# Patient Record
Sex: FEMALE | Race: WHITE | Employment: OTHER | URBAN - METROPOLITAN AREA
[De-identification: names, ages, dates, MRNs, and addresses within clinical notes are randomized per-mention and may not be internally consistent; named-entity substitution may affect disease eponyms.]

---

## 2019-05-29 ENCOUNTER — OP HISTORICAL/CONVERTED ENCOUNTER (OUTPATIENT)
Dept: OTHER | Age: 69
End: 2019-05-29

## 2019-06-11 ENCOUNTER — IP HISTORICAL/CONVERTED ENCOUNTER (OUTPATIENT)
Dept: OTHER | Age: 69
End: 2019-06-11

## 2019-07-03 ENCOUNTER — ED HISTORICAL/CONVERTED ENCOUNTER (OUTPATIENT)
Dept: OTHER | Age: 69
End: 2019-07-03

## 2019-07-10 ENCOUNTER — ED HISTORICAL/CONVERTED ENCOUNTER (OUTPATIENT)
Dept: OTHER | Age: 69
End: 2019-07-10

## 2019-07-18 ENCOUNTER — OP HISTORICAL/CONVERTED ENCOUNTER (OUTPATIENT)
Dept: OTHER | Age: 69
End: 2019-07-18

## 2019-07-29 ENCOUNTER — ED HISTORICAL/CONVERTED ENCOUNTER (OUTPATIENT)
Dept: OTHER | Age: 69
End: 2019-07-29

## 2019-08-02 ENCOUNTER — OP HISTORICAL/CONVERTED ENCOUNTER (OUTPATIENT)
Dept: OTHER | Age: 69
End: 2019-08-02

## 2019-08-09 ENCOUNTER — OP HISTORICAL/CONVERTED ENCOUNTER (OUTPATIENT)
Dept: OTHER | Age: 69
End: 2019-08-09

## 2019-08-25 ENCOUNTER — IP HISTORICAL/CONVERTED ENCOUNTER (OUTPATIENT)
Dept: OTHER | Age: 69
End: 2019-08-25

## 2019-09-02 ENCOUNTER — ED HISTORICAL/CONVERTED ENCOUNTER (OUTPATIENT)
Dept: OTHER | Age: 69
End: 2019-09-02

## 2019-09-09 ENCOUNTER — IP HISTORICAL/CONVERTED ENCOUNTER (OUTPATIENT)
Dept: OTHER | Age: 69
End: 2019-09-09

## 2019-09-09 LAB — HBA1C MFR BLD HPLC: 5.1 %

## 2019-09-18 ENCOUNTER — OP HISTORICAL/CONVERTED ENCOUNTER (OUTPATIENT)
Dept: OTHER | Age: 69
End: 2019-09-18

## 2019-09-25 ENCOUNTER — OP HISTORICAL/CONVERTED ENCOUNTER (OUTPATIENT)
Dept: OTHER | Age: 69
End: 2019-09-25

## 2019-11-20 ENCOUNTER — IP HISTORICAL/CONVERTED ENCOUNTER (OUTPATIENT)
Dept: OTHER | Age: 69
End: 2019-11-20

## 2019-12-18 LAB
INR, EXTERNAL: 36.6
PT, EXTERNAL: 3

## 2020-01-13 ENCOUNTER — ED HISTORICAL/CONVERTED ENCOUNTER (OUTPATIENT)
Dept: OTHER | Age: 70
End: 2020-01-13

## 2020-02-21 ENCOUNTER — ED HISTORICAL/CONVERTED ENCOUNTER (OUTPATIENT)
Dept: OTHER | Age: 70
End: 2020-02-21

## 2020-03-16 ENCOUNTER — ED HISTORICAL/CONVERTED ENCOUNTER (OUTPATIENT)
Dept: OTHER | Age: 70
End: 2020-03-16

## 2020-06-30 LAB
INR, EXTERNAL: 1.8
PT, EXTERNAL: 22.1

## 2020-07-03 VITALS
HEART RATE: 63 BPM | DIASTOLIC BLOOD PRESSURE: 76 MMHG | SYSTOLIC BLOOD PRESSURE: 144 MMHG | OXYGEN SATURATION: 98 % | WEIGHT: 180 LBS | TEMPERATURE: 97.3 F | BODY MASS INDEX: 29.99 KG/M2 | HEIGHT: 65 IN

## 2020-07-03 PROBLEM — Z79.2 LONG TERM (CURRENT) USE OF ANTIBIOTICS: Status: ACTIVE | Noted: 2020-07-03

## 2020-07-03 PROBLEM — I48.91 ATRIAL FIBRILLATION (HCC): Status: ACTIVE | Noted: 2020-07-03

## 2020-07-03 PROBLEM — Z95.0 CARDIAC PACEMAKER IN SITU: Status: ACTIVE | Noted: 2020-07-03

## 2020-07-03 PROBLEM — E03.9 ACQUIRED HYPOTHYROIDISM: Status: ACTIVE | Noted: 2020-07-03

## 2020-07-03 PROBLEM — E87.1 HYPONATREMIA: Status: ACTIVE | Noted: 2020-07-03

## 2020-07-03 PROBLEM — I73.9 PERIPHERAL VASCULAR DISEASE (HCC): Status: ACTIVE | Noted: 2020-07-03

## 2020-07-03 PROBLEM — G89.29 CHRONIC NECK PAIN: Status: ACTIVE | Noted: 2020-07-03

## 2020-07-03 PROBLEM — M54.2 CHRONIC NECK PAIN: Status: ACTIVE | Noted: 2020-07-03

## 2020-07-03 PROBLEM — K52.9 CHRONIC DIARRHEA: Status: ACTIVE | Noted: 2020-07-03

## 2020-07-03 PROBLEM — G56.01 CARPAL TUNNEL SYNDROME OF RIGHT WRIST: Status: ACTIVE | Noted: 2020-07-03

## 2020-07-03 PROBLEM — G40.909 SEIZURE DISORDER (HCC): Status: ACTIVE | Noted: 2020-07-03

## 2020-07-03 PROBLEM — E11.9 TYPE II DIABETES MELLITUS (HCC): Status: ACTIVE | Noted: 2020-07-03

## 2020-07-03 PROBLEM — N18.6 END STAGE RENAL FAILURE ON DIALYSIS (HCC): Status: ACTIVE | Noted: 2020-07-03

## 2020-07-03 PROBLEM — Z99.2 END STAGE RENAL FAILURE ON DIALYSIS (HCC): Status: ACTIVE | Noted: 2020-07-03

## 2020-07-03 PROBLEM — R07.89 CHEST WALL PAIN: Status: ACTIVE | Noted: 2020-07-03

## 2020-07-03 PROBLEM — R29.6 RECURRENT FALLS: Status: ACTIVE | Noted: 2020-07-03

## 2020-07-03 PROBLEM — Z86.73 HISTORY OF CVA (CEREBROVASCULAR ACCIDENT): Status: ACTIVE | Noted: 2020-07-03

## 2020-07-03 RX ORDER — MIDODRINE HYDROCHLORIDE 5 MG/1
TABLET ORAL 3 TIMES DAILY
COMMUNITY
End: 2021-05-01

## 2020-07-03 RX ORDER — FAMOTIDINE 40 MG/1
40 TABLET, FILM COATED ORAL DAILY
COMMUNITY
End: 2020-09-05

## 2020-07-03 RX ORDER — WARFARIN 4 MG/1
4 TABLET ORAL DAILY
COMMUNITY
End: 2021-05-10 | Stop reason: ALTCHOICE

## 2020-07-03 RX ORDER — ATORVASTATIN CALCIUM 40 MG/1
TABLET, FILM COATED ORAL DAILY
COMMUNITY
End: 2021-01-01

## 2020-07-03 RX ORDER — FLUOXETINE 10 MG/1
10 TABLET ORAL DAILY
COMMUNITY
End: 2020-09-15

## 2020-07-03 RX ORDER — LACOSAMIDE 100 MG/1
150 TABLET ORAL 2 TIMES DAILY
Status: ON HOLD | COMMUNITY
End: 2021-05-21 | Stop reason: SDUPTHER

## 2020-07-03 RX ORDER — LOPERAMIDE HYDROCHLORIDE 2 MG/1
CAPSULE ORAL
COMMUNITY
End: 2020-10-16

## 2020-07-03 RX ORDER — AMMONIUM LACTATE 12 G/100G
LOTION TOPICAL AS NEEDED
COMMUNITY
End: 2020-10-16

## 2020-07-03 RX ORDER — LEVETIRACETAM 1000 MG/1
TABLET ORAL 2 TIMES DAILY
COMMUNITY
End: 2021-05-10 | Stop reason: SDUPTHER

## 2020-07-03 RX ORDER — LEVOTHYROXINE SODIUM 150 UG/1
TABLET ORAL
COMMUNITY
End: 2021-03-26

## 2020-07-03 RX ORDER — ACETAMINOPHEN AND CODEINE PHOSPHATE 300; 30 MG/1; MG/1
1 TABLET ORAL
COMMUNITY
End: 2020-08-07 | Stop reason: SDUPTHER

## 2020-07-03 RX ORDER — LANSOPRAZOLE 30 MG/1
CAPSULE, DELAYED RELEASE ORAL
COMMUNITY
End: 2020-09-05 | Stop reason: ALTCHOICE

## 2020-07-03 RX ORDER — CAPSAICIN 0.1 %
CREAM (GRAM) TOPICAL 3 TIMES DAILY
COMMUNITY
End: 2020-10-16

## 2020-07-03 RX ORDER — SEVELAMER CARBONATE 800 MG/1
TABLET, FILM COATED ORAL 3 TIMES DAILY
COMMUNITY
End: 2020-10-16

## 2020-07-03 RX ORDER — METOPROLOL TARTRATE 25 MG/1
TABLET, FILM COATED ORAL 2 TIMES DAILY
COMMUNITY
End: 2020-11-16

## 2020-07-17 ENCOUNTER — OP HISTORICAL/CONVERTED ENCOUNTER (OUTPATIENT)
Dept: OTHER | Age: 70
End: 2020-07-17

## 2020-07-29 LAB
INR PPP: 2.1 (ref 0.8–1.2)
PROTHROMBIN TIME: 21.9 SEC (ref 9.1–12)

## 2020-08-07 ENCOUNTER — NURSE TRIAGE (OUTPATIENT)
Dept: INTERNAL MEDICINE CLINIC | Age: 70
End: 2020-08-07

## 2020-08-07 DIAGNOSIS — M54.2 CHRONIC NECK PAIN: Primary | ICD-10-CM

## 2020-08-07 DIAGNOSIS — G89.29 CHRONIC NECK PAIN: Primary | ICD-10-CM

## 2020-08-07 RX ORDER — ACETAMINOPHEN AND CODEINE PHOSPHATE 300; 30 MG/1; MG/1
1 TABLET ORAL
Qty: 120 TAB | Refills: 2 | Status: SHIPPED | OUTPATIENT
Start: 2020-08-07 | End: 2020-09-17 | Stop reason: SDUPTHER

## 2020-08-07 NOTE — PROGRESS NOTES
Pain management: This patient is on narcotic medication for pain relief. We reviewed and discussed the following;  Etiology: Chronic neck pain   Therapeutic modalities and nonnarcotic medications:   current narcotic: Tylenol #3  Urine drug screen: June 2020 positive for codeine only   Prescription monitoring program: reviewed, last refill 4/16/2020, she uses it as needed only, Current MME/day:   0.00   30 Day Avg MME/day:   0.00    concurrent benzodiazepines: no  Side effects: none  Goals of therapy: reduce pain to at least 4/10 and improve sleep (she was not able to sleep due to pain)  Consultations regarding pain: She has seen neurology   Adjustments to be made: continue 1 tablet every 6 hours as needed   Controlled substance agreement: yes from April 2020  Risk of narcotic therapy: 5.6%  MME (if greater than 120 needs naloxone rx): no      ICD-10-CM ICD-9-CM    1.  Chronic neck pain  M54.2 723.1 acetaminophen-codeine (TYLENOL #3) 300-30 mg per tablet    G89.29 338.29

## 2020-08-17 ENCOUNTER — OFFICE VISIT (OUTPATIENT)
Dept: PODIATRY | Age: 70
End: 2020-08-17
Payer: MEDICARE

## 2020-08-17 VITALS
BODY MASS INDEX: 26.06 KG/M2 | TEMPERATURE: 97.5 F | OXYGEN SATURATION: 99 % | HEART RATE: 64 BPM | DIASTOLIC BLOOD PRESSURE: 70 MMHG | SYSTOLIC BLOOD PRESSURE: 112 MMHG | HEIGHT: 65 IN | WEIGHT: 156.4 LBS

## 2020-08-17 DIAGNOSIS — I73.9 PERIPHERAL VASCULAR DISEASE (HCC): Primary | ICD-10-CM

## 2020-08-17 DIAGNOSIS — E11.51 TYPE 2 DIABETES MELLITUS WITH DIABETIC PERIPHERAL ANGIOPATHY WITHOUT GANGRENE, WITHOUT LONG-TERM CURRENT USE OF INSULIN (HCC): ICD-10-CM

## 2020-08-17 PROCEDURE — 99213 OFFICE O/P EST LOW 20 MIN: CPT | Performed by: PODIATRIST

## 2020-08-17 NOTE — PROGRESS NOTES
HISTORY OF PRESENT ILLNESS  Jeimy De León is a 72 y.o. female is being seen in the office as a returning patient to discuss her most recent non-invasive vascular studies and tx options. She states she has been applying the lotion twice daily as directed that she was prescribed last office visit. She states she is now presented to be fitted for diabetic shoes/insoles. She states her legs and feet cont to be very cold. She states she has held off on the vascular surgeon consult until we discussed her noninvasive vascular studies here in the office today. She continues to deny any recent trauma. She continues to deny any systemic signs of infection. She continues to deny any other pedal complaints    Review of Systems   Constitutional: Negative. HENT: Negative. Eyes: Negative. Respiratory: Negative. Cardiovascular: Negative. Gastrointestinal: Negative. Genitourinary: Negative. Musculoskeletal: Positive for joint pain. Skin: Negative. Neurological: Positive for sensory change. Endo/Heme/Allergies: Bruises/bleeds easily. Psychiatric/Behavioral: Positive for depression. All other systems reviewed and are negative. Physical Exam  Vitals signs reviewed. Constitutional:       Appearance: Normal appearance. She is overweight. Cardiovascular:      Pulses:           Dorsalis pedis pulses are 0 on the right side and 0 on the left side. Posterior tibial pulses are 0 on the right side and 0 on the left side. Musculoskeletal:      Right ankle: She exhibits decreased range of motion and swelling. Achilles tendon exhibits no pain and no defect. Left ankle: She exhibits decreased range of motion and swelling. Achilles tendon exhibits no pain and no defect. Right lower leg: Edema present. Left lower leg: Edema present. Right foot: Decreased range of motion. No deformity, bunion or Charcot foot. Left foot: Decreased range of motion.  No deformity, bunion or Charcot foot. Feet:      Right foot:      Protective Sensation: 10 sites tested. 0 sites sensed. Skin integrity: Dry skin present. Toenail Condition: Right toenails are abnormally thick. Left foot:      Protective Sensation: 10 sites tested. 0 sites sensed. Skin integrity: Dry skin present. Toenail Condition: Left toenails are abnormally thick. Skin:     General: Skin is warm. Capillary Refill: Capillary refill takes 2 to 3 seconds. Neurological:      Mental Status: She is alert and oriented to person, place, and time. Psychiatric:         Mood and Affect: Mood and affect normal.         Behavior: Behavior is cooperative. ASSESSMENT and PLAN    ICD-10-CM ICD-9-CM    1. Peripheral vascular disease ([facility])  I73.9 443.9    2. Type 2 diabetes mellitus with diabetic peripheral angiopathy without gangrene, without long-term current use of insulin ([facility])  E11.51 250.70      443.81        Discussed and educated patient regarding her current medical condition  Personally reviewed noninvasive vascular studies and discussed findings with patient noting no inflow disease to bilateral lower extremities  Instructed patient to monitor their feet daily, be compliant with all medications and wear supportive shoe gear.

## 2020-08-31 VITALS
DIASTOLIC BLOOD PRESSURE: 76 MMHG | TEMPERATURE: 97.3 F | SYSTOLIC BLOOD PRESSURE: 144 MMHG | HEIGHT: 65 IN | HEART RATE: 63 BPM | BODY MASS INDEX: 29.99 KG/M2 | OXYGEN SATURATION: 98 % | WEIGHT: 180 LBS

## 2020-08-31 PROBLEM — I87.2 PERIPHERAL VENOUS INSUFFICIENCY: Status: ACTIVE | Noted: 2020-08-31

## 2020-08-31 PROBLEM — E11.8 DISORDER DUE TO WELL CONTROLLED TYPE 2 DIABETES MELLITUS (HCC): Status: ACTIVE | Noted: 2020-08-31

## 2020-08-31 PROBLEM — L02.219 CELLULITIS AND ABSCESS OF TRUNK: Status: ACTIVE | Noted: 2020-08-31

## 2020-08-31 PROBLEM — I70.25 ATHEROSCLEROSIS OF NATIVE ARTERIES OF THE EXTREMITIES WITH ULCERATION (HCC): Status: ACTIVE | Noted: 2020-08-31

## 2020-08-31 PROBLEM — L03.319 CELLULITIS AND ABSCESS OF TRUNK: Status: ACTIVE | Noted: 2020-08-31

## 2020-08-31 PROBLEM — I83.10 VARICOSE VEINS OF LOWER EXTREMITY WITH INFLAMMATION: Status: ACTIVE | Noted: 2020-08-31

## 2020-08-31 RX ORDER — PHENYTOIN SODIUM 100 MG/1
CAPSULE, EXTENDED RELEASE ORAL
COMMUNITY
End: 2020-10-16

## 2020-08-31 RX ORDER — QUETIAPINE FUMARATE 25 MG/1
TABLET, FILM COATED ORAL
COMMUNITY
End: 2020-10-16

## 2020-08-31 RX ORDER — HYDROCODONE BITARTRATE AND ACETAMINOPHEN 5; 325 MG/1; MG/1
TABLET ORAL
COMMUNITY
End: 2020-09-17 | Stop reason: ALTCHOICE

## 2020-08-31 RX ORDER — TRAMADOL HYDROCHLORIDE 50 MG/1
50 TABLET ORAL
COMMUNITY
End: 2020-09-17 | Stop reason: ALTCHOICE

## 2020-08-31 RX ORDER — VANCOMYCIN HYDROCHLORIDE 125 MG/1
CAPSULE ORAL 4 TIMES DAILY
COMMUNITY
End: 2020-09-17 | Stop reason: ALTCHOICE

## 2020-08-31 RX ORDER — OMEPRAZOLE 20 MG/1
20 CAPSULE, DELAYED RELEASE ORAL DAILY
COMMUNITY
End: 2020-09-05 | Stop reason: ALTCHOICE

## 2020-08-31 RX ORDER — GABAPENTIN 300 MG/1
300 CAPSULE ORAL 3 TIMES DAILY
COMMUNITY
End: 2020-10-16

## 2020-08-31 RX ORDER — PHENYTOIN 50 MG/1
TABLET, CHEWABLE ORAL 3 TIMES DAILY
COMMUNITY
End: 2020-10-16

## 2020-09-05 RX ORDER — FAMOTIDINE 40 MG/1
TABLET, FILM COATED ORAL
Qty: 90 TAB | Refills: 0 | Status: SHIPPED | OUTPATIENT
Start: 2020-09-05 | End: 2020-10-16

## 2020-09-15 RX ORDER — FLUOXETINE 10 MG/1
TABLET ORAL
Qty: 90 TAB | Refills: 1 | Status: SHIPPED | OUTPATIENT
Start: 2020-09-15 | End: 2020-10-16

## 2020-09-17 ENCOUNTER — VIRTUAL VISIT (OUTPATIENT)
Dept: INTERNAL MEDICINE CLINIC | Age: 70
End: 2020-09-17
Payer: MEDICARE

## 2020-09-17 DIAGNOSIS — M54.2 CHRONIC NECK PAIN: ICD-10-CM

## 2020-09-17 DIAGNOSIS — Z51.81 MEDICATION MONITORING ENCOUNTER: Primary | ICD-10-CM

## 2020-09-17 DIAGNOSIS — G89.29 CHRONIC NECK PAIN: ICD-10-CM

## 2020-09-17 PROCEDURE — 99213 OFFICE O/P EST LOW 20 MIN: CPT | Performed by: INTERNAL MEDICINE

## 2020-09-17 RX ORDER — ACETAMINOPHEN AND CODEINE PHOSPHATE 300; 30 MG/1; MG/1
1 TABLET ORAL
Qty: 120 TAB | Refills: 0 | Status: SHIPPED | OUTPATIENT
Start: 2020-09-17 | End: 2021-05-10 | Stop reason: SDUPTHER

## 2020-09-17 NOTE — PROGRESS NOTES
Valdez Kiran is a 72 y.o. female and presents with Medication Refill    THIS VISIT WAS COMPLETED VIRTUALLY VIA [facility]. ME WITH PATIENTS CONSENT IN THE SETTING OF CORONAVIRUS Jose Tay is in the screen with her daughter Loren Leong, we are discussing today about her chronic necl pain. She had been taking tylenol #3 as needed only and very ocassionally, so I want to reassess this and see if she really needs to continue having this prescribed chronically. Last time I refilled it for few days was in April. She says that the pharmacy was not filling the medication apparently due to insurance issues. She says her neck, arms and legs hurt, sometimes even her \"butt\" hurts. She says the pain is 11/10 and it's probably worse at night time. She used to take the tylenol #3 twice a day and with that her pain will be controlled and she was able to function, walk a little bit (at baseline she does not walk much) and sleep. , which she is not currently able to do      She continues warfarin 4 mg/day for Afib, last INR 8/31 done at podiatry office 3.0, therapeutic. No issues  Continues HD with no issues, her nephrologist is Dr. Lorelei Caban. Review of Systems  Review of Systems   Constitutional: Negative for chills, fatigue, fever and unexpected weight change. HENT: Negative for congestion, ear pain, sneezing and sore throat. Eyes: Negative for pain and discharge. Respiratory: Negative for cough, shortness of breath and wheezing. Cardiovascular: Negative for chest pain, palpitations and leg swelling. Gastrointestinal: Negative for abdominal pain, blood in stool, constipation and diarrhea. Endocrine: Negative for polydipsia and polyuria. Genitourinary: Negative for difficulty urinating, dysuria, frequency, hematuria and urgency. Musculoskeletal: Negative for arthralgias, back pain and joint swelling. Skin: Negative for rash.    Allergic/Immunologic: Negative for environmental allergies and food allergies. Neurological: Negative for dizziness, speech difficulty, weakness, light-headedness, numbness and headaches. Hematological: Negative for adenopathy. Psychiatric/Behavioral: Negative for behavioral problems (Depression), sleep disturbance and suicidal ideas.           Past Medical History:   Diagnosis Date    Anxiety disorder     Arthritis     Atherosclerosis of native arteries of the extremities with ulceration ([facility] 8/31/2020    Atrial fibrillation (HCC)     Cardiac pacemaker in situ     Cellulitis and abscess of trunk 8/31/2020    Depression, postpartum     Diabetes [facility]     Disorder due to well controlled type 2 diabetes mellitus ([facility] 8/31/2020    Dizziness     Heart disease     Heartburn     Hx of long term use of blood thinners     Hypercholesterolemia     Hypertension     Hyponatremia     Hypothyroidism     Kidney disease     Migraines     Peripheral venous insufficiency 8/31/2020    Renal failure     Seizures ([facility]     Sleep disorder     Stroke ([facility]     Stroke ([facility]     Thyroid disease     Varicose veins of lower extremity with inflammation 8/31/2020     Past Surgical History:   Procedure Laterality Date    HX BACK SURGERY      HX OTHER SURGICAL      leg surgery     HX OTHER SURGICAL      pacemaker monitor      Social History     Socioeconomic History    Marital status: UNKNOWN     Spouse name: Not on file    Number of children: Not on file    Years of education: Not on file    Highest education level: Not on file   Tobacco Use    Smoking status: Never Smoker    Smokeless tobacco: Never Used   Substance and Sexual Activity    Alcohol use: Never     Frequency: Never     Family History   Problem Relation Age of Onset    Heart Disease Mother     Heart Disease Father     Diabetes Sister     Diabetes Brother      Current Outpatient Medications   Medication Sig Dispense Refill    acetaminophen-codeine (TYLENOL #3) 300-30 mg per tablet Take 1 Tab by mouth every six (6) hours as needed for Pain for up to 30 days. Max Daily Amount: 4 Tabs. 120 Tab 0    FLUoxetine (PROzac) 10 mg tablet Take 1 tablet by mouth once daily for 30 days 90 Tab 1    ammonium lactate (LAC-HYDRIN) 12 % lotion Apply  to affected area as needed. rub in to affected area well      atorvastatin (LIPITOR) 40 mg tablet Take  by mouth daily.  Calcium-Cholecalciferol, D3, (Calcium 600 with Vitamin D3) 600 mg(1,500mg) -400 unit cap Take  by mouth.  levETIRAcetam 1,000 mg tablet Take  by mouth two (2) times a day.  levothyroxine (SYNTHROID) 150 mcg tablet Take  by mouth Daily (before breakfast).  loperamide (IMODIUM) 2 mg capsule Take  by mouth four (4) times daily as needed for Diarrhea.  metoprolol tartrate (LOPRESSOR) 25 mg tablet Take  by mouth two (2) times a day. Take one and one half tab by mouth twice daily      midodrine (PROAMATINE) 5 mg tablet Take  by mouth three (3) times daily.  sevelamer carbonate (RENVELA) 800 mg tab tab Take  by mouth three (3) times daily.  warfarin (COUMADIN) 4 mg tablet Take 4 mg by mouth daily.  famotidine (PEPCID) 40 mg tablet Take 1 tablet by mouth once daily for 90 days 90 Tab 0    gabapentin (NEURONTIN) 300 mg capsule Take 300 mg by mouth three (3) times daily.  phenytoin (DILANTIN) 50 mg chewable tablet Take  by mouth three (3) times daily.  phenytoin ER (DILANTIN ER) 100 mg ER capsule Take  by mouth.  QUEtiapine (SEROquel) 25 mg tablet Take  by mouth.  capsaicin (CAPZASIN-HP) 0.1 % topical cream Apply  to affected area three (3) times daily.  lacosamide (Vimpat) 100 mg tab tablet Take 150 mg by mouth two (2) times a day. No Known Allergies    Objective: There were no vitals taken for this visit. Physical Exam:   Physical Exam  Vitals signs and nursing note reviewed. Constitutional:       Appearance: Normal appearance. HENT:      Head: Normocephalic and atraumatic.    Eyes: General: No scleral icterus. Conjunctiva/sclera: Conjunctivae normal.      Pupils: Pupils are equal, round, and reactive to light. Neck:      Musculoskeletal: Normal range of motion. Skin:     Coloration: Skin is not jaundiced or pale. Findings: No rash. Neurological:      Mental Status: She is alert and oriented to person, place, and time. Psychiatric:         Mood and Affect: Mood normal.         Behavior: Behavior normal.         Thought Content: Thought content normal.         Judgment: Judgment normal.          Results for orders placed or performed in visit on 08/31/20   AMB PT/INR EXTERNAL   Result Value Ref Range    Prothrombin time, External 22.1     INR, External 1.8    AMB PT/INR EXTERNAL   Result Value Ref Range    Prothrombin time, External 3.0     INR, External 36.6    AMB EXT HGBA1C   Result Value Ref Range    Hemoglobin A1c, External 5.1        Assessment/Plan:    Tylenol alone does not help her multiple pains as mentioned above, she cannot take NSAIDs since she is on warfarin and she is also ESRD on hemodialysis, so she benefits from narcotic to control the pain, Tylenol 3 has been effective before, and the goal is to achieve function and help her sleep better and be able to sleep. I will restart the Tylenol 3, we have in the past signed pain agreement, will have it uploaded been epic, she needs to repeat drug urine test thought the last one we did was in June which was unremarkable. 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 No need for refills at this moment. ICD-10-CM ICD-9-CM    1.  Medication monitoring encounter  Z51.81 V58.83 TOXASSURE SELECT 13 (MW)   2. Chronic neck pain  M54.2 723.1 acetaminophen-codeine (TYLENOL #3) 300-30 mg per tablet    G89.29 338.29 TOXASSURE SELECT 13 (MW)     Orders Placed This Encounter    TOXASSURE SELECT 13 (MW)    acetaminophen-codeine (TYLENOL #3) 300-30 mg per tablet     Sig: Take 1 Tab by mouth every six (6) hours as needed for Pain for up to 30 days. Max Daily Amount: 4 Tabs. Dispense:  120 Tab     Refill:  0     She has progressive pain, NSAIDs contraidicated, tylenol alone not effective. Please submit PA request if necessary and give her initial 7 days supply while we complete the PA. Thank you. There are no Patient Instructions on file for this visit. Jade Johnson is a 72 y.o. female being evaluated by a Virtual Visit (video visit) encounter to address concerns as mentioned above. A caregiver was present when appropriate. Due to this being a TeleHealth encounter (During [facility] public health emergency), evaluation of the following organ systems was limited: Vitals/Constitutional/EENT/Resp/CV/GI//MS/Neuro/Skin/Heme-Lymph-Imm. Pursuant to the emergency declaration under the [address] [place] [address], [address] [place] [address] authority and the [employer] Act, this Virtual Visit was conducted with patient's (and/or legal guardian's) consent, to reduce the risk of exposure to COVID-19 and provide necessary medical care. Services were provided through a video synchronous discussion virtually to substitute for in-person encounter. --Yolis Mccarthy MD on 9/17/2020 at 11:10 AM    An electronic signature was used to authenticate this note.

## 2020-10-16 ENCOUNTER — HOSPITAL ENCOUNTER (OUTPATIENT)
Age: 70
Setting detail: OBSERVATION
Discharge: HOME | End: 2020-10-17
Attending: EMERGENCY MEDICINE | Admitting: HOSPITALIST
Payer: MEDICARE

## 2020-10-16 ENCOUNTER — APPOINTMENT (OUTPATIENT)
Dept: GENERAL RADIOLOGY | Age: 70
End: 2020-10-16
Attending: EMERGENCY MEDICINE
Payer: MEDICARE

## 2020-10-16 DIAGNOSIS — E87.5 HYPERKALEMIA: Primary | ICD-10-CM

## 2020-10-16 LAB
ALBUMIN SERPL-MCNC: 3.3 G/DL (ref 3.5–5)
ALBUMIN/GLOB SERPL: 1.2 {RATIO} (ref 1.1–2.2)
ALP SERPL-CCNC: 178 U/L (ref 45–117)
ALT SERPL-CCNC: 37 U/L (ref 12–78)
ANION GAP SERPL CALC-SCNC: 6 MMOL/L (ref 5–15)
AST SERPL W P-5'-P-CCNC: 31 U/L (ref 15–37)
ATRIAL RATE: 56 BPM
BASOPHILS # BLD: 0 K/UL (ref 0–0.1)
BASOPHILS NFR BLD: 1 % (ref 0–1)
BILIRUB SERPL-MCNC: 0.5 MG/DL (ref 0.2–1)
BNP SERPL-MCNC: 9151 PG/ML
BUN SERPL-MCNC: 64 MG/DL (ref 6–20)
BUN/CREAT SERPL: 15 (ref 12–20)
CA-I BLD-MCNC: 8.4 MG/DL (ref 8.5–10.1)
CALCULATED R AXIS, ECG10: 87 DEGREES
CALCULATED T AXIS, ECG11: -69 DEGREES
CHLORIDE SERPL-SCNC: 96 MMOL/L (ref 97–108)
CO2 SERPL-SCNC: 29 MMOL/L (ref 21–32)
CREAT SERPL-MCNC: 4.23 MG/DL (ref 0.55–1.02)
DIAGNOSIS, 93000: NORMAL
DIFFERENTIAL METHOD BLD: ABNORMAL
EOSINOPHIL # BLD: 0 K/UL (ref 0–0.4)
EOSINOPHIL NFR BLD: 0 % (ref 0–7)
ERYTHROCYTE [DISTWIDTH] IN BLOOD BY AUTOMATED COUNT: 12.9 % (ref 11.5–14.5)
GLOBULIN SER CALC-MCNC: 2.8 G/DL (ref 2–4)
GLUCOSE SERPL-MCNC: 85 MG/DL (ref 65–100)
HCT VFR BLD AUTO: 37.9 % (ref 35–47)
HGB BLD-MCNC: 12.3 G/DL (ref 11.5–16)
IMM GRANULOCYTES # BLD AUTO: 0 K/UL (ref 0–0.04)
IMM GRANULOCYTES NFR BLD AUTO: 0 % (ref 0–0.5)
LYMPHOCYTES # BLD: 1.6 K/UL (ref 0.8–3.5)
LYMPHOCYTES NFR BLD: 25 % (ref 12–49)
MCH RBC QN AUTO: 31.1 PG (ref 26–34)
MCHC RBC AUTO-ENTMCNC: 32.5 G/DL (ref 30–36.5)
MCV RBC AUTO: 95.9 FL (ref 80–99)
MONOCYTES # BLD: 0.6 K/UL (ref 0–1)
MONOCYTES NFR BLD: 10 % (ref 5–13)
NEUTS SEG # BLD: 4.1 K/UL (ref 1.8–8)
NEUTS SEG NFR BLD: 64 % (ref 32–75)
PLATELET # BLD AUTO: 112 K/UL (ref 150–400)
PMV BLD AUTO: 11.9 FL (ref 8.9–12.9)
POTASSIUM SERPL-SCNC: 5.9 MMOL/L (ref 3.5–5.1)
PROT SERPL-MCNC: 6.1 G/DL (ref 6.4–8.2)
Q-T INTERVAL, ECG07: 220 MS
QRS DURATION, ECG06: 106 MS
QTC CALCULATION (BEZET), ECG08: 313 MS
RBC # BLD AUTO: 3.95 M/UL (ref 3.8–5.2)
SODIUM SERPL-SCNC: 131 MMOL/L (ref 136–145)
TROPONIN I SERPL-MCNC: <0.05 NG/ML
VENTRICULAR RATE, ECG03: 122 BPM
WBC # BLD AUTO: 6.4 K/UL (ref 3.6–11)

## 2020-10-16 PROCEDURE — 84484 ASSAY OF TROPONIN QUANT: CPT

## 2020-10-16 PROCEDURE — 87635 SARS-COV-2 COVID-19 AMP PRB: CPT

## 2020-10-16 PROCEDURE — 80053 COMPREHEN METABOLIC PANEL: CPT

## 2020-10-16 PROCEDURE — 36415 COLL VENOUS BLD VENIPUNCTURE: CPT

## 2020-10-16 PROCEDURE — 83880 ASSAY OF NATRIURETIC PEPTIDE: CPT

## 2020-10-16 PROCEDURE — 99218 HC RM OBSERVATION: CPT

## 2020-10-16 PROCEDURE — 71046 X-RAY EXAM CHEST 2 VIEWS: CPT

## 2020-10-16 PROCEDURE — 96372 THER/PROPH/DIAG INJ SC/IM: CPT

## 2020-10-16 PROCEDURE — 85025 COMPLETE CBC W/AUTO DIFF WBC: CPT

## 2020-10-16 PROCEDURE — 74011250637 HC RX REV CODE- 250/637: Performed by: PHYSICIAN ASSISTANT

## 2020-10-16 PROCEDURE — 93005 ELECTROCARDIOGRAM TRACING: CPT

## 2020-10-16 PROCEDURE — 99284 EMERGENCY DEPT VISIT MOD MDM: CPT

## 2020-10-16 RX ORDER — POLYETHYLENE GLYCOL 3350 17 G/17G
17 POWDER, FOR SOLUTION ORAL DAILY PRN
Status: DISCONTINUED | OUTPATIENT
Start: 2020-10-16 | End: 2020-10-17 | Stop reason: HOSPADM

## 2020-10-16 RX ORDER — SODIUM POLYSTYRENE SULFONATE 15 G/60ML
30 SUSPENSION ORAL; RECTAL
Status: COMPLETED | OUTPATIENT
Start: 2020-10-16 | End: 2020-10-17

## 2020-10-16 RX ORDER — LEVOTHYROXINE SODIUM 75 UG/1
150 TABLET ORAL
Status: DISCONTINUED | OUTPATIENT
Start: 2020-10-17 | End: 2020-10-17 | Stop reason: HOSPADM

## 2020-10-16 RX ORDER — ACETAMINOPHEN 650 MG/1
650 SUPPOSITORY RECTAL
Status: DISCONTINUED | OUTPATIENT
Start: 2020-10-16 | End: 2020-10-17 | Stop reason: HOSPADM

## 2020-10-16 RX ORDER — LEVETIRACETAM 500 MG/1
1000 TABLET ORAL 2 TIMES DAILY
Status: DISCONTINUED | OUTPATIENT
Start: 2020-10-16 | End: 2020-10-17 | Stop reason: HOSPADM

## 2020-10-16 RX ORDER — SODIUM CHLORIDE 0.9 % (FLUSH) 0.9 %
5-40 SYRINGE (ML) INJECTION AS NEEDED
Status: DISCONTINUED | OUTPATIENT
Start: 2020-10-16 | End: 2020-10-17 | Stop reason: HOSPADM

## 2020-10-16 RX ORDER — FAMOTIDINE 40 MG/1
40 TABLET, FILM COATED ORAL DAILY
COMMUNITY
End: 2020-12-22

## 2020-10-16 RX ORDER — METOPROLOL TARTRATE 25 MG/1
25 TABLET, FILM COATED ORAL 2 TIMES DAILY
Status: DISCONTINUED | OUTPATIENT
Start: 2020-10-16 | End: 2020-10-17 | Stop reason: HOSPADM

## 2020-10-16 RX ORDER — SODIUM CHLORIDE 0.9 % (FLUSH) 0.9 %
5-40 SYRINGE (ML) INJECTION EVERY 8 HOURS
Status: DISCONTINUED | OUTPATIENT
Start: 2020-10-16 | End: 2020-10-17 | Stop reason: HOSPADM

## 2020-10-16 RX ORDER — FAMOTIDINE 20 MG/1
40 TABLET, FILM COATED ORAL DAILY
Status: DISCONTINUED | OUTPATIENT
Start: 2020-10-17 | End: 2020-10-17 | Stop reason: HOSPADM

## 2020-10-16 RX ORDER — ONDANSETRON 2 MG/ML
4 INJECTION INTRAMUSCULAR; INTRAVENOUS
Status: DISCONTINUED | OUTPATIENT
Start: 2020-10-16 | End: 2020-10-17 | Stop reason: HOSPADM

## 2020-10-16 RX ORDER — ACETAMINOPHEN 325 MG/1
650 TABLET ORAL
Status: DISCONTINUED | OUTPATIENT
Start: 2020-10-16 | End: 2020-10-17 | Stop reason: HOSPADM

## 2020-10-16 RX ORDER — MIDODRINE HYDROCHLORIDE 5 MG/1
5 TABLET ORAL 3 TIMES DAILY
Status: DISCONTINUED | OUTPATIENT
Start: 2020-10-16 | End: 2020-10-17 | Stop reason: HOSPADM

## 2020-10-16 RX ORDER — PROMETHAZINE HYDROCHLORIDE 25 MG/1
12.5 TABLET ORAL
Status: DISCONTINUED | OUTPATIENT
Start: 2020-10-16 | End: 2020-10-17 | Stop reason: HOSPADM

## 2020-10-16 RX ORDER — HEPARIN SODIUM 10000 [USP'U]/ML
5000 INJECTION, SOLUTION INTRAVENOUS; SUBCUTANEOUS EVERY 8 HOURS
Status: DISCONTINUED | OUTPATIENT
Start: 2020-10-16 | End: 2020-10-17 | Stop reason: HOSPADM

## 2020-10-16 RX ADMIN — LEVETIRACETAM 1000 MG: 500 TABLET ORAL at 20:50

## 2020-10-16 RX ADMIN — LACOSAMIDE 150 MG: 100 TABLET, FILM COATED ORAL at 20:50

## 2020-10-16 NOTE — ED PROVIDER NOTES
EMERGENCY DEPARTMENT HISTORY AND PHYSICAL EXAM      Date: 10/16/2020  Patient Name: Jamie Tony      History of Presenting Illness     Chief Complaint   Patient presents with    Lethargy    Sore Throat    Headache       History Provided By: Patient    HPI: Jamie Tony, 72 y.o. female with a past medical history significant End-stage renal disease presents to the ED with cc of having missed dialysis for the Past couple of days because she has had some body aches and sore throat and they were not comfortable for her going there. Patient denies any other symptoms at this time specifically denies fever, chills, nausea, vomiting, chest pain, rash, diarrhea, headache, night sweats. There are no other complaints, changes, or physical findings at this time. PCP: Jermaine Noel MD    Current Outpatient Medications   Medication Sig Dispense Refill    famotidine (PEPCID) 40 mg tablet Take 40 mg by mouth daily.  acetaminophen-codeine (TYLENOL #3) 300-30 mg per tablet Take 1 Tab by mouth every six (6) hours as needed for Pain for up to 30 days. Max Daily Amount: 4 Tabs. 120 Tab 0    atorvastatin (LIPITOR) 40 mg tablet Take  by mouth daily.  levETIRAcetam 1,000 mg tablet Take  by mouth two (2) times a day.  levothyroxine (SYNTHROID) 150 mcg tablet Take  by mouth Daily (before breakfast).  metoprolol tartrate (LOPRESSOR) 25 mg tablet Take  by mouth two (2) times a day. Take one and one half tab by mouth twice daily      midodrine (PROAMATINE) 5 mg tablet Take  by mouth three (3) times daily.  lacosamide (Vimpat) 100 mg tab tablet Take 150 mg by mouth two (2) times a day.  warfarin (COUMADIN) 4 mg tablet Take 4 mg by mouth daily.          Past History     Past Medical History:  Past Medical History:   Diagnosis Date    Anxiety disorder     Arthritis     Atherosclerosis of native arteries of the extremities with ulceration ([facility] [address] 8/31/2020    Atrial fibrillation [facility])     Cardiac pacemaker in situ     Cellulitis and abscess of trunk 8/31/2020    Depression, postpartum     Diabetes ([facility] [address]     Disorder due to well controlled type 2 diabetes mellitus ([facility] [address] 8/31/2020    Dizziness     Heart disease     Heartburn     Hx of long term use of blood thinners     Hypercholesterolemia     Hypertension     Hyponatremia     Hypothyroidism     Kidney disease     Migraines     Peripheral venous insufficiency 8/31/2020    Renal failure     Seizures ([facility] [address]     Sleep disorder     Stroke ([facility]     Stroke ([facility]     Thyroid disease     Varicose veins of lower extremity with inflammation 8/31/2020       Past Surgical History:  Past Surgical History:   Procedure Laterality Date    HX BACK SURGERY      HX OTHER SURGICAL      leg surgery     HX OTHER SURGICAL      pacemaker monitor        Family History:  Family History   Problem Relation Age of Onset    Heart Disease Mother     Heart Disease Father     Diabetes Sister     Diabetes Brother        Social History:  Social History     Tobacco Use    Smoking status: Never Smoker    Smokeless tobacco: Never Used   Substance Use Topics    Alcohol use: Never     Frequency: Never    Drug use: Not on file       Allergies:  No Known Allergies      Review of Systems     Review of Systems   Constitutional: Positive for fatigue. Negative for activity change, appetite change, chills, fever and unexpected weight change. Body aches   HENT: Positive for sore throat. Negative for congestion, hearing loss, rhinorrhea, sneezing and voice change. Eyes: Negative. Negative for pain and visual disturbance. Respiratory: Negative. Negative for apnea, cough, choking, chest tightness and shortness of breath. Cardiovascular: Negative. Negative for chest pain and palpitations. Gastrointestinal: Negative. Negative for abdominal distention, abdominal pain, blood in stool, diarrhea, nausea and vomiting. Genitourinary: Negative. Negative for difficulty urinating, flank pain, frequency and urgency. No discharge   Musculoskeletal: Negative. Negative for arthralgias, back pain, myalgias and neck stiffness. Skin: Negative. Negative for color change and rash. Allergic/Immunologic: Negative for immunocompromised state. Neurological: Negative. Negative for dizziness, seizures, syncope, speech difficulty, weakness, numbness and headaches. Hematological: Negative for adenopathy. Psychiatric/Behavioral: Negative. Negative for agitation, behavioral problems, dysphoric mood and suicidal ideas. The patient is not nervous/anxious. Physical Exam     Physical Exam  Vitals signs and nursing note reviewed. Constitutional:       General: She is not in acute distress. Appearance: She is well-developed. HENT:      Head: Normocephalic and atraumatic. Nose: Nose normal.      Mouth/Throat:      Mouth: Mucous membranes are moist.      Pharynx: Oropharynx is clear. No oropharyngeal exudate. Eyes:      General:         Right eye: No discharge. Left eye: No discharge. Conjunctiva/sclera: Conjunctivae normal.      Pupils: Pupils are equal, round, and reactive to light. Neck:      Musculoskeletal: Normal range of motion and neck supple. Cardiovascular:      Rate and Rhythm: Normal rate and regular rhythm. Chest Wall: PMI is not displaced. No thrill. Heart sounds: Normal heart sounds. No murmur. No friction rub. No gallop. Comments: Pacemaker right upper chest  Pulmonary:      Effort: Pulmonary effort is normal. No respiratory distress. Breath sounds: Normal breath sounds. No wheezing or rales. Chest:      Chest wall: No tenderness. Abdominal:      General: Bowel sounds are normal. There is no distension. Palpations: Abdomen is soft. There is no mass. Tenderness: There is no abdominal tenderness. There is no guarding or rebound. Musculoskeletal: Normal range of motion. Lymphadenopathy:      Cervical: No cervical adenopathy. Skin:     General: Skin is warm and dry. Capillary Refill: Capillary refill takes less than 2 seconds. Findings: No erythema or rash. Neurological:      Mental Status: She is alert and oriented to person, place, and time. Cranial Nerves: No cranial nerve deficit. Coordination: Coordination normal.   Psychiatric:         Mood and Affect: Mood normal.         Behavior: Behavior normal.         Diagnostic Study Results     Labs -     No results found for this or any previous visit (from the past 12 hour(s)). Radiologic Studies -   [unfilled]  CT Results  (Last 48 hours)    None        CXR Results  (Last 48 hours)    None          Medical Decision Making and ED Course     Vital Signs-Reviewed the patient's vital signs. No data found. EKG Interpretation:  Ventricular rate 122 bpm, HI interval unmeasurable QRS duration 106 ms,  ms. A. fib with RVR with frequent AV dual paced complexes with PVCs. Low voltage QRS septal infarct age undetermined. Records Reviewed: Nursing Notes and Old Medical Records    The patient presents with end-stage renal with a differential diagnosis of MI overload and electrolyte abnormality. Provider Notes (Medical Decision Making):   Assess with basic labs and determine if patient needs emergent dialysis. We will also provide COVID testing so she can return to her dialysis center. [facility]       ED Course:     - I am the first and primary provider for this patient. - I reviewed the vital signs, available nursing notes, past medical history, past surgical history, family history and social history. Initial assessment performed. The patients presenting problems have been discussed, and they are in agreement with the care plan formulated and outlined with them. I have encouraged them to ask questions as they arise throughout their visit.     ED Course as of Oct 18 2113   Fri Oct 16, 2020   1402 Patient's potassium is elevated. Will dialyze the patient   Potassium(!): 5.9 [CS]   8600 I have discussed the case with Dr. Caprice Matta who will call for dialysis orders. [CS]   0010 I discussed the case with Dr. Yoko Villalobos who who conveys the patient should be admitted and treated with Kayexalate.    [CS]      ED Course User Index  [CS] Lina Salinas MD       CONSULTATIONS:          Procedures       Jose Mcintosh MD  Procedures                 Disposition       Admitted    Remove if not discharged  DISCHARGE PLAN:  1. Current Discharge Medication List      CONTINUE these medications which have NOT CHANGED    Details   acetaminophen-codeine (TYLENOL #3) 300-30 mg per tablet Take 1 Tab by mouth every six (6) hours as needed for Pain for up to 30 days. Max Daily Amount: 4 Tabs. Qty: 120 Tab, Refills: 0    Comments: She has progressive pain, NSAIDs contraidicated, tylenol alone not effective. Please submit PA request if necessary and give her initial 7 days supply while we complete the PA. Thank you. Associated Diagnoses: Chronic neck pain      FLUoxetine (PROzac) 10 mg tablet Take 1 tablet by mouth once daily for 30 days  Qty: 90 Tab, Refills: 1      famotidine (PEPCID) 40 mg tablet Take 1 tablet by mouth once daily for 90 days  Qty: 90 Tab, Refills: 0      gabapentin (NEURONTIN) 300 mg capsule Take 300 mg by mouth three (3) times daily. phenytoin (DILANTIN) 50 mg chewable tablet Take  by mouth three (3) times daily. phenytoin ER (DILANTIN ER) 100 mg ER capsule Take  by mouth. QUEtiapine (SEROquel) 25 mg tablet Take  by mouth. ammonium lactate (LAC-HYDRIN) 12 % lotion Apply  to affected area as needed. rub in to affected area well      atorvastatin (LIPITOR) 40 mg tablet Take  by mouth daily.       Calcium-Cholecalciferol, D3, (Calcium 600 with Vitamin D3) 600 mg(1,500mg) -400 unit cap Take  by mouth.      capsaicin (CAPZASIN-HP) 0.1 % topical cream Apply  to affected area three (3) times daily. levETIRAcetam 1,000 mg tablet Take  by mouth two (2) times a day. levothyroxine (SYNTHROID) 150 mcg tablet Take  by mouth Daily (before breakfast). loperamide (IMODIUM) 2 mg capsule Take  by mouth four (4) times daily as needed for Diarrhea.      metoprolol tartrate (LOPRESSOR) 25 mg tablet Take  by mouth two (2) times a day. Take one and one half tab by mouth twice daily      midodrine (PROAMATINE) 5 mg tablet Take  by mouth three (3) times daily. sevelamer carbonate (RENVELA) 800 mg tab tab Take  by mouth three (3) times daily. lacosamide (Vimpat) 100 mg tab tablet Take 150 mg by mouth two (2) times a day. warfarin (COUMADIN) 4 mg tablet Take 4 mg by mouth daily. 2.   Follow-up Information     Follow up With Specialties Details Why [address] MD Internal Medicine   [address] [place] [address],[address] [facility]  [phone]          3. Return to ED if worse   4. Discharge Medication List as of 10/17/2020  4:46 PM      CONTINUE these medications which have NOT CHANGED    Details   famotidine (PEPCID) 40 mg tablet Take 40 mg by mouth daily. , Historical Med      acetaminophen-codeine (TYLENOL #3) 300-30 mg per tablet Take 1 Tab by mouth every six (6) hours as needed for Pain for up to 30 days. Max Daily Amount: 4 Tabs., Normal, Disp-120 Tab,R-0She has progressive pain, NSAIDs contraidicated, tylenol alone not effective. Please submit PA request if necessary and give  her initial 7 days supply while we complete the PA. Thank you. atorvastatin (LIPITOR) 40 mg tablet Take  by mouth daily. , Historical Med      levETIRAcetam 1,000 mg tablet Take  by mouth two (2) times a day., Historical Med      levothyroxine (SYNTHROID) 150 mcg tablet Take  by mouth Daily (before breakfast). , Historical Med      metoprolol tartrate (LOPRESSOR) 25 mg tablet Take  by mouth two (2) times a day.  Take one and one half tab by mouth twice daily, Historical Med      midodrine (PROAMATINE) 5 mg tablet Take  by mouth three (3) times daily. , Historical Med      lacosamide (Vimpat) 100 mg tab tablet Take 150 mg by mouth two (2) times a day., Historical Med      warfarin (COUMADIN) 4 mg tablet Take 4 mg by mouth daily. , Historical Med             Diagnosis     Clinical Impression:   1. Hyperkalemia        Attestations:    Radha Cassidy MD    Please note that this dictation was completed with [employer], the [employer] voice recognition software. Quite often unanticipated grammatical, syntax, homophones, and other interpretive errors are inadvertently transcribed by the computer software. Please disregard these errors. Please excuse any errors that have escaped final proofreading. Thank you.

## 2020-10-16 NOTE — ED TRIAGE NOTES
Pt with generalized weakness, sleeping a lot. Has missed 2 dialysis appointments. C/o sore throat and headache.

## 2020-10-16 NOTE — H&P
History and Physical    Patient: Dulce Maria Myers MRN: [id number]  SSN: [id number]    YOB: 1950  Age: 72 y.o. Sex: female      Subjective:      Dulce Maria Myers is a 72 y.o. female who presents emergency department with a cough and headache and viral-like symptoms and was refused at her dialysis center x2 for hemodialysis. She presents with a potassium of 5.9 and will need inpatient dialysis. COVID testing pending and Kayexalate given. Patient also be given D5 with insulin as well as calcium if needed. EKG revealed A fib with RVR. Rate is now controlled at 64/ Repeat EKG now. Patient will have dialysis in the morning. Nephrology has been notified of the patient's admission.   This x-ray reveals no obvious findings consistent with COVID pneumonitis, consolidation or pulmonary edema    Past Medical History:   Diagnosis Date    Anxiety disorder     Arthritis     Atherosclerosis of native arteries of the extremities with ulceration ([address] 8/31/2020    Atrial fibrillation ([address]     Cardiac pacemaker in situ     Cellulitis and abscess of trunk 8/31/2020    Depression, postpartum     Diabetes ([address]     Disorder due to well controlled type 2 diabetes mellitus ([address] 8/31/2020    Dizziness     Heart disease     Heartburn     Hx of long term use of blood thinners     Hypercholesterolemia     Hypertension     Hyponatremia     Hypothyroidism     Kidney disease     Migraines     Peripheral venous insufficiency 8/31/2020    Renal failure     Seizures ([address]     Sleep disorder     Stroke ([address]     Stroke ([address]     Thyroid disease     Varicose veins of lower extremity with inflammation 8/31/2020     Past Surgical History:   Procedure Laterality Date    HX BACK SURGERY      HX OTHER SURGICAL      leg surgery     HX OTHER SURGICAL      pacemaker monitor       Family History   Problem Relation Age of Onset    Heart Disease Mother     Heart Disease Father     Diabetes Sister    Nidia Mares Diabetes Brother      Social History     Tobacco Use    Smoking status: Never Smoker    Smokeless tobacco: Never Used   Substance Use Topics    Alcohol use: Never     Frequency: Never      Prior to Admission medications    Medication Sig Start Date End Date Taking? Authorizing Provider   famotidine (PEPCID) 40 mg tablet Take 40 mg by mouth daily. Yes Provider, Historical   acetaminophen-codeine (TYLENOL #3) 300-30 mg per tablet Take 1 Tab by mouth every six (6) hours as needed for Pain for up to 30 days. Max Daily Amount: 4 Tabs. 9/17/20 10/17/20 Yes Malgorzata Ponce MD   atorvastatin (LIPITOR) 40 mg tablet Take  by mouth daily. Yes Provider, Historical   levETIRAcetam 1,000 mg tablet Take  by mouth two (2) times a day. Yes Provider, Historical   levothyroxine (SYNTHROID) 150 mcg tablet Take  by mouth Daily (before breakfast). Yes Provider, Historical   metoprolol tartrate (LOPRESSOR) 25 mg tablet Take  by mouth two (2) times a day. Take one and one half tab by mouth twice daily   Yes Provider, Historical   midodrine (PROAMATINE) 5 mg tablet Take  by mouth three (3) times daily. Yes Provider, Historical   lacosamide (Vimpat) 100 mg tab tablet Take 150 mg by mouth two (2) times a day. Yes Provider, Historical   warfarin (COUMADIN) 4 mg tablet Take 4 mg by mouth daily. Yes Provider, Historical        No Known Allergies    Review of Systems:  Review of Systems   Constitutional: Positive for malaise/fatigue. Negative for chills and fever. HENT: Negative. Eyes: Negative for blurred vision and double vision. Respiratory: Positive for cough. Negative for shortness of breath and wheezing. Cardiovascular: Negative for chest pain and palpitations. Gastrointestinal: Positive for nausea and vomiting. Negative for abdominal pain. Genitourinary: Negative for dysuria and urgency. Musculoskeletal: Positive for myalgias. Skin: Negative. Neurological: Positive for headaches. Negative for dizziness. Psychiatric/Behavioral: Negative. All other systems reviewed and are negative. Objective:     Vitals:    10/16/20 1140 10/16/20 1750   BP: 99/60 (!) 120/56   Pulse: (!) 59 64   Resp: 16 16   Temp: 98.3 °F (36.8 °C)    SpO2: 95% 100%   Weight: 79.4 kg (175 lb)    Height: 5' 5\" (1.651 m)         Physical Exam:  Constitutional: Alert in no acute distress   HEENT: Sclerae anicteric, The neck is supple. Cardiovascular: Regular rate and rhythm. No murmurs, gallops, or rubs. Jeremy Hires Respiratory: Clear breath sounds with no wheezes, rales, or rhonchi. GI: Abdomen nondistended, soft, and nontender. Normal active bowel sounds. Rectal: Deferred   Musculoskeletal: No pitting edema of the lower legs. Extremities have good range of motion. Neurological:  Patient is alert and oriented. Cranial nerves II-XII intact  Psychiatric: Mood appears appropriate with judgement intact. Lymphatic: No cervical or supraclavicular adenopathy. Skin: No rashes or breakdown of the skin, mild lower extremity edema    Assessment:     Hospital Problems  Date Reviewed: 9/17/2020          Codes Class Noted POA    Hyperkalemia ICD-10-CM: E87.5  ICD-9-CM: 276.7  10/16/2020 Unknown              Plan:   \Plan:    1. Hyperkalemia with end-stage renal disease and has missed 2 successive hemodialysis sessions  Kayexalate  Consider D5 with insulin and calcium if patient develops arrhythmia  Dialysis in a.m. Nephrology consulted, Chana Rosales physician has discussed case with Dr. Howard Level  Repeat EKG  Give Calcium    2. Seizure disorder  Continue home medications to include Keppra and Vimpat    3. Cough with headache  Covid 19 pending  Viral syndrome    4.  Nausea and vomiting  Uremic    Code status: Full code    For admission 45 minutes      Signed By: Tabby Laguna PA-C     October 16, 2020

## 2020-10-17 VITALS
HEART RATE: 60 BPM | BODY MASS INDEX: 29.16 KG/M2 | SYSTOLIC BLOOD PRESSURE: 118 MMHG | OXYGEN SATURATION: 100 % | RESPIRATION RATE: 18 BRPM | HEIGHT: 65 IN | DIASTOLIC BLOOD PRESSURE: 69 MMHG | TEMPERATURE: 98 F | WEIGHT: 175.04 LBS

## 2020-10-17 LAB
GLUCOSE BLD STRIP.AUTO-MCNC: 102 MG/DL (ref 65–100)
GLUCOSE BLD STRIP.AUTO-MCNC: 308 MG/DL (ref 65–100)
PERFORMED BY, TECHID: ABNORMAL
PERFORMED BY, TECHID: ABNORMAL
SARS-COV-2, COV2: NORMAL

## 2020-10-17 PROCEDURE — 90935 HEMODIALYSIS ONE EVALUATION: CPT

## 2020-10-17 PROCEDURE — 99218 HC RM OBSERVATION: CPT

## 2020-10-17 PROCEDURE — 82962 GLUCOSE BLOOD TEST: CPT

## 2020-10-17 PROCEDURE — 96372 THER/PROPH/DIAG INJ SC/IM: CPT

## 2020-10-17 PROCEDURE — 74011250637 HC RX REV CODE- 250/637: Performed by: PHYSICIAN ASSISTANT

## 2020-10-17 PROCEDURE — 74011250636 HC RX REV CODE- 250/636: Performed by: PHYSICIAN ASSISTANT

## 2020-10-17 RX ADMIN — SODIUM POLYSTYRENE SULFONATE 30 G: 15 SUSPENSION ORAL; RECTAL at 02:28

## 2020-10-17 RX ADMIN — FAMOTIDINE 40 MG: 20 TABLET ORAL at 09:47

## 2020-10-17 RX ADMIN — METOPROLOL TARTRATE 25 MG: 25 TABLET, FILM COATED ORAL at 09:48

## 2020-10-17 RX ADMIN — Medication 10 ML: at 06:12

## 2020-10-17 RX ADMIN — LEVETIRACETAM 1000 MG: 500 TABLET ORAL at 09:47

## 2020-10-17 RX ADMIN — MIDODRINE HYDROCHLORIDE 5 MG: 5 TABLET ORAL at 09:48

## 2020-10-17 RX ADMIN — HEPARIN SODIUM 5000 UNITS: 10000 INJECTION, SOLUTION INTRAVENOUS; SUBCUTANEOUS at 09:45

## 2020-10-17 RX ADMIN — Medication 10 ML: at 14:00

## 2020-10-17 RX ADMIN — LEVOTHYROXINE SODIUM 150 MCG: 0.07 TABLET ORAL at 09:52

## 2020-10-17 RX ADMIN — LACOSAMIDE 150 MG: 100 TABLET, FILM COATED ORAL at 09:48

## 2020-10-17 NOTE — CONSULTS
NAME:  Ministerio Rodriguez   :   1950   MRN:   [id number]     ATTENDING: Mona Seals MD  PCP:  Viviana Thorne MD    Date/Time:  10/17/2020 3:29 PM      Subjective:   REQUESTING PHYSICIAN:  Dr. Kingsley Meals:    ESRD on HD, hyperkalemia    Ms. Yola Steele is a 67-year-old female with past medical history of hypertension, type 2 diabetes, end-stage renal disease on hemodialysis presented to the emergency room with complaints of cough, headaches. She was referred to the emergency room from outpatient hemodialysis unit for suspected symptoms of COVID-19 infection. Initial labs in the emergency room showed a high potassium of 5.9. No evidence of any significant fluid overload, her chest x-ray was negative for any specific infiltrates. Patient remained afebrile. She was placed in droplet isolation. Patient seen at bedside, she is alert and awake, not in any acute distress, no complaints of any fever or chills, no complaints of any acute shortness of breath.   Mild complaints of cough present  She received Kayexalate yesterday for hyperkalemia, no repeat labs done yet as ordered yesterday  Past Medical History:   Diagnosis Date    Anxiety disorder     Arthritis     Atherosclerosis of native arteries of the extremities with ulceration ([address] 2020    Atrial fibrillation ([address]     Cardiac pacemaker in situ     Cellulitis and abscess of trunk 2020    Depression, postpartum     Diabetes ([address]     Disorder due to well controlled type 2 diabetes mellitus ([address] 2020    Dizziness     Heart disease     Heartburn     Hx of long term use of blood thinners     Hypercholesterolemia     Hypertension     Hyponatremia     Hypothyroidism     Kidney disease     Migraines     Peripheral venous insufficiency 2020    Renal failure     Seizures ([address]     Sleep disorder     Stroke ([address]     Stroke ([address]     Thyroid disease     Varicose veins of lower extremity with inflammation 8/31/2020      Past Surgical History:   Procedure Laterality Date    HX BACK SURGERY      HX OTHER SURGICAL      leg surgery     HX OTHER SURGICAL      pacemaker monitor      Social History     Tobacco Use    Smoking status: Never Smoker    Smokeless tobacco: Never Used   Substance Use Topics    Alcohol use: Never     Frequency: Never      Family History   Problem Relation Age of Onset    Heart Disease Mother     Heart Disease Father     Diabetes Sister     Diabetes Brother        No Known Allergies   Prior to Admission medications    Medication Sig Start Date End Date Taking? Authorizing Provider   famotidine (PEPCID) 40 mg tablet Take 40 mg by mouth daily. Yes Provider, Historical   acetaminophen-codeine (TYLENOL #3) 300-30 mg per tablet Take 1 Tab by mouth every six (6) hours as needed for Pain for up to 30 days. Max Daily Amount: 4 Tabs. 9/17/20 10/17/20 Yes Vinod Bartlett MD   atorvastatin (LIPITOR) 40 mg tablet Take  by mouth daily. Yes Provider, Historical   levETIRAcetam 1,000 mg tablet Take  by mouth two (2) times a day. Yes Provider, Historical   levothyroxine (SYNTHROID) 150 mcg tablet Take  by mouth Daily (before breakfast). Yes Provider, Historical   metoprolol tartrate (LOPRESSOR) 25 mg tablet Take  by mouth two (2) times a day. Take one and one half tab by mouth twice daily   Yes Provider, Historical   midodrine (PROAMATINE) 5 mg tablet Take  by mouth three (3) times daily. Yes Provider, Historical   lacosamide (Vimpat) 100 mg tab tablet Take 150 mg by mouth two (2) times a day. Yes Provider, Historical   warfarin (COUMADIN) 4 mg tablet Take 4 mg by mouth daily. Yes Provider, Historical       REVIEW OF SYSTEMS:     Complaints as mentioned in the history of presenting illness, she has generalized tiredness, on and off joint pains with osteoarthritis and some difficulty in ambulation.   No other significant complaints on complete review of systems      Objective:   VITALS:    Visit Vitals  BP (!) 104/53   Pulse 60   Temp 98 °F (36.7 °C) (Oral)   Resp 16   Ht 5' 5\" (1.651 m)   Wt 79.4 kg (175 lb)   SpO2 100%   BMI 29.12 kg/m²     Temp (24hrs), Av.8 °F (36.6 °C), Min:97.7 °F (36.5 °C), Max:98 °F (36.7 °C)      PHYSICAL EXAM:   General: alert awake well-oriented, no acute distress. HEENT: EOMI, no Icterus, no Pallor, pupils reactive, mucosa moist, normal inspection of ears and nose, throat clear. Neck: Neck is supple, No JVD, no thyromegaly  Lungs: breathsounds normal, no respiratory distress on inspection, no rhonchi, no rales,  CVS: heart sounds normal, regular rate and rhythm, no murmurs, no rubs. GI: soft, nontender, normal BS, no palpable organomegaly, Extremeties: no clubbing, no cyanosis, no edema,  Neuro: Alert, awake, oriented x3, No new focal deficits, moving all extremeties well. Skin: normal skin turgor, no skin rashes . Musculoskeletal: no acute joint swellings    LAB DATA REVIEWED:    Recent Results (from the past 24 hour(s))   SARS-COV-2    Collection Time: 10/16/20  4:55 PM   Result Value Ref Range    SARS-CoV-2 Please find results under separate order     GLUCOSE, POC    Collection Time: 10/17/20  8:58 AM   Result Value Ref Range    Glucose (POC) 308 (H) 65 - 100 mg/dL    Performed by James Lopez    GLUCOSE, POC    Collection Time: 10/17/20 12:06 PM   Result Value Ref Range    Glucose (POC) 102 (H) 65 - 100 mg/dL    Performed by James Lopez        1. End-stage renal disease on hemodialysis on  and   She had hyperkalemia and was treated with Kayexalate yesterday  Inpatient hemodialysis arranged for today  No complaints of any fluid overload, no complaints of any uremia with nausea or vomiting. 2.  Hyperkalemia: Secondary to end-stage renal disease  Received Kayexalate yesterday  Expect to resolve with hemodialysis today  We will continue to monitor    3.   Symptoms of cough, headaches, suspected COVID-19 infection  COVID-19 test is pending  Symptoms appears to have improved    4. Hypertension: Stable blood pressures  She is on midodrine for hypotensive episodes with hemodialysis  No complaints of any dizziness    5. Patient is okay for discharge if stable after hemodialysis.      Signed: Karina Elam MD

## 2020-10-17 NOTE — DISCHARGE SUMMARY
Admit date: 10/16/2020   Admitting Provider: Pastor Jonna MD    Discharge date: 10/17/2020  Discharging Provider: Alicia Chaudhari PA-C      * Admission Diagnoses: Hyperkalemia [E87.5]    * Discharge Diagnoses:    Hospital Problems as of 10/17/2020 Date Reviewed: 9/17/2020          Codes Class Noted - Resolved POA    Hyperkalemia ICD-10-CM: E87.5  ICD-9-CM: 276.7  10/16/2020 - Present Unknown              * Hospital Course: This is a 23-year-old female with renal disease requiring hemodialysis after missing 2 sessions due to a viral illness. Patient was placed in the hospital for observation due to hyperkalemia with a potassium of 5.9 although asymptomatic. Patient does describe nausea vomiting although this may be related to uremia. COVID-19 test pending although patient is completely asymptomatic. Patient will receive hemodialysis on October 17, 2020 and if okay with nephrology, Dr. Jazzy Priest she will be discharged home. Patient will follow up on COVID-19 testing and will quarantine herself appropriately with mask and other precautions. Discussed case with the patient's nephrologist and son. * Procedures: None  * No surgery found *      Consults:   Neurology    Significant Diagnostic Studies: As discussed in hospital course    Discharge Exam:  Visit Vitals  BP (!) 104/53   Pulse 60   Temp 98 °F (36.7 °C) (Oral)   Resp 16   Ht 5' 5\" (1.651 m)   Wt 79.4 kg (175 lb)   SpO2 100%   BMI 29.12 kg/m²     PHYSICAL EXAM:  Constitutional: Alert in no acute distress   HEENT: Sclerae anicteric, The neck is supple. Cardiovascular: Regular rate and rhythm. No murmurs, gallops, or rubs. Jocelyn Hytop Respiratory: Clear breath sounds with no wheezes, rales, or rhonchi. GI: Abdomen nondistended, soft, and nontender. Normal active bowel sounds. Rectal: Deferred   Musculoskeletal: No pitting edema of the lower legs. Extremities have good range of motion. Neurological:  Patient is alert and oriented.  Cranial nerves II-XII intact  Psychiatric: Mood appears appropriate with judgement intact. Lymphatic: No cervical or supraclavicular adenopathy. Skin: No rashes or breakdown of the skin      * Discharge Condition: stable  * Disposition: Home    Discharge Medications:  Current Discharge Medication List      CONTINUE these medications which have NOT CHANGED    Details   famotidine (PEPCID) 40 mg tablet Take 40 mg by mouth daily. acetaminophen-codeine (TYLENOL #3) 300-30 mg per tablet Take 1 Tab by mouth every six (6) hours as needed for Pain for up to 30 days. Max Daily Amount: 4 Tabs. Qty: 120 Tab, Refills: 0    Comments: She has progressive pain, NSAIDs contraidicated, tylenol alone not effective. Please submit PA request if necessary and give her initial 7 days supply while we complete the PA. Thank you. Associated Diagnoses: Chronic neck pain      atorvastatin (LIPITOR) 40 mg tablet Take  by mouth daily. levETIRAcetam 1,000 mg tablet Take  by mouth two (2) times a day. levothyroxine (SYNTHROID) 150 mcg tablet Take  by mouth Daily (before breakfast). metoprolol tartrate (LOPRESSOR) 25 mg tablet Take  by mouth two (2) times a day. Take one and one half tab by mouth twice daily      midodrine (PROAMATINE) 5 mg tablet Take  by mouth three (3) times daily. lacosamide (Vimpat) 100 mg tab tablet Take 150 mg by mouth two (2) times a day. warfarin (COUMADIN) 4 mg tablet Take 4 mg by mouth daily. * Follow-up Care/Patient Instructions:   Activity: Activity as tolerated  Diet: Diabetic Diet  Wound Care: None needed    Follow-up Information     Follow up With Specialties Details Why [address], [place], [place] Internal Medicine   [address] [facility] [address] [facility]  [phone]            Discharge summary greater than 35 minutes spent with the patient performing discharge instructions, medication review and physical exam    Signed:  Linda Rivero GERMAIN  10/17/2020  3:13 PM

## 2020-10-17 NOTE — ROUTINE PROCESS
Discharge instructions given to the patient and patient verbalized understanding. Patient discharged to home with self care.

## 2020-10-17 NOTE — DIALYSIS
Patient tolerated treatment. 1.5 kg of fluid was removed. Patient was alert and oriented at the end of treatment.

## 2020-10-17 NOTE — ROUTINE PROCESS
Called The daughter in-law who lives with Ms. Deanna Bobby  at [phone] and let her know that Ms. Deanna Bobby will be discharged today by 1700.

## 2020-10-17 NOTE — ROUTINE PROCESS
Bedside and Verbal shift change report given to Vanessa Castellanos (oncoming nurse) by Anahy Bee (offgoing nurse). Report included the following information SBAR and MAR.

## 2020-10-17 NOTE — PROGRESS NOTES
Problem: Pressure Injury - Risk of  Goal: *Prevention of pressure injury  Description: Document Nitin Scale and appropriate interventions in the flowsheet. Outcome: Resolved/Met  Note: Pressure Injury Interventions:  Sensory Interventions: Assess changes in LOC, Float heels, Keep linens dry and wrinkle-free    Moisture Interventions: Absorbent underpads, Apply protective barrier, creams and emollients, Minimize layers    Activity Interventions: Assess need for specialty bed, Pressure redistribution bed/mattress(bed type)    Mobility Interventions: Assess need for specialty bed, Float heels, Pressure redistribution bed/mattress (bed type)    Nutrition Interventions: Offer support with meals,snacks and hydration    Friction and Shear Interventions: Apply protective barrier, creams and emollients, Minimize layers                Problem: Patient Education: Go to Patient Education Activity  Goal: Patient/Family Education  Outcome: Resolved/Met     Problem: Falls - Risk of  Goal: *Absence of Falls  Description: Document Cuco Fall Risk and appropriate interventions in the flowsheet. Outcome: Resolved/Met  Note: Fall Risk Interventions:  Mobility Interventions: Bed/chair exit alarm         Medication Interventions: Bed/chair exit alarm, Patient to call before getting OOB, Teach patient to arise slowly    Elimination Interventions: Bed/chair exit alarm, Call light in reach, Patient to call for help with toileting needs              Problem: Patient Education: Go to Patient Education Activity  Goal: Patient/Family Education  Outcome: Resolved/Met     Problem: Risk for Spread of Infection  Goal: Prevent transmission of infectious organism to others  Description: Prevent the transmission of infectious organisms to other patients, staff members, and visitors.   Outcome: Resolved/Met     Problem: Patient Education:  Go to Education Activity  Goal: Patient/Family Education  Outcome: Resolved/Met

## 2020-10-18 LAB — SARS-COV-2, COV2NT: NOT DETECTED

## 2020-11-16 RX ORDER — METOPROLOL TARTRATE 25 MG/1
TABLET, FILM COATED ORAL
Qty: 90 TAB | Refills: 0 | Status: SHIPPED | OUTPATIENT
Start: 2020-11-16 | End: 2021-01-29

## 2020-12-22 RX ORDER — FAMOTIDINE 40 MG/1
TABLET, FILM COATED ORAL
Qty: 90 TAB | Refills: 0 | Status: SHIPPED | OUTPATIENT
Start: 2020-12-22 | End: 2021-03-26

## 2020-12-27 ENCOUNTER — HOSPITAL ENCOUNTER (EMERGENCY)
Age: 70
Discharge: HOME | End: 2020-12-27
Attending: EMERGENCY MEDICINE
Payer: MEDICARE

## 2020-12-27 VITALS
DIASTOLIC BLOOD PRESSURE: 70 MMHG | OXYGEN SATURATION: 98 % | HEIGHT: 68 IN | BODY MASS INDEX: 21.22 KG/M2 | SYSTOLIC BLOOD PRESSURE: 136 MMHG | RESPIRATION RATE: 18 BRPM | WEIGHT: 140 LBS | HEART RATE: 74 BPM | TEMPERATURE: 99.5 F

## 2020-12-27 DIAGNOSIS — N39.0 BACTERIAL URINARY INFECTION: ICD-10-CM

## 2020-12-27 DIAGNOSIS — A49.9 BACTERIAL URINARY INFECTION: ICD-10-CM

## 2020-12-27 DIAGNOSIS — G40.919 BREAKTHROUGH SEIZURE (HCC): Primary | ICD-10-CM

## 2020-12-27 LAB
ALBUMIN SERPL-MCNC: 3.9 G/DL (ref 3.5–5)
ALBUMIN/GLOB SERPL: 1.1 {RATIO} (ref 1.1–2.2)
ALP SERPL-CCNC: 193 U/L (ref 45–117)
ALT SERPL-CCNC: 46 U/L (ref 12–78)
ANION GAP SERPL CALC-SCNC: 8 MMOL/L (ref 5–15)
APPEARANCE UR: ABNORMAL
AST SERPL W P-5'-P-CCNC: 35 U/L (ref 15–37)
ATRIAL RATE: 66 BPM
BACTERIA URNS QL MICRO: NEGATIVE /HPF
BASOPHILS # BLD: 0 K/UL (ref 0–0.1)
BASOPHILS NFR BLD: 1 % (ref 0–1)
BILIRUB SERPL-MCNC: 0.9 MG/DL (ref 0.2–1)
BILIRUB UR QL: NEGATIVE
BUN SERPL-MCNC: 52 MG/DL (ref 6–20)
BUN/CREAT SERPL: 14 (ref 12–20)
CA-I BLD-MCNC: 8.4 MG/DL (ref 8.5–10.1)
CALCULATED R AXIS, ECG10: 89 DEGREES
CALCULATED T AXIS, ECG11: -12 DEGREES
CHLORIDE SERPL-SCNC: 105 MMOL/L (ref 97–108)
CO2 SERPL-SCNC: 29 MMOL/L (ref 21–32)
COLOR UR: ABNORMAL
CREAT SERPL-MCNC: 3.74 MG/DL (ref 0.55–1.02)
DIAGNOSIS, 93000: NORMAL
DIFFERENTIAL METHOD BLD: ABNORMAL
EOSINOPHIL # BLD: 0 K/UL (ref 0–0.4)
EOSINOPHIL NFR BLD: 0 % (ref 0–7)
ERYTHROCYTE [DISTWIDTH] IN BLOOD BY AUTOMATED COUNT: 12.8 % (ref 11.5–14.5)
GLOBULIN SER CALC-MCNC: 3.7 G/DL (ref 2–4)
GLUCOSE SERPL-MCNC: 87 MG/DL (ref 65–100)
GLUCOSE UR STRIP.AUTO-MCNC: NEGATIVE MG/DL
HCT VFR BLD AUTO: 42 % (ref 35–47)
HGB BLD-MCNC: 13.1 G/DL (ref 11.5–16)
HGB UR QL STRIP: ABNORMAL
IMM GRANULOCYTES # BLD AUTO: 0 K/UL (ref 0–0.04)
IMM GRANULOCYTES NFR BLD AUTO: 0 % (ref 0–0.5)
INR PPP: 1.1 (ref 0.9–1.1)
KETONES UR QL STRIP.AUTO: NEGATIVE MG/DL
LEUKOCYTE ESTERASE UR QL STRIP.AUTO: ABNORMAL
LYMPHOCYTES # BLD: 1.1 K/UL (ref 0.8–3.5)
LYMPHOCYTES NFR BLD: 18 % (ref 12–49)
MCH RBC QN AUTO: 31.3 PG (ref 26–34)
MCHC RBC AUTO-ENTMCNC: 31.2 G/DL (ref 30–36.5)
MCV RBC AUTO: 100.2 FL (ref 80–99)
MONOCYTES # BLD: 0.6 K/UL (ref 0–1)
MONOCYTES NFR BLD: 9 % (ref 5–13)
NEUTS SEG # BLD: 4.5 K/UL (ref 1.8–8)
NEUTS SEG NFR BLD: 72 % (ref 32–75)
NITRITE UR QL STRIP.AUTO: NEGATIVE
P-R INTERVAL, ECG05: 276 MS
PH UR STRIP: 6 [PH] (ref 5–8)
PLATELET # BLD AUTO: 134 K/UL (ref 150–400)
PMV BLD AUTO: 11.5 FL (ref 8.9–12.9)
POTASSIUM SERPL-SCNC: 3.7 MMOL/L (ref 3.5–5.1)
PROT SERPL-MCNC: 7.6 G/DL (ref 6.4–8.2)
PROT UR STRIP-MCNC: 100 MG/DL
PROTHROMBIN TIME: 14.3 SEC (ref 11.9–14.7)
Q-T INTERVAL, ECG07: 422 MS
QRS DURATION, ECG06: 132 MS
QTC CALCULATION (BEZET), ECG08: 442 MS
RBC # BLD AUTO: 4.19 M/UL (ref 3.8–5.2)
RBC #/AREA URNS HPF: ABNORMAL /HPF (ref 0–5)
SODIUM SERPL-SCNC: 142 MMOL/L (ref 136–145)
SP GR UR REFRACTOMETRY: 1.01 (ref 1–1.03)
UA: UC IF INDICATED,UAUC: ABNORMAL
UROBILINOGEN UR QL STRIP.AUTO: 4 EU/DL (ref 0.1–1)
VENTRICULAR RATE, ECG03: 66 BPM
WBC # BLD AUTO: 6.2 K/UL (ref 3.6–11)
WBC URNS QL MICRO: ABNORMAL /HPF (ref 0–4)

## 2020-12-27 PROCEDURE — 80053 COMPREHEN METABOLIC PANEL: CPT

## 2020-12-27 PROCEDURE — 87077 CULTURE AEROBIC IDENTIFY: CPT

## 2020-12-27 PROCEDURE — 80177 DRUG SCRN QUAN LEVETIRACETAM: CPT

## 2020-12-27 PROCEDURE — 81001 URINALYSIS AUTO W/SCOPE: CPT

## 2020-12-27 PROCEDURE — 99284 EMERGENCY DEPT VISIT MOD MDM: CPT

## 2020-12-27 PROCEDURE — 85610 PROTHROMBIN TIME: CPT

## 2020-12-27 PROCEDURE — 80235 DRUG ASSAY LACOSAMIDE: CPT

## 2020-12-27 PROCEDURE — 87186 SC STD MICRODIL/AGAR DIL: CPT

## 2020-12-27 PROCEDURE — 85025 COMPLETE CBC W/AUTO DIFF WBC: CPT

## 2020-12-27 PROCEDURE — 36415 COLL VENOUS BLD VENIPUNCTURE: CPT

## 2020-12-27 PROCEDURE — 87086 URINE CULTURE/COLONY COUNT: CPT

## 2020-12-27 PROCEDURE — 93005 ELECTROCARDIOGRAM TRACING: CPT

## 2020-12-27 PROCEDURE — 74011250637 HC RX REV CODE- 250/637: Performed by: EMERGENCY MEDICINE

## 2020-12-27 RX ORDER — CEPHALEXIN 250 MG/1
250 CAPSULE ORAL
Status: COMPLETED | OUTPATIENT
Start: 2020-12-27 | End: 2020-12-27

## 2020-12-27 RX ORDER — LEVETIRACETAM 250 MG/1
1000 TABLET ORAL ONCE
Status: COMPLETED | OUTPATIENT
Start: 2020-12-27 | End: 2020-12-27

## 2020-12-27 RX ORDER — CEPHALEXIN 250 MG/1
250 CAPSULE ORAL DAILY
Qty: 6 CAP | Refills: 0 | Status: SHIPPED | OUTPATIENT
Start: 2020-12-28 | End: 2021-01-03

## 2020-12-27 RX ORDER — LEVETIRACETAM 250 MG/1
500 TABLET ORAL ONCE
Status: COMPLETED | OUTPATIENT
Start: 2020-12-27 | End: 2020-12-27

## 2020-12-27 RX ORDER — LEVETIRACETAM 500 MG/1
TABLET ORAL
Qty: 100 TAB | Refills: 0 | Status: SHIPPED | OUTPATIENT
Start: 2020-12-27 | End: 2021-05-10 | Stop reason: ALTCHOICE

## 2020-12-27 RX ORDER — LEVETIRACETAM 250 MG/1
500 TABLET ORAL
Status: COMPLETED | OUTPATIENT
Start: 2020-12-27 | End: 2020-12-27

## 2020-12-27 RX ADMIN — CEPHALEXIN 250 MG: 250 CAPSULE ORAL at 19:24

## 2020-12-27 RX ADMIN — LEVETIRACETAM 1000 MG: 250 TABLET ORAL at 15:31

## 2020-12-27 RX ADMIN — LEVETIRACETAM 500 MG: 250 TABLET ORAL at 19:24

## 2020-12-27 RX ADMIN — LEVETIRACETAM 500 MG: 250 TABLET, FILM COATED ORAL at 18:26

## 2020-12-27 NOTE — DISCHARGE INSTRUCTIONS
Take keppra 1000mg in the morning and 1500 at night. Continue Vimpat. For UTI take keflex 250 mg daily. See Dr. Rajeev Turner within 2-3 days for followup. Return to ED for any fevers, seizure that does not stop after 5 minutes, altered mental status, trouble breathing, chest pain or any other severe concerns.

## 2020-12-27 NOTE — ED PROVIDER NOTES
HPI   Chief Complaint   Patient presents with    Seizure   70yoF hx seizure, ESRD on HD, afib on warfarin presents with son to ED for seizure. Per son pt has been having many episodes of whole body shaking episodes since 3 days ago. During these shaking episodes she partially loses consciousness, no postictal period. Son says pt has poor appetite but nothing else is going on, she is taking her vimpat and keppra. No fevers, vomiting, diarrhea, cough, SOB, head trauma. Today pt was shaking so much she didn't make it to dialysis.      Past Medical History:   Diagnosis Date    Anxiety disorder     Arthritis     Atherosclerosis of native arteries of the extremities with ulceration ([facility] 8/31/2020    Atrial fibrillation (H[facility])     Cardiac pacemaker in situ     Cellulitis and abscess of trunk 8/31/2020    Depression, postpartum     Diabetes ([address]     Disorder due to well controlled type 2 diabetes mellitus ([address] 8/31/2020    Dizziness     Heart disease     Heartburn     Hx of long term use of blood thinners     Hypercholesterolemia     Hypertension     Hyponatremia     Hypothyroidism     Kidney disease     Migraines     Peripheral venous insufficiency 8/31/2020    Renal failure     Seizures ([address]     Sleep disorder     Stroke ([address]     Stroke ([facility]     Thyroid disease     Varicose veins of lower extremity with inflammation 8/31/2020       Past Surgical History:   Procedure Laterality Date    HX BACK SURGERY      HX OTHER SURGICAL      leg surgery     HX OTHER SURGICAL      pacemaker monitor          Family History:   Problem Relation Age of Onset    Heart Disease Mother     Heart Disease Father     Diabetes Sister     Diabetes Brother        Social History     Socioeconomic History    Marital status:      Spouse name: Not on file    Number of children: Not on file    Years of education: Not on file    Highest education level: Not on file   Occupational History    Not on file Social Needs    Financial resource strain: Not on file    Food insecurity     Worry: Not on file     Inability: Not on file    Transportation needs     Medical: Not on file     Non-medical: Not on file   Tobacco Use    Smoking status: Never Smoker    Smokeless tobacco: Never Used   Substance and Sexual Activity    Alcohol use: Never     Frequency: Never    Drug use: Not on file    Sexual activity: Not on file   Lifestyle    Physical activity     Days per week: Not on file     Minutes per session: Not on file    Stress: Not on file   Relationships    Social connections     Talks on phone: Not on file     Gets together: Not on file     Attends Muslim service: Not on file     Active member of club or organization: Not on file     Attends meetings of clubs or organizations: Not on file     Relationship status: Not on file    Intimate partner violence     Fear of current or ex partner: Not on file     Emotionally abused: Not on file     Physically abused: Not on file     Forced sexual activity: Not on file   Other Topics Concern    Not on file   Social History Narrative    Not on file         ALLERGIES: Patient has no known allergies. Review of Systems   Constitutional: Positive for appetite change. Neurological: Positive for seizures. All other systems reviewed and are negative. Vitals:    12/27/20 1348 12/27/20 1418 12/27/20 1731   BP: (!) 151/71  (!) 140/64   Pulse: 60  66   Resp: 16  15   Temp: 99.5 °F (37.5 °C)     SpO2: 99% 99% 99%   Weight: 63.5 kg (140 lb)     Height: 5' 8\" (1.727 m)              Physical Exam   Patient Vitals for the past 8 hrs:   Temp Pulse Resp BP SpO2   12/27/20 1731  66 15 (!) 140/64 99 %   12/27/20 1418     99 %   12/27/20 1348 99.5 °F (37.5 °C) 60 16 (!) 151/71 99 %        Nursing note and vitals reviewed. Constitutional: NAD. Head: Normocephalic and atraumatic. Mouth/Throat: Airway patent. Dry mucous membranes. Eyes: EOMI. PERRL.  No scleral icterus. Neck: Neck supple. Cardiovascular: Normal rate, regular rhythm. Normal heart sounds. No murmur, rub, or gallop. Good pulses throughout. Pulmonary/Chest: No respiratory distress. Clear to auscultation bilaterally. No wheezes, rales, or rhonchi. Abdominal/GI: BS normal, Soft, non-tender, non-distended. No rebound or guarding. Musculoskeletal: No gross injuries or deformities. No leg swelling. Neurological: Alert and oriented to person, place, and time. Cranial Nerves 2-12 intact. Moving all extremities with 5/5 strength. Diffuse resting tremors. Psych: Pleasant, cooperative. Skin: Skin is warm and dry. No rash noted. MDM   Ddx = breakthrough seizure, UTI, metabolic derangement, medication non-compliance vs underdosing. EKG read by me at 14:38 showing A-paced rhythm, RBBB. Pt has UTI. Given keflex in ED, Rx keflex for home. I called Dr. Beau Zuleta with neurology, he sees in his clinic records that pt has hx focal seizure and wants her to go up to keppra 1000mg in the morning and 1500mg at night, keep vimpat. Given 1500mg keflex in ED. I told patient and son about the plan, instructed to go to get dialysis tomorrow. D/c with return precautions. Labs Reviewed   CBC WITH AUTOMATED DIFF - Abnormal; Notable for the following components:       Result Value    .2 (*)     PLATELET 299 (*)     All other components within normal limits   METABOLIC PANEL, COMPREHENSIVE - Abnormal; Notable for the following components:    BUN 52 (*)     Creatinine 3.74 (*)     GFR est AA 15 (*)     GFR est non-AA 12 (*)     Calcium 8.4 (*)     Alk.  phosphatase 193 (*)     All other components within normal limits   URINALYSIS W/ REFLEX CULTURE - Abnormal; Notable for the following components:    Appearance Turbid (*)     Protein 100 (*)     Blood Small (*)     Urobilinogen 4.0 (*)     Leukocyte Esterase Moderate (*)     UA:UC IF INDICATED Urine Culture Ordered (*)     All other components within normal limits CULTURE, URINE   URINALYSIS W/MICROSCOPIC   PROTHROMBIN TIME + INR   LACOSAMIDE   LEVETIRACETAM (KEPPRA)     No orders to display     Medications   cephALEXin (KEFLEX) capsule 250 mg (has no administration in time range)   levETIRAcetam (KEPPRA) tablet 500 mg (has no administration in time range)   levETIRAcetam (KEPPRA) tablet 1,000 mg (1,000 mg Oral Given 12/27/20 1531)   levETIRAcetam (KEPPRA) tablet 500 mg (500 mg Oral Given 12/27/20 1826)     Maribeth MORELAND MD, am  the first and primary ED provider for this patient.           Procedures

## 2020-12-29 LAB — LEVETIRACETAM SERPL-MCNC: 85.8 UG/ML

## 2020-12-30 LAB
BACTERIA SPEC CULT: ABNORMAL
COLONY COUNT,CNT: ABNORMAL
LACOSAMIDE SERPL-MCNC: 16.9 UG/ML
SPECIAL REQUESTS,SREQ: ABNORMAL

## 2021-01-01 RX ORDER — ATORVASTATIN CALCIUM 40 MG/1
TABLET, FILM COATED ORAL
Qty: 90 TAB | Refills: 2 | Status: SHIPPED | OUTPATIENT
Start: 2021-01-01 | End: 2021-11-22

## 2021-01-29 RX ORDER — METOPROLOL TARTRATE 25 MG/1
TABLET, FILM COATED ORAL
Qty: 90 TAB | Refills: 0 | Status: SHIPPED | OUTPATIENT
Start: 2021-01-29 | End: 2021-05-01

## 2021-03-26 RX ORDER — FAMOTIDINE 40 MG/1
TABLET, FILM COATED ORAL
Qty: 90 TAB | Refills: 0 | Status: SHIPPED | OUTPATIENT
Start: 2021-03-26 | End: 2021-05-10 | Stop reason: ALTCHOICE

## 2021-03-26 RX ORDER — LEVOTHYROXINE SODIUM 150 UG/1
TABLET ORAL
Qty: 90 TAB | Refills: 0 | Status: SHIPPED | OUTPATIENT
Start: 2021-03-26 | End: 2021-06-07

## 2021-03-26 RX ORDER — FLUOXETINE 10 MG/1
TABLET ORAL
Qty: 90 TAB | Refills: 0 | Status: SHIPPED | OUTPATIENT
Start: 2021-03-26 | End: 2021-07-11

## 2021-04-02 ENCOUNTER — VIRTUAL VISIT (OUTPATIENT)
Dept: INTERNAL MEDICINE CLINIC | Age: 71
End: 2021-04-02
Payer: MEDICARE

## 2021-04-02 DIAGNOSIS — N30.00 ACUTE CYSTITIS WITHOUT HEMATURIA: ICD-10-CM

## 2021-04-02 DIAGNOSIS — I48.0 PAROXYSMAL ATRIAL FIBRILLATION (HCC): ICD-10-CM

## 2021-04-02 DIAGNOSIS — R41.0 CONFUSION: Primary | ICD-10-CM

## 2021-04-02 PROCEDURE — G8400 PT W/DXA NO RESULTS DOC: HCPCS | Performed by: INTERNAL MEDICINE

## 2021-04-02 PROCEDURE — 3017F COLORECTAL CA SCREEN DOC REV: CPT | Performed by: INTERNAL MEDICINE

## 2021-04-02 PROCEDURE — 1090F PRES/ABSN URINE INCON ASSESS: CPT | Performed by: INTERNAL MEDICINE

## 2021-04-02 PROCEDURE — 1101F PT FALLS ASSESS-DOCD LE1/YR: CPT | Performed by: INTERNAL MEDICINE

## 2021-04-02 PROCEDURE — G8427 DOCREV CUR MEDS BY ELIG CLIN: HCPCS | Performed by: INTERNAL MEDICINE

## 2021-04-02 PROCEDURE — 99214 OFFICE O/P EST MOD 30 MIN: CPT | Performed by: INTERNAL MEDICINE

## 2021-04-02 PROCEDURE — G8510 SCR DEP NEG, NO PLAN REQD: HCPCS | Performed by: INTERNAL MEDICINE

## 2021-04-02 RX ORDER — AMOXICILLIN AND CLAVULANATE POTASSIUM 500; 125 MG/1; MG/1
1 TABLET, FILM COATED ORAL EVERY 12 HOURS
Qty: 20 TAB | Refills: 0 | Status: SHIPPED | OUTPATIENT
Start: 2021-04-02 | End: 2021-04-12

## 2021-04-02 RX ORDER — LEVETIRACETAM 1000 MG/1
1000 TABLET ORAL 2 TIMES DAILY
COMMUNITY
End: 2021-06-11 | Stop reason: DRUGHIGH

## 2021-04-02 NOTE — PROGRESS NOTES
1. Have you been to the ER, urgent care clinic since your last visit? Hospitalized since your last visit? yes Feb 2021 seizures and UTI    2. Have you seen or consulted any other health care providers outside of the [address] [place] [address] since your last visit? Include any pap smears or colon screening. Yes When: 1 weeks ago Where: neurologist  Reason for visit: f/u       Chief Complaint   Patient presents with    Fatigue    Seizure    Altered mental status     Pt states that she has been having seizures that started yesturday afternoon. States that she is very fatigued, shaky, and confused. Her caregiver states that when she gets like this she has a UTI.      Contact Number [phone]

## 2021-04-02 NOTE — PROGRESS NOTES
Inge Galindo is a 80 y.o. female and presents with Fatigue, Seizure, and Altered mental status    THIS VISIT WAS COMPLETED VIRTUALLY VIA DOXY. ME WITH PATIENTS CONSENT IN THE SETTING OF CORONAVIRUS PANDEMIC     I have not seen Antonietta Lynn since September 2020. Today Fito Aguilar her daughter in law and primary caregiver says that Antonietta Lynn, she says she thinks she has a UTI again. She's confused, shaking since yesterday, had 2 breakthrough seizures yesterday and today. Antonietta Lynn says she feels dizzy, tired and agitated, has a little back pain, no abdominal pain, denies chills, no fever, no cough, sob. She has afib on warfarin, Fito Aguilar says she has been taking the warfarin but not on HD days because she bleeds if she does. I see INR in December in ER visit was 1.1. We tried to stop warfarin and start Eliquis, but they would have to pay more than 300 dollars out of pocket, so that was not possible.        Review of Systems  Review of Systems       Past Medical History:   Diagnosis Date    Anxiety disorder     Arthritis     Atherosclerosis of native arteries of the extremities with ulceration ([address] 8/31/2020    Atrial fibrillation ([address]     Cardiac pacemaker in situ     Cellulitis and abscess of trunk 8/31/2020    Depression, postpartum     Diabetes ([address]     Disorder due to well controlled type 2 diabetes mellitus ([address] 8/31/2020    Dizziness     Heart disease     Heartburn     Hx of long term use of blood thinners     Hypercholesterolemia     Hypertension     Hyponatremia     Hypothyroidism     Kidney disease     Migraines     Peripheral venous insufficiency 8/31/2020    Renal failure     Seizures ([address]     Sleep disorder     Stroke ([address]     Stroke ([address]     Thyroid disease     Varicose veins of lower extremity with inflammation 8/31/2020     Past Surgical History:   Procedure Laterality Date    HX BACK SURGERY      HX OTHER SURGICAL      leg surgery     HX OTHER SURGICAL      pacemaker monitor Social History     Socioeconomic History    Marital status:      Spouse name: Not on file    Number of children: Not on file    Years of education: Not on file    Highest education level: Not on file   Tobacco Use    Smoking status: Never Smoker    Smokeless tobacco: Never Used   Substance and Sexual Activity    Alcohol use: Never     Frequency: Never     Binge frequency: Never     Family History   Problem Relation Age of Onset    Heart Disease Mother     Heart Disease Father     Diabetes Sister     Diabetes Brother      Current Outpatient Medications   Medication Sig Dispense Refill    levETIRAcetam (Keppra) 1,000 mg tablet Take 1,000 mg by mouth two (2) times a day.  famotidine (PEPCID) 40 mg tablet Take 1 tablet by mouth once daily for 90 days 90 Tab 0    FLUoxetine (PROzac) 10 mg tablet Take 1 tablet by mouth once daily 90 Tab 0    levothyroxine (SYNTHROID) 150 mcg tablet Take 1 tablet by mouth once daily 90 Tab 0    metoprolol tartrate (LOPRESSOR) 25 mg tablet TAKE 1 & 1/2 (ONE & ONE-HALF) TABLETS BY MOUTH TWICE DAILY FOR 30 DAYS 90 Tab 0    atorvastatin (LIPITOR) 40 mg tablet Take 1 tablet by mouth once daily for 90 days 90 Tab 2    levETIRAcetam 1,000 mg tablet Take  by mouth two (2) times a day.  midodrine (PROAMATINE) 5 mg tablet Take  by mouth three (3) times daily.  lacosamide (Vimpat) 100 mg tab tablet Take 150 mg by mouth two (2) times a day.  warfarin (COUMADIN) 4 mg tablet Take 4 mg by mouth daily.  levETIRAcetam (Keppra) 500 mg tablet 2 tablets in the morning, 3 tablets at night 100 Tab 0    acetaminophen-codeine (TYLENOL #3) 300-30 mg per tablet Take 1 Tab by mouth every six (6) hours as needed for Pain for up to 30 days. Max Daily Amount: 4 Tabs. 120 Tab 0     No Known Allergies    Objective: There were no vitals taken for this visit.   Physical Exam:   Physical Exam     Results for orders placed or performed during the hospital encounter of 12/27/20   CULTURE, URINE    Specimen: Urine   Result Value Ref Range    Special Requests: No Special Requests  Reflexed from [id number]        [place] Count >100,000  colonies/ml        Culture result: Escherichia coli (A)         Susceptibility    Escherichia coli - SAMARA     Amikacin ($) <=2 Susceptible ug/mL     Ampicillin ($) >=32 Resistant ug/mL     Ampicillin/sulbactam ($) 16 Intermediate ug/mL     Cefazolin ($) <=4 Susceptible ug/mL     Cefepime ($$) <=1 Susceptible ug/mL     Cefoxitin <=4 Susceptible ug/mL     Ceftazidime ($) <=1 Susceptible ug/mL     Ceftriaxone ($) <=1 Susceptible ug/mL     Ciprofloxacin ($) <=0.25 Susceptible ug/mL     Gentamicin ($) <=1 Susceptible ug/mL     Levofloxacin ($) <=0.12 Susceptible ug/mL     Meropenem ($$) <=0.25 Susceptible ug/mL     Nitrofurantoin 32 Susceptible ug/mL     Piperacillin/Tazobac ($) <=4 Susceptible ug/mL     Tobramycin ($) <=1 Susceptible ug/mL     Trimeth/Sulfa <=20 Susceptible ug/mL   CBC WITH AUTOMATED DIFF   Result Value Ref Range    WBC 6.2 3.6 - 11.0 K/uL    RBC 4.19 3.80 - 5.20 M/uL    HGB 13.1 11.5 - 16.0 g/dL    HCT 42.0 35.0 - 47.0 %    .2 (H) 80.0 - 99.0 FL    MCH 31.3 26.0 - 34.0 PG    MCHC 31.2 30.0 - 36.5 g/dL    RDW 12.8 11.5 - 14.5 %    PLATELET 923 (L) 455 - 400 K/uL    MPV 11.5 8.9 - 12.9 FL    NEUTROPHILS 72 32 - 75 %    LYMPHOCYTES 18 12 - 49 %    MONOCYTES 9 5 - 13 %    EOSINOPHILS 0 0 - 7 %    BASOPHILS 1 0 - 1 %    IMMATURE GRANULOCYTES 0 0.0 - 0.5 %    ABS. NEUTROPHILS 4.5 1.8 - 8.0 K/UL    ABS. LYMPHOCYTES 1.1 0.8 - 3.5 K/UL    ABS. MONOCYTES 0.6 0.0 - 1.0 K/UL    ABS. EOSINOPHILS 0.0 0.0 - 0.4 K/UL    ABS. BASOPHILS 0.0 0.0 - 0.1 K/UL    ABS. IMM.  GRANS. 0.0 0.00 - 0.04 K/UL    DF AUTOMATED     METABOLIC PANEL, COMPREHENSIVE   Result Value Ref Range    Sodium 142 136 - 145 mmol/L    Potassium 3.7 3.5 - 5.1 mmol/L    Chloride 105 97 - 108 mmol/L    CO2 29 21 - 32 mmol/L    Anion gap 8 5 - 15 mmol/L    Glucose 87 65 - 100 mg/dL    BUN 52 (H) 6 - 20 mg/dL    Creatinine 3.74 (H) 0.55 - 1.02 mg/dL    BUN/Creatinine ratio 14 12 - 20      GFR est AA 15 (L) >60 ml/min/1.73m2    GFR est non-AA 12 (L) >60 ml/min/1.73m2    Calcium 8.4 (L) 8.5 - 10.1 mg/dL    Bilirubin, total 0.9 0.2 - 1.0 mg/dL    AST (SGOT) 35 15 - 37 U/L    ALT (SGPT) 46 12 - 78 U/L    Alk. phosphatase 193 (H) 45 - 117 U/L    Protein, total 7.6 6.4 - 8.2 g/dL    Albumin 3.9 3.5 - 5.0 g/dL    Globulin 3.7 2.0 - 4.0 g/dL    A-G Ratio 1.1 1.1 - 2.2     PROTHROMBIN TIME + INR   Result Value Ref Range    Prothrombin time 14.3 11.9 - 14.7 sec    INR 1.1 0.9 - 1.1     LACOSAMIDE   Result Value Ref Range    Lacosamide 16.9 (H) ug/mL   LEVETIRACETAM (KEPPRA)   Result Value Ref Range    Levetiracetam (Keppra) 85.8 (H) ug/mL   URINALYSIS W/ REFLEX CULTURE    Specimen: Urine   Result Value Ref Range    Color Yellow/Straw      Appearance Turbid (A) Clear      Specific gravity 1.014 1.003 - 1.030      pH (UA) 6.0 5.0 - 8.0      Protein 100 (A) Negative mg/dL    Glucose Negative Negative mg/dL    Ketone Negative Negative mg/dL    Bilirubin Negative Negative      Blood Small (A) Negative      Urobilinogen 4.0 (H) 0.1 - 1.0 EU/dL    Nitrites Negative Negative      Leukocyte Esterase Moderate (A) Negative      UA:UC IF INDICATED Urine Culture Ordered (A) Culture not indicated by UA result      WBC  0 - 4 /hpf    RBC 0-5 0 - 5 /hpf    Bacteria Negative Negative /hpf   EKG, 12 LEAD, INITIAL   Result Value Ref Range    Ventricular Rate 66 BPM    Atrial Rate 66 BPM    P-R Interval 276 ms    QRS Duration 132 ms    Q-T Interval 422 ms    QTC Calculation (Bezet) 442 ms    Calculated R Axis 89 degrees    Calculated T Axis -12 degrees    Diagnosis       Atrial-paced rhythm with prolonged AV conduction  Right bundle branch block  Abnormal ECG  When compared with ECG of 16-OCT-2020 12:38,  Electronic atrial pacemaker has replaced Electronic ventricular pacemaker  Vent.  rate has decreased BY  56 BPM  Confirmed by Tramaine Luis MD, [address] ([id number]) on 12/27/2020 7:38:30 PM         Assessment/Plan:    ICD-10-CM ICD-9-CM    1. Confusion  R41.0 298.9 CBC WITH AUTOMATED DIFF      METABOLIC PANEL, COMPREHENSIVE      URINALYSIS W/ RFLX MICROSCOPIC      CULTURE, URINE   2. Paroxysmal atrial fibrillation (HCC)  I48.0 427.31 PROTHROMBIN TIME + INR     Orders Placed This Encounter    CULTURE, URINE    PROTHROMBIN TIME + INR    CBC WITH AUTOMATED DIFF    METABOLIC PANEL, COMPREHENSIVE    URINALYSIS W/ RFLX MICROSCOPIC    levETIRAcetam (Keppra) 1,000 mg tablet     Sig: Take 1,000 mg by mouth two (2) times a day. call if any problems  There are no Patient Instructions on file for this visit. Follow-up and Dispositions    · Return in about 1 month (around 5/2/2021).

## 2021-04-04 LAB
ALBUMIN SERPL-MCNC: 4.1 G/DL (ref 3.8–4.8)
ALBUMIN/GLOB SERPL: 2.6 {RATIO} (ref 1.2–2.2)
ALP SERPL-CCNC: 159 IU/L (ref 39–117)
ALT SERPL-CCNC: 16 IU/L (ref 0–32)
APPEARANCE UR: CLEAR
AST SERPL-CCNC: 18 IU/L (ref 0–40)
BACTERIA #/AREA URNS HPF: ABNORMAL /[HPF]
BACTERIA UR CULT: NORMAL
BASOPHILS # BLD AUTO: 0 X10E3/UL (ref 0–0.2)
BASOPHILS NFR BLD AUTO: 1 %
BILIRUB SERPL-MCNC: 0.6 MG/DL (ref 0–1.2)
BILIRUB UR QL STRIP: NEGATIVE
BUN SERPL-MCNC: 38 MG/DL (ref 8–27)
BUN/CREAT SERPL: 11 (ref 12–28)
CALCIUM SERPL-MCNC: 8.6 MG/DL (ref 8.7–10.3)
CASTS URNS QL MICRO: ABNORMAL /LPF
CHLORIDE SERPL-SCNC: 97 MMOL/L (ref 96–106)
CO2 SERPL-SCNC: 28 MMOL/L (ref 20–29)
COLOR UR: YELLOW
CREAT SERPL-MCNC: 3.46 MG/DL (ref 0.57–1)
EOSINOPHIL # BLD AUTO: 0.1 X10E3/UL (ref 0–0.4)
EOSINOPHIL NFR BLD AUTO: 1 %
EPI CELLS #/AREA URNS HPF: >10 /HPF (ref 0–10)
ERYTHROCYTE [DISTWIDTH] IN BLOOD BY AUTOMATED COUNT: 12.1 % (ref 11.7–15.4)
GLOBULIN SER CALC-MCNC: 1.6 G/DL (ref 1.5–4.5)
GLUCOSE SERPL-MCNC: 57 MG/DL (ref 65–99)
GLUCOSE UR QL: NEGATIVE
HCT VFR BLD AUTO: 34.1 % (ref 34–46.6)
HGB BLD-MCNC: 11.3 G/DL (ref 11.1–15.9)
HGB UR QL STRIP: NEGATIVE
IMM GRANULOCYTES # BLD AUTO: 0 X10E3/UL (ref 0–0.1)
IMM GRANULOCYTES NFR BLD AUTO: 0 %
INR PPP: 1.1 (ref 0.9–1.2)
KETONES UR QL STRIP: NEGATIVE
LEUKOCYTE ESTERASE UR QL STRIP: ABNORMAL
LYMPHOCYTES # BLD AUTO: 1.8 X10E3/UL (ref 0.7–3.1)
LYMPHOCYTES NFR BLD AUTO: 34 %
MCH RBC QN AUTO: 31.8 PG (ref 26.6–33)
MCHC RBC AUTO-ENTMCNC: 33.1 G/DL (ref 31.5–35.7)
MCV RBC AUTO: 96 FL (ref 79–97)
MICRO URNS: ABNORMAL
MONOCYTES # BLD AUTO: 0.5 X10E3/UL (ref 0.1–0.9)
MONOCYTES NFR BLD AUTO: 10 %
NEUTROPHILS # BLD AUTO: 2.8 X10E3/UL (ref 1.4–7)
NEUTROPHILS NFR BLD AUTO: 54 %
NITRITE UR QL STRIP: NEGATIVE
PH UR STRIP: 7 [PH] (ref 5–7.5)
PLATELET # BLD AUTO: 121 X10E3/UL (ref 150–450)
POTASSIUM SERPL-SCNC: 5.9 MMOL/L (ref 3.5–5.2)
PROT SERPL-MCNC: 5.7 G/DL (ref 6–8.5)
PROT UR QL STRIP: ABNORMAL
PROTHROMBIN TIME: 11.5 SEC (ref 9.1–12)
RBC # BLD AUTO: 3.55 X10E6/UL (ref 3.77–5.28)
RBC #/AREA URNS HPF: ABNORMAL /HPF (ref 0–2)
SODIUM SERPL-SCNC: 140 MMOL/L (ref 134–144)
SP GR UR: 1.02 (ref 1–1.03)
UROBILINOGEN UR STRIP-MCNC: 1 MG/DL (ref 0.2–1)
WBC # BLD AUTO: 5.3 X10E3/UL (ref 3.4–10.8)
WBC #/AREA URNS HPF: ABNORMAL /HPF (ref 0–5)

## 2021-04-08 NOTE — PROGRESS NOTES
Please let her know urinalysis and urine culture show only urogenital kusum contamination, no infection. It doesn't seem the new episodes of seizures are triggered by infection, her white blood cell count is normal. If she continues having frequent seizures, she should go to ER. Can you please ask her when is her follow up with Dr. Florence Salts again? Let her know I will try to get in touch with him please. Second thing, INR is 1.1 which is like she were not taking the warfarin at all and I see the ones that have been done in December in the ER was about the same. I will give it a try to order the Eliquis again and see if we can get it approved by insurance. I don't think it makes much sense to continue trying warfarin since she's really not taking it in a daily basis.

## 2021-05-01 RX ORDER — METOPROLOL TARTRATE 25 MG/1
TABLET, FILM COATED ORAL
Qty: 90 TAB | Refills: 0 | Status: SHIPPED | OUTPATIENT
Start: 2021-05-01 | End: 2021-08-06

## 2021-05-01 RX ORDER — MIDODRINE HYDROCHLORIDE 5 MG/1
TABLET ORAL
Qty: 270 TAB | Refills: 0 | Status: SHIPPED | OUTPATIENT
Start: 2021-05-01 | End: 2021-12-03

## 2021-05-10 ENCOUNTER — OFFICE VISIT (OUTPATIENT)
Dept: INTERNAL MEDICINE CLINIC | Age: 71
End: 2021-05-10
Payer: MEDICARE

## 2021-05-10 VITALS
HEIGHT: 61 IN | RESPIRATION RATE: 18 BRPM | SYSTOLIC BLOOD PRESSURE: 111 MMHG | DIASTOLIC BLOOD PRESSURE: 59 MMHG | HEART RATE: 66 BPM | OXYGEN SATURATION: 100 % | TEMPERATURE: 97.7 F | BODY MASS INDEX: 26.45 KG/M2

## 2021-05-10 DIAGNOSIS — R10.13 DYSPEPSIA: ICD-10-CM

## 2021-05-10 DIAGNOSIS — M54.2 CHRONIC NECK PAIN: ICD-10-CM

## 2021-05-10 DIAGNOSIS — Z51.81 MEDICATION MONITORING ENCOUNTER: ICD-10-CM

## 2021-05-10 DIAGNOSIS — Z99.2 END STAGE RENAL FAILURE ON DIALYSIS (HCC): ICD-10-CM

## 2021-05-10 DIAGNOSIS — R11.2 NAUSEA AND VOMITING, INTRACTABILITY OF VOMITING NOT SPECIFIED, UNSPECIFIED VOMITING TYPE: ICD-10-CM

## 2021-05-10 DIAGNOSIS — N18.6 END STAGE RENAL FAILURE ON DIALYSIS (HCC): ICD-10-CM

## 2021-05-10 DIAGNOSIS — G89.29 CHRONIC NECK PAIN: ICD-10-CM

## 2021-05-10 DIAGNOSIS — E11.9 CONTROLLED TYPE 2 DIABETES MELLITUS WITHOUT COMPLICATION, WITHOUT LONG-TERM CURRENT USE OF INSULIN (HCC): ICD-10-CM

## 2021-05-10 DIAGNOSIS — R35.0 INCREASED URINARY FREQUENCY: ICD-10-CM

## 2021-05-10 DIAGNOSIS — I70.25 ATHEROSCLEROSIS OF NATIVE ARTERIES OF THE EXTREMITIES WITH ULCERATION (HCC): ICD-10-CM

## 2021-05-10 DIAGNOSIS — G40.919 BREAKTHROUGH SEIZURE (HCC): ICD-10-CM

## 2021-05-10 DIAGNOSIS — H25.9 AGE-RELATED CATARACT OF BOTH EYES, UNSPECIFIED AGE-RELATED CATARACT TYPE: Primary | ICD-10-CM

## 2021-05-10 DIAGNOSIS — I48.0 PAROXYSMAL ATRIAL FIBRILLATION (HCC): ICD-10-CM

## 2021-05-10 DIAGNOSIS — E03.9 ACQUIRED HYPOTHYROIDISM: ICD-10-CM

## 2021-05-10 PROCEDURE — 2022F DILAT RTA XM EVC RTNOPTHY: CPT | Performed by: INTERNAL MEDICINE

## 2021-05-10 PROCEDURE — 99214 OFFICE O/P EST MOD 30 MIN: CPT | Performed by: INTERNAL MEDICINE

## 2021-05-10 PROCEDURE — G8536 NO DOC ELDER MAL SCRN: HCPCS | Performed by: INTERNAL MEDICINE

## 2021-05-10 PROCEDURE — G8419 CALC BMI OUT NRM PARAM NOF/U: HCPCS | Performed by: INTERNAL MEDICINE

## 2021-05-10 PROCEDURE — G8510 SCR DEP NEG, NO PLAN REQD: HCPCS | Performed by: INTERNAL MEDICINE

## 2021-05-10 PROCEDURE — 1101F PT FALLS ASSESS-DOCD LE1/YR: CPT | Performed by: INTERNAL MEDICINE

## 2021-05-10 PROCEDURE — 3017F COLORECTAL CA SCREEN DOC REV: CPT | Performed by: INTERNAL MEDICINE

## 2021-05-10 PROCEDURE — 3046F HEMOGLOBIN A1C LEVEL >9.0%: CPT | Performed by: INTERNAL MEDICINE

## 2021-05-10 PROCEDURE — G8427 DOCREV CUR MEDS BY ELIG CLIN: HCPCS | Performed by: INTERNAL MEDICINE

## 2021-05-10 PROCEDURE — G8400 PT W/DXA NO RESULTS DOC: HCPCS | Performed by: INTERNAL MEDICINE

## 2021-05-10 PROCEDURE — 1090F PRES/ABSN URINE INCON ASSESS: CPT | Performed by: INTERNAL MEDICINE

## 2021-05-10 RX ORDER — OMEPRAZOLE 40 MG/1
40 CAPSULE, DELAYED RELEASE ORAL DAILY
Qty: 90 CAP | Refills: 0 | Status: SHIPPED | OUTPATIENT
Start: 2021-05-10 | End: 2021-08-08

## 2021-05-10 RX ORDER — ONDANSETRON HYDROCHLORIDE 8 MG/1
8 TABLET, FILM COATED ORAL
Qty: 90 TAB | Refills: 1 | Status: SHIPPED | OUTPATIENT
Start: 2021-05-10 | End: 2021-07-09

## 2021-05-10 RX ORDER — ACETAMINOPHEN AND CODEINE PHOSPHATE 300; 30 MG/1; MG/1
1 TABLET ORAL
Qty: 120 TAB | Refills: 0 | Status: SHIPPED | OUTPATIENT
Start: 2021-05-10 | End: 2021-05-19

## 2021-05-10 NOTE — PROGRESS NOTES
Bin Currie is a 80 y.o. female and presents with Follow-up, Irregular Heart Beat, Diabetes, Thyroid Problem, and Chronic Kidney Disease    Igor Rubio is now on Eliquis, doing well. No mre seizures, abelinoues keppra 2000 mg/day, has appt with Neuro Dr. Alton Kaye next month     Having episodes of nausea and vomiting after she eats, she says she feels really bad burning in her throat and she vomits, she says it happens randomly, but Марина Hollins tells me it's almost daily, not wiith any specific foods. They were told in dalysis protein is low and she was recommended wey protein and almond milkl by the nutritionist.        Review of Systems  Review of Systems   Constitutional: Negative for chills, fatigue, fever and unexpected weight change. HENT: Negative for congestion, ear pain, sneezing and sore throat. Eyes: Negative for pain and discharge. Respiratory: Negative for cough, shortness of breath and wheezing. Cardiovascular: Negative for chest pain, palpitations and leg swelling. Gastrointestinal: Positive for nausea. Negative for abdominal pain, blood in stool, constipation and diarrhea. Endocrine: Negative for polydipsia and polyuria. Genitourinary: Negative for difficulty urinating, dysuria, frequency, hematuria and urgency. Musculoskeletal: Negative for arthralgias, back pain and joint swelling. Skin: Negative for rash. Allergic/Immunologic: Negative for environmental allergies and food allergies. Neurological: Negative for dizziness, speech difficulty, weakness, light-headedness, numbness and headaches. Hematological: Negative for adenopathy. Psychiatric/Behavioral: Negative for behavioral problems (Depression), sleep disturbance and suicidal ideas.           Past Medical History:   Diagnosis Date    Anxiety disorder     Arthritis     Atherosclerosis of native arteries of the extremities with ulceration ([facility] 8/31/2020    Atrial fibrillation ([facility]     Cardiac pacemaker in situ     Cellulitis and abscess of trunk 8/31/2020    Depression, postpartum     Diabetes ([facility]     Disorder due to well controlled type 2 diabetes mellitus ([facility] 8/31/2020    Dizziness     Heart disease     Heartburn     Hx of long term use of blood thinners     Hypercholesterolemia     Hypertension     Hyponatremia     Hypothyroidism     Kidney disease     Migraines     Peripheral venous insufficiency 8/31/2020    Renal failure     Seizures ([facility]     Sleep disorder     Stroke ([facility]     Stroke ([facility]     Thyroid disease     Varicose veins of lower extremity with inflammation 8/31/2020     Past Surgical History:   Procedure Laterality Date    HX BACK SURGERY      HX OTHER SURGICAL      leg surgery     HX OTHER SURGICAL      pacemaker monitor      Social History     Socioeconomic History    Marital status:      Spouse name: Not on file    Number of children: Not on file    Years of education: Not on file    Highest education level: Not on file   Tobacco Use    Smoking status: Never Smoker    Smokeless tobacco: Never Used   Substance and Sexual Activity    Alcohol use: Never     Frequency: Never     Binge frequency: Never     Family History   Problem Relation Age of Onset    Heart Disease Mother     Heart Disease Father     Diabetes Sister     Diabetes Brother      Current Outpatient Medications   Medication Sig Dispense Refill    omeprazole (PRILOSEC) 40 mg capsule Take 1 Cap by mouth daily for 90 days. 90 Cap 0    ondansetron hcl (ZOFRAN) 8 mg tablet Take 1 Tab by mouth every eight (8) hours as needed for Nausea or Vomiting for up to 60 days. 90 Tab 1    acetaminophen-codeine (TYLENOL #3) 300-30 mg per tablet Take 1 Tab by mouth every six (6) hours as needed for Pain for up to 30 days. Max Daily Amount: 4 Tabs.  120 Tab 0    midodrine (PROAMATINE) 5 mg tablet TAKE 1 TABLET BY MOUTH THREE TIMES DAILY FOR 90 DAYS 270 Tab 0    metoprolol tartrate (LOPRESSOR) 25 mg tablet TAKE 1 & 1/2 (ONE & ONE-HALF) TABLETS BY MOUTH TWICE DAILY FOR 30 DAYS 90 Tab 0    apixaban (ELIQUIS) 2.5 mg tablet Take 1 Tab by mouth two (2) times a day for 180 days. Indications: treatment to prevent blood clots in chronic atrial fibrillation 60 Tab 5    levETIRAcetam (Keppra) 1,000 mg tablet Take 1,000 mg by mouth two (2) times a day.  FLUoxetine (PROzac) 10 mg tablet Take 1 tablet by mouth once daily 90 Tab 0    levothyroxine (SYNTHROID) 150 mcg tablet Take 1 tablet by mouth once daily 90 Tab 0    atorvastatin (LIPITOR) 40 mg tablet Take 1 tablet by mouth once daily for 90 days 90 Tab 2    lacosamide (Vimpat) 100 mg tab tablet Take 150 mg by mouth two (2) times a day. No Known Allergies    Objective:  Visit Vitals  BP (!) 111/59 (BP 1 Location: Right upper arm, BP Patient Position: Sitting, BP Cuff Size: Adult)   Pulse 66   Temp 97.7 °F (36.5 °C) (Oral)   Resp 18   Ht 5' 1\" (1.549 m)   SpO2 100% Comment: RA   BMI 26.45 kg/m²     Physical Exam:   Physical Exam  Constitutional:       General: She is not in acute distress. Appearance: Normal appearance. HENT:      Head: Normocephalic and atraumatic. Mouth/Throat:      Mouth: Mucous membranes are moist.   Eyes:      Extraocular Movements: Extraocular movements intact. Conjunctiva/sclera: Conjunctivae normal.      Pupils: Pupils are equal, round, and reactive to light. Neck:      Musculoskeletal: Normal range of motion and neck supple. Cardiovascular:      Rate and Rhythm: Normal rate and regular rhythm. Pulses: Normal pulses. Heart sounds: Normal heart sounds. Pulmonary:      Effort: Pulmonary effort is normal.      Breath sounds: Normal breath sounds. Abdominal:      General: Abdomen is flat. Bowel sounds are normal. There is no distension. Palpations: Abdomen is soft. There is no mass. Tenderness: There is no abdominal tenderness. Musculoskeletal:         General: No swelling or deformity.       Right lower leg: No edema. Left lower leg: No edema. Lymphadenopathy:      Cervical: No cervical adenopathy. Skin:     General: Skin is warm and dry. Capillary Refill: Capillary refill takes less than 2 seconds. Coloration: Skin is not jaundiced or pale. Findings: No erythema or rash. Neurological:      Mental Status: She is alert and oriented to person, place, and time. Motor: Weakness (LEs) present. Gait: Gait abnormal (wheelchair bound ). Psychiatric:         Mood and Affect: Mood normal.         Behavior: Behavior normal.         Thought Content: Thought content normal.         Judgment: Judgment normal.          Results for orders placed or performed in visit on 04/02/21   CULTURE, URINE    Specimen: Urine   Result Value Ref Range    Urine Culture, Routine       Mixed urogenital kusum  50,000-100,000 colony forming units per mL     PROTHROMBIN TIME + INR   Result Value Ref Range    INR 1.1 0.9 - 1.2    Prothrombin time 11.5 9.1 - 12.0 sec   CBC WITH AUTOMATED DIFF   Result Value Ref Range    WBC 5.3 3.4 - 10.8 x10E3/uL    RBC 3.55 (L) 3.77 - 5.28 x10E6/uL    HGB 11.3 11.1 - 15.9 g/dL    HCT 34.1 34.0 - 46.6 %    MCV 96 79 - 97 fL    MCH 31.8 26.6 - 33.0 pg    MCHC 33.1 31.5 - 35.7 g/dL    RDW 12.1 11.7 - 15.4 %    PLATELET 195 (L) 620 - 450 x10E3/uL    NEUTROPHILS 54 Not Estab. %    Lymphocytes 34 Not Estab. %    MONOCYTES 10 Not Estab. %    EOSINOPHILS 1 Not Estab. %    BASOPHILS 1 Not Estab. %    ABS. NEUTROPHILS 2.8 1.4 - 7.0 x10E3/uL    Abs Lymphocytes 1.8 0.7 - 3.1 x10E3/uL    ABS. MONOCYTES 0.5 0.1 - 0.9 x10E3/uL    ABS. EOSINOPHILS 0.1 0.0 - 0.4 x10E3/uL    ABS. BASOPHILS 0.0 0.0 - 0.2 x10E3/uL    IMMATURE GRANULOCYTES 0 Not Estab. %    ABS. IMM.  GRANS. 0.0 0.0 - 0.1 A47Q9/BM   METABOLIC PANEL, COMPREHENSIVE   Result Value Ref Range    Glucose 57 (L) 65 - 99 mg/dL    BUN 38 (H) 8 - 27 mg/dL    Creatinine 3.46 (H) 0.57 - 1.00 mg/dL    GFR est non-AA 13 (L) >59 mL/min/1.73    GFR est AA 15 (L) >59 mL/min/1.73    BUN/Creatinine ratio 11 (L) 12 - 28    Sodium 140 134 - 144 mmol/L    Potassium 5.9 (H) 3.5 - 5.2 mmol/L    Chloride 97 96 - 106 mmol/L    CO2 28 20 - 29 mmol/L    Calcium 8.6 (L) 8.7 - 10.3 mg/dL    Protein, total 5.7 (L) 6.0 - 8.5 g/dL    Albumin 4.1 3.8 - 4.8 g/dL    GLOBULIN, TOTAL 1.6 1.5 - 4.5 g/dL    A-G Ratio 2.6 (H) 1.2 - 2.2    Bilirubin, total 0.6 0.0 - 1.2 mg/dL    Alk. phosphatase 159 (H) 39 - 117 IU/L    AST (SGOT) 18 0 - 40 IU/L    ALT (SGPT) 16 0 - 32 IU/L   URINALYSIS W/ RFLX MICROSCOPIC   Result Value Ref Range    Specific Gravity 1.019 1.005 - 1.030    pH (UA) 7.0 5.0 - 7.5    Color Yellow Yellow    Appearance Clear Clear    Leukocyte Esterase 2+ (A) Negative    Protein 1+ (A) Negative/Trace    Glucose Negative Negative    Ketone Negative Negative    Blood Negative Negative    Bilirubin Negative Negative    Urobilinogen 1.0 0.2 - 1.0 mg/dL    Nitrites Negative Negative    Microscopic Examination See additional order    MICROSCOPIC EXAMINATION   Result Value Ref Range    WBC 6-10 (A) 0 - 5 /hpf    RBC 0-2 0 - 2 /hpf    Epithelial cells >10 (A) 0 - 10 /hpf    Casts None seen None seen /lpf    Bacteria Moderate (A) None seen/Few       Assessment/Plan:    She will have labs drawn at dialysis, she could not give sample for urinalysis today, she will, not reporting any. For dyspepsia nausea and vomiting treat as below, if no improvement will refer her to gastroenterology    Continues under the care of neurology for seizure disorder she has an appointment coming up soon.     Continues on dialysis, under the care of nephrology Dr. Valentín Farrell TFTs and will adjust treatment accordingly    For chronic neck pain associated radiculopathy she has been doing well on as needed only Tylenol 3 which she takes seldomly, last refill was done in September, will check DUS       ICD-10-CM ICD-9-CM    1. Age-related cataract of both eyes, unspecified age-related cataract type  H25.9 366.10 REFERRAL TO OPHTHALMOLOGY   2. Dyspepsia  R10.13 536.8 omeprazole (PRILOSEC) 40 mg capsule   3. Nausea and vomiting, intractability of vomiting not specified, unspecified vomiting type  R11.2 787.01 ondansetron hcl (ZOFRAN) 8 mg tablet   4. Increased urinary frequency  R35.0 788.41 URINALYSIS W/ RFLX MICROSCOPIC   5. Controlled type 2 diabetes mellitus without complication, without long-term current use of insulin ([facility])  E11.9 250.00 HEMOGLOBIN A1C WITH EAG   6. Acquired hypothyroidism  E03.9 244.9 TSH 3RD GENERATION      T4, FREE   7. Atherosclerosis of native arteries of the extremities with ulceration ([facility])  I70.25 440.23      707.9    8. End stage renal failure on dialysis ([facility])  N18.6 585.6     Z99.2 V45.11    9. Breakthrough seizure ([facility]  G40.919 345.91    10. Chronic neck pain  M54.2 723.1 acetaminophen-codeine (TYLENOL #3) 300-30 mg per tablet    G89.29 338.29    11. Medication monitoring encounter  Z51.81 V58.83 TOXASSURE SELECT 13 (MW)   12. Paroxysmal atrial fibrillation ([facility]  I48.0 427.31      Orders Placed This Encounter    TSH 3RD GENERATION    T4, FREE    HEMOGLOBIN A1C WITH EAG    URINALYSIS W/ RFLX MICROSCOPIC    TOXASSURE SELECT 13 (MW)    REFERRAL TO OPHTHALMOLOGY     Referral Priority:   Routine     Referral Type:   Consultation     Referral Reason:   Specialty Services Required     Referred to Provider:   Fabien Suarez MD     Number of Visits Requested:   1    omeprazole (PRILOSEC) 40 mg capsule     Sig: Take 1 Cap by mouth daily for 90 days. Dispense:  90 Cap     Refill:  0    ondansetron hcl (ZOFRAN) 8 mg tablet     Sig: Take 1 Tab by mouth every eight (8) hours as needed for Nausea or Vomiting for up to 60 days. Dispense:  90 Tab     Refill:  1    acetaminophen-codeine (TYLENOL #3) 300-30 mg per tablet     Sig: Take 1 Tab by mouth every six (6) hours as needed for Pain for up to 30 days. Max Daily Amount: 4 Tabs.      Dispense:  120 Tab     Refill:  0     call if any problems  There are no Patient Instructions on file for this visit. Follow-up and Dispositions    · Return in about 3 months (around 8/10/2021).

## 2021-05-10 NOTE — PROGRESS NOTES
1. Have you been to the ER, urgent care clinic since your last visit? Hospitalized since your last visit? No    2. Have you seen or consulted any other health care providers outside of the [address] [place] [address] since your last visit? Include any pap smears or colon screening. No     Chief Complaint   Patient presents with    Follow-up    Irregular Heart Beat    Diabetes    Thyroid Problem    Chronic Kidney Disease     Pt states that she is here for a f/u visit.

## 2021-05-19 ENCOUNTER — APPOINTMENT (OUTPATIENT)
Dept: CT IMAGING | Age: 71
End: 2021-05-19
Attending: EMERGENCY MEDICINE
Payer: MEDICARE

## 2021-05-19 ENCOUNTER — HOSPITAL ENCOUNTER (OUTPATIENT)
Age: 71
Setting detail: OBSERVATION
Discharge: HOME HEALTH | End: 2021-05-21
Attending: EMERGENCY MEDICINE | Admitting: FAMILY MEDICINE
Payer: MEDICARE

## 2021-05-19 DIAGNOSIS — G45.9 TIA (TRANSIENT ISCHEMIC ATTACK): Primary | ICD-10-CM

## 2021-05-19 DIAGNOSIS — R56.9 SEIZURE (HCC): ICD-10-CM

## 2021-05-19 LAB
ALBUMIN SERPL-MCNC: 3.1 G/DL (ref 3.5–5)
ALBUMIN/GLOB SERPL: 1.1 {RATIO} (ref 1.1–2.2)
ALP SERPL-CCNC: 164 U/L (ref 45–117)
ALT SERPL-CCNC: 25 U/L (ref 12–78)
ANION GAP SERPL CALC-SCNC: 2 MMOL/L (ref 5–15)
AST SERPL W P-5'-P-CCNC: 19 U/L (ref 15–37)
BASOPHILS # BLD: 0 K/UL (ref 0–0.1)
BASOPHILS NFR BLD: 1 % (ref 0–1)
BILIRUB SERPL-MCNC: 0.8 MG/DL (ref 0.2–1)
BUN SERPL-MCNC: 22 MG/DL (ref 6–20)
BUN/CREAT SERPL: 11 (ref 12–20)
CA-I BLD-MCNC: 8.2 MG/DL (ref 8.5–10.1)
CHLORIDE SERPL-SCNC: 97 MMOL/L (ref 97–108)
CO2 SERPL-SCNC: 34 MMOL/L (ref 21–32)
CREAT SERPL-MCNC: 2.01 MG/DL (ref 0.55–1.02)
DIFFERENTIAL METHOD BLD: ABNORMAL
EOSINOPHIL # BLD: 0.1 K/UL (ref 0–0.4)
EOSINOPHIL NFR BLD: 1 % (ref 0–7)
ERYTHROCYTE [DISTWIDTH] IN BLOOD BY AUTOMATED COUNT: 12.7 % (ref 11.5–14.5)
GLOBULIN SER CALC-MCNC: 2.8 G/DL (ref 2–4)
GLUCOSE BLD STRIP.AUTO-MCNC: 107 MG/DL (ref 65–117)
GLUCOSE SERPL-MCNC: 115 MG/DL (ref 65–100)
HCT VFR BLD AUTO: 32.4 % (ref 35–47)
HGB BLD-MCNC: 10.3 G/DL (ref 11.5–16)
IMM GRANULOCYTES # BLD AUTO: 0 K/UL (ref 0–0.04)
IMM GRANULOCYTES NFR BLD AUTO: 0 % (ref 0–0.5)
INR PPP: 1.2 (ref 0.9–1.1)
LYMPHOCYTES # BLD: 0.9 K/UL (ref 0.8–3.5)
LYMPHOCYTES NFR BLD: 18 % (ref 12–49)
MCH RBC QN AUTO: 31 PG (ref 26–34)
MCHC RBC AUTO-ENTMCNC: 31.8 G/DL (ref 30–36.5)
MCV RBC AUTO: 97.6 FL (ref 80–99)
MONOCYTES # BLD: 0.5 K/UL (ref 0–1)
MONOCYTES NFR BLD: 9 % (ref 5–13)
NEUTS SEG # BLD: 3.6 K/UL (ref 1.8–8)
NEUTS SEG NFR BLD: 71 % (ref 32–75)
NRBC # BLD: 0 K/UL (ref 0–0.01)
NRBC BLD-RTO: 0 PER 100 WBC
PERFORMED BY, TECHID: NORMAL
PLATELET # BLD AUTO: 88 K/UL (ref 150–400)
PMV BLD AUTO: 12.5 FL (ref 8.9–12.9)
POTASSIUM SERPL-SCNC: 3.9 MMOL/L (ref 3.5–5.1)
PROT SERPL-MCNC: 5.9 G/DL (ref 6.4–8.2)
PROTHROMBIN TIME: 15.1 SEC (ref 11.9–14.7)
RBC # BLD AUTO: 3.32 M/UL (ref 3.8–5.2)
SODIUM SERPL-SCNC: 133 MMOL/L (ref 136–145)
TROPONIN I SERPL-MCNC: <0.05 NG/ML
WBC # BLD AUTO: 5.1 K/UL (ref 3.6–11)

## 2021-05-19 PROCEDURE — 65270000029 HC RM PRIVATE

## 2021-05-19 PROCEDURE — 99285 EMERGENCY DEPT VISIT HI MDM: CPT

## 2021-05-19 PROCEDURE — 93005 ELECTROCARDIOGRAM TRACING: CPT

## 2021-05-19 PROCEDURE — 96374 THER/PROPH/DIAG INJ IV PUSH: CPT

## 2021-05-19 PROCEDURE — 74011000636 HC RX REV CODE- 636: Performed by: EMERGENCY MEDICINE

## 2021-05-19 PROCEDURE — 82962 GLUCOSE BLOOD TEST: CPT

## 2021-05-19 PROCEDURE — 70450 CT HEAD/BRAIN W/O DYE: CPT

## 2021-05-19 PROCEDURE — 85025 COMPLETE CBC W/AUTO DIFF WBC: CPT

## 2021-05-19 PROCEDURE — 36415 COLL VENOUS BLD VENIPUNCTURE: CPT

## 2021-05-19 PROCEDURE — 85610 PROTHROMBIN TIME: CPT

## 2021-05-19 PROCEDURE — 70498 CT ANGIOGRAPHY NECK: CPT

## 2021-05-19 PROCEDURE — 80053 COMPREHEN METABOLIC PANEL: CPT

## 2021-05-19 PROCEDURE — 74011250636 HC RX REV CODE- 250/636

## 2021-05-19 PROCEDURE — 84484 ASSAY OF TROPONIN QUANT: CPT

## 2021-05-19 RX ORDER — LABETALOL HYDROCHLORIDE 5 MG/ML
5 INJECTION, SOLUTION INTRAVENOUS
Status: DISPENSED | OUTPATIENT
Start: 2021-05-19 | End: 2021-05-20

## 2021-05-19 RX ORDER — SODIUM CHLORIDE 9 MG/ML
75 INJECTION, SOLUTION INTRAVENOUS CONTINUOUS
Status: DISPENSED | OUTPATIENT
Start: 2021-05-19 | End: 2021-05-20

## 2021-05-19 RX ORDER — LORAZEPAM 2 MG/ML
2 INJECTION INTRAMUSCULAR
Status: COMPLETED | OUTPATIENT
Start: 2021-05-19 | End: 2021-05-19

## 2021-05-19 RX ORDER — ONDANSETRON 2 MG/ML
4 INJECTION INTRAMUSCULAR; INTRAVENOUS
Status: DISCONTINUED | OUTPATIENT
Start: 2021-05-19 | End: 2021-05-21 | Stop reason: HOSPADM

## 2021-05-19 RX ORDER — ACETAMINOPHEN 325 MG/1
650 TABLET ORAL
Status: DISCONTINUED | OUTPATIENT
Start: 2021-05-19 | End: 2021-05-21 | Stop reason: HOSPADM

## 2021-05-19 RX ORDER — ACETAMINOPHEN 650 MG/1
650 SUPPOSITORY RECTAL
Status: DISCONTINUED | OUTPATIENT
Start: 2021-05-19 | End: 2021-05-21 | Stop reason: HOSPADM

## 2021-05-19 RX ORDER — LEVETIRACETAM 10 MG/ML
1000 INJECTION INTRAVASCULAR EVERY 12 HOURS
Status: DISCONTINUED | OUTPATIENT
Start: 2021-05-19 | End: 2021-05-21 | Stop reason: HOSPADM

## 2021-05-19 RX ORDER — LORAZEPAM 2 MG/ML
INJECTION INTRAMUSCULAR
Status: COMPLETED
Start: 2021-05-19 | End: 2021-05-19

## 2021-05-19 RX ADMIN — LORAZEPAM 2 MG: 2 INJECTION INTRAMUSCULAR at 19:01

## 2021-05-19 RX ADMIN — LORAZEPAM 2 MG: 2 INJECTION INTRAMUSCULAR; INTRAVENOUS at 19:01

## 2021-05-19 RX ADMIN — IOPAMIDOL 100 ML: 755 INJECTION, SOLUTION INTRAVENOUS at 19:11

## 2021-05-19 NOTE — ED PROVIDER NOTES
EMERGENCY DEPARTMENT HISTORY AND PHYSICAL EXAM      Date: 5/19/2021  Patient Name: Tana Dejesus      History of Presenting Illness     Chief Complaint   Patient presents with    Extremity Weakness    Altered mental status       History Provided By: Patient and Patient's Son    HPI: Tana Dejesus, 80 y.o. female with a past medical history significant Multiple comorbidities presents to the ED with cc of right upper extremity pain weakness and inability to move bilateral lower extremities. Patient is a poor historian. He conveys compliance with her medication regimen. Upon bringing the patient back for a head CT she did have a generalized tonic-clonic seizure and began staring off to the right for approximately 2 minutes. Patient was treated 2 mg of Ativan and is now somnolent but back at her baseline. There is no appreciable active seizing at this point time and she is unable to provide a history    There are no other complaints, changes, or physical findings at this time. PCP: Ramiro Nur MD    Current Outpatient Medications   Medication Sig Dispense Refill    omeprazole (PRILOSEC) 40 mg capsule Take 1 Cap by mouth daily for 90 days. 90 Cap 0    ondansetron hcl (ZOFRAN) 8 mg tablet Take 1 Tab by mouth every eight (8) hours as needed for Nausea or Vomiting for up to 60 days. 90 Tab 1    acetaminophen-codeine (TYLENOL #3) 300-30 mg per tablet Take 1 Tab by mouth every six (6) hours as needed for Pain for up to 30 days. Max Daily Amount: 4 Tabs. 120 Tab 0    midodrine (PROAMATINE) 5 mg tablet TAKE 1 TABLET BY MOUTH THREE TIMES DAILY FOR 90 DAYS 270 Tab 0    metoprolol tartrate (LOPRESSOR) 25 mg tablet TAKE 1 & 1/2 (ONE & ONE-HALF) TABLETS BY MOUTH TWICE DAILY FOR 30 DAYS 90 Tab 0    apixaban (ELIQUIS) 2.5 mg tablet Take 1 Tab by mouth two (2) times a day for 180 days.  Indications: treatment to prevent blood clots in chronic atrial fibrillation 60 Tab 5    levETIRAcetam (Keppra) 1,000 mg tablet Take 1,000 mg by mouth two (2) times a day.  FLUoxetine (PROzac) 10 mg tablet Take 1 tablet by mouth once daily 90 Tab 0    levothyroxine (SYNTHROID) 150 mcg tablet Take 1 tablet by mouth once daily 90 Tab 0    atorvastatin (LIPITOR) 40 mg tablet Take 1 tablet by mouth once daily for 90 days 90 Tab 2    lacosamide (Vimpat) 100 mg tab tablet Take 150 mg by mouth two (2) times a day.          Past History     Past Medical History:  Past Medical History:   Diagnosis Date    Anxiety disorder     Arthritis     Atherosclerosis of native arteries of the extremities with ulceration ([facility] 8/31/2020    Atrial fibrillation (HCC)     Cardiac pacemaker in situ     Cellulitis and abscess of trunk 8/31/2020    Depression, postpartum     Diabetes ([facility]     Disorder due to well controlled type 2 diabetes mellitus ([facility] 8/31/2020    Dizziness     Heart disease     Heartburn     Hx of long term use of blood thinners     Hypercholesterolemia     Hypertension     Hyponatremia     Hypothyroidism     Kidney disease     Migraines     Peripheral venous insufficiency 8/31/2020    Renal failure     Seizures ([facility]     Sleep disorder     Stroke ([facility]     Stroke ([facility]     Thyroid disease     Varicose veins of lower extremity with inflammation 8/31/2020       Past Surgical History:  Past Surgical History:   Procedure Laterality Date    HX BACK SURGERY      HX OTHER SURGICAL      leg surgery     HX OTHER SURGICAL      pacemaker monitor        Family History:  Family History   Problem Relation Age of Onset    Heart Disease Mother     Heart Disease Father     Diabetes Sister     Diabetes Brother        Social History:  Social History     Tobacco Use    Smoking status: Never Smoker    Smokeless tobacco: Never Used   Vaping Use    Vaping Use: Never used   Substance Use Topics    Alcohol use: Never    Drug use: Not on file       Allergies:  No Known Allergies      Review of Systems Review of Systems   Unable to perform ROS: Acuity of condition       Physical Exam     Physical Exam  Constitutional:       Appearance: She is well-developed. Neurological:      Mental Status: She is lethargic and confused. GCS: GCS eye subscore is 3. GCS verbal subscore is 4. GCS motor subscore is 5. Cranial Nerves: Dysarthria present. No cranial nerve deficit. Comments: Patient's right arm is shaking but is distractible. It is cool to the touch. She has a palpable thrill in her right [facility], [place], operated by [facility]         Lab and Diagnostic Study Results     Labs -     Recent Results (from the past 12 hour(s))   GLUCOSE, POC    Collection Time: 05/19/21  8:07 PM   Result Value Ref Range    Glucose (POC) 107 65 - 117 mg/dL    Performed by TESSA VILLAVICENCIO    CBC WITH AUTOMATED DIFF    Collection Time: 05/19/21  9:56 PM   Result Value Ref Range    WBC 5.1 3.6 - 11.0 K/uL    RBC 3.32 (L) 3.80 - 5.20 M/uL    HGB 10.3 (L) 11.5 - 16.0 g/dL    HCT 32.4 (L) 35.0 - 47.0 %    MCV 97.6 80.0 - 99.0 FL    MCH 31.0 26.0 - 34.0 PG    MCHC 31.8 30.0 - 36.5 g/dL    RDW 12.7 11.5 - 14.5 %    PLATELET 88 (L) 759 - 400 K/uL    MPV 12.5 8.9 - 12.9 FL    NRBC 0.0 0.0  WBC    ABSOLUTE NRBC 0.00 0.00 - 0.01 K/uL    NEUTROPHILS 71 32 - 75 %    LYMPHOCYTES 18 12 - 49 %    MONOCYTES 9 5 - 13 %    EOSINOPHILS 1 0 - 7 %    BASOPHILS 1 0 - 1 %    IMMATURE GRANULOCYTES 0 0 - 0.5 %    ABS. NEUTROPHILS 3.6 1.8 - 8.0 K/UL    ABS. LYMPHOCYTES 0.9 0.8 - 3.5 K/UL    ABS. MONOCYTES 0.5 0.0 - 1.0 K/UL    ABS. EOSINOPHILS 0.1 0.0 - 0.4 K/UL    ABS. BASOPHILS 0.0 0.0 - 0.1 K/UL    ABS. IMM.  GRANS. 0.0 0.00 - 0.04 K/UL    DF AUTOMATED     METABOLIC PANEL, COMPREHENSIVE    Collection Time: 05/19/21  9:56 PM   Result Value Ref Range    Sodium 133 (L) 136 - 145 mmol/L    Potassium 3.9 3.5 - 5.1 mmol/L    Chloride 97 97 - 108 mmol/L    CO2 34 (H) 21 - 32 mmol/L    Anion gap 2 (L) 5 - 15 mmol/L    Glucose 115 (H) 65 - 100 mg/dL    BUN 22 (H) 6 - 20 mg/dL Creatinine 2.01 (H) 0.55 - 1.02 mg/dL    BUN/Creatinine ratio 11 (L) 12 - 20      GFR est AA 30 (L) >60 ml/min/1.73m2    GFR est non-AA 24 (L) >60 ml/min/1.73m2    Calcium 8.2 (L) 8.5 - 10.1 mg/dL    Bilirubin, total 0.8 0.2 - 1.0 mg/dL    AST (SGOT) 19 15 - 37 U/L    ALT (SGPT) 25 12 - 78 U/L    Alk. phosphatase 164 (H) 45 - 117 U/L    Protein, total 5.9 (L) 6.4 - 8.2 g/dL    Albumin 3.1 (L) 3.5 - 5.0 g/dL    Globulin 2.8 2.0 - 4.0 g/dL    A-G Ratio 1.1 1.1 - 2.2     PROTHROMBIN TIME + INR    Collection Time: 05/19/21  9:56 PM   Result Value Ref Range    Prothrombin time 15.1 (H) 11.9 - 14.7 sec    INR 1.2 (H) 0.9 - 1.1     TROPONIN I    Collection Time: 05/19/21  9:56 PM   Result Value Ref Range    Troponin-I, Qt. <0.05 <0.05 ng/mL       Radiologic Studies -   [unfilled]  CT Results  (Last 48 hours)               05/19/21 1912  CTA CODE NEURO HEAD AND NECK W CONT Final result    Impression:  No occlusion or high-grade stenosis of the major cervical or intracranial   arterial vasculature. Please note that degrees of carotid stenosis are measured in accordance with   NASCET criteria. Findings were discussed with Dr. Dick Houston by Dr. Sabino Peralta at 8:09 PM on   5/19/2021 via phone conversation. Narrative:  Study: Head and Neck CTA       Clinical Indication: Code stroke. Comparison: Head CT performed earlier the same day. Technique: Routine unenhanced axial acquisition of the neck was performed. Subsequently, contiguous thin section axial acquisition of the head and neck was   performed in the arterial phase from the thoracic inlet to the skull vertex   following the intravenous administration of 100 mL Isovue-370. Coronal,   sagittal, and MIP reconstructions were generated and reviewed.  Dose reduction:   All CT scans at this facility are performed using dose reduction optimization   techniques as appropriate to a performed exam including the following-automated   exposure control, adjustments of mA and/or Kv according to patient size, or use   of iterative reconstructive technique. Findings:       Neck CTA:   The aortic arch and great vessel origins are unremarkable. The common carotid arteries are patent without high-grade stenosis. Calcified   plaque in the carotid bulbs without significant stenosis. The cervical internal   carotid arteries are tortuous but patent without high-grade stenosis based on   NASCET criteria. The external carotid arteries are unremarkable. The cervical vertebral arteries are patent without high-grade stenosis. The paraspinal soft tissues are unremarkable. The lung apices are clear. Multilevel cervical spondylosis. Partially visualized right subclavian approach   pacemaker. Head CTA:   The intracranial internal carotid arteries are patent without high-grade   stenosis. The anterior cerebral arteries are patent without high-grade stenosis. The middle cerebral arteries are patent without high-grade stenosis. The   anterior and bilateral posterior communicating arteries are visualized. The intracranial vertebral arteries are patent without high-grade stenosis. The   left vertebral artery is dominant. The basilar artery is patent without   high-grade stenosis. Hypoplastic right P1 segment with fetal type origin of the   right posterior cerebral artery, a normal anatomic variant. Otherwise the   posterior cerebral arteries are patent without high-grade stenosis. A 2 x 1 mm inferiorly projecting outpouching arising from the communicating   segment of the left internal carotid artery appears to represent an infundibular   origin of the posterior communicating artery. No evidence of an aneurysm or high   flow vascular malformation. 05/19/21 1907  CT CODE NEURO HEAD WO CONTRAST Final result    Impression:  No acute intracranial abnormality. Chronic findings as above.            Findings were discussed with Dr. Butler Layer by Dr. Anahy Colby at 7:18 PM 5/19/2021   via phone conversation. Narrative:  Study: Head CT without contrast.       Clinical Indication: Code stroke. Comparison: Head CT dated 1/13/2020. Technique: Routine volume acquisition of the head was performed without contrast   using soft tissue and bone kernels. Coronal and sagittal reconstructions were   generated and reviewed. Dose reduction: All CT scans at this facility are   performed using dose reduction optimization techniques as appropriate to a   performed exam including the following-automated exposure control, adjustments   of mA and/or Kv according to patient size, or use of iterative reconstructive   technique. Findings:       Mild generalized volume loss with commensurate prominence of ventricles and   sulci, unchanged. No midline shift. The basal cisterns are patent. No acute hemorrhage, abnormal   extra-axial fluid, or evidence of large territorial ischemia. Unchanged region   of left occipital lobe encephalomalacia. Chronic lacunar infarct of the left   basal ganglia. Unremarkable orbits. The included paranasal sinuses and mastoid air cells are   clear. No evidence of an aggressive calvarial or extracalvarial lesion. CXR Results  (Last 48 hours)    None          Medical Decision Making and ED Course   - I am the first and primary provider for this patient AND AM THE PRIMARY PROVIDER OF RECORD. - I reviewed the vital signs, available nursing notes, past medical history, past surgical history, family history and social history. - Initial assessment performed. The patients presenting problems have been discussed, and the staff are in agreement with the care plan formulated and outlined with them. I have encouraged them to ask questions as they arise throughout their visit. Vital Signs-Reviewed the patient's vital signs.     Patient Vitals for the past 12 hrs:   Temp Pulse Resp BP SpO2 05/19/21 1949 98 °F (36.7 °C)       05/19/21 1947  60 17 138/62 100 %   05/19/21 1855  84 17 131/70 98 %       Records Reviewed: Nursing Notes and Old Medical Records    The patient presents with diffuse weakness in bilateral lower extremities with right arm shaking and now generalized seizure With a differential diagnosis of metabolic abnormality, TIA, CVA, UTI, dehydration, electrolyte abnormality. Will assess with basic labs and CT of the head and neurology evaluation    ED Course:       ED Course as of May 19 2318   Wed May 19, 2021   2040 Telemetry neurology reveals that the patient likely has post ictal state versus new TIA and recommends admission and further observation. He does convey that her antiepileptic drugs are maxed out given her acute renal insufficiency and recommends that Ativan be used otherwise.    [CS]      ED Course User Index  [CS] Manfred De León MD         Provider Notes (Medical Decision Making): [facility]           Consultations:       Consultations: -  Hospitalist Consultant: Dr. Rogers Monson: We have asked for emergent assistance with regard to this patient. We have discussed the patients HPI, ROS, PE and results this far. They will come and evaluate the patient for admission. Procedures and Critical Care       Performed by: Kaye Lopez MD  PROCEDURES:  Procedures         Disposition     Disposition: Admitted to Floor Medical Floor the case was discussed with the admitting physician         Remove if not discharged  DISCHARGE PLAN:  1. Current Discharge Medication List      CONTINUE these medications which have NOT CHANGED    Details   omeprazole (PRILOSEC) 40 mg capsule Take 1 Cap by mouth daily for 90 days. Qty: 90 Cap, Refills: 0    Associated Diagnoses: Dyspepsia      ondansetron hcl (ZOFRAN) 8 mg tablet Take 1 Tab by mouth every eight (8) hours as needed for Nausea or Vomiting for up to 60 days.   Qty: 90 Tab, Refills: 1    Associated Diagnoses: Nausea and vomiting, intractability of vomiting not specified, unspecified vomiting type      acetaminophen-codeine (TYLENOL #3) 300-30 mg per tablet Take 1 Tab by mouth every six (6) hours as needed for Pain for up to 30 days. Max Daily Amount: 4 Tabs. Qty: 120 Tab, Refills: 0    Associated Diagnoses: Chronic neck pain      midodrine (PROAMATINE) 5 mg tablet TAKE 1 TABLET BY MOUTH THREE TIMES DAILY FOR 90 DAYS  Qty: 270 Tab, Refills: 0      metoprolol tartrate (LOPRESSOR) 25 mg tablet TAKE 1 & 1/2 (ONE & ONE-HALF) TABLETS BY MOUTH TWICE DAILY FOR 30 DAYS  Qty: 90 Tab, Refills: 0      apixaban (ELIQUIS) 2.5 mg tablet Take 1 Tab by mouth two (2) times a day for 180 days. Indications: treatment to prevent blood clots in chronic atrial fibrillation  Qty: 60 Tab, Refills: 5    Comments: To stop warfarin. Please let us know if PA is required. Thank you. Associated Diagnoses: Paroxysmal atrial fibrillation (HCC)      levETIRAcetam (Keppra) 1,000 mg tablet Take 1,000 mg by mouth two (2) times a day. FLUoxetine (PROzac) 10 mg tablet Take 1 tablet by mouth once daily  Qty: 90 Tab, Refills: 0      levothyroxine (SYNTHROID) 150 mcg tablet Take 1 tablet by mouth once daily  Qty: 90 Tab, Refills: 0      atorvastatin (LIPITOR) 40 mg tablet Take 1 tablet by mouth once daily for 90 days  Qty: 90 Tab, Refills: 2      lacosamide (Vimpat) 100 mg tab tablet Take 150 mg by mouth two (2) times a day. 2.   Follow-up Information    None       3. Return to ED if worse   4. Current Discharge Medication List          Diagnosis     Clinical Impression:   1. TIA (transient ischemic attack)        Attestations:    Cheli aMi MD    Please note that this dictation was completed with [employer], the computer voice recognition software. Quite often unanticipated grammatical, syntax, homophones, and other interpretive errors are inadvertently transcribed by the computer software. Please disregard these errors.   Please excuse any errors that have escaped final proofreading. Thank you.

## 2021-05-19 NOTE — ED TRIAGE NOTES
Family with pt to ED, states onset 45 min ago of patient with AMS and not moving right arm or right leg like she normally does. Stroke alert called 1852.

## 2021-05-20 LAB
APPEARANCE UR: CLEAR
ATRIAL RATE: 55 BPM
BACTERIA URNS QL MICRO: NEGATIVE /HPF
BILIRUB UR QL: NEGATIVE
CALCULATED R AXIS, ECG10: -81 DEGREES
CALCULATED T AXIS, ECG11: 90 DEGREES
CHOLEST SERPL-MCNC: 110 MG/DL
COLOR UR: ABNORMAL
DIAGNOSIS, 93000: NORMAL
EPITH CASTS URNS QL MICRO: ABNORMAL /LPF
ERYTHROCYTE [DISTWIDTH] IN BLOOD BY AUTOMATED COUNT: 12.6 % (ref 11.5–14.5)
GLUCOSE BLD STRIP.AUTO-MCNC: 98 MG/DL (ref 65–117)
GLUCOSE UR STRIP.AUTO-MCNC: NEGATIVE MG/DL
HCT VFR BLD AUTO: 34.9 % (ref 35–47)
HDLC SERPL-MCNC: 72 MG/DL
HDLC SERPL: 1.5 {RATIO} (ref 0–5)
HGB BLD-MCNC: 10.6 G/DL (ref 11.5–16)
HGB UR QL STRIP: ABNORMAL
KETONES UR QL STRIP.AUTO: NEGATIVE MG/DL
LDLC SERPL CALC-MCNC: 27.8 MG/DL (ref 0–100)
LEUKOCYTE ESTERASE UR QL STRIP.AUTO: NEGATIVE
LIPID PROFILE,FLP: NORMAL
MCH RBC QN AUTO: 30.5 PG (ref 26–34)
MCHC RBC AUTO-ENTMCNC: 30.4 G/DL (ref 30–36.5)
MCV RBC AUTO: 100.6 FL (ref 80–99)
NITRITE UR QL STRIP.AUTO: NEGATIVE
NRBC # BLD: 0 K/UL (ref 0–0.01)
NRBC BLD-RTO: 0 PER 100 WBC
PERFORMED BY, TECHID: NORMAL
PH UR STRIP: 6 [PH] (ref 5–8)
PLATELET # BLD AUTO: 83 K/UL (ref 150–400)
PMV BLD AUTO: 12.1 FL (ref 8.9–12.9)
PROT UR STRIP-MCNC: 30 MG/DL
Q-T INTERVAL, ECG07: 478 MS
QRS DURATION, ECG06: 174 MS
QTC CALCULATION (BEZET), ECG08: 478 MS
RBC # BLD AUTO: 3.47 M/UL (ref 3.8–5.2)
RBC #/AREA URNS HPF: ABNORMAL /HPF (ref 0–5)
SP GR UR REFRACTOMETRY: 1.03 (ref 1–1.03)
TRIGL SERPL-MCNC: 51 MG/DL (ref ?–150)
TSH SERPL DL<=0.05 MIU/L-ACNC: 0.36 UIU/ML (ref 0.36–3.74)
UROBILINOGEN UR QL STRIP.AUTO: 4 EU/DL (ref 0.1–1)
VENTRICULAR RATE, ECG03: 60 BPM
VLDLC SERPL CALC-MCNC: 10.2 MG/DL
WBC # BLD AUTO: 5.2 K/UL (ref 3.6–11)
WBC URNS QL MICRO: ABNORMAL /HPF (ref 0–4)

## 2021-05-20 PROCEDURE — 99218 HC RM OBSERVATION: CPT

## 2021-05-20 PROCEDURE — 80061 LIPID PANEL: CPT

## 2021-05-20 PROCEDURE — 65270000029 HC RM PRIVATE

## 2021-05-20 PROCEDURE — 97166 OT EVAL MOD COMPLEX 45 MIN: CPT

## 2021-05-20 PROCEDURE — 74011000258 HC RX REV CODE- 258: Performed by: FAMILY MEDICINE

## 2021-05-20 PROCEDURE — 74011250636 HC RX REV CODE- 250/636: Performed by: STUDENT IN AN ORGANIZED HEALTH CARE EDUCATION/TRAINING PROGRAM

## 2021-05-20 PROCEDURE — 84443 ASSAY THYROID STIM HORMONE: CPT

## 2021-05-20 PROCEDURE — 85027 COMPLETE CBC AUTOMATED: CPT

## 2021-05-20 PROCEDURE — 97530 THERAPEUTIC ACTIVITIES: CPT

## 2021-05-20 PROCEDURE — 96376 TX/PRO/DX INJ SAME DRUG ADON: CPT

## 2021-05-20 PROCEDURE — 80177 DRUG SCRN QUAN LEVETIRACETAM: CPT

## 2021-05-20 PROCEDURE — 82962 GLUCOSE BLOOD TEST: CPT

## 2021-05-20 PROCEDURE — 36415 COLL VENOUS BLD VENIPUNCTURE: CPT

## 2021-05-20 PROCEDURE — 74011250636 HC RX REV CODE- 250/636: Performed by: FAMILY MEDICINE

## 2021-05-20 PROCEDURE — 83036 HEMOGLOBIN GLYCOSYLATED A1C: CPT

## 2021-05-20 PROCEDURE — 80235 DRUG ASSAY LACOSAMIDE: CPT

## 2021-05-20 PROCEDURE — 74011000258 HC RX REV CODE- 258: Performed by: STUDENT IN AN ORGANIZED HEALTH CARE EDUCATION/TRAINING PROGRAM

## 2021-05-20 PROCEDURE — C9254 INJECTION, LACOSAMIDE: HCPCS | Performed by: STUDENT IN AN ORGANIZED HEALTH CARE EDUCATION/TRAINING PROGRAM

## 2021-05-20 PROCEDURE — 96375 TX/PRO/DX INJ NEW DRUG ADDON: CPT

## 2021-05-20 PROCEDURE — C9254 INJECTION, LACOSAMIDE: HCPCS | Performed by: FAMILY MEDICINE

## 2021-05-20 PROCEDURE — 81001 URINALYSIS AUTO W/SCOPE: CPT

## 2021-05-20 PROCEDURE — 74011250637 HC RX REV CODE- 250/637: Performed by: INTERNAL MEDICINE

## 2021-05-20 PROCEDURE — 97161 PT EVAL LOW COMPLEX 20 MIN: CPT

## 2021-05-20 PROCEDURE — 74011250637 HC RX REV CODE- 250/637: Performed by: FAMILY MEDICINE

## 2021-05-20 PROCEDURE — 92610 EVALUATE SWALLOWING FUNCTION: CPT

## 2021-05-20 RX ORDER — LORAZEPAM 2 MG/ML
2 INJECTION INTRAMUSCULAR
Status: ACTIVE | OUTPATIENT
Start: 2021-05-20 | End: 2021-05-21

## 2021-05-20 RX ORDER — MIDODRINE HYDROCHLORIDE 5 MG/1
10 TABLET ORAL
Status: DISCONTINUED | OUTPATIENT
Start: 2021-05-20 | End: 2021-05-21 | Stop reason: HOSPADM

## 2021-05-20 RX ORDER — FLUOXETINE 10 MG/1
10 CAPSULE ORAL DAILY
Status: DISCONTINUED | OUTPATIENT
Start: 2021-05-20 | End: 2021-05-21 | Stop reason: HOSPADM

## 2021-05-20 RX ORDER — MIDODRINE HYDROCHLORIDE 5 MG/1
5 TABLET ORAL
Status: DISCONTINUED | OUTPATIENT
Start: 2021-05-20 | End: 2021-05-20

## 2021-05-20 RX ORDER — PANTOPRAZOLE SODIUM 40 MG/1
40 TABLET, DELAYED RELEASE ORAL DAILY
Status: DISCONTINUED | OUTPATIENT
Start: 2021-05-20 | End: 2021-05-21 | Stop reason: HOSPADM

## 2021-05-20 RX ORDER — ATORVASTATIN CALCIUM 40 MG/1
40 TABLET, FILM COATED ORAL
Status: DISCONTINUED | OUTPATIENT
Start: 2021-05-20 | End: 2021-05-21 | Stop reason: HOSPADM

## 2021-05-20 RX ORDER — LEVOTHYROXINE SODIUM 75 UG/1
150 TABLET ORAL
Status: DISCONTINUED | OUTPATIENT
Start: 2021-05-20 | End: 2021-05-21 | Stop reason: HOSPADM

## 2021-05-20 RX ORDER — METOPROLOL TARTRATE 25 MG/1
37.5 TABLET, FILM COATED ORAL 2 TIMES DAILY
Status: DISCONTINUED | OUTPATIENT
Start: 2021-05-20 | End: 2021-05-21 | Stop reason: HOSPADM

## 2021-05-20 RX ADMIN — METOPROLOL TARTRATE 37.5 MG: 25 TABLET, FILM COATED ORAL at 21:31

## 2021-05-20 RX ADMIN — SODIUM CHLORIDE 200 MG: 9 INJECTION, SOLUTION INTRAVENOUS at 12:37

## 2021-05-20 RX ADMIN — SODIUM CHLORIDE 150 MG: 9 INJECTION, SOLUTION INTRAVENOUS at 01:27

## 2021-05-20 RX ADMIN — PANTOPRAZOLE SODIUM 40 MG: 40 TABLET, DELAYED RELEASE ORAL at 08:41

## 2021-05-20 RX ADMIN — METOPROLOL TARTRATE 37.5 MG: 25 TABLET, FILM COATED ORAL at 08:41

## 2021-05-20 RX ADMIN — APIXABAN 2.5 MG: 2.5 TABLET, FILM COATED ORAL at 08:41

## 2021-05-20 RX ADMIN — SODIUM CHLORIDE 75 ML/HR: 9 INJECTION, SOLUTION INTRAVENOUS at 00:23

## 2021-05-20 RX ADMIN — LEVETIRACETAM 1000 MG: 10 INJECTION INTRAVENOUS at 13:16

## 2021-05-20 RX ADMIN — MIDODRINE HYDROCHLORIDE 10 MG: 5 TABLET ORAL at 18:00

## 2021-05-20 RX ADMIN — FAMOTIDINE 20 MG: 10 INJECTION, SOLUTION INTRAVENOUS at 00:22

## 2021-05-20 RX ADMIN — LEVOTHYROXINE SODIUM 150 MCG: 75 TABLET ORAL at 08:41

## 2021-05-20 RX ADMIN — APIXABAN 2.5 MG: 2.5 TABLET, FILM COATED ORAL at 21:31

## 2021-05-20 RX ADMIN — FLUOXETINE 10 MG: 10 CAPSULE ORAL at 08:41

## 2021-05-20 RX ADMIN — MIDODRINE HYDROCHLORIDE 5 MG: 5 TABLET ORAL at 12:15

## 2021-05-20 RX ADMIN — ATORVASTATIN CALCIUM 40 MG: 40 TABLET, FILM COATED ORAL at 21:31

## 2021-05-20 RX ADMIN — LEVETIRACETAM 1000 MG: 10 INJECTION INTRAVENOUS at 00:23

## 2021-05-20 NOTE — PROGRESS NOTES
NEUROLOGY  PROGRESS NOTE    Admission History/Pertinent Events  Sergey Rust is a 80y.o. year old female who presented on 5/19/2021. Patient has a past medical history of Stroke (Right Occipital Region), Seizure, Afib (Apixaban), Pacemaker In Place, HTN, HL, DM2, ESRD On HD, Hypothyroidism, Anxiety/Depression, Sleep Difficulties who presented with confusion and RHB weakness. Patient reportedly developed the symptoms at around 6:30 PM on 5/19. She felt that the symptoms were similar to her seizure she had in the past.  While patient was in the ED, patient had a reported 2-minute generalized convulsive episode for which patient was given IV lorazepam and was lethargic and confused thereafter. Emergent CT head was negative for any acute findings. CTA head/neck revealed no acute findings. Patient was subsequently loaded with IV levetiracetam 1000. Home Stroke Prophylaxis: Apixaban 2.5 BID, Atorvastatin 40     Home AEDs: Levetiracetam 1000 BID, Lacosamide 150 BID      ASSESSMENT/PLAN      Impression  Will continue patient on anticoagulation and statin therapy for neurovascular prophylaxis and AEDs per below for seizure prophylaxis.         CTH WO  NAICA  Remote left occipital encephalomalacia likely reflecting prior insult  Remote left basal ganglier infarct  GCA    CTA Head/Neck  No occlusions, dissections, significant stenosis, pseudoaneurysms or aneurysms in the neurovascular system  Small infundibulum in the communicating left ICA      Plan      Breakthrough Seizure Activity  History of Left Occipital & Left BG Ischemia  Localization Related Epilepsy  Associated: Afib, ESRD, Pacemaker In Place  -Q8Hr NeuroChecks  -Stroke Prophylaxis: Apixaban 2.5 BID, Atorvastatin 40   -SBP Goal < 160  -Seizure Precautions  -Seizure Prophylaxis: Levetiracetam 1000 BID, Lacosamide 200 BID  -STAT IV Lorazepam 2 mg with any clinical seizure activity lasting > 3 minutes and contact Neurology for further recommendations  -PT/OT/ST as needed  -Management of metabolic/infectious derangements to referring teams  -Can't get MRI d/t Pacemaker    Patient to follow-up with her personal neurologist in 2 weeks from discharge    Please contact neurology with any further questions/concerns        SUBJECTIVE   No significant changes on patient's neurological examination. Physical/Neurological Exam  General: Elderly  female     Patient awake, alert; following central and peripheral commands   No expressive or receptive aphasia; No dysarthria   Pupils react to light bilaterally; EOM Intact   Degree of right homonymous hemianopsia  Intact to light touch on face bilaterally   No facial droop   Motor: 4/5 in the left hemibody, 4-/5 in the right hemibody  Postural tremulousness in the right hemibody  Slightly increased tone in the right hemibody  Sensation to light touch intact grossly throughout       OBJECTIVE  Vital Signs  Temp:  [98 °F (36.7 °C)]   Pulse (Heart Rate):  [60-84]   BP: (129-138)/(62-90)   Resp Rate:  [17]   O2 Sat (%):  [98 %-100 %]   Weight:  [63.5 kg (140 lb)]     MEDICATIONS    Current Facility-Administered Medications:     LORazepam (ATIVAN) injection 2 mg, 2 mg, IntraVENous, ONCE PRN, Hal Lechuga MD    apixaban (ELIQUIS) tablet 2.5 mg, 2.5 mg, Oral, BID, Hal Lechuga MD    atorvastatin (LIPITOR) tablet 40 mg, 40 mg, Oral, QHS, Hal Lechuga MD    . PHARMACY TO SUBSTITUTE PER PROTOCOL (Reordered from: FLUoxetine (PROzac) 10 mg tablet), , , Per Protocol, Hal Lechuga MD    levothyroxine (SYNTHROID) tablet 150 mcg, 150 mcg, Oral, ACB, Hal Lechuga MD    metoprolol tartrate (LOPRESSOR) tablet 37.5 mg, 37.5 mg, Oral, BID, Hal Lechuga MD    midodrine (PROAMATINE) tablet 5 mg, 5 mg, Oral, TID WITH MEALS, Hal Lechuga MD    pantoprazole (PROTONIX) tablet 40 mg, 40 mg, Oral, DAILY, Hal Lechuga MD    lacosamide (VIMPAT) 200 mg in 0.9% sodium chloride 100 mL IVPB, 200 mg, IntraVENous, Q12H, George Juan MD    levETIRAcetam in saline (iso-os) (KEPPRA) infusion 1,000 mg, 1,000 mg, IntraVENous, Q12H, Hal Lechuga MD, 1,000 mg at 05/20/21 0023    acetaminophen (TYLENOL) tablet 650 mg, 650 mg, Oral, Q4H PRN **OR** acetaminophen (TYLENOL) solution 650 mg, 650 mg, Per NG tube, Q4H PRN **OR** acetaminophen (TYLENOL) suppository 650 mg, 650 mg, Rectal, Q4H PRN, Hal Lechuga MD    0.9% sodium chloride infusion, 75 mL/hr, IntraVENous, CONTINUOUS, Hal Lechuga MD, Last Rate: 75 mL/hr at 05/20/21 0023, 75 mL/hr at 05/20/21 0023    ondansetron (ZOFRAN) injection 4 mg, 4 mg, IntraVENous, Q6H PRN, Hal Lechuga MD    labetaloL (NORMODYNE;TRANDATE) injection 5 mg, 5 mg, IntraVENous, ONCE PRN, Hal Lechuga MD    Current Outpatient Medications:     omeprazole (PRILOSEC) 40 mg capsule, Take 1 Cap by mouth daily for 90 days. , Disp: 90 Cap, Rfl: 0    ondansetron hcl (ZOFRAN) 8 mg tablet, Take 1 Tab by mouth every eight (8) hours as needed for Nausea or Vomiting for up to 60 days. , Disp: 90 Tab, Rfl: 1    midodrine (PROAMATINE) 5 mg tablet, TAKE 1 TABLET BY MOUTH THREE TIMES DAILY FOR 90 DAYS, Disp: 270 Tab, Rfl: 0    metoprolol tartrate (LOPRESSOR) 25 mg tablet, TAKE 1 & 1/2 (ONE & ONE-HALF) TABLETS BY MOUTH TWICE DAILY FOR 30 DAYS, Disp: 90 Tab, Rfl: 0    apixaban (ELIQUIS) 2.5 mg tablet, Take 1 Tab by mouth two (2) times a day for 180 days.  Indications: treatment to prevent blood clots in chronic atrial fibrillation, Disp: 60 Tab, Rfl: 5    levETIRAcetam (Keppra) 1,000 mg tablet, Take 1,000 mg by mouth two (2) times a day., Disp: , Rfl:     FLUoxetine (PROzac) 10 mg tablet, Take 1 tablet by mouth once daily, Disp: 90 Tab, Rfl: 0    levothyroxine (SYNTHROID) 150 mcg tablet, Take 1 tablet by mouth once daily, Disp: 90 Tab, Rfl: 0    atorvastatin (LIPITOR) 40 mg tablet, Take 1 tablet by mouth once daily for 90 days, Disp: 90 Tab, Rfl: 2    lacosamide (Vimpat) 100 mg tab tablet, Take 150 mg by mouth two (2) times a day., Disp: , Rfl:       Labs: I've reviewed the labs for today     This document has been prepared by the [employer] voice recognition system, typographical errors may have occurred.  Attempts have been made to correct errors, however inadvertent errors may persist.

## 2021-05-20 NOTE — PROGRESS NOTES
SPEECH LANGUAGE PATHOLOGY BEDSIDE SWALLOW EVALUATION  Patient: Carmen Reina (75 y.o. female)  Date: 5/20/2021  Primary Diagnosis: Seizure ([facility] [address] [R56.9]        Precautions: aspiration      ASSESSMENT :  Based on the objective data described below, the patient presents with minimal oral motor impairment negatively impacted by intermittent tremulous movements of the oral motor mechanics and jaw. Patient admitted for questionable seizure vs TIA. Patient does have hx of CVA. She has tremors of the hand and head at baseline. This appears to come and go during bedside swallow assessment. O2 saturations 100%; /80, HR 66.   Patient denies any difficulty w/ her swallowing at this time. Administered thin liquids via straw and cup. Swallow initiation timely. HLE and protraction adequate to palpation. No clinical indicators of penetration/aspiration present w/ all thin liquid trials. Bolus control improved w/ straw use. Mastication slow for solid cracker. No significant oral residual. Patient did exhibit intermittent dry cough, however this appears incidental. Patient reports second hand smoke exposure. Pharyngeal phase largely functional for solids. Patient is tearful. She feels like she cant handle another thing wrong with her. Patient also noted to have some self-feeding deficits and will require assistance. Patient will benefit from skilled intervention to address the above impairments. Patients rehabilitation potential is considered to be Good     PLAN :  Recommendations and Planned Interventions:  Chopped diet, thin liquids. Assistance w/ p.o. intake. Due not feed if not alert. Educated patient if seizure medication is making her groggy , then that is not a time to eat. Aspiration precautions. SLP to follow-up to ensure diet tolerance. Frequency/Duration: Patient will be followed by speech-language pathology 2 times a week to address goals. Discharge Recommendations:  To Be Determined SUBJECTIVE:   Patient seen at bedside in ED. She is alert. Lungs clear. OBJECTIVE:     CXR Results  (Last 48 hours)      None           CT Results  (Last 48 hours)                 05/19/21 1912  CTA CODE NEURO HEAD AND NECK W CONT Final result    Impression:  No occlusion or high-grade stenosis of the major cervical or intracranial   arterial vasculature. Please note that degrees of carotid stenosis are measured in accordance with   NASCET criteria. Findings were discussed with Dr. Lana Anand by Dr. Claus Herrera at 8:09 PM on   5/19/2021 via phone conversation. Narrative:  Study: Head and Neck CTA       Clinical Indication: Code stroke. Comparison: Head CT performed earlier the same day. Technique: Routine unenhanced axial acquisition of the neck was performed. Subsequently, contiguous thin section axial acquisition of the head and neck was   performed in the arterial phase from the thoracic inlet to the skull vertex   following the intravenous administration of 100 mL Isovue-370. Coronal,   sagittal, and MIP reconstructions were generated and reviewed. Dose reduction:   All CT scans at this facility are performed using dose reduction optimization   techniques as appropriate to a performed exam including the following-automated   exposure control, adjustments of mA and/or Kv according to patient size, or use   of iterative reconstructive technique. Findings:       Neck CTA:   The aortic arch and great vessel origins are unremarkable. The common carotid arteries are patent without high-grade stenosis. Calcified   plaque in the carotid bulbs without significant stenosis. The cervical internal   carotid arteries are tortuous but patent without high-grade stenosis based on   NASCET criteria. The external carotid arteries are unremarkable. The cervical vertebral arteries are patent without high-grade stenosis.        The paraspinal soft tissues are unremarkable. The lung apices are clear. Multilevel cervical spondylosis. Partially visualized right subclavian approach   pacemaker. Head CTA:   The intracranial internal carotid arteries are patent without high-grade   stenosis. The anterior cerebral arteries are patent without high-grade stenosis. The middle cerebral arteries are patent without high-grade stenosis. The   anterior and bilateral posterior communicating arteries are visualized. The intracranial vertebral arteries are patent without high-grade stenosis. The   left vertebral artery is dominant. The basilar artery is patent without   high-grade stenosis. Hypoplastic right P1 segment with fetal type origin of the   right posterior cerebral artery, a normal anatomic variant. Otherwise the   posterior cerebral arteries are patent without high-grade stenosis. A 2 x 1 mm inferiorly projecting outpouching arising from the communicating   segment of the left internal carotid artery appears to represent an infundibular   origin of the posterior communicating artery. No evidence of an aneurysm or high   flow vascular malformation. 05/19/21 1907  CT CODE NEURO HEAD WO CONTRAST Final result    Impression:  No acute intracranial abnormality. Chronic findings as above. Findings were discussed with Dr. Devine Offer by Dr. Ely Romano at 7:18 PM 5/19/2021   via phone conversation. Narrative:  Study: Head CT without contrast.       Clinical Indication: Code stroke. Comparison: Head CT dated 1/13/2020. Technique: Routine volume acquisition of the head was performed without contrast   using soft tissue and bone kernels. Coronal and sagittal reconstructions were   generated and reviewed.  Dose reduction: All CT scans at this facility are   performed using dose reduction optimization techniques as appropriate to a   performed exam including the following-automated exposure control, adjustments   of mA and/or Kv according to patient size, or use of iterative reconstructive   technique. Findings:       Mild generalized volume loss with commensurate prominence of ventricles and   sulci, unchanged. No midline shift. The basal cisterns are patent. No acute hemorrhage, abnormal   extra-axial fluid, or evidence of large territorial ischemia. Unchanged region   of left occipital lobe encephalomalacia. Chronic lacunar infarct of the left   basal ganglia. Unremarkable orbits. The included paranasal sinuses and mastoid air cells are   clear. No evidence of an aggressive calvarial or extracalvarial lesion. Past Medical History:   Diagnosis Date    Anxiety disorder     Arthritis     Atherosclerosis of native arteries of the extremities with ulceration ([address] 8/31/2020    Atrial fibrillation (HCC)     Cardiac pacemaker in situ     Cellulitis and abscess of trunk 8/31/2020    Depression, postpartum     Diabetes ([address]     Heart disease     Heartburn     Hx of long term use of blood thinners     Hypercholesterolemia     Hypertension     Hypothyroidism     Migraines     Peripheral venous insufficiency 8/31/2020    Seizures (HCC)     Sleep disorder     Stroke ([facility] [address]     Varicose veins of lower extremity with inflammation 8/31/2020     Past Surgical History:   Procedure Laterality Date    HX BACK SURGERY      HX OTHER SURGICAL      leg surgery     HX OTHER SURGICAL      pacemaker monitor      Prior Level of Function/Home Situation: unknown     Diet prior to admission: regular, thin  Current Diet:  Regular, thin   Cognitive and Communication Status:  Neurologic State: Confused  Orientation Level: Disoriented X4  Cognition: Impaired decision making       After treatment:   Patient left in no apparent distress in bed, Call bell within reach, and Nursing notified    COMMUNICATION/EDUCATION:   Patient was educated regarding purpose of SLP assessment, POC, diet recs and sw safety precautions.  Patient demonstrated Good understanding as evidenced by verbal understanding. The patient's plan of care including recommendations, planned interventions, and recommended diet changes were discussed with: Registered nurse. Patient/family have participated as able in goal setting and plan of care. Thank you for this referral.  Maddie Green, SLP M.S. CCC-SLP  Time Calculation: 15 mins           Problem: Dysphagia (Adult)  Goal: *Acute Goals and Plan of Care (Insert Text)  Description: Speech Therapy Swallow Goals  Initiated 5/20/2021  -Patient will tolerate chopped diet with thin liquids without clinical indicators of aspiration given no cues within 5-7 day(s). [ ] Not met  [ ]  MET   [ ] Progressing  [ ] Gigi Deis  -Patient will tolerate PO trials without clinical indicators of aspiration given no cues within 5-7 day(s). [ ] Not met  [ ]  MET   [ ] Progressing  [ ] Gigi Deis  -Patient will demonstrate understanding of swallow safety precautions and aspiration precautions, diet recs with no cues within 5-7 day(s).         [ ] Not met  [ ]  MET   [ ] Progressing  [ ] Discontinue     Outcome: Not Progressing Towards Goal

## 2021-05-20 NOTE — CONSULTS
Neurology Consult Note    HPI  Ms. Cornell Jay is a 80 y.o.  female with a history significant for Stroke, Seizure, Afib (Apixaban), Pacemaker In Place, HTN, HL, DM2, ESRD On HD, Hypothyroidism, Anxiety/Depression, Sleep Difficulties who presented with confusion and RHB weakness. Patient reportedly developed the symptoms at around 6:30 PM on 5/19. She felt that the symptoms were similar to her seizure she had in the past.  While patient was in the ED, patient had a reported 2-minute generalized convulsive episode for which patient was given IV lorazepam and was lethargic and confused thereafter. Emergent CT head was negative for any acute findings. CTA head/neck revealed no acute findings. Patient was subsequently loaded with IV levetiracetam 1000. Patient reports that she uses a cane and walker to ambulate at home. She reports she has been taking her seizure medications and not missing any doses. Patient has slight weakness in the right hemibody compared to the left and a right homonymous hemianopsia. Also has slight postural tremor in the right hemibody. Home Stroke Prophylaxis: Apixaban 2.5 BID, Atorvastatin 40     Home AEDs: Levetiracetam 1000 BID, Lacosamide 150 BID     CTH WO  NAICA  Remote left occipital encephalomalacia likely reflecting prior insult  Remote left basal ganglier infarct  GCA      IMPRESSION  Ms. Cornell Jay is a 80 y.o.  female with the above history presented with confusion and right hemibody weakness. While patient was in the ED, patient had a generalized tonic-clonic seizure-like episode for which patient was given IV lorazepam and subsequently loaded with IV levetiracetam 1000. Emergent CT head and CTA head/neck were negative for any acute findings and patient was not deemed a candidate for TPA. Etiology of patient presenting symptoms likely related to seizure activity and less likely to be related to a new infarct.   Will increase patient's AED per below and monitor patient for seizure activity. Etiology of patient's right-sided weakness and confusion likely related to left occipital encephalomalacia from patient's prior stroke associated seizure activity related to this region. We will continue patient on anticoagulation and statin therapy for neurovascular prophylaxis. RECOMMENDATIONS      Breakthrough Seizure Activity  History of Left Occipital & Left BG Ischemia  Localization Related Epilepsy  Associated: Afib, ESRD, Pacemaker In Place  -Q4Hr NeuroChecks, TELE  -Stroke Prophylaxis: Apixaban 2.5 BID, Atorvastatin 40   -SBP Goal < 160  -Seizure Precautions  -Seizure Prophylaxis: Levetiracetam 1000 BID, Lacosamide 200 BID  -STAT IV Lorazepam 2 mg with any clinical seizure activity lasting > 3 minutes and contact Neurology for further recommendations  -PT/OT/ST as needed  -Management of metabolic/infectious derangements to referring teams  -Can't get MRI d/t Pacemaker      Diagnosis and plan was discussed extensively with patient who is in agreement with the above plan. They have been counseled regarding potential side effects of the interventions above and would like to proceed with the aforementioned plan. Review of Systems  A 14 point review was done and pertinent positives & negative findings are listed as part of the history of present illness, all other systems were reviewed and are negative. Physical/Neurological Exam  General: Elderly  female  Cardiovascular: tachycardic at times  Pulmonary: no increased work of breathing  Gastrointestinal/Abdomen: soft w/hypoactive bowel sounds   Skin: warm and dry      Patient awake, alert; following central and peripheral commands   No expressive or receptive aphasia;  No dysarthria   Pupils react to light bilaterally; EOM Intact   Degree of right homonymous hemianopsia  Intact to light touch on face bilaterally   No facial droop   No gross hearing loss   Tongue is midline   Motor: 4/5 in the left hemibody, 4-/5 in the right hemibody  Postural tremulousness in the right hemibody  Slightly increased tone in the right hemibody  Sensation to light touch intact grossly throughout       Past Medical History:   Diagnosis Date    Anxiety disorder     Arthritis     Atherosclerosis of native arteries of the extremities with ulceration ([facility] [address] 8/31/2020    Atrial fibrillation ([facility] [address]     Cardiac pacemaker in situ     Cellulitis and abscess of trunk 8/31/2020    Depression, postpartum     Diabetes ([facility] [address]     Heart disease     Heartburn     Hx of long term use of blood thinners     Hypercholesterolemia     Hypertension     Hypothyroidism     Migraines     Peripheral venous insufficiency 8/31/2020    Seizures ([address]     Sleep disorder     Stroke ([facility] [address]     Varicose veins of lower extremity with inflammation 8/31/2020      Past Surgical History:   Procedure Laterality Date    HX BACK SURGERY      HX OTHER SURGICAL      leg surgery     HX OTHER SURGICAL      pacemaker monitor      No Known Allergies  Family History   Problem Relation Age of Onset    Heart Disease Mother     Heart Disease Father     Diabetes Sister     Diabetes Brother         Social Connections:     Frequency of Communication with Friends and Family:     Frequency of Social Gatherings with Friends and Family:     Attends [employer] Services:     Active Member of Clubs or Organizations:     Attends Club or Organization Meetings:     Marital Status:          Medications  Current Outpatient Medications   Medication Instructions    apixaban (ELIQUIS) 2.5 mg, Oral, 2 TIMES DAILY    atorvastatin (LIPITOR) 40 mg tablet Take 1 tablet by mouth once daily for 90 days    FLUoxetine (PROzac) 10 mg tablet Take 1 tablet by mouth once daily    lacosamide (VIMPAT) 150 mg, Oral, 2 TIMES DAILY    levETIRAcetam (KEPPRA) 1,000 mg, Oral, 2 TIMES DAILY    levothyroxine (SYNTHROID) 150 mcg tablet Take 1 tablet by mouth once daily    metoprolol tartrate (LOPRESSOR) 25 mg tablet TAKE 1 & 1/2 (ONE & ONE-HALF) TABLETS BY MOUTH TWICE DAILY FOR 30 DAYS    midodrine (PROAMATINE) 5 mg tablet TAKE 1 TABLET BY MOUTH THREE TIMES DAILY FOR 90 DAYS    omeprazole (PRILOSEC) 40 mg, Oral, DAILY    ondansetron hcl (ZOFRAN) 8 mg, Oral, EVERY 8 HOURS AS NEEDED       Current Facility-Administered Medications   Medication Dose Route Frequency    LORazepam (ATIVAN) injection 2 mg  2 mg IntraVENous ONCE PRN    apixaban (ELIQUIS) tablet 2.5 mg  2.5 mg Oral BID    atorvastatin (LIPITOR) tablet 40 mg  40 mg Oral QHS    . PHARMACY TO SUBSTITUTE PER PROTOCOL (Reordered from: FLUoxetine (PROzac) 10 mg tablet)    Per Protocol    levothyroxine (SYNTHROID) tablet 150 mcg  150 mcg Oral ACB    metoprolol tartrate (LOPRESSOR) tablet 37.5 mg  37.5 mg Oral BID    midodrine (PROAMATINE) tablet 5 mg  5 mg Oral TID WITH MEALS    pantoprazole (PROTONIX) tablet 40 mg  40 mg Oral DAILY    levETIRAcetam in saline (iso-os) (KEPPRA) infusion 1,000 mg  1,000 mg IntraVENous Q12H    acetaminophen (TYLENOL) tablet 650 mg  650 mg Oral Q4H PRN    Or    acetaminophen (TYLENOL) solution 650 mg  650 mg Per NG tube Q4H PRN    Or    acetaminophen (TYLENOL) suppository 650 mg  650 mg Rectal Q4H PRN    0.9% sodium chloride infusion  75 mL/hr IntraVENous CONTINUOUS    ondansetron (ZOFRAN) injection 4 mg  4 mg IntraVENous Q6H PRN    labetaloL (NORMODYNE;TRANDATE) injection 5 mg  5 mg IntraVENous ONCE PRN    lacosamide (VIMPAT) 150 mg in 0.9% sodium chloride 100 mL IVPB  150 mg IntraVENous Q12H     Current Outpatient Medications   Medication Sig    omeprazole (PRILOSEC) 40 mg capsule Take 1 Cap by mouth daily for 90 days.  ondansetron hcl (ZOFRAN) 8 mg tablet Take 1 Tab by mouth every eight (8) hours as needed for Nausea or Vomiting for up to 60 days.     midodrine (PROAMATINE) 5 mg tablet TAKE 1 TABLET BY MOUTH THREE TIMES DAILY FOR 90 DAYS    metoprolol tartrate (LOPRESSOR) 25 mg tablet TAKE 1 & 1/2 (ONE & ONE-HALF) TABLETS BY MOUTH TWICE DAILY FOR 30 DAYS    apixaban (ELIQUIS) 2.5 mg tablet Take 1 Tab by mouth two (2) times a day for 180 days. Indications: treatment to prevent blood clots in chronic atrial fibrillation    levETIRAcetam (Keppra) 1,000 mg tablet Take 1,000 mg by mouth two (2) times a day.  FLUoxetine (PROzac) 10 mg tablet Take 1 tablet by mouth once daily    levothyroxine (SYNTHROID) 150 mcg tablet Take 1 tablet by mouth once daily    atorvastatin (LIPITOR) 40 mg tablet Take 1 tablet by mouth once daily for 90 days    lacosamide (Vimpat) 100 mg tab tablet Take 150 mg by mouth two (2) times a day. Objective  Temp:  [98 °F (36.7 °C)]   Pulse (Heart Rate):  [60-84]   BP: (129-138)/(62-90)   Resp Rate:  [17]   O2 Sat (%):  [98 %-100 %]   Weight:  [63.5 kg (140 lb)]   No intake or output data in the 24 hours ending 05/20/21 0628  Wt Readings from Last 3 Encounters:   05/19/21 63.5 kg (140 lb)   12/27/20 63.5 kg (140 lb)   10/17/20 79.4 kg (175 lb 0.7 oz)        Labs  Recent Results (from the past 24 hour(s))   GLUCOSE, POC    Collection Time: 05/19/21  8:07 PM   Result Value Ref Range    Glucose (POC) 107 65 - 117 mg/dL    Performed by Alta Espino    CBC WITH AUTOMATED DIFF    Collection Time: 05/19/21  9:56 PM   Result Value Ref Range    WBC 5.1 3.6 - 11.0 K/uL    RBC 3.32 (L) 3.80 - 5.20 M/uL    HGB 10.3 (L) 11.5 - 16.0 g/dL    HCT 32.4 (L) 35.0 - 47.0 %    MCV 97.6 80.0 - 99.0 FL    MCH 31.0 26.0 - 34.0 PG    MCHC 31.8 30.0 - 36.5 g/dL    RDW 12.7 11.5 - 14.5 %    PLATELET 88 (L) 178 - 400 K/uL    MPV 12.5 8.9 - 12.9 FL    NRBC 0.0 0.0  WBC    ABSOLUTE NRBC 0.00 0.00 - 0.01 K/uL    NEUTROPHILS 71 32 - 75 %    LYMPHOCYTES 18 12 - 49 %    MONOCYTES 9 5 - 13 %    EOSINOPHILS 1 0 - 7 %    BASOPHILS 1 0 - 1 %    IMMATURE GRANULOCYTES 0 0 - 0.5 %    ABS. NEUTROPHILS 3.6 1.8 - 8.0 K/UL    ABS. LYMPHOCYTES 0.9 0.8 - 3.5 K/UL    ABS. MONOCYTES 0.5 0.0 - 1.0 K/UL    ABS. EOSINOPHILS 0.1 0.0 - 0.4 K/UL    ABS. BASOPHILS 0.0 0.0 - 0.1 K/UL    ABS. IMM. GRANS. 0.0 0.00 - 0.04 K/UL    DF AUTOMATED     METABOLIC PANEL, COMPREHENSIVE    Collection Time: 05/19/21  9:56 PM   Result Value Ref Range    Sodium 133 (L) 136 - 145 mmol/L    Potassium 3.9 3.5 - 5.1 mmol/L    Chloride 97 97 - 108 mmol/L    CO2 34 (H) 21 - 32 mmol/L    Anion gap 2 (L) 5 - 15 mmol/L    Glucose 115 (H) 65 - 100 mg/dL    BUN 22 (H) 6 - 20 mg/dL    Creatinine 2.01 (H) 0.55 - 1.02 mg/dL    BUN/Creatinine ratio 11 (L) 12 - 20      GFR est AA 30 (L) >60 ml/min/1.73m2    GFR est non-AA 24 (L) >60 ml/min/1.73m2    Calcium 8.2 (L) 8.5 - 10.1 mg/dL    Bilirubin, total 0.8 0.2 - 1.0 mg/dL    AST (SGOT) 19 15 - 37 U/L    ALT (SGPT) 25 12 - 78 U/L    Alk.  phosphatase 164 (H) 45 - 117 U/L    Protein, total 5.9 (L) 6.4 - 8.2 g/dL    Albumin 3.1 (L) 3.5 - 5.0 g/dL    Globulin 2.8 2.0 - 4.0 g/dL    A-G Ratio 1.1 1.1 - 2.2     PROTHROMBIN TIME + INR    Collection Time: 05/19/21  9:56 PM   Result Value Ref Range    Prothrombin time 15.1 (H) 11.9 - 14.7 sec    INR 1.2 (H) 0.9 - 1.1     TROPONIN I    Collection Time: 05/19/21  9:56 PM   Result Value Ref Range    Troponin-I, Qt. <0.05 <0.05 ng/mL   URINALYSIS W/MICROSCOPIC    Collection Time: 05/20/21 12:56 AM   Result Value Ref Range    Color Yellow/Straw      Appearance Clear Clear      Specific gravity 1.028 1.003 - 1.030      pH (UA) 6.0 5.0 - 8.0      Protein 30 (A) Negative mg/dL    Glucose Negative Negative mg/dL    Ketone Negative Negative mg/dL    Bilirubin Negative Negative      Blood Small (A) Negative      Urobilinogen 4.0 (H) 0.1 - 1.0 EU/dL    Nitrites Negative Negative      Leukocyte Esterase Negative Negative      WBC 0-4 0 - 4 /hpf    RBC 5-10 0 - 5 /hpf    Epithelial cells Moderate (A) Few /lpf    Bacteria Negative Negative /hpf          Significant Diagnostic Studies  All images independently visualized    CTA CODE NEURO HEAD AND NECK W CONT   Final Result   No occlusion or high-grade stenosis of the major cervical or intracranial   arterial vasculature. Please note that degrees of carotid stenosis are measured in accordance with   NASCET criteria. Findings were discussed with Dr. Madison Pacheco by Dr. Eugenia Shine at 8:09 PM on   5/19/2021 via phone conversation. CT CODE NEURO HEAD WO CONTRAST   Final Result   No acute intracranial abnormality. Chronic findings as above. Findings were discussed with Dr. Madison Pacheco by Dr. Eugenia Shine at 7:18 PM 5/19/2021   via phone conversation. MRI BRAIN WO CONT    (Results Pending)         This document has been prepared by the Dragon voice recognition system, typographical errors may have occurred.  Attempts have been made to correct errors, however inadvertent errors may persist.

## 2021-05-20 NOTE — CONSULTS
Renal Consultation Note    NAME:  Meme Prieto   :   1950   MRN:   [id number]     ATTENDING: Elle Waterman MD  PCP:  Mariluz Rincon MD    Date/Time:  2021 12:51 PM      Subjective:   REQUESTING PHYSICIAN: Dr Sherwin De La Torre:    Medical management of ESRD    Patient is a 67-year-old  female with past history of ESRD on hemodialysis every MWF, previous CVA, A. fib on Eliquis, seizure disorder and hypertension who was brought in for altered mental status and right-sided weakness. According to the patient she felt different about 1 hour prior to arrival and 2 symptoms were similar to the previous seizure activity. It seems her last seizure was 2 to 3 months ago. She does not report any noncompliance with her seizure medications. Her last dialysis was yesterday per patient and she says she completed her treatment. In the ED she had a witnessed tonic-clonic seizure while she was having a CT of the head done which subsided 2 minutes later with IV Ativan. Patient seen in the room. She is working with OT/PT. Denies any further complaints.   Denies any shortness of breath    Past Medical History:   Diagnosis Date    Anxiety disorder     Arthritis     Atherosclerosis of native arteries of the extremities with ulceration ([address] 2020    Atrial fibrillation (HCC)     Cardiac pacemaker in situ     Cellulitis and abscess of trunk 2020    Depression, postpartum     Diabetes ([address]     Heart disease     Heartburn     Hx of long term use of blood thinners     Hypercholesterolemia     Hypertension     Hypothyroidism     Migraines     Peripheral venous insufficiency 2020    Seizures ([address]     Sleep disorder     Stroke ([address]     Varicose veins of lower extremity with inflammation 2020      Past Surgical History:   Procedure Laterality Date    HX BACK SURGERY      HX OTHER SURGICAL      leg surgery     HX OTHER SURGICAL      pacemaker monitor      Social History     Tobacco Use    Smoking status: Never Smoker    Smokeless tobacco: Never Used   Substance Use Topics    Alcohol use: Never      Family History   Problem Relation Age of Onset    Heart Disease Mother     Heart Disease Father     Diabetes Sister     Diabetes Brother        No Known Allergies   Prior to Admission medications    Medication Sig Start Date End Date Taking? Authorizing Provider   omeprazole (PRILOSEC) 40 mg capsule Take 1 Cap by mouth daily for 90 days. 5/10/21 8/8/21 Yes Delvin Ozuna MD   ondansetron hcl [facility]) 8 mg tablet Take 1 Tab by mouth every eight (8) hours as needed for Nausea or Vomiting for up to 60 days. 5/10/21 7/9/21 Yes Delvin Ozuna MD   midodrine (PROAMATINE) 5 mg tablet TAKE 1 TABLET BY MOUTH THREE TIMES DAILY FOR 90 DAYS 5/1/21  Yes Delvin Ozuna MD   metoprolol tartrate (LOPRESSOR) 25 mg tablet TAKE 1 & 1/2 (ONE & ONE-HALF) TABLETS BY MOUTH TWICE DAILY FOR 30 DAYS 5/1/21  Yes Delvin Ozuan MD   apixaban (ELIQUIS) 2.5 mg tablet Take 1 Tab by mouth two (2) times a day for 180 days. Indications: treatment to prevent blood clots in chronic atrial fibrillation 4/8/21 10/5/21 Yes Delvin Ozuna MD   levETIRAcetam (Keppra) 1,000 mg tablet Take 1,000 mg by mouth two (2) times a day. Yes Provider, Historical   FLUoxetine (PROzac) 10 mg tablet Take 1 tablet by mouth once daily 3/26/21  Yes Delvin Ozuna MD   levothyroxine (SYNTHROID) 150 mcg tablet Take 1 tablet by mouth once daily 3/26/21  Yes Delvin Ozuna MD   atorvastatin (LIPITOR) 40 mg tablet Take 1 tablet by mouth once daily for 90 days 1/1/21 9/28/21 Yes Delvin Ozuna MD   lacosamide (Vimpat) 100 mg tab tablet Take 150 mg by mouth two (2) times a day. Yes Provider, Historical       REVIEW OF SYSTEMS:       Constitutional: Negative for chills and fever. HENT: Negative. Eyes: Negative. Respiratory: Negative. Cardiovascular: Negative. Gastrointestinal: Negative for abdominal pain and nausea. Skin: Negative. Neurological: Negative. Objective:   VITALS:    Visit Vitals  BP (!) 106/54   Pulse 60   Temp (!) 94.4 °F (34.7 °C)   Resp 18   Ht 5' 4\" (1.626 m)   Wt 63.5 kg (140 lb)   SpO2 100%   BMI 24.03 kg/m²     Temp (24hrs), Av.9 °F (36.1 °C), Min:94.4 °F (34.7 °C), Max:98.3 °F (36.8 °C)      PHYSICAL EXAM:     General: NAD, alert, cooperative  Eyes: sclera anicteric  Oral Cavity: No thrush or ulcers  Neck: no JVD  Chest: Fair bilateral air entry. No Wheezing or Rhonchi. No rales.   Heart: normal sounds  Abdomen: soft and non tender   : no gee  Upper extremities: AV fistula left upper arm with good bruit and thrill  Lower Extremities: no edema  Skin: no rash  Neuro: intact, no focals  Psychiatric: non-depressed          LAB DATA REVIEWED:    Recent Results (from the past 24 hour(s))   EKG, 12 LEAD, INITIAL    Collection Time: 21  7:44 PM   Result Value Ref Range    Ventricular Rate 60 BPM    Atrial Rate 55 BPM    QRS Duration 174 ms    Q-T Interval 478 ms    QTC Calculation (Bezet) 478 ms    Calculated R Axis -81 degrees    Calculated T Axis 90 degrees    Diagnosis       Ventricular-paced rhythm  Abnormal ECG  When compared with ECG of 27-DEC-2020 14:38,  Electronic ventricular pacemaker has replaced Electronic atrial pacemaker  Confirmed by DULCE JIMENEZ ([id number]) on 2021 12:48:09 PM     GLUCOSE, POC    Collection Time: 21  8:07 PM   Result Value Ref Range    Glucose (POC) 107 65 - 117 mg/dL    Performed by TESSA VILLAVICENCIO    CBC WITH AUTOMATED DIFF    Collection Time: 21  9:56 PM   Result Value Ref Range    WBC 5.1 3.6 - 11.0 K/uL    RBC 3.32 (L) 3.80 - 5.20 M/uL    HGB 10.3 (L) 11.5 - 16.0 g/dL    HCT 32.4 (L) 35.0 - 47.0 %    MCV 97.6 80.0 - 99.0 FL    MCH 31.0 26.0 - 34.0 PG    MCHC 31.8 30.0 - 36.5 g/dL    RDW 12.7 11.5 - 14.5 %    PLATELET 88 (L) 433 - 400 K/uL    MPV 12.5 8.9 - 12.9 FL    NRBC 0.0 0.0  WBC    ABSOLUTE NRBC 0.00 0.00 - 0.01 K/uL    NEUTROPHILS 71 32 - 75 %    LYMPHOCYTES 18 12 - 49 %    MONOCYTES 9 5 - 13 %    EOSINOPHILS 1 0 - 7 %    BASOPHILS 1 0 - 1 %    IMMATURE GRANULOCYTES 0 0 - 0.5 %    ABS. NEUTROPHILS 3.6 1.8 - 8.0 K/UL    ABS. LYMPHOCYTES 0.9 0.8 - 3.5 K/UL    ABS. MONOCYTES 0.5 0.0 - 1.0 K/UL    ABS. EOSINOPHILS 0.1 0.0 - 0.4 K/UL    ABS. BASOPHILS 0.0 0.0 - 0.1 K/UL    ABS. IMM. GRANS. 0.0 0.00 - 0.04 K/UL    DF AUTOMATED     METABOLIC PANEL, COMPREHENSIVE    Collection Time: 05/19/21  9:56 PM   Result Value Ref Range    Sodium 133 (L) 136 - 145 mmol/L    Potassium 3.9 3.5 - 5.1 mmol/L    Chloride 97 97 - 108 mmol/L    CO2 34 (H) 21 - 32 mmol/L    Anion gap 2 (L) 5 - 15 mmol/L    Glucose 115 (H) 65 - 100 mg/dL    BUN 22 (H) 6 - 20 mg/dL    Creatinine 2.01 (H) 0.55 - 1.02 mg/dL    BUN/Creatinine ratio 11 (L) 12 - 20      GFR est AA 30 (L) >60 ml/min/1.73m2    GFR est non-AA 24 (L) >60 ml/min/1.73m2    Calcium 8.2 (L) 8.5 - 10.1 mg/dL    Bilirubin, total 0.8 0.2 - 1.0 mg/dL    AST (SGOT) 19 15 - 37 U/L    ALT (SGPT) 25 12 - 78 U/L    Alk.  phosphatase 164 (H) 45 - 117 U/L    Protein, total 5.9 (L) 6.4 - 8.2 g/dL    Albumin 3.1 (L) 3.5 - 5.0 g/dL    Globulin 2.8 2.0 - 4.0 g/dL    A-G Ratio 1.1 1.1 - 2.2     PROTHROMBIN TIME + INR    Collection Time: 05/19/21  9:56 PM   Result Value Ref Range    Prothrombin time 15.1 (H) 11.9 - 14.7 sec    INR 1.2 (H) 0.9 - 1.1     TROPONIN I    Collection Time: 05/19/21  9:56 PM   Result Value Ref Range    Troponin-I, Qt. <0.05 <0.05 ng/mL   URINALYSIS W/MICROSCOPIC    Collection Time: 05/20/21 12:56 AM   Result Value Ref Range    Color Yellow/Straw      Appearance Clear Clear      Specific gravity 1.028 1.003 - 1.030      pH (UA) 6.0 5.0 - 8.0      Protein 30 (A) Negative mg/dL    Glucose Negative Negative mg/dL    Ketone Negative Negative mg/dL    Bilirubin Negative Negative      Blood Small (A) Negative      Urobilinogen 4.0 (H) 0.1 - 1.0 EU/dL    Nitrites Negative Negative      Leukocyte Esterase Negative Negative      WBC 0-4 0 - 4 /hpf    RBC 5-10 0 - 5 /hpf    Epithelial cells Moderate (A) Few /lpf    Bacteria Negative Negative /hpf   LIPID PANEL    Collection Time: 05/20/21  5:35 AM   Result Value Ref Range    LIPID PROFILE        Cholesterol, total 110 <200 mg/dL    Triglyceride 51 <150 mg/dL    HDL Cholesterol 72 mg/dL    LDL, calculated 27.8 0 - 100 mg/dL    VLDL, calculated 10.2 mg/dL    CHOL/HDL Ratio 1.5 0.0 - 5.0     CBC W/O DIFF    Collection Time: 05/20/21  5:35 AM   Result Value Ref Range    WBC 5.2 3.6 - 11.0 K/uL    RBC 3.47 (L) 3.80 - 5.20 M/uL    HGB 10.6 (L) 11.5 - 16.0 g/dL    HCT 34.9 (L) 35.0 - 47.0 %    .6 (H) 80.0 - 99.0 FL    MCH 30.5 26.0 - 34.0 PG    MCHC 30.4 30.0 - 36.5 g/dL    RDW 12.6 11.5 - 14.5 %    PLATELET 83 (L) 159 - 400 K/uL    MPV 12.1 8.9 - 12.9 FL    NRBC 0.0 0.0  WBC    ABSOLUTE NRBC 0.00 0.00 - 0.01 K/uL   TSH 3RD GENERATION    Collection Time: 05/20/21  5:35 AM   Result Value Ref Range    TSH 0.36 0.36 - 3.74 uIU/mL       Recommendations/Plan:     #1 ESRD  -On hemodialysis every MWF at [facility] dialyzed on 5/19 outpatient  -No indication for hemodialysis today. Potassium is 3.9. No uremic or volume overload symptoms  -We will plan for dialysis in a.m. with 2K bath. -AV fistula is current access which has good bruit and thrill    #2 hypertension  -Blood pressure low normal.  She is on metoprolol low-dose for A. fib. Also on midodrine 5 mg 3 times daily which I will increase to 10 mg 3 times daily    #3 Breakthrough seizure activity  -Patient has history of seizures. Was on Keppra 1 g twice daily and lacosamide 200 mg  -Has been evaluated by neurology. -CT head does not show any new abnormalities  -Working with PT/OT    #4 anemia  -Hemoglobin is 10.6.   Continue Procrit with dialysis    #5 renal osteodystrophy  -Calcium is okay. Check phosphorus with next labs. She was not noted to be on any binders.   Obtain PTH from outpatient  ________________________________________________________________________  Signed: Rosa Guadalupe MD

## 2021-05-20 NOTE — PROGRESS NOTES
OCCUPATIONAL THERAPY EVALUATION  Patient: Solomon Luo (58 y.o. female)  Date: 5/20/2021  Primary Diagnosis: Seizure [facility]) [R56.9]        Precautions: falls    ASSESSMENT  Patient is a 80year old female, who came to the ED with extremity weakness and AMS and admitted for seizure vs TIA on 5/19/2021. PMH includes: anxiety disorder, arthritis, atherosclerosis, a fib, cardiac pacemaker, cellulitis, depression, DM, dizziness, heart disease, HTN, hyponatremia, hypothyroidism, kidney disease, migraines, peripheral venous insufficiency, renal failure, seizures, sleep disorder, stroke, back surgery, leg surgery. Based on the objective data described below, the patient presents with impaired sensation, impaired coordination bilaterally, decreased standing balance, decreased activity tolerance, generalized deconditioning, pain in R side with internal rotation of R shoulder, deep breaths, and coughin, increased need for A with self care and functional mobility/transfers. Patient semi supine in bed upon OT arrival and agreeable to working with therapy. Patient A&O to person and place, disoriented to time and situation and per pt report, pt lives with son in a two story house but pt stays on first floor with a wheelchair ramp to enter. Patient reports being able to stand pivot transfer to wheelchair mod I, requires occasional assistance for ADLs but generally independent. Patient was not ambulating PTA, DME includes: wheelchair, BSC, grab bars. Patient was sponge bathing only PTA and reports one fall in past three months. Patient CGA bed mobility, CGA sup <> sit, CGA scooting. CGA sit <> stand with gait belt and RW, patient able to take several side steps at EOB but declined ambulating away from bed at this time. While sitting EOB patient SBA LE dressing to don/doff socks, once returned to semi supine patient setup A grooming to wash face and brush teeth.  Patient would benefit from continued skilled OT services to address above deficits and improve safety and independence with self care and functional mobility/transfers. Recommend discharge to home with [facility] and family care when medically appropriate, as pt close to functional baseline at this time. Current Level of Function Impacting Discharge (ADLs/self-care): setup A grooming, SBA LE dressing. Other factors to consider for discharge: time since onset, PLOF, family support, DME available        PLAN :  Recommendations and Planned Interventions: self care training, functional mobility training, therapeutic exercise, balance training, therapeutic activities, endurance activities, patient education, home safety training and family training/education    Frequency/Duration: Patient will be followed by occupational therapy 3 times a week to address goals. Recommendation for discharge: (in order for the patient to meet his/her long term goals)  HHOT and family care    This discharge recommendation:  Has been made in collaboration with the attending provider and/or case management    IF patient discharges home will need the following DME: patient owns DME required for discharge       SUBJECTIVE:   Patient stated My side started hurting yesterday or the day before, I can't remember if I fell.     OBJECTIVE DATA SUMMARY:   HISTORY:   Past Medical History:   Diagnosis Date    Anxiety disorder     Arthritis     Atherosclerosis of native arteries of the extremities with ulceration ([address] 8/31/2020    Atrial fibrillation ([address]     Cardiac pacemaker in situ     Cellulitis and abscess of trunk 8/31/2020    Depression, postpartum     Diabetes ([address]     Heart disease     Heartburn     Hx of long term use of blood thinners     Hypercholesterolemia     Hypertension     Hypothyroidism     Migraines     Peripheral venous insufficiency 8/31/2020    Seizures ([address]     Sleep disorder     Stroke ([address]     Varicose veins of lower extremity with inflammation 8/31/2020     Past Surgical History:   Procedure Laterality Date    HX BACK SURGERY      HX OTHER SURGICAL      leg surgery     HX OTHER SURGICAL      pacemaker monitor        Expanded or extensive additional review of patient history:     Home Situation  Home Environment: Private residence  # Steps to Enter: 0 (ramp)  Wheelchair Ramp: Yes  One/Two Story Residence: Two story, live on 1st floor  # of Interior Steps: 12  Interior Rails: Right  Lift Chair Available: No  Living Alone: No (lives with son)  Support Systems: Child(jose carlos)  Patient Expects to be Discharged to[de-identified] Private residence  Current DME Used/Available at Home: Wheelchair, Commode, bedside, Grab bars  Tub or Shower Type: Other (comment) (pt sponge bathes)    PLOF: Pt required A for ADLS/IADLS, mod I with transfers, w/c dependent for ambulation prior to admission. EXAMINATION OF PERFORMANCE DEFICITS:  Cognitive/Behavioral Status:  Neurologic State: Alert  Orientation Level: Oriented to person;Oriented to place; Disoriented to time;Disoriented to situation  Cognition: Follows commands  Safety/Judgement: Awareness of environment; Insight into deficits    Skin: intact where visible    Edema: none noted    Hearing: Auditory  Auditory Impairment: Hard of hearing, bilateral    Vision/Perceptual:    Patient reports \"I am three quarters blind\". Patient's low vision at baseline requiring some cues and orientation.     Range of Motion:  AROM: Within functional limits (pain with RUE internal rotation)    Strength:  Strength: Generally decreased, functional     RUE Strength  Observation: grossly observed to be 3+/5     LUE Strength  Observation: grossly observed to be 3+/5    Coordination:   Finger Opposition Finger to Nose   Right UE  [] Intact    [x] Impaired   [] Intact    [x] Impaired   Left UE [] Intact    [x] Impaired [] Intact    [x] Impaired     Bilateral coordination slowed and impaired    Tone & Sensation:  Tone: Normal  Light touch sensation: RUE impaired compared to left, BLE with general neuropathy and decreased light touch  Deep pressure: LLE lighter than RLE    Balance:  Sitting: Intact; With support  Standing: Impaired; With support  Standing - Static: Fair;Constant support  Standing - Dynamic : Fair;Constant support    Functional Mobility and Transfers for ADLs:  Bed Mobility:  Rolling: Contact guard assistance  Supine to Sit: Contact guard assistance  Sit to Supine: Contact guard assistance  Scooting: Contact guard assistance    Transfers:  Sit to Stand: Contact guard assistance; Additional time  Stand to Sit: Contact guard assistance; Additional time  Assistive Device : Gait Belt;Walker, rolling    ADL Assessment:    Oral Facial Hygiene/Grooming: Setup    Lower Body Dressing: Stand-by assistance    ADL Intervention and task modifications:    Grooming  Grooming Assistance: Set-up  Position Performed: Other (comment) (semi supine with HOB raised)  Washing Face: Set-up  Brushing Teeth: Set-up    Lower Body Dressing Assistance  Dressing Assistance: Stand-by assistance  Socks: Stand-by assistance  Leg Crossed Method Used: Yes  Position Performed: Seated edge of bed    Cognitive Retraining  Safety/Judgement: Awareness of environment; Insight into deficits    Therapeutic Exercise:  Patient may benefit from UE HEP, initiate at next session as able     Functional Measure:    [facility] [address] [address] AM-PACTM \"6 Clicks\"                                                       Daily Activity Inpatient Short Form  How much help from another person does the patient currently need. .. Total; A Lot A Little None   1. Putting on and taking off regular lower body clothing? []  1 []  2 [x]  3 []  4   2. Bathing (including washing, rinsing, drying)? []  1 []  2 [x]  3 []  4   3. Toileting, which includes using toilet, bedpan or urinal? [] 1 []  2 [x]  3 []  4   4. Putting on and taking off regular upper body clothing? []  1 []  2 [x]  3 []  4   5.   Taking care of personal grooming such as brushing teeth? []  1 []  2 [x]  3 []  4   6. Eating meals? []  1 []  2 [x]  3 []  4   © , Trustees of [address] [place] [address] [address] [address], under license to [employer]. All rights reserved     Score: 18/24     Interpretation of Tool:  Represents clinically-significant functional categories (i.e. Activities of daily living). Percentage of Impairment CH    0%   CI    1-19% CJ    20-39% CK    40-59% CL    60-79% CM    80-99% CN     100%   [facility]  Score 6-24 24 23 20-22 15-19 10-14 7-9 6        Occupational Therapy Evaluation Charge Determination   History Examination Decision-Making   LOW Complexity : Brief history review  MEDIUM Complexity : 3-5 performance deficits relating to physical, cognitive , or psychosocial skils that result in activity limitations and / or participation restrictions LOW Complexity : No comorbidities that affect functional and no verbal or physical assistance needed to complete eval tasks       Based on the above components, the patient evaluation is determined to be of the following complexity level: LOW     Pain Ratin/10 at rest, 10/10 R side pain with movement, weight bearing through arms, coughing, and deep breaths    Activity Tolerance:   Fair and requires rest breaks    After treatment patient left in no apparent distress:    Supine in bed, Call bell within reach, Bed / chair alarm activated, Side rails x 3 and nsg present    COMMUNICATION/EDUCATION:   The patients plan of care was discussed with: Physical therapist and Registered nurse. Home safety education was provided and the patient/caregiver indicated understanding., Patient/family have participated as able in goal setting and plan of care. and Patient/family agree to work toward stated goals and plan of care. This patients plan of care is appropriate for delegation to [facility]. OT/PT sessions occurred together for increased patient and clinician safety.     Problem: Self Care Deficits Care Plan (Adult)  Goal: *Acute Goals and Plan of Care (Insert Text)  Description: Pt will be mod I sup <> sit in prep for EOB ADLs  Pt will be mod I grooming sitting EOB  Pt will be mod I LE dressing sitting EOB/long sit  Pt will be mod I sit <>  prep for toileting LRAD  Pt will be mod I toileting/stand pivot toilet transfer/cloth mgmt LRAD  Pt will be I following UE HEP in prep for self care tasks     Outcome: Not Met     Thank you for this referral.  Chris Sanchez, OTR/L  Time Calculation: 29 mins

## 2021-05-20 NOTE — PROGRESS NOTES
PHYSICAL THERAPY EVALUATION  Patient: Areli Lopez (29 y.o. female)  Date: 5/20/2021  Primary Diagnosis: Seizure [facility]) [R56.9]        Precautions: falls, seizure       ASSESSMENT  Patient is a 80year old female, who came to the ED with extremity weakness and AMS and admitted for seizure vs TIA on 5/19/2021. PMH includes: anxiety disorder, arthritis, atherosclerosis, a fib, cardiac pacemaker, cellulitis, depression, DM, dizziness, heart disease, HTN, hyponatremia, hypothyroidism, kidney disease, migraines, peripheral venous insufficiency, renal failure, seizures, sleep disorder, stroke, back surgery, leg surgery.     Based on the objective data described below, the patient presents with impaired sensation, impaired coordination bilaterally, decreased standing balance, decreased activity tolerance, generalized deconditioning, pain in R side with internal rotation of R shoulder, deep breaths, and coughing, increased need for A with amb and functional mobility/transfers. Patient semi supine in bed upon PT arrival and agreeable to working with therapy. Patient A&O to person and place, disoriented to time and situation and per pt report, pt lives with son in a two story house but pt stays on first floor with a wheelchair ramp to enter. Patient reports being able to stand pivot transfer to wheelchair mod I, requires occasional assistance for ADLs but generally independent. Patient was not ambulating PTA, DME includes: wheelchair, BSC, grab bars. Patient was sponge bathing only PTA and reports one fall in past three months. Patient CGA bed mobility, CGA sup <> sit, CGA scooting. CGA sit <> stand with gait belt and RW, patient sidestepped to [address] with gt belt, RW, and CGA demonstrating constant knee flexion right > left with good foot clearance and no LOB or knee buckling noted. Pt performed self care with OT, see OT note for details; during self care pt reported low vision which is her baseline.  Patient would benefit from continued skilled PT services to address above deficits and improve safety and independence with amb and functional mobility/transfers. Recommend discharge to home with HHPT and family care when medically appropriate, as pt close to functional baseline at this time.       Current Level of Function Impacting Discharge (mobility/balance): CGA    Other factors to consider for discharge: acute medical state      PLAN :  Recommendations and Planned Interventions: bed mobility training, transfer training, gait training, therapeutic exercises, neuromuscular re-education, patient and family training/education and therapeutic activities      Frequency/Duration: Patient will be followed by physical therapy:  3 times a week to address goals. 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     OBJECTIVE DATA SUMMARY:   HISTORY:    Past Medical History:   Diagnosis Date    Anxiety disorder     Arthritis     Atherosclerosis of native arteries of the extremities with ulceration ([address] 8/31/2020    Atrial fibrillation ([address]     Cardiac pacemaker in situ     Cellulitis and abscess of trunk 8/31/2020    Depression, postpartum     Diabetes ([address]     Heart disease     Heartburn     Hx of long term use of blood thinners     Hypercholesterolemia     Hypertension     Hypothyroidism     Migraines     Peripheral venous insufficiency 8/31/2020    Seizures ([address]     Sleep disorder     Stroke ([address]     Varicose veins of lower extremity with inflammation 8/31/2020     Past Surgical History:   Procedure Laterality Date    HX BACK SURGERY      HX OTHER SURGICAL      leg surgery     HX OTHER SURGICAL pacemaker monitor        Home Situation  Home Environment: Private residence  # Steps to Enter: 0 (ramp)  Wheelchair Ramp: Yes  One/Two Story Residence: Two story, live on 1st floor  # of Interior Steps: 12  Interior Rails: Right  Lift Chair Available: No  Living Alone: No (lives with son)  Support Systems: Child(jose carlos)  Patient Expects to be Discharged to[de-identified] Private residence  Current DME Used/[facility] at [facility]: Wheelchair, Commode, bedside, Grab bars  Tub or Shower Type: Other (comment) (pt sponge bathes)    EXAMINATION/PRESENTATION/DECISION MAKING:   Critical Behavior:  Neurologic State: Alert  Orientation Level: Oriented to person, Oriented to place, Disoriented to time, Disoriented to situation  Cognition: Follows commands  Safety/Judgement: Awareness of environment, Insight into deficits  Hearing: Auditory  Auditory Impairment: Hard of hearing, bilateral  Skin:  Intact where visible   Edema: none noted   Range Of Motion:  AROM: Within functional limits (pain with RUE internal rotation)                       Strength:    Strength: Generally decreased, functional                    Tone & Sensation:   Tone: Normal                              Coordination:   impaired bilateral LE and UE  Vision:    limited   Functional Mobility:  Bed Mobility:  Rolling: Contact guard assistance  Supine to Sit: Contact guard assistance  Sit to Supine: Contact guard assistance  Scooting: Contact guard assistance  Transfers:  Sit to Stand: Contact guard assistance; Additional time  Stand to Sit: Contact guard assistance; Additional time                       Balance:   Sitting: Intact; With support  Standing: Impaired; With support  Standing - Static: Fair;Constant support  Standing - Dynamic : Fair;Constant support  Ambulation/Gait Training:   not completed this session          Therapeutic Exercises:   Not completed this session    Functional Measure:  [facility] Inpatient Short Form  How much difficulty does the patient currently have. .. Unable A Lot A Little None   1. Turning over in bed (including adjusting bedclothes, sheets and blankets)? [] 1   [] 2   [x] 3   [] 4   2. Sitting down on and standing up from a chair with arms ( e.g., wheelchair, bedside commode, etc.)   [] 1   [] 2   [x] 3   [] 4   3. Moving from lying on back to sitting on the side of the bed? [] 1   [] 2   [x] 3   [] 4          How much help from another person does the patient currently need. .. Total A Lot A Little None   4. Moving to and from a bed to a chair (including a wheelchair)? [] 1   [x] 2   [] 3   [] 4   5. Need to walk in hospital room? [] 1   [] 2   [x] 3   [] 4   6. Climbing 3-5 steps with a railing? [] 1   [x] 2   [] 3   [] 4   © , Trustees of [facility], under license to [employer]. All rights reserved     Score:  Initial:  Most Recent: X (Date: 21 )   Interpretation of Tool:  Represents activities that are increasingly more difficult (i.e. Bed mobility, Transfers, Gait). Score 24 23 22-20 19-15 14-10 9-7 6   Modifier CH CI CJ CK CL CM CN         Physical Therapy Evaluation Charge Determination   History Examination Presentation Decision-Making   HIGH Complexity :3+ comorbidities / personal factors will impact the outcome/ POC  HIGH Complexity : 4+ Standardized tests and measures addressing body structure, function, activity limitation and / or participation in recreation  LOW Complexity : Stable, uncomplicated  Other outcome measures ampac 6  mod      Based on the above components, the patient evaluation is determined to be of the following complexity level: LOW     Pain Ratin/10 reported    Activity Tolerance:   Good    After treatment patient left in no apparent distress:   Supine in bed, Call bell within reach, Bed / chair alarm activated and Side rails x 3 and nsg updated.     GOALS:    Problem: Mobility Impaired (Adult and Pediatric)  Goal: *Acute Goals and Plan of Care (Insert Text)  Description: Pt will be I with LE HEP in 7 days. Pt will perform bed mobility with mod I in 7 days. Pt will perform transfers with mod I in 7 days. Outcome: Not Met       COMMUNICATION/EDUCATION:   The patients plan of care was discussed with: Occupational therapist and Registered nurse. Fall prevention education was provided and the patient/caregiver indicated understanding., Patient/family have participated as able in goal setting and plan of care. , and Patient/family agree to work toward stated goals and plan of care. PT/OT sessions occurred together for increased safety of pt and clinician. Kris Hagan and Shalom keating, KANDICE, present during session to assist and observe with patient verbal consent under direct supervision of Asia Godoy, PT, DPT.        Thank you for this referral.  Edgar Farrell, PT, DPT   Time Calculation: 29 mins

## 2021-05-20 NOTE — ROUTINE PROCESS
TRANSFER - OUT REPORT: 
 
Verbal report given to bhavana jeffrey Bone  being transferred to  for routine progression of care Report consisted of patients Situation, Background, Assessment and  
Recommendations(SBAR). Information from the following report(s) SBAR was reviewed with the receiving nurse. Lines:  
Peripheral IV 05/19/21 Distal;Left;Posterior Forearm (Active) Opportunity for questions and clarification was provided. Patient transported with: 
 [email or web address]

## 2021-05-20 NOTE — H&P
History and Physical    Patient: Rigoberto Dowling MRN: [id number]  SSN: [id number]    YOB: 1950  Age: 80 y.o. Sex: female      Subjective:      Chief Complaint: Altered mental status and right-sided weakness    HPI: Rigoberto Dowling is a 80 y.o. female with past medical history of previous cerebrovascular accident, atrial fibrillation on Eliquis, pacemaker, seizure disorder and end-stage renal disease (hemodialysis on Monday, Wednesday, Friday) presenting to the ER from home after family noticed altered mental status and right-sided weakness. Ms. Karen Jennings reports feeling within normal limits throughout the day. Approximately 1 hour prior to arrival, patient reports \"not being able to get it together. \"  Ms. Karen Jennings states that symptoms were similar prior to previous seizure activity. Last seizure was approximately 2 to 3 months ago. She has been taking her home seizure medications as prescribed. Emergency medical services were called and stroke alert was called in the field by EMS. Family reported to EMS that patient had altered mental status and noticed right sided weakness. Ms. Karen Jennings has no recollection of events that occurred just prior to arriving to the ER. I found Ms. Karen Jennings to be a poor historian concerning chronic medical problems or home medication regimen. The majority of history was obtained from ER physician. While in the emergency room, patient had a witnessed tonic-clonic seizure while CT of the head was obtained. Seizure activity lasted for approximately 2 minutes and subsided after 2 mg of IV Ativan was administered. Past medical history, past surgical history, family history, social history was reviewed at the time of admission. At the time of admission, Ms. Karen Jennings has no recollection of home medications. I obtain medication list from last primary care physician note dated May 10, 2021. Ms. Karen Jeninngs denies tobacco, alcohol or illicit drug use.   Patient is a full code.    Past Medical History:   Diagnosis Date    Anxiety disorder     Arthritis     Atherosclerosis of native arteries of the extremities with ulceration ([facility] 8/31/2020    [facility]     Cardiac pacemaker in situ     Cellulitis and abscess of trunk 8/31/2020    Depression, postpartum     Diabetes ([facility]     Heart disease     Heartburn     Hx of long term use of blood thinners     Hypercholesterolemia     Hypertension     Hypothyroidism     Migraines     Peripheral venous insufficiency 8/31/2020    Seizures [facility]     Sleep disorder     Stroke ([facility]     Varicose veins of lower extremity with inflammation 8/31/2020     Past Surgical History:   Procedure Laterality Date    HX BACK SURGERY      HX OTHER SURGICAL      leg surgery     HX OTHER SURGICAL      pacemaker monitor       Family History   Problem Relation Age of Onset    Heart Disease Mother     Heart Disease Father     Diabetes Sister     Diabetes Brother      Social History     Tobacco Use    Smoking status: Never Smoker    Smokeless tobacco: Never Used   Substance Use Topics    Alcohol use: Never      Prior to Admission medications    Medication Sig Start Date End Date Taking? Authorizing Provider   omeprazole (PRILOSEC) 40 mg capsule Take 1 Cap by mouth daily for 90 days. 5/10/21 8/8/21 Yes MD Cheryl   ondansetron hcl [facility]) 8 mg tablet Take 1 Tab by mouth every eight (8) hours as needed for Nausea or Vomiting for up to 60 days. 5/10/21 7/9/21 Yes MD Cheryl   midodrine (PROAMATINE) 5 mg tablet TAKE 1 TABLET BY MOUTH THREE TIMES DAILY FOR 90 DAYS 5/1/21  Yes MD Cheryl   metoprolol tartrate (LOPRESSOR) 25 mg tablet TAKE 1 & 1/2 (ONE & ONE-HALF) TABLETS BY MOUTH TWICE DAILY FOR 30 DAYS 5/1/21  Yes MD Cheryl   apixaban (ELIQUIS) 2.5 mg tablet Take 1 Tab by mouth two (2) times a day for 180 days.  Indications: treatment to prevent blood clots in chronic atrial fibrillation 4/8/21 10/5/21 Yes Kaur Stevens MD   levETIRAcetam (Keppra) 1,000 mg tablet Take 1,000 mg by mouth two (2) times a day. Yes Provider, Historical   FLUoxetine (PROzac) 10 mg tablet Take 1 tablet by mouth once daily 3/26/21  Yes Kaur Stevens MD   levothyroxine (SYNTHROID) 150 mcg tablet Take 1 tablet by mouth once daily 3/26/21  Yes Kaur Stevens MD   atorvastatin (LIPITOR) 40 mg tablet Take 1 tablet by mouth once daily for 90 days 1/1/21 9/28/21 Yes Kaur Stevens MD   lacosamide (Vimpat) 100 mg tab tablet Take 150 mg by mouth two (2) times a day. Yes Provider, Historical        No Known Allergies    Review of Systems:  Constitutional: Denies fevers, chills, fatigue, weakness, unexplained weight loss, night sweats. Reports \"not being able to get it together. \"  Head, Eyes, Ears, Nose, Mouth, Throat: Denies nasal congestion, sore throat, rhinorrhea, earache, ringing of the ears, difficulty hearing, facial pain, facial swelling. Respiratory: Denies shortness of breath, wheezing, cough, sputum production, hemoptysis. Denies use of oxygen at home. Cardiovascular: Denies chest pain, irregular heart beat, racing pulse, lower extremity edema, dizziness, dyspnea on exertion, orthopnea. Gastrointestinal: Denies nausea, vomiting, diarrhea, constipation, abdominal pain, loss of appetite, acid reflux, melena, hematochezia, change in bowel habits. Endocrine: Denies intolerance to heat or cold. Denies polyuria, polydipsia, polyphagia. Genitourinary: Denies increased urinary frequency, dysuria, hematuria, urinary incontinence, increased urinary frequency  Integument/Breast: No rash, itching, new skin lesion. Musculoskeletal: Denies joint swelling, joint pain, myalgias, neck pain, back pain. Denies CVA tenderness.   Neurological: Positive for right side weakness (reported by family) and witnessed seizure activity in the ER. Denies headaches, dizziness, confusion, tremors, numbness/tingling, weakness, problems with balance, loss of consciousness. Hematologic: Denies easy bleeding, easy bruising, lymphadenopathy. Behavioral/Psychiatric: Denies anxiety, depression, increased irritability, mood swings, delusions, hallucination, SI/HI. Objective:     Vitals:    05/19/21 1855 05/19/21 1947 05/19/21 1949   BP: 131/70 138/62    Pulse: 84 60    Resp: 17 17    Temp:   98 °F (36.7 °C)   SpO2: 98% 100%    Weight: 63.5 kg (140 lb)     Height: 5' 4\" (1.626 m)          Physical Exam:  General: Alert and Oriented x 3. Cooperative and friendly. No acute distress. Nourished and well developed. Appears chronically ill. Head/Eyes: Normocephalic, atraumatic, EOMI, PERRLA. Nose/Mouth: Turbinates within normal limits, No drainage. Mucous membranes are moist.  Throat and Neck: No masses, JVD, thyromegaly or lymphadenopathy appreciated. Cervical spine has good range of motion without pain. Lung: Clear to auscultation bilaterally without wheezes, rhonchi or crackles. Good air movement bilaterally. Symmetric chest rise with respirations. Heart: Regular rate and rhythm. Normal S1/S2. No appreciated murmurs, rubs or gallops. No lower extremity edema. Abdomen: Soft, non-tender, non-distended. Bowel sounds present in all four quadrants. No masses appreciated. Extremities:  Atraumatic. Able to move all extremities symmetrically. No bony protuberances appreciated. Skin: Clean, dry and intact without appreciated lesions. Right upper chest PM site is clean, dry and intact. Right upper AV fistula site (inner arm) has palpable thrill. Neurologic: Alert and oriented x3. Cranial nerves II through XII are intact. 5 out of 5 strength in left upper extremity. Right upper extremity with 3 out of 5 strength, involuntary movements with extension. Ms. Darion Kohli can move bilateral lower extremities.   She has 4 out of 5 strength in bilateral lower extremities. Intact sensation. Speech is clear and facial features are symmetric. Psychiatric: Flat affect, normal thought process, good eye contact. Recent Results (from the past 24 hour(s))   GLUCOSE, POC    Collection Time: 05/19/21  8:07 PM   Result Value Ref Range    Glucose (POC) 107 65 - 117 mg/dL    Performed by Janet Saini    CBC WITH AUTOMATED DIFF    Collection Time: 05/19/21  9:56 PM   Result Value Ref Range    WBC 5.1 3.6 - 11.0 K/uL    RBC 3.32 (L) 3.80 - 5.20 M/uL    HGB 10.3 (L) 11.5 - 16.0 g/dL    HCT 32.4 (L) 35.0 - 47.0 %    MCV 97.6 80.0 - 99.0 FL    MCH 31.0 26.0 - 34.0 PG    MCHC 31.8 30.0 - 36.5 g/dL    RDW 12.7 11.5 - 14.5 %    PLATELET 88 (L) 468 - 400 K/uL    MPV 12.5 8.9 - 12.9 FL    NRBC 0.0 0.0  WBC    ABSOLUTE NRBC 0.00 0.00 - 0.01 K/uL    NEUTROPHILS 71 32 - 75 %    LYMPHOCYTES 18 12 - 49 %    MONOCYTES 9 5 - 13 %    EOSINOPHILS 1 0 - 7 %    BASOPHILS 1 0 - 1 %    IMMATURE GRANULOCYTES 0 0 - 0.5 %    ABS. NEUTROPHILS 3.6 1.8 - 8.0 K/UL    ABS. LYMPHOCYTES 0.9 0.8 - 3.5 K/UL    ABS. MONOCYTES 0.5 0.0 - 1.0 K/UL    ABS. EOSINOPHILS 0.1 0.0 - 0.4 K/UL    ABS. BASOPHILS 0.0 0.0 - 0.1 K/UL    ABS. IMM. GRANS. 0.0 0.00 - 0.04 K/UL    DF AUTOMATED     METABOLIC PANEL, COMPREHENSIVE    Collection Time: 05/19/21  9:56 PM   Result Value Ref Range    Sodium 133 (L) 136 - 145 mmol/L    Potassium 3.9 3.5 - 5.1 mmol/L    Chloride 97 97 - 108 mmol/L    CO2 34 (H) 21 - 32 mmol/L    Anion gap 2 (L) 5 - 15 mmol/L    Glucose 115 (H) 65 - 100 mg/dL    BUN 22 (H) 6 - 20 mg/dL    Creatinine 2.01 (H) 0.55 - 1.02 mg/dL    BUN/Creatinine ratio 11 (L) 12 - 20      GFR est AA 30 (L) >60 ml/min/1.73m2    GFR est non-AA 24 (L) >60 ml/min/1.73m2    Calcium 8.2 (L) 8.5 - 10.1 mg/dL    Bilirubin, total 0.8 0.2 - 1.0 mg/dL    AST (SGOT) 19 15 - 37 U/L    ALT (SGPT) 25 12 - 78 U/L    Alk.  phosphatase 164 (H) 45 - 117 U/L    Protein, total 5.9 (L) 6.4 - 8.2 g/dL    Albumin 3.1 (L) 3.5 - 5.0 g/dL    Globulin 2.8 2.0 - 4.0 g/dL    A-G Ratio 1.1 1.1 - 2.2     PROTHROMBIN TIME + INR    Collection Time: 05/19/21  9:56 PM   Result Value Ref Range    Prothrombin time 15.1 (H) 11.9 - 14.7 sec    INR 1.2 (H) 0.9 - 1.1     TROPONIN I    Collection Time: 05/19/21  9:56 PM   Result Value Ref Range    Troponin-I, Qt. <0.05 <0.05 ng/mL       XR Results (maximum last 3): Results from [place] encounter on 10/16/20    XR CHEST PA LAT    Narrative  Chest, 2 views, 10/16/2020    History: Weakness. Comparison: Including chest 3/16/2020. Findings: Right-sided pacemaker device is placed at the right atrium and right  ventricle. The cardiac silhouette is enlarged. The lungs are underexpanded. There is prominence of the pulmonary vasculature without hydrostatic edema. No  focal consolidation, pleural effusions or pneumothorax is identified. Degenerative changes are present in the thoracic spine. Suture line is seen at  GE junction. Impression  Impression: No acute pulmonary process. CT Results (maximum last 3): Results from [place] encounter on 05/19/21    CTA CODE NEURO HEAD AND NECK W CONT    Narrative  Study: Head and Neck CTA    Clinical Indication: Code stroke. Comparison: Head CT performed earlier the same day. Technique: Routine unenhanced axial acquisition of the neck was performed. Subsequently, contiguous thin section axial acquisition of the head and neck was  performed in the arterial phase from the thoracic inlet to the skull vertex  following the intravenous administration of 100 mL Isovue-370. Coronal,  sagittal, and MIP reconstructions were generated and reviewed.  Dose reduction:  All CT scans at this facility are performed using dose reduction optimization  techniques as appropriate to a performed exam including the following-automated  exposure control, adjustments of mA and/or Kv according to patient size, or use  of iterative reconstructive technique. Findings:    Neck CTA:  The aortic arch and great vessel origins are unremarkable. The common carotid arteries are patent without high-grade stenosis. Calcified  plaque in the carotid bulbs without significant stenosis. The cervical internal  carotid arteries are tortuous but patent without high-grade stenosis based on  NASCET criteria. The external carotid arteries are unremarkable. The cervical vertebral arteries are patent without high-grade stenosis. The paraspinal soft tissues are unremarkable. The lung apices are clear. Multilevel cervical spondylosis. Partially visualized right subclavian approach  pacemaker. Head CTA:  The intracranial internal carotid arteries are patent without high-grade  stenosis. The anterior cerebral arteries are patent without high-grade stenosis. The middle cerebral arteries are patent without high-grade stenosis. The  anterior and bilateral posterior communicating arteries are visualized. The intracranial vertebral arteries are patent without high-grade stenosis. The  left vertebral artery is dominant. The basilar artery is patent without  high-grade stenosis. Hypoplastic right P1 segment with fetal type origin of the  right posterior cerebral artery, a normal anatomic variant. Otherwise the  posterior cerebral arteries are patent without high-grade stenosis. A 2 x 1 mm inferiorly projecting outpouching arising from the communicating  segment of the left internal carotid artery appears to represent an infundibular  origin of the posterior communicating artery. No evidence of an aneurysm or high  flow vascular malformation. Impression  No occlusion or high-grade stenosis of the major cervical or intracranial  arterial vasculature. Please note that degrees of carotid stenosis are measured in accordance with  NASCET criteria.         Findings were discussed with Dr. Albino Westbrook by Dr. Andrew Parra at 8:09 PM on  5/19/2021 via phone conversation. CT CODE NEURO HEAD WO CONTRAST    Narrative  Study: Head CT without contrast.    Clinical Indication: Code stroke. Comparison: Head CT dated 1/13/2020. Technique: Routine volume acquisition of the head was performed without contrast  using soft tissue and bone kernels. Coronal and sagittal reconstructions were  generated and reviewed. Dose reduction: All CT scans at this facility are  performed using dose reduction optimization techniques as appropriate to a  performed exam including the following-automated exposure control, adjustments  of mA and/or Kv according to patient size, or use of iterative reconstructive  technique. Findings:    Mild generalized volume loss with commensurate prominence of ventricles and  sulci, unchanged. No midline shift. The basal cisterns are patent. No acute hemorrhage, abnormal  extra-axial fluid, or evidence of large territorial ischemia. Unchanged region  of left occipital lobe encephalomalacia. Chronic lacunar infarct of the left  basal ganglia. Unremarkable orbits. The included paranasal sinuses and mastoid air cells are  clear. No evidence of an aggressive calvarial or extracalvarial lesion. Impression  No acute intracranial abnormality. Chronic findings as above. Findings were discussed with Dr. Haydee Barker by Dr. Lazarus Keys at 7:18 PM 5/19/2021  via phone conversation. Assessment:     Rin Bonner is a 80 y.o. female who presents with altered mental status and right-sided weakness. Ms. Finn Fernandez is wheelchair-bound and reports chronic lower extremity weakness. While in the emergency room, patient had witnessed tonic-clonic seizure that subsided with IV Ativan. CT of the head has no evidence of acute intracranial abnormality and CT of the head and neck have no evidence of occlusion or high-grade stenosis of the major cervical or intracranial arterial vasculature.   I suspect that symptoms are related to seizure disorder - prior to symptom onset, Ms. Elisa Rosado reports having similar symptoms prior to previous seizures. Telemetry neurologist recommended admission to the hospital for further evaluation and to rule out transient ischemic attack. Plan:     1. Admit to telemetry bed. 2. Neurology consult. Order MRI Brain. Order Q4H Neuro checks. Order labetalol for SBP >220 or SBP >110.  3. Order echocardiogram. Check lipid panel and hemoglobin A1C.  4. Perform bedside swallow evaluation. Order physical therapy, occupational therapy and speech therapy consults. 5. Fall and aspiration precautions. 6. Place on seizure precautions. Order IV Ativan as needed for seizure activity. Continue home dose of Keppra and Vimpat (administer IV). Check Vimpat and Keppra serum levels. 7. Order nephrology consult for hemodialysis coordination. 8. For history of atrial fibrillation, continue home dose of metoprolol tartrate (with holding parameters) and Eliquis. 9. For history of hypothyroidism, continue home dose of levothyroxine. 10. For history of hyperlipidemia, continue home dose of Lipitor. 11. For history of anxiety and depression, continue home dose of Prozac. GI PPX: Diet ordered. DVT PPX: Continue home dose of Eliquis.     Signed By: Arben Caban MD     May 19, 2021

## 2021-05-20 NOTE — PROGRESS NOTES
Progress Note    Patient: Alta Krishna MRN: [id number]  SSN: [id number]    YOB: 1950  Age: 80 y.o. Sex: female      Admit Date: 5/19/2021    LOS: 1 day     Subjective:     71F, H/o seizures, SSS on eliquis and PPM with seizure like activity complicated by YUNI    Her condition is complicated by right sided weakness likely seizure related    Onkeppra 1000 BID and lacosamide 200 BID    Review of Systems:  Constitutional: Denies fevers, chills, fatigue, weakness, unexplained weight loss, night sweats. Reports \"not being able to get it together. \"  Head, Eyes, Ears, Nose, Mouth, Throat: Denies nasal congestion, sore throat, rhinorrhea, earache, ringing of the ears, difficulty hearing, facial pain, facial swelling. Respiratory: Denies shortness of breath, wheezing, cough, sputum production, hemoptysis. Denies use of oxygen at home. Cardiovascular: Denies chest pain, irregular heart beat, racing pulse, lower extremity edema, dizziness, dyspnea on exertion, orthopnea. Gastrointestinal: Denies nausea, vomiting, diarrhea, constipation, abdominal pain, loss of appetite, acid reflux, melena, hematochezia, change in bowel habits. Endocrine: Denies intolerance to heat or cold. Denies polyuria, polydipsia, polyphagia. Genitourinary: Denies increased urinary frequency, dysuria, hematuria, urinary incontinence, increased urinary frequency  Integument/Breast: No rash, itching, new skin lesion. Musculoskeletal: Denies joint swelling, joint pain, myalgias, neck pain, back pain. Denies CVA tenderness. Neurological: Positive for right side weakness (reported by family) and witnessed seizure activity in the ER. Denies headaches, dizziness, confusion, tremors, numbness/tingling, weakness, problems with balance, loss of consciousness. Hematologic: Denies easy bleeding, easy bruising, lymphadenopathy.   Behavioral/Psychiatric: Denies anxiety, depression, increased irritability, mood swings, delusions, hallucination, SI/HI.         Objective:     Vitals:    05/20/21 0031 05/20/21 0826 05/20/21 1023 05/20/21 1112   BP: (!) 129/90 (!) 141/80 (!) 106/54    Pulse: 72 64 60    Resp: 17 17 18    Temp:  98.3 °F (36.8 °C) (!) 94.4 °F (34.7 °C)    SpO2: 100% 97%  100%   Weight:       Height:            Intake and Output:  Current Shift: No intake/output data recorded. Last three shifts: No intake/output data recorded. Physical Exam:   General: Alert and Oriented x 3. Cooperative and friendly. No acute distress. Nourished and well developed. Appears chronically ill. Head/Eyes: Normocephalic, atraumatic, EOMI, PERRLA. Nose/Mouth: Turbinates within normal limits, No drainage. Mucous membranes are moist.  Throat and Neck: No masses, JVD, thyromegaly or lymphadenopathy appreciated. Cervical spine has good range of motion without pain. Lung: Clear to auscultation bilaterally without wheezes, rhonchi or crackles. Good air movement bilaterally. Symmetric chest rise with respirations. Heart: Regular rate and rhythm. Normal S1/S2. No appreciated murmurs, rubs or gallops. No lower extremity edema. Abdomen: Soft, non-tender, non-distended. Bowel sounds present in all four quadrants. No masses appreciated. Extremities:  Atraumatic. Able to move all extremities symmetrically. No bony protuberances appreciated. Skin: Clean, dry and intact without appreciated lesions. Right upper chest PM site is clean, dry and intact. Right upper AV fistula site (inner arm) has palpable thrill. Neurologic: Alert and oriented x3. Cranial nerves II through XII are intact. 5 out of 5 strength in left upper extremity. Right upper extremity with 3 out of 5 strength, involuntary movements with extension. Ms. Ocampo Breakshandra can move bilateral lower extremities. She has 4 out of 5 strength in bilateral lower extremities. Intact sensation. Speech is clear and facial features are symmetric.   Psychiatric: Flat affect, normal thought process, good eye contact. Lab/Data Review:  Recent Results (from the past 24 hour(s))   GLUCOSE, POC    Collection Time: 05/19/21  8:07 PM   Result Value Ref Range    Glucose (POC) 107 65 - 117 mg/dL    Performed by Alta Espino    CBC WITH AUTOMATED DIFF    Collection Time: 05/19/21  9:56 PM   Result Value Ref Range    WBC 5.1 3.6 - 11.0 K/uL    RBC 3.32 (L) 3.80 - 5.20 M/uL    HGB 10.3 (L) 11.5 - 16.0 g/dL    HCT 32.4 (L) 35.0 - 47.0 %    MCV 97.6 80.0 - 99.0 FL    MCH 31.0 26.0 - 34.0 PG    MCHC 31.8 30.0 - 36.5 g/dL    RDW 12.7 11.5 - 14.5 %    PLATELET 88 (L) 534 - 400 K/uL    MPV 12.5 8.9 - 12.9 FL    NRBC 0.0 0.0  WBC    ABSOLUTE NRBC 0.00 0.00 - 0.01 K/uL    NEUTROPHILS 71 32 - 75 %    LYMPHOCYTES 18 12 - 49 %    MONOCYTES 9 5 - 13 %    EOSINOPHILS 1 0 - 7 %    BASOPHILS 1 0 - 1 %    IMMATURE GRANULOCYTES 0 0 - 0.5 %    ABS. NEUTROPHILS 3.6 1.8 - 8.0 K/UL    ABS. LYMPHOCYTES 0.9 0.8 - 3.5 K/UL    ABS. MONOCYTES 0.5 0.0 - 1.0 K/UL    ABS. EOSINOPHILS 0.1 0.0 - 0.4 K/UL    ABS. BASOPHILS 0.0 0.0 - 0.1 K/UL    ABS. IMM. GRANS. 0.0 0.00 - 0.04 K/UL    DF AUTOMATED     METABOLIC PANEL, COMPREHENSIVE    Collection Time: 05/19/21  9:56 PM   Result Value Ref Range    Sodium 133 (L) 136 - 145 mmol/L    Potassium 3.9 3.5 - 5.1 mmol/L    Chloride 97 97 - 108 mmol/L    CO2 34 (H) 21 - 32 mmol/L    Anion gap 2 (L) 5 - 15 mmol/L    Glucose 115 (H) 65 - 100 mg/dL    BUN 22 (H) 6 - 20 mg/dL    Creatinine 2.01 (H) 0.55 - 1.02 mg/dL    BUN/Creatinine ratio 11 (L) 12 - 20      GFR est AA 30 (L) >60 ml/min/1.73m2    GFR est non-AA 24 (L) >60 ml/min/1.73m2    Calcium 8.2 (L) 8.5 - 10.1 mg/dL    Bilirubin, total 0.8 0.2 - 1.0 mg/dL    AST (SGOT) 19 15 - 37 U/L    ALT (SGPT) 25 12 - 78 U/L    Alk.  phosphatase 164 (H) 45 - 117 U/L    Protein, total 5.9 (L) 6.4 - 8.2 g/dL    Albumin 3.1 (L) 3.5 - 5.0 g/dL    Globulin 2.8 2.0 - 4.0 g/dL    A-G Ratio 1.1 1.1 - 2.2     PROTHROMBIN TIME + INR    Collection Time: 05/19/21  9:56 PM   Result Value Ref Range    Prothrombin time 15.1 (H) 11.9 - 14.7 sec    INR 1.2 (H) 0.9 - 1.1     TROPONIN I    Collection Time: 05/19/21  9:56 PM   Result Value Ref Range    Troponin-I, Qt. <0.05 <0.05 ng/mL   URINALYSIS W/MICROSCOPIC    Collection Time: 05/20/21 12:56 AM   Result Value Ref Range    Color Yellow/Straw      Appearance Clear Clear      Specific gravity 1.028 1.003 - 1.030      pH (UA) 6.0 5.0 - 8.0      Protein 30 (A) Negative mg/dL    Glucose Negative Negative mg/dL    Ketone Negative Negative mg/dL    Bilirubin Negative Negative      Blood Small (A) Negative      Urobilinogen 4.0 (H) 0.1 - 1.0 EU/dL    Nitrites Negative Negative      Leukocyte Esterase Negative Negative      WBC 0-4 0 - 4 /hpf    RBC 5-10 0 - 5 /hpf    Epithelial cells Moderate (A) Few /lpf    Bacteria Negative Negative /hpf   LIPID PANEL    Collection Time: 05/20/21  5:35 AM   Result Value Ref Range    LIPID PROFILE        Cholesterol, total 110 <200 mg/dL    Triglyceride 51 <150 mg/dL    HDL Cholesterol 72 mg/dL    LDL, calculated 27.8 0 - 100 mg/dL    VLDL, calculated 10.2 mg/dL    CHOL/HDL Ratio 1.5 0.0 - 5.0     CBC W/O DIFF    Collection Time: 05/20/21  5:35 AM   Result Value Ref Range    WBC 5.2 3.6 - 11.0 K/uL    RBC 3.47 (L) 3.80 - 5.20 M/uL    HGB 10.6 (L) 11.5 - 16.0 g/dL    HCT 34.9 (L) 35.0 - 47.0 %    .6 (H) 80.0 - 99.0 FL    MCH 30.5 26.0 - 34.0 PG    MCHC 30.4 30.0 - 36.5 g/dL    RDW 12.6 11.5 - 14.5 %    PLATELET 83 (L) 895 - 400 K/uL    MPV 12.1 8.9 - 12.9 FL    NRBC 0.0 0.0  WBC    ABSOLUTE NRBC 0.00 0.00 - 0.01 K/uL   TSH 3RD GENERATION    Collection Time: 05/20/21  5:35 AM   Result Value Ref Range    TSH 0.36 0.36 - 3.74 uIU/mL         Assessment and plan:      (1) Seizure disorder: canrt get MRI d/t PPM, on keppra 1g BID and lacosamide 200 BID, PRN lorazepam, PT OT    (2) Functional paraplegia: complicates #1.     (3) Hyponatremia : medication induced stable    (4) YUNI: unsure if its CKD.      (5) Hypothyroidism: levothyroxine    (6) SSS: eliquis metoprolol and midodrine.     (7) GERD: PPI    (8) Depression: fluoxetine    (9) ESRD on HD:     DVT ppx: eliquis    DISPO: home when stable      Signed By: Bret Reynoso MD     May 20, 2021

## 2021-05-21 ENCOUNTER — APPOINTMENT (OUTPATIENT)
Dept: NON INVASIVE DIAGNOSTICS | Age: 71
End: 2021-05-21
Attending: FAMILY MEDICINE
Payer: MEDICARE

## 2021-05-21 VITALS
RESPIRATION RATE: 16 BRPM | OXYGEN SATURATION: 97 % | HEIGHT: 64 IN | SYSTOLIC BLOOD PRESSURE: 128 MMHG | WEIGHT: 140 LBS | HEART RATE: 60 BPM | BODY MASS INDEX: 23.9 KG/M2 | DIASTOLIC BLOOD PRESSURE: 69 MMHG | TEMPERATURE: 98.2 F

## 2021-05-21 LAB
ECHO AO ROOT DIAM: 2.5 CM
ECHO AV PEAK GRADIENT: 11 MMHG
ECHO EST RA PRESSURE: 3 MMHG
ECHO LA AREA 4C: 24.58 CM2
ECHO LA MAJOR AXIS: 4 CM
ECHO LA MINOR AXIS: 2.38 CM
ECHO LA TO AORTIC ROOT RATIO: 1.6
ECHO LV E' SEPTAL VELOCITY: 5.36 CM/S
ECHO LV EJECTION FRACTION BIPLANE: 70.7 % (ref 55–100)
ECHO LV ESV A2C: 32.2 CM3
ECHO LV INTERNAL DIMENSION DIASTOLIC MMODE: 4.84 CM
ECHO LV INTERNAL DIMENSION SYSTOLIC MMODE: 2.9 CM
ECHO LV IVSD MMODE: 1.21 CM
ECHO LV POSTERIOR WALL DIASTOLIC MMODE: 1.1 CM
ECHO LVOT PEAK GRADIENT: 9 MMHG
ECHO LVOT SV: 88.6 CM3
ECHO MV A VELOCITY: 38.9 CM/S
ECHO MV AREA PHT: 4.78 CM2
ECHO MV E DECELERATION TIME (DT): 123 MS
ECHO MV E VELOCITY: 157 CM/S
ECHO MV E/A RATIO: 4.04
ECHO MV E/E' SEPTAL: 29.29
ECHO MV PRESSURE HALF TIME (PHT): 46 MS
ECHO PV PEAK INSTANTANEOUS GRADIENT SYSTOLIC: 6 MMHG
ECHO PV REGURGITANT MAX VELOCITY: 124 CM/S
ECHO PV REGURGITANT MAX VELOCITY: 149 CM/S
ECHO PV REGURGITANT MAX VELOCITY: 163 CM/S
ECHO RA AREA 4C: 11.96 CM2
ECHO RIGHT VENTRICULAR SYSTOLIC PRESSURE (RVSP): 58 MMHG
ECHO RV INTERNAL DIMENSION: 2.95 CM
ECHO TV MAX VELOCITY: 371 CM/S
ECHO TV REGURGITANT PEAK GRADIENT: 55 MMHG
EST. AVERAGE GLUCOSE BLD GHB EST-MCNC: 91 MG/DL
HBA1C MFR BLD: 4.8 % (ref 4–5.6)
MV DEC SLOPE: ABNORMAL MM/S2
MV DEC SLOPE: ABNORMAL MM/S2

## 2021-05-21 PROCEDURE — 82962 GLUCOSE BLOOD TEST: CPT

## 2021-05-21 PROCEDURE — 90935 HEMODIALYSIS ONE EVALUATION: CPT

## 2021-05-21 PROCEDURE — C9254 INJECTION, LACOSAMIDE: HCPCS | Performed by: STUDENT IN AN ORGANIZED HEALTH CARE EDUCATION/TRAINING PROGRAM

## 2021-05-21 PROCEDURE — 74011250636 HC RX REV CODE- 250/636: Performed by: FAMILY MEDICINE

## 2021-05-21 PROCEDURE — 99218 HC RM OBSERVATION: CPT

## 2021-05-21 PROCEDURE — 74011000258 HC RX REV CODE- 258: Performed by: STUDENT IN AN ORGANIZED HEALTH CARE EDUCATION/TRAINING PROGRAM

## 2021-05-21 PROCEDURE — 74011250637 HC RX REV CODE- 250/637: Performed by: INTERNAL MEDICINE

## 2021-05-21 PROCEDURE — G0257 UNSCHED DIALYSIS ESRD PT HOS: HCPCS

## 2021-05-21 PROCEDURE — 74011250636 HC RX REV CODE- 250/636: Performed by: INTERNAL MEDICINE

## 2021-05-21 PROCEDURE — 93306 TTE W/DOPPLER COMPLETE: CPT

## 2021-05-21 PROCEDURE — 96376 TX/PRO/DX INJ SAME DRUG ADON: CPT

## 2021-05-21 PROCEDURE — 74011250636 HC RX REV CODE- 250/636: Performed by: STUDENT IN AN ORGANIZED HEALTH CARE EDUCATION/TRAINING PROGRAM

## 2021-05-21 RX ORDER — LACOSAMIDE 100 MG/1
200 TABLET ORAL 2 TIMES DAILY
Qty: 60 TABLET | Refills: 2 | Status: SHIPPED | OUTPATIENT
Start: 2021-05-21 | End: 2021-06-11 | Stop reason: DRUGHIGH

## 2021-05-21 RX ORDER — LACOSAMIDE 100 MG/1
200 TABLET ORAL EVERY 12 HOURS
Status: DISCONTINUED | OUTPATIENT
Start: 2021-05-21 | End: 2021-05-21 | Stop reason: HOSPADM

## 2021-05-21 RX ADMIN — SODIUM CHLORIDE 200 MG: 9 INJECTION, SOLUTION INTRAVENOUS at 00:57

## 2021-05-21 RX ADMIN — EPOETIN ALFA-EPBX 4000 UNITS: 4000 INJECTION, SOLUTION INTRAVENOUS; SUBCUTANEOUS at 11:58

## 2021-05-21 RX ADMIN — MIDODRINE HYDROCHLORIDE 10 MG: 5 TABLET ORAL at 17:47

## 2021-05-21 RX ADMIN — LEVETIRACETAM 1000 MG: 10 INJECTION INTRAVENOUS at 00:40

## 2021-05-21 NOTE — PROGRESS NOTES
15: 55PM Outbound call to patient's son Starr Agudelo, @ [phone]. Patient identifier's (name, , and four digit code) verified per HIPAA policy. Informed of d/c order and recommendation for [place]. Son gave verbal consent for writer to send [place] referrals on his mother's behalf. Referrals sent via [facility]. Medicare pt has received, reviewed, and signed 2nd IM letter informing them of their right to appeal the discharge. Signed copy has been placed on pt bedside chart.       RENA Garibay

## 2021-05-21 NOTE — PROGRESS NOTES
Renal Consultation Note    NAME:  Areli Lopez   :   1950   MRN:   [id number]     ATTENDING: Jayesh Escoto MD  PCP:  Carlin Sky MD    Date/Time:  2021 12:51 PM      Subjective:     Patient seen in the room. She was dialyzed this morning with 2.5 L fluid removal.  Denies any further seizures. She is being discharged home today      Past Medical History:   Diagnosis Date    Anxiety disorder     Arthritis     Atherosclerosis of native arteries of the extremities with ulceration ([address] 2020    Atrial fibrillation ([address]     Cardiac pacemaker in situ     Cellulitis and abscess of trunk 2020    Depression, postpartum     Diabetes ([address]     Heart disease     Heartburn     Hx of long term use of blood thinners     Hypercholesterolemia     Hypertension     Hypothyroidism     Migraines     Peripheral venous insufficiency 2020    Seizures ([address]     Sleep disorder     Stroke [facility]     Varicose veins of lower extremity with inflammation 2020      Past Surgical History:   Procedure Laterality Date    HX BACK SURGERY      HX OTHER SURGICAL      leg surgery     HX OTHER SURGICAL      pacemaker monitor      Social History     Tobacco Use    Smoking status: Never Smoker    Smokeless tobacco: Never Used   Substance Use Topics    Alcohol use: Never      Family History   Problem Relation Age of Onset    Heart Disease Mother     Heart Disease Father     Diabetes Sister     Diabetes Brother        No Known Allergies   Prior to Admission medications    Medication Sig Start Date End Date Taking? Authorizing Provider   lacosamide (Vimpat) 100 mg tab tablet Take 2 Tablets by mouth two (2) times a day. Max Daily Amount: 400 mg. 21  Yes Anneliese Godfrey MD   omeprazole (PRILOSEC) 40 mg capsule Take 1 Cap by mouth daily for 90 days.  5/10/21 8/8/21 Yes Carlin Sky MD   ondansetron hcl [facility]) 8 mg tablet Take 1 Tab by mouth every eight (8) hours as needed for Nausea or Vomiting for up to 60 days. 5/10/21 7/9/21 Yes Anthony Gautam MD   midodrine (PROAMATINE) 5 mg tablet TAKE 1 TABLET BY MOUTH THREE TIMES DAILY FOR 90 DAYS 21  Yes Anthony Gautam MD   metoprolol tartrate (LOPRESSOR) 25 mg tablet TAKE 1 & 1/2 (ONE & ONE-HALF) TABLETS BY MOUTH TWICE DAILY FOR 30 DAYS 21  Yes Anthony Gautam MD   apixaban (ELIQUIS) 2.5 mg tablet Take 1 Tab by mouth two (2) times a day for 180 days. Indications: treatment to prevent blood clots in chronic atrial fibrillation 4/8/21 10/5/21 Yes Anthony Gautam MD   levETIRAcetam (Keppra) 1,000 mg tablet Take 1,000 mg by mouth two (2) times a day. Yes Provider, Historical   FLUoxetine (PROzac) 10 mg tablet Take 1 tablet by mouth once daily 3/26/21  Yes Anthony Gautam MD   levothyroxine (SYNTHROID) 150 mcg tablet Take 1 tablet by mouth once daily 3/26/21  Yes Anthony Gautam MD   atorvastatin (LIPITOR) 40 mg tablet Take 1 tablet by mouth once daily for 90 days 21 Yes Anthony Gautam MD       REVIEW OF SYSTEMS:       Constitutional: Negative for chills and fever. HENT: Negative. Eyes: Negative. Respiratory: Negative. Cardiovascular: Negative. Gastrointestinal: Negative for abdominal pain and nausea. Skin: Negative. Neurological: Negative. Objective:   VITALS:    Visit Vitals  /69   Pulse 60   Temp 98.2 °F (36.8 °C) (Oral)   Resp 16   Ht 5' 4\" (1.626 m)   Wt 63.5 kg (140 lb)   SpO2 97%   Breastfeeding No   BMI 24.03 kg/m²     Temp (24hrs), Av.2 °F (36.8 °C), Min:97.9 °F (36.6 °C), Max:98.3 °F (36.8 °C)      PHYSICAL EXAM:     General: NAD, alert, cooperative  Eyes: sclera anicteric  Oral Cavity: No thrush or ulcers  Neck: no JVD  Chest: Fair bilateral air entry. No Wheezing or Rhonchi. No rales.   Heart: normal sounds  Abdomen: soft and non tender   : no gee  Upper extremities: AV fistula left upper arm with good bruit and thrill  Lower Extremities: no edema  Skin: no rash  Neuro: intact, no focals  Psychiatric: non-depressed          LAB DATA REVIEWED:    Recent Results (from the past 24 hour(s))   GLUCOSE, POC    Collection Time: 05/20/21  8:05 PM   Result Value Ref Range    Glucose (POC) 98 65 - 117 mg/dL    Performed by Alex Cerna        Recommendations/Plan:     #1 ESRD  -On hemodialysis every MWF at [address] [facility] dialyzed on 5/19 outpatient  -He was dialyzed this morning with 2K bath with 2.5 L fluid removal  -We will continue MWF dialysis while she is here-AV fistula is current access which has good bruit and thrill    #2 hypertension  -Blood pressure low normal.  She is on metoprolol low-dose for A. fib. Also on midodrine 5 mg 3 times daily which I will increase to 10 mg 3 times daily    #3 Breakthrough seizure activity  -Patient has history of seizures. Was on Keppra 1 g twice daily and lacosamide 200 mg  -Has been evaluated by neurology. -CT head does not show any new abnormalities  -Working with PT/OT    #4 anemia  -Hemoglobin is 10.6. Continue Procrit with dialysis    #5 renal osteodystrophy  -Calcium is okay. Check phosphorus with next labs. She was not noted to be on any binders.   Obtain PTH from outpatient  ________________________________________________________________________  Signed: Bianca Calix MD

## 2021-05-21 NOTE — DISCHARGE INSTRUCTIONS
Patient Education        Seizure: Care Instructions  Your Care Instructions     Seizures are caused by abnormal patterns of electrical signals in the brain. They are different for each person. Seizures can affect movement, speech, vision, or awareness. Some people have only slight shaking of a hand and do not pass out. Other people may pass out and have violent shaking of the whole body. Some people appear to stare into space. They are awake, but they can't respond normally. Later, they may not remember what happened. You may need tests to identify the type and cause of the seizures. A seizure may occur only once, or you may have them more than one time. Taking medicines as directed and following up with your doctor may help keep you from having more seizures. The doctor has checked you carefully, but problems can develop later. If you notice any problems or new symptoms, get medical treatment right away. Follow-up care is a key part of your treatment and safety. Be sure to make and go to all appointments, and call your doctor if you are having problems. It's also a good idea to know your test results and keep a list of the medicines you take. How can you care for yourself at home? · Be safe with medicines. Take your medicines exactly as prescribed. Call your doctor if you think you are having a problem with your medicine. · Do not do any activity that could be dangerous to you or others until your doctor says it is safe to do so. For example, do not drive a car, operate machinery, swim, or climb ladders. · Be sure that anyone treating you for any health problem knows that you have had a seizure and what medicines you are taking for it. · Identify and avoid things that may make you more likely to have a seizure. These may include lack of sleep, alcohol or drug use, stress, or not eating. · Make sure you go to your follow-up appointment. When should you call for help?    Call 911 anytime you think you may need emergency care. For example, call if:    · You have another seizure.     · You have new symptoms, such as trouble walking, speaking, or thinking clearly. Call your doctor now or seek immediate medical care if:    · You are not acting normally. Watch closely for changes in your health, and be sure to contact your doctor if you have any problems. Where can you learn more? Go to http://www.gray.com/  Enter M769 in the search box to learn more about \"Seizure: Care Instructions. \"  Current as of: August 4, 2020               Content Version: 12.8  © 2006-2021 [employer]. Care instructions adapted under license by [employer] (which disclaims liability or warranty for this information). If you have questions about a medical condition or this instruction, always ask your healthcare professional. [address] any warranty or liability for your use of this information.        INSTRUCTIONS ON DISCHARGE    (1) the only medication change is LACOSAMIDE which is increased to 200mg twice a day    (2) F/u with neurology in 1 week

## 2021-05-21 NOTE — DISCHARGE SUMMARY
Physician Discharge Summary     Patient ID:    Solomon Luo  [id number]  80 y.o.  1950    Admit date: 5/19/2021    Discharge date : 5/21/2021    Chronic Diagnoses:    Problem List as of 5/21/2021 Date Reviewed: 5/10/2021        Codes Class Noted - Resolved    Seizure ([facility] ICD-10-CM: R56.9  ICD-9-CM: 780.39  5/19/2021 - Present        Hyperkalemia ICD-10-CM: E87.5  ICD-9-CM: 276.7  10/16/2020 - Present        Atherosclerosis of native arteries of the extremities with ulceration ([facility] ICD-10-CM: I70.25  ICD-9-CM: 440.23, 707.9  8/31/2020 - Present        Cellulitis and abscess of trunk ICD-10-CM: L03.319, L02.219  ICD-9-CM: 682.2  8/31/2020 - Present        Disorder due to well controlled type 2 diabetes mellitus ([facility] ICD-10-CM: E11.8  ICD-9-CM: 250.90  8/31/2020 - Present        Peripheral venous insufficiency ICD-10-CM: I87.2  ICD-9-CM: 459.81  8/31/2020 - Present        Varicose veins of lower extremity with inflammation ICD-10-CM: I83.10  ICD-9-CM: 454.1  8/31/2020 - Present        Peripheral vascular disease ([facility] ICD-10-CM: I73.9  ICD-9-CM: 443.9  7/3/2020 - Present        Type II diabetes mellitus ([facility] ICD-10-CM: E11.9  ICD-9-CM: 250.00  7/3/2020 - Present        Long term (current) use of antibiotics ICD-10-CM: Z79.2  ICD-9-CM: V58.62  7/3/2020 - Present        Atrial fibrillation ([facility] ICD-10-CM: I48.91  ICD-9-CM: 427.31  7/3/2020 - Present        Cardiac pacemaker in situ ICD-10-CM: Z95.0  ICD-9-CM: V45.01  7/3/2020 - Present        Carpal tunnel syndrome of right wrist ICD-10-CM: G56.01  ICD-9-CM: 354.0  7/3/2020 - Present        Chest wall pain ICD-10-CM: R07.89  ICD-9-CM: 786.52  7/3/2020 - Present        Chronic diarrhea ICD-10-CM: K52.9  ICD-9-CM: 787.91  7/3/2020 - Present        Chronic neck pain ICD-10-CM: M54.2, G89.29  ICD-9-CM: 723.1, 338.29  7/3/2020 - Present        End stage renal failure on dialysis [facility]) ICD-10-CM: N18.6, Z99.2  ICD-9-CM: 585.6, V45.11  7/3/2020 - Present        History of CVA (cerebrovascular accident) ICD-10-CM: Z86.73  ICD-9-CM: V12.54  7/3/2020 - Present        Hyponatremia ICD-10-CM: E87.1  ICD-9-CM: 276.1  7/3/2020 - Present        Acquired hypothyroidism ICD-10-CM: E03.9  ICD-9-CM: 244.9  7/3/2020 - Present        Seizure disorder [facility]) ICD-10-CM: G40.909  ICD-9-CM: 345.90  7/3/2020 - Present        Recurrent falls ICD-10-CM: R29.6  ICD-9-CM: V15.88  7/3/2020 - Present          22    Final Diagnoses:   Seizure ([facility] [R56.9]    Reason for Hospitalization:    (1) Seizure disorder: canrt get MRI d/t PPM, on keppra 1g BID and lacosamide 200 BID, PRN lorazepam, PT OT     (2) Functional paraplegia: complicates #1.      (3) Hyponatremia : medication induced stable     (4) YUNI: unsure if its CKD.      (5) Hypothyroidism: levothyroxine     (6) SSS: eliquis metoprolol and midodrine.     (7) GERD: PPI     (8) Depression: fluoxetine     (9) ESRD on HD:     Hospital Course:   71F, H/o seizures, SSS on eliquis and PPM with seizure like activity complicated by YUNI     Her condition is complicated by right sided weakness likely seizure related    Workup done as follows:  CTH WO  NAICA  Remote left occipital encephalomalacia likely reflecting prior insult  Remote left basal ganglier infarct  GCA     CTA Head/Neck  No occlusions, dissections, significant stenosis, pseudoaneurysms or aneurysms in the neurovascular system  Small infundibulum in the communicating left ICA    PT recommended  PT.      INSTRUCTIONS ON DISCHARGE    (1) the only medication change is LACOSAMIDE which is increased to 200mg twice a day    (2) F/u with neurology in 1 week          Discharge Medications:   Current Discharge Medication List      CONTINUE these medications which have CHANGED    Details   lacosamide (Vimpat) 100 mg tab tablet Take 2 Tablets by mouth two (2) times a day. Max Daily Amount: 400 mg.   Qty: 60 Tablet, Refills: 2  Start date: 5/21/2021    Associated Diagnoses: Seizure ([facility] CONTINUE these medications which have NOT CHANGED    Details   omeprazole (PRILOSEC) 40 mg capsule Take 1 Cap by mouth daily for 90 days. Qty: 90 Cap, Refills: 0    Associated Diagnoses: Dyspepsia      ondansetron hcl (ZOFRAN) 8 mg tablet Take 1 Tab by mouth every eight (8) hours as needed for Nausea or Vomiting for up to 60 days. Qty: 90 Tab, Refills: 1    Associated Diagnoses: Nausea and vomiting, intractability of vomiting not specified, unspecified vomiting type      midodrine (PROAMATINE) 5 mg tablet TAKE 1 TABLET BY MOUTH THREE TIMES DAILY FOR 90 DAYS  Qty: 270 Tab, Refills: 0      metoprolol tartrate (LOPRESSOR) 25 mg tablet TAKE 1 & 1/2 (ONE & ONE-HALF) TABLETS BY MOUTH TWICE DAILY FOR 30 DAYS  Qty: 90 Tab, Refills: 0      apixaban (ELIQUIS) 2.5 mg tablet Take 1 Tab by mouth two (2) times a day for 180 days. Indications: treatment to prevent blood clots in chronic atrial fibrillation  Qty: 60 Tab, Refills: 5    Comments: To stop warfarin. Please let us know if PA is required. Thank you. Associated Diagnoses: Paroxysmal atrial fibrillation (HCC)      levETIRAcetam (Keppra) 1,000 mg tablet Take 1,000 mg by mouth two (2) times a day. FLUoxetine (PROzac) 10 mg tablet Take 1 tablet by mouth once daily  Qty: 90 Tab, Refills: 0      levothyroxine (SYNTHROID) 150 mcg tablet Take 1 tablet by mouth once daily  Qty: 90 Tab, Refills: 0      atorvastatin (LIPITOR) 40 mg tablet Take 1 tablet by mouth once daily for 90 days  Qty: 90 Tab, Refills: 2               Follow up Care:    1. Carlin Sky MD in 1-2 weeks. Please call to set up an appointment shortly after discharge. Diet: cardiac and renal    Disposition:   PT    Advanced Directive:   FULL x   DNR      Discharge Exam:  General: Alert and Oriented x 3. Cooperative and friendly. No acute distress. Nourished and well developed. Appears chronically ill.   Head/Eyes: Normocephalic, atraumatic, EOMI, PERRLA.   Nose/Mouth: Turbinates within normal limits, No drainage.  Mucous membranes are moist.  Throat and Neck: No masses, JVD, thyromegaly or lymphadenopathy appreciated. Cervical spine has good range of motion without pain. Lung: Clear to auscultation bilaterally without wheezes, rhonchi or crackles. Good air movement bilaterally. Symmetric chest rise with respirations. Heart: Regular rate and rhythm. Normal S1/S2. No appreciated murmurs, rubs or gallops. No lower extremity edema. Abdomen: Soft, non-tender, non-distended. Bowel sounds present in all four quadrants. No masses appreciated. Extremities:  Atraumatic. Able to move all extremities symmetrically. No bony protuberances appreciated. Skin: Clean, dry and intact without appreciated lesions. Right upper chest PM site is clean, dry and intact. Right upper AV fistula site (inner arm) has palpable thrill. Neurologic: Alert and oriented x3.  Cranial nerves II through XII are intact.  5 out of 5 strength in left upper extremity.  Right upper extremity with 3 out of 5 strength, involuntary movements with extension.  Ms. Shama Vargas can move bilateral lower extremities. Lizabeth Silva has 4 out of 5 strength in bilateral lower extremities.  Intact sensation.  Speech is clear and facial features are symmetric. Psychiatric: Flat affect, normal thought process, good eye contact. CONSULTATIONS:neurology renal    Significant Diagnostic Studies:     Radiologic Studies -   Results from Hospital Encounter encounter on 10/16/20    XR CHEST PA LAT    Narrative  Chest, 2 views, 10/16/2020    History: Weakness. Comparison: Including chest 3/16/2020. Findings: Right-sided pacemaker device is placed at the right atrium and right  ventricle. The cardiac silhouette is enlarged. The lungs are underexpanded. There is prominence of the pulmonary vasculature without hydrostatic edema. No  focal consolidation, pleural effusions or pneumothorax is identified.   Degenerative changes are present in the thoracic spine. Suture line is seen at  GE junction. Impression  Impression: No acute pulmonary process. CT Results  (Last 48 hours)               05/19/21 1912  CTA CODE NEURO HEAD AND NECK W CONT Final result    Impression:  No occlusion or high-grade stenosis of the major cervical or intracranial   arterial vasculature. Please note that degrees of carotid stenosis are measured in accordance with   NASCET criteria. Findings were discussed with Dr. Jefe Morgan by Dr. Ericka Live at 8:09 PM on   5/19/2021 via phone conversation. Narrative:  Study: Head and Neck CTA       Clinical Indication: Code stroke. Comparison: Head CT performed earlier the same day. Technique: Routine unenhanced axial acquisition of the neck was performed. Subsequently, contiguous thin section axial acquisition of the head and neck was   performed in the arterial phase from the thoracic inlet to the skull vertex   following the intravenous administration of 100 mL Isovue-370. Coronal,   sagittal, and MIP reconstructions were generated and reviewed. Dose reduction:   All CT scans at this facility are performed using dose reduction optimization   techniques as appropriate to a performed exam including the following-automated   exposure control, adjustments of mA and/or Kv according to patient size, or use   of iterative reconstructive technique. Findings:       Neck CTA:   The aortic arch and great vessel origins are unremarkable. The common carotid arteries are patent without high-grade stenosis. Calcified   plaque in the carotid bulbs without significant stenosis. The cervical internal   carotid arteries are tortuous but patent without high-grade stenosis based on   NASCET criteria. The external carotid arteries are unremarkable. The cervical vertebral arteries are patent without high-grade stenosis. The paraspinal soft tissues are unremarkable. The lung apices are clear. Multilevel cervical spondylosis. Partially visualized right subclavian approach   pacemaker. Head CTA:   The intracranial internal carotid arteries are patent without high-grade   stenosis. The anterior cerebral arteries are patent without high-grade stenosis. The middle cerebral arteries are patent without high-grade stenosis. The   anterior and bilateral posterior communicating arteries are visualized. The intracranial vertebral arteries are patent without high-grade stenosis. The   left vertebral artery is dominant. The basilar artery is patent without   high-grade stenosis. Hypoplastic right P1 segment with fetal type origin of the   right posterior cerebral artery, a normal anatomic variant. Otherwise the   posterior cerebral arteries are patent without high-grade stenosis. A 2 x 1 mm inferiorly projecting outpouching arising from the communicating   segment of the left internal carotid artery appears to represent an infundibular   origin of the posterior communicating artery. No evidence of an aneurysm or high   flow vascular malformation. 05/19/21 1907  CT CODE NEURO HEAD WO CONTRAST Final result    Impression:  No acute intracranial abnormality. Chronic findings as above. Findings were discussed with Dr. Lisa Calhoun by Dr. Dorina Robert at 7:18 PM 5/19/2021   via phone conversation. Narrative:  Study: Head CT without contrast.       Clinical Indication: Code stroke. Comparison: Head CT dated 1/13/2020. Technique: Routine volume acquisition of the head was performed without contrast   using soft tissue and bone kernels. Coronal and sagittal reconstructions were   generated and reviewed.  Dose reduction: All CT scans at this facility are   performed using dose reduction optimization techniques as appropriate to a   performed exam including the following-automated exposure control, adjustments   of mA and/or Kv according to patient size, or use of iterative reconstructive   technique. Findings:       Mild generalized volume loss with commensurate prominence of ventricles and   sulci, unchanged. No midline shift. The basal cisterns are patent. No acute hemorrhage, abnormal   extra-axial fluid, or evidence of large territorial ischemia. Unchanged region   of left occipital lobe encephalomalacia. Chronic lacunar infarct of the left   basal ganglia. Unremarkable orbits. The included paranasal sinuses and mastoid air cells are   clear. No evidence of an aggressive calvarial or extracalvarial lesion.                          Discharge time spent 35 minutes    Signed:  Cyndy Grant MD  5/21/2021  10:55 AM

## 2021-05-24 LAB
GLUCOSE BLD STRIP.AUTO-MCNC: 105 MG/DL (ref 65–117)
GLUCOSE BLD STRIP.AUTO-MCNC: 122 MG/DL (ref 65–117)
GLUCOSE BLD STRIP.AUTO-MCNC: 70 MG/DL (ref 65–117)
GLUCOSE BLD STRIP.AUTO-MCNC: 78 MG/DL (ref 65–117)
LACOSAMIDE SERPL-MCNC: 8.6 UG/ML (ref 5–10)
LEVETIRACETAM SERPL-MCNC: 38.5 UG/ML (ref 10–40)
PERFORMED BY, TECHID: ABNORMAL
PERFORMED BY, TECHID: NORMAL

## 2021-06-01 NOTE — PROGRESS NOTES
Physician Progress Note      PATIENT:               Amada Mcdaniels  CSN #:                  [id number]  :                       1950  ADMIT DATE:       2021 6:52 PM  [address] DATE:        2021 6:21 PM  RESPONDING  PROVIDER #:        Yamilka Zhang MD          QUERY TEXT:    Pt admitted with NEW ONSET WEAKNESS/ SEIZURES . Pt noted to have prior CVA. If possible, please document in progress notes and discharge summary if you are evaluating and/or treating any of the following: The medical record reflects the following:  Risk Factors: HX OF CVA, SEIZURES, CAD, AFIB, DM II, DEPRESSION, CKD  Clinical Indicators:  NEUROLOGY PN: \"Etiology of patient's right-sided weakness and confusion likely related to left occipital encephalomalacia from patient's prior stroke associated seizure activity related to this region. \"  Treatment: NEUROLOGY CONSULTED, CT OF HEAD, NEURO CHECKS, STROKE PROPHYLAXIS, LAB MONITORING. Thank you,  MIKE Ma, RN, CDI Specialist  [phone] or [email or web address]  Options provided:  -- Right sided weakness is a sequela of prior CVA  -- Right sided weakness is not a sequela of prior CVA  -- Other - I will add my own diagnosis  -- Disagree - Not applicable / Not valid  -- Disagree - Clinically unable to determine / Unknown  -- Refer to Clinical Documentation Reviewer    PROVIDER RESPONSE TEXT:    Right sided weakness is a sequela of prior CVA.     Query created by: Scott Reid on 2021 1:31 PM      Electronically signed by:  Yamilka Zhang MD 2021 4:12 PM

## 2021-06-07 RX ORDER — LEVOTHYROXINE SODIUM 150 UG/1
TABLET ORAL
Qty: 90 TABLET | Refills: 0 | Status: SHIPPED | OUTPATIENT
Start: 2021-06-07 | End: 2021-10-13

## 2021-06-11 ENCOUNTER — TELEPHONE (OUTPATIENT)
Dept: INTERNAL MEDICINE CLINIC | Age: 71
End: 2021-06-11

## 2021-06-11 ENCOUNTER — VIRTUAL VISIT (OUTPATIENT)
Dept: INTERNAL MEDICINE CLINIC | Age: 71
End: 2021-06-11
Payer: MEDICARE

## 2021-06-11 DIAGNOSIS — N18.6 END STAGE RENAL FAILURE ON DIALYSIS (HCC): ICD-10-CM

## 2021-06-11 DIAGNOSIS — G40.909 SEIZURE DISORDER (HCC): ICD-10-CM

## 2021-06-11 DIAGNOSIS — Z99.2 END STAGE RENAL FAILURE ON DIALYSIS (HCC): ICD-10-CM

## 2021-06-11 DIAGNOSIS — I73.9 PERIPHERAL VASCULAR DISEASE (HCC): ICD-10-CM

## 2021-06-11 DIAGNOSIS — I49.5 SSS (SICK SINUS SYNDROME) (HCC): ICD-10-CM

## 2021-06-11 DIAGNOSIS — Z09 HOSPITAL DISCHARGE FOLLOW-UP: ICD-10-CM

## 2021-06-11 DIAGNOSIS — I48.0 PAROXYSMAL ATRIAL FIBRILLATION (HCC): ICD-10-CM

## 2021-06-11 DIAGNOSIS — Z95.0 CARDIAC PACEMAKER IN SITU: ICD-10-CM

## 2021-06-11 DIAGNOSIS — E16.2 HYPOGLYCEMIA: Primary | ICD-10-CM

## 2021-06-11 DIAGNOSIS — G40.919 BREAKTHROUGH SEIZURE (HCC): ICD-10-CM

## 2021-06-11 DIAGNOSIS — E03.9 ACQUIRED HYPOTHYROIDISM: ICD-10-CM

## 2021-06-11 DIAGNOSIS — Z86.73 HISTORY OF CVA (CEREBROVASCULAR ACCIDENT): ICD-10-CM

## 2021-06-11 PROBLEM — E11.8 DISORDER DUE TO WELL CONTROLLED TYPE 2 DIABETES MELLITUS (HCC): Status: RESOLVED | Noted: 2020-08-31 | Resolved: 2021-06-11

## 2021-06-11 PROCEDURE — 1090F PRES/ABSN URINE INCON ASSESS: CPT | Performed by: INTERNAL MEDICINE

## 2021-06-11 PROCEDURE — 1101F PT FALLS ASSESS-DOCD LE1/YR: CPT | Performed by: INTERNAL MEDICINE

## 2021-06-11 PROCEDURE — G8432 DEP SCR NOT DOC, RNG: HCPCS | Performed by: INTERNAL MEDICINE

## 2021-06-11 PROCEDURE — G8427 DOCREV CUR MEDS BY ELIG CLIN: HCPCS | Performed by: INTERNAL MEDICINE

## 2021-06-11 PROCEDURE — 3017F COLORECTAL CA SCREEN DOC REV: CPT | Performed by: INTERNAL MEDICINE

## 2021-06-11 PROCEDURE — 1111F DSCHRG MED/CURRENT MED MERGE: CPT | Performed by: INTERNAL MEDICINE

## 2021-06-11 PROCEDURE — G8400 PT W/DXA NO RESULTS DOC: HCPCS | Performed by: INTERNAL MEDICINE

## 2021-06-11 PROCEDURE — 99214 OFFICE O/P EST MOD 30 MIN: CPT | Performed by: INTERNAL MEDICINE

## 2021-06-11 RX ORDER — SEVELAMER CARBONATE 800 MG/1
TABLET, FILM COATED ORAL
COMMUNITY
Start: 2021-05-10

## 2021-06-11 RX ORDER — LACOSAMIDE 150 MG/1
150 TABLET, FILM COATED ORAL 2 TIMES DAILY
COMMUNITY
Start: 2021-06-05 | End: 2021-12-03

## 2021-06-11 RX ORDER — LEVETIRACETAM 500 MG/1
TABLET, EXTENDED RELEASE ORAL
COMMUNITY
Start: 2021-06-01 | End: 2022-02-22

## 2021-06-11 NOTE — PROGRESS NOTES
Jolan Moritz (: 1950) is a 80 y.o. female, established patient, here for evaluation of the following chief complaint(s):   Hospital Follow Up (Seizures, already f/u with neurologist per daughter), Epilepsy, and Diabetes (Daughter reports nonfasting blood sugar in the 90 range)       ASSESSMENT/PLAN:  Below is the assessment and plan developed based on review of pertinent labs, studies, and medications. She is not on 200 mg bid vimpat but 150 mg bid, changed by Neuro, updated in her med list. Continues long acting keppra now 1000 mg BID, so far no new seizures since discharge, actively following up with Neurology   Has been having episodes of hypoglycemia, symptomatic as reported despite being off any Diabetes treatment, this is not normal and is concerning this could cause breakthrough seizures. Start workup as below. Recommended her to eat and drink fluids (juices) every 3 hours in the mean time. TSH was checked at the hospital  wnl. Continues 150 mg levothyroxine   ESRD on HD under the care of Nephro Dr. Jenna Steven. Continues Eliquis for PAF/SSS, no bleeding, discussed about fall prevention     1. Hypoglycemia  -     CORTISOL, AM  -     INSULIN  -     INSULIN, FREE & TOTAL  -     C-PEPTIDE  2. Hospital discharge follow-up  -     [place] DISCHARGE MEDS RECONCILED W/ CURRENT OUTPATIENT MED LIST  3. Acquired hypothyroidism  4. Breakthrough seizure ([address]  5. End stage renal failure on dialysis ([address]  6. Paroxysmal atrial fibrillation ([address]  7. Seizure disorder ([address]  8. History of CVA (cerebrovascular accident)  9. Cardiac pacemaker in situ  10. SSS (sick sinus syndrome) ([address]  11. Peripheral vascular disease ([address]      No follow-ups on file. SUBJECTIVE/OBJECTIVE:  HPI  Cam Shields on camera with her primary caregiver her daughter in law Katarzyna Johnson. Cam Shields was hospitalized at [facility] from  to , notes and discharge summary reviewed.  She was seen by Neuro Dr. Lisa Jeffers, she was there for breakthrough seizures once again, her vimpat was increased to 200 mg BID, continues keppra 1000 mg BID. Was discharged on home PT. She has seen her Neurologist Dr. Satya Dhaliwal after that hospitalization already, who Loulou Sanchez says changed her to extended release Keppra 100 mg BID, but switched down to 150 mg BID, she says he said that if she develops further seizures they will increase keppra and not the vimpat. Lorraine Solis says insurance does not cover home PT and it's difficult for them to do outpatinet because of the HD 3 days a week. She tries to stand up for a little with walker, and can do max 5 steps, no falls. She has had multiple episodes of breakthrough seizures and there have been gradual adjustments to her treatment over several months. She has ESRD on HD her , is mostly wheechair bound after CVA in 2019, has PAF and SSS with PPM and on Eliquis, HTN, hypothyroidism, DM, the latter has resolved for several months with weight loss, she has been off hypoglicemiants for more than 1 year, last A1C from 5/20 4.8%. Glucose has been around 90s most of the times, but sometimes it drops down to 50 despite of being off medications for Diabetes for a long time and despite eating every couple of hours snacks or a normal meal. Loulou Sanchez says that if she does not eat or drink something for more than 5 hours. Review of Systems   Constitutional: Negative for chills, fatigue, fever and unexpected weight change. HENT: Negative for congestion, ear pain, sneezing and sore throat. Eyes: Negative for pain and discharge. Respiratory: Negative for cough, shortness of breath and wheezing. Cardiovascular: Negative for chest pain, palpitations and leg swelling. Gastrointestinal: Negative for abdominal pain, blood in stool, constipation and diarrhea. Endocrine: Negative for polydipsia and polyuria. Genitourinary: Negative for difficulty urinating, dysuria, frequency, hematuria and urgency.    Musculoskeletal: Negative for arthralgias, back pain and joint swelling. Skin: Negative for rash. Allergic/Immunologic: Negative for environmental allergies and food allergies. Neurological: Negative for dizziness, speech difficulty, weakness, light-headedness, numbness and headaches. Hematological: Negative for adenopathy. Psychiatric/Behavioral: Negative for behavioral problems (Depression), sleep disturbance and suicidal ideas. Patient-Reported Vitals 6/11/2021   Patient-Reported Weight -   Patient-Reported Pulse 72   Patient-Reported SpO2 99   Patient-Reported Systolic  966   Patient-Reported Diastolic 70       Physical Exam  Vitals and nursing note reviewed. Constitutional:       Appearance: Normal appearance. HENT:      Head: Normocephalic and atraumatic. Eyes:      General: No scleral icterus. Conjunctiva/sclera: Conjunctivae normal.      Pupils: Pupils are equal, round, and reactive to light. Skin:     Coloration: Skin is not jaundiced or pale. Findings: No rash. Neurological:      Mental Status: She is alert and oriented to person, place, and time. Psychiatric:         Mood and Affect: Mood normal.         Behavior: Behavior normal.         Thought Content: Thought content normal.         Judgment: Judgment normal.           Bin Currie, was evaluated through a synchronous (real-time) audio-video encounter. The patient (or guardian if applicable) is aware that this is a billable service. Verbal consent to proceed has been obtained within the past 12 months. The visit was conducted pursuant to the emergency declaration under the [facility], [address] [place] [address] authority and the [employer] Act. Patient identification was verified, and a caregiver was present when appropriate. The patient was located in a state where the provider was credentialed to provide care. An electronic signature was used to authenticate this note.   -- Quintin Perdomo Marciana Sacks, MD

## 2021-06-11 NOTE — PROGRESS NOTES
Chief Complaint   Patient presents with   [facility] Follow Up     Seizures, already f/u with neurologist per daughter    Epilepsy    Diabetes     Daughter reports nonfasting blood sugar in the 90 range     1. Have you been to the ER, urgent care clinic since your last visit? Hospitalized since your last visit? Yes When: 5/19/2021-5/21/2021 Where: [facility] Reason for visit: Seizures    2. Have you seen or consulted any other health care providers outside of the [address] [place] [address] since your last visit? Include any pap smears or colon screening.  No

## 2021-06-30 ENCOUNTER — TELEPHONE (OUTPATIENT)
Dept: INTERNAL MEDICINE CLINIC | Age: 71
End: 2021-06-30

## 2021-06-30 NOTE — TELEPHONE ENCOUNTER
Pt's daughter called and said that she has a productive cough and is coughing up yellow colored sputum. She wanted her to have an appointment but we have none available. She said that she is not running a fever but she thinks that she has bronchitis. I told her daughter to take her to urgent care to be evaluated.

## 2021-07-11 RX ORDER — FLUOXETINE 10 MG/1
TABLET ORAL
Qty: 90 TABLET | Refills: 0 | Status: SHIPPED | OUTPATIENT
Start: 2021-07-11 | End: 2021-10-13

## 2021-08-06 RX ORDER — METOPROLOL TARTRATE 25 MG/1
37.5 TABLET, FILM COATED ORAL 2 TIMES DAILY
Qty: 270 TABLET | Refills: 1 | Status: SHIPPED | OUTPATIENT
Start: 2021-08-06 | End: 2021-08-11

## 2021-08-11 RX ORDER — METOPROLOL TARTRATE 25 MG/1
TABLET, FILM COATED ORAL
Qty: 90 TABLET | Refills: 2 | Status: SHIPPED | OUTPATIENT
Start: 2021-08-11 | End: 2021-12-03

## 2021-10-13 RX ORDER — FLUOXETINE 10 MG/1
TABLET ORAL
Qty: 90 TABLET | Refills: 0 | Status: SHIPPED | OUTPATIENT
Start: 2021-10-13 | End: 2021-10-17

## 2021-10-13 RX ORDER — LEVOTHYROXINE SODIUM 150 UG/1
TABLET ORAL
Qty: 90 TABLET | Refills: 0 | Status: SHIPPED | OUTPATIENT
Start: 2021-10-13 | End: 2022-04-13

## 2021-10-17 RX ORDER — LOPERAMIDE HYDROCHLORIDE 2 MG/1
CAPSULE ORAL
Qty: 180 CAPSULE | Refills: 0 | Status: SHIPPED | OUTPATIENT
Start: 2021-10-17 | End: 2022-02-22

## 2021-10-17 RX ORDER — FLUOXETINE 10 MG/1
TABLET ORAL
Qty: 90 TABLET | Refills: 0 | Status: SHIPPED | OUTPATIENT
Start: 2021-10-17 | End: 2021-11-05

## 2021-10-18 ENCOUNTER — TRANSCRIBE ORDER (OUTPATIENT)
Dept: SCHEDULING | Age: 71
End: 2021-10-18

## 2021-10-18 DIAGNOSIS — R07.9 CHEST PAIN, UNSPECIFIED: Primary | ICD-10-CM

## 2021-11-02 ENCOUNTER — TELEPHONE (OUTPATIENT)
Dept: INTERNAL MEDICINE CLINIC | Age: 71
End: 2021-11-02

## 2021-11-02 ENCOUNTER — HOSPITAL ENCOUNTER (OUTPATIENT)
Dept: NON INVASIVE DIAGNOSTICS | Age: 71
Discharge: HOME | End: 2021-11-02
Attending: INTERNAL MEDICINE
Payer: MEDICARE

## 2021-11-02 DIAGNOSIS — R07.9 CHEST PAIN, UNSPECIFIED: ICD-10-CM

## 2021-11-02 DIAGNOSIS — F41.1 GAD (GENERALIZED ANXIETY DISORDER): Primary | ICD-10-CM

## 2021-11-02 LAB
ECHO AO ASC DIAM: 3 CM
ECHO AO ROOT DIAM: 2.7 CM
ECHO AV AREA PEAK VELOCITY: 2.2 CM2
ECHO AV AREA VTI: 2.14 CM2
ECHO AV MEAN GRADIENT: 6 MMHG
ECHO AV MEAN VELOCITY: 104 CM/S
ECHO AV PEAK GRADIENT: 13 MMHG
ECHO AV PEAK VELOCITY: 178 CM/S
ECHO AV VTI: 39 CM
ECHO EST RA PRESSURE: 10 MMHG
ECHO LA AREA 2C: 21.1 CM2
ECHO LA AREA 4C: 26.6 CM2
ECHO LA MAJOR AXIS: 4.6 CM
ECHO LV E' SEPTAL VELOCITY: 5.43 CM/S
ECHO LV EDV A2C: 27 CM3
ECHO LV EDV A4C: 60 CM3
ECHO LV ESV A2C: 23.6 CM3
ECHO LV ESV A4C: 29 CM3
ECHO LV INTERNAL DIMENSION DIASTOLIC: 3.78 CM (ref 3.9–5.3)
ECHO LV INTERNAL DIMENSION SYSTOLIC: 2.87 CM
ECHO LV IVSD: 1.05 CM (ref 0.6–0.9)
ECHO LV MASS 2D: 132.7 G (ref 67–162)
ECHO LV POSTERIOR WALL DIASTOLIC: 1.14 CM (ref 0.6–0.9)
ECHO LVOT DIAM: 2.1 CM
ECHO LVOT PEAK GRADIENT: 5 MMHG
ECHO LVOT PEAK VELOCITY: 113 CM/S
ECHO LVOT SV: 83 CM3
ECHO LVOT VTI: 24.1 CM
ECHO MV A VELOCITY: 47 CM/S
ECHO MV AREA PHT: 7.1 CM2
ECHO MV E VELOCITY: 158 CM/S
ECHO MV E/A RATIO: 3.36
ECHO MV E/E' SEPTAL: 29.1
ECHO MV MAX VELOCITY: 171 CM/S
ECHO MV MEAN GRADIENT: 3 MMHG
ECHO MV PEAK GRADIENT: 12 MMHG
ECHO MV PRESSURE HALF TIME (PHT): 31 MS
ECHO MV VTI: 40.9 CM
ECHO PV MAX VELOCITY: 86.9 CM/S
ECHO PV MEAN GRADIENT: 1 MMHG
ECHO PV PEAK INSTANTANEOUS GRADIENT SYSTOLIC: 3 MMHG
ECHO PV VTI: 18.2 CM
ECHO RA AREA 4C: 12 CM2
ECHO RIGHT VENTRICULAR SYSTOLIC PRESSURE (RVSP): 99 MMHG
ECHO TV MAX VELOCITY: 471 CM/S
ECHO TV REGURGITANT PEAK GRADIENT: 89 MMHG
LA VOL DISK BP: 95 CM3 (ref 22–52)
LVOT MG: 3 MMHG
MV DEC SLOPE: ABNORMAL MM/S2
MV DEC SLOPE: ABNORMAL MM/S2

## 2021-11-02 PROCEDURE — 93306 TTE W/DOPPLER COMPLETE: CPT

## 2021-11-02 NOTE — TELEPHONE ENCOUNTER
Patients daughter in law called stating that the patient has been out of her FLUoxetine (PROzac) 10 mg tablet for the past three weeks and [employer] keeps telling them that it needs prior authorization. She stated that they are confused because its never needed prior auth before. Please check on this.

## 2021-11-05 RX ORDER — FLUOXETINE 10 MG/1
10 CAPSULE ORAL DAILY
Qty: 90 CAPSULE | Refills: 2 | Status: SHIPPED | OUTPATIENT
Start: 2021-11-05 | End: 2022-02-23 | Stop reason: SDUPTHER

## 2021-11-22 RX ORDER — ATORVASTATIN CALCIUM 40 MG/1
TABLET, FILM COATED ORAL
Qty: 90 TABLET | Refills: 3 | Status: SHIPPED | OUTPATIENT
Start: 2021-11-22 | End: 2022-05-09

## 2021-12-01 ENCOUNTER — HOSPITAL ENCOUNTER (EMERGENCY)
Age: 71
Discharge: HOME | End: 2021-12-01
Attending: STUDENT IN AN ORGANIZED HEALTH CARE EDUCATION/TRAINING PROGRAM
Payer: MEDICARE

## 2021-12-01 ENCOUNTER — APPOINTMENT (OUTPATIENT)
Dept: GENERAL RADIOLOGY | Age: 71
End: 2021-12-01
Attending: STUDENT IN AN ORGANIZED HEALTH CARE EDUCATION/TRAINING PROGRAM
Payer: MEDICARE

## 2021-12-01 ENCOUNTER — APPOINTMENT (OUTPATIENT)
Dept: CT IMAGING | Age: 71
End: 2021-12-01
Attending: STUDENT IN AN ORGANIZED HEALTH CARE EDUCATION/TRAINING PROGRAM
Payer: MEDICARE

## 2021-12-01 VITALS
WEIGHT: 145 LBS | DIASTOLIC BLOOD PRESSURE: 71 MMHG | OXYGEN SATURATION: 98 % | TEMPERATURE: 97.9 F | HEIGHT: 66 IN | HEART RATE: 60 BPM | RESPIRATION RATE: 19 BRPM | SYSTOLIC BLOOD PRESSURE: 137 MMHG | BODY MASS INDEX: 23.3 KG/M2

## 2021-12-01 DIAGNOSIS — R53.1 GENERALIZED WEAKNESS: Primary | ICD-10-CM

## 2021-12-01 DIAGNOSIS — R11.2 NON-INTRACTABLE VOMITING WITH NAUSEA, UNSPECIFIED VOMITING TYPE: ICD-10-CM

## 2021-12-01 LAB
ANION GAP SERPL CALC-SCNC: 8 MMOL/L (ref 5–15)
APPEARANCE UR: ABNORMAL
ATRIAL RATE: 129 BPM
BACTERIA URNS QL MICRO: NEGATIVE /HPF
BASOPHILS # BLD: 0 K/UL (ref 0–0.1)
BASOPHILS NFR BLD: 1 % (ref 0–1)
BILIRUB UR QL: NEGATIVE
BUN SERPL-MCNC: 61 MG/DL (ref 6–20)
BUN/CREAT SERPL: 11 (ref 12–20)
CA-I BLD-MCNC: 8.2 MG/DL (ref 8.5–10.1)
CALCULATED P AXIS, ECG09: 123 DEGREES
CALCULATED R AXIS, ECG10: -74 DEGREES
CALCULATED T AXIS, ECG11: 0 DEGREES
CHLORIDE SERPL-SCNC: 105 MMOL/L (ref 97–108)
CO2 SERPL-SCNC: 26 MMOL/L (ref 21–32)
COLOR UR: ABNORMAL
CREAT SERPL-MCNC: 5.69 MG/DL (ref 0.55–1.02)
DIAGNOSIS, 93000: NORMAL
DIFFERENTIAL METHOD BLD: ABNORMAL
EOSINOPHIL # BLD: 0 K/UL (ref 0–0.4)
EOSINOPHIL NFR BLD: 1 % (ref 0–7)
ERYTHROCYTE [DISTWIDTH] IN BLOOD BY AUTOMATED COUNT: 13.5 % (ref 11.5–14.5)
GLUCOSE SERPL-MCNC: 102 MG/DL (ref 65–100)
GLUCOSE UR STRIP.AUTO-MCNC: NEGATIVE MG/DL
HCT VFR BLD AUTO: 40.9 % (ref 35–47)
HGB BLD-MCNC: 12.3 G/DL (ref 11.5–16)
HGB UR QL STRIP: ABNORMAL
IMM GRANULOCYTES # BLD AUTO: 0 K/UL (ref 0–0.04)
IMM GRANULOCYTES NFR BLD AUTO: 1 % (ref 0–0.5)
KETONES UR QL STRIP.AUTO: NEGATIVE MG/DL
LEUKOCYTE ESTERASE UR QL STRIP.AUTO: ABNORMAL
LYMPHOCYTES # BLD: 1.1 K/UL (ref 0.8–3.5)
LYMPHOCYTES NFR BLD: 30 % (ref 12–49)
MCH RBC QN AUTO: 30.7 PG (ref 26–34)
MCHC RBC AUTO-ENTMCNC: 30.1 G/DL (ref 30–36.5)
MCV RBC AUTO: 102 FL (ref 80–99)
MONOCYTES # BLD: 0.4 K/UL (ref 0–1)
MONOCYTES NFR BLD: 10 % (ref 5–13)
MUCOUS THREADS URNS QL MICRO: ABNORMAL /LPF
NEUTS SEG # BLD: 2.2 K/UL (ref 1.8–8)
NEUTS SEG NFR BLD: 57 % (ref 32–75)
NITRITE UR QL STRIP.AUTO: NEGATIVE
NRBC # BLD: 0 K/UL (ref 0–0.01)
NRBC BLD-RTO: 0 PER 100 WBC
P-R INTERVAL, ECG05: 104 MS
PH UR STRIP: 5 [PH] (ref 5–8)
PLATELET # BLD AUTO: 69 K/UL (ref 150–400)
POTASSIUM SERPL-SCNC: 4.4 MMOL/L (ref 3.5–5.1)
PROT UR STRIP-MCNC: 30 MG/DL
Q-T INTERVAL, ECG07: 102 MS
QRS DURATION, ECG06: 106 MS
QTC CALCULATION (BEZET), ECG08: 176 MS
RBC # BLD AUTO: 4.01 M/UL (ref 3.8–5.2)
RBC #/AREA URNS HPF: ABNORMAL /HPF (ref 0–5)
SODIUM SERPL-SCNC: 139 MMOL/L (ref 136–145)
SP GR UR REFRACTOMETRY: 1.01 (ref 1–1.03)
UROBILINOGEN UR QL STRIP.AUTO: 0.1 EU/DL (ref 0.1–1)
VENTRICULAR RATE, ECG03: 179 BPM
WBC # BLD AUTO: 3.8 K/UL (ref 3.6–11)
WBC URNS QL MICRO: ABNORMAL /HPF (ref 0–4)

## 2021-12-01 PROCEDURE — 74011250636 HC RX REV CODE- 250/636: Performed by: STUDENT IN AN ORGANIZED HEALTH CARE EDUCATION/TRAINING PROGRAM

## 2021-12-01 PROCEDURE — 36415 COLL VENOUS BLD VENIPUNCTURE: CPT

## 2021-12-01 PROCEDURE — 81001 URINALYSIS AUTO W/SCOPE: CPT

## 2021-12-01 PROCEDURE — 70450 CT HEAD/BRAIN W/O DYE: CPT

## 2021-12-01 PROCEDURE — 85025 COMPLETE CBC W/AUTO DIFF WBC: CPT

## 2021-12-01 PROCEDURE — 93005 ELECTROCARDIOGRAM TRACING: CPT

## 2021-12-01 PROCEDURE — 71045 X-RAY EXAM CHEST 1 VIEW: CPT

## 2021-12-01 PROCEDURE — 96374 THER/PROPH/DIAG INJ IV PUSH: CPT

## 2021-12-01 PROCEDURE — 74176 CT ABD & PELVIS W/O CONTRAST: CPT

## 2021-12-01 PROCEDURE — 80048 BASIC METABOLIC PNL TOTAL CA: CPT

## 2021-12-01 PROCEDURE — 99285 EMERGENCY DEPT VISIT HI MDM: CPT

## 2021-12-01 RX ORDER — ONDANSETRON 4 MG/1
4 TABLET, ORALLY DISINTEGRATING ORAL
Qty: 20 TABLET | Refills: 0 | Status: SHIPPED | OUTPATIENT
Start: 2021-12-01 | End: 2022-02-24 | Stop reason: SDUPTHER

## 2021-12-01 RX ORDER — ONDANSETRON 2 MG/ML
4 INJECTION INTRAMUSCULAR; INTRAVENOUS
Status: COMPLETED | OUTPATIENT
Start: 2021-12-01 | End: 2021-12-01

## 2021-12-01 RX ADMIN — ONDANSETRON 4 MG: 2 INJECTION INTRAMUSCULAR; INTRAVENOUS at 13:25

## 2021-12-01 NOTE — DISCHARGE INSTRUCTIONS
Thank you! Thank you for allowing me to care for you in the emergency department. I sincerely hope that you are satisfied with your visit today. It is my goal to provide you with excellent care. Below you will find a list of your labs and imaging from your visit today. Should you have any questions regarding these results please do not hesitate to call the emergency department. Labs -     Recent Results (from the past 12 hour(s))   EKG, 12 LEAD, INITIAL    Collection Time: 12/01/21  7:21 AM   Result Value Ref Range    Ventricular Rate 179 BPM    Atrial Rate 129 BPM    P-R Interval 104 ms    QRS Duration 106 ms    Q-T Interval 102 ms    QTC Calculation (Bezet) 176 ms    Calculated P [place] 123 degrees    Calculated R Axis -74 degrees    Calculated T Axis 0 degrees    Diagnosis       Ventricular-paced rhythm  Abnormal ECG  When compared with ECG of 5/19/2021  Unclear reasons with V tracking, consider normal pacemaker algorithm vs   pacemaker failure  Confirmed by Mariano Calvo ([id number]) on 12/1/2021 10:21:07 AM     CBC WITH AUTOMATED DIFF    Collection Time: 12/01/21  7:50 AM   Result Value Ref Range    WBC 3.8 3.6 - 11.0 K/uL    RBC 4.01 3.80 - 5.20 M/uL    HGB 12.3 11.5 - 16.0 g/dL    HCT 40.9 35.0 - 47.0 %    .0 (H) 80.0 - 99.0 FL    MCH 30.7 26.0 - 34.0 PG    MCHC 30.1 30.0 - 36.5 g/dL    RDW 13.5 11.5 - 14.5 %    PLATELET 69 (L) 144 - 400 K/uL    NRBC 0.0 0.0  WBC    ABSOLUTE NRBC 0.00 0.00 - 0.01 K/uL    NEUTROPHILS 57 32 - 75 %    LYMPHOCYTES 30 12 - 49 %    MONOCYTES 10 5 - 13 %    EOSINOPHILS 1 0 - 7 %    BASOPHILS 1 0 - 1 %    IMMATURE GRANULOCYTES 1 (H) 0 - 0.5 %    ABS. NEUTROPHILS 2.2 1.8 - 8.0 K/UL    ABS. LYMPHOCYTES 1.1 0.8 - 3.5 K/UL    ABS. MONOCYTES 0.4 0.0 - 1.0 K/UL    ABS. EOSINOPHILS 0.0 0.0 - 0.4 K/UL    ABS. BASOPHILS 0.0 0.0 - 0.1 K/UL    ABS. IMM.  GRANS. 0.0 0.00 - 0.04 K/UL    DF AUTOMATED     METABOLIC PANEL, BASIC    Collection Time: 12/01/21  7:50 AM   Result Value Ref Range    Sodium 139 136 - 145 mmol/L    Potassium 4.4 3.5 - 5.1 mmol/L    Chloride 105 97 - 108 mmol/L    CO2 26 21 - 32 mmol/L    Anion gap 8 5 - 15 mmol/L    Glucose 102 (H) 65 - 100 mg/dL    BUN 61 (H) 6 - 20 mg/dL    Creatinine 5.69 (H) 0.55 - 1.02 mg/dL    BUN/Creatinine ratio 11 (L) 12 - 20      GFR est AA 9 (L) >60 ml/min/1.73m2    GFR est non-AA 7 (L) >60 ml/min/1.73m2    Calcium 8.2 (L) 8.5 - 10.1 mg/dL   URINALYSIS W/MICROSCOPIC    Collection Time: 12/01/21 10:59 AM   Result Value Ref Range    Color Yellow/Straw      Appearance Turbid (A) Clear      Specific gravity 1.015 1.003 - 1.030      pH (UA) 5.0 5.0 - 8.0      Protein 30 (A) Negative mg/dL    Glucose Negative Negative mg/dL    Ketone Negative Negative mg/dL    Bilirubin Negative Negative      Blood Small (A) Negative      Urobilinogen 0.1 0.1 - 1.0 EU/dL    Nitrites Negative Negative      Leukocyte Esterase Small (A) Negative      WBC 0-4 0 - 4 /hpf    RBC 0-5 0 - 5 /hpf    Bacteria Negative Negative /hpf    Mucus Trace /lpf       Radiologic Studies -   CT ABD PELV WO CONT   Final Result   1. Postoperative changes with no acute abdominal or pelvic findings. 2. Body wall edema and inflammatory changes in the perirectal and presacral   regions which may be related to volume overload. Recommend follow-up as   warranted. 3. Worsening degenerative changes of the spine. 4. Small hyperdense cysts associated with each kidney recommend follow-up as   clinically warranted. 5. Other findings as described. CT HEAD WO CONT   Final Result   1. No acute intracranial findings. 2. Old left posterior parietal ischemic changes. 3. Stable small vessel ischemic changes. XR CHEST PORT   Final Result   Shallow depth of inspiration, otherwise stable chest.        CT Results  (Last 48 hours)                 12/01/21 1410  CT ABD PELV WO CONT Final result    Impression:  1. Postoperative changes with no acute abdominal or pelvic findings.    2. Body wall edema and inflammatory changes in the perirectal and presacral   regions which may be related to volume overload. Recommend follow-up as   warranted. 3. Worsening degenerative changes of the spine. 4. Small hyperdense cysts associated with each kidney recommend follow-up as   clinically warranted. 5. Other findings as described. Narrative:  Axial images from the lung bases to the pubic symphysis were obtained without   contrast. Sagittal and coronal reformatted images were reviewed. All CT scans at   this facility are performed using dose reduction optimization techniques as   appropriate to a performed exam including the following:   automated exposure   control, adjustments of the mA and/or kV according to patient size, or use of   iterative reconstruction technique. Comparison with July 19, 2019. INDICATION: Vomiting. There are small bilateral pleural effusions. Extensive coronary artery   calcifications are noted. Pacer leads are seen in the right atrium and right   ventricle. Hypoventilatory changes are seen at the lung bases. Small hiatal   hernia. Postsurgical changes associated with the stomach and proximal small   intestine consistent with gastric bypass. No discrete mass lesions in the liver,   spleen, pancreas or left adrenal gland. There is a small right adrenal nodule   measuring 4 Hounsfield units consistent with benign adrenal adenoma. The   pancreas is atrophic. Small hyperdense cyst projects off the posterior aspect of   the left kidney. Small cyst in lateral right kidney. There is a 13 mm hyperdense   cyst in the lateral aspect of the right kidney which previous measured 12 mm. No   hydronephrosis or hydroureter. Abdominal aorta is atherosclerotic with no focal   aneurysm no retroperitoneal adenopathy. There is a 3.4 cm transverse diameter   fat-containing ventral hernia. There is a knuckle of transverse colon extending   into the defect.  There is a 2.1 cm transverse diameter fat-containing umbilical   hernia. Urinary bladder is unremarkable. Uterus is unremarkable. There are mild   inflammatory changes in the presacral and perirectal fat. No gross free fluid or   adenopathy is seen in the pelvis. There is extensive body wall edema. Bone   windows and reconstructed images shows an old right rib fracture. Degenerative   changes of the spine are noted, worsened compared to the previous study. Laminectomy of L4. Mild degenerative changes of the hips. ORIF of a left femoral   fracture with intramedullary erika, only partially visualized. 12/01/21 0919  CT HEAD WO CONT Final result    Impression:  1. No acute intracranial findings. 2. Old left posterior parietal ischemic changes. 3. Stable small vessel ischemic changes. Narrative:  Axial images from the skull base to the vertex were obtained without the use of   contrast. Bone windows were reviewed. Sagittal and coronal reformatted images   were also reviewed. All CT scans at this facility are performed using dose   reduction optimization techniques as appropriate to a performed exam including   the following:   automated exposure control, adjustments of the mA and/or kV   according to patient size, or use of iterative reconstruction technique. Comparison May 19, 2021. There is no evidence of hemorrhage. No mass or mass effect is seen. There is no   acute ischemia. Ventricles, sulci and cisterns are intact. Old ischemic changes in the posterior   left parietal lobe. Minimal periventricular white matter changes are seen   consistent with small vessel ischemia. Gray matter/white matter differentiation   is within normal limits. Bone windows show no acute abnormalities. The visualized portions of the orbits,   paranasal sinuses and mastoid air cells are unremarkable. Review of the   reconstructed images show no additional findings.                  CXR Results  (Last 48 hours)                 12/01/21 3731 XR CHEST PORT Final result    Impression:  Shallow depth of inspiration, otherwise stable chest.       Narrative:  Portable upright radiograph of the chest 8:30 a.m. compared with October 16, 2020. INDICATION: Weakness. Heart size is enlarged. Aorta is atherosclerotic. Shallow depth of inspiration. Pulmonary vasculature remains mildly prominent. No overt edema or infiltrate. No   effusion or pneumothorax. Right chest wall pacer remains in place. If you feel that you have not received excellent quality care or timely care, please ask to speak to the nurse manager. Please choose us in the future for your continued health care needs. ------------------------------------------------------------------------------------------------------------  The exam and treatment you received in the Emergency Department were for an urgent problem and are not intended as complete care. It is important that you follow-up with a doctor, nurse practitioner, or physician assistant to:  (1) confirm your diagnosis,  (2) re-evaluation of changes in your illness and treatment, and  (3) for ongoing care. If your symptoms become worse or you do not improve as expected and you are unable to reach your usual health care provider, you should return to the Emergency Department. We are available 24 hours a day. Please take your discharge instructions with you when you go to your follow-up appointment. If you have any problem arranging a follow-up appointment, contact the Emergency Department immediately. If a prescription has been provided, please have it filled as soon as possible to prevent a delay in treatment. Read the entire medication instruction sheet provided to you by the pharmacy.  If you have any questions or reservations about taking the medication due to side effects or interactions with other medications, please call your primary care physician or contact the ER to speak with the charge nurse.     Make an appointment with your family doctor or the physician you were referred to for follow-up of this visit as instructed on your discharge paperwork, as this is a mandatory follow-up. Return to the ER if you are unable to be seen or if you are unable to be seen in a timely manner. If you have any problem arranging the follow-up visit, contact the Emergency Department immediately.

## 2021-12-01 NOTE — ED TRIAGE NOTES
Pt fell yesterday. Complaining of vomiting and weakness. Sent here by dialysis, missed Monday due to same symptoms.

## 2021-12-01 NOTE — ED NOTES
Patient arrived to room in wheelchair with son. Patient's son stated pt went to dialyses this morning but started vomiting so they called him to bring her to the hospital. Patient has not had dialysis since Friday. Patient has been feeling weak and has had 2 falls from her wheelchair in the last few days. Patient is normally able to transfer independently from her wheelchair.

## 2021-12-01 NOTE — ROUTINE PROCESS
Writer reviewed dc instructions with patient and son. Both verbalized understanding. Pt assisted to get dressed and was wheeled out of facility in her personal wheelchair.

## 2021-12-01 NOTE — ED PROVIDER NOTES
EMERGENCY DEPARTMENT HISTORY AND PHYSICAL EXAM      Date: 12/1/2021  Patient Name: Sanjay Randolph      History of Presenting Illness     Chief Complaint   Patient presents with    Extremity Weakness       History Provided By: Patient    HPI: Sanjay Randolph, 80 y.o. female with multiple comorbidities as below presented emerge department with generalized weakness, nausea and vomiting once yesterday. She is a dialysis dependent and went to dialysis center today and due to her symptoms and to the ED for evaluation. Patient accompanied by her son who reports that she is normally independent in transferring from bed to wheelchair. However, over the last 2 days she had 2 falls due to generalized weakness. Patient is denying chest pain, shortness of breath, abdominal pain, recurrent vomiting, diarrhea, constipation. She does describe decreased appetite. There are no other complaints, changes, or physical findings at this time. PCP: Karla Winston MD    Current Outpatient Medications   Medication Sig Dispense Refill    ondansetron (Zofran ODT) 4 mg disintegrating tablet 1 Tablet by SubLINGual route every eight (8) hours as needed for Nausea or Vomiting. 20 Tablet 0    atorvastatin (LIPITOR) 40 mg tablet Take 1 tablet by mouth once daily for 90 days 90 Tablet 3    FLUoxetine (PROzac) 10 mg capsule Take 1 Capsule by mouth daily for 270 days. 90 Capsule 2    loperamide (IMODIUM) 2 mg capsule TAKE 1 CAPSULE BY MOUTH SIX TIMES DAILY AS NEEDED FOR 30 DAYS 180 Capsule 0    Euthyrox 150 mcg tablet Take 1 tablet by mouth once daily 90 Tablet 0    metoprolol tartrate (LOPRESSOR) 25 mg tablet TAKE 1 & 1/2 (ONE & ONE-HALF) TABLETS BY MOUTH TWICE DAILY FOR 30 DAYS 90 Tablet 2    levETIRAcetam (KEPPRA XR) 500 mg ER tablet TAKE 2 TABLETS BY MOUTH TWICE DAILY FOR 90 DAYS      Vimpat 150 mg tab tablet Take 150 mg by mouth two (2) times a day.       sevelamer carbonate (RENVELA) 800 mg tab tab       midodrine (PROAMATINE) 5 mg tablet TAKE 1 TABLET BY MOUTH THREE TIMES DAILY FOR 90 DAYS 270 Tab 0       Past History     Past Medical History:  Past Medical History:   Diagnosis Date    Anxiety disorder     Arthritis     Atheros[facility] arteries of the extremities [facility] 8/31/2020    Atrial fibrillation (HCC)     Cardiac pacemaker in situ     Cellulitis and abscess of trunk 8/31/2020    Depression, postpartum     Diabetes ([facility]     Heart disease     Heartburn     Hx of long term use of blood thinners     Hypercholesterolemia     Hypertension     Hypothyroidism     Migraines     Peripheral venous insufficiency 8/31/2020    Seizures ([facility]     Sleep disorder     Stroke ([facility]     2019    Varicose veins of lower extremity with inflammation 8/31/2020       Past Surgical History:  Past Surgical History:   Procedure Laterality Date    HX BACK SURGERY      HX OTHER SURGICAL      leg surgery     HX OTHER SURGICAL      pacemaker monitor        Family History:  Family History   Problem Relation Age of Onset    Heart Disease Mother     Heart Disease Father     Diabetes Sister     Diabetes Brother        Social History:  Social History     Tobacco Use    Smoking status: Never Smoker    Smokeless tobacco: Never Used   Vaping Use    Vaping Use: Never used   Substance Use Topics    Alcohol use: Never    Drug use: Never       Allergies:  No Known Allergies      Review of Systems     Review of Systems   Constitutional: Positive for activity change. Negative for fever. HENT: Negative for congestion. Eyes: Negative for discharge and visual disturbance. Respiratory: Negative for chest tightness and shortness of breath. Cardiovascular: Negative for chest pain and leg swelling. Gastrointestinal: Negative for abdominal pain, diarrhea and vomiting. Genitourinary: Negative for dysuria and flank pain. Musculoskeletal: Negative for back pain and gait problem.    Skin: Negative for rash and wound. Neurological: Positive for weakness. Negative for dizziness and headaches. Physical Exam     Physical Exam    Lab and Diagnostic Study Results     Labs -     No results found for this or any previous visit (from the past 12 hour(s)). Radiologic Studies -   [unfilled]  CT Results  (Last 48 hours)               12/01/21 1410  CT ABD PELV WO CONT Final result    Impression:  1. Postoperative changes with no acute abdominal or pelvic findings. 2. Body wall edema and inflammatory changes in the perirectal and presacral   regions which may be related to volume overload. Recommend follow-up as   warranted. 3. Worsening degenerative changes of the spine. 4. Small hyperdense cysts associated with each kidney recommend follow-up as   clinically warranted. 5. Other findings as described. Narrative:  Axial images from the lung bases to the pubic symphysis were obtained without   contrast. Sagittal and coronal reformatted images were reviewed. All CT scans at   this facility are performed using dose reduction optimization techniques as   appropriate to a performed exam including the following:   automated exposure   control, adjustments of the mA and/or kV according to patient size, or use of   iterative reconstruction technique. Comparison with July 19, 2019. INDICATION: Vomiting. There are small bilateral pleural effusions. Extensive coronary artery   calcifications are noted. Pacer leads are seen in the right atrium and right   ventricle. Hypoventilatory changes are seen at the lung bases. Small hiatal   hernia. Postsurgical changes associated with the stomach and proximal small   intestine consistent with gastric bypass. No discrete mass lesions in the liver,   spleen, pancreas or left adrenal gland. There is a small right adrenal nodule   measuring 4 Hounsfield units consistent with benign adrenal adenoma. The   pancreas is atrophic.  Small hyperdense cyst projects off the posterior aspect of   the left kidney. Small cyst in lateral right kidney. There is a 13 mm hyperdense   cyst in the lateral aspect of the right kidney which previous measured 12 mm. No   hydronephrosis or hydroureter. Abdominal aorta is atherosclerotic with no focal   aneurysm no retroperitoneal adenopathy. There is a 3.4 cm transverse diameter   fat-containing ventral hernia. There is a knuckle of transverse colon extending   into the defect. There is a 2.1 cm transverse diameter fat-containing umbilical   hernia. Urinary bladder is unremarkable. Uterus is unremarkable. There are mild   inflammatory changes in the presacral and perirectal fat. No gross free fluid or   adenopathy is seen in the pelvis. There is extensive body wall edema. Bone   windows and reconstructed images shows an old right rib fracture. Degenerative   changes of the spine are noted, worsened compared to the previous study. Laminectomy of L4. Mild degenerative changes of the hips. ORIF of a left femoral   fracture with intramedullary erika, only partially visualized. 12/01/21 0919  CT HEAD WO CONT Final result    Impression:  1. No acute intracranial findings. 2. Old left posterior parietal ischemic changes. 3. Stable small vessel ischemic changes. Narrative:  Axial images from the skull base to the vertex were obtained without the use of   contrast. Bone windows were reviewed. Sagittal and coronal reformatted images   were also reviewed. All CT scans at this facility are performed using dose   reduction optimization techniques as appropriate to a performed exam including   the following:   automated exposure control, adjustments of the mA and/or kV   according to patient size, or use of iterative reconstruction technique. Comparison May 19, 2021. There is no evidence of hemorrhage. No mass or mass effect is seen. There is no   acute ischemia. Ventricles, sulci and cisterns are intact.  Old ischemic changes in the posterior   left parietal lobe. Minimal periventricular white matter changes are seen   consistent with small vessel ischemia. Gray matter/white matter differentiation   is within normal limits. Bone windows show no acute abnormalities. The visualized portions of the orbits,   paranasal sinuses and mastoid air cells are unremarkable. Review of the   reconstructed images show no additional findings. CXR Results  (Last 48 hours)               12/01/21 0835  XR CHEST PORT Final result    Impression:  Shallow depth of inspiration, otherwise stable chest.       Narrative:  Portable upright radiograph of the chest 8:30 a.m. compared with October 16, 2020. INDICATION: Weakness. Heart size is enlarged. Aorta is atherosclerotic. Shallow depth of inspiration. Pulmonary vasculature remains mildly prominent. No overt edema or infiltrate. No   effusion or pneumothorax. Right chest wall pacer remains in place. Medical Decision Making and ED Course   - I am the first and primary provider for this patient AND AM THE PRIMARY PROVIDER OF RECORD. - I reviewed the vital signs, available nursing notes, past medical history, past surgical history, family history and social history. - Initial assessment performed. The patients presenting problems have been discussed, and the staff are in agreement with the care plan formulated and outlined with them. I have encouraged them to ask questions as they arise throughout their visit. Vital Signs-Reviewed the patient's vital signs.     Patient Vitals for the past 24 hrs:   Temp Pulse Resp BP SpO2   12/01/21 1512  60  137/71 98 %   12/01/21 1510  60   99 %   12/01/21 1327 97.9 °F (36.6 °C) 60 19 131/66 99 %   12/01/21 1300  64 18 125/67 100 %   12/01/21 1030  60  (!) 140/82 100 %   12/01/21 0844    116/85        Records Reviewed: Nursing Notes and Old Medical Records    Provider Notes (Medical Decision Making):     Patient is presenting with generalized weakness, malaise, nausea and one episode of vomiting. Patient does not appear to be in acute distress. We will get some labs, imaging of her head given that she had falls recently, chest x-ray and reassess. ED Course:     Patient work-up in the ED including CBC within normal, chemistry consistent with history of CKD, urinalysis without evidence of infection. CT of the head without acute finding. Chest x-ray was within acceptable limits. CT abdomen did not show any acute intra-abdominal process. Patient was given 1 round of Zofran. She was able to tolerate p.o. Patient symptoms improved and she feels better. Thus, she is discharged to follow-up with her dialysis center to get dialyzed and follow-up with her primary doctor for evaluation regarding her symptoms. Anticipatory guidance return precautions discussed with the patient and her family member. She was given a prescription of Zofran given history of nausea. Stressed on the importance of coming back to the ED if symptoms worsen or if she develop any new concerning symptoms. Disposition     Disposition: Condition improved  DC- Adult Discharges: All of the diagnostic tests were reviewed and questions answered. Diagnosis, care plan and treatment options were discussed. The patient understands the instructions and will follow up as directed. The patients results have been reviewed with them. They have been counseled regarding their diagnosis. The patient and caregiver verbally convey understanding and agreement of the signs, symptoms, diagnosis, treatment and prognosis and additionally agrees to follow up as recommended with their PCP in 24 - 48 hours. They also agree with the care-plan and convey that all of their questions have been answered.   I have also put together some discharge instructions for them that include: 1) educational information regarding their diagnosis, 2) how to care for their diagnosis at home, as well a 3) list of reasons why they would want to return to the ED prior to their follow-up appointment, should their condition change. Discharged      DISCHARGE PLAN:  1. Current Discharge Medication List      CONTINUE these medications which have NOT CHANGED    Details   atorvastatin (LIPITOR) 40 mg tablet Take 1 tablet by mouth once daily for 90 days  Qty: 90 Tablet, Refills: 3      FLUoxetine (PROzac) 10 mg capsule Take 1 Capsule by mouth daily for 270 days. Qty: 90 Capsule, Refills: 2    Associated Diagnoses: TRISHA (generalized anxiety disorder)      loperamide (IMODIUM) 2 mg capsule TAKE 1 CAPSULE BY MOUTH SIX TIMES DAILY AS NEEDED FOR 30 DAYS  Qty: 180 Capsule, Refills: 0      Euthyrox 150 mcg tablet Take 1 tablet by mouth once daily  Qty: 90 Tablet, Refills: 0      metoprolol tartrate (LOPRESSOR) 25 mg tablet TAKE 1 & 1/2 (ONE & ONE-HALF) TABLETS BY MOUTH TWICE DAILY FOR 30 DAYS  Qty: 90 Tablet, Refills: 2      levETIRAcetam (KEPPRA XR) 500 mg ER tablet TAKE 2 TABLETS BY MOUTH TWICE DAILY FOR 90 DAYS      Vimpat 150 mg tab tablet Take 150 mg by mouth two (2) times a day. sevelamer carbonate (RENVELA) 800 mg tab tab       midodrine (PROAMATINE) 5 mg tablet TAKE 1 TABLET BY MOUTH THREE TIMES DAILY FOR 90 DAYS  Qty: 270 Tab, Refills: 0           2. Follow-up Information     Follow up With Specialties Details Why [facility]    [address] [facility] EMERGENCY DEPT Emergency Medicine Go to  As needed, If symptoms worsen [address] [facility] [id number]  Christa Owen MD Internal Medicine In 2 days For reevaluation, Discuss your visit to the  [facility]  [phone]          3. Return to ED if worse   4. Discharge Medication List as of 12/1/2021  3:44 PM          Diagnosis     Clinical Impression:   1. Generalized weakness    2.  Non-intractable vomiting with nausea, unspecified vomiting type Attestations: Jaymie Spann MD    Please note that this dictation was completed with [employer], the computer voice recognition software. Quite often unanticipated grammatical, syntax, homophones, and other interpretive errors are inadvertently transcribed by the computer software. Please disregard these errors. Please excuse any errors that have escaped final proofreading. Thank you.

## 2021-12-03 ENCOUNTER — APPOINTMENT (OUTPATIENT)
Dept: CT IMAGING | Age: 71
DRG: 252 | End: 2021-12-03
Attending: EMERGENCY MEDICINE
Payer: MEDICARE

## 2021-12-03 ENCOUNTER — HOSPITAL ENCOUNTER (INPATIENT)
Age: 71
LOS: 6 days | Discharge: HOME HEALTH | DRG: 252 | End: 2021-12-11
Attending: EMERGENCY MEDICINE | Admitting: HOSPITALIST
Payer: MEDICARE

## 2021-12-03 ENCOUNTER — APPOINTMENT (OUTPATIENT)
Dept: GENERAL RADIOLOGY | Age: 71
DRG: 252 | End: 2021-12-03
Attending: EMERGENCY MEDICINE
Payer: MEDICARE

## 2021-12-03 DIAGNOSIS — T82.898A STEAL SYNDROME AS COMPLICATION OF DIALYSIS ACCESS, INITIAL ENCOUNTER (HCC): ICD-10-CM

## 2021-12-03 DIAGNOSIS — R11.2 NAUSEA AND VOMITING, INTRACTABILITY OF VOMITING NOT SPECIFIED, UNSPECIFIED VOMITING TYPE: ICD-10-CM

## 2021-12-03 DIAGNOSIS — R26.2 AMBULATORY DYSFUNCTION: ICD-10-CM

## 2021-12-03 DIAGNOSIS — I73.9 PVD (PERIPHERAL VASCULAR DISEASE) (HCC): ICD-10-CM

## 2021-12-03 DIAGNOSIS — I70.25 ATHEROSCLEROSIS OF NATIVE ARTERIES OF THE EXTREMITIES WITH ULCERATION (HCC): ICD-10-CM

## 2021-12-03 DIAGNOSIS — N18.6 ESRD (END STAGE RENAL DISEASE) (HCC): ICD-10-CM

## 2021-12-03 DIAGNOSIS — R53.83 FATIGUE, UNSPECIFIED TYPE: Primary | ICD-10-CM

## 2021-12-03 PROBLEM — R53.1 LEFT-SIDED WEAKNESS: Status: ACTIVE | Noted: 2021-12-03

## 2021-12-03 LAB
ALBUMIN SERPL-MCNC: 1.9 G/DL (ref 3.5–5)
ALBUMIN/GLOB SERPL: 0.9 {RATIO} (ref 1.1–2.2)
ALP SERPL-CCNC: 67 U/L (ref 45–117)
ALT SERPL-CCNC: 17 U/L (ref 12–78)
ANION GAP SERPL CALC-SCNC: 7 MMOL/L (ref 5–15)
ANION GAP SERPL CALC-SCNC: 8 MMOL/L (ref 5–15)
APPEARANCE UR: CLEAR
AST SERPL W P-5'-P-CCNC: 16 U/L (ref 15–37)
AST SERPL W P-5'-P-CCNC: ABNORMAL U/L (ref 15–37)
AST SERPL W P-5'-P-CCNC: NORMAL U/L (ref 15–37)
ATRIAL RATE: 132 BPM
ATRIAL RATE: 217 BPM
BACTERIA URNS QL MICRO: NEGATIVE /HPF
BASOPHILS # BLD: 0.1 K/UL (ref 0–0.1)
BASOPHILS NFR BLD: 1 % (ref 0–1)
BILIRUB SERPL-MCNC: 0.4 MG/DL (ref 0.2–1)
BILIRUB UR QL: NEGATIVE
BNP SERPL-MCNC: ABNORMAL PG/ML
BUN SERPL-MCNC: 50 MG/DL (ref 6–20)
BUN SERPL-MCNC: 80 MG/DL (ref 6–20)
BUN/CREAT SERPL: 12 (ref 12–20)
BUN/CREAT SERPL: 13 (ref 12–20)
CA-I BLD-MCNC: 7.7 MG/DL (ref 8.5–10.1)
CA-I BLD-MCNC: <5 MG/DL (ref 8.5–10.1)
CALCULATED R AXIS, ECG10: -78 DEGREES
CALCULATED R AXIS, ECG10: -78 DEGREES
CALCULATED T AXIS, ECG11: 0 DEGREES
CALCULATED T AXIS, ECG11: 0 DEGREES
CHLORIDE SERPL-SCNC: 105 MMOL/L (ref 97–108)
CHLORIDE SERPL-SCNC: 123 MMOL/L (ref 97–108)
CO2 SERPL-SCNC: 15 MMOL/L (ref 21–32)
CO2 SERPL-SCNC: 24 MMOL/L (ref 21–32)
COLOR UR: ABNORMAL
CREAT SERPL-MCNC: 3.92 MG/DL (ref 0.55–1.02)
CREAT SERPL-MCNC: 6.58 MG/DL (ref 0.55–1.02)
CRP SERPL-MCNC: 0.6 MG/DL (ref 0–0.6)
D DIMER PPP FEU-MCNC: 2.62 UG/ML(FEU)
DIAGNOSIS, 93000: NORMAL
DIAGNOSIS, 93000: NORMAL
DIFFERENTIAL METHOD BLD: ABNORMAL
EOSINOPHIL # BLD: 0.1 K/UL (ref 0–0.4)
EOSINOPHIL NFR BLD: 1 % (ref 0–7)
ERYTHROCYTE [DISTWIDTH] IN BLOOD BY AUTOMATED COUNT: 13.7 % (ref 11.5–14.5)
GLOBULIN SER CALC-MCNC: 2.1 G/DL (ref 2–4)
GLUCOSE BLD STRIP.AUTO-MCNC: 172 MG/DL (ref 65–117)
GLUCOSE SERPL-MCNC: 120 MG/DL (ref 65–100)
GLUCOSE SERPL-MCNC: 95 MG/DL (ref 65–100)
GLUCOSE UR STRIP.AUTO-MCNC: NEGATIVE MG/DL
HCT VFR BLD AUTO: 44.2 % (ref 35–47)
HGB BLD-MCNC: 13.5 G/DL (ref 11.5–16)
HGB UR QL STRIP: ABNORMAL
IMM GRANULOCYTES # BLD AUTO: 0 K/UL (ref 0–0.04)
IMM GRANULOCYTES NFR BLD AUTO: 1 % (ref 0–0.5)
KETONES UR QL STRIP.AUTO: NEGATIVE MG/DL
LACTATE SERPL-SCNC: 1.8 MMOL/L (ref 0.4–2)
LEUKOCYTE ESTERASE UR QL STRIP.AUTO: NEGATIVE
LYMPHOCYTES # BLD: 2.7 K/UL (ref 0.8–3.5)
LYMPHOCYTES NFR BLD: 33 % (ref 12–49)
MAGNESIUM SERPL-MCNC: 2.6 MG/DL (ref 1.6–2.4)
MAGNESIUM SERPL-MCNC: NORMAL MG/DL (ref 1.6–2.4)
MAGNESIUM SERPL-MCNC: NORMAL MG/DL (ref 1.6–2.4)
MCH RBC QN AUTO: 31 PG (ref 26–34)
MCHC RBC AUTO-ENTMCNC: 30.5 G/DL (ref 30–36.5)
MCV RBC AUTO: 101.4 FL (ref 80–99)
MONOCYTES # BLD: 0.6 K/UL (ref 0–1)
MONOCYTES NFR BLD: 8 % (ref 5–13)
NEUTS SEG # BLD: 4.6 K/UL (ref 1.8–8)
NEUTS SEG NFR BLD: 56 % (ref 32–75)
NITRITE UR QL STRIP.AUTO: NEGATIVE
NRBC # BLD: 0 K/UL (ref 0–0.01)
NRBC BLD-RTO: 0 PER 100 WBC
PERFORMED BY, TECHID: ABNORMAL
PH UR STRIP: 5 [PH] (ref 5–8)
PHOSPHATE SERPL-MCNC: 3.6 MG/DL (ref 2.6–4.7)
PLATELET # BLD AUTO: 90 K/UL (ref 150–400)
PMV BLD AUTO: 12 FL (ref 8.9–12.9)
POTASSIUM SERPL-SCNC: 5.4 MMOL/L (ref 3.5–5.1)
POTASSIUM SERPL-SCNC: 5.4 MMOL/L (ref 3.5–5.1)
POTASSIUM SERPL-SCNC: ABNORMAL MMOL/L (ref 3.5–5.1)
POTASSIUM SERPL-SCNC: NORMAL MMOL/L (ref 3.5–5.1)
PROT SERPL-MCNC: 4 G/DL (ref 6.4–8.2)
PROT UR STRIP-MCNC: 30 MG/DL
Q-T INTERVAL, ECG07: 108 MS
Q-T INTERVAL, ECG07: 80 MS
QRS DURATION, ECG06: 112 MS
QRS DURATION, ECG06: 84 MS
QTC CALCULATION (BEZET), ECG08: 118 MS
QTC CALCULATION (BEZET), ECG08: 205 MS
RBC # BLD AUTO: 4.36 M/UL (ref 3.8–5.2)
RBC #/AREA URNS HPF: ABNORMAL /HPF (ref 0–5)
SODIUM SERPL-SCNC: 137 MMOL/L (ref 136–145)
SODIUM SERPL-SCNC: 145 MMOL/L (ref 136–145)
SP GR UR REFRACTOMETRY: 1.01 (ref 1–1.03)
TROPONIN-HIGH SENSITIVITY: 33 NG/L (ref 0–51)
TROPONIN-HIGH SENSITIVITY: NORMAL NG/L (ref 0–51)
TROPONIN-HIGH SENSITIVITY: NORMAL NG/L (ref 0–51)
TSH SERPL DL<=0.05 MIU/L-ACNC: 1 UIU/ML (ref 0.36–3.74)
UA: UC IF INDICATED,UAUC: ABNORMAL
UROBILINOGEN UR QL STRIP.AUTO: 0.1 EU/DL (ref 0.1–1)
VENTRICULAR RATE, ECG03: 132 BPM
VENTRICULAR RATE, ECG03: 217 BPM
WBC # BLD AUTO: 8 K/UL (ref 3.6–11)
WBC URNS QL MICRO: ABNORMAL /HPF (ref 0–4)

## 2021-12-03 PROCEDURE — 74011250636 HC RX REV CODE- 250/636: Performed by: NURSE PRACTITIONER

## 2021-12-03 PROCEDURE — 80053 COMPREHEN METABOLIC PANEL: CPT

## 2021-12-03 PROCEDURE — 84100 ASSAY OF PHOSPHORUS: CPT

## 2021-12-03 PROCEDURE — 70450 CT HEAD/BRAIN W/O DYE: CPT

## 2021-12-03 PROCEDURE — 74011250637 HC RX REV CODE- 250/637: Performed by: NURSE PRACTITIONER

## 2021-12-03 PROCEDURE — 36415 COLL VENOUS BLD VENIPUNCTURE: CPT

## 2021-12-03 PROCEDURE — 85025 COMPLETE CBC W/AUTO DIFF WBC: CPT

## 2021-12-03 PROCEDURE — 82962 GLUCOSE BLOOD TEST: CPT

## 2021-12-03 PROCEDURE — 99285 EMERGENCY DEPT VISIT HI MDM: CPT

## 2021-12-03 PROCEDURE — G0378 HOSPITAL OBSERVATION PER HR: HCPCS

## 2021-12-03 PROCEDURE — 86140 C-REACTIVE PROTEIN: CPT

## 2021-12-03 PROCEDURE — 71045 X-RAY EXAM CHEST 1 VIEW: CPT

## 2021-12-03 PROCEDURE — 84484 ASSAY OF TROPONIN QUANT: CPT

## 2021-12-03 PROCEDURE — 83735 ASSAY OF MAGNESIUM: CPT

## 2021-12-03 PROCEDURE — 84450 TRANSFERASE (AST) (SGOT): CPT

## 2021-12-03 PROCEDURE — 84132 ASSAY OF SERUM POTASSIUM: CPT

## 2021-12-03 PROCEDURE — 96372 THER/PROPH/DIAG INJ SC/IM: CPT

## 2021-12-03 PROCEDURE — 81001 URINALYSIS AUTO W/SCOPE: CPT

## 2021-12-03 PROCEDURE — 83605 ASSAY OF LACTIC ACID: CPT

## 2021-12-03 PROCEDURE — 84443 ASSAY THYROID STIM HORMONE: CPT

## 2021-12-03 PROCEDURE — 85379 FIBRIN DEGRADATION QUANT: CPT

## 2021-12-03 PROCEDURE — 93005 ELECTROCARDIOGRAM TRACING: CPT

## 2021-12-03 PROCEDURE — 83880 ASSAY OF NATRIURETIC PEPTIDE: CPT

## 2021-12-03 RX ORDER — FLUOXETINE 10 MG/1
10 CAPSULE ORAL DAILY
Status: DISCONTINUED | OUTPATIENT
Start: 2021-12-04 | End: 2021-12-11 | Stop reason: HOSPADM

## 2021-12-03 RX ORDER — HEPARIN SODIUM 5000 [USP'U]/ML
5000 INJECTION, SOLUTION INTRAVENOUS; SUBCUTANEOUS EVERY 8 HOURS
Status: DISCONTINUED | OUTPATIENT
Start: 2021-12-03 | End: 2021-12-05

## 2021-12-03 RX ORDER — LEVETIRACETAM 500 MG/1
1000 TABLET ORAL 2 TIMES DAILY
Status: DISCONTINUED | OUTPATIENT
Start: 2021-12-03 | End: 2021-12-04

## 2021-12-03 RX ORDER — ONDANSETRON 4 MG/1
4 TABLET, ORALLY DISINTEGRATING ORAL
Status: DISCONTINUED | OUTPATIENT
Start: 2021-12-03 | End: 2021-12-11 | Stop reason: HOSPADM

## 2021-12-03 RX ORDER — ACETAMINOPHEN 650 MG/1
650 SUPPOSITORY RECTAL
Status: DISCONTINUED | OUTPATIENT
Start: 2021-12-03 | End: 2021-12-11 | Stop reason: HOSPADM

## 2021-12-03 RX ORDER — SEVELAMER CARBONATE 800 MG/1
800 TABLET, FILM COATED ORAL
Status: DISCONTINUED | OUTPATIENT
Start: 2021-12-04 | End: 2021-12-11 | Stop reason: HOSPADM

## 2021-12-03 RX ORDER — SODIUM CHLORIDE 0.9 % (FLUSH) 0.9 %
5-40 SYRINGE (ML) INJECTION EVERY 8 HOURS
Status: DISCONTINUED | OUTPATIENT
Start: 2021-12-03 | End: 2021-12-11 | Stop reason: HOSPADM

## 2021-12-03 RX ORDER — LACOSAMIDE 200 MG/1
1 TABLET, FILM COATED ORAL 2 TIMES DAILY
COMMUNITY
Start: 2021-11-19

## 2021-12-03 RX ORDER — LEVOTHYROXINE SODIUM 75 UG/1
150 TABLET ORAL DAILY
Status: DISCONTINUED | OUTPATIENT
Start: 2021-12-04 | End: 2021-12-11 | Stop reason: HOSPADM

## 2021-12-03 RX ORDER — POLYETHYLENE GLYCOL 3350 17 G/17G
17 POWDER, FOR SOLUTION ORAL DAILY PRN
Status: DISCONTINUED | OUTPATIENT
Start: 2021-12-03 | End: 2021-12-11 | Stop reason: HOSPADM

## 2021-12-03 RX ORDER — METOPROLOL TARTRATE 25 MG/1
25 TABLET, FILM COATED ORAL DAILY
Status: DISCONTINUED | OUTPATIENT
Start: 2021-12-04 | End: 2021-12-11 | Stop reason: HOSPADM

## 2021-12-03 RX ORDER — ATORVASTATIN CALCIUM 40 MG/1
40 TABLET, FILM COATED ORAL
Status: DISCONTINUED | OUTPATIENT
Start: 2021-12-03 | End: 2021-12-11 | Stop reason: HOSPADM

## 2021-12-03 RX ORDER — ACETAMINOPHEN 325 MG/1
650 TABLET ORAL
Status: DISCONTINUED | OUTPATIENT
Start: 2021-12-03 | End: 2021-12-11 | Stop reason: HOSPADM

## 2021-12-03 RX ORDER — ONDANSETRON 2 MG/ML
4 INJECTION INTRAMUSCULAR; INTRAVENOUS
Status: DISCONTINUED | OUTPATIENT
Start: 2021-12-03 | End: 2021-12-11 | Stop reason: HOSPADM

## 2021-12-03 RX ORDER — SODIUM CHLORIDE 0.9 % (FLUSH) 0.9 %
5-40 SYRINGE (ML) INJECTION AS NEEDED
Status: DISCONTINUED | OUTPATIENT
Start: 2021-12-03 | End: 2021-12-10

## 2021-12-03 RX ADMIN — Medication 10 ML: at 22:09

## 2021-12-03 RX ADMIN — LEVETIRACETAM 1000 MG: 500 TABLET, FILM COATED ORAL at 22:03

## 2021-12-03 RX ADMIN — LACOSAMIDE 150 MG: 100 TABLET, FILM COATED ORAL at 22:03

## 2021-12-03 RX ADMIN — HEPARIN SODIUM 5000 UNITS: 5000 INJECTION INTRAVENOUS; SUBCUTANEOUS at 22:02

## 2021-12-03 RX ADMIN — ATORVASTATIN CALCIUM 40 MG: 40 TABLET, FILM COATED ORAL at 22:03

## 2021-12-03 NOTE — PROGRESS NOTES
Patient admitted to room 521-01 at 1700, Pt assisted to bed x3 assist. Dual skin assessment performed with Prerna KRUSE. Skin dry and intact, some redness noted to lower right extremity.  Patient has fistula to upper right arm, positive bruit and thrill Vitals documented, patient resting in bed eating dinner at this time

## 2021-12-03 NOTE — ED NOTES
Pt in bed resting with eyes closed. Pt denies any pain, and is not in any distress at this time. Writer will continue to monitor.

## 2021-12-03 NOTE — ED NOTES
Assumed care of this patient, pt in bed resting with eyes closed, in no acute distress at this time, states she is not in any pain. Writer will continue to monitor patient.

## 2021-12-03 NOTE — H&P
Earnestine CARTER, GABRIEL-C    History and Physical      Patient: Meme Prieto MRN: [id number]  SSN: [id number]    YOB: 1950  Age: 80 y.o. Sex: female        Chief Complaint   Patient presents with    Fatigue       Subjective:      Meme Prieto is a 80 y.o. female who presents to the ED from her dialysis appointment with cc of persistent weakness, intractable nausea and vomiting. Per [facility] [phone] patient has showed up for dialysis 3 times and has been turned away due to overall sickness. Reports regimen to have hemodialysis today in the dialysis unit stated that she was too sick for dialysis and called 911. Subsequently patient has been in the emergency room multiple times this week for same reports. Head CT negative for any acute intracranial findings today and previously 2 days ago. As x-ray negative for acute cardiopulmonary findings. Recent echocardiogram showed left ventricular ejection fraction 50 to 55%. Moderately dilated left atrium, moderate mitral valve regurgitation noted. Assessment patient herself is a poor historian, appears very fatigued. Past medical history significant for A. fib, heart failure with pacemaker, CVA, seizure disorder, end-stage renal disease on HD. Hospitalist team consulted for intractable nausea vomiting and ambulatory dysfunction. Will admit patient observation status. Obtain PT/OT evaluation. Obtain nephrology for dialysis management.     Past Medical History:   Diagnosis Date    Anxiety disorder     Arthritis     Atherosclerosis of native arteries of the extremities with ulceration ([address] 8/31/2020    Atrial fibrillation (HCC)     Cardiac pacemaker in situ     Cellulitis and abscess of trunk 8/31/2020    Depression, postpartum     Diabetes ([address]     Heart disease     Heartburn     Hx of long term use of blood thinners     Hypercholesterolemia     Hypertension     Hypothyroidism     Migraines     Peripheral venous insufficiency 8/31/2020    Seizures ([address]     Sleep disorder     Stroke [facility])     2019    Varicose veins of lower extremity with inflammation 8/31/2020     Past Surgical History:   Procedure Laterality Date    HX BACK SURGERY      HX OTHER SURGICAL      leg surgery     HX OTHER SURGICAL      pacemaker monitor       Family History   Problem Relation Age of Onset    Heart Disease Mother     Heart Disease Father     Diabetes Sister     Diabetes Brother      Social History     Tobacco Use    Smoking status: Never Smoker    Smokeless tobacco: Never Used   Substance Use Topics    Alcohol use: Never      Prior to Admission medications    Medication Sig Start Date End Date Taking? Authorizing Provider   ondansetron (Zofran ODT) 4 mg disintegrating tablet 1 Tablet by SubLINGual route every eight (8) hours as needed for Nausea or Vomiting. 12/1/21   Arslan Cerna MD   atorvastatin (LIPITOR) 40 mg tablet Take 1 tablet by mouth once daily for 90 days 11/22/21 8/19/22  Iwona Motley MD   FLUoxetine (PROzac) 10 mg capsule Take 1 Capsule by mouth daily for 270 days. 11/5/21 8/2/22  Iwona Motley MD   loperamide (IMODIUM) 2 mg capsule TAKE 1 CAPSULE BY MOUTH SIX TIMES DAILY AS NEEDED FOR 30 DAYS 10/17/21   Cesar Harding MD   Euthyrox 150 mcg tablet Take 1 tablet by mouth once daily 10/13/21   Cesar Rutherford MD   metoprolol tartrate (LOPRESSOR) 25 mg tablet TAKE 1 & 1/2 (ONE & ONE-HALF) TABLETS BY MOUTH TWICE DAILY FOR 30 DAYS 8/11/21   Iwona Motley MD   levETIRAcetam (KEPPRA XR) 500 mg ER tablet TAKE 2 TABLETS BY MOUTH TWICE DAILY FOR 90 DAYS 6/1/21   Provider, Historical   Vimpat 150 mg tab tablet Take 150 mg by mouth two (2) times a day.  6/5/21   Provider, Historical   sevelamer carbonate (RENVELA) 800 mg tab tab  5/10/21   Provider, Historical   midodrine (PROAMATINE) 5 mg tablet TAKE 1 TABLET BY MOUTH THREE TIMES DAILY FOR 90 DAYS 5/1/21   Roberta Ch MD        No Known Allergies    Review of Systems:  Review of Systems   Reason unable to perform ROS: limited due to lethargy. Constitutional: Positive for malaise/fatigue. HENT: Negative. Respiratory: Negative for cough and shortness of breath. Cardiovascular: Negative for chest pain. Gastrointestinal: Positive for nausea and vomiting. Skin: Negative. Neurological: Positive for weakness. Objective:     Vitals:    12/03/21 0807 12/03/21 0808 12/03/21 0900 12/03/21 1012   BP:  132/70 130/69 107/89   Pulse:  60 60 60   Resp:  19 19 16   Temp:  97.8 °F (36.6 °C)     SpO2: 97% 96% 96% 96%   Weight:       Height:            Physical Exam:  Physical Exam  Vitals and nursing note reviewed. Constitutional:       Appearance: She is ill-appearing. Comments: lethargic   Eyes:      Conjunctiva/sclera: Conjunctivae normal.   Cardiovascular:      Rate and Rhythm: Normal rate. Pulses: Normal pulses. Heart sounds: Normal heart sounds. Pulmonary:      Effort: Pulmonary effort is normal.   Abdominal:      General: Bowel sounds are normal.   Musculoskeletal:         General: Normal range of motion. Skin:     General: Skin is warm and dry. Capillary Refill: Capillary refill takes more than 3 seconds. Neurological:      Mental Status: She is disoriented. Comments: Unable to assess due to lethargy          Assessment:     Hospital Problems  Date Reviewed: 6/11/2021          Codes Class Noted POA    Left-sided weakness ICD-10-CM: R53.1  ICD-9-CM: 728.87  12/3/2021 Unknown        Intractable nausea and vomiting ICD-10-CM: R11.2  ICD-9-CM: 536.2  12/3/2021 Unknown            1. Generalized weakness/ambulatory dysfunction:  Obtain PT/OT evaluation  Supportive care  Fall precautions    2. Intractable nausea and vomiting:  -encourage oral hydration  -prn zofran and compazine  -continue supportive care    3.  End-stage renal disease:  -daughter reports missed HD x 1 week  -BNP > 15,000  -obtain nephrology consultation    4. Seizure disorder:  -continue keppra 1000mg bid, vimpat 150mg bid  -prn ativan for breakthrough sz    5. Peripheral vascular disease:  -continue lipitor 40mg  -outpatient vascular workup warranted    6. Atrial fibrillation:  -Currently stable on tele  -continue metoprolol 25mg    7. Hyperkalemia and hypomagnesemia:  -elevated suspected due to missed HD  -expect correction w/ HD   -repeat labs in am    8. Depression:  -Continue prozac    9. Hypothyroidism:  -currently on syntrhoid 150mcg  -obtain tsh        Full Code  GI PROPHYLAXIS  DVT PROPHYLAXIS  Home medications reviewed and reconciled  NOK: Daughter-in-law Brush Linda [phone], son Elke Davis [phone]      Above treatment plan reviewed and discussed with patient and son in detail at bedside, all questions answered. Family requested patient to be full code.       Time Spent: > 75 minutes      Signed By: Nigel Ugalde NP     December 3, 2021

## 2021-12-03 NOTE — Clinical Note
Peripheral Lesion 1. Balloon inflated using single inflation technique. Inflation#1: Pressure = 8 darrius; Duration = 90 sec.    BRACHIAL ARTERY

## 2021-12-03 NOTE — ED PROVIDER NOTES
EMERGENCY DEPARTMENT HISTORY AND PHYSICAL EXAM        Date: 12/3/2021  Patient Name: Regina Reddy    History of Presenting Illness     Chief Complaint   Patient presents with    Fatigue       History Provided By: Patient    HPI: Regina Reddy, 80 y.o. female with history of atrial fibrillation, hypertension, hyperlipidemia, seizure, and CVA who presents with episode of nausea, vomiting, and weakness. States that she has been having left-sided leg weakness for about a week now. She has been having generalized fatigue and tiredness. Sent by nursing home for evaluation. PCP: Ira Covarrubias MD    Current Outpatient Medications   Medication Sig Dispense Refill    ondansetron (Zofran ODT) 4 mg disintegrating tablet 1 Tablet by SubLINGual route every eight (8) hours as needed for Nausea or Vomiting. 20 Tablet 0    atorvastatin (LIPITOR) 40 mg tablet Take 1 tablet by mouth once daily for 90 days 90 Tablet 3    FLUoxetine (PROzac) 10 mg capsule Take 1 Capsule by mouth daily for 270 days. 90 Capsule 2    loperamide (IMODIUM) 2 mg capsule TAKE 1 CAPSULE BY MOUTH SIX TIMES DAILY AS NEEDED FOR 30 DAYS 180 Capsule 0    Euthyrox 150 mcg tablet Take 1 tablet by mouth once daily 90 Tablet 0    metoprolol tartrate (LOPRESSOR) 25 mg tablet TAKE 1 & 1/2 (ONE & ONE-HALF) TABLETS BY MOUTH TWICE DAILY FOR 30 DAYS 90 Tablet 2    levETIRAcetam (KEPPRA XR) 500 mg ER tablet TAKE 2 TABLETS BY MOUTH TWICE DAILY FOR 90 DAYS      Vimpat 150 mg tab tablet Take 150 mg by mouth two (2) times a day.       sevelamer carbonate (RENVELA) 800 mg tab tab       midodrine (PROAMATINE) 5 mg tablet TAKE 1 TABLET BY MOUTH THREE TIMES DAILY FOR 90 DAYS 270 Tab 0       Past History     Past Medical History:  Past Medical History:   Diagnosis Date    Anxiety disorder     Arthritis     Atherosclerosis of native arteries of the extremities with ulceration ([address] 8/31/2020    Atrial fibrillation ([address]     Cardiac pacemaker in situ     Cellulitis and abscess of trunk 8/31/2020    Depression, postpartum     Diabetes [facility]     Heart disease     Heartburn     Hx of long term use of blood thinners     Hypercholesterolemia     Hypertension     Hypothyroidism     Migraines     Peripheral venous insufficiency 8/31/2020    Seizures ([facility]     Sleep disorder     Stroke [facility]     2019    Varicose veins of lower extremity with inflammation 8/31/2020       Past Surgical History:  Past Surgical History:   Procedure Laterality Date    HX BACK SURGERY      HX OTHER SURGICAL      leg surgery     HX OTHER SURGICAL      pacemaker monitor        Family History:  Family History   Problem Relation Age of Onset    Heart Disease Mother     Heart Disease Father     Diabetes Sister     Diabetes Brother        Social History:  Social History     Tobacco Use    Smoking status: Never Smoker    Smokeless tobacco: Never Used   Vaping Use    Vaping Use: Never used   Substance Use Topics    Alcohol use: Never    Drug use: Never       Allergies:  No Known Allergies        Review of Systems   Review of Systems   Constitutional: Positive for fatigue. Negative for fever. HENT: Negative for congestion. Eyes: Negative for visual disturbance. Respiratory: Negative for shortness of breath. Cardiovascular: Negative for chest pain. Gastrointestinal: Positive for nausea and vomiting. Negative for abdominal pain. Genitourinary: Negative for dysuria. Musculoskeletal: Negative for arthralgias. Skin: Negative for rash. Neurological: Positive for weakness. Negative for headaches. Physical Exam   Constitutional: No acute distress. Well-nourished. Skin: No rash. ENT: No rhinorrhea. No cough. Head is normocephalic and atraumatic. Eye: No proptosis or conjunctival injections. Respiratory: No apparent respiratory distress. Lung sounds are clear. Cardiovascular: Regular rate and rhythm. No murmurs. Gastrointestinal: Nondistended. Abdomen is nontender. Musculoskeletal: No obvious bony deformities. Psychiatric: Cooperative. Appropriate mood and affect. Diagnostic Study Results     Labs -     Recent Results (from the past 24 hour(s))   CBC WITH AUTOMATED DIFF    Collection Time: 12/03/21  7:21 AM   Result Value Ref Range    WBC 8.0 3.6 - 11.0 K/uL    RBC 4.36 3.80 - 5.20 M/uL    HGB 13.5 11.5 - 16.0 g/dL    HCT 44.2 35.0 - 47.0 %    .4 (H) 80.0 - 99.0 FL    MCH 31.0 26.0 - 34.0 PG    MCHC 30.5 30.0 - 36.5 g/dL    RDW 13.7 11.5 - 14.5 %    PLATELET 90 (L) 574 - 400 K/uL    MPV 12.0 8.9 - 12.9 FL    NRBC 0.0 0.0  WBC    ABSOLUTE NRBC 0.00 0.00 - 0.01 K/uL    NEUTROPHILS 56 32 - 75 %    LYMPHOCYTES 33 12 - 49 %    MONOCYTES 8 5 - 13 %    EOSINOPHILS 1 0 - 7 %    BASOPHILS 1 0 - 1 %    IMMATURE GRANULOCYTES 1 (H) 0 - 0.5 %    ABS. NEUTROPHILS 4.6 1.8 - 8.0 K/UL    ABS. LYMPHOCYTES 2.7 0.8 - 3.5 K/UL    ABS. MONOCYTES 0.6 0.0 - 1.0 K/UL    ABS. EOSINOPHILS 0.1 0.0 - 0.4 K/UL    ABS. BASOPHILS 0.1 0.0 - 0.1 K/UL    ABS. IMM. GRANS. 0.0 0.00 - 0.04 K/UL    DF AUTOMATED     METABOLIC PANEL, COMPREHENSIVE    Collection Time: 12/03/21  7:21 AM   Result Value Ref Range    Sodium 145 136 - 145 mmol/L    Potassium Hemolyzed, recollect requested 3.5 - 5.1 mmol/L    Chloride 123 (H) 97 - 108 mmol/L    CO2 15 (LL) 21 - 32 mmol/L    Anion gap 7 5 - 15 mmol/L    Glucose 120 (H) 65 - 100 mg/dL    BUN 50 (H) 6 - 20 mg/dL    Creatinine 3.92 (H) 0.55 - 1.02 mg/dL    BUN/Creatinine ratio 13 12 - 20      GFR est AA 14 (L) >60 ml/min/1.73m2    GFR est non-AA 11 (L) >60 ml/min/1.73m2    Calcium <5.0 (LL) 8.5 - 10.1 mg/dL    Bilirubin, total 0.4 0.2 - 1.0 mg/dL    AST (SGOT) Hemolyzed, recollect requested 15 - 37 U/L    ALT (SGPT) 17 12 - 78 U/L    Alk.  phosphatase 67 45 - 117 U/L    Protein, total 4.0 (L) 6.4 - 8.2 g/dL    Albumin 1.9 (L) 3.5 - 5.0 g/dL    Globulin 2.1 2.0 - 4.0 g/dL    A-G Ratio 0.9 (L) 1.1 - 2.2     TROPONIN-HIGH SENSITIVITY Collection Time: 12/03/21  7:21 AM   Result Value Ref Range    Troponin-High Sensitivity Hemolyzed, recollect requested 0 - 51 ng/L   NT-PRO BNP    Collection Time: 12/03/21  7:21 AM   Result Value Ref Range    NT pro-BNP 15,302 (H) <125 pg/mL   MAGNESIUM    Collection Time: 12/03/21  7:21 AM   Result Value Ref Range    Magnesium Hemolyzed, recollect requested 1.6 - 2.4 mg/dL   PHOSPHORUS    Collection Time: 12/03/21  7:21 AM   Result Value Ref Range    Phosphorus 3.6 2.6 - 4.7 mg/dL   GLUCOSE, POC    Collection Time: 12/03/21  7:21 AM   Result Value Ref Range    Glucose (POC) 172 (H) 65 - 117 mg/dL    Performed by ADAM COREY    TROPONIN-HIGH SENSITIVITY    Collection Time: 12/03/21  8:18 AM   Result Value Ref Range    Troponin-High Sensitivity Hemolyzed, recollect requested 0 - 51 ng/L   AST    Collection Time: 12/03/21  8:18 AM   Result Value Ref Range    AST (SGOT) Hemolyzed, recollect requested 15 - 37 U/L   POTASSIUM    Collection Time: 12/03/21  8:18 AM   Result Value Ref Range    Potassium Hemolyzed, recollect requested 3.5 - 5.1 mmol/L   MAGNESIUM    Collection Time: 12/03/21  8:18 AM   Result Value Ref Range    Magnesium Hemolyzed, recollect requested 1.6 - 2.4 mg/dL   TROPONIN-HIGH SENSITIVITY    Collection Time: 12/03/21  9:15 AM   Result Value Ref Range    Troponin-High Sensitivity 33 0 - 51 ng/L   AST    Collection Time: 12/03/21  9:27 AM   Result Value Ref Range    AST (SGOT) 16 15 - 37 U/L   POTASSIUM    Collection Time: 12/03/21  9:27 AM   Result Value Ref Range    Potassium 5.4 (H) 3.5 - 5.1 mmol/L   MAGNESIUM    Collection Time: 12/03/21  9:27 AM   Result Value Ref Range    Magnesium 2.6 (H) 1.6 - 2.4 mg/dL   URINALYSIS W/ REFLEX CULTURE    Collection Time: 12/03/21  9:54 AM    Specimen: Urine   Result Value Ref Range    Color Yellow/Straw      Appearance Clear Clear      Specific gravity 1.015 1.003 - 1.030      pH (UA) 5.0 5.0 - 8.0      Protein 30 (A) Negative mg/dL    Glucose Negative Negative mg/dL    Ketone Negative Negative mg/dL    Bilirubin Negative Negative      Blood Small (A) Negative      Urobilinogen 0.1 0.1 - 1.0 EU/dL    Nitrites Negative Negative      Leukocyte Esterase Negative Negative      UA:UC IF INDICATED Culture not indicated by UA result Culture not indicated by UA result      WBC 0-4 0 - 4 /hpf    RBC 5-10 0 - 5 /hpf    Bacteria Negative Negative /hpf       Radiologic Studies -   CT HEAD WO CONT   Final Result   Age-appropriate atrophy. No acute findings. XR CHEST SNGL V   Final Result   The cardiomediastinal silhouette is appropriate for age, technique,   and lung expansion. Pulmonary vasculature is not congested. The lungs are   essentially clear. No effusion or pneumothorax is seen. CT Results  (Last 48 hours)               12/03/21 0934  CT HEAD WO CONT Final result    Impression:  Age-appropriate atrophy. No acute findings. Narrative:  CT dose reduction was achieved through use of a standardized protocol tailored   for this examination and automatic exposure control for dose modulation. CT Head       History:        The sulci are prominent but symmetric. Periventricular and deep white matter   hypodensity is not unexpected for age. Small chronic left PCA infarct, as seen   in December 1. No acute abnormality seen gray or white matter. Ventricles are   symmetric and appropriate for atrophy. There is no midline shift or mass effect,   or evidence of hemorrhage. Bone windows show no fracture. 12/01/21 1410  CT ABD PELV WO CONT Final result    Impression:  1. Postoperative changes with no acute abdominal or pelvic findings. 2. Body wall edema and inflammatory changes in the perirectal and presacral   regions which may be related to volume overload. Recommend follow-up as   warranted. 3. Worsening degenerative changes of the spine. 4. Small hyperdense cysts associated with each kidney recommend follow-up as   clinically warranted. 5. Other findings as described. Narrative:  Axial images from the lung bases to the pubic symphysis were obtained without   contrast. Sagittal and coronal reformatted images were reviewed. All CT scans at   this facility are performed using dose reduction optimization techniques as   appropriate to a performed exam including the following:   automated exposure   control, adjustments of the mA and/or kV according to patient size, or use of   iterative reconstruction technique. Comparison with July 19, 2019. INDICATION: Vomiting. There are small bilateral pleural effusions. Extensive coronary artery   calcifications are noted. Pacer leads are seen in the right atrium and right   ventricle. Hypoventilatory changes are seen at the lung bases. Small hiatal   hernia. Postsurgical changes associated with the stomach and proximal small   intestine consistent with gastric bypass. No discrete mass lesions in the liver,   spleen, pancreas or left adrenal gland. There is a small right adrenal nodule   measuring 4 Hounsfield units consistent with benign adrenal adenoma. The   pancreas is atrophic. Small hyperdense cyst projects off the posterior aspect of   the left kidney. Small cyst in lateral right kidney. There is a 13 mm hyperdense   cyst in the lateral aspect of the right kidney which previous measured 12 mm. No   hydronephrosis or hydroureter. Abdominal aorta is atherosclerotic with no focal   aneurysm no retroperitoneal adenopathy. There is a 3.4 cm transverse diameter   fat-containing ventral hernia. There is a knuckle of transverse colon extending   into the defect. There is a 2.1 cm transverse diameter fat-containing umbilical   hernia. Urinary bladder is unremarkable. Uterus is unremarkable. There are mild   inflammatory changes in the presacral and perirectal fat. No gross free fluid or   adenopathy is seen in the pelvis. There is extensive body wall edema.  Bone   windows and reconstructed images shows an old right rib fracture. Degenerative   changes of the spine are noted, worsened compared to the previous study. Laminectomy of L4. Mild degenerative changes of the hips. ORIF of a left femoral   fracture with intramedullary erika, only partially visualized. CXR Results  (Last 48 hours)               12/03/21 0752  XR CHEST SNGL V Final result    Impression:  The cardiomediastinal silhouette is appropriate for age, technique,   and lung expansion. Pulmonary vasculature is not congested. The lungs are   essentially clear. No effusion or pneumothorax is seen. Narrative:  1 view comparison December 1                 Medical Decision Making and ED Course     I reviewed the available vital signs, nursing notes, past medical history, past surgical history, family history, and social history. Vital Signs - Reviewed the patient's vital signs. Patient Vitals for the past 12 hrs:   Temp Pulse Resp BP SpO2   12/03/21 1012  60 16 107/89 96 %   12/03/21 0900  60 19 130/69 96 %   12/03/21 0808 97.8 °F (36.6 °C) 60 19 132/70 96 %   12/03/21 0807     97 %   12/03/21 0710  96 13 (!) 139/93 96 %       EKG interpretation: Obtained on 12/3/2021 at 0717. Read at 0719. Paced rhythm at rate of about 80 bpm.  Left axis deviation. Normal QRS duration and QTc interval.    Medical Decision Making:   Presented with generalized weakness, fatigue, nausea vomiting. The differential diagnosis is electrolyte abnormality, dehydration, food toxicity, UTI, pneumonia. Work-up is negative. She does have kidney failure however this does not appear to be acute. No signs of pneumonia or UTI. Will discharge home. She is well-appearing on my exam.         Disposition     Discharged    DISCHARGE PLAN:  1.    Current Discharge Medication List      CONTINUE these medications which have NOT CHANGED    Details   ondansetron (Zofran ODT) 4 mg disintegrating tablet 1 Tablet by SubLINGual route every eight (8) hours as needed for Nausea or Vomiting. Qty: 20 Tablet, Refills: 0      atorvastatin (LIPITOR) 40 mg tablet Take 1 tablet by mouth once daily for 90 days  Qty: 90 Tablet, Refills: 3      FLUoxetine (PROzac) 10 mg capsule Take 1 Capsule by mouth daily for 270 days. Qty: 90 Capsule, Refills: 2    Associated Diagnoses: TRISHA (generalized anxiety disorder)      loperamide (IMODIUM) 2 mg capsule TAKE 1 CAPSULE BY MOUTH SIX TIMES DAILY AS NEEDED FOR 30 DAYS  Qty: 180 Capsule, Refills: 0      Euthyrox 150 mcg tablet Take 1 tablet by mouth once daily  Qty: 90 Tablet, Refills: 0      metoprolol tartrate (LOPRESSOR) 25 mg tablet TAKE 1 & 1/2 (ONE & ONE-HALF) TABLETS BY MOUTH TWICE DAILY FOR 30 DAYS  Qty: 90 Tablet, Refills: 2      levETIRAcetam (KEPPRA XR) 500 mg ER tablet TAKE 2 TABLETS BY MOUTH TWICE DAILY FOR 90 DAYS      Vimpat 150 mg tab tablet Take 150 mg by mouth two (2) times a day. sevelamer carbonate (RENVELA) 800 mg tab tab       midodrine (PROAMATINE) 5 mg tablet TAKE 1 TABLET BY MOUTH THREE TIMES DAILY FOR 90 DAYS  Qty: 270 Tab, Refills: 0           2. Follow-up Information     Follow up With Specialties Details Why [address] [facility] EMERGENCY DEPT Emergency Medicine Go today As soon as possible if symptoms worsen [address] [facility] [address] [phone]    Primary care doctor  Schedule an appointment as soon as possible for a visit in 3 days          3. Return to ED if worse     Diagnosis     Clinical impression:   1. Fatigue, unspecified type    2. Nausea and vomiting, intractability of vomiting not specified, unspecified vomiting type           Attestation:  Please note that this dictation was completed with Run[employer], the [employer] voice recognition software. Quite often unanticipated grammatical, syntax, homophones, and other interpretive errors are inadvertently transcribed by the computer software. Please disregard these errors.  Please excuse any errors that have escaped final proofreading. Thank you.   Chato Noland, DO

## 2021-12-03 NOTE — ROUTINE PROCESS
TRANSFER - OUT REPORT:    Verbal report given to Dhruv RN(name) on Grace Lips  being transferred to  [phone] (unit) for routine progression of care       Report consisted of patients Situation, Background, Assessment and   Recommendations(SBAR). Information from the following report(s) SBAR, ED Summary, Intake/Output, MAR, Recent Results and Cardiac Rhythm AV paced was reviewed with the receiving nurse. Lines:   Peripheral IV 12/03/21 Left; Posterior Forearm (Active)   Site Assessment Clean, dry, & intact 12/03/21 0926   Phlebitis Assessment 0 12/03/21 0926   Infiltration Assessment 0 12/03/21 0926   Dressing Status Clean, dry, & intact 12/03/21 0926   Dressing Type Transparent 12/03/21 0926   Hub Color/Line Status Pink 12/03/21 0926       Peripheral IV 12/03/21 Left; Posterior; Proximal Forearm (Active)        Opportunity for questions and clarification was provided.       Patient transported with:   [facility]

## 2021-12-03 NOTE — Clinical Note
Dressed using 4 X 4, elastic bandage and transparent dressing. Site: clean, dry, & intact, no bleeding and no hematoma.

## 2021-12-03 NOTE — Clinical Note
Diagnostic catheter removed over exchange length wire. Physical Therapy Evaluation    Patient Name:  Alicia Soliz   MRN:  [id number]    Recommendations:     Discharge Recommendations:  outpatient PT   Discharge Equipment Recommendations: none   Barriers to discharge: None    Assessment:     Alicia Soliz is a 42 y.o. female admitted with a medical diagnosis of Multiple sclerosis exacerbation.  She presents with the following impairments/functional limitations:  weakness, impaired balance, impaired functional mobilty, impaired endurance, gait instability, decreased lower extremity function .    Rehab Prognosis: Good; patient would benefit from acute skilled PT services to address these deficits and reach maximum level of function.    Recent Surgery: * No surgery found *      Plan:     During this hospitalization, patient to be seen   to address the identified rehab impairments via gait training, therapeutic activities, therapeutic exercises and progress toward the following goals:    · Plan of Care Expires:  04/18/19    Subjective     Chief Complaint: L LE NUMBNESS  Patient/Family Comments/goals: INC STRENGTH  Pain/Comfort:  · Pain Rating 1: 0/10  · Pain Rating Post-Intervention 1: 0/10    Patients cultural, spiritual, [employer]al conflicts given the current situation:      Living Environment:  PT LIVES AT HOME WITH DAUGHTER AND HAS NO STEPS TO ENTER HOME  Prior to admission, patients level of function was MOD I WITH RW.  Equipment used at home:  .  DME owned (not currently used): none.  Upon discharge, patient will have assistance from FAMILY.    Objective:     Communicated with NURSE AND Epic CHART REVIEW prior to session.  Patient found supine with peripheral IV, telemetry  upon PT entry to room.    General Precautions: Standard, fall   Orthopedic Precautions:N/A   Braces: N/A     Exams:  · RLE ROM: WNL  · RLE Strength: WFL  · LLE ROM: LIMITED  · LLE Strength: LIMITED    Functional Mobility:  PT MET IN RM SEATED EOB WITH S AND STOOD WITH RW AND GT TRAINED  WITH L LE FOOT DRAG REQUIRING VERBAL CUES TO PROPER GT PATTERN. PT GT TRAINED X 100' AND RETURNED TO RM T/F TO COMMODE WITH S AND LEFT SEATED WITH CALL STERN IN REACH AND DAUGHTER PRESENT. PT EDUCATED ON ROLE OF P.T. AND LEFT WITH ALL NEEDS MET     AM-PAC 6 CLICK MOBILITY  Total Score:18     Patient left ON COMMODE with call button in reach.    GOALS:   Multidisciplinary Problems     Physical Therapy Goals        Problem: Physical Therapy Goal    Goal Priority Disciplines Outcome Goal Variances Interventions   Physical Therapy Goal     PT, PT/OT      Description:  PT WILL BE SEEN FOR P.T. FOR A MIN OF 5 OUT OF 7 DAYS  A WEEK  LT19  1. PT WILL COMPLETE BED MOBILITY IND  2. PT WILL T/F TO CHAIR WITH RW MOD I  3. PT WILL GT TRAIN X 250' WITH RW AND S.  4. PT WILL COMPLETE B LE TE X 20 REPS                    History:     Past Medical History:   Diagnosis Date    Anemia     Arthritis     Cardiac arrest as complication of care     pt states she went into cardiac arrest from an allergic reaction to a medication    Encounter for blood transfusion     Hemiplegia due to old stroke     Hypertension     Multiple sclerosis     Stroke        Past Surgical History:   Procedure Laterality Date    APPENDECTOMY      CHOLECYSTECTOMY      HYSTERECTOMY      KNEE SURGERY      TONSILLECTOMY      TUBAL LIGATION      VASCULAR CATH INSERTION Right 2015    Performed by Dayron Vasques MD at [facility] CATH LAB       Time Tracking:     PT Received On: 19  PT Start Time: 1120     PT Stop Time: 1145  PT Total Time (min): 25 min     Billable Minutes: Evaluation 15 and Gait Training 10      Karen Jenkins, PT  2019

## 2021-12-03 NOTE — ED TRIAGE NOTES
GENERALIZED WEAKNESS, LETHARGY, HAS NOT HAD DIALYSIS SINCE 11/26/2021, WAS AT DIALYSIS CENTER THIS MORNING AND REFUSED

## 2021-12-03 NOTE — Clinical Note
Peripheral Lesion 1. Inflation#1: Pressure = 10 darrius; Duration = 15 sec. Kissing balloon inflation #1: Pressure = 10 darrius; Duration = 30 sec.

## 2021-12-04 LAB
ANION GAP SERPL CALC-SCNC: 11 MMOL/L (ref 5–15)
BASOPHILS # BLD: 0.1 K/UL (ref 0–0.1)
BASOPHILS NFR BLD: 1 % (ref 0–1)
BUN SERPL-MCNC: 78 MG/DL (ref 6–20)
BUN/CREAT SERPL: 12 (ref 12–20)
CA-I BLD-MCNC: 7.8 MG/DL (ref 8.5–10.1)
CHLORIDE SERPL-SCNC: 109 MMOL/L (ref 97–108)
CO2 SERPL-SCNC: 19 MMOL/L (ref 21–32)
CREAT SERPL-MCNC: 6.57 MG/DL (ref 0.55–1.02)
DIFFERENTIAL METHOD BLD: ABNORMAL
EOSINOPHIL # BLD: 0.1 K/UL (ref 0–0.4)
EOSINOPHIL NFR BLD: 1 % (ref 0–7)
ERYTHROCYTE [DISTWIDTH] IN BLOOD BY AUTOMATED COUNT: 13.9 % (ref 11.5–14.5)
GLUCOSE BLD STRIP.AUTO-MCNC: 79 MG/DL (ref 65–117)
GLUCOSE BLD STRIP.AUTO-MCNC: 81 MG/DL (ref 65–117)
GLUCOSE BLD STRIP.AUTO-MCNC: 84 MG/DL (ref 65–117)
GLUCOSE SERPL-MCNC: 87 MG/DL (ref 65–100)
HCT VFR BLD AUTO: 44.5 % (ref 35–47)
HGB BLD-MCNC: 13.2 G/DL (ref 11.5–16)
IMM GRANULOCYTES # BLD AUTO: 0 K/UL (ref 0–0.04)
IMM GRANULOCYTES NFR BLD AUTO: 1 % (ref 0–0.5)
LYMPHOCYTES # BLD: 1.8 K/UL (ref 0.8–3.5)
LYMPHOCYTES NFR BLD: 33 % (ref 12–49)
MCH RBC QN AUTO: 30.8 PG (ref 26–34)
MCHC RBC AUTO-ENTMCNC: 29.7 G/DL (ref 30–36.5)
MCV RBC AUTO: 103.7 FL (ref 80–99)
MONOCYTES # BLD: 0.4 K/UL (ref 0–1)
MONOCYTES NFR BLD: 8 % (ref 5–13)
NEUTS SEG # BLD: 3.1 K/UL (ref 1.8–8)
NEUTS SEG NFR BLD: 56 % (ref 32–75)
NRBC # BLD: 0 K/UL (ref 0–0.01)
NRBC BLD-RTO: 0 PER 100 WBC
PERFORMED BY, TECHID: NORMAL
PLATELET # BLD AUTO: 65 K/UL (ref 150–400)
PMV BLD AUTO: 13.3 FL (ref 8.9–12.9)
POTASSIUM SERPL-SCNC: 5.4 MMOL/L (ref 3.5–5.1)
RBC # BLD AUTO: 4.29 M/UL (ref 3.8–5.2)
SODIUM SERPL-SCNC: 139 MMOL/L (ref 136–145)
WBC # BLD AUTO: 5.5 K/UL (ref 3.6–11)

## 2021-12-04 PROCEDURE — 90935 HEMODIALYSIS ONE EVALUATION: CPT

## 2021-12-04 PROCEDURE — 97530 THERAPEUTIC ACTIVITIES: CPT

## 2021-12-04 PROCEDURE — 82962 GLUCOSE BLOOD TEST: CPT

## 2021-12-04 PROCEDURE — 96372 THER/PROPH/DIAG INJ SC/IM: CPT

## 2021-12-04 PROCEDURE — 5A1D70Z PERFORMANCE OF URINARY FILTRATION, INTERMITTENT, LESS THAN 6 HOURS PER DAY: ICD-10-PCS | Performed by: INTERNAL MEDICINE

## 2021-12-04 PROCEDURE — 97161 PT EVAL LOW COMPLEX 20 MIN: CPT

## 2021-12-04 PROCEDURE — 36415 COLL VENOUS BLD VENIPUNCTURE: CPT

## 2021-12-04 PROCEDURE — 85025 COMPLETE CBC W/AUTO DIFF WBC: CPT

## 2021-12-04 PROCEDURE — G0378 HOSPITAL OBSERVATION PER HR: HCPCS

## 2021-12-04 PROCEDURE — 74011250637 HC RX REV CODE- 250/637: Performed by: NURSE PRACTITIONER

## 2021-12-04 PROCEDURE — 74011250636 HC RX REV CODE- 250/636: Performed by: NURSE PRACTITIONER

## 2021-12-04 PROCEDURE — 74011250637 HC RX REV CODE- 250/637: Performed by: PSYCHIATRY & NEUROLOGY

## 2021-12-04 PROCEDURE — 97165 OT EVAL LOW COMPLEX 30 MIN: CPT

## 2021-12-04 PROCEDURE — 80048 BASIC METABOLIC PNL TOTAL CA: CPT

## 2021-12-04 RX ORDER — LEVETIRACETAM 500 MG/1
500 TABLET ORAL 2 TIMES DAILY
Status: DISCONTINUED | OUTPATIENT
Start: 2021-12-04 | End: 2021-12-11 | Stop reason: HOSPADM

## 2021-12-04 RX ORDER — LACOSAMIDE 100 MG/1
200 TABLET ORAL 2 TIMES DAILY
Status: DISCONTINUED | OUTPATIENT
Start: 2021-12-04 | End: 2021-12-11 | Stop reason: HOSPADM

## 2021-12-04 RX ADMIN — Medication 10 ML: at 21:26

## 2021-12-04 RX ADMIN — SEVELAMER CARBONATE 800 MG: 800 TABLET, FILM COATED ORAL at 08:02

## 2021-12-04 RX ADMIN — METOPROLOL TARTRATE 25 MG: 25 TABLET, FILM COATED ORAL at 08:02

## 2021-12-04 RX ADMIN — LACOSAMIDE 200 MG: 100 TABLET, FILM COATED ORAL at 21:25

## 2021-12-04 RX ADMIN — ATORVASTATIN CALCIUM 40 MG: 40 TABLET, FILM COATED ORAL at 21:25

## 2021-12-04 RX ADMIN — SEVELAMER CARBONATE 800 MG: 800 TABLET, FILM COATED ORAL at 16:27

## 2021-12-04 RX ADMIN — Medication 10 ML: at 05:52

## 2021-12-04 RX ADMIN — HEPARIN SODIUM 5000 UNITS: 5000 INJECTION INTRAVENOUS; SUBCUTANEOUS at 21:25

## 2021-12-04 RX ADMIN — HEPARIN SODIUM 5000 UNITS: 5000 INJECTION INTRAVENOUS; SUBCUTANEOUS at 05:52

## 2021-12-04 RX ADMIN — FLUOXETINE HYDROCHLORIDE 10 MG: 10 CAPSULE ORAL at 08:02

## 2021-12-04 RX ADMIN — LACOSAMIDE 150 MG: 100 TABLET, FILM COATED ORAL at 08:02

## 2021-12-04 RX ADMIN — LEVOTHYROXINE SODIUM 150 MCG: 0.07 TABLET ORAL at 08:02

## 2021-12-04 RX ADMIN — PROCHLORPERAZINE EDISYLATE 5 MG: 5 INJECTION INTRAMUSCULAR; INTRAVENOUS at 09:49

## 2021-12-04 RX ADMIN — Medication 10 ML: at 15:24

## 2021-12-04 RX ADMIN — LEVETIRACETAM 1000 MG: 500 TABLET, FILM COATED ORAL at 08:02

## 2021-12-04 RX ADMIN — LEVETIRACETAM 500 MG: 500 TABLET, FILM COATED ORAL at 21:25

## 2021-12-04 NOTE — CONSULTS
Patient not in room when I came to see her. She apparently had a breakthrough seizure at home. Cut down keppra to renal dosing 500 gm bid since she is on HD. Will increase vimpat to 200 gm bid.

## 2021-12-04 NOTE — DIALYSIS
Hepatitis B  Negative 11/3/21 per [facility] Will arrange updated testing.

## 2021-12-04 NOTE — PROGRESS NOTES
Problem: Self Care Deficits Care Plan (Adult)  Goal: *Acute Goals and Plan of Care (Insert Text)  Description: Pt will be modified independence self feeding  Pt will be modified independence sup<->sit in prep for EOB ADL's  Pt will be modified independence  LB dressing EOB level  Pt will be modified independence  sit EOB 10 minutes in prep for EOB ADL's  Pt will be modified independence  grooming EOB level  Pt will be modified independence  sit<-> prep for toilet transfer  Pt will be modified independence  toilet transfer with LRAD  Pt will be modified independence  toileting/cloth mgmt LRAD  Pt will be modified independence  grooming standing sink  Pt will be MI yousuf UE HEP in prep for self care tasks    Outcome: Not Met     OCCUPATIONAL THERAPY EVALUATION  Patient: Alta Krishna (23 y.o. female)  Date: 12/4/2021  Primary Diagnosis: Left-sided weakness [R53.1]  Intractable nausea and vomiting [R11.2]        Precautions:   Fall    ASSESSMENT  Pt is 79 y/o female came to [facility] with frequent falls during the past few days, weakness, fatigue, N/V, dialysis on hold 2/2 pain not feeling well (last dialysis 11/26/21). Pt adm 12/3/21 for generalized weakness, ambulatory dysfunction, stroke r/o. Head CT negative for acute findings (12/3/21). Pt reports to ED multiple times this week with same chief complaints. Pt has hx of anxiety, end-stage renal disease, ESRD on dialysis, arthritis, afib, atherosclerosis, pacemaker, cellulitis, depression, diabetes, heart disease, heartburn, HTN, hypothyroidism, migraines, seizure d/o, sleep d/o, back surgery, leg surgery, and CVA. Pt received supine in bed A&Ox 3 (not time) and agreeable for OT/PT eval/tx. Pt's son in room and information obtained primarily from him and medical chart at this time.  Per pt's son's report, pt lives with her other son and daughter in law in 2 story home with a wheelchair ramp to enter and requires assist for self care and functional transfers/mobility using wheelchair. Pt currently presents with decreased activity tolerance, decreased safety awareness, decreased balance, generalized weakness, and increased need for assist with self care (max A LB dressing don/doff socks supine in bed, min A UB bathing long sitting in bed simulated with wet wipe) and functional mobility/transfers (min A rolling, scooting, supine<->sit<->supine). Pt presents with BUE tremors throughout eval and reports significant N/V and dizziness. Increase in dizziness/nausea with activity (supine to sit). BP reading 149/75 supine in bed. Nurse and NP made aware, NP r/o active seizure. Pt able to follow and execute simple commands. Pt reported decreased sensation to light touch on RUE/RLE. Pt would benefit from skilled OT services while at [facility] in order to increase safety and independence with self care and functional transfers/mobility. Recommend discharge SNF vs home with 24/7 assist when medically appropriate, pending progress with therapy. Other factors to consider for discharge: symptom severity, onset of symptoms, family support, PMH        PLAN :  Recommendations and Planned Interventions: self care training, functional mobility training, therapeutic exercise, balance training, therapeutic activities, patient education, home safety training and family training/education    Frequency/Duration: Patient will be followed by occupational therapy 3-5x/week to address goals. Recommendation for discharge: (in order for the patient to meet his/her long term goals)  To Be Determined; SNF vs home with 24/7 care, pending progress with therapy    This discharge recommendation:  Has been made in collaboration with the attending provider and/or case management    IF patient discharges home will need the following DME: TBD       SUBJECTIVE:   Patient stated I think I'm having a seizure.     OBJECTIVE DATA SUMMARY:   HISTORY:   Past Medical History:   Diagnosis Date    Anxiety disorder     Arthritis     Atherosclerosis of native arteries of the extremities with ulceration (HCC) 8/31/2020    Atrial fibrillation (HCC)     Cardiac pacemaker in situ     Cellulitis and abscess of trunk 8/31/2020    Depression, postpartum     Diabetes ([facility]     Heart disease     Heartburn     Hx of long term use of blood thinners     Hypercholesterolemia     Hypertension     Hypothyroidism     Migraines     Peripheral venous insufficiency 8/31/2020    Seizures ([facility]     Sleep disorder     Stroke ([facility]     2019    Varicose veins of lower extremity with inflammation 8/31/2020     Past Surgical History:   Procedure Laterality Date    HX BACK SURGERY      HX OTHER SURGICAL      leg surgery     HX OTHER SURGICAL      pacemaker monitor        Expanded or extensive additional review of patient history:     Home Situation  Home Environment: Private residence  # Steps to Enter: 0  Wheelchair Ramp: Yes  One/Two Story Residence: Two story, live on 1st floor  Living Alone: No  Support Systems: Child(jose carlos) (son and daughter in law)  Patient Expects to be Discharged to[de-identified] House  Current DME Used/Available at Home: Commode, bedside, Grab bars, Walker, Wheelchair    PLOF: Pt requires assist for ADLS/IADLS, assist with mobility prior to admission. Uses wheelchair at baseline for mobility. EXAMINATION OF PERFORMANCE DEFICITS:  Cognitive/Behavioral Status:  Neurologic State: Confused; Lethargic  Orientation Level: Disoriented to time; Oriented to person; Oriented to place; Oriented to situation  Cognition: Decreased attention/concentration; Poor safety awareness    Safety/Judgement: Decreased awareness of need for assistance; Decreased awareness of need for safety; Decreased insight into deficits; Fall prevention    Hearing:   Auditory  Auditory Impairment: None    Range of Motion:  AROM: Generally decreased, functional    Strength:  Strength: Generally decreased, functional    Coordination:  Coordination: Grossly decreased, non-functional (Pt unable to demo finger opposition 2/2 tremors)  Fine Motor Skills-Upper: Left Impaired; Right Impaired      Tone & Sensation:  Sensation: Impaired (Decreased sensation light tough to RUE )    Balance:  Sitting: Impaired; With support  Sitting - Static: Fair (occasional)  Sitting - Dynamic: Poor (constant support)    Functional Mobility and Transfers for ADLs:  Bed Mobility:  Rolling: Minimum assistance  Supine to Sit: Minimum assistance  Sit to Supine: Minimum assistance  Scooting: Minimum assistance    ADL Assessment:  Lower Body Dressing: Maximum assistance    ADL Intervention and task modifications:  Grooming  Grooming Assistance: Minimum assistance  Position Performed: Long sitting on bed  Washing Face: Minimum assistance    Lower Body Dressing Assistance  Socks: Maximum assistance  Position Performed: Supine    Cognitive Retraining  Safety/Judgement: Decreased awareness of need for assistance; Decreased awareness of need for safety; Decreased insight into deficits; [address] [place] \"6 Clicks\"                                                       Daily Activity Inpatient Short Form  How much help from another person does the patient currently need. .. Total; A Lot A Little None   1. Putting on and taking off regular lower body clothing? []  1 [x]  2 []  3 []  4   2. Bathing (including washing, rinsing, drying)? []  1 [x]  2 []  3 []  4   3. Toileting, which includes using toilet, bedpan or urinal? [] 1 [x]  2 []  3 []  4   4. Putting on and taking off regular upper body clothing? []  1 []  2 [x]  3 []  4   5. Taking care of personal grooming such as brushing teeth? []  1 []  2 [x]  3 []  4   6. Eating meals? []  1 []  2 [x]  3 []  4   © 2007, Trustees [address] [address], under license to [employer].  All rights reserved     Score: 15/24     Interpretation of Tool:  Represents clinically-significant functional categories (i.e. Activities of daily living). Percentage of Impairment CH    0%   CI    1-19% CJ    20-39% CK    40-59% CL    60-79% CM    80-99% CN     100%   [facility]  Score 6-24 24 23 20-22 15-19 10-14 7-9 6        Occupational Therapy Evaluation Charge Determination   History Examination Decision-Making   LOW Complexity : Brief history review  MEDIUM Complexity : 3-5 performance deficits relating to physical, cognitive , or psychosocial skils that result in activity limitations and / or participation restrictions MEDIUM Complexity : Patient may present with comorbidities that affect occupational performnce. Miniml to moderate modification of tasks or assistance (eg, physical or verbal ) with assesment(s) is necessary to enable patient to complete evaluation       Based on the above components, the patient evaluation is determined to be of the following complexity level: LOW   Pain Ratin/10    Activity Tolerance:   Fair  Please refer to the flowsheet for vital signs taken during this treatment. After treatment patient left in no apparent distress:    Supine in bed, Call bell within reach and Caregiver / family present    COMMUNICATION/EDUCATION:   The patients plan of care was discussed with: Physical therapist, Registered nurse and Nurse Practitioner. PT/OT sessions occurred together for increased safety of pt and clinician. Home safety education was provided and the patient/caregiver indicated understanding. and Patient/family agree to work toward stated goals and plan of care. This patients plan of care is appropriate for delegation to [facility].     Thank you for this referral.  Matthew Hurtado OT  Time Calculation: 33 mins

## 2021-12-04 NOTE — PROGRESS NOTES
Problem: Mobility Impaired (Adult and Pediatric)  Goal: *Acute Goals and Plan of Care (Insert Text)  Description: Physical Therapy Goals  Initiated 12/4/21  1)  I with HEP in 7 days to improve overall functional mobility. 2)  Bed mobility with sup in 7 days to prevent skin breakdown. 3)  Pt to perform stand pivot transfer from bed to chair with CGA in 7 days. Pt. Goal:  Pt to be able to walk safely without falls. Outcome: Not Met   PHYSICAL THERAPY EVALUATION  Patient: Cecilia Mantilla (89 y.o. female)  Date: 12/4/2021  Primary Diagnosis: Left-sided weakness [R53.1]  Intractable nausea and vomiting [R11.2]        Precautions:   Fall    ASSESSMENT  Per H&P note: Zack Orosco is a 80 y.o. female who presents to the ED from her dialysis appointment with cc of persistent weakness, intractable nausea and vomiting. Per Warden Noriega [phone] patient has showed up for dialysis 3 times and has been turned away due to overall sickness. Reports regimen to have hemodialysis today in the dialysis unit stated that she was too sick for dialysis and called 911. Subsequently patient has been in the emergency room multiple times this week for same reports. Head CT negative for any acute intracranial findings today and previously 2 days ago. As x-ray negative for acute cardiopulmonary findings. Recent echocardiogram showed left ventricular ejection fraction 50 to 55%. Moderately dilated left atrium, moderate mitral valve regurgitation noted. Assessment patient herself is a poor historian, appears very fatigued. Past medical history significant for A. fib, heart failure with pacemaker, CVA, seizure disorder, end-stage renal disease on HD. \"     Pt A & O x 3 and agreeable to PT/OT with son present in room. Pt reports living in 2 story home with her son and daughter in law but reports residing on the first floor.   Pt reports being I with ADL's and amb short distances with a RW in the home, but also uses w/c in the home. Pt reports the following DME at home: grab bars, hospital bed, w/c, bedside commode, RW. Pt reports 2 falls in the past 3 months. Based on the objective data described below, the patient presents with decreased LE strength, impaired balance, difficulty with  bed mobility requiring min A and difficulty sitting EOB due to dizziness and nausea. Pt requiring additional time for sup to sit transfer and once sitting EOB, pt became very pale and reported dizziness and nausea. Pt sat EOB for about 20 seconds but appeared like she might pass out, so she was assisted back to semi-supine position and given emesis bag where she did dry heave a couple of times. Pt's BP taken in supine and found to be 149/75. Pt appeared to be having some seizure like activity once back in the bed and son expressing concern so went to find Sivlia Suosa NP. She came in to check on her and said it was not a seizure but just the tremors the pt has at baseline. Pt still remaining very pale and nauseated and RN notified once treatment ended. Pt would benefit from skilled PT services to improve LE strength, balance, functional mobility and progress to gait as tolerated. Other factors to consider for discharge: impaired mobility, level of assist     Patient will benefit from skilled therapy intervention to address the above noted impairments. PLAN :  Recommendations and Planned Interventions: bed mobility training, transfer training, gait training, therapeutic exercises, patient and family training/education, and therapeutic activities      Frequency/Duration: Patient will be followed by physical therapy:  3-5x/week to address goals.     Recommendation for discharge: (in order for the patient to meet his/her long term goals)  HHPT with 24/7 family care vs SNF pending progress    This discharge recommendation:  Has been made in collaboration with the attending provider and/or case management    IF patient discharges home will need the following DME: to be determined (TBD)         SUBJECTIVE:   Patient stated i haven't walked in a week.     OBJECTIVE DATA SUMMARY:   HISTORY:    Past Medical History:   Diagnosis Date    Anxiety disorder     Arthritis     Atherosclerosis of native arteries of the extremities with ulceration ([facility] 8/31/2020    Atrial fibrillation (HCC)     Cardiac pacemaker in situ     Cellulitis and abscess of trunk 8/31/2020    Depression, postpartum     Diabetes ([facility]     Heart disease     Heartburn     Hx of long term use of blood thinners     Hypercholesterolemia     Hypertension     Hypothyroidism     Migraines     Peripheral venous insufficiency 8/31/2020    Seizures ([facility]     Sleep disorder     [facility]     [facility]    Varicose veins of lower extremity with inflammation 8/31/2020     Past Surgical History:   Procedure Laterality Date    HX BACK SURGERY      HX OTHER SURGICAL      leg surgery     HX OTHER SURGICAL      pacemaker monitor        Personal factors and/or comorbidities impacting plan of care: impaired mobility, level of assist    Home Situation  Home Environment: Private residence  # Steps to Enter: 0  Wheelchair Ramp: Yes  One/Two Story Residence: Two story, live on 1st floor  Living Alone: No  Support Systems: Child(jose carlos)  Patient Expects to be Discharged to[de-identified] House  Current DME Used/Available at Home: Commode, bedside, Grab bars, Walker, Wheelchair    PLOF: Pt I for ADLS/IADLS, MI with mobility prior to admission.      EXAMINATION/PRESENTATION/DECISION MAKING:   Critical Behavior:  Neurologic State: Confused, Lethargic  Orientation Level: Disoriented to time, Oriented to person, Oriented to place, Oriented to situation  Cognition: Decreased attention/concentration, Poor safety awareness  Safety/Judgement: Decreased awareness of need for assistance, Decreased awareness of need for safety, Decreased insight into deficits, Fall prevention  Hearing: Auditory  Auditory Impairment: None    Range Of Motion:  AROM: Generally decreased, functional      Knee and hip flex ROM minimally restricted B                 Strength:    Strength: Generally decreased, functional       Right Left   HIP FLEXION 3-/5 3-/5   HIP EXTENSION     HIP ABDUCTION     HIP ADDUCTION     KNEE FLEXION 3-/5 3-/5   KNEE EXTENSION 3-/5 3-/5   ANKLE PF  3/5 3/5   ANKLE DF 3/5 3/5     0/5       No palpable muscle contraction  1/5       Palpable muscle contraction, no joint movement  2-/5      Less than full range of motion in gravity eliminated position  2/5       Able to complete full range of motion in gravity eliminated position  2+/5     Able to initiate movement against gravity  3-/5      More than half but not full range of motion against gravity  3/5       Able to complete full range of motion against gravity  3+/5     Completes full range of motion against gravity with minimal resistance  4-/5      Completes full range of motion against gravity with minimal-moderate resistance  4/5       Completes full range of motion against gravity with moderate resistance  4+/5     Completes full range of motion against gravity with moderate-maximum resistance  5/5       Completes full range of motion against gravity with maximum resistance                  Tone & Sensation:                  Sensation: Impaired (Decreased sensation light tough to RUE )      Decreased sensation to LT over R LE         Coordination:  Coordination: Grossly decreased, non-functional (Pt unable to demo finger opposition 2/2 tremors)  Vision:      Functional Mobility:  Bed Mobility:  Rolling: Minimum assistance  Supine to Sit: Minimum assistance  Sit to Supine: Minimum assistance  Scooting: Minimum assistance  Transfers:                             Balance:   Sitting: Impaired;  With support  Sitting - Static: Fair (occasional)  Sitting - Dynamic: Poor (constant support)  Ambulation/Gait Training: Functional Measure:    [facility] AM-PAC 6 Clicks         Basic Mobility Inpatient Short Form  How much difficulty does the patient currently have. .. Unable A Lot A Little None   1. Turning over in bed (including adjusting bedclothes, sheets and blankets)? [] 1   [] 2   [x] 3   [] 4   2. Sitting down on and standing up from a chair with arms ( e.g., wheelchair, bedside commode, etc.)   [] 1   [x] 2   [] 3   [] 4   3. Moving from lying on back to sitting on the side of the bed? [] 1   [] 2   [x] 3   [] 4          How much help from another person does the patient currently need. .. Total A Lot A Little None   4. Moving to and from a bed to a chair (including a wheelchair)? [] 1   [x] 2   [] 3   [] 4   5. Need to walk in hospital room? [] 1   [x] 2   [] 3   [] 4   6. Climbing 3-5 steps with a railing? [] 1   [x] 2   [] 3   [] 4   © 2007, Trustees of [facility], under license to [employer]. All rights reserved     Score:  Initial: 14 Most Recent: X (Date:21)   Interpretation of Tool:  Represents activities that are increasingly more difficult (i.e. Bed mobility, Transfers, Gait).   Score 24 23 22-20 19-15 14-10 9-7 6   Modifier CH CI CJ CK CL CM CN          Physical Therapy Evaluation Charge Determination   History Examination Presentation Decision-Making   MEDIUM  Complexity : 1-2 comorbidities / personal factors will impact the outcome/ POC  MEDIUM Complexity : 3 Standardized tests and measures addressing body structure, function, activity limitation and / or participation in recreation  LOW Complexity : Stable, uncomplicated  Other Functional Measure [facility] 6 low      Based on the above components, the patient evaluation is determined to be of the following complexity level: LOW     Pain Ratin/10    Activity Tolerance:   Fair, requires rest breaks, and signs and symptoms of orthostatic hypotension  Please refer to the flowsheet for vital signs taken during this treatment. After treatment patient left in no apparent distress:   Supine in bed, Call bell within reach, and Caregiver / family present    COMMUNICATION/EDUCATION:   The patients plan of care was discussed with: Occupational therapist, Registered nurse, and Physician. Patient/family agree to work toward stated goals and plan of care. PT/OT sessions occurred together for increased safety of pt and clinician.      Thank you for this referral.  Mariam Mccall   Time Calculation: 32 mins

## 2021-12-04 NOTE — PROGRESS NOTES
Problem: Falls - Risk of  Goal: *Absence of Falls  Description: Document Torres Velarde Fall Risk and appropriate interventions in the flowsheet. Outcome: Progressing Towards Goal  Note: Fall Risk Interventions:  Mobility Interventions: Utilize walker, cane, or other assistive device    Mentation Interventions: Adequate sleep, hydration, pain control, Bed/chair exit alarm    Medication Interventions: Teach patient to arise slowly    Elimination Interventions: Bed/chair exit alarm, Call light in reach    History of Falls Interventions: Bed/chair exit alarm         Problem: Patient Education: Go to Patient Education Activity  Goal: Patient/Family Education  Outcome: Progressing Towards Goal     Problem: Pressure Injury - Risk of  Goal: *Prevention of pressure injury  Description: Document Nitin Scale and appropriate interventions in the flowsheet. Outcome: Progressing Towards Goal  Note: Pressure Injury Interventions:  Sensory Interventions: Assess changes in LOC, Assess need for specialty bed, Float heels, Keep linens dry and wrinkle-free, Minimize linen layers    Moisture Interventions: Absorbent underpads, Apply protective barrier, creams and emollients, Minimize layers    Activity Interventions: PT/OT evaluation    Mobility Interventions: PT/OT evaluation, HOB 30 degrees or less, Turn and reposition approx.  every two hours(pillow and wedges)    Nutrition Interventions: Document food/fluid/supplement intake    Friction and Shear Interventions: Apply protective barrier, creams and emollients, Minimize layers                Problem: Patient Education: Go to Patient Education Activity  Goal: Patient/Family Education  Outcome: Progressing Towards Goal

## 2021-12-04 NOTE — CONSULTS
Nephrology Consult    Patient: Cecilia Mantilla MRN: [id number]  SSN: [id number]    YOB: 1950  Age: 80 y.o. Sex: female      Subjective:   Reason for the consultation. End-stage renal disease  History of present illness the patient is 31-year-old woman with underlying history of ESRD, on hemodialysis at [address] [facility] renal care in [place], hypertension, depression, atrial fibrillation was admitted with generalized weakness, nausea/vomiting, missed dialysis sessions. No chest complaints or worsening shortness of breath. No lower extremity swelling.     Past Medical History:   Diagnosis Date    Anxiety disorder     Arthritis     Atherosclerosis of native arteries of the extremities with ulceration ([facility] 8/31/2020    Atrial fibrillation (HCC)     Cardiac pacemaker in situ     Cellulitis and abscess of trunk 8/31/2020    Depression, postpartum     Diabetes ([address]     Heart disease     Heartburn     Hx of long term use of blood thinners     Hypercholesterolemia     Hypertension     Hypothyroidism     Migraines     Peripheral venous insufficiency 8/31/2020    Seizures ([address]     Sleep disorder     Stroke ([facility] [address]     2019    Varicose veins of lower extremity with inflammation 8/31/2020     Past Surgical History:   Procedure Laterality Date    HX BACK SURGERY      HX OTHER SURGICAL      leg surgery     HX OTHER SURGICAL      pacemaker monitor       Family History   Problem Relation Age of Onset    Heart Disease Mother     Heart Disease Father     Diabetes Sister     Diabetes Brother      Social History     Tobacco Use    Smoking status: Never Smoker    Smokeless tobacco: Never Used   Substance Use Topics    Alcohol use: Never      Current Facility-Administered Medications   Medication Dose Route Frequency Provider Last Rate Last Admin    levETIRAcetam (KEPPRA) tablet 500 mg  500 mg Oral BID Klaus Guzman MD        lacosamide (VIMPAT) tablet 200 mg  200 mg Oral BID Klaus Guzman MD  sodium chloride (NS) flush 5-40 mL  5-40 mL IntraVENous Q8H Arloa Montane, NP   10 mL at 12/04/21 0552    sodium chloride (NS) flush 5-40 mL  5-40 mL IntraVENous PRN Arloa Montane, NP        acetaminophen (TYLENOL) tablet 650 mg  650 mg Oral Q6H PRN Arloa Montane, NP        Or   [facility] acetaminophen (TYLENOL) suppository 650 mg  650 mg Rectal Q6H PRN Arloa Montane, NP        polyethylene glycol (MIRALAX) packet 17 g  17 g Oral DAILY PRN Arloa Montane, NP        ondansetron (ZOFRAN ODT) tablet 4 mg  4 mg Oral Q8H PRN Arloa Montane, NP        Or    ondansetron (ZOFRAN) injection 4 mg  4 mg IntraVENous Q6H PRN Arloa Montane, NP        heparin (porcine) injection 5,000 Units  5,000 Units SubCUTAneous Q8H Arloa Montane, NP   5,000 Units at 12/04/21 0552    prochlorperazine (COMPAZINE) with saline injection 5 mg  5 mg IntraVENous Q6H PRN Arloa Montane, NP   5 mg at 12/04/21 0949    atorvastatin (LIPITOR) tablet 40 mg  40 mg Oral QHS Arloa Montane, NP   40 mg at 12/03/21 2203    levothyroxine (SYNTHROID) tablet 150 mcg  150 mcg Oral DAILY Arloa Montane, NP   150 mcg at 12/04/21 0802    FLUoxetine (PROzac) capsule 10 mg  10 mg Oral DAILY Arloa Montane, NP   10 mg at 12/04/21 0802    metoprolol tartrate (LOPRESSOR) tablet 25 mg  25 mg Oral DAILY Arloa Montane, NP   25 mg at 12/04/21 0802    sevelamer carbonate (RENVELA) tab 800 mg  800 mg Oral TID WITH MEALS Arloa Montane, NP   800 mg at 12/04/21 0802        No Known Allergies    Review of Systems:  A comprehensive review of systems was negative except for that written in the History of Present Illness.     Objective:     Vitals:    12/04/21 0359 12/04/21 0717 12/04/21 0800 12/04/21 1022   BP:  (!) 143/75  132/64   Pulse: 60 60 60 85   Resp:  18  18   Temp:  97.3 °F (36.3 °C)     TempSrc:    Oral   SpO2:  100%     Weight:       Height:            Physical Exam:  General: NAD  Eyes: sclera anicteric  Oral Cavity: No thrush or ulcers  Neck: no JVD  Chest: Fair bilateral air entry  Heart: normal sounds  Abdomen: soft and non tender   : no gee  Lower Extremities: no edema  Skin: no rash  Neuro: intact  Psychiatric: non-depressed            Assessment:     Hospital Problems  Date Reviewed: 6/11/2021          Codes Class Noted POA    Left-sided weakness ICD-10-CM: R53.1  ICD-9-CM: 728.87  12/3/2021 Unknown        * (Principal) Intractable nausea and vomiting ICD-10-CM: R11.2  ICD-9-CM: 536.2  12/3/2021 Unknown              Plan:   1 ESRD. -On hemodialysis at [address] [facility] [address] renal care in [place].    -She missed dialysis for 3 treatments.  -She came with BUN 78/creatinine 6.57 and potassium 5.4.    -Clinically has symptoms suggestive of uremia. No volume overload. -Plan for hemodialysis today and on Monday.  -She has left upper arm AV fistula has a dialysis access    2 hyperkalemia:  -From missed dialysis  -K is 5.4  -Plan to use 2K bath    3 anemia.   - Hemoglobin 13.2  -No indication for erythropoietin    4 renal osteodystrophy.    -Her calcium and phosphorus are okay. -will continue phosphorus binders    5 history of seizure disorder.    -On Vimpat 200 mg 2 times a day. -On Keppra 500 mg 2 times a day. 6 intractable nausea vomiting.    -It can be from uremia. Plan to dialyze today. Zofran as needed. Thank you for the consultation.     Signed By: Georgia Madison MD     December 4, 2021

## 2021-12-04 NOTE — PROGRESS NOTES
Hospitalist Progress Note         [facility]    Daily Progress Note: 12/4/2021    Lisa Born is a 80 y.o. female who presents to the ED from her dialysis appointment with cc of persistent weakness, intractable nausea and vomiting. Chest x-ray negative for acute cardiopulmonary findings. Recent echocardiogram showed left ventricular ejection fraction 50 to 55%. Moderately dilated left atrium, moderate mitral valve regurgitation noted. Resting tremors noted on examination. Caretaker reports increased in tremors and seizure activity at home due to inability to hold meds down. Subjective:   Subjective   Patient examined lying in bed with son at bedside  Resting tremors noted on examination of face (mild)  No overnight events reported  No acute distress noted on examination    Review of Systems:   Review of Systems   Constitutional: Positive for malaise/fatigue. HENT: Negative. Eyes: Negative. Respiratory: Negative. Cardiovascular: Negative. Gastrointestinal: Positive for nausea. Genitourinary: Negative. Musculoskeletal: Negative. Skin: Negative. Neurological: Positive for tremors and weakness. Psychiatric/Behavioral: Negative for depression and suicidal ideas. Objective:   Objective      Vitals:  Patient Vitals for the past 12 hrs:   Temp Pulse Resp BP SpO2   12/04/21 0800  60      12/04/21 0717 97.3 °F (36.3 °C) 60 18 (!) 143/75 100 %   12/04/21 0359  60      12/04/21 0117 97.5 °F (36.4 °C) 60 18 (!) 150/77 97 %   12/04/21 0000  60      12/03/21 2341 97.8 °F (36.6 °C) 60 22 (!) 147/74 97 %        Physical Exam:  Physical Exam  Vitals and nursing note reviewed. Constitutional:       Appearance: Normal appearance. Eyes:      Conjunctiva/sclera: Conjunctivae normal.   Cardiovascular:      Rate and Rhythm: Normal rate. Heart sounds: Normal heart sounds.    Pulmonary:      Effort: Pulmonary effort is normal.      Breath sounds: Normal breath sounds. Abdominal:      General: Bowel sounds are normal.   Musculoskeletal:         General: Normal range of motion. Cervical back: Normal range of motion. Skin:     General: Skin is warm and dry. Capillary Refill: Capillary refill takes less than 2 seconds. Neurological:      Mental Status: She is alert. Mental status is at baseline. Motor: Weakness present.       Gait: Gait abnormal.      Comments: Resting facial and hand tremors          Lab Results:  Recent Results (from the past 24 hour(s))   D DIMER    Collection Time: 12/03/21  5:26 PM   Result Value Ref Range    D DIMER 2.62 (H) <0.50 ug/ml(FEU)   METABOLIC PANEL, BASIC    Collection Time: 12/03/21  5:26 PM   Result Value Ref Range    Sodium 137 136 - 145 mmol/L    Potassium 5.4 (H) 3.5 - 5.1 mmol/L    Chloride 105 97 - 108 mmol/L    CO2 24 21 - 32 mmol/L    Anion gap 8 5 - 15 mmol/L    Glucose 95 65 - 100 mg/dL    BUN 80 (H) 6 - 20 mg/dL    Creatinine 6.58 (H) 0.55 - 1.02 mg/dL    BUN/Creatinine ratio 12 12 - 20      GFR est AA 8 (L) >60 ml/min/1.73m2    GFR est non-AA 6 (L) >60 ml/min/1.73m2    Calcium 7.7 (L) 8.5 - 10.1 mg/dL   TSH 3RD GENERATION    Collection Time: 12/03/21  5:26 PM   Result Value Ref Range    TSH 1.00 0.36 - 3.74 uIU/mL   C REACTIVE PROTEIN, QT    Collection Time: 12/03/21  5:26 PM   Result Value Ref Range    C-Reactive protein 0.60 0.00 - 0.60 mg/dL   LACTIC ACID    Collection Time: 12/03/21  5:26 PM   Result Value Ref Range    Lactic acid 1.8 0.4 - 2.0 mmol/L   CBC WITH AUTOMATED DIFF    Collection Time: 12/04/21  6:25 AM   Result Value Ref Range    WBC 5.5 3.6 - 11.0 K/uL    RBC 4.29 3.80 - 5.20 M/uL    HGB 13.2 11.5 - 16.0 g/dL    HCT 44.5 35.0 - 47.0 %    .7 (H) 80.0 - 99.0 FL    MCH 30.8 26.0 - 34.0 PG    MCHC 29.7 (L) 30.0 - 36.5 g/dL    RDW 13.9 11.5 - 14.5 %    PLATELET 65 (L) 850 - 400 K/uL    MPV 13.3 (H) 8.9 - 12.9 FL    NRBC 0.0 0.0  WBC    ABSOLUTE NRBC 0.00 0.00 - 0.01 K/uL NEUTROPHILS 56 32 - 75 %    LYMPHOCYTES 33 12 - 49 %    MONOCYTES 8 5 - 13 %    EOSINOPHILS 1 0 - 7 %    BASOPHILS 1 0 - 1 %    IMMATURE GRANULOCYTES 1 (H) 0 - 0.5 %    ABS. NEUTROPHILS 3.1 1.8 - 8.0 K/UL    ABS. LYMPHOCYTES 1.8 0.8 - 3.5 K/UL    ABS. MONOCYTES 0.4 0.0 - 1.0 K/UL    ABS. EOSINOPHILS 0.1 0.0 - 0.4 K/UL    ABS. BASOPHILS 0.1 0.0 - 0.1 K/UL    ABS. IMM. GRANS. 0.0 0.00 - 0.04 K/UL    DF AUTOMATED     METABOLIC PANEL, BASIC    Collection Time: 12/04/21  6:25 AM   Result Value Ref Range    Sodium 139 136 - 145 mmol/L    Potassium 5.4 (H) 3.5 - 5.1 mmol/L    Chloride 109 (H) 97 - 108 mmol/L    CO2 19 (L) 21 - 32 mmol/L    Anion gap 11 5 - 15 mmol/L    Glucose 87 65 - 100 mg/dL    BUN 78 (H) 6 - 20 mg/dL    Creatinine 6.57 (H) 0.55 - 1.02 mg/dL    BUN/Creatinine ratio 12 12 - 20      GFR est AA 8 (L) >60 ml/min/1.73m2    GFR est non-AA 6 (L) >60 ml/min/1.73m2    Calcium 7.8 (L) 8.5 - 10.1 mg/dL   GLUCOSE, POC    Collection Time: 12/04/21  8:18 AM   Result Value Ref Range    Glucose (POC) 81 65 - 117 mg/dL    Performed by [facility]           Diagnostic Images:  CT Results  (Last 48 hours)               12/03/21 0934  CT HEAD WO CONT Final result    Impression:  Age-appropriate atrophy. No acute findings. Narrative:  CT dose reduction was achieved through use of a standardized protocol tailored   for this examination and automatic exposure control for dose modulation. CT Head       History:        The sulci are prominent but symmetric. Periventricular and deep white matter   hypodensity is not unexpected for age. Small chronic left PCA infarct, as seen   in December 1. No acute abnormality seen gray or white matter. Ventricles are   symmetric and appropriate for atrophy. There is no midline shift or mass effect,   or evidence of hemorrhage. Bone windows show no fracture.                  Current Medications:    Current Facility-Administered Medications:     sodium chloride (NS) flush 5-40 mL, 5-40 mL, IntraVENous, Q8H, Justin Holland NP, 10 mL at 12/04/21 0552    sodium chloride (NS) flush 5-40 mL, 5-40 mL, IntraVENous, PRN, Justin Holland NP    acetaminophen (TYLENOL) tablet 650 mg, 650 mg, Oral, Q6H PRN **OR** acetaminophen (TYLENOL) suppository 650 mg, 650 mg, Rectal, Q6H PRN, Justin Holland NP    polyethylene glycol (MIRALAX) packet 17 g, 17 g, Oral, DAILY PRN, Justin Holland NP    ondansetron (ZOFRAN ODT) tablet 4 mg, 4 mg, Oral, Q8H PRN **OR** ondansetron (ZOFRAN) injection 4 mg, 4 mg, IntraVENous, Q6H PRN, Justin Holland NP    heparin (porcine) injection 5,000 Units, 5,000 Units, SubCUTAneous, Q8H, Alma Rosa Lazo NP, 5,000 Units at 12/04/21 0552    prochlorperazine (COMPAZINE) with saline injection 5 mg, 5 mg, IntraVENous, Q6H PRN, Justin Holland NP, 5 mg at 12/04/21 0949    atorvastatin (LIPITOR) tablet 40 mg, 40 mg, Oral, QHS, Alma Rosa Lazo NP, 40 mg at 12/03/21 2203    levothyroxine (SYNTHROID) tablet 150 mcg, 150 mcg, Oral, DAILY, Alma Rosa Lazo NP, 150 mcg at 12/04/21 0802    FLUoxetine (PROzac) capsule 10 mg, 10 mg, Oral, DAILY, Zeinab Lazo NP, 10 mg at 12/04/21 0802    levETIRAcetam (KEPPRA) tablet 1,000 mg, 1,000 mg, Oral, BID, Justin Holland NP, 1,000 mg at 12/04/21 0802    metoprolol tartrate (LOPRESSOR) tablet 25 mg, 25 mg, Oral, DAILY, Zeinab Lazo NP, 25 mg at 12/04/21 0802    sevelamer carbonate (RENVELA) tab 800 mg, 800 mg, Oral, TID WITH MEALS, Justin Holland NP, 800 mg at 12/04/21 0802    lacosamide (VIMPAT) tablet 150 mg, 150 mg, Oral, BID, Justin Holland NP, 150 mg at 12/04/21 0802       ASSESSMENT:    1. Generalized weakness/ambulatory dysfunction:  Obtain PT/OT evaluation  Supportive care  Fall precautions     2. Intractable nausea and vomiting:  -encourage oral hydration  -prn zofran and compazine  -continue supportive care     3.  End-stage renal disease:  -daughter reports missed HD x 1 week  -BNP > 15,000  -nephrology for HD management  -Plan for HD today     4. Seizure disorder:  -continue keppra 1000mg bid, vimpat 150mg bid  -prn ativan for breakthrough sz     5. Peripheral vascular disease:  -continue lipitor 40mg  -outpatient vascular workup warranted     6. Atrial fibrillation:  -Currently stable on tele  -continue metoprolol 25mg     7. Hyperkalemia and hypomagnesemia:  -elevated suspected due to missed HD  -expect correction w/ HD      8. Depression:  -Continue prozac     9. Hypothyroidism:  -currently on syntrhoid 150mcg  -tsh 1      Full Code  Dvt Prophylaxis heparin  GI Prophylaxis none  Disposition:  -pending pt/ot eval  -neurology eval  -HD today and Monday  - dispo planning      Above treatment plan reviewed and discussed with patient and son in detail at bedside, all questions answered. Daughter-in-law Fritz Matthews [phone] updated    Care Plan discussed with: Interdisciplinary team    Total time spent with patient: 35 minutes.     Rishabh Almaraz NP

## 2021-12-04 NOTE — DIALYSIS
3.5 hour HD with 3000 ml fluid removal.  Pt tolerated procedure well with no c/o dizziness, nausea, shortness of breath, or lightheadedness. Pt much more alert and awake post dialysis as compared to pre treatment.

## 2021-12-05 ENCOUNTER — APPOINTMENT (OUTPATIENT)
Dept: NON INVASIVE DIAGNOSTICS | Age: 71
DRG: 252 | End: 2021-12-05
Attending: INTERNAL MEDICINE
Payer: MEDICARE

## 2021-12-05 PROBLEM — E87.70 FLUID OVERLOAD: Status: ACTIVE | Noted: 2021-12-05

## 2021-12-05 LAB
ALBUMIN SERPL-MCNC: 3.1 G/DL (ref 3.5–5)
ANION GAP SERPL CALC-SCNC: 8 MMOL/L (ref 5–15)
BUN SERPL-MCNC: 41 MG/DL (ref 6–20)
BUN/CREAT SERPL: 9 (ref 12–20)
CA-I BLD-MCNC: 7.8 MG/DL (ref 8.5–10.1)
CHLORIDE SERPL-SCNC: 105 MMOL/L (ref 97–108)
CO2 SERPL-SCNC: 27 MMOL/L (ref 21–32)
CREAT SERPL-MCNC: 4.41 MG/DL (ref 0.55–1.02)
GLUCOSE BLD STRIP.AUTO-MCNC: 126 MG/DL (ref 65–117)
GLUCOSE BLD STRIP.AUTO-MCNC: 61 MG/DL (ref 65–117)
GLUCOSE BLD STRIP.AUTO-MCNC: 67 MG/DL (ref 65–117)
GLUCOSE BLD STRIP.AUTO-MCNC: 67 MG/DL (ref 65–117)
GLUCOSE BLD STRIP.AUTO-MCNC: 85 MG/DL (ref 65–117)
GLUCOSE BLD STRIP.AUTO-MCNC: 89 MG/DL (ref 65–117)
GLUCOSE BLD STRIP.AUTO-MCNC: 95 MG/DL (ref 65–117)
GLUCOSE BLD STRIP.AUTO-MCNC: 97 MG/DL (ref 65–117)
GLUCOSE SERPL-MCNC: 66 MG/DL (ref 65–100)
HBV SURFACE AG SER QL: <0.1 INDEX
HBV SURFACE AG SER QL: NEGATIVE
PERFORMED BY, TECHID: ABNORMAL
PERFORMED BY, TECHID: ABNORMAL
PERFORMED BY, TECHID: NORMAL
PHOSPHATE SERPL-MCNC: 4 MG/DL (ref 2.6–4.7)
POTASSIUM SERPL-SCNC: 4.2 MMOL/L (ref 3.5–5.1)
RIGHT DIST BRACHIAL A EDV: 273 CM/S
RIGHT DIST BRACHIAL A PSV: 603 CM/S
RIGHT DIST RADIAL A PSV: 29.9 CM/S
RIGHT MID AX A PSV: 170 CM/S
RIGHT MID RADIAL A PSV: 20.2 CM/S
RIGHT PROX BRACHIAL A EDV: 108 CM/S
RIGHT PROX BRACHIAL A PSV: 195 CM/S
RIGHT PROX RADIAL A PSV: 45.7 CM/S
SODIUM SERPL-SCNC: 140 MMOL/L (ref 136–145)
VAS RIGHT SUBCLAVIAN PROX EDV: 50.6 CM/S
VAS RIGHT SUBCLAVIAN PROX PSV: 133 CM/S

## 2021-12-05 PROCEDURE — 74011250636 HC RX REV CODE- 250/636: Performed by: HOSPITALIST

## 2021-12-05 PROCEDURE — 97530 THERAPEUTIC ACTIVITIES: CPT

## 2021-12-05 PROCEDURE — 74011250636 HC RX REV CODE- 250/636: Performed by: NURSE PRACTITIONER

## 2021-12-05 PROCEDURE — 97110 THERAPEUTIC EXERCISES: CPT

## 2021-12-05 PROCEDURE — 80069 RENAL FUNCTION PANEL: CPT

## 2021-12-05 PROCEDURE — 65270000029 HC RM PRIVATE

## 2021-12-05 PROCEDURE — 96372 THER/PROPH/DIAG INJ SC/IM: CPT

## 2021-12-05 PROCEDURE — 87340 HEPATITIS B SURFACE AG IA: CPT

## 2021-12-05 PROCEDURE — 74011250637 HC RX REV CODE- 250/637: Performed by: PSYCHIATRY & NEUROLOGY

## 2021-12-05 PROCEDURE — 74011250637 HC RX REV CODE- 250/637: Performed by: NURSE PRACTITIONER

## 2021-12-05 PROCEDURE — 82962 GLUCOSE BLOOD TEST: CPT

## 2021-12-05 PROCEDURE — 36415 COLL VENOUS BLD VENIPUNCTURE: CPT

## 2021-12-05 PROCEDURE — G0378 HOSPITAL OBSERVATION PER HR: HCPCS

## 2021-12-05 PROCEDURE — 93931 UPPER EXTREMITY STUDY: CPT

## 2021-12-05 PROCEDURE — 93931 UPPER EXTREMITY STUDY: CPT | Performed by: SURGERY

## 2021-12-05 RX ORDER — HEPARIN SODIUM 10000 [USP'U]/100ML
12-25 INJECTION, SOLUTION INTRAVENOUS
Status: DISCONTINUED | OUTPATIENT
Start: 2021-12-05 | End: 2021-12-05

## 2021-12-05 RX ORDER — CALCITRIOL 0.25 UG/1
0.5 CAPSULE ORAL DAILY
Status: DISCONTINUED | OUTPATIENT
Start: 2021-12-05 | End: 2021-12-11 | Stop reason: HOSPADM

## 2021-12-05 RX ORDER — PROCHLORPERAZINE MALEATE 5 MG
5 TABLET ORAL
Status: DISCONTINUED | OUTPATIENT
Start: 2021-12-05 | End: 2021-12-11 | Stop reason: HOSPADM

## 2021-12-05 RX ORDER — FAMOTIDINE 10 MG/1
10 TABLET ORAL 2 TIMES DAILY
Status: DISCONTINUED | OUTPATIENT
Start: 2021-12-05 | End: 2021-12-11 | Stop reason: HOSPADM

## 2021-12-05 RX ORDER — HEPARIN SODIUM 1000 [USP'U]/ML
30 INJECTION, SOLUTION INTRAVENOUS; SUBCUTANEOUS AS NEEDED
Status: DISCONTINUED | OUTPATIENT
Start: 2021-12-05 | End: 2021-12-06

## 2021-12-05 RX ORDER — HEPARIN SODIUM 1000 [USP'U]/ML
60 INJECTION, SOLUTION INTRAVENOUS; SUBCUTANEOUS AS NEEDED
Status: DISCONTINUED | OUTPATIENT
Start: 2021-12-05 | End: 2021-12-06

## 2021-12-05 RX ADMIN — PROCHLORPERAZINE MALEATE 5 MG: 5 TABLET ORAL at 10:57

## 2021-12-05 RX ADMIN — ATORVASTATIN CALCIUM 40 MG: 40 TABLET, FILM COATED ORAL at 22:52

## 2021-12-05 RX ADMIN — LACOSAMIDE 200 MG: 100 TABLET, FILM COATED ORAL at 08:54

## 2021-12-05 RX ADMIN — LEVETIRACETAM 500 MG: 500 TABLET, FILM COATED ORAL at 22:52

## 2021-12-05 RX ADMIN — SEVELAMER CARBONATE 800 MG: 800 TABLET, FILM COATED ORAL at 16:33

## 2021-12-05 RX ADMIN — FLUOXETINE HYDROCHLORIDE 10 MG: 10 CAPSULE ORAL at 10:59

## 2021-12-05 RX ADMIN — CALCITRIOL CAPSULES 0.25 MCG 0.5 MCG: 0.25 CAPSULE ORAL at 11:00

## 2021-12-05 RX ADMIN — Medication 10 ML: at 22:56

## 2021-12-05 RX ADMIN — HEPARIN SODIUM 5000 UNITS: 5000 INJECTION INTRAVENOUS; SUBCUTANEOUS at 05:42

## 2021-12-05 RX ADMIN — SEVELAMER CARBONATE 800 MG: 800 TABLET, FILM COATED ORAL at 08:54

## 2021-12-05 RX ADMIN — FAMOTIDINE 10 MG: 10 TABLET ORAL at 22:55

## 2021-12-05 RX ADMIN — SEVELAMER CARBONATE 800 MG: 800 TABLET, FILM COATED ORAL at 12:00

## 2021-12-05 RX ADMIN — HEPARIN SODIUM 12 UNITS/KG/HR: 10000 INJECTION, SOLUTION INTRAVENOUS at 21:55

## 2021-12-05 RX ADMIN — Medication 10 ML: at 05:45

## 2021-12-05 RX ADMIN — LEVOTHYROXINE SODIUM 150 MCG: 0.07 TABLET ORAL at 08:54

## 2021-12-05 RX ADMIN — LEVETIRACETAM 500 MG: 500 TABLET, FILM COATED ORAL at 08:54

## 2021-12-05 RX ADMIN — LACOSAMIDE 200 MG: 100 TABLET, FILM COATED ORAL at 22:52

## 2021-12-05 RX ADMIN — METOPROLOL TARTRATE 25 MG: 25 TABLET, FILM COATED ORAL at 08:54

## 2021-12-05 NOTE — PROGRESS NOTES
Nephrology Consult    Patient: Crissy Davis MRN: [id number]  SSN: [id number]    YOB: 1950  Age: 80 y.o.   Sex: female      Subjective:   Patient is seen at the bedside  She is awake but weak and lethargic  She was dialyzed yesterday  No noticed lower extremity swelling and on room air  She is noticed to have right hand bluish discoloration and no palpable radial pulse  No complaint of hand pain  She has right arm AV fistula      Past Medical History:   Diagnosis Date    Anxiety disorder     Arthritis     Atherosclerosis of native arteries of the extremities with ulceration ([facility] [address] 8/31/2020    Atrial fibrillation ([address]     Cardiac pacemaker in situ     Cellulitis and abscess of trunk 8/31/2020    Depression, postpartum     Diabetes ([address]     Heart disease     Heartburn     Hx of long term use of blood thinners     Hypercholesterolemia     Hypertension     Hypothyroidism     Migraines     Peripheral venous insufficiency 8/31/2020    Seizures ([address]     Sleep disorder     Stroke ([facility] [address]     2019    Varicose veins of lower extremity with inflammation 8/31/2020     Past Surgical History:   Procedure Laterality Date    HX BACK SURGERY      HX OTHER SURGICAL      leg surgery     HX OTHER SURGICAL      pacemaker monitor       Family History   Problem Relation Age of Onset    Heart Disease Mother     Heart Disease Father     Diabetes Sister     Diabetes Brother      Social History     Tobacco Use    Smoking status: Never Smoker    Smokeless tobacco: Never Used   Substance Use Topics    Alcohol use: Never      Current Facility-Administered Medications   Medication Dose Route Frequency Provider Last Rate Last Admin    calcitRIOL (ROCALTROL) capsule 0.5 mcg  0.5 mcg Oral DAILY Cm Leigh NP        prochlorperazine (COMPAZINE) tablet 5 mg  5 mg Oral Q6H PRN Cm Leigh NP   5 mg at 12/05/21 1057    famotidine (PF) (PEPCID) 10 mg in 0.9% sodium chloride 10 mL injection 10 mg IntraVENous Q12H Salvador Johnson NP        levETIRAcetam (KEPPRA) tablet 500 mg  500 mg Oral BID Aaliyah Kay MD   500 mg at 12/05/21 0854    lacosamide (VIMPAT) tablet 200 mg  200 mg Oral BID Aaliyah Kay MD   200 mg at 12/05/21 0854    sodium chloride (NS) flush 5-40 mL  5-40 mL IntraVENous Q8H Salvador Johnson NP   10 mL at 12/05/21 0545    sodium chloride (NS) flush 5-40 mL  5-40 mL IntraVENous PRN Salvador Johnson NP        acetaminophen (TYLENOL) tablet 650 mg  650 mg Oral Q6H PRN Salvador Johnson NP        Or   Bernestine Holger acetaminophen (TYLENOL) suppository 650 mg  650 mg Rectal Q6H PRN Salvador Johnson NP        polyethylene glycol (MIRALAX) packet 17 g  17 g Oral DAILY PRN Salvador Johnson NP        ondansetron (ZOFRAN ODT) tablet 4 mg  4 mg Oral Q8H PRN Salvador Johnson NP        Or    ondansetron (ZOFRAN) injection 4 mg  4 mg IntraVENous Q6H PRN Salvador Johnson NP        heparin (porcine) injection 5,000 Units  5,000 Units SubCUTAneous Q8H Salvador Johnson NP   5,000 Units at 12/05/21 0542    atorvastatin (LIPITOR) tablet 40 mg  40 mg Oral QHS Salvador Johnson NP   40 mg at 12/04/21 2125    levothyroxine (SYNTHROID) tablet 150 mcg  150 mcg Oral DAILY Salvador Johnson NP   150 mcg at 12/05/21 4800    FLUoxetine (PROzac) capsule 10 mg  10 mg Oral DAILY Salvador Johnson NP   10 mg at 12/05/21 1059    metoprolol tartrate (LOPRESSOR) tablet 25 mg  25 mg Oral DAILY Salvador Johnson NP   25 mg at 12/05/21 0854    sevelamer carbonate (RENVELA) tab 800 mg  800 mg Oral TID WITH MEALS Salvador Johnson NP   800 mg at 12/05/21 0854        No Known Allergies    Review of Systems:  A comprehensive review of systems was negative except for that written in the History of Present Illness.     Objective:     Vitals:    12/04/21 2303 12/05/21 0000 12/05/21 0800 12/05/21 0812   BP: 134/62   (!) 141/66   Pulse: 64 60 60 61   Resp: 18   16   Temp: 97.5 °F (36.4 °C)   97.8 °F (36.6 °C)   TempSrc:       SpO2: 98%   99%   Weight: Height:            Physical Exam:  General: NAD  Eyes: sclera anicteric  Oral Cavity: No thrush or ulcers  Neck: no JVD  Chest: Fair bilateral air entry  Heart: normal sounds  Abdomen: soft and non tender   : no gee  Lower Extremities: no edema  Upper extremity: R hand bluish discoloration and Radial pulse not palpable  Skin: no rash  Neuro: intact  Psychiatric: non-depressed            Assessment:     Hospital Problems  Date Reviewed: 6/11/2021          Codes Class Noted POA    Fluid overload ICD-10-CM: E87.70  ICD-9-CM: 276.69  12/5/2021 Unknown        Left-sided weakness ICD-10-CM: R53.1  ICD-9-CM: 728.87  12/3/2021 Unknown        * (Principal) Intractable nausea and vomiting ICD-10-CM: R11.2  ICD-9-CM: 536.2  12/3/2021 Unknown        Seizure ([address] ICD-10-CM: R56.9  ICD-9-CM: 334.51  5/19/2021 Unknown              Plan:   1 ESRD. -On hemodialysis at [address] renal care in [place].    -She missed dialysis for 3 treatments.  -She came with BUN 78/creatinine 6.57 and potassium 5.4.    -Clinically had symptoms suggestive of uremia. No volume overload. -She was dialyzed yesterday  -No noticed lower extremity swelling and on room air  -She is noticed to have right hand bluish discoloration and no palpable radial pulse  -No complaint of hand pain  -She has right arm AV fistula  -Ordered arterial duplex of the right arm  -Consulted general surgery Dr. Rock Lewis      2 hyperkalemia:  -From missed dialysis  -K ws 5.4  -now K 4.2 today  -Plan to use 2K bath    3 anemia.   - Hemoglobin 13.2  -No indication for erythropoietin    4 renal osteodystrophy.    -Her calcium and phosphorus are okay. -will continue phosphorus binders    5 history of seizure disorder.    -On Vimpat 200 mg 2 times a day. -On Keppra 500 mg 2 times a day. 6 intractable nausea vomiting.    -It can be from uremia (resolved after dialysis). -Zofran as needed.     7.  Thrombocytopenia:  -It is like chronic  -It can be from ITP  -History of prolonged bleeding after dialysis  -Plan to consult hematology.     Signed By: Freddy Damian MD     December 5, 2021

## 2021-12-05 NOTE — PROGRESS NOTES
Reason for Admission:   Intractable Nausea and Vomiting                    RUR Score:   15%               PCP: First and Last name:   Iwona Motley MD     Name of Practice:    Are you a current patient: Yes/No:    Approximate date of last visit: Unsure   Can you participate in a virtual visit if needed:     Do you (patient/family) have any concerns for transition/discharge? No concerns         Plan for utilizing home health:   Agreeable if needed (unable to find accepting company in the past)    Current Advanced Directive/Advance Care Plan:  Full Code      Healthcare Decision Maker:   Click here to complete [employer] including selection of the Healthcare Decision Maker Relationship (ie \"Primary\")        Dimitrios Bowens, [phone]        Transition of Care Plan:       Patient lives at home with her son and his family. There is a ramp to enter the home. Patient is WC bound, but can stand and take a few steps to transfer to bed or to the bathroom. Patient has never had [facility] because there has not been an accepting company due to insurance and service area. Patient's family will be her ride at discharge and to follow-up appointments. Patient's family transports her to and from dialysis MWF. Patient uses the  [address] on [place], [place]. Current Dispo: Home/self.

## 2021-12-05 NOTE — PROGRESS NOTES
Hospitalist Progress Note         [facility], [facility]    Daily Progress Note: 12/5/2021    Keisha Mancia is a 80 y.o. female who presents to the ED from her dialysis appointment with cc of persistent weakness, intractable nausea and vomiting. Chest x-ray negative for acute cardiopulmonary findings. Recent echocardiogram showed left ventricular ejection fraction 50 to 55%. Moderately dilated left atrium, moderate mitral valve regurgitation noted. Resting tremors noted on examination. Caretaker reports increased in tremors and seizure activity at home due to inability to hold meds down. Subjective:   Subjective   Patient examined lying in bed with son at bedside  Resting tremors noted on examination of face (mild)  No overnight events reported  No acute distress noted on examination    Review of Systems:   Review of Systems   Constitutional: Negative. HENT: Negative. Eyes: Negative. Respiratory: Negative. Cardiovascular: Negative. Gastrointestinal: Negative. Genitourinary: Negative. Musculoskeletal: Negative. Skin: Negative. Neurological: Positive for weakness. Psychiatric/Behavioral: Negative for depression and suicidal ideas. Objective:   Objective      Vitals:  Patient Vitals for the past 12 hrs:   Temp Pulse Resp BP SpO2   12/05/21 0812 97.8 °F (36.6 °C) 61 16 (!) 141/66 99 %   12/05/21 0000  60      12/04/21 2303 97.5 °F (36.4 °C) 64 18 134/62 98 %        Physical Exam:  Physical Exam  Vitals and nursing note reviewed. Constitutional:       Appearance: Normal appearance. Eyes:      Conjunctiva/sclera: Conjunctivae normal.   Cardiovascular:      Rate and Rhythm: Normal rate. Heart sounds: Normal heart sounds. Comments: Absent radial pulse, bluish discoloration hand  Pulmonary:      Effort: Pulmonary effort is normal.      Breath sounds: Normal breath sounds.    Abdominal:      General: Bowel sounds are normal.   Musculoskeletal: General: Normal range of motion. Cervical back: Normal range of motion. Skin:     General: Skin is warm and dry. Neurological:      Mental Status: She is alert. Mental status is at baseline. Motor: Weakness present.       Gait: Gait abnormal.      Comments: Resting facial and hand tremors          Lab Results:  Recent Results (from the past 24 hour(s))   GLUCOSE, POC    Collection Time: 12/04/21  5:02 PM   Result Value Ref Range    Glucose (POC) 84 65 - 117 mg/dL    Performed by [address], POC    Collection Time: 12/04/21  7:39 PM   Result Value Ref Range    Glucose (POC) 79 65 - 117 mg/dL    Performed by [facility]    GLUCOSE, POC    Collection Time: 12/05/21 12:28 AM   Result Value Ref Range    Glucose (POC) 85 65 - 117 mg/dL    Performed by [facility]    GLUCOSE, POC    Collection Time: 12/05/21  5:43 AM   Result Value Ref Range    Glucose (POC) 67 65 - 117 mg/dL    Performed by [facility]    RENAL FUNCTION PANEL    Collection Time: 12/05/21  7:25 AM   Result Value Ref Range    Sodium 140 136 - 145 mmol/L    Potassium 4.2 3.5 - 5.1 mmol/L    Chloride 105 97 - 108 mmol/L    CO2 27 21 - 32 mmol/L    Anion gap 8 5 - 15 mmol/L    Glucose 66 65 - 100 mg/dL    BUN 41 (H) 6 - 20 mg/dL    Creatinine 4.41 (H) 0.55 - 1.02 mg/dL    BUN/Creatinine ratio 9 (L) 12 - 20      GFR est AA 12 (L) >60 ml/min/1.73m2    GFR est non-AA 10 (L) >60 ml/min/1.73m2    Calcium 7.8 (L) 8.5 - 10.1 mg/dL    Phosphorus 4.0 2.6 - 4.7 mg/dL    Albumin 3.1 (L) 3.5 - 5.0 g/dL   GLUCOSE, POC    Collection Time: 12/05/21  8:05 AM   Result Value Ref Range    Glucose (POC) 61 (L) 65 - 117 mg/dL    Performed by [address], POC    Collection Time: 12/05/21  9:27 AM   Result Value Ref Range    Glucose (POC) 126 (H) 65 - 117 mg/dL    Performed by [facility]           Diagnostic Images:  CT Results  (Last 48 hours)               12/03/21 0934  CT HEAD WO CONT Final result    Impression:  Age-appropriate atrophy. No acute findings. Narrative:  CT dose reduction was achieved through use of a standardized protocol tailored   for this examination and automatic exposure control for dose modulation. CT Head       History:        The sulci are prominent but symmetric. Periventricular and deep white matter   hypodensity is not unexpected for age. Small chronic left PCA infarct, as seen   in December 1. No acute abnormality seen gray or white matter. Ventricles are   symmetric and appropriate for atrophy. There is no midline shift or mass effect,   or evidence of hemorrhage. Bone windows show no fracture.                  Current Medications:    Current Facility-Administered Medications:     calcitRIOL (ROCALTROL) capsule 0.5 mcg, 0.5 mcg, Oral, DAILY, Algis Spar, NP    levETIRAcetam (KEPPRA) tablet 500 mg, 500 mg, Oral, BID, Umm Gracia MD, 500 mg at 12/05/21 0854    lacosamide (VIMPAT) tablet 200 mg, 200 mg, Oral, BID, Umm Gracia MD, 200 mg at 12/05/21 0854    sodium chloride (NS) flush 5-40 mL, 5-40 mL, IntraVENous, Q8H, Erin Lazo NP, 10 mL at 12/05/21 0545    sodium chloride (NS) flush 5-40 mL, 5-40 mL, IntraVENous, PRN, Algis Spar, NP    acetaminophen (TYLENOL) tablet 650 mg, 650 mg, Oral, Q6H PRN **OR** acetaminophen (TYLENOL) suppository 650 mg, 650 mg, Rectal, Q6H PRN, Algis Spar, NP    polyethylene glycol (MIRALAX) packet 17 g, 17 g, Oral, DAILY PRN, Algis Spar, NP    ondansetron (ZOFRAN ODT) tablet 4 mg, 4 mg, Oral, Q8H PRN **OR** ondansetron (ZOFRAN) injection 4 mg, 4 mg, IntraVENous, Q6H PRN, Algis Spar, NP    heparin (porcine) injection 5,000 Units, 5,000 Units, SubCUTAneous, Q8H, Erin Lazo NP, 5,000 Units at 12/05/21 0542    prochlorperazine (COMPAZINE) with saline injection 5 mg, 5 mg, IntraVENous, Q6H PRN, Algis Spar, NP, 5 mg at 12/04/21 0949    atorvastatin (LIPITOR) tablet 40 mg, 40 mg, Oral, QHS, Violet, Erin Irby, NP, 40 mg at 12/04/21 7076   levothyroxine (SYNTHROID) tablet 150 mcg, 150 mcg, Oral, DAILY, Zeinab Lazo, NP, 150 mcg at 12/05/21 0854    FLUoxetine (PROzac) capsule 10 mg, 10 mg, Oral, DAILY, Zeinab Lazo, NP, 10 mg at 12/04/21 0802    metoprolol tartrate (LOPRESSOR) tablet 25 mg, 25 mg, Oral, DAILY, Zeinab Lazo, NP, 25 mg at 12/05/21 0854    sevelamer carbonate (RENVELA) tab 800 mg, 800 mg, Oral, TID WITH MEALS, Noé Signs, NP, 800 mg at 12/05/21 0854       ASSESSMENT:    1. Generalized weakness/ambulatory dysfunction:  continue PT/OT as ordered  Supportive care  Fall precautions     2. Intractable nausea and vomiting:  -encourage oral hydration  -prn zofran and compazine  -continue supportive care     3. End-stage renal disease:  -daughter reports missed HD x 1 week  -BNP > 15,000  -nephrology for HD management  -s/p HD w/ plan for repeat HD on 12/6  --She is noticed to have right hand bluish discoloration and no palpable radial pulse  -STAT arterial duplex of the right arm  -Consulted vascular  -START LOW DOSE HEPARIN GTT      4. Seizure disorder:  -keppra adjusted to 500mg bid, vimpat increased 200mg bid by neurology  -prn ativan for breakthrough sz     5. Peripheral vascular disease:  -continue lipitor 40mg  -outpatient vascular workup warranted     6. Atrial fibrillation:  -Currently stable on tele  -continue metoprolol 25mg     7. Hyperkalemia and hypomagnesemia:  -elevated suspected due to missed HD  -expect correction w/ HD      8. Depression:  -Continue prozac     9. Thrombocytopenia:  -hold all anticoagulants  -consult hematology    10.  Hypothyroidism:  -currently on syntrhoid 150mcg  -tsh 1.0      Full Code  Dvt Prophylaxis heparin   GI Prophylaxis none  Disposition:  -pending pt/ot eval  -HD on Monday  - dispo planning  -hematology eval    NOK:  Daughter-in-law Hernan Amador [phone] updated     Above treatment plan reviewed and discussed with patient and son in detail at bedside, all questions answered. ADDENDUM: Transfers initiated to [facility]. Write spoke with Dr. Héctor Rizo vascular. Writer explained hand is bluish with discoloration, non palpable radial pulse, asymptomatic. Per. Dr. Sandhu Skill she does not require emergent transfer as this very well could be asymptomatic steal syndrome that is noted in many vascular patient's with no palpable radial pulse. His recommendation is treat her presenting symptoms and she can follow up with general surgeon. Care Plan discussed with: Interdisciplinary team    Total time spent with patient: 35 minutes.     Winda Snellen, NP

## 2021-12-05 NOTE — PROGRESS NOTES
Vascular Access Note:  Patient assessed with Ultra sound for suitable vein to cannulate. Veins are non compressible in left arm with right arm having fistula. No PIV placed. Also of note patient is on PO meds and tolerating well per care nurse    2005- Suitable vein mid forearm for 22g 1 inch placed for heparin drip.

## 2021-12-05 NOTE — PROGRESS NOTES
Problem: Mobility Impaired (Adult and Pediatric)  Goal: *Acute Goals and Plan of Care (Insert Text)  Description: Physical Therapy Goals  Initiated 12/4/21  1)  I with HEP in 7 days to improve overall functional mobility. 2)  Bed mobility with sup in 7 days to prevent skin breakdown. 3)  Pt to perform stand pivot transfer from bed to chair with CGA in 7 days. Pt. Goal:  Pt to be able to walk safely without falls. Outcome: Progressing Towards Goal   PHYSICAL THERAPY TREATMENT  Patient: Loretta Rodriges (10 y.o. female)  Date: 12/5/2021  Diagnosis: Left-sided weakness [R53.1]  Intractable nausea and vomiting [R11.2]  Fluid overload [E87.70]  Seizure ([address] [R56.9]   Intractable nausea and vomiting       Precautions: Fall  Chart, physical therapy assessment, plan of care and goals were reviewed. ASSESSMENT  Patient continues with skilled PT services and is progressing towards goals. Pt received supine in bed, pleasant and cooperative. She declined getting OOB and amb training, but agreed to come on EOB. Bed mobility rolling with SBA, supine>sit and scooting to EOB with min A. Noted john pad soiled and pt needing michelle care. Pt stood to [facility] with mod A and able to tolerate ~1 min with BUE support for michelle care. She then side stepped 3 ft toward head of bed with FWW with mod/min A. She c/o 7-8 R knee pain. Pt sat on EOB and went back to supine with min A to bring LEs onto bed. She scooted while in supine to head of bed with CGA. She stated her pain was easing but did not rate. She then completed ankle pumps, heel slides, and hip abd/add. Pt was left in supine, call bell within reach, all needs addressed. Current Level of Function Impacting Discharge (mobility/balance): assist x 1, balance deficits    Other factors to consider for discharge: severity of impairments, balance deficits, assist x 1, safety         PLAN :  Patient continues to benefit from skilled intervention to address the above impairments. Continue treatment per established plan of care. to address goals. Recommendation for discharge: (in order for the patient to meet his/her long term goals)  Kaleb Jamison    This discharge recommendation:  Has been made in collaboration with the attending provider and/or case management    IF patient discharges home will need the following DME: to be determined (TBD)       SUBJECTIVE:   Patient stated My R knee just hurts when I get up.     OBJECTIVE DATA SUMMARY:   Critical Behavior:  Neurologic State: Alert  Orientation Level: Oriented to place, Oriented to situation, Oriented to person, Disoriented to time  Cognition: Follows commands  Safety/Judgement: Decreased awareness of need for assistance, Decreased awareness of need for safety, Decreased insight into deficits, Fall prevention    Functional Mobility Training:  Bed Mobility:  Rolling: Stand-by assistance  Supine to Sit: Minimum assistance  Sit to Supine: Minimum assistance  Scooting: Minimum assistance     Transfers:  Sit to Stand: Moderate assistance  Stand to Sit: Minimum assistance         Balance:  Sitting: Impaired; Without support  Sitting - Static: Fair (occasional)  Sitting - Dynamic: Poor (constant support)  Standing: Impaired  Standing - Static: Fair; Constant support  Standing - Dynamic : Poor; Constant support    Ambulation/Gait Training:  Distance (ft): 3 Feet (ft) (side stepped)  Assistive Device: Walker, rolling; Gait belt  Ambulation - Level of Assistance: Moderate assistance; Minimal assistance     Therapeutic Exercises:       EXERCISE   Sets   Reps   Active Active Assist   Passive Self ROM   Comments   Ankle Pumps 1 20 [x] [] [] []    Heel Slides 1 10 [x] [] [] []    Hip abd/add 1 10 [x] [] [] []       Pain Ratin-7/10    Activity Tolerance:   Fair  Please refer to the flowsheet for vital signs taken during this treatment.     After treatment patient left in no apparent distress:   Supine in bed, Heels elevated for pressure relief, Call bell within reach, and Bed / chair alarm activated    COMMUNICATION/COLLABORATION:   The patients plan of care was discussed with: Registered nurse.      Grecia Villareal PTA   Time Calculation: 26 mins

## 2021-12-05 NOTE — PROGRESS NOTES
Patient's right hand slightly blue/purple in color, has improved from earlier in shift, patient can move all digits,t  0 complaints of pain or discomfort. Faint radial pulse noted x3 nurses. Right arm AV fistula Positive bruit and thrill, 0 bleeding to note. Consult to vascular surgery put in for Dr. Mert Reyna. Results are negative from arterial duplex of right arm

## 2021-12-05 NOTE — PROGRESS NOTES
Attempted to set up surgery consult, called Dr. Ritchie Duckworth, states he does not work at [employer], just case by case basis,

## 2021-12-05 NOTE — PROGRESS NOTES
Bedside and Verbal shift change report given to Rocco Dandy, LPN (oncoming nurse) by Fito Mcallister RN (offgoing nurse). Report included the following information SBAR, Kardex, Intake/Output, MAR, Recent Results, Med Rec Status and Cardiac Rhythm A-Paced.

## 2021-12-05 NOTE — PROGRESS NOTES
Problem: Falls - Risk of  Goal: *Absence of Falls  Description: Document Emilie Perez Fall Risk and appropriate interventions in the flowsheet. Outcome: Progressing Towards Goal  Note: Fall Risk Interventions:  Mobility Interventions: Utilize walker, cane, or other assistive device    Mentation Interventions: Adequate sleep, hydration, pain control, Bed/chair exit alarm    Medication Interventions: Bed/chair exit alarm    Elimination Interventions: Bed/chair exit alarm, Call light in reach    History of Falls Interventions: Bed/chair exit alarm         Problem: Patient Education: Go to Patient Education Activity  Goal: Patient/Family Education  Outcome: Progressing Towards Goal     Problem: Pressure Injury - Risk of  Goal: *Prevention of pressure injury  Description: Document Nitin Scale and appropriate interventions in the flowsheet. Outcome: Progressing Towards Goal  Note: Pressure Injury Interventions:  Sensory Interventions: Assess changes in LOC, Check visual cues for pain, Minimize linen layers, Keep linens dry and wrinkle-free, Turn and reposition approx.  every two hours (pillows and wedges if needed), Pressure redistribution bed/mattress (bed type)    Moisture Interventions: Absorbent underpads, Apply protective barrier, creams and emollients, Minimize layers    Activity Interventions: PT/OT evaluation    Mobility Interventions: PT/OT evaluation    Nutrition Interventions: Document food/fluid/supplement intake    Friction and Shear Interventions: Apply protective barrier, creams and emollients, Minimize layers                Problem: Patient Education: Go to Patient Education Activity  Goal: Patient/Family Education  Outcome: Progressing Towards Goal     Problem: Patient Education: Go to Patient Education Activity  Goal: Patient/Family Education  Outcome: Progressing Towards Goal     Problem: Patient Education: Go to Patient Education Activity  Goal: Patient/Family Education  Outcome: Progressing Towards Goal

## 2021-12-06 LAB
BASOPHILS # BLD: 0 K/UL (ref 0–0.1)
BASOPHILS NFR BLD: 1 % (ref 0–1)
DIFFERENTIAL METHOD BLD: ABNORMAL
EOSINOPHIL # BLD: 0.1 K/UL (ref 0–0.4)
EOSINOPHIL NFR BLD: 2 % (ref 0–7)
ERYTHROCYTE [DISTWIDTH] IN BLOOD BY AUTOMATED COUNT: 13.5 % (ref 11.5–14.5)
GLUCOSE BLD STRIP.AUTO-MCNC: 129 MG/DL (ref 65–117)
GLUCOSE BLD STRIP.AUTO-MCNC: 77 MG/DL (ref 65–117)
GLUCOSE BLD STRIP.AUTO-MCNC: 91 MG/DL (ref 65–117)
HCT VFR BLD AUTO: 39.4 % (ref 35–47)
HGB BLD-MCNC: 12.3 G/DL (ref 11.5–16)
IMM GRANULOCYTES # BLD AUTO: 0 K/UL (ref 0–0.04)
IMM GRANULOCYTES NFR BLD AUTO: 0 % (ref 0–0.5)
LYMPHOCYTES # BLD: 1.2 K/UL (ref 0.8–3.5)
LYMPHOCYTES NFR BLD: 28 % (ref 12–49)
MCH RBC QN AUTO: 31 PG (ref 26–34)
MCHC RBC AUTO-ENTMCNC: 31.2 G/DL (ref 30–36.5)
MCV RBC AUTO: 99.2 FL (ref 80–99)
MONOCYTES # BLD: 0.4 K/UL (ref 0–1)
MONOCYTES NFR BLD: 9 % (ref 5–13)
NEUTS SEG # BLD: 2.7 K/UL (ref 1.8–8)
NEUTS SEG NFR BLD: 60 % (ref 32–75)
NRBC # BLD: 0 K/UL (ref 0–0.01)
NRBC BLD-RTO: 0 PER 100 WBC
PERFORMED BY, TECHID: ABNORMAL
PERFORMED BY, TECHID: NORMAL
PERFORMED BY, TECHID: NORMAL
PLATELET # BLD AUTO: 69 K/UL (ref 150–400)
PMV BLD AUTO: 12.9 FL (ref 8.9–12.9)
RBC # BLD AUTO: 3.97 M/UL (ref 3.8–5.2)
WBC # BLD AUTO: 4.3 K/UL (ref 3.6–11)

## 2021-12-06 PROCEDURE — 85025 COMPLETE CBC W/AUTO DIFF WBC: CPT

## 2021-12-06 PROCEDURE — 74011250636 HC RX REV CODE- 250/636: Performed by: PHYSICIAN ASSISTANT

## 2021-12-06 PROCEDURE — 74011250637 HC RX REV CODE- 250/637: Performed by: PSYCHIATRY & NEUROLOGY

## 2021-12-06 PROCEDURE — 74011250636 HC RX REV CODE- 250/636: Performed by: NURSE PRACTITIONER

## 2021-12-06 PROCEDURE — 99222 1ST HOSP IP/OBS MODERATE 55: CPT | Performed by: SURGERY

## 2021-12-06 PROCEDURE — 82962 GLUCOSE BLOOD TEST: CPT

## 2021-12-06 PROCEDURE — 65270000029 HC RM PRIVATE

## 2021-12-06 PROCEDURE — 36415 COLL VENOUS BLD VENIPUNCTURE: CPT

## 2021-12-06 PROCEDURE — 90935 HEMODIALYSIS ONE EVALUATION: CPT

## 2021-12-06 PROCEDURE — 74011250637 HC RX REV CODE- 250/637: Performed by: NURSE PRACTITIONER

## 2021-12-06 RX ORDER — HEPARIN SODIUM 5000 [USP'U]/ML
5000 INJECTION, SOLUTION INTRAVENOUS; SUBCUTANEOUS EVERY 12 HOURS
Status: DISCONTINUED | OUTPATIENT
Start: 2021-12-06 | End: 2021-12-11 | Stop reason: HOSPADM

## 2021-12-06 RX ADMIN — SEVELAMER CARBONATE 800 MG: 800 TABLET, FILM COATED ORAL at 13:38

## 2021-12-06 RX ADMIN — LEVETIRACETAM 500 MG: 500 TABLET, FILM COATED ORAL at 13:38

## 2021-12-06 RX ADMIN — ACETAMINOPHEN 650 MG: 325 TABLET ORAL at 13:38

## 2021-12-06 RX ADMIN — LACOSAMIDE 200 MG: 100 TABLET, FILM COATED ORAL at 21:45

## 2021-12-06 RX ADMIN — FLUOXETINE HYDROCHLORIDE 10 MG: 10 CAPSULE ORAL at 13:38

## 2021-12-06 RX ADMIN — Medication 10 ML: at 21:46

## 2021-12-06 RX ADMIN — ATORVASTATIN CALCIUM 40 MG: 40 TABLET, FILM COATED ORAL at 21:45

## 2021-12-06 RX ADMIN — LACOSAMIDE 200 MG: 100 TABLET, FILM COATED ORAL at 13:38

## 2021-12-06 RX ADMIN — SEVELAMER CARBONATE 800 MG: 800 TABLET, FILM COATED ORAL at 17:53

## 2021-12-06 RX ADMIN — METOPROLOL TARTRATE 25 MG: 25 TABLET, FILM COATED ORAL at 13:38

## 2021-12-06 RX ADMIN — Medication 10 ML: at 05:57

## 2021-12-06 RX ADMIN — FAMOTIDINE 10 MG: 10 TABLET ORAL at 21:45

## 2021-12-06 RX ADMIN — Medication 10 ML: at 13:38

## 2021-12-06 RX ADMIN — LEVETIRACETAM 500 MG: 500 TABLET, FILM COATED ORAL at 21:45

## 2021-12-06 RX ADMIN — FAMOTIDINE 10 MG: 10 TABLET ORAL at 13:38

## 2021-12-06 RX ADMIN — CALCITRIOL CAPSULES 0.25 MCG 0.5 MCG: 0.25 CAPSULE ORAL at 13:38

## 2021-12-06 RX ADMIN — ONDANSETRON 4 MG: 2 INJECTION INTRAMUSCULAR; INTRAVENOUS at 13:35

## 2021-12-06 RX ADMIN — LEVOTHYROXINE SODIUM 150 MCG: 0.07 TABLET ORAL at 13:38

## 2021-12-06 RX ADMIN — HEPARIN SODIUM 5000 UNITS: 5000 INJECTION INTRAVENOUS; SUBCUTANEOUS at 17:53

## 2021-12-06 NOTE — PROGRESS NOTES
Hospitalist Progress Note    Subjective:   Daily Progress Note: 12/6/2021 4:37 PM    Hand sensation has improved. Current Facility-Administered Medications   Medication Dose Route Frequency    calcitRIOL (ROCALTROL) capsule 0.5 mcg  0.5 mcg Oral DAILY    prochlorperazine (COMPAZINE) tablet 5 mg  5 mg Oral Q6H PRN    famotidine (PEPCID) tablet 10 mg  10 mg Oral BID    levETIRAcetam (KEPPRA) tablet 500 mg  500 mg Oral BID    lacosamide (VIMPAT) tablet 200 mg  200 mg Oral BID    sodium chloride (NS) flush 5-40 mL  5-40 mL IntraVENous Q8H    sodium chloride (NS) flush 5-40 mL  5-40 mL IntraVENous PRN    acetaminophen (TYLENOL) tablet 650 mg  650 mg Oral Q6H PRN    Or    acetaminophen (TYLENOL) suppository 650 mg  650 mg Rectal Q6H PRN    polyethylene glycol (MIRALAX) packet 17 g  17 g Oral DAILY PRN    ondansetron (ZOFRAN ODT) tablet 4 mg  4 mg Oral Q8H PRN    Or    ondansetron (ZOFRAN) injection 4 mg  4 mg IntraVENous Q6H PRN    atorvastatin (LIPITOR) tablet 40 mg  40 mg Oral QHS    levothyroxine (SYNTHROID) tablet 150 mcg  150 mcg Oral DAILY    FLUoxetine (PROzac) capsule 10 mg  10 mg Oral DAILY    metoprolol tartrate (LOPRESSOR) tablet 25 mg  25 mg Oral DAILY    sevelamer carbonate (RENVELA) tab 800 mg  800 mg Oral TID WITH MEALS        Review of Systems  Review of Systems   Constitutional: Negative for fever and malaise/fatigue. HENT: Negative. Respiratory: Negative for cough and shortness of breath. Cardiovascular: Negative for chest pain and leg swelling. Gastrointestinal: Negative for abdominal pain, nausea and vomiting. Genitourinary: Negative. Musculoskeletal: Negative. Skin: Negative. Neurological: Negative. Psychiatric/Behavioral: Negative.              Objective:     Visit Vitals  BP (!) 143/66 (BP 1 Location: Left upper arm, BP Patient Position: Lying)   Pulse 68   Temp 98.1 °F (36.7 °C)   Resp 20   Ht 5' 6\" (1.676 m)   Wt 74.8 kg (165 lb)   SpO2 98% Breastfeeding No   BMI 26.63 kg/m²      O2 Device: None (Room air)    Temp (24hrs), Av.3 °F (36.8 °C), Min:97.9 °F (36.6 °C), Max:98.7 °F (37.1 °C)      701 - 1900  In: 360 [P.O.:360]  Out:  - 700  In: 480 [P.O.:480]  Out: -     Recent Results (from the past 24 hour(s))   GLUCOSE, POC    Collection Time: 21  5:11 PM   Result Value Ref Range    Glucose (POC) 97 65 - 117 mg/dL    Performed by Марина Sandra    GLUCOSE, POC    Collection Time: 21  8:48 PM   Result Value Ref Range    Glucose (POC) 67 65 - 117 mg/dL    Performed by Bri Young (HUSSAIN)    GLUCOSE, POC    Collection Time: 21 11:52 PM   Result Value Ref Range    Glucose (POC) 89 65 - 117 mg/dL    Performed by JOSE DE JESUS MACIAS    CBC WITH AUTOMATED DIFF    Collection Time: 21  7:45 AM   Result Value Ref Range    WBC 4.3 3.6 - 11.0 K/uL    RBC 3.97 3.80 - 5.20 M/uL    HGB 12.3 11.5 - 16.0 g/dL    HCT 39.4 35.0 - 47.0 %    MCV 99.2 (H) 80.0 - 99.0 FL    MCH 31.0 26.0 - 34.0 PG    MCHC 31.2 30.0 - 36.5 g/dL    RDW 13.5 11.5 - 14.5 %    PLATELET 69 (L) 153 - 400 K/uL    MPV 12.9 8.9 - 12.9 FL    NRBC 0.0 0.0  WBC    ABSOLUTE NRBC 0.00 0.00 - 0.01 K/uL    NEUTROPHILS 60 32 - 75 %    LYMPHOCYTES 28 12 - 49 %    MONOCYTES 9 5 - 13 %    EOSINOPHILS 2 0 - 7 %    BASOPHILS 1 0 - 1 %    IMMATURE GRANULOCYTES 0 0 - 0.5 %    ABS. NEUTROPHILS 2.7 1.8 - 8.0 K/UL    ABS. LYMPHOCYTES 1.2 0.8 - 3.5 K/UL    ABS. MONOCYTES 0.4 0.0 - 1.0 K/UL    ABS. EOSINOPHILS 0.1 0.0 - 0.4 K/UL    ABS. BASOPHILS 0.0 0.0 - 0.1 K/UL    ABS. IMM.  GRANS. 0.0 0.00 - 0.04 K/UL    DF AUTOMATED     GLUCOSE, POC    Collection Time: 21  7:58 AM   Result Value Ref Range    Glucose (POC) 77 65 - 117 mg/dL    Performed by Kamran Buenrostro ([employer])    GLUCOSE, POC    Collection Time: 21  3:34 PM   Result Value Ref Range    Glucose (POC) 91 65 - 117 mg/dL    Performed by Kamran Buenrostro ([employer])         DUPLEX UPPER EXT ARTERY RIGHT   Final Result      CT HEAD WO CONT   Final Result   Age-appropriate atrophy. No acute findings. XR CHEST SNGL V   Final Result   The cardiomediastinal silhouette is appropriate for age, technique,   and lung expansion. Pulmonary vasculature is not congested. The lungs are   essentially clear. No effusion or pneumothorax is seen. PHYSICAL EXAM:    Physical Exam  Vitals reviewed. HENT:      Head: Normocephalic and atraumatic. Mouth/Throat:      Mouth: Mucous membranes are moist.      Pharynx: Oropharynx is clear. Cardiovascular:      Rate and Rhythm: Regular rhythm. Heart sounds: Normal heart sounds. Pulmonary:      Breath sounds: Normal breath sounds. Abdominal:      General: Abdomen is flat. Bowel sounds are normal.      Palpations: Abdomen is soft. Musculoskeletal:      Cervical back: Normal range of motion and neck supple. Comments: Right hand is warm to the touch with good motor function   Skin:     General: Skin is warm and dry. Neurological:      General: No focal deficit present. Mental Status: She is alert and oriented to person, place, and time. Mental status is at baseline.    Psychiatric:         Mood and Affect: Mood normal.          Data Review    Recent Results (from the past 24 hour(s))   GLUCOSE, POC    Collection Time: 12/05/21  5:11 PM   Result Value Ref Range    Glucose (POC) 97 65 - 117 mg/dL    Performed by Terrance Burns, POC    Collection Time: 12/05/21  8:48 PM   Result Value Ref Range    Glucose (POC) 67 65 - 117 mg/dL    Performed by Bri Young (HUSSAIN)    GLUCOSE, POC    Collection Time: 12/05/21 11:52 PM   Result Value Ref Range    Glucose (POC) 89 65 - 117 mg/dL    Performed by JOSE DE JESUS MACIAS    CBC WITH AUTOMATED DIFF    Collection Time: 12/06/21  7:45 AM   Result Value Ref Range    WBC 4.3 3.6 - 11.0 K/uL    RBC 3.97 3.80 - 5.20 M/uL    HGB 12.3 11.5 - 16.0 g/dL    HCT 39.4 35.0 - 47.0 %    MCV 99.2 (H) 80.0 - 99.0 FL    MCH 31.0 26.0 - 34.0 PG    MCHC 31.2 30.0 - 36.5 g/dL    RDW 13.5 11.5 - 14.5 %    PLATELET 69 (L) 820 - 400 K/uL    MPV 12.9 8.9 - 12.9 FL    NRBC 0.0 0.0  WBC    ABSOLUTE NRBC 0.00 0.00 - 0.01 K/uL    NEUTROPHILS 60 32 - 75 %    LYMPHOCYTES 28 12 - 49 %    MONOCYTES 9 5 - 13 %    EOSINOPHILS 2 0 - 7 %    BASOPHILS 1 0 - 1 %    IMMATURE GRANULOCYTES 0 0 - 0.5 %    ABS. NEUTROPHILS 2.7 1.8 - 8.0 K/UL    ABS. LYMPHOCYTES 1.2 0.8 - 3.5 K/UL    ABS. MONOCYTES 0.4 0.0 - 1.0 K/UL    ABS. EOSINOPHILS 0.1 0.0 - 0.4 K/UL    ABS. BASOPHILS 0.0 0.0 - 0.1 K/UL    ABS. IMM. GRANS. 0.0 0.00 - 0.04 K/UL    DF AUTOMATED     GLUCOSE, POC    Collection Time: 12/06/21  7:58 AM   Result Value Ref Range    Glucose (POC) 77 65 - 117 mg/dL    Performed by Sabiha Vazquez ([employer])    GLUCOSE, POC    Collection Time: 12/06/21  3:34 PM   Result Value Ref Range    Glucose (POC) 91 65 - 117 mg/dL    Performed by Sabiha Vazquez ([employer]er)         Assessment/Plan:     Principal Problem:    Intractable nausea and vomiting (12/3/2021)    Active Problems:    Seizure ([address] (5/19/2021)      Left-sided weakness (12/3/2021)      Fluid overload (12/5/2021)      Hospital course: This 52-year-old male presented to the emergency department on 12/3/2021 for admission after being found to have persistent weakness, intractable nausea and vomiting. Chest x-ray was negative for acute pulmonary process. Patient was also found to have increased tremors and seizure like activity. Patient's started on Keppra 500 mg twice daily and Vimpat was increased to 200 mg twice daily. Tremors have subsided. Patient is tolerating hemodialysis without complications. Nausea and vomiting have resolved. Patient with a recent right arm fistula placement. There is no radial pulse although there are findings consistent with steal syndrome. Motor and sensation are present. Dr. Layo Jaquez, vascular surgery evaluating.   Neurology consultation,  Constantine See. Nephrology consultation Dr. Humphrey Lanier. Vascular surgery consultation Dr. Odell Shoulder:    1. Generalized weakness/ambulatory dysfunction:  continue PT/OT as ordered  Supportive care  Fall precautions     2. Intractable nausea and vomiting:  -encourage oral hydration  -prn zofran and compazine  -continue supportive care  Resolved     3. End-stage renal disease:  Continue hemodialysis  Dr. Johnson Porter evaluating right facial for steal syndrome, right hand with good sensation and motor no active signs of cyanotic presenting     4. Seizure disorder:  -keppra adjusted to 500mg bid, vimpat increased 200mg bid by neurology  -prn ativan for breakthrough sz     5. Peripheral vascular disease:  -continue lipitor 40mg  -outpatient vascular workup warranted     6. Atrial fibrillation:  -Currently stable on tele  -continue metoprolol 25mg     7. Hyperkalemia   Resolved     8. Depression:  -Continue prozac     9. Thrombocytopenia: Chronic  -hold all anticoagulants for platelet count less than 50  -consult hematology     10. Hypothyroidism:  -currently on syntrhoid 150mcg  -tsh 1.0 and normal        Full Code  Dvt Prophylaxis heparin   GI Prophylaxis none  Disposition:  -pending pt/ot eval  -CM dispo planning  -hematology eval     NOK:  Daughter-in-law Claudia Puente [phone] updated   Care Plan discussed with: Patient/Family    Total time spent with patient: >35 minutes.

## 2021-12-06 NOTE — PROGRESS NOTES
Patient: Lisa Born MRN: [id number]  SSN: [id number]    YOB: 1950  Age: 80 y.o. Sex: female          Subjective:     She was seen on hemodialysis today. Is tolerating the procedure well. She did not complain of any pain in the right upper extremity. Neither did she complain of any pain in the right palm area. Did not have any paresthesias etc.    Blood flow rate as reported by HD RN was close to 400 mils per minute. Objective:     Vitals:    12/05/21 1513 12/05/21 1600 12/05/21 2044 12/06/21 0102   BP: (!) 157/72  129/71 134/67   Pulse: 60 60 61 60   Resp: 16  18 18   Temp: 98.6 °F (37 °C)  98.3 °F (36.8 °C) 98.7 °F (37.1 °C)   TempSrc:       SpO2: 98%  94% 96%   Weight:       Height:            Intake and Output:  Yesterday's Intake and Output:  12/05 0701 - 12/06 0700  In: 480 [P.O.:480]  Out: -      Physical Exam:   GENERAL: alert, cooperative, no distress, appears stated age  EYE: negative  LUNG: clear to auscultation bilaterally  HEART: regular rate and rhythm, S1, S2 normal  ABDOMEN: soft, non-tender. Bowel sounds normal. No masses,  no organomegaly  EXTREMITIES:  extremities normal, right brachial aVF. Right hand warm to touch. Slightly dusky in appearance. Delayed capillary refill  ulcers,SKIN: Normal.   Skin -  no rash or abnormalities  NEUROLOGIC: negative      Dialysis access: Arteriovenous Fistula site right.     Data Review    Meds    Current Facility-Administered Medications   Medication Dose Route Frequency    calcitRIOL (ROCALTROL) capsule 0.5 mcg  0.5 mcg Oral DAILY    prochlorperazine (COMPAZINE) tablet 5 mg  5 mg Oral Q6H PRN    famotidine (PEPCID) tablet 10 mg  10 mg Oral BID    levETIRAcetam (KEPPRA) tablet 500 mg  500 mg Oral BID    lacosamide (VIMPAT) tablet 200 mg  200 mg Oral BID    sodium chloride (NS) flush 5-40 mL  5-40 mL IntraVENous Q8H    sodium chloride (NS) flush 5-40 mL  5-40 mL IntraVENous PRN    acetaminophen (TYLENOL) tablet 650 mg 650 mg Oral Q6H PRN    Or    acetaminophen (TYLENOL) suppository 650 mg  650 mg Rectal Q6H PRN    polyethylene glycol (MIRALAX) packet 17 g  17 g Oral DAILY PRN    ondansetron (ZOFRAN ODT) tablet 4 mg  4 mg Oral Q8H PRN    Or    ondansetron (ZOFRAN) injection 4 mg  4 mg IntraVENous Q6H PRN    atorvastatin (LIPITOR) tablet 40 mg  40 mg Oral QHS    levothyroxine (SYNTHROID) tablet 150 mcg  150 mcg Oral DAILY    FLUoxetine (PROzac) capsule 10 mg  10 mg Oral DAILY    metoprolol tartrate (LOPRESSOR) tablet 25 mg  25 mg Oral DAILY    sevelamer carbonate (RENVELA) tab 800 mg  800 mg Oral TID WITH MEALS       Lab      Recent Results (from the past 24 hour(s))   RENAL FUNCTION PANEL    Collection Time: 12/05/21  7:25 AM   Result Value Ref Range    Sodium 140 136 - 145 mmol/L    Potassium 4.2 3.5 - 5.1 mmol/L    Chloride 105 97 - 108 mmol/L    CO2 27 21 - 32 mmol/L    Anion gap 8 5 - 15 mmol/L    Glucose 66 65 - 100 mg/dL    BUN 41 (H) 6 - 20 mg/dL    Creatinine 4.41 (H) 0.55 - 1.02 mg/dL    BUN/Creatinine ratio 9 (L) 12 - 20      GFR est AA 12 (L) >60 ml/min/1.73m2    GFR est non-AA 10 (L) >60 ml/min/1.73m2    Calcium 7.8 (L) 8.5 - 10.1 mg/dL    Phosphorus 4.0 2.6 - 4.7 mg/dL    Albumin 3.1 (L) 3.5 - 5.0 g/dL   HEP B SURFACE AG    Collection Time: 12/05/21  7:25 AM   Result Value Ref Range    Hepatitis B surface Ag <0.10 Index    Hep B surface Ag Interp.  Negative Negative   GLUCOSE, POC    Collection Time: 12/05/21  8:05 AM   Result Value Ref Range    Glucose (POC) 61 (L) 65 - 117 mg/dL    Performed by [address], POC    Collection Time: 12/05/21  9:27 AM   Result Value Ref Range    Glucose (POC) 126 (H) 65 - 117 mg/dL    Performed by [address], POC    Collection Time: 12/05/21 11:03 AM   Result Value Ref Range    Glucose (POC) 95 65 - 117 mg/dL    Performed by [address] [place] [address] EXT ARTERY RIGHT    Collection Time: 12/05/21  4:01 PM   Result Value Ref Range    Right Mid Ax A .0 cm/s    Right Dist Brachial A .0 cm/s    Right Dist Brachial A .0 cm/s    Right Prox Brachial A .0 cm/s    Right Prox Brachial A .0 cm/s    Right Dist Radial A PSV 29.9 cm/s    Right Mid Radial A PSV 20.2 cm/s    Right Prox Radial A PSV 45.7 cm/s    Right subclavian prox .0 cm/s    Right subclavian prox EDV 50.6 cm/s   GLUCOSE, POC    Collection Time: 12/05/21  5:11 PM   Result Value Ref Range    Glucose (POC) 97 65 - 117 mg/dL    Performed by Terrance Burns, POC    Collection Time: 12/05/21  8:48 PM   Result Value Ref Range    Glucose (POC) 67 65 - 117 mg/dL    Performed by Annette Buchanan (LPN)    GLUCOSE, POC    Collection Time: 12/05/21 11:52 PM   Result Value Ref Range    Glucose (POC) 89 65 - 117 mg/dL    Performed by [facility]         Lab Results   Component Value Date/Time    Color Yellow/Straw 12/03/2021 09:54 AM    Appearance Clear 12/03/2021 09:54 AM    Specific gravity 1.015 12/03/2021 09:54 AM    pH (UA) 5.0 12/03/2021 09:54 AM    Protein 30 (A) 12/03/2021 09:54 AM    Glucose Negative 12/03/2021 09:54 AM    Ketone Negative 12/03/2021 09:54 AM    Bilirubin Negative 12/03/2021 09:54 AM    Urobilinogen 0.1 12/03/2021 09:54 AM    Nitrites Negative 12/03/2021 09:54 AM    Leukocyte Esterase Negative 12/03/2021 09:54 AM    Epithelial cells Moderate (A) 05/20/2021 12:56 AM    Bacteria Negative 12/03/2021 09:54 AM    WBC 0-4 12/03/2021 09:54 AM    RBC 5-10 12/03/2021 09:54 AM       Imaging         Assessment & Plan:     Principal Problem:    Intractable nausea and vomiting (12/3/2021)  -Resolved now. Probably secondary to uremia. She is undergoing dialysis now. Seizure ([address] (5/19/2021)  Had breakthrough seizures. Was seen by neurology. Dose of Keppra was adjusted to 500 mg and Vimpat was also changed to 200 mg twice daily. She will follow up with neurology after discharge.     ESRD and fluid overload (12/5/2021)  S/p HD with 2 L fluid removal. Patient apparently did well. -Hyperkalemia -from missed hemodialysis. S/p HD today. AVF - Stenosis and steal phenomena-   Noted radiology report   Will consult vascular surgery, stat consult placed in system. H&H 13.2 -getting erythropoietin. Increased risk of aVF thrombosis    -History of atrial fibrillation -currently on anticoagulation with Eliquis    Signed By: Melvina Moore MD     December 6, 2021      This note was prepared using voice recognition system and is likely to have sound alike errors that may have been overlooked even during proofreading.   Please contact the author for any clarifications

## 2021-12-06 NOTE — CONSULTS
History and Physical    Chief complaints: Right hand pain and cold. History of Presenting Illness:  Felipe Beth is a 80 y.o. very pleasant woman examined this morning for vascular consultation for right hand pain and cold. According to patient and patient  at this been going on for about over a month now. And patient has AV fistula in the right upper arm. AV fistula has been there for 7 years now. AV fistula is working okay. Patient gets dialysis 3 times weekly. Currently patient's course pain is about 4 out of 10. Patient is able to move her right hand. Vascular examination shows patient has palpable proximal right brachial artery. Nonpalpable radial artery.   Patient motor function is intact sensation somewhat lost.      Past Medical History:   Diagnosis Date    Anxiety disorder     Arthritis     Atherosclerosis of native arteries of the extremities with ulceration ([address] 8/31/2020    Atrial fibrillation ([address]     Cardiac pacemaker in situ     Cellulitis and abscess of trunk 8/31/2020    Depression, postpartum     Diabetes ([address]     Heart disease     Heartburn     Hx of long term use of blood thinners     Hypercholesterolemia     Hypertension     Hypothyroidism     Migraines     Peripheral venous insufficiency 8/31/2020    Seizures ([address]     Sleep disorder     Stroke ([address]     2019    Varicose veins of lower extremity with inflammation 8/31/2020      Past Surgical History:   Procedure Laterality Date    HX BACK SURGERY      HX OTHER SURGICAL      leg surgery     HX OTHER SURGICAL      pacemaker monitor      Family History   Problem Relation Age of Onset    Heart Disease Mother     Heart Disease Father     Diabetes Sister     Diabetes Brother       Social History     Tobacco Use    Smoking status: Never Smoker    Smokeless tobacco: Never Used   Substance Use Topics    Alcohol use: Never       Prior to Admission medications    Medication Sig Start Date End Date Taking? Authorizing Provider   Vimpat 200 mg tab tablet Take 1 Tablet by mouth two (2) times a day. 11/19/21  Yes Provider, Historical   atorvastatin (LIPITOR) 40 mg tablet Take 1 tablet by mouth once daily for 90 days 11/22/21 8/19/22 Yes Anthony Gautam MD   FLUoxetine (PROzac) 10 mg capsule Take 1 Capsule by mouth daily for 270 days. 11/5/21 8/2/22 Yes Anthony Gautam MD   Euthyrox 150 mcg tablet Take 1 tablet by mouth once daily 10/13/21  Yes Anthony Gautam MD   levETIRAcetam (KEPPRA XR) 500 mg ER tablet TAKE 2 TABLETS BY MOUTH TWICE DAILY FOR 90 DAYS 6/1/21  Yes Provider, Historical   sevelamer carbonate (RENVELA) 800 mg tab tab  5/10/21  Yes Provider, Historical   ondansetron (Zofran ODT) 4 mg disintegrating tablet 1 Tablet by SubLINGual route every eight (8) hours as needed for Nausea or Vomiting. 12/1/21   Etsuardo Michael MD   loperamide (IMODIUM) 2 mg capsule TAKE 1 CAPSULE BY MOUTH SIX TIMES DAILY AS NEEDED FOR 30 DAYS 10/17/21   Doug Gleason MD     No Known Allergies     Review of Systems:  Pertinent review of systems discussed in HPI, and rest of organ systems personally reviewed and they are negative. Objective:   Vital signs reviewed:      Visit Vitals  /67 (BP 1 Location: Left upper arm, BP Patient Position: At rest;Supine)   Pulse 60   Temp 98.7 °F (37.1 °C)   Resp 18   Ht 5' 6\" (1.676 m)   Wt 165 lb (74.8 kg)   SpO2 96%   Breastfeeding No   BMI 26.63 kg/m²       Physical Exam:   General appearance:   Patient is awake and alert, not in particular distress. Head and neck atraumatic normocephalic. ENT shows normal oral mucosa, no jaundice no hoarse voice. Eyes: Pupil equal gaze appropriate. Cardiac system regular rate rhythm. Pulmonary: No audible wheeze. Chest wall: Chest wall excursion normal with respiration cycle, there is no deformity or chest trauma.   Abdomen: Soft not tender or distended, bowel sounds active. There is no obvious palpable mass, or hernia. Neurologic: Nonfocal.  Cranial nerves intact, no new focal findings. Musculoskeletal system: Motor function normal limits, motor function 5 out of 5, range of motion normal in all 4 extremity  Skin: Warm and moist.  Hematologic system: No obvious bruising. Psychosocial: Appropriate and cooperative. Vascular examination: Lower and upper extremities warm to touch, no signs of ischemia or cyanosis. Data Review: Labs are reviewed.  Discussed  Recent Results (from the past 24 hour(s))   GLUCOSE, POC    Collection Time: 12/05/21  8:05 AM   Result Value Ref Range    Glucose (POC) 61 (L) 65 - 117 mg/dL    Performed by Bebo Cabrales    GLUCOSE, POC    Collection Time: 12/05/21  9:27 AM   Result Value Ref Range    Glucose (POC) 126 (H) 65 - 117 mg/dL    Performed by Bebo Cabrales    GLUCOSE, POC    Collection Time: 12/05/21 11:03 AM   Result Value Ref Range    Glucose (POC) 95 65 - 117 mg/dL    Performed by Bebo Cabrales    DUPLEX UPPER EXT ARTERY RIGHT    Collection Time: 12/05/21  4:01 PM   Result Value Ref Range    Right Mid Ax A .0 cm/s    Right Dist Brachial A .0 cm/s    Right Dist Brachial A .0 cm/s    Right Prox Brachial A .0 cm/s    Right Prox Brachial A .0 cm/s    Right Dist Radial A PSV 29.9 cm/s    Right Mid Radial A PSV 20.2 cm/s    Right Prox Radial A PSV 45.7 cm/s    Right subclavian prox .0 cm/s    Right subclavian prox EDV 50.6 cm/s   GLUCOSE, POC    Collection Time: 12/05/21  5:11 PM   Result Value Ref Range    Glucose (POC) 97 65 - 117 mg/dL    Performed by Terrance Burns, POC    Collection Time: 12/05/21  8:48 PM   Result Value Ref Range    Glucose (POC) 67 65 - 117 mg/dL    Performed by Kyle JC)    GLUCOSE, POC    Collection Time: 12/05/21 11:52 PM   Result Value Ref Range    Glucose (POC) 89 65 - 117 mg/dL    Performed by [address] reviewed: discussed as below. No name on file. Assessment:     Principal Problem:    Intractable nausea and vomiting (12/3/2021)    Active Problems:    Seizure ([address] (5/19/2021)      Left-sided weakness (12/3/2021)      Fluid overload (12/5/2021)        Plan:     Most likely has a patient has stage III steal syndrome of the right arm secondary to AV fistula. Next  I examined right arm arterial duplex examination. Looks like there is a distal brachial artery significant stenosis proximal to the anastomosis of the basilic vein. Patient will need angiographic examination with intervention at this area. I will try to organize this procedure sometime this week depending availability of the EP lab or catheter. I will continue monitor her progress.

## 2021-12-06 NOTE — CONSULTS
Hematology Oncology Consultation    Reason for consult: Thrombocytopenia    Admitting Diagnosis: Left-sided weakness [R53.1]  Intractable nausea and vomiting [R11.2]  Fluid overload [E87.70]  Seizure ([address] [R56.9]    Impression:    1. Thrombocytopenia  -unclear duration, plts in 2015 which is last in our system was actually above normal  -pts currently 60s  -no hemolysis  -likely related to acute illness  -plts >50s would hold on steroids at this time    2. Anemia of CKD  -doing well with this    3. 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 ESRD    Discussion: Kelly Melendez is a  80y.o. year old seen in consultation at the request of Dr. Kat gilmore. She presents with a multiday history of n/v and weakness. She has missed several days of dialysis prior to this as she was too ill.   She herself is moderately confused and not able to add to her hematology history  She has been seen about her R av fistula being discolored and without radial pulse  No bleeding of which she has seen  No craving/petichae  Imaging:      Labs:    Recent Results (from the past 24 hour(s))   DUPLEX UPPER EXT ARTERY RIGHT    Collection Time: 12/05/21  4:01 PM   Result Value Ref Range    Right Mid Ax A .0 cm/s    Right Dist Brachial A .0 cm/s    Right Dist Brachial A .0 cm/s    Right Prox Brachial A .0 cm/s    Right Prox Brachial A .0 cm/s    Right Dist Radial A PSV 29.9 cm/s    Right Mid Radial A PSV 20.2 cm/s    Right Prox Radial A PSV 45.7 cm/s    Right subclavian prox .0 cm/s    Right subclavian prox EDV 50.6 cm/s   GLUCOSE, POC    Collection Time: 12/05/21  5:11 PM   Result Value Ref Range    Glucose (POC) 97 65 - 117 mg/dL    Performed by Terrance Burns, POC    Collection Time: 12/05/21  8:48 PM   Result Value Ref Range    Glucose (POC) 67 65 - 117 mg/dL    Performed by Pradip JC)    GLUCOSE, POC    Collection Time: 12/05/21 11:52 PM   Result Value Ref Range    Glucose (POC) 89 65 - 117 mg/dL    Performed by Quin Russell    CBC WITH AUTOMATED DIFF    Collection Time: 12/06/21  7:45 AM   Result Value Ref Range    WBC 4.3 3.6 - 11.0 K/uL    RBC 3.97 3.80 - 5.20 M/uL    HGB 12.3 11.5 - 16.0 g/dL    HCT 39.4 35.0 - 47.0 %    MCV 99.2 (H) 80.0 - 99.0 FL    MCH 31.0 26.0 - 34.0 PG    MCHC 31.2 30.0 - 36.5 g/dL    RDW 13.5 11.5 - 14.5 %    PLATELET 69 (L) 457 - 400 K/uL    MPV 12.9 8.9 - 12.9 FL    NRBC 0.0 0.0  WBC    ABSOLUTE NRBC 0.00 0.00 - 0.01 K/uL    NEUTROPHILS 60 32 - 75 %    LYMPHOCYTES 28 12 - 49 %    MONOCYTES 9 5 - 13 %    EOSINOPHILS 2 0 - 7 %    BASOPHILS 1 0 - 1 %    IMMATURE GRANULOCYTES 0 0 - 0.5 %    ABS. NEUTROPHILS 2.7 1.8 - 8.0 K/UL    ABS. LYMPHOCYTES 1.2 0.8 - 3.5 K/UL    ABS. MONOCYTES 0.4 0.0 - 1.0 K/UL    ABS. EOSINOPHILS 0.1 0.0 - 0.4 K/UL    ABS. BASOPHILS 0.0 0.0 - 0.1 K/UL    ABS. IMM.  GRANS. 0.0 0.00 - 0.04 K/UL    DF AUTOMATED     GLUCOSE, POC    Collection Time: 12/06/21  7:58 AM   Result Value Ref Range    Glucose (POC) 77 65 - 117 mg/dL    Performed by Jarred Brown ([employer])        History:  Past Medical History:   Diagnosis Date    Anxiety disorder     Arthritis     Atherosclerosis of native arteries of the extremities with ulceration ([facility] 8/31/2020    Atrial fibrillation ([address]     Cardiac pacemaker in situ     Cellulitis and abscess of trunk 8/31/2020    Depression, postpartum     Diabetes ([address]     Heart disease     Heartburn     Hx of long term use of blood thinners     Hypercholesterolemia     Hypertension     Hypothyroidism     Migraines     Peripheral venous insufficiency 8/31/2020    Seizures ([address]     Sleep disorder     Stroke ([facility] [facility]     2019    Varicose veins of lower extremity with inflammation 8/31/2020      Past Surgical History:   Procedure Laterality Date    HX BACK SURGERY      HX OTHER SURGICAL      leg surgery     HX OTHER SURGICAL      pacemaker monitor       Prior to Admission medications Medication Sig Start Date End Date Taking? Authorizing Provider   Vimpat 200 mg tab tablet Take 1 Tablet by mouth two (2) times a day. 11/19/21  Yes Provider, Historical   atorvastatin (LIPITOR) 40 mg tablet Take 1 tablet by mouth once daily for 90 days 11/22/21 8/19/22 Yes Cristina Greer MD   FLUoxetine (PROzac) 10 mg capsule Take 1 Capsule by mouth daily for 270 days. 11/5/21 8/2/22 Yes Cristina Greer MD   Euthyrox 150 mcg tablet Take 1 tablet by mouth once daily 10/13/21  Yes Cristina Greer MD   levETIRAcetam (KEPPRA XR) 500 mg ER tablet TAKE 2 TABLETS BY MOUTH TWICE DAILY FOR 90 DAYS 6/1/21  Yes Provider, Historical   sevelamer carbonate (RENVELA) 800 mg tab tab  5/10/21  Yes Provider, Historical   ondansetron (Zofran ODT) 4 mg disintegrating tablet 1 Tablet by SubLINGual route every eight (8) hours as needed for Nausea or Vomiting.  12/1/21   Arslan Madrigal MD   loperamide (IMODIUM) 2 mg capsule TAKE 1 CAPSULE BY MOUTH SIX TIMES DAILY AS NEEDED FOR 30 DAYS 10/17/21   Cristina Greer MD     No Known Allergies   Social History     Tobacco Use    Smoking status: Never Smoker    Smokeless tobacco: Never Used   Substance Use Topics    Alcohol use: Never      Family History   Problem Relation Age of Onset    Heart Disease Mother     Heart Disease Father     Diabetes Sister     Diabetes Brother         Current Medications:  Current Facility-Administered Medications   Medication Dose Route Frequency    calcitRIOL (ROCALTROL) capsule 0.5 mcg  0.5 mcg Oral DAILY    prochlorperazine (COMPAZINE) tablet 5 mg  5 mg Oral Q6H PRN    famotidine (PEPCID) tablet 10 mg  10 mg Oral BID    levETIRAcetam (KEPPRA) tablet 500 mg  500 mg Oral BID    lacosamide (VIMPAT) tablet 200 mg  200 mg Oral BID    sodium chloride (NS) flush 5-40 mL  5-40 mL IntraVENous Q8H    sodium chloride (NS) flush 5-40 mL  5-40 mL IntraVENous PRN    acetaminophen (TYLENOL) tablet 650 mg  650 mg Oral Q6H PRN    Or    acetaminophen (TYLENOL) suppository 650 mg  650 mg Rectal Q6H PRN    polyethylene glycol (MIRALAX) packet 17 g  17 g Oral DAILY PRN    ondansetron (ZOFRAN ODT) tablet 4 mg  4 mg Oral Q8H PRN    Or    ondansetron (ZOFRAN) injection 4 mg  4 mg IntraVENous Q6H PRN    atorvastatin (LIPITOR) tablet 40 mg  40 mg Oral QHS    levothyroxine (SYNTHROID) tablet 150 mcg  150 mcg Oral DAILY    FLUoxetine (PROzac) capsule 10 mg  10 mg Oral DAILY    metoprolol tartrate (LOPRESSOR) tablet 25 mg  25 mg Oral DAILY    sevelamer carbonate (RENVELA) tab 800 mg  800 mg Oral TID WITH MEALS         Review of Systems:  Constitutional No fevers, chills, night sweats, excessive fatigue or weight loss. Allergic/Immunologic No recent allergic reactions   Eyes No significant visual difficulties. No diplopia. ENMT No problems with hearing, no sore throat, no sinus drainage. Endocrine No hot flashes or night sweats. No cold intolerance, polyuria, or polydipsia   Hematologic/Lymphatic No easy bruising or bleeding. The patient denies any tender or palpable lymph nodes   Breasts No abnormal masses of breast, nipple discharge or pain. Respiratory No dyspnea on exertion, orthopnea, chest pain, cough or hemoptysis. Cardiovascular No anginal chest pain, irregular heart beat, tachycardia, palpitations or orthopnea. Gastrointestinal No nausea, vomiting, diarrhea, constipation, cramping, dysphagia, reflux, heartburn, GI bleeding, or early satiety. No change in bowel habits. Genitourinary (M) No hematuria, dysuria, increased frequency, urgency, hesitancy or incontinence. Musculoskeletal No joint pain, swelling or redness. No decreased range of motion. Integumentary No chronic rashes, inflammation, ulcerations, pruritus, petechiae, purpura, ecchymoses, or skin changes. Neurologic No headache, blurred vision, and no areas of focal weakness or numbness. Normal gait. No sensory problems. Psychiatric No insomnia, depression, atul or mood swings. No psychotropic drugs. Physical Exam:  Constitutional Alert, cooperative, oriented. Mood and affect appropriate. Appears close to chronological age. Well nourished. Well developed. Head Normocephalic; no scars   Eyes Conjunctivae and sclerae are clear and without icterus. Pupils are reactive and equal.   ENMT Sinuses are nontender. No oral exudates, ulcers, masses, thrush or mucositis. Oropharynx clear. Tongue normal.   Neck Supple without masses or thyromegaly. No jugular venous distension. Hematologic/Lymphatic No petechiae or purpura. No tender or palpable lymph nodes in the cervical, supraclavicular, axillary or inguinal area. Respiratory Lungs are clear to auscultation without rhonchi or wheezing. Cardiovascular Regular rate and rhythm of heart without murmurs, gallops or rubs. Chest / Line Site Chest is symmetric with no chest wall deformities. Abdomen Non-tender, non-distended, no masses, ascites or hepatosplenomegaly. Good bowel sounds. No guarding or rebound tenderness. No pulsatile masses. Musculoskeletal No tenderness or swelling, normal range of motion without obvious weakness. Extremities No visible deformities, no cyanosis, clubbing or edema. Skin No rashes, scars, or lesions suggestive of malignancy. No petechiae, purpura, or ecchymoses. No excoriations. Neurologic No sensory or motor deficits, normal cerebellar function, normal gait, cranial nerves intact. Psychiatric Alert and oriented times three. Coherent speech. Verbalizes understanding of our discussions today.

## 2021-12-06 NOTE — PROGRESS NOTES
I was notified by Nephrology attending about concerns for no read with regards to not having a read for UE doppler and concerns for not having vascular surgery on board. Nursing supervisor contacted, there are currently no providers available to read UE duplex. Contacted transfer center and updated Dr. Yimi Cuello (Vascular Surgery on call at [facility]) about latest update, based on his recommendations, in house Teriist re-evaluated the patient at bedside, with bedside doppler, appreciable pulse on upper extremity, patients UE duplex read by vascular surgery. Case rediscussed with Vascular Surgery on call at [facility], in house denizurnist and Nephrology attending.     At this time the consensus is that the patient is stable for continued care at [facility]

## 2021-12-06 NOTE — PROGRESS NOTES
ADLs: Turning frequently in bed. VS: Hypertensive on return from dialysis, responded well to scheduled meds. Other VSS on RA. Neuro: A&Ox4. Cardiac: Tele NSR a-paced. Respiratory: No concerns. GI/: Voiding without issue - purewick in place. Passing flatus but no BM. Tolerating diet well - intermittent nausea improved with PRNs. Skin/Wounds: RUE fistula bruit heard and thrill felt. No other concerns. Lines/Drains: PIV x1 capped, flushes well. Pain: Patient reports intermittent headache relieved with PRNs. Bedside shift change report given to [address] [place] [address] (oncoming nurse) by Graciela Noel (offgoing nurse). Report included the following information SBAR.

## 2021-12-07 LAB
ALBUMIN SERPL-MCNC: 3.3 G/DL (ref 3.5–5)
ALBUMIN/GLOB SERPL: 1.2 {RATIO} (ref 1.1–2.2)
ALP SERPL-CCNC: 116 U/L (ref 45–117)
ALT SERPL-CCNC: 24 U/L (ref 12–78)
ANION GAP SERPL CALC-SCNC: 5 MMOL/L (ref 5–15)
AST SERPL W P-5'-P-CCNC: 22 U/L (ref 15–37)
BASOPHILS # BLD: 0 K/UL (ref 0–0.1)
BASOPHILS NFR BLD: 1 % (ref 0–1)
BILIRUB SERPL-MCNC: 0.6 MG/DL (ref 0.2–1)
BUN SERPL-MCNC: 32 MG/DL (ref 6–20)
BUN/CREAT SERPL: 9 (ref 12–20)
CA-I BLD-MCNC: 7.5 MG/DL (ref 8.5–10.1)
CHLORIDE SERPL-SCNC: 102 MMOL/L (ref 97–108)
CO2 SERPL-SCNC: 29 MMOL/L (ref 21–32)
CREAT SERPL-MCNC: 3.69 MG/DL (ref 0.55–1.02)
DIFFERENTIAL METHOD BLD: ABNORMAL
EOSINOPHIL # BLD: 0.1 K/UL (ref 0–0.4)
EOSINOPHIL NFR BLD: 2 % (ref 0–7)
ERYTHROCYTE [DISTWIDTH] IN BLOOD BY AUTOMATED COUNT: 13.6 % (ref 11.5–14.5)
GLOBULIN SER CALC-MCNC: 2.8 G/DL (ref 2–4)
GLUCOSE BLD STRIP.AUTO-MCNC: 70 MG/DL (ref 65–117)
GLUCOSE BLD STRIP.AUTO-MCNC: 78 MG/DL (ref 65–117)
GLUCOSE BLD STRIP.AUTO-MCNC: 95 MG/DL (ref 65–117)
GLUCOSE SERPL-MCNC: 73 MG/DL (ref 65–100)
HCT VFR BLD AUTO: 49.6 % (ref 35–47)
HGB BLD-MCNC: 14.8 G/DL (ref 11.5–16)
IMM GRANULOCYTES # BLD AUTO: 0 K/UL (ref 0–0.04)
IMM GRANULOCYTES NFR BLD AUTO: 0 % (ref 0–0.5)
LYMPHOCYTES # BLD: 1.5 K/UL (ref 0.8–3.5)
LYMPHOCYTES NFR BLD: 34 % (ref 12–49)
MCH RBC QN AUTO: 30.6 PG (ref 26–34)
MCHC RBC AUTO-ENTMCNC: 29.8 G/DL (ref 30–36.5)
MCV RBC AUTO: 102.7 FL (ref 80–99)
MONOCYTES # BLD: 0.3 K/UL (ref 0–1)
MONOCYTES NFR BLD: 7 % (ref 5–13)
NEUTS SEG # BLD: 2.5 K/UL (ref 1.8–8)
NEUTS SEG NFR BLD: 56 % (ref 32–75)
NRBC # BLD: 0 K/UL (ref 0–0.01)
NRBC BLD-RTO: 0 PER 100 WBC
PERFORMED BY, TECHID: NORMAL
PLATELET # BLD AUTO: 80 K/UL (ref 150–400)
PMV BLD AUTO: 11.5 FL (ref 8.9–12.9)
POTASSIUM SERPL-SCNC: 4.4 MMOL/L (ref 3.5–5.1)
PROT SERPL-MCNC: 6.1 G/DL (ref 6.4–8.2)
RBC # BLD AUTO: 4.83 M/UL (ref 3.8–5.2)
SODIUM SERPL-SCNC: 136 MMOL/L (ref 136–145)
WBC # BLD AUTO: 4.4 K/UL (ref 3.6–11)

## 2021-12-07 PROCEDURE — C1725 CATH, TRANSLUMIN NON-LASER: HCPCS | Performed by: SURGERY

## 2021-12-07 PROCEDURE — C1887 CATHETER, GUIDING: HCPCS | Performed by: SURGERY

## 2021-12-07 PROCEDURE — 36216 PLACE CATHETER IN ARTERY: CPT | Performed by: SURGERY

## 2021-12-07 PROCEDURE — 77030013516 HC DEV INFL ANGI MRTM -B: Performed by: SURGERY

## 2021-12-07 PROCEDURE — C1894 INTRO/SHEATH, NON-LASER: HCPCS | Performed by: SURGERY

## 2021-12-07 PROCEDURE — 76210000006 HC OR PH I REC 0.5 TO 1 HR: Performed by: SURGERY

## 2021-12-07 PROCEDURE — 99152 MOD SED SAME PHYS/QHP 5/>YRS: CPT | Performed by: SURGERY

## 2021-12-07 PROCEDURE — 36901 INTRO CATH DIALYSIS CIRCUIT: CPT | Performed by: SURGERY

## 2021-12-07 PROCEDURE — 77030031139 HC SUT VCRL2 J&J -A: Performed by: SURGERY

## 2021-12-07 PROCEDURE — 36225 PLACE CATH SUBCLAVIAN ART: CPT | Performed by: SURGERY

## 2021-12-07 PROCEDURE — 36200 PLACE CATHETER IN AORTA: CPT | Performed by: SURGERY

## 2021-12-07 PROCEDURE — 74011250636 HC RX REV CODE- 250/636: Performed by: SURGERY

## 2021-12-07 PROCEDURE — 77030006078 HC TAPE GLOW N TEL LEMV -B: Performed by: SURGERY

## 2021-12-07 PROCEDURE — 74011250637 HC RX REV CODE- 250/637: Performed by: NURSE PRACTITIONER

## 2021-12-07 PROCEDURE — B41GZZZ FLUOROSCOPY OF LEFT LOWER EXTREMITY ARTERIES: ICD-10-PCS | Performed by: SURGERY

## 2021-12-07 PROCEDURE — 77030004561 HC CATH ANGI DX COBRA ANGI -B: Performed by: SURGERY

## 2021-12-07 PROCEDURE — 74011000636 HC RX REV CODE- 636: Performed by: SURGERY

## 2021-12-07 PROCEDURE — 99232 SBSQ HOSP IP/OBS MODERATE 35: CPT | Performed by: SURGERY

## 2021-12-07 PROCEDURE — 82607 VITAMIN B-12: CPT

## 2021-12-07 PROCEDURE — 85025 COMPLETE CBC W/AUTO DIFF WBC: CPT

## 2021-12-07 PROCEDURE — 75710 ARTERY X-RAYS ARM/LEG: CPT | Performed by: SURGERY

## 2021-12-07 PROCEDURE — C1769 GUIDE WIRE: HCPCS | Performed by: SURGERY

## 2021-12-07 PROCEDURE — 36217 PLACE CATHETER IN ARTERY: CPT | Performed by: SURGERY

## 2021-12-07 PROCEDURE — 65270000029 HC RM PRIVATE

## 2021-12-07 PROCEDURE — 36902 INTRO CATH DIALYSIS CIRCUIT: CPT | Performed by: SURGERY

## 2021-12-07 PROCEDURE — 74011000250 HC RX REV CODE- 250: Performed by: SURGERY

## 2021-12-07 PROCEDURE — 36415 COLL VENOUS BLD VENIPUNCTURE: CPT

## 2021-12-07 PROCEDURE — 74011250637 HC RX REV CODE- 250/637: Performed by: PSYCHIATRY & NEUROLOGY

## 2021-12-07 PROCEDURE — 36221 PLACE CATH THORACIC AORTA: CPT | Performed by: SURGERY

## 2021-12-07 PROCEDURE — 77030004558 HC CATH ANGI DX SUPR TORQ CARD -A: Performed by: SURGERY

## 2021-12-07 PROCEDURE — 37246 TRLUML BALO ANGIOP 1ST ART: CPT | Performed by: SURGERY

## 2021-12-07 PROCEDURE — 76937 US GUIDE VASCULAR ACCESS: CPT | Performed by: SURGERY

## 2021-12-07 PROCEDURE — 80053 COMPREHEN METABOLIC PANEL: CPT

## 2021-12-07 PROCEDURE — 03773ZZ DILATION OF RIGHT BRACHIAL ARTERY, PERCUTANEOUS APPROACH: ICD-10-PCS | Performed by: SURGERY

## 2021-12-07 PROCEDURE — 74011250637 HC RX REV CODE- 250/637: Performed by: SURGERY

## 2021-12-07 PROCEDURE — 83540 ASSAY OF IRON: CPT

## 2021-12-07 PROCEDURE — 82962 GLUCOSE BLOOD TEST: CPT

## 2021-12-07 PROCEDURE — 99153 MOD SED SAME PHYS/QHP EA: CPT | Performed by: SURGERY

## 2021-12-07 RX ORDER — MIDAZOLAM HYDROCHLORIDE 1 MG/ML
INJECTION INTRAMUSCULAR; INTRAVENOUS AS NEEDED
Status: DISCONTINUED | OUTPATIENT
Start: 2021-12-07 | End: 2021-12-07 | Stop reason: HOSPADM

## 2021-12-07 RX ORDER — DEXTROSE 50 % IN WATER (D50W) INTRAVENOUS SYRINGE
AS NEEDED
Status: DISCONTINUED | OUTPATIENT
Start: 2021-12-07 | End: 2021-12-07 | Stop reason: HOSPADM

## 2021-12-07 RX ORDER — LIDOCAINE HYDROCHLORIDE 10 MG/ML
INJECTION INFILTRATION; PERINEURAL AS NEEDED
Status: DISCONTINUED | OUTPATIENT
Start: 2021-12-07 | End: 2021-12-07 | Stop reason: HOSPADM

## 2021-12-07 RX ORDER — SODIUM CHLORIDE 0.9 % (FLUSH) 0.9 %
5-40 SYRINGE (ML) INJECTION EVERY 8 HOURS
Status: DISCONTINUED | OUTPATIENT
Start: 2021-12-07 | End: 2021-12-11 | Stop reason: HOSPADM

## 2021-12-07 RX ORDER — NITROGLYCERIN 5 MG/ML
INJECTION, SOLUTION INTRAVENOUS AS NEEDED
Status: DISCONTINUED | OUTPATIENT
Start: 2021-12-07 | End: 2021-12-07 | Stop reason: HOSPADM

## 2021-12-07 RX ORDER — HEPARIN SODIUM 1000 [USP'U]/ML
INJECTION, SOLUTION INTRAVENOUS; SUBCUTANEOUS AS NEEDED
Status: DISCONTINUED | OUTPATIENT
Start: 2021-12-07 | End: 2021-12-07 | Stop reason: HOSPADM

## 2021-12-07 RX ORDER — HEPARIN SODIUM 200 [USP'U]/100ML
INJECTION, SOLUTION INTRAVENOUS
Status: COMPLETED | OUTPATIENT
Start: 2021-12-07 | End: 2021-12-07

## 2021-12-07 RX ORDER — SODIUM CHLORIDE 0.9 % (FLUSH) 0.9 %
5-40 SYRINGE (ML) INJECTION AS NEEDED
Status: DISCONTINUED | OUTPATIENT
Start: 2021-12-07 | End: 2021-12-11 | Stop reason: HOSPADM

## 2021-12-07 RX ORDER — FENTANYL CITRATE 50 UG/ML
INJECTION, SOLUTION INTRAMUSCULAR; INTRAVENOUS AS NEEDED
Status: DISCONTINUED | OUTPATIENT
Start: 2021-12-07 | End: 2021-12-07 | Stop reason: HOSPADM

## 2021-12-07 RX ORDER — HYDROCODONE BITARTRATE AND ACETAMINOPHEN 7.5; 325 MG/1; MG/1
1 TABLET ORAL
Status: DISCONTINUED | OUTPATIENT
Start: 2021-12-07 | End: 2021-12-11 | Stop reason: HOSPADM

## 2021-12-07 RX ADMIN — METOPROLOL TARTRATE 25 MG: 25 TABLET, FILM COATED ORAL at 09:15

## 2021-12-07 RX ADMIN — CALCITRIOL CAPSULES 0.25 MCG 0.5 MCG: 0.25 CAPSULE ORAL at 09:14

## 2021-12-07 RX ADMIN — LEVOTHYROXINE SODIUM 150 MCG: 0.07 TABLET ORAL at 09:13

## 2021-12-07 RX ADMIN — SEVELAMER CARBONATE 800 MG: 800 TABLET, FILM COATED ORAL at 09:14

## 2021-12-07 RX ADMIN — FLUOXETINE HYDROCHLORIDE 10 MG: 10 CAPSULE ORAL at 09:14

## 2021-12-07 RX ADMIN — Medication 10 ML: at 22:39

## 2021-12-07 RX ADMIN — FAMOTIDINE 10 MG: 10 TABLET ORAL at 09:13

## 2021-12-07 RX ADMIN — LACOSAMIDE 200 MG: 100 TABLET, FILM COATED ORAL at 20:48

## 2021-12-07 RX ADMIN — LEVETIRACETAM 500 MG: 500 TABLET, FILM COATED ORAL at 20:48

## 2021-12-07 RX ADMIN — LEVETIRACETAM 500 MG: 500 TABLET, FILM COATED ORAL at 09:13

## 2021-12-07 RX ADMIN — ONDANSETRON 4 MG: 4 TABLET, ORALLY DISINTEGRATING ORAL at 21:37

## 2021-12-07 RX ADMIN — FAMOTIDINE 10 MG: 10 TABLET ORAL at 20:48

## 2021-12-07 RX ADMIN — LACOSAMIDE 200 MG: 100 TABLET, FILM COATED ORAL at 09:14

## 2021-12-07 RX ADMIN — ATORVASTATIN CALCIUM 40 MG: 40 TABLET, FILM COATED ORAL at 20:47

## 2021-12-07 RX ADMIN — Medication 10 ML: at 06:27

## 2021-12-07 RX ADMIN — HYDROCODONE BITARTRATE AND ACETAMINOPHEN 1 TABLET: 7.5; 325 TABLET ORAL at 20:48

## 2021-12-07 RX ADMIN — SEVELAMER CARBONATE 800 MG: 800 TABLET, FILM COATED ORAL at 12:56

## 2021-12-07 NOTE — PROGRESS NOTES
Problem: Falls - Risk of  Goal: *Absence of Falls  Description: Document Marisol Coronel Fall Risk and appropriate interventions in the flowsheet.   Outcome: Progressing Towards Goal  Note: Fall Risk Interventions:  Mobility Interventions: PT Consult for mobility concerns    Mentation Interventions: Evaluate medications/consider consulting pharmacy    Medication Interventions: Evaluate medications/consider consulting pharmacy    Elimination Interventions: Patient to call for help with toileting needs    History of Falls Interventions: Bed/chair exit alarm, Door open when patient unattended, Room close to nurse's station         Problem: Patient Education: Go to Patient Education Activity  Goal: Patient/Family Education  Outcome: Progressing Towards Goal     Problem: Patient Education: Go to Patient Education Activity  Goal: Patient/Family Education  Outcome: Progressing Towards Goal     Problem: Seizure Disorder (Adult)  Goal: *STG: Remains free of seizure activity  Outcome: Progressing Towards Goal  Goal: *STG: Remains free of injury during seizure activity  Outcome: Progressing Towards Goal  Goal: *STG: Remains safe in hospital  Outcome: Progressing Towards Goal  Goal: Interventions  Outcome: Progressing Towards Goal

## 2021-12-07 NOTE — PROGRESS NOTES
Dual skin assessment completed with Jono Spann RN. Some non-blanchable redness seen in gluteal cleft and on bilateral buttocks. Also noticed some bruising on right lower leg. Right groin puncture site from procedure earlier today: site is clean dry and dressing is intact.

## 2021-12-07 NOTE — PROGRESS NOTES
Hospitalist Progress Note    Subjective:   Daily Progress Note: 12/7/2021 4:37 PM    Hand sensation has improved. Current Facility-Administered Medications   Medication Dose Route Frequency    heparin (porcine) injection 5,000 Units  5,000 Units SubCUTAneous Q12H    calcitRIOL (ROCALTROL) capsule 0.5 mcg  0.5 mcg Oral DAILY    prochlorperazine (COMPAZINE) tablet 5 mg  5 mg Oral Q6H PRN    famotidine (PEPCID) tablet 10 mg  10 mg Oral BID    levETIRAcetam (KEPPRA) tablet 500 mg  500 mg Oral BID    lacosamide (VIMPAT) tablet 200 mg  200 mg Oral BID    sodium chloride (NS) flush 5-40 mL  5-40 mL IntraVENous Q8H    sodium chloride (NS) flush 5-40 mL  5-40 mL IntraVENous PRN    acetaminophen (TYLENOL) tablet 650 mg  650 mg Oral Q6H PRN    Or    acetaminophen (TYLENOL) suppository 650 mg  650 mg Rectal Q6H PRN    polyethylene glycol (MIRALAX) packet 17 g  17 g Oral DAILY PRN    ondansetron (ZOFRAN ODT) tablet 4 mg  4 mg Oral Q8H PRN    Or    ondansetron (ZOFRAN) injection 4 mg  4 mg IntraVENous Q6H PRN    atorvastatin (LIPITOR) tablet 40 mg  40 mg Oral QHS    levothyroxine (SYNTHROID) tablet 150 mcg  150 mcg Oral DAILY    FLUoxetine (PROzac) capsule 10 mg  10 mg Oral DAILY    metoprolol tartrate (LOPRESSOR) tablet 25 mg  25 mg Oral DAILY    sevelamer carbonate (RENVELA) tab 800 mg  800 mg Oral TID WITH MEALS        Review of Systems  Review of Systems   Constitutional: Negative for fever and malaise/fatigue. HENT: Negative. Respiratory: Negative for cough and shortness of breath. Cardiovascular: Negative for chest pain and leg swelling. Gastrointestinal: Negative for abdominal pain, nausea and vomiting. Genitourinary: Negative. Musculoskeletal: Negative. Skin: Negative. Neurological: Negative. Psychiatric/Behavioral: Negative.              Objective:     Visit Vitals  /62 (BP 1 Location: Left upper arm, BP Patient Position: At rest)   Pulse 60   Temp 97.8 °F (36.6 °C) Resp 18   Ht 5' 6\" (1.676 m)   Wt 74.8 kg (165 lb)   SpO2 99%   Breastfeeding No   BMI 26.63 kg/m²      O2 Device: None (Room air)    Temp (24hrs), Av.1 °F (36.7 °C), Min:97.8 °F (36.6 °C), Max:98.5 °F (36.9 °C)      No intake/output data recorded.  1901 -  0700  In: 600 [P.O.:600]  Out: 2000     Recent Results (from the past 24 hour(s))   GLUCOSE, POC    Collection Time: 21  3:34 PM   Result Value Ref Range    Glucose (POC) 91 65 - 117 mg/dL    Performed by Sabiha Vazquez ([employer])    GLUCOSE, POC    Collection Time: 21  7:41 PM   Result Value Ref Range    Glucose (POC) 129 (H) 65 - 117 mg/dL    Performed by Nancy Rouse (LPN)    GLUCOSE, POC    Collection Time: 21  7:48 AM   Result Value Ref Range    Glucose (POC) 78 65 - 117 mg/dL    Performed by DOUGLAS MENDOSA         DUPLEX UPPER EXT ARTERY RIGHT   Final Result      CT HEAD WO CONT   Final Result   Age-appropriate atrophy. No acute findings. XR CHEST SNGL V   Final Result   The cardiomediastinal silhouette is appropriate for age, technique,   and lung expansion. Pulmonary vasculature is not congested. The lungs are   essentially clear. No effusion or pneumothorax is seen. PHYSICAL EXAM:    Physical Exam  Vitals reviewed. HENT:      Head: Normocephalic and atraumatic. Mouth/Throat:      Mouth: Mucous membranes are moist.      Pharynx: Oropharynx is clear. Cardiovascular:      Rate and Rhythm: Regular rhythm. Heart sounds: Normal heart sounds. Pulmonary:      Breath sounds: Normal breath sounds. Abdominal:      General: Abdomen is flat. Bowel sounds are normal.      Palpations: Abdomen is soft. Musculoskeletal:      Cervical back: Normal range of motion and neck supple. Comments: Right hand is warm to the touch with good motor function   Skin:     General: Skin is warm and dry. Neurological:      General: No focal deficit present.       Mental Status: She is alert and oriented to person, place, and time. Mental status is at baseline. Psychiatric:         Mood and Affect: Mood normal.          Data Review    Recent Results (from the past 24 hour(s))   GLUCOSE, POC    Collection Time: 12/06/21  3:34 PM   Result Value Ref Range    Glucose (POC) 91 65 - 117 mg/dL    Performed by Page Parada ([employer])    GLUCOSE, POC    Collection Time: 12/06/21  7:41 PM   Result Value Ref Range    Glucose (POC) 129 (H) 65 - 117 mg/dL    Performed by Sampson Galloway (LPN)    GLUCOSE, POC    Collection Time: 12/07/21  7:48 AM   Result Value Ref Range    Glucose (POC) 78 65 - 117 mg/dL    Performed by DOUGLAS MENDOSA         Assessment/Plan:     Principal Problem:    Intractable nausea and vomiting (12/3/2021)    Active Problems:    Seizure ([address] (5/19/2021)      Left-sided weakness (12/3/2021)      Fluid overload (12/5/2021)      Hospital course: This 67-year-old male presented to the emergency department on 12/3/2021 for admission after being found to have persistent weakness, intractable nausea and vomiting. Chest x-ray was negative for acute pulmonary process. Patient was also found to have increased tremors and seizure like activity. Patient's started on Keppra 500 mg twice daily and Vimpat was increased to 200 mg twice daily. Tremors have subsided. Patient is tolerating hemodialysis without complications. Nausea and vomiting have resolved. Patient with a recent right arm fistula placement. There is no radial pulse although there are findings consistent with steal syndrome. Motor and sensation are present. Dr. Viktor Davila, vascular surgery evaluating. Neurology consultation. Nephrology consultation Dr. Kaiden Mccallum. Angiogram pending    Impression\plan:    1. Generalized weakness/ambulatory dysfunction:  continue PT/OT as ordered  Supportive care  Fall precautions     2.  Intractable nausea and vomiting:  -encourage oral hydration  -prn zofran and compazine  -continue supportive care  Resolved     3. End-stage renal disease:  Continue hemodialysis  Dr. Layo Boone evaluating right fistula steal syndrome, right hand with good sensation and motor no active signs of cyanotic presentation     4. Seizure disorder:  -keppra adjusted to 500mg bid, vimpat increased 200mg bid by neurology  -prn ativan for breakthrough sz     5. Peripheral vascular disease:  -continue lipitor 40mg  -outpatient vascular workup warranted     6. Atrial fibrillation:  -Currently stable on tele  -continue metoprolol 25mg     7. Hyperkalemia   Resolved     8. Depression:  -Continue prozac     9. Thrombocytopenia: Chronic  -hold all anticoagulants for platelet count less than 50  -consult hematology     10. Hypothyroidism:  -currently on syntrhoid 150mcg  -tsh 1.0 and normal        Full Code  Dvt Prophylaxis heparin   GI Prophylaxis none  Disposition:  -Awaiting the angiogram     NOK:  Daughter-in-law Jessica Garcia [phone] updated   Care Plan discussed with: Patient/Family    Total time spent with patient: >35 minutes.

## 2021-12-07 NOTE — PROGRESS NOTES
Patient: Paula Henao MRN: [id number]  SSN: [id number]    YOB: 1950  Age: 80 y.o. Sex: female          Subjective:     Seen at bedside,  She feels well  Didn't c/o any rt hand pain etc     Blood flow rate yesterday as reported by HD RN was close to 400 mils per minute. Objective:     Vitals:    12/06/21 1531 12/06/21 1936 12/07/21 0002 12/07/21 0744   BP:  125/67 118/60 139/62   Pulse: 68 (!) 58 61 60   Resp: 20 19 18 18   Temp: 98.1 °F (36.7 °C) 98.3 °F (36.8 °C) 98.2 °F (36.8 °C) 97.8 °F (36.6 °C)   TempSrc:       SpO2: 98% 95% 97% 99%   Weight:       Height:            Intake and Output:  Yesterday's Intake and Output:  12/06 0701 - 12/07 0700  In: 600 [P.O.:600]  Out: 2000      Physical Exam:   GENERAL: alert, cooperative, no distress, appears stated age  EYE: negative  LUNG: clear to auscultation bilaterally  HEART: regular rate and rhythm, S1, S2 normal  ABDOMEN: soft, non-tender. Bowel sounds normal. No masses,  no organomegaly  EXTREMITIES:  extremities normal, right brachial aVF. Right hand warm to touch. Slightly dusky blue appearance. Delayed capillary refill  ulcers,SKIN: Normal.   Skin -  no rash or abnormalities  NEUROLOGIC: negative      Dialysis access: Arteriovenous Fistula site right.     Data Review    Meds    Current Facility-Administered Medications   Medication Dose Route Frequency    heparin (porcine) injection 5,000 Units  5,000 Units SubCUTAneous Q12H    calcitRIOL (ROCALTROL) capsule 0.5 mcg  0.5 mcg Oral DAILY    prochlorperazine (COMPAZINE) tablet 5 mg  5 mg Oral Q6H PRN    famotidine (PEPCID) tablet 10 mg  10 mg Oral BID    levETIRAcetam (KEPPRA) tablet 500 mg  500 mg Oral BID    lacosamide (VIMPAT) tablet 200 mg  200 mg Oral BID    sodium chloride (NS) flush 5-40 mL  5-40 mL IntraVENous Q8H    sodium chloride (NS) flush 5-40 mL  5-40 mL IntraVENous PRN    acetaminophen (TYLENOL) tablet 650 mg  650 mg Oral Q6H PRN    Or    acetaminophen (TYLENOL) suppository 650 mg  650 mg Rectal Q6H PRN    polyethylene glycol (MIRALAX) packet 17 g  17 g Oral DAILY PRN    ondansetron (ZOFRAN ODT) tablet 4 mg  4 mg Oral Q8H PRN    Or    ondansetron (ZOFRAN) injection 4 mg  4 mg IntraVENous Q6H PRN    atorvastatin (LIPITOR) tablet 40 mg  40 mg Oral QHS    levothyroxine (SYNTHROID) tablet 150 mcg  150 mcg Oral DAILY    FLUoxetine (PROzac) capsule 10 mg  10 mg Oral DAILY    metoprolol tartrate (LOPRESSOR) tablet 25 mg  25 mg Oral DAILY    sevelamer carbonate (RENVELA) tab 800 mg  800 mg Oral TID WITH MEALS       Lab      Recent Results (from the past 24 hour(s))   GLUCOSE, POC    Collection Time: 12/06/21  3:34 PM   Result Value Ref Range    Glucose (POC) 91 65 - 117 mg/dL    Performed by Nury Maxwell ([employer])    GLUCOSE, POC    Collection Time: 12/06/21  7:41 PM   Result Value Ref Range    Glucose (POC) 129 (H) 65 - 117 mg/dL    Performed by Danny Purcell (LPN)    GLUCOSE, POC    Collection Time: 12/07/21  7:48 AM   Result Value Ref Range    Glucose (POC) 78 65 - 117 mg/dL    Performed by DOUGLAS MENDOSA         Lab Results   Component Value Date/Time    Color Yellow/Straw 12/03/2021 09:54 AM    Appearance Clear 12/03/2021 09:54 AM    Specific gravity 1.015 12/03/2021 09:54 AM    pH (UA) 5.0 12/03/2021 09:54 AM    Protein 30 (A) 12/03/2021 09:54 AM    Glucose Negative 12/03/2021 09:54 AM    Ketone Negative 12/03/2021 09:54 AM    Bilirubin Negative 12/03/2021 09:54 AM    Urobilinogen 0.1 12/03/2021 09:54 AM    Nitrites Negative 12/03/2021 09:54 AM    Leukocyte Esterase Negative 12/03/2021 09:54 AM    Epithelial cells Moderate (A) 05/20/2021 12:56 AM    Bacteria Negative 12/03/2021 09:54 AM    WBC 0-4 12/03/2021 09:54 AM    RBC 5-10 12/03/2021 09:54 AM       Imaging         Assessment & Plan:     Principal Problem:    Intractable nausea and vomiting (12/3/2021)  -Resolved now. Probably secondary to uremia. She is undergoing dialysis now.       Seizure ([facility] [address] (5/19/2021)  Had breakthrough seizures. Was seen by neurology. Dose of Keppra was adjusted to 500 mg and Vimpat was also changed to 200 mg twice daily. She will follow up with neurology after discharge. ESRD and fluid overload (12/5/2021)  S/p HD with 2 L fluid removal.  Patient apparently did well. -Hyperkalemia -from missed hemodialysis. S/p HD today. AVF - Stenosis and steal phenomena-   Noted radiology report   Vascular Planning on angiography sometime this week    H&H 13.2   Will hold epo with HD session   Increased risk of aVF thrombosis    -History of atrial fibrillation -currently on anticoagulation with Eliquis    Signed By: Michael Melton MD     December 7, 2021      This note was prepared using voice recognition system and is likely to have sound alike errors that may have been overlooked even during proofreading.   Please contact the author for any clarifications

## 2021-12-07 NOTE — PROGRESS NOTES
Mass referral for home health has been sent out. [facility] has accepted. [facility] 12/09/2021. Informed patient and family that they have been accepted with [facility]. They are in agreement with this plan. Plan for PT, OT, and SN at home. Medicare pt has received, reviewed, and signed 2nd IM letter informing them of their right to appeal the discharge. Signed copied has been placed on pt bedside chart.

## 2021-12-07 NOTE — PROGRESS NOTES
Attempted to see pt for OT treatment; however, she was off the floor in EP. Will continue to follow and attempt tx session at a later time. Thank you.

## 2021-12-08 LAB
ALBUMIN SERPL-MCNC: 2.5 G/DL (ref 3.5–5)
ALBUMIN/GLOB SERPL: 0.9 {RATIO} (ref 1.1–2.2)
ALP SERPL-CCNC: 98 U/L (ref 45–117)
ALT SERPL-CCNC: 20 U/L (ref 12–78)
ANION GAP SERPL CALC-SCNC: 6 MMOL/L (ref 5–15)
AST SERPL W P-5'-P-CCNC: 23 U/L (ref 15–37)
BASOPHILS # BLD: 0 K/UL (ref 0–0.1)
BASOPHILS NFR BLD: 1 % (ref 0–1)
BILIRUB SERPL-MCNC: 0.6 MG/DL (ref 0.2–1)
BUN SERPL-MCNC: 35 MG/DL (ref 6–20)
BUN/CREAT SERPL: 9 (ref 12–20)
CA-I BLD-MCNC: 7.9 MG/DL (ref 8.5–10.1)
CHLORIDE SERPL-SCNC: 101 MMOL/L (ref 97–108)
CO2 SERPL-SCNC: 29 MMOL/L (ref 21–32)
CREAT SERPL-MCNC: 3.99 MG/DL (ref 0.55–1.02)
DIFFERENTIAL METHOD BLD: ABNORMAL
EOSINOPHIL # BLD: 0.1 K/UL (ref 0–0.4)
EOSINOPHIL NFR BLD: 2 % (ref 0–7)
ERYTHROCYTE [DISTWIDTH] IN BLOOD BY AUTOMATED COUNT: 13.6 % (ref 11.5–14.5)
FOLATE SERPL-MCNC: 4.3 NG/ML (ref 5–21)
GLOBULIN SER CALC-MCNC: 2.8 G/DL (ref 2–4)
GLUCOSE BLD STRIP.AUTO-MCNC: 159 MG/DL (ref 65–117)
GLUCOSE BLD STRIP.AUTO-MCNC: 63 MG/DL (ref 65–117)
GLUCOSE BLD STRIP.AUTO-MCNC: 90 MG/DL (ref 65–117)
GLUCOSE BLD STRIP.AUTO-MCNC: 93 MG/DL (ref 65–117)
GLUCOSE SERPL-MCNC: 148 MG/DL (ref 65–100)
HCT VFR BLD AUTO: 42.3 % (ref 35–47)
HGB BLD-MCNC: 12.7 G/DL (ref 11.5–16)
IMM GRANULOCYTES # BLD AUTO: 0 K/UL (ref 0–0.04)
IMM GRANULOCYTES NFR BLD AUTO: 0 % (ref 0–0.5)
IRON SATN MFR SERPL: 38 % (ref 20–50)
IRON SERPL-MCNC: 87 UG/DL (ref 35–150)
LYMPHOCYTES # BLD: 1.1 K/UL (ref 0.8–3.5)
LYMPHOCYTES NFR BLD: 21 % (ref 12–49)
MCH RBC QN AUTO: 30.2 PG (ref 26–34)
MCHC RBC AUTO-ENTMCNC: 30 G/DL (ref 30–36.5)
MCV RBC AUTO: 100.7 FL (ref 80–99)
MONOCYTES # BLD: 0.3 K/UL (ref 0–1)
MONOCYTES NFR BLD: 7 % (ref 5–13)
NEUTS SEG # BLD: 3.4 K/UL (ref 1.8–8)
NEUTS SEG NFR BLD: 69 % (ref 32–75)
NRBC # BLD: 0 K/UL (ref 0–0.01)
NRBC BLD-RTO: 0 PER 100 WBC
PERFORMED BY, TECHID: ABNORMAL
PERFORMED BY, TECHID: ABNORMAL
PERFORMED BY, TECHID: NORMAL
PERFORMED BY, TECHID: NORMAL
PLATELET # BLD AUTO: 74 K/UL (ref 150–400)
PMV BLD AUTO: 12 FL (ref 8.9–12.9)
POTASSIUM SERPL-SCNC: 4.1 MMOL/L (ref 3.5–5.1)
PROT SERPL-MCNC: 5.3 G/DL (ref 6.4–8.2)
RBC # BLD AUTO: 4.2 M/UL (ref 3.8–5.2)
SODIUM SERPL-SCNC: 136 MMOL/L (ref 136–145)
TIBC SERPL-MCNC: 229 UG/DL (ref 250–450)
VIT B12 SERPL-MCNC: 291 PG/ML (ref 193–986)
WBC # BLD AUTO: 4.9 K/UL (ref 3.6–11)

## 2021-12-08 PROCEDURE — 65270000029 HC RM PRIVATE

## 2021-12-08 PROCEDURE — 36415 COLL VENOUS BLD VENIPUNCTURE: CPT

## 2021-12-08 PROCEDURE — 74011250637 HC RX REV CODE- 250/637: Performed by: PSYCHIATRY & NEUROLOGY

## 2021-12-08 PROCEDURE — 74011250637 HC RX REV CODE- 250/637: Performed by: NURSE PRACTITIONER

## 2021-12-08 PROCEDURE — 97110 THERAPEUTIC EXERCISES: CPT

## 2021-12-08 PROCEDURE — 90935 HEMODIALYSIS ONE EVALUATION: CPT

## 2021-12-08 PROCEDURE — 99232 SBSQ HOSP IP/OBS MODERATE 35: CPT | Performed by: SURGERY

## 2021-12-08 PROCEDURE — 97530 THERAPEUTIC ACTIVITIES: CPT

## 2021-12-08 PROCEDURE — 82962 GLUCOSE BLOOD TEST: CPT

## 2021-12-08 PROCEDURE — 85025 COMPLETE CBC W/AUTO DIFF WBC: CPT

## 2021-12-08 PROCEDURE — 80053 COMPREHEN METABOLIC PANEL: CPT

## 2021-12-08 PROCEDURE — 74011250636 HC RX REV CODE- 250/636: Performed by: PHYSICIAN ASSISTANT

## 2021-12-08 RX ADMIN — LEVETIRACETAM 500 MG: 500 TABLET, FILM COATED ORAL at 13:35

## 2021-12-08 RX ADMIN — Medication 10 ML: at 14:01

## 2021-12-08 RX ADMIN — LEVETIRACETAM 500 MG: 500 TABLET, FILM COATED ORAL at 21:35

## 2021-12-08 RX ADMIN — Medication 10 ML: at 13:27

## 2021-12-08 RX ADMIN — LACOSAMIDE 200 MG: 100 TABLET, FILM COATED ORAL at 13:42

## 2021-12-08 RX ADMIN — HEPARIN SODIUM 5000 UNITS: 5000 INJECTION INTRAVENOUS; SUBCUTANEOUS at 17:23

## 2021-12-08 RX ADMIN — LEVOTHYROXINE SODIUM 150 MCG: 0.07 TABLET ORAL at 13:23

## 2021-12-08 RX ADMIN — FLUOXETINE HYDROCHLORIDE 10 MG: 10 CAPSULE ORAL at 13:23

## 2021-12-08 RX ADMIN — LACOSAMIDE 200 MG: 100 TABLET, FILM COATED ORAL at 21:35

## 2021-12-08 RX ADMIN — ACETAMINOPHEN 650 MG: 325 TABLET ORAL at 15:12

## 2021-12-08 RX ADMIN — FAMOTIDINE 10 MG: 10 TABLET ORAL at 21:35

## 2021-12-08 RX ADMIN — HEPARIN SODIUM 5000 UNITS: 5000 INJECTION INTRAVENOUS; SUBCUTANEOUS at 05:16

## 2021-12-08 RX ADMIN — Medication 10 ML: at 21:36

## 2021-12-08 RX ADMIN — SEVELAMER CARBONATE 800 MG: 800 TABLET, FILM COATED ORAL at 13:26

## 2021-12-08 RX ADMIN — Medication 10 ML: at 05:17

## 2021-12-08 RX ADMIN — ATORVASTATIN CALCIUM 40 MG: 40 TABLET, FILM COATED ORAL at 21:35

## 2021-12-08 RX ADMIN — SEVELAMER CARBONATE 800 MG: 800 TABLET, FILM COATED ORAL at 17:23

## 2021-12-08 RX ADMIN — METOPROLOL TARTRATE 25 MG: 25 TABLET, FILM COATED ORAL at 13:23

## 2021-12-08 RX ADMIN — CALCITRIOL CAPSULES 0.25 MCG 0.5 MCG: 0.25 CAPSULE ORAL at 13:23

## 2021-12-08 NOTE — PROGRESS NOTES
1430 pm  Discharge disposition will be for patient to discharge home with son. Patient accepted with [facility]. CM will continue to follow for any further discahrge needs.

## 2021-12-08 NOTE — PROGRESS NOTES
Hospitalist Progress Note    Subjective:   Daily Progress Note: 12/8/2021 4:37 PM    Hand sensation has improved. Cyanotic during dialysis    Current Facility-Administered Medications   Medication Dose Route Frequency    sodium chloride (NS) flush 5-40 mL  5-40 mL IntraVENous Q8H    sodium chloride (NS) flush 5-40 mL  5-40 mL IntraVENous PRN    HYDROcodone-acetaminophen (NORCO) 7.5-325 mg per tablet 1 Tablet  1 Tablet Oral Q4H PRN    zinc oxide-white petrolatum 17-57 % topical paste   Topical PRN    heparin (porcine) injection 5,000 Units  5,000 Units SubCUTAneous Q12H    calcitRIOL (ROCALTROL) capsule 0.5 mcg  0.5 mcg Oral DAILY    prochlorperazine (COMPAZINE) tablet 5 mg  5 mg Oral Q6H PRN    famotidine (PEPCID) tablet 10 mg  10 mg Oral BID    levETIRAcetam (KEPPRA) tablet 500 mg  500 mg Oral BID    lacosamide (VIMPAT) tablet 200 mg  200 mg Oral BID    sodium chloride (NS) flush 5-40 mL  5-40 mL IntraVENous Q8H    sodium chloride (NS) flush 5-40 mL  5-40 mL IntraVENous PRN    acetaminophen (TYLENOL) tablet 650 mg  650 mg Oral Q6H PRN    Or    acetaminophen (TYLENOL) suppository 650 mg  650 mg Rectal Q6H PRN    polyethylene glycol (MIRALAX) packet 17 g  17 g Oral DAILY PRN    ondansetron (ZOFRAN ODT) tablet 4 mg  4 mg Oral Q8H PRN    Or    ondansetron (ZOFRAN) injection 4 mg  4 mg IntraVENous Q6H PRN    atorvastatin (LIPITOR) tablet 40 mg  40 mg Oral QHS    levothyroxine (SYNTHROID) tablet 150 mcg  150 mcg Oral DAILY    FLUoxetine (PROzac) capsule 10 mg  10 mg Oral DAILY    metoprolol tartrate (LOPRESSOR) tablet 25 mg  25 mg Oral DAILY    sevelamer carbonate (RENVELA) tab 800 mg  800 mg Oral TID WITH MEALS        Review of Systems  Review of Systems   Constitutional: Negative for fever and malaise/fatigue. HENT: Negative. Respiratory: Negative for cough and shortness of breath. Cardiovascular: Negative for chest pain and leg swelling.    Gastrointestinal: Negative for abdominal pain, nausea and vomiting. Genitourinary: Negative. Musculoskeletal: Negative. Skin: Negative. Neurological: Negative. Psychiatric/Behavioral: Negative. Objective:     Visit Vitals  BP (!) 141/69   Pulse 60   Temp 98.5 °F (36.9 °C) (Oral)   Resp 18   Ht 5' 6\" (1.676 m)   Wt 74.8 kg (165 lb)   SpO2 100%   Breastfeeding No   BMI 26.63 kg/m²      O2 Device: None (Room air)    Temp (24hrs), Av.9 °F (36.6 °C), Min:97.3 °F (36.3 °C), Max:98.5 °F (36.9 °C)      701 - 1900  In: -   Out:    No intake/output data recorded. Recent Results (from the past 24 hour(s))   CBC WITH AUTOMATED DIFF    Collection Time: 21  6:04 PM   Result Value Ref Range    WBC 4.4 3.6 - 11.0 K/uL    RBC 4.83 3.80 - 5.20 M/uL    HGB 14.8 11.5 - 16.0 g/dL    HCT 49.6 (H) 35.0 - 47.0 %    .7 (H) 80.0 - 99.0 FL    MCH 30.6 26.0 - 34.0 PG    MCHC 29.8 (L) 30.0 - 36.5 g/dL    RDW 13.6 11.5 - 14.5 %    PLATELET 80 (L) 700 - 400 K/uL    MPV 11.5 8.9 - 12.9 FL    NRBC 0.0 0.0  WBC    ABSOLUTE NRBC 0.00 0.00 - 0.01 K/uL    NEUTROPHILS 56 32 - 75 %    LYMPHOCYTES 34 12 - 49 %    MONOCYTES 7 5 - 13 %    EOSINOPHILS 2 0 - 7 %    BASOPHILS 1 0 - 1 %    IMMATURE GRANULOCYTES 0 0 - 0.5 %    ABS. NEUTROPHILS 2.5 1.8 - 8.0 K/UL    ABS. LYMPHOCYTES 1.5 0.8 - 3.5 K/UL    ABS. MONOCYTES 0.3 0.0 - 1.0 K/UL    ABS. EOSINOPHILS 0.1 0.0 - 0.4 K/UL    ABS. BASOPHILS 0.0 0.0 - 0.1 K/UL    ABS. IMM.  GRANS. 0.0 0.00 - 0.04 K/UL    DF AUTOMATED     METABOLIC PANEL, COMPREHENSIVE    Collection Time: 21  6:04 PM   Result Value Ref Range    Sodium 136 136 - 145 mmol/L    Potassium 4.4 3.5 - 5.1 mmol/L    Chloride 102 97 - 108 mmol/L    CO2 29 21 - 32 mmol/L    Anion gap 5 5 - 15 mmol/L    Glucose 73 65 - 100 mg/dL    BUN 32 (H) 6 - 20 mg/dL    Creatinine 3.69 (H) 0.55 - 1.02 mg/dL    BUN/Creatinine ratio 9 (L) 12 - 20      GFR est AA 15 (L) >60 ml/min/1.73m2    GFR est non-AA 12 (L) >60 ml/min/1.73m2    Calcium 7.5 (L) 8.5 - 10.1 mg/dL    Bilirubin, total 0.6 0.2 - 1.0 mg/dL    AST (SGOT) 22 15 - 37 U/L    ALT (SGPT) 24 12 - 78 U/L    Alk. phosphatase 116 45 - 117 U/L    Protein, total 6.1 (L) 6.4 - 8.2 g/dL    Albumin 3.3 (L) 3.5 - 5.0 g/dL    Globulin 2.8 2.0 - 4.0 g/dL    A-G Ratio 1.2 1.1 - 2.2     GLUCOSE, POC    Collection Time: 12/07/21  8:57 PM   Result Value Ref Range    Glucose (POC) 95 65 - 117 mg/dL    Performed by Sarbjit Bernstein    CBC WITH AUTOMATED DIFF    Collection Time: 12/08/21  8:30 AM   Result Value Ref Range    WBC 4.9 3.6 - 11.0 K/uL    RBC 4.20 3.80 - 5.20 M/uL    HGB 12.7 11.5 - 16.0 g/dL    HCT 42.3 35.0 - 47.0 %    .7 (H) 80.0 - 99.0 FL    MCH 30.2 26.0 - 34.0 PG    MCHC 30.0 30.0 - 36.5 g/dL    RDW 13.6 11.5 - 14.5 %    PLATELET 74 (L) 838 - 400 K/uL    MPV 12.0 8.9 - 12.9 FL    NRBC 0.0 0.0  WBC    ABSOLUTE NRBC 0.00 0.00 - 0.01 K/uL    NEUTROPHILS 69 32 - 75 %    LYMPHOCYTES 21 12 - 49 %    MONOCYTES 7 5 - 13 %    EOSINOPHILS 2 0 - 7 %    BASOPHILS 1 0 - 1 %    IMMATURE GRANULOCYTES 0 0 - 0.5 %    ABS. NEUTROPHILS 3.4 1.8 - 8.0 K/UL    ABS. LYMPHOCYTES 1.1 0.8 - 3.5 K/UL    ABS. MONOCYTES 0.3 0.0 - 1.0 K/UL    ABS. EOSINOPHILS 0.1 0.0 - 0.4 K/UL    ABS. BASOPHILS 0.0 0.0 - 0.1 K/UL    ABS. IMM. GRANS. 0.0 0.00 - 0.04 K/UL    DF AUTOMATED     METABOLIC PANEL, COMPREHENSIVE    Collection Time: 12/08/21  8:30 AM   Result Value Ref Range    Sodium 136 136 - 145 mmol/L    Potassium 4.1 3.5 - 5.1 mmol/L    Chloride 101 97 - 108 mmol/L    CO2 29 21 - 32 mmol/L    Anion gap 6 5 - 15 mmol/L    Glucose 148 (H) 65 - 100 mg/dL    BUN 35 (H) 6 - 20 mg/dL    Creatinine 3.99 (H) 0.55 - 1.02 mg/dL    BUN/Creatinine ratio 9 (L) 12 - 20      GFR est AA 13 (L) >60 ml/min/1.73m2    GFR est non-AA 11 (L) >60 ml/min/1.73m2    Calcium 7.9 (L) 8.5 - 10.1 mg/dL    Bilirubin, total 0.6 0.2 - 1.0 mg/dL    AST (SGOT) 23 15 - 37 U/L    ALT (SGPT) 20 12 - 78 U/L    Alk.  phosphatase 98 45 - 117 U/L Protein, total 5.3 (L) 6.4 - 8.2 g/dL    Albumin 2.5 (L) 3.5 - 5.0 g/dL    Globulin 2.8 2.0 - 4.0 g/dL    A-G Ratio 0.9 (L) 1.1 - 2.2     GLUCOSE, POC    Collection Time: 12/08/21  8:33 AM   Result Value Ref Range    Glucose (POC) 93 65 - 117 mg/dL    Performed by Norberto Hargrove         DUPLEX UPPER EXT ARTERY RIGHT   Final Result      CT HEAD WO CONT   Final Result   Age-appropriate atrophy. No acute findings. XR CHEST SNGL V   Final Result   The cardiomediastinal silhouette is appropriate for age, technique,   and lung expansion. Pulmonary vasculature is not congested. The lungs are   essentially clear. No effusion or pneumothorax is seen. IR INSERT TUNL CVC W/O PORT OVER 5 YR    (Results Pending)        PHYSICAL EXAM:    Physical Exam  Vitals reviewed. HENT:      Head: Normocephalic and atraumatic. Mouth/Throat:      Mouth: Mucous membranes are moist.      Pharynx: Oropharynx is clear. Cardiovascular:      Rate and Rhythm: Regular rhythm. Heart sounds: Normal heart sounds. Pulmonary:      Breath sounds: Normal breath sounds. Abdominal:      General: Abdomen is flat. Bowel sounds are normal.      Palpations: Abdomen is soft. Musculoskeletal:      Cervical back: Normal range of motion and neck supple. Comments: Right hand is warm to the touch with good motor function   Skin:     General: Skin is warm and dry. Neurological:      General: No focal deficit present. Mental Status: She is alert and oriented to person, place, and time. Mental status is at baseline.    Psychiatric:         Mood and Affect: Mood normal.          Data Review    Recent Results (from the past 24 hour(s))   CBC WITH AUTOMATED DIFF    Collection Time: 12/07/21  6:04 PM   Result Value Ref Range    WBC 4.4 3.6 - 11.0 K/uL    RBC 4.83 3.80 - 5.20 M/uL    HGB 14.8 11.5 - 16.0 g/dL    HCT 49.6 (H) 35.0 - 47.0 %    .7 (H) 80.0 - 99.0 FL    MCH 30.6 26.0 - 34.0 PG    MCHC 29.8 (L) 30.0 - 36.5 g/dL    RDW 13.6 11.5 - 14.5 %    PLATELET 80 (L) 840 - 400 K/uL    MPV 11.5 8.9 - 12.9 FL    NRBC 0.0 0.0  WBC    ABSOLUTE NRBC 0.00 0.00 - 0.01 K/uL    NEUTROPHILS 56 32 - 75 %    LYMPHOCYTES 34 12 - 49 %    MONOCYTES 7 5 - 13 %    EOSINOPHILS 2 0 - 7 %    BASOPHILS 1 0 - 1 %    IMMATURE GRANULOCYTES 0 0 - 0.5 %    ABS. NEUTROPHILS 2.5 1.8 - 8.0 K/UL    ABS. LYMPHOCYTES 1.5 0.8 - 3.5 K/UL    ABS. MONOCYTES 0.3 0.0 - 1.0 K/UL    ABS. EOSINOPHILS 0.1 0.0 - 0.4 K/UL    ABS. BASOPHILS 0.0 0.0 - 0.1 K/UL    ABS. IMM. GRANS. 0.0 0.00 - 0.04 K/UL    DF AUTOMATED     METABOLIC PANEL, COMPREHENSIVE    Collection Time: 12/07/21  6:04 PM   Result Value Ref Range    Sodium 136 136 - 145 mmol/L    Potassium 4.4 3.5 - 5.1 mmol/L    Chloride 102 97 - 108 mmol/L    CO2 29 21 - 32 mmol/L    Anion gap 5 5 - 15 mmol/L    Glucose 73 65 - 100 mg/dL    BUN 32 (H) 6 - 20 mg/dL    Creatinine 3.69 (H) 0.55 - 1.02 mg/dL    BUN/Creatinine ratio 9 (L) 12 - 20      GFR est AA 15 (L) >60 ml/min/1.73m2    GFR est non-AA 12 (L) >60 ml/min/1.73m2    Calcium 7.5 (L) 8.5 - 10.1 mg/dL    Bilirubin, total 0.6 0.2 - 1.0 mg/dL    AST (SGOT) 22 15 - 37 U/L    ALT (SGPT) 24 12 - 78 U/L    Alk.  phosphatase 116 45 - 117 U/L    Protein, total 6.1 (L) 6.4 - 8.2 g/dL    Albumin 3.3 (L) 3.5 - 5.0 g/dL    Globulin 2.8 2.0 - 4.0 g/dL    A-G Ratio 1.2 1.1 - 2.2     GLUCOSE, POC    Collection Time: 12/07/21  8:57 PM   Result Value Ref Range    Glucose (POC) 95 65 - 117 mg/dL    Performed by Jane Toussaint    CBC WITH AUTOMATED DIFF    Collection Time: 12/08/21  8:30 AM   Result Value Ref Range    WBC 4.9 3.6 - 11.0 K/uL    RBC 4.20 3.80 - 5.20 M/uL    HGB 12.7 11.5 - 16.0 g/dL    HCT 42.3 35.0 - 47.0 %    .7 (H) 80.0 - 99.0 FL    MCH 30.2 26.0 - 34.0 PG    MCHC 30.0 30.0 - 36.5 g/dL    RDW 13.6 11.5 - 14.5 %    PLATELET 74 (L) 232 - 400 K/uL    MPV 12.0 8.9 - 12.9 FL    NRBC 0.0 0.0  WBC    ABSOLUTE NRBC 0.00 0.00 - 0.01 K/uL NEUTROPHILS 69 32 - 75 %    LYMPHOCYTES 21 12 - 49 %    MONOCYTES 7 5 - 13 %    EOSINOPHILS 2 0 - 7 %    BASOPHILS 1 0 - 1 %    IMMATURE GRANULOCYTES 0 0 - 0.5 %    ABS. NEUTROPHILS 3.4 1.8 - 8.0 K/UL    ABS. LYMPHOCYTES 1.1 0.8 - 3.5 K/UL    ABS. MONOCYTES 0.3 0.0 - 1.0 K/UL    ABS. EOSINOPHILS 0.1 0.0 - 0.4 K/UL    ABS. BASOPHILS 0.0 0.0 - 0.1 K/UL    ABS. IMM. GRANS. 0.0 0.00 - 0.04 K/UL    DF AUTOMATED     METABOLIC PANEL, COMPREHENSIVE    Collection Time: 12/08/21  8:30 AM   Result Value Ref Range    Sodium 136 136 - 145 mmol/L    Potassium 4.1 3.5 - 5.1 mmol/L    Chloride 101 97 - 108 mmol/L    CO2 29 21 - 32 mmol/L    Anion gap 6 5 - 15 mmol/L    Glucose 148 (H) 65 - 100 mg/dL    BUN 35 (H) 6 - 20 mg/dL    Creatinine 3.99 (H) 0.55 - 1.02 mg/dL    BUN/Creatinine ratio 9 (L) 12 - 20      GFR est AA 13 (L) >60 ml/min/1.73m2    GFR est non-AA 11 (L) >60 ml/min/1.73m2    Calcium 7.9 (L) 8.5 - 10.1 mg/dL    Bilirubin, total 0.6 0.2 - 1.0 mg/dL    AST (SGOT) 23 15 - 37 U/L    ALT (SGPT) 20 12 - 78 U/L    Alk. phosphatase 98 45 - 117 U/L    Protein, total 5.3 (L) 6.4 - 8.2 g/dL    Albumin 2.5 (L) 3.5 - 5.0 g/dL    Globulin 2.8 2.0 - 4.0 g/dL    A-G Ratio 0.9 (L) 1.1 - 2.2     GLUCOSE, POC    Collection Time: 12/08/21  8:33 AM   Result Value Ref Range    Glucose (POC) 93 65 - 117 mg/dL    Performed by Roslyn Saldaña         Assessment/Plan:     Principal Problem:    Intractable nausea and vomiting (12/3/2021)    Active Problems:    Seizure ([address] (5/19/2021)      Left-sided weakness (12/3/2021)      Fluid overload (12/5/2021)      Hospital course: This 61-year-old male presented to the emergency department on 12/3/2021 for admission after being found to have persistent weakness, intractable nausea and vomiting. Chest x-ray was negative for acute pulmonary process. Patient was also found to have increased tremors and seizure like activity.   Patient's started on Keppra 500 mg twice daily and Vimpat was increased to 200 mg twice daily. Tremors have subsided. Patient is tolerating hemodialysis without complications. Nausea and vomiting have resolved. Patient with a recent right arm fistula placement. There is no radial pulse although there are findings consistent with steal syndrome. Motor and sensation are present. Dr. Angie Ray, vascular surgery evaluating. Neurology consultation. Nephrology consultation Dr. Johan Mcelod. Angiogram with significant steal syndrome. Patient will need permacath and revision versus ligation of the graft. Tentatively scheduled for 12/9/2021 with Dr. Angie Ray. Discussed case with patient and daughter-in-law Magy Smith. Impression\plan:    1. Generalized weakness/ambulatory dysfunction:  continue PT/OT as ordered  Supportive care  Fall precautions     2. Intractable nausea and vomiting:  -encourage oral hydration  -prn zofran and compazine  -continue supportive care  Resolved     3. End-stage renal disease:  Continue hemodialysis  Dr. Angie Ray evaluating right fistula steal syndrome, right hand with good sensation and motor no active signs of cyanotic presentation  Permacath  Revision of graft     4. Seizure disorder:  -keppra adjusted to 500mg bid, vimpat increased 200mg bid by neurology  -prn ativan for breakthrough sz     5. Peripheral vascular disease:  -continue lipitor 40mg  -outpatient vascular workup warranted     6. Atrial fibrillation:  -Currently stable on tele  -continue metoprolol 25mg  Questionable outpatient Eliquis     7. Hyperkalemia   Resolved     8. Depression:  -Continue prozac     9. Thrombocytopenia: Chronic  -hold all anticoagulants for platelet count less than 50  -consult hematology     10.  Hypothyroidism:  -currently on syntrhoid 150mcg  -tsh 1.0 and normal        Full Code  Dvt Prophylaxis heparin   GI Prophylaxis none  Disposition:  -Revision of breath     NOK:  Daughter-in-law Ricardo [facility] [phone] updated   Care Plan discussed with: Patient/Family    Total time spent with patient: >35 minutes.

## 2021-12-08 NOTE — PROGRESS NOTES
PROGRESS NOTE      Chief Complaints:  Patient examined this morning. HPI and  Objective:  Patient says right hand pain is somewhat better. Patient denies chest pain shortness of breath denies any nausea vomiting or generalized weakness fever chills. Review of Systems:  Rest of review of system negative. EXAM:  Visit Vitals  /72   Pulse 63   Temp 98.4 °F (36.9 °C) (Oral)   Resp 18   Ht 5' 6\" (1.676 m)   Wt 165 lb (74.8 kg)   SpO2 100%   Breastfeeding No   BMI 26.63 kg/m²       Patient is awake and alert. Head and neck atraumatic, normocephalic. ENT: No hoarse voice  Cardiac system regular rate rhythm. Pulmonary no audible wheeze  Chest wall excursion normal with respiration cycle  Abdomen is soft not particularly distended. Neurologically nonfocal.  Skin is warm and moist.  Psychosocial: Cooperative. Vascular examination as previously noted no changes. Recent Results (from the past 24 hour(s))   GLUCOSE, POC    Collection Time: 12/07/21 11:22 AM   Result Value Ref Range    Glucose (POC) 70 65 - 117 mg/dL    Performed by Joaquina Amaral    CBC WITH AUTOMATED DIFF    Collection Time: 12/07/21  6:04 PM   Result Value Ref Range    WBC 4.4 3.6 - 11.0 K/uL    RBC 4.83 3.80 - 5.20 M/uL    HGB 14.8 11.5 - 16.0 g/dL    HCT 49.6 (H) 35.0 - 47.0 %    .7 (H) 80.0 - 99.0 FL    MCH 30.6 26.0 - 34.0 PG    MCHC 29.8 (L) 30.0 - 36.5 g/dL    RDW 13.6 11.5 - 14.5 %    PLATELET 80 (L) 597 - 400 K/uL    MPV 11.5 8.9 - 12.9 FL    NRBC 0.0 0.0  WBC    ABSOLUTE NRBC 0.00 0.00 - 0.01 K/uL    NEUTROPHILS 56 32 - 75 %    LYMPHOCYTES 34 12 - 49 %    MONOCYTES 7 5 - 13 %    EOSINOPHILS 2 0 - 7 %    BASOPHILS 1 0 - 1 %    IMMATURE GRANULOCYTES 0 0 - 0.5 %    ABS. NEUTROPHILS 2.5 1.8 - 8.0 K/UL    ABS. LYMPHOCYTES 1.5 0.8 - 3.5 K/UL    ABS. MONOCYTES 0.3 0.0 - 1.0 K/UL    ABS. EOSINOPHILS 0.1 0.0 - 0.4 K/UL    ABS. BASOPHILS 0.0 0.0 - 0.1 K/UL    ABS. IMM.  GRANS. 0.0 0.00 - 0.04 K/UL    DF AUTOMATED     METABOLIC PANEL, COMPREHENSIVE    Collection Time: 12/07/21  6:04 PM   Result Value Ref Range    Sodium 136 136 - 145 mmol/L    Potassium 4.4 3.5 - 5.1 mmol/L    Chloride 102 97 - 108 mmol/L    CO2 29 21 - 32 mmol/L    Anion gap 5 5 - 15 mmol/L    Glucose 73 65 - 100 mg/dL    BUN 32 (H) 6 - 20 mg/dL    Creatinine 3.69 (H) 0.55 - 1.02 mg/dL    BUN/Creatinine ratio 9 (L) 12 - 20      GFR est AA 15 (L) >60 ml/min/1.73m2    GFR est non-AA 12 (L) >60 ml/min/1.73m2    Calcium 7.5 (L) 8.5 - 10.1 mg/dL    Bilirubin, total 0.6 0.2 - 1.0 mg/dL    AST (SGOT) 22 15 - 37 U/L    ALT (SGPT) 24 12 - 78 U/L    Alk. phosphatase 116 45 - 117 U/L    Protein, total 6.1 (L) 6.4 - 8.2 g/dL    Albumin 3.3 (L) 3.5 - 5.0 g/dL    Globulin 2.8 2.0 - 4.0 g/dL    A-G Ratio 1.2 1.1 - 2.2     GLUCOSE, POC    Collection Time: 12/07/21  8:57 PM   Result Value Ref Range    Glucose (POC) 95 65 - 117 mg/dL    Performed by Haley Messina    CBC WITH AUTOMATED DIFF    Collection Time: 12/08/21  8:30 AM   Result Value Ref Range    WBC 4.9 3.6 - 11.0 K/uL    RBC 4.20 3.80 - 5.20 M/uL    HGB 12.7 11.5 - 16.0 g/dL    HCT 42.3 35.0 - 47.0 %    .7 (H) 80.0 - 99.0 FL    MCH 30.2 26.0 - 34.0 PG    MCHC 30.0 30.0 - 36.5 g/dL    RDW 13.6 11.5 - 14.5 %    PLATELET 74 (L) 629 - 400 K/uL    MPV 12.0 8.9 - 12.9 FL    NRBC 0.0 0.0  WBC    ABSOLUTE NRBC 0.00 0.00 - 0.01 K/uL    NEUTROPHILS 69 32 - 75 %    LYMPHOCYTES 21 12 - 49 %    MONOCYTES 7 5 - 13 %    EOSINOPHILS 2 0 - 7 %    BASOPHILS 1 0 - 1 %    IMMATURE GRANULOCYTES 0 0 - 0.5 %    ABS. NEUTROPHILS 3.4 1.8 - 8.0 K/UL    ABS. LYMPHOCYTES 1.1 0.8 - 3.5 K/UL    ABS. MONOCYTES 0.3 0.0 - 1.0 K/UL    ABS. EOSINOPHILS 0.1 0.0 - 0.4 K/UL    ABS. BASOPHILS 0.0 0.0 - 0.1 K/UL    ABS. IMM.  GRANS. 0.0 0.00 - 0.04 K/UL    DF AUTOMATED     METABOLIC PANEL, COMPREHENSIVE    Collection Time: 12/08/21  8:30 AM   Result Value Ref Range    Sodium 136 136 - 145 mmol/L    Potassium 4.1 3.5 - 5.1 mmol/L    Chloride 101 97 - 108 mmol/L    CO2 29 21 - 32 mmol/L    Anion gap 6 5 - 15 mmol/L    Glucose 148 (H) 65 - 100 mg/dL    BUN 35 (H) 6 - 20 mg/dL    Creatinine 3.99 (H) 0.55 - 1.02 mg/dL    BUN/Creatinine ratio 9 (L) 12 - 20      GFR est AA 13 (L) >60 ml/min/1.73m2    GFR est non-AA 11 (L) >60 ml/min/1.73m2    Calcium 7.9 (L) 8.5 - 10.1 mg/dL    Bilirubin, total 0.6 0.2 - 1.0 mg/dL    AST (SGOT) 23 15 - 37 U/L    ALT (SGPT) 20 12 - 78 U/L    Alk. phosphatase 98 45 - 117 U/L    Protein, total 5.3 (L) 6.4 - 8.2 g/dL    Albumin 2.5 (L) 3.5 - 5.0 g/dL    Globulin 2.8 2.0 - 4.0 g/dL    A-G Ratio 0.9 (L) 1.1 - 2.2     GLUCOSE, POC    Collection Time: 12/08/21  8:33 AM   Result Value Ref Range    Glucose (POC) 93 65 - 117 mg/dL    Performed by Brook Mercedes        ASSESSMENT:   Patient is 80 y.o. with diagnosis of : Principal Problem:    Intractable nausea and vomiting (12/3/2021)    Active Problems:    Seizure ([address] (5/19/2021)      Left-sided weakness (12/3/2021)      Fluid overload (12/5/2021)        PLAN:                 Patient had right brachial artery proximal to distal brachial basilic AV fistula balloon PT angioplasty. Despite patient has a significant steal syndrome to brachiobasilic AV fistula. Patient will require brachiobasilic AV fistula revision versus ligation to alleviate stage III steal syndrome she has on the right hand. Please coordinate with IR placed temporary permacath placement until this issues addressed. If the patient and family opted to have this procedure done to be here I will make arrangements possibly early next week if not tomorrow(pending OR availability), and the intraoperative investigation and effort will determine ligation versus revision.

## 2021-12-08 NOTE — PROGRESS NOTES
Pt returned from dialysis. Pt's daughter in law stated that she spoke to the doctor who will be doing the AV fistula revision and he told her that she will be getting a temporary dialysis catheter tomorrow (12/9) and then either Friday or Monday she will get the AV fistula revision. Post revision the AV fistula can not be used for 8 weeks. Also, pt used the bed pan without any issues. After she was clean up and the nurse was working on [email or web address], she started to felt like she was going to have a seizure. She had seizure activity for a few seconds abd her face and hand started to shake. After that she spoke to me. Pt was medicated. Pt's daughter in law will be at her bedside and will call if there is any issues. 1245: Pt's lunch was provided after she felt better.

## 2021-12-08 NOTE — PROGRESS NOTES
PROGRESS NOTE      Chief Complaints:  Patient has no new complaints. HPI and  Objective:    Patient denies chest pain shortness of breath abdominal pain nausea. Patient underwent right arm angiographic examination today. Right groin access looks okay. Review of Systems:  Some review of system negative. EXAM:  Visit Vitals  BP (!) 169/75   Pulse 61   Temp 97.3 °F (36.3 °C)   Resp 14   Ht 5' 6\" (1.676 m)   Wt 165 lb (74.8 kg)   SpO2 99%   Breastfeeding No   BMI 26.63 kg/m²       Patient is awake and alert  Head and neck atraumatic, normocephalic. ENT: No hoarse voice  Cardiac system regular rate rhythm. Pulmonary no audible wheeze  Chest wall excursion normal with respiration cycle  Abdomen is soft not particularly distended. Neurologically nonfocal.  Skin is warm and moist.  Psychosocial: Cooperative. Vascular examination as previously noted no changes. Recent Results (from the past 24 hour(s))   GLUCOSE, POC    Collection Time: 12/07/21  7:48 AM   Result Value Ref Range    Glucose (POC) 78 65 - 117 mg/dL    Performed by DOUGLAS MEDNOSA    GLUCOSE, POC    Collection Time: 12/07/21 11:22 AM   Result Value Ref Range    Glucose (POC) 70 65 - 117 mg/dL    Performed by Darion Pradhan    CBC WITH AUTOMATED DIFF    Collection Time: 12/07/21  6:04 PM   Result Value Ref Range    WBC 4.4 3.6 - 11.0 K/uL    RBC 4.83 3.80 - 5.20 M/uL    HGB 14.8 11.5 - 16.0 g/dL    HCT 49.6 (H) 35.0 - 47.0 %    .7 (H) 80.0 - 99.0 FL    MCH 30.6 26.0 - 34.0 PG    MCHC 29.8 (L) 30.0 - 36.5 g/dL    RDW 13.6 11.5 - 14.5 %    PLATELET 80 (L) 353 - 400 K/uL    MPV 11.5 8.9 - 12.9 FL    NRBC 0.0 0.0  WBC    ABSOLUTE NRBC 0.00 0.00 - 0.01 K/uL    NEUTROPHILS 56 32 - 75 %    LYMPHOCYTES 34 12 - 49 %    MONOCYTES 7 5 - 13 %    EOSINOPHILS 2 0 - 7 %    BASOPHILS 1 0 - 1 %    IMMATURE GRANULOCYTES 0 0 - 0.5 %    ABS. NEUTROPHILS 2.5 1.8 - 8.0 K/UL    ABS. LYMPHOCYTES 1.5 0.8 - 3.5 K/UL    ABS. MONOCYTES 0.3 0.0 - 1.0 K/UL    ABS. EOSINOPHILS 0.1 0.0 - 0.4 K/UL    ABS. BASOPHILS 0.0 0.0 - 0.1 K/UL    ABS. IMM. GRANS. 0.0 0.00 - 0.04 K/UL    DF AUTOMATED     METABOLIC PANEL, COMPREHENSIVE    Collection Time: 12/07/21  6:04 PM   Result Value Ref Range    Sodium 136 136 - 145 mmol/L    Potassium 4.4 3.5 - 5.1 mmol/L    Chloride 102 97 - 108 mmol/L    CO2 29 21 - 32 mmol/L    Anion gap 5 5 - 15 mmol/L    Glucose 73 65 - 100 mg/dL    BUN 32 (H) 6 - 20 mg/dL    Creatinine 3.69 (H) 0.55 - 1.02 mg/dL    BUN/Creatinine ratio 9 (L) 12 - 20      GFR est AA 15 (L) >60 ml/min/1.73m2    GFR est non-AA 12 (L) >60 ml/min/1.73m2    Calcium 7.5 (L) 8.5 - 10.1 mg/dL    Bilirubin, total 0.6 0.2 - 1.0 mg/dL    AST (SGOT) 22 15 - 37 U/L    ALT (SGPT) 24 12 - 78 U/L    Alk. phosphatase 116 45 - 117 U/L    Protein, total 6.1 (L) 6.4 - 8.2 g/dL    Albumin 3.3 (L) 3.5 - 5.0 g/dL    Globulin 2.8 2.0 - 4.0 g/dL    A-G Ratio 1.2 1.1 - 2.2     GLUCOSE, POC    Collection Time: 12/07/21  8:57 PM   Result Value Ref Range    Glucose (POC) 95 65 - 117 mg/dL    Performed by Pearlie Bence        ASSESSMENT:   Patient is 80 y.o. with diagnosis of : Principal Problem:    Intractable nausea and vomiting (12/3/2021)    Active Problems:    Seizure ([address] (5/19/2021)      Left-sided weakness (12/3/2021)      Fluid overload (12/5/2021)        PLAN:                 Angiogram of the right arm shows mild to moderate plaque disease proximal to distal to the brachiobasilic AV fistula. This was treated with balloon PT angioplasty. However still significant inflow to the graft the system compromising inflow to the distal hand. Recommendation includes revision of the AV fistula or possibly ligating right upper arm brachiobasilic AV fistula for her stage III steal syndrome. I discussed these findings and recommendation with the patient's son.

## 2021-12-08 NOTE — PROGRESS NOTES
Hematology Oncology Progress Note           Follow up for: thrombocytopenia  Chart notes reviewed since last visit. Blood counts improving mildly  No bleeding      Patient Vitals for the past 24 hrs:   BP Temp Temp src Pulse Resp SpO2   12/08/21 1440 121/64 97.6 °F (36.4 °C)  60 17 100 %   12/08/21 1220 (!) 141/69   60     12/08/21 1150 (!) 141/66 98.5 °F (36.9 °C) Oral 65     12/08/21 1120 (!) 144/64   60     12/08/21 1050 (!) 141/64   60     12/08/21 1020 139/72   63     12/08/21 0950 (!) 143/64   60     12/08/21 0920 129/69   68     12/08/21 0850 134/63 98.4 °F (36.9 °C) Oral 62 18    12/08/21 0737 123/62 98.1 °F (36.7 °C)  60 18 100 %   12/08/21 0423 128/70 97.4 °F (36.3 °C)  62 16 98 %   12/07/21 2044 (!) 169/75 97.3 °F (36.3 °C)  61 14 99 %   12/07/21 1745      99 %   12/07/21 1743 134/64 97.8 °F (36.6 °C)  60 18 99 %   12/07/21 1715 (!) 108/35   60 15 95 %   12/07/21 1700 (!) 101/41   60 11 95 %   12/07/21 1645 (!) 95/58   60 14 95 %   12/07/21 1640 108/66   61 16 93 %       Review of Systems:    Constitutional No fevers, chills, night sweats, excessive fatigue or weight loss. Allergic/Immunologic No recent allergic reactions   Eyes No significant visual difficulties. No diplopia. ENMT No problems with hearing, no sore throat, no sinus drainage. Endocrine No hot flashes or night sweats. No cold intolerance, polyuria, or polydipsia   Hematologic/Lymphatic No easy bruising or bleeding. The patient denies any tender or palpable lymph nodes   Breasts No abnormal masses of breast, nipple discharge or pain. Respiratory No dyspnea on exertion, orthopnea, chest pain, cough or hemoptysis. Cardiovascular No anginal chest pain, irregular heart beat, tachycardia, palpitations or orthopnea. Gastrointestinal No nausea, vomiting, diarrhea, constipation, cramping, dysphagia, reflux, heartburn, GI bleeding, or early satiety. No change in bowel habits. Genitourinary (M) No hematuria, dysuria, increased frequency, urgency, hesitancy or incontinence. Musculoskeletal No joint pain, swelling or redness. No decreased range of motion. Integumentary No chronic rashes, inflammation, ulcerations, pruritus, petechiae, purpura, ecchymoses, or skin changes. Neurologic No headache, blurred vision, and no areas of focal weakness or numbness. Normal gait. No sensory problems. Psychiatric No insomnia, depression, atul or mood swings. No psychotropic drugs       Physical Examination:  Constitutional Sedation to maintain respiratory support   Head Normocephalic; no scars   Eyes Conjunctivae and sclerae are clear and without icterus. Pupils are reactive and equal.   ENMT Sinuses are nontender. No oral exudates, ulcers, masses, thrush or mucositis. Oropharynx clear. Tongue normal.   Neck Supple without masses or thyromegaly. No jugular venous distension. Hematologic/Lymphatic No petechiae or purpura. No tender or palpable lymph nodes in the cervical, supraclavicular, axillary or inguinal area. Respiratory Lungs are clear to auscultation without rhonchi or wheezing. Cardiovascular Regular rate and rhythm of heart without murmurs, gallops or rubs. Chest / Line Site Chest is symmetric with no chest wall deformities. Abdomen Non-tender, non-distended, no masses, ascites or hepatosplenomegaly. Good bowel sounds. No guarding or rebound tenderness. No pulsatile masses. Musculoskeletal No tenderness or swelling, normal range of motion without obvious weakness. Extremities No visible deformities, no cyanosis, clubbing or edema. Skin No rashes, scars, or lesions suggestive of malignancy. No petechiae, purpura, or ecchymoses. No excoriations. Neurologic No sensory or motor deficits, normal cerebellar function, normal gait, cranial nerves intact. Psychiatric Alert and oriented times three. Coherent speech. Verbalizes understanding of our discussions today. Labs:  Recent Results (from the past 24 hour(s))   CBC WITH AUTOMATED DIFF    Collection Time: 12/07/21  6:04 PM   Result Value Ref Range    WBC 4.4 3.6 - 11.0 K/uL    RBC 4.83 3.80 - 5.20 M/uL    HGB 14.8 11.5 - 16.0 g/dL    HCT 49.6 (H) 35.0 - 47.0 %    .7 (H) 80.0 - 99.0 FL    MCH 30.6 26.0 - 34.0 PG    MCHC 29.8 (L) 30.0 - 36.5 g/dL    RDW 13.6 11.5 - 14.5 %    PLATELET 80 (L) 071 - 400 K/uL    MPV 11.5 8.9 - 12.9 FL    NRBC 0.0 0.0  WBC    ABSOLUTE NRBC 0.00 0.00 - 0.01 K/uL    NEUTROPHILS 56 32 - 75 %    LYMPHOCYTES 34 12 - 49 %    MONOCYTES 7 5 - 13 %    EOSINOPHILS 2 0 - 7 %    BASOPHILS 1 0 - 1 %    IMMATURE GRANULOCYTES 0 0 - 0.5 %    ABS. NEUTROPHILS 2.5 1.8 - 8.0 K/UL    ABS. LYMPHOCYTES 1.5 0.8 - 3.5 K/UL    ABS. MONOCYTES 0.3 0.0 - 1.0 K/UL    ABS. EOSINOPHILS 0.1 0.0 - 0.4 K/UL    ABS. BASOPHILS 0.0 0.0 - 0.1 K/UL    ABS. IMM. GRANS. 0.0 0.00 - 0.04 K/UL    DF AUTOMATED     METABOLIC PANEL, COMPREHENSIVE    Collection Time: 12/07/21  6:04 PM   Result Value Ref Range    Sodium 136 136 - 145 mmol/L    Potassium 4.4 3.5 - 5.1 mmol/L    Chloride 102 97 - 108 mmol/L    CO2 29 21 - 32 mmol/L    Anion gap 5 5 - 15 mmol/L    Glucose 73 65 - 100 mg/dL    BUN 32 (H) 6 - 20 mg/dL    Creatinine 3.69 (H) 0.55 - 1.02 mg/dL    BUN/Creatinine ratio 9 (L) 12 - 20      GFR est AA 15 (L) >60 ml/min/1.73m2    GFR est non-AA 12 (L) >60 ml/min/1.73m2    Calcium 7.5 (L) 8.5 - 10.1 mg/dL    Bilirubin, total 0.6 0.2 - 1.0 mg/dL    AST (SGOT) 22 15 - 37 U/L    ALT (SGPT) 24 12 - 78 U/L    Alk.  phosphatase 116 45 - 117 U/L    Protein, total 6.1 (L) 6.4 - 8.2 g/dL    Albumin 3.3 (L) 3.5 - 5.0 g/dL    Globulin 2.8 2.0 - 4.0 g/dL    A-G Ratio 1.2 1.1 - 2.2     GLUCOSE, POC    Collection Time: 12/07/21  8:57 PM   Result Value Ref Range    Glucose (POC) 95 65 - 117 mg/dL    Performed by Jane Toussaint    CBC WITH AUTOMATED DIFF    Collection Time: 12/08/21  8:30 AM   Result Value Ref Range    WBC 4.9 3.6 - 11.0 K/uL    RBC 4.20 3.80 - 5.20 M/uL    HGB 12.7 11.5 - 16.0 g/dL    HCT 42.3 35.0 - 47.0 %    .7 (H) 80.0 - 99.0 FL    MCH 30.2 26.0 - 34.0 PG    MCHC 30.0 30.0 - 36.5 g/dL    RDW 13.6 11.5 - 14.5 %    PLATELET 74 (L) 429 - 400 K/uL    MPV 12.0 8.9 - 12.9 FL    NRBC 0.0 0.0  WBC    ABSOLUTE NRBC 0.00 0.00 - 0.01 K/uL    NEUTROPHILS 69 32 - 75 %    LYMPHOCYTES 21 12 - 49 %    MONOCYTES 7 5 - 13 %    EOSINOPHILS 2 0 - 7 %    BASOPHILS 1 0 - 1 %    IMMATURE GRANULOCYTES 0 0 - 0.5 %    ABS. NEUTROPHILS 3.4 1.8 - 8.0 K/UL    ABS. LYMPHOCYTES 1.1 0.8 - 3.5 K/UL    ABS. MONOCYTES 0.3 0.0 - 1.0 K/UL    ABS. EOSINOPHILS 0.1 0.0 - 0.4 K/UL    ABS. BASOPHILS 0.0 0.0 - 0.1 K/UL    ABS. IMM. GRANS. 0.0 0.00 - 0.04 K/UL    DF AUTOMATED     METABOLIC PANEL, COMPREHENSIVE    Collection Time: 12/08/21  8:30 AM   Result Value Ref Range    Sodium 136 136 - 145 mmol/L    Potassium 4.1 3.5 - 5.1 mmol/L    Chloride 101 97 - 108 mmol/L    CO2 29 21 - 32 mmol/L    Anion gap 6 5 - 15 mmol/L    Glucose 148 (H) 65 - 100 mg/dL    BUN 35 (H) 6 - 20 mg/dL    Creatinine 3.99 (H) 0.55 - 1.02 mg/dL    BUN/Creatinine ratio 9 (L) 12 - 20      GFR est AA 13 (L) >60 ml/min/1.73m2    GFR est non-AA 11 (L) >60 ml/min/1.73m2    Calcium 7.9 (L) 8.5 - 10.1 mg/dL    Bilirubin, total 0.6 0.2 - 1.0 mg/dL    AST (SGOT) 23 15 - 37 U/L    ALT (SGPT) 20 12 - 78 U/L    Alk. phosphatase 98 45 - 117 U/L    Protein, total 5.3 (L) 6.4 - 8.2 g/dL    Albumin 2.5 (L) 3.5 - 5.0 g/dL    Globulin 2.8 2.0 - 4.0 g/dL    A-G Ratio 0.9 (L) 1.1 - 2.2     GLUCOSE, POC    Collection Time: 12/08/21  8:33 AM   Result Value Ref Range    Glucose (POC) 93 65 - 117 mg/dL    Performed by Beata Mclaughlin        Imaging:      Assessment and Plan:   1. Thrombocytopenia  -unclear duration, plts in 2015 which is last in our system was actually above normal  -pts currentlyimproving.   Son thinks baseline ~90k as op though not sure long term values  -no hemolysis  -likely related to acute illness  -plts >50s would hold on steroids at this time     2. Anemia of CKD  -doing well with this     3. Vascular steal from AV Fistula     4.  ESRD    No need for OP followup  Will sign off; feel free to call with questions

## 2021-12-08 NOTE — PROGRESS NOTES
OCCUPATIONAL THERAPY TREATMENT  Patient: Jimmy Goodman (87 y.o. female)  Date: 12/8/2021  Diagnosis: Left-sided weakness [R53.1]  Intractable nausea and vomiting [R11.2]  Fluid overload [E87.70]  Seizure ([address] [R56.9]   Intractable nausea and vomiting  Procedure(s) (LRB):  ANGIOGRAPHY UPPER EXT RIGHT (N/A) 1 Day Post-Op  Precautions: Fall  Chart, occupational therapy assessment, plan of care, and goals were reviewed. ASSESSMENT  Patient continues with skilled OT services and is progressing towards goals. Upon ANDERSON arrival, pt semi supine in bed and agreeable to tx session. Pt completed drink>mouth with Mod I for taking medication. Pt reporting have had 7/10 headache prior to beginning of session. Pt completed bed mobility with SBA and increased time. Pt completed seated EOB hair brushing with Henrry and additional time for thoroughness due to knots in hair. Pt completed sit>stand from EOB with Henrry using RW for balance upon standing. Pt completed few steps to recliner with Henrry for balance and maneuvering RW. Pt reporting pain in RLE knee while standing reporting that pain has been happening since fall. Pt left sitting up in chair with call bell within reach. Pt stating that she is aware when most seizure activity happens and verbalized understanding of hitting call bell if she feels seizure coming on. Pt tolerated session well this date. RN made aware of pt current positioning. Will continue to follow pt throughout remainder of stay and progress towards OT goals. Recommending SNF vs HHOT with 24/7 care at discharge when medically appropriate. Current Level of Function Impacting Discharge (ADLs): SBA bed mobility, Henrry transfers, Henrry grooming (seated)    Other factors to consider for discharge: symptom severity, onset of symptoms, family support, PMH         PLAN :  Patient continues to benefit from skilled intervention to address the above impairments.   Continue treatment per established plan of care. to address goals. Recommend next OT session: seated EOB grooming, bed>chair, UE therex, BSC transfer    Recommendation for discharge: (in order for the patient to meet his/her long term goals)  SNF vs HHOT with 24/7 care pending progress    This discharge recommendation:  Has been made in collaboration with the attending provider and/or case management    IF patient discharges home will need the following DME: TBD       SUBJECTIVE:   Patient stated my R knee has been hurting me since I fell.     OBJECTIVE DATA SUMMARY:   Cognitive/Behavioral Status:  Neurologic State: Alert  Orientation Level: Oriented X4  Cognition: Follows commands    Functional Mobility and Transfers for ADLs:  Bed Mobility:  Rolling: Stand-by assistance; Additional time  Supine to Sit: Stand-by assistance; Additional time  Scooting: Stand-by assistance; Additional time    Transfers:  Sit to Stand: Minimum assistance     Bed to Chair: Minimum assistance    Balance:  Sitting: Intact; Without support  Sitting - Static: Good (unsupported)  Sitting - Dynamic: Good (unsupported)  Standing: Impaired; With support  Standing - Static: Constant support; Fair  Standing - Dynamic : Constant support; Fair    ADL Intervention:  Feeding  Drink to Mouth: Modified independent    Grooming  Position Performed: Seated edge of bed  Brushing/Combing Hair: Minimum assistance (2/2 thoroughness)    Pain:  7/10 headache reported prior to session  0/10 at end of session    Activity Tolerance:   Fair and requires rest breaks  Please refer to the flowsheet for vital signs taken during this treatment. After treatment patient left in no apparent distress:   Sitting in chair and Call bell within reach    COMMUNICATION/COLLABORATION:   The patients plan of care was discussed with: Registered nurse.      MONICA To  Time Calculation: 41 mins    Problem: Self Care Deficits Care Plan (Adult)  Goal: *Acute Goals and Plan of Care (Insert Text)  Description: Pt will be MI self feeding  Pt will be MI sup<->sit in prep for EOB ADL's  Pt will be MI LB dressing EOB level  Pt will be MI sit EOB 10 minutes in prep for EOB ADL's  Pt will be MI grooming EOB level  Pt will be MI sit<-> prep for toilet transfer  Pt will be MI toilet transfer with LRAD  Pt will be MI toileting/cloth mgmt LRAD  Pt will be MI grooming standing sink  Pt will be MI yousuf UE HEP in prep for self care tasks    Outcome: Progressing Towards Goal

## 2021-12-08 NOTE — PROGRESS NOTES
Patient: Crissy Davis MRN: [id number]  SSN: [id number]    YOB: 1950  Age: 80 y.o. Sex: female          Subjective:     Saw patient on Dialysis ,  She didn't c/o any rt hand pain    BFR was decreased to 300 from 400 as her hand looked cyanotic     Objective:     Vitals:    12/08/21 1050 12/08/21 1120 12/08/21 1150 12/08/21 1220   BP: (!) 141/64 (!) 144/64 (!) 141/66 (!) 141/69   Pulse: 60 60 65 60   Resp:       Temp:   98.5 °F (36.9 °C)    TempSrc:   Oral    SpO2:       Weight:       Height:            Intake and Output:  Yesterday's Intake and Output:  No intake/output data recorded. Physical Exam:   GENERAL: alert, cooperative, no distress, appears stated age  EYE: negative  LUNG: clear to auscultation bilaterally  HEART: regular rate and rhythm, S1, S2 normal  ABDOMEN: soft, non-tender. Bowel sounds normal. No masses,  no organomegaly  EXTREMITIES:  extremities normal, right brachial aVF. Right hand warm to touch. Slightly dusky blue appearance. Delayed capillary refill  ulcers,SKIN: Normal.   Skin -  no rash or abnormalities  NEUROLOGIC: negative      Dialysis access: Arteriovenous Fistula site right.     Data Review    Meds    Current Facility-Administered Medications   Medication Dose Route Frequency    sodium chloride (NS) flush 5-40 mL  5-40 mL IntraVENous Q8H    sodium chloride (NS) flush 5-40 mL  5-40 mL IntraVENous PRN    HYDROcodone-acetaminophen (NORCO) 7.5-325 mg per tablet 1 Tablet  1 Tablet Oral Q4H PRN    zinc oxide-white petrolatum 17-57 % topical paste   Topical PRN    heparin (porcine) injection 5,000 Units  5,000 Units SubCUTAneous Q12H    calcitRIOL (ROCALTROL) capsule 0.5 mcg  0.5 mcg Oral DAILY    prochlorperazine (COMPAZINE) tablet 5 mg  5 mg Oral Q6H PRN    famotidine (PEPCID) tablet 10 mg  10 mg Oral BID    levETIRAcetam (KEPPRA) tablet 500 mg  500 mg Oral BID    lacosamide (VIMPAT) tablet 200 mg  200 mg Oral BID    sodium chloride (NS) flush 5-40 mL  5-40 mL IntraVENous Q8H    sodium chloride (NS) flush 5-40 mL  5-40 mL IntraVENous PRN    acetaminophen (TYLENOL) tablet 650 mg  650 mg Oral Q6H PRN    Or    acetaminophen (TYLENOL) suppository 650 mg  650 mg Rectal Q6H PRN    polyethylene glycol (MIRALAX) packet 17 g  17 g Oral DAILY PRN    ondansetron (ZOFRAN ODT) tablet 4 mg  4 mg Oral Q8H PRN    Or    ondansetron (ZOFRAN) injection 4 mg  4 mg IntraVENous Q6H PRN    atorvastatin (LIPITOR) tablet 40 mg  40 mg Oral QHS    levothyroxine (SYNTHROID) tablet 150 mcg  150 mcg Oral DAILY    FLUoxetine (PROzac) capsule 10 mg  10 mg Oral DAILY    metoprolol tartrate (LOPRESSOR) tablet 25 mg  25 mg Oral DAILY    sevelamer carbonate (RENVELA) tab 800 mg  800 mg Oral TID WITH MEALS       Lab      Recent Results (from the past 24 hour(s))   CBC WITH AUTOMATED DIFF    Collection Time: 12/07/21  6:04 PM   Result Value Ref Range    WBC 4.4 3.6 - 11.0 K/uL    RBC 4.83 3.80 - 5.20 M/uL    HGB 14.8 11.5 - 16.0 g/dL    HCT 49.6 (H) 35.0 - 47.0 %    .7 (H) 80.0 - 99.0 FL    MCH 30.6 26.0 - 34.0 PG    MCHC 29.8 (L) 30.0 - 36.5 g/dL    RDW 13.6 11.5 - 14.5 %    PLATELET 80 (L) 690 - 400 K/uL    MPV 11.5 8.9 - 12.9 FL    NRBC 0.0 0.0  WBC    ABSOLUTE NRBC 0.00 0.00 - 0.01 K/uL    NEUTROPHILS 56 32 - 75 %    LYMPHOCYTES 34 12 - 49 %    MONOCYTES 7 5 - 13 %    EOSINOPHILS 2 0 - 7 %    BASOPHILS 1 0 - 1 %    IMMATURE GRANULOCYTES 0 0 - 0.5 %    ABS. NEUTROPHILS 2.5 1.8 - 8.0 K/UL    ABS. LYMPHOCYTES 1.5 0.8 - 3.5 K/UL    ABS. MONOCYTES 0.3 0.0 - 1.0 K/UL    ABS. EOSINOPHILS 0.1 0.0 - 0.4 K/UL    ABS. BASOPHILS 0.0 0.0 - 0.1 K/UL    ABS. IMM.  GRANS. 0.0 0.00 - 0.04 K/UL    DF AUTOMATED     METABOLIC PANEL, COMPREHENSIVE    Collection Time: 12/07/21  6:04 PM   Result Value Ref Range    Sodium 136 136 - 145 mmol/L    Potassium 4.4 3.5 - 5.1 mmol/L    Chloride 102 97 - 108 mmol/L    CO2 29 21 - 32 mmol/L    Anion gap 5 5 - 15 mmol/L    Glucose 73 65 - 100 mg/dL BUN 32 (H) 6 - 20 mg/dL    Creatinine 3.69 (H) 0.55 - 1.02 mg/dL    BUN/Creatinine ratio 9 (L) 12 - 20      GFR est AA 15 (L) >60 ml/min/1.73m2    GFR est non-AA 12 (L) >60 ml/min/1.73m2    Calcium 7.5 (L) 8.5 - 10.1 mg/dL    Bilirubin, total 0.6 0.2 - 1.0 mg/dL    AST (SGOT) 22 15 - 37 U/L    ALT (SGPT) 24 12 - 78 U/L    Alk. phosphatase 116 45 - 117 U/L    Protein, total 6.1 (L) 6.4 - 8.2 g/dL    Albumin 3.3 (L) 3.5 - 5.0 g/dL    Globulin 2.8 2.0 - 4.0 g/dL    A-G Ratio 1.2 1.1 - 2.2     GLUCOSE, POC    Collection Time: 12/07/21  8:57 PM   Result Value Ref Range    Glucose (POC) 95 65 - 117 mg/dL    Performed by Carrie Ca    CBC WITH AUTOMATED DIFF    Collection Time: 12/08/21  8:30 AM   Result Value Ref Range    WBC 4.9 3.6 - 11.0 K/uL    RBC 4.20 3.80 - 5.20 M/uL    HGB 12.7 11.5 - 16.0 g/dL    HCT 42.3 35.0 - 47.0 %    .7 (H) 80.0 - 99.0 FL    MCH 30.2 26.0 - 34.0 PG    MCHC 30.0 30.0 - 36.5 g/dL    RDW 13.6 11.5 - 14.5 %    PLATELET 74 (L) 368 - 400 K/uL    MPV 12.0 8.9 - 12.9 FL    NRBC 0.0 0.0  WBC    ABSOLUTE NRBC 0.00 0.00 - 0.01 K/uL    NEUTROPHILS 69 32 - 75 %    LYMPHOCYTES 21 12 - 49 %    MONOCYTES 7 5 - 13 %    EOSINOPHILS 2 0 - 7 %    BASOPHILS 1 0 - 1 %    IMMATURE GRANULOCYTES 0 0 - 0.5 %    ABS. NEUTROPHILS 3.4 1.8 - 8.0 K/UL    ABS. LYMPHOCYTES 1.1 0.8 - 3.5 K/UL    ABS. MONOCYTES 0.3 0.0 - 1.0 K/UL    ABS. EOSINOPHILS 0.1 0.0 - 0.4 K/UL    ABS. BASOPHILS 0.0 0.0 - 0.1 K/UL    ABS. IMM.  GRANS. 0.0 0.00 - 0.04 K/UL    DF AUTOMATED     METABOLIC PANEL, COMPREHENSIVE    Collection Time: 12/08/21  8:30 AM   Result Value Ref Range    Sodium 136 136 - 145 mmol/L    Potassium 4.1 3.5 - 5.1 mmol/L    Chloride 101 97 - 108 mmol/L    CO2 29 21 - 32 mmol/L    Anion gap 6 5 - 15 mmol/L    Glucose 148 (H) 65 - 100 mg/dL    BUN 35 (H) 6 - 20 mg/dL    Creatinine 3.99 (H) 0.55 - 1.02 mg/dL    BUN/Creatinine ratio 9 (L) 12 - 20      GFR est AA 13 (L) >60 ml/min/1.73m2    GFR est non-AA 11 (L) >60 ml/min/1.73m2    Calcium 7.9 (L) 8.5 - 10.1 mg/dL    Bilirubin, total 0.6 0.2 - 1.0 mg/dL    AST (SGOT) 23 15 - 37 U/L    ALT (SGPT) 20 12 - 78 U/L    Alk. phosphatase 98 45 - 117 U/L    Protein, total 5.3 (L) 6.4 - 8.2 g/dL    Albumin 2.5 (L) 3.5 - 5.0 g/dL    Globulin 2.8 2.0 - 4.0 g/dL    A-G Ratio 0.9 (L) 1.1 - 2.2     GLUCOSE, POC    Collection Time: 12/08/21  8:33 AM   Result Value Ref Range    Glucose (POC) 93 65 - 117 mg/dL    Performed by Davene Heimlich         Lab Results   Component Value Date/Time    Color Yellow/Straw 12/03/2021 09:54 AM    Appearance Clear 12/03/2021 09:54 AM    Specific gravity 1.015 12/03/2021 09:54 AM    pH (UA) 5.0 12/03/2021 09:54 AM    Protein 30 (A) 12/03/2021 09:54 AM    Glucose Negative 12/03/2021 09:54 AM    Ketone Negative 12/03/2021 09:54 AM    Bilirubin Negative 12/03/2021 09:54 AM    Urobilinogen 0.1 12/03/2021 09:54 AM    Nitrites Negative 12/03/2021 09:54 AM    Leukocyte Esterase Negative 12/03/2021 09:54 AM    Epithelial cells Moderate (A) 05/20/2021 12:56 AM    Bacteria Negative 12/03/2021 09:54 AM    WBC 0-4 12/03/2021 09:54 AM    RBC 5-10 12/03/2021 09:54 AM       Imaging         Assessment & Plan:     Principal Problem:    Intractable nausea and vomiting (12/3/2021)  -Resolved now. Probably secondary to uremia. She is undergoing dialysis now. Seizure ([address] (5/19/2021)  Had breakthrough seizures. Was seen by neurology. Dose of Keppra was adjusted to 500 mg and Vimpat was also changed to 200 mg twice daily. She will follow up with neurology after discharge. ESRD and fluid overload (12/5/2021)  S/p HD with 2 L fluid removal.    Saw on HD  BFR decreased during HD to.    -Hyperkalemia -from missed hemodialysis. S/p HD today.     AVF - Stenosis and steal phenomena-   Noted radiology report   s/p angiography on 12/7 and angioplasty  Plan for AVF revision vs ligation due to significant steal syndrome  IR contacted for temp HD catheter placement H&H 13.2   Will hold epo with HD session   Increased risk of aVF thrombosis    -History of atrial fibrillation -currently on anticoagulation with Eliquis    Signed By: Marcell Blas MD     December 8, 2021      This note was prepared using voice recognition system and is likely to have sound alike errors that may have been overlooked even during proofreading.   Please contact the author for any clarifications

## 2021-12-08 NOTE — PROGRESS NOTES
Problem: Falls - Risk of  Goal: *Absence of Falls  Description: Document Po Mckeon Fall Risk and appropriate interventions in the flowsheet. Outcome: Progressing Towards Goal  Note: Fall Risk Interventions:  Mobility Interventions: Bed/chair exit alarm, PT Consult for mobility concerns, Strengthening exercises (ROM-active/passive)    Mentation Interventions: Bed/chair exit alarm, Adequate sleep, hydration, pain control, Reorient patient    Medication Interventions: Teach patient to arise slowly, Evaluate medications/consider consulting pharmacy, Bed/chair exit alarm    Elimination Interventions: Patient to call for help with toileting needs, Call light in reach, Bed/chair exit alarm    History of Falls Interventions: Room close to nurse's station, Door open when patient unattended, Bed/chair exit alarm         Problem: Pressure Injury - Risk of  Goal: *Prevention of pressure injury  Description: Document Nitin Scale and appropriate interventions in the flowsheet. Outcome: Progressing Towards Goal  Note: Pressure Injury Interventions:  Sensory Interventions: Assess changes in LOC, Keep linens dry and wrinkle-free, Turn and reposition approx. every two hours (pillows and wedges if needed), Pad between skin to skin, Minimize linen layers    Moisture Interventions: Limit adult briefs, Internal/External urinary devices, Minimize layers    Activity Interventions: PT/OT evaluation, Assess need for specialty bed    Mobility Interventions: PT/OT evaluation, Turn and reposition approx.  every two hours(pillow and wedges), HOB 30 degrees or less    Nutrition Interventions: Document food/fluid/supplement intake, Discuss nutritional consult with provider, Offer support with meals,snacks and hydration    Friction and Shear Interventions: Minimize layers, Apply protective barrier, creams and emollients

## 2021-12-08 NOTE — PROGRESS NOTES
Dual skin assessment down with Erma Wylie RN. Skin concerns noted include scattered bruising, redness to her groin, non-blanchable redness to her coccyx.  Right groin site

## 2021-12-08 NOTE — PROGRESS NOTES
Pt is up in the chair after working with OT. No distress noted. Stated that her HA is getting better.

## 2021-12-08 NOTE — PROGRESS NOTES
Problem: Mobility Impaired (Adult and Pediatric)  Goal: *Acute Goals and Plan of Care (Insert Text)  Description: Physical Therapy Goals  Initiated 12/4/21  1)  I with HEP in 7 days to improve overall functional mobility. 2)  Bed mobility with sup in 7 days to prevent skin breakdown. 3)  Pt to perform stand pivot transfer from bed to chair with CGA in 7 days. Pt. Goal:  Pt to be able to walk safely without falls. Outcome: Progressing Towards Goal   PHYSICAL THERAPY TREATMENT  Patient: Manfred Mata (48 y.o. female)  Date: 12/8/2021  Diagnosis: Left-sided weakness [R53.1]  Intractable nausea and vomiting [R11.2]  Fluid overload [E87.70]  Seizure ([address] [R56.9]   Intractable nausea and vomiting  Procedure(s) (LRB):  ANGIOGRAPHY UPPER EXT RIGHT (N/A) 1 Day Post-Op  Precautions: Fall  Chart, physical therapy assessment, plan of care and goals were reviewed. ASSESSMENT  Patient continues with skilled PT services and is progressing towards goals. Pt received sitting reclined in chair with son at her side, pleasant and agreeable to Tx, although she declined transfers and amb stating that whenever she amb and WB on her RLE, she experiences severe pain. She denied any pain at that time. Pt completed ex in reclined position (see chart below), still denying any pain. Pt was left in chair, call bell within reach, all needs addressed, son present. Current Level of Function Impacting Discharge (mobility/balance): assist x 1, balance deficits    Other factors to consider for discharge: pain, level of assist severity of deficits         PLAN :  Patient continues to benefit from skilled intervention to address the above impairments. Continue treatment per established plan of care. to address goals.     Recommendation for discharge: (in order for the patient to meet his/her long term goals)  Home with [address] [address]    This discharge recommendation:  Has been made in collaboration with the attending provider and/or case management    IF patient discharges home will need the following DME: to be determined (TBD)       SUBJECTIVE:   Patient stated I don't want to walk. Whenever I walk on this leg (pointing to RLE) the pain is terrible! Roxy Marc    OBJECTIVE DATA SUMMARY:   Critical Behavior:  Neurologic State: Alert  Orientation Level: Oriented X4  Cognition: Follows commands  Safety/Judgement: Decreased awareness of need for assistance, Decreased awareness of need for safety, Decreased insight into deficits, Fall prevention    Balance:  Sitting: Intact; Without support  Sitting - Static: Good (unsupported)  Sitting - Dynamic: Good (unsupported)  Standing: Impaired; With support  Standing - Static: Constant support; Fair  Standing - Dynamic : Constant support; Fair    Therapeutic Exercises:       EXERCISE   Sets   Reps   Active Active Assist   Passive Self ROM   Comments   Ankle Pumps 2 20 [x] [] [] []    Glut Sets 3 10 [x] [] [] []    Heel Slides 3 10 [x] [] [] []    Hip abd/add 3 10 [x] [] [] []       Pain Ratin/10    Activity Tolerance:   Fair  Please refer to the flowsheet for vital signs taken during this treatment. After treatment patient left in no apparent distress:   Sitting in chair, Heels elevated for pressure relief, Call bell within reach, and Caregiver / family present    COMMUNICATION/COLLABORATION:   The patients plan of care was discussed with: Registered nurse.      Lynn Santos PTA   Time Calculation: 18 mins

## 2021-12-09 ENCOUNTER — ANESTHESIA (OUTPATIENT)
Dept: SURGERY | Age: 71
DRG: 252 | End: 2021-12-09
Payer: MEDICARE

## 2021-12-09 ENCOUNTER — ANESTHESIA EVENT (OUTPATIENT)
Dept: SURGERY | Age: 71
DRG: 252 | End: 2021-12-09
Payer: MEDICARE

## 2021-12-09 LAB
ALBUMIN SERPL-MCNC: 2.8 G/DL (ref 3.5–5)
ALBUMIN/GLOB SERPL: 1 {RATIO} (ref 1.1–2.2)
ALP SERPL-CCNC: 112 U/L (ref 45–117)
ALT SERPL-CCNC: 23 U/L (ref 12–78)
ANION GAP SERPL CALC-SCNC: 8 MMOL/L (ref 5–15)
AST SERPL W P-5'-P-CCNC: 22 U/L (ref 15–37)
BILIRUB SERPL-MCNC: 0.6 MG/DL (ref 0.2–1)
BUN SERPL-MCNC: 26 MG/DL (ref 6–20)
BUN/CREAT SERPL: 8 (ref 12–20)
CA-I BLD-MCNC: 7.9 MG/DL (ref 8.5–10.1)
CHLORIDE SERPL-SCNC: 104 MMOL/L (ref 97–108)
CO2 SERPL-SCNC: 24 MMOL/L (ref 21–32)
CREAT SERPL-MCNC: 3.06 MG/DL (ref 0.55–1.02)
GLOBULIN SER CALC-MCNC: 2.7 G/DL (ref 2–4)
GLUCOSE BLD STRIP.AUTO-MCNC: 108 MG/DL (ref 65–117)
GLUCOSE BLD STRIP.AUTO-MCNC: 118 MG/DL (ref 65–117)
GLUCOSE BLD STRIP.AUTO-MCNC: 119 MG/DL (ref 65–117)
GLUCOSE BLD STRIP.AUTO-MCNC: 132 MG/DL (ref 65–117)
GLUCOSE BLD STRIP.AUTO-MCNC: 53 MG/DL (ref 65–117)
GLUCOSE BLD STRIP.AUTO-MCNC: 59 MG/DL (ref 65–117)
GLUCOSE BLD STRIP.AUTO-MCNC: 60 MG/DL (ref 65–117)
GLUCOSE BLD STRIP.AUTO-MCNC: 71 MG/DL (ref 65–117)
GLUCOSE BLD STRIP.AUTO-MCNC: 77 MG/DL (ref 65–117)
GLUCOSE BLD STRIP.AUTO-MCNC: 83 MG/DL (ref 65–117)
GLUCOSE SERPL-MCNC: 63 MG/DL (ref 65–100)
PERFORMED BY, TECHID: ABNORMAL
PERFORMED BY, TECHID: NORMAL
POTASSIUM SERPL-SCNC: 4.1 MMOL/L (ref 3.5–5.1)
PROT SERPL-MCNC: 5.5 G/DL (ref 6.4–8.2)
SODIUM SERPL-SCNC: 136 MMOL/L (ref 136–145)

## 2021-12-09 PROCEDURE — 77030002987 HC SUT PROL J&J -B: Performed by: SURGERY

## 2021-12-09 PROCEDURE — 74011000250 HC RX REV CODE- 250: Performed by: PHYSICIAN ASSISTANT

## 2021-12-09 PROCEDURE — 74011250637 HC RX REV CODE- 250/637: Performed by: PSYCHIATRY & NEUROLOGY

## 2021-12-09 PROCEDURE — C1768 GRAFT, VASCULAR: HCPCS | Performed by: SURGERY

## 2021-12-09 PROCEDURE — 82962 GLUCOSE BLOOD TEST: CPT

## 2021-12-09 PROCEDURE — 03V70CZ RESTRICTION OF RIGHT BRACHIAL ARTERY WITH EXTRALUMINAL DEVICE, OPEN APPROACH: ICD-10-PCS | Performed by: SURGERY

## 2021-12-09 PROCEDURE — 76060000034 HC ANESTHESIA 1.5 TO 2 HR: Performed by: SURGERY

## 2021-12-09 PROCEDURE — 74011250637 HC RX REV CODE- 250/637: Performed by: SURGERY

## 2021-12-09 PROCEDURE — 36415 COLL VENOUS BLD VENIPUNCTURE: CPT

## 2021-12-09 PROCEDURE — 74011250636 HC RX REV CODE- 250/636: Performed by: NURSE ANESTHETIST, CERTIFIED REGISTERED

## 2021-12-09 PROCEDURE — 77030002933 HC SUT MCRYL J&J -A: Performed by: SURGERY

## 2021-12-09 PROCEDURE — 36832 AV FISTULA REVISION OPEN: CPT | Performed by: SURGERY

## 2021-12-09 PROCEDURE — 74011000250 HC RX REV CODE- 250: Performed by: SURGERY

## 2021-12-09 PROCEDURE — 76010000153 HC OR TIME 1.5 TO 2 HR: Performed by: SURGERY

## 2021-12-09 PROCEDURE — 77030002996 HC SUT SLK J&J -A: Performed by: SURGERY

## 2021-12-09 PROCEDURE — 77030010512 HC APPL CLP LIG J&J -C: Performed by: SURGERY

## 2021-12-09 PROCEDURE — 74011250636 HC RX REV CODE- 250/636: Performed by: SURGERY

## 2021-12-09 PROCEDURE — 99232 SBSQ HOSP IP/OBS MODERATE 35: CPT | Performed by: SURGERY

## 2021-12-09 PROCEDURE — 74011250637 HC RX REV CODE- 250/637: Performed by: NURSE PRACTITIONER

## 2021-12-09 PROCEDURE — 74011000250 HC RX REV CODE- 250: Performed by: NURSE ANESTHETIST, CERTIFIED REGISTERED

## 2021-12-09 PROCEDURE — 74011000250 HC RX REV CODE- 250: Performed by: ANESTHESIOLOGY

## 2021-12-09 PROCEDURE — 65270000029 HC RM PRIVATE

## 2021-12-09 PROCEDURE — 76210000006 HC OR PH I REC 0.5 TO 1 HR: Performed by: SURGERY

## 2021-12-09 PROCEDURE — 77030031139 HC SUT VCRL2 J&J -A: Performed by: SURGERY

## 2021-12-09 PROCEDURE — 74011000250 HC RX REV CODE- 250

## 2021-12-09 PROCEDURE — 2709999900 HC NON-CHARGEABLE SUPPLY: Performed by: SURGERY

## 2021-12-09 PROCEDURE — 80053 COMPREHEN METABOLIC PANEL: CPT

## 2021-12-09 PROCEDURE — 77030014007 HC SPNG HEMSTAT J&J -B: Performed by: SURGERY

## 2021-12-09 DEVICE — XENOSURE BIOLOGIC PATCH, 0.8CM X 8CM, EIFU
Type: IMPLANTABLE DEVICE | Site: ARM | Status: FUNCTIONAL
Brand: XENOSURE BIOLOGIC PATCH

## 2021-12-09 RX ORDER — SODIUM CHLORIDE 0.9 % (FLUSH) 0.9 %
5-40 SYRINGE (ML) INJECTION AS NEEDED
Status: CANCELLED | OUTPATIENT
Start: 2021-12-09

## 2021-12-09 RX ORDER — DEXTROSE 50 % IN WATER (D50W) INTRAVENOUS SYRINGE
25 ONCE
Status: COMPLETED | OUTPATIENT
Start: 2021-12-09 | End: 2021-12-09

## 2021-12-09 RX ORDER — HYDROCODONE BITARTRATE AND ACETAMINOPHEN 5; 325 MG/1; MG/1
2 TABLET ORAL AS NEEDED
Status: DISCONTINUED | OUTPATIENT
Start: 2021-12-09 | End: 2021-12-09 | Stop reason: HOSPADM

## 2021-12-09 RX ORDER — PROPOFOL 10 MG/ML
INJECTION, EMULSION INTRAVENOUS AS NEEDED
Status: DISCONTINUED | OUTPATIENT
Start: 2021-12-09 | End: 2021-12-09 | Stop reason: HOSPADM

## 2021-12-09 RX ORDER — HYDROMORPHONE HYDROCHLORIDE 1 MG/ML
0.25 INJECTION, SOLUTION INTRAMUSCULAR; INTRAVENOUS; SUBCUTANEOUS
Status: DISCONTINUED | OUTPATIENT
Start: 2021-12-09 | End: 2021-12-09 | Stop reason: HOSPADM

## 2021-12-09 RX ORDER — DIPHENHYDRAMINE HYDROCHLORIDE 50 MG/ML
12.5 INJECTION, SOLUTION INTRAMUSCULAR; INTRAVENOUS AS NEEDED
Status: DISCONTINUED | OUTPATIENT
Start: 2021-12-09 | End: 2021-12-09 | Stop reason: HOSPADM

## 2021-12-09 RX ORDER — FENTANYL CITRATE 50 UG/ML
INJECTION, SOLUTION INTRAMUSCULAR; INTRAVENOUS AS NEEDED
Status: DISCONTINUED | OUTPATIENT
Start: 2021-12-09 | End: 2021-12-09 | Stop reason: HOSPADM

## 2021-12-09 RX ORDER — LIDOCAINE HYDROCHLORIDE 10 MG/ML
INJECTION INFILTRATION; PERINEURAL AS NEEDED
Status: DISCONTINUED | OUTPATIENT
Start: 2021-12-09 | End: 2021-12-09 | Stop reason: HOSPADM

## 2021-12-09 RX ORDER — FENTANYL CITRATE 50 UG/ML
25 INJECTION, SOLUTION INTRAMUSCULAR; INTRAVENOUS
Status: DISCONTINUED | OUTPATIENT
Start: 2021-12-09 | End: 2021-12-09 | Stop reason: HOSPADM

## 2021-12-09 RX ORDER — ALBUTEROL SULFATE 2.5 MG/.5ML
2.5 SOLUTION RESPIRATORY (INHALATION) AS NEEDED
Status: DISCONTINUED | OUTPATIENT
Start: 2021-12-09 | End: 2021-12-09 | Stop reason: HOSPADM

## 2021-12-09 RX ORDER — DEXTROSE 50 % IN WATER (D50W) INTRAVENOUS SYRINGE
Status: COMPLETED
Start: 2021-12-09 | End: 2021-12-09

## 2021-12-09 RX ORDER — DEXTROSE 50 % IN WATER (D50W) INTRAVENOUS SYRINGE
Status: DISPENSED
Start: 2021-12-09 | End: 2021-12-10

## 2021-12-09 RX ORDER — DEXTROSE 50 % IN WATER (D50W) INTRAVENOUS SYRINGE
25 AS NEEDED
Status: COMPLETED | OUTPATIENT
Start: 2021-12-09 | End: 2021-12-09

## 2021-12-09 RX ORDER — SODIUM CHLORIDE 0.9 % (FLUSH) 0.9 %
5-40 SYRINGE (ML) INJECTION AS NEEDED
Status: DISCONTINUED | OUTPATIENT
Start: 2021-12-09 | End: 2021-12-09 | Stop reason: HOSPADM

## 2021-12-09 RX ORDER — ENOXAPARIN SODIUM 100 MG/ML
30 INJECTION SUBCUTANEOUS DAILY
Status: CANCELLED | OUTPATIENT
Start: 2021-12-10

## 2021-12-09 RX ORDER — ONDANSETRON 2 MG/ML
4 INJECTION INTRAMUSCULAR; INTRAVENOUS AS NEEDED
Status: DISCONTINUED | OUTPATIENT
Start: 2021-12-09 | End: 2021-12-09 | Stop reason: HOSPADM

## 2021-12-09 RX ORDER — SODIUM CHLORIDE 9 MG/ML
INJECTION, SOLUTION INTRAVENOUS
Status: DISCONTINUED | OUTPATIENT
Start: 2021-12-09 | End: 2021-12-09 | Stop reason: HOSPADM

## 2021-12-09 RX ORDER — SODIUM CHLORIDE 0.9 % (FLUSH) 0.9 %
5-40 SYRINGE (ML) INJECTION EVERY 8 HOURS
Status: CANCELLED | OUTPATIENT
Start: 2021-12-09

## 2021-12-09 RX ORDER — ONDANSETRON 2 MG/ML
4 INJECTION INTRAMUSCULAR; INTRAVENOUS
Status: CANCELLED | OUTPATIENT
Start: 2021-12-09

## 2021-12-09 RX ORDER — HEPARIN SODIUM 1000 [USP'U]/ML
INJECTION, SOLUTION INTRAVENOUS; SUBCUTANEOUS AS NEEDED
Status: DISCONTINUED | OUTPATIENT
Start: 2021-12-09 | End: 2021-12-09 | Stop reason: HOSPADM

## 2021-12-09 RX ORDER — DEXAMETHASONE SODIUM PHOSPHATE 4 MG/ML
INJECTION, SOLUTION INTRA-ARTICULAR; INTRALESIONAL; INTRAMUSCULAR; INTRAVENOUS; SOFT TISSUE AS NEEDED
Status: DISCONTINUED | OUTPATIENT
Start: 2021-12-09 | End: 2021-12-09 | Stop reason: HOSPADM

## 2021-12-09 RX ORDER — LIDOCAINE HYDROCHLORIDE 20 MG/ML
INJECTION, SOLUTION EPIDURAL; INFILTRATION; INTRACAUDAL; PERINEURAL AS NEEDED
Status: DISCONTINUED | OUTPATIENT
Start: 2021-12-09 | End: 2021-12-09 | Stop reason: HOSPADM

## 2021-12-09 RX ORDER — ONDANSETRON 2 MG/ML
INJECTION INTRAMUSCULAR; INTRAVENOUS AS NEEDED
Status: DISCONTINUED | OUTPATIENT
Start: 2021-12-09 | End: 2021-12-09 | Stop reason: HOSPADM

## 2021-12-09 RX ORDER — ONDANSETRON 4 MG/1
4 TABLET, ORALLY DISINTEGRATING ORAL
Status: CANCELLED | OUTPATIENT
Start: 2021-12-09

## 2021-12-09 RX ADMIN — LEVETIRACETAM 500 MG: 500 TABLET, FILM COATED ORAL at 22:49

## 2021-12-09 RX ADMIN — METOPROLOL TARTRATE 25 MG: 25 TABLET, FILM COATED ORAL at 17:36

## 2021-12-09 RX ADMIN — ONDANSETRON 4 MG: 4 TABLET, ORALLY DISINTEGRATING ORAL at 02:05

## 2021-12-09 RX ADMIN — LACOSAMIDE 200 MG: 100 TABLET, FILM COATED ORAL at 22:49

## 2021-12-09 RX ADMIN — FAMOTIDINE 10 MG: 10 TABLET ORAL at 22:49

## 2021-12-09 RX ADMIN — FENTANYL CITRATE 100 MCG: 50 INJECTION, SOLUTION INTRAMUSCULAR; INTRAVENOUS at 13:50

## 2021-12-09 RX ADMIN — Medication 10 ML: at 05:33

## 2021-12-09 RX ADMIN — DEXTROSE MONOHYDRATE 12.5 G: 25 INJECTION, SOLUTION INTRAVENOUS at 13:12

## 2021-12-09 RX ADMIN — PHENYLEPHRINE HYDROCHLORIDE 200 MCG: 10 INJECTION INTRAVENOUS at 14:15

## 2021-12-09 RX ADMIN — PROPOFOL 100 MG: 10 INJECTION, EMULSION INTRAVENOUS at 14:10

## 2021-12-09 RX ADMIN — DEXTROSE MONOHYDRATE 25 G: 25 INJECTION, SOLUTION INTRAVENOUS at 12:13

## 2021-12-09 RX ADMIN — DEXTROSE MONOHYDRATE 12.5 G: 25 INJECTION, SOLUTION INTRAVENOUS at 09:47

## 2021-12-09 RX ADMIN — HYDROCODONE BITARTRATE AND ACETAMINOPHEN 1 TABLET: 7.5; 325 TABLET ORAL at 02:05

## 2021-12-09 RX ADMIN — DEXAMETHASONE SODIUM PHOSPHATE 4 MG: 4 INJECTION, SOLUTION INTRA-ARTICULAR; INTRALESIONAL; INTRAMUSCULAR; INTRAVENOUS; SOFT TISSUE at 14:10

## 2021-12-09 RX ADMIN — PROPOFOL 100 MG: 10 INJECTION, EMULSION INTRAVENOUS at 13:53

## 2021-12-09 RX ADMIN — SODIUM CHLORIDE: 9 INJECTION, SOLUTION INTRAVENOUS at 13:46

## 2021-12-09 RX ADMIN — SEVELAMER CARBONATE 800 MG: 800 TABLET, FILM COATED ORAL at 17:36

## 2021-12-09 RX ADMIN — CALCITRIOL CAPSULES 0.25 MCG 0.5 MCG: 0.25 CAPSULE ORAL at 17:36

## 2021-12-09 RX ADMIN — ONDANSETRON 4 MG: 2 INJECTION INTRAMUSCULAR; INTRAVENOUS at 14:10

## 2021-12-09 RX ADMIN — PHENYLEPHRINE HYDROCHLORIDE 200 MCG: 10 INJECTION INTRAVENOUS at 14:33

## 2021-12-09 RX ADMIN — DEXTROSE MONOHYDRATE 25 G: 25 INJECTION, SOLUTION INTRAVENOUS at 08:43

## 2021-12-09 RX ADMIN — ATORVASTATIN CALCIUM 40 MG: 40 TABLET, FILM COATED ORAL at 22:49

## 2021-12-09 RX ADMIN — LIDOCAINE HYDROCHLORIDE 40 MG: 20 INJECTION, SOLUTION EPIDURAL; INFILTRATION; INTRACAUDAL; PERINEURAL at 14:10

## 2021-12-09 RX ADMIN — PHENYLEPHRINE HYDROCHLORIDE 200 MCG: 10 INJECTION INTRAVENOUS at 14:30

## 2021-12-09 RX ADMIN — PHENYLEPHRINE HYDROCHLORIDE 200 MCG: 10 INJECTION INTRAVENOUS at 14:10

## 2021-12-09 NOTE — PROGRESS NOTES
Patient signed informed consent for right brachiobasillic arteriole venous fistula revision or possibly ligation.  Informed consent placed in patient's chart

## 2021-12-09 NOTE — PROGRESS NOTES
Problem: Falls - Risk of  Goal: *Absence of Falls  Description: Document Ricardo Harry Fall Risk and appropriate interventions in the flowsheet.   Outcome: Progressing Towards Goal  Note: Fall Risk Interventions:  Mobility Interventions: Bed/chair exit alarm, PT Consult for mobility concerns, Strengthening exercises (ROM-active/passive)    Mentation Interventions: Bed/chair exit alarm, Adequate sleep, hydration, pain control, Reorient patient    Medication Interventions: Teach patient to arise slowly, Evaluate medications/consider consulting pharmacy, Bed/chair exit alarm    Elimination Interventions: Patient to call for help with toileting needs, Call light in reach, Bed/chair exit alarm    History of Falls Interventions: Room close to nurse's station, Door open when patient unattended, Bed/chair exit alarm

## 2021-12-09 NOTE — ROUTINE PROCESS
TRANSFER - OUT REPORT:    Verbal report given to Jeramy KRUSE(name) on Jimmy Goodman  being transferred to [facility] room 487(unit) for routine post - op       Report consisted of patients Situation, Background, Assessment and   Recommendations(SBAR). Information from the following report(s) SBAR, Kardex, OR Summary, Procedure Summary, Intake/Output, MAR and Cardiac Rhythm a paced was reviewed with the receiving nurse. Lines:   Triple Lumen 12/09/21 Left; Proximal Femoral (Active)   Central Line Being Utilized Yes 12/09/21 1535   Site Assessment Clean, dry, & intact 12/09/21 1535   Infiltration Assessment 0 12/09/21 1535   Affected Extremity/Extremities Pulses palpable; Range of motion performed; Color distal to insertion site pink (or appropriate for race) 12/09/21 1535   Dressing Status Intact 12/09/21 1535   Dressing Type Transparent 12/09/21 1535   Proximal Hub Color/Line Status White; Patent; Infusing 12/09/21 1535   Medial Hub Color/Line Status Brown; Capped 12/09/21 1535   Distal Hub Color/Line Status Blue; Capped 12/09/21 1535       Peripheral IV 12/08/21 Distal; Left Basilic (Active)   Site Assessment Clean, dry, & intact 12/08/21 1941   Phlebitis Assessment 0 12/08/21 1941   Infiltration Assessment 0 12/08/21 1941   Dressing Status Clean, dry, & intact 12/08/21 1941   Dressing Type Transparent; Tape 12/08/21 1941   Hub Color/Line Status Flushed; Capped 12/08/21 1941        Opportunity for questions and clarification was provided.       Patient transported with:   Monitor  Registered Nurse

## 2021-12-09 NOTE — ANESTHESIA POSTPROCEDURE EVALUATION
Procedure(s):  Right Arm Arteriovenous Fistula Revision.     general    Anesthesia Post Evaluation      Multimodal analgesia: multimodal analgesia used between 6 hours prior to anesthesia start to PACU discharge  Patient location during evaluation: PACU  Patient participation: complete - patient participated  Level of consciousness: awake  Pain score: 0  Pain management: adequate  Airway patency: patent  Anesthetic complications: no  Cardiovascular status: acceptable  Respiratory status: acceptable  Hydration status: acceptable  Post anesthesia nausea and vomiting:  controlled  Final Post Anesthesia Temperature Assessment:  Normothermia (36.0-37.5 degrees C)      INITIAL Post-op Vital signs:   Vitals Value Taken Time   BP 95/69 12/09/21 1529   Temp 36.2 °C (97.2 °F) 12/09/21 1529   Pulse 62 12/09/21 1529   Resp 11 12/09/21 1529   SpO2 98 % 12/09/21 1529

## 2021-12-09 NOTE — ANESTHESIA PREPROCEDURE EVALUATION
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)   (+) Atrial fibrillation (HCC)   (+) Cardiac pacemaker in situ   (+) SSS (sick sinus syndrome) (HCC)      RENAL FAILURE   (+) End stage renal failure on dialysis ([facility]      ENDOCRINE   (+) Acquired hypothyroidism   (+) Type II diabetes mellitus ([facility]       Anesthetic History     PONV          Review of Systems / Medical History  Patient summary reviewed, nursing notes reviewed and pertinent labs reviewed    Pulmonary  Within defined limits                 Neuro/Psych     seizures  CVA  Headaches and psychiatric history     Cardiovascular    Hypertension        Dysrhythmias (Sick Sinus Syndrome & Atrial Fibrillation)   Pacemaker (Pacemaker only), PAD and hyperlipidemia         GI/Hepatic/Renal     GERD    Renal disease (12/8 - last dialysis ): ESRD and dialysis       Endo/Other    Diabetes: type 2    Blood dyscrasia (Idiopathic Thrombocytopenia with platelet count of 04G), arthritis and anemia     Other Findings              Physical Exam    Airway  Mallampati: II  TM Distance: 4 - 6 cm  Neck ROM: normal range of motion   Mouth opening: Normal     Cardiovascular    Rhythm: regular  Rate: normal         Dental    Dentition: Poor dentition     Pulmonary  Breath sounds clear to auscultation               Abdominal  GI exam deferred       Other Findings   Comments: Results for Derik Baires (MRN [id number]) as of 12/9/2021 13:19    12/9/2021 09:18  Sodium: 136  Potassium: 4.1  Chloride: 104  CO2: 24  Anion gap: 8  Glucose: 63 (L)  BUN: 26 (H)  Creatinine: 3.06 (H)  BUN/Creatinine ratio: 8 (L)  Calcium: 7.9 (L)  GFR est non-AA: 15 (L)  GFR est AA: 18 (L)  Bilirubin, total: 0.6  Protein, total: 5.5 (L)  Albumin: 2.8 (L)  Globulin: 2.7  A-G Ratio: 1.0 (L)  ALT: 23  AST: 22  Alk.  phosphatase: 112    Results for Hector Brar (MRN [id number]) as of 12/9/2021 13:19    12/8/2021 08:30  WBC: 4.9  NRBC: 0.0  RBC: 4.20  HGB: 12.7  HCT: 42.3  MCV: 100.7 (H)  MCH: 30.2  MCHC: 30.0  RDW: 13.6  PLATELET: 74 (L)         Anesthetic Plan    ASA: 4  Anesthesia type: general          Induction: Intravenous  Anesthetic plan and risks discussed with: Patient

## 2021-12-09 NOTE — PROGRESS NOTES
Hospitalist Progress Note    Subjective:   Daily Progress Note: 12/9/2021 4:37 PM    Hand sensation has improved. Cyanotic appearance     Current Facility-Administered Medications   Medication Dose Route Frequency    sodium chloride (NS) flush 5-40 mL  5-40 mL IntraVENous Q8H    sodium chloride (NS) flush 5-40 mL  5-40 mL IntraVENous PRN    HYDROcodone-acetaminophen (NORCO) 7.5-325 mg per tablet 1 Tablet  1 Tablet Oral Q4H PRN    zinc oxide-white petrolatum 17-57 % topical paste   Topical PRN    heparin (porcine) injection 5,000 Units  5,000 Units SubCUTAneous Q12H    calcitRIOL (ROCALTROL) capsule 0.5 mcg  0.5 mcg Oral DAILY    prochlorperazine (COMPAZINE) tablet 5 mg  5 mg Oral Q6H PRN    famotidine (PEPCID) tablet 10 mg  10 mg Oral BID    levETIRAcetam (KEPPRA) tablet 500 mg  500 mg Oral BID    lacosamide (VIMPAT) tablet 200 mg  200 mg Oral BID    sodium chloride (NS) flush 5-40 mL  5-40 mL IntraVENous Q8H    sodium chloride (NS) flush 5-40 mL  5-40 mL IntraVENous PRN    acetaminophen (TYLENOL) tablet 650 mg  650 mg Oral Q6H PRN    Or    acetaminophen (TYLENOL) suppository 650 mg  650 mg Rectal Q6H PRN    polyethylene glycol (MIRALAX) packet 17 g  17 g Oral DAILY PRN    ondansetron (ZOFRAN ODT) tablet 4 mg  4 mg Oral Q8H PRN    Or    ondansetron (ZOFRAN) injection 4 mg  4 mg IntraVENous Q6H PRN    atorvastatin (LIPITOR) tablet 40 mg  40 mg Oral QHS    levothyroxine (SYNTHROID) tablet 150 mcg  150 mcg Oral DAILY    FLUoxetine (PROzac) capsule 10 mg  10 mg Oral DAILY    metoprolol tartrate (LOPRESSOR) tablet 25 mg  25 mg Oral DAILY    sevelamer carbonate (RENVELA) tab 800 mg  800 mg Oral TID WITH MEALS        Review of Systems  Review of Systems   Constitutional: Negative for fever and malaise/fatigue. HENT: Negative. Respiratory: Negative for cough and shortness of breath. Cardiovascular: Negative for chest pain and leg swelling.    Gastrointestinal: Negative for abdominal pain, nausea and vomiting. Genitourinary: Negative. Musculoskeletal: Negative. Skin: Negative. Neurological: Negative. Psychiatric/Behavioral: Negative. Objective:     Visit Vitals  BP (!) 105/43 (BP 1 Location: Left upper arm, BP Patient Position: At rest;Supine)   Pulse 95   Temp 97.7 °F (36.5 °C)   Resp 20   Ht 5' 6\" (1.676 m)   Wt 74.8 kg (165 lb)   SpO2 100%   Breastfeeding No   BMI 26.63 kg/m²      O2 Device: None (Room air)    Temp (24hrs), Av.1 °F (36.7 °C), Min:97.6 °F (36.4 °C), Max:98.5 °F (36.9 °C)      No intake/output data recorded.    1901 -  0700  In: 480 [P.O.:480]  Out:      Recent Results (from the past 24 hour(s))   GLUCOSE, POC    Collection Time: 21  3:52 PM   Result Value Ref Range    Glucose (POC) 159 (H) 65 - 117 mg/dL    Performed by [address] [place] [address], POC    Collection Time: 21  8:18 PM   Result Value Ref Range    Glucose (POC) 63 (L) 65 - 117 mg/dL    Performed by Paulino Barthel    GLUCOSE, POC    Collection Time: 21  9:38 PM   Result Value Ref Range    Glucose (POC) 90 65 - 117 mg/dL    Performed by [address] [place] [address], POC    Collection Time: 21  8:20 AM   Result Value Ref Range    Glucose (POC) 53 (LL) 65 - 117 mg/dL    Performed by [address] [place], POC    Collection Time: 21  8:42 AM   Result Value Ref Range    Glucose (POC) 60 (L) 65 - 117 mg/dL    Performed by [facility] Brenda Garcia, COMPREHENSIVE    Collection Time: 21  9:18 AM   Result Value Ref Range    Sodium 136 136 - 145 mmol/L    Potassium 4.1 3.5 - 5.1 mmol/L    Chloride 104 97 - 108 mmol/L    CO2 24 21 - 32 mmol/L    Anion gap 8 5 - 15 mmol/L    Glucose 63 (L) 65 - 100 mg/dL    BUN 26 (H) 6 - 20 mg/dL    Creatinine 3.06 (H) 0.55 - 1.02 mg/dL    BUN/Creatinine ratio 8 (L) 12 - 20      GFR est AA 18 (L) >60 ml/min/1.73m2    GFR est non-AA 15 (L) >60 ml/min/1.73m2    Calcium 7.9 (L) 8.5 - 10.1 mg/dL    Bilirubin, total 0.6 0.2 - 1.0 mg/dL    AST (SGOT) 22 15 - 37 U/L    ALT (SGPT) 23 12 - 78 U/L    Alk. phosphatase 112 45 - 117 U/L    Protein, total 5.5 (L) 6.4 - 8.2 g/dL    Albumin 2.8 (L) 3.5 - 5.0 g/dL    Globulin 2.7 2.0 - 4.0 g/dL    A-G Ratio 1.0 (L) 1.1 - 2.2     GLUCOSE, POC    Collection Time: 12/09/21  9:28 AM   Result Value Ref Range    Glucose (POC) 71 65 - 117 mg/dL    Performed by [employer] EXT ARTERY RIGHT   Final Result      CT HEAD WO CONT   Final Result   Age-appropriate atrophy. No acute findings. XR CHEST SNGL V   Final Result   The cardiomediastinal silhouette is appropriate for age, technique,   and lung expansion. Pulmonary vasculature is not congested. The lungs are   essentially clear. No effusion or pneumothorax is seen. IR INSERT TUNL CVC W/O PORT OVER 5 YR    (Results Pending)        PHYSICAL EXAM:    Physical Exam  Vitals reviewed. HENT:      Head: Normocephalic and atraumatic. Mouth/Throat:      Mouth: Mucous membranes are moist.      Pharynx: Oropharynx is clear. Cardiovascular:      Rate and Rhythm: Regular rhythm. Heart sounds: Normal heart sounds. Pulmonary:      Breath sounds: Normal breath sounds. Abdominal:      General: Abdomen is flat. Bowel sounds are normal.      Palpations: Abdomen is soft. Musculoskeletal:      Cervical back: Normal range of motion and neck supple. Comments: Right hand is warm to the touch with good motor function   Skin:     General: Skin is warm and dry. Neurological:      General: No focal deficit present. Mental Status: She is alert and oriented to person, place, and time. Mental status is at baseline.    Psychiatric:         Mood and Affect: Mood normal.          Data Review    Recent Results (from the past 24 hour(s))   GLUCOSE, POC    Collection Time: 12/08/21  3:52 PM   Result Value Ref Range    Glucose (POC) 159 (H) 65 - 117 mg/dL    Performed by [address] [place] [address], POC    Collection Time: 12/08/21  8:18 PM   Result Value Ref Range    Glucose (POC) 63 (L) 65 - 117 mg/dL    Performed by Abdirahman JHA, POC    Collection Time: 12/08/21  9:38 PM   Result Value Ref Range    Glucose (POC) 90 65 - 117 mg/dL    Performed by [address], POC    Collection Time: 12/09/21  8:20 AM   Result Value Ref Range    Glucose (POC) 53 (LL) 65 - 117 mg/dL    Performed by [facility], POC    Collection Time: 12/09/21  8:42 AM   Result Value Ref Range    Glucose (POC) 60 (L) 65 - 117 mg/dL    Performed by Lizabeth Rojas, COMPREHENSIVE    Collection Time: 12/09/21  9:18 AM   Result Value Ref Range    Sodium 136 136 - 145 mmol/L    Potassium 4.1 3.5 - 5.1 mmol/L    Chloride 104 97 - 108 mmol/L    CO2 24 21 - 32 mmol/L    Anion gap 8 5 - 15 mmol/L    Glucose 63 (L) 65 - 100 mg/dL    BUN 26 (H) 6 - 20 mg/dL    Creatinine 3.06 (H) 0.55 - 1.02 mg/dL    BUN/Creatinine ratio 8 (L) 12 - 20      GFR est AA 18 (L) >60 ml/min/1.73m2    GFR est non-AA 15 (L) >60 ml/min/1.73m2    Calcium 7.9 (L) 8.5 - 10.1 mg/dL    Bilirubin, total 0.6 0.2 - 1.0 mg/dL    AST (SGOT) 22 15 - 37 U/L    ALT (SGPT) 23 12 - 78 U/L    Alk. phosphatase 112 45 - 117 U/L    Protein, total 5.5 (L) 6.4 - 8.2 g/dL    Albumin 2.8 (L) 3.5 - 5.0 g/dL    Globulin 2.7 2.0 - 4.0 g/dL    A-G Ratio 1.0 (L) 1.1 - 2.2     GLUCOSE, POC    Collection Time: 12/09/21  9:28 AM   Result Value Ref Range    Glucose (POC) 71 65 - 117 mg/dL    Performed by Jensen Tolbert         Assessment/Plan:     Principal Problem:    Intractable nausea and vomiting (12/3/2021)    Active Problems:    Seizure ([address] (5/19/2021)      Left-sided weakness (12/3/2021)      Fluid overload (12/5/2021)      Hospital course: This 20-year-old male presented to the emergency department on 12/3/2021 for admission after being found to have persistent weakness, intractable nausea and vomiting. Chest x-ray was negative for acute pulmonary process. Patient was also found to have increased tremors and seizure like activity. Patient's started on Keppra 500 mg twice daily and Vimpat was increased to 200 mg twice daily. Tremors have subsided. Patient is tolerating hemodialysis without complications. Nausea and vomiting have resolved. Patient with a recent right arm fistula placement. There is no radial pulse although there are findings consistent with steal syndrome. Motor and sensation are present. Dr. Sohail Lopez, vascular surgery performed revision on 12/9/2021. Sahil Sloan Neurology consultation. Nephrology consultation Dr. Marely Qiu. Angiogram with significant steal syndrome. Patient will need permacath and revision versus ligation of the graft. Tentatively scheduled for 12/9/2021 with Dr. Sohail Lopez. Discussed case with patient and daughter-in-law Claire Medrano. Impression\plan:    1. Generalized weakness/ambulatory dysfunction:  continue PT/OT as ordered  Supportive care  Fall precautions     2. Intractable nausea and vomiting:  -encourage oral hydration  -prn zofran and compazine  -continue supportive care  Resolved     3. End-stage renal disease:  Continue hemodialysis  Dr. Sohail Lopez evaluating right fistula steal syndrome, right hand with good sensation and motor no active signs of cyanotic presentation  Permacath  Revision of graft     4. Seizure disorder:  -keppra adjusted to 500mg bid, vimpat increased 200mg bid by neurology  -prn ativan for breakthrough sz     5. Peripheral vascular disease:  -continue lipitor 40mg     6. Atrial fibrillation:  -Currently stable on tele  -continue metoprolol 25mg  Questionable outpatient Eliquis     7. Hyperkalemia   Resolved     8. Depression:  -Continue prozac     9. Thrombocytopenia: Chronic  -hold all anticoagulants for platelet count less than 50     10.  Hypothyroidism:  -currently on syntrhoid 150mcg  -tsh 1.0 and normal        Full Code  Dvt Prophylaxis heparin   GI Prophylaxis none  Disposition:  Revision of fistula     NOK: Daughter-in-law Michelle Ross [phone] updated   Care Plan discussed with: Patient/Family    Total time spent with patient: >35 minutes.

## 2021-12-09 NOTE — PROGRESS NOTES
PROGRESS NOTE      Chief Complaints:  Patient examined this morning. Patient still complaining of moderate pain in the right hand but somewhat slightly better. HPI and  Objective:    Patient denies chest pain shortness of breath denies any fever chills denies any nausea vomiting abdominal pain. Review of Systems:  Rest of review of system negative, personally reviewed. EXAM:  Visit Vitals  BP (!) 93/43   Pulse 60   Temp 98 °F (36.7 °C)   Resp 18   Ht 5' 6\" (1.676 m)   Wt 165 lb (74.8 kg)   SpO2 96%   Breastfeeding No   BMI 26.63 kg/m²       Patient is awake and alert  Head and neck atraumatic, normocephalic. ENT: No hoarse voice  Cardiac system regular rate rhythm. Pulmonary no audible wheeze  Chest wall excursion normal with respiration cycle  Abdomen is soft not particularly distended. Neurologically nonfocal.  Skin is warm and moist.  Psychosocial: Cooperative. Vascular examination as previously noted no changes. Recent Results (from the past 24 hour(s))   CBC WITH AUTOMATED DIFF    Collection Time: 12/08/21  8:30 AM   Result Value Ref Range    WBC 4.9 3.6 - 11.0 K/uL    RBC 4.20 3.80 - 5.20 M/uL    HGB 12.7 11.5 - 16.0 g/dL    HCT 42.3 35.0 - 47.0 %    .7 (H) 80.0 - 99.0 FL    MCH 30.2 26.0 - 34.0 PG    MCHC 30.0 30.0 - 36.5 g/dL    RDW 13.6 11.5 - 14.5 %    PLATELET 74 (L) 106 - 400 K/uL    MPV 12.0 8.9 - 12.9 FL    NRBC 0.0 0.0  WBC    ABSOLUTE NRBC 0.00 0.00 - 0.01 K/uL    NEUTROPHILS 69 32 - 75 %    LYMPHOCYTES 21 12 - 49 %    MONOCYTES 7 5 - 13 %    EOSINOPHILS 2 0 - 7 %    BASOPHILS 1 0 - 1 %    IMMATURE GRANULOCYTES 0 0 - 0.5 %    ABS. NEUTROPHILS 3.4 1.8 - 8.0 K/UL    ABS. LYMPHOCYTES 1.1 0.8 - 3.5 K/UL    ABS. MONOCYTES 0.3 0.0 - 1.0 K/UL    ABS. EOSINOPHILS 0.1 0.0 - 0.4 K/UL    ABS. BASOPHILS 0.0 0.0 - 0.1 K/UL    ABS. IMM.  GRANS. 0.0 0.00 - 0.04 K/UL    DF AUTOMATED     METABOLIC PANEL, COMPREHENSIVE    Collection Time: 12/08/21  8:30 AM   Result Value Ref Range    Sodium 136 136 - 145 mmol/L    Potassium 4.1 3.5 - 5.1 mmol/L    Chloride 101 97 - 108 mmol/L    CO2 29 21 - 32 mmol/L    Anion gap 6 5 - 15 mmol/L    Glucose 148 (H) 65 - 100 mg/dL    BUN 35 (H) 6 - 20 mg/dL    Creatinine 3.99 (H) 0.55 - 1.02 mg/dL    BUN/Creatinine ratio 9 (L) 12 - 20      GFR est AA 13 (L) >60 ml/min/1.73m2    GFR est non-AA 11 (L) >60 ml/min/1.73m2    Calcium 7.9 (L) 8.5 - 10.1 mg/dL    Bilirubin, total 0.6 0.2 - 1.0 mg/dL    AST (SGOT) 23 15 - 37 U/L    ALT (SGPT) 20 12 - 78 U/L    Alk. phosphatase 98 45 - 117 U/L    Protein, total 5.3 (L) 6.4 - 8.2 g/dL    Albumin 2.5 (L) 3.5 - 5.0 g/dL    Globulin 2.8 2.0 - 4.0 g/dL    A-G Ratio 0.9 (L) 1.1 - 2.2     GLUCOSE, POC    Collection Time: 12/08/21  8:33 AM   Result Value Ref Range    Glucose (POC) 93 65 - 117 mg/dL    Performed by Yuli Silva    GLUCOSE, POC    Collection Time: 12/08/21  3:52 PM   Result Value Ref Range    Glucose (POC) 159 (H) 65 - 117 mg/dL    Performed by Diane Juarez    GLUCOSE, POC    Collection Time: 12/08/21  8:18 PM   Result Value Ref Range    Glucose (POC) 63 (L) 65 - 117 mg/dL    Performed by Austin Cranker    GLUCOSE, POC    Collection Time: 12/08/21  9:38 PM   Result Value Ref Range    Glucose (POC) 90 65 - 117 mg/dL    Performed by Roseanne More        ASSESSMENT:   Patient is 80 y.o. with diagnosis of : Principal Problem:    Intractable nausea and vomiting (12/3/2021)    Active Problems:    Seizure ([address] (5/19/2021)      Left-sided weakness (12/3/2021)      Fluid overload (12/5/2021)        PLAN:                 Right angiogram showed moderate plaque at the proximal to distal brachiobasilic AV fistula this was treated with balloon PT angioplasty. Despite the patient had essentially nonvisualization of the distal radial and ulnar artery. Patient has significant hemodynamic steal syndrome and most of the blood flow to the AV fistula.        Patient scheduled for [facility] brachiobasilic AV fistula revision possibly banding procedure today, if banding procedure still causes significant outflow to the distal radial ulnar artery most likely she will need a ligation of the AV fistula. Meanwhile patient is getting permacath placement today.

## 2021-12-09 NOTE — PROGRESS NOTES
PT treatment attempted at [phone] however, pt currently off floor at this time. Will continue to follow pt and will attempt treatment at a later time.  Thank you

## 2021-12-09 NOTE — WOUND CARE
IP WOUND CONSULT    [facility] RECORD NUMBER:  [id number]  AGE: 80 y.o. GENDER: female  : 1950  TODAY'S DATE:  2021    GENERAL     [] Follow-up   [x] New Consult    Crissy Davis is a 80 y.o. female referred by:   [] Physician  [x] Nursing  [] Other:         PAST MEDICAL HISTORY    Past Medical History:   Diagnosis Date    Anxiety disorder     Arthritis     Atherosclerosis of native arteries of the extremities with ulceration ([facility] 2020    Atrial fibrillation ([facility]     Cardiac pacemaker in situ     Cellulitis and abscess of trunk 2020    Depression, postpartum     Diabetes ([facility]     Heart disease     Heartburn     Hx of long term use of blood thinners     Hypercholesterolemia     Hypertension     Hypothyroidism     Migraines     Peripheral venous insufficiency 2020    Seizures ([facility]     Sleep disorder     Stroke ([facility]     2019    Varicose veins of lower extremity with inflammation 2020        PAST SURGICAL HISTORY    Past Surgical History:   Procedure Laterality Date    HX BACK SURGERY      HX OTHER SURGICAL      leg surgery     HX OTHER SURGICAL      pacemaker monitor        FAMILY HISTORY    Family History   Problem Relation Age of Onset    Heart Disease Mother     Heart Disease Father     Diabetes Sister     Diabetes Brother          ALLERGIES    No Known Allergies    MEDICATIONS    No current facility-administered medications on file prior to encounter. Current Outpatient Medications on File Prior to Encounter   Medication Sig Dispense Refill    Vimpat 200 mg tab tablet Take 1 Tablet by mouth two (2) times a day.  atorvastatin (LIPITOR) 40 mg tablet Take 1 tablet by mouth once daily for 90 days 90 Tablet 3    FLUoxetine (PROzac) 10 mg capsule Take 1 Capsule by mouth daily for 270 days.  90 Capsule 2    Euthyrox 150 mcg tablet Take 1 tablet by mouth once daily 90 Tablet 0    levETIRAcetam (KEPPRA XR) 500 mg ER tablet TAKE 2 TABLETS BY MOUTH TWICE DAILY FOR 90 DAYS      sevelamer carbonate (RENVELA) 800 mg tab tab       ondansetron (Zofran ODT) 4 mg disintegrating tablet 1 Tablet by SubLINGual route every eight (8) hours as needed for Nausea or Vomiting. 20 Tablet 0    loperamide (IMODIUM) 2 mg capsule TAKE 1 CAPSULE BY MOUTH SIX TIMES DAILY AS NEEDED FOR 30 DAYS 180 Capsule 0         [unfilled]  Visit Vitals  /61 (BP 1 Location: Left arm, BP Patient Position: At rest;Supine)   Pulse 61   Temp 98 °F (36.7 °C)   Resp 18   Ht 5' 6\" (1.676 m)   Wt 74.8 kg (165 lb)   SpO2 100%   Breastfeeding No   BMI 26.63 kg/m²       ASSESSMENT     Wound Identification & Type: Possible incontinence associated dermatitis to buttocks  Dressing change: zinc paste   Verbal consent for picture: Yes    Contributing Factors: anticoagulation therapy, diabetes, poor glucose control, decreased mobility, shear force, incontinence of urine and decreased tissue oxygenation    Wound Gluteal fold/cleft Medial nonblanchable redness across top of buttox, and gluetan folds.  12/01/21 (Active)   Wound Image   12/09/21 1304   Wound Etiology Other (Comment) 12/09/21 1304   Dressing Status Other (Comment) 12/07/21 1907   Cleansed Other (Comment) 12/09/21 1304   Dressing/Treatment Zinc paste 12/09/21 1304   Wound Assessment Pink/red; Dry 12/09/21 1304   Drainage Amount None 12/09/21 1304   Wound Odor None 12/09/21 1304   Leisa-Wound/Incision Assessment Dry/flaky; Fragile 12/09/21 1304   Number of days: 8          PLAN     Skin Care & Pressure Relief Recommendations  Minimize layers of linen  Turn/reposition approximately every 2 hours  Pillow wedges  Manage incontinence   Promote continence; Skin Protective lotion/cream to buttocks and sacrum daily and as needed with incontinence care  Offload heels pillows    Nitin Messina  Blood Glucose: 60 on 12/9/21                             Albumin: 2.5 on 12/8/21  WBCs: 4.9 on 12/8/21    Support Surface: Gel mattress    Physician/Provider notified:   Recommendations: Possible incontinence associated dermatitis to buttocks. Area is dark red, dry, and slightly rough in texture. Non-tender per patient. Per patient, she is incontinent of urine and \"piddles constantly\". Applied a PureWick to manage  incontinence. Patient denies having diarrhea. Apply zinc paste TID and prn for soiling to buttocks and sacrum. Patient able to turn with 1-person assistance. Use foam wedge to ensure turning q2h at 30 degree angle or more to offload sacrum and buttocks. If not contraindicated, maintain HOB at 30 degrees or less to reduce pressure to buttocks and sacrum. Float heels with 2-3 pillows while in bed to offload the heels. Will continue to follow.       Discharge Wound Care Needs: TBD    Teaching completed with:   [] Patient           [] Family member       [] Caregiver          [] Nursing  [] Other    Patient/Caregiver Teaching:  Level of patient/caregiver understanding able to:   [] Indicates understanding       [] Needs reinforcement  [] Unsuccessful      [] Verbal Understanding  [] Demonstrated understanding       [] No evidence of learning  [] Refused teaching         [] N/A       Electronically signed by Seth Hightower RN on 12/9/2021 at 1:09 PM

## 2021-12-09 NOTE — PROGRESS NOTES
Four eyes skin assessment completed with [facility]. Erythema and ecchymosis noted to BUE and BLE.

## 2021-12-10 ENCOUNTER — APPOINTMENT (OUTPATIENT)
Dept: INTERVENTIONAL RADIOLOGY/VASCULAR | Age: 71
DRG: 252 | End: 2021-12-10
Attending: PHYSICIAN ASSISTANT
Payer: MEDICARE

## 2021-12-10 LAB
ALBUMIN SERPL-MCNC: 2.5 G/DL (ref 3.5–5)
ALBUMIN/GLOB SERPL: 1 {RATIO} (ref 1.1–2.2)
ALP SERPL-CCNC: 103 U/L (ref 45–117)
ALT SERPL-CCNC: 26 U/L (ref 12–78)
ANION GAP SERPL CALC-SCNC: 5 MMOL/L (ref 5–15)
AST SERPL W P-5'-P-CCNC: 33 U/L (ref 15–37)
BASOPHILS # BLD: 0 K/UL (ref 0–0.1)
BASOPHILS NFR BLD: 1 % (ref 0–1)
BILIRUB SERPL-MCNC: 0.6 MG/DL (ref 0.2–1)
BUN SERPL-MCNC: 34 MG/DL (ref 6–20)
BUN/CREAT SERPL: 9 (ref 12–20)
CA-I BLD-MCNC: 7.9 MG/DL (ref 8.5–10.1)
CHLORIDE SERPL-SCNC: 102 MMOL/L (ref 97–108)
CO2 SERPL-SCNC: 29 MMOL/L (ref 21–32)
CREAT SERPL-MCNC: 3.65 MG/DL (ref 0.55–1.02)
DIFFERENTIAL METHOD BLD: ABNORMAL
EOSINOPHIL # BLD: 0 K/UL (ref 0–0.4)
EOSINOPHIL NFR BLD: 1 % (ref 0–7)
ERYTHROCYTE [DISTWIDTH] IN BLOOD BY AUTOMATED COUNT: 13.5 % (ref 11.5–14.5)
GLOBULIN SER CALC-MCNC: 2.5 G/DL (ref 2–4)
GLUCOSE BLD STRIP.AUTO-MCNC: 112 MG/DL (ref 65–117)
GLUCOSE BLD STRIP.AUTO-MCNC: 120 MG/DL (ref 65–117)
GLUCOSE BLD STRIP.AUTO-MCNC: 50 MG/DL (ref 65–117)
GLUCOSE BLD STRIP.AUTO-MCNC: 58 MG/DL (ref 65–117)
GLUCOSE BLD STRIP.AUTO-MCNC: 69 MG/DL (ref 65–117)
GLUCOSE SERPL-MCNC: 85 MG/DL (ref 65–100)
HCT VFR BLD AUTO: 38.4 % (ref 35–47)
HGB BLD-MCNC: 12.2 G/DL (ref 11.5–16)
IMM GRANULOCYTES # BLD AUTO: 0 K/UL (ref 0–0.04)
IMM GRANULOCYTES NFR BLD AUTO: 0 % (ref 0–0.5)
LYMPHOCYTES # BLD: 1.7 K/UL (ref 0.8–3.5)
LYMPHOCYTES NFR BLD: 28 % (ref 12–49)
MCH RBC QN AUTO: 31.2 PG (ref 26–34)
MCHC RBC AUTO-ENTMCNC: 31.8 G/DL (ref 30–36.5)
MCV RBC AUTO: 98.2 FL (ref 80–99)
MONOCYTES # BLD: 0.5 K/UL (ref 0–1)
MONOCYTES NFR BLD: 9 % (ref 5–13)
NEUTS SEG # BLD: 3.7 K/UL (ref 1.8–8)
NEUTS SEG NFR BLD: 61 % (ref 32–75)
NRBC # BLD: 0 K/UL (ref 0–0.01)
NRBC BLD-RTO: 0 PER 100 WBC
PERFORMED BY, TECHID: ABNORMAL
PERFORMED BY, TECHID: NORMAL
PERFORMED BY, TECHID: NORMAL
PLATELET # BLD AUTO: 82 K/UL (ref 150–400)
PMV BLD AUTO: 11.4 FL (ref 8.9–12.9)
POTASSIUM SERPL-SCNC: 4.2 MMOL/L (ref 3.5–5.1)
PROT SERPL-MCNC: 5 G/DL (ref 6.4–8.2)
RBC # BLD AUTO: 3.91 M/UL (ref 3.8–5.2)
SODIUM SERPL-SCNC: 136 MMOL/L (ref 136–145)
WBC # BLD AUTO: 6 K/UL (ref 3.6–11)

## 2021-12-10 PROCEDURE — C1769 GUIDE WIRE: HCPCS

## 2021-12-10 PROCEDURE — 82962 GLUCOSE BLOOD TEST: CPT

## 2021-12-10 PROCEDURE — 99152 MOD SED SAME PHYS/QHP 5/>YRS: CPT

## 2021-12-10 PROCEDURE — 77002 NEEDLE LOCALIZATION BY XRAY: CPT

## 2021-12-10 PROCEDURE — 74011250637 HC RX REV CODE- 250/637: Performed by: SURGERY

## 2021-12-10 PROCEDURE — 74011250637 HC RX REV CODE- 250/637: Performed by: NURSE PRACTITIONER

## 2021-12-10 PROCEDURE — 90935 HEMODIALYSIS ONE EVALUATION: CPT

## 2021-12-10 PROCEDURE — 76937 US GUIDE VASCULAR ACCESS: CPT

## 2021-12-10 PROCEDURE — 85025 COMPLETE CBC W/AUTO DIFF WBC: CPT

## 2021-12-10 PROCEDURE — 74011250637 HC RX REV CODE- 250/637: Performed by: PSYCHIATRY & NEUROLOGY

## 2021-12-10 PROCEDURE — 99024 POSTOP FOLLOW-UP VISIT: CPT | Performed by: SURGERY

## 2021-12-10 PROCEDURE — 74011250636 HC RX REV CODE- 250/636: Performed by: RADIOLOGY

## 2021-12-10 PROCEDURE — 74011000250 HC RX REV CODE- 250: Performed by: INTERNAL MEDICINE

## 2021-12-10 PROCEDURE — 80053 COMPREHEN METABOLIC PANEL: CPT

## 2021-12-10 PROCEDURE — 36415 COLL VENOUS BLD VENIPUNCTURE: CPT

## 2021-12-10 PROCEDURE — 0JH63XZ INSERTION OF TUNNELED VASCULAR ACCESS DEVICE INTO CHEST SUBCUTANEOUS TISSUE AND FASCIA, PERCUTANEOUS APPROACH: ICD-10-PCS | Performed by: RADIOLOGY

## 2021-12-10 PROCEDURE — 65270000029 HC RM PRIVATE

## 2021-12-10 PROCEDURE — 02HV33Z INSERTION OF INFUSION DEVICE INTO SUPERIOR VENA CAVA, PERCUTANEOUS APPROACH: ICD-10-PCS | Performed by: RADIOLOGY

## 2021-12-10 RX ORDER — MIDAZOLAM HYDROCHLORIDE 1 MG/ML
.5-2 INJECTION, SOLUTION INTRAMUSCULAR; INTRAVENOUS
Status: DISCONTINUED | OUTPATIENT
Start: 2021-12-10 | End: 2021-12-11 | Stop reason: HOSPADM

## 2021-12-10 RX ORDER — FENTANYL CITRATE 50 UG/ML
25-100 INJECTION, SOLUTION INTRAMUSCULAR; INTRAVENOUS
Status: DISCONTINUED | OUTPATIENT
Start: 2021-12-10 | End: 2021-12-11 | Stop reason: HOSPADM

## 2021-12-10 RX ORDER — DEXTROSE 50 % IN WATER (D50W) INTRAVENOUS SYRINGE
Status: DISPENSED
Start: 2021-12-10 | End: 2021-12-10

## 2021-12-10 RX ORDER — HEPARIN SODIUM 1000 [USP'U]/ML
3200 INJECTION, SOLUTION INTRAVENOUS; SUBCUTANEOUS
Status: DISCONTINUED | OUTPATIENT
Start: 2021-12-10 | End: 2021-12-11 | Stop reason: HOSPADM

## 2021-12-10 RX ORDER — DEXTROSE 50 % IN WATER (D50W) INTRAVENOUS SYRINGE
25 AS NEEDED
Status: DISCONTINUED | OUTPATIENT
Start: 2021-12-10 | End: 2021-12-11 | Stop reason: HOSPADM

## 2021-12-10 RX ADMIN — CALCITRIOL CAPSULES 0.25 MCG 0.5 MCG: 0.25 CAPSULE ORAL at 09:03

## 2021-12-10 RX ADMIN — FAMOTIDINE 10 MG: 10 TABLET ORAL at 09:03

## 2021-12-10 RX ADMIN — METOPROLOL TARTRATE 25 MG: 25 TABLET, FILM COATED ORAL at 09:03

## 2021-12-10 RX ADMIN — FAMOTIDINE 10 MG: 10 TABLET ORAL at 20:44

## 2021-12-10 RX ADMIN — HYDROCODONE BITARTRATE AND ACETAMINOPHEN 1 TABLET: 7.5; 325 TABLET ORAL at 01:33

## 2021-12-10 RX ADMIN — LACOSAMIDE 200 MG: 100 TABLET, FILM COATED ORAL at 20:45

## 2021-12-10 RX ADMIN — ATORVASTATIN CALCIUM 40 MG: 40 TABLET, FILM COATED ORAL at 20:44

## 2021-12-10 RX ADMIN — MIDAZOLAM 1 MG: 1 INJECTION INTRAMUSCULAR; INTRAVENOUS at 10:28

## 2021-12-10 RX ADMIN — FLUOXETINE HYDROCHLORIDE 10 MG: 10 CAPSULE ORAL at 09:03

## 2021-12-10 RX ADMIN — Medication 10 ML: at 20:51

## 2021-12-10 RX ADMIN — FENTANYL CITRATE 50 MCG: 50 INJECTION, SOLUTION INTRAMUSCULAR; INTRAVENOUS at 10:28

## 2021-12-10 RX ADMIN — HYDROCODONE BITARTRATE AND ACETAMINOPHEN 1 TABLET: 7.5; 325 TABLET ORAL at 23:41

## 2021-12-10 RX ADMIN — LEVETIRACETAM 500 MG: 500 TABLET, FILM COATED ORAL at 09:04

## 2021-12-10 RX ADMIN — Medication 10 ML: at 05:16

## 2021-12-10 RX ADMIN — Medication 10 ML: at 20:50

## 2021-12-10 RX ADMIN — FENTANYL CITRATE 50 MCG: 50 INJECTION, SOLUTION INTRAMUSCULAR; INTRAVENOUS at 10:44

## 2021-12-10 RX ADMIN — LEVETIRACETAM 500 MG: 500 TABLET, FILM COATED ORAL at 20:44

## 2021-12-10 RX ADMIN — LEVOTHYROXINE SODIUM 150 MCG: 0.07 TABLET ORAL at 09:04

## 2021-12-10 RX ADMIN — SEVELAMER CARBONATE 800 MG: 800 TABLET, FILM COATED ORAL at 09:03

## 2021-12-10 RX ADMIN — SEVELAMER CARBONATE 800 MG: 800 TABLET, FILM COATED ORAL at 16:02

## 2021-12-10 RX ADMIN — DEXTROSE MONOHYDRATE 25 G: 25 INJECTION, SOLUTION INTRAVENOUS at 08:29

## 2021-12-10 RX ADMIN — LACOSAMIDE 200 MG: 100 TABLET, FILM COATED ORAL at 09:03

## 2021-12-10 NOTE — PROGRESS NOTES
Patient's blood sugar 58. Received orders for D50W prn. Will continue to monitor.      5707: Patients sugar is 120

## 2021-12-10 NOTE — PROGRESS NOTES
Patient examined this morning. Patient has right brachiobasilic AV fistula banding  procedure done yesterday. On examination this morning. Patient has warm right hand and the strong Doppler signal noted both radial ulnar artery. However AV fistula has very sluggish flow. I am doubtful that AV fistula will mature properly. Patient will need permacath today so she can dialyze today. I will continue monitor patient's vascular status on the right hand.

## 2021-12-10 NOTE — PROGRESS NOTES
Tx initiated via a patent right upper chest catheter. Patient is  fresh from IR and site is still bleeding. Dressing was changed per protocol.  Net fluid removal is 2kgs and 3.5 hours

## 2021-12-10 NOTE — PROCEDURES
Interventional Radiology Post Procedure Note    12/10/2021    Indications: ESRD; maturing AVF    Procedure(s): Image-guided PermCath placement (R IJV, 19 cm)    Preliminary Findings (full report to follow):  Widely patent right IJV    Fluoro Time: 1 minute(s)    Contrast: None    Specimen: None    Implants: See above    Estimated blood loss: Minimal    Complications: None    Plan: Catheter ready to use (tip in RA)     Follow Up: PRN

## 2021-12-10 NOTE — PROGRESS NOTES
Discharge disposition is to go home with son. [facility] has accepted patient and will see once she is discharged.

## 2021-12-10 NOTE — PROGRESS NOTES
Patients blood sugar 50. Two juices given. Will continue to monitor. 1619: Patients sugar is 69. Gave one more juice.

## 2021-12-10 NOTE — PROGRESS NOTES
Hospitalist Progress Note    Subjective:   Daily Progress Note: 12/10/2021 4:37 PM    Patient seen and evaluated in dialysis, patient status post right-sided permacath placement, of note patient had significant bleeding from permacath site however currently has stopped after administration of quick clot, patient herself currently has no active complaints, discussed with RN    Current Facility-Administered Medications   Medication Dose Route Frequency    dextrose (D50W) injection syrg 25 g  25 g IntraVENous PRN    midazolam (VERSED) injection 0.5-2 mg  0.5-2 mg IntraVENous Rad Multiple    fentaNYL citrate (PF) injection  mcg   mcg IntraVENous Rad Multiple    zinc oxide-white petrolatum 17-57 % topical paste   Topical PRN    sodium chloride (NS) flush 5-40 mL  5-40 mL IntraVENous Q8H    sodium chloride (NS) flush 5-40 mL  5-40 mL IntraVENous PRN    HYDROcodone-acetaminophen (NORCO) 7.5-325 mg per tablet 1 Tablet  1 Tablet Oral Q4H PRN    heparin (porcine) injection 5,000 Units  5,000 Units SubCUTAneous Q12H    calcitRIOL (ROCALTROL) capsule 0.5 mcg  0.5 mcg Oral DAILY    prochlorperazine (COMPAZINE) tablet 5 mg  5 mg Oral Q6H PRN    famotidine (PEPCID) tablet 10 mg  10 mg Oral BID    levETIRAcetam (KEPPRA) tablet 500 mg  500 mg Oral BID    lacosamide (VIMPAT) tablet 200 mg  200 mg Oral BID    sodium chloride (NS) flush 5-40 mL  5-40 mL IntraVENous Q8H    acetaminophen (TYLENOL) tablet 650 mg  650 mg Oral Q6H PRN    Or    acetaminophen (TYLENOL) suppository 650 mg  650 mg Rectal Q6H PRN    polyethylene glycol (MIRALAX) packet 17 g  17 g Oral DAILY PRN    ondansetron (ZOFRAN ODT) tablet 4 mg  4 mg Oral Q8H PRN    Or    ondansetron (ZOFRAN) injection 4 mg  4 mg IntraVENous Q6H PRN    atorvastatin (LIPITOR) tablet 40 mg  40 mg Oral QHS    levothyroxine (SYNTHROID) tablet 150 mcg  150 mcg Oral DAILY    FLUoxetine (PROzac) capsule 10 mg  10 mg Oral DAILY    metoprolol tartrate (LOPRESSOR) tablet 25 mg  25 mg Oral DAILY    sevelamer carbonate (RENVELA) tab 800 mg  800 mg Oral TID WITH MEALS        Review of Systems  Review of Systems   Constitutional: Negative for fever and malaise/fatigue. HENT: Negative. Respiratory: Negative for cough and shortness of breath. Cardiovascular: Negative for chest pain and leg swelling. Gastrointestinal: Negative for abdominal pain, nausea and vomiting. Genitourinary: Negative. Musculoskeletal: Negative. Skin: Negative. Neurological: Negative. Psychiatric/Behavioral: Negative. Objective:     Visit Vitals  /67   Pulse 61   Temp 97.3 °F (36.3 °C) (Oral)   Resp 18   Ht 5' 6\" (1.676 m)   Wt 73.5 kg (162 lb 0.6 oz)   SpO2 99%   Breastfeeding No   BMI 26.15 kg/m²    O2 Flow Rate (L/min): 2 l/min O2 Device: Nasal cannula    Temp (24hrs), Av.5 °F (36.4 °C), Min:97.2 °F (36.2 °C), Max:98 °F (36.7 °C)      No intake/output data recorded.    1901 - 12/10 0700  In: 300 [I.V.:300]  Out: -     Recent Results (from the past 24 hour(s))   GLUCOSE, POC    Collection Time: 21  4:02 PM   Result Value Ref Range    Glucose (POC) 59 (L) 65 - 117 mg/dL    Performed by Serafin Martin, POC    Collection Time: 21  4:07 PM   Result Value Ref Range    Glucose (POC) 108 65 - 117 mg/dL    Performed by Serafin Martin, POC    Collection Time: 21  4:08 PM   Result Value Ref Range    Glucose (POC) 119 (H) 65 - 117 mg/dL    Performed by Serafin Martin, POC    Collection Time: 21  5:01 PM   Result Value Ref Range    Glucose (POC) 118 (H) 65 - 117 mg/dL    Performed by Lea Shukla    GLUCOSE, POC    Collection Time: 21  8:52 PM   Result Value Ref Range    Glucose (POC) 132 (H) 65 - 117 mg/dL    Performed by [address], POC    Collection Time: 12/10/21  8:11 AM   Result Value Ref Range    Glucose (POC) 58 (L) 65 - 117 mg/dL    Performed by [address], POC Collection Time: 12/10/21  9:00 AM   Result Value Ref Range    Glucose (POC) 120 (H) 65 - 117 mg/dL    Performed by LISA TREJO    CBC WITH AUTOMATED DIFF    Collection Time: 12/10/21 11:30 AM   Result Value Ref Range    WBC 6.0 3.6 - 11.0 K/uL    RBC 3.91 3.80 - 5.20 M/uL    HGB 12.2 11.5 - 16.0 g/dL    HCT 38.4 35.0 - 47.0 %    MCV 98.2 80.0 - 99.0 FL    MCH 31.2 26.0 - 34.0 PG    MCHC 31.8 30.0 - 36.5 g/dL    RDW 13.5 11.5 - 14.5 %    PLATELET 82 (L) 575 - 400 K/uL    MPV 11.4 8.9 - 12.9 FL    NRBC 0.0 0.0  WBC    ABSOLUTE NRBC 0.00 0.00 - 0.01 K/uL    NEUTROPHILS 61 32 - 75 %    LYMPHOCYTES 28 12 - 49 %    MONOCYTES 9 5 - 13 %    EOSINOPHILS 1 0 - 7 %    BASOPHILS 1 0 - 1 %    IMMATURE GRANULOCYTES 0 0 - 0.5 %    ABS. NEUTROPHILS 3.7 1.8 - 8.0 K/UL    ABS. LYMPHOCYTES 1.7 0.8 - 3.5 K/UL    ABS. MONOCYTES 0.5 0.0 - 1.0 K/UL    ABS. EOSINOPHILS 0.0 0.0 - 0.4 K/UL    ABS. BASOPHILS 0.0 0.0 - 0.1 K/UL    ABS. IMM. GRANS. 0.0 0.00 - 0.04 K/UL    DF AUTOMATED     METABOLIC PANEL, COMPREHENSIVE    Collection Time: 12/10/21 11:30 AM   Result Value Ref Range    Sodium 136 136 - 145 mmol/L    Potassium 4.2 3.5 - 5.1 mmol/L    Chloride 102 97 - 108 mmol/L    CO2 29 21 - 32 mmol/L    Anion gap 5 5 - 15 mmol/L    Glucose 85 65 - 100 mg/dL    BUN 34 (H) 6 - 20 mg/dL    Creatinine 3.65 (H) 0.55 - 1.02 mg/dL    BUN/Creatinine ratio 9 (L) 12 - 20      GFR est AA 15 (L) >60 ml/min/1.73m2    GFR est non-AA 12 (L) >60 ml/min/1.73m2    Calcium 7.9 (L) 8.5 - 10.1 mg/dL    Bilirubin, total 0.6 0.2 - 1.0 mg/dL    AST (SGOT) 33 15 - 37 U/L    ALT (SGPT) 26 12 - 78 U/L    Alk. phosphatase 103 45 - 117 U/L    Protein, total 5.0 (L) 6.4 - 8.2 g/dL    Albumin 2.5 (L) 3.5 - 5.0 g/dL    Globulin 2.5 2.0 - 4.0 g/dL    A-G Ratio 1.0 (L) 1.1 - 2.2          IR INSERT TUNL CVC W/O PORT OVER 5 YR   Final Result   Ultrasound of the right neck demonstrated widely patent internal jugular vein.       Successful ultrasound and fluoroscopy guided placement of a right 19 cm PermCath   via the IJV. The catheter is functioning and is ready for use. DUPLEX UPPER EXT ARTERY RIGHT   Final Result      CT HEAD WO CONT   Final Result   Age-appropriate atrophy. No acute findings. XR CHEST SNGL V   Final Result   The cardiomediastinal silhouette is appropriate for age, technique,   and lung expansion. Pulmonary vasculature is not congested. The lungs are   essentially clear. No effusion or pneumothorax is seen. IR US GUIDED VASCULAR ACCESS    (Results Pending)   IR FLUORO GUIDED NEEDLE PLACEMENT    (Results Pending)        PHYSICAL EXAM:    Physical Exam  Vitals reviewed. HENT:      Head: Normocephalic and atraumatic. Mouth/Throat:      Mouth: Mucous membranes are moist.      Pharynx: Oropharynx is clear. Cardiovascular:      Rate and Rhythm: Regular rhythm. Heart sounds: Normal heart sounds. Pulmonary:      Breath sounds: Normal breath sounds. Abdominal:      General: Abdomen is flat. Bowel sounds are normal.      Palpations: Abdomen is soft. Musculoskeletal:      Cervical back: Normal range of motion and neck supple. Comments: Right hand is warm to the touch with good motor function   Skin:     General: Skin is warm and dry. Neurological:      General: No focal deficit present. Mental Status: She is alert and oriented to person, place, and time. Mental status is at baseline.    Psychiatric:         Mood and Affect: Mood normal.          Data Review    Recent Results (from the past 24 hour(s))   GLUCOSE, POC    Collection Time: 12/09/21  4:02 PM   Result Value Ref Range    Glucose (POC) 59 (L) 65 - 117 mg/dL    Performed by [employer]    GLUCOSE, POC    Collection Time: 12/09/21  4:07 PM   Result Value Ref Range    Glucose (POC) 108 65 - 117 mg/dL    Performed by [employer]    GLUCOSE, POC    Collection Time: 12/09/21  4:08 PM   Result Value Ref Range    Glucose (POC) 119 (H) 65 - 117 mg/dL    Performed by Soham Goyal    GLUCOSE, POC    Collection Time: 12/09/21  5:01 PM   Result Value Ref Range    Glucose (POC) 118 (H) 65 - 117 mg/dL    Performed by Stanton Mendoza    GLUCOSE, POC    Collection Time: 12/09/21  8:52 PM   Result Value Ref Range    Glucose (POC) 132 (H) 65 - 117 mg/dL    Performed by Estela [address]ya [address], POC    Collection Time: 12/10/21  8:11 AM   Result Value Ref Range    Glucose (POC) 58 (L) 65 - 117 mg/dL    Performed by Janiya Pratt    GLUCOSE, POC    Collection Time: 12/10/21  9:00 AM   Result Value Ref Range    Glucose (POC) 120 (H) 65 - 117 mg/dL    Performed by LISA TREJO    CBC WITH AUTOMATED DIFF    Collection Time: 12/10/21 11:30 AM   Result Value Ref Range    WBC 6.0 3.6 - 11.0 K/uL    RBC 3.91 3.80 - 5.20 M/uL    HGB 12.2 11.5 - 16.0 g/dL    HCT 38.4 35.0 - 47.0 %    MCV 98.2 80.0 - 99.0 FL    MCH 31.2 26.0 - 34.0 PG    MCHC 31.8 30.0 - 36.5 g/dL    RDW 13.5 11.5 - 14.5 %    PLATELET 82 (L) 743 - 400 K/uL    MPV 11.4 8.9 - 12.9 FL    NRBC 0.0 0.0  WBC    ABSOLUTE NRBC 0.00 0.00 - 0.01 K/uL    NEUTROPHILS 61 32 - 75 %    LYMPHOCYTES 28 12 - 49 %    MONOCYTES 9 5 - 13 %    EOSINOPHILS 1 0 - 7 %    BASOPHILS 1 0 - 1 %    IMMATURE GRANULOCYTES 0 0 - 0.5 %    ABS. NEUTROPHILS 3.7 1.8 - 8.0 K/UL    ABS. LYMPHOCYTES 1.7 0.8 - 3.5 K/UL    ABS. MONOCYTES 0.5 0.0 - 1.0 K/UL    ABS. EOSINOPHILS 0.0 0.0 - 0.4 K/UL    ABS. BASOPHILS 0.0 0.0 - 0.1 K/UL    ABS. IMM.  GRANS. 0.0 0.00 - 0.04 K/UL    DF AUTOMATED     METABOLIC PANEL, COMPREHENSIVE    Collection Time: 12/10/21 11:30 AM   Result Value Ref Range    Sodium 136 136 - 145 mmol/L    Potassium 4.2 3.5 - 5.1 mmol/L    Chloride 102 97 - 108 mmol/L    CO2 29 21 - 32 mmol/L    Anion gap 5 5 - 15 mmol/L    Glucose 85 65 - 100 mg/dL    BUN 34 (H) 6 - 20 mg/dL    Creatinine 3.65 (H) 0.55 - 1.02 mg/dL    BUN/Creatinine ratio 9 (L) 12 - 20      GFR est AA 15 (L) >60 ml/min/1.73m2    GFR est non-AA 12 (L) >60 ml/min/1.73m2    Calcium 7.9 (L) 8.5 - 10.1 mg/dL    Bilirubin, total 0.6 0.2 - 1.0 mg/dL    AST (SGOT) 33 15 - 37 U/L    ALT (SGPT) 26 12 - 78 U/L    Alk. phosphatase 103 45 - 117 U/L    Protein, total 5.0 (L) 6.4 - 8.2 g/dL    Albumin 2.5 (L) 3.5 - 5.0 g/dL    Globulin 2.5 2.0 - 4.0 g/dL    A-G Ratio 1.0 (L) 1.1 - 2.2          Assessment/Plan:     Principal Problem:    Intractable nausea and vomiting (12/3/2021)    Active Problems:    Seizure ([address] (5/19/2021)      Left-sided weakness (12/3/2021)      Fluid overload (12/5/2021)      Hospital course: This 78-year-old male presented to the emergency department on 12/3/2021 for admission after being found to have persistent weakness, intractable nausea and vomiting. Chest x-ray was negative for acute pulmonary process. Patient was also found to have increased tremors and seizure like activity. Patient's started on Keppra 500 mg twice daily and Vimpat was increased to 200 mg twice daily. Tremors have subsided. Patient is tolerating hemodialysis without complications. Nausea and vomiting have resolved. Patient with a recent right arm fistula placement. There is no radial pulse although there are findings consistent with steal syndrome. Motor and sensation are present. Dr. Kelvin Bassett, vascular surgery performed revision on 12/9/2021. [facility]phrology consultation Dr. Emilee Moore. Angiogram with significant steal syndrome. Patient will need permacath and revision versus ligation of the graft. Tentatively scheduled for 12/9/2021 with Dr. Kelvin Bassett. Discussed case with patient and daughter-in-law Darya Chou. Impression\plan:    1. Generalized weakness/ambulatory dysfunction:  continue PT/OT as ordered  Supportive care  Fall precautions     2. Intractable nausea and vomiting:  -encourage oral hydration  -prn zofran and compazine  -continue supportive care  Resolved     3.  End-stage renal disease:  Continue hemodialysis  Patient currently status post revision of right-sided AV fistula to correct stage III steal syndrome, currently right upper extremity looks good  Status post permacath placement, currently receiving dialysis without issue  Follow-up vascular surgery recommendations     4. Seizure disorder:  -keppra adjusted to 500mg bid, vimpat increased 200mg bid by neurology  -prn ativan for breakthrough sz     5. Peripheral vascular disease:  -continue lipitor 40mg     6. Atrial fibrillation:  -Currently stable on tele  -continue metoprolol 25mg  Questionable outpatient Eliquis     7. Hyperkalemia   Resolved     8. Depression:  -Continue prozac     9. Thrombocytopenia: Chronic  -hold all anticoagulants for platelet count less than 50     10. Hypothyroidism:  -currently on syntrhoid 150mcg  -tsh 1.0 and normal        Full Code  Dvt Prophylaxis heparin   GI Prophylaxis none  Disposition:  Revision of fistula     NOK:  Daughter-in-law Ghada Porter [phone] updated   Care Plan discussed with: Patient/Family    Total time spent with patient: >35 minutes.

## 2021-12-10 NOTE — BRIEF OP NOTE
Brief Postoperative Note    Patient: Regina Reddy  YOB: 1950  MRN: [id number]    Date of Procedure: 12/9/2021     Pre-Op Diagnosis: Stage Three Steal Syndrome Right hand secondary from Brachiobasilic AVF    Post-Op Diagnosis: same    Procedure(s):  Right Arm Arteriovenous Fistula Revision, application of band patch placement using biologic patch on arteriol side of basilic vein. Surgeon(s):  Arsen Malik MD    Surgical Assistant: Surg Asst-1: Rosaura Lofton    Anesthesia: General     Estimated Blood Loss (AA):82II    Complications: none  Specimens: none  Implants:   Implant Name Type Inv. Item Serial No.  Lot No. LRB No. Used Action   PATCH VASC PERIPATCH 0.8X8CM -- Gino BROOKS PATCH VASC [address] [id number] -- Gino Wiley . [facility] [employer] [id number] Right 1 Implanted       Drains: none    Findings:as above.     Electronically Signed by Debbie Salcedo MD on 12/9/2021 at 10:41 PM

## 2021-12-11 VITALS
DIASTOLIC BLOOD PRESSURE: 55 MMHG | OXYGEN SATURATION: 100 % | RESPIRATION RATE: 18 BRPM | WEIGHT: 157.85 LBS | HEART RATE: 60 BPM | SYSTOLIC BLOOD PRESSURE: 118 MMHG | BODY MASS INDEX: 25.37 KG/M2 | TEMPERATURE: 98.2 F | HEIGHT: 66 IN

## 2021-12-11 LAB
GLUCOSE BLD STRIP.AUTO-MCNC: 68 MG/DL (ref 65–117)
GLUCOSE BLD STRIP.AUTO-MCNC: 74 MG/DL (ref 65–117)
GLUCOSE BLD STRIP.AUTO-MCNC: 84 MG/DL (ref 65–117)
PERFORMED BY, TECHID: NORMAL

## 2021-12-11 PROCEDURE — 74011250637 HC RX REV CODE- 250/637: Performed by: PSYCHIATRY & NEUROLOGY

## 2021-12-11 PROCEDURE — 74011250637 HC RX REV CODE- 250/637: Performed by: NURSE PRACTITIONER

## 2021-12-11 PROCEDURE — 97530 THERAPEUTIC ACTIVITIES: CPT

## 2021-12-11 PROCEDURE — 97110 THERAPEUTIC EXERCISES: CPT

## 2021-12-11 PROCEDURE — 74011250636 HC RX REV CODE- 250/636: Performed by: PHYSICIAN ASSISTANT

## 2021-12-11 PROCEDURE — 82962 GLUCOSE BLOOD TEST: CPT

## 2021-12-11 PROCEDURE — 74011250636 HC RX REV CODE- 250/636: Performed by: NURSE PRACTITIONER

## 2021-12-11 RX ORDER — CALCITRIOL 0.5 UG/1
0.5 CAPSULE ORAL DAILY
Qty: 30 CAPSULE | Refills: 0 | Status: SHIPPED | OUTPATIENT
Start: 2021-12-12 | End: 2022-02-22

## 2021-12-11 RX ORDER — ONDANSETRON 4 MG/1
4 TABLET, FILM COATED ORAL
Qty: 45 TABLET | Refills: 0 | Status: SHIPPED | OUTPATIENT
Start: 2021-12-11 | End: 2022-02-10

## 2021-12-11 RX ADMIN — METOPROLOL TARTRATE 25 MG: 25 TABLET, FILM COATED ORAL at 09:06

## 2021-12-11 RX ADMIN — Medication 10 ML: at 06:09

## 2021-12-11 RX ADMIN — LEVETIRACETAM 500 MG: 500 TABLET, FILM COATED ORAL at 09:06

## 2021-12-11 RX ADMIN — ACETAMINOPHEN 650 MG: 325 TABLET ORAL at 09:06

## 2021-12-11 RX ADMIN — CALCITRIOL CAPSULES 0.25 MCG 0.5 MCG: 0.25 CAPSULE ORAL at 09:06

## 2021-12-11 RX ADMIN — Medication 10 ML: at 17:29

## 2021-12-11 RX ADMIN — HEPARIN SODIUM 5000 UNITS: 5000 INJECTION INTRAVENOUS; SUBCUTANEOUS at 17:24

## 2021-12-11 RX ADMIN — Medication 10 ML: at 17:28

## 2021-12-11 RX ADMIN — LEVOTHYROXINE SODIUM 150 MCG: 0.07 TABLET ORAL at 09:06

## 2021-12-11 RX ADMIN — FAMOTIDINE 10 MG: 10 TABLET ORAL at 09:06

## 2021-12-11 RX ADMIN — FLUOXETINE HYDROCHLORIDE 10 MG: 10 CAPSULE ORAL at 09:06

## 2021-12-11 RX ADMIN — HEPARIN SODIUM 5000 UNITS: 5000 INJECTION INTRAVENOUS; SUBCUTANEOUS at 06:07

## 2021-12-11 RX ADMIN — SEVELAMER CARBONATE 800 MG: 800 TABLET, FILM COATED ORAL at 17:24

## 2021-12-11 RX ADMIN — ONDANSETRON 4 MG: 2 INJECTION INTRAMUSCULAR; INTRAVENOUS at 10:15

## 2021-12-11 RX ADMIN — LACOSAMIDE 200 MG: 100 TABLET, FILM COATED ORAL at 09:06

## 2021-12-11 RX ADMIN — SEVELAMER CARBONATE 800 MG: 800 TABLET, FILM COATED ORAL at 09:06

## 2021-12-11 RX ADMIN — Medication 10 ML: at 06:10

## 2021-12-11 NOTE — PROGRESS NOTES
Problem: Mobility Impaired (Adult and Pediatric)  Goal: *Acute Goals and Plan of Care (Insert Text)  Description: Physical Therapy Goals  Initiated 12/4/21  1)  I with HEP in 7 days to improve overall functional mobility. 2)  Bed mobility with sup in 7 days to prevent skin breakdown. 3)  Pt to perform stand pivot transfer from bed to chair with CGA in 7 days. Pt. Goal:  Pt to be able to walk safely without falls. Outcome: Progressing Towards Goal   PHYSICAL THERAPY TREATMENT  Patient: Dotty Jennings (40 y.o. female)  Date: 12/11/2021  Diagnosis: Left-sided weakness [R53.1]  Intractable nausea and vomiting [R11.2]  Fluid overload [E87.70]  Seizure ([address] [R56.9]   Intractable nausea and vomiting  Procedure(s) (LRB):  Right Arm Arteriovenous Fistula Revision (Right) 2 Days Post-Op  Precautions: Fall  Chart, physical therapy assessment, plan of care and goals were reviewed. ASSESSMENT  Patient continues with skilled PT services and is progressing towards goals. Upon entry into room, patient semi-supine in bed and agreeable to work with PT but declined any walking due to R knee pain and having been w/c bound PTA, per patient report. She was agreeable to sit up and do exercises. SBA for bed mobility supine<>sit and scooting to EOB with good balance noted. Patient performed LE, UE and core strengthening exercises in sitting before returning to bed. She will benefit from continued skilled PT services to improve her strength for transfers to decrease assistance from caregivers. Current Level of Function Impacting Discharge (mobility/balance): decr functional mobility     Other factors to consider for discharge: w/c bound, lives with son, severity of R knee pain, medical hx          PLAN :  Patient continues to benefit from skilled intervention to address the above impairments. Continue treatment per established plan of care. to address goals.     Recommendation for discharge: (in order for the patient to meet his/her long term goals)  Home with Family Care    This discharge recommendation:  Has been made in collaboration with the attending provider and/or case management    IF patient discharges home will need the following DME: to be determined (TBD)       SUBJECTIVE:   Patient stated I don't move around too much at home. I am usually in a w/c since I have seizures and they don't want me up walking and falling.     OBJECTIVE DATA SUMMARY:   Critical Behavior:  Neurologic State: Alert  Orientation Level: Oriented X4  Cognition: Follows commands, Appropriate for age attention/concentration  Safety/Judgement: Decreased awareness of need for assistance, Decreased awareness of need for safety, Decreased insight into deficits, Fall prevention  Functional Mobility Training:  Bed Mobility:  Rolling: Stand-by assistance  Supine to Sit: Stand-by assistance  Sit to Supine: Stand-by assistance  Scooting: Stand-by assistance        Transfers:  She declined OOB transfers today         Balance:  Sitting: Intact  Sitting - Static: Good (unsupported)  Sitting - Dynamic: Good (unsupported)  Ambulation/Gait Training:  Patient says she does not walk at home and gets around in a w/c and uses grab bars to transfer     Therapeutic Exercises:   AROM 2 x 10 in sitting, performed bilaterally: marching, LAQ, hip ER, hip abd/add, ankle pumps, shoulder flexion, shoulder horz abd/add, bicep curls, reaching cross body  Patient performed forward<>backward trunk leans and trunk rotation to improve mobility   Pain Rating:  RLE 10/10     Activity Tolerance:   Fair  Please refer to the flowsheet for vital signs taken during this treatment.     After treatment patient left in no apparent distress:   Supine in bed and Call bell within reach    COMMUNICATION/COLLABORATION:   The patients plan of care was discussed with: Physical therapistElvia Rae   Time Calculation: 23 mins

## 2021-12-11 NOTE — PROGRESS NOTES
Problem: Patient Education: Go to Patient Education Activity  Goal: Patient/Family Education  Outcome: Progressing Towards Goal     Problem: Pressure Injury - Risk of  Goal: *Prevention of pressure injury  Description: Document Nitin Scale and appropriate interventions in the flowsheet.   Outcome: Progressing Towards Goal  Note: Pressure Injury Interventions:  Sensory Interventions: Assess changes in LOC, Discuss PT/OT consult with provider, Float heels, Minimize linen layers, Maintain/enhance activity level    Moisture Interventions: Absorbent underpads, Internal/External urinary devices, Limit adult briefs, Minimize layers    Activity Interventions: PT/OT evaluation, Increase time out of bed    Mobility Interventions: PT/OT evaluation, Float heels    Nutrition Interventions: Document food/fluid/supplement intake    Friction and Shear Interventions: Apply protective barrier, creams and emollients, Lift sheet

## 2021-12-11 NOTE — PROGRESS NOTES
Patient: Jonah Alvarez MRN: [id number]  SSN: [id number]    YOB: 1950  Age: 80 y.o. Sex: female          Subjective:     Saw patient at bedside today   She was c/o nausea and vomiting   Didn't have any CP/SOB/Abd pain etc     Nurse was giving her Zofran     Objective:     Vitals:    12/10/21 2001 12/11/21 0400 12/11/21 0418 12/11/21 0845   BP: 104/60  (!) 85/52 133/60   Pulse: 61  60 61   Resp: 12 18 18   Temp: 97.8 °F (36.6 °C)  98 °F (36.7 °C) 97.8 °F (36.6 °C)   TempSrc:       SpO2: 99%  96% 97%   Weight:  71.6 kg (157 lb 13.6 oz)     Height:            Intake and Output:  Yesterday's Intake and Output:  No intake/output data recorded. Physical Exam:   GENERAL: alert, cooperative, no distress, appears stated age  EYE: negative  LUNG: clear to auscultation bilaterally  HEART: regular rate and rhythm, S1, S2 normal  ABDOMEN: soft, non-tender. Bowel sounds normal. No masses,  no organomegaly  EXTREMITIES:  extremities normal, right brachial aVF. Right hand warm to touch. Slightly dusky blue appearance.   Delayed capillary refill  ulcers,SKIN: Normal.   Skin -  no rash or abnormalities  NEUROLOGIC: negative      Dialysis access: rt  IJ Perma-catheter (12/10/21)    Data Review    Meds    Current Facility-Administered Medications   Medication Dose Route Frequency    dextrose (D50W) injection syrg 25 g  25 g IntraVENous PRN    midazolam (VERSED) injection 0.5-2 mg  0.5-2 mg IntraVENous Rad Multiple    fentaNYL citrate (PF) injection  mcg   mcg IntraVENous Rad Multiple    heparin (porcine) 1,000 unit/mL injection 3,200 Units  3,200 Units Hemodialysis DIALYSIS PRN    zinc oxide-white petrolatum 17-57 % topical paste   Topical PRN    sodium chloride (NS) flush 5-40 mL  5-40 mL IntraVENous Q8H    sodium chloride (NS) flush 5-40 mL  5-40 mL IntraVENous PRN    HYDROcodone-acetaminophen (NORCO) 7.5-325 mg per tablet 1 Tablet  1 Tablet Oral Q4H PRN    heparin (porcine) injection 5,000 Units  5,000 Units SubCUTAneous Q12H    calcitRIOL (ROCALTROL) capsule 0.5 mcg  0.5 mcg Oral DAILY    prochlorperazine (COMPAZINE) tablet 5 mg  5 mg Oral Q6H PRN    famotidine (PEPCID) tablet 10 mg  10 mg Oral BID    levETIRAcetam (KEPPRA) tablet 500 mg  500 mg Oral BID    lacosamide (VIMPAT) tablet 200 mg  200 mg Oral BID    sodium chloride (NS) flush 5-40 mL  5-40 mL IntraVENous Q8H    acetaminophen (TYLENOL) tablet 650 mg  650 mg Oral Q6H PRN    Or    acetaminophen (TYLENOL) suppository 650 mg  650 mg Rectal Q6H PRN    polyethylene glycol (MIRALAX) packet 17 g  17 g Oral DAILY PRN    ondansetron (ZOFRAN ODT) tablet 4 mg  4 mg Oral Q8H PRN    Or    ondansetron (ZOFRAN) injection 4 mg  4 mg IntraVENous Q6H PRN    atorvastatin (LIPITOR) tablet 40 mg  40 mg Oral QHS    levothyroxine (SYNTHROID) tablet 150 mcg  150 mcg Oral DAILY    FLUoxetine (PROzac) capsule 10 mg  10 mg Oral DAILY    metoprolol tartrate (LOPRESSOR) tablet 25 mg  25 mg Oral DAILY    sevelamer carbonate (RENVELA) tab 800 mg  800 mg Oral TID WITH MEALS       Lab      Recent Results (from the past 24 hour(s))   CBC WITH AUTOMATED DIFF    Collection Time: 12/10/21 11:30 AM   Result Value Ref Range    WBC 6.0 3.6 - 11.0 K/uL    RBC 3.91 3.80 - 5.20 M/uL    HGB 12.2 11.5 - 16.0 g/dL    HCT 38.4 35.0 - 47.0 %    MCV 98.2 80.0 - 99.0 FL    MCH 31.2 26.0 - 34.0 PG    MCHC 31.8 30.0 - 36.5 g/dL    RDW 13.5 11.5 - 14.5 %    PLATELET 82 (L) 479 - 400 K/uL    MPV 11.4 8.9 - 12.9 FL    NRBC 0.0 0.0  WBC    ABSOLUTE NRBC 0.00 0.00 - 0.01 K/uL    NEUTROPHILS 61 32 - 75 %    LYMPHOCYTES 28 12 - 49 %    MONOCYTES 9 5 - 13 %    EOSINOPHILS 1 0 - 7 %    BASOPHILS 1 0 - 1 %    IMMATURE GRANULOCYTES 0 0 - 0.5 %    ABS. NEUTROPHILS 3.7 1.8 - 8.0 K/UL    ABS. LYMPHOCYTES 1.7 0.8 - 3.5 K/UL    ABS. MONOCYTES 0.5 0.0 - 1.0 K/UL    ABS. EOSINOPHILS 0.0 0.0 - 0.4 K/UL    ABS. BASOPHILS 0.0 0.0 - 0.1 K/UL    ABS. IMM.  GRANS. 0.0 0.00 - 0.04 K/UL    DF AUTOMATED     METABOLIC PANEL, COMPREHENSIVE    Collection Time: 12/10/21 11:30 AM   Result Value Ref Range    Sodium 136 136 - 145 mmol/L    Potassium 4.2 3.5 - 5.1 mmol/L    Chloride 102 97 - 108 mmol/L    CO2 29 21 - 32 mmol/L    Anion gap 5 5 - 15 mmol/L    Glucose 85 65 - 100 mg/dL    BUN 34 (H) 6 - 20 mg/dL    Creatinine 3.65 (H) 0.55 - 1.02 mg/dL    BUN/Creatinine ratio 9 (L) 12 - 20      GFR est AA 15 (L) >60 ml/min/1.73m2    GFR est non-AA 12 (L) >60 ml/min/1.73m2    Calcium 7.9 (L) 8.5 - 10.1 mg/dL    Bilirubin, total 0.6 0.2 - 1.0 mg/dL    AST (SGOT) 33 15 - 37 U/L    ALT (SGPT) 26 12 - 78 U/L    Alk.  phosphatase 103 45 - 117 U/L    Protein, total 5.0 (L) 6.4 - 8.2 g/dL    Albumin 2.5 (L) 3.5 - 5.0 g/dL    Globulin 2.5 2.0 - 4.0 g/dL    A-G Ratio 1.0 (L) 1.1 - 2.2     GLUCOSE, POC    Collection Time: 12/10/21  3:48 PM   Result Value Ref Range    Glucose (POC) 50 (LL) 65 - 117 mg/dL    Performed by [address] [place] [address], POC    Collection Time: 12/10/21  4:15 PM   Result Value Ref Range    Glucose (POC) 69 65 - 117 mg/dL    Performed by [address] [place] [address], POC    Collection Time: 12/10/21  8:08 PM   Result Value Ref Range    Glucose (POC) 112 65 - 117 mg/dL    Performed by [address] [place] [address], POC    Collection Time: 12/11/21  8:47 AM   Result Value Ref Range    Glucose (POC) 84 65 - 117 mg/dL    Performed by Jose Lange         Lab Results   Component Value Date/Time    Color Yellow/Straw 12/03/2021 09:54 AM    Appearance Clear 12/03/2021 09:54 AM    Specific gravity 1.015 12/03/2021 09:54 AM    pH (UA) 5.0 12/03/2021 09:54 AM    Protein 30 (A) 12/03/2021 09:54 AM    Glucose Negative 12/03/2021 09:54 AM    Ketone Negative 12/03/2021 09:54 AM    Bilirubin Negative 12/03/2021 09:54 AM    Urobilinogen 0.1 12/03/2021 09:54 AM    Nitrites Negative 12/03/2021 09:54 AM    Leukocyte Esterase Negative 12/03/2021 09:54 AM    Epithelial cells Moderate (A) 05/20/2021 12:56 AM Bacteria Negative 12/03/2021 09:54 AM    WBC 0-4 12/03/2021 09:54 AM    RBC 5-10 12/03/2021 09:54 AM       Imaging         Assessment & Plan:     Principal Problem:    Intractable nausea and vomiting (12/3/2021)  -Resolved now. Probably secondary to uremia. She is undergoing dialysis now. Seizure ([address] (5/19/2021)  Had breakthrough seizures. Was seen by neurology. Dose of Keppra was adjusted to 500 mg and Vimpat was also changed to 200 mg twice daily. She will follow up with neurology after discharge. ESRD and fluid overload (12/5/2021)  S/p HD yesterday. Saw on HD yesterday - she had excessive bleeding from the exit site which got better after Quick clot dressing was placed     AVF - Stenosis and steal phenomena-   Noted radiology report   s/p angiography on 12/7 and angioplasty. RT Brachio-basilic AVF banding was done 12/10/21  IR placed permacath on 12/10/21    H&H 13.2   Will hold epo with HD session   Increased risk of aVF thrombosis    Renal Osteodystrophy - currently on renvela , calitriol  Ca is WNL  Phos is good  Will repeat labs on Monday     -Hyperkalemia -from missed hemodialysis. resolved    -History of atrial fibrillation -currently on anticoagulation with Eliquis. Blood pressures - better after HD  Currently only on Metoprolol    Signed By: Kait Bowles MD     December 11, 2021      This note was prepared using voice recognition system and is likely to have sound alike errors that may have been overlooked even during proofreading.   Please contact the author for any clarifications

## 2021-12-11 NOTE — DISCHARGE SUMMARY
Hospitalist Discharge Summary     Patient ID:  Burke Wilhelm  [id number]  80 y.o.  1950  12/3/2021    PCP on record: Cristina Greer MD    Admit date: 12/3/2021  Discharge date and time: 12/11/2021    DISCHARGE DIAGNOSIS:    Malfunctioning right-sided AV fistula requiring fistula revision/stage III steal syndrome/end-stage renal disease on hemodialysis/intractable nausea and vomiting/seizure disorder/peripheral vascular disease/atrial fibrillation/hyperkalemia    CONSULTATIONS:  IP CONSULT TO NEPHROLOGY  IP CONSULT TO NEUROLOGY  IP CONSULT TO HEMATOLOGY  IP CONSULT TO HEMATOLOGY  IP CONSULT TO VASCULAR SURGERY  IP CONSULT TO GENERAL SURGERY  IP CONSULT TO VASCULAR SURGERY    Excerpted HPI from H&P of Junior Ghosh MD:  Burke Wilhelm is a 80 y.o. female who presents to the ED from her dialysis appointment with cc of persistent weakness, intractable nausea and vomiting. Per Rolando Kirkland [phone] patient has showed up for dialysis 3 times and has been turned away due to overall sickness. Reports regimen to have hemodialysis today in the dialysis unit stated that she was too sick for dialysis and called 911. Subsequently patient has been in the emergency room multiple times this week for same reports. Head CT negative for any acute intracranial findings today and previously 2 days ago. As x-ray negative for acute cardiopulmonary findings. Recent echocardiogram showed left ventricular ejection fraction 50 to 55%. Moderately dilated left atrium, moderate mitral valve regurgitation noted. Assessment patient herself is a poor historian, appears very fatigued. Past medical history significant for A. fib, heart failure with pacemaker, CVA, seizure disorder, end-stage renal disease on HD. Hospitalist team consulted for intractable nausea vomiting and ambulatory dysfunction. Will admit patient observation status. Obtain PT/OT evaluation.   Obtain nephrology for dialysis management.       ______________________________________________________________________  DISCHARGE SUMMARY/HOSPITAL COURSE:  for full details see H&P, daily progress notes, labs, consult notes. This 81-year-old male presented to the emergency department on 12/3/2021 for admission after being found to have persistent weakness, intractable nausea and vomiting. Chest x-ray was negative for acute pulmonary process. Patient was also found to have increased tremors and seizure like activity. Patient's started on Keppra 500 mg twice daily and Vimpat was increased to 200 mg twice daily. Tremors have subsided. Patient is tolerating hemodialysis without complications. Nausea and vomiting have resolved. Patient with a recent right arm fistula placement. There is no radial pulse although there are findings consistent with steal syndrome. Motor and sensation are present. Dr. Kelvin Bassett, vascular surgery performed revision on 12/9/2021. [facility] Neurology consultation. Nephrology consultation Dr. Emilee Moore. Angiogram with significant steal syndrome. Patient was evaluated by vascular surgery, had revision of AV fistula site, permacath was placed, permacath initially had a lot of bleeding at site which resolved, patient symptoms resolved, tolerating dialysis well, subsequent to which patient was deemed stable for discharge with outpatient follow-up with vascular surgery and to continue dialysis as per schedule, the plan was explained to the patient in details agreeable to current plan          _______________________________________________________________________  Patient seen and examined by me on discharge day. Pertinent Findings:  Physical Exam  Vitals reviewed. HENT:      Head: Normocephalic and atraumatic. Mouth/Throat:      Mouth: Mucous membranes are moist.      Pharynx: Oropharynx is clear. Cardiovascular:      Rate and Rhythm: Regular rhythm. Heart sounds: Normal heart sounds.    Pulmonary:      Breath sounds: Normal breath sounds. Abdominal:      General: Abdomen is flat. Bowel sounds are normal.      Palpations: Abdomen is soft. Musculoskeletal:      Cervical back: Normal range of motion and neck supple. Comments: Right hand is warm to the touch with good motor function   Skin:     General: Skin is warm and dry. Neurological:      General: No focal deficit present. Mental Status: She is alert and oriented to person, place, and time. Mental status is at baseline. Psychiatric:         Mood and Affect: Mood normal.   _______________________________________________________________________  DISCHARGE MEDICATIONS:   Current Discharge Medication List      START taking these medications    Details   calcitRIOL (ROCALTROL) 0.5 mcg capsule Take 1 Capsule by mouth daily. Qty: 30 Capsule, Refills: 0  Start date: 12/12/2021      zinc oxide-white petrolatum 17-57 % topical paste Apply  to affected area as needed for Skin Irritation. Indications: skin irritation  Qty: 113 g, Refills: 0  Start date: 12/11/2021         CONTINUE these medications which have NOT CHANGED    Details   Vimpat 200 mg tab tablet Take 1 Tablet by mouth two (2) times a day. atorvastatin (LIPITOR) 40 mg tablet Take 1 tablet by mouth once daily for 90 days  Qty: 90 Tablet, Refills: 3      FLUoxetine (PROzac) 10 mg capsule Take 1 Capsule by mouth daily for 270 days. Qty: 90 Capsule, Refills: 2    Associated Diagnoses: TRISHA (generalized anxiety disorder)      Euthyrox 150 mcg tablet Take 1 tablet by mouth once daily  Qty: 90 Tablet, Refills: 0      levETIRAcetam (KEPPRA XR) 500 mg ER tablet TAKE 2 TABLETS BY MOUTH TWICE DAILY FOR 90 DAYS      sevelamer carbonate (RENVELA) 800 mg tab tab       ondansetron (Zofran ODT) 4 mg disintegrating tablet 1 Tablet by SubLINGual route every eight (8) hours as needed for Nausea or Vomiting.   Qty: 20 Tablet, Refills: 0      loperamide (IMODIUM) 2 mg capsule TAKE 1 CAPSULE BY MOUTH SIX TIMES DAILY AS NEEDED FOR 30 DAYS  Qty: 180 Capsule, Refills: 0         STOP taking these medications       metoprolol tartrate (LOPRESSOR) 25 mg tablet Comments:   Reason for Stopping:                 Patient Follow Up Instructions: Activity: PT/OT per Home Health  Diet: Renal Diet  Wound Care: As directed    Follow-up with PCP/Nephrology in 2 weeks. Follow-up tests/labs As per above physicians  Follow-up Information     Follow up With Specialties Details Why [address] [place] [address] EMERGENCY DEPT Emergency Medicine Go today As soon as possible if symptoms worsen [address] [facility] [address] [phone]    Primary care doctor  Schedule an appointment as soon as possible for a visit in 3 days      Nataly Meyer MD Internal Medicine In 1 week  [address] [facility],[address], [facility]  [phone]      Dennis García MD Nephrology, Nephrology In 1 week  [facility]  [phone]      Helen Thomas MD Surgery, General and Vascular Surgery In 1 week  [address] [facility],[address], [facility]  [phone]          ________________________________________________________________    Risk of deterioration: Low    Condition at Discharge:  Stable  __________________________________________________________________    Disposition  Home with family and home health services    ____________________________________________________________________    Code Status: Full Code  ___________________________________________________________________      Total time in minutes spent coordinating this discharge (includes going over instructions, follow-up, prescriptions, and preparing report for sign off to her PCP) :  45 minutes    Signed:   Nathan East MD

## 2021-12-11 NOTE — PROGRESS NOTES
OCCUPATIONAL THERAPY TREATMENT  Patient: Hi Brody (89 y.o. female)  Date: 12/11/2021  Diagnosis: Left-sided weakness [R53.1]  Intractable nausea and vomiting [R11.2]  Fluid overload [E87.70]  Seizure ([address] [R56.9]   Intractable nausea and vomiting  Procedure(s) (LRB):  Right Arm Arteriovenous Fistula Revision (Right) 2 Days Post-Op  Precautions: Fall  Chart, occupational therapy assessment, plan of care, and goals were reviewed. ASSESSMENT  Patient continues with skilled OT services and is progressing towards goals. Pt. Received semi-supine in bed and agreeable to therapy session. Pt. Performed bed mobility with SBA with additional time to perform. Pt. Demonstrated good sitting balance while seated at EOB. Sit-> stand with CGA/min A with use of RW upon standing for support. Pt. C/o increased pain in R knee. Pt able to complete few forward steps and side steps to chair with CGA with use of RW. While seated in chair pt performed Oral care and facial grooming with set-up assistance- pt required total A for placing tooth paste on tooth brush d/t pt stating she has difficulty seeing in yousuf eyes. Pt. Performed UE therex while seated in chair with 1 pound weighted object to increased overall strength and endurance. Pt. Left reclined in chair with needs met and call bell within reach. REC. SNF when medically appropriate for discharge. Current Level of Function Impacting Discharge (ADLs): assistance with OOB mobility and transfers, decreased vision in bilateral eyes, increased pain in RLE. Other factors to consider for discharge: PLOF, time since on set         PLAN :  Patient continues to benefit from skilled intervention to address the above impairments. Continue treatment per established plan of care. to address goals.   Recommendation for discharge: (in order for the patient to meet his/her long term goals)  Therapy up to 5 days/week in SNF setting    This discharge recommendation:  Has been made in collaboration with the attending provider and/or case management    IF patient discharges home will need the following DME: TBD       SUBJECTIVE:   Patient stated I have difficulty seeing     OBJECTIVE DATA SUMMARY:   Cognitive/Behavioral Status:  Neurologic State: Alert  Orientation Level: Oriented X4  Cognition: Appropriate for age attention/concentration; Follows commands    Functional Mobility and Transfers for ADLs:  Bed Mobility:  Rolling: Stand-by assistance  Supine to Sit: Stand-by assistance  Sit to Supine: Stand-by assistance  Scooting: Stand-by assistance    Transfers:  Sit to Stand: Contact guard assistance     Bed to Chair: Contact guard assistance; Minimum assistance    Balance:  Sitting: Intact  Sitting - Static: Good (unsupported)  Sitting - Dynamic: Good (unsupported)    ADL Intervention:     Grooming  Grooming Assistance: Set-up; Stand-by assistance  Position Performed: Seated in chair  Washing Face: Set-up  Brushing Teeth: Set-up; Stand-by assistance; Total assistance (dependent)    Therapeutic Exercises: yousuf UE's with 1 pound weighted object  Exercise Sets Reps AROM AAROM PROM Self PROM Comments   Horizontal abd/add 2 10 [x] [] [] []    Elbow flex/ext 2 10 [x] [] [] []    Wrist flex/ext 1 10 [x] [] [] []       [] [] [] []        Pain:  Pain in R knee , no level of pain verbalized    Activity Tolerance:   Fair  Please refer to the flowsheet for vital signs taken during this treatment. After treatment patient left in no apparent distress:   Sitting in chair, Heels elevated for pressure relief, and Call bell within reach    COMMUNICATION/COLLABORATION:   The patients plan of care was discussed with: Registered nurseElvia Maciel  Time Calculation: 23 mins    Problem: Self Care Deficits Care Plan (Adult)  Goal: *Acute Goals and Plan of Care (Insert Text)  Description: Pt will be MI self feeding  Pt will be MI sup<->sit in prep for EOB ADL's  Pt will be MI LB dressing EOB level  Pt will be MI sit EOB 10 minutes in prep for EOB ADL's  Pt will be MI grooming EOB level  Pt will be MI sit<-> prep for toilet transfer  Pt will be MI toilet transfer with LRAD  Pt will be MI toileting/cloth mgmt LRAD  Pt will be MI grooming standing sink  Pt will be MI yousuf UE HEP in prep for self care tasks    Outcome: Progressing Towards Goal

## 2021-12-11 NOTE — DISCHARGE INSTRUCTIONS
Patient Education        Fatigue: Care Instructions  Your Care Instructions     Fatigue is a feeling of tiredness, exhaustion, or lack of energy. You may feel fatigue because of too much or not enough activity. It can also come from stress, lack of sleep, boredom, and poor diet. Many medical problems, such as viral infections, can cause fatigue. Emotional problems, especially depression, are often the cause of fatigue. Fatigue is most often a symptom of another problem. Treatment for fatigue depends on the cause. For example, if you have fatigue because you have a certain health problem, treating this problem also treats your fatigue. If depression or anxiety is the cause, treatment may help. Follow-up care is a key part of your treatment and safety. Be sure to make and go to all appointments, and call your doctor if you are having problems. It's also a good idea to know your test results and keep a list of the medicines you take. How can you care for yourself at home? · Get regular exercise. But don't overdo it. Go back and forth between rest and exercise. · Get plenty of rest.  · Eat a healthy diet. Do not skip meals, especially breakfast.  · Reduce your use of caffeine, tobacco, and alcohol. Caffeine is most often found in coffee, tea, cola drinks, and chocolate. · Limit medicines that can cause fatigue. This includes tranquilizers and cold and allergy medicines. When should you call for help? Watch closely for changes in your health, and be sure to contact your doctor if:    · You have new symptoms such as fever or a rash.     · Your fatigue gets worse.     · You have been feeling down, depressed, or hopeless. Or you may have lost interest in things that you usually enjoy.     · You are not getting better as expected. Where can you learn more? Go to http://www.gray.com/  Enter [id number] in the search box to learn more about \"Fatigue: Care Instructions. \"  Current as of: July 1, 2021               Content Version: 13.0  © 2006-2021 [employer]. Care instructions adapted under license by [employer] (which disclaims liability or warranty for this information). If you have questions about a medical condition or this instruction, always ask your healthcare professional. [address] any warranty or liability for your use of this information. Patient Education        Hemodialysis Access Surgery: What to Expect at Home  Your Recovery  Hemodialysis is a way to remove wastes from the blood when your kidneys can no longer do the job. It's not a cure, but it can help you live longer and feel better. It's a lifesaving treatment when you have kidney failure. Hemodialysis is often called dialysis. Your doctor created a place (called an access) in your arm for your blood to flow in and out of your body during your dialysis sessions. Your arm will probably be bruised and swollen. It may hurt. The cut (incision) may bleed. The pain and bleeding will get better over several days. You will probably need only over-the-counter pain medicine. You can reduce swelling by propping up your arm on 1 or 2 pillows and keeping your elbow straight. You will have stitches. These may dissolve on their own, or your doctor will tell you when to come in to have them removed. You should also be able to return to work in a few days. You may feel some coolness or numbness in your hand. These feelings usually go away in a few weeks. Your doctor may suggest squeezing a soft object. This will strengthen your access and may make hemodialysis faster and easier. You should always be able to feel blood rushing through the fistula or graft. It feels like a slight vibration when you put your fingers on the skin over the fistula or graft. This feeling is called a thrill or a pulse. This care sheet gives you a general idea about how long it will take for you to recover.  But each person recovers at a different pace. Follow the steps below to get better as quickly as possible. How can you care for yourself at home? Activity    · Rest when you feel tired. Getting enough sleep will help you recover. Do not lie on or sleep on the arm with the access.     · Avoid activities such as washing windows or gardening that put stress on the arm with the access.     · You may use your arm, but do not lift anything that weighs more than about 15 pounds. This may include a child, heavy grocery bags, a heavy briefcase or backpack, cat litter or dog food bags, or a vacuum .     · You can shower, but keep the access dry for the first 2 days. Cover the area with a plastic bag to keep it dry.     · Do not soak or scrub the incision until it has healed.     · Wear an arm guard to protect the area if you play sports or work with your arms.     · You may drive when your doctor says it is okay. This is usually in 1 to 2 days.     · Most people are able to return to work about 1 or 2 days after surgery. Diet    · Follow an eating plan that is good for your kidneys. A registered dietitian can help you make a meal plan that is right for you. You may need to limit protein, salt, fluids, and certain foods. Medicines    · Your doctor will tell you if and when you can restart your medicines. You will also be given instructions about taking any new medicines.     · If you take aspirin or some other blood thinner, ask your doctor if and when to start taking it again. Make sure that you understand exactly what your doctor wants you to do.     · Take pain medicines exactly as directed. ? If the doctor gave you a prescription medicine for pain, take it as prescribed. ? If you are not taking a prescription pain medicine, ask your doctor if you can take acetaminophen (Tylenol). Do not take ibuprofen (Advil, Motrin) or naproxen (Aleve), or similar medicines, unless your doctor tells you to.  They may make chronic kidney disease worse. ? Do not take two or more pain medicines at the same time unless the doctor told you to. Many pain medicines have acetaminophen, which is Tylenol. Too much acetaminophen (Tylenol) can be harmful.     · If you think your pain medicine is making you sick to your stomach:  ? Take your medicine after meals (unless your doctor has told you not to). ? Ask your doctor for a different pain medicine.     · If your doctor prescribed antibiotics, take them as directed. Do not stop taking them just because you feel better. You need to take the full course of antibiotics. Incision care    · Keep the area dry for 2 days. After 2 days, wash the area with soap and water every day, and always before dialysis.     · Do not soak or scrub the incision until it has healed.     · If you have a bandage, change it every day or as your doctor recommends. Your doctor will tell you when you can remove it. Exercise    · Squeeze a soft ball or other object as your doctor tells you. This will help blood flow through the access and help prevent blood clots. Elevation    · Prop up the sore arm on a pillow anytime you sit or lie down during the next 3 days. Try to keep it above the level of your heart. This will help reduce swelling. Other instructions    · Every day, check your access for a pulse or thrill in the fistula or graft area. A thrill is a vibration. To feel a pulse or thrill, place the first two fingers of your hand over the access.     · Do not bump your arm.     · Do not wear tight clothing, jewelry, or anything else that may squeeze the access.     · Use your other arm to have blood drawn or blood pressure taken.     · Do not put cream or lotion on or near the access.     · Make sure all doctors you deal with know that you have a vascular access. Follow-up care is a key part of your treatment and safety. Be sure to make and go to all appointments, and call your doctor if you are having problems.  It's also a good idea to know your test results and keep a list of the medicines you take. When should you call for help? Call 911 anytime you think you may need emergency care. For example, call if:    · You passed out (lost consciousness).     · You have chest pain, are short of breath, or cough up blood. Call your doctor now or seek immediate medical care if:    · Your hand or arm is cold or dark-colored.     · You have no pulse in your access.     · You have nausea or you vomit for more than four hours.     · You have pain that does not get better after you take pain medicine.     · You have loose stitches, or your incision comes open.     · You are bleeding from the incision.     · You have signs of infection, such as:  ? Increased pain, swelling, warmth, or redness. ? Red streaks leading from the area. ? Pus draining from the area. ? A fever.     · You have signs of a blood clot in your leg (called a deep vein thrombosis), such as:  ? Pain in your calf, back of the knee, thigh, or groin. ? Redness or swelling in your leg. Watch closely for changes in your health, and be sure to contact your doctor if you have any problems. Where can you learn more? Go to http://www.gray.com/  Enter P616 in the search box to learn more about \"Hemodialysis Access Surgery: What to Expect at Home. \"  Current as of: December 17, 2020               Content Version: 13.0  © 7454-8400 [employer]. Care instructions adapted under license by [employer] (which disclaims liability or warranty for this information). If you have questions about a medical condition or this instruction, always ask your healthcare professional. [place] [address] any warranty or liability for your use of this information.          Patient Education        Short-term Catheter for Hemodialysis: Care Instructions  Overview     To start hemodialysis (also called dialysis) right away, your doctor will insert a soft plastic tube into a vein. This tube will carry your blood to the dialysis machine. The tube is called a central venous catheter, or CV line. It will be your vascular access until your permanent access is ready to use. If you have a kidney injury that can be healed, you may need dialysis only for a short time. But some people will need to have long-term dialysis. This includes people with chronic kidney disease. If you need long-term dialysis, it can take weeks or months for a permanent vascular access to be ready to use. You can use the catheter until a permanent access site is ready. The catheter site will be in a large vein, usually in your chest or neck. Or it may be in your groin. A few stitches will hold the catheter in place. By learning how to care for your access, you will help avoid problems and get the best results from your dialysis treatments. Follow-up care is a key part of your treatment and safety. Be sure to make and go to all appointments, and call your doctor if you are having problems. It's also a good idea to know your test results and keep a list of the medicines you take. How can you care for yourself at home? · Make sure the catheter is secured to your body and and isn't pulling. · Avoid clothes that rub or pull on your catheter. · Never use scissors or other sharp objects around your catheter. · Don't bend or crimp your catheter. · Ask your doctor or dialysis nurse when you can take a bath or shower. You may be able to do these things after the site heals or if you cover the site with a waterproof bandage. · You can stay active while the catheter is in. But talk to your doctor about the kind of activities you want to do. · Review emergency instructions with your dialysis team so you know what to do if your catheter comes out. · Keep your bandage and exit site dry and clean. Change a dirty or bloody bandage.  Check the site every day for signs of infection. · Always wash your hands before you touch your catheter. · Keep the end of the catheter covered when it's not in use. · Wear a mask during your dialysis treatments. It can keep you from breathing germs onto your catheter. When should you call for help? Call 911  anytime you think you may need emergency care. For example, call if:    · The catheter comes out of your body. Call your doctor now or seek immediate medical care if:    · You have signs of infection, such as:  ? Increased pain, swelling, warmth, or redness around the area. ? Red streaks leading from the area. ? Pus draining from the area. ? A fever.     · You have liquid leaking from around the catheter.     · There are cracks or leaks in the tube.     · You have pain or swelling in your neck or arm.     · The line becomes clogged. Watch closely for changes in your health, and be sure to contact your doctor if you have any problems. Where can you learn more? Go to http://www.gray.com/  Enter C210 in the search box to learn more about \"Short-term Catheter for Hemodialysis: Care Instructions. \"  Current as of: December 17, 2020               Content Version: 13.0  © 4753-9342 [employer]. Care instructions adapted under license by [employer] (which disclaims liability or warranty for this information). If you have questions about a medical condition or this instruction, always ask your healthcare professional. [place] [address] any warranty or liability for your use of this information.

## 2021-12-11 NOTE — PROGRESS NOTES
Discharge plan of care/case management plan validated with provider discharge order. Patient discharged home to home health. Accepted by [facility] with accepting start date TBD. Patient notified, left voicemail for son Rebecca Nogueira. Removed triple lumen groin catheter, tip intact, patient tolerated well. Telemetry discontinued.

## 2021-12-12 NOTE — OP NOTES
This is operative report on Mary Richard, patient's date of birth January 13, 1950. Date of surgery: December 9, 2021. Surgeon: Dr. Ronit Tripp. Preop diagnosis:  1. right brachiobasilic AV fistula. 2.  Stage III right arm steal syndrome. Postprocedure diagnosis: Same as above    Procedure:  Right brachiobasilic AV fistula revision with banding procedure using biologic with Xenosure patch. Anesthesia: General.  EBL: Minimal.  Complications: None. Assistant: None. Condition: Stable. Specimen: None. Disposition: PACU. Indication for procedure: Mrs. Carly Mota is currently hospitalized with a multiple medical problem including significant right hand pain and numbness with diagnosis of steal syndrome stage III from brachiobasilic AV fistula. Patient had a previous angiogram done on the right arm which showed significant plaque disease brachial artery proximal to distal to brachiobasilic AV fistula. Despite the angioplasty patient has a significant steal syndrome on angiogram.  Patient will require revision or ligation of the AV fistula due to her symptoms. Patient was discussed rational for procedure, risks benefits complications patient has signed the procedure consent. Description of procedure: Patient was brought to the operating table, the supine position followed by general anesthesia was initiated. Followed by patient's right arm was prepped usual sterile technique using DuraPrep's followed by sterile drapes were placed usual fashion. At this time timeout was called and confirmation was made and procedure commenced. Using #15 blade skin incision made proximal to antecubital level in the right arm where the fistula located. Followed by dissection was continued until brachiobasilic AV fistula anastomosis was found. Vesseloops applied around the proximal anastomosis site. Followed by Doppler interrogation was not performed by pinching AV fistula.   After adequate dopplerable flow was identified lumen of the proximal AV fistula was imbricated by creating T-banding for Prolene sutures. Followed by this is reinforced with a biologic patch placement with xenosure biologic patch antisepsis secured to the wall of the T pending using for procedures. After banding procedure adequate flow to the radial ulnar artery of the right hand was confirmed with a Doppler interrogation. As well as adequate flow through the AV fistula. Followed by wounds are closed with a 3-0 Vicryl sutures in layers and skin was closed with a formal sutures. Appropriate sterile dressings applied patient taught procedure well without complication.

## 2021-12-21 ENCOUNTER — HOSPITAL ENCOUNTER (EMERGENCY)
Age: 71
Discharge: HOME | End: 2021-12-21
Payer: MEDICARE

## 2021-12-21 ENCOUNTER — APPOINTMENT (OUTPATIENT)
Dept: GENERAL RADIOLOGY | Age: 71
End: 2021-12-21
Attending: PHYSICIAN ASSISTANT
Payer: MEDICARE

## 2021-12-21 VITALS
SYSTOLIC BLOOD PRESSURE: 158 MMHG | BODY MASS INDEX: 26.82 KG/M2 | HEART RATE: 60 BPM | TEMPERATURE: 98 F | DIASTOLIC BLOOD PRESSURE: 70 MMHG | RESPIRATION RATE: 15 BRPM | HEIGHT: 65 IN | OXYGEN SATURATION: 98 % | WEIGHT: 161 LBS

## 2021-12-21 DIAGNOSIS — N18.9 CHRONIC KIDNEY DISEASE, UNSPECIFIED CKD STAGE: ICD-10-CM

## 2021-12-21 DIAGNOSIS — Z45.2 ENCOUNTER FOR CARE RELATED TO PORT-A-CATH: Primary | ICD-10-CM

## 2021-12-21 DIAGNOSIS — D69.6 THROMBOCYTOPENIA (HCC): ICD-10-CM

## 2021-12-21 LAB
ALBUMIN SERPL-MCNC: 3.2 G/DL (ref 3.5–5)
ALBUMIN/GLOB SERPL: 1.1 {RATIO} (ref 1.1–2.2)
ALP SERPL-CCNC: 162 U/L (ref 45–117)
ALT SERPL-CCNC: 25 U/L (ref 12–78)
ANION GAP SERPL CALC-SCNC: 6 MMOL/L (ref 5–15)
APTT PPP: 27.4 SEC (ref 21.2–34.1)
AST SERPL W P-5'-P-CCNC: 24 U/L (ref 15–37)
BASOPHILS # BLD: 0 K/UL (ref 0–0.1)
BASOPHILS NFR BLD: 1 % (ref 0–1)
BILIRUB SERPL-MCNC: 0.6 MG/DL (ref 0.2–1)
BUN SERPL-MCNC: 32 MG/DL (ref 6–20)
BUN/CREAT SERPL: 10 (ref 12–20)
CA-I BLD-MCNC: 8.2 MG/DL (ref 8.5–10.1)
CHLORIDE SERPL-SCNC: 106 MMOL/L (ref 97–108)
CO2 SERPL-SCNC: 28 MMOL/L (ref 21–32)
CREAT SERPL-MCNC: 3.1 MG/DL (ref 0.55–1.02)
DIFFERENTIAL METHOD BLD: ABNORMAL
EOSINOPHIL # BLD: 0.1 K/UL (ref 0–0.4)
EOSINOPHIL NFR BLD: 2 % (ref 0–7)
ERYTHROCYTE [DISTWIDTH] IN BLOOD BY AUTOMATED COUNT: 13.6 % (ref 11.5–14.5)
GLOBULIN SER CALC-MCNC: 2.9 G/DL (ref 2–4)
GLUCOSE SERPL-MCNC: 66 MG/DL (ref 65–100)
HCT VFR BLD AUTO: 31.7 % (ref 35–47)
HGB BLD-MCNC: 9.7 G/DL (ref 11.5–16)
IMM GRANULOCYTES # BLD AUTO: 0 K/UL (ref 0–0.04)
IMM GRANULOCYTES NFR BLD AUTO: 0 % (ref 0–0.5)
INR PPP: 1.1 (ref 0.9–1.1)
LYMPHOCYTES # BLD: 1 K/UL (ref 0.8–3.5)
LYMPHOCYTES NFR BLD: 30 % (ref 12–49)
MCH RBC QN AUTO: 31.2 PG (ref 26–34)
MCHC RBC AUTO-ENTMCNC: 30.6 G/DL (ref 30–36.5)
MCV RBC AUTO: 101.9 FL (ref 80–99)
MONOCYTES # BLD: 0.3 K/UL (ref 0–1)
MONOCYTES NFR BLD: 10 % (ref 5–13)
NEUTS SEG # BLD: 1.9 K/UL (ref 1.8–8)
NEUTS SEG NFR BLD: 57 % (ref 32–75)
NRBC # BLD: 0 K/UL (ref 0–0.01)
NRBC BLD-RTO: 0 PER 100 WBC
PLATELET # BLD AUTO: 88 K/UL (ref 150–400)
PMV BLD AUTO: 10.7 FL (ref 8.9–12.9)
POTASSIUM SERPL-SCNC: 4.2 MMOL/L (ref 3.5–5.1)
PROT SERPL-MCNC: 6.1 G/DL (ref 6.4–8.2)
PROTHROMBIN TIME: 14.1 SEC (ref 11.9–14.7)
RBC # BLD AUTO: 3.11 M/UL (ref 3.8–5.2)
SODIUM SERPL-SCNC: 140 MMOL/L (ref 136–145)
THERAPEUTIC RANGE,PTTT: NORMAL SEC (ref 82–109)
WBC # BLD AUTO: 3.4 K/UL (ref 3.6–11)

## 2021-12-21 PROCEDURE — 85610 PROTHROMBIN TIME: CPT

## 2021-12-21 PROCEDURE — 99283 EMERGENCY DEPT VISIT LOW MDM: CPT

## 2021-12-21 PROCEDURE — 85730 THROMBOPLASTIN TIME PARTIAL: CPT

## 2021-12-21 PROCEDURE — 71045 X-RAY EXAM CHEST 1 VIEW: CPT

## 2021-12-21 PROCEDURE — 85025 COMPLETE CBC W/AUTO DIFF WBC: CPT

## 2021-12-21 PROCEDURE — 80053 COMPREHEN METABOLIC PANEL: CPT

## 2021-12-21 PROCEDURE — 36415 COLL VENOUS BLD VENIPUNCTURE: CPT

## 2021-12-21 NOTE — ED NOTES
Bedside and Verbal shift change report given to Chico Bustillos RN. (oncoming nurse) by Georgie Devries RN (offgoing nurse). Report included the following information SBAR & care transferred.

## 2021-12-25 ENCOUNTER — HOSPITAL ENCOUNTER (EMERGENCY)
Age: 71
Discharge: HOME | DRG: 871 | End: 2021-12-25
Attending: EMERGENCY MEDICINE | Admitting: EMERGENCY MEDICINE
Payer: MEDICARE

## 2021-12-25 VITALS
TEMPERATURE: 100.2 F | DIASTOLIC BLOOD PRESSURE: 50 MMHG | HEIGHT: 65 IN | BODY MASS INDEX: 26.66 KG/M2 | HEART RATE: 60 BPM | RESPIRATION RATE: 18 BRPM | SYSTOLIC BLOOD PRESSURE: 114 MMHG | WEIGHT: 160 LBS | OXYGEN SATURATION: 99 %

## 2021-12-25 DIAGNOSIS — T82.838A: Primary | ICD-10-CM

## 2021-12-25 LAB
ERYTHROCYTE [DISTWIDTH] IN BLOOD BY AUTOMATED COUNT: 13.8 % (ref 11.5–14.5)
HCT VFR BLD AUTO: 23.9 % (ref 35–47)
HGB BLD-MCNC: 7.4 G/DL (ref 11.5–16)
MCH RBC QN AUTO: 30.7 PG (ref 26–34)
MCHC RBC AUTO-ENTMCNC: 31 G/DL (ref 30–36.5)
MCV RBC AUTO: 99.2 FL (ref 80–99)
NRBC # BLD: 0 K/UL (ref 0–0.01)
NRBC BLD-RTO: 0 PER 100 WBC
PLATELET # BLD AUTO: 118 K/UL (ref 150–400)
PMV BLD AUTO: 10.9 FL (ref 8.9–12.9)
RBC # BLD AUTO: 2.41 M/UL (ref 3.8–5.2)
WBC # BLD AUTO: 10.1 K/UL (ref 3.6–11)

## 2021-12-25 PROCEDURE — 85027 COMPLETE CBC AUTOMATED: CPT

## 2021-12-25 PROCEDURE — 36415 COLL VENOUS BLD VENIPUNCTURE: CPT

## 2021-12-25 PROCEDURE — 99283 EMERGENCY DEPT VISIT LOW MDM: CPT

## 2021-12-25 NOTE — ED NOTES
Pt's son made it loud and clear that he would be contacting his  regarding his frustration about his mother dialysis port continuing to bleed. Attempting to educate pt that at this present time, the dialysis port is not bleeding. He tried to argue that it was because of the pressure bandage, so instructed to leave the pressure bandage ON until seen by the SURGEON or dialysis. He kept on going about how he wasn't getting any sleep. Pt left upset but with SURGEON's phone number and 's phone number. (He wanted 's number too.

## 2021-12-25 NOTE — ED TRIAGE NOTES
Pt reports some bleeding from HD port in chest. States it was accidentally pulled yesterday and was bleeding heavily, tonight it is still bleeding but a small amount

## 2021-12-25 NOTE — ED PROVIDER NOTES
EMERGENCY DEPARTMENT HISTORY AND PHYSICAL EXAM        Date: 12/25/2021  Patient Name: Yolande Habermann    History of Presenting Illness     Chief Complaint   Patient presents with    Skin Problem     Bleeding Port     History Provided By: Patient, EMS    HPI: Yolande Habermann, 80 y.o. female with history of anxiety, atrial fibrillation, diabetes, hypertension, hyperlipidemia, seizures, stroke, and chronic kidney disease on hemodialysis who presents with bleeding from right chest port. States that happened in the middle of the night a few hours ago. He was enough to fill a small towel. States that this happened the day before and was significant requiring transfusion or transfer to another facility. EMS applied dressing and pressure. Bleeding is relatively controlled. PCP: Raphael Daley MD    Current Outpatient Medications   Medication Sig Dispense Refill    calcitRIOL (ROCALTROL) 0.5 mcg capsule Take 1 Capsule by mouth daily. 30 Capsule 0    zinc oxide-white petrolatum 17-57 % topical paste Apply  to affected area as needed for Skin Irritation. Indications: skin irritation 113 g 0    ondansetron hcl (Zofran) 4 mg tablet Take 1 Tablet by mouth every eight (8) hours as needed for Nausea or Vomiting. 45 Tablet 0    Vimpat 200 mg tab tablet Take 1 Tablet by mouth two (2) times a day.  ondansetron (Zofran ODT) 4 mg disintegrating tablet 1 Tablet by SubLINGual route every eight (8) hours as needed for Nausea or Vomiting. 20 Tablet 0    atorvastatin (LIPITOR) 40 mg tablet Take 1 tablet by mouth once daily for 90 days 90 Tablet 3    FLUoxetine (PROzac) 10 mg capsule Take 1 Capsule by mouth daily for 270 days.  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) 8/31/2020    Atrial fibrillation (HCC)     Cardiac pacemaker in situ     Cellulitis and abscess of trunk 8/31/2020    Depression, postpartum     [facility] [address]     Heart disease     Heartburn     Hx of long term use of blood thinners     Hypercholesterolemia     Hypertension     Hypothyroidism     Migraines     Peripheral venous insufficiency 8/31/2020    Seizures ([facility] [address]     Sleep disorder     Stroke ([facility] [address]     2019    Varicose veins of lower extremity with inflammation 8/31/2020       Past Surgical History:  Past Surgical History:   Procedure Laterality Date    HX BACK SURGERY      HX OTHER SURGICAL      leg surgery     HX OTHER SURGICAL      pacemaker monitor     IR INSERT TUNL CVC W/O PORT OVER 5 YR  12/10/2021       Family History:  Family History   Problem Relation Age of Onset    Heart Disease Mother     Heart Disease Father     Diabetes Sister     Diabetes Brother        Social History:  Social History     Tobacco Use    Smoking status: Never Smoker    Smokeless tobacco: Never Used   Vaping Use    Vaping Use: Never used   Substance Use Topics    Alcohol use: Never    Drug use: Never       Allergies:  No Known Allergies        Review of Systems   Review of Systems   Constitutional: Negative for fever. HENT: Negative for congestion. Eyes: Negative for visual disturbance. Respiratory: Negative for shortness of breath. Cardiovascular: Negative for chest pain. Gastrointestinal: Negative for abdominal pain. Genitourinary: Negative for dysuria. Musculoskeletal: Negative for arthralgias. Skin: Negative for rash. Neurological: Negative for headaches. Physical Exam   Constitutional: No acute distress. Well-nourished. Skin: No rash. There is a chest port on the right side with blood. There is no active bleeding. But appears to have come from the skin insertion site. ENT: No rhinorrhea. No cough. Head is normocephalic and atraumatic. Eye: No proptosis or conjunctival injections. Respiratory: No apparent respiratory distress. Gastrointestinal: Nondistended. Musculoskeletal: No obvious bony deformities. Psychiatric: Cooperative. Appropriate mood and affect. Diagnostic Study Results     Labs -     Recent Results (from the past 24 hour(s))   CBC W/O DIFF    Collection Time: 12/25/21  6:04 AM   Result Value Ref Range    WBC 10.1 3.6 - 11.0 K/uL    RBC 2.41 (L) 3.80 - 5.20 M/uL    HGB 7.4 (L) 11.5 - 16.0 g/dL    HCT 23.9 (L) 35.0 - 47.0 %    MCV 99.2 (H) 80.0 - 99.0 FL    MCH 30.7 26.0 - 34.0 PG    MCHC 31.0 30.0 - 36.5 g/dL    RDW 13.8 11.5 - 14.5 %    PLATELET 924 (L) 811 - 400 K/uL    MPV 10.9 8.9 - 12.9 FL    NRBC 0.0 0.0  WBC    ABSOLUTE NRBC 0.00 0.00 - 0.01 K/uL       Radiologic Studies -   No orders to display     CT Results  (Last 48 hours)    None        CXR Results  (Last 48 hours)    None          Medical Decision Making and ED Course     I reviewed the available vital signs, nursing notes, past medical history, past surgical history, family history, and social history. Vital Signs - Reviewed the patient's vital signs. No data found. Medical Decision Making:   Presented with bleeding from port on right side of chest. The differential diagnosis is anemia, bleeding, fistula malfunction. There is no active bleeding. The dressing was removed and the blood clots were removed using sterile fashion. A new dressing was applied. There is no significant bleeding after this point. Hemoglobin is 7.4 which is not significantly lower than previously. Patient does have mildly elevated temperature however no signs of infection. For no need for further work-up at this time.   Recommended follow-up with vascular surgeon. I did discuss this with Dr. Antionette Taylor, the general surgeon on-call who had no additional recommendations. Disposition     Discharged    DISCHARGE PLAN:  1. Current Discharge Medication List      CONTINUE these medications which have NOT CHANGED    Details   calcitRIOL (ROCALTROL) 0.5 mcg capsule Take 1 Capsule by mouth daily. Qty: 30 Capsule, Refills: 0      zinc oxide-white petrolatum 17-57 % topical paste Apply  to affected area as needed for Skin Irritation. Indications: skin irritation  Qty: 113 g, Refills: 0      ondansetron hcl (Zofran) 4 mg tablet Take 1 Tablet by mouth every eight (8) hours as needed for Nausea or Vomiting. Qty: 45 Tablet, Refills: 0      Vimpat 200 mg tab tablet Take 1 Tablet by mouth two (2) times a day. ondansetron (Zofran ODT) 4 mg disintegrating tablet 1 Tablet by SubLINGual route every eight (8) hours as needed for Nausea or Vomiting. Qty: 20 Tablet, Refills: 0      atorvastatin (LIPITOR) 40 mg tablet Take 1 tablet by mouth once daily for 90 days  Qty: 90 Tablet, Refills: 3      FLUoxetine (PROzac) 10 mg capsule Take 1 Capsule by mouth daily for 270 days.   Qty: 90 Capsule, Refills: 2    Associated Diagnoses: TRISHA (generalized anxiety disorder)      loperamide (IMODIUM) 2 mg capsule TAKE 1 CAPSULE BY MOUTH SIX TIMES DAILY AS NEEDED FOR 30 DAYS  Qty: 180 Capsule, Refills: 0      Euthyrox 150 mcg tablet Take 1 tablet by mouth once daily  Qty: 90 Tablet, Refills: 0      levETIRAcetam (KEPPRA XR) 500 mg ER tablet TAKE 2 TABLETS BY MOUTH TWICE DAILY FOR 90 DAYS      sevelamer carbonate (RENVELA) 800 mg tab tab            2.   Follow-up Information     Follow up With Specialties Details Why [address] EMERGENCY DEPT Emergency Medicine Go today As soon as possible if symptoms worsen [address]  Madelyn Frankel MD Surgery, General and Vascular Surgery Schedule an appointment as soon as possible for a visit   [address] [place] [address]  [phone]          3. Return to ED if worse     Diagnosis     Clinical impression:   1. Bleeding from shunt [facility])           Attestation:  Please note that this dictation was completed with [email or web address], the computer voice recognition software. Quite often unanticipated grammatical, syntax, homophones, and other interpretive errors are inadvertently transcribed by the computer software. Please disregard these errors. Please excuse any errors that have escaped final proofreading. Thank you.   Herschel Phalen, DO

## 2021-12-25 NOTE — ED NOTES
Pt fistula site and area irrigated with sterile water, area cleaned with chlorohexidine, pressure bandage applied with sterile gauze, sterile gauze wrap, and coband.

## 2021-12-26 ENCOUNTER — HOSPITAL ENCOUNTER (INPATIENT)
Age: 71
LOS: 8 days | Discharge: NURSING, REHAB OR LONG-TERM CARE | DRG: 871 | End: 2022-01-03
Attending: STUDENT IN AN ORGANIZED HEALTH CARE EDUCATION/TRAINING PROGRAM | Admitting: INTERNAL MEDICINE
Payer: MEDICARE

## 2021-12-26 ENCOUNTER — APPOINTMENT (OUTPATIENT)
Dept: GENERAL RADIOLOGY | Age: 71
DRG: 871 | End: 2021-12-26
Attending: STUDENT IN AN ORGANIZED HEALTH CARE EDUCATION/TRAINING PROGRAM
Payer: MEDICARE

## 2021-12-26 ENCOUNTER — APPOINTMENT (OUTPATIENT)
Dept: CT IMAGING | Age: 71
DRG: 871 | End: 2021-12-26
Attending: STUDENT IN AN ORGANIZED HEALTH CARE EDUCATION/TRAINING PROGRAM
Payer: MEDICARE

## 2021-12-26 DIAGNOSIS — I21.4 NSTEMI (NON-ST ELEVATED MYOCARDIAL INFARCTION) (HCC): ICD-10-CM

## 2021-12-26 DIAGNOSIS — R41.0 DELIRIUM: ICD-10-CM

## 2021-12-26 DIAGNOSIS — U07.1 COVID-19: Primary | ICD-10-CM

## 2021-12-26 PROBLEM — R41.82 AMS (ALTERED MENTAL STATUS): Status: ACTIVE | Noted: 2021-12-26

## 2021-12-26 LAB
ALBUMIN SERPL-MCNC: 2.8 G/DL (ref 3.5–5)
ALBUMIN/GLOB SERPL: 0.9 {RATIO} (ref 1.1–2.2)
ALP SERPL-CCNC: 120 U/L (ref 45–117)
ALT SERPL-CCNC: 18 U/L (ref 12–78)
ANION GAP SERPL CALC-SCNC: 10 MMOL/L (ref 5–15)
AST SERPL W P-5'-P-CCNC: 25 U/L (ref 15–37)
ATRIAL RATE: 277 BPM
BASOPHILS # BLD: 0 K/UL (ref 0–0.1)
BASOPHILS NFR BLD: 0 % (ref 0–1)
BILIRUB SERPL-MCNC: 0.9 MG/DL (ref 0.2–1)
BUN SERPL-MCNC: 44 MG/DL (ref 6–20)
BUN/CREAT SERPL: 10 (ref 12–20)
CA-I BLD-MCNC: 8.2 MG/DL (ref 8.5–10.1)
CALCULATED P AXIS, ECG09: 103 DEGREES
CALCULATED R AXIS, ECG10: 95 DEGREES
CALCULATED T AXIS, ECG11: 72 DEGREES
CHLORIDE SERPL-SCNC: 105 MMOL/L (ref 97–108)
CK SERPL-CCNC: 99 U/L (ref 26–192)
CO2 SERPL-SCNC: 24 MMOL/L (ref 21–32)
COVID-19 RAPID TEST, COVR: DETECTED
CREAT SERPL-MCNC: 4.29 MG/DL (ref 0.55–1.02)
DIAGNOSIS, 93000: NORMAL
DIFFERENTIAL METHOD BLD: ABNORMAL
EOSINOPHIL # BLD: 0 K/UL (ref 0–0.4)
EOSINOPHIL NFR BLD: 0 % (ref 0–7)
ERYTHROCYTE [DISTWIDTH] IN BLOOD BY AUTOMATED COUNT: 14.4 % (ref 11.5–14.5)
FLUAV AG NPH QL IA: NEGATIVE
FLUBV AG NOSE QL IA: NEGATIVE
GLOBULIN SER CALC-MCNC: 3.1 G/DL (ref 2–4)
GLUCOSE SERPL-MCNC: 84 MG/DL (ref 65–100)
HCT VFR BLD AUTO: 23.6 % (ref 35–47)
HGB BLD-MCNC: 7.3 G/DL (ref 11.5–16)
IMM GRANULOCYTES # BLD AUTO: 0 K/UL
IMM GRANULOCYTES NFR BLD AUTO: 0 %
INR PPP: 1.1 (ref 0.9–1.1)
LACTATE SERPL-SCNC: 2.2 MMOL/L (ref 0.4–2)
LIPASE SERPL-CCNC: 42 U/L (ref 73–393)
LYMPHOCYTES # BLD: 0.5 K/UL (ref 0.8–3.5)
LYMPHOCYTES NFR BLD: 6 % (ref 12–49)
MAGNESIUM SERPL-MCNC: 1.9 MG/DL (ref 1.6–2.4)
MCH RBC QN AUTO: 30.9 PG (ref 26–34)
MCHC RBC AUTO-ENTMCNC: 30.9 G/DL (ref 30–36.5)
MCV RBC AUTO: 100 FL (ref 80–99)
MONOCYTES # BLD: 0.4 K/UL (ref 0–1)
MONOCYTES NFR BLD: 5 % (ref 5–13)
NEUTS SEG # BLD: 7.3 K/UL (ref 1.8–8)
NEUTS SEG NFR BLD: 89 % (ref 32–75)
NRBC # BLD: 0 K/UL (ref 0–0.01)
NRBC BLD-RTO: 0 PER 100 WBC
P-R INTERVAL, ECG05: 270 MS
PHOSPHATE SERPL-MCNC: 4.1 MG/DL (ref 2.6–4.7)
PLATELET # BLD AUTO: 118 K/UL (ref 150–400)
PMV BLD AUTO: 11 FL (ref 8.9–12.9)
POTASSIUM SERPL-SCNC: 4.2 MMOL/L (ref 3.5–5.1)
PROT SERPL-MCNC: 5.9 G/DL (ref 6.4–8.2)
PROTHROMBIN TIME: 13.8 SEC (ref 11.9–14.7)
Q-T INTERVAL, ECG07: 414 MS
QRS DURATION, ECG06: 136 MS
QTC CALCULATION (BEZET), ECG08: 427 MS
RBC # BLD AUTO: 2.36 M/UL (ref 3.8–5.2)
RBC MORPH BLD: ABNORMAL
SODIUM SERPL-SCNC: 139 MMOL/L (ref 136–145)
SPECIMEN SOURCE: ABNORMAL
TROPONIN-HIGH SENSITIVITY: 105 NG/L (ref 0–51)
TSH SERPL DL<=0.05 MIU/L-ACNC: 2.71 UIU/ML (ref 0.36–3.74)
VENTRICULAR RATE, ECG03: 64 BPM
WBC # BLD AUTO: 8.2 K/UL (ref 3.6–11)

## 2021-12-26 PROCEDURE — 85025 COMPLETE CBC W/AUTO DIFF WBC: CPT

## 2021-12-26 PROCEDURE — 85610 PROTHROMBIN TIME: CPT

## 2021-12-26 PROCEDURE — 74011250636 HC RX REV CODE- 250/636: Performed by: PHYSICIAN ASSISTANT

## 2021-12-26 PROCEDURE — 99285 EMERGENCY DEPT VISIT HI MDM: CPT

## 2021-12-26 PROCEDURE — 65270000029 HC RM PRIVATE

## 2021-12-26 PROCEDURE — 74011250637 HC RX REV CODE- 250/637: Performed by: PHYSICIAN ASSISTANT

## 2021-12-26 PROCEDURE — 74011000250 HC RX REV CODE- 250: Performed by: PHYSICIAN ASSISTANT

## 2021-12-26 PROCEDURE — 71045 X-RAY EXAM CHEST 1 VIEW: CPT

## 2021-12-26 PROCEDURE — 87040 BLOOD CULTURE FOR BACTERIA: CPT

## 2021-12-26 PROCEDURE — 72125 CT NECK SPINE W/O DYE: CPT

## 2021-12-26 PROCEDURE — 83735 ASSAY OF MAGNESIUM: CPT

## 2021-12-26 PROCEDURE — 3E0333Z INTRODUCTION OF ANTI-INFLAMMATORY INTO PERIPHERAL VEIN, PERCUTANEOUS APPROACH: ICD-10-PCS | Performed by: PHYSICIAN ASSISTANT

## 2021-12-26 PROCEDURE — 87804 INFLUENZA ASSAY W/OPTIC: CPT

## 2021-12-26 PROCEDURE — 83690 ASSAY OF LIPASE: CPT

## 2021-12-26 PROCEDURE — 87186 SC STD MICRODIL/AGAR DIL: CPT

## 2021-12-26 PROCEDURE — 84443 ASSAY THYROID STIM HORMONE: CPT

## 2021-12-26 PROCEDURE — 84484 ASSAY OF TROPONIN QUANT: CPT

## 2021-12-26 PROCEDURE — 36415 COLL VENOUS BLD VENIPUNCTURE: CPT

## 2021-12-26 PROCEDURE — 87635 SARS-COV-2 COVID-19 AMP PRB: CPT

## 2021-12-26 PROCEDURE — 70450 CT HEAD/BRAIN W/O DYE: CPT

## 2021-12-26 PROCEDURE — 80053 COMPREHEN METABOLIC PANEL: CPT

## 2021-12-26 PROCEDURE — 84100 ASSAY OF PHOSPHORUS: CPT

## 2021-12-26 PROCEDURE — 93005 ELECTROCARDIOGRAM TRACING: CPT

## 2021-12-26 PROCEDURE — 83605 ASSAY OF LACTIC ACID: CPT

## 2021-12-26 PROCEDURE — 74011250637 HC RX REV CODE- 250/637: Performed by: STUDENT IN AN ORGANIZED HEALTH CARE EDUCATION/TRAINING PROGRAM

## 2021-12-26 PROCEDURE — 87077 CULTURE AEROBIC IDENTIFY: CPT

## 2021-12-26 PROCEDURE — 82550 ASSAY OF CK (CPK): CPT

## 2021-12-26 PROCEDURE — 94761 N-INVAS EAR/PLS OXIMETRY MLT: CPT

## 2021-12-26 PROCEDURE — 87147 CULTURE TYPE IMMUNOLOGIC: CPT

## 2021-12-26 RX ORDER — ASPIRIN 325 MG
325 TABLET ORAL
Status: COMPLETED | OUTPATIENT
Start: 2021-12-26 | End: 2021-12-26

## 2021-12-26 RX ORDER — ACETAMINOPHEN 325 MG/1
650 TABLET ORAL
Status: COMPLETED | OUTPATIENT
Start: 2021-12-26 | End: 2021-12-26

## 2021-12-26 RX ORDER — ONDANSETRON 2 MG/ML
4 INJECTION INTRAMUSCULAR; INTRAVENOUS
Status: DISCONTINUED | OUTPATIENT
Start: 2021-12-26 | End: 2022-01-03 | Stop reason: HOSPADM

## 2021-12-26 RX ORDER — SEVELAMER CARBONATE 800 MG/1
800 TABLET, FILM COATED ORAL
Status: DISCONTINUED | OUTPATIENT
Start: 2021-12-26 | End: 2022-01-03 | Stop reason: HOSPADM

## 2021-12-26 RX ORDER — ACETAMINOPHEN 325 MG/1
650 TABLET ORAL
Status: DISCONTINUED | OUTPATIENT
Start: 2021-12-26 | End: 2022-01-03 | Stop reason: HOSPADM

## 2021-12-26 RX ORDER — DEXAMETHASONE SODIUM PHOSPHATE 4 MG/ML
4 INJECTION, SOLUTION INTRA-ARTICULAR; INTRALESIONAL; INTRAMUSCULAR; INTRAVENOUS; SOFT TISSUE EVERY 12 HOURS
Status: DISCONTINUED | OUTPATIENT
Start: 2021-12-26 | End: 2022-01-03

## 2021-12-26 RX ORDER — POLYETHYLENE GLYCOL 3350 17 G/17G
17 POWDER, FOR SOLUTION ORAL DAILY PRN
Status: DISCONTINUED | OUTPATIENT
Start: 2021-12-26 | End: 2022-01-03 | Stop reason: HOSPADM

## 2021-12-26 RX ORDER — ASCORBIC ACID 500 MG
500 TABLET ORAL 2 TIMES DAILY
Status: DISCONTINUED | OUTPATIENT
Start: 2021-12-26 | End: 2022-01-03 | Stop reason: HOSPADM

## 2021-12-26 RX ORDER — LACOSAMIDE 100 MG/1
200 TABLET ORAL 2 TIMES DAILY
Status: DISCONTINUED | OUTPATIENT
Start: 2021-12-26 | End: 2022-01-03 | Stop reason: HOSPADM

## 2021-12-26 RX ORDER — LEVETIRACETAM 250 MG/1
250 TABLET ORAL 2 TIMES DAILY
Status: DISCONTINUED | OUTPATIENT
Start: 2021-12-26 | End: 2022-01-03 | Stop reason: HOSPADM

## 2021-12-26 RX ORDER — GUAIFENESIN 100 MG/5ML
81 LIQUID (ML) ORAL DAILY
Status: DISCONTINUED | OUTPATIENT
Start: 2021-12-27 | End: 2022-01-03 | Stop reason: HOSPADM

## 2021-12-26 RX ORDER — PANTOPRAZOLE SODIUM 40 MG/1
40 TABLET, DELAYED RELEASE ORAL
Status: DISCONTINUED | OUTPATIENT
Start: 2021-12-27 | End: 2021-12-26

## 2021-12-26 RX ORDER — FLUOXETINE 10 MG/1
10 CAPSULE ORAL DAILY
Status: DISCONTINUED | OUTPATIENT
Start: 2021-12-27 | End: 2022-01-03 | Stop reason: HOSPADM

## 2021-12-26 RX ORDER — BENZONATATE 100 MG/1
100 CAPSULE ORAL
Status: DISCONTINUED | OUTPATIENT
Start: 2021-12-26 | End: 2022-01-03 | Stop reason: HOSPADM

## 2021-12-26 RX ORDER — SODIUM CHLORIDE 0.9 % (FLUSH) 0.9 %
5-40 SYRINGE (ML) INJECTION AS NEEDED
Status: DISCONTINUED | OUTPATIENT
Start: 2021-12-26 | End: 2022-01-03 | Stop reason: HOSPADM

## 2021-12-26 RX ORDER — LEVETIRACETAM 500 MG/1
500 TABLET, EXTENDED RELEASE ORAL DAILY
Status: DISCONTINUED | OUTPATIENT
Start: 2021-12-27 | End: 2021-12-26

## 2021-12-26 RX ORDER — CALCITRIOL 0.25 UG/1
0.5 CAPSULE ORAL DAILY
Status: DISCONTINUED | OUTPATIENT
Start: 2021-12-27 | End: 2022-01-03 | Stop reason: HOSPADM

## 2021-12-26 RX ORDER — ATORVASTATIN CALCIUM 40 MG/1
40 TABLET, FILM COATED ORAL
Status: DISCONTINUED | OUTPATIENT
Start: 2021-12-26 | End: 2022-01-03 | Stop reason: HOSPADM

## 2021-12-26 RX ORDER — ALBUTEROL SULFATE 90 UG/1
2 AEROSOL, METERED RESPIRATORY (INHALATION)
Status: DISCONTINUED | OUTPATIENT
Start: 2021-12-26 | End: 2022-01-03 | Stop reason: HOSPADM

## 2021-12-26 RX ORDER — ZINC SULFATE 50(220)MG
1 CAPSULE ORAL DAILY
Status: DISCONTINUED | OUTPATIENT
Start: 2021-12-27 | End: 2022-01-03 | Stop reason: HOSPADM

## 2021-12-26 RX ORDER — ONDANSETRON 4 MG/1
4 TABLET, ORALLY DISINTEGRATING ORAL
Status: DISCONTINUED | OUTPATIENT
Start: 2021-12-26 | End: 2022-01-03 | Stop reason: HOSPADM

## 2021-12-26 RX ORDER — SODIUM CHLORIDE 0.9 % (FLUSH) 0.9 %
5-40 SYRINGE (ML) INJECTION EVERY 8 HOURS
Status: DISCONTINUED | OUTPATIENT
Start: 2021-12-26 | End: 2022-01-03 | Stop reason: HOSPADM

## 2021-12-26 RX ORDER — SODIUM CHLORIDE 9 MG/ML
75 INJECTION, SOLUTION INTRAVENOUS CONTINUOUS
Status: DISCONTINUED | OUTPATIENT
Start: 2021-12-26 | End: 2022-01-03 | Stop reason: HOSPADM

## 2021-12-26 RX ORDER — PANTOPRAZOLE SODIUM 40 MG/1
40 TABLET, DELAYED RELEASE ORAL
Status: DISCONTINUED | OUTPATIENT
Start: 2021-12-26 | End: 2022-01-03 | Stop reason: HOSPADM

## 2021-12-26 RX ORDER — ACETAMINOPHEN 650 MG/1
650 SUPPOSITORY RECTAL
Status: DISCONTINUED | OUTPATIENT
Start: 2021-12-26 | End: 2022-01-03 | Stop reason: HOSPADM

## 2021-12-26 RX ADMIN — ASPIRIN 325 MG ORAL TABLET 325 MG: 325 PILL ORAL at 12:18

## 2021-12-26 RX ADMIN — SODIUM CHLORIDE 75 ML/HR: 9 INJECTION, SOLUTION INTRAVENOUS at 12:17

## 2021-12-26 RX ADMIN — ACETAMINOPHEN 650 MG: 325 TABLET ORAL at 09:23

## 2021-12-26 RX ADMIN — OXYCODONE HYDROCHLORIDE AND ACETAMINOPHEN 500 MG: 500 TABLET ORAL at 12:19

## 2021-12-26 RX ADMIN — LACOSAMIDE 200 MG: 100 TABLET, FILM COATED ORAL at 23:51

## 2021-12-26 RX ADMIN — SODIUM CHLORIDE, PRESERVATIVE FREE 10 ML: 5 INJECTION INTRAVENOUS at 23:51

## 2021-12-26 RX ADMIN — AZITHROMYCIN DIHYDRATE 500 MG: 500 INJECTION, POWDER, LYOPHILIZED, FOR SOLUTION INTRAVENOUS at 12:18

## 2021-12-26 RX ADMIN — SODIUM CHLORIDE, PRESERVATIVE FREE 10 ML: 5 INJECTION INTRAVENOUS at 14:00

## 2021-12-26 RX ADMIN — ATORVASTATIN CALCIUM 40 MG: 40 TABLET, FILM COATED ORAL at 23:51

## 2021-12-26 RX ADMIN — SEVELAMER CARBONATE 800 MG: 800 TABLET, FILM COATED ORAL at 12:18

## 2021-12-26 RX ADMIN — WATER 2 G: 1 INJECTION INTRAMUSCULAR; INTRAVENOUS; SUBCUTANEOUS at 12:18

## 2021-12-26 RX ADMIN — OXYCODONE HYDROCHLORIDE AND ACETAMINOPHEN 500 MG: 500 TABLET ORAL at 23:51

## 2021-12-26 RX ADMIN — LEVETIRACETAM 250 MG: 250 TABLET, FILM COATED ORAL at 23:51

## 2021-12-26 RX ADMIN — DEXAMETHASONE SODIUM PHOSPHATE 4 MG: 4 INJECTION, SOLUTION INTRA-ARTICULAR; INTRALESIONAL; INTRAMUSCULAR; INTRAVENOUS; SOFT TISSUE at 23:51

## 2021-12-26 RX ADMIN — DEXAMETHASONE SODIUM PHOSPHATE 4 MG: 4 INJECTION, SOLUTION INTRA-ARTICULAR; INTRALESIONAL; INTRAMUSCULAR; INTRAVENOUS; SOFT TISSUE at 12:19

## 2021-12-26 NOTE — ED PROVIDER NOTES
EMERGENCY DEPARTMENT HISTORY AND PHYSICAL EXAM      Date: 12/26/2021  Patient Name: Hood Odell    History of Presenting Illness     Chief Complaint   Patient presents with    Altered mental status       HPI: Hood Odell, 80 y.o. female with past medical history of ESRD on dialysis MWF, diabetes, hyperlipidemia, seizure disorder, hypothyroidism, CVA, currently on Keppra and atorvastatin presenting with  altered mental status. The patient has had recurrent falls for the past few days. Multiple family members tested positive for Covid today and she had positive exposure present. Currently, the patient is altered and does not answer questions. She is ANO x0-1. History is limited due to altered mental status. PCP: Jared Best MD    Current Facility-Administered Medications   Medication Dose Route Frequency Provider Last Rate Last Admin    aspirin tablet 325 mg  325 mg Oral NOW Ova Shelter, MD Vlad         Current Outpatient Medications   Medication Sig Dispense Refill    calcitRIOL (ROCALTROL) 0.5 mcg capsule Take 1 Capsule by mouth daily. 30 Capsule 0    zinc oxide-white petrolatum 17-57 % topical paste Apply  to affected area as needed for Skin Irritation. Indications: skin irritation 113 g 0    ondansetron hcl (Zofran) 4 mg tablet Take 1 Tablet by mouth every eight (8) hours as needed for Nausea or Vomiting. 45 Tablet 0    Vimpat 200 mg tab tablet Take 1 Tablet by mouth two (2) times a day.  ondansetron (Zofran ODT) 4 mg disintegrating tablet 1 Tablet by SubLINGual route every eight (8) hours as needed for Nausea or Vomiting. 20 Tablet 0    atorvastatin (LIPITOR) 40 mg tablet Take 1 tablet by mouth once daily for 90 days 90 Tablet 3    FLUoxetine (PROzac) 10 mg capsule Take 1 Capsule by mouth daily for 270 days.  90 Capsule 2    loperamide (IMODIUM) 2 mg capsule TAKE 1 CAPSULE BY MOUTH SIX TIMES DAILY AS NEEDED FOR 30 DAYS 180 Capsule 0    Euthyrox 150 mcg tablet Take 1 tablet by mouth once daily 90 Tablet 0    levETIRAcetam (KEPPRA XR) 500 mg ER tablet TAKE 2 TABLETS BY MOUTH TWICE DAILY FOR 90 DAYS      sevelamer carbonate (RENVELA) 800 mg tab tab          Medical History   I reviewed the medical, surgical, family, and social history, as well as allergies:    Past Medical History:  Past Medical History:   Diagnosis Date    Anxiety disorder     Arthritis     Atherosclerosis of native arteries of the extremities with ulceration ([facility] [address] 8/31/2020    Atrial fibrillation ([facility] [address]     Cardiac pacemaker in situ     Cellulitis and abscess of trunk 8/31/2020    Depression, postpartum     Diabetes ([address]     Heart disease     Heartburn     Hx of long term use of blood thinners     Hypercholesterolemia     Hypertension     Hypothyroidism     Migraines     Peripheral venous insufficiency 8/31/2020    Seizures ([address]     Sleep disorder     Stroke ([facility] [address]     2019    Varicose veins of lower extremity with inflammation 8/31/2020       Past Surgical History:  Past Surgical History:   Procedure Laterality Date    HX BACK SURGERY      HX OTHER SURGICAL      leg surgery     HX OTHER SURGICAL      pacemaker monitor     IR INSERT TUNL CVC W/O PORT OVER 5 YR  12/10/2021       Family History:  Family History   Problem Relation Age of Onset    Heart Disease Mother     Heart Disease Father     Diabetes Sister     Diabetes Brother        Social History:  Social History     Tobacco Use    Smoking status: Never Smoker    Smokeless tobacco: Never Used   Vaping Use    Vaping Use: Never used   Substance Use Topics    Alcohol use: Never    Drug use: Never       Allergies:  No Known Allergies    Review of Systems     Review of Systems   Unable to perform ROS: Mental status change         Physical Exam and Vital Signs   Vital Signs - Reviewed the patient's vital signs.     Patient Vitals for the past 12 hrs:   Temp Pulse Resp BP SpO2   12/26/21 0826     100 %   12/26/21 0816 (!) 101.1 °F (38.4 °C) 62 16 (!) 114/58 100 %       Physical Exam:    GENERAL: awake, alert, A&Ox0-1, not in distress  HEENT:  * Pupils equal, EOMI  * Head atraumatic  CV:  * regular rhythm  * warm and perfused extremities bilaterally  PULMONARY: Good air movement, no wheezes or crackles  ABDOMEN: soft, not distended, no guarding, not tenderness to palpation  : No suprapubic tenderness  EXTREMITIES/BACK: warm and perfused, no tenderness, no edema  SKIN: no rashes or signs of trauma  NEURO:  * Perseverance of speech  * Moves U&LE to command      Medical Decision Making and ED Course   - I am the first and primary provider for this patient and am the primary provider of record. - I reviewed the vital signs, available nursing notes, past medical history, past surgical history, family history and social history. - Initial assessment performed. The patients presenting problems have been discussed, and the staff are in agreement with the care plan formulated and outlined with them. I have encouraged them to ask questions as they arise throughout their visit. - Available medical records, nursing notes, old EKGs, and EMS run sheets (if patient was EMS transported) were reviewed    MDM:   Patient is a 80 y.o. female presenting for AMS in setting of falls and COVID +ve result. Vitals reveal fever and physical exam reveals AMS. EKG showed TWI and atrial paced rhythm. The comprehensive list of differential diagnoses, including the less probable ones, include but is not limited to metabolic causes (i.e., hyper/hyponatremia, hyper/hypoglycemia, hypercalcemia, hyper/hypothyroidism, hypoxia/hypercapnea, uremic encephalopathy), structural lesions (malignancy, intracranial hemorrhage, cerebral edema), anemia, cerebrovascular accident, transient ischemic attack, seizures, postictal state, heart failure, endocarditis, head injury/ICH, UTI.       Results     Labs:  Recent Results (from the past 12 hour(s))   EKG, 12 LEAD, INITIAL    Collection Time: 12/26/21  8:23 AM   Result Value Ref Range    Ventricular Rate 64 BPM    Atrial Rate 277 BPM    P-R Interval 270 ms    QRS Duration 136 ms    Q-T Interval 414 ms    QTC Calculation (Bezet) 427 ms    Calculated P Axis 103 degrees    Calculated R Axis 95 degrees    Calculated T Axis 72 degrees    Diagnosis        Suspect arm lead reversal, interpretation assumes no reversal  Atrial-paced rhythm with prolonged AV conduction  Right bundle branch block  Abnormal ECG  When compared with ECG of 03-DEC-2021 07:17,  Electronic atrial pacemaker has replaced Electronic ventricular pacemaker  Vent. rate has decreased BY  68 BPM  Confirmed by Vee Finch MD, [facility] ([phone]) on 12/26/2021 11:21:25 AM     COVID-19 RAPID TEST    Collection Time: 12/26/21  8:37 AM   Result Value Ref Range    Specimen source Swab      COVID-19 rapid test DETECTED (A) Not Detected     INFLUENZA A & B AG (RAPID TEST)    Collection Time: 12/26/21  8:38 AM   Result Value Ref Range    Influenza A Antigen Negative Negative      Influenza B Antigen Negative Negative     CBC WITH AUTOMATED DIFF    Collection Time: 12/26/21  8:44 AM   Result Value Ref Range    WBC 8.2 3.6 - 11.0 K/uL    RBC 2.36 (L) 3.80 - 5.20 M/uL    HGB 7.3 (L) 11.5 - 16.0 g/dL    HCT 23.6 (L) 35.0 - 47.0 %    .0 (H) 80.0 - 99.0 FL    MCH 30.9 26.0 - 34.0 PG    MCHC 30.9 30.0 - 36.5 g/dL    RDW 14.4 11.5 - 14.5 %    PLATELET 733 (L) 565 - 400 K/uL    MPV 11.0 8.9 - 12.9 FL    NRBC 0.0 0.0  WBC    ABSOLUTE NRBC 0.00 0.00 - 0.01 K/uL    NEUTROPHILS PENDING %    LYMPHOCYTES PENDING %    MONOCYTES PENDING %    EOSINOPHILS PENDING %    BASOPHILS PENDING %    IMMATURE GRANULOCYTES PENDING %    ABS. NEUTROPHILS PENDING K/UL    ABS. LYMPHOCYTES PENDING K/UL    ABS. MONOCYTES PENDING K/UL    ABS. EOSINOPHILS PENDING K/UL    ABS. BASOPHILS PENDING K/UL    ABS. IMM. GRANS.  PENDING K/UL    DF PENDING    METABOLIC PANEL, COMPREHENSIVE    Collection Time: 12/26/21  8:44 AM   Result Value Ref Range    Sodium 139 136 - 145 mmol/L    Potassium 4.2 3.5 - 5.1 mmol/L    Chloride 105 97 - 108 mmol/L    CO2 24 21 - 32 mmol/L    Anion gap 10 5 - 15 mmol/L    Glucose 84 65 - 100 mg/dL    BUN 44 (H) 6 - 20 mg/dL    Creatinine 4.29 (H) 0.55 - 1.02 mg/dL    BUN/Creatinine ratio 10 (L) 12 - 20      GFR est AA 12 (L) >60 ml/min/1.73m2    GFR est non-AA 10 (L) >60 ml/min/1.73m2    Calcium 8.2 (L) 8.5 - 10.1 mg/dL    Bilirubin, total 0.9 0.2 - 1.0 mg/dL    AST (SGOT) 25 15 - 37 U/L    ALT (SGPT) 18 12 - 78 U/L    Alk. phosphatase 120 (H) 45 - 117 U/L    Protein, total 5.9 (L) 6.4 - 8.2 g/dL    Albumin 2.8 (L) 3.5 - 5.0 g/dL    Globulin 3.1 2.0 - 4.0 g/dL    A-G Ratio 0.9 (L) 1.1 - 2.2     MAGNESIUM    Collection Time: 12/26/21  8:44 AM   Result Value Ref Range    Magnesium 1.9 1.6 - 2.4 mg/dL   PROTHROMBIN TIME + INR    Collection Time: 12/26/21  8:44 AM   Result Value Ref Range    Prothrombin time 13.8 11.9 - 14.7 sec    INR 1.1 0.9 - 1.1     CK    Collection Time: 12/26/21  8:44 AM   Result Value Ref Range    CK 99 26 - 192 U/L   TSH 3RD GENERATION    Collection Time: 12/26/21  8:44 AM   Result Value Ref Range    TSH 2.71 0.36 - 3.74 uIU/mL   LIPASE    Collection Time: 12/26/21  8:44 AM   Result Value Ref Range    Lipase 42 (L) 73 - 393 U/L   TROPONIN-HIGH SENSITIVITY    Collection Time: 12/26/21  8:44 AM   Result Value Ref Range    Troponin-High Sensitivity 105 (HH) 0 - 51 ng/L   LACTIC ACID    Collection Time: 12/26/21  8:44 AM   Result Value Ref Range    Lactic acid 2.2 (HH) 0.4 - 2.0 mmol/L   PHOSPHORUS    Collection Time: 12/26/21  8:44 AM   Result Value Ref Range    Phosphorus 4.1 2.6 - 4.7 mg/dL       Radiologic Studies:  CT Results  (Last 48 hours)               12/26/21 0902  CT HEAD WO CONT Final result    Impression:  No acute intracranial abnormality. Remote left parietal-occipital   infarct.  Mild generalized atrophy with nonspecific white matter abnormality that   may indicate chronic small vessel disease. Aretha Griffin HISTORY:  recurrent falls, AMS   Dose reduction technique: All CT scans at this facility are performed using dose reduction optimization   technique as appropriate on the exam including the following: Automated exposure   control, adjustment of the MA and/or KV according to patient size and/or use of   iterative reconstructive technique. TECHNIQUE: Noncontrast CT of the cervical spine with multiplanar   reconstructions. COMPARISON: None   LIMITATIONS: None       FRACTURES: None   ALIGNMENT: Normal   VERTEBRAL BODIES: Multilevel degenerative/arthritic changes characterized by   osteophyte formation and associated endplate changes; findings most notable at   C5-6, C7-T1. DISC SPACES: Decreased with multiple posterior disc protrusions some of which   are mildly calcified   POSTERIOR ELEMENTS: Normal for age. SPINAL CANAL: Normal   PARASPINAL SOFT TISSUES: Normal       OTHER: None       IMPRESSION: Multilevel degenerative disc and degenerative arthritic changes of   the cervical spine with no acute abnormality. Narrative:  HISTORY:  recurrent falls, AMS   Dose reduction technique: All CT scans at this facility are performed using dose reduction optimization   technique as appropriate on the exam including the following: Automated exposure   control, adjustment of the MA and/or KV according to patient size and/or use of   iterative reconstructive technique. TECHNIQUE: [BRAIN CT without contrast]   COMPARISON: [4 weeks prior]]   LIMITATIONS: [None]       BRAIN: Mild patchy areas of subcortical and periventricular white matter   hypodensity. More focal encephalomalacia of the left parietal-occipital region   as on prior    No acute parenchymal hemorrhage, mass effect or midline shift. VENTRICLES: Mild prominence of the cerebral ventricles commensurate with the   degree of atrophy.            EXTRA-AXIAL SPACES/SULCI: Mild generalized cerebral/cerebellar atrophy. No   extra-axial hemorrhage, fluid collection or mass. CALVARIUM/SKULL BASE: Normal       FACE/SINUSES: Visualized portions normal       SOFT TISSUES: Normal       OTHER: None            12/26/21 0902  CT SPINE CERV WO CONT Final result    Impression:  No acute intracranial abnormality. Remote left parietal-occipital   infarct. Mild generalized atrophy with nonspecific white matter abnormality that   may indicate chronic small vessel disease. Lico Yuan HISTORY:  recurrent falls, AMS   Dose reduction technique: All CT scans at this facility are performed using dose reduction optimization   technique as appropriate on the exam including the following: Automated exposure   control, adjustment of the MA and/or KV according to patient size and/or use of   iterative reconstructive technique. TECHNIQUE: Noncontrast CT of the cervical spine with multiplanar   reconstructions. COMPARISON: None   LIMITATIONS: None       FRACTURES: None   ALIGNMENT: Normal   VERTEBRAL BODIES: Multilevel degenerative/arthritic changes characterized by   osteophyte formation and associated endplate changes; findings most notable at   C5-6, C7-T1. DISC SPACES: Decreased with multiple posterior disc protrusions some of which   are mildly calcified   POSTERIOR ELEMENTS: Normal for age. SPINAL CANAL: Normal   PARASPINAL SOFT TISSUES: Normal       OTHER: None       IMPRESSION: Multilevel degenerative disc and degenerative arthritic changes of   the cervical spine with no acute abnormality. Narrative:  HISTORY:  recurrent falls, AMS   Dose reduction technique: All CT scans at this facility are performed using dose reduction optimization   technique as appropriate on the exam including the following: Automated exposure   control, adjustment of the MA and/or KV according to patient size and/or use of   iterative reconstructive technique.        TECHNIQUE: [BRAIN CT without contrast]   COMPARISON: [4 weeks prior]]   LIMITATIONS: [None]       BRAIN: Mild patchy areas of subcortical and periventricular white matter   hypodensity. More focal encephalomalacia of the left parietal-occipital region   as on prior    No acute parenchymal hemorrhage, mass effect or midline shift. VENTRICLES: Mild prominence of the cerebral ventricles commensurate with the   degree of atrophy. EXTRA-AXIAL SPACES/SULCI: Mild generalized cerebral/cerebellar atrophy. No   extra-axial hemorrhage, fluid collection or mass. CALVARIUM/SKULL BASE: Normal       FACE/SINUSES: Visualized portions normal       SOFT TISSUES: Normal       OTHER: None                CXR Results  (Last 48 hours)               12/26/21 0913  XR CHEST PORT Final result    Impression:  No acute cardiopulmonary abnormality. .        Narrative:  HISTORY:  AMS       TECHNIQUE:  XR CHEST PORT       COMPARISON: 12/21/2021   LIMITATIONS: None       TUBES/LINES: Dual-lumen right IJ catheter, right chest wall dual-lead AICD   remain in place. Suture line again noted in the upper abdomen       LUNG PARENCHYMA: Normal   TRACHEA/BRONCHI: Normal   PULMONARY VESSELS: Normal   PLEURA: Normal   HEART: Normal   AORTIC SHADOW:Normal.     MEDIASTINUM: Normal   BONE/SOFT TISSUES: No acute abnormality. OTHER: None                 Medications ordered:  Medications   aspirin tablet 325 mg (has no administration in time range)   acetaminophen (TYLENOL) tablet 650 mg (650 mg Oral Given 12/26/21 0923)        ED Course     ED Course:     ED Course as of 12/26/21 1126   Sun Dec 26, 2021   0923 Flu swabs negative for flu A and B.   [SS]   0941 No significant electrolyte derangements. Creatinine is not elevated more than baseline range making YUNI unlikely. No significant transaminitis noted. Normal bilirubin. CPK not suggestive of significant rhabdomyolysis. Lipase is not significantly elevated.     Magnesium within normal limits. Phosphorus level within normal limits. TSH is within normal limits. No concern for hypothyroidism or hyperthyroidism. Lactate mildly elevated at 2.2    Trop 105, no ischemic changes on EKG. Will trend. Will avoid ASA pending CT head given falls. [SS]   1012 Chest x-ray negative, no concern for pulmonary edema, pleural effusion, pneumothorax, or pneumonia. [SS]   1013 The non-contrasted head CT was negative for acute process making acute intracranial bleed unlikely. No evidence of large subacute stroke, ventriculomegaly, or masses. CT C-spine did not show any evidence of acute bony abnormalities. [SS]   1013 COVID  +ve [SS]   1013 CBC does not show any evidence of acute process. Leukocytosis not present to suggest infection. Hemoglobin at baseline without evidence of acute anemia. Platelet count is normal.   [SS]      ED Course User Index  [SS] Blayne Lobato MD       Reassessment / Disposition / Discussion:    Patient to be admitted for NSTEMI and delirium in setting of COVID infection. Final Disposition     Disposition: Condition stable  Admitted to Floor Medical Floor the case was discussed with the admitting physician     ADMISSION: After completion of ED workup and discussion of results and diagnoses with the patient, patient was admitted to the hospital. All the patient's questions were answered. Case was discussed with the receiving team.    ED Procedures & Consultations   Performed by: Roc Rodriguez MD  Procedures     EKG interpretation (Preliminary):  Rhythm: atrial paced rhythm; and regular . Rate (approx.): 64.  Axis: normal;  UT interval: normal;  QRS interval: normal ;  ST/T wave: abnormal: Nonspecific T-Wave inversion present in: V2-V4  Other findings: abnormal EKG: Nonspecific T wave inversion    Diagnosis     Clinical Impression:   1. COVID-19    2. Delirium    3.  NSTEMI (non-ST elevated myocardial infarction) [facility])      Attestations:    Roc Rodriguez MD    Please note that this dictation was completed with [employer]ack, the [employer] voice recognition software. Quite often unanticipated grammatical, syntax, homophones, and other interpretive errors are inadvertently transcribed by the computer software. Please disregard these errors. Please excuse any errors that have escaped final proofreading. Thank you.

## 2021-12-26 NOTE — CONSULTS
Consult requested by: Hebert Bills MD    ADMIT DATE: 12/26/2021  CONSULT DATE: December 26, 2021               Admission diagnosis: <principal problem not specified>   Reason for Nephrology Consultation: Management of ESRD  HPI: Marky Michaud is a 80 y.o. female [address] [facility] with known diagnosis of ESRD for which he receives hemodialysis on MWF . Patient presented to the Ed with c/o fever , and change in mental status . Many of her family members tested + for covid, She was febrile on presentation and tested + for covid . Lactic acid was 2.2  CTH was negative for any acute pathology     She has a H/o DM, Seizures , HTN, hypothyroidism , Depression . Up on exam in the ER , she was comfortably sleeping in bed , woke up spontaneously but was not oriented .   She was not in any apparent distress      Past Medical History:   Diagnosis Date    Anxiety disorder     Arthritis     Atherosclerosis of native arteries of the extremities with ulceration ([address] 8/31/2020    Atrial fibrillation (HCC)     Cardiac pacemaker in situ     Cellulitis and abscess of trunk 8/31/2020    Depression, postpartum     Diabetes ([address]     Heart disease     Heartburn     Hx of long term use of blood thinners     Hypercholesterolemia     Hypertension     Hypothyroidism     Migraines     Peripheral venous insufficiency 8/31/2020    Seizures ([address]     Sleep disorder     Stroke ([address]     2019    Varicose veins of lower extremity with inflammation 8/31/2020      Past Surgical History:   Procedure Laterality Date    HX BACK SURGERY      HX OTHER SURGICAL      leg surgery     HX OTHER SURGICAL      pacemaker monitor     IR INSERT TUNL CVC W/O PORT OVER 5 YR  12/10/2021       Social History     Socioeconomic History    Marital status:      Spouse name: Not on file    Number of children: Not on file    Years of education: Not on file    Highest education level: Not on file   Occupational History  Not on file   Tobacco Use    Smoking status: Never Smoker    Smokeless tobacco: Never Used   Vaping Use    Vaping Use: Never used   Substance and Sexual Activity    Alcohol use: Never    Drug use: Never    Sexual activity: Not on file   Other Topics Concern    Not on file   Social History Narrative    Not on file     Social Determinants of Health     Financial Resource Strain:     Difficulty of Paying Living Expenses: Not on file   Food Insecurity:     Worried About Running Out of Food in the Last Year: Not on file    Janet of Food in the Last Year: Not on file   Transportation Needs:     Lack of Transportation (Medical): Not on file    Lack of Transportation (Non-Medical):  Not on file   Physical Activity:     Days of Exercise per Week: Not on file    Minutes of Exercise per Session: Not on file   Stress:     Feeling of Stress : Not on file   Social Connections:     Frequency of Communication with Friends and Family: Not on file    Frequency of Social Gatherings with Friends and Family: Not on file    Attends Sabianism Services: Not on file    Active Member of Clubs or Organizations: Not on file    Attends Club or Organization Meetings: Not on file    Marital Status: Not on file   Intimate Partner Violence:     Fear of Current or Ex-Partner: Not on file    Emotionally Abused: Not on file    Physically Abused: Not on file    Sexually Abused: Not on file   Housing Stability:     Unable to Pay for Housing in the Last Year: Not on file    Number of [place] in the Last Year: Not on file    Unstable Housing in the Last Year: Not on file       Family History   Problem Relation Age of Onset    Heart Disease Mother     Heart Disease Father     Diabetes Sister     Diabetes Brother      No Known Allergies     Home Medications:   (Not in a hospital admission)      Current Inpatient Medications:     Current Facility-Administered Medications   Medication Dose Route Frequency    atorvastatin (LIPITOR) tablet 40 mg  40 mg Oral QHS    [START ON 12/27/2021] calcitRIOL (ROCALTROL) capsule 0.5 mcg  0.5 mcg Oral DAILY    [START ON 12/27/2021] levothyroxine (SYNTHROID) tablet 150 mcg  150 mcg Oral DAILY    [START ON 12/27/2021] FLUoxetine (PROzac) capsule 10 mg  10 mg Oral DAILY    sevelamer carbonate (RENVELA) tab 800 mg  800 mg Oral TID WITH MEALS    lacosamide (VIMPAT) tablet 200 mg  200 mg Oral BID    sodium chloride (NS) flush 5-40 mL  5-40 mL IntraVENous Q8H    sodium chloride (NS) flush 5-40 mL  5-40 mL IntraVENous PRN    acetaminophen (TYLENOL) tablet 650 mg  650 mg Oral Q6H PRN    Or    acetaminophen (TYLENOL) suppository 650 mg  650 mg Rectal Q6H PRN    polyethylene glycol (MIRALAX) packet 17 g  17 g Oral DAILY PRN    ondansetron (ZOFRAN ODT) tablet 4 mg  4 mg Oral Q8H PRN    Or    ondansetron (ZOFRAN) injection 4 mg  4 mg IntraVENous Q6H PRN    cefTRIAXone (ROCEPHIN) 2 g in sterile water (preservative free) 20 mL IV syringe  2 g IntraVENous Q24H    azithromycin (ZITHROMAX) 500 mg in 0.9% sodium chloride 250 mL (VIAL-MATE)  500 mg IntraVENous Q24H    [START ON 12/27/2021] zinc sulfate (ZINCATE) 50 mg zinc (220 mg) capsule 1 Capsule  1 Capsule Oral DAILY    ascorbic acid (vitamin C) (VITAMIN C) tablet 500 mg  500 mg Oral BID    0.9% sodium chloride infusion  75 mL/hr IntraVENous CONTINUOUS    dexamethasone (DECADRON) 4 mg/mL injection 4 mg  4 mg IntraVENous Q12H    albuterol (PROVENTIL HFA, VENTOLIN HFA, PROAIR HFA) inhaler 2 Puff  2 Puff Inhalation Q6H PRN    [START ON 12/27/2021] aspirin chewable tablet 81 mg  81 mg Oral DAILY    levETIRAcetam (KEPPRA) tablet 250 mg  250 mg Oral BID    benzonatate (TESSALON) capsule 100 mg  100 mg Oral TID PRN    pantoprazole (PROTONIX) tablet 40 mg  40 mg Oral ACB     Current Outpatient Medications   Medication Sig    calcitRIOL (ROCALTROL) 0.5 mcg capsule Take 1 Capsule by mouth daily.     zinc oxide-white petrolatum 17-57 % topical paste Apply  to affected area as needed for Skin Irritation. Indications: skin irritation    ondansetron hcl (Zofran) 4 mg tablet Take 1 Tablet by mouth every eight (8) hours as needed for Nausea or Vomiting.  Vimpat 200 mg tab tablet Take 1 Tablet by mouth two (2) times a day.  ondansetron (Zofran ODT) 4 mg disintegrating tablet 1 Tablet by SubLINGual route every eight (8) hours as needed for Nausea or Vomiting.  atorvastatin (LIPITOR) 40 mg tablet Take 1 tablet by mouth once daily for 90 days    FLUoxetine (PROzac) 10 mg capsule Take 1 Capsule by mouth daily for 270 days.  loperamide (IMODIUM) 2 mg capsule TAKE 1 CAPSULE BY MOUTH SIX TIMES DAILY AS NEEDED FOR 30 DAYS    Euthyrox 150 mcg tablet Take 1 tablet by mouth once daily    levETIRAcetam (KEPPRA XR) 500 mg ER tablet TAKE 2 TABLETS BY MOUTH TWICE DAILY FOR 90 DAYS    sevelamer carbonate (RENVELA) 800 mg tab tab        Review of Systems:   No fever or chills. No sore throat. No cough or hemoptysis. No shortness of breath or chest pain. No orthopnea or paroxysmal nocturnal dyspnea. No nausea, vomiting, abdominal pain, melena or hematochezia. No ankle swelling, no joint paints. No muscle aches. No skin changes. No dizziness or lightheadedness. No headaches. Physical Assessment:     Vitals:    12/26/21 0816 12/26/21 0826   BP: (!) 114/58    Pulse: 62    Resp: 16    Temp: (!) 101.1 °F (38.4 °C)    SpO2: 100% 100%   Weight: 76.7 kg (169 lb)    Height: 5' 6\" (1.676 m)      Last 3 Recorded Weights in this Encounter    12/26/21 0816   Weight: 76.7 kg (169 lb)     Admission weight: Weight: 76.7 kg (169 lb) (12/26/21 0816)    No intake or output data in the 24 hours ending 12/26/21 1452    Patient is in no apparent distress. HEENT: Head is normocephalic and atraumatic. Pupils are round, equal, reactive to light. Sclerae are anicteric. Oropharynx clear. Neck: no cervical lymphadenopathy or thyromegaly.    Lungs: good air entry, clear to auscultation bilaterally. Trachea at the midline. Cardiovascular system: S1, S2, regular rate and rhythm. No murmurs, gallops or rubs. No jvd. Abdomen: soft, non tender, non distended. Positive bowel sounds. No hepatosplenomegaly. No abdominal bruits. Extremities: no clubbing, cyanosis or edema. Strong dorsalis pedis pulses. Brisk capillary refill on the toes bilaterally. Integumentary: skin is grossly intact. Neurologic: Alert, oriented time three. Cooperative and appropriate. No gross motor or sensory deficits. Dialysis access: cuffed tunneled catheter site right. IJ TDC     Data Review:    Labs: Results:       Chemistry Recent Labs     12/26/21  0844   GLU 84      K 4.2      CO2 24   BUN 44*   CREA 4.29*   CA 8.2*   AGAP 10   BUCR 10*   *   TP 5.9*   ALB 2.8*   GLOB 3.1   AGRAT 0.9*   PHOS 4.1         CBC w/Diff Recent Labs     12/26/21  0844 12/25/21  0604   WBC 8.2 10.1   RBC 2.36* 2.41*   HGB 7.3* 7.4*   HCT 23.6* 23.9*   * 118*   GRANS 89*  --    LYMPH 6*  --    EOS 0  --          Iron/Ferritin No results for input(s): IRON in the last 72 hours. No lab exists for component: TIBCCALC   PTH/VIT D No results for input(s): PTH in the last 72 hours. No lab exists for component: VITD            Assessment & Plan     ESRD. Stable electrolytes. Will dialyze tomorrow,  Not volume overloaded on exam     Access site looks good, will monitor carefully for any bleeding during HD tomorrow      Anemia of ESRD. - Epogen 10.000 international units  with HD. Will avoid IV iron because of active infection . Secondary hyperparathyroidism of ESRD. Will check Serum calcium, Phos, PTH levels. Continue Phosphate binders & calcitriol.      - Renal Diet    COVID Pneumonia - started on empiric abxs because of fever,lactic acidosis and leucocytosis . Blood cultures sent from ER . AMS - CTH negative   Likely Metabolic and sepsis related .     Seizure disorder - Vimpat & Keppra resumed,  Renally dosed     Please dose all medications for  creatinine clearance <15/dialysis. Avoid PICC lines on either arm in order to preserve veins for dialysis access creation.

## 2021-12-26 NOTE — H&P
History and Physical    Patient: Koffi Turner MRN: [id number]  SSN: [id number]    YOB: 1950  Age: 80 y.o. Sex: female      Subjective:      Koffi Turner is a 80 y.o. female with a past medical history of heart disease, GERD, hypercholesterolemia, hypothyroidism, seizure disorder, end-stage renal disease on hemodialysis that presented to the emergency room on 12/26/2021 for altered mental status. Of note patient was in the hospital the day prior due to her dialysis catheter bleeding. At that time pressure dressing was placed and bleeding had improved. Family and patient were notified to keep the pressure dressing on and to follow-up with general surgery as an outpatient. However the following day patient became altered, febrile, increased coughing. Multiple family members have tested positive for Covid. Therefore she presented back to the emergency room. Patient is altered and oriented x0. She denies any pain. She does admit to a cough. She has had multiple falls and generalized weakness. In the ED vital signs were significant for fever 101.1 °F and hypotension of 114/58. Laboratory data was significant for a hemoglobin of 7.3, lactic acid 2.2, troponin I 05. Influenza a and B both negative. Rapid Covid positive. Chest x-ray unremarkable. CT of the head showed no acute intracranial abnormality but did show a remote left parietal occipital infarct with generalized atrophy. CT of the cervical spine showed multilevel degenerative disc and generative arthritic changes of the cervical spine with no acute abnormality. Patient was not hypoxic.     Past Medical History:   Diagnosis Date    Anxiety disorder     Arthritis     Atherosclerosis of native arteries of the extremities with ulceration ([address] 8/31/2020    Atrial fibrillation (HCC)     Cardiac pacemaker in situ     Cellulitis and abscess of trunk 8/31/2020    Depression, postpartum     Diabetes ([facility] [address]     Heart disease  Heartburn     Hx of long term use of blood thinners     Hypercholesterolemia     Hypertension     Hypothyroidism     Migraines     Peripheral venous insufficiency 8/31/2020    Se[facility]ures [facility] [address]     Sleep disorder     Stroke ([facility] [address]     2019    Varicose veins of lower extremity with inflammation 8/31/2020     Past Surgical History:   Procedure Laterality Date    HX BACK SURGERY      HX OTHER SURGICAL      leg surgery     HX OTHER SURGICAL      pacemaker monitor     IR INSERT TUNL CVC W/O PORT OVER 5 YR  12/10/2021      Family History   Problem Relation Age of Onset    Heart Disease Mother     Heart Disease Father     Diabetes Sister     Diabetes Brother      Social History     Tobacco Use    Smoking status: Never Smoker    Smokeless tobacco: Never Used   Substance Use Topics    Alcohol use: Never      Prior to Admission medications    Medication Sig Start Date End Date Taking? Authorizing Provider   calcitRIOL (ROCALTROL) 0.5 mcg capsule Take 1 Capsule by mouth daily. 12/12/21   Lana King MD   zinc oxide-white petrolatum 17-57 % topical paste Apply  to affected area as needed for Skin Irritation. Indications: skin irritation 12/11/21   Lana King MD   ondansetron hcl (Zofran) 4 mg tablet Take 1 Tablet by mouth every eight (8) hours as needed for Nausea or Vomiting. 12/11/21   Lana King MD   Vimpat 200 mg tab tablet Take 1 Tablet by mouth two (2) times a day. 11/19/21   Provider, Historical   ondansetron (Zofran ODT) 4 mg disintegrating tablet 1 Tablet by SubLINGual route every eight (8) hours as needed for Nausea or Vomiting. 12/1/21   Arslan Ignacio MD   atorvastatin (LIPITOR) 40 mg tablet Take 1 tablet by mouth once daily for 90 days 11/22/21 8/19/22  Devin Daly MD   FLUoxetine (PROzac) 10 mg capsule Take 1 Capsule by mouth daily for 270 days.  11/5/21 8/2/22  Devin Daly MD   loperamide (IMODIUM) 2 mg capsule TAKE 1 CAPSULE BY MOUTH SIX TIMES DAILY AS NEEDED FOR 30 DAYS 10/17/21   Doug Robins MD   Euthyrox 150 mcg tablet Take 1 tablet by mouth once daily 10/13/21   Abe Arias MD   levETIRAcetam (KEPPRA XR) 500 mg ER tablet TAKE 2 TABLETS BY MOUTH TWICE DAILY FOR 90 DAYS 6/1/21   Provider, Historical   sevelamer carbonate (RENVELA) 800 mg tab tab  5/10/21   Provider, Historical        No Known Allergies    Review of Systems:  Constitutional: ++fevers, No chills, ++fatigue, ++weakness  Eyes: No visual disturbance  Ears, Nose, Mouth, Throat, and Face: No nasal congestion, No sore throat  Respiratory: ++cough, No sputum, No wheezing, No SOB  Cardiovascular: No chest pain, No lower extremity edema, No Palpitations   Gastrointestinal: No nausea, No vomiting, No diarrhea, No constipation, No abdominal pain  Genitourinary: No frequency, No dysuria, No hematuria  Integument/Breast: No rash, No skin lesion(s), No dryness  Musculoskeletal: No arthralgias, No neck pain, No back pain  Neurological: No headaches, No dizziness, +++ confusion,  No seizures  Behavioral/Psychiatric: No anxiety, No depression      Objective:     Vitals:    12/26/21 0816 12/26/21 0826   BP: (!) 114/58    Pulse: 62    Resp: 16    Temp: (!) 101.1 °F (38.4 °C)    SpO2: 100% 100%   Weight: 76.7 kg (169 lb)    Height: 5' 6\" (1.676 m)         Physical Exam:  General: chronically ill appearing, alert, cooperative, no distress  Eye: conjunctivae/corneas clear. PERRL, EOM's intact. Throat and Neck: normal and no erythema or exudates noted. No mass   Lung: clear to auscultation bilaterally  Heart: regular rate and rhythm,   Abdomen: soft, non-tender. Bowel sounds normal. No masses,  Extremities:  able to move all extremities normal, atraumatic  Skin: Normal.  Neurologic: AOx3. Cranial nerves 2-12 and sensation grossly intact. Psychiatric: non focal    XR Results (maximum last 3):   [facility]    XR CHEST PORT    Narrative  HISTORY:  AMS    TECHNIQUE:  XR CHEST PORT    COMPARISON: 12/21/2021  LIMITATIONS: None    TUBES/LINES: Dual-lumen right IJ catheter, right chest wall dual-lead AICD  remain in place. Suture line again noted in the upper abdomen    LUNG PARENCHYMA: Normal  TRACHEA/BRONCHI: Normal  PULMONARY VESSELS: Normal  PLEURA: Normal  HEART: Normal  AORTIC SHADOW:Normal.  MEDIASTINUM: Normal  BONE/SOFT TISSUES: No acute abnormality. OTHER: None    Impression  No acute cardiopulmonary abnormality. .      Results from [place] encounter on 12/21/21    XR CHEST PORT    Narrative  Portable upright radiograph chest 4:45 p.m. compared to December 3, 2021. INDICATION: Port-A-Cath bleeding. Heart size remains enlarged with an atherosclerotic aorta. Right chest wall  pacer remains in place. Interval placement of a right IJ catheter with tips  projecting over the right atrium. No pneumothorax. No gross infiltrate or  effusion. Staple line projects over the region of the gastroesophageal junction. Impression  1. Status post right IJ catheter placement without evidence of complication. Results from [place] encounter on 12/03/21    XR CHEST SNGL V    Narrative  1 view comparison December 1    Impression  The cardiomediastinal silhouette is appropriate for age, technique,  and lung expansion. Pulmonary vasculature is not congested. The lungs are  essentially clear. No effusion or pneumothorax is seen. CT Results (maximum last 3): Results from [place] encounter on 12/26/21    CT SPINE CERV WO CONT    Narrative  HISTORY:  recurrent falls, AMS  Dose reduction technique:   All CT scans at this facility are performed using dose reduction optimization  technique as appropriate on the exam including the following: Automated exposure  control, adjustment of the MA and/or KV according to patient size and/or use of  iterative reconstructive technique. TECHNIQUE: [BRAIN CT without contrast]  COMPARISON: [4 weeks prior]]  LIMITATIONS: [None]    BRAIN: Mild patchy areas of subcortical and periventricular white matter  hypodensity. More focal encephalomalacia of the left parietal-occipital region  as on prior    No acute parenchymal hemorrhage, mass effect or midline shift. VENTRICLES: Mild prominence of the cerebral ventricles commensurate with the  degree of atrophy. EXTRA-AXIAL SPACES/SULCI: Mild generalized cerebral/cerebellar atrophy. No  extra-axial hemorrhage, fluid collection or mass. CALVARIUM/SKULL BASE: Normal    FACE/SINUSES: Visualized portions normal    SOFT TISSUES: Normal    OTHER: None    Impression  No acute intracranial abnormality. Remote left parietal-occipital  infarct. Mild generalized atrophy with nonspecific white matter abnormality that  may indicate chronic small vessel disease. Michael Gutter HISTORY:  recurrent falls, AMS  Dose reduction technique: All CT scans at this facility are performed using dose reduction optimization  technique as appropriate on the exam including the following: Automated exposure  control, adjustment of the MA and/or KV according to patient size and/or use of  iterative reconstructive technique. TECHNIQUE: Noncontrast CT of the cervical spine with multiplanar  reconstructions. COMPARISON: None  LIMITATIONS: None    FRACTURES: None  ALIGNMENT: Normal  VERTEBRAL BODIES: Multilevel degenerative/arthritic changes characterized by  osteophyte formation and associated endplate changes; findings most notable at  C5-6, C7-T1. DISC SPACES: Decreased with multiple posterior disc protrusions some of which  are mildly calcified  POSTERIOR ELEMENTS: Normal for age. SPINAL CANAL: Normal  PARASPINAL SOFT TISSUES: Normal    OTHER: None    IMPRESSION: Multilevel degenerative disc and degenerative arthritic changes of  the cervical spine with no acute abnormality.       CT HEAD WO CONT    Narrative  HISTORY:  recurrent falls, AMS  Dose reduction technique: All CT scans at this facility are performed using dose reduction optimization  technique as appropriate on the exam including the following: Automated exposure  control, adjustment of the MA and/or KV according to patient size and/or use of  iterative reconstructive technique. TECHNIQUE: [BRAIN CT without contrast]  COMPARISON: [4 weeks prior]]  LIMITATIONS: [None]    BRAIN: Mild patchy areas of subcortical and periventricular white matter  hypodensity. More focal encephalomalacia of the left parietal-occipital region  as on prior    No acute parenchymal hemorrhage, mass effect or midline shift. VENTRICLES: Mild prominence of the cerebral ventricles commensurate with the  degree of atrophy. EXTRA-AXIAL SPACES/SULCI: Mild generalized cerebral/cerebellar atrophy. No  extra-axial hemorrhage, fluid collection or mass. CALVARIUM/SKULL BASE: Normal    FACE/SINUSES: Visualized portions normal    SOFT TISSUES: Normal    OTHER: None    Impression  No acute intracranial abnormality. Remote left parietal-occipital  infarct. Mild generalized atrophy with nonspecific white matter abnormality that  may indicate chronic small vessel disease. Aretha Ordonez HISTORY:  recurrent falls, AMS  Dose reduction technique: All CT scans at this facility are performed using dose reduction optimization  technique as appropriate on the exam including the following: Automated exposure  control, adjustment of the MA and/or KV according to patient size and/or use of  iterative reconstructive technique. TECHNIQUE: Noncontrast CT of the cervical spine with multiplanar  reconstructions. COMPARISON: None  LIMITATIONS: None    FRACTURES: None  ALIGNMENT: Normal  VERTEBRAL BODIES: Multilevel degenerative/arthritic changes characterized by  osteophyte formation and associated endplate changes; findings most notable at  C5-6, C7-T1.     DISC SPACES: Decreased with multiple posterior disc protrusions some of which  are mildly calcified  POSTERIOR ELEMENTS: Normal for age. SPINAL CANAL: Normal  PARASPINAL SOFT TISSUES: Normal    OTHER: None    IMPRESSION: Multilevel degenerative disc and degenerative arthritic changes of  the cervical spine with no acute abnormality. Results from [place] encounter on 12/03/21    CT HEAD WO CONT    Narrative  CT dose reduction was achieved through use of a standardized protocol tailored  for this examination and automatic exposure control for dose modulation. CT Head    History:    The sulci are prominent but symmetric. Periventricular and deep white matter  hypodensity is not unexpected for age. Small chronic left PCA infarct, as seen  in December 1. No acute abnormality seen gray or white matter. Ventricles are  symmetric and appropriate for atrophy. There is no midline shift or mass effect,  or evidence of hemorrhage. Bone windows show no fracture. Impression  Age-appropriate atrophy. No acute findings. CBC WITH AUTOMATED DIFF    Collection Time: 12/26/21  8:44 AM   Result Value Ref Range    WBC 8.2 3.6 - 11.0 K/uL    RBC 2.36 (L) 3.80 - 5.20 M/uL    HGB 7.3 (L) 11.5 - 16.0 g/dL    HCT 23.6 (L) 35.0 - 47.0 %    .0 (H) 80.0 - 99.0 FL    MCH 30.9 26.0 - 34.0 PG    MCHC 30.9 30.0 - 36.5 g/dL    RDW 14.4 11.5 - 14.5 %    PLATELET 061 (L) 743 - 400 K/uL    MPV 11.0 8.9 - 12.9 FL    NRBC 0.0 0.0  WBC    ABSOLUTE NRBC 0.00 0.00 - 0.01 K/uL    NEUTROPHILS PENDING %    LYMPHOCYTES PENDING %    MONOCYTES PENDING %    EOSINOPHILS PENDING %    BASOPHILS PENDING %    IMMATURE GRANULOCYTES PENDING %    ABS. NEUTROPHILS PENDING K/UL    ABS. LYMPHOCYTES PENDING K/UL    ABS. MONOCYTES PENDING K/UL    ABS. EOSINOPHILS PENDING K/UL    ABS. BASOPHILS PENDING K/UL    ABS. IMM. GRANS.  PENDING K/UL    DF PENDING      BMP:   Lab Results   Component Value Date/Time     12/26/2021 08:44 AM    K 4.2 12/26/2021 08:44 AM  12/26/2021 08:44 AM    CO2 24 12/26/2021 08:44 AM    AGAP 10 12/26/2021 08:44 AM    GLU 84 12/26/2021 08:44 AM    BUN 44 (H) 12/26/2021 08:44 AM    CREA 4.29 (H) 12/26/2021 08:44 AM    GFRAA 12 (L) 12/26/2021 08:44 AM    GFRNA 10 (L) 12/26/2021 08:44 AM        Assessment:   1. Metabolic encephalopathy  2. Covid PNA without hypoxia at time of admission/lactic acidosis  3. End-stage renal disease/complication due to bleeding  4. Acute on chronic anemia  5. Hypothyroidism  6. Seizure disorder  7. Hyperlipidemia  8. Elevated troponin most likely secondary to #2  9. Generalized weakness      Plan:   1.  CT does not show any acute intracranial abnormality. CT of the cervical spine no acute fracture. Altered mental status most likely secondary to febrile illness due to Covid pneumonia. Continue to monitor  2. Patient is currently not hypoxic. She is rapid Covid positive. Influenza A/B negative. Chest x-ray unremarkable. WBC within normal limits. Due to febrile illness we start IV azithromycin, Rocephin, Decadron, zinc, vitamin C. Tessalon as needed for cough. We will start gentle IV fluids. Lactic acid elevated. Will repeat in a.m. 3.  Consult nephrology for hemodialysis. Will defer to them about access and increased bleeding. 4.  Hemoglobin 7.3. We will check an occult blood, vitamin D, folate, vitamin B12, iron profile. We will transfuse if patient becomes hemodynamically unstable or hemoglobin drops below 7.  5.  TSH within normal limits. On Synthroid  6. Continue with Keppra and Vimpat  7. Patient on statin  8. Troponin elevated. We will cycle 2 more sets. Mostly secondary due to Covid. Continue with aspirin and statin. 9.  PT OT  7.  CBC, BMP, CRP, LDH, ferritin in a.m.     CODE STATUS full code    DVT prophylaxis SCDs  GI prophylaxis Protonix    Total time: 61 min     Signed By: Tr Osorio PA-C     December 26, 2021

## 2021-12-27 LAB
ANION GAP SERPL CALC-SCNC: 8 MMOL/L (ref 5–15)
BASOPHILS # BLD: 0 K/UL (ref 0–0.1)
BASOPHILS NFR BLD: 0 % (ref 0–1)
BUN SERPL-MCNC: 58 MG/DL (ref 6–20)
BUN/CREAT SERPL: 12 (ref 12–20)
CA-I BLD-MCNC: 7.5 MG/DL (ref 8.5–10.1)
CHLORIDE SERPL-SCNC: 108 MMOL/L (ref 97–108)
CO2 SERPL-SCNC: 25 MMOL/L (ref 21–32)
CREAT SERPL-MCNC: 4.85 MG/DL (ref 0.55–1.02)
CRP SERPL-MCNC: 14.2 MG/DL (ref 0–0.6)
DIFFERENTIAL METHOD BLD: ABNORMAL
EOSINOPHIL # BLD: 0 K/UL (ref 0–0.4)
EOSINOPHIL NFR BLD: 0 % (ref 0–7)
ERYTHROCYTE [DISTWIDTH] IN BLOOD BY AUTOMATED COUNT: 14.5 % (ref 11.5–14.5)
GLUCOSE SERPL-MCNC: 96 MG/DL (ref 65–100)
HCT VFR BLD AUTO: 18.8 % (ref 35–47)
HGB BLD-MCNC: 5.7 G/DL (ref 11.5–16)
IMM GRANULOCYTES # BLD AUTO: 0 K/UL
IMM GRANULOCYTES NFR BLD AUTO: 0 %
LACTATE SERPL-SCNC: 0.8 MMOL/L (ref 0.4–2)
LDH SERPL L TO P-CCNC: 114 U/L (ref 81–246)
LYMPHOCYTES # BLD: 0.4 K/UL (ref 0.8–3.5)
LYMPHOCYTES NFR BLD: 8 % (ref 12–49)
MCH RBC QN AUTO: 30.6 PG (ref 26–34)
MCHC RBC AUTO-ENTMCNC: 30.3 G/DL (ref 30–36.5)
MCV RBC AUTO: 101.1 FL (ref 80–99)
MONOCYTES # BLD: 0.4 K/UL (ref 0–1)
MONOCYTES NFR BLD: 9 % (ref 5–13)
NEUTS SEG # BLD: 3.9 K/UL (ref 1.8–8)
NEUTS SEG NFR BLD: 83 % (ref 32–75)
NRBC # BLD: 0 K/UL (ref 0–0.01)
NRBC BLD-RTO: 0 PER 100 WBC
PLATELET # BLD AUTO: 109 K/UL (ref 150–400)
PMV BLD AUTO: 11.3 FL (ref 8.9–12.9)
POTASSIUM SERPL-SCNC: 4.4 MMOL/L (ref 3.5–5.1)
PROCALCITONIN SERPL-MCNC: 2.04 NG/ML
RBC # BLD AUTO: 1.86 M/UL (ref 3.8–5.2)
RBC MORPH BLD: ABNORMAL
RBC MORPH BLD: ABNORMAL
SODIUM SERPL-SCNC: 141 MMOL/L (ref 136–145)
WBC # BLD AUTO: 4.7 K/UL (ref 3.6–11)

## 2021-12-27 PROCEDURE — 83615 LACTATE (LD) (LDH) ENZYME: CPT

## 2021-12-27 PROCEDURE — 82728 ASSAY OF FERRITIN: CPT

## 2021-12-27 PROCEDURE — 74011000250 HC RX REV CODE- 250: Performed by: PHYSICIAN ASSISTANT

## 2021-12-27 PROCEDURE — 74011250636 HC RX REV CODE- 250/636: Performed by: INTERNAL MEDICINE

## 2021-12-27 PROCEDURE — 80048 BASIC METABOLIC PNL TOTAL CA: CPT

## 2021-12-27 PROCEDURE — 65270000029 HC RM PRIVATE

## 2021-12-27 PROCEDURE — 87641 MR-STAPH DNA AMP PROBE: CPT

## 2021-12-27 PROCEDURE — 36415 COLL VENOUS BLD VENIPUNCTURE: CPT

## 2021-12-27 PROCEDURE — 74011250637 HC RX REV CODE- 250/637: Performed by: PHYSICIAN ASSISTANT

## 2021-12-27 PROCEDURE — P9016 RBC LEUKOCYTES REDUCED: HCPCS

## 2021-12-27 PROCEDURE — 86900 BLOOD TYPING SEROLOGIC ABO: CPT

## 2021-12-27 PROCEDURE — 86923 COMPATIBILITY TEST ELECTRIC: CPT

## 2021-12-27 PROCEDURE — 74011250636 HC RX REV CODE- 250/636: Performed by: PHYSICIAN ASSISTANT

## 2021-12-27 PROCEDURE — 84145 PROCALCITONIN (PCT): CPT

## 2021-12-27 PROCEDURE — 86140 C-REACTIVE PROTEIN: CPT

## 2021-12-27 PROCEDURE — 83605 ASSAY OF LACTIC ACID: CPT

## 2021-12-27 PROCEDURE — 85025 COMPLETE CBC W/AUTO DIFF WBC: CPT

## 2021-12-27 PROCEDURE — 90935 HEMODIALYSIS ONE EVALUATION: CPT

## 2021-12-27 RX ORDER — SODIUM CHLORIDE 9 MG/ML
250 INJECTION, SOLUTION INTRAVENOUS ONCE
Status: DISPENSED | OUTPATIENT
Start: 2021-12-27 | End: 2021-12-27

## 2021-12-27 RX ORDER — HEPARIN SODIUM 1000 [USP'U]/ML
3600 INJECTION, SOLUTION INTRAVENOUS; SUBCUTANEOUS
Status: DISCONTINUED | OUTPATIENT
Start: 2021-12-27 | End: 2022-01-03 | Stop reason: HOSPADM

## 2021-12-27 RX ADMIN — SODIUM CHLORIDE, PRESERVATIVE FREE 10 ML: 5 INJECTION INTRAVENOUS at 22:03

## 2021-12-27 RX ADMIN — HEPARIN SODIUM 3600 UNITS: 1000 INJECTION, SOLUTION INTRAVENOUS; SUBCUTANEOUS at 16:00

## 2021-12-27 RX ADMIN — DEXAMETHASONE SODIUM PHOSPHATE 4 MG: 4 INJECTION, SOLUTION INTRA-ARTICULAR; INTRALESIONAL; INTRAMUSCULAR; INTRAVENOUS; SOFT TISSUE at 09:50

## 2021-12-27 RX ADMIN — SODIUM CHLORIDE 1750 MG: 9 INJECTION, SOLUTION INTRAVENOUS at 21:59

## 2021-12-27 RX ADMIN — OXYCODONE HYDROCHLORIDE AND ACETAMINOPHEN 500 MG: 500 TABLET ORAL at 09:49

## 2021-12-27 RX ADMIN — OXYCODONE HYDROCHLORIDE AND ACETAMINOPHEN 500 MG: 500 TABLET ORAL at 21:58

## 2021-12-27 RX ADMIN — LEVETIRACETAM 250 MG: 250 TABLET, FILM COATED ORAL at 21:58

## 2021-12-27 RX ADMIN — SODIUM CHLORIDE, PRESERVATIVE FREE 10 ML: 5 INJECTION INTRAVENOUS at 09:59

## 2021-12-27 RX ADMIN — PANTOPRAZOLE SODIUM 40 MG: 40 TABLET, DELAYED RELEASE ORAL at 09:49

## 2021-12-27 RX ADMIN — SEVELAMER CARBONATE 800 MG: 800 TABLET, FILM COATED ORAL at 16:53

## 2021-12-27 RX ADMIN — LACOSAMIDE 200 MG: 100 TABLET, FILM COATED ORAL at 21:58

## 2021-12-27 RX ADMIN — CALCITRIOL CAPSULES 0.25 MCG 0.5 MCG: 0.25 CAPSULE ORAL at 09:49

## 2021-12-27 RX ADMIN — DEXAMETHASONE SODIUM PHOSPHATE 4 MG: 4 INJECTION, SOLUTION INTRA-ARTICULAR; INTRALESIONAL; INTRAMUSCULAR; INTRAVENOUS; SOFT TISSUE at 21:58

## 2021-12-27 RX ADMIN — WATER 2 G: 1 INJECTION INTRAMUSCULAR; INTRAVENOUS; SUBCUTANEOUS at 16:54

## 2021-12-27 RX ADMIN — AZITHROMYCIN DIHYDRATE 500 MG: 500 INJECTION, POWDER, LYOPHILIZED, FOR SOLUTION INTRAVENOUS at 16:53

## 2021-12-27 RX ADMIN — ZINC SULFATE 220 MG (50 MG) CAPSULE 1 CAPSULE: CAPSULE at 09:49

## 2021-12-27 RX ADMIN — ATORVASTATIN CALCIUM 40 MG: 40 TABLET, FILM COATED ORAL at 21:58

## 2021-12-27 RX ADMIN — SODIUM CHLORIDE, PRESERVATIVE FREE 10 ML: 5 INJECTION INTRAVENOUS at 16:55

## 2021-12-27 RX ADMIN — SEVELAMER CARBONATE 800 MG: 800 TABLET, FILM COATED ORAL at 09:49

## 2021-12-27 RX ADMIN — LEVOTHYROXINE SODIUM 150 MCG: 0.03 TABLET ORAL at 09:49

## 2021-12-27 RX ADMIN — EPOETIN ALFA-EPBX 10000 UNITS: 10000 INJECTION, SOLUTION INTRAVENOUS; SUBCUTANEOUS at 15:58

## 2021-12-27 RX ADMIN — LACOSAMIDE 200 MG: 100 TABLET, FILM COATED ORAL at 09:49

## 2021-12-27 RX ADMIN — ASPIRIN 81 MG CHEWABLE TABLET 81 MG: 81 TABLET CHEWABLE at 09:49

## 2021-12-27 RX ADMIN — LEVETIRACETAM 250 MG: 250 TABLET, FILM COATED ORAL at 09:49

## 2021-12-27 RX ADMIN — FLUOXETINE 10 MG: 10 CAPSULE ORAL at 09:50

## 2021-12-27 NOTE — PROGRESS NOTES
Consult requested by: Kamla Reeves MD    ADMIT DATE: 12/26/2021  CONSULT DATE: December 27, 2021           Patient is seen in the room  Her hemoglobin is 5.7  Plan to transfuse 2 units of packed RBCs with dialysis     Past Medical History:   Diagnosis Date    Anxiety disorder     Arthritis     Atherosclerosis of native arteries of the extremities with ulceration ([facility] 8/31/2020    Atrial fibrillation ([address]     Cardiac pacemaker in situ     Cellulitis and abscess of trunk 8/31/2020    Depression, postpartum     Diabetes ([address]     Heart disease     Heartburn     Hx of long term use of blood thinners     Hypercholesterolemia     Hypertension     Hypothyroidism     Migraines     Peripheral venous insufficiency 8/31/2020    Seizures ([address]     Sleep disorder     Stroke ([facility]     2019    Varicose veins of lower extremity with inflammation 8/31/2020      Past Surgical History:   Procedure Laterality Date    HX BACK SURGERY      HX OTHER SURGICAL      leg surgery     HX OTHER SURGICAL      pacemaker monitor     IR INSERT TUNL CVC W/O PORT OVER 5 YR  12/10/2021       Social History     Socioeconomic History    Marital status:      Spouse name: Not on file    Number of children: Not on file    Years of education: Not on file    Highest education level: Not on file   Occupational History    Not on file   Tobacco Use    Smoking status: Never Smoker    Smokeless tobacco: Never Used   Vaping Use    Vaping Use: Never used   Substance and Sexual Activity    Alcohol use: Never    Drug use: Never    Sexual activity: Not on file   Other Topics Concern    Not on file   Social History Narrative    Not on file     Social Determinants of Health     Financial Resource Strain:     Difficulty of Paying Living Expenses: Not on file   Food Insecurity:     Worried About Running Out of Food in the Last Year: Not on file    Janet of Food in the Last Year: Not on HElvia Almanza Needs:     Lack of Transportation (Medical): Not on file    Lack of Transportation (Non-Medical): Not on file   Physical Activity:     Days of Exercise per Week: Not on file    Minutes of Exercise per Session: Not on file   Stress:     Feeling of Stress : Not on file   Social Connections:     Frequency of Communication with Friends and Family: Not on file    Frequency of Social Gatherings with Friends and Family: Not on file    Attends Alevism Services: Not on file    Active Member of [address] [place] [address] or Organizations: Not on file    Attends Club or Organization Meetings: Not on file    Marital Status: Not on file   Intimate Partner Violence:     Fear of Current or Ex-Partner: Not on file    Emotionally Abused: Not on file    Physically Abused: Not on file    Sexually Abused: Not on file   Housing Stability:     Unable to Pay for Housing in the Last Year: Not on file    Number of [place] in the Last Year: Not on file    Unstable Housing in the Last Year: Not on file       Family History   Problem Relation Age of Onset    Heart Disease Mother     Heart Disease Father     Diabetes Sister     Diabetes Brother      No Known Allergies     Home Medications:     Medications Prior to Admission   Medication Sig    calcitRIOL (ROCALTROL) 0.5 mcg capsule Take 1 Capsule by mouth daily.  zinc oxide-white petrolatum 17-57 % topical paste Apply  to affected area as needed for Skin Irritation. Indications: skin irritation    ondansetron hcl (Zofran) 4 mg tablet Take 1 Tablet by mouth every eight (8) hours as needed for Nausea or Vomiting.  Vimpat 200 mg tab tablet Take 1 Tablet by mouth two (2) times a day.  ondansetron (Zofran ODT) 4 mg disintegrating tablet 1 Tablet by SubLINGual route every eight (8) hours as needed for Nausea or Vomiting.     atorvastatin (LIPITOR) 40 mg tablet Take 1 tablet by mouth once daily for 90 days    FLUoxetine (PROzac) 10 mg capsule Take 1 Capsule by mouth daily for 270 days.    loperamide (IMODIUM) 2 mg capsule TAKE 1 CAPSULE BY MOUTH SIX TIMES DAILY AS NEEDED FOR 30 DAYS    Euthyrox 150 mcg tablet Take 1 tablet by mouth once daily    levETIRAcetam (KEPPRA XR) 500 mg ER tablet TAKE 2 TABLETS BY MOUTH TWICE DAILY FOR 90 DAYS    sevelamer carbonate (RENVELA) 800 mg tab tab        Current Inpatient Medications:     Current Facility-Administered Medications   Medication Dose Route Frequency    0.9% sodium chloride infusion 250 mL  250 mL IntraVENous ONCE    heparin (porcine) 1,000 unit/mL injection 3,600 Units  3,600 Units IntraVENous DIALYSIS PRN    [START ON 12/29/2021] Vancomycin - Random level to be drawn pre-HD 12/29   Other ONCE    vancomycin (VANCOCIN) 1,750 mg in 0.9% sodium chloride 500 mL IVPB  1,750 mg IntraVENous ONCE    atorvastatin (LIPITOR) tablet 40 mg  40 mg Oral QHS    calcitRIOL (ROCALTROL) capsule 0.5 mcg  0.5 mcg Oral DAILY    levothyroxine (SYNTHROID) tablet 150 mcg  150 mcg Oral DAILY    FLUoxetine (PROzac) capsule 10 mg  10 mg Oral DAILY    sevelamer carbonate (RENVELA) tab 800 mg  800 mg Oral TID WITH MEALS    lacosamide (VIMPAT) tablet 200 mg  200 mg Oral BID    sodium chloride (NS) flush 5-40 mL  5-40 mL IntraVENous Q8H    sodium chloride (NS) flush 5-40 mL  5-40 mL IntraVENous PRN    acetaminophen (TYLENOL) tablet 650 mg  650 mg Oral Q6H PRN    Or    acetaminophen (TYLENOL) suppository 650 mg  650 mg Rectal Q6H PRN    polyethylene glycol (MIRALAX) packet 17 g  17 g Oral DAILY PRN    ondansetron (ZOFRAN ODT) tablet 4 mg  4 mg Oral Q8H PRN    Or    ondansetron (ZOFRAN) injection 4 mg  4 mg IntraVENous Q6H PRN    cefTRIAXone (ROCEPHIN) 2 g in sterile water (preservative free) 20 mL IV syringe  2 g IntraVENous Q24H    azithromycin (ZITHROMAX) 500 mg in 0.9% sodium chloride 250 mL (VIAL-MATE)  500 mg IntraVENous Q24H    zinc sulfate (ZINCATE) 50 mg zinc (220 mg) capsule 1 Capsule  1 Capsule Oral DAILY    ascorbic acid (vitamin C) (VITAMIN C) tablet 500 mg  500 mg Oral BID    0.9% sodium chloride infusion  75 mL/hr IntraVENous CONTINUOUS    dexamethasone (DECADRON) 4 mg/mL injection 4 mg  4 mg IntraVENous Q12H    albuterol (PROVENTIL HFA, VENTOLIN HFA, PROAIR HFA) inhaler 2 Puff  2 Puff Inhalation Q6H PRN    aspirin chewable tablet 81 mg  81 mg Oral DAILY    levETIRAcetam (KEPPRA) tablet 250 mg  250 mg Oral BID    benzonatate (TESSALON) capsule 100 mg  100 mg Oral TID PRN    pantoprazole (PROTONIX) tablet 40 mg  40 mg Oral ACB    epoetin anjelica-epbx (RETACRIT) injection 10,000 Units  10,000 Units SubCUTAneous Q MON, WED & FRI       Review of Systems:   No fever or chills. No sore throat. No cough or hemoptysis. No shortness of breath or chest pain. No orthopnea or paroxysmal nocturnal dyspnea. No nausea, vomiting, abdominal pain, melena or hematochezia. No ankle swelling, no joint paints. No muscle aches. No skin changes. No dizziness or lightheadedness. No headaches. Physical Assessment:     Vitals:    12/27/21 1450 12/27/21 1505 12/27/21 1523 12/27/21 1538   BP: 114/61 121/63 128/67 137/72   Pulse:  60     Resp:  14 16 16   Temp: 97.8 °F (36.6 °C) 97.8 °F (36.6 °C) 97.8 °F (36.6 °C) 97.8 °F (36.6 °C)   TempSrc:  Axillary     SpO2:       Weight:       Height:         Last 3 Recorded Weights in this Encounter    12/26/21 0816   Weight: 76.7 kg (169 lb)     Admission weight: Weight: 76.7 kg (169 lb) (12/26/21 0816)      Intake/Output Summary (Last 24 hours) at 12/27/2021 1603  Last data filed at 12/27/2021 1538  Gross per 24 hour   Intake 1180 ml   Output 150 ml   Net 1030 ml       Patient is in no apparent distress. HEENT: Head is normocephalic and atraumatic. Pupils are round, equal, reactive to light. Sclerae are anicteric. Oropharynx clear. Neck: no cervical lymphadenopathy or thyromegaly. Lungs: good air entry, clear to auscultation bilaterally. Trachea at the midline.    Cardiovascular system: S1, S2, regular rate and rhythm. No murmurs, gallops or rubs. No jvd. Abdomen: soft, non tender, non distended. Positive bowel sounds. No hepatosplenomegaly. No abdominal bruits. Extremities: no clubbing, cyanosis or edema. Strong dorsalis pedis pulses. Brisk capillary refill on the toes bilaterally. Integumentary: skin is grossly intact. Neurologic: Alert, oriented time three. Cooperative and appropriate. No gross motor or sensory deficits. Dialysis access: cuffed tunneled catheter site right. [facility]     Data Review:    Labs: Results:       Chemistry Recent Labs     12/27/21  0815 12/26/21  0844   GLU 96 84    139   K 4.4 4.2    105   CO2 25 24   BUN 58* 44*   CREA 4.85* 4.29*   CA 7.5* 8.2*   AGAP 8 10   BUCR 12 10*   AP  --  120*   TP  --  5.9*   ALB  --  2.8*   GLOB  --  3.1   AGRAT  --  0.9*   PHOS  --  4.1         CBC w/Diff Recent Labs     12/27/21  0758 12/26/21  0844 12/25/21  0604   WBC 4.7 8.2 10.1   RBC 1.86* 2.36* 2.41*   HGB 5.7* 7.3* 7.4*   HCT 18.8* 23.6* 23.9*   * 118* 118*   GRANS 83* 89*  --    LYMPH 8* 6*  --    EOS 0 0  --          Iron/Ferritin No results for input(s): IRON in the last 72 hours. No lab exists for component: TIBCCALC   PTH/VIT D No results for input(s): PTH in the last 72 hours. No lab exists for component: VITD            Assessment & Plan     1-ESRD:  -HD MWF at [facility], [address] [place] [address]  -No volume overload/uremia  -plan for HD today   -RI perm cath as active dialysis access  -BORA AVF in place also    2-Anemia:  -Hb 5.7  -No obvious bleeding  -plan for blood Tx 2 units with HD today  -iv Epo with HD  -r/o GIB       3-Secondary hyperparathyroidism of ESRD. -cont sevelamer 800 mg tid and calcitriol 0.5 mcg daily       - Renal Diet    4-COVID Pneumonia   -- started on empiric abxs because of fever,lactic acidosis and leucocytosis . 5-AMS - CTH negative   Likely Metabolic and sepsis related .     6- Seizure disorder - Vimpat & Keppra resumed,  Renally dosed

## 2021-12-27 NOTE — PROGRESS NOTES
PT eval order received and acknowledged. PT eval attempted at 1510 however pt hgb 5.7, evaluation held at this time. Will continue to follow patient and attempt PT eval at a later time. Thank you.

## 2021-12-27 NOTE — PROGRESS NOTES
Hospitalist Progress Note               Daily Progress Note: 12/27/2021      Subjective: The patient is seen for follow up. 80 y.o. female with a past medical history of heart disease, GERD, hypercholesterolemia, hypothyroidism, seizure disorder, end-stage renal disease on hemodialysis that presented to the emergency room on 12/26/2021 for altered mental status. Of note patient was in the hospital the day prior due to her dialysis catheter bleeding. At that time pressure dressing was placed and bleeding had improved. Family and patient were notified to keep the pressure dressing on and to follow-up with general surgery as an outpatient. However the following day patient became altered, febrile, increased coughing. Multiple family members have tested positive for Covid. Therefore she presented back to the emergency room. Patient is altered and oriented x0. She denies any pain. She does admit to a cough. She has had multiple falls and generalized weakness. In the ED vital signs were significant for fever 101.1 °F and hypotension of 114/58. Laboratory data was significant for a hemoglobin of 7.3, lactic acid 2.2, troponin I 05. Influenza a and B both negative. Rapid Covid positive. Chest x-ray unremarkable. CT of the head showed no acute intracranial abnormality but did show a remote left parietal occipital infarct with generalized atrophy. CT of the cervical spine showed multilevel degenerative disc and generative arthritic changes of the cervical spine with no acute abnormality. Patient was not hypoxic. Patient was admitted to medical telemetry. She was started on Zithromax and ceftriaxone along with Decadron. --------    Patient is seen for follow-up. Hemoglobin this morning is down to 5.7.  2 units of blood have been ordered. She is due for dialysis today. Labs show a CRP of 14.2, .   Creatinine today is 4.8    Problem List:  Problem List as of 12/27/2021 Date Reviewed: 12/9/2021          Codes Class Noted - Resolved    AMS (altered mental status) ICD-10-CM: R41.82  ICD-9-CM: 780.97  12/26/2021 - Present        COVID ICD-10-CM: U07.1  ICD-9-CM: 079.89  12/26/2021 - Present        Fluid overload ICD-10-CM: E87.70  ICD-9-CM: 276.69  12/5/2021 - Present        Left-sided weakness ICD-10-CM: R53.1  ICD-9-CM: 728.87  12/3/2021 - Present        Intractable nausea and vomiting ICD-10-CM: R11.2  ICD-9-CM: 536.2  12/3/2021 - Present        Hypoglycemia ICD-10-CM: E16.2  ICD-9-CM: 251.2  6/11/2021 - Present        Breakthrough seizure ([facility] ICD-10-CM: E15.206  ICD-9-CM: 345.91  6/11/2021 - Present        SSS (sick sinus syndrome) ([facility] ICD-10-CM: I49.5  ICD-9-CM: 427.81  6/11/2021 - Present        Seizure ([facility] ICD-10-CM: R56.9  ICD-9-CM: 780.39  5/19/2021 - Present        Hyperkalemia ICD-10-CM: E87.5  ICD-9-CM: 276.7  10/16/2020 - Present        Atherosclerosis of native arteries of the extremities with ulceration ([facility] ICD-10-CM: I70.25  ICD-9-CM: 440.23, 707.9  8/31/2020 - Present        Cellulitis and abscess of trunk ICD-10-CM: L03.319, L02.219  ICD-9-CM: 682.2  8/31/2020 - Present        Peripheral venous insufficiency ICD-10-CM: I87.2  ICD-9-CM: 459.81  8/31/2020 - Present        Varicose veins of lower extremity with inflammation ICD-10-CM: I83.10  ICD-9-CM: 454.1  8/31/2020 - Present        Peripheral vascular disease ([facility] ICD-10-CM: I73.9  ICD-9-CM: 443.9  7/3/2020 - Present        Type II diabetes mellitus ([facility] ICD-10-CM: E11.9  ICD-9-CM: 250.00  7/3/2020 - Present        Long term (current) use of antibiotics ICD-10-CM: Z79.2  ICD-9-CM: V58.62  7/3/2020 - Present        Atrial fibrillation (HCC) ICD-10-CM: I48.91  ICD-9-CM: 427.31  7/3/2020 - Present        Cardiac pacemaker in situ ICD-10-CM: Z95.0  ICD-9-CM: V45.01  7/3/2020 - Present        Carpal tunnel syndrome of right wrist ICD-10-CM: G56.01  ICD-9-CM: 354.0  7/3/2020 - Present        Chest wall pain ICD-10-CM: R07.89  ICD-9-CM: 786.52  7/3/2020 - Present        Chronic diarrhea ICD-10-CM: K52.9  ICD-9-CM: 787.91  7/3/2020 - Present        Chronic neck pain ICD-10-CM: M54.2, G89.29  ICD-9-CM: 723.1, 338.29  7/3/2020 - Present        End stage renal failure on dialysis [facility]) ICD-10-CM: N18.6, Z99.2  ICD-9-CM: 585.6, V45.11  7/3/2020 - Present        History of CVA (cerebrovascular accident) ICD-10-CM: Z86.73  ICD-9-CM: V12.54  7/3/2020 - Present        Hyponatremia ICD-10-CM: E87.1  ICD-9-CM: 276.1  7/3/2020 - Present        Acquired hypothyroidism ICD-10-CM: E03.9  ICD-9-CM: 244.9  7/3/2020 - Present        Seizure disorder ([facility] ICD-10-CM: G40.909  ICD-9-CM: 345.90  7/3/2020 - Present        Recurrent falls ICD-10-CM: R29.6  ICD-9-CM: V15.88  7/3/2020 - Present        RESOLVED: Disorder due to well controlled type 2 diabetes mellitus ([facility] ICD-10-CM: E11.8  ICD-9-CM: 250.90  8/31/2020 - 6/11/2021              Medications reviewed  Current Facility-Administered Medications   Medication Dose Route Frequency    0.9% sodium chloride infusion 250 mL  250 mL IntraVENous ONCE    atorvastatin (LIPITOR) tablet 40 mg  40 mg Oral QHS    calcitRIOL (ROCALTROL) capsule 0.5 mcg  0.5 mcg Oral DAILY    levothyroxine (SYNTHROID) tablet 150 mcg  150 mcg Oral DAILY    FLUoxetine (PROzac) capsule 10 mg  10 mg Oral DAILY    sevelamer carbonate (RENVELA) tab 800 mg  800 mg Oral TID WITH MEALS    lacosamide (VIMPAT) tablet 200 mg  200 mg Oral BID    sodium chloride (NS) flush 5-40 mL  5-40 mL IntraVENous Q8H    sodium chloride (NS) flush 5-40 mL  5-40 mL IntraVENous PRN    acetaminophen (TYLENOL) tablet 650 mg  650 mg Oral Q6H PRN    Or    acetaminophen (TYLENOL) suppository 650 mg  650 mg Rectal Q6H PRN    polyethylene glycol (MIRALAX) packet 17 g  17 g Oral DAILY PRN    ondansetron (ZOFRAN ODT) tablet 4 mg  4 mg Oral Q8H PRN    Or    ondansetron (ZOFRAN) injection 4 mg  4 mg IntraVENous Q6H PRN    cefTRIAXone (ROCEPHIN) 2 g in sterile water (preservative free) 20 mL IV syringe  2 g IntraVENous Q24H    azithromycin (ZITHROMAX) 500 mg in 0.9% sodium chloride 250 mL (VIAL-MATE)  500 mg IntraVENous Q24H    zinc sulfate (ZINCATE) 50 mg zinc (220 mg) capsule 1 Capsule  1 Capsule Oral DAILY    ascorbic acid (vitamin C) (VITAMIN C) tablet 500 mg  500 mg Oral BID    0.9% sodium chloride infusion  75 mL/hr IntraVENous CONTINUOUS    dexamethasone (DECADRON) 4 mg/mL injection 4 mg  4 mg IntraVENous Q12H    albuterol (PROVENTIL HFA, VENTOLIN HFA, PROAIR HFA) inhaler 2 Puff  2 Puff Inhalation Q6H PRN    aspirin chewable tablet 81 mg  81 mg Oral DAILY    levETIRAcetam (KEPPRA) tablet 250 mg  250 mg Oral BID    benzonatate (TESSALON) capsule 100 mg  100 mg Oral TID PRN    pantoprazole (PROTONIX) tablet 40 mg  40 mg Oral ACB    epoetin anjelica-epbx (RETACRIT) injection 10,000 Units  10,000 Units SubCUTAneous Q MON, WED & FRI       Review of Systems:   A comprehensive review of systems was negative except for that written in the HPI. Objective:   Physical Exam:     Visit Vitals  BP (!) 108/59 (BP 1 Location: Left upper arm, BP Patient Position: At rest)   Pulse 60   Temp 98.2 °F (36.8 °C)   Resp 18   Ht 5' 6\" (1.676 m)   Wt 76.7 kg (169 lb)   SpO2 100%   BMI 27.28 kg/m²      O2 Device: None (Room air)    Temp (24hrs), Av.8 °F (37.1 °C), Min:98.2 °F (36.8 °C), Max:99.2 °F (37.3 °C)    701 - 1900  In: 30 [P.O.:30]  Out: -    1901 -  0700  In: 250 [I.V.:250]  Out: -     General:   Awake and alert appears frail   Lungs:   Clear to auscultation bilaterally. Chest wall:  No tenderness or deformity. Heart:  Regular rate and rhythm, S1, S2 normal, no murmur, click, rub or gallop. Abdomen:   Soft, non-tender. Bowel sounds normal. No masses,  No organomegaly. Extremities: Extremities normal, atraumatic, no cyanosis or edema. Pulses: 2+ and symmetric all extremities.    Skin: Skin color, texture, turgor normal. No rashes or lesions   Neurologic: CNII-XII intact. No gross focal deficits         Data Review:       Recent Days:  Recent Labs     12/27/21  0758 12/26/21  0844 12/25/21  0604   WBC 4.7 8.2 10.1   HGB 5.7* 7.3* 7.4*   HCT 18.8* 23.6* 23.9*   * 118* 118*     Recent Labs     12/27/21  0815 12/26/21  0844    139   K 4.4 4.2    105   CO2 25 24   GLU 96 84   BUN 58* 44*   CREA 4.85* 4.29*   CA 7.5* 8.2*   MG  --  1.9   PHOS  --  4.1   ALB  --  2.8*   TBILI  --  0.9   ALT  --  18   INR  --  1.1     No results for input(s): PH, PCO2, PO2, HCO3, FIO2 in the last 72 hours. 24 Hour Results:  Recent Results (from the past 24 hour(s))   CBC WITH AUTOMATED DIFF    Collection Time: 12/27/21  7:58 AM   Result Value Ref Range    WBC 4.7 3.6 - 11.0 K/uL    RBC 1.86 (L) 3.80 - 5.20 M/uL    HGB 5.7 (LL) 11.5 - 16.0 g/dL    HCT 18.8 (L) 35.0 - 47.0 %    .1 (H) 80.0 - 99.0 FL    MCH 30.6 26.0 - 34.0 PG    MCHC 30.3 30.0 - 36.5 g/dL    RDW 14.5 11.5 - 14.5 %    PLATELET 093 (L) 039 - 400 K/uL    MPV 11.3 8.9 - 12.9 FL    NRBC 0.0 0.0  WBC    ABSOLUTE NRBC 0.00 0.00 - 0.01 K/uL    NEUTROPHILS PENDING %    LYMPHOCYTES PENDING %    MONOCYTES PENDING %    EOSINOPHILS PENDING %    BASOPHILS PENDING %    IMMATURE GRANULOCYTES PENDING %    ABS. NEUTROPHILS PENDING K/UL    ABS. LYMPHOCYTES PENDING K/UL    ABS. MONOCYTES PENDING K/UL    ABS. EOSINOPHILS PENDING K/UL    ABS. BASOPHILS PENDING K/UL    ABS. IMM. GRANS.  PENDING K/UL    DF PENDING    METABOLIC PANEL, BASIC    Collection Time: 12/27/21  8:15 AM   Result Value Ref Range    Sodium 141 136 - 145 mmol/L    Potassium 4.4 3.5 - 5.1 mmol/L    Chloride 108 97 - 108 mmol/L    CO2 25 21 - 32 mmol/L    Anion gap 8 5 - 15 mmol/L    Glucose 96 65 - 100 mg/dL    BUN 58 (H) 6 - 20 mg/dL    Creatinine 4.85 (H) 0.55 - 1.02 mg/dL    BUN/Creatinine ratio 12 12 - 20      GFR est AA 11 (L) >60 ml/min/1.73m2    GFR est non-AA 9 (L) >60 ml/min/1.73m2 Calcium 7.5 (L) 8.5 - 10.1 mg/dL   C REACTIVE PROTEIN, QT    Collection Time: 12/27/21  8:15 AM   Result Value Ref Range    C-Reactive protein 14.20 (H) 0.00 - 0.60 mg/dL   LD    Collection Time: 12/27/21  8:15 AM   Result Value Ref Range     81 - 246 U/L       XR CHEST PORT   Final Result   No acute cardiopulmonary abnormality. .       CT HEAD WO CONT   Final Result   No acute intracranial abnormality. Remote left parietal-occipital   infarct. Mild generalized atrophy with nonspecific white matter abnormality that   may indicate chronic small vessel disease. [place] HISTORY:  recurrent falls, AMS   Dose reduction technique: All CT scans at this facility are performed using dose reduction optimization   technique as appropriate on the exam including the following: Automated exposure   control, adjustment of the MA and/or KV according to patient size and/or use of   iterative reconstructive technique. TECHNIQUE: Noncontrast CT of the cervical spine with multiplanar   reconstructions. COMPARISON: None   LIMITATIONS: None      FRACTURES: None   ALIGNMENT: Normal   VERTEBRAL BODIES: Multilevel degenerative/arthritic changes characterized by   osteophyte formation and associated endplate changes; findings most notable at   C5-6, C7-T1. DISC SPACES: Decreased with multiple posterior disc protrusions some of which   are mildly calcified   POSTERIOR ELEMENTS: Normal for age. SPINAL CANAL: Normal   PARASPINAL SOFT TISSUES: Normal      OTHER: None      IMPRESSION: Multilevel degenerative disc and degenerative arthritic changes of   the cervical spine with no acute abnormality. CT SPINE CERV WO CONT   Final Result   No acute intracranial abnormality. Remote left parietal-occipital   infarct. Mild generalized atrophy with nonspecific white matter abnormality that   may indicate chronic small vessel disease. [place]       HISTORY:  recurrent falls, AMS   Dose reduction technique: All CT scans at this facility are performed using dose reduction optimization   technique as appropriate on the exam including the following: Automated exposure   control, adjustment of the MA and/or KV according to patient size and/or use of   iterative reconstructive technique. TECHNIQUE: Noncontrast CT of the cervical spine with multiplanar   reconstructions. COMPARISON: None   LIMITATIONS: None      FRACTURES: None   ALIGNMENT: Normal   VERTEBRAL BODIES: Multilevel degenerative/arthritic changes characterized by   osteophyte formation and associated endplate changes; findings most notable at   C5-6, C7-T1. DISC SPACES: Decreased with multiple posterior disc protrusions some of which   are mildly calcified   POSTERIOR ELEMENTS: Normal for age. SPINAL CANAL: Normal   PARASPINAL SOFT TISSUES: Normal      OTHER: None      IMPRESSION: Multilevel degenerative disc and degenerative arthritic changes of   the cervical spine with no acute abnormality. Assessment:  Covid pneumonitis, nonsevere disease. No hypoxia    Acute on chronic anemia of chronic kidney disease    Metabolic encephalopathy, likely due to Covid/fever. Improved    Seizure disorder, on Vimpat and Keppra    End stage renal disease on hemodialysis      Plan:  Continue ceftriaxone and Zithromax, continue Decadron  Transfuse 2 units of packed cells today  And procalcitonin. If normal, stop ceftriaxone    Care Plan discussed with: Patient/Family and nurse    Disposition: Continued inpatient care x24 hours    Total time spent with patient: 30 minutes.     Marbin Ortiz MD

## 2021-12-27 NOTE — PROGRESS NOTES
Patient has been accepted with [facility] with an anticipated start of care date of 12/30/21.

## 2021-12-27 NOTE — PROGRESS NOTES
OT eval order received and acknowledged however per chart review, pt with HGB of 5.7 thus will hold OT eval att his time. Will continue to follow pt and attempt OT eval at a later time when medically appropriate. Thank you.

## 2021-12-27 NOTE — PROGRESS NOTES
Day #1 of Vancomycin  Consult provided for this 80 y.o. female for indication of bacteremia.   Antibiotic regimen:  Vancomycin + CTX + Azithromycin  Concomitant nephrotoxic drugs: None  Frequency of BMP: Not scheduled    Recent Labs     21  0815 21  0758 21  0844 21  0604   WBC  --  4.7 8.2 10.1   CREA 4.85*  --  4.29*  --    BUN 58*  --  44*  --      Est CrCl: ESRD on HD MW  Temp (24hrs), Av.1 °F (36.7 °C), Min:97.8 °F (36.6 °C), Max:99.2 °F (37.3 °C)    Cultures:    Blood: GPC in all 4 bottles    MRSA Swab: Pending    Target range: Trough 20-25 mcg/mL      Assessment/Plan:   Last febrile , WBC WNL, lactic acid, procal, and CRP elevated  ESRD on HD MWF  Will initiate therapy with a loading dose of 1750 mg and check a pre-HD level   Antimicrobial stop date TBD

## 2021-12-27 NOTE — PROGRESS NOTES
Reason for Readmission:     AMS         RUR Score/Risk Level:   23%      PCP: First and Last name: Donnie Watkins MD    Name of Practice:    Are you a current patient: Yes/No:    Approximate date of last visit: Nov 2021   Can you participate in a virtual visit with your PCP:     Is a Care Conference indicated:   No    Did you attend your follow up appointment (s): If not, why not:  No.  Got sick before appointment       Resources/supports as identified by patient/family:  Patient lives with son and daughter in law        Top Challenges facing patient (as identified by patient/family and CM): Finances/Medication cost?   No issue    Transportation   No issue     Support system or lack thereof? No issue    Living arrangements? No issue         Self-care/ADLs/Cognition? No issue         Current Advanced Directive/Advance Care Plan:  No ACP. Plan for utilizing home health:   Resume care with [facility]             Transition of Care Plan:    Based on readmission, the patient's previous Plan of Care   has been evaluated and/or modified. The current Transition of Care Plan is:         Patient currently lives at home with her son and daughter-in-law. There is a ramp to enter the home. Patient has a BSC, wheelchair, cane, walker, and hospital bed at home. Patient was current with [facility] at admission. Patient's son would like for patient to resume services at discharge. Patient's son will be her ride at discharge and to follow-up appointments. Patient uses [address] in [place], [place]. Current Dispo: Home with . Choice letter signed and placed on chart. Referral has been sent.

## 2021-12-28 LAB
ABO + RH BLD: NORMAL
ALBUMIN SERPL-MCNC: 1.9 G/DL (ref 3.5–5)
ANION GAP SERPL CALC-SCNC: 7 MMOL/L (ref 5–15)
BLD PROD TYP BPU: NORMAL
BLD PROD TYP BPU: NORMAL
BLOOD GROUP ANTIBODIES SERPL: NEGATIVE
BPU ID: NORMAL
BPU ID: NORMAL
BUN SERPL-MCNC: 39 MG/DL (ref 6–20)
BUN/CREAT SERPL: 12 (ref 12–20)
CA-I BLD-MCNC: 7.6 MG/DL (ref 8.5–10.1)
CHLORIDE SERPL-SCNC: 107 MMOL/L (ref 97–108)
CO2 SERPL-SCNC: 27 MMOL/L (ref 21–32)
CREAT SERPL-MCNC: 3.19 MG/DL (ref 0.55–1.02)
CROSSMATCH RESULT,%XM: NORMAL
CROSSMATCH RESULT,%XM: NORMAL
ERYTHROCYTE [DISTWIDTH] IN BLOOD BY AUTOMATED COUNT: 15.5 % (ref 11.5–14.5)
FERRITIN SERPL-MCNC: 959 NG/ML (ref 8–252)
GLUCOSE SERPL-MCNC: 112 MG/DL (ref 65–100)
HCT VFR BLD AUTO: 27.4 % (ref 35–47)
HGB BLD-MCNC: 8.7 G/DL (ref 11.5–16)
MCH RBC QN AUTO: 30.7 PG (ref 26–34)
MCHC RBC AUTO-ENTMCNC: 31.8 G/DL (ref 30–36.5)
MCV RBC AUTO: 96.8 FL (ref 80–99)
MRSA DNA SPEC QL NAA+PROBE: NOT DETECTED
NRBC # BLD: 0 K/UL (ref 0–0.01)
NRBC BLD-RTO: 0 PER 100 WBC
PHOSPHATE SERPL-MCNC: 4.8 MG/DL (ref 2.6–4.7)
PLATELET # BLD AUTO: 107 K/UL (ref 150–400)
PMV BLD AUTO: 11 FL (ref 8.9–12.9)
POTASSIUM SERPL-SCNC: 4 MMOL/L (ref 3.5–5.1)
RBC # BLD AUTO: 2.83 M/UL (ref 3.8–5.2)
SODIUM SERPL-SCNC: 141 MMOL/L (ref 136–145)
SPECIMEN EXP DATE BLD: NORMAL
STATUS OF UNIT,%ST: NORMAL
STATUS OF UNIT,%ST: NORMAL
TRANSFUSION STATUS PATIENT QL: NORMAL
TRANSFUSION STATUS PATIENT QL: NORMAL
UNIT DIVISION, %UDIV: 0
UNIT DIVISION, %UDIV: 0
WBC # BLD AUTO: 6.7 K/UL (ref 3.6–11)

## 2021-12-28 PROCEDURE — 85027 COMPLETE CBC AUTOMATED: CPT

## 2021-12-28 PROCEDURE — 74011000250 HC RX REV CODE- 250: Performed by: PHYSICIAN ASSISTANT

## 2021-12-28 PROCEDURE — 97165 OT EVAL LOW COMPLEX 30 MIN: CPT

## 2021-12-28 PROCEDURE — 97162 PT EVAL MOD COMPLEX 30 MIN: CPT

## 2021-12-28 PROCEDURE — 74011250636 HC RX REV CODE- 250/636: Performed by: PHYSICIAN ASSISTANT

## 2021-12-28 PROCEDURE — 74011250637 HC RX REV CODE- 250/637: Performed by: PHYSICIAN ASSISTANT

## 2021-12-28 PROCEDURE — 97530 THERAPEUTIC ACTIVITIES: CPT

## 2021-12-28 PROCEDURE — 36415 COLL VENOUS BLD VENIPUNCTURE: CPT

## 2021-12-28 PROCEDURE — 65270000029 HC RM PRIVATE

## 2021-12-28 PROCEDURE — 80069 RENAL FUNCTION PANEL: CPT

## 2021-12-28 PROCEDURE — 74011250637 HC RX REV CODE- 250/637: Performed by: INTERNAL MEDICINE

## 2021-12-28 RX ADMIN — SODIUM CHLORIDE, PRESERVATIVE FREE 10 ML: 5 INJECTION INTRAVENOUS at 08:49

## 2021-12-28 RX ADMIN — SODIUM CHLORIDE, PRESERVATIVE FREE 10 ML: 5 INJECTION INTRAVENOUS at 22:00

## 2021-12-28 RX ADMIN — LEVETIRACETAM 250 MG: 250 TABLET, FILM COATED ORAL at 21:00

## 2021-12-28 RX ADMIN — SODIUM CHLORIDE, PRESERVATIVE FREE 10 ML: 5 INJECTION INTRAVENOUS at 05:29

## 2021-12-28 RX ADMIN — LEVOTHYROXINE SODIUM 150 MCG: 0.03 TABLET ORAL at 08:45

## 2021-12-28 RX ADMIN — SEVELAMER CARBONATE 800 MG: 800 TABLET, FILM COATED ORAL at 16:00

## 2021-12-28 RX ADMIN — AZITHROMYCIN DIHYDRATE 500 MG: 500 INJECTION, POWDER, LYOPHILIZED, FOR SOLUTION INTRAVENOUS at 12:19

## 2021-12-28 RX ADMIN — DEXAMETHASONE SODIUM PHOSPHATE 4 MG: 4 INJECTION, SOLUTION INTRA-ARTICULAR; INTRALESIONAL; INTRAMUSCULAR; INTRAVENOUS; SOFT TISSUE at 21:00

## 2021-12-28 RX ADMIN — ZINC SULFATE 220 MG (50 MG) CAPSULE 1 CAPSULE: CAPSULE at 08:45

## 2021-12-28 RX ADMIN — LACOSAMIDE 200 MG: 100 TABLET, FILM COATED ORAL at 21:00

## 2021-12-28 RX ADMIN — OXYCODONE HYDROCHLORIDE AND ACETAMINOPHEN 500 MG: 500 TABLET ORAL at 08:45

## 2021-12-28 RX ADMIN — ATORVASTATIN CALCIUM 40 MG: 40 TABLET, FILM COATED ORAL at 22:00

## 2021-12-28 RX ADMIN — FLUOXETINE 10 MG: 10 CAPSULE ORAL at 08:45

## 2021-12-28 RX ADMIN — ASPIRIN 81 MG CHEWABLE TABLET 81 MG: 81 TABLET CHEWABLE at 08:45

## 2021-12-28 RX ADMIN — LACTULOSE 30 ML: 20 SOLUTION ORAL at 21:38

## 2021-12-28 RX ADMIN — DEXAMETHASONE SODIUM PHOSPHATE 4 MG: 4 INJECTION, SOLUTION INTRA-ARTICULAR; INTRALESIONAL; INTRAMUSCULAR; INTRAVENOUS; SOFT TISSUE at 08:48

## 2021-12-28 RX ADMIN — WATER 2 G: 1 INJECTION INTRAMUSCULAR; INTRAVENOUS; SUBCUTANEOUS at 12:19

## 2021-12-28 RX ADMIN — CALCITRIOL CAPSULES 0.25 MCG 0.5 MCG: 0.25 CAPSULE ORAL at 08:45

## 2021-12-28 RX ADMIN — LEVETIRACETAM 250 MG: 250 TABLET, FILM COATED ORAL at 08:45

## 2021-12-28 RX ADMIN — OXYCODONE HYDROCHLORIDE AND ACETAMINOPHEN 500 MG: 500 TABLET ORAL at 21:00

## 2021-12-28 RX ADMIN — LACOSAMIDE 200 MG: 100 TABLET, FILM COATED ORAL at 08:45

## 2021-12-28 RX ADMIN — PANTOPRAZOLE SODIUM 40 MG: 40 TABLET, DELAYED RELEASE ORAL at 08:45

## 2021-12-28 RX ADMIN — ACETAMINOPHEN 650 MG: 325 TABLET ORAL at 00:50

## 2021-12-28 RX ADMIN — LACTULOSE 30 ML: 20 SOLUTION ORAL at 16:00

## 2021-12-28 RX ADMIN — LACTULOSE 30 ML: 20 SOLUTION ORAL at 12:19

## 2021-12-28 RX ADMIN — SEVELAMER CARBONATE 800 MG: 800 TABLET, FILM COATED ORAL at 08:45

## 2021-12-28 RX ADMIN — SEVELAMER CARBONATE 800 MG: 800 TABLET, FILM COATED ORAL at 12:20

## 2021-12-28 NOTE — PROGRESS NOTES
CM reviewed chart and spoke to primary physician. Patient will likely need 24-48 more hours before discharge. CM has updated [address] with current clinicals. CM will continue to follow.

## 2021-12-28 NOTE — PROGRESS NOTES
Hospitalist Progress Note               Daily Progress Note: 12/28/2021      Subjective: The patient is seen for follow up. 80 y.o. female with a past medical history of heart disease, GERD, hypercholesterolemia, hypothyroidism, seizure disorder, end-stage renal disease on hemodialysis that presented to the emergency room on 12/26/2021 for altered mental status. Of note patient was in the hospital the day prior due to her dialysis catheter bleeding. At that time pressure dressing was placed and bleeding had improved. Family and patient were notified to keep the pressure dressing on and to follow-up with general surgery as an outpatient. However the following day patient became altered, febrile, increased coughing. Multiple family members have tested positive for Covid. Therefore she presented back to the emergency room. Patient is altered and oriented x0. She denies any pain. She does admit to a cough. She has had multiple falls and generalized weakness. In the ED vital signs were significant for fever 101.1 °F and hypotension of 114/58. Laboratory data was significant for a hemoglobin of 7.3, lactic acid 2.2, troponin I 05. Influenza a and B both negative. Rapid Covid positive. Chest x-ray unremarkable. CT of the head showed no acute intracranial abnormality but did show a remote left parietal occipital infarct with generalized atrophy. CT of the cervical spine showed multilevel degenerative disc and generative arthritic changes of the cervical spine with no acute abnormality. Patient was not hypoxic. Patient was admitted to medical telemetry. She was started on Zithromax and ceftriaxone along with Decadron. --------    Patient is seen for follow-up. Hemoglobin this morning is down to 8.7. S/P 2 units of blood. Reports constipation for over 1 week.  No fevers        Problem List:  Problem List as of 12/28/2021 Date Reviewed: 12/9/2021          Codes Class Noted - Resolved    AMS (altered mental status) ICD-10-CM: R41.82  ICD-9-CM: 780.97  12/26/2021 - Present        COVID ICD-10-CM: U07.1  ICD-9-CM: 079.89  12/26/2021 - Present        Fluid overload ICD-10-CM: E87.70  ICD-9-CM: 276.69  12/5/2021 - Present        Left-sided weakness ICD-10-CM: R53.1  ICD-9-CM: 728.87  12/3/2021 - Present        Intractable nausea and vomiting ICD-10-CM: R11.2  ICD-9-CM: 536.2  12/3/2021 - Present        Hypoglycemia ICD-10-CM: E16.2  ICD-9-CM: 251.2  6/11/2021 - Present        Breakthrough seizure ([facility] ICD-10-CM: A44.582  ICD-9-CM: 345.91  6/11/2021 - Present        SSS (sick sinus syndrome) ([facility] ICD-10-CM: I49.5  ICD-9-CM: 427.81  6/11/2021 - Present        Seizure ([facility] ICD-10-CM: R56.9  ICD-9-CM: 780.39  5/19/2021 - Present        Hyperkalemia ICD-10-CM: E87.5  ICD-9-CM: 276.7  10/16/2020 - Present        Atherosclerosis of native arteries of the extremities with ulceration ([facility] ICD-10-CM: I70.25  ICD-9-CM: 440.23, 707.9  8/31/2020 - Present        Cellulitis and abscess of trunk ICD-10-CM: L03.319, L02.219  ICD-9-CM: 682.2  8/31/2020 - Present        Peripheral venous insufficiency ICD-10-CM: I87.2  ICD-9-CM: 459.81  8/31/2020 - Present        Varicose veins of lower extremity with inflammation ICD-10-CM: I83.10  ICD-9-CM: 454.1  8/31/2020 - Present        Peripheral vascular disease ([facility] ICD-10-CM: I73.9  ICD-9-CM: 443.9  7/3/2020 - Present        Type II diabetes mellitus ([facility] ICD-10-CM: E11.9  ICD-9-CM: 250.00  7/3/2020 - Present        Long term (current) use of antibiotics ICD-10-CM: Z79.2  ICD-9-CM: V58.62  7/3/2020 - Present        Atrial fibrillation ([facility] ICD-10-CM: I48.91  ICD-9-CM: 427.31  7/3/2020 - Present        Cardiac pacemaker in situ ICD-10-CM: Z95.0  ICD-9-CM: V45.01  7/3/2020 - Present        Carpal tunnel syndrome of right wrist ICD-10-CM: G56.01  ICD-9-CM: 354.0  7/3/2020 - Present        Chest wall pain ICD-10-CM: R07.89  ICD-9-CM: 786.52  7/3/2020 - Present        Chronic diarrhea ICD-10-CM: K52.9  ICD-9-CM: 787.91  7/3/2020 - Present        Chronic neck pain ICD-10-CM: M54.2, G89.29  ICD-9-CM: 723.1, 338.29  7/3/2020 - Present        End stage renal failure on dialysis [facility]) ICD-10-CM: N18.6, Z99.2  ICD-9-CM: 585.6, V45.11  7/3/2020 - Present        History of CVA (cerebrovascular accident) ICD-10-CM: Z86.73  ICD-9-CM: V12.54  7/3/2020 - Present        Hyponatremia ICD-10-CM: E87.1  ICD-9-CM: 276.1  7/3/2020 - Present        Acquired hypothyroidism ICD-10-CM: E03.9  ICD-9-CM: 244.9  7/3/2020 - Present        Seizure disorder ([facility] ICD-10-CM: G40.909  ICD-9-CM: 345.90  7/3/2020 - Present        Recurrent falls ICD-10-CM: R29.6  ICD-9-CM: V15.88  7/3/2020 - Present        RESOLVED: Disorder due to well controlled type 2 diabetes mellitus ([facility] ICD-10-CM: E11.8  ICD-9-CM: 250.90  8/31/2020 - 6/11/2021              Medications reviewed  Current Facility-Administered Medications   Medication Dose Route Frequency    lactulose (CHRONULAC) 10 gram/15 mL solution 30 mL  20 g Oral TID    heparin (porcine) 1,000 unit/mL injection 3,600 Units  3,600 Units IntraVENous DIALYSIS PRN    [START ON 12/29/2021] Vancomycin - Random level to be drawn pre-HD 12/29   Other ONCE    atorvastatin (LIPITOR) tablet 40 mg  40 mg Oral QHS    calcitRIOL (ROCALTROL) capsule 0.5 mcg  0.5 mcg Oral DAILY    levothyroxine (SYNTHROID) tablet 150 mcg  150 mcg Oral DAILY    FLUoxetine (PROzac) capsule 10 mg  10 mg Oral DAILY    sevelamer carbonate (RENVELA) tab 800 mg  800 mg Oral TID WITH MEALS    lacosamide (VIMPAT) tablet 200 mg  200 mg Oral BID    sodium chloride (NS) flush 5-40 mL  5-40 mL IntraVENous Q8H    sodium chloride (NS) flush 5-40 mL  5-40 mL IntraVENous PRN    acetaminophen (TYLENOL) tablet 650 mg  650 mg Oral Q6H PRN    Or    acetaminophen (TYLENOL) suppository 650 mg  650 mg Rectal Q6H PRN    polyethylene glycol (MIRALAX) packet 17 g  17 g Oral DAILY PRN    ondansetron (ZOFRAN ODT) tablet 4 mg  4 mg Oral Q8H PRN    Or    ondansetron (ZOFRAN) injection 4 mg  4 mg IntraVENous Q6H PRN    cefTRIAXone (ROCEPHIN) 2 g in sterile water (preservative free) 20 mL IV syringe  2 g IntraVENous Q24H    azithromycin (ZITHROMAX) 500 mg in 0.9% sodium chloride 250 mL (VIAL-MATE)  500 mg IntraVENous Q24H    zinc sulfate (ZINCATE) 50 mg zinc (220 mg) capsule 1 Capsule  1 Capsule Oral DAILY    ascorbic acid (vitamin C) (VITAMIN C) tablet 500 mg  500 mg Oral BID    0.9% sodium chloride infusion  75 mL/hr IntraVENous CONTINUOUS    dexamethasone (DECADRON) 4 mg/mL injection 4 mg  4 mg IntraVENous Q12H    albuterol (PROVENTIL HFA, VENTOLIN HFA, PROAIR HFA) inhaler 2 Puff  2 Puff Inhalation Q6H PRN    aspirin chewable tablet 81 mg  81 mg Oral DAILY    levETIRAcetam (KEPPRA) tablet 250 mg  250 mg Oral BID    benzonatate (TESSALON) capsule 100 mg  100 mg Oral TID PRN    pantoprazole (PROTONIX) tablet 40 mg  40 mg Oral ACB    epoetin anjelica-epbx (RETACRIT) injection 10,000 Units  10,000 Units SubCUTAneous Q MON, WED & FRI       Review of Systems:   A comprehensive review of systems was negative except for that written in the HPI. Objective:   Physical Exam:     Visit Vitals  BP (!) 142/65 (BP 1 Location: Left upper arm, BP Patient Position: At rest;Supine)   Pulse 63   Temp 97.9 °F (36.6 °C)   Resp 17   Ht 5' 6\" (1.676 m)   Wt 76.7 kg (169 lb)   SpO2 98%   BMI 27.28 kg/m²      O2 Device: None (Room air)    Temp (24hrs), Av.9 °F (36.6 °C), Min:97.8 °F (36.6 °C), Max:98.7 °F (37.1 °C)    701 - 1900  In: 120 [P.O.:120]  Out: -    1901 -  0700  In: 930 [P.O.:30]  Out: 1300     General:   Awake and alert appears frail   Lungs:   Clear to auscultation bilaterally. Chest wall:  No tenderness or deformity. Heart:  Regular rate and rhythm, S1, S2 normal, no murmur, click, rub or gallop. Abdomen:   Soft, non-tender. Bowel sounds normal. No masses,  No organomegaly.    Extremities: Extremities normal, atraumatic, no cyanosis or edema. Pulses: 2+ and symmetric all extremities. Skin: Skin color, texture, turgor normal. No rashes or lesions   Neurologic: CNII-XII intact. No gross focal deficits         Data Review:       Recent Days:  Recent Labs     12/28/21  0851 12/27/21  0758 12/26/21  0844   WBC 6.7 4.7 8.2   HGB 8.7* 5.7* 7.3*   HCT 27.4* 18.8* 23.6*   * 109* 118*     Recent Labs     12/28/21  0851 12/27/21  0815 12/26/21  0844    141 139   K 4.0 4.4 4.2    108 105   CO2 27 25 24   * 96 84   BUN 39* 58* 44*   CREA 3.19* 4.85* 4.29*   CA 7.6* 7.5* 8.2*   MG  --   --  1.9   PHOS 4.8*  --  4.1   ALB 1.9*  --  2.8*   TBILI  --   --  0.9   ALT  --   --  18   INR  --   --  1.1     No results for input(s): PH, PCO2, PO2, HCO3, FIO2 in the last 72 hours.     24 Hour Results:  Recent Results (from the past 24 hour(s))   TYPE & SCREEN    Collection Time: 12/27/21 11:44 AM   Result Value Ref Range    Crossmatch Expiration 12/30/2021,2359     ABO/Rh(D) B Positive     Antibody screen Negative     Unit number [id number]     Blood component type [facility]     Unit division 00     Status of unit Issued,final     TRANSFUSION STATUS Ok to transfuse     Crossmatch result Compatible     Unit number [id number]     Blood component type [facility]     Unit division 00     Status of unit Issued,final     TRANSFUSION STATUS Ok to transfuse     Crossmatch result Compatible    PROCALCITONIN    Collection Time: 12/27/21 11:44 AM   Result Value Ref Range    Procalcitonin 2.04 (H) 0 ng/mL   MRSA SCREEN - PCR (NASAL)    Collection Time: 12/27/21 10:10 PM   Result Value Ref Range    MRSA by PCR, Nasal Not Detected Not Detected     RENAL FUNCTION PANEL    Collection Time: 12/28/21  8:51 AM   Result Value Ref Range    Sodium 141 136 - 145 mmol/L    Potassium 4.0 3.5 - 5.1 mmol/L    Chloride 107 97 - 108 mmol/L    CO2 27 21 - 32 mmol/L    Anion gap 7 5 - 15 mmol/L    Glucose 112 (H) 65 - 100 mg/dL BUN 39 (H) 6 - 20 mg/dL    Creatinine 3.19 (H) 0.55 - 1.02 mg/dL    BUN/Creatinine ratio 12 12 - 20      GFR est AA 17 (L) >60 ml/min/1.73m2    GFR est non-AA 14 (L) >60 ml/min/1.73m2    Calcium 7.6 (L) 8.5 - 10.1 mg/dL    Phosphorus 4.8 (H) 2.6 - 4.7 mg/dL    Albumin 1.9 (L) 3.5 - 5.0 g/dL   CBC W/O DIFF    Collection Time: 12/28/21  8:51 AM   Result Value Ref Range    WBC 6.7 3.6 - 11.0 K/uL    RBC 2.83 (L) 3.80 - 5.20 M/uL    HGB 8.7 (L) 11.5 - 16.0 g/dL    HCT 27.4 (L) 35.0 - 47.0 %    MCV 96.8 80.0 - 99.0 FL    MCH 30.7 26.0 - 34.0 PG    MCHC 31.8 30.0 - 36.5 g/dL    RDW 15.5 (H) 11.5 - 14.5 %    PLATELET 952 (L) 319 - 400 K/uL    MPV 11.0 8.9 - 12.9 FL    NRBC 0.0 0.0  WBC    ABSOLUTE NRBC 0.00 0.00 - 0.01 K/uL       XR CHEST PORT   Final Result   No acute cardiopulmonary abnormality. .       CT HEAD WO CONT   Final Result   No acute intracranial abnormality. Remote left parietal-occipital   infarct. Mild generalized atrophy with nonspecific white matter abnormality that   may indicate chronic small vessel disease. Erleen Curtis HISTORY:  recurrent falls, AMS   Dose reduction technique: All CT scans at this facility are performed using dose reduction optimization   technique as appropriate on the exam including the following: Automated exposure   control, adjustment of the MA and/or KV according to patient size and/or use of   iterative reconstructive technique. TECHNIQUE: Noncontrast CT of the cervical spine with multiplanar   reconstructions. COMPARISON: None   LIMITATIONS: None      FRACTURES: None   ALIGNMENT: Normal   VERTEBRAL BODIES: Multilevel degenerative/arthritic changes characterized by   osteophyte formation and associated endplate changes; findings most notable at   C5-6, C7-T1. DISC SPACES: Decreased with multiple posterior disc protrusions some of which   are mildly calcified   POSTERIOR ELEMENTS: Normal for age.        SPINAL CANAL: Normal   PARASPINAL SOFT TISSUES: Normal      OTHER: None      IMPRESSION: Multilevel degenerative disc and degenerative arthritic changes of   the cervical spine with no acute abnormality. CT SPINE CERV WO CONT   Final Result   No acute intracranial abnormality. Remote left parietal-occipital   infarct. Mild generalized atrophy with nonspecific white matter abnormality that   may indicate chronic small vessel disease. Tushar Stone HISTORY:  recurrent falls, AMS   Dose reduction technique: All CT scans at this facility are performed using dose reduction optimization   technique as appropriate on the exam including the following: Automated exposure   control, adjustment of the MA and/or KV according to patient size and/or use of   iterative reconstructive technique. TECHNIQUE: Noncontrast CT of the cervical spine with multiplanar   reconstructions. COMPARISON: None   LIMITATIONS: None      FRACTURES: None   ALIGNMENT: Normal   VERTEBRAL BODIES: Multilevel degenerative/arthritic changes characterized by   osteophyte formation and associated endplate changes; findings most notable at   C5-6, C7-T1. DISC SPACES: Decreased with multiple posterior disc protrusions some of which   are mildly calcified   POSTERIOR ELEMENTS: Normal for age. SPINAL CANAL: Normal   PARASPINAL SOFT TISSUES: Normal      OTHER: None      IMPRESSION: Multilevel degenerative disc and degenerative arthritic changes of   the cervical spine with no acute abnormality. Assessment:  Covid pneumonitis, nonsevere disease. No hypoxia    Acute on chronic anemia of chronic kidney disease    Metabolic encephalopathy, likely due to Covid/fever.   Improved    Seizure disorder, on Vimpat and Keppra    End stage renal disease on hemodialysis    Constipation      Plan:  Continue ceftriaxone and Zithromax, continue Decadron  PTOT evaluation  Add lactulose 20g TID    Care Plan discussed with: Patient/Family and nurse    Disposition: Continued inpatient care x24 hours    Total time spent with patient: 30 minutes.     Guillermo Gautam MD

## 2021-12-28 NOTE — PROGRESS NOTES
PHYSICAL THERAPY EVALUATION  Patient: Tyson Moore (1 y.o. female)  Date: 12/28/2021  Primary Diagnosis: COVID [U07.1]  AMS (altered mental status) [R41.82]        Precautions: falls       ASSESSMENT  Pt is a 79 yo female admitted on 12/26/2021 for AMS, febrile, and increased coughing; pt positive for COVID 19. PMH: anxiety disorder, OA, afib, pacemaker, cellulitis, depression, DM, heartburn, HLD, HTN, hypothyroidism, migraines, PVD, CVA, sleep disorder, seizures. Pt A&O x self and place but not time. Per pt report and medical records pt resides with son and daughter in law in a 2 story home but resides on 1st floor with ramp entrance, pt was required assistance for ADLS/IADLS, WC for primary mobility (states her son propels her) prior to admission. DME: hospital bed, BSC, grab bars, RW, and WC. Based on the objective data described below, the patient presents with generalized weakness, impaired functional mobility, impaired balance, and decreased activity tolerance. Pt semi-supine in bed upon PT/OT arrival, agreeable to evaluation. Pt required mod Ax2 for bed mobility, mod Ax2 supine <> sit transfers. Pt sat EOB x 5-8 min with mod A to bilateral UE support to perform self care with OT (see OT eval for details). Pt did poor with session today with c/o10/10 generalized pain increasing with mobility, however, pt currently on RA and demonstrates saturations >97% throughout session. Pt will benefit from continued skilled PT to address above deficits and return to PLOF. Current PT DC recommendation SNF.      Current Level of Function Impacting Discharge (mobility/balance): mod Ax2    Other factors to consider for discharge: severity of deficits, readmission, acute medical state      PLAN :  Recommendations and Planned Interventions: bed mobility training, transfer training, gait training, therapeutic exercises, neuromuscular re-education, patient and family training/education and therapeutic activities Frequency/Duration: Patient will be followed by physical therapy:  2-3x/week to address goals. Recommendation for discharge: (in order for the patient to meet his/her long term goals)  Kaleb Jamison    This discharge recommendation:  Has been made in collaboration with the attending provider and/or case management    IF patient discharges home will need the following DME: to be determined (TBD)         SUBJECTIVE:   Patient stated i hurt all over.     OBJECTIVE DATA SUMMARY:   HISTORY:    Past Medical History:   Diagnosis Date    Anxiety disorder     Arthritis     Atherosclerosis of native arteries of the extremities with ulceration ([facility] [address] 8/31/2020    Atrial fibrillation (HCC)     Cardiac pacemaker in situ     Cellulitis and abscess of trunk 8/31/2020    Depression, postpartum     Diabetes ([facility] [address]     Heart disease     Heartburn     Hx of long term use of blood thinners     Hypercholesterolemia     Hypertension     Hypothyroidism     Migraines     Peripheral venous insufficiency 8/31/2020    Seizures ([facility] [address] [facility]     Sleep disorder     Stroke ([facility] [address]     2019    Varicose veins of lower extremity with inflammation 8/31/2020     Past Surgical History:   Procedure Laterality Date    HX BACK SURGERY      HX OTHER SURGICAL      leg surgery     HX OTHER SURGICAL      pacemaker monitor     IR INSERT TUNL CVC W/O PORT OVER 5 YR  12/10/2021       Home Situation  Home Environment: Private residence  # Steps to Enter: 0  Wheelchair Ramp: Yes  One/Two Story Residence: Two story, live on 1st floor  Lift Chair Available: No  Living Alone: No  Support Systems: Child(jose carlos)  Patient Expects to be Discharged to[de-identified] House  Current DME Used/Available at Home: [address] [facility], bedside,Hospital bed,Walker, rolling    EXAMINATION/PRESENTATION/DECISION MAKING:   Critical Behavior:  Neurologic State: Alert  Orientation Level: Oriented to person,Oriented to place,Disoriented to time  Cognition: Follows commands     Hearing: Auditory  Auditory Impairment: None  Skin:  Intact where visible  Edema: none noted   Range Of Motion:  AROM: Generally decreased, functional           PROM: Generally decreased, functional           Strength:    Strength: Generally decreased, functional         Functional Mobility:  Bed Mobility:  Rolling: Moderate assistance;Assist x2  Supine to Sit: Moderate assistance;Assist x2  Sit to Supine: Moderate assistance;Assist x2  Scooting: Moderate assistance;Assist x2  Transfers:                             Balance:   Sitting: Impaired; Without support  Sitting - Static: Poor (constant support); Prop sitting  Sitting - Dynamic: Poor (constant support); Prop sitting  Ambulation/Gait Training:      NA         Functional Measure:  [facility] AM-PAC 6 Clicks         Basic Mobility Inpatient Short Form  How much difficulty does the patient currently have. .. Unable A Lot A Little None   1. Turning over in bed (including adjusting bedclothes, sheets and blankets)? [] 1   [x] 2   [] 3   [] 4   2. Sitting down on and standing up from a chair with arms ( e.g., wheelchair, bedside commode, etc.)   [] 1   [x] 2   [] 3   [] 4   3. Moving from lying on back to sitting on the side of the bed? [] 1   [x] 2   [] 3   [] 4          How much help from another person does the patient currently need. .. Total A Lot A Little None   4. Moving to and from a bed to a chair (including a wheelchair)? [x] 1   [] 2   [] 3   [] 4   5. Need to walk in hospital room? [x] 1   [] 2   [] 3   [] 4   6. Climbing 3-5 steps with a railing? [x] 1   [] 2   [] 3   [] 4   © 2007, Trustees of [facility], under license to [employer]. All rights reserved     Score:  Initial: 9/24 Most Recent: X (Date: 12/28/21 )   Interpretation of Tool:  Represents activities that are increasingly more difficult (i.e. Bed mobility, Transfers, Gait).   Score 24 23 22-20 19-15 14-10 9-7 6   Modifier CH CI CJ CK CL CM CN Physical Therapy Evaluation Charge Determination   History Examination Presentation Decision-Making   HIGH Complexity :3+ comorbidities / personal factors will impact the outcome/ POC  HIGH Complexity : 4+ Standardized tests and measures addressing body structure, function, activity limitation and / or participation in recreation  MEDIUM Complexity : Evolving with changing characteristics  Other outcome measures ampac 6  mod      Based on the above components, the patient evaluation is determined to be of the following complexity level: MEDIUM    Pain Rating:  10/10 generalized    Activity Tolerance:   Poor, requires rest breaks and pain limiting mobility     After treatment patient left in no apparent distress:   Supine in bed, Call bell within reach, Bed / chair alarm activated and Side rails x 3 and nsg updated. GOALS:    Problem: Mobility Impaired (Adult and Pediatric)  Goal: *Acute Goals and Plan of Care (Insert Text)  Description: Pt will be I with LE HEP in 7 days. Pt will perform bed mobility with CGA in 7 days. Pt will perform transfers with CGA including sliding board transfers from bed <> chair in 7 days. Pt will demonstrate improvement in dynamic seated balance from mod A to SBA in 7 days. Outcome: Not Met       COMMUNICATION/EDUCATION:   The patients plan of care was discussed with: Occupational therapist, Registered nurse, and Case management. Fall prevention education was provided and the patient/caregiver indicated understanding., Patient/family have participated as able in goal setting and plan of care. , and Patient/family agree to work toward stated goals and plan of care. PT/OT sessions occurred together for increased safety of pt and clinician.        Thank you for this referral.  Chase Mina, PT, DPT   Time Calculation: 22 mins

## 2021-12-28 NOTE — PROGRESS NOTES
Consult requested by: Indiana Paniagua MD    ADMIT DATE: 12/26/2021  CONSULT DATE: December 28, 2021           Patient is seen in the room  Her hemoglobin is 5.7-->8.7   transfused 2 units of packed RBCs with dialysis     Past Medical History:   Diagnosis Date    Anxiety disorder     Arthritis     Atherosclerosis of native arteries of the extremities with ulceration ([facility] [address] 8/31/2020    Atrial fibrillation ([facility] [facility]     Cardiac pacemaker in situ     Cellulitis and abscess of trunk 8/31/2020    Depression, postpartum     Diabetes ([facility] [facility]     Heart disease     Heartburn     Hx of long term use of blood thinners     Hypercholesterolemia     Hypertension     Hypothyroidism     Migraines     Peripheral venous insufficiency 8/31/2020    Seizures ([facility]     Sleep disorder     Stroke ([facility] [address] [facility]     2019    Varicose veins of lower extremity with inflammation 8/31/2020      Past Surgical History:   Procedure Laterality Date    HX BACK SURGERY      HX OTHER SURGICAL      leg surgery     HX OTHER SURGICAL      pacemaker monitor     IR INSERT TUNL CVC W/O PORT OVER 5 YR  12/10/2021       Social History     Socioeconomic History    Marital status:      Spouse name: Not on file    Number of children: Not on file    Years of education: Not on file    Highest education level: Not on file   Occupational History    Not on file   Tobacco Use    Smoking status: Never Smoker    Smokeless tobacco: Never Used   Vaping Use    Vaping Use: Never used   Substance and Sexual Activity    Alcohol use: Never    Drug use: Never    Sexual activity: Not on file   Other Topics Concern    Not on file   Social History Narrative    Not on file     Social Determinants of Health     Financial Resource Strain:     Difficulty of Paying Living Expenses: Not on file   Food Insecurity:     Worried About Running Out of Food in the Last Year: Not on file    Janet of Food in the Last Year: Not on JO-ANN Almanza Needs:     Lack of Transportation (Medical): Not on file    Lack of Transportation (Non-Medical): Not on file   Physical Activity:     Days of Exercise per Week: Not on file    Minutes of Exercise per Session: Not on file   Stress:     Feeling of Stress : Not on file   Social Connections:     Frequency of Communication with Friends and Family: Not on file    Frequency of Social Gatherings with Friends and Family: Not on file    Attends Voodoo Services: Not on file    Active Member of [address] [place] [address] or Organizations: Not on file    Attends Club or Organization Meetings: Not on file    Marital Status: Not on file   Intimate Partner Violence:     Fear of Current or Ex-Partner: Not on file    Emotionally Abused: Not on file    Physically Abused: Not on file    Sexually Abused: Not on file   Housing Stability:     Unable to Pay for Housing in the Last Year: Not on file    Number of [place] in the Last Year: Not on file    Unstable Housing in the Last Year: Not on file       Family History   Problem Relation Age of Onset    Heart Disease Mother     Heart Disease Father     Diabetes Sister     Diabetes Brother      No Known Allergies     Home Medications:     Medications Prior to Admission   Medication Sig    calcitRIOL (ROCALTROL) 0.5 mcg capsule Take 1 Capsule by mouth daily.  zinc oxide-white petrolatum 17-57 % topical paste Apply  to affected area as needed for Skin Irritation. Indications: skin irritation    ondansetron hcl (Zofran) 4 mg tablet Take 1 Tablet by mouth every eight (8) hours as needed for Nausea or Vomiting.  Vimpat 200 mg tab tablet Take 1 Tablet by mouth two (2) times a day.  ondansetron (Zofran ODT) 4 mg disintegrating tablet 1 Tablet by SubLINGual route every eight (8) hours as needed for Nausea or Vomiting.     atorvastatin (LIPITOR) 40 mg tablet Take 1 tablet by mouth once daily for 90 days    FLUoxetine (PROzac) 10 mg capsule Take 1 Capsule by mouth daily for 270 days.    loperamide (IMODIUM) 2 mg capsule TAKE 1 CAPSULE BY MOUTH SIX TIMES DAILY AS NEEDED FOR 30 DAYS    Euthyrox 150 mcg tablet Take 1 tablet by mouth once daily    levETIRAcetam (KEPPRA XR) 500 mg ER tablet TAKE 2 TABLETS BY MOUTH TWICE DAILY FOR 90 DAYS    sevelamer carbonate (RENVELA) 800 mg tab tab        Current Inpatient Medications:     Current Facility-Administered Medications   Medication Dose Route Frequency    lactulose (CHRONULAC) 10 gram/15 mL solution 30 mL  20 g Oral TID    heparin (porcine) 1,000 unit/mL injection 3,600 Units  3,600 Units IntraVENous DIALYSIS PRN    [START ON 12/29/2021] Vancomycin - Random level to be drawn pre-HD 12/29   Other ONCE    atorvastatin (LIPITOR) tablet 40 mg  40 mg Oral QHS    calcitRIOL (ROCALTROL) capsule 0.5 mcg  0.5 mcg Oral DAILY    levothyroxine (SYNTHROID) tablet 150 mcg  150 mcg Oral DAILY    FLUoxetine (PROzac) capsule 10 mg  10 mg Oral DAILY    sevelamer carbonate (RENVELA) tab 800 mg  800 mg Oral TID WITH MEALS    lacosamide (VIMPAT) tablet 200 mg  200 mg Oral BID    sodium chloride (NS) flush 5-40 mL  5-40 mL IntraVENous Q8H    sodium chloride (NS) flush 5-40 mL  5-40 mL IntraVENous PRN    acetaminophen (TYLENOL) tablet 650 mg  650 mg Oral Q6H PRN    Or    acetaminophen (TYLENOL) suppository 650 mg  650 mg Rectal Q6H PRN    polyethylene glycol (MIRALAX) packet 17 g  17 g Oral DAILY PRN    ondansetron (ZOFRAN ODT) tablet 4 mg  4 mg Oral Q8H PRN    Or    ondansetron (ZOFRAN) injection 4 mg  4 mg IntraVENous Q6H PRN    cefTRIAXone (ROCEPHIN) 2 g in sterile water (preservative free) 20 mL IV syringe  2 g IntraVENous Q24H    azithromycin (ZITHROMAX) 500 mg in 0.9% sodium chloride 250 mL (VIAL-MATE)  500 mg IntraVENous Q24H    zinc sulfate (ZINCATE) 50 mg zinc (220 mg) capsule 1 Capsule  1 Capsule Oral DAILY    ascorbic acid (vitamin C) (VITAMIN C) tablet 500 mg  500 mg Oral BID    0.9% sodium chloride infusion  75 mL/hr IntraVENous CONTINUOUS    dexamethasone (DECADRON) 4 mg/mL injection 4 mg  4 mg IntraVENous Q12H    albuterol (PROVENTIL HFA, VENTOLIN HFA, PROAIR HFA) inhaler 2 Puff  2 Puff Inhalation Q6H PRN    aspirin chewable tablet 81 mg  81 mg Oral DAILY    levETIRAcetam (KEPPRA) tablet 250 mg  250 mg Oral BID    benzonatate (TESSALON) capsule 100 mg  100 mg Oral TID PRN    pantoprazole (PROTONIX) tablet 40 mg  40 mg Oral ACB    epoetin anjelica-epbx (RETACRIT) injection 10,000 Units  10,000 Units SubCUTAneous Q MON, WED & FRI       Review of Systems:   No fever or chills. No sore throat. No cough or hemoptysis. No shortness of breath or chest pain. No orthopnea or paroxysmal nocturnal dyspnea. No nausea, vomiting, abdominal pain, melena or hematochezia. No ankle swelling, no joint paints. No muscle aches. No skin changes. No dizziness or lightheadedness. No headaches. Physical Assessment:     Vitals:    12/28/21 0400 12/28/21 0755 12/28/21 0800 12/28/21 1200   BP:  (!) 142/65     Pulse: 64 60 63 66   Resp:  17     Temp:  97.9 °F (36.6 °C)     TempSrc:       SpO2:  98%     Weight:       Height:         Last 3 Recorded Weights in this Encounter    12/26/21 0816   Weight: 76.7 kg (169 lb)     Admission weight: Weight: 76.7 kg (169 lb) (12/26/21 0816)      Intake/Output Summary (Last 24 hours) at 12/28/2021 1216  Last data filed at 12/28/2021 0602  Gross per 24 hour   Intake 1020 ml   Output 1300 ml   Net -280 ml       Patient is in no apparent distress. HEENT: Head is normocephalic and atraumatic. Pupils are round, equal, reactive to light. Sclerae are anicteric. Oropharynx clear. Neck: no cervical lymphadenopathy or thyromegaly. Lungs: good air entry, clear to auscultation bilaterally. Trachea at the midline. Cardiovascular system: S1, S2, regular rate and rhythm. No murmurs, gallops or rubs. No jvd. Abdomen: soft, non tender, non distended. Positive bowel sounds. No hepatosplenomegaly.  No abdominal bruits. Extremities: no clubbing, cyanosis or edema. Strong dorsalis pedis pulses. Brisk capillary refill on the toes bilaterally. Integumentary: skin is grossly intact. Neurologic: Alert, oriented time three. Cooperative and appropriate. No gross motor or sensory deficits. Dialysis access: cuffed tunneled catheter site right. [facility]     Data Review:    Labs: Results:       Chemistry Recent Labs     12/28/21  0851 12/27/21  0815 12/26/21  0844 12/26/21  0844   * 96  --  84    141  --  139   K 4.0 4.4  --  4.2    108  --  105   CO2 27 25  --  24   BUN 39* 58*  --  44*   CREA 3.19* 4.85*  --  4.29*   CA 7.6* 7.5*  --  8.2*   AGAP 7 8  --  10   BUCR 12 12  --  10*   AP  --   --   --  120*   TP  --   --   --  5.9*   ALB 1.9*  --   --  2.8*   GLOB  --   --   --  3.1   AGRAT  --   --   --  0.9*   PHOS 4.8*  --    < > 4.1    < > = values in this interval not displayed. CBC w/Diff Recent Labs     12/28/21  0851 12/27/21  0758 12/26/21  0844   WBC 6.7 4.7 8.2   RBC 2.83* 1.86* 2.36*   HGB 8.7* 5.7* 7.3*   HCT 27.4* 18.8* 23.6*   * 109* 118*   GRANS  --  83* 89*   LYMPH  --  8* 6*   EOS  --  0 0         Iron/Ferritin No results for input(s): IRON in the last 72 hours. No lab exists for component: TIBCCALC   PTH/VIT D No results for input(s): PTH in the last 72 hours. No lab exists for component: VITD            Assessment & Plan     1-ESRD:  -HD MWF at [facility], [address] [place] [address]  -No volume overload/uremia  -s/p HD 12/27   -RI perm cath as active dialysis access  -BORA AVF in place also    2-Anemia:  -Hb 5.7  -No obvious bleeding  -s/p blood Tx 2 units with HD   -iv Epo with HD  -r/o GIB   -Hb 8.7       3-Secondary hyperparathyroidism of ESRD. -cont sevelamer 800 mg tid and calcitriol 0.5 mcg daily       - Renal Diet    4-COVID Pneumonia   -- started on empiric abxs because of fever,lactic acidosis and leucocytosis . 5-AMS - CTH negative   Likely Metabolic and sepsis related .     6- Seizure disorder - Vimpat & Keppra resumed,  Renally dosed

## 2021-12-28 NOTE — PROGRESS NOTES
OCCUPATIONAL THERAPY EVALUATION  Patient: Geovanna Carreon (31 y.o. female)  Date: 12/28/2021  Primary Diagnosis: COVID [U07.1]  AMS (altered mental status) [R41.82]        Precautions: fall risk, COVID-19 (+)      ASSESSMENT  Pt is a 81 yo female admitted on 12/26/2021 for AMS, febrile, and increased coughing; pt positive for COVID 19. PMH: anxiety disorder, OA, afib, pacemaker, cellulitis, depression, DM, heartburn, HLD, HTN, hypothyroidism, migraines, PVD, CVA, sleep disorder, seizures. Pt A&O x self and place but not time, agreeable to OT/PT evaluations. Per pt report and medical records pt resides with son and daughter in law in a 2 story home but resides on 1st floor with ramp entrance, pt was required assistance for ADLS/IADLS, WC for primary mobility (states her son propels her) prior to admission. DME: hospital bed, BSC, grab bars, RW, and WC. Based on current observations, pt presents with deficits in generalized strength/AROM, static/dynamic sitting balance, functional activity tolerance, vision, and increased pain impacting overall performance of ADLs and functional transfers/mobility. Pt currently requires mod A x2 for bed mobility, mod A x2 supine><sit transfers, total A donning socks seated EOB 2' to limited anterior reach and total A combing hair in sitting 2' required UE support on bedside. Pt tolerated approx 5-8 min seated EOB before returning to supine mod A x2 2' to fatigue. Basic face washing activity completed s/p setup. Overall, pt tolerates session fair with c/o 10/10 generalized pain with mobility, SPO2 97%> on RA. Pt would benefit from continued skilled OT services to address current impairments and improve IND and safety with self cares and functional transfers/mobility. At this time, OT recommending d/c to SNF once medically appropriate.     Other factors to consider for discharge: family support, DME, time since onset, severity of deficits      Patient will benefit from skilled therapy intervention to address the above noted impairments. PLAN :  Recommendations and Planned Interventions: self care training, functional mobility training, therapeutic exercise, balance training, therapeutic activities, endurance activities, neuromuscular re-education, patient education, and family training/education    Frequency/Duration: Patient will be followed by occupational therapy:  2-3x/week to address goals. Recommendation for discharge: (in order for the patient to meet his/her long term goals)  Kaleb Shaheen    This discharge recommendation:  Has been made in collaboration with the attending provider and/or case management       SUBJECTIVE:   Patient stated my son helps me at home.     OBJECTIVE DATA SUMMARY:   HISTORY:   Past Medical History:   Diagnosis Date    Anxiety disorder     Arthritis     Atherosclerosis of native arteries of the extremities with ulceration ([facility] 8/31/2020    Atrial fibrillation (HCC)     Cardiac pacemaker in situ     Cellulitis and abscess of trunk 8/31/2020    Depression, postpartum     Diabetes ([facility]     Heart disease     Heartburn     Hx of long term use of blood thinners     Hypercholesterolemia     Hypertension     Hypothyroidism     Migraines     Peripheral venous insufficiency 8/31/2020    Seizures ([facility]     Sleep disorder     Stroke ([facility]     2019    Varicose veins of lower extremity with inflammation 8/31/2020     Past Surgical History:   Procedure Laterality Date    HX BACK SURGERY      HX OTHER SURGICAL      leg surgery     HX OTHER SURGICAL      pacemaker monitor     IR INSERT TUNL CVC W/O PORT OVER 5 YR  12/10/2021       Expanded or extensive additional review of patient history:     Home Situation  Home Environment: Private residence  # Steps to Enter: 0  Wheelchair Ramp: Yes  One/Two Story Residence: Two story, live on 1st floor  Lift Chair Available: No  Living Alone: No  Support Systems: Child(jose carlos)  Patient Expects to be Discharged to[de-identified] [place]  Current DME Used/Available at Home: Wheelchair,Commode, bedside,Hospital bed,Walker, rolling    EXAMINATION OF PERFORMANCE DEFICITS:  Cognitive/Behavioral Status:  Neurologic State: Alert  Orientation Level: Oriented to person;Oriented to place; Disoriented to time  Cognition: Follows commands          Hearing: Auditory  Auditory Impairment: None    Vision/Perceptual:     Per pt, poor vision in both eyes                                Range of Motion:  AROM: Generally decreased, functional  PROM: Generally decreased, functional                      Strength:  Strength: Generally decreased, functional                Coordination:  Coordination: Generally decreased, functional  Fine Motor Skills-Upper: Comment (grossly decreased, functional)             Balance:  Sitting: Impaired; Without support  Sitting - Static: Poor (constant support); Prop sitting  Sitting - Dynamic: Poor (constant support); Prop sitting    Functional Mobility and Transfers for ADLs:  Bed Mobility:  Rolling: Moderate assistance;Assist x2  Supine to Sit: Moderate assistance;Assist x2  Sit to Supine: Moderate assistance;Assist x2  Scooting: Moderate assistance;Assist x2    ADL Intervention and task modifications:  Feeding  Drink to Mouth: Modified independent    Grooming  Grooming Assistance: Set-up; Stand-by assistance  Position Performed: Long sitting on bed  Washing Face: Set-up; Stand-by assistance                   Lower Body Dressing Assistance  Socks: Total assistance (dependent)  Position Performed: Seated edge of bed              Therapeutic Exercise:  Pt would benefit from UE HEP to improve overall UE AROM/strength and can be further educated in next treatment session. Functional Measure:    [facility] [address] [address] AM-PACTM \"6 Clicks\"                                                       Daily Activity Inpatient Short Form  How much help from another person does the patient currently need. ..  Total; A Lot A Little None   1. Putting on and taking off regular lower body clothing? [x]  1 []  2 []  3 []  4   2. Bathing (including washing, rinsing, drying)? []  1 [x]  2 []  3 []  4   3. Toileting, which includes using toilet, bedpan or urinal? [x] 1 []  2 []  3 []  4   4. Putting on and taking off regular upper body clothing? []  1 [x]  2 []  3 []  4   5. Taking care of personal grooming such as brushing teeth? []  1 []  2 [x]  3 []  4   6. Eating meals? []  1 []  2 [x]  3 []  4   © 2007, Trustees of [address] [place] [address] [address] [address], under license to [employer]. All rights reserved     Score: 12/24     Interpretation of Tool:  Represents clinically-significant functional categories (i.e. Activities of daily living). Percentage of Impairment CH    0%   CI    1-19% CJ    20-39% CK    40-59% CL    60-79% CM    80-99% CN     100%   [facility]  Score 6-24 24 23 20-22 15-19 10-14 7-9 6         Occupational Therapy Evaluation Charge Determination   History Examination Decision-Making   LOW Complexity : Brief history review  MEDIUM Complexity : 3-5 performance deficits relating to physical, cognitive , or psychosocial skils that result in activity limitations and / or participation restrictions MEDIUM Complexity : Patient may present with comorbidities that affect occupational performnce. Miniml to moderate modification of tasks or assistance (eg, physical or verbal ) with assesment(s) is necessary to enable patient to complete evaluation       Based on the above components, the patient evaluation is determined to be of the following complexity level: LOW   Pain Rating:  10/10 generalized pain    Activity Tolerance:   Fair  Please refer to the flowsheet for vital signs taken during this treatment. After treatment patient left in no apparent distress:    Supine in bed, Call bell within reach, and Side rails x 3    COMMUNICATION/EDUCATION:   The patients plan of care was discussed with: Physical therapist and Registered nurse. Patient/family have participated as able in goal setting and plan of care. and Patient/family agree to work toward stated goals and plan of care. This patients plan of care is appropriate for delegation to [facility].     OT/PT sessions occurred together for increased patient and clinician safety    Thank you for this referral.  Xochilt Lomas  Time Calculation: 21 mins    Problem: Self Care Deficits Care Plan (Adult)  Goal: *Acute Goals and Plan of Care (Insert Text)  Description: Pt will be Mod I sup <> sit in prep for EOB ADLs  Pt will be Mod I grooming sitting EOB  Pt will be Mod I LE dressing sitting EOB/long sit  Pt will be Mod I toileting/toilet transfer/cloth mgmt LRAD  Pt will be Mod I following UE HEP in prep for self care tasks    Outcome: Not Met

## 2021-12-29 LAB
ALBUMIN SERPL-MCNC: 2 G/DL (ref 3.5–5)
ANION GAP SERPL CALC-SCNC: 9 MMOL/L (ref 5–15)
BACTERIA SPEC CULT: ABNORMAL
BACTERIA SPEC CULT: ABNORMAL
BUN SERPL-MCNC: 49 MG/DL (ref 6–20)
BUN/CREAT SERPL: 13 (ref 12–20)
CA-I BLD-MCNC: 7.6 MG/DL (ref 8.5–10.1)
CHLORIDE SERPL-SCNC: 108 MMOL/L (ref 97–108)
CO2 SERPL-SCNC: 24 MMOL/L (ref 21–32)
CREAT SERPL-MCNC: 3.67 MG/DL (ref 0.55–1.02)
ERYTHROCYTE [DISTWIDTH] IN BLOOD BY AUTOMATED COUNT: 15.2 % (ref 11.5–14.5)
GLUCOSE BLD STRIP.AUTO-MCNC: 93 MG/DL (ref 65–117)
GLUCOSE SERPL-MCNC: 112 MG/DL (ref 65–100)
HCT VFR BLD AUTO: 27.2 % (ref 35–47)
HGB BLD-MCNC: 8.5 G/DL (ref 11.5–16)
MCH RBC QN AUTO: 30.7 PG (ref 26–34)
MCHC RBC AUTO-ENTMCNC: 31.3 G/DL (ref 30–36.5)
MCV RBC AUTO: 98.2 FL (ref 80–99)
NRBC # BLD: 0 K/UL (ref 0–0.01)
NRBC BLD-RTO: 0 PER 100 WBC
PERFORMED BY, TECHID: NORMAL
PHOSPHATE SERPL-MCNC: 4.1 MG/DL (ref 2.6–4.7)
PLATELET # BLD AUTO: 108 K/UL (ref 150–400)
PMV BLD AUTO: 11.2 FL (ref 8.9–12.9)
POTASSIUM SERPL-SCNC: 3.7 MMOL/L (ref 3.5–5.1)
RBC # BLD AUTO: 2.77 M/UL (ref 3.8–5.2)
SODIUM SERPL-SCNC: 141 MMOL/L (ref 136–145)
SPECIAL REQUESTS,SREQ: ABNORMAL
VANCOMYCIN SERPL-MCNC: 15.2 UG/ML
WBC # BLD AUTO: 6.5 K/UL (ref 3.6–11)

## 2021-12-29 PROCEDURE — 80069 RENAL FUNCTION PANEL: CPT

## 2021-12-29 PROCEDURE — 85027 COMPLETE CBC AUTOMATED: CPT

## 2021-12-29 PROCEDURE — 80202 ASSAY OF VANCOMYCIN: CPT

## 2021-12-29 PROCEDURE — 65270000029 HC RM PRIVATE

## 2021-12-29 PROCEDURE — 74011000250 HC RX REV CODE- 250: Performed by: PHYSICIAN ASSISTANT

## 2021-12-29 PROCEDURE — 74011250636 HC RX REV CODE- 250/636

## 2021-12-29 PROCEDURE — 90935 HEMODIALYSIS ONE EVALUATION: CPT

## 2021-12-29 PROCEDURE — 74011250637 HC RX REV CODE- 250/637: Performed by: INTERNAL MEDICINE

## 2021-12-29 PROCEDURE — 74011250636 HC RX REV CODE- 250/636: Performed by: PHYSICIAN ASSISTANT

## 2021-12-29 PROCEDURE — 36415 COLL VENOUS BLD VENIPUNCTURE: CPT

## 2021-12-29 PROCEDURE — 74011250636 HC RX REV CODE- 250/636: Performed by: INTERNAL MEDICINE

## 2021-12-29 PROCEDURE — 74011250637 HC RX REV CODE- 250/637: Performed by: PHYSICIAN ASSISTANT

## 2021-12-29 PROCEDURE — 82962 GLUCOSE BLOOD TEST: CPT

## 2021-12-29 RX ORDER — INSULIN LISPRO 100 [IU]/ML
INJECTION, SOLUTION INTRAVENOUS; SUBCUTANEOUS
Status: DISCONTINUED | OUTPATIENT
Start: 2021-12-29 | End: 2022-01-03

## 2021-12-29 RX ORDER — DEXAMETHASONE 4 MG/1
4 TABLET ORAL
Qty: 10 TABLET | Refills: 0 | Status: SHIPPED | OUTPATIENT
Start: 2021-12-29 | End: 2022-01-03

## 2021-12-29 RX ORDER — BENZONATATE 100 MG/1
100 CAPSULE ORAL
Qty: 21 CAPSULE | Refills: 0 | Status: SHIPPED | OUTPATIENT
Start: 2021-12-29 | End: 2022-01-03

## 2021-12-29 RX ORDER — AZITHROMYCIN 250 MG/1
250 TABLET, FILM COATED ORAL DAILY
Qty: 6 TABLET | Refills: 0 | Status: SHIPPED | OUTPATIENT
Start: 2021-12-29 | End: 2022-01-03

## 2021-12-29 RX ORDER — ASCORBIC ACID 500 MG
500 TABLET ORAL 2 TIMES DAILY
Qty: 60 TABLET | Refills: 0 | Status: SHIPPED | OUTPATIENT
Start: 2021-12-29 | End: 2022-01-03

## 2021-12-29 RX ORDER — HEPARIN SODIUM 1000 [USP'U]/ML
INJECTION, SOLUTION INTRAVENOUS; SUBCUTANEOUS
Status: COMPLETED
Start: 2021-12-29 | End: 2021-12-29

## 2021-12-29 RX ORDER — GUAIFENESIN 100 MG/5ML
81 LIQUID (ML) ORAL DAILY
Qty: 30 TABLET | Refills: 0 | Status: SHIPPED | OUTPATIENT
Start: 2021-12-30 | End: 2022-01-03

## 2021-12-29 RX ORDER — ZINC SULFATE 50(220)MG
1 CAPSULE ORAL DAILY
Qty: 30 CAPSULE | Refills: 0 | Status: SHIPPED | OUTPATIENT
Start: 2021-12-30 | End: 2022-01-03

## 2021-12-29 RX ADMIN — CALCITRIOL CAPSULES 0.25 MCG 0.5 MCG: 0.25 CAPSULE ORAL at 11:33

## 2021-12-29 RX ADMIN — LACTULOSE 30 ML: 20 SOLUTION ORAL at 11:34

## 2021-12-29 RX ADMIN — LEVETIRACETAM 250 MG: 250 TABLET, FILM COATED ORAL at 20:57

## 2021-12-29 RX ADMIN — WATER 2 G: 1 INJECTION INTRAMUSCULAR; INTRAVENOUS; SUBCUTANEOUS at 11:34

## 2021-12-29 RX ADMIN — SEVELAMER CARBONATE 800 MG: 800 TABLET, FILM COATED ORAL at 11:33

## 2021-12-29 RX ADMIN — AZITHROMYCIN DIHYDRATE 500 MG: 500 INJECTION, POWDER, LYOPHILIZED, FOR SOLUTION INTRAVENOUS at 11:35

## 2021-12-29 RX ADMIN — LACTULOSE 30 ML: 20 SOLUTION ORAL at 16:32

## 2021-12-29 RX ADMIN — ATORVASTATIN CALCIUM 40 MG: 40 TABLET, FILM COATED ORAL at 20:56

## 2021-12-29 RX ADMIN — LEVETIRACETAM 250 MG: 250 TABLET, FILM COATED ORAL at 11:34

## 2021-12-29 RX ADMIN — ASPIRIN 81 MG CHEWABLE TABLET 81 MG: 81 TABLET CHEWABLE at 11:34

## 2021-12-29 RX ADMIN — SODIUM CHLORIDE, PRESERVATIVE FREE 10 ML: 5 INJECTION INTRAVENOUS at 05:58

## 2021-12-29 RX ADMIN — PANTOPRAZOLE SODIUM 40 MG: 40 TABLET, DELAYED RELEASE ORAL at 11:34

## 2021-12-29 RX ADMIN — SEVELAMER CARBONATE 800 MG: 800 TABLET, FILM COATED ORAL at 16:32

## 2021-12-29 RX ADMIN — DEXAMETHASONE SODIUM PHOSPHATE 4 MG: 4 INJECTION, SOLUTION INTRA-ARTICULAR; INTRALESIONAL; INTRAMUSCULAR; INTRAVENOUS; SOFT TISSUE at 11:35

## 2021-12-29 RX ADMIN — EPOETIN ALFA-EPBX 10000 UNITS: 10000 INJECTION, SOLUTION INTRAVENOUS; SUBCUTANEOUS at 10:28

## 2021-12-29 RX ADMIN — LEVOTHYROXINE SODIUM 150 MCG: 0.03 TABLET ORAL at 11:33

## 2021-12-29 RX ADMIN — FLUOXETINE 10 MG: 10 CAPSULE ORAL at 11:33

## 2021-12-29 RX ADMIN — ACETAMINOPHEN 650 MG: 325 TABLET ORAL at 05:59

## 2021-12-29 RX ADMIN — LACTULOSE 30 ML: 20 SOLUTION ORAL at 22:00

## 2021-12-29 RX ADMIN — SODIUM CHLORIDE, PRESERVATIVE FREE 10 ML: 5 INJECTION INTRAVENOUS at 16:33

## 2021-12-29 RX ADMIN — HEPARIN SODIUM 3600 UNITS: 1000 INJECTION, SOLUTION INTRAVENOUS; SUBCUTANEOUS at 10:54

## 2021-12-29 RX ADMIN — ZINC SULFATE 220 MG (50 MG) CAPSULE 1 CAPSULE: CAPSULE at 11:33

## 2021-12-29 RX ADMIN — OXYCODONE HYDROCHLORIDE AND ACETAMINOPHEN 500 MG: 500 TABLET ORAL at 11:34

## 2021-12-29 RX ADMIN — DEXAMETHASONE SODIUM PHOSPHATE 4 MG: 4 INJECTION, SOLUTION INTRA-ARTICULAR; INTRALESIONAL; INTRAMUSCULAR; INTRAVENOUS; SOFT TISSUE at 20:57

## 2021-12-29 RX ADMIN — OXYCODONE HYDROCHLORIDE AND ACETAMINOPHEN 500 MG: 500 TABLET ORAL at 20:57

## 2021-12-29 RX ADMIN — LACOSAMIDE 200 MG: 100 TABLET, FILM COATED ORAL at 11:33

## 2021-12-29 RX ADMIN — SODIUM CHLORIDE, PRESERVATIVE FREE 10 ML: 5 INJECTION INTRAVENOUS at 21:05

## 2021-12-29 RX ADMIN — LACOSAMIDE 200 MG: 100 TABLET, FILM COATED ORAL at 20:56

## 2021-12-29 RX ADMIN — ATORVASTATIN CALCIUM 40 MG: 40 TABLET, FILM COATED ORAL at 22:00

## 2021-12-29 NOTE — DISCHARGE SUMMARY
Physician Discharge Summary     Patient ID:    Janet Frank  [id number]  80 y.o.  1950    Admit date: 12/26/2021    Discharge date : 12/29/2021    Chronic Diagnoses:    Problem List as of 12/29/2021 Date Reviewed: 12/9/2021          Codes Class Noted - Resolved    AMS (altered mental status) ICD-10-CM: R41.82  ICD-9-CM: 780.97  12/26/2021 - Present        COVID ICD-10-CM: U07.1  ICD-9-CM: 079.89  12/26/2021 - Present        Fluid overload ICD-10-CM: E87.70  ICD-9-CM: 276.69  12/5/2021 - Present        Left-sided weakness ICD-10-CM: R53.1  ICD-9-CM: 728.87  12/3/2021 - Present        Intractable nausea and vomiting ICD-10-CM: R11.2  ICD-9-CM: 536.2  12/3/2021 - Present        Hypoglycemia ICD-10-CM: E16.2  ICD-9-CM: 251.2  6/11/2021 - Present        Breakthrough seizure ([facility] ICD-10-CM: U16.234  ICD-9-CM: 345.91  6/11/2021 - Present        SSS (sick sinus syndrome) ([facility] ICD-10-CM: I49.5  ICD-9-CM: 427.81  6/11/2021 - Present        Seizure ([facility] ICD-10-CM: R56.9  ICD-9-CM: 780.39  5/19/2021 - Present        Hyperkalemia ICD-10-CM: E87.5  ICD-9-CM: 276.7  10/16/2020 - Present        Atherosclerosis of native arteries of the extremities with ulceration ([facility] ICD-10-CM: I70.25  ICD-9-CM: 440.23, 707.9  8/31/2020 - Present        Cellulitis and abscess of trunk ICD-10-CM: L03.319, L02.219  ICD-9-CM: 682.2  8/31/2020 - Present        Peripheral venous insufficiency ICD-10-CM: I87.2  ICD-9-CM: 459.81  8/31/2020 - Present        Varicose veins of lower extremity with inflammation ICD-10-CM: I83.10  ICD-9-CM: 454.1  8/31/2020 - Present        Peripheral vascular disease ([facility] ICD-10-CM: I73.9  ICD-9-CM: 443.9  7/3/2020 - Present        Type II diabetes mellitus ([facility] ICD-10-CM: E11.9  ICD-9-CM: 250.00  7/3/2020 - Present        Long term (current) use of antibiotics ICD-10-CM: Z79.2  ICD-9-CM: V58.62  7/3/2020 - Present        Atrial fibrillation ([facility] ICD-10-CM: I48.91  ICD-9-CM: 427.31 7/3/2020 - Present        Cardiac pacemaker in situ ICD-10-CM: Z95.0  ICD-9-CM: V45.01  7/3/2020 - Present        Carpal tunnel syndrome of right wrist ICD-10-CM: G56.01  ICD-9-CM: 354.0  7/3/2020 - Present        Chest wall pain ICD-10-CM: R07.89  ICD-9-CM: 786.52  7/3/2020 - Present        Chronic diarrhea ICD-10-CM: K52.9  ICD-9-CM: 787.91  7/3/2020 - Present        Chronic neck pain ICD-10-CM: M54.2, G89.29  ICD-9-CM: 723.1, 338.29  7/3/2020 - Present        End stage renal failure on dialysis [facility]) ICD-10-CM: N18.6, Z99.2  ICD-9-CM: 585.6, V45.11  7/3/2020 - Present        History of CVA (cerebrovascular accident) ICD-10-CM: Z86.73  ICD-9-CM: V12.54  7/3/2020 - Present        Hyponatremia ICD-10-CM: E87.1  ICD-9-CM: 276.1  7/3/2020 - Present        Acquired hypothyroidism ICD-10-CM: E03.9  ICD-9-CM: 244.9  7/3/2020 - Present        Seizure disorder ([facility] ICD-10-CM: G40.909  ICD-9-CM: 345.90  7/3/2020 - Present        Recurrent falls ICD-10-CM: R29.6  ICD-9-CM: V15.88  7/3/2020 - Present        RESOLVED: Disorder due to well controlled type 2 diabetes mellitus ([facility] ICD-10-CM: E11.8  ICD-9-CM: 250.90  8/31/2020 - 6/11/2021          22    Final Diagnoses:   COVID [U07.1]  AMS (altered mental status) [R41.82]  Covid pneumonitis, nonsevere disease. No hypoxia     Acute on chronic anemia of chronic kidney disease     Metabolic encephalopathy, likely due to Covid/fever.   Improved     Seizure disorder, on Vimpat and Keppra     End stage renal disease on hemodialysis     Constipation    Reason for Hospitalization:  Altered mental status      Hospital Course:   80 y.o. female with a past medical history of heart disease, GERD, hypercholesterolemia, hypothyroidism, seizure disorder, end-stage renal disease on hemodialysis that presented to the emergency room on 12/26/2021 for altered mental status.  Of note patient was in the hospital the day prior due to her dialysis catheter bleeding.  At that time pressure dressing was placed and bleeding had improved.  Family and patient were notified to keep the pressure dressing on and to follow-up with general surgery as an outpatient.  However the following day patient became altered, febrile, increased coughing.  Multiple family members have tested positive for Wanna Slice she presented back to the emergency room.  Patient is altered and oriented x0.  She denies any pain.  She does admit to a cough.  She has had multiple falls and generalized weakness.  In the ED vital signs were significant for fever 101.1 °F and hypotension of 114/58.  Laboratory data was significant for a hemoglobin of 7.3, lactic acid 2.2, troponin I 05.  Influenza a and B both negative.  Rapid Covid positive.  Chest x-ray unremarkable.  CT of the head showed no acute intracranial abnormality but did show a remote left parietal occipital infarct with generalized atrophy.  CT of the cervical spine showed multilevel degenerative disc and generative arthritic changes of the cervical spine with no acute abnormality.  Patient was not hypoxic.     Patient was admitted to medical telemetry. She was started on Zithromax and ceftriaxone along with Decadron. During the hospitalization, she got routine hemodialysis. She has not required nasal oxygen at any point. Overall clinically stable for discharge to home with self quarantine. This has been explained to son Charlotte Lopez at . Discharge Medications:   Current Discharge Medication List      START taking these medications    Details   ascorbic acid, vitamin C, (VITAMIN C) 500 mg tablet Take 1 Tablet by mouth two (2) times a day for 30 days. Qty: 60 Tablet, Refills: 0  Start date: 12/29/2021, End date: 1/28/2022      aspirin 81 mg chewable tablet Take 1 Tablet by mouth daily for 30 days.   Qty: 30 Tablet, Refills: 0  Start date: 12/30/2021, End date: 1/29/2022      benzonatate (TESSALON) 100 mg capsule Take 1 Capsule by mouth three (3) times daily as needed for Cough for up to 7 days. Qty: 21 Capsule, Refills: 0  Start date: 12/29/2021, End date: 1/5/2022      zinc sulfate (ZINCATE) 50 mg zinc (220 mg) capsule Take 1 Capsule by mouth daily for 30 days. Qty: 30 Capsule, Refills: 0  Start date: 12/30/2021, End date: 1/29/2022      dexAMETHasone (Decadron) 4 mg tablet Take 4 mg by mouth Daily (before breakfast) for 10 days. Qty: 10 Tablet, Refills: 0  Start date: 12/29/2021, End date: 1/8/2022      azithromycin (ZITHROMAX) 250 mg tablet Take 1 Tablet by mouth daily for 5 days. Qty: 6 Tablet, Refills: 0  Start date: 12/29/2021, End date: 1/3/2022         CONTINUE these medications which have NOT CHANGED    Details   calcitRIOL (ROCALTROL) 0.5 mcg capsule Take 1 Capsule by mouth daily. Qty: 30 Capsule, Refills: 0      zinc oxide-white petrolatum 17-57 % topical paste Apply  to affected area as needed for Skin Irritation. Indications: skin irritation  Qty: 113 g, Refills: 0      ondansetron hcl (Zofran) 4 mg tablet Take 1 Tablet by mouth every eight (8) hours as needed for Nausea or Vomiting. Qty: 45 Tablet, Refills: 0      Vimpat 200 mg tab tablet Take 1 Tablet by mouth two (2) times a day. ondansetron (Zofran ODT) 4 mg disintegrating tablet 1 Tablet by SubLINGual route every eight (8) hours as needed for Nausea or Vomiting. Qty: 20 Tablet, Refills: 0      atorvastatin (LIPITOR) 40 mg tablet Take 1 tablet by mouth once daily for 90 days  Qty: 90 Tablet, Refills: 3      FLUoxetine (PROzac) 10 mg capsule Take 1 Capsule by mouth daily for 270 days.   Qty: 90 Capsule, Refills: 2    Associated Diagnoses: TRISHA (generalized anxiety disorder)      loperamide (IMODIUM) 2 mg capsule TAKE 1 CAPSULE BY MOUTH SIX TIMES DAILY AS NEEDED FOR 30 DAYS  Qty: 180 Capsule, Refills: 0      Euthyrox 150 mcg tablet Take 1 tablet by mouth once daily  Qty: 90 Tablet, Refills: 0      levETIRAcetam (KEPPRA XR) 500 mg ER tablet TAKE 2 TABLETS BY MOUTH TWICE DAILY FOR 90 DAYS      sevelamer carbonate (RENVELA) 800 mg tab tab                Follow up Care:    1. Gokul Bro MD in 1-2 weeks. Please call to set up an appointment shortly after discharge. Diet:  Renal Diet    Disposition:  Home. Advanced Directive:   FULL    DNR      Discharge Exam:  Visit Vitals  BP (!) 156/70   Pulse 68   Temp 98.1 °F (36.7 °C) (Oral)   Resp 18   Ht 5' 6\" (1.676 m)   Wt 76.7 kg (169 lb)   SpO2 96%   BMI 27.28 kg/m²      O2 Device: None (Room air)    Temp (24hrs), Av °F (36.7 °C), Min:97.7 °F (36.5 °C), Max:98.2 °F (36.8 °C)    701 - 1900  In: -   Out: 1000    1901 -  07  In: 120 [P.O.:120]  Out: -     General:  Alert, cooperative, no distress, appears stated age. Lungs:   Clear to auscultation bilaterally. Chest wall:  No tenderness or deformity. Heart:  Regular rate and rhythm, S1, S2 normal, no murmur, click, rub or gallop. Abdomen:   Soft, non-tender. Bowel sounds normal. No masses,  No organomegaly. Extremities: Extremities normal, atraumatic, no cyanosis or edema. Pulses: 2+ and symmetric all extremities. Skin: Skin color, texture, turgor normal. No rashes or lesions   Neurologic: CNII-XII intact. No gross sensory or motor deficits         CONSULTATIONS: Nephrology    Significant Diagnostic Studies:   2021: BUN 44 mg/dL (H; Ref range: 6 - 20 mg/dL); Calcium 8.2 mg/dL (L; Ref range: 8.5 - 10.1 mg/dL); CO2 24 mmol/L (Ref range: 21 - 32 mmol/L); Creatinine 4.29 mg/dL (H; Ref range: 0.55 - 1.02 mg/dL); Glucose 84 mg/dL (Ref range: 65 - 100 mg/dL); HCT 23.6 % (L; Ref range: 35.0 - 47.0 %); HGB 7.3 g/dL (L; Ref range: 11.5 - 16.0 g/dL); Potassium 4.2 mmol/L (Ref range: 3.5 - 5.1 mmol/L); Sodium 139 mmol/L (Ref range: 136 - 145 mmol/L)  2021: BUN 58 mg/dL (H; Ref range: 6 - 20 mg/dL); Calcium 7.5 mg/dL (L; Ref range: 8.5 - 10.1 mg/dL); CO2 25 mmol/L (Ref range: 21 - 32 mmol/L);  Creatinine 4.85 mg/dL (H; Ref range: 0.55 - 1.02 mg/dL); Glucose 96 mg/dL (Ref range: 65 - 100 mg/dL); HCT 18.8 % (L; Ref range: 35.0 - 47.0 %); HGB 5.7 g/dL (LL; Ref range: 11.5 - 16.0 g/dL); Potassium 4.4 mmol/L (Ref range: 3.5 - 5.1 mmol/L); Sodium 141 mmol/L (Ref range: 136 - 145 mmol/L)  Recent Labs     12/29/21  0647 12/28/21  0851   WBC 6.5 6.7   HGB 8.5* 8.7*   HCT 27.2* 27.4*   * 107*     Recent Labs     12/29/21  0647 12/28/21  0851 12/27/21  0815    141 141   K 3.7 4.0 4.4    107 108   CO2 24 27 25   BUN 49* 39* 58*   CREA 3.67* 3.19* 4.85*   * 112* 96   CA 7.6* 7.6* 7.5*   PHOS 4.1 4.8*  --      Recent Labs     12/29/21  0647 12/28/21  0851   ALB 2.0* 1.9*     No results for input(s): INR, PTP, APTT, INREXT in the last 72 hours. Recent Labs     12/27/21  0813   FERR 959*      No results for input(s): PH, PCO2, PO2 in the last 72 hours. No results for input(s): CPK, CKMB in the last 72 hours.     No lab exists for component: TROPONINI  Lab Results   Component Value Date/Time    Glucose (POC) 68 12/11/2021 03:48 PM    Glucose (POC) 74 12/11/2021 11:43 AM    Glucose (POC) 84 12/11/2021 08:47 AM    Glucose (POC) 112 12/10/2021 08:08 PM    Glucose (POC) 69 12/10/2021 04:15 PM       Total Time: 35 minutes    Signed:  Hina Kearney MD  12/29/2021  2:47 PM

## 2021-12-29 NOTE — PROGRESS NOTES
Skin assessment done by URIAH Garcias and Chon Marcus RN. Patient has pressure injury and redness to sacrum area. Bruising to the right hip, hemodialysis port to right subclavicular area.

## 2021-12-29 NOTE — PROGRESS NOTES
Hospitalist Progress Note               Daily Progress Note: 12/29/2021      Subjective: The patient is seen for follow up. 80 y.o. female with a past medical history of heart disease, GERD, hypercholesterolemia, hypothyroidism, seizure disorder, end-stage renal disease on hemodialysis that presented to the emergency room on 12/26/2021 for altered mental status. Of note patient was in the hospital the day prior due to her dialysis catheter bleeding. At that time pressure dressing was placed and bleeding had improved. Family and patient were notified to keep the pressure dressing on and to follow-up with general surgery as an outpatient. However the following day patient became altered, febrile, increased coughing. Multiple family members have tested positive for Covid. Therefore she presented back to the emergency room. Patient is altered and oriented x0. She denies any pain. She does admit to a cough. She has had multiple falls and generalized weakness. In the ED vital signs were significant for fever 101.1 °F and hypotension of 114/58. Laboratory data was significant for a hemoglobin of 7.3, lactic acid 2.2, troponin I 05. Influenza a and B both negative. Rapid Covid positive. Chest x-ray unremarkable. CT of the head showed no acute intracranial abnormality but did show a remote left parietal occipital infarct with generalized atrophy. CT of the cervical spine showed multilevel degenerative disc and generative arthritic changes of the cervical spine with no acute abnormality. Patient was not hypoxic. Patient was admitted to medical telemetry. She was started on Zithromax and ceftriaxone along with Decadron. --------    Patient is seen for follow-up. Hemoglobin this morning is down to 8.5. S/P 2 units of blood.   No fevers overnight      Problem List:  Problem List as of 12/29/2021 Date Reviewed: 12/9/2021          Codes Class Noted - Resolved    AMS (altered mental status) ICD-10-CM: R41.82  ICD-9-CM: 780.97  12/26/2021 - Present        COVID ICD-10-CM: U07.1  ICD-9-CM: 079.89  12/26/2021 - Present        Fluid overload ICD-10-CM: E87.70  ICD-9-CM: 276.69  12/5/2021 - Present        Left-sided weakness ICD-10-CM: R53.1  ICD-9-CM: 728.87  12/3/2021 - Present        Intractable nausea and vomiting ICD-10-CM: R11.2  ICD-9-CM: 536.2  12/3/2021 - Present        Hypoglycemia ICD-10-CM: E16.2  ICD-9-CM: 251.2  6/11/2021 - Present        Breakthrough seizure ([facility] ICD-10-CM: S29.334  ICD-9-CM: 345.91  6/11/2021 - Present        SSS (sick sinus syndrome) ([facility] ICD-10-CM: I49.5  ICD-9-CM: 427.81  6/11/2021 - Present        Seizure ([facility] ICD-10-CM: R56.9  ICD-9-CM: 780.39  5/19/2021 - Present        Hyperkalemia ICD-10-CM: E87.5  ICD-9-CM: 276.7  10/16/2020 - Present        Atherosclerosis of native arteries of the extremities with ulceration ([facility] ICD-10-CM: I70.25  ICD-9-CM: 440.23, 707.9  8/31/2020 - Present        Cellulitis and abscess of trunk ICD-10-CM: L03.319, L02.219  ICD-9-CM: 682.2  8/31/2020 - Present        Peripheral venous insufficiency ICD-10-CM: I87.2  ICD-9-CM: 459.81  8/31/2020 - Present        Varicose veins of lower extremity with inflammation ICD-10-CM: I83.10  ICD-9-CM: 454.1  8/31/2020 - Present        Peripheral vascular disease ([facility] ICD-10-CM: I73.9  ICD-9-CM: 443.9  7/3/2020 - Present        Type II diabetes mellitus ([facility] ICD-10-CM: E11.9  ICD-9-CM: 250.00  7/3/2020 - Present        Long term (current) use of antibiotics ICD-10-CM: Z79.2  ICD-9-CM: V58.62  7/3/2020 - Present        Atrial fibrillation ([facility] ICD-10-CM: I48.91  ICD-9-CM: 427.31  7/3/2020 - Present        Cardiac pacemaker in situ ICD-10-CM: Z95.0  ICD-9-CM: V45.01  7/3/2020 - Present        Carpal tunnel syndrome of right wrist ICD-10-CM: G56.01  ICD-9-CM: 354.0  7/3/2020 - Present        Chest wall pain ICD-10-CM: R07.89  ICD-9-CM: 786.52  7/3/2020 - Present        Chronic diarrhea ICD-10-CM: K52.9  ICD-9-CM: 787.91  7/3/2020 - Present        Chronic neck pain ICD-10-CM: M54.2, G89.29  ICD-9-CM: 723.1, 338.29  7/3/2020 - Present        End stage renal failure on dialysis [facility]) ICD-10-CM: N18.6, Z99.2  ICD-9-CM: 585.6, V45.11  7/3/2020 - Present        History of CVA (cerebrovascular accident) ICD-10-CM: Z86.73  ICD-9-CM: V12.54  7/3/2020 - Present        Hyponatremia ICD-10-CM: E87.1  ICD-9-CM: 276.1  7/3/2020 - Present        Acquired hypothyroidism ICD-10-CM: E03.9  ICD-9-CM: 244.9  7/3/2020 - Present        Seizure disorder ([facility] ICD-10-CM: G40.909  ICD-9-CM: 345.90  7/3/2020 - Present        Recurrent falls ICD-10-CM: R29.6  ICD-9-CM: V15.88  7/3/2020 - Present        RESOLVED: Disorder due to well controlled type 2 diabetes mellitus ([facility] ICD-10-CM: E11.8  ICD-9-CM: 250.90  8/31/2020 - 6/11/2021              Medications reviewed  Current Facility-Administered Medications   Medication Dose Route Frequency    epoetin anjelica-epbx (RETACRIT) injection 10,000 Units  10,000 Units IntraVENous DIALYSIS MON, WED & FRI    lactulose (CHRONULAC) 10 gram/15 mL solution 30 mL  20 g Oral TID    heparin (porcine) 1,000 unit/mL injection 3,600 Units  3,600 Units IntraVENous DIALYSIS PRN    atorvastatin (LIPITOR) tablet 40 mg  40 mg Oral QHS    calcitRIOL (ROCALTROL) capsule 0.5 mcg  0.5 mcg Oral DAILY    levothyroxine (SYNTHROID) tablet 150 mcg  150 mcg Oral DAILY    FLUoxetine (PROzac) capsule 10 mg  10 mg Oral DAILY    sevelamer carbonate (RENVELA) tab 800 mg  800 mg Oral TID WITH MEALS    lacosamide (VIMPAT) tablet 200 mg  200 mg Oral BID    sodium chloride (NS) flush 5-40 mL  5-40 mL IntraVENous Q8H    sodium chloride (NS) flush 5-40 mL  5-40 mL IntraVENous PRN    acetaminophen (TYLENOL) tablet 650 mg  650 mg Oral Q6H PRN    Or    acetaminophen (TYLENOL) suppository 650 mg  650 mg Rectal Q6H PRN    polyethylene glycol (MIRALAX) packet 17 g  17 g Oral DAILY PRN    ondansetron (ZOFRAN ODT) tablet 4 mg 4 mg Oral Q8H PRN    Or    ondansetron (ZOFRAN) injection 4 mg  4 mg IntraVENous Q6H PRN    cefTRIAXone (ROCEPHIN) 2 g in sterile water (preservative free) 20 mL IV syringe  2 g IntraVENous Q24H    azithromycin (ZITHROMAX) 500 mg in 0.9% sodium chloride 250 mL (VIAL-MATE)  500 mg IntraVENous Q24H    zinc sulfate (ZINCATE) 50 mg zinc (220 mg) capsule 1 Capsule  1 Capsule Oral DAILY    ascorbic acid (vitamin C) (VITAMIN C) tablet 500 mg  500 mg Oral BID    0.9% sodium chloride infusion  75 mL/hr IntraVENous CONTINUOUS    dexamethasone (DECADRON) 4 mg/mL injection 4 mg  4 mg IntraVENous Q12H    albuterol (PROVENTIL HFA, VENTOLIN HFA, PROAIR HFA) inhaler 2 Puff  2 Puff Inhalation Q6H PRN    aspirin chewable tablet 81 mg  81 mg Oral DAILY    levETIRAcetam (KEPPRA) tablet 250 mg  250 mg Oral BID    benzonatate (TESSALON) capsule 100 mg  100 mg Oral TID PRN    pantoprazole (PROTONIX) tablet 40 mg  40 mg Oral ACB       Review of Systems:   A comprehensive review of systems was negative except for that written in the HPI. Objective:   Physical Exam:     Visit Vitals  BP (!) 156/70   Pulse 68   Temp 98.1 °F (36.7 °C) (Oral)   Resp 18   Ht 5' 6\" (1.676 m)   Wt 76.7 kg (169 lb)   SpO2 96%   BMI 27.28 kg/m²      O2 Device: None (Room air)    Temp (24hrs), Av °F (36.7 °C), Min:97.7 °F (36.5 °C), Max:98.2 °F (36.8 °C)    701 - 1900  In: -   Out: 1000    1901 -  07  In: 120 [P.O.:120]  Out: -     General:   Awake and alert appears frail   Lungs:   Clear to auscultation bilaterally. Chest wall:  No tenderness or deformity. Heart:  Regular rate and rhythm, S1, S2 normal, no murmur, click, rub or gallop. Abdomen:   Soft, non-tender. Bowel sounds normal. No masses,  No organomegaly. Extremities: Extremities normal, atraumatic, no cyanosis or edema. Pulses: 2+ and symmetric all extremities.    Skin: Skin color, texture, turgor normal. No rashes or lesions   Neurologic: CNII-XII intact. No gross focal deficits         Data Review:       Recent Days:  Recent Labs     12/29/21  0647 12/28/21  0851 12/27/21  0758   WBC 6.5 6.7 4.7   HGB 8.5* 8.7* 5.7*   HCT 27.2* 27.4* 18.8*   * 107* 109*     Recent Labs     12/29/21  0647 12/28/21  0851 12/27/21  0815    141 141   K 3.7 4.0 4.4    107 108   CO2 24 27 25   * 112* 96   BUN 49* 39* 58*   CREA 3.67* 3.19* 4.85*   CA 7.6* 7.6* 7.5*   PHOS 4.1 4.8*  --    ALB 2.0* 1.9*  --      No results for input(s): PH, PCO2, PO2, HCO3, FIO2 in the last 72 hours. 24 Hour Results:  Recent Results (from the past 24 hour(s))   VANCOMYCIN, RANDOM    Collection Time: 12/29/21  6:47 AM   Result Value Ref Range    Vancomycin, random 15.2 ug/mL   CBC W/O DIFF    Collection Time: 12/29/21  6:47 AM   Result Value Ref Range    WBC 6.5 3.6 - 11.0 K/uL    RBC 2.77 (L) 3.80 - 5.20 M/uL    HGB 8.5 (L) 11.5 - 16.0 g/dL    HCT 27.2 (L) 35.0 - 47.0 %    MCV 98.2 80.0 - 99.0 FL    MCH 30.7 26.0 - 34.0 PG    MCHC 31.3 30.0 - 36.5 g/dL    RDW 15.2 (H) 11.5 - 14.5 %    PLATELET 734 (L) 083 - 400 K/uL    MPV 11.2 8.9 - 12.9 FL    NRBC 0.0 0.0  WBC    ABSOLUTE NRBC 0.00 0.00 - 0.01 K/uL   RENAL FUNCTION PANEL    Collection Time: 12/29/21  6:47 AM   Result Value Ref Range    Sodium 141 136 - 145 mmol/L    Potassium 3.7 3.5 - 5.1 mmol/L    Chloride 108 97 - 108 mmol/L    CO2 24 21 - 32 mmol/L    Anion gap 9 5 - 15 mmol/L    Glucose 112 (H) 65 - 100 mg/dL    BUN 49 (H) 6 - 20 mg/dL    Creatinine 3.67 (H) 0.55 - 1.02 mg/dL    BUN/Creatinine ratio 13 12 - 20      GFR est AA 15 (L) >60 ml/min/1.73m2    GFR est non-AA 12 (L) >60 ml/min/1.73m2    Calcium 7.6 (L) 8.5 - 10.1 mg/dL    Phosphorus 4.1 2.6 - 4.7 mg/dL    Albumin 2.0 (L) 3.5 - 5.0 g/dL       XR CHEST PORT   Final Result   No acute cardiopulmonary abnormality. .       CT HEAD WO CONT   Final Result   No acute intracranial abnormality. Remote left parietal-occipital   infarct.  Mild generalized atrophy with nonspecific white matter abnormality that   may indicate chronic small vessel disease. Bambi Bloodgood HISTORY:  recurrent falls, AMS   Dose reduction technique: All CT scans at this facility are performed using dose reduction optimization   technique as appropriate on the exam including the following: Automated exposure   control, adjustment of the MA and/or KV according to patient size and/or use of   iterative reconstructive technique. TECHNIQUE: Noncontrast CT of the cervical spine with multiplanar   reconstructions. COMPARISON: None   LIMITATIONS: None      FRACTURES: None   ALIGNMENT: Normal   VERTEBRAL BODIES: Multilevel degenerative/arthritic changes characterized by   osteophyte formation and associated endplate changes; findings most notable at   C5-6, C7-T1. DISC SPACES: Decreased with multiple posterior disc protrusions some of which   are mildly calcified   POSTERIOR ELEMENTS: Normal for age. SPINAL CANAL: Normal   PARASPINAL SOFT TISSUES: Normal      OTHER: None      IMPRESSION: Multilevel degenerative disc and degenerative arthritic changes of   the cervical spine with no acute abnormality. CT SPINE CERV WO CONT   Final Result   No acute intracranial abnormality. Remote left parietal-occipital   infarct. Mild generalized atrophy with nonspecific white matter abnormality that   may indicate chronic small vessel disease. Bambi Bloodgood HISTORY:  recurrent falls, AMS   Dose reduction technique: All CT scans at this facility are performed using dose reduction optimization   technique as appropriate on the exam including the following: Automated exposure   control, adjustment of the MA and/or KV according to patient size and/or use of   iterative reconstructive technique. TECHNIQUE: Noncontrast CT of the cervical spine with multiplanar   reconstructions.    COMPARISON: None   LIMITATIONS: None      FRACTURES: None   ALIGNMENT: Normal   VERTEBRAL BODIES: Multilevel degenerative/arthritic changes characterized by   osteophyte formation and associated endplate changes; findings most notable at   C5-6, C7-T1. DISC SPACES: Decreased with multiple posterior disc protrusions some of which   are mildly calcified   POSTERIOR ELEMENTS: Normal for age. SPINAL CANAL: Normal   PARASPINAL SOFT TISSUES: Normal      OTHER: None      IMPRESSION: Multilevel degenerative disc and degenerative arthritic changes of   the cervical spine with no acute abnormality. Assessment:  Covid pneumonitis, nonsevere disease. No hypoxia    Acute on chronic anemia of chronic kidney disease    Metabolic encephalopathy, likely due to Covid/fever. Improved    Seizure disorder, on Vimpat and Keppra    End stage renal disease on hemodialysis    Constipation      Plan:  Continue ceftriaxone and Zithromax, continue Decadron  PTOT evaluation  SNF referral underway    Care Plan discussed with: Patient/Family and nurse    Disposition: Continued inpatient care x24 hours    Total time spent with patient: 30 minutes.     Tiffanie Borrego MD

## 2021-12-29 NOTE — PROGRESS NOTES
Consult requested by: Jung Riley MD    ADMIT DATE: 12/26/2021  CONSULT DATE: December 29, 2021           Patient is seen in the room  Her hemoglobin is 5.7-->8.7   transfused 2 units of packed RBCs with dialysis  S/p dialyzed today and removed 1L     Past Medical History:   Diagnosis Date    Anxiety disorder     Arthritis     Atherosclerosis of native arteries of the extremities with ulceration ([address] 8/31/2020    Atrial fibrillation ([address]     Cardiac pacemaker in situ     Cellulitis and abscess of trunk 8/31/2020    Depression, postpartum     Diabetes ([address]     Heart disease     Heartburn     Hx of long term use of blood thinners     Hypercholesterolemia     Hypertension     Hypothyroidism     Migraines     Peripheral venous insufficiency 8/31/2020    Seizures ([address]     Sleep disorder     Stroke ([facility]     2019    Varicose veins of lower extremity with inflammation 8/31/2020      Past Surgical History:   Procedure Laterality Date    HX BACK SURGERY      HX OTHER SURGICAL      leg surgery     HX OTHER SURGICAL      pacemaker monitor     IR INSERT TUNL CVC W/O PORT OVER 5 YR  12/10/2021       Social History     Socioeconomic History    Marital status:      Spouse name: Not on file    Number of children: Not on file    Years of education: Not on file    Highest education level: Not on file   Occupational History    Not on file   Tobacco Use    Smoking status: Never Smoker    Smokeless tobacco: Never Used   Vaping Use    Vaping Use: Never used   Substance and Sexual Activity    Alcohol use: Never    Drug use: Never    Sexual activity: Not on file   Other Topics Concern    Not on file   Social History Narrative    Not on file     Social Determinants of Health     Financial Resource Strain:     Difficulty of Paying Living Expenses: Not on file   Food Insecurity:     Worried About Running Out of Food in the Last Year: Not on file    Janet of Food in the Last Year: Not on file   Transportation Needs:     Lack of Transportation (Medical): Not on file    Lack of Transportation (Non-Medical): Not on file   Physical Activity:     Days of Exercise per Week: Not on file    Minutes of Exercise per Session: Not on file   Stress:     Feeling of Stress : Not on file   Social Connections:     Frequency of Communication with Friends and Family: Not on file    Frequency of Social Gatherings with Friends and Family: Not on file    Attends Oriental orthodox Services: Not on file    Active Member of [address] [place] [address] [employer] or Organizations: Not on file    Attends Club or Organization Meetings: Not on file    Marital Status: Not on file   Intimate Partner Violence:     Fear of Current or Ex-Partner: Not on file    Emotionally Abused: Not on file    Physically Abused: Not on file    Sexually Abused: Not on file   Housing Stability:     Unable to Pay for Housing in the Last Year: Not on file    Number of [place] in the Last Year: Not on file    Unstable Housing in the Last Year: Not on file       Family History   Problem Relation Age of Onset    Heart Disease Mother     Heart Disease Father     Diabetes Sister     Diabetes Brother      No Known Allergies     Home Medications:     Medications Prior to Admission   Medication Sig    calcitRIOL (ROCALTROL) 0.5 mcg capsule Take 1 Capsule by mouth daily.  zinc oxide-white petrolatum 17-57 % topical paste Apply  to affected area as needed for Skin Irritation. Indications: skin irritation    ondansetron hcl (Zofran) 4 mg tablet Take 1 Tablet by mouth every eight (8) hours as needed for Nausea or Vomiting.  Vimpat 200 mg tab tablet Take 1 Tablet by mouth two (2) times a day.  ondansetron (Zofran ODT) 4 mg disintegrating tablet 1 Tablet by SubLINGual route every eight (8) hours as needed for Nausea or Vomiting.     atorvastatin (LIPITOR) 40 mg tablet Take 1 tablet by mouth once daily for 90 days    FLUoxetine (PROzac) 10 mg capsule Take 1 Capsule by mouth daily for 270 days.     loperamide (IMODIUM) 2 mg capsule TAKE 1 CAPSULE BY MOUTH SIX TIMES DAILY AS NEEDED FOR 30 DAYS    Euthyrox 150 mcg tablet Take 1 tablet by mouth once daily    levETIRAcetam (KEPPRA XR) 500 mg ER tablet TAKE 2 TABLETS BY MOUTH TWICE DAILY FOR 90 DAYS    sevelamer carbonate (RENVELA) 800 mg tab tab        Current Inpatient Medications:     Current Facility-Administered Medications   Medication Dose Route Frequency    epoetin anjelica-epbx (RETACRIT) injection 10,000 Units  10,000 Units IntraVENous DIALYSIS MON, WED & FRI    lactulose (CHRONULAC) 10 gram/15 mL solution 30 mL  20 g Oral TID    heparin (porcine) 1,000 unit/mL injection 3,600 Units  3,600 Units IntraVENous DIALYSIS PRN    atorvastatin (LIPITOR) tablet 40 mg  40 mg Oral QHS    calcitRIOL (ROCALTROL) capsule 0.5 mcg  0.5 mcg Oral DAILY    levothyroxine (SYNTHROID) tablet 150 mcg  150 mcg Oral DAILY    FLUoxetine (PROzac) capsule 10 mg  10 mg Oral DAILY    sevelamer carbonate (RENVELA) tab 800 mg  800 mg Oral TID WITH MEALS    lacosamide (VIMPAT) tablet 200 mg  200 mg Oral BID    sodium chloride (NS) flush 5-40 mL  5-40 mL IntraVENous Q8H    sodium chloride (NS) flush 5-40 mL  5-40 mL IntraVENous PRN    acetaminophen (TYLENOL) tablet 650 mg  650 mg Oral Q6H PRN    Or    acetaminophen (TYLENOL) suppository 650 mg  650 mg Rectal Q6H PRN    polyethylene glycol (MIRALAX) packet 17 g  17 g Oral DAILY PRN    ondansetron (ZOFRAN ODT) tablet 4 mg  4 mg Oral Q8H PRN    Or    ondansetron (ZOFRAN) injection 4 mg  4 mg IntraVENous Q6H PRN    cefTRIAXone (ROCEPHIN) 2 g in sterile water (preservative free) 20 mL IV syringe  2 g IntraVENous Q24H    azithromycin (ZITHROMAX) 500 mg in 0.9% sodium chloride 250 mL (VIAL-MATE)  500 mg IntraVENous Q24H    zinc sulfate (ZINCATE) 50 mg zinc (220 mg) capsule 1 Capsule  1 Capsule Oral DAILY    ascorbic acid (vitamin C) (VITAMIN C) tablet 500 mg  500 mg Oral BID    0.9% sodium chloride infusion  75 mL/hr IntraVENous CONTINUOUS    dexamethasone (DECADRON) 4 mg/mL injection 4 mg  4 mg IntraVENous Q12H    albuterol (PROVENTIL HFA, VENTOLIN HFA, PROAIR HFA) inhaler 2 Puff  2 Puff Inhalation Q6H PRN    aspirin chewable tablet 81 mg  81 mg Oral DAILY    levETIRAcetam (KEPPRA) tablet 250 mg  250 mg Oral BID    benzonatate (TESSALON) capsule 100 mg  100 mg Oral TID PRN    pantoprazole (PROTONIX) tablet 40 mg  40 mg Oral ACB       Review of Systems:   No fever or chills. No sore throat. No cough or hemoptysis. No shortness of breath or chest pain. No orthopnea or paroxysmal nocturnal dyspnea. No nausea, vomiting, abdominal pain, melena or hematochezia. No ankle swelling, no joint paints. No muscle aches. No skin changes. No dizziness or lightheadedness. No headaches. Physical Assessment:     Vitals:    12/29/21 0923 12/29/21 0953 12/29/21 1023 12/29/21 1053   BP: (!) 142/72 (!) 151/72 (!) 147/73 (!) 156/70   Pulse: 60 60 62 68   Resp: 18 18 18 18   Temp:    98.1 °F (36.7 °C)   TempSrc:    Oral   SpO2:       Weight:       Height:         Last 3 Recorded Weights in this Encounter    12/26/21 0816   Weight: 76.7 kg (169 lb)     Admission weight: Weight: 76.7 kg (169 lb) (12/26/21 0816)      Intake/Output Summary (Last 24 hours) at 12/29/2021 1619  Last data filed at 12/29/2021 1053  Gross per 24 hour   Intake    Output 1000 ml   Net -1000 ml       Patient is in no apparent distress. HEENT: Head is normocephalic and atraumatic. Pupils are round, equal, reactive to light. Sclerae are anicteric. Oropharynx clear. Neck: no cervical lymphadenopathy or thyromegaly. Lungs: good air entry, clear to auscultation bilaterally. Trachea at the midline. Cardiovascular system: S1, S2, regular rate and rhythm. No murmurs, gallops or rubs. No jvd. Abdomen: soft, non tender, non distended. Positive bowel sounds. No hepatosplenomegaly. No abdominal bruits.    Extremities: no clubbing, cyanosis or edema. Strong dorsalis pedis pulses. Brisk capillary refill on the toes bilaterally. Integumentary: skin is grossly intact. Neurologic: Alert, oriented time three. Cooperative and appropriate. No gross motor or sensory deficits. Dialysis access: cuffed tunneled catheter site right. [facility]     Data Review:    Labs: Results:       Chemistry Recent Labs     12/29/21  0647 12/28/21  0851 12/28/21  0851 12/27/21  0815   *  --  112* 96     --  141 141   K 3.7  --  4.0 4.4     --  107 108   CO2 24  --  27 25   BUN 49*  --  39* 58*   CREA 3.67*  --  3.19* 4.85*   CA 7.6*  --  7.6* 7.5*   AGAP 9  --  7 8   BUCR 13  --  12 12   ALB 2.0*  --  1.9*  --    PHOS 4.1   < > 4.8*  --     < > = values in this interval not displayed. CBC w/Diff Recent Labs     12/29/21  0647 12/28/21  0851 12/27/21  0758   WBC 6.5 6.7 4.7   RBC 2.77* 2.83* 1.86*   HGB 8.5* 8.7* 5.7*   HCT 27.2* 27.4* 18.8*   * 107* 109*   GRANS  --   --  83*   LYMPH  --   --  8*   EOS  --   --  0         Iron/Ferritin No results for input(s): IRON in the last 72 hours. No lab exists for component: TIBCCALC   PTH/VIT D No results for input(s): PTH in the last 72 hours. No lab exists for component: VITD            Assessment & Plan     1-ESRD:  -HD MWF at [facility], [address] [place] [address]  -No volume overload/uremia  -s/p HD 12/27 and 12/29  -RI perm cath as active dialysis access  -BORA AVF in place also    2-Anemia:  -Hb was 5.7  -No obvious bleeding  -s/p blood Tx 2 units with HD   -iv Epo with HD  -r/o GIB   -Hb 8.7       3-Secondary hyperparathyroidism of ESRD. -cont sevelamer 800 mg tid and calcitriol 0.5 mcg daily       - Renal Diet    4-COVID Pneumonia   -- started on empiric abxs because of fever,lactic acidosis and leucocytosis . 5-AMS - CTH negative   Likely Metabolic and sepsis related .     6- Seizure disorder - Vimpat & Keppra resumed,  Renally dosed

## 2021-12-29 NOTE — PROGRESS NOTES
Awaiting placement for discharge        Problem: Airway Clearance - Ineffective  Goal: Achieve or maintain patent airway  Outcome: Progressing Towards Goal     Problem: Gas Exchange - Impaired  Goal: Absence of hypoxia  Outcome: Progressing Towards Goal  Goal: Promote optimal lung function  Outcome: Progressing Towards Goal     Problem: Breathing Pattern - Ineffective  Goal: Ability to achieve and maintain a regular respiratory rate  Outcome: Progressing Towards Goal     Problem:  Body Temperature -  Risk of, Imbalanced  Goal: Ability to maintain a body temperature within defined limits  Outcome: Progressing Towards Goal  Goal: Will regain or maintain usual level of consciousness  Outcome: Progressing Towards Goal  Goal: Complications related to the disease process, condition or treatment will be avoided or minimized  Outcome: Progressing Towards Goal     Problem: Isolation Precautions - Risk of Spread of Infection  Goal: Prevent transmission of infectious organism to others  Outcome: Progressing Towards Goal     Problem: Nutrition Deficits  Goal: Optimize nutrtional status  Outcome: Progressing Towards Goal     Problem: Risk for Fluid Volume Deficit  Goal: Maintain normal heart rhythm  Outcome: Progressing Towards Goal  Goal: Maintain absence of muscle cramping  Outcome: Progressing Towards Goal  Goal: Maintain normal serum potassium, sodium, calcium, phosphorus, and pH  Outcome: Progressing Towards Goal     Problem: Loneliness or Risk for Loneliness  Goal: Demonstrate positive use of time alone when socialization is not possible  Outcome: Progressing Towards Goal     Problem: Fatigue  Goal: Verbalize increase energy and improved vitality  Outcome: Progressing Towards Goal     Problem: Patient Education: Go to Patient Education Activity  Goal: Patient/Family Education  Outcome: Progressing Towards Goal     Problem: Risk for Spread of Infection  Goal: Prevent transmission of infectious organism to others  Description: Prevent the transmission of infectious organisms to other patients, staff members, and visitors. Outcome: Progressing Towards Goal     Problem: Patient Education:  Go to Education Activity  Goal: Patient/Family Education  Outcome: Progressing Towards Goal     Problem: Pressure Injury - Risk of  Goal: *Prevention of pressure injury  Description: Document Nitin Scale and appropriate interventions in the flowsheet. Outcome: Progressing Towards Goal  Note: Pressure Injury Interventions:  Sensory Interventions: Assess changes in LOC,Discuss PT/OT consult with provider,Keep linens dry and wrinkle-free,Turn and reposition approx. every two hours (pillows and wedges if needed)    Moisture Interventions: Absorbent underpads,Apply protective barrier, creams and emollients,Maintain skin hydration (lotion/cream),Minimize layers    Activity Interventions: PT/OT evaluation    Mobility Interventions: HOB 30 degrees or less,Turn and reposition approx. every two hours(pillow and wedges)    Nutrition Interventions: Document food/fluid/supplement intake    Friction and Shear Interventions: HOB 30 degrees or less,Minimize layers,Transferring/repositioning devices                Problem: Patient Education: Go to Patient Education Activity  Goal: Patient/Family Education  Outcome: Progressing Towards Goal     Problem: Falls - Risk of  Goal: *Absence of Falls  Description: Document Cuco Fall Risk and appropriate interventions in the flowsheet.   Outcome: Progressing Towards Goal     Problem: Patient Education: Go to Patient Education Activity  Goal: Patient/Family Education  Outcome: Progressing Towards Goal     Problem: Patient Education: Go to Patient Education Activity  Goal: Patient/Family Education  Outcome: Progressing Towards Goal     Problem: Patient Education: Go to Patient Education Activity  Goal: Patient/Family Education  Outcome: Progressing Towards Goal

## 2021-12-29 NOTE — PROGRESS NOTES
CM spoke with patient son about SNF choices. Son is declining SNF and requesting that patient still come home with home health. He is also requesting to talk to primary physician. CM made physician aware. CM will continue to follow. CM has updated HH with current clinicals.

## 2021-12-29 NOTE — PROGRESS NOTES
CM spoke with primary physician. He spoke to patient's son and they are agreeable to patient coming home today with [facility]. CM has updated [address]. Discharge plan of care/case management plan validated with provider discharge order. Second IMM reviewed, questioned answered, signed and placed on chart.

## 2021-12-29 NOTE — PROGRESS NOTES
CM spoke with patient's son. He would like for patient to go to a SNF at discharge. Patient's son does not have a preference of facility. Choice letter signed and placed on chart. Referrals have been sent. CM made primary RN and physician aware. Discharge cancelled at this time.

## 2021-12-29 NOTE — PROGRESS NOTES
Called Dr. Bert Larry was told by Wendy Armstrong that nurse will call patient with appointment information. Because, they only have appointments in February.

## 2021-12-30 ENCOUNTER — TELEPHONE (OUTPATIENT)
Dept: INTERNAL MEDICINE CLINIC | Age: 71
End: 2021-12-30

## 2021-12-30 LAB
GLUCOSE BLD STRIP.AUTO-MCNC: 110 MG/DL (ref 65–117)
GLUCOSE BLD STRIP.AUTO-MCNC: 112 MG/DL (ref 65–117)
GLUCOSE BLD STRIP.AUTO-MCNC: 88 MG/DL (ref 65–117)
GLUCOSE BLD STRIP.AUTO-MCNC: 97 MG/DL (ref 65–117)
PERFORMED BY, TECHID: NORMAL

## 2021-12-30 PROCEDURE — 97530 THERAPEUTIC ACTIVITIES: CPT

## 2021-12-30 PROCEDURE — 74011250636 HC RX REV CODE- 250/636: Performed by: PHYSICIAN ASSISTANT

## 2021-12-30 PROCEDURE — 65270000029 HC RM PRIVATE

## 2021-12-30 PROCEDURE — 74011000250 HC RX REV CODE- 250: Performed by: PHYSICIAN ASSISTANT

## 2021-12-30 PROCEDURE — 74011250637 HC RX REV CODE- 250/637: Performed by: INTERNAL MEDICINE

## 2021-12-30 PROCEDURE — 74011250637 HC RX REV CODE- 250/637: Performed by: PHYSICIAN ASSISTANT

## 2021-12-30 PROCEDURE — 82962 GLUCOSE BLOOD TEST: CPT

## 2021-12-30 RX ORDER — TRAMADOL HYDROCHLORIDE 50 MG/1
50 TABLET ORAL
Status: DISCONTINUED | OUTPATIENT
Start: 2021-12-30 | End: 2022-01-03 | Stop reason: HOSPADM

## 2021-12-30 RX ADMIN — SODIUM CHLORIDE, PRESERVATIVE FREE 10 ML: 5 INJECTION INTRAVENOUS at 05:58

## 2021-12-30 RX ADMIN — DEXAMETHASONE SODIUM PHOSPHATE 4 MG: 4 INJECTION, SOLUTION INTRA-ARTICULAR; INTRALESIONAL; INTRAMUSCULAR; INTRAVENOUS; SOFT TISSUE at 11:22

## 2021-12-30 RX ADMIN — ASPIRIN 81 MG CHEWABLE TABLET 81 MG: 81 TABLET CHEWABLE at 11:21

## 2021-12-30 RX ADMIN — SEVELAMER CARBONATE 800 MG: 800 TABLET, FILM COATED ORAL at 12:52

## 2021-12-30 RX ADMIN — ATORVASTATIN CALCIUM 40 MG: 40 TABLET, FILM COATED ORAL at 21:17

## 2021-12-30 RX ADMIN — SODIUM CHLORIDE, PRESERVATIVE FREE 10 ML: 5 INJECTION INTRAVENOUS at 14:17

## 2021-12-30 RX ADMIN — AZITHROMYCIN DIHYDRATE 500 MG: 500 INJECTION, POWDER, LYOPHILIZED, FOR SOLUTION INTRAVENOUS at 12:20

## 2021-12-30 RX ADMIN — LEVETIRACETAM 250 MG: 250 TABLET, FILM COATED ORAL at 21:17

## 2021-12-30 RX ADMIN — SEVELAMER CARBONATE 800 MG: 800 TABLET, FILM COATED ORAL at 11:22

## 2021-12-30 RX ADMIN — OXYCODONE HYDROCHLORIDE AND ACETAMINOPHEN 500 MG: 500 TABLET ORAL at 21:17

## 2021-12-30 RX ADMIN — LACOSAMIDE 200 MG: 100 TABLET, FILM COATED ORAL at 21:18

## 2021-12-30 RX ADMIN — ACETAMINOPHEN 650 MG: 325 TABLET ORAL at 12:51

## 2021-12-30 RX ADMIN — TRAMADOL HYDROCHLORIDE 50 MG: 50 TABLET ORAL at 17:19

## 2021-12-30 RX ADMIN — LEVOTHYROXINE SODIUM 150 MCG: 0.03 TABLET ORAL at 11:22

## 2021-12-30 RX ADMIN — CALCITRIOL CAPSULES 0.25 MCG 0.5 MCG: 0.25 CAPSULE ORAL at 11:21

## 2021-12-30 RX ADMIN — SEVELAMER CARBONATE 800 MG: 800 TABLET, FILM COATED ORAL at 17:19

## 2021-12-30 RX ADMIN — FLUOXETINE 10 MG: 10 CAPSULE ORAL at 11:22

## 2021-12-30 RX ADMIN — WATER 2 G: 1 INJECTION INTRAMUSCULAR; INTRAVENOUS; SUBCUTANEOUS at 12:20

## 2021-12-30 RX ADMIN — LEVETIRACETAM 250 MG: 250 TABLET, FILM COATED ORAL at 11:21

## 2021-12-30 RX ADMIN — ZINC SULFATE 220 MG (50 MG) CAPSULE 1 CAPSULE: CAPSULE at 11:21

## 2021-12-30 RX ADMIN — SODIUM CHLORIDE, PRESERVATIVE FREE 10 ML: 5 INJECTION INTRAVENOUS at 21:19

## 2021-12-30 RX ADMIN — DEXAMETHASONE SODIUM PHOSPHATE 4 MG: 4 INJECTION, SOLUTION INTRA-ARTICULAR; INTRALESIONAL; INTRAMUSCULAR; INTRAVENOUS; SOFT TISSUE at 21:18

## 2021-12-30 RX ADMIN — LACTULOSE 30 ML: 20 SOLUTION ORAL at 17:19

## 2021-12-30 RX ADMIN — OXYCODONE HYDROCHLORIDE AND ACETAMINOPHEN 500 MG: 500 TABLET ORAL at 11:22

## 2021-12-30 NOTE — PROGRESS NOTES
PHYSICAL THERAPY TREATMENT  Patient: Magdy Snyder (61 y.o. female)  Date: 12/30/2021  Diagnosis: COVID [U07.1]  AMS (altered mental status) [R41.82] <principal problem not specified>       Precautions:    Chart, physical therapy assessment, plan of care and goals were reviewed. ASSESSMENT  Patient continues with skilled PT services and is progressing towards goals. Pt pleasantly motivated for therapy and willing to work despite pain level. Patient demos improved mobility overall today and req min-mod A x2 for supine>sit, mod A scooting, and demos good sitting balance at the EOB. Patient demos ability to perform sit to stand today, min A x2 however gait held due to pain that worsens in the back/hip. Patient tolerated EOB seated therex well and demos good dynamic balance as well. Required Parisa x2 for returning to supine, progressing well towards goals, will need SNF upon discharge to improve mobility and IND. Current Level of Function Impacting Discharge (mobility/balance): weakness, unsteady mobility    Other factors to consider for discharge: assistance at home, safety. PLAN :  Patient continues to benefit from skilled intervention to address the above impairments. Continue treatment per established plan of care. to address goals. Recommendation for discharge: (in order for the patient to meet his/her long term goals)  Kaleb Jamison    This discharge recommendation:  Has been made in collaboration with the attending provider and/or case management    IF patient discharges home will need the following DME: to be determined (TBD)       SUBJECTIVE:   Patient stated is it the last day of the month?     OBJECTIVE DATA SUMMARY:   Critical Behavior:  Neurologic State: Alert  Orientation Level: Oriented to person,Oriented to time  Cognition: Follows commands     Functional Mobility Training:  Bed Mobility:  Rolling: Minimum assistance  Supine to Sit: Minimum assistance; Moderate assistance;Assist x2  Sit to Supine: Minimum assistance;Assist x2  Scooting: Moderate assistance    Transfers:  Sit to Stand: Minimum assistance;Assist x2  Stand to Sit: Minimum assistance;Assist x2    Balance:  Sitting: Intact; Without support  Sitting - Static: Good (unsupported)  Sitting - Dynamic: Good (unsupported)  Standing: Impaired; With support  Standing - Static: Constant support; Fair  Standing - Dynamic : Constant support; Fair      Therapeutic Exercises:   10x LAQ and ankle pumps    Pain Rating:  \"20\"/10 back and hip    Activity Tolerance:   Fair and requires rest breaks  Please refer to the flowsheet for vital signs taken during this treatment. After treatment patient left in no apparent distress:   Supine in bed, Call bell within reach, Bed / chair alarm activated, and Side rails x 3    COMMUNICATION/COLLABORATION:   The patients plan of care was discussed with: Occupational therapy assistant and Registered nurse. Cotx with [facility] for increased safety and assistance. Yony Calero   Time Calculation: 29 mins         Problem: Mobility Impaired (Adult and Pediatric)  Goal: *Acute Goals and Plan of Care (Insert Text)  Description: Pt will be I with LE HEP in 7 days. Pt will perform bed mobility with CGA in 7 days. Pt will perform transfers with CGA including sliding board transfers from bed <> chair in 7 days. Pt will demonstrate improvement in dynamic seated balance from mod A to SBA in 7 days.      Outcome: Progressing Towards Goal

## 2021-12-30 NOTE — PROGRESS NOTES
YUMIKO spoke with patient's son, Marlene Arango [phone], regarding DCP. He stated that [facility] was his first preference with [facility] as second, states that he will have to transport patient to Dialysis ([address] [facility]) and he would like to stay close to the area if possible. CM notified Sudhir Lee, liaison at [facility] and Josselyn segal, patient will require insurance auth prior to d/c, to be started today. YUMIKO continues to follow.       1:30 PM - Auth ref # [id number]

## 2021-12-30 NOTE — PROGRESS NOTES
Physician Progress Note      PATIENT:               Khari Koch  CSN #:                  [id number]  :                       1950  ADMIT DATE:       2021 8:17 AM  [address] [place] DATE:  RESPONDING  PROVIDER #:        Hero Howe MD          QUERY TEXT:    Dear Dr. Davion Jenkins -    Patient admitted with Covid PNA, metabolic encephalopathy. Documentation reflects NSTEMI in ED provider note dated 21. If possible, please document in the progress notes and discharge summary if NSTEMI was: The medical record reflects the following:  Risk Factors: 80 F presented with AMS, Covid exposure; admitted with Covid PNA, metabolic encephalopathy  Clinical Indicators: troponin 105; EKG notes Atrial-paced rhythm with prolonged AV conduction, Right bundle branch block; ED MD notes states \"Patient to be admitted for NSTEMI and delirium in setting of COVID infection\"  Treatment: labs, EKG    Thank you,  GHAZALA HerringN, RN, [facility]  Clinical   [phone]  Options provided:  -- NSTEMI confirmed after study  -- NSTEMI treated and resolved  -- NSTEMI ruled out after study  -- Other - I will add my own diagnosis  -- Disagree - Not applicable / Not valid  -- Disagree - Clinically unable to determine / Unknown  -- Refer to Clinical Documentation Reviewer    PROVIDER RESPONSE TEXT:    NSTEMI ruled out after study. Query created by: Prakash Gonzalez on 2021 10:31 AM      QUERY TEXT:    Dear Dr. Davion Jenkins -    Pt admitted with Covid PNA with metabolic encephalopathy. Noted documentation of sepsis by ordered Nephrology consultant.   If possible, please document in progress notes and discharge summary:    The medical record reflects the following:  Risk Factors: 80 F presented with AMS, Covid exposure; admitted with Covid PNA, metabolic encephalopathy  Clinical Indicators: per Nephrology consultant notes, \"AMS likely metabolic and sepsis related; WBC WNL; lactic acid 2.2...0.8; procalcitonin 2.04; CRP 14.20; tmax 101.1 on arrival  Treatment: labs, IV ABT, Decadron    Thank you,  GHAZALA ShayN, RN, [facility], [place]  Clinical   [phone]  Options provided:  -- Sepsis confirmed present on admission  -- Sepsis confirmed not present on admission  -- Sepsis ruled out  -- Other - I will add my own diagnosis  -- Disagree - Not applicable / Not valid  -- Disagree - Clinically unable to determine / Unknown  -- Refer to Clinical Documentation Reviewer    PROVIDER RESPONSE TEXT:    The diagnosis of sepsis was confirmed as present on admission.     Query created by: Colin Edmondson on 12/30/2021 10:36 AM      Electronically signed by:  Shanthi Worrell MD 12/30/2021 12:26 PM

## 2021-12-30 NOTE — TELEPHONE ENCOUNTER
----- Message from Corky Rad sent at 12/29/2021  3:07 PM EST -----  Subject: Appointment Request    Reason for Call: Routine Hospital Follow Up    QUESTIONS  Type of Appointment? Established Patient  Reason for appointment request? Available appointments did not meet   patient need  Additional Information for Provider? Jennifer from [employer] is   calling b/c pt needs to schedule a hospital discharge f/up appt with Dr. King Donaldson for having covid/c-diff. Pt is being discharged from [facility]. Pt was admitted and discharged on12/26/21-12/29/21. Pls   give pt a call to schedule, next available appt was on 2/4/22  ---------------------------------------------------------------------------  --------------  CALL BACK INFO  What is the best way for the office to contact you? OK to leave message on   voicemail  Preferred Call Back Phone Number? [phone]  ---------------------------------------------------------------------------  --------------  SCRIPT ANSWERS  Relationship to Patient? Third Party  Representative Name? Marisol Villa [facility]  (Patient requests to see provider urgently. )? No  (Has the patient been discharged from the hospital within 2 business days   AND does not have a Telephone Encounter  Follow Up From [address] [place] [address]   documented in [address]?)? No  Do you have any questions for your primary care provider that need to be   answered prior to your appointment? (Use RN Triage if question pertains to   anything on the red flag list)? No  (Patient needs follow up visit after hospital discharge) Book first   available appointment within 7 days OF DISCHARGE, if no appt, proceed to   book the next available time slot within 14 days OF DISCHARGE AND Send   Message to Provider. [address] Follow Up appointment cannot be booked   beyond 14 Days and should result in a Message to Provider. ?  Yes   Have you been diagnosed with, awaiting test results for, or told that you   are suspected of having COVID-19 (Coronavirus)? (If patient has tested   negative or was tested as a requirement for work, school, or travel and   not based on symptoms, answer no)? No  Within the past two weeks have you developed any of the following symptoms   (answer no if symptoms have been present longer than 2 weeks or began   more than 2 weeks ago)? Fever or Chills, Cough, Shortness of breath or   difficulty breathing, Loss of taste or smell, Sore throat, Nasal   congestion, Sneezing or runny nose, Fatigue or generalized body aches   (answer no if pain is specific to a body part e.g. back pain), Diarrhea,   Headache?  Yes

## 2021-12-30 NOTE — PROGRESS NOTES
OCCUPATIONAL THERAPY TREATMENT  Patient: Marky Michaud (87 y.o. female)  Date: 12/30/2021  Diagnosis: COVID [U07.1]  AMS (altered mental status) [R41.82] <principal problem not specified>       Precautions:    Chart, occupational therapy assessment, plan of care, and goals were reviewed. ASSESSMENT  Patient continues with skilled OT services and is progressing towards goals. Upon ANDERSON/PTA arrival, pt semi supine in bed and agreeable to tx session. Pt agreeable to work with therapy despite high pain levels at this time. Pt noted with improved mobility overall requiring Min-ModA x2 for supine>sit, ModA scooting, and demonstrating good sitting balance at EOB. Pt completed sit>stand with Henrry x2 today; however, pt unable to ambulate at this time due to pain worsening in back/hip. Pt tolerated sitting at EOB for seated UE and LE therex (see chart below for UE therex). Pt noted with good dynamic sitting balance this date as well. Pt required Henrry x2 returning to supine. Pt tolerated session fair and left semi supine in bed with call bell within reach. Will continue to follow pt throughout remainder of stay and progress towards OT goals. Recommending SNF at discharge when medically appropriate. Goals updated at this time, discussed with and agreed upon with supervising OT. Current Level of Function Impacting Discharge (ADLs): Min-ModA x2 bed mobility, Min-ModA grooming, total A toileting, total A LB dressing     Other factors to consider for discharge: family support, DME, time since onset, severity of deficits          PLAN :  Patient continues to benefit from skilled intervention to address the above impairments. Continue treatment per established plan of care. to address goals.     Recommend next OT session: sit>stand, EOB grooming    Recommendation for discharge: (in order for the patient to meet his/her long term goals)  Kaleb Jamison    This discharge recommendation:  Has been made in collaboration with the attending provider and/or case management    IF patient discharges home will need the following DME: TBD       SUBJECTIVE:   Patient stated is it the last day of the month?     OBJECTIVE DATA SUMMARY:   Cognitive/Behavioral Status:  Neurologic State: Alert  Orientation Level: Oriented to person;Oriented to time  Cognition: Follows commands    Functional Mobility and Transfers for ADLs:  Bed Mobility:  Rolling: Minimum assistance  Supine to Sit: Minimum assistance; Moderate assistance;Assist x2  Sit to Supine: Minimum assistance;Assist x2  Scooting: Moderate assistance    Transfers:  Sit to Stand: Minimum assistance;Assist x2    Balance:  Sitting: Intact; Without support  Sitting - Static: Good (unsupported)  Sitting - Dynamic: Good (unsupported)  Standing: Impaired; With support  Standing - Static: Constant support; Fair  Standing - Dynamic : Constant support; Fair    ADL Intervention:  Grooming  Position Performed: Seated edge of bed  Brushing/Combing Hair: Minimum assistance; Moderate assistance    Lower Body Dressing Assistance  Socks: Total assistance (dependent)    Toileting  Bowel Hygiene: Total assistance (dependent)    Therapeutic Exercises:   Exercise Sets Reps AROM AAROM PROM Self PROM Comments   Shoulder flex/ext 1 10 [x] [] [] [] LUE     Pain:  10/10 back and hip pain    Activity Tolerance:   Fair and requires rest breaks  Please refer to the flowsheet for vital signs taken during this treatment. After treatment patient left in no apparent distress:   Supine in bed and Call bell within reach    COMMUNICATION/COLLABORATION:   The patients plan of care was discussed with: Physical therapy assistant and Registered nurse.      MONICA To  Time Calculation: 29 mins    Problem: Self Care Deficits Care Plan (Adult)  Goal: *Acute Goals and Plan of Care (Insert Text)  Description: Pt will be Mod I sup <> sit in prep for EOB ADLs  Pt will be Mod I grooming sitting EOB  Pt will be Mod I LE dressing sitting EOB/long sit  Pt will be Mod I sit>stand from EOB in prep for ADLs using LRAD  Pt will be Mod I toileting/toilet transfer/cloth mgmt LRAD  Pt will be Mod I following UE HEP in prep for self care tasks  12/30/2021 1018 by MONICA Christy  Outcome: Progressing Towards Goal

## 2021-12-30 NOTE — PROGRESS NOTES
Consult requested by: Leela Guzman MD    ADMIT DATE: 12/26/2021  CONSULT DATE: December 30, 2021           Patient is seen in the room  Her hemoglobin is 5.7-->8.5   transfused 2 units of packed RBCs with dialysis  S/p dialyzed 12/29 and removed 1L     Past Medical History:   Diagnosis Date    Anxiety disorder     Arthritis     Atherosclerosis of native arteries of the extremities with ulceration ([address] 8/31/2020    Atrial fibrillation ([address]     Cardiac pacemaker in situ     Cellulitis and abscess of trunk 8/31/2020    Depression, postpartum     Diabetes ([address]     Heart disease     Heartburn     Hx of long term use of blood thinners     Hypercholesterolemia     Hypertension     Hypothyroidism     Migraines     Peripheral venous insufficiency 8/31/2020    Seizures ([address]     Sleep disorder     Stroke [facility]     2019    Varicose veins of lower extremity with inflammation 8/31/2020      Past Surgical History:   Procedure Laterality Date    HX BACK SURGERY      HX OTHER SURGICAL      leg surgery     HX OTHER SURGICAL      pacemaker monitor     IR INSERT TUNL CVC W/O PORT OVER 5 YR  12/10/2021       Social History     Socioeconomic History    Marital status:      Spouse name: Not on file    Number of children: Not on file    Years of education: Not on file    Highest education level: Not on file   Occupational History    Not on file   Tobacco Use    Smoking status: Never Smoker    Smokeless tobacco: Never Used   Vaping Use    Vaping Use: Never used   Substance and Sexual Activity    Alcohol use: Never    Drug use: Never    Sexual activity: Not on file   Other Topics Concern    Not on file   Social History Narrative    Not on file     Social Determinants of Health     Financial Resource Strain:     Difficulty of Paying Living Expenses: Not on file   Food Insecurity:     Worried About Running Out of Food in the Last Year: Not on file    [address] in the Last Year: Not on file   Transportation Needs:     Lack of Transportation (Medical): Not on file    Lack of Transportation (Non-Medical): Not on file   Physical Activity:     Days of Exercise per Week: Not on file    Minutes of Exercise per Session: Not on file   Stress:     Feeling of Stress : Not on file   Social Connections:     Frequency of Communication with Friends and Family: Not on file    Frequency of Social Gatherings with Friends and Family: Not on file    Attends Mandae[employer] Services: Not on file    Active Member of [address] [place] [address] [employer] or Organizations: Not on file    Attends Club or Organization Meetings: Not on file    Marital Status: Not on file   Intimate Partner Violence:     Fear of Current or Ex-Partner: Not on file    Emotionally Abused: Not on file    Physically Abused: Not on file    Sexually Abused: Not on file   Housing Stability:     Unable to Pay for Housing in the Last Year: Not on file    Number of [place] in the Last Year: Not on file    Unstable Housing in the Last Year: Not on file       Family History   Problem Relation Age of Onset    Heart Disease Mother     Heart Disease Father     Diabetes Sister     Diabetes Brother      No Known Allergies     Home Medications:     Medications Prior to Admission   Medication Sig    calcitRIOL (ROCALTROL) 0.5 mcg capsule Take 1 Capsule by mouth daily.  zinc oxide-white petrolatum 17-57 % topical paste Apply  to affected area as needed for Skin Irritation. Indications: skin irritation    ondansetron hcl (Zofran) 4 mg tablet Take 1 Tablet by mouth every eight (8) hours as needed for Nausea or Vomiting.  Vimpat 200 mg tab tablet Take 1 Tablet by mouth two (2) times a day.  ondansetron (Zofran ODT) 4 mg disintegrating tablet 1 Tablet by SubLINGual route every eight (8) hours as needed for Nausea or Vomiting.     atorvastatin (LIPITOR) 40 mg tablet Take 1 tablet by mouth once daily for 90 days    FLUoxetine (PROzac) 10 mg capsule Take 1 Capsule by mouth daily for 270 days.     loperamide (IMODIUM) 2 mg capsule TAKE 1 CAPSULE BY MOUTH SIX TIMES DAILY AS NEEDED FOR 30 DAYS    Euthyrox 150 mcg tablet Take 1 tablet by mouth once daily    levETIRAcetam (KEPPRA XR) 500 mg ER tablet TAKE 2 TABLETS BY MOUTH TWICE DAILY FOR 90 DAYS    sevelamer carbonate (RENVELA) 800 mg tab tab        Current Inpatient Medications:     Current Facility-Administered Medications   Medication Dose Route Frequency    epoetin anjelica-epbx (RETACRIT) injection 10,000 Units  10,000 Units IntraVENous DIALYSIS MON, WED & FRI    insulin lispro (HUMALOG) injection   SubCUTAneous AC&HS    lactulose (CHRONULAC) 10 gram/15 mL solution 30 mL  20 g Oral TID    heparin (porcine) 1,000 unit/mL injection 3,600 Units  3,600 Units IntraVENous DIALYSIS PRN    atorvastatin (LIPITOR) tablet 40 mg  40 mg Oral QHS    calcitRIOL (ROCALTROL) capsule 0.5 mcg  0.5 mcg Oral DAILY    levothyroxine (SYNTHROID) tablet 150 mcg  150 mcg Oral DAILY    FLUoxetine (PROzac) capsule 10 mg  10 mg Oral DAILY    sevelamer carbonate (RENVELA) tab 800 mg  800 mg Oral TID WITH MEALS    lacosamide (VIMPAT) tablet 200 mg  200 mg Oral BID    sodium chloride (NS) flush 5-40 mL  5-40 mL IntraVENous Q8H    sodium chloride (NS) flush 5-40 mL  5-40 mL IntraVENous PRN    acetaminophen (TYLENOL) tablet 650 mg  650 mg Oral Q6H PRN    Or    acetaminophen (TYLENOL) suppository 650 mg  650 mg Rectal Q6H PRN    polyethylene glycol (MIRALAX) packet 17 g  17 g Oral DAILY PRN    ondansetron (ZOFRAN ODT) tablet 4 mg  4 mg Oral Q8H PRN    Or    ondansetron (ZOFRAN) injection 4 mg  4 mg IntraVENous Q6H PRN    cefTRIAXone (ROCEPHIN) 2 g in sterile water (preservative free) 20 mL IV syringe  2 g IntraVENous Q24H    azithromycin (ZITHROMAX) 500 mg in 0.9% sodium chloride 250 mL (VIAL-MATE)  500 mg IntraVENous Q24H    zinc sulfate (ZINCATE) 50 mg zinc (220 mg) capsule 1 Capsule  1 Capsule Oral DAILY    ascorbic acid (vitamin C) (VITAMIN C) tablet 500 mg  500 mg Oral BID    0.9% sodium chloride infusion  75 mL/hr IntraVENous CONTINUOUS    dexamethasone (DECADRON) 4 mg/mL injection 4 mg  4 mg IntraVENous Q12H    albuterol (PROVENTIL HFA, VENTOLIN HFA, PROAIR HFA) inhaler 2 Puff  2 Puff Inhalation Q6H PRN    aspirin chewable tablet 81 mg  81 mg Oral DAILY    levETIRAcetam (KEPPRA) tablet 250 mg  250 mg Oral BID    benzonatate (TESSALON) capsule 100 mg  100 mg Oral TID PRN    pantoprazole (PROTONIX) tablet 40 mg  40 mg Oral ACB       Review of Systems:   No fever or chills. No sore throat. No cough or hemoptysis. No shortness of breath or chest pain. No orthopnea or paroxysmal nocturnal dyspnea. No nausea, vomiting, abdominal pain, melena or hematochezia. No ankle swelling, no joint paints. No muscle aches. No skin changes. No dizziness or lightheadedness. No headaches. Physical Assessment:     Vitals:    12/29/21 1634 12/29/21 1943 12/30/21 0001 12/30/21 0815   BP: (!) 156/70 (!) 146/70 130/74 (!) 153/75   Pulse: 68 61 60 61   Resp: 18 18 18 18   Temp: 98.1 °F (36.7 °C) 98.2 °F (36.8 °C) 97.8 °F (36.6 °C) 97.9 °F (36.6 °C)   TempSrc:       SpO2:  98% 95% 99%   Weight:       Height:         Last 3 Recorded Weights in this Encounter    12/26/21 0816   Weight: 76.7 kg (169 lb)     Admission weight: Weight: 76.7 kg (169 lb) (12/26/21 0816)    No intake or output data in the 24 hours ending 12/30/21 1224    Patient is in no apparent distress. HEENT: Head is normocephalic and atraumatic. Pupils are round, equal, reactive to light. Sclerae are anicteric. Oropharynx clear. Neck: no cervical lymphadenopathy or thyromegaly. Lungs: good air entry, clear to auscultation bilaterally. Trachea at the midline. Cardiovascular system: S1, S2, regular rate and rhythm. No murmurs, gallops or rubs. No jvd. Abdomen: soft, non tender, non distended. Positive bowel sounds. No hepatosplenomegaly. No abdominal bruits. Extremities: no clubbing, cyanosis or edema. Strong dorsalis pedis pulses. Brisk capillary refill on the toes bilaterally. Integumentary: skin is grossly intact. Neurologic: Alert, oriented time three. Cooperative and appropriate. No gross motor or sensory deficits. Dialysis access: cuffed tunneled catheter site right. [facility]     Data Review:    Labs: Results:       Chemistry Recent Labs     12/29/21  0647 12/28/21  0851 12/28/21  0851   *  --  112*     --  141   K 3.7  --  4.0     --  107   CO2 24  --  27   BUN 49*  --  39*   CREA 3.67*  --  3.19*   CA 7.6*  --  7.6*   AGAP 9  --  7   BUCR 13  --  12   ALB 2.0*  --  1.9*   PHOS 4.1   < > 4.8*    < > = values in this interval not displayed. CBC w/Diff Recent Labs     12/29/21  0647 12/28/21  0851   WBC 6.5 6.7   RBC 2.77* 2.83*   HGB 8.5* 8.7*   HCT 27.2* 27.4*   * 107*         Iron/Ferritin No results for input(s): IRON in the last 72 hours. No lab exists for component: TIBCCALC   PTH/VIT D No results for input(s): PTH in the last 72 hours. No lab exists for component: VITD            Assessment & Plan     1-ESRD:  -HD MWF at [facility], [address] [place] [address]  -No volume overload/uremia  -s/p HD 12/27 and 12/29  -RI perm cath as active dialysis access  -BORA AVF in place also    2-Anemia:  -Hb was 5.7  -No obvious bleeding  -s/p blood Tx 2 units with HD   -iv Epo with HD  -r/o GIB   -Hb 8.7       3-Secondary hyperparathyroidism of ESRD. -cont sevelamer 800 mg tid and calcitriol 0.5 mcg daily       - Renal Diet    4-COVID Pneumonia   -- started on empiric abxs because of fever,lactic acidosis and leucocytosis . 5-AMS - CTH negative   Likely Metabolic and sepsis related .     6- Seizure disorder - Vimpat & Keppra resumed,  Renally dosed

## 2021-12-30 NOTE — PROGRESS NOTES
Hospitalist Progress Note               Daily Progress Note: 12/30/2021      Subjective: The patient is seen for follow up. 80 y.o. female with a past medical history of heart disease, GERD, hypercholesterolemia, hypothyroidism, seizure disorder, end-stage renal disease on hemodialysis that presented to the emergency room on 12/26/2021 for altered mental status. Of note patient was in the hospital the day prior due to her dialysis catheter bleeding. At that time pressure dressing was placed and bleeding had improved. Family and patient were notified to keep the pressure dressing on and to follow-up with general surgery as an outpatient. However the following day patient became altered, febrile, increased coughing. Multiple family members have tested positive for Covid. Therefore she presented back to the emergency room. Patient is altered and oriented x0. She denies any pain. She does admit to a cough. She has had multiple falls and generalized weakness. In the ED vital signs were significant for fever 101.1 °F and hypotension of 114/58. Laboratory data was significant for a hemoglobin of 7.3, lactic acid 2.2, troponin I 05. Influenza a and B both negative. Rapid Covid positive. Chest x-ray unremarkable. CT of the head showed no acute intracranial abnormality but did show a remote left parietal occipital infarct with generalized atrophy. CT of the cervical spine showed multilevel degenerative disc and generative arthritic changes of the cervical spine with no acute abnormality. Patient was not hypoxic. Patient was admitted to medical telemetry. She was started on Zithromax and ceftriaxone along with Decadron. --------    Patient is seen for follow-up. Hemoglobin this morning is down to 8.5. S/P 2 units of blood.   No fevers overnight      Problem List:  Problem List as of 12/30/2021 Date Reviewed: 12/9/2021          Codes Class Noted - Resolved    AMS (altered mental status) ICD-10-CM: R41.82  ICD-9-CM: 780.97  12/26/2021 - Present        COVID ICD-10-CM: U07.1  ICD-9-CM: 079.89  12/26/2021 - Present        Fluid overload ICD-10-CM: E87.70  ICD-9-CM: 276.69  12/5/2021 - Present        Left-sided weakness ICD-10-CM: R53.1  ICD-9-CM: 728.87  12/3/2021 - Present        Intractable nausea and vomiting ICD-10-CM: R11.2  ICD-9-CM: 536.2  12/3/2021 - Present        Hypoglycemia ICD-10-CM: E16.2  ICD-9-CM: 251.2  6/11/2021 - Present        Breakthrough seizure ([facility] ICD-10-CM: C08.231  ICD-9-CM: 345.91  6/11/2021 - Present        SSS (sick sinus syndrome) ([facility] ICD-10-CM: I49.5  ICD-9-CM: 427.81  6/11/2021 - Present        Seizure ([facility] ICD-10-CM: R56.9  ICD-9-CM: 780.39  5/19/2021 - Present        Hyperkalemia ICD-10-CM: E87.5  ICD-9-CM: 276.7  10/16/2020 - Present        Atherosclerosis of native arteries of the extremities with ulceration ([facility] ICD-10-CM: I70.25  ICD-9-CM: 440.23, 707.9  8/31/2020 - Present        Cellulitis and abscess of trunk ICD-10-CM: L03.319, L02.219  ICD-9-CM: 682.2  8/31/2020 - Present        Peripheral venous insufficiency ICD-10-CM: I87.2  ICD-9-CM: 459.81  8/31/2020 - Present        Varicose veins of lower extremity with inflammation ICD-10-CM: I83.10  ICD-9-CM: 454.1  8/31/2020 - Present        Peripheral vascular disease ([facility] ICD-10-CM: I73.9  ICD-9-CM: 443.9  7/3/2020 - Present        Type II diabetes mellitus ([facility] ICD-10-CM: E11.9  ICD-9-CM: 250.00  7/3/2020 - Present        Long term (current) use of antibiotics ICD-10-CM: Z79.2  ICD-9-CM: V58.62  7/3/2020 - Present        Atrial fibrillation ([facility] ICD-10-CM: I48.91  ICD-9-CM: 427.31  7/3/2020 - Present        Cardiac pacemaker in situ ICD-10-CM: Z95.0  ICD-9-CM: V45.01  7/3/2020 - Present        Carpal tunnel syndrome of right wrist ICD-10-CM: G56.01  ICD-9-CM: 354.0  7/3/2020 - Present        Chest wall pain ICD-10-CM: R07.89  ICD-9-CM: 786.52  7/3/2020 - Present        Chronic diarrhea ICD-10-CM: K52.9  ICD-9-CM: 787.91  7/3/2020 - Present        Chronic neck pain ICD-10-CM: M54.2, G89.29  ICD-9-CM: 723.1, 338.29  7/3/2020 - Present        End stage renal failure on dialysis [facility]) ICD-10-CM: N18.6, Z99.2  ICD-9-CM: 585.6, V45.11  7/3/2020 - Present        History of CVA (cerebrovascular accident) ICD-10-CM: Z86.73  ICD-9-CM: V12.54  7/3/2020 - Present        Hyponatremia ICD-10-CM: E87.1  ICD-9-CM: 276.1  7/3/2020 - Present        Acquired hypothyroidism ICD-10-CM: E03.9  ICD-9-CM: 244.9  7/3/2020 - Present        Seizure disorder ([facility] ICD-10-CM: G40.909  ICD-9-CM: 345.90  7/3/2020 - Present        Recurrent falls ICD-10-CM: R29.6  ICD-9-CM: V15.88  7/3/2020 - Present        RESOLVED: Disorder due to well controlled type 2 diabetes mellitus ([facility] ICD-10-CM: E11.8  ICD-9-CM: 250.90  8/31/2020 - 6/11/2021              Medications reviewed  Current Facility-Administered Medications   Medication Dose Route Frequency    epoetin anjelica-epbx (RETACRIT) injection 10,000 Units  10,000 Units IntraVENous DIALYSIS MON, WED & FRI    insulin lispro (HUMALOG) injection   SubCUTAneous AC&HS    lactulose (CHRONULAC) 10 gram/15 mL solution 30 mL  20 g Oral TID    heparin (porcine) 1,000 unit/mL injection 3,600 Units  3,600 Units IntraVENous DIALYSIS PRN    atorvastatin (LIPITOR) tablet 40 mg  40 mg Oral QHS    calcitRIOL (ROCALTROL) capsule 0.5 mcg  0.5 mcg Oral DAILY    levothyroxine (SYNTHROID) tablet 150 mcg  150 mcg Oral DAILY    FLUoxetine (PROzac) capsule 10 mg  10 mg Oral DAILY    sevelamer carbonate (RENVELA) tab 800 mg  800 mg Oral TID WITH MEALS    lacosamide (VIMPAT) tablet 200 mg  200 mg Oral BID    sodium chloride (NS) flush 5-40 mL  5-40 mL IntraVENous Q8H    sodium chloride (NS) flush 5-40 mL  5-40 mL IntraVENous PRN    acetaminophen (TYLENOL) tablet 650 mg  650 mg Oral Q6H PRN    Or    acetaminophen (TYLENOL) suppository 650 mg  650 mg Rectal Q6H PRN    polyethylene glycol (MIRALAX) packet 17 g 17 g Oral DAILY PRN    ondansetron (ZOFRAN ODT) tablet 4 mg  4 mg Oral Q8H PRN    Or    ondansetron (ZOFRAN) injection 4 mg  4 mg IntraVENous Q6H PRN    cefTRIAXone (ROCEPHIN) 2 g in sterile water (preservative free) 20 mL IV syringe  2 g IntraVENous Q24H    azithromycin (ZITHROMAX) 500 mg in 0.9% sodium chloride 250 mL (VIAL-MATE)  500 mg IntraVENous Q24H    zinc sulfate (ZINCATE) 50 mg zinc (220 mg) capsule 1 Capsule  1 Capsule Oral DAILY    ascorbic acid (vitamin C) (VITAMIN C) tablet 500 mg  500 mg Oral BID    0.9% sodium chloride infusion  75 mL/hr IntraVENous CONTINUOUS    dexamethasone (DECADRON) 4 mg/mL injection 4 mg  4 mg IntraVENous Q12H    albuterol (PROVENTIL HFA, VENTOLIN HFA, PROAIR HFA) inhaler 2 Puff  2 Puff Inhalation Q6H PRN    aspirin chewable tablet 81 mg  81 mg Oral DAILY    levETIRAcetam (KEPPRA) tablet 250 mg  250 mg Oral BID    benzonatate (TESSALON) capsule 100 mg  100 mg Oral TID PRN    pantoprazole (PROTONIX) tablet 40 mg  40 mg Oral ACB       Review of Systems:   A comprehensive review of systems was negative except for that written in the HPI. Objective:   Physical Exam:     Visit Vitals  BP (!) 153/75 (BP 1 Location: Left upper arm, BP Patient Position: At rest;Supine)   Pulse 61   Temp 97.9 °F (36.6 °C)   Resp 18   Ht 5' 6\" (1.676 m)   Wt 76.7 kg (169 lb)   SpO2 99%   BMI 27.28 kg/m²      O2 Device: None (Room air)    Temp (24hrs), Av °F (36.7 °C), Min:97.8 °F (36.6 °C), Max:98.2 °F (36.8 °C)    No intake/output data recorded.  1901 -  0700  In: -   Out: 1000     General:   Awake and alert appears frail   Lungs:   Clear to auscultation bilaterally. Chest wall:  No tenderness or deformity. Heart:  Regular rate and rhythm, S1, S2 normal, no murmur, click, rub or gallop. Abdomen:   Soft, non-tender. Bowel sounds normal. No masses,  No organomegaly. Extremities: Extremities normal, atraumatic, no cyanosis or edema.    Pulses: 2+ and symmetric all extremities. Skin: Skin color, texture, turgor normal. No rashes or lesions   Neurologic: CNII-XII intact. No gross focal deficits         Data Review:       Recent Days:  Recent Labs     12/29/21  0647 12/28/21  0851   WBC 6.5 6.7   HGB 8.5* 8.7*   HCT 27.2* 27.4*   * 107*     Recent Labs     12/29/21  0647 12/28/21  0851    141   K 3.7 4.0    107   CO2 24 27   * 112*   BUN 49* 39*   CREA 3.67* 3.19*   CA 7.6* 7.6*   PHOS 4.1 4.8*   ALB 2.0* 1.9*     No results for input(s): PH, PCO2, PO2, HCO3, FIO2 in the last 72 hours. 24 Hour Results:  Recent Results (from the past 24 hour(s))   GLUCOSE, POC    Collection Time: 12/29/21  7:47 PM   Result Value Ref Range    Glucose (POC) 93 65 - 117 mg/dL    Performed by Stanley Briceno    GLUCOSE, POC    Collection Time: 12/30/21  7:59 AM   Result Value Ref Range    Glucose (POC) 88 65 - 117 mg/dL    Performed by Americo Alert    GLUCOSE, POC    Collection Time: 12/30/21 11:13 AM   Result Value Ref Range    Glucose (POC) 97 65 - 117 mg/dL    Performed by LOTUS GREEN        XR CHEST PORT   Final Result   No acute cardiopulmonary abnormality. .       CT HEAD WO CONT   Final Result   No acute intracranial abnormality. Remote left parietal-occipital   infarct. Mild generalized atrophy with nonspecific white matter abnormality that   may indicate chronic small vessel disease. Júnior Anaya HISTORY:  recurrent falls, AMS   Dose reduction technique: All CT scans at this facility are performed using dose reduction optimization   technique as appropriate on the exam including the following: Automated exposure   control, adjustment of the MA and/or KV according to patient size and/or use of   iterative reconstructive technique. TECHNIQUE: Noncontrast CT of the cervical spine with multiplanar   reconstructions.    COMPARISON: None   LIMITATIONS: None      FRACTURES: None   ALIGNMENT: Normal   VERTEBRAL BODIES: Multilevel degenerative/arthritic changes characterized by   osteophyte formation and associated endplate changes; findings most notable at   C5-6, C7-T1. DISC SPACES: Decreased with multiple posterior disc protrusions some of which   are mildly calcified   POSTERIOR ELEMENTS: Normal for age. SPINAL CANAL: Normal   PARASPINAL SOFT TISSUES: Normal      OTHER: None      IMPRESSION: Multilevel degenerative disc and degenerative arthritic changes of   the cervical spine with no acute abnormality. CT SPINE CERV WO CONT   Final Result   No acute intracranial abnormality. Remote left parietal-occipital   infarct. Mild generalized atrophy with nonspecific white matter abnormality that   may indicate chronic small vessel disease. Luis Alberto Cortes HISTORY:  recurrent falls, AMS   Dose reduction technique: All CT scans at this facility are performed using dose reduction optimization   technique as appropriate on the exam including the following: Automated exposure   control, adjustment of the MA and/or KV according to patient size and/or use of   iterative reconstructive technique. TECHNIQUE: Noncontrast CT of the cervical spine with multiplanar   reconstructions. COMPARISON: None   LIMITATIONS: None      FRACTURES: None   ALIGNMENT: Normal   VERTEBRAL BODIES: Multilevel degenerative/arthritic changes characterized by   osteophyte formation and associated endplate changes; findings most notable at   C5-6, C7-T1. DISC SPACES: Decreased with multiple posterior disc protrusions some of which   are mildly calcified   POSTERIOR ELEMENTS: Normal for age. SPINAL CANAL: Normal   PARASPINAL SOFT TISSUES: Normal      OTHER: None      IMPRESSION: Multilevel degenerative disc and degenerative arthritic changes of   the cervical spine with no acute abnormality. Assessment:  Covid pneumonitis, nonsevere disease.   No hypoxia    Sepsis secondary to Covid pneumonitis    Acute on chronic anemia of chronic kidney disease    Metabolic encephalopathy, likely due to Covid/fever. Improved    Seizure disorder, on Vimpat and Keppra    End stage renal disease on hemodialysis    Constipation      Plan:  Continue ceftriaxone and Zithromax, continue Decadron  PTOT evaluation  SNF referral underway    Care Plan discussed with: Patient/Family and nurse    Disposition: Continued inpatient care x24 hours    Total time spent with patient: 30 minutes.     Navid Brown MD

## 2021-12-31 LAB
ANION GAP SERPL CALC-SCNC: 4 MMOL/L (ref 5–15)
BASOPHILS # BLD: 0 K/UL (ref 0–0.1)
BASOPHILS NFR BLD: 0 % (ref 0–1)
BUN SERPL-MCNC: 19 MG/DL (ref 6–20)
BUN/CREAT SERPL: 9 (ref 12–20)
CA-I BLD-MCNC: 7.5 MG/DL (ref 8.5–10.1)
CHLORIDE SERPL-SCNC: 109 MMOL/L (ref 97–108)
CO2 SERPL-SCNC: 28 MMOL/L (ref 21–32)
CREAT SERPL-MCNC: 2.09 MG/DL (ref 0.55–1.02)
CRP SERPL-MCNC: 1.67 MG/DL (ref 0–0.6)
DIFFERENTIAL METHOD BLD: ABNORMAL
EOSINOPHIL # BLD: 0 K/UL (ref 0–0.4)
EOSINOPHIL NFR BLD: 0 % (ref 0–7)
ERYTHROCYTE [DISTWIDTH] IN BLOOD BY AUTOMATED COUNT: 14.9 % (ref 11.5–14.5)
GLUCOSE BLD STRIP.AUTO-MCNC: 109 MG/DL (ref 65–117)
GLUCOSE BLD STRIP.AUTO-MCNC: 80 MG/DL (ref 65–117)
GLUCOSE BLD STRIP.AUTO-MCNC: 95 MG/DL (ref 65–117)
GLUCOSE BLD STRIP.AUTO-MCNC: 96 MG/DL (ref 65–117)
GLUCOSE SERPL-MCNC: 98 MG/DL (ref 65–100)
HCT VFR BLD AUTO: 29.7 % (ref 35–47)
HGB BLD-MCNC: 9.3 G/DL (ref 11.5–16)
IMM GRANULOCYTES # BLD AUTO: 0 K/UL
IMM GRANULOCYTES NFR BLD AUTO: 0 %
LYMPHOCYTES # BLD: 0.2 K/UL (ref 0.8–3.5)
LYMPHOCYTES NFR BLD: 2 % (ref 12–49)
MCH RBC QN AUTO: 30.8 PG (ref 26–34)
MCHC RBC AUTO-ENTMCNC: 31.3 G/DL (ref 30–36.5)
MCV RBC AUTO: 98.3 FL (ref 80–99)
METAMYELOCYTES NFR BLD MANUAL: 2 %
MONOCYTES # BLD: 0.8 K/UL (ref 0–1)
MONOCYTES NFR BLD: 9 % (ref 5–13)
MYELOCYTES NFR BLD MANUAL: 2 %
NEUTS SEG # BLD: 7.8 K/UL (ref 1.8–8)
NEUTS SEG NFR BLD: 85 % (ref 32–75)
NRBC # BLD: 0.04 K/UL (ref 0–0.01)
NRBC BLD-RTO: 0.4 PER 100 WBC
PERFORMED BY, TECHID: NORMAL
PLATELET # BLD AUTO: 121 K/UL (ref 150–400)
PMV BLD AUTO: 10.7 FL (ref 8.9–12.9)
POTASSIUM SERPL-SCNC: 3.5 MMOL/L (ref 3.5–5.1)
PROCALCITONIN SERPL-MCNC: 0.8 NG/ML
RBC # BLD AUTO: 3.02 M/UL (ref 3.8–5.2)
RBC MORPH BLD: ABNORMAL
RBC MORPH BLD: ABNORMAL
SODIUM SERPL-SCNC: 141 MMOL/L (ref 136–145)
WBC # BLD AUTO: 9.2 K/UL (ref 3.6–11)

## 2021-12-31 PROCEDURE — 86140 C-REACTIVE PROTEIN: CPT

## 2021-12-31 PROCEDURE — 74011250637 HC RX REV CODE- 250/637: Performed by: PHYSICIAN ASSISTANT

## 2021-12-31 PROCEDURE — 90935 HEMODIALYSIS ONE EVALUATION: CPT

## 2021-12-31 PROCEDURE — 80048 BASIC METABOLIC PNL TOTAL CA: CPT

## 2021-12-31 PROCEDURE — 74011250636 HC RX REV CODE- 250/636: Performed by: PHYSICIAN ASSISTANT

## 2021-12-31 PROCEDURE — 74011250636 HC RX REV CODE- 250/636: Performed by: INTERNAL MEDICINE

## 2021-12-31 PROCEDURE — 36415 COLL VENOUS BLD VENIPUNCTURE: CPT

## 2021-12-31 PROCEDURE — 84145 PROCALCITONIN (PCT): CPT

## 2021-12-31 PROCEDURE — 65270000029 HC RM PRIVATE

## 2021-12-31 PROCEDURE — 82962 GLUCOSE BLOOD TEST: CPT

## 2021-12-31 PROCEDURE — 85025 COMPLETE CBC W/AUTO DIFF WBC: CPT

## 2021-12-31 PROCEDURE — 74011250637 HC RX REV CODE- 250/637: Performed by: INTERNAL MEDICINE

## 2021-12-31 RX ADMIN — HEPARIN SODIUM 3600 UNITS: 1000 INJECTION, SOLUTION INTRAVENOUS; SUBCUTANEOUS at 17:12

## 2021-12-31 RX ADMIN — FLUOXETINE 10 MG: 10 CAPSULE ORAL at 12:42

## 2021-12-31 RX ADMIN — LEVETIRACETAM 250 MG: 250 TABLET, FILM COATED ORAL at 12:43

## 2021-12-31 RX ADMIN — SODIUM CHLORIDE, PRESERVATIVE FREE 10 ML: 5 INJECTION INTRAVENOUS at 06:39

## 2021-12-31 RX ADMIN — SEVELAMER CARBONATE 800 MG: 800 TABLET, FILM COATED ORAL at 16:38

## 2021-12-31 RX ADMIN — VANCOMYCIN HYDROCHLORIDE 1000 MG: 1 INJECTION, POWDER, LYOPHILIZED, FOR SOLUTION INTRAVENOUS at 12:41

## 2021-12-31 RX ADMIN — ATORVASTATIN CALCIUM 40 MG: 40 TABLET, FILM COATED ORAL at 21:35

## 2021-12-31 RX ADMIN — LACOSAMIDE 200 MG: 100 TABLET, FILM COATED ORAL at 12:42

## 2021-12-31 RX ADMIN — SODIUM CHLORIDE 75 ML/HR: 9 INJECTION, SOLUTION INTRAVENOUS at 01:36

## 2021-12-31 RX ADMIN — SEVELAMER CARBONATE 800 MG: 800 TABLET, FILM COATED ORAL at 12:42

## 2021-12-31 RX ADMIN — EPOETIN ALFA-EPBX 10000 UNITS: 10000 INJECTION, SOLUTION INTRAVENOUS; SUBCUTANEOUS at 11:38

## 2021-12-31 RX ADMIN — LACOSAMIDE 200 MG: 100 TABLET, FILM COATED ORAL at 21:34

## 2021-12-31 RX ADMIN — LACTULOSE 30 ML: 20 SOLUTION ORAL at 16:38

## 2021-12-31 RX ADMIN — DEXAMETHASONE SODIUM PHOSPHATE 4 MG: 4 INJECTION, SOLUTION INTRA-ARTICULAR; INTRALESIONAL; INTRAMUSCULAR; INTRAVENOUS; SOFT TISSUE at 12:42

## 2021-12-31 RX ADMIN — DEXAMETHASONE SODIUM PHOSPHATE 4 MG: 4 INJECTION, SOLUTION INTRA-ARTICULAR; INTRALESIONAL; INTRAMUSCULAR; INTRAVENOUS; SOFT TISSUE at 21:35

## 2021-12-31 RX ADMIN — LEVETIRACETAM 250 MG: 250 TABLET, FILM COATED ORAL at 21:35

## 2021-12-31 RX ADMIN — PANTOPRAZOLE SODIUM 40 MG: 40 TABLET, DELAYED RELEASE ORAL at 12:43

## 2021-12-31 RX ADMIN — ZINC SULFATE 220 MG (50 MG) CAPSULE 1 CAPSULE: CAPSULE at 12:42

## 2021-12-31 RX ADMIN — CALCITRIOL CAPSULES 0.25 MCG 0.5 MCG: 0.25 CAPSULE ORAL at 12:43

## 2021-12-31 RX ADMIN — TRAMADOL HYDROCHLORIDE 50 MG: 50 TABLET ORAL at 14:30

## 2021-12-31 RX ADMIN — SODIUM CHLORIDE, PRESERVATIVE FREE 10 ML: 5 INJECTION INTRAVENOUS at 21:35

## 2021-12-31 RX ADMIN — LEVOTHYROXINE SODIUM 150 MCG: 0.03 TABLET ORAL at 12:42

## 2021-12-31 RX ADMIN — OXYCODONE HYDROCHLORIDE AND ACETAMINOPHEN 500 MG: 500 TABLET ORAL at 21:35

## 2021-12-31 RX ADMIN — SODIUM CHLORIDE, PRESERVATIVE FREE 10 ML: 5 INJECTION INTRAVENOUS at 16:39

## 2021-12-31 RX ADMIN — ACETAMINOPHEN 650 MG: 325 TABLET ORAL at 16:38

## 2021-12-31 RX ADMIN — LACTULOSE 30 ML: 20 SOLUTION ORAL at 21:34

## 2021-12-31 RX ADMIN — OXYCODONE HYDROCHLORIDE AND ACETAMINOPHEN 500 MG: 500 TABLET ORAL at 12:43

## 2021-12-31 RX ADMIN — ASPIRIN 81 MG CHEWABLE TABLET 81 MG: 81 TABLET CHEWABLE at 12:43

## 2021-12-31 RX ADMIN — LACTULOSE 30 ML: 20 SOLUTION ORAL at 12:42

## 2021-12-31 NOTE — PROGRESS NOTES
Day #1 of Vancomycin Dr. Waldo Levine started pt on Vancomycin protocol today again. Consult provided for this 80 y.o. female for indication of bacteremia. Antibiotic regimen:  Vancomycin   Concomitant nephrotoxic drugs: None  Frequency of BMP: Not scheduled    Recent Labs     21  0647   WBC 6.5   CREA 3.67*   BUN 49*     Est CrCl: ESRD on HD [facility]    Temp (24hrs), Av.8 °F (36.6 °C), Min:97.3 °F (36.3 °C), Max:98 °F (36.7 °C)    Cultures:    Blood: GPC in all 4 bottles, Staph aureus         Target range: Trough 20-25 mcg/mL      Assessment/Plan:   Last febrile , WBC WNL, lactic acid, procal, and CRP elevated  ESRD on HD [facility]  Dr. Waldo Levine ordered 1000 mg Vancomycin to be given today post HD. A pre-HD level  on 1/3/22 at 0600 was scheduled.   Antimicrobial stop date TBD

## 2021-12-31 NOTE — PROGRESS NOTES
Awaiting SNF authorization for discharge. Problem: Airway Clearance - Ineffective  Goal: Achieve or maintain patent airway  Outcome: Progressing Towards Goal     Problem: Gas Exchange - Impaired  Goal: Absence of hypoxia  Outcome: Progressing Towards Goal  Goal: Promote optimal lung function  Outcome: Progressing Towards Goal     Problem: Breathing Pattern - Ineffective  Goal: Ability to achieve and maintain a regular respiratory rate  Outcome: Progressing Towards Goal     Problem:  Body Temperature -  Risk of, Imbalanced  Goal: Ability to maintain a body temperature within defined limits  Outcome: Progressing Towards Goal     Problem: Isolation Precautions - Risk of Spread of Infection  Goal: Prevent transmission of infectious organism to others  Outcome: Progressing Towards Goal

## 2021-12-31 NOTE — PROGRESS NOTES
Problem: Airway Clearance - Ineffective  Goal: Achieve or maintain patent airway  Outcome: Progressing Towards Goal     Problem: Gas Exchange - Impaired  Goal: Absence of hypoxia  Outcome: Progressing Towards Goal  Goal: Promote optimal lung function  Outcome: Progressing Towards Goal     Problem: Breathing Pattern - Ineffective  Goal: Ability to achieve and maintain a regular respiratory rate  Outcome: Progressing Towards Goal     Problem:  Body Temperature -  Risk of, Imbalanced  Goal: Ability to maintain a body temperature within defined limits  Outcome: Progressing Towards Goal  Goal: Will regain or maintain usual level of consciousness  Outcome: Progressing Towards Goal  Goal: Complications related to the disease process, condition or treatment will be avoided or minimized  Outcome: Progressing Towards Goal     Problem: Isolation Precautions - Risk of Spread of Infection  Goal: Prevent transmission of infectious organism to others  Outcome: Progressing Towards Goal     Problem: Nutrition Deficits  Goal: Optimize nutrtional status  Outcome: Progressing Towards Goal     Problem: Risk for Fluid Volume Deficit  Goal: Maintain normal heart rhythm  Outcome: Progressing Towards Goal  Goal: Maintain absence of muscle cramping  Outcome: Progressing Towards Goal  Goal: Maintain normal serum potassium, sodium, calcium, phosphorus, and pH  Outcome: Progressing Towards Goal     Problem: Loneliness or Risk for Loneliness  Goal: Demonstrate positive use of time alone when socialization is not possible  Outcome: Progressing Towards Goal     Problem: Fatigue  Goal: Verbalize increase energy and improved vitality  Outcome: Progressing Towards Goal     Problem: Patient Education: Go to Patient Education Activity  Goal: Patient/Family Education  Outcome: Progressing Towards Goal     Problem: Risk for Spread of Infection  Goal: Prevent transmission of infectious organism to others  Description: Prevent the transmission of infectious organisms to other patients, staff members, and visitors. Outcome: Progressing Towards Goal     Problem: Patient Education:  Go to Education Activity  Goal: Patient/Family Education  Outcome: Progressing Towards Goal     Problem: Pressure Injury - Risk of  Goal: *Prevention of pressure injury  Description: Document Nitin Scale and appropriate interventions in the flowsheet. Outcome: Progressing Towards Goal  Note: Pressure Injury Interventions:  Sensory Interventions: Assess changes in LOC,Discuss PT/OT consult with provider    Moisture Interventions: Absorbent underpads    Activity Interventions: PT/OT evaluation    Mobility Interventions: Turn and reposition approx. every two hours(pillow and wedges)    Nutrition Interventions: Document food/fluid/supplement intake    Friction and Shear Interventions: Apply protective barrier, creams and emollients,HOB 30 degrees or less                Problem: Patient Education: Go to Patient Education Activity  Goal: Patient/Family Education  Outcome: Progressing Towards Goal     Problem: Falls - Risk of  Goal: *Absence of Falls  Description: Document Cuco Fall Risk and appropriate interventions in the flowsheet.   Outcome: Progressing Towards Goal  Note: Fall Risk Interventions:       Mentation Interventions: Adequate sleep, hydration, pain control,More frequent rounding,Reorient patient    Medication Interventions: Bed/chair exit alarm    Elimination Interventions: Bed/chair exit alarm              Problem: Patient Education: Go to Patient Education Activity  Goal: Patient/Family Education  Outcome: Progressing Towards Goal     Problem: Patient Education: Go to Patient Education Activity  Goal: Patient/Family Education  Outcome: Progressing Towards Goal     Problem: Patient Education: Go to Patient Education Activity  Goal: Patient/Family Education  Outcome: Progressing Towards Goal

## 2021-12-31 NOTE — PROGRESS NOTES
Tx initiated via  a patent Right upper chest cath. Dressing changed per protocol. No s/s of infection or skin break down. Net fluid removal goal is 1kgs and 3 hours. No issues verbalized.  Epogen 10k given

## 2021-12-31 NOTE — PROGRESS NOTES
Consult requested by: Padmini Lindsay MD    ADMIT DATE: 12/26/2021  CONSULT DATE: December 31, 2021           Patient is seen in the room  Her hemoglobin is 5.7-->8.5   transfused 2 units of packed RBCs with dialysis  S/p dialyzed 12/31 and removed 1L     Past Medical History:   Diagnosis Date    Anxiety disorder     Arthritis     Atherosclerosis of native arteries of the extremities with ulceration ([facility] 8/31/2020    Atrial fibrillation ([address]     Cardiac pacemaker in situ     Cellulitis and abscess of trunk 8/31/2020    Depression, postpartum     Diabetes ([address]     Heart disease     Heartburn     Hx of long term use of blood thinners     Hypercholesterolemia     Hypertension     Hypothyroidism     Migraines     Peripheral venous insufficiency 8/31/2020    Seizures ([address]     Sleep disorder     Stroke ([facility] [address] [facility]     2019    Varicose veins of lower extremity with inflammation 8/31/2020      Past Surgical History:   Procedure Laterality Date    HX BACK SURGERY      HX OTHER SURGICAL      leg surgery     HX OTHER SURGICAL      pacemaker monitor     IR INSERT TUNL CVC W/O PORT OVER 5 YR  12/10/2021       Social History     Socioeconomic History    Marital status:      Spouse name: Not on file    Number of children: Not on file    Years of education: Not on file    Highest education level: Not on file   Occupational History    Not on file   Tobacco Use    Smoking status: Never Smoker    Smokeless tobacco: Never Used   Vaping Use    Vaping Use: Never used   Substance and Sexual Activity    Alcohol use: Never    Drug use: Never    Sexual activity: Not on file   Other Topics Concern    Not on file   Social History Narrative    Not on file     Social Determinants of Health     Financial Resource Strain:     Difficulty of Paying Living Expenses: Not on file   Food Insecurity:     Worried About Running Out of Food in the Last Year: Not on file    [address] in the Last Year: Not on file   Transportation Needs:     Lack of Transportation (Medical): Not on file    Lack of Transportation (Non-Medical): Not on file   Physical Activity:     Days of Exercise per Week: Not on file    Minutes of Exercise per Session: Not on file   Stress:     Feeling of Stress : Not on file   Social Connections:     Frequency of Communication with Friends and Family: Not on file    Frequency of Social Gatherings with Friends and Family: Not on file    Attends [employer] Services: Not on file    Active Member of [address] [place] [address] [employer] or Organizations: Not on file    Attends Club or Organization Meetings: Not on file    Marital Status: Not on file   Intimate Partner Violence:     Fear of Current or Ex-Partner: Not on file    Emotionally Abused: Not on file    Physically Abused: Not on file    Sexually Abused: Not on file   Housing Stability:     Unable to Pay for Housing in the Last Year: Not on file    Number of [place] in the Last Year: Not on file    Unstable Housing in the Last Year: Not on file       Family History   Problem Relation Age of Onset    Heart Disease Mother     Heart Disease Father     Diabetes Sister     Diabetes Brother      No Known Allergies     Home Medications:     Medications Prior to Admission   Medication Sig    calcitRIOL (ROCALTROL) 0.5 mcg capsule Take 1 Capsule by mouth daily.  zinc oxide-white petrolatum 17-57 % topical paste Apply  to affected area as needed for Skin Irritation. Indications: skin irritation    ondansetron hcl (Zofran) 4 mg tablet Take 1 Tablet by mouth every eight (8) hours as needed for Nausea or Vomiting.  Vimpat 200 mg tab tablet Take 1 Tablet by mouth two (2) times a day.  ondansetron (Zofran ODT) 4 mg disintegrating tablet 1 Tablet by SubLINGual route every eight (8) hours as needed for Nausea or Vomiting.     atorvastatin (LIPITOR) 40 mg tablet Take 1 tablet by mouth once daily for 90 days    FLUoxetine (PROzac) 10 mg capsule Take 1 Capsule by mouth daily for 270 days.  loperamide (IMODIUM) 2 mg capsule TAKE 1 CAPSULE BY MOUTH SIX TIMES DAILY AS NEEDED FOR 30 DAYS    Euthyrox 150 mcg tablet Take 1 tablet by mouth once daily    levETIRAcetam (KEPPRA XR) 500 mg ER tablet TAKE 2 TABLETS BY MOUTH TWICE DAILY FOR 90 DAYS    sevelamer carbonate (RENVELA) 800 mg tab tab        Current Inpatient Medications:     Current Facility-Administered Medications   Medication Dose Route Frequency    VANCOMYCIN INFORMATION NOTE   Other PRN    [START ON 1/3/2022] Vancomycin random level to be drawn on 1/3/22 at 0600 pre HD.    Other ONCE    traMADoL (ULTRAM) tablet 50 mg  50 mg Oral Q6H PRN    epoetin anjelica-epbx (RETACRIT) injection 10,000 Units  10,000 Units IntraVENous DIALYSIS MON, WED & FRI    insulin lispro (HUMALOG) injection   SubCUTAneous AC&HS    lactulose (CHRONULAC) 10 gram/15 mL solution 30 mL  20 g Oral TID    heparin (porcine) 1,000 unit/mL injection 3,600 Units  3,600 Units IntraVENous DIALYSIS PRN    atorvastatin (LIPITOR) tablet 40 mg  40 mg Oral QHS    calcitRIOL (ROCALTROL) capsule 0.5 mcg  0.5 mcg Oral DAILY    levothyroxine (SYNTHROID) tablet 150 mcg  150 mcg Oral DAILY    FLUoxetine (PROzac) capsule 10 mg  10 mg Oral DAILY    sevelamer carbonate (RENVELA) tab 800 mg  800 mg Oral TID WITH MEALS    lacosamide (VIMPAT) tablet 200 mg  200 mg Oral BID    sodium chloride (NS) flush 5-40 mL  5-40 mL IntraVENous Q8H    sodium chloride (NS) flush 5-40 mL  5-40 mL IntraVENous PRN    acetaminophen (TYLENOL) tablet 650 mg  650 mg Oral Q6H PRN    Or    acetaminophen (TYLENOL) suppository 650 mg  650 mg Rectal Q6H PRN    polyethylene glycol (MIRALAX) packet 17 g  17 g Oral DAILY PRN    ondansetron (ZOFRAN ODT) tablet 4 mg  4 mg Oral Q8H PRN    Or    ondansetron (ZOFRAN) injection 4 mg  4 mg IntraVENous Q6H PRN    zinc sulfate (ZINCATE) 50 mg zinc (220 mg) capsule 1 Capsule  1 Capsule Oral DAILY    ascorbic acid (vitamin C) (VITAMIN C) tablet 500 mg  500 mg Oral BID    0.9% sodium chloride infusion  75 mL/hr IntraVENous CONTINUOUS    dexamethasone (DECADRON) 4 mg/mL injection 4 mg  4 mg IntraVENous Q12H    albuterol (PROVENTIL HFA, VENTOLIN HFA, PROAIR HFA) inhaler 2 Puff  2 Puff Inhalation Q6H PRN    aspirin chewable tablet 81 mg  81 mg Oral DAILY    levETIRAcetam (KEPPRA) tablet 250 mg  250 mg Oral BID    benzonatate (TESSALON) capsule 100 mg  100 mg Oral TID PRN    pantoprazole (PROTONIX) tablet 40 mg  40 mg Oral ACB       Review of Systems:   No fever or chills. No sore throat. No cough or hemoptysis. No shortness of breath or chest pain. No orthopnea or paroxysmal nocturnal dyspnea. No nausea, vomiting, abdominal pain, melena or hematochezia. No ankle swelling, no joint paints. No muscle aches. No skin changes. No dizziness or lightheadedness. No headaches. Physical Assessment:     Vitals:    12/31/21 1100 12/31/21 1130 12/31/21 1403 12/31/21 1435   BP: (!) 178/71 (!) 170/69 (!) 160/70 (!) 144/68   Pulse:   60 65   Resp:   18 18   Temp:   98 °F (36.7 °C) 98.1 °F (36.7 °C)   TempSrc:       SpO2:    98%   Weight:       Height:         Last 3 Recorded Weights in this Encounter    12/26/21 0816 12/31/21 0601   Weight: 76.7 kg (169 lb) 77.7 kg (171 lb 4.8 oz)     Admission weight: Weight: 76.7 kg (169 lb) (12/26/21 0816)      Intake/Output Summary (Last 24 hours) at 12/31/2021 1507  Last data filed at 12/31/2021 1130  Gross per 24 hour   Intake    Output 1300 ml   Net -1300 ml       Patient is in no apparent distress. HEENT: Head is normocephalic and atraumatic. Pupils are round, equal, reactive to light. Sclerae are anicteric. Oropharynx clear. Neck: no cervical lymphadenopathy or thyromegaly. Lungs: good air entry, clear to auscultation bilaterally. Trachea at the midline. Cardiovascular system: S1, S2, regular rate and rhythm. No murmurs, gallops or rubs. No jvd.    Abdomen: soft, non tender, non distended. Positive bowel sounds. No hepatosplenomegaly. No abdominal bruits. Extremities: no clubbing, cyanosis or edema. Strong dorsalis pedis pulses. Brisk capillary refill on the toes bilaterally. Integumentary: skin is grossly intact. Neurologic: Alert, oriented time three. Cooperative and appropriate. No gross motor or sensory deficits. Dialysis access: cuffed tunneled catheter site right. IJ TDC     Data Review:    Labs: Results:       Chemistry Recent Labs     12/29/21  0647   *      K 3.7      CO2 24   BUN 49*   CREA 3.67*   CA 7.6*   AGAP 9   BUCR 13   ALB 2.0*   PHOS 4.1         CBC w/Diff Recent Labs     12/29/21  0647   WBC 6.5   RBC 2.77*   HGB 8.5*   HCT 27.2*   *         Iron/Ferritin No results for input(s): IRON in the last 72 hours. No lab exists for component: TIBCCALC   PTH/VIT D No results for input(s): PTH in the last 72 hours. No lab exists for component: VITD            Assessment & Plan     1-ESRD:  -HD MWF at [facility], [address] [place] [address]  -No volume overload/uremia  -s/p HD 12/27, 12/29 and  12/31  -RI perm cath as active dialysis access  -BORA AVF in place also    2-Anemia:  -Hb was 5.7  -No obvious bleeding  -s/p blood Tx 2 units with HD   -iv Epo with HD  -r/o GIB   -Hb 8.7       3-Secondary hyperparathyroidism of ESRD. -cont sevelamer 800 mg tid and calcitriol 0.5 mcg daily       - Renal Diet    4-COVID Pneumonia   -- started on empiric abxs because of fever,lactic acidosis and leucocytosis . 5-AMS - CTH negative   Likely Metabolic and sepsis related .     6- Seizure disorder - Vimpat & Keppra resumed,  Renally dosed

## 2021-12-31 NOTE — PROGRESS NOTES
Hospitalist Progress Note               Daily Progress Note: 12/31/2021      Subjective: The patient is seen for follow up. 80 y.o. female with a past medical history of heart disease, GERD, hypercholesterolemia, hypothyroidism, seizure disorder, end-stage renal disease on hemodialysis that presented to the emergency room on 12/26/2021 for altered mental status. Of note patient was in the hospital the day prior due to her dialysis catheter bleeding. At that time pressure dressing was placed and bleeding had improved. Family and patient were notified to keep the pressure dressing on and to follow-up with general surgery as an outpatient. However the following day patient became altered, febrile, increased coughing. Multiple family members have tested positive for Covid. Therefore she presented back to the emergency room. Patient is altered and oriented x0. She denies any pain. She does admit to a cough. She has had multiple falls and generalized weakness. In the ED vital signs were significant for fever 101.1 °F and hypotension of 114/58. Laboratory data was significant for a hemoglobin of 7.3, lactic acid 2.2, troponin I 05. Influenza a and B both negative. Rapid Covid positive. Chest x-ray unremarkable. CT of the head showed no acute intracranial abnormality but did show a remote left parietal occipital infarct with generalized atrophy. CT of the cervical spine showed multilevel degenerative disc and generative arthritic changes of the cervical spine with no acute abnormality. Patient was not hypoxic. Patient was admitted to medical telemetry. She was started on Zithromax and ceftriaxone along with Decadron. --------    Patient is seen for follow-up. Blood cultures 12/26 grew mssa x 4 bottles. Got vanco 12/27, random level 15.9 on 12/29  S/p hd earlier today. Remains afebrile  spo2 >96% on room air.     Will give dose 1 gm vanco iv today, pharmacy to dose and will need vanco x 2 weeks with hd.    HD 12/31:Net fluid removal goal is 1kgs and 3 hours. No issues verbalized. Epogen 10k given. No apparent vanco today      12/30  Hemoglobin this morning is down to 8.5. S/P 2 units of blood.   No fevers overnight      Problem List:  Problem List as of 12/31/2021 Date Reviewed: 12/9/2021          Codes Class Noted - Resolved    AMS (altered mental status) ICD-10-CM: R41.82  ICD-9-CM: 780.97  12/26/2021 - Present        COVID ICD-10-CM: U07.1  ICD-9-CM: 079.89  12/26/2021 - Present        Fluid overload ICD-10-CM: E87.70  ICD-9-CM: 276.69  12/5/2021 - Present        Left-sided weakness ICD-10-CM: R53.1  ICD-9-CM: 728.87  12/3/2021 - Present        Intractable nausea and vomiting ICD-10-CM: R11.2  ICD-9-CM: 536.2  12/3/2021 - Present        Hypoglycemia ICD-10-CM: E16.2  ICD-9-CM: 251.2  6/11/2021 - Present        Breakthrough seizure ([facility] ICD-10-CM: U94.430  ICD-9-CM: 345.91  6/11/2021 - Present        SSS (sick sinus syndrome) ([facility] ICD-10-CM: I49.5  ICD-9-CM: 427.81  6/11/2021 - Present        Seizure ([facility] ICD-10-CM: R56.9  ICD-9-CM: 780.39  5/19/2021 - Present        Hyperkalemia ICD-10-CM: E87.5  ICD-9-CM: 276.7  10/16/2020 - Present        Atherosclerosis of native arteries of the extremities with ulceration ([facility] ICD-10-CM: I70.25  ICD-9-CM: 440.23, 707.9  8/31/2020 - Present        Cellulitis and abscess of trunk ICD-10-CM: L03.319, L02.219  ICD-9-CM: 682.2  8/31/2020 - Present        Peripheral venous insufficiency ICD-10-CM: I87.2  ICD-9-CM: 459.81  8/31/2020 - Present        Varicose veins of lower extremity with inflammation ICD-10-CM: I83.10  ICD-9-CM: 454.1  8/31/2020 - Present        Peripheral vascular disease ([facility] ICD-10-CM: I73.9  ICD-9-CM: 443.9  7/3/2020 - Present        Type II diabetes mellitus ([facility] ICD-10-CM: E11.9  ICD-9-CM: 250.00  7/3/2020 - Present        Long term (current) use of antibiotics ICD-10-CM: Z79.2  ICD-9-CM: V58.62  7/3/2020 - Present        Atrial fibrillation [facility]) ICD-10-CM: I48.91  ICD-9-CM: 427.31  7/3/2020 - Present        Cardiac pacemaker in situ ICD-10-CM: Z95.0  ICD-9-CM: V45.01  7/3/2020 - Present        Carpal tunnel syndrome of right wrist ICD-10-CM: G56.01  ICD-9-CM: 354.0  7/3/2020 - Present        Chest wall pain ICD-10-CM: R07.89  ICD-9-CM: 786.52  7/3/2020 - Present        Chronic diarrhea ICD-10-CM: K52.9  ICD-9-CM: 787.91  7/3/2020 - Present        Chronic neck pain ICD-10-CM: M54.2, G89.29  ICD-9-CM: 723.1, 338.29  7/3/2020 - Present        End stage renal failure on dialysis [facility]) ICD-10-CM: N18.6, Z99.2  ICD-9-CM: 585.6, V45.11  7/3/2020 - Present        History of CVA (cerebrovascular accident) ICD-10-CM: Z86.73  ICD-9-CM: V12.54  7/3/2020 - Present        Hyponatremia ICD-10-CM: E87.1  ICD-9-CM: 276.1  7/3/2020 - Present        Acquired hypothyroidism ICD-10-CM: E03.9  ICD-9-CM: 244.9  7/3/2020 - Present        Seizure disorder ([facility] ICD-10-CM: G40.909  ICD-9-CM: 345.90  7/3/2020 - Present        Recurrent falls ICD-10-CM: R29.6  ICD-9-CM: V15.88  7/3/2020 - Present        RESOLVED: Disorder due to well controlled type 2 diabetes mellitus ([facility] ICD-10-CM: E11.8  ICD-9-CM: 250.90  8/31/2020 - 6/11/2021              Medications reviewed  Current Facility-Administered Medications   Medication Dose Route Frequency    doxycycline (VIBRAMYCIN) 100 mg in 0.9% sodium chloride (MBP/ADV) 100 mL MBP  100 mg IntraVENous Q12H    traMADoL (ULTRAM) tablet 50 mg  50 mg Oral Q6H PRN    epoetin anjelica-epbx (RETACRIT) injection 10,000 Units  10,000 Units IntraVENous DIALYSIS MON, WED & FRI    insulin lispro (HUMALOG) injection   SubCUTAneous AC&HS    lactulose (CHRONULAC) 10 gram/15 mL solution 30 mL  20 g Oral TID    heparin (porcine) 1,000 unit/mL injection 3,600 Units  3,600 Units IntraVENous DIALYSIS PRN    atorvastatin (LIPITOR) tablet 40 mg  40 mg Oral QHS    calcitRIOL (ROCALTROL) capsule 0.5 mcg  0.5 mcg Oral DAILY    levothyroxine (SYNTHROID) tablet 150 mcg  150 mcg Oral DAILY    FLUoxetine (PROzac) capsule 10 mg  10 mg Oral DAILY    sevelamer carbonate (RENVELA) tab 800 mg  800 mg Oral TID WITH MEALS    lacosamide (VIMPAT) tablet 200 mg  200 mg Oral BID    sodium chloride (NS) flush 5-40 mL  5-40 mL IntraVENous Q8H    sodium chloride (NS) flush 5-40 mL  5-40 mL IntraVENous PRN    acetaminophen (TYLENOL) tablet 650 mg  650 mg Oral Q6H PRN    Or    acetaminophen (TYLENOL) suppository 650 mg  650 mg Rectal Q6H PRN    polyethylene glycol (MIRALAX) packet 17 g  17 g Oral DAILY PRN    ondansetron (ZOFRAN ODT) tablet 4 mg  4 mg Oral Q8H PRN    Or    ondansetron (ZOFRAN) injection 4 mg  4 mg IntraVENous Q6H PRN    azithromycin (ZITHROMAX) 500 mg in 0.9% sodium chloride 250 mL (VIAL-MATE)  500 mg IntraVENous Q24H    zinc sulfate (ZINCATE) 50 mg zinc (220 mg) capsule 1 Capsule  1 Capsule Oral DAILY    ascorbic acid (vitamin C) (VITAMIN C) tablet 500 mg  500 mg Oral BID    0.9% sodium chloride infusion  75 mL/hr IntraVENous CONTINUOUS    dexamethasone (DECADRON) 4 mg/mL injection 4 mg  4 mg IntraVENous Q12H    albuterol (PROVENTIL HFA, VENTOLIN HFA, PROAIR HFA) inhaler 2 Puff  2 Puff Inhalation Q6H PRN    aspirin chewable tablet 81 mg  81 mg Oral DAILY    levETIRAcetam (KEPPRA) tablet 250 mg  250 mg Oral BID    benzonatate (TESSALON) capsule 100 mg  100 mg Oral TID PRN    pantoprazole (PROTONIX) tablet 40 mg  40 mg Oral ACB       Review of Systems:   A comprehensive review of systems was negative except for that written in the HPI. Objective:   Physical Exam:     Visit Vitals  BP (!) 162/77   Pulse 60   Temp 98 °F (36.7 °C) (Oral)   Resp 18   Ht 5' 6\" (1.676 m)   Wt 77.7 kg (171 lb 4.8 oz)   SpO2 97%   BMI 27.65 kg/m²      O2 Device: None (Room air)    Temp (24hrs), Av.8 °F (36.6 °C), Min:97.3 °F (36.3 °C), Max:98 °F (36.7 °C)    No intake/output data recorded. No intake/output data recorded.     General:   Awake and alert appears frail   Lungs:   Clear to auscultation bilaterally. Chest wall:  No tenderness or deformity. Heart:  Regular rate and rhythm, S1, S2 normal, no murmur, click, rub or gallop. Abdomen:   Soft, non-tender. Bowel sounds normal. No masses,  No organomegaly. Extremities: Extremities normal, atraumatic, no cyanosis or edema. Pulses: 2+ and symmetric all extremities. Skin: Skin color, texture, turgor normal. No rashes or lesions   Neurologic: CNII-XII intact. generallyy weak. Data Review:       Recent Days:  Recent Labs     12/29/21  0647   WBC 6.5   HGB 8.5*   HCT 27.2*   *     Recent Labs     12/29/21  0647      K 3.7      CO2 24   *   BUN 49*   CREA 3.67*   CA 7.6*   PHOS 4.1   ALB 2.0*     No results for input(s): PH, PCO2, PO2, HCO3, FIO2 in the last 72 hours. 24 Hour Results:  Recent Results (from the past 24 hour(s))   GLUCOSE, POC    Collection Time: 12/30/21  4:26 PM   Result Value Ref Range    Glucose (POC) 110 65 - 117 mg/dL    Performed by Rudene Merlin, POC    Collection Time: 12/30/21  8:07 PM   Result Value Ref Range    Glucose (POC) 112 65 - 117 mg/dL    Performed by Rudene Merlin, POC    Collection Time: 12/31/21  7:30 AM   Result Value Ref Range    Glucose (POC) 109 65 - 117 mg/dL    Performed by Junito Bennett        XR CHEST PORT   Final Result   No acute cardiopulmonary abnormality. .       CT HEAD WO CONT   Final Result   No acute intracranial abnormality. Remote left parietal-occipital   infarct. Mild generalized atrophy with nonspecific white matter abnormality that   may indicate chronic small vessel disease. Noemi Do HISTORY:  recurrent falls, AMS   Dose reduction technique:    All CT scans at this facility are performed using dose reduction optimization   technique as appropriate on the exam including the following: Automated exposure   control, adjustment of the MA and/or KV according to patient size and/or use of iterative reconstructive technique. TECHNIQUE: Noncontrast CT of the cervical spine with multiplanar   reconstructions. COMPARISON: None   LIMITATIONS: None      FRACTURES: None   ALIGNMENT: Normal   VERTEBRAL BODIES: Multilevel degenerative/arthritic changes characterized by   osteophyte formation and associated endplate changes; findings most notable at   C5-6, C7-T1. DISC SPACES: Decreased with multiple posterior disc protrusions some of which   are mildly calcified   POSTERIOR ELEMENTS: Normal for age. SPINAL CANAL: Normal   PARASPINAL SOFT TISSUES: Normal      OTHER: None      IMPRESSION: Multilevel degenerative disc and degenerative arthritic changes of   the cervical spine with no acute abnormality. CT SPINE CERV WO CONT   Final Result   No acute intracranial abnormality. Remote left parietal-occipital   infarct. Mild generalized atrophy with nonspecific white matter abnormality that   may indicate chronic small vessel disease. Júnior Anaya HISTORY:  recurrent falls, AMS   Dose reduction technique: All CT scans at this facility are performed using dose reduction optimization   technique as appropriate on the exam including the following: Automated exposure   control, adjustment of the MA and/or KV according to patient size and/or use of   iterative reconstructive technique. TECHNIQUE: Noncontrast CT of the cervical spine with multiplanar   reconstructions. COMPARISON: None   LIMITATIONS: None      FRACTURES: None   ALIGNMENT: Normal   VERTEBRAL BODIES: Multilevel degenerative/arthritic changes characterized by   osteophyte formation and associated endplate changes; findings most notable at   C5-6, C7-T1. DISC SPACES: Decreased with multiple posterior disc protrusions some of which   are mildly calcified   POSTERIOR ELEMENTS: Normal for age.        SPINAL CANAL: Normal   PARASPINAL SOFT TISSUES: Normal      OTHER: None      IMPRESSION: Multilevel degenerative disc and degenerative arthritic changes of   the cervical spine with no acute abnormality. Assessment:  Covid pneumonitis, nonsevere disease. No hypoxia and remains doing well on room air. Sepsis secondary to Covid pneumonitis +/- bacteremia/mssa, resolved    Bacteremia/MSSA: blcs x 4 bottles grew staph aureus/MSSA-12/26, 1st dose vanco 12/27. Vanco level 12/29 ~15. Will need 2 weeks vanco with HD, d/w pharmacy    Acute on chronic anemia of chronic kidney disease s/p prbc x 2 on 12/27. Epoitin with HD. Metabolic encephalopathy, likely due to Covid/fever. Resolved. Seizure disorder, on Vimpat and Keppra, no activity. End stage renal disease on hemodialysis MWF    Constipation      Plan:  Continue vanco with hd x 2 weeks. 1 gm today, inform nephro, discussed with pharmacy, consulted for vanco dosing. PTOT evaluation- rec snf, insurance auth initiated. Follow pending labs today including crp/procalcitonin. Care Plan discussed with: Patient/Family and nurse    Disposition: Continued inpatient care x24 hours    Dispo: anticipate snf, auth initiated. Need to follow repeat blood culture initial blcs grew mssa. Will need 2 weeks vanco with HD. Total time spent with patient: 30 minutes.     Laci Elias MD

## 2022-01-01 LAB
GLUCOSE BLD STRIP.AUTO-MCNC: 101 MG/DL (ref 65–117)
GLUCOSE BLD STRIP.AUTO-MCNC: 110 MG/DL (ref 65–117)
GLUCOSE BLD STRIP.AUTO-MCNC: 119 MG/DL (ref 65–117)
GLUCOSE BLD STRIP.AUTO-MCNC: 130 MG/DL (ref 65–117)
GLUCOSE BLD STRIP.AUTO-MCNC: 168 MG/DL (ref 65–117)
GLUCOSE BLD STRIP.AUTO-MCNC: 74 MG/DL (ref 65–117)
PERFORMED BY, TECHID: ABNORMAL
PERFORMED BY, TECHID: NORMAL

## 2022-01-01 PROCEDURE — 74011250636 HC RX REV CODE- 250/636: Performed by: PHYSICIAN ASSISTANT

## 2022-01-01 PROCEDURE — 82962 GLUCOSE BLOOD TEST: CPT

## 2022-01-01 PROCEDURE — 74011250637 HC RX REV CODE- 250/637: Performed by: PHYSICIAN ASSISTANT

## 2022-01-01 PROCEDURE — 65270000029 HC RM PRIVATE

## 2022-01-01 RX ADMIN — DEXAMETHASONE SODIUM PHOSPHATE 4 MG: 4 INJECTION, SOLUTION INTRA-ARTICULAR; INTRALESIONAL; INTRAMUSCULAR; INTRAVENOUS; SOFT TISSUE at 21:13

## 2022-01-01 RX ADMIN — CALCITRIOL CAPSULES 0.25 MCG 0.5 MCG: 0.25 CAPSULE ORAL at 08:47

## 2022-01-01 RX ADMIN — LACOSAMIDE 200 MG: 100 TABLET, FILM COATED ORAL at 21:12

## 2022-01-01 RX ADMIN — ATORVASTATIN CALCIUM 40 MG: 40 TABLET, FILM COATED ORAL at 21:12

## 2022-01-01 RX ADMIN — SEVELAMER CARBONATE 800 MG: 800 TABLET, FILM COATED ORAL at 12:35

## 2022-01-01 RX ADMIN — LEVETIRACETAM 250 MG: 250 TABLET, FILM COATED ORAL at 21:12

## 2022-01-01 RX ADMIN — DEXAMETHASONE SODIUM PHOSPHATE 4 MG: 4 INJECTION, SOLUTION INTRA-ARTICULAR; INTRALESIONAL; INTRAMUSCULAR; INTRAVENOUS; SOFT TISSUE at 08:47

## 2022-01-01 RX ADMIN — SEVELAMER CARBONATE 800 MG: 800 TABLET, FILM COATED ORAL at 17:51

## 2022-01-01 RX ADMIN — OXYCODONE HYDROCHLORIDE AND ACETAMINOPHEN 500 MG: 500 TABLET ORAL at 08:47

## 2022-01-01 RX ADMIN — ACETAMINOPHEN 650 MG: 325 TABLET ORAL at 15:06

## 2022-01-01 RX ADMIN — LEVETIRACETAM 250 MG: 250 TABLET, FILM COATED ORAL at 08:47

## 2022-01-01 RX ADMIN — SODIUM CHLORIDE, PRESERVATIVE FREE 10 ML: 5 INJECTION INTRAVENOUS at 21:13

## 2022-01-01 RX ADMIN — SEVELAMER CARBONATE 800 MG: 800 TABLET, FILM COATED ORAL at 08:47

## 2022-01-01 RX ADMIN — OXYCODONE HYDROCHLORIDE AND ACETAMINOPHEN 500 MG: 500 TABLET ORAL at 21:13

## 2022-01-01 RX ADMIN — LACOSAMIDE 200 MG: 100 TABLET, FILM COATED ORAL at 08:47

## 2022-01-01 RX ADMIN — FLUOXETINE 10 MG: 10 CAPSULE ORAL at 08:47

## 2022-01-01 RX ADMIN — LEVOTHYROXINE SODIUM 150 MCG: 0.03 TABLET ORAL at 08:47

## 2022-01-01 RX ADMIN — ASPIRIN 81 MG CHEWABLE TABLET 81 MG: 81 TABLET CHEWABLE at 08:47

## 2022-01-01 RX ADMIN — ZINC SULFATE 220 MG (50 MG) CAPSULE 1 CAPSULE: CAPSULE at 08:47

## 2022-01-01 RX ADMIN — PANTOPRAZOLE SODIUM 40 MG: 40 TABLET, DELAYED RELEASE ORAL at 08:47

## 2022-01-01 NOTE — PROGRESS NOTES
Problem: Isolation Precautions - Risk of Spread of Infection  Goal: Prevent transmission of infectious organism to others  Outcome: Progressing Towards Goal     Problem: Nutrition Deficits  Goal: Optimize nutrtional status  Outcome: Progressing Towards Goal

## 2022-01-01 NOTE — PROGRESS NOTES
Hospitalist Progress Note               Daily Progress Note: 1/1/2022      Subjective: The patient is seen for follow up. 80 y.o. female with a past medical history of heart disease, GERD, hypercholesterolemia, hypothyroidism, seizure disorder, end-stage renal disease on hemodialysis that presented to the emergency room on 12/26/2021 for altered mental status. Of note patient was in the hospital the day prior due to her dialysis catheter bleeding. At that time pressure dressing was placed and bleeding had improved. Family and patient were notified to keep the pressure dressing on and to follow-up with general surgery as an outpatient. However the following day patient became altered, febrile, increased coughing. Multiple family members have tested positive for Covid. Therefore she presented back to the emergency room. Patient is altered and oriented x0. She denies any pain. She does admit to a cough. She has had multiple falls and generalized weakness. In the ED vital signs were significant for fever 101.1 °F and hypotension of 114/58. Laboratory data was significant for a hemoglobin of 7.3, lactic acid 2.2, troponin I 05. Influenza a and B both negative. Rapid Covid positive. Chest x-ray unremarkable. CT of the head showed no acute intracranial abnormality but did show a remote left parietal occipital infarct with generalized atrophy. CT of the cervical spine showed multilevel degenerative disc and generative arthritic changes of the cervical spine with no acute abnormality. Patient was not hypoxic. Patient was admitted to medical telemetry. She was started on Zithromax and ceftriaxone along with Decadron. --------    Patient is seen for follow-up. Denies complaints, pleasantly confused. Remains on room air, afebrile  Anticipate snf placement, pending. 12/31  Blood cultures 12/26 grew mssa x 4 bottles.    Got vanco 12/27, random level 15.9 on 12/29  S/p hd earlier today.    Remains afebrile  spo2 >96% on room air. Will give dose 1 gm vanco iv today, pharmacy to dose and will need vanco x 2 weeks with hd.    HD 12/31:Net fluid removal goal is 1kgs and 3 hours. No issues verbalized. Epogen 10k given. No apparent vanco today      12/30  Hemoglobin this morning is down to 8.5. S/P 2 units of blood.   No fevers overnight      Problem List:  Problem List as of 1/1/2022 Date Reviewed: 12/9/2021          Codes Class Noted - Resolved    AMS (altered mental status) ICD-10-CM: R41.82  ICD-9-CM: 780.97  12/26/2021 - Present        COVID ICD-10-CM: U07.1  ICD-9-CM: 079.89  12/26/2021 - Present        Fluid overload ICD-10-CM: E87.70  ICD-9-CM: 276.69  12/5/2021 - Present        Left-sided weakness ICD-10-CM: R53.1  ICD-9-CM: 728.87  12/3/2021 - Present        Intractable nausea and vomiting ICD-10-CM: R11.2  ICD-9-CM: 536.2  12/3/2021 - Present        Hypoglycemia ICD-10-CM: E16.2  ICD-9-CM: 251.2  6/11/2021 - Present        Breakthrough seizure ([facility] ICD-10-CM: N52.964  ICD-9-CM: 345.91  6/11/2021 - Present        SSS (sick sinus syndrome) ([facility] ICD-10-CM: I49.5  ICD-9-CM: 427.81  6/11/2021 - Present        Seizure ([facility] ICD-10-CM: R56.9  ICD-9-CM: 780.39  5/19/2021 - Present        Hyperkalemia ICD-10-CM: E87.5  ICD-9-CM: 276.7  10/16/2020 - Present        Atherosclerosis of native arteries of the extremities with ulceration ([facility] ICD-10-CM: I70.25  ICD-9-CM: 440.23, 707.9  8/31/2020 - Present        Cellulitis and abscess of trunk ICD-10-CM: L03.319, L02.219  ICD-9-CM: 682.2  8/31/2020 - Present        Peripheral venous insufficiency ICD-10-CM: I87.2  ICD-9-CM: 459.81  8/31/2020 - Present        Varicose veins of lower extremity with inflammation ICD-10-CM: I83.10  ICD-9-CM: 454.1  8/31/2020 - Present        Peripheral vascular disease ([facility] ICD-10-CM: I73.9  ICD-9-CM: 443.9  7/3/2020 - Present        Type II diabetes mellitus ([facility] ICD-10-CM: E11.9  ICD-9-CM: 250.00  7/3/2020 - Present        Long term (current) use of antibiotics ICD-10-CM: Z79.2  ICD-9-CM: V58.62  7/3/2020 - Present        Atrial fibrillation ([facility] ICD-10-CM: I48.91  ICD-9-CM: 427.31  7/3/2020 - Present        Cardiac pacemaker in situ ICD-10-CM: Z95.0  ICD-9-CM: V45.01  7/3/2020 - Present        Carpal tunnel syndrome of right wrist ICD-10-CM: G56.01  ICD-9-CM: 354.0  7/3/2020 - Present        Chest wall pain ICD-10-CM: R07.89  ICD-9-CM: 786.52  7/3/2020 - Present        Chronic diarrhea ICD-10-CM: K52.9  ICD-9-CM: 787.91  7/3/2020 - Present        Chronic neck pain ICD-10-CM: M54.2, G89.29  ICD-9-CM: 723.1, 338.29  7/3/2020 - Present        End stage renal failure on dialysis [facility]) ICD-10-CM: N18.6, Z99.2  ICD-9-CM: 585.6, V45.11  7/3/2020 - Present        History of CVA (cerebrovascular accident) ICD-10-CM: Z86.73  ICD-9-CM: V12.54  7/3/2020 - Present        Hyponatremia ICD-10-CM: E87.1  ICD-9-CM: 276.1  7/3/2020 - Present        Acquired hypothyroidism ICD-10-CM: E03.9  ICD-9-CM: 244.9  7/3/2020 - Present        Seizure disorder ([facility] ICD-10-CM: G40.909  ICD-9-CM: 345.90  7/3/2020 - Present        Recurrent falls ICD-10-CM: R29.6  ICD-9-CM: V15.88  7/3/2020 - Present        RESOLVED: Disorder due to well controlled type 2 diabetes mellitus ([facility] ICD-10-CM: E11.8  ICD-9-CM: 250.90  8/31/2020 - 6/11/2021              Medications reviewed  Current Facility-Administered Medications   Medication Dose Route Frequency    VANCOMYCIN INFORMATION NOTE   Other PRN    [START ON 1/3/2022] Vancomycin random level to be drawn on 1/3/22 at 0600 pre HD.    Other ONCE    traMADoL (ULTRAM) tablet 50 mg  50 mg Oral Q6H PRN    epoetin anjelica-epbx (RETACRIT) injection 10,000 Units  10,000 Units IntraVENous DIALYSIS MON, WED & FRI    insulin lispro (HUMALOG) injection   SubCUTAneous AC&HS    lactulose (CHRONULAC) 10 gram/15 mL solution 30 mL  20 g Oral TID    heparin (porcine) 1,000 unit/mL injection 3,600 Units  3,600 Units IntraVENous DIALYSIS PRN    atorvastatin (LIPITOR) tablet 40 mg  40 mg Oral QHS    calcitRIOL (ROCALTROL) capsule 0.5 mcg  0.5 mcg Oral DAILY    levothyroxine (SYNTHROID) tablet 150 mcg  150 mcg Oral DAILY    FLUoxetine (PROzac) capsule 10 mg  10 mg Oral DAILY    sevelamer carbonate (RENVELA) tab 800 mg  800 mg Oral TID WITH MEALS    lacosamide (VIMPAT) tablet 200 mg  200 mg Oral BID    sodium chloride (NS) flush 5-40 mL  5-40 mL IntraVENous Q8H    sodium chloride (NS) flush 5-40 mL  5-40 mL IntraVENous PRN    acetaminophen (TYLENOL) tablet 650 mg  650 mg Oral Q6H PRN    Or    acetaminophen (TYLENOL) suppository 650 mg  650 mg Rectal Q6H PRN    polyethylene glycol (MIRALAX) packet 17 g  17 g Oral DAILY PRN    ondansetron (ZOFRAN ODT) tablet 4 mg  4 mg Oral Q8H PRN    Or    ondansetron (ZOFRAN) injection 4 mg  4 mg IntraVENous Q6H PRN    zinc sulfate (ZINCATE) 50 mg zinc (220 mg) capsule 1 Capsule  1 Capsule Oral DAILY    ascorbic acid (vitamin C) (VITAMIN C) tablet 500 mg  500 mg Oral BID    [Held by provider] 0.9% sodium chloride infusion  75 mL/hr IntraVENous CONTINUOUS    dexamethasone (DECADRON) 4 mg/mL injection 4 mg  4 mg IntraVENous Q12H    albuterol (PROVENTIL HFA, VENTOLIN HFA, PROAIR HFA) inhaler 2 Puff  2 Puff Inhalation Q6H PRN    aspirin chewable tablet 81 mg  81 mg Oral DAILY    levETIRAcetam (KEPPRA) tablet 250 mg  250 mg Oral BID    benzonatate (TESSALON) capsule 100 mg  100 mg Oral TID PRN    pantoprazole (PROTONIX) tablet 40 mg  40 mg Oral ACB       Review of Systems:   A comprehensive review of systems was negative except for that written in the HPI.     Objective:   Physical Exam:     Visit Vitals  BP (!) 154/78 (BP 1 Location: Left upper arm, BP Patient Position: At rest)   Pulse 60   Temp 98.2 °F (36.8 °C)   Resp 18   Ht 5' 6\" (1.676 m)   Wt 77.7 kg (171 lb 4.8 oz)   SpO2 96%   BMI 27.65 kg/m²      O2 Device: None (Room air)    Temp (24hrs), Av °F (36.7 °C), Min:97.9 °F (36.6 °C), Max:98.2 °F (36.8 °C)    01/01 0701 - 01/01 1900  In: 533 [P.O.:370; I.V.:600]  Out: 250 [Urine:250]   12/30 1901 - 01/01 0700  In: -   Out: 1300     General:   Awake and alert appears frail   Lungs:   Clear to auscultation bilaterally. Chest wall:  No tenderness or deformity. Heart:  Regular rate and rhythm, S1, S2 normal, no murmur, click, rub or gallop. Abdomen:   Soft, non-tender. Bowel sounds normal. No masses,  No organomegaly. Extremities: Extremities normal, atraumatic, no cyanosis or edema. Pulses: 2+ and symmetric all extremities. Skin: Skin color, texture, turgor normal. No rashes or lesions   Neurologic: CNII-XII intact. generallyy weak. Data Review:       Recent Days:  Recent Labs     12/31/21  1547   WBC 9.2   HGB 9.3*   HCT 29.7*   *     Recent Labs     12/31/21  1547      K 3.5   *   CO2 28   GLU 98   BUN 19   CREA 2.09*   CA 7.5*     No results for input(s): PH, PCO2, PO2, HCO3, FIO2 in the last 72 hours.     24 Hour Results:  Recent Results (from the past 24 hour(s))   GLUCOSE, POC    Collection Time: 12/31/21  8:14 PM   Result Value Ref Range    Glucose (POC) 95 65 - 117 mg/dL    Performed by Law Saunders, POC    Collection Time: 01/01/22  7:26 AM   Result Value Ref Range    Glucose (POC) 74 65 - 117 mg/dL    Performed by Kiromic, POC    Collection Time: 01/01/22 10:59 AM   Result Value Ref Range    Glucose (POC) 101 65 - 117 mg/dL    Performed by Kiromic, POC    Collection Time: 01/01/22 11:11 AM   Result Value Ref Range    Glucose (POC) 168 (H) 65 - 117 mg/dL    Performed by Samm Manley, POC    Collection Time: 01/01/22  4:18 PM   Result Value Ref Range    Glucose (POC) 130 (H) 65 - 117 mg/dL    Performed by Kiromic, POC    Collection Time: 01/01/22  5:05 PM   Result Value Ref Range    Glucose (POC) 110 65 - 117 mg/dL    Performed by LOTUS GREEN        XR CHEST PORT Final Result   No acute cardiopulmonary abnormality. .       CT HEAD WO CONT   Final Result   No acute intracranial abnormality. Remote left parietal-occipital   infarct. Mild generalized atrophy with nonspecific white matter abnormality that   may indicate chronic small vessel disease. Isi Fanning HISTORY:  recurrent falls, AMS   Dose reduction technique: All CT scans at this facility are performed using dose reduction optimization   technique as appropriate on the exam including the following: Automated exposure   control, adjustment of the MA and/or KV according to patient size and/or use of   iterative reconstructive technique. TECHNIQUE: Noncontrast CT of the cervical spine with multiplanar   reconstructions. COMPARISON: None   LIMITATIONS: None      FRACTURES: None   ALIGNMENT: Normal   VERTEBRAL BODIES: Multilevel degenerative/arthritic changes characterized by   osteophyte formation and associated endplate changes; findings most notable at   C5-6, C7-T1. DISC SPACES: Decreased with multiple posterior disc protrusions some of which   are mildly calcified   POSTERIOR ELEMENTS: Normal for age. SPINAL CANAL: Normal   PARASPINAL SOFT TISSUES: Normal      OTHER: None      IMPRESSION: Multilevel degenerative disc and degenerative arthritic changes of   the cervical spine with no acute abnormality. CT SPINE CERV WO CONT   Final Result   No acute intracranial abnormality. Remote left parietal-occipital   infarct. Mild generalized atrophy with nonspecific white matter abnormality that   may indicate chronic small vessel disease. Isi Fanning HISTORY:  recurrent falls, AMS   Dose reduction technique: All CT scans at this facility are performed using dose reduction optimization   technique as appropriate on the exam including the following: Automated exposure   control, adjustment of the MA and/or KV according to patient size and/or use of   iterative reconstructive technique. TECHNIQUE: Noncontrast CT of the cervical spine with multiplanar   reconstructions. COMPARISON: None   LIMITATIONS: None      FRACTURES: None   ALIGNMENT: Normal   VERTEBRAL BODIES: Multilevel degenerative/arthritic changes characterized by   osteophyte formation and associated endplate changes; findings most notable at   C5-6, C7-T1. DISC SPACES: Decreased with multiple posterior disc protrusions some of which   are mildly calcified   POSTERIOR ELEMENTS: Normal for age. SPINAL CANAL: Normal   PARASPINAL SOFT TISSUES: Normal      OTHER: None      IMPRESSION: Multilevel degenerative disc and degenerative arthritic changes of   the cervical spine with no acute abnormality. Assessment:  Covid pneumonitis, nonsevere disease. No hypoxia and remains doing well on room air. Sepsis secondary to Covid pneumonitis +/- bacteremia/mssa, resolved    Bacteremia/MSSA: blcs x 4 bottles grew staph aureus/MSSA-12/26, 1st dose vanco 12/27. Vanco level 12/29 ~15. Will need 2 weeks vanco with HD, d/w pharmacy 12/31, informed nephrology. Inflammatory markers descending. Acute on chronic anemia of chronic kidney disease s/p prbc x 2 on 12/27. Epoitin with HD. Metabolic encephalopathy, likely due to Covid/fever. Resolved. Seizure disorder, on Vimpat and Keppra, no activity. End stage renal disease on hemodialysis MWF    Constipation      Plan:  Continue vanco with hd x 2 weeks. inform nephro, discussed with pharmacy, consulted for vanco dosing. PTOT evaluation- rec snf, insurance auth initiated. Care Plan discussed with: Patient/Family and nurse    Disposition: Continued inpatient care x24 hours    Dispo: anticipate snf, auth initiated. Need to follow repeat blood culture initial blcs grew mssa. Will need 2 weeks vanco with HD. Total time spent with patient: 30 minutes.     Cristi Olmedo MD

## 2022-01-01 NOTE — PROGRESS NOTES
Consult requested by: Neal Ovalle MD    ADMIT DATE: 12/26/2021  CONSULT DATE: January 1, 2022           Patient is seen in the room  Her hemoglobin is 5.7-->8.5->9.3   transfused 2 units of packed RBCs with dialysis  S/p dialyzed 12/31 and removed 1L     Past Medical History:   Diagnosis Date    Anxiety disorder     Arthritis     Atherosclerosis of native arteries of the extremities with ulceration ([address] 8/31/2020    Atrial fibrillation ([address]     Cardiac pacemaker in situ     Cellulitis and abscess of trunk 8/31/2020    Depression, postpartum     Diabetes ([address]     Heart disease     Heartburn     Hx of long term use of blood thinners     Hypercholesterolemia     Hypertension     Hypothyroidism     Migraines     Peripheral venous insufficiency 8/31/2020    Seizures ([address]     Sleep disorder     Stroke ([facility]     2019    Varicose veins of lower extremity with inflammation 8/31/2020      Past Surgical History:   Procedure Laterality Date    HX BACK SURGERY      HX OTHER SURGICAL      leg surgery     HX OTHER SURGICAL      pacemaker monitor     IR INSERT TUNL CVC W/O PORT OVER 5 YR  12/10/2021       Social History     Socioeconomic History    Marital status:      Spouse name: Not on file    Number of children: Not on file    Years of education: Not on file    Highest education level: Not on file   Occupational History    Not on file   Tobacco Use    Smoking status: Never Smoker    Smokeless tobacco: Never Used   Vaping Use    Vaping Use: Never used   Substance and Sexual Activity    Alcohol use: Never    Drug use: Never    Sexual activity: Not on file   Other Topics Concern    Not on file   Social History Narrative    Not on file     Social Determinants of Health     Financial Resource Strain:     Difficulty of Paying Living Expenses: Not on file   Food Insecurity:     Worried About Running Out of Food in the Last Year: Not on file    Janet of Food in the Last Year: Not on file   Transportation Needs:     Lack of Transportation (Medical): Not on file    Lack of Transportation (Non-Medical): Not on file   Physical Activity:     Days of Exercise per Week: Not on file    Minutes of Exercise per Session: Not on file   Stress:     Feeling of Stress : Not on file   Social Connections:     Frequency of Communication with Friends and Family: Not on file    Frequency of Social Gatherings with Friends and Family: Not on file    Attends Sabianist Services: Not on file    Active Member of [address] [place] [address] [email or web address] or Organizations: Not on file    Attends Club or Organization Meetings: Not on file    Marital Status: Not on file   Intimate Partner Violence:     Fear of Current or Ex-Partner: Not on file    Emotionally Abused: Not on file    Physically Abused: Not on file    Sexually Abused: Not on file   Housing Stability:     Unable to Pay for Housing in the Last Year: Not on file    Number of [place] in the Last Year: Not on file    Unstable Housing in the Last Year: Not on file       Family History   Problem Relation Age of Onset    Heart Disease Mother     Heart Disease Father     Diabetes Sister     Diabetes Brother      No Known Allergies     Home Medications:     Medications Prior to Admission   Medication Sig    calcitRIOL (ROCALTROL) 0.5 mcg capsule Take 1 Capsule by mouth daily.  zinc oxide-white petrolatum 17-57 % topical paste Apply  to affected area as needed for Skin Irritation. Indications: skin irritation    ondansetron hcl (Zofran) 4 mg tablet Take 1 Tablet by mouth every eight (8) hours as needed for Nausea or Vomiting.  Vimpat 200 mg tab tablet Take 1 Tablet by mouth two (2) times a day.  ondansetron (Zofran ODT) 4 mg disintegrating tablet 1 Tablet by SubLINGual route every eight (8) hours as needed for Nausea or Vomiting.     atorvastatin (LIPITOR) 40 mg tablet Take 1 tablet by mouth once daily for 90 days    FLUoxetine (PROzac) 10 mg capsule Take 1 Capsule by mouth daily for 270 days.  loperamide (IMODIUM) 2 mg capsule TAKE 1 CAPSULE BY MOUTH SIX TIMES DAILY AS NEEDED FOR 30 DAYS    Euthyrox 150 mcg tablet Take 1 tablet by mouth once daily    levETIRAcetam (KEPPRA XR) 500 mg ER tablet TAKE 2 TABLETS BY MOUTH TWICE DAILY FOR 90 DAYS    sevelamer carbonate (RENVELA) 800 mg tab tab        Current Inpatient Medications:     Current Facility-Administered Medications   Medication Dose Route Frequency    VANCOMYCIN INFORMATION NOTE   Other PRN    [START ON 1/3/2022] Vancomycin random level to be drawn on 1/3/22 at 0600 pre HD.    Other ONCE    traMADoL (ULTRAM) tablet 50 mg  50 mg Oral Q6H PRN    epoetin anjelica-epbx (RETACRIT) injection 10,000 Units  10,000 Units IntraVENous DIALYSIS MON, WED & FRI    insulin lispro (HUMALOG) injection   SubCUTAneous AC&HS    lactulose (CHRONULAC) 10 gram/15 mL solution 30 mL  20 g Oral TID    heparin (porcine) 1,000 unit/mL injection 3,600 Units  3,600 Units IntraVENous DIALYSIS PRN    atorvastatin (LIPITOR) tablet 40 mg  40 mg Oral QHS    calcitRIOL (ROCALTROL) capsule 0.5 mcg  0.5 mcg Oral DAILY    levothyroxine (SYNTHROID) tablet 150 mcg  150 mcg Oral DAILY    FLUoxetine (PROzac) capsule 10 mg  10 mg Oral DAILY    sevelamer carbonate (RENVELA) tab 800 mg  800 mg Oral TID WITH MEALS    lacosamide (VIMPAT) tablet 200 mg  200 mg Oral BID    sodium chloride (NS) flush 5-40 mL  5-40 mL IntraVENous Q8H    sodium chloride (NS) flush 5-40 mL  5-40 mL IntraVENous PRN    acetaminophen (TYLENOL) tablet 650 mg  650 mg Oral Q6H PRN    Or    acetaminophen (TYLENOL) suppository 650 mg  650 mg Rectal Q6H PRN    polyethylene glycol (MIRALAX) packet 17 g  17 g Oral DAILY PRN    ondansetron (ZOFRAN ODT) tablet 4 mg  4 mg Oral Q8H PRN    Or    ondansetron (ZOFRAN) injection 4 mg  4 mg IntraVENous Q6H PRN    zinc sulfate (ZINCATE) 50 mg zinc (220 mg) capsule 1 Capsule  1 Capsule Oral DAILY    ascorbic acid (vitamin C) (VITAMIN C) tablet 500 mg  500 mg Oral BID    0.9% sodium chloride infusion  75 mL/hr IntraVENous CONTINUOUS    dexamethasone (DECADRON) 4 mg/mL injection 4 mg  4 mg IntraVENous Q12H    albuterol (PROVENTIL HFA, VENTOLIN HFA, PROAIR HFA) inhaler 2 Puff  2 Puff Inhalation Q6H PRN    aspirin chewable tablet 81 mg  81 mg Oral DAILY    levETIRAcetam (KEPPRA) tablet 250 mg  250 mg Oral BID    benzonatate (TESSALON) capsule 100 mg  100 mg Oral TID PRN    pantoprazole (PROTONIX) tablet 40 mg  40 mg Oral ACB       Review of Systems:   No fever or chills. No sore throat. No cough or hemoptysis. No shortness of breath or chest pain. No orthopnea or paroxysmal nocturnal dyspnea. No nausea, vomiting, abdominal pain, melena or hematochezia. No ankle swelling, no joint paints. No muscle aches. No skin changes. No dizziness or lightheadedness. No headaches. Physical Assessment:     Vitals:    12/31/21 1403 12/31/21 1435 12/31/21 2019 01/01/22 0722   BP: (!) 160/70 (!) 144/68 (!) 146/69 (!) 154/79   Pulse: 60 65 90 60   Resp: 18 18 20 18   Temp: 98 °F (36.7 °C) 98.1 °F (36.7 °C) 97.9 °F (36.6 °C) 98 °F (36.7 °C)   TempSrc:       SpO2:  98%  100%   Weight:       Height:         Last 3 Recorded Weights in this Encounter    12/26/21 0816 12/31/21 0601   Weight: 76.7 kg (169 lb) 77.7 kg (171 lb 4.8 oz)     Admission weight: Weight: 76.7 kg (169 lb) (12/26/21 0816)      Intake/Output Summary (Last 24 hours) at 1/1/2022 1128  Last data filed at 12/31/2021 1130  Gross per 24 hour   Intake    Output 1300 ml   Net -1300 ml       Patient is in no apparent distress. HEENT: Head is normocephalic and atraumatic. Pupils are round, equal, reactive to light. Sclerae are anicteric. Oropharynx clear. Neck: no cervical lymphadenopathy or thyromegaly. Lungs: good air entry, clear to auscultation bilaterally. Trachea at the midline. Cardiovascular system: S1, S2, regular rate and rhythm.  No murmurs, gallops or rubs. No jvd. Abdomen: soft, non tender, non distended. Positive bowel sounds. No hepatosplenomegaly. No abdominal bruits. Extremities: no clubbing, cyanosis or edema. Strong dorsalis pedis pulses. Brisk capillary refill on the toes bilaterally. Integumentary: skin is grossly intact. Neurologic: Alert, oriented time three. Cooperative and appropriate. No gross motor or sensory deficits. Dialysis access: cuffed tunneled catheter site right. IJ TDC     Data Review:    Labs: Results:       Chemistry Recent Labs     12/31/21  1547   GLU 98      K 3.5   *   CO2 28   BUN 19   CREA 2.09*   CA 7.5*   AGAP 4*   BUCR 9*         CBC w/Diff Recent Labs     12/31/21  1547   WBC 9.2   RBC 3.02*   HGB 9.3*   HCT 29.7*   *   GRANS 85*   LYMPH 2*   EOS 0         Iron/Ferritin No results for input(s): IRON in the last 72 hours. No lab exists for component: TIBCCALC   PTH/VIT D No results for input(s): PTH in the last 72 hours. No lab exists for component: VITD            Assessment & Plan     1-ESRD:  -HD MWF at [facility], [address] [place] [address]  -No volume overload/uremia  -s/p HD 12/27, 12/29 and  12/31  -RI perm cath as active dialysis access  -BORA AVF in place also    2-Anemia:  -Hb was 5.7  -No obvious bleeding  -s/p blood Tx 2 units with HD   -iv Epo with HD  -r/o GIB   -Hb 8.7       3-Secondary hyperparathyroidism of ESRD. -cont sevelamer 800 mg tid and calcitriol 0.5 mcg daily       - Renal Diet    4-COVID Pneumonia   -- started on empiric abxs because of fever,lactic acidosis and leucocytosis . 5-AMS - CTH negative   Likely Metabolic and sepsis related .     6- Seizure disorder - Vimpat & Keppra resumed,  Renally dosed

## 2022-01-02 LAB
GLUCOSE BLD STRIP.AUTO-MCNC: 113 MG/DL (ref 65–117)
GLUCOSE BLD STRIP.AUTO-MCNC: 117 MG/DL (ref 65–117)
GLUCOSE BLD STRIP.AUTO-MCNC: 92 MG/DL (ref 65–117)
GLUCOSE BLD STRIP.AUTO-MCNC: 96 MG/DL (ref 65–117)
PERFORMED BY, TECHID: NORMAL

## 2022-01-02 PROCEDURE — 82962 GLUCOSE BLOOD TEST: CPT

## 2022-01-02 PROCEDURE — 74011000250 HC RX REV CODE- 250: Performed by: PHYSICIAN ASSISTANT

## 2022-01-02 PROCEDURE — 74011250637 HC RX REV CODE- 250/637: Performed by: PHYSICIAN ASSISTANT

## 2022-01-02 PROCEDURE — 65270000029 HC RM PRIVATE

## 2022-01-02 PROCEDURE — 74011250636 HC RX REV CODE- 250/636: Performed by: PHYSICIAN ASSISTANT

## 2022-01-02 PROCEDURE — 74011636637 HC RX REV CODE- 636/637: Performed by: INTERNAL MEDICINE

## 2022-01-02 PROCEDURE — 97530 THERAPEUTIC ACTIVITIES: CPT

## 2022-01-02 PROCEDURE — 74011250637 HC RX REV CODE- 250/637: Performed by: INTERNAL MEDICINE

## 2022-01-02 RX ADMIN — PANTOPRAZOLE SODIUM 40 MG: 40 TABLET, DELAYED RELEASE ORAL at 09:49

## 2022-01-02 RX ADMIN — ASPIRIN 81 MG CHEWABLE TABLET 81 MG: 81 TABLET CHEWABLE at 09:48

## 2022-01-02 RX ADMIN — SEVELAMER CARBONATE 800 MG: 800 TABLET, FILM COATED ORAL at 18:48

## 2022-01-02 RX ADMIN — LEVETIRACETAM 250 MG: 250 TABLET, FILM COATED ORAL at 09:48

## 2022-01-02 RX ADMIN — LEVOTHYROXINE SODIUM 150 MCG: 0.03 TABLET ORAL at 09:47

## 2022-01-02 RX ADMIN — SEVELAMER CARBONATE 800 MG: 800 TABLET, FILM COATED ORAL at 09:49

## 2022-01-02 RX ADMIN — LEVETIRACETAM 250 MG: 250 TABLET, FILM COATED ORAL at 21:44

## 2022-01-02 RX ADMIN — SODIUM CHLORIDE, PRESERVATIVE FREE 10 ML: 5 INJECTION INTRAVENOUS at 15:57

## 2022-01-02 RX ADMIN — SEVELAMER CARBONATE 800 MG: 800 TABLET, FILM COATED ORAL at 11:38

## 2022-01-02 RX ADMIN — OXYCODONE HYDROCHLORIDE AND ACETAMINOPHEN 500 MG: 500 TABLET ORAL at 09:50

## 2022-01-02 RX ADMIN — LACTULOSE 30 ML: 20 SOLUTION ORAL at 21:43

## 2022-01-02 RX ADMIN — LACOSAMIDE 200 MG: 100 TABLET, FILM COATED ORAL at 21:44

## 2022-01-02 RX ADMIN — LACOSAMIDE 200 MG: 100 TABLET, FILM COATED ORAL at 09:48

## 2022-01-02 RX ADMIN — ZINC SULFATE 220 MG (50 MG) CAPSULE 1 CAPSULE: CAPSULE at 09:47

## 2022-01-02 RX ADMIN — OXYCODONE HYDROCHLORIDE AND ACETAMINOPHEN 500 MG: 500 TABLET ORAL at 21:44

## 2022-01-02 RX ADMIN — DEXAMETHASONE SODIUM PHOSPHATE 4 MG: 4 INJECTION, SOLUTION INTRA-ARTICULAR; INTRALESIONAL; INTRAMUSCULAR; INTRAVENOUS; SOFT TISSUE at 21:44

## 2022-01-02 RX ADMIN — SODIUM CHLORIDE, PRESERVATIVE FREE 10 ML: 5 INJECTION INTRAVENOUS at 21:45

## 2022-01-02 RX ADMIN — TRAMADOL HYDROCHLORIDE 50 MG: 50 TABLET ORAL at 06:31

## 2022-01-02 RX ADMIN — ATORVASTATIN CALCIUM 40 MG: 40 TABLET, FILM COATED ORAL at 21:45

## 2022-01-02 RX ADMIN — DEXAMETHASONE SODIUM PHOSPHATE 4 MG: 4 INJECTION, SOLUTION INTRA-ARTICULAR; INTRALESIONAL; INTRAMUSCULAR; INTRAVENOUS; SOFT TISSUE at 09:47

## 2022-01-02 RX ADMIN — TRAMADOL HYDROCHLORIDE 50 MG: 50 TABLET ORAL at 11:42

## 2022-01-02 RX ADMIN — SODIUM CHLORIDE, PRESERVATIVE FREE 10 ML: 5 INJECTION INTRAVENOUS at 05:58

## 2022-01-02 RX ADMIN — CALCITRIOL CAPSULES 0.25 MCG 0.5 MCG: 0.25 CAPSULE ORAL at 09:49

## 2022-01-02 RX ADMIN — FLUOXETINE 10 MG: 10 CAPSULE ORAL at 09:49

## 2022-01-02 NOTE — PROGRESS NOTES
Consult requested by: Leela Guzman MD    ADMIT DATE: 12/26/2021  CONSULT DATE: January 2, 2022           Patient is seen in the room  Her hemoglobin is 5.7-->8.5->9.3   transfused 2 units of packed RBCs with dialysis  S/p dialyzed 12/31 and removed 1L     Past Medical History:   Diagnosis Date    Anxiety disorder     Arthritis     Atherosclerosis of native arteries of the extremities with ulceration ([address] 8/31/2020    Atrial fibrillation ([address]     Cardiac pacemaker in situ     Cellulitis and abscess of trunk 8/31/2020    Depression, postpartum     Diabetes ([address]     Heart disease     Heartburn     Hx of long term use of blood thinners     Hypercholesterolemia     Hypertension     Hypothyroidism     Migraines     Peripheral venous insufficiency 8/31/2020    Seizures ([address]     Sleep disorder     Stroke ([facility]     2019    Varicose veins of lower extremity with inflammation 8/31/2020      Past Surgical History:   Procedure Laterality Date    HX BACK SURGERY      HX OTHER SURGICAL      leg surgery     HX OTHER SURGICAL      pacemaker monitor     IR INSERT TUNL CVC W/O PORT OVER 5 YR  12/10/2021       Social History     Socioeconomic History    Marital status:      Spouse name: Not on file    Number of children: Not on file    Years of education: Not on file    Highest education level: Not on file   Occupational History    Not on file   Tobacco Use    Smoking status: Never Smoker    Smokeless tobacco: Never Used   Vaping Use    Vaping Use: Never used   Substance and Sexual Activity    Alcohol use: Never    Drug use: Never    Sexual activity: Not on file   Other Topics Concern    Not on file   Social History Narrative    Not on file     Social Determinants of Health     Financial Resource Strain:     Difficulty of Paying Living Expenses: Not on file   Food Insecurity:     Worried About Running Out of Food in the Last Year: Not on file    Janet of Food in the Last Year: Not on file   Transportation Needs:     Lack of Transportation (Medical): Not on file    Lack of Transportation (Non-Medical): Not on file   Physical Activity:     Days of Exercise per Week: Not on file    Minutes of Exercise per Session: Not on file   Stress:     Feeling of Stress : Not on file   Social Connections:     Frequency of Communication with Friends and Family: Not on file    Frequency of Social Gatherings with Friends and Family: Not on file    Attends Scientologist Services: Not on file    Active Member of [address] [place] [address] [employer] or Organizations: Not on file    Attends Club or Organization Meetings: Not on file    Marital Status: Not on file   Intimate Partner Violence:     Fear of Current or Ex-Partner: Not on file    Emotionally Abused: Not on file    Physically Abused: Not on file    Sexually Abused: Not on file   Housing Stability:     Unable to Pay for Housing in the Last Year: Not on file    Number of [place] in the Last Year: Not on file    Unstable Housing in the Last Year: Not on file       Family History   Problem Relation Age of Onset    Heart Disease Mother     Heart Disease Father     Diabetes Sister     Diabetes Brother      No Known Allergies     Home Medications:     Medications Prior to Admission   Medication Sig    calcitRIOL (ROCALTROL) 0.5 mcg capsule Take 1 Capsule by mouth daily.  zinc oxide-white petrolatum 17-57 % topical paste Apply  to affected area as needed for Skin Irritation. Indications: skin irritation    ondansetron hcl (Zofran) 4 mg tablet Take 1 Tablet by mouth every eight (8) hours as needed for Nausea or Vomiting.  Vimpat 200 mg tab tablet Take 1 Tablet by mouth two (2) times a day.  ondansetron (Zofran ODT) 4 mg disintegrating tablet 1 Tablet by SubLINGual route every eight (8) hours as needed for Nausea or Vomiting.     atorvastatin (LIPITOR) 40 mg tablet Take 1 tablet by mouth once daily for 90 days    FLUoxetine (PROzac) 10 mg capsule Take 1 Capsule by mouth daily for 270 days.  loperamide (IMODIUM) 2 mg capsule TAKE 1 CAPSULE BY MOUTH SIX TIMES DAILY AS NEEDED FOR 30 DAYS    Euthyrox 150 mcg tablet Take 1 tablet by mouth once daily    levETIRAcetam (KEPPRA XR) 500 mg ER tablet TAKE 2 TABLETS BY MOUTH TWICE DAILY FOR 90 DAYS    sevelamer carbonate (RENVELA) 800 mg tab tab        Current Inpatient Medications:     Current Facility-Administered Medications   Medication Dose Route Frequency    VANCOMYCIN INFORMATION NOTE   Other PRN    [START ON 1/3/2022] Vancomycin random level to be drawn on 1/3/22 at 0600 pre HD.    Other ONCE    traMADoL (ULTRAM) tablet 50 mg  50 mg Oral Q6H PRN    epoetin anjelica-epbx (RETACRIT) injection 10,000 Units  10,000 Units IntraVENous DIALYSIS MON, WED & FRI    insulin lispro (HUMALOG) injection   SubCUTAneous AC&HS    lactulose (CHRONULAC) 10 gram/15 mL solution 30 mL  20 g Oral TID    heparin (porcine) 1,000 unit/mL injection 3,600 Units  3,600 Units IntraVENous DIALYSIS PRN    atorvastatin (LIPITOR) tablet 40 mg  40 mg Oral QHS    calcitRIOL (ROCALTROL) capsule 0.5 mcg  0.5 mcg Oral DAILY    levothyroxine (SYNTHROID) tablet 150 mcg  150 mcg Oral DAILY    FLUoxetine (PROzac) capsule 10 mg  10 mg Oral DAILY    sevelamer carbonate (RENVELA) tab 800 mg  800 mg Oral TID WITH MEALS    lacosamide (VIMPAT) tablet 200 mg  200 mg Oral BID    sodium chloride (NS) flush 5-40 mL  5-40 mL IntraVENous Q8H    sodium chloride (NS) flush 5-40 mL  5-40 mL IntraVENous PRN    acetaminophen (TYLENOL) tablet 650 mg  650 mg Oral Q6H PRN    Or    acetaminophen (TYLENOL) suppository 650 mg  650 mg Rectal Q6H PRN    polyethylene glycol (MIRALAX) packet 17 g  17 g Oral DAILY PRN    ondansetron (ZOFRAN ODT) tablet 4 mg  4 mg Oral Q8H PRN    Or    ondansetron (ZOFRAN) injection 4 mg  4 mg IntraVENous Q6H PRN    zinc sulfate (ZINCATE) 50 mg zinc (220 mg) capsule 1 Capsule  1 Capsule Oral DAILY    ascorbic acid (vitamin C) (VITAMIN C) tablet 500 mg  500 mg Oral BID    [Held by provider] 0.9% sodium chloride infusion  75 mL/hr IntraVENous CONTINUOUS    dexamethasone (DECADRON) 4 mg/mL injection 4 mg  4 mg IntraVENous Q12H    albuterol (PROVENTIL HFA, VENTOLIN HFA, PROAIR HFA) inhaler 2 Puff  2 Puff Inhalation Q6H PRN    aspirin chewable tablet 81 mg  81 mg Oral DAILY    levETIRAcetam (KEPPRA) tablet 250 mg  250 mg Oral BID    benzonatate (TESSALON) capsule 100 mg  100 mg Oral TID PRN    pantoprazole (PROTONIX) tablet 40 mg  40 mg Oral ACB       Review of Systems:   No fever or chills. No sore throat. No cough or hemoptysis. No shortness of breath or chest pain. No orthopnea or paroxysmal nocturnal dyspnea. No nausea, vomiting, abdominal pain, melena or hematochezia. No ankle swelling, no joint paints. No muscle aches. No skin changes. No dizziness or lightheadedness. No headaches. Physical Assessment:     Vitals:    01/01/22 1354 01/01/22 1945 01/01/22 2337 01/02/22 0727   BP: (!) 154/78 (!) 151/71 (!) 157/74 (!) 166/81   Pulse: 60 61 60 60   Resp: 18 20 20 18   Temp: 98.2 °F (36.8 °C) 97.8 °F (36.6 °C) 97.9 °F (36.6 °C) 97.7 °F (36.5 °C)   TempSrc:       SpO2: 96% 98% 98% 97%   Weight:       Height:         Last 3 Recorded Weights in this Encounter    12/26/21 0816 12/31/21 0601   Weight: 76.7 kg (169 lb) 77.7 kg (171 lb 4.8 oz)     Admission weight: Weight: 76.7 kg (169 lb) (12/26/21 0816)    No intake or output data in the 24 hours ending 01/02/22 1653    Patient is in no apparent distress. HEENT: Head is normocephalic and atraumatic. Pupils are round, equal, reactive to light. Sclerae are anicteric. Oropharynx clear. Neck: no cervical lymphadenopathy or thyromegaly. Lungs: good air entry, clear to auscultation bilaterally. Trachea at the midline. Cardiovascular system: S1, S2, regular rate and rhythm. No murmurs, gallops or rubs. No jvd. Abdomen: soft, non tender, non distended.  Positive bowel sounds. No hepatosplenomegaly. No abdominal bruits. Extremities: no clubbing, cyanosis or edema. Strong dorsalis pedis pulses. Brisk capillary refill on the toes bilaterally. Integumentary: skin is grossly intact. Neurologic: Alert, oriented time three. Cooperative and appropriate. No gross motor or sensory deficits. Dialysis access: cuffed tunneled catheter site right. IJ TDC     Data Review:    Labs: Results:       Chemistry Recent Labs     12/31/21  1547   GLU 98      K 3.5   *   CO2 28   BUN 19   CREA 2.09*   CA 7.5*   AGAP 4*   BUCR 9*         CBC w/Diff Recent Labs     12/31/21  1547   WBC 9.2   RBC 3.02*   HGB 9.3*   HCT 29.7*   *   GRANS 85*   LYMPH 2*   EOS 0         Iron/Ferritin No results for input(s): IRON in the last 72 hours. No lab exists for component: TIBCCALC   PTH/VIT D No results for input(s): PTH in the last 72 hours. No lab exists for component: VITD            Assessment & Plan     1-ESRD:  -HD MWF at [facility]  -No volume overload/uremia  -s/p HD 12/27, 12/29 and  12/31  -RI perm cath as active dialysis access  -BORA AVF in place also    2-Anemia:  -Hb was 5.7  -No obvious bleeding  -s/p blood Tx 2 units with HD   -iv Epo with HD  -r/o GIB   -Hb 8.7       3-Secondary hyperparathyroidism of ESRD. -cont sevelamer 800 mg tid and calcitriol 0.5 mcg daily       - Renal Diet    4-COVID Pneumonia   -- started on empiric abxs because of fever,lactic acidosis and leucocytosis . 5-AMS - CTH negative   Likely Metabolic and sepsis related .     6- Seizure disorder - Vimpat & Keppra resumed,  Renally dosed

## 2022-01-02 NOTE — PROGRESS NOTES
Problem: Mobility Impaired (Adult and Pediatric)  Goal: *Acute Goals and Plan of Care (Insert Text)  Description: Pt will be I with LE HEP in 7 days. Pt will perform bed mobility with CGA in 7 days. Pt will perform transfers with CGA including sliding board transfers from bed <> chair in 7 days. Pt will demonstrate improvement in dynamic seated balance from mod A to SBA in 7 days. Outcome: Progressing Towards Goal   PHYSICAL THERAPY TREATMENT  Patient: Arias Alvarez (51 y.o. female)  Date: 1/2/2022  Diagnosis: COVID [U07.1]  AMS (altered mental status) [R41.82] <principal problem not specified>       Precautions:    Chart, physical therapy assessment, plan of care and goals were reviewed. ASSESSMENT  Patient continues with skilled PT services and is progressing slowly towards goals. Co-treat with OT due to level of assist needed for bed mobility and transfers due to weakness and high pain levels. Upon entry into room, patient coughing heavily and stating that she feels awful but agreeable to work with therapy and do as much as she can. Bed mobility performed modA to come to sitting with incr time due to back and hip pain; patient hollering out in pain during transfer. She sat EOB for ~20 minutes total and performed LE and UE therex and attempted 2 stands with RW and modA x 2. Patient performed ADLs and UE exercises with OT and LE exercises with PT to work on limb strengthening for improving transfers. She reports some dizziness in sitting; BP was 162/88 nd SaO2 at 96-97% throughout session. Patient states that she was w/c bound PTA and would 'scoot' to<>from w/c and bed at home but has not walked in some time. However, practiced stands to better assist with transfer in/out of bed. ModA x 2 to stand with RW and incr time and patient unable to come fully erect due to high pain levels. She stood for ~3-5 seconds before needing to sit down.  Patient then transferred back to bed modA x 1 and Parisa x 1 at trunk. Total A to scoot to [facility] and position to comfort with use of trendelenburg feature on bed. Patient's nurse entered room after this to administer meds and get blood sugar. Patient will benefit from continued skilled PT services to improve her strength for bed mobility and transfers. Current Level of Function Impacting Discharge (mobility/balance): decr mobility, decr standing balance    Other factors to consider for discharge: says she is w/c bound and doesn't walk at home, medical hx, age, assist x 2, COVID+          PLAN :  Patient continues to benefit from skilled intervention to address the above impairments. Continue treatment per established plan of care. to address goals. Recommendation for discharge: (in order for the patient to meet his/her long term goals)  Kaleb Jamison    This discharge recommendation:  Has been made in collaboration with the attending provider and/or case management    IF patient discharges home will need the following DME: to be determined (TBD)       SUBJECTIVE:   Patient stated I have had pain in my back and hips for most of my life.     OBJECTIVE DATA SUMMARY:   Critical Behavior:  Neurologic State: Alert,Irritable  Orientation Level: Oriented X4  Cognition: Appropriate decision making,Follows commands     Functional Mobility Training:  Bed Mobility:  Rolling: Moderate assistance  Supine to Sit: Moderate assistance  Sit to Supine: Moderate assistance;Minimum assistance;Assist x2  Scooting: Moderate assistance;Assist x1        Transfers:  Sit to Stand: Moderate assistance;Assist x2  Stand to Sit: Moderate assistance;Assist x2  Incr time to perform and cues needed for hand placement and sequencing. Patient unable to stand for any length of time      Balance:  Sitting: Intact; With support  Sitting - Static: Good (unsupported)  Sitting - Dynamic: Fair (occasional)  Standing: Impaired; With support  Standing - Static: Fair;Constant support  Standing - Dynamic : Poor;Constant support  Ambulation/Gait Training:  Patient says she does not ambulate at home     Therapeutic Exercises:   10x in sitting: LAQ, marching, bicep curls, shoulder flexion LUE only     Pain Ratin/10 \"everything hurts\"     Activity Tolerance:   Poor, SpO2 stable on RA, requires frequent rest breaks, and observed SOB with activity  Please refer to the flowsheet for vital signs taken during this treatment. After treatment patient left in no apparent distress:   Supine in bed, Call bell within reach, Bed / chair alarm activated, and Side rails x 3    COMMUNICATION/COLLABORATION:   The patients plan of care was discussed with: Physical therapist, Occupational therapy assistant, and Registered nurse.      Salas Lopez   Time Calculation: 29 mins

## 2022-01-02 NOTE — PROGRESS NOTES
Hospitalist Progress Note               Daily Progress Note: 1/2/2022      Subjective: The patient is seen for follow up. 80 y.o. female with a past medical history of heart disease, GERD, hypercholesterolemia, hypothyroidism, seizure disorder, end-stage renal disease on hemodialysis that presented to the emergency room on 12/26/2021 for altered mental status. Of note patient was in the hospital the day prior due to her dialysis catheter bleeding. At that time pressure dressing was placed and bleeding had improved. Family and patient were notified to keep the pressure dressing on and to follow-up with general surgery as an outpatient. However the following day patient became altered, febrile, increased coughing. Multiple family members have tested positive for Covid. Therefore she presented back to the emergency room. Patient is altered and oriented x0. She denies any pain. She does admit to a cough. She has had multiple falls and generalized weakness. In the ED vital signs were significant for fever 101.1 °F and hypotension of 114/58. Laboratory data was significant for a hemoglobin of 7.3, lactic acid 2.2, troponin I 05. Influenza a and B both negative. Rapid Covid positive. Chest x-ray unremarkable. CT of the head showed no acute intracranial abnormality but did show a remote left parietal occipital infarct with generalized atrophy. CT of the cervical spine showed multilevel degenerative disc and generative arthritic changes of the cervical spine with no acute abnormality. Patient was not hypoxic. Patient was admitted to medical telemetry. She was started on Zithromax and ceftriaxone along with Decadron. --------    Patient is seen for follow-up. Denies complaints, pleasantly confused. Remains on room air, afebrile  Anticipate snf placement, pending. 12/31  Blood cultures 12/26 grew mssa x 4 bottles.    Got vanco 12/27, random level 15.9 on 12/29  S/p hd earlier today.    Remains afebrile  spo2 >96% on room air. Will give dose 1 gm vanco iv today, pharmacy to dose and will need vanco x 2 weeks with hd. Patient seen in follow-up. Offers no complaints.   Awaiting insurance auth for SNF placement    Problem List:  Problem List as of 1/2/2022 Date Reviewed: 12/9/2021          Codes Class Noted - Resolved    AMS (altered mental status) ICD-10-CM: R41.82  ICD-9-CM: 780.97  12/26/2021 - Present        COVID ICD-10-CM: U07.1  ICD-9-CM: 079.89  12/26/2021 - Present        Fluid overload ICD-10-CM: E87.70  ICD-9-CM: 276.69  12/5/2021 - Present        Left-sided weakness ICD-10-CM: R53.1  ICD-9-CM: 728.87  12/3/2021 - Present        Intractable nausea and vomiting ICD-10-CM: R11.2  ICD-9-CM: 536.2  12/3/2021 - Present        Hypoglycemia ICD-10-CM: E16.2  ICD-9-CM: 251.2  6/11/2021 - Present        Breakthrough seizure ([facility] ICD-10-CM: U83.839  ICD-9-CM: 345.91  6/11/2021 - Present        SSS (sick sinus syndrome) ([facility] ICD-10-CM: I49.5  ICD-9-CM: 427.81  6/11/2021 - Present        Seizure ([facility] ICD-10-CM: R56.9  ICD-9-CM: 780.39  5/19/2021 - Present        Hyperkalemia ICD-10-CM: E87.5  ICD-9-CM: 276.7  10/16/2020 - Present        Atherosclerosis of native arteries of the extremities with ulceration ([facility] ICD-10-CM: I70.25  ICD-9-CM: 440.23, 707.9  8/31/2020 - Present        Cellulitis and abscess of trunk ICD-10-CM: L03.319, L02.219  ICD-9-CM: 682.2  8/31/2020 - Present        Peripheral venous insufficiency ICD-10-CM: I87.2  ICD-9-CM: 459.81  8/31/2020 - Present        Varicose veins of lower extremity with inflammation ICD-10-CM: I83.10  ICD-9-CM: 454.1  8/31/2020 - Present        Peripheral vascular disease ([facility] ICD-10-CM: I73.9  ICD-9-CM: 443.9  7/3/2020 - Present        Type II diabetes mellitus ([facility] ICD-10-CM: E11.9  ICD-9-CM: 250.00  7/3/2020 - Present        Long term (current) use of antibiotics ICD-10-CM: Z79.2  ICD-9-CM: V58.62  7/3/2020 - Present        Atrial fibrillation ([facility] ICD-10-CM: I48.91  ICD-9-CM: 427.31  7/3/2020 - Present        Cardiac pacemaker in situ ICD-10-CM: Z95.0  ICD-9-CM: V45.01  7/3/2020 - Present        Carpal tunnel syndrome of right wrist ICD-10-CM: G56.01  ICD-9-CM: 354.0  7/3/2020 - Present        Chest wall pain ICD-10-CM: R07.89  ICD-9-CM: 786.52  7/3/2020 - Present        Chronic diarrhea ICD-10-CM: K52.9  ICD-9-CM: 787.91  7/3/2020 - Present        Chronic neck pain ICD-10-CM: M54.2, G89.29  ICD-9-CM: 723.1, 338.29  7/3/2020 - Present        End stage renal failure on dialysis [facility]) ICD-10-CM: N18.6, Z99.2  ICD-9-CM: 585.6, V45.11  7/3/2020 - Present        History of CVA (cerebrovascular accident) ICD-10-CM: Z86.73  ICD-9-CM: V12.54  7/3/2020 - Present        Hyponatremia ICD-10-CM: E87.1  ICD-9-CM: 276.1  7/3/2020 - Present        Acquired hypothyroidism ICD-10-CM: E03.9  ICD-9-CM: 244.9  7/3/2020 - Present        Seizure disorder ([facility] ICD-10-CM: G40.909  ICD-9-CM: 345.90  7/3/2020 - Present        Recurrent falls ICD-10-CM: R29.6  ICD-9-CM: V15.88  7/3/2020 - Present        RESOLVED: Disorder due to well controlled type 2 diabetes mellitus ([facility] ICD-10-CM: E11.8  ICD-9-CM: 250.90  8/31/2020 - 6/11/2021              Medications reviewed  Current Facility-Administered Medications   Medication Dose Route Frequency    VANCOMYCIN INFORMATION NOTE   Other PRN    [START ON 1/3/2022] Vancomycin random level to be drawn on 1/3/22 at 0600 pre HD.    Other ONCE    traMADoL (ULTRAM) tablet 50 mg  50 mg Oral Q6H PRN    epoetin anjelica-epbx (RETACRIT) injection 10,000 Units  10,000 Units IntraVENous DIALYSIS MON, WED & FRI    insulin lispro (HUMALOG) injection   SubCUTAneous AC&HS    lactulose (CHRONULAC) 10 gram/15 mL solution 30 mL  20 g Oral TID    heparin (porcine) 1,000 unit/mL injection 3,600 Units  3,600 Units IntraVENous DIALYSIS PRN    atorvastatin (LIPITOR) tablet 40 mg  40 mg Oral QHS    calcitRIOL (ROCALTROL) capsule 0.5 mcg  0.5 mcg Oral DAILY    levothyroxine (SYNTHROID) tablet 150 mcg  150 mcg Oral DAILY    FLUoxetine (PROzac) capsule 10 mg  10 mg Oral DAILY    sevelamer carbonate (RENVELA) tab 800 mg  800 mg Oral TID WITH MEALS    lacosamide (VIMPAT) tablet 200 mg  200 mg Oral BID    sodium chloride (NS) flush 5-40 mL  5-40 mL IntraVENous Q8H    sodium chloride (NS) flush 5-40 mL  5-40 mL IntraVENous PRN    acetaminophen (TYLENOL) tablet 650 mg  650 mg Oral Q6H PRN    Or    acetaminophen (TYLENOL) suppository 650 mg  650 mg Rectal Q6H PRN    polyethylene glycol (MIRALAX) packet 17 g  17 g Oral DAILY PRN    ondansetron (ZOFRAN ODT) tablet 4 mg  4 mg Oral Q8H PRN    Or    ondansetron (ZOFRAN) injection 4 mg  4 mg IntraVENous Q6H PRN    zinc sulfate (ZINCATE) 50 mg zinc (220 mg) capsule 1 Capsule  1 Capsule Oral DAILY    ascorbic acid (vitamin C) (VITAMIN C) tablet 500 mg  500 mg Oral BID    [Held by provider] 0.9% sodium chloride infusion  75 mL/hr IntraVENous CONTINUOUS    dexamethasone (DECADRON) 4 mg/mL injection 4 mg  4 mg IntraVENous Q12H    albuterol (PROVENTIL HFA, VENTOLIN HFA, PROAIR HFA) inhaler 2 Puff  2 Puff Inhalation Q6H PRN    aspirin chewable tablet 81 mg  81 mg Oral DAILY    levETIRAcetam (KEPPRA) tablet 250 mg  250 mg Oral BID    benzonatate (TESSALON) capsule 100 mg  100 mg Oral TID PRN    pantoprazole (PROTONIX) tablet 40 mg  40 mg Oral ACB       Review of Systems:   A comprehensive review of systems was negative except for that written in the HPI. Objective:   Physical Exam:     Visit Vitals  BP (!) 166/81 (BP 1 Location: Left upper arm)   Pulse 60   Temp 97.7 °F (36.5 °C)   Resp 18   Ht 5' 6\" (1.676 m)   Wt 77.7 kg (171 lb 4.8 oz)   SpO2 97%   BMI 27.65 kg/m²      O2 Device: None (Room air)    Temp (24hrs), Av.9 °F (36.6 °C), Min:97.7 °F (36.5 °C), Max:98.2 °F (36.8 °C)    No intake/output data recorded. 1901 -  0700  In: 1 [P.O.:370;  I.V.:600]  Out: 250 [Urine:250]    General: Awake and alert appears frail   Lungs:   Clear to auscultation bilaterally. Chest wall:  No tenderness or deformity. Heart:  Regular rate and rhythm, S1, S2 normal, no murmur, click, rub or gallop. Abdomen:   Soft, non-tender. Bowel sounds normal. No masses,  No organomegaly. Extremities: Extremities normal, atraumatic, no cyanosis or edema. Pulses: 2+ and symmetric all extremities. Skin: Skin color, texture, turgor normal. No rashes or lesions   Neurologic: CNII-XII intact. generallyy weak. Data Review:       Recent Days:  Recent Labs     12/31/21  1547   WBC 9.2   HGB 9.3*   HCT 29.7*   *     Recent Labs     12/31/21  1547      K 3.5   *   CO2 28   GLU 98   BUN 19   CREA 2.09*   CA 7.5*     No results for input(s): PH, PCO2, PO2, HCO3, FIO2 in the last 72 hours. 24 Hour Results:  Recent Results (from the past 24 hour(s))   GLUCOSE, POC    Collection Time: 01/01/22 10:59 AM   Result Value Ref Range    Glucose (POC) 101 65 - 117 mg/dL    Performed by [facility], POC    Collection Time: 01/01/22 11:11 AM   Result Value Ref Range    Glucose (POC) 168 (H) 65 - 117 mg/dL    Performed by Samm Manley, POC    Collection Time: 01/01/22  4:18 PM   Result Value Ref Range    Glucose (POC) 130 (H) 65 - 117 mg/dL    Performed by [facility], POC    Collection Time: 01/01/22  5:05 PM   Result Value Ref Range    Glucose (POC) 110 65 - 117 mg/dL    Performed by Reshma Schofield    GLUCOSE, POC    Collection Time: 01/01/22  7:40 PM   Result Value Ref Range    Glucose (POC) 119 (H) 65 - 117 mg/dL    Performed by Mukesh Hinson, POC    Collection Time: 01/02/22  7:44 AM   Result Value Ref Range    Glucose (POC) 96 65 - 117 mg/dL    Performed by Mis AYALA        XR CHEST PORT   Final Result   No acute cardiopulmonary abnormality. .       CT HEAD WO CONT   Final Result   No acute intracranial abnormality. Remote left parietal-occipital   infarct. Mild generalized atrophy with nonspecific white matter abnormality that   may indicate chronic small vessel disease. Lawernce Roughen HISTORY:  recurrent falls, AMS   Dose reduction technique: All CT scans at this facility are performed using dose reduction optimization   technique as appropriate on the exam including the following: Automated exposure   control, adjustment of the MA and/or KV according to patient size and/or use of   iterative reconstructive technique. TECHNIQUE: Noncontrast CT of the cervical spine with multiplanar   reconstructions. COMPARISON: None   LIMITATIONS: None      FRACTURES: None   ALIGNMENT: Normal   VERTEBRAL BODIES: Multilevel degenerative/arthritic changes characterized by   osteophyte formation and associated endplate changes; findings most notable at   C5-6, C7-T1. DISC SPACES: Decreased with multiple posterior disc protrusions some of which   are mildly calcified   POSTERIOR ELEMENTS: Normal for age. SPINAL CANAL: Normal   PARASPINAL SOFT TISSUES: Normal      OTHER: None      IMPRESSION: Multilevel degenerative disc and degenerative arthritic changes of   the cervical spine with no acute abnormality. CT SPINE CERV WO CONT   Final Result   No acute intracranial abnormality. Remote left parietal-occipital   infarct. Mild generalized atrophy with nonspecific white matter abnormality that   may indicate chronic small vessel disease. Lawernce Roughen HISTORY:  recurrent falls, AMS   Dose reduction technique: All CT scans at this facility are performed using dose reduction optimization   technique as appropriate on the exam including the following: Automated exposure   control, adjustment of the MA and/or KV according to patient size and/or use of   iterative reconstructive technique. TECHNIQUE: Noncontrast CT of the cervical spine with multiplanar   reconstructions.    COMPARISON: None   LIMITATIONS: None      FRACTURES: None   ALIGNMENT: Normal VERTEBRAL BODIES: Multilevel degenerative/arthritic changes characterized by   osteophyte formation and associated endplate changes; findings most notable at   C5-6, C7-T1. DISC SPACES: Decreased with multiple posterior disc protrusions some of which   are mildly calcified   POSTERIOR ELEMENTS: Normal for age. SPINAL CANAL: Normal   PARASPINAL SOFT TISSUES: Normal      OTHER: None      IMPRESSION: Multilevel degenerative disc and degenerative arthritic changes of   the cervical spine with no acute abnormality. Assessment:  Covid pneumonitis, nonsevere disease. No hypoxia and remains doing well on room air. Sepsis secondary to Covid pneumonitis +/- bacteremia/mssa, resolved    Bacteremia/MSSA: blcs x 4 bottles grew staph aureus/MSSA-12/26, 1st dose vanco 12/27. Vanco level 12/29 ~15. Will need 2 weeks vanco with HD, d/w pharmacy 12/31, informed nephrology. Inflammatory markers descending. Acute on chronic anemia of chronic kidney disease s/p prbc x 2 on 12/27. Epoitin with HD. Metabolic encephalopathy, likely due to Covid/fever. Resolved. Seizure disorder, on Vimpat and Keppra, no activity. End stage renal disease on hemodialysis MWF    Constipation      Plan:  Continue vanco with hd x 2 weeks. inform nephro, discussed with pharmacy, consulted for vanco dosing. PTOT evaluation- rec snf, insurance auth initiated. Care Plan discussed with: Patient/Family and nurse    Disposition: Continued inpatient care x24 hours    Dispo: anticipate snf, auth initiated. Need to follow repeat blood culture initial blcs grew mssa. Will need 2 weeks vanco with HD. Total time spent with patient: 30 minutes.     Brook Haas MD

## 2022-01-02 NOTE — PROGRESS NOTES
Problem: Self Care Deficits Care Plan (Adult)  Goal: *Acute Goals and Plan of Care (Insert Text)  Description: Pt will be Mod I sup <> sit in prep for EOB ADLs  Pt will be Mod I grooming sitting EOB  Pt will be Mod I LE dressing sitting EOB/long sit  Pt will be Mod I sit>stand from EOB in prep for ADLs using LRAD  Pt will be Mod I toileting/toilet transfer/cloth mgmt LRAD  Pt will be Mod I following UE HEP in prep for self care tasks  Outcome: Progressing Towards Goal   OCCUPATIONAL THERAPY TREATMENT  Patient: Viridiana Yost (89 y.o. female)  Date: 1/2/2022  Diagnosis: COVID [U07.1]  AMS (altered mental status) [R41.82] <principal problem not specified>       Precautions: COVID and FALL  Chart, occupational therapy assessment, plan of care, and goals were reviewed. ASSESSMENT  Patient continues with skilled OT services and is progressing slowly towards goals. Patient received semi supine in bed upon ANDERSON/PTA arrival and agreeable to work with therapist.  Patient required mod A x1 for bed mobility,supine to sit EOB, mod Ax2 sit to supine and mod A x1 scooting with additional time 2/2 pain in back. Seated EOB, pt required SBA/set-up for simple grooming (washed face). Patient educated on and completed bilateral UE HEP to increase strength/endurance needed for ADL'S and functional transfers, see grid below. Two partial STS using Rw/gait belt with mod A x2 2/2 pain and weakness with additional time. Pt reported that she was wheelchair bound prior to hospital stay, she used S/B transfer. /88 and SpO2 96-97 during activities. Pt limited by back pain. Patient would benefit from continued skilled Occupational services while at [facility] in order to increase safety and independence with self care and functional tranfers/mobility. Recommend at discharge to SNF when medically appropriate.      Current Level of Function Impacting Discharge (ADLs): SBA/set-up for grooming    Other factors to consider for discharge: Time of onset, medical prognosis/diagnosis, severity of deficits, PLOF, home environment, and family support          PLAN :  Patient continues to benefit from skilled intervention to address the above impairments. Continue treatment per established plan of care. to address goals. Recommend with staff: Bed mobility to relieve pressure sores     Recommend next OT session: EOB grooming    Recommendation for discharge: (in order for the patient to meet his/her long term goals)  Kaleb Jamison    This discharge recommendation:  Has been made in collaboration with the attending provider and/or case management    IF patient discharges home will need the following DME: TBD       SUBJECTIVE:   Patient stated I'm in so much pain.     OBJECTIVE DATA SUMMARY:   Cognitive/Behavioral Status:  Neurologic State: Alert;Irritable  Orientation Level: Oriented X4  Cognition: Appropriate decision making; Follows commands             Functional Mobility and Transfers for ADLs:  Bed Mobility:  Rolling: Moderate assistance  Supine to Sit: Moderate assistance  Sit to Supine: Moderate assistance;Minimum assistance;Assist x2  Scooting: Moderate assistance;Assist x1    Transfers:  Sit to Stand: Moderate assistance;Assist x2          Balance:  Sitting: Intact; With support  Sitting - Static: Good (unsupported)  Sitting - Dynamic: Fair (occasional)  Standing: Impaired; With support  Standing - Static: Fair;Constant support  Standing - Dynamic : Poor;Constant support    ADL Intervention:       Grooming  Grooming Assistance: Stand-by assistance;Set-up  Position Performed: Seated edge of bed  Washing Face: Stand-by assistance;Set-up       Therapeutic Exercises:   Exercise Sets Reps AROM AAROM PROM Self PROM Comments   Shoulder flex/ext 1 10 [x] [] [] [] L UE only   Elbow flex/ext 1 10 [x] [] [] [] R/L UE        Pain:  10/10    Activity Tolerance:   Poor and requires frequent rest breaks  Please refer to the flowsheet for vital signs taken during this treatment. After treatment patient left in no apparent distress:   Supine in bed, Call bell within reach, Bed / chair alarm activated, and Side rails x 3    COMMUNICATION/COLLABORATION:   The patients plan of care was discussed with: Physical therapy assistant and Registered nurse.      Electa Boxer, COTA  Time Calculation: 29 mins

## 2022-01-03 VITALS
DIASTOLIC BLOOD PRESSURE: 74 MMHG | HEART RATE: 60 BPM | SYSTOLIC BLOOD PRESSURE: 154 MMHG | RESPIRATION RATE: 18 BRPM | WEIGHT: 171.3 LBS | OXYGEN SATURATION: 98 % | HEIGHT: 66 IN | TEMPERATURE: 97.9 F | BODY MASS INDEX: 27.53 KG/M2

## 2022-01-03 PROBLEM — U07.1 COVID: Status: RESOLVED | Noted: 2021-12-26 | Resolved: 2022-01-03

## 2022-01-03 PROBLEM — R78.81 MSSA BACTEREMIA: Status: ACTIVE | Noted: 2022-01-03

## 2022-01-03 PROBLEM — I69.398 GAIT DISTURBANCE, POST-STROKE: Status: ACTIVE | Noted: 2022-01-03

## 2022-01-03 PROBLEM — B95.61 MSSA BACTEREMIA: Status: ACTIVE | Noted: 2022-01-03

## 2022-01-03 PROBLEM — G93.41 METABOLIC ENCEPHALOPATHY: Status: ACTIVE | Noted: 2022-01-03

## 2022-01-03 PROBLEM — A41.9 SEPSIS (HCC): Status: ACTIVE | Noted: 2022-01-03

## 2022-01-03 PROBLEM — A41.9 SEPSIS (HCC): Status: RESOLVED | Noted: 2022-01-03 | Resolved: 2022-01-03

## 2022-01-03 PROBLEM — R26.9 GAIT DISTURBANCE, POST-STROKE: Status: ACTIVE | Noted: 2022-01-03

## 2022-01-03 PROBLEM — D64.9 ACUTE ON CHRONIC ANEMIA: Status: ACTIVE | Noted: 2022-01-03

## 2022-01-03 PROBLEM — R41.82 AMS (ALTERED MENTAL STATUS): Status: RESOLVED | Noted: 2021-12-26 | Resolved: 2022-01-03

## 2022-01-03 PROBLEM — G93.41 METABOLIC ENCEPHALOPATHY: Status: RESOLVED | Noted: 2022-01-03 | Resolved: 2022-01-03

## 2022-01-03 LAB
DATE LAST DOSE: NORMAL
GLUCOSE BLD STRIP.AUTO-MCNC: 113 MG/DL (ref 65–117)
GLUCOSE BLD STRIP.AUTO-MCNC: 78 MG/DL (ref 65–117)
GLUCOSE BLD STRIP.AUTO-MCNC: 93 MG/DL (ref 65–117)
PERFORMED BY, TECHID: NORMAL
REPORTED DOSE,DOSE: NORMAL UNITS
VANCOMYCIN SERPL-MCNC: 14.2 UG/ML

## 2022-01-03 PROCEDURE — 74011000250 HC RX REV CODE- 250: Performed by: PHYSICIAN ASSISTANT

## 2022-01-03 PROCEDURE — 36415 COLL VENOUS BLD VENIPUNCTURE: CPT

## 2022-01-03 PROCEDURE — 90935 HEMODIALYSIS ONE EVALUATION: CPT

## 2022-01-03 PROCEDURE — 82962 GLUCOSE BLOOD TEST: CPT

## 2022-01-03 PROCEDURE — 74011250637 HC RX REV CODE- 250/637: Performed by: PHYSICIAN ASSISTANT

## 2022-01-03 PROCEDURE — 74011250637 HC RX REV CODE- 250/637: Performed by: INTERNAL MEDICINE

## 2022-01-03 PROCEDURE — 80202 ASSAY OF VANCOMYCIN: CPT

## 2022-01-03 PROCEDURE — 74011250636 HC RX REV CODE- 250/636: Performed by: PHYSICIAN ASSISTANT

## 2022-01-03 PROCEDURE — 74011250636 HC RX REV CODE- 250/636: Performed by: INTERNAL MEDICINE

## 2022-01-03 RX ORDER — GUAIFENESIN 100 MG/5ML
81 LIQUID (ML) ORAL DAILY
Qty: 30 TABLET | Refills: 0 | Status: SHIPPED
Start: 2022-01-03 | End: 2022-02-02

## 2022-01-03 RX ORDER — HEPARIN SODIUM 1000 [USP'U]/ML
INJECTION, SOLUTION INTRAVENOUS; SUBCUTANEOUS
Status: DISCONTINUED
Start: 2022-01-03 | End: 2022-01-03 | Stop reason: HOSPADM

## 2022-01-03 RX ORDER — PREDNISONE 20 MG/1
TABLET ORAL
Qty: 6 TABLET | Refills: 0 | Status: SHIPPED
Start: 2022-01-03 | End: 2022-01-21

## 2022-01-03 RX ORDER — INSULIN LISPRO 100 [IU]/ML
INJECTION, SOLUTION INTRAVENOUS; SUBCUTANEOUS
Qty: 1 EACH | Refills: 0 | Status: SHIPPED
Start: 2022-01-03 | End: 2022-02-10

## 2022-01-03 RX ORDER — INSULIN LISPRO 100 [IU]/ML
INJECTION, SOLUTION INTRAVENOUS; SUBCUTANEOUS
Status: DISCONTINUED | OUTPATIENT
Start: 2022-01-03 | End: 2022-01-03 | Stop reason: HOSPADM

## 2022-01-03 RX ORDER — ASCORBIC ACID 500 MG
500 TABLET ORAL 2 TIMES DAILY
Qty: 60 TABLET | Refills: 0 | Status: SHIPPED
Start: 2022-01-03 | End: 2022-02-02

## 2022-01-03 RX ORDER — BENZONATATE 100 MG/1
100 CAPSULE ORAL
Qty: 21 CAPSULE | Refills: 0 | Status: SHIPPED
Start: 2022-01-03 | End: 2022-01-11

## 2022-01-03 RX ADMIN — OXYCODONE HYDROCHLORIDE AND ACETAMINOPHEN 500 MG: 500 TABLET ORAL at 09:11

## 2022-01-03 RX ADMIN — ZINC SULFATE 220 MG (50 MG) CAPSULE 1 CAPSULE: CAPSULE at 09:11

## 2022-01-03 RX ADMIN — SODIUM CHLORIDE, PRESERVATIVE FREE 10 ML: 5 INJECTION INTRAVENOUS at 05:10

## 2022-01-03 RX ADMIN — SEVELAMER CARBONATE 800 MG: 800 TABLET, FILM COATED ORAL at 18:14

## 2022-01-03 RX ADMIN — BENZONATATE 100 MG: 100 CAPSULE ORAL at 09:12

## 2022-01-03 RX ADMIN — ASPIRIN 81 MG CHEWABLE TABLET 81 MG: 81 TABLET CHEWABLE at 09:11

## 2022-01-03 RX ADMIN — LACOSAMIDE 200 MG: 100 TABLET, FILM COATED ORAL at 09:11

## 2022-01-03 RX ADMIN — SODIUM CHLORIDE, PRESERVATIVE FREE 10 ML: 5 INJECTION INTRAVENOUS at 18:14

## 2022-01-03 RX ADMIN — HEPARIN SODIUM 3600 UNITS: 1000 INJECTION, SOLUTION INTRAVENOUS; SUBCUTANEOUS at 17:00

## 2022-01-03 RX ADMIN — TRAMADOL HYDROCHLORIDE 50 MG: 50 TABLET ORAL at 09:11

## 2022-01-03 RX ADMIN — DEXAMETHASONE SODIUM PHOSPHATE 4 MG: 4 INJECTION, SOLUTION INTRA-ARTICULAR; INTRALESIONAL; INTRAMUSCULAR; INTRAVENOUS; SOFT TISSUE at 09:11

## 2022-01-03 RX ADMIN — EPOETIN ALFA-EPBX 10000 UNITS: 10000 INJECTION, SOLUTION INTRAVENOUS; SUBCUTANEOUS at 17:01

## 2022-01-03 RX ADMIN — FLUOXETINE 10 MG: 10 CAPSULE ORAL at 09:11

## 2022-01-03 RX ADMIN — SEVELAMER CARBONATE 800 MG: 800 TABLET, FILM COATED ORAL at 09:11

## 2022-01-03 RX ADMIN — LEVOTHYROXINE SODIUM 150 MCG: 0.03 TABLET ORAL at 09:12

## 2022-01-03 RX ADMIN — LEVETIRACETAM 250 MG: 250 TABLET, FILM COATED ORAL at 09:12

## 2022-01-03 RX ADMIN — CALCITRIOL CAPSULES 0.25 MCG 0.5 MCG: 0.25 CAPSULE ORAL at 09:12

## 2022-01-03 RX ADMIN — SEVELAMER CARBONATE 800 MG: 800 TABLET, FILM COATED ORAL at 12:33

## 2022-01-03 RX ADMIN — PANTOPRAZOLE SODIUM 40 MG: 40 TABLET, DELAYED RELEASE ORAL at 09:12

## 2022-01-03 NOTE — DISCHARGE SUMMARY
HOSPITALIST DISCHARGE SUMMARY    NAME: Burke Escoto   :  1950   MRN:  [id number]     Date/Time:  1/3/2022 12:10 PM    DISCHARGE DIAGNOSIS:  Active Problems:    Peripheral vascular disease ([facility] (7/3/2020)      Type II diabetes mellitus ([facility] (7/3/2020)      Cardiac pacemaker in situ (7/3/2020)      Chronic diarrhea (7/3/2020)      End stage renal failure on dialysis [facility]) (7/3/2020)      History of CVA (cerebrovascular accident) (7/3/2020)      Seizure disorder ([facility] (7/3/2020)      MSSA bacteremia (1/3/2022)      Acute on chronic anemia (1/3/2022)      Gait disturbance, post-stroke (1/3/2022)      CONSULTATIONS: Nephrology and Hospitalist    Procedures: see electronic medical records for all procedures/Xrays and details which were not copied into this note but were reviewed prior to creation of Plan. DISCHARGE SUMMARY/HOSPITAL COURSE: for full details see H&P, daily progress notes, labs, consult notes.  Briefly As Per HPI:   The patient is seen for follow up. 80 y.o. female with a past medical history of heart disease, GERD, hypercholesterolemia, hypothyroidism, seizure disorder, end-stage renal disease on hemodialysis that presented to the emergency room on 2021 for altered mental status.  Of note patient was in the hospital the day prior due to her dialysis catheter bleeding.  At that time pressure dressing was placed and bleeding had improved.  Family and patient were notified to keep the pressure dressing on and to follow-up with general surgery as an outpatient.  However the following day patient became altered, febrile, increased coughing.  Multiple family members have tested positive for Orma Hue she presented back to the emergency room.  Patient is altered and oriented x0.  She denies any pain.  She does admit to a cough.  She has had multiple falls and generalized weakness.  In the ED vital signs were significant for fever 101.1 °F and hypotension of 114/58.  Laboratory data was significant for a hemoglobin of 7.3, lactic acid 2.2, troponin I 05.  Influenza a and B both negative.  Rapid Covid positive.  Chest x-ray unremarkable.  CT of the head showed no acute intracranial abnormality but did show a remote left parietal occipital infarct with generalized atrophy.  CT of the cervical spine showed multilevel degenerative disc and generative arthritic changes of the cervical spine with no acute abnormality.  Patient was not hypoxic.     Patient was admitted to medical telemetry secondary to:   -Covid pneumonitis, nonsevere disease   -Sepsis secondary to Covid pneumonitis   -Lactic acidosis   -End-stage renal disease on hemodialysis   -Acute on chronic anemia   -Metabolic encephalopathy   -Elevated troponin  Patient was not hypoxic. Influenza A/B were negative. Chest x-ray was unremarkable. In lieu of sepsis and Covid she was hydrated, started on Decadron, azithromycin, Rocephin, zinc and vitamin C. Lactic acid repeated to within normal limits next morning. Respiratory status remained stable on room air. Blood culture grew mssa, vanco initiated, d/w ID, recommending 2 weeks of vancomycin. Repeat blood cultures subsequently. Crp and procal elevated, 14.20 and 2.04 respectively, descended to 0.80 and 1.67 respectively. Pt with hx cva and states memory issues subsequently, metabolic encephalopathy on presentation appeared resolved to baseline. Hx seizure disorder and pta meds Vimpat and Keppra resumed and no seizure activity reported. Was followed by nephrology secondary to esrd on hd. Has RI perm cath as active hd access and rue avf in place also. HD with HD on 12/31 and will need to continue x 2 weeks secondary to mssa bacteremia. I have informed Nephrology of recommendation for vanco with hd x 2 weeks. Acute on chronic anemia of chronic kidney disease s/p prbc x 2 on 12/27. Epoitin with HD.  Hgb from 55.7 transfused to 8.7, subsequently ascended to 9.3 with epogen with HD. No evidence of gi bleeding. Hospitalization delayed as initially plan was for hhc though son, initially declined snf then decided preferred snf. Pt agreeable. Pt/Ot rec snf. Pt remaining hemodynamically stable, no supplemental o2 need and mentation apparently at baseline, cleared for dc to snf. Plan for dc:  Pt/ot. Decub precautions. Vancomycin 1 gm mwf after HD x 2 weeks. Will continue mwf HD and transport arranged it appears at facility, otherwise son has agreed to transport. Current Discharge Medication List      START taking these medications    Details   insulin lispro (HUMALOG) 100 unit/mL injection As per insulin sensitive ssi:    Insulin High Sensitivity (thin, ESRD)  For Blood Sugar (mg/dL) of:              Less than 150 =   0 units  150 -199 =  1 units  200 -249 =   2 units  250 -299 =   3 units  300 -349 =   4 units  350 or greater = 5 units and Call Physician  Initiate Hypoglycemic protocol if blood glucose is <70 mg/dL. Qty: 1 Each, Refills: 0  Start date: 1/3/2022      ascorbic acid, vitamin C, (VITAMIN C) 500 mg tablet Take 1 Tablet by mouth two (2) times a day for 30 days. Qty: 60 Tablet, Refills: 0  Start date: 1/3/2022, End date: 2/2/2022      aspirin 81 mg chewable tablet Take 1 Tablet by mouth daily for 30 days. Qty: 30 Tablet, Refills: 0  Start date: 1/3/2022, End date: 2/2/2022      benzonatate (TESSALON) 100 mg capsule Take 1 Capsule by mouth three (3) times daily as needed for Cough for up to 7 days. Qty: 21 Capsule, Refills: 0  Start date: 1/3/2022, End date: 1/10/2022      vancomycin 1,000 mg 1,000 mg, vial-mate 1 Device IVPB 1,000 mg by IntraVENous route DIALYSIS MON, WED & FRI for 6 doses. Qty: 6 Dose, Refills: 0  Start date: 1/3/2022, End date: 1/15/2022      predniSONE (DELTASONE) 20 mg tablet Start prednisone 20 mg PO daily x 2 days then 10 mg PO daily x 2 days then stop.   Qty: 6 Tablet, Refills: 0  Start date: 1/3/2022         CONTINUE these medications which have NOT CHANGED    Details   calcitRIOL (ROCALTROL) 0.5 mcg capsule Take 1 Capsule by mouth daily. Qty: 30 Capsule, Refills: 0      zinc oxide-white petrolatum 17-57 % topical paste Apply  to affected area as needed for Skin Irritation. Indications: skin irritation  Qty: 113 g, Refills: 0      ondansetron hcl (Zofran) 4 mg tablet Take 1 Tablet by mouth every eight (8) hours as needed for Nausea or Vomiting. Qty: 45 Tablet, Refills: 0      Vimpat 200 mg tab tablet Take 1 Tablet by mouth two (2) times a day. ondansetron (Zofran ODT) 4 mg disintegrating tablet 1 Tablet by SubLINGual route every eight (8) hours as needed for Nausea or Vomiting. Qty: 20 Tablet, Refills: 0      atorvastatin (LIPITOR) 40 mg tablet Take 1 tablet by mouth once daily for 90 days  Qty: 90 Tablet, Refills: 3      FLUoxetine (PROzac) 10 mg capsule Take 1 Capsule by mouth daily for 270 days. Qty: 90 Capsule, Refills: 2    Associated Diagnoses: TRISHA (generalized anxiety disorder)      loperamide (IMODIUM) 2 mg capsule TAKE 1 CAPSULE BY MOUTH SIX TIMES DAILY AS NEEDED FOR 30 DAYS  Qty: 180 Capsule, Refills: 0      Euthyrox 150 mcg tablet Take 1 tablet by mouth once daily  Qty: 90 Tablet, Refills: 0      levETIRAcetam (KEPPRA XR) 500 mg ER tablet TAKE 2 TABLETS BY MOUTH TWICE DAILY FOR 90 DAYS      sevelamer carbonate (RENVELA) 800 mg tab tab                    _______________________________________________________________________   Patient seen and examined by me on day of discharge.   Pertinent findings are:  Vitals:  Visit Vitals  BP (!) 145/69 (BP 1 Location: Left upper arm, BP Patient Position: At rest)   Pulse 60   Temp 97.7 °F (36.5 °C)   Resp 18   Ht 5' 6\" (1.676 m)   Wt 77.7 kg (171 lb 4.8 oz)   SpO2 98%   BMI 27.65 kg/m²     Temp (24hrs), Av.6 °F (36.4 °C), Min:97.3 °F (36.3 °C), Max:97.7 °F (36.5 °C)      Last 24hr Input/Output:  No intake or output data in the 24 hours ending 01/03/22 1210     General:   Awake and alert appears frail, aao x 2, appears baseline reportedly. Lungs:   Clear to auscultation bilaterally. Not labored. No adventitious sounds. Chest wall:  No tenderness or deformity. Heart:  Regular rate and rhythm, S1, S2 normal, no murmur, click, rub or gallop. Abdomen:   Soft, non-tender. Bowel sounds normal. No masses,  No organomegaly. Extremities: Extremities normal, atraumatic, no cyanosis or edema. Pulses: 2+ and symmetric all extremities. Skin: Skin color, texture, turgor normal. No rashes or lesions   Neurologic: CNII-XII intact. generallyy weak.       _______________________________________________________________________  DISPOSITION:    Home with Family:    Home with HH/PT/OT/RN:    SNF/LTC: x   ARY:    OTHER:    ________________________________________________________________________    Recommended diet:  Diabetic Diet  Recommended activity:Activity as tolerated with buster, encourage oob to chair, ambulate with assist, PT/OT. Follow Up:  1- Dr. Javier Polanco MD , set up an appointment to see them in 7-10 days.   2- Nephrology Dr M[facility] for MWF HD   ______________________________________________________________________  Total time spent in discharge (min): 45   ________________________________________________________________________    Care Plan discussed with:    Comments   Patient x    Family  x    RN x    Care Manager x                   Consultant:  x    ________________________________________________________________________  Attending Physician: Sanju Varma, MD  ________________________________________________________________________  **Lab Data Reviewed:  Recent Results (from the past 24 hour(s))   GLUCOSE, POC    Collection Time: 01/02/22  3:55 PM   Result Value Ref Range    Glucose (POC) 117 65 - 117 mg/dL    Performed by [id number] [id number], POC    Collection Time: 01/02/22  8:03 PM   Result Value Ref Range Glucose (POC) 113 65 - 117 mg/dL    Performed by ML Mullen    Collection Time: 01/03/22  6:36 AM   Result Value Ref Range    Vancomycin, random 14.2 ug/mL    Reported dose date Not provided      Reported dose: Not provided Units   GLUCOSE, POC    Collection Time: 01/03/22  8:22 AM   Result Value Ref Range    Glucose (POC) 78 65 - 117 mg/dL    Performed by Emogene Card    GLUCOSE, POC    Collection Time: 01/03/22 11:33 AM   Result Value Ref Range    Glucose (POC) 113 65 - 117 mg/dL    Performed by Emogene Card

## 2022-01-03 NOTE — PROGRESS NOTES
Patient has Rhina Mayo to [facility]. She is able to discharge there today. She will go to Room 225B and nursing report can be called to [phone]. Per [facility] they are able to transport the patient to and from dialysis MWF at 6:30am. However, CM also confirmed with patient's son, Cheryle Polka at [phone] that if needed he is able to transport the patient to and from dialysis. Per attending patient will need IV abx with her dialysis. CM awaiting prescription to fax to [address] [facility]. Have attempted to call [facility] multiple times at [phone] with no answer. Once prescription obtained will fax and call again. Patient's son has been notified of discharge today at [phone]. Medicare pt has received, reviewed, and signed 2nd IM letter informing them of their right to appeal the discharge. Signed copied has been placed on pt bedside chart. ADDENDUM 2:38pm  Prescription for vanc has been faxed to [address] [facility] at [phone]. CM confirmed receipt with dialysis center.

## 2022-01-03 NOTE — PROGRESS NOTES
Consult requested by: Charly Chew MD    ADMIT DATE: 12/26/2021  CONSULT DATE: January 3, 2022           Patient is seen in the room  Comfortably resting , not in any distress     Past Medical History:   Diagnosis Date    Anxiety disorder     Arthritis     Atherosclerosis of native arteries of the extremities with ulceration ([facility] 8/31/2020    [facility]rial fibrillation ([facility]     Cardiac pacemaker in situ     Cellulitis and abscess of trunk 8/31/2020    Depression, postpartum     Diabetes ([facility]     Heart disease     Heartburn     Hx of long term use of blood thinners     Hypercholesterolemia     Hypertension     Hypothyroidism     Migraines     Peripheral venous insufficiency 8/31/2020    Seizures ([facility]     Sleep disorder     Stroke ([facility]     2019    Varicose veins of lower extremity with inflammation 8/31/2020      Past Surgical History:   Procedure Laterality Date    HX BACK SURGERY      HX OTHER SURGICAL      leg surgery     HX OTHER SURGICAL      pacemaker monitor     IR INSERT TUNL CVC W/O PORT OVER 5 YR  12/10/2021       Social History     Socioeconomic History    Marital status:      Spouse name: Not on file    Number of children: Not on file    Years of education: Not on file    Highest education level: Not on file   Occupational History    Not on file   Tobacco Use    Smoking status: Never Smoker    Smokeless tobacco: Never Used   Vaping Use    Vaping Use: Never used   Substance and Sexual Activity    Alcohol use: Never    Drug use: Never    Sexual activity: Not on file   Other Topics Concern    Not on file   Social History Narrative    Not on file     Social Determinants of Health     Financial Resource Strain:     Difficulty of Paying Living Expenses: Not on file   Food Insecurity:     Worried About Running Out of Food in the Last Year: Not on file    Janet of Food in the Last Year: Not on file   Transportation Needs:     Lack of Transportation (Medical): Not on file    Lack of Transportation (Non-Medical): Not on file   Physical Activity:     Days of Exercise per Week: Not on file    Minutes of Exercise per Session: Not on file   Stress:     Feeling of Stress : Not on file   Social Connections:     Frequency of Communication with Friends and Family: Not on file    Frequency of Social Gatherings with Friends and Family: Not on file    Attends Denominational Services: Not on file    Active Member of [address] [place] [address] [employer] or Organizations: Not on file    Attends Club or Organization Meetings: Not on file    Marital Status: Not on file   Intimate Partner Violence:     Fear of Current or Ex-Partner: Not on file    Emotionally Abused: Not on file    Physically Abused: Not on file    Sexually Abused: Not on file   Housing Stability:     Unable to Pay for Housing in the Last Year: Not on file    Number of [place] in the Last Year: Not on file    Unstable Housing in the Last Year: Not on file       Family History   Problem Relation Age of Onset    Heart Disease Mother     Heart Disease Father     Diabetes Sister     Diabetes Brother      No Known Allergies     Home Medications:     Medications Prior to Admission   Medication Sig    calcitRIOL (ROCALTROL) 0.5 mcg capsule Take 1 Capsule by mouth daily.  zinc oxide-white petrolatum 17-57 % topical paste Apply  to affected area as needed for Skin Irritation. Indications: skin irritation    ondansetron hcl (Zofran) 4 mg tablet Take 1 Tablet by mouth every eight (8) hours as needed for Nausea or Vomiting.  Vimpat 200 mg tab tablet Take 1 Tablet by mouth two (2) times a day.  ondansetron (Zofran ODT) 4 mg disintegrating tablet 1 Tablet by SubLINGual route every eight (8) hours as needed for Nausea or Vomiting.  atorvastatin (LIPITOR) 40 mg tablet Take 1 tablet by mouth once daily for 90 days    FLUoxetine (PROzac) 10 mg capsule Take 1 Capsule by mouth daily for 270 days.     loperamide (IMODIUM) 2 mg capsule TAKE 1 CAPSULE BY MOUTH SIX TIMES DAILY AS NEEDED FOR 30 DAYS    Euthyrox 150 mcg tablet Take 1 tablet by mouth once daily    levETIRAcetam (KEPPRA XR) 500 mg ER tablet TAKE 2 TABLETS BY MOUTH TWICE DAILY FOR 90 DAYS    sevelamer carbonate (RENVELA) 800 mg tab tab        Current Inpatient Medications:     Current Facility-Administered Medications   Medication Dose Route Frequency    vancomycin (VANCOCIN) 1,000 mg in 0.9% sodium chloride 250 mL (VIAL-MATE)  1,000 mg IntraVENous ONCE    VANCOMYCIN INFORMATION NOTE   Other PRN    traMADoL (ULTRAM) tablet 50 mg  50 mg Oral Q6H PRN    epoetin anjelica-epbx (RETACRIT) injection 10,000 Units  10,000 Units IntraVENous DIALYSIS MON, WED & FRI    insulin lispro (HUMALOG) injection   SubCUTAneous AC&HS    lactulose (CHRONULAC) 10 gram/15 mL solution 30 mL  20 g Oral TID    heparin (porcine) 1,000 unit/mL injection 3,600 Units  3,600 Units IntraVENous DIALYSIS PRN    atorvastatin (LIPITOR) tablet 40 mg  40 mg Oral QHS    calcitRIOL (ROCALTROL) capsule 0.5 mcg  0.5 mcg Oral DAILY    levothyroxine (SYNTHROID) tablet 150 mcg  150 mcg Oral DAILY    FLUoxetine (PROzac) capsule 10 mg  10 mg Oral DAILY    sevelamer carbonate (RENVELA) tab 800 mg  800 mg Oral TID WITH MEALS    lacosamide (VIMPAT) tablet 200 mg  200 mg Oral BID    sodium chloride (NS) flush 5-40 mL  5-40 mL IntraVENous Q8H    sodium chloride (NS) flush 5-40 mL  5-40 mL IntraVENous PRN    acetaminophen (TYLENOL) tablet 650 mg  650 mg Oral Q6H PRN    Or    acetaminophen (TYLENOL) suppository 650 mg  650 mg Rectal Q6H PRN    polyethylene glycol (MIRALAX) packet 17 g  17 g Oral DAILY PRN    ondansetron (ZOFRAN ODT) tablet 4 mg  4 mg Oral Q8H PRN    Or    ondansetron (ZOFRAN) injection 4 mg  4 mg IntraVENous Q6H PRN    zinc sulfate (ZINCATE) 50 mg zinc (220 mg) capsule 1 Capsule  1 Capsule Oral DAILY    ascorbic acid (vitamin C) (VITAMIN C) tablet 500 mg  500 mg Oral BID    [Held by provider] 0.9% sodium chloride infusion  75 mL/hr IntraVENous CONTINUOUS    albuterol (PROVENTIL HFA, VENTOLIN HFA, PROAIR HFA) inhaler 2 Puff  2 Puff Inhalation Q6H PRN    aspirin chewable tablet 81 mg  81 mg Oral DAILY    levETIRAcetam (KEPPRA) tablet 250 mg  250 mg Oral BID    benzonatate (TESSALON) capsule 100 mg  100 mg Oral TID PRN    pantoprazole (PROTONIX) tablet 40 mg  40 mg Oral ACB       Physical Assessment:     Vitals:    01/02/22 1846 01/02/22 2035 01/03/22 0016 01/03/22 0910   BP: (!) 150/59 (!) 158/69 (!) 195/71 (!) 145/69   Pulse: 62 60 60 60   Resp: 18 18 18 18   Temp: 97.5 °F (36.4 °C) 97.7 °F (36.5 °C) 97.3 °F (36.3 °C) 97.7 °F (36.5 °C)   TempSrc:       SpO2: 96% 98% 97% 98%   Weight:       Height:         Last 3 Recorded Weights in this Encounter    12/26/21 0816 12/31/21 0601   Weight: 76.7 kg (169 lb) 77.7 kg (171 lb 4.8 oz)     Admission weight: Weight: 76.7 kg (169 lb) (12/26/21 0816)    No intake or output data in the 24 hours ending 01/03/22 1232    Patient is in no apparent distress. HEENT: Head is normocephalic and atraumatic. Pupils are round, equal, reactive to light. Sclerae are anicteric. Oropharynx clear. Neck: no cervical lymphadenopathy or thyromegaly. Lungs: good air entry, clear to auscultation bilaterally. Trachea at the midline. Cardiovascular system: S1, S2, regular rate and rhythm. No murmurs, gallops or rubs. No jvd. Abdomen: soft, non tender, non distended. Positive bowel sounds. No hepatosplenomegaly. No abdominal bruits. Extremities: no clubbing, cyanosis or edema. Strong dorsalis pedis pulses. Brisk capillary refill on the toes bilaterally. Integumentary: skin is grossly intact. Neurologic: Alert, oriented time three. Cooperative and appropriate. No gross motor or sensory deficits. Dialysis access: cuffed tunneled catheter site right.   [facility]     Data Review:    Labs: Results:       Chemistry Recent Labs     12/31/21  1547   GLU 98      K 3.5   CL 109*   CO2 28   BUN 19   CREA 2.09*   CA 7.5*   AGAP 4*   BUCR 9*         CBC w/Diff Recent Labs     12/31/21  1547   WBC 9.2   RBC 3.02*   HGB 9.3*   HCT 29.7*   *   GRANS 85*   LYMPH 2*   EOS 0         Iron/Ferritin No results for input(s): IRON in the last 72 hours. No lab exists for component: TIBCCALC   PTH/VIT D No results for input(s): PTH in the last 72 hours. No lab exists for component: VITD            Assessment & Plan     1-ESRD:  -HD MWF at [facility], [address] [place] [address]  -No volume overload/uremia  -s/p HD 12/27, 12/29 and  12/31  -RI perm cath as active dialysis access  -BORA AVF in place also  -will go for Dialysis today     2-Anemia:  -Hb was 5.7  -No obvious bleeding  -s/p blood Tx 2 units with HD   -iv Epo with HD  -r/o GIB   -Hb 8.7     3-Secondary hyperparathyroidism of ESRD. -cont sevelamer 800 mg tid and calcitriol 0.5 mcg daily       - Renal Diet    4-COVID Pneumonia   -- started on empiric abxs because of fever,lactic acidosis and leucocytosis . 5-AMS - CTH negative   Likely Metabolic and sepsis related .     6- Seizure disorder - Vimpat & Keppra resumed,  Renally dosed

## 2022-01-03 NOTE — PROGRESS NOTES
Consult for Vancomycin Dosing by Pharmacy by Dr. Leyla Horowitz provided for this 80y.o. year old female on HD (MWF), for indication of bacteremia    Day of Therapy: 4  Goal of Level(s): 10-15mcg/dL     Significant Cultures: All Micro Results       Procedure Component Value Units Date/Time    CULTURE, BLOOD [[id number]]     Order Status: Canceled Specimen: Blood     CULTURE, BLOOD, PAIRED [[id number]]  (Abnormal)  (Susceptibility) Collected: 12/26/21 0844    Order Status: Completed Specimen: Blood Updated: 12/29/21 1014     Special Requests: No Special Requests        Culture result:       Staphylococcus aureus growing in all 4 bottles drawn NO SITE INDICATED            (NOTE) [facility] CALLED TO JAIME LEMUS [facility] ON 12/27/21 AT 1311. SH    MRSA SCREEN - PCR (NASAL) [[id number]] Collected: 12/27/21 2210    Order Status: Completed Specimen: Swab Updated: 12/28/21 0017     MRSA by PCR, Nasal Not Detected       COVID-19 RAPID TEST [[id number]]  (Abnormal) Collected: 12/26/21 0837    Order Status: Completed Specimen: Nasopharyngeal Updated: 12/26/21 0943     Specimen source Swab        COVID-19 rapid test DETECTED        Comment: Rapid Abbott ID Now   The specimen is POSITIVE for SARS-CoV-2, the novel coronavirus associated with COVID-19. This test has been authorized by the FDA under an Emergency Use Authorization (EUA) for use by authorized laboratories.    Fact sheet for Healthcare Providers: ConventionUpdate.co.nz Fact sheet for Patients: ConventionUpdate.co.nz   Methodology: Isothermal Nucleic Acid Amplification Results verified, phoned to and read back by Jon Rosas 12/26/2021 BY REINALDO         INFLUENZA A & B AG (RAPID TEST) [[id number]] Collected: 12/26/21 0838    Order Status: Completed Specimen: Nasopharyngeal from Nasal washing Updated: 12/26/21 0915     Influenza A Antigen Negative        Influenza B Antigen Negative               Serum Creatinine Creatinine   Date/Time Value Ref Range Status   12/31/2021 03:47 PM 2.09 (H) 0.55 - 1.02 mg/dL Final     Comment:     Investigated per delta check protocol   12/29/2021 06:47 AM 3.67 (H) 0.55 - 1.02 mg/dL Final   12/28/2021 08:51 AM 3.19 (H) 0.55 - 1.02 mg/dL Final     Comment:     Investigated per delta check protocol      Creatinine Clearance Estimated Creatinine Clearance: 26.4 mL/min (A) (by C-G formula based on SCr of 2.09 mg/dL (H)). BUN Lab Results   Component Value Date/Time    BUN 19 12/31/2021 03:47 PM      WBC Lab Results   Component Value Date/Time    WBC 9.2 12/31/2021 03:47 PM      Temp Temp Readings from Last 1 Encounters:   01/03/22 97.7 °F (36.5 °C)      C-Reactive Protein C-Reactive protein   Date Value Ref Range Status   12/31/2021 1.67 (H) 0.00 - 0.60 mg/dL Final   12/27/2021 14.20 (H) 0.00 - 0.60 mg/dL Final   12/03/2021 0.60 0.00 - 0.60 mg/dL Final      Procalcitonin Lab Results   Component Value Date/Time    Procalcitonin 0.80 (H) 12/31/2021 03:47 PM        Last Level:    Vancomycin, random   Date/Time Value Ref Range Status   01/03/2022 06:36 AM 14.2 ug/mL Final     Comment:     No reference range has been established. Ht Readings from Last 1 Encounters:   12/26/21 167.6 cm (66\")        Wt Readings from Last 1 Encounters:   12/31/21 77.7 kg (171 lb 4.8 oz)     Ideal body weight: 59.3 kg (130 lb 11.7 oz)  Adjusted ideal body weight: 66.7 kg (146 lb 15.3 oz)     Previous Regimen: 1000mg IV x1 (12/31)    New Regimen:   Vancomycin level is therapeutic. Give Vancomycin 1000mg IV x1 post HD today. Will order a random level pre-hemodialysis. Pharmacy to follow daily and will make changes to dose and/or frequency based on clinical status.      _________________________________     Pharmacist Tri D Lynnann Cushing

## 2022-01-03 NOTE — PROGRESS NOTES
Patient discharged to [facility]. Report called to [facility]. Family aware of discharge. MD aware of discharge. IV and telemetry removed. VSS. Dialysis port intact. Z-guard applied to sacrum and placed in a brief for transport. Belongings packed at bedside and taken with [facility]. No further questions or concerns at this time.

## 2022-01-07 ENCOUNTER — APPOINTMENT (OUTPATIENT)
Dept: GENERAL RADIOLOGY | Age: 72
DRG: 177 | End: 2022-01-07
Attending: EMERGENCY MEDICINE
Payer: MEDICARE

## 2022-01-07 ENCOUNTER — APPOINTMENT (OUTPATIENT)
Dept: CT IMAGING | Age: 72
DRG: 177 | End: 2022-01-07
Attending: EMERGENCY MEDICINE
Payer: MEDICARE

## 2022-01-07 ENCOUNTER — HOSPITAL ENCOUNTER (INPATIENT)
Age: 72
LOS: 4 days | Discharge: NURSING, REHAB OR LONG-TERM CARE | DRG: 177 | End: 2022-01-11
Attending: EMERGENCY MEDICINE | Admitting: HOSPITALIST
Payer: MEDICARE

## 2022-01-07 DIAGNOSIS — B95.61 MSSA BACTEREMIA: ICD-10-CM

## 2022-01-07 DIAGNOSIS — U07.1 COVID-19: ICD-10-CM

## 2022-01-07 DIAGNOSIS — R41.82 ALTERED MENTAL STATUS, UNSPECIFIED ALTERED MENTAL STATUS TYPE: Primary | ICD-10-CM

## 2022-01-07 DIAGNOSIS — R78.81 MSSA BACTEREMIA: ICD-10-CM

## 2022-01-07 PROBLEM — A41.9 SEPSIS (HCC): Status: ACTIVE | Noted: 2022-01-07

## 2022-01-07 LAB
ALBUMIN SERPL-MCNC: 2.5 G/DL (ref 3.5–5)
ALBUMIN/GLOB SERPL: 0.9 {RATIO} (ref 1.1–2.2)
ALP SERPL-CCNC: 104 U/L (ref 45–117)
ALT SERPL-CCNC: 15 U/L (ref 12–78)
ANION GAP SERPL CALC-SCNC: 4 MMOL/L (ref 5–15)
ARTERIAL PATENCY WRIST A: POSITIVE
AST SERPL W P-5'-P-CCNC: 13 U/L (ref 15–37)
BASE DEFICIT BLDA-SCNC: 0.9 MMOL/L (ref 0–2)
BASOPHILS # BLD: 0 K/UL (ref 0–0.1)
BASOPHILS NFR BLD: 0 % (ref 0–1)
BDY SITE: ABNORMAL
BILIRUB SERPL-MCNC: 0.7 MG/DL (ref 0.2–1)
BUN SERPL-MCNC: 28 MG/DL (ref 6–20)
BUN/CREAT SERPL: 10 (ref 12–20)
CA-I BLD-MCNC: 8.1 MG/DL (ref 8.5–10.1)
CHLORIDE SERPL-SCNC: 105 MMOL/L (ref 97–108)
CO2 SERPL-SCNC: 30 MMOL/L (ref 21–32)
COVID-19 RAPID TEST, COVR: DETECTED
CREAT SERPL-MCNC: 2.75 MG/DL (ref 0.55–1.02)
DIFFERENTIAL METHOD BLD: ABNORMAL
EOSINOPHIL # BLD: 0 K/UL (ref 0–0.4)
EOSINOPHIL NFR BLD: 0 % (ref 0–7)
ERYTHROCYTE [DISTWIDTH] IN BLOOD BY AUTOMATED COUNT: 18.6 % (ref 11.5–14.5)
GLOBULIN SER CALC-MCNC: 2.9 G/DL (ref 2–4)
GLUCOSE SERPL-MCNC: 77 MG/DL (ref 65–100)
HCO3 BLDA-SCNC: 24 MMOL/L (ref 22–26)
HCT VFR BLD AUTO: 35.8 % (ref 35–47)
HGB BLD-MCNC: 10.6 G/DL (ref 11.5–16)
IMM GRANULOCYTES # BLD AUTO: 0.1 K/UL (ref 0–0.04)
IMM GRANULOCYTES NFR BLD AUTO: 1 % (ref 0–0.5)
LACTATE SERPL-SCNC: 0.5 MMOL/L (ref 0.4–2)
LYMPHOCYTES # BLD: 0.5 K/UL (ref 0.8–3.5)
LYMPHOCYTES NFR BLD: 8 % (ref 12–49)
MCH RBC QN AUTO: 30.9 PG (ref 26–34)
MCHC RBC AUTO-ENTMCNC: 29.6 G/DL (ref 30–36.5)
MCV RBC AUTO: 104.4 FL (ref 80–99)
MONOCYTES # BLD: 0.5 K/UL (ref 0–1)
MONOCYTES NFR BLD: 8 % (ref 5–13)
NEUTS SEG # BLD: 5.5 K/UL (ref 1.8–8)
NEUTS SEG NFR BLD: 83 % (ref 32–75)
NRBC # BLD: 0 K/UL (ref 0–0.01)
NRBC BLD-RTO: 0 PER 100 WBC
PCO2 BLDA: 50 MMHG (ref 35–45)
PH BLDA: 7.31 [PH] (ref 7.35–7.45)
PLATELET # BLD AUTO: 113 K/UL (ref 150–400)
PMV BLD AUTO: 10.7 FL (ref 8.9–12.9)
PO2 BLDA: 128 MMHG (ref 75–100)
POTASSIUM SERPL-SCNC: 3.6 MMOL/L (ref 3.5–5.1)
PROT SERPL-MCNC: 5.4 G/DL (ref 6.4–8.2)
RBC # BLD AUTO: 3.43 M/UL (ref 3.8–5.2)
SAO2 % BLD: 98 %
SAO2% DEVICE SAO2% SENSOR NAME: ABNORMAL
SODIUM SERPL-SCNC: 139 MMOL/L (ref 136–145)
SPECIMEN SOURCE: ABNORMAL
TROPONIN-HIGH SENSITIVITY: 120 NG/L (ref 0–51)
TROPONIN-HIGH SENSITIVITY: 127 NG/L (ref 0–51)
TSH SERPL DL<=0.05 MIU/L-ACNC: 2.89 UIU/ML (ref 0.36–3.74)
WBC # BLD AUTO: 6.5 K/UL (ref 3.6–11)

## 2022-01-07 PROCEDURE — 74011000250 HC RX REV CODE- 250: Performed by: HOSPITALIST

## 2022-01-07 PROCEDURE — 36600 WITHDRAWAL OF ARTERIAL BLOOD: CPT

## 2022-01-07 PROCEDURE — 93005 ELECTROCARDIOGRAM TRACING: CPT

## 2022-01-07 PROCEDURE — 83036 HEMOGLOBIN GLYCOSYLATED A1C: CPT

## 2022-01-07 PROCEDURE — 74011250636 HC RX REV CODE- 250/636: Performed by: HOSPITALIST

## 2022-01-07 PROCEDURE — 80053 COMPREHEN METABOLIC PANEL: CPT

## 2022-01-07 PROCEDURE — 83605 ASSAY OF LACTIC ACID: CPT

## 2022-01-07 PROCEDURE — 84145 PROCALCITONIN (PCT): CPT

## 2022-01-07 PROCEDURE — 84484 ASSAY OF TROPONIN QUANT: CPT

## 2022-01-07 PROCEDURE — 99285 EMERGENCY DEPT VISIT HI MDM: CPT

## 2022-01-07 PROCEDURE — 87040 BLOOD CULTURE FOR BACTERIA: CPT

## 2022-01-07 PROCEDURE — 87635 SARS-COV-2 COVID-19 AMP PRB: CPT

## 2022-01-07 PROCEDURE — 84439 ASSAY OF FREE THYROXINE: CPT

## 2022-01-07 PROCEDURE — 82803 BLOOD GASES ANY COMBINATION: CPT

## 2022-01-07 PROCEDURE — 84443 ASSAY THYROID STIM HORMONE: CPT

## 2022-01-07 PROCEDURE — 83735 ASSAY OF MAGNESIUM: CPT

## 2022-01-07 PROCEDURE — 70450 CT HEAD/BRAIN W/O DYE: CPT

## 2022-01-07 PROCEDURE — 85025 COMPLETE CBC W/AUTO DIFF WBC: CPT

## 2022-01-07 PROCEDURE — 71045 X-RAY EXAM CHEST 1 VIEW: CPT

## 2022-01-07 PROCEDURE — 36415 COLL VENOUS BLD VENIPUNCTURE: CPT

## 2022-01-07 PROCEDURE — 65270000029 HC RM PRIVATE

## 2022-01-07 RX ORDER — ACETAMINOPHEN 325 MG/1
650 TABLET ORAL
Status: DISCONTINUED | OUTPATIENT
Start: 2022-01-07 | End: 2022-01-11 | Stop reason: HOSPADM

## 2022-01-07 RX ORDER — LEVOTHYROXINE SODIUM 75 UG/1
150 TABLET ORAL DAILY
Status: DISCONTINUED | OUTPATIENT
Start: 2022-01-08 | End: 2022-01-11 | Stop reason: HOSPADM

## 2022-01-07 RX ORDER — SODIUM CHLORIDE 0.9 % (FLUSH) 0.9 %
5-40 SYRINGE (ML) INJECTION EVERY 8 HOURS
Status: DISCONTINUED | OUTPATIENT
Start: 2022-01-07 | End: 2022-01-11 | Stop reason: HOSPADM

## 2022-01-07 RX ORDER — DEXAMETHASONE SODIUM PHOSPHATE 4 MG/ML
4 INJECTION, SOLUTION INTRA-ARTICULAR; INTRALESIONAL; INTRAMUSCULAR; INTRAVENOUS; SOFT TISSUE EVERY 12 HOURS
Status: DISCONTINUED | OUTPATIENT
Start: 2022-01-07 | End: 2022-01-07

## 2022-01-07 RX ORDER — INSULIN LISPRO 100 [IU]/ML
INJECTION, SOLUTION INTRAVENOUS; SUBCUTANEOUS
Status: DISCONTINUED | OUTPATIENT
Start: 2022-01-07 | End: 2022-01-11 | Stop reason: HOSPADM

## 2022-01-07 RX ORDER — DEXTROSE 50 % IN WATER (D50W) INTRAVENOUS SYRINGE
25-50 AS NEEDED
Status: DISCONTINUED | OUTPATIENT
Start: 2022-01-07 | End: 2022-01-11 | Stop reason: HOSPADM

## 2022-01-07 RX ORDER — DEXAMETHASONE SODIUM PHOSPHATE 10 MG/ML
4 INJECTION INTRAMUSCULAR; INTRAVENOUS EVERY 12 HOURS
Status: DISCONTINUED | OUTPATIENT
Start: 2022-01-07 | End: 2022-01-08

## 2022-01-07 RX ORDER — POLYETHYLENE GLYCOL 3350 17 G/17G
17 POWDER, FOR SOLUTION ORAL DAILY PRN
Status: DISCONTINUED | OUTPATIENT
Start: 2022-01-07 | End: 2022-01-11 | Stop reason: HOSPADM

## 2022-01-07 RX ORDER — ONDANSETRON 4 MG/1
4 TABLET, ORALLY DISINTEGRATING ORAL
Status: DISCONTINUED | OUTPATIENT
Start: 2022-01-07 | End: 2022-01-11 | Stop reason: HOSPADM

## 2022-01-07 RX ORDER — SODIUM CHLORIDE 0.9 % (FLUSH) 0.9 %
5-40 SYRINGE (ML) INJECTION AS NEEDED
Status: DISCONTINUED | OUTPATIENT
Start: 2022-01-07 | End: 2022-01-11 | Stop reason: HOSPADM

## 2022-01-07 RX ORDER — BENZONATATE 100 MG/1
100 CAPSULE ORAL
Status: DISCONTINUED | OUTPATIENT
Start: 2022-01-07 | End: 2022-01-11 | Stop reason: HOSPADM

## 2022-01-07 RX ORDER — ATORVASTATIN CALCIUM 40 MG/1
40 TABLET, FILM COATED ORAL DAILY
Status: DISCONTINUED | OUTPATIENT
Start: 2022-01-08 | End: 2022-01-10

## 2022-01-07 RX ORDER — ONDANSETRON 2 MG/ML
4 INJECTION INTRAMUSCULAR; INTRAVENOUS
Status: DISCONTINUED | OUTPATIENT
Start: 2022-01-07 | End: 2022-01-11 | Stop reason: HOSPADM

## 2022-01-07 RX ORDER — SEVELAMER CARBONATE 800 MG/1
800 TABLET, FILM COATED ORAL
Status: DISCONTINUED | OUTPATIENT
Start: 2022-01-08 | End: 2022-01-11 | Stop reason: HOSPADM

## 2022-01-07 RX ORDER — NALOXONE HYDROCHLORIDE 1 MG/ML
0.4 INJECTION INTRAMUSCULAR; INTRAVENOUS; SUBCUTANEOUS
Status: DISCONTINUED | OUTPATIENT
Start: 2022-01-07 | End: 2022-01-07

## 2022-01-07 RX ORDER — ENOXAPARIN SODIUM 100 MG/ML
30 INJECTION SUBCUTANEOUS DAILY
Status: DISCONTINUED | OUTPATIENT
Start: 2022-01-08 | End: 2022-01-10

## 2022-01-07 RX ORDER — GUAIFENESIN 100 MG/5ML
81 LIQUID (ML) ORAL DAILY
Status: DISCONTINUED | OUTPATIENT
Start: 2022-01-08 | End: 2022-01-11 | Stop reason: HOSPADM

## 2022-01-07 RX ORDER — LEVETIRACETAM 500 MG/1
1000 TABLET ORAL 2 TIMES DAILY
Status: DISCONTINUED | OUTPATIENT
Start: 2022-01-08 | End: 2022-01-11 | Stop reason: HOSPADM

## 2022-01-07 RX ORDER — CALCITRIOL 0.25 UG/1
0.5 CAPSULE ORAL DAILY
Status: DISCONTINUED | OUTPATIENT
Start: 2022-01-08 | End: 2022-01-11 | Stop reason: HOSPADM

## 2022-01-07 RX ORDER — LACOSAMIDE 200 MG/1
200 TABLET ORAL 2 TIMES DAILY
Status: DISCONTINUED | OUTPATIENT
Start: 2022-01-07 | End: 2022-01-11 | Stop reason: HOSPADM

## 2022-01-07 RX ORDER — ASCORBIC ACID 500 MG
500 TABLET ORAL 2 TIMES DAILY
Status: DISCONTINUED | OUTPATIENT
Start: 2022-01-07 | End: 2022-01-11 | Stop reason: HOSPADM

## 2022-01-07 RX ORDER — MAGNESIUM SULFATE 100 %
4 CRYSTALS MISCELLANEOUS AS NEEDED
Status: DISCONTINUED | OUTPATIENT
Start: 2022-01-07 | End: 2022-01-11 | Stop reason: HOSPADM

## 2022-01-07 RX ORDER — FLUOXETINE 10 MG/1
10 CAPSULE ORAL DAILY
Status: DISCONTINUED | OUTPATIENT
Start: 2022-01-08 | End: 2022-01-11 | Stop reason: HOSPADM

## 2022-01-07 RX ORDER — ACETAMINOPHEN 650 MG/1
650 SUPPOSITORY RECTAL
Status: DISCONTINUED | OUTPATIENT
Start: 2022-01-07 | End: 2022-01-11 | Stop reason: HOSPADM

## 2022-01-07 RX ORDER — LEVETIRACETAM 10 MG/ML
1000 INJECTION INTRAVASCULAR EVERY 12 HOURS
Status: DISCONTINUED | OUTPATIENT
Start: 2022-01-07 | End: 2022-01-08

## 2022-01-07 RX ADMIN — SODIUM CHLORIDE, PRESERVATIVE FREE 10 ML: 5 INJECTION INTRAVENOUS at 22:00

## 2022-01-07 RX ADMIN — AZITHROMYCIN 500 MG: 500 INJECTION, POWDER, LYOPHILIZED, FOR SOLUTION INTRAVENOUS at 22:48

## 2022-01-07 NOTE — ED PROVIDER NOTES
EMERGENCY DEPARTMENT HISTORY AND PHYSICAL EXAM      Date: 1/7/2022  Patient Name: Burke Escoto      History of Presenting Illness     Chief Complaint   Patient presents with    Altered mental status       History Provided By: EMS    HPI: Burke Escoto, 80 y.o. female with a past medical history significant for SSS s/p pacemaker, DM, HTN, CVA, seizures, depression/anxiety presents to the ED with cc of AMS. LKWT 7pm yesterday. Daughter found her altered, not responding, usually A&Ox4. BS 91, vitals stable w/EMS. Does have history of seizures but no seizure-like activity noted. Tested positive for COVID 4d ago. No further history obtainable at this time. There are no other complaints, changes, or physical findings at this time. PCP: Tristan Selby MD    Current Outpatient Medications   Medication Sig Dispense Refill    insulin lispro (HUMALOG) 100 unit/mL injection As per insulin sensitive ssi:    Insulin High Sensitivity (thin, ESRD)  For Blood Sugar (mg/dL) of:              Less than 150 =   0 units  150 -199 =  1 units  200 -249 =   2 units  250 -299 =   3 units  300 -349 =   4 units  350 or greater = 5 units and Call Physician  Initiate Hypoglycemic protocol if blood glucose is <70 mg/dL. 1 Each 0    ascorbic acid, vitamin C, (VITAMIN C) 500 mg tablet Take 1 Tablet by mouth two (2) times a day for 30 days. 60 Tablet 0    aspirin 81 mg chewable tablet Take 1 Tablet by mouth daily for 30 days. 30 Tablet 0    benzonatate (TESSALON) 100 mg capsule Take 1 Capsule by mouth three (3) times daily as needed for Cough for up to 7 days. 21 Capsule 0    vancomycin 1,000 mg 1,000 mg, vial-mate 1 Device IVPB 1,000 mg by IntraVENous route DIALYSIS MON, WED & FRI for 6 doses. 6 Dose 0    predniSONE (DELTASONE) 20 mg tablet Start prednisone 20 mg PO daily x 2 days then 10 mg PO daily x 2 days then stop. 6 Tablet 0    calcitRIOL (ROCALTROL) 0.5 mcg capsule Take 1 Capsule by mouth daily.  30 Capsule 0 zinc oxide-white petrolatum 17-57 % topical paste Apply  to affected area as needed for Skin Irritation. Indications: skin irritation 113 g 0    ondansetron hcl (Zofran) 4 mg tablet Take 1 Tablet by mouth every eight (8) hours as needed for Nausea or Vomiting. 45 Tablet 0    Vimpat 200 mg tab tablet Take 1 Tablet by mouth two (2) times a day. ondansetron (Zofran ODT) 4 mg disintegrating tablet 1 Tablet by SubLINGual route every eight (8) hours as needed for Nausea or Vomiting. 20 Tablet 0    atorvastatin (LIPITOR) 40 mg tablet Take 1 tablet by mouth once daily for 90 days 90 Tablet 3    FLUoxetine (PROzac) 10 mg capsule Take 1 Capsule by mouth daily for 270 days.  90 Capsule 2    loperamide (IMODIUM) 2 mg capsule TAKE 1 CAPSULE BY MOUTH SIX TIMES DAILY AS NEEDED FOR 30 DAYS 180 Capsule 0    Euthyrox 150 mcg tablet Take 1 tablet by mouth once daily 90 Tablet 0    levETIRAcetam (KEPPRA XR) 500 mg ER tablet TAKE 2 TABLETS BY MOUTH TWICE DAILY FOR 90 DAYS      sevelamer carbonate (RENVELA) 800 mg tab tab          Past History     Past Medical History:  Past Medical History:   Diagnosis Date    Anxiety disorder     Arthritis     Atherosclerosis of native arteries of the extremities with ulceration (HCC) 8/31/2020    Atrial fibrillation (HCC)     Cardiac pacemaker in situ     Cellulitis and abscess of trunk 8/31/2020    Depression, postpartum     Diabetes ([address]     Heart disease     Heartburn     Hx of long term use of blood thinners     Hypercholesterolemia     Hypertension     Hypothyroidism     Migraines     Peripheral venous insufficiency 8/31/2020    Seizures ([address]     Sleep disorder     St[address] [address]     2019    Varicose veins of lower extremity with inflammation 8/31/2020       Past Surgical History:  Past Surgical History:   Procedure Laterality Date    HX BACK SURGERY      HX OTHER SURGICAL      leg surgery     HX OTHER SURGICAL      pacemaker monitor     IR INSERT TUNL CVC W/O PORT OVER 5 YR  12/10/2021 Family History:  Family History   Problem Relation Age of Onset    Heart Disease Mother     Heart Disease Father     Diabetes Sister     Diabetes Brother        Social History:  Social History     Tobacco Use    Smoking status: Never Smoker    Smokeless tobacco: Never Used   Vaping Use    Vaping Use: Never used   Substance Use Topics    Alcohol use: Never    Drug use: Never       Allergies:  No Known Allergies      Review of Systems   FAHEEM 2/2 pt condition    Physical Exam   Constitutional: Somnolent, opens eyes to name, intermittently following commands, NAD  Eyes: PERRL, no injection or scleral icterus, no discharge  HEENT: NCAT, neck supple, MMM, no oropharyngeal exudates  CV: RRR, no m/r/g  Respiratory: CTAB, no r/r/w  GI: Abd soft, nondistended, nontender  : Deferred  MSK: FROM, no joint effusions or edema  Skin: No rashes  Neuro: Symmetric facies, speech fluent in short phrases at times, eyes open to pain, withdraws all 4 to pain, but mostly not following commands. Lab and Diagnostic Study Results     Labs -   No results found for this or any previous visit (from the past 12 hour(s)). Radiologic Studies -   [unfilled]  CT Results  (Last 48 hours)      None          CXR Results  (Last 48 hours)      None            Medical Decision Making and ED Course   - I am the first and primary provider for this patient AND AM THE PRIMARY PROVIDER OF RECORD. - I reviewed the vital signs, available nursing notes, past medical history, past surgical history, family history and social history. - Initial assessment performed. The patients presenting problems have been discussed, and the staff are in agreement with the care plan formulated and outlined with them. I have encouraged them to ask questions as they arise throughout their visit. Vital Signs-Reviewed the patient's vital signs.     Patient Vitals for the past 12 hrs:   Temp Pulse Resp BP SpO2   01/07/22 1741 -- 63 22 (!) 186/62 92 %   01/07/22 1719 99.8 °F (37.7 °C) 60 8 (!) 173/65 95 %   01/07/22 1711 -- -- -- -- 97 %         Provider Notes (Medical Decision Making):   71F w/AMS, broad differential, possibly COVID encephalopathy, post-ictal, electrolyte abnormality, will get CT Head to r/o stroke. Less likely sepsis given no abnormal vital signs. Dispo likely admit. ED Course:       ED Course as of 01/07/22 2105   Isidore Older Jan 07, 2022   1834 Troponin-High Sensitivity(!!): 127  Will repeat. [YA]   1924 CT HEAD WO CONT    IMPRESSION  Old ischemic disease. No acute intracranial abnormality. [YA]   1924 XR CHEST PORT    Cardiomegaly, congestion and mild interstitial edema. No consolidation. No  effusion or pneumothorax [YA]   1926 Son now at bedside, states that she gets confused after big seizures that lasts 3+ days, thinks that's what happened this time although no one witnessed it. Patient mental status improving but still altered. [YA]   1935 Admitted to Dr. Ezra Roblero. [YA]   2018 Patient desaturated to 60% while sleeping, getting ABG. [YA]   2057 PCO2(!): 50  Not consistent with level of AMS. [YA]   2057 pH(!): 7.31 [YA]      ED Course User Index  [YA] Dutch Banegas MD         Consultations:     Hospitalist Dr. Ezra Roblero    Procedures and Critical Care     CRITICAL CARE NOTE :  9:06 PM  Amount of Critical Care Time: 35(minutes)    IMPENDING DETERIORATION -Respiratory and CNS  ASSOCIATED RISK FACTORS - Hypoxia and CNS Decompensation  MANAGEMENT- Bedside Assessment  INTERPRETATION -  CT Scan, Blood Gases, and Blood Pressure  INTERVENTIONS - supplemental O2  CASE REVIEW - Hospitalist/Intensivist  TREATMENT RESPONSE -Improved  PERFORMED BY - Self    NOTES   :  I have spent critical care time involved in lab review, consultations with specialist, family decision- making, bedside attention and documentation. This time excludes time spent in any separate billed procedures. During this entire length of time I was immediately available to the patient .     Josue Antonio Samuels MD      Disposition     Disposition: Admitted to Floor Medical Floor the case was discussed with the admitting physician Dr. Davon Pedroza. Admitted      Diagnosis     Clinical Impression:   1. Altered mental status, unspecified altered mental status type        Attestations:     Jovani Alcantar MD

## 2022-01-07 NOTE — ED TRIAGE NOTES
ams since 1900 yesterday, per ems, daughter states pt normally ano x 4, and able to converse, more lethargic now.  Pt speaking clearly when doctor accessing

## 2022-01-08 LAB
APPEARANCE UR: CLEAR
BACTERIA URNS QL MICRO: NEGATIVE /HPF
BILIRUB UR QL: NEGATIVE
COLOR UR: ABNORMAL
EST. AVERAGE GLUCOSE BLD GHB EST-MCNC: 88 MG/DL
GLUCOSE BLD STRIP.AUTO-MCNC: 70 MG/DL (ref 65–117)
GLUCOSE BLD STRIP.AUTO-MCNC: 73 MG/DL (ref 65–117)
GLUCOSE BLD STRIP.AUTO-MCNC: 74 MG/DL (ref 65–117)
GLUCOSE BLD STRIP.AUTO-MCNC: 78 MG/DL (ref 65–117)
GLUCOSE BLD STRIP.AUTO-MCNC: 82 MG/DL (ref 65–117)
GLUCOSE UR STRIP.AUTO-MCNC: NEGATIVE MG/DL
HBA1C MFR BLD: 4.7 % (ref 4–5.6)
HGB UR QL STRIP: NEGATIVE
KETONES UR QL STRIP.AUTO: NEGATIVE MG/DL
LEUKOCYTE ESTERASE UR QL STRIP.AUTO: NEGATIVE
MAGNESIUM SERPL-MCNC: 2 MG/DL (ref 1.6–2.4)
NITRITE UR QL STRIP.AUTO: NEGATIVE
PERFORMED BY, TECHID: NORMAL
PH UR STRIP: 5 [PH] (ref 5–8)
PROCALCITONIN SERPL-MCNC: 1.22 NG/ML
PROT UR STRIP-MCNC: 30 MG/DL
RBC #/AREA URNS HPF: ABNORMAL /HPF (ref 0–5)
SP GR UR REFRACTOMETRY: 1.01 (ref 1–1.03)
T4 FREE SERPL-MCNC: 1.2 NG/DL (ref 0.8–1.5)
UA: UC IF INDICATED,UAUC: ABNORMAL
UROBILINOGEN UR QL STRIP.AUTO: 0.1 EU/DL (ref 0.1–1)
WBC URNS QL MICRO: ABNORMAL /HPF (ref 0–4)

## 2022-01-08 PROCEDURE — 82962 GLUCOSE BLOOD TEST: CPT

## 2022-01-08 PROCEDURE — 74011250636 HC RX REV CODE- 250/636: Performed by: HOSPITALIST

## 2022-01-08 PROCEDURE — 74011250636 HC RX REV CODE- 250/636: Performed by: INTERNAL MEDICINE

## 2022-01-08 PROCEDURE — G0257 UNSCHED DIALYSIS ESRD PT HOS: HCPCS

## 2022-01-08 PROCEDURE — 74011000250 HC RX REV CODE- 250: Performed by: HOSPITALIST

## 2022-01-08 PROCEDURE — 81001 URINALYSIS AUTO W/SCOPE: CPT

## 2022-01-08 PROCEDURE — 65270000029 HC RM PRIVATE

## 2022-01-08 PROCEDURE — 99223 1ST HOSP IP/OBS HIGH 75: CPT | Performed by: INTERNAL MEDICINE

## 2022-01-08 PROCEDURE — 74011250637 HC RX REV CODE- 250/637: Performed by: HOSPITALIST

## 2022-01-08 RX ORDER — LEVETIRACETAM 10 MG/ML
1000 INJECTION INTRAVASCULAR ONCE
Status: COMPLETED | OUTPATIENT
Start: 2022-01-08 | End: 2022-01-08

## 2022-01-08 RX ORDER — HYDRALAZINE HYDROCHLORIDE 20 MG/ML
10 INJECTION INTRAMUSCULAR; INTRAVENOUS
Status: DISCONTINUED | OUTPATIENT
Start: 2022-01-08 | End: 2022-01-11 | Stop reason: HOSPADM

## 2022-01-08 RX ORDER — HEPARIN SODIUM 1000 [USP'U]/ML
3200 INJECTION, SOLUTION INTRAVENOUS; SUBCUTANEOUS
Status: DISCONTINUED | OUTPATIENT
Start: 2022-01-08 | End: 2022-01-11 | Stop reason: HOSPADM

## 2022-01-08 RX ADMIN — SODIUM CHLORIDE, PRESERVATIVE FREE 10 ML: 5 INJECTION INTRAVENOUS at 17:35

## 2022-01-08 RX ADMIN — CALCITRIOL CAPSULES 0.25 MCG 0.5 MCG: 0.25 CAPSULE ORAL at 10:23

## 2022-01-08 RX ADMIN — LEVETIRACETAM 1000 MG: 500 TABLET, FILM COATED ORAL at 17:35

## 2022-01-08 RX ADMIN — LEVOTHYROXINE SODIUM 150 MCG: 0.07 TABLET ORAL at 08:07

## 2022-01-08 RX ADMIN — SEVELAMER CARBONATE 800 MG: 800 TABLET, FILM COATED ORAL at 08:07

## 2022-01-08 RX ADMIN — DEXAMETHASONE SODIUM PHOSPHATE 4 MG: 10 INJECTION INTRAMUSCULAR; INTRAVENOUS at 08:07

## 2022-01-08 RX ADMIN — DEXAMETHASONE SODIUM PHOSPHATE 4 MG: 10 INJECTION INTRAMUSCULAR; INTRAVENOUS at 01:01

## 2022-01-08 RX ADMIN — DEXAMETHASONE SODIUM PHOSPHATE 4 MG: 10 INJECTION INTRAMUSCULAR; INTRAVENOUS at 22:41

## 2022-01-08 RX ADMIN — ACETAMINOPHEN 650 MG: 325 TABLET ORAL at 12:02

## 2022-01-08 RX ADMIN — AZITHROMYCIN 500 MG: 500 INJECTION, POWDER, LYOPHILIZED, FOR SOLUTION INTRAVENOUS at 22:37

## 2022-01-08 RX ADMIN — ENOXAPARIN SODIUM 30 MG: 100 INJECTION SUBCUTANEOUS at 08:07

## 2022-01-08 RX ADMIN — ASPIRIN 81 MG CHEWABLE TABLET 81 MG: 81 TABLET CHEWABLE at 08:07

## 2022-01-08 RX ADMIN — VANCOMYCIN HYDROCHLORIDE 1000 MG: 1 INJECTION, POWDER, LYOPHILIZED, FOR SOLUTION INTRAVENOUS at 17:34

## 2022-01-08 RX ADMIN — OXYCODONE HYDROCHLORIDE AND ACETAMINOPHEN 500 MG: 500 TABLET ORAL at 22:40

## 2022-01-08 RX ADMIN — FLUOXETINE 10 MG: 10 CAPSULE ORAL at 08:07

## 2022-01-08 RX ADMIN — SODIUM CHLORIDE, PRESERVATIVE FREE 10 ML: 5 INJECTION INTRAVENOUS at 22:43

## 2022-01-08 RX ADMIN — SEVELAMER CARBONATE 800 MG: 800 TABLET, FILM COATED ORAL at 12:02

## 2022-01-08 RX ADMIN — OXYCODONE HYDROCHLORIDE AND ACETAMINOPHEN 500 MG: 500 TABLET ORAL at 08:07

## 2022-01-08 RX ADMIN — LACOSAMIDE 200 MG: 200 TABLET, FILM COATED ORAL at 22:40

## 2022-01-08 RX ADMIN — DEXTROSE MONOHYDRATE 12.5 G: 25 INJECTION, SOLUTION INTRAVENOUS at 01:25

## 2022-01-08 RX ADMIN — SEVELAMER CARBONATE 800 MG: 800 TABLET, FILM COATED ORAL at 17:35

## 2022-01-08 RX ADMIN — LEVETIRACETAM 1000 MG: 500 TABLET, FILM COATED ORAL at 23:12

## 2022-01-08 RX ADMIN — HEPARIN SODIUM 3200 UNITS: 1000 INJECTION, SOLUTION INTRAVENOUS; SUBCUTANEOUS at 15:30

## 2022-01-08 RX ADMIN — ATORVASTATIN CALCIUM 40 MG: 40 TABLET, FILM COATED ORAL at 08:07

## 2022-01-08 RX ADMIN — LEVETIRACETAM 1000 MG: 10 INJECTION, SOLUTION INTRAVENOUS at 01:01

## 2022-01-08 RX ADMIN — LACOSAMIDE 200 MG: 200 TABLET, FILM COATED ORAL at 08:07

## 2022-01-08 NOTE — PROGRESS NOTES
Hospitalist Progress Note    Subjective:   Daily Progress Note: 1/8/2022 5:11 PM    Hospital Course:  Pt presents to the hospital with confusion, weakness, found to be covid 19 positive, family reports became less responsive , she was recently hospitalized and discharged on 1/3. BC grew staph aureusand was ordered 2 weeks of IV vancomycin to be given on dialysis days. History of non working AV fistula, has HD tunneled catheter in right chest. Family reported a seizure like episode, and became post ictal, CT head did not reveal any acute processes, She is legally blind, and has residual paralysis in right upper arm, slight tremors present.       Subjective: Pt seen in dialysis, answers simple questions, but mumbles     Current Facility-Administered Medications   Medication Dose Route Frequency    heparin (porcine) 1,000 unit/mL injection 3,200 Units  3,200 Units Hemodialysis DIALYSIS PRN    vancomycin (VANCOCIN) 1,000 mg in 0.9% sodium chloride 250 mL (VIAL-MATE)  1,000 mg IntraVENous ONCE    vancomycin dose per pharmacy protocol   Other Rx Dosing/Monitoring    ascorbic acid (vitamin C) (VITAMIN C) tablet 500 mg  500 mg Oral BID    aspirin chewable tablet 81 mg  81 mg Oral DAILY    atorvastatin (LIPITOR) tablet 40 mg  40 mg Oral DAILY    benzonatate (TESSALON) capsule 100 mg  100 mg Oral TID PRN    calcitRIOL (ROCALTROL) capsule 0.5 mcg  0.5 mcg Oral DAILY    levothyroxine (SYNTHROID) tablet 150 mcg  150 mcg Oral DAILY    FLUoxetine (PROzac) capsule 10 mg  10 mg Oral DAILY    levETIRAcetam (KEPPRA) tablet 1,000 mg  1,000 mg Oral BID    lacosamide (VIMPAT) tablet 200 mg  200 mg Oral BID    sevelamer carbonate (RENVELA) tab 800 mg  800 mg Oral TID WITH MEALS    zinc oxide-white petrolatum 17-57 % topical paste   Topical PRN    sodium chloride (NS) flush 5-40 mL  5-40 mL IntraVENous Q8H    sodium chloride (NS) flush 5-40 mL  5-40 mL IntraVENous PRN    acetaminophen (TYLENOL) tablet 650 mg  650 mg Oral Q6H PRN    Or    acetaminophen (TYLENOL) suppository 650 mg  650 mg Rectal Q6H PRN    polyethylene glycol (MIRALAX) packet 17 g  17 g Oral DAILY PRN    ondansetron (ZOFRAN ODT) tablet 4 mg  4 mg Oral Q8H PRN    Or    ondansetron (ZOFRAN) injection 4 mg  4 mg IntraVENous Q6H PRN    enoxaparin (LOVENOX) injection 30 mg  30 mg SubCUTAneous DAILY    azithromycin (ZITHROMAX) 500 mg in 0.9% sodium chloride 250 mL (VIAL-MATE)  500 mg IntraVENous Q24H    glucose chewable tablet 16 g  4 Tablet Oral PRN    dextrose (D50W) injection syrg 12.5-25 g  25-50 mL IntraVENous PRN    glucagon (GLUCAGEN) injection 1 mg  1 mg IntraMUSCular PRN    insulin lispro (HUMALOG) injection   SubCUTAneous AC&HS    dexamethasone (PF) (DECADRON) 10 mg/mL injection 4 mg  4 mg IntraVENous Q12H     Current Outpatient Medications   Medication Sig    insulin lispro (HUMALOG) 100 unit/mL injection As per insulin sensitive ssi:    Insulin High Sensitivity (thin, ESRD)  For Blood Sugar (mg/dL) of:              Less than 150 =   0 units  150 -199 =  1 units  200 -249 =   2 units  250 -299 =   3 units  300 -349 =   4 units  350 or greater = 5 units and Call Physician  Initiate Hypoglycemic protocol if blood glucose is <70 mg/dL.  ascorbic acid, vitamin C, (VITAMIN C) 500 mg tablet Take 1 Tablet by mouth two (2) times a day for 30 days.  aspirin 81 mg chewable tablet Take 1 Tablet by mouth daily for 30 days.  benzonatate (TESSALON) 100 mg capsule Take 1 Capsule by mouth three (3) times daily as needed for Cough for up to 7 days.  vancomycin 1,000 mg 1,000 mg, vial-mate 1 Device IVPB 1,000 mg by IntraVENous route DIALYSIS MON, WED & FRI for 6 doses.  predniSONE (DELTASONE) 20 mg tablet Start prednisone 20 mg PO daily x 2 days then 10 mg PO daily x 2 days then stop.  calcitRIOL (ROCALTROL) 0.5 mcg capsule Take 1 Capsule by mouth daily.     zinc oxide-white petrolatum 17-57 % topical paste Apply  to affected area as needed for Skin Irritation. Indications: skin irritation    ondansetron hcl (Zofran) 4 mg tablet Take 1 Tablet by mouth every eight (8) hours as needed for Nausea or Vomiting.  Vimpat 200 mg tab tablet Take 1 Tablet by mouth two (2) times a day.  ondansetron (Zofran ODT) 4 mg disintegrating tablet 1 Tablet by SubLINGual route every eight (8) hours as needed for Nausea or Vomiting.  atorvastatin (LIPITOR) 40 mg tablet Take 1 tablet by mouth once daily for 90 days    FLUoxetine (PROzac) 10 mg capsule Take 1 Capsule by mouth daily for 270 days.  loperamide (IMODIUM) 2 mg capsule TAKE 1 CAPSULE BY MOUTH SIX TIMES DAILY AS NEEDED FOR 30 DAYS    Euthyrox 150 mcg tablet Take 1 tablet by mouth once daily    levETIRAcetam (KEPPRA XR) 500 mg ER tablet TAKE 2 TABLETS BY MOUTH TWICE DAILY FOR 90 DAYS    sevelamer carbonate (RENVELA) 800 mg tab tab         Review of Systems:    ROS   Unable to obtain full ROS due to lethargy  Objective:     Visit Vitals  BP (!) 145/75   Pulse 72   Temp 98 °F (36.7 °C) (Oral)   Resp 18   Ht 5' 5\" (1.651 m)   Wt 81.6 kg (180 lb)   SpO2 100%   BMI 29.95 kg/m²      O2 Device: None (Room air)    Temp (24hrs), Av.8 °F (37.1 °C), Min:98 °F (36.7 °C), Max:99.8 °F (37.7 °C)      701 - 1900  In: -   Out: 7279   1901 -  07  In: 350 [I.V.:350]  Out: -     PHYSICAL EXAM:    Physical Exam  Constitutional:       General: She is not in acute distress. Cardiovascular:      Rate and Rhythm: Normal rate and regular rhythm. Pulses: Normal pulses. Heart sounds: Murmur heard. Comments: Left chest wall pacemaker  Pulmonary:      Effort: No respiratory distress. Breath sounds: Rhonchi present. No wheezing. Abdominal:      General: Bowel sounds are normal.      Palpations: Abdomen is soft. Skin:     General: Skin is warm and dry. Comments: Right chest wall HD catheter   Neurological:      Mental Status: She is lethargic. Motor: Weakness present. Comments: Right upper arm tremors, paralysis            Data Review    Recent Results (from the past 24 hour(s))   LACTIC ACID    Collection Time: 01/07/22  8:34 PM   Result Value Ref Range    Lactic acid 0.5 0.4 - 2.0 mmol/L   BLOOD GAS, ARTERIAL    Collection Time: 01/07/22  8:34 PM   Result Value Ref Range    pH 7.31 (L) 7.35 - 7.45      PCO2 50 (H) 35 - 45 mmHg    PO2 128 (H) 75 - 100 mmHg    O2 SAT 98 >95 %    BICARBONATE 24 22 - 26 mmol/L    BASE DEFICIT 0.9 0 - 2 mmol/L    O2 METHOD RA      SITE Right Radial      ERNESTINE'S TEST Positive     TROPONIN-HIGH SENSITIVITY    Collection Time: 01/07/22 10:47 PM   Result Value Ref Range    Troponin-High Sensitivity 120 (HH) 0 - 51 ng/L   MAGNESIUM    Collection Time: 01/07/22 10:47 PM   Result Value Ref Range    Magnesium 2.0 1.6 - 2.4 mg/dL   PROCALCITONIN    Collection Time: 01/07/22 10:47 PM   Result Value Ref Range    Procalcitonin 1.22 (H) 0 ng/mL   COVID-19 RAPID TEST    Collection Time: 01/07/22 10:47 PM   Result Value Ref Range    Specimen source Please find results under separate order      COVID-19 rapid test DETECTED (A) Not Detected     HEMOGLOBIN A1C WITH EAG    Collection Time: 01/07/22 10:47 PM   Result Value Ref Range    Hemoglobin A1c 4.7 4.0 - 5.6 %    Est. average glucose 88 mg/dL   GLUCOSE, POC    Collection Time: 01/08/22  1:17 AM   Result Value Ref Range    Glucose (POC) 70 65 - 117 mg/dL    Performed by Deion Mcclain 42, POC    Collection Time: 01/08/22  8:18 AM   Result Value Ref Range    Glucose (POC) 74 65 - 117 mg/dL    Performed by ADAM COREY    URINALYSIS W/ REFLEX CULTURE    Collection Time: 01/08/22 10:25 AM    Specimen: Urine   Result Value Ref Range    Color Yellow/Straw      Appearance Clear Clear      Specific gravity 1.012 1.003 - 1.030      pH (UA) 5.0 5.0 - 8.0      Protein 30 (A) Negative mg/dL    Glucose Negative Negative mg/dL    Ketone Negative Negative mg/dL    Bilirubin Negative Negative      Blood Negative Negative      Urobilinogen 0.1 0.1 - 1.0 EU/dL    Nitrites Negative Negative      Leukocyte Esterase Negative Negative      UA:UC IF INDICATED Culture not indicated by UA result Culture not indicated by UA result      WBC 0-4 0 - 4 /hpf    RBC 0-5 0 - 5 /hpf    Bacteria Negative Negative /hpf   GLUCOSE, POC    Collection Time: 01/08/22 10:49 AM   Result Value Ref Range    Glucose (POC) 82 65 - 117 mg/dL    Performed by ADAM COREY        CT HEAD WO CONT   Final Result   Old ischemic disease. No acute intracranial abnormality. XR CHEST PORT   Final Result          Active Problems:    Sepsis ([address] (1/7/2022)        Assessment/Plan:   1. Acute encephalopathy- secondary to covid infection and seizure activity. 2. Seizure disorder- continue keppra, lacosamide, neurology following, CT head does not show acute process,    3. Hx of staph aureus bacteremia- consult to ID for recommendations, on IV vancomycin,   BC are pending    4. ESRD- nephrology following, HD tues/thurs/sat    5. Covid 19 pneumonia- on IV zithromax, decadron, O2 as needed. 6. Hypothyroidism- on levothyroxine, TSH WNL    7. Hx of Sick sinus syndrome- has pacemaker     8. Elevated troponin- EKG NSR, no chest pain, cardiology consulted.   Repeat troponin trending down, will repeat in am.           DVT Prophylaxis: lovenox,renal dose  Code Status:  Full Code  POA:    Care Plan discussed with:   _____patient, staff nurse, ID__________________________________________________________    Alpha Papa, NP

## 2022-01-08 NOTE — PROGRESS NOTES
Vancomycin Note-01/08/2022    Admission date [id number]   Attending RAZA BAÑUELOS   Indication Bacteremia - MSSA        Height Ht Readings from Last 1 Encounters:   01/07/22 165.1 cm (65\")       Weight Wt Readings from Last 1 Encounters:   01/07/22 81.6 kg (180 lb)      IBW ideal body weight      SCr Creatinine   Date Value Ref Range Status   01/07/2022 2.75 (H) 0.55 - 1.02 mg/dL Final   12/31/2021 2.09 (H) 0.55 - 1.02 mg/dL Final     Comment:     Investigated per delta check protocol   12/29/2021 3.67 (H) 0.55 - 1.02 mg/dL Final      CrCl (based on IBW) 17.1 ml/min       Goal level 21-25   Current regimen  Post HD dosing. Patient was on 1000mg IV vanc post HD MWF. Therapy to continue through 01/15/22       NEW regimen 1000mg x 1 today   Level Scheduled for 01/10 @ 0400 pre HD         Current Antimicrobial Therapy (168h ago, onward)       Ordered     Start Stop    01/08/22 1537  vancomycin dose per pharmacy protocol  Other,   RX DOSING/MONITORING        References:    Lexicomp    01/08/22 1536 --    01/08/22 1518  vancomycin (VANCOCIN) 1,000 mg in 0.9% sodium chloride 250 mL (VIAL-MATE)  1,000 mg,   IntraVENous,   ONCE        References:    Lexicomp    01/08/22 1519 01/09/22 0318    01/07/22 2154  azithromycin (ZITHROMAX) 500 mg in 0.9% sodium chloride 250 mL (VIAL-MATE)  500 mg,   IntraVENous,   EVERY 24 HOURS        References:    Lexicomp    01/07/22 2155 01/12/22 2154                      Submitted by:  Presley Ohara, ROBD

## 2022-01-08 NOTE — CONSULTS
NEUROLOGY CONSULT    Name Nelly Forte Age 80 y.o. MRN [id number]  1950     Referring Physician: Benjamin Mcclure MD    Chief Complaint: Breakthrough seizure? This is a 80 y.o.  female very well-known to our service, follows in the clinic with me for seizures who was discharged from the hospital on 2022 after receiving treatment for MRSA bacteremia, was found unresponsive by the daughter around 7 PM yesterday and EMS was called. The suspicion was that she probably had a breakthrough seizure secondary to acute illness and was unresponsive from there. In the ER she started responding to voice and answering simple questions. She she was also diagnosed with Covid. Her troponin was elevated at 147 chest x-ray showed cardiomegaly. A CT scan of the head done in the ED did not reveal any acute findings. At the time of my evaluation this morning the patient started responding after some tactile and painful stimuli but her speech was difficult to understand as it was more of mumbling. However noted that she is not moving her right arm at all and it is flaccid. Could not determine from the notes what status was when she came to the ED. Assessment and Plan:  1. 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 Probably she had a breakthrough seizure because of the acute illness. 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 The differential would also include a lacunar infarct in the left deep nuclei. If the patient does not improve in the next 24 hours, will do another imaging study, CT scan or an MRI to check for the stroke. Meanwhile continue the seizure medications. 2.  History of stroke in the past.  3.  Atrial fibrillation.   4.  Diabetes mellitus. 5.  Hypothyroidism. 6.  Hyper cholesterolemia. Thank you for the consult.     No Known Allergies     Current Facility-Administered Medications   Medication Dose Route Frequency    ascorbic acid (vitamin C) (VITAMIN C) tablet 500 mg  500 mg Oral BID    aspirin chewable tablet 81 mg  81 mg Oral DAILY    atorvastatin (LIPITOR) tablet 40 mg  40 mg Oral DAILY    benzonatate (TESSALON) capsule 100 mg  100 mg Oral TID PRN    calcitRIOL (ROCALTROL) capsule 0.5 mcg  0.5 mcg Oral DAILY    levothyroxine (SYNTHROID) tablet 150 mcg  150 mcg Oral DAILY    FLUoxetine (PROzac) capsule 10 mg  10 mg Oral DAILY    levETIRAcetam (KEPPRA XR) ER tablet 1,000 mg  1,000 mg Oral DAILY    [START ON 1/10/2022] vancomycin (VANCOCIN) 1,000 mg in 0.9% sodium chloride 250 mL (VIAL-MATE)  1 g IntraVENous DIALYSIS MON, WED & FRI    lacosamide (VIMPAT) tablet 200 mg  200 mg Oral BID    sevelamer carbonate (RENVELA) tab 800 mg  800 mg Oral TID WITH MEALS    zinc oxide-white petrolatum 17-57 % topical paste   Topical PRN    sodium chloride (NS) flush 5-40 mL  5-40 mL IntraVENous Q8H    sodium chloride (NS) flush 5-40 mL  5-40 mL IntraVENous PRN    acetaminophen (TYLENOL) tablet 650 mg  650 mg Oral Q6H PRN    Or    acetaminophen (TYLENOL) suppository 650 mg  650 mg Rectal Q6H PRN    polyethylene glycol (MIRALAX) packet 17 g  17 g Oral DAILY PRN    ondansetron (ZOFRAN ODT) tablet 4 mg  4 mg Oral Q8H PRN    Or    ondansetron (ZOFRAN) injection 4 mg  4 mg IntraVENous Q6H PRN    enoxaparin (LOVENOX) injection 30 mg  30 mg SubCUTAneous DAILY    azithromycin (ZITHROMAX) 500 mg in 0.9% sodium chloride 250 mL (VIAL-MATE)  500 mg IntraVENous Q24H    glucose chewable tablet 16 g  4 Tablet Oral PRN    dextrose (D50W) injection syrg 12.5-25 g  25-50 mL IntraVENous PRN    glucagon (GLUCAGEN) injection 1 mg  1 mg IntraMUSCular PRN    insulin lispro (HUMALOG) injection   SubCUTAneous AC&HS    dexamethasone (PF) (DECADRON) 10 mg/mL injection 4 mg  4 mg IntraVENous Q12H     Current Outpatient Medications   Medication Sig    insulin lispro (HUMALOG) 100 unit/mL injection As per insulin sensitive ssi:    Insulin High Sensitivity (thin, ESRD)  For Blood Sugar (mg/dL) of:              Less than 150 =   0 units  150 -199 =  1 units  200 -249 =   2 units  250 -299 =   3 units  300 -349 =   4 units  350 or greater = 5 units and Call Physician  Initiate Hypoglycemic protocol if blood glucose is <70 mg/dL.  ascorbic acid, vitamin C, (VITAMIN C) 500 mg tablet Take 1 Tablet by mouth two (2) times a day for 30 days.  aspirin 81 mg chewable tablet Take 1 Tablet by mouth daily for 30 days.  benzonatate (TESSALON) 100 mg capsule Take 1 Capsule by mouth three (3) times daily as needed for Cough for up to 7 days.  vancomycin 1,000 mg 1,000 mg, vial-mate 1 Device IVPB 1,000 mg by IntraVENous route DIALYSIS MON, WED & FRI for 6 doses.  predniSONE (DELTASONE) 20 mg tablet Start prednisone 20 mg PO daily x 2 days then 10 mg PO daily x 2 days then stop.  calcitRIOL (ROCALTROL) 0.5 mcg capsule Take 1 Capsule by mouth daily.  zinc oxide-white petrolatum 17-57 % topical paste Apply  to affected area as needed for Skin Irritation. Indications: skin irritation    ondansetron hcl (Zofran) 4 mg tablet Take 1 Tablet by mouth every eight (8) hours as needed for Nausea or Vomiting.  Vimpat 200 mg tab tablet Take 1 Tablet by mouth two (2) times a day.  ondansetron (Zofran ODT) 4 mg disintegrating tablet 1 Tablet by SubLINGual route every eight (8) hours as needed for Nausea or Vomiting.  atorvastatin (LIPITOR) 40 mg tablet Take 1 tablet by mouth once daily for 90 days    FLUoxetine (PROzac) 10 mg capsule Take 1 Capsule by mouth daily for 270 days.     loperamide (IMODIUM) 2 mg capsule TAKE 1 CAPSULE BY MOUTH SIX TIMES DAILY AS NEEDED FOR 30 DAYS    Euthyrox 150 mcg tablet Take 1 tablet by mouth once daily    levETIRAcetam (KEPPRA XR) 500 mg ER tablet TAKE 2 TABLETS BY MOUTH TWICE DAILY FOR 90 DAYS    sevelamer carbonate (RENVELA) 800 mg tab tab      Past Medical History:   Diagnosis Date    Anxiety disorder     Arthritis     Atherosclerosis of native arteries of the extremities with ulceration (HCC) 8/31/2020    Atrial fibrillation (HCC)     Cardiac pacemaker in situ     Cellulitis and abscess of trunk 8/31/2020    Depression, postpartum     Diabetes ([facility]     Heart disease     Heartburn     Hx of long term use of blood thinners     Hypercholesterolemia     Hypertension     Hypothyroidism     Migraines     Peripheral venous insufficiency 8/31/2020    [facility]ures [facility]     Sleep disorder     Stroke ([facility]     2019    Varicose veins of lower extremity with inflammation 8/31/2020     Social History     Tobacco Use    Smoking status: Never Smoker    Smokeless tobacco: Never Used   Vaping Use    Vaping Use: Never used   Substance Use Topics    Alcohol use: Never    Drug use: Never     Exam  Visit Vitals  BP (!) 179/67   Pulse 63   Temp 98.9 °F (37.2 °C)   Resp 20   Ht 5' 5\" (1.651 m)   Wt 81.6 kg (180 lb)   SpO2 100%   BMI 29.95 kg/m²     General: Well developed, well nourished. Patient in no distress   Head: Normocephalic, atraumatic, anicteric sclera   Neck Normal ROM, No thyromegally   Lungs:     Cardiac:    Abd:    Ext: + pedal edema   Skin: Supple no rash     NeurologicExam:  Mental Status: Lethargic but arousable and answers simple questions, says right arm does not work. Speech: Fluent no aphasia or dysarthria. Cranial Nerves:   Pupils are equal round and reactive to light, spontaneous eye movements noted, face is symmetric, visual fields are intact to threat. Motor:  Right upper extremity is completely flaccid, moves the left upper and both lower extremities weakly. .Normal bulk and tone. Reflexes:   Deep tendon reflexes not checked.    Sensory:   Symmetric and intact    Gait:  Gait is deferred due to her condition   Tremor:   No tremor noted. Cerebellar:  Coordination intact for finger-nose-finger. Neurovascular: No carotid bruits.  No JVD      Lab Review  Lab Results   Component Value Date/Time    WBC 6.5 01/07/2022 05:00 PM    HCT 35.8 01/07/2022 05:00 PM    HGB 10.6 (L) 01/07/2022 05:00 PM    PLATELET 690 (L) 39/65/3692 05:00 PM     Lab Results   Component Value Date/Time    Sodium 139 01/07/2022 05:00 PM    Potassium 3.6 01/07/2022 05:00 PM    Chloride 105 01/07/2022 05:00 PM    CO2 30 01/07/2022 05:00 PM    Glucose 77 01/07/2022 05:00 PM    BUN 28 (H) 01/07/2022 05:00 PM    Creatinine 2.75 (H) 01/07/2022 05:00 PM    Calcium 8.1 (L) 01/07/2022 05:00 PM     Lab Results   Component Value Date/Time    Vitamin B12 291 12/07/2021 06:04 PM    Folate 4.3 (L) 12/07/2021 06:04 PM     Lab Results   Component Value Date/Time    LDL, calculated 27.8 05/20/2021 05:35 AM     Lab Results   Component Value Date/Time    Hemoglobin A1c 4.8 05/20/2021 05:35 AM    Hemoglobin A1c, External 5.1 09/09/2019 12:00 AM     No components found for: TROPQUANT  No results found for: NGOZI

## 2022-01-08 NOTE — DIALYSIS
Pt tolerated 3 h of dialysis with 2.5 L fluid removal visited with Dr. Memo Ramirez during treatment. Perm cath wnl dressing changed. Returns to ER report to [employer]

## 2022-01-08 NOTE — DIALYSIS
Tx x 3hours initiated via Rt. Chest tunnelled ,Latest Hep. B Surface Antigen Negative 12/14/2021 per [address] Record

## 2022-01-08 NOTE — ED NOTES
Bedside and Verbal shift change report given to Edel Cifuentes RN (oncoming nurse) by Lauren Rivero RN (offgoing nurse). Report included the following information SBAR & care transferred.

## 2022-01-08 NOTE — CONSULTS
Consult requested by: Manjit Henriquez*    ADMIT DATE: 1/7/2022  CONSULT DATE: January 8, 2022             Admission diagnosis: AMS  Reason for Nephrology Consultation: Management of ESRD  HPI: Kimberly Romano is a 80 y.o. female [address] with known diagnosis of ESRD for which he receives hemodialysis on MWF schedule at [facility] outpatient dialysis. Patient usually dialyzes for 4 hours. Right IJ TDC is used for dialysis access. Patient was brought to the ER by EMS after family found her to have altered mental status. Patient's baseline mental status is alert awake oriented x3. On arrival in the ER she was responding to simple commands and answering questions with the basic yes or no but was not fully conversational.    -She was recently discharged from the hospital after she was treated for Covid infection and was also found to have MSSA bacteremia for which she was supposed to continue vancomycin after dialysis until January 15.    -When I saw the patient she was getting dialysis, she woke up spontaneously but could not tell me when her last dialysis session was or where she dialyzes. -She did not appear to be in any respiratory distress.      Past Medical History:   Diagnosis Date    Anxiety disorder     Arthritis     Atherosclerosis of native arteries of the extremities with ulceration ([address] 8/31/2020    Atrial fibrillation (HCC)     Cardiac pacemaker in situ     Cellulitis and abscess of trunk 8/31/2020    Depression, postpartum     Diabetes ([address]     Heart disease     Heartburn     Hx of long term use of blood thinners     Hypercholesterolemia     Hypertension     Hypothyroidism     Migraines     Peripheral venous insufficiency 8/31/2020    Seizures ([address]     Sleep disorder     Stroke ([address]     2019    Varicose veins of lower extremity with inflammation 8/31/2020      Past Surgical History:   Procedure Laterality Date    HX BACK SURGERY      HX OTHER SURGICAL      leg surgery     HX OTHER SURGICAL      pacemaker monitor     IR INSERT TUNL CVC W/O PORT OVER 5 YR  12/10/2021       Social History     Socioeconomic History    Marital status:      Spouse name: Not on file    Number of children: Not on file    Years of education: Not on file    Highest education level: Not on file   Occupational History    Not on file   Tobacco Use    Smoking status: Never Smoker    Smokeless tobacco: Never Used   Vaping Use    Vaping Use: Never used   Substance and Sexual Activity    Alcohol use: Never    Drug use: Never    Sexual activity: Not on file   Other Topics Concern    Not on file   Social History Narrative    Not on file     Social Determinants of Health     Financial Resource Strain:     Difficulty of Paying Living Expenses: Not on file   Food Insecurity:     Worried About [address] in the Last Year: Not on file    [address] in the Last Year: Not on file   Transportation Needs:     Lack of Transportation (Medical): Not on file    Lack of Transportation (Non-Medical):  Not on file   Physical Activity:     Days of Exercise per Week: Not on file    Minutes of Exercise per Session: Not on file   Stress:     Feeling of Stress : Not on file   Social Connections:     Frequency of Communication with Friends and Family: Not on file    Frequency of Social Gatherings with Friends and Family: Not on file    Attends Advent Services: Not on file    Active Member of Clubs or Organizations: Not on file    Attends Club or Organization Meetings: Not on file    Marital Status: Not on file   Intimate Partner Violence:     Fear of Current or Ex-Partner: Not on file    Emotionally Abused: Not on file    Physically Abused: Not on file    Sexually Abused: Not on file   Housing Stability:     Unable to Pay for Housing in the Last Year: Not on file    Number of [place] in the Last Year: Not on file    Unstable Housing in the Last Year: Not on file       Family History   Problem Relation Age of Onset    Heart Disease Mother     Heart Disease Father     Diabetes Sister     Diabetes Brother      No Known Allergies     Home Medications:   (Not in a hospital admission)      Current Inpatient Medications:     Current Facility-Administered Medications   Medication Dose Route Frequency    heparin (porcine) 1,000 unit/mL injection 3,200 Units  3,200 Units Hemodialysis DIALYSIS PRN    ascorbic acid (vitamin C) (VITAMIN C) tablet 500 mg  500 mg Oral BID    aspirin chewable tablet 81 mg  81 mg Oral DAILY    atorvastatin (LIPITOR) tablet 40 mg  40 mg Oral DAILY    benzonatate (TESSALON) capsule 100 mg  100 mg Oral TID PRN    calcitRIOL (ROCALTROL) capsule 0.5 mcg  0.5 mcg Oral DAILY    levothyroxine (SYNTHROID) tablet 150 mcg  150 mcg Oral DAILY    FLUoxetine (PROzac) capsule 10 mg  10 mg Oral DAILY    levETIRAcetam (KEPPRA) tablet 1,000 mg  1,000 mg Oral BID    [START ON 1/10/2022] vancomycin (VANCOCIN) 1,000 mg in 0.9% sodium chloride 250 mL (VIAL-MATE)  1 g IntraVENous DIALYSIS MON, WED & FRI    lacosamide (VIMPAT) tablet 200 mg  200 mg Oral BID    sevelamer carbonate (RENVELA) tab 800 mg  800 mg Oral TID WITH MEALS    zinc oxide-white petrolatum 17-57 % topical paste   Topical PRN    sodium chloride (NS) flush 5-40 mL  5-40 mL IntraVENous Q8H    sodium chloride (NS) flush 5-40 mL  5-40 mL IntraVENous PRN    acetaminophen (TYLENOL) tablet 650 mg  650 mg Oral Q6H PRN    Or    acetaminophen (TYLENOL) suppository 650 mg  650 mg Rectal Q6H PRN    polyethylene glycol (MIRALAX) packet 17 g  17 g Oral DAILY PRN    ondansetron (ZOFRAN ODT) tablet 4 mg  4 mg Oral Q8H PRN    Or    ondansetron (ZOFRAN) injection 4 mg  4 mg IntraVENous Q6H PRN    enoxaparin (LOVENOX) injection 30 mg  30 mg SubCUTAneous DAILY    azithromycin (ZITHROMAX) 500 mg in 0.9% sodium chloride 250 mL (VIAL-MATE)  500 mg IntraVENous Q24H    glucose chewable tablet 16 g  4 Tablet Oral PRN    dextrose (D50W) injection syrg 12.5-25 g  25-50 mL IntraVENous PRN    glucagon (GLUCAGEN) injection 1 mg  1 mg IntraMUSCular PRN    insulin lispro (HUMALOG) injection   SubCUTAneous AC&HS    dexamethasone (PF) (DECADRON) 10 mg/mL injection 4 mg  4 mg IntraVENous Q12H     Current Outpatient Medications   Medication Sig    insulin lispro (HUMALOG) 100 unit/mL injection As per insulin sensitive ssi:    Insulin High Sensitivity (thin, ESRD)  For Blood Sugar (mg/dL) of:              Less than 150 =   0 units  150 -199 =  1 units  200 -249 =   2 units  250 -299 =   3 units  300 -349 =   4 units  350 or greater = 5 units and Call Physician  Initiate Hypoglycemic protocol if blood glucose is <70 mg/dL.  ascorbic acid, vitamin C, (VITAMIN C) 500 mg tablet Take 1 Tablet by mouth two (2) times a day for 30 days.  aspirin 81 mg chewable tablet Take 1 Tablet by mouth daily for 30 days.  benzonatate (TESSALON) 100 mg capsule Take 1 Capsule by mouth three (3) times daily as needed for Cough for up to 7 days.  vancomycin 1,000 mg 1,000 mg, vial-mate 1 Device IVPB 1,000 mg by IntraVENous route DIALYSIS MON, WED & FRI for 6 doses.  predniSONE (DELTASONE) 20 mg tablet Start prednisone 20 mg PO daily x 2 days then 10 mg PO daily x 2 days then stop.  calcitRIOL (ROCALTROL) 0.5 mcg capsule Take 1 Capsule by mouth daily.  zinc oxide-white petrolatum 17-57 % topical paste Apply  to affected area as needed for Skin Irritation. Indications: skin irritation    ondansetron hcl (Zofran) 4 mg tablet Take 1 Tablet by mouth every eight (8) hours as needed for Nausea or Vomiting.  Vimpat 200 mg tab tablet Take 1 Tablet by mouth two (2) times a day.  ondansetron (Zofran ODT) 4 mg disintegrating tablet 1 Tablet by SubLINGual route every eight (8) hours as needed for Nausea or Vomiting.     atorvastatin (LIPITOR) 40 mg tablet Take 1 tablet by mouth once daily for 90 days    FLUoxetine (PROzac) 10 mg capsule Take 1 Capsule by mouth daily for 270 days.  loperamide (IMODIUM) 2 mg capsule TAKE 1 CAPSULE BY MOUTH SIX TIMES DAILY AS NEEDED FOR 30 DAYS    Euthyrox 150 mcg tablet Take 1 tablet by mouth once daily    levETIRAcetam (KEPPRA XR) 500 mg ER tablet TAKE 2 TABLETS BY MOUTH TWICE DAILY FOR 90 DAYS    sevelamer carbonate (RENVELA) 800 mg tab tab        Physical Assessment:     Vitals:    01/08/22 1225 01/08/22 1255 01/08/22 1325 01/08/22 1355   BP: (!) 159/70 (!) 153/75 (!) 164/78 (!) 148/78   Pulse: 61 60 60 60   Resp: 18      Temp: 98.4 °F (36.9 °C)      TempSrc: Axillary      SpO2: 100%      Weight:       Height:         Last 3 Recorded Weights in this Encounter    01/07/22 1711   Weight: 81.6 kg (180 lb)     Admission weight: Weight: 81.6 kg (180 lb) (01/07/22 1711)      Intake/Output Summary (Last 24 hours) at 1/8/2022 1510  Last data filed at 1/8/2022 0116  Gross per 24 hour   Intake 350 ml   Output    Net 350 ml       Patient is in no apparent distress. HEENT: Head is normocephalic and atraumatic. Pupils are round, equal, reactive to light. Sclerae are anicteric. Oropharynx clear. Neck: no cervical lymphadenopathy or thyromegaly. Lungs: good air entry, clear to auscultation bilaterally. Trachea at the midline. Cardiovascular system: S1, S2, regular rate and rhythm. No murmurs, gallops or rubs. No jvd. RT IJ TDC access . Abdomen: soft, non tender, non distended. Positive bowel sounds. No hepatosplenomegaly. No abdominal bruits. Extremities: no clubbing, cyanosis or edema. Strong dorsalis pedis pulses. Brisk capillary refill on the toes bilaterally. Integumentary: skin is grossly intact. Neurologic: Alert, oriented time three. Cooperative and appropriate. No gross motor or sensory deficits. Dialysis access: cuffed tunneled catheter site right. IJ     Data Review:    Labs: Results:       Chemistry Recent Labs     01/07/22  1700   GLU 77      K 3.6    CO2 30   BUN 28*   CREA 2.75*   CA 8.1*   AGAP 4*   BUCR 10*      TP 5.4*   ALB 2.5*   GLOB 2.9   AGRAT 0.9*         CBC w/Diff Recent Labs     01/07/22  1700   WBC 6.5   RBC 3.43*   HGB 10.6*   HCT 35.8   *   GRANS 83*   LYMPH 8*   EOS 0         Iron/Ferritin No results for input(s): IRON in the last 72 hours. No lab exists for component: TIBCCALC   PTH/VIT D No results for input(s): PTH in the last 72 hours. No lab exists for component: VITD            Assessment & Plan     1. ESRD. S/p HD today . Patient unable to inform when her last session was. Tolerated the procedure well . Please dose all medications for  creatinine clearance <15/dialysis. Avoid PICC lines on either arm in order to preserve veins for dialysis access creation. 2. Anemia of ESRD. -    Epogen 10.000 international units  with HD. Will not start IV iron for now as she was recently Dx with MSSA bacteremia   Multivitamin supplementation (Nephrocaps for b-complex folate supplementation)    3. Secondary hyperparathyroidism of ESRD. Will check Serum calcium, Phos, PTH levels. Continue Phosphate binders.      - Renal Diet    4. AMS - ? Related to seizure disorder or sepsis   Blood cultures in ED ,   CTH was normal on admission   Scheduled for MRI to r/o any acute stroke . Avoid Gadolinium due to its association with nephrogenic systemic fibrosis in a patients  ESRD.

## 2022-01-08 NOTE — CONSULTS
Consult    NAME: Robert Rojo   :  1950   MRN:  [id number]     Date/Time:  2022 5:56 AM    Patient PCP: Linn Walker MD  ________________________________________________________________________     Assessment:   Primary cardiologist: Alessandro [facility]amo  (Unknown)    PROBLEM LIST:  1. Incomplete database secondary to the patient being a poor historian  2. COVID-19  3. Seizure disorder  4. Previous cerebrovascular accident (CVA)  5.  Cardiac dysrhythmia       5a. Sick sinus syndrome requiring permanent pacemaker       5b. Atrial fibrillation, currently in sinus rhythm  6. Elevated cardiac enzymes in the indeterminate range  7. Hypertension  8. Dyslipidemia  9. Type 2 diabetes  10. Varicose veins    11. End-stage renal disease-dialysis dependent  12. Hypothyroidism  13. Anxiety  14. Depression  15. Anemia  16. Thrombocytopenia  17. Hypocalcemia        []        High complexity decision making was performed    Subjective:   CHIEF COMPLAINT:     HISTORY OF PRESENT ILLNESS:     I spoke with the patient's son, Liban Kay. He indicates that this 69-year-old  female is followed by Alessandro alamo in [place], [place]. Objective:      Physical Exam:  The patient was neither seen nor examined by me. Data:       Plan:   1. Notify primary cardiologist, I.e.  [facility]   Teodoro Park MD

## 2022-01-08 NOTE — H&P
History and Physical    Subjective:   Chief Complaint : confused since 7pm  Source of information : son at bed side    History of present illness:     68F, H/o ESRD on HD, SSS s/p PPM, DMII on SSI no lantus, seizure, CVA with confusion    She was discharged on 1/3 with acute encephaloapthy s/t MSSA bacteremia s/t HD line, was sent on vancmyinc for 2 weeks, also, had COVID symptomatic treatemt 1/2,    At 7 pm, when daughter went to check on her she was unresponsive and they had to call EMS, son mentions that he went to check on her yesteday and she was fine. She was send to the ER    ER: she is responding to voice and answering miminal basic questions,   Was also placed on 2L NC, troponin elevated 147, CXR cardiomegaly.  No sepsis    Likely had a seizure, triggered by possible bacteremia, new infection,     CT head old infarct    Past Medical History:   Diagnosis Date    Anxiety disorder     Arthritis     Atherosclerosis of native arteries of the extremities with ulceration ([address] 8/31/2020    Atrial fibrillation ([address]     Cardiac pacemaker in situ     Cellulitis and abscess of trunk 8/31/2020    Depression, postpartum     Diabetes ([address]     Heart disease     Heartburn     Hx of long term use of blood thinners     Hypercholesterolemia     Hypertension     Hypothyroidism     Migraines     Peripheral venous insufficiency 8/31/2020    Seizures ([address]     Sleep disorder     Stroke ([address]     2019    Varicose veins of lower extremity with inflammation 8/31/2020     Past Surgical History:   Procedure Laterality Date    HX BACK SURGERY      HX OTHER SURGICAL      leg surgery     HX OTHER SURGICAL      pacemaker monitor     IR INSERT TUNL CVC W/O PORT OVER 5 YR  12/10/2021     Family History   Problem Relation Age of Onset    Heart Disease Mother     Heart Disease Father     Diabetes Sister     Diabetes Brother       Social History     Tobacco Use    Smoking status: Never Smoker    Smokeless tobacco: Never Used   Substance Use Topics    Alcohol use: Never       Prior to Admission medications    Medication Sig Start Date End Date Taking? Authorizing Provider   insulin lispro (HUMALOG) 100 unit/mL injection As per insulin sensitive ssi:    Insulin High Sensitivity (thin, ESRD)  For Blood Sugar (mg/dL) of:              Less than 150 =   0 units  150 -199 =  1 units  200 -249 =   2 units  250 -299 =   3 units  300 -349 =   4 units  350 or greater = 5 units and Call Physician  Initiate Hypoglycemic protocol if blood glucose is <70 mg/dL. 1/3/22   Jagdeep Montenegro MD   ascorbic acid, vitamin C, (VITAMIN C) 500 mg tablet Take 1 Tablet by mouth two (2) times a day for 30 days. 1/3/22 2/2/22  Jagdeep Montenegro MD   aspirin 81 mg chewable tablet Take 1 Tablet by mouth daily for 30 days. 1/3/22 2/2/22  Jagdeep Montenegro MD   benzonatate (TESSALON) 100 mg capsule Take 1 Capsule by mouth three (3) times daily as needed for Cough for up to 7 days. 1/3/22 1/10/22  Jagdeep Montenegro MD   vancomycin 1,000 mg 1,000 mg, vial-mate 1 Device IVPB 1,000 mg by IntraVENous route DIALYSIS MON, WED & FRI for 6 doses. 1/3/22 1/15/22  Jagdeep Montenegro MD   predniSONE (DELTASONE) 20 mg tablet Start prednisone 20 mg PO daily x 2 days then 10 mg PO daily x 2 days then stop. 1/3/22   Jagdeep Montenegro MD   calcitRIOL (ROCALTROL) 0.5 mcg capsule Take 1 Capsule by mouth daily. 12/12/21   nAdrea Felix MD   zinc oxide-white petrolatum 17-57 % topical paste Apply  to affected area as needed for Skin Irritation. Indications: skin irritation 12/11/21   Andrea Felix MD   ondansetron hcl (Zofran) 4 mg tablet Take 1 Tablet by mouth every eight (8) hours as needed for Nausea or Vomiting. 12/11/21   Andrea Felix MD   Vimpat 200 mg tab tablet Take 1 Tablet by mouth two (2) times a day.  11/19/21   Provider, Historical   ondansetron (Zofran ODT) 4 mg disintegrating tablet 1 Tablet by SubLINGual route every eight (8) hours as needed for Nausea or Vomiting. 12/1/21   Arslan Mojica MD   atorvastatin (LIPITOR) 40 mg tablet Take 1 tablet by mouth once daily for 90 days 11/22/21 8/19/22  Fahad Steele MD   FLUoxetine (PROzac) 10 mg capsule Take 1 Capsule by mouth daily for 270 days. 11/5/21 8/2/22  Fahad Steele MD   loperamide (IMODIUM) 2 mg capsule TAKE 1 CAPSULE BY MOUTH SIX TIMES DAILY AS NEEDED FOR 30 DAYS 10/17/21   Doug Barksdale MD   Euthyrox 150 mcg tablet Take 1 tablet by mouth once daily 10/13/21   Jory Cheney MD   levETIRAcetam (KEPPRA XR) 500 mg ER tablet TAKE 2 TABLETS BY MOUTH TWICE DAILY FOR 90 DAYS 6/1/21   Provider, Historical   sevelamer carbonate (RENVELA) 800 mg tab tab  5/10/21   Provider, Historical     No Known Allergies          Review of Systems:  Unable to determine s.t mental status    Vitals:     Visit Vitals  BP (!) 186/62   Pulse 63   Temp 99.8 °F (37.7 °C)   Resp 22   Ht 5' 5\" (1.651 m)   Wt 81.6 kg (180 lb)   SpO2 92%   BMI 29.95 kg/m²       Physical Exam:   Constitutional: Somnolent, opens eyes to name, intermittently following commands, NAD  Eyes: PERRL, no injection or scleral icterus, no discharge  HEENT: NCAT, neck supple, MMM, no oropharyngeal exudates  CV: RRR, no m/r/g  Respiratory: CTAB, no r/r/w  GI: Abd soft, nondistended, nontender  : Deferred  MSK: FROM, no joint effusions or edema  Skin: No rashes  Neuro: Symmetric facies, speech fluent in short phrases at times, eyes open to pain, withdraws all 4 to pain, but mostly not following commands.       Data Review:   Recent Results (from the past 24 hour(s))   CBC WITH AUTOMATED DIFF    Collection Time: 01/07/22  5:00 PM   Result Value Ref Range    WBC 6.5 3.6 - 11.0 K/uL    RBC 3.43 (L) 3.80 - 5.20 M/uL    HGB 10.6 (L) 11.5 - 16.0 g/dL    HCT 35.8 35.0 - 47.0 %    .4 (H) 80.0 - 99.0 FL    MCH 30.9 26.0 - 34.0 PG    MCHC 29.6 (L) 30.0 - 36.5 g/dL    RDW 18.6 (H) 11.5 - 14.5 %    PLATELET 142 (L) 120 - 400 K/uL    MPV 10.7 8.9 - 12.9 FL    NRBC 0.0 0.0  WBC    ABSOLUTE NRBC 0.00 0.00 - 0.01 K/uL    NEUTROPHILS 83 (H) 32 - 75 %    LYMPHOCYTES 8 (L) 12 - 49 %    MONOCYTES 8 5 - 13 %    EOSINOPHILS 0 0 - 7 %    BASOPHILS 0 0 - 1 %    IMMATURE GRANULOCYTES 1 (H) 0 - 0.5 %    ABS. NEUTROPHILS 5.5 1.8 - 8.0 K/UL    ABS. LYMPHOCYTES 0.5 (L) 0.8 - 3.5 K/UL    ABS. MONOCYTES 0.5 0.0 - 1.0 K/UL    ABS. EOSINOPHILS 0.0 0.0 - 0.4 K/UL    ABS. BASOPHILS 0.0 0.0 - 0.1 K/UL    ABS. IMM. GRANS. 0.1 (H) 0.00 - 0.04 K/UL    DF AUTOMATED     TSH 3RD GENERATION    Collection Time: 01/07/22  5:00 PM   Result Value Ref Range    TSH 2.89 0.36 - 3.54 uIU/mL   METABOLIC PANEL, COMPREHENSIVE    Collection Time: 01/07/22  5:00 PM   Result Value Ref Range    Sodium 139 136 - 145 mmol/L    Potassium 3.6 3.5 - 5.1 mmol/L    Chloride 105 97 - 108 mmol/L    CO2 30 21 - 32 mmol/L    Anion gap 4 (L) 5 - 15 mmol/L    Glucose 77 65 - 100 mg/dL    BUN 28 (H) 6 - 20 mg/dL    Creatinine 2.75 (H) 0.55 - 1.02 mg/dL    BUN/Creatinine ratio 10 (L) 12 - 20      GFR est AA 21 (L) >60 ml/min/1.73m2    GFR est non-AA 17 (L) >60 ml/min/1.73m2    Calcium 8.1 (L) 8.5 - 10.1 mg/dL    Bilirubin, total 0.7 0.2 - 1.0 mg/dL    AST (SGOT) 13 (L) 15 - 37 U/L    ALT (SGPT) 15 12 - 78 U/L    Alk.  phosphatase 104 45 - 117 U/L    Protein, total 5.4 (L) 6.4 - 8.2 g/dL    Albumin 2.5 (L) 3.5 - 5.0 g/dL    Globulin 2.9 2.0 - 4.0 g/dL    A-G Ratio 0.9 (L) 1.1 - 2.2     TROPONIN-HIGH SENSITIVITY    Collection Time: 01/07/22  5:00 PM   Result Value Ref Range    Troponin-High Sensitivity 127 (HH) 0 - 51 ng/L   LACTIC ACID    Collection Time: 01/07/22  8:34 PM   Result Value Ref Range    Lactic acid 0.5 0.4 - 2.0 mmol/L   BLOOD GAS, ARTERIAL    Collection Time: 01/07/22  8:34 PM   Result Value Ref Range    pH 7.31 (L) 7.35 - 7.45      PCO2 50 (H) 35 - 45 mmHg    PO2 128 (H) 75 - 100 mmHg    O2 SAT 98 >95 % BICARBONATE 24 22 - 26 mmol/L    BASE DEFICIT 0.9 0 - 2 mmol/L    O2 METHOD RA      SITE Right Radial      ERNESTINE'S TEST Positive               Assessment and Plan :     (1) probable seizure: load with keppra, resume lacosamide. Consult neurology for input. (2) Acute encephalopathy: GCS 13: likely a combination of seixure, new infection and COVID. (3) Acute hypoxic respiratory failure: 2L NC, wean to keep saturation > 92%. ABG.     (4) COVID-19 pneumonia: decadron and azithromycin. Zinc. Repeat covid    (3) MSSA bacteremia: vancoomycin for 2 weeks. Has h/p C-diff in 2019    (4) ESRD on HD: consult nephrology    (5) CVA: old infarct.     (6) NSTEMI type II: repeat troponin, cardiology consult      DVT ppx: heparin subQ    DISPO: rehab when alert and oriented and on room air    Signed By: Hawa Chadwick MD     January 7, 2022

## 2022-01-09 ENCOUNTER — APPOINTMENT (OUTPATIENT)
Dept: NON INVASIVE DIAGNOSTICS | Age: 72
DRG: 177 | End: 2022-01-09
Attending: HOSPITALIST
Payer: MEDICARE

## 2022-01-09 LAB
ALBUMIN SERPL-MCNC: 2.3 G/DL (ref 3.5–5)
ANION GAP SERPL CALC-SCNC: 6 MMOL/L (ref 5–15)
BUN SERPL-MCNC: 24 MG/DL (ref 6–20)
BUN/CREAT SERPL: 10 (ref 12–20)
CA-I BLD-MCNC: 7.9 MG/DL (ref 8.5–10.1)
CHLORIDE SERPL-SCNC: 105 MMOL/L (ref 97–108)
CO2 SERPL-SCNC: 30 MMOL/L (ref 21–32)
CREAT SERPL-MCNC: 2.31 MG/DL (ref 0.55–1.02)
CRP SERPL-MCNC: 0.98 MG/DL (ref 0–0.6)
ECHO AO ROOT DIAM: 3.4 CM
ECHO AO ROOT INDEX: 1.89 CM/M2
ECHO AV PEAK GRADIENT: 15 MMHG
ECHO AV PEAK VELOCITY: 1.9 M/S
ECHO AV VELOCITY RATIO: 0.84
ECHO EST RA PRESSURE: 3 MMHG
ECHO LA DIAMETER INDEX: 2.5 CM/M2
ECHO LA DIAMETER: 4.5 CM
ECHO LA TO AORTIC ROOT RATIO: 1.32
ECHO LV E' LATERAL VELOCITY: 8 CM/S
ECHO LV E' SEPTAL VELOCITY: 6 CM/S
ECHO LV EJECTION FRACTION BIPLANE: 65 % (ref 55–100)
ECHO LV FRACTIONAL SHORTENING: 37 % (ref 28–44)
ECHO LV INTERNAL DIMENSION DIASTOLE INDEX: 2.72 CM/M2
ECHO LV INTERNAL DIMENSION DIASTOLIC: 4.9 CM (ref 3.9–5.3)
ECHO LV INTERNAL DIMENSION SYSTOLIC INDEX: 1.72 CM/M2
ECHO LV INTERNAL DIMENSION SYSTOLIC: 3.1 CM
ECHO LV IVSD: 1.1 CM (ref 0.6–0.9)
ECHO LV MASS 2D: 200.5 G (ref 67–162)
ECHO LV MASS INDEX 2D: 111.4 G/M2 (ref 43–95)
ECHO LV POSTERIOR WALL DIASTOLIC: 1.1 CM (ref 0.6–0.9)
ECHO LV RELATIVE WALL THICKNESS RATIO: 0.45
ECHO LVOT PEAK GRADIENT: 10 MMHG
ECHO LVOT PEAK VELOCITY: 1.6 M/S
ECHO MV A VELOCITY: 0.39 M/S
ECHO MV E DECELERATION TIME (DT): 208 MS
ECHO MV E VELOCITY: 1.33 M/S
ECHO MV E/A RATIO: 3.41
ECHO MV E/E' LATERAL: 16.63
ECHO MV E/E' RATIO (AVERAGED): 19.4
ECHO MV E/E' SEPTAL: 22.17
ECHO MV REGURGITANT PEAK GRADIENT: 64 MMHG
ECHO MV REGURGITANT PEAK VELOCITY: 4 M/S
ECHO PV MAX VELOCITY: 1.3 M/S
ECHO PV PEAK GRADIENT: 6 MMHG
ECHO RIGHT VENTRICULAR SYSTOLIC PRESSURE (RVSP): 41 MMHG
ECHO RV INTERNAL DIMENSION: 2.7 CM
ECHO RV TAPSE: 2 CM (ref 1.5–2)
ECHO TV REGURGITANT MAX VELOCITY: 3.07 M/S
ECHO TV REGURGITANT PEAK GRADIENT: 38 MMHG
ERYTHROCYTE [DISTWIDTH] IN BLOOD BY AUTOMATED COUNT: 18.4 % (ref 11.5–14.5)
FERRITIN SERPL-MCNC: 708 NG/ML (ref 8–252)
GLUCOSE BLD STRIP.AUTO-MCNC: 104 MG/DL (ref 65–117)
GLUCOSE BLD STRIP.AUTO-MCNC: 80 MG/DL (ref 65–117)
GLUCOSE BLD STRIP.AUTO-MCNC: 82 MG/DL (ref 65–117)
GLUCOSE BLD STRIP.AUTO-MCNC: 86 MG/DL (ref 65–117)
GLUCOSE SERPL-MCNC: 81 MG/DL (ref 65–100)
HCT VFR BLD AUTO: 32.6 % (ref 35–47)
HGB BLD-MCNC: 9.6 G/DL (ref 11.5–16)
LDH SERPL L TO P-CCNC: 156 U/L (ref 81–246)
MCH RBC QN AUTO: 30.9 PG (ref 26–34)
MCHC RBC AUTO-ENTMCNC: 29.4 G/DL (ref 30–36.5)
MCV RBC AUTO: 104.8 FL (ref 80–99)
NRBC # BLD: 0 K/UL (ref 0–0.01)
NRBC BLD-RTO: 0 PER 100 WBC
PERFORMED BY, TECHID: NORMAL
PHOSPHATE SERPL-MCNC: 3.5 MG/DL (ref 2.6–4.7)
PLATELET # BLD AUTO: 100 K/UL (ref 150–400)
PMV BLD AUTO: 11 FL (ref 8.9–12.9)
POTASSIUM SERPL-SCNC: 3.6 MMOL/L (ref 3.5–5.1)
PROCALCITONIN SERPL-MCNC: 0.77 NG/ML
RBC # BLD AUTO: 3.11 M/UL (ref 3.8–5.2)
SODIUM SERPL-SCNC: 141 MMOL/L (ref 136–145)
TROPONIN-HIGH SENSITIVITY: 62 NG/L (ref 0–51)
WBC # BLD AUTO: 5.2 K/UL (ref 3.6–11)

## 2022-01-09 PROCEDURE — 84145 PROCALCITONIN (PCT): CPT

## 2022-01-09 PROCEDURE — 74011250637 HC RX REV CODE- 250/637: Performed by: HOSPITALIST

## 2022-01-09 PROCEDURE — 74011000250 HC RX REV CODE- 250: Performed by: HOSPITALIST

## 2022-01-09 PROCEDURE — 86140 C-REACTIVE PROTEIN: CPT

## 2022-01-09 PROCEDURE — 80069 RENAL FUNCTION PANEL: CPT

## 2022-01-09 PROCEDURE — 84484 ASSAY OF TROPONIN QUANT: CPT

## 2022-01-09 PROCEDURE — 36415 COLL VENOUS BLD VENIPUNCTURE: CPT

## 2022-01-09 PROCEDURE — 65270000029 HC RM PRIVATE

## 2022-01-09 PROCEDURE — 99232 SBSQ HOSP IP/OBS MODERATE 35: CPT | Performed by: INTERNAL MEDICINE

## 2022-01-09 PROCEDURE — 74011250637 HC RX REV CODE- 250/637: Performed by: INTERNAL MEDICINE

## 2022-01-09 PROCEDURE — 85027 COMPLETE CBC AUTOMATED: CPT

## 2022-01-09 PROCEDURE — 82728 ASSAY OF FERRITIN: CPT

## 2022-01-09 PROCEDURE — 74011250636 HC RX REV CODE- 250/636: Performed by: HOSPITALIST

## 2022-01-09 PROCEDURE — 82962 GLUCOSE BLOOD TEST: CPT

## 2022-01-09 PROCEDURE — 83615 LACTATE (LD) (LDH) ENZYME: CPT

## 2022-01-09 PROCEDURE — 93306 TTE W/DOPPLER COMPLETE: CPT

## 2022-01-09 RX ORDER — CLONIDINE HYDROCHLORIDE 0.1 MG/1
0.2 TABLET ORAL
Status: COMPLETED | OUTPATIENT
Start: 2022-01-09 | End: 2022-01-09

## 2022-01-09 RX ORDER — LORAZEPAM 2 MG/ML
2 INJECTION INTRAMUSCULAR
Status: DISCONTINUED | OUTPATIENT
Start: 2022-01-09 | End: 2022-01-11 | Stop reason: HOSPADM

## 2022-01-09 RX ORDER — OXYCODONE AND ACETAMINOPHEN 5; 325 MG/1; MG/1
1 TABLET ORAL
Status: DISCONTINUED | OUTPATIENT
Start: 2022-01-09 | End: 2022-01-11 | Stop reason: HOSPADM

## 2022-01-09 RX ORDER — AMLODIPINE BESYLATE 5 MG/1
5 TABLET ORAL DAILY
Status: DISCONTINUED | OUTPATIENT
Start: 2022-01-09 | End: 2022-01-11 | Stop reason: HOSPADM

## 2022-01-09 RX ORDER — CLONIDINE HYDROCHLORIDE 0.1 MG/1
0.2 TABLET ORAL
Status: DISCONTINUED | OUTPATIENT
Start: 2022-01-09 | End: 2022-01-11 | Stop reason: HOSPADM

## 2022-01-09 RX ADMIN — ENOXAPARIN SODIUM 30 MG: 100 INJECTION SUBCUTANEOUS at 11:30

## 2022-01-09 RX ADMIN — SODIUM CHLORIDE, PRESERVATIVE FREE 10 ML: 5 INJECTION INTRAVENOUS at 11:31

## 2022-01-09 RX ADMIN — CALCITRIOL CAPSULES 0.25 MCG 0.5 MCG: 0.25 CAPSULE ORAL at 11:28

## 2022-01-09 RX ADMIN — LEVOTHYROXINE SODIUM 150 MCG: 0.07 TABLET ORAL at 11:29

## 2022-01-09 RX ADMIN — SODIUM CHLORIDE, PRESERVATIVE FREE 10 ML: 5 INJECTION INTRAVENOUS at 22:00

## 2022-01-09 RX ADMIN — AMLODIPINE BESYLATE 5 MG: 5 TABLET ORAL at 11:28

## 2022-01-09 RX ADMIN — OXYCODONE HYDROCHLORIDE AND ACETAMINOPHEN 500 MG: 500 TABLET ORAL at 11:28

## 2022-01-09 RX ADMIN — SODIUM CHLORIDE, PRESERVATIVE FREE 10 ML: 5 INJECTION INTRAVENOUS at 16:30

## 2022-01-09 RX ADMIN — LACOSAMIDE 200 MG: 200 TABLET, FILM COATED ORAL at 11:27

## 2022-01-09 RX ADMIN — FLUOXETINE 10 MG: 10 CAPSULE ORAL at 11:30

## 2022-01-09 RX ADMIN — BENZONATATE 100 MG: 100 CAPSULE ORAL at 11:26

## 2022-01-09 RX ADMIN — ASPIRIN 81 MG CHEWABLE TABLET 81 MG: 81 TABLET CHEWABLE at 11:27

## 2022-01-09 RX ADMIN — SEVELAMER CARBONATE 800 MG: 800 TABLET, FILM COATED ORAL at 17:23

## 2022-01-09 RX ADMIN — OXYCODONE HYDROCHLORIDE AND ACETAMINOPHEN 1 TABLET: 5; 325 TABLET ORAL at 02:49

## 2022-01-09 RX ADMIN — SEVELAMER CARBONATE 800 MG: 800 TABLET, FILM COATED ORAL at 11:27

## 2022-01-09 RX ADMIN — CLONIDINE HYDROCHLORIDE 0.2 MG: 0.1 TABLET ORAL at 02:49

## 2022-01-09 RX ADMIN — ATORVASTATIN CALCIUM 40 MG: 40 TABLET, FILM COATED ORAL at 11:27

## 2022-01-09 RX ADMIN — LEVETIRACETAM 1000 MG: 500 TABLET, FILM COATED ORAL at 11:27

## 2022-01-09 NOTE — PROGRESS NOTES
Hospitalist Progress Note    Subjective:   Daily Progress Note: 1/9/2022 9:37 AM    Generalized weakness and right arm weakness    Current Facility-Administered Medications   Medication Dose Route Frequency    cloNIDine HCL (CATAPRES) tablet 0.2 mg  0.2 mg Oral Q6H PRN    oxyCODONE-acetaminophen (PERCOCET) 5-325 mg per tablet 1 Tablet  1 Tablet Oral Q6H PRN    LORazepam (ATIVAN) injection 2 mg  2 mg IntraVENous Q6H PRN    amLODIPine (NORVASC) tablet 5 mg  5 mg Oral DAILY    heparin (porcine) 1,000 unit/mL injection 3,200 Units  3,200 Units Hemodialysis DIALYSIS PRN    vancomycin dose per pharmacy protocol   Other Rx Dosing/Monitoring    hydrALAZINE (APRESOLINE) 20 mg/mL injection 10 mg  10 mg IntraVENous Q6H PRN    ascorbic acid (vitamin C) (VITAMIN C) tablet 500 mg  500 mg Oral BID    aspirin chewable tablet 81 mg  81 mg Oral DAILY    atorvastatin (LIPITOR) tablet 40 mg  40 mg Oral DAILY    benzonatate (TESSALON) capsule 100 mg  100 mg Oral TID PRN    calcitRIOL (ROCALTROL) capsule 0.5 mcg  0.5 mcg Oral DAILY    levothyroxine (SYNTHROID) tablet 150 mcg  150 mcg Oral DAILY    FLUoxetine (PROzac) capsule 10 mg  10 mg Oral DAILY    levETIRAcetam (KEPPRA) tablet 1,000 mg  1,000 mg Oral BID    lacosamide (VIMPAT) tablet 200 mg  200 mg Oral BID    sevelamer carbonate (RENVELA) tab 800 mg  800 mg Oral TID WITH MEALS    zinc oxide-white petrolatum 17-57 % topical paste   Topical PRN    sodium chloride (NS) flush 5-40 mL  5-40 mL IntraVENous Q8H    sodium chloride (NS) flush 5-40 mL  5-40 mL IntraVENous PRN    acetaminophen (TYLENOL) tablet 650 mg  650 mg Oral Q6H PRN    Or    acetaminophen (TYLENOL) suppository 650 mg  650 mg Rectal Q6H PRN    polyethylene glycol (MIRALAX) packet 17 g  17 g Oral DAILY PRN    ondansetron (ZOFRAN ODT) tablet 4 mg  4 mg Oral Q8H PRN    Or    ondansetron (ZOFRAN) injection 4 mg  4 mg IntraVENous Q6H PRN    enoxaparin (LOVENOX) injection 30 mg  30 mg SubCUTAneous DAILY    glucose chewable tablet 16 g  4 Tablet Oral PRN    dextrose (D50W) injection syrg 12.5-25 g  25-50 mL IntraVENous PRN    glucagon (GLUCAGEN) injection 1 mg  1 mg IntraMUSCular PRN    insulin lispro (HUMALOG) injection   SubCUTAneous AC&HS        Review of Systems  Review of Systems   Constitutional: Positive for malaise/fatigue. Negative for chills and fever. HENT: Negative. Respiratory: Negative for cough and shortness of breath. Cardiovascular: Negative for chest pain and leg swelling. Gastrointestinal: Negative for abdominal pain, nausea and vomiting. Genitourinary: Negative. Musculoskeletal: Negative. Skin: Negative. Neurological: Positive for weakness. Psychiatric/Behavioral: Negative. Objective:     Visit Vitals  BP (!) 149/78   Pulse 60   Temp 98 °F (36.7 °C)   Resp 22   Ht 5' 5\" (1.651 m)   Wt 72.7 kg (160 lb 4.4 oz)   SpO2 98%   BMI 26.67 kg/m²      O2 Device: None (Room air)    Temp (24hrs), Av.2 °F (36.8 °C), Min:98 °F (36.7 °C), Max:98.4 °F (36.9 °C)      No intake/output data recorded.    1901 -  0700  In: 600 [I.V.:600]  Out: 2520     Recent Results (from the past 24 hour(s))   URINALYSIS W/ REFLEX CULTURE    Collection Time: 22 10:25 AM    Specimen: Urine   Result Value Ref Range    Color Yellow/Straw      Appearance Clear Clear      Specific gravity 1.012 1.003 - 1.030      pH (UA) 5.0 5.0 - 8.0      Protein 30 (A) Negative mg/dL    Glucose Negative Negative mg/dL    Ketone Negative Negative mg/dL    Bilirubin Negative Negative      Blood Negative Negative      Urobilinogen 0.1 0.1 - 1.0 EU/dL    Nitrites Negative Negative      Leukocyte Esterase Negative Negative      UA:UC IF INDICATED Culture not indicated by UA result Culture not indicated by UA result      WBC 0-4 0 - 4 /hpf    RBC 0-5 0 - 5 /hpf    Bacteria Negative Negative /hpf   GLUCOSE, POC    Collection Time: 22 10:49 AM   Result Value Ref Range    Glucose (POC) 82 65 - 117 mg/dL    Performed by ADAM COREY    GLUCOSE, POC    Collection Time: 01/08/22  5:38 PM   Result Value Ref Range    Glucose (POC) 73 65 - 117 mg/dL    Performed by Francoise 1, POC    Collection Time: 01/08/22 10:36 PM   Result Value Ref Range    Glucose (POC) 78 65 - 117 mg/dL    Performed by Ethan Wagner    CBC W/O DIFF    Collection Time: 01/09/22  7:30 AM   Result Value Ref Range    WBC 5.2 3.6 - 11.0 K/uL    RBC 3.11 (L) 3.80 - 5.20 M/uL    HGB 9.6 (L) 11.5 - 16.0 g/dL    HCT 32.6 (L) 35.0 - 47.0 %    .8 (H) 80.0 - 99.0 FL    MCH 30.9 26.0 - 34.0 PG    MCHC 29.4 (L) 30.0 - 36.5 g/dL    RDW 18.4 (H) 11.5 - 14.5 %    PLATELET 080 (L) 437 - 400 K/uL    MPV 11.0 8.9 - 12.9 FL    NRBC 0.0 0.0  WBC    ABSOLUTE NRBC 0.00 0.00 - 0.01 K/uL   C REACTIVE PROTEIN, QT    Collection Time: 01/09/22  7:30 AM   Result Value Ref Range    C-Reactive protein 0.98 (H) 0.00 - 0.60 mg/dL   LD    Collection Time: 01/09/22  7:30 AM   Result Value Ref Range     81 - 246 U/L   GLUCOSE, POC    Collection Time: 01/09/22  8:32 AM   Result Value Ref Range    Glucose (POC) 80 65 - 117 mg/dL    Performed by Edgar Martell         CT HEAD WO CONT   Final Result   Old ischemic disease. No acute intracranial abnormality. XR CHEST PORT   Final Result           PHYSICAL EXAM:    Physical Exam  Vitals reviewed. HENT:      Head: Normocephalic and atraumatic. Mouth/Throat:      Mouth: Mucous membranes are moist.      Pharynx: Oropharynx is clear. Eyes:      Conjunctiva/sclera: Conjunctivae normal.   Cardiovascular:      Rate and Rhythm: Regular rhythm. Heart sounds: Normal heart sounds. Pulmonary:      Breath sounds: Normal breath sounds. Abdominal:      General: Abdomen is flat. Bowel sounds are normal.      Palpations: Abdomen is soft. Musculoskeletal:         General: Normal range of motion. Cervical back: Normal range of motion and neck supple.    Skin:     General: Skin is warm and dry. Neurological:      Mental Status: She is alert and oriented to person, place, and time.       Comments: Right arm weakness although improving, patient able to move her right arm with flexion and extension although wrist weakness still present   Psychiatric:         Mood and Affect: Mood normal.          Data Review    Recent Results (from the past 24 hour(s))   URINALYSIS W/ REFLEX CULTURE    Collection Time: 01/08/22 10:25 AM    Specimen: Urine   Result Value Ref Range    Color Yellow/Straw      Appearance Clear Clear      Specific gravity 1.012 1.003 - 1.030      pH (UA) 5.0 5.0 - 8.0      Protein 30 (A) Negative mg/dL    Glucose Negative Negative mg/dL    Ketone Negative Negative mg/dL    Bilirubin Negative Negative      Blood Negative Negative      Urobilinogen 0.1 0.1 - 1.0 EU/dL    Nitrites Negative Negative      Leukocyte Esterase Negative Negative      UA:UC IF INDICATED Culture not indicated by UA result Culture not indicated by UA result      WBC 0-4 0 - 4 /hpf    RBC 0-5 0 - 5 /hpf    Bacteria Negative Negative /hpf   GLUCOSE, POC    Collection Time: 01/08/22 10:49 AM   Result Value Ref Range    Glucose (POC) 82 65 - 117 mg/dL    Performed by ADAM COREY    GLUCOSE, POC    Collection Time: 01/08/22  5:38 PM   Result Value Ref Range    Glucose (POC) 73 65 - 117 mg/dL    Performed by Francoise 1, POC    Collection Time: 01/08/22 10:36 PM   Result Value Ref Range    Glucose (POC) 78 65 - 117 mg/dL    Performed by SWATI FRAZIER    CBC W/O DIFF    Collection Time: 01/09/22  7:30 AM   Result Value Ref Range    WBC 5.2 3.6 - 11.0 K/uL    RBC 3.11 (L) 3.80 - 5.20 M/uL    HGB 9.6 (L) 11.5 - 16.0 g/dL    HCT 32.6 (L) 35.0 - 47.0 %    .8 (H) 80.0 - 99.0 FL    MCH 30.9 26.0 - 34.0 PG    MCHC 29.4 (L) 30.0 - 36.5 g/dL    RDW 18.4 (H) 11.5 - 14.5 %    PLATELET 159 (L) 075 - 400 K/uL    MPV 11.0 8.9 - 12.9 FL    NRBC 0.0 0.0  WBC    ABSOLUTE NRBC 0.00 0.00 - 0.01 K/uL C REACTIVE PROTEIN, QT    Collection Time: 01/09/22  7:30 AM   Result Value Ref Range    C-Reactive protein 0.98 (H) 0.00 - 0.60 mg/dL   LD    Collection Time: 01/09/22  7:30 AM   Result Value Ref Range     81 - 246 U/L   GLUCOSE, POC    Collection Time: 01/09/22  8:32 AM   Result Value Ref Range    Glucose (POC) 80 65 - 117 mg/dL    Performed by Shell Mccall         Assessment/Plan:     Active Problems:    Sepsis ([address] (1/7/2022)      Hospital course: 78-year-old female admitted on 1/7/2022 after suffering a seizure-like episode with 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 Neurology consultation. Patient with a recent history of COVID-19 and continuing to suffer from generalized weakness. Patient was found to be bacteremic on last admission with staph aureus susceptible to vancomycin. Patient has end-stage renal disease currently receiving hemodialysis as well as vancomycin for 2 weeks. ID consultation and repeat cultures pending. Impression\plan:    1. Acute encephalopathy  Resolved    2. Seizure disorder  Continue Keppra  Continue lacosamide  Neurology consultation  CT of the head with no acute process  Residual right arm weakness secondary to Ruddy's paralysis    3. History of staph aureus bacteremia  Continue vancomycin  ID consultation  Blood cultures pending  No white count evident or febrile illness  Elevated procalcitonin    4. End-stage renal disease  Renal consultation  Continue hemodialysis as previously scheduled, Tuesday, Thursday and Saturday per nephrology    5. Hypothyroidism  TSH normal  Continue levothyroxine    6. Elevated troponin  Most likely type II  Trending down appropriately  Cardiac consultation  Continue aspirin    7.   COVID-19 positive  Stable      Status: Full code    DVT prophylaxis: Heparin  Ulcer prophylaxis: Not indicated    Discharge barriers: Clearance from cardiology, neurological clearance and repeat \"blood cultures     care Plan discussed with: Patient/Family    Total time spent with patient: >35 minutes.

## 2022-01-09 NOTE — PROGRESS NOTES
Comprehensive Nutrition Assessment    Type and Reason for Visit: Initial,Consult (low efren)    Nutrition Recommendations/Plan:   Continue current Cardiac diet  Add Ensure Enlive BID      Nutrition Assessment:  Admitted for confusion, weakness, +COVID-19. Neuro following for seizures. Pt A&Ox2, inappropriate for interview. No intakes yet documented, RD unable to speak with RN, called x2. Will add Ensure Enlive BID and f/u for tolerance/need of ONS. Labs: H/H 9.6/32.6, BUN 24, Cr 2.31. Meds: Vitamin C, Calcitriol, prozac, keppra, levothyroxine, oxycodone, renvela, vancomycin. Malnutrition Assessment:  Malnutrition Status:  Insufficient data        Estimated Daily Nutrient Needs:  Energy (kcal): 1818kcals (25kcals/kg); Weight Used for Energy Requirements: Current  Protein (g): 87g (1.2g/kg); Weight Used for Protein Requirements: Current  Fluid (ml/day): 1500ml; Method Used for Fluid Requirements: Standard renal    Nutrition Related Findings:  Unable to complete NFPE d/t precautions. RN bedside swallow finding no issues with C/S. No N/V/D/C, last BM pta. No edema. Wounds:    (Non-blanchable redness to gluteal fold/cleft)       Current Nutrition Therapies:  ADULT DIET Regular; Low Fat/Low Chol/High Fiber/ROYAL    Anthropometric Measures:  · Height:  5' 5\" (165.1 cm)  · Current Body Wt:  72.7 kg (160 lb 4.4 oz) (1/9)  · Admission Body Wt:  160 lb 4.4 oz (1/9)    · Usual Body Wt:   (angella)     · Ideal Body Wt:  125 lbs:  128.2 %   · BMI Category: Overweight (BMI 25.0-29. 9)       Nutrition Diagnosis:   No nutrition diagnosis at this time     Nutrition Interventions:   Food and/or Nutrient Delivery: Continue current diet,Start oral nutrition supplement  Nutrition Education and Counseling: No recommendations at this time  Coordination of Nutrition Care: Continue to monitor while inpatient    Goals:  Intakes >/=50% of EENs in >5 days, Wt maintenance within +/-0.5kg in >5 days       Nutrition Monitoring and Evaluation:   Behavioral-Environmental Outcomes: None identified  Food/Nutrient Intake Outcomes: Food and nutrient intake,Supplement intake  Physical Signs/Symptoms Outcomes: Weight,Skin    Discharge Planning:     Too soon to determine     Electronically signed by [place] on 1/9/2022 at 9:52 AM    Contact: Ext [phone], or via [employer]

## 2022-01-09 NOTE — PROGRESS NOTES
Hospitalist Progress Note    Subjective:   Daily Progress Note: 1/9/2022 9:37 AM    Generalized weakness and right arm weakness    Current Facility-Administered Medications   Medication Dose Route Frequency    cloNIDine HCL (CATAPRES) tablet 0.2 mg  0.2 mg Oral Q6H PRN    oxyCODONE-acetaminophen (PERCOCET) 5-325 mg per tablet 1 Tablet  1 Tablet Oral Q6H PRN    heparin (porcine) 1,000 unit/mL injection 3,200 Units  3,200 Units Hemodialysis DIALYSIS PRN    vancomycin dose per pharmacy protocol   Other Rx Dosing/Monitoring    hydrALAZINE (APRESOLINE) 20 mg/mL injection 10 mg  10 mg IntraVENous Q6H PRN    ascorbic acid (vitamin C) (VITAMIN C) tablet 500 mg  500 mg Oral BID    aspirin chewable tablet 81 mg  81 mg Oral DAILY    atorvastatin (LIPITOR) tablet 40 mg  40 mg Oral DAILY    benzonatate (TESSALON) capsule 100 mg  100 mg Oral TID PRN    calcitRIOL (ROCALTROL) capsule 0.5 mcg  0.5 mcg Oral DAILY    levothyroxine (SYNTHROID) tablet 150 mcg  150 mcg Oral DAILY    FLUoxetine (PROzac) capsule 10 mg  10 mg Oral DAILY    levETIRAcetam (KEPPRA) tablet 1,000 mg  1,000 mg Oral BID    lacosamide (VIMPAT) tablet 200 mg  200 mg Oral BID    sevelamer carbonate (RENVELA) tab 800 mg  800 mg Oral TID WITH MEALS    zinc oxide-white petrolatum 17-57 % topical paste   Topical PRN    sodium chloride (NS) flush 5-40 mL  5-40 mL IntraVENous Q8H    sodium chloride (NS) flush 5-40 mL  5-40 mL IntraVENous PRN    acetaminophen (TYLENOL) tablet 650 mg  650 mg Oral Q6H PRN    Or    acetaminophen (TYLENOL) suppository 650 mg  650 mg Rectal Q6H PRN    polyethylene glycol (MIRALAX) packet 17 g  17 g Oral DAILY PRN    ondansetron (ZOFRAN ODT) tablet 4 mg  4 mg Oral Q8H PRN    Or    ondansetron (ZOFRAN) injection 4 mg  4 mg IntraVENous Q6H PRN    enoxaparin (LOVENOX) injection 30 mg  30 mg SubCUTAneous DAILY    glucose chewable tablet 16 g  4 Tablet Oral PRN    dextrose (D50W) injection syrg 12.5-25 g  25-50 mL IntraVENous PRN    glucagon (GLUCAGEN) injection 1 mg  1 mg IntraMUSCular PRN    insulin lispro (HUMALOG) injection   SubCUTAneous AC&HS        Review of Systems  Review of Systems   Constitutional: Positive for malaise/fatigue. Negative for chills and fever. HENT: Negative. Respiratory: Negative for cough and shortness of breath. Cardiovascular: Negative for chest pain and leg swelling. Gastrointestinal: Negative for abdominal pain, nausea and vomiting. Genitourinary: Negative. Musculoskeletal: Negative. Skin: Negative. Neurological: Positive for weakness. Psychiatric/Behavioral: Negative. Objective:     Visit Vitals  BP (!) 149/78   Pulse 60   Temp 98 °F (36.7 °C)   Resp 22   Ht 5' 5\" (1.651 m)   Wt 72.7 kg (160 lb 4.4 oz)   SpO2 98%   BMI 26.67 kg/m²      O2 Device: None (Room air)    Temp (24hrs), Av.2 °F (36.8 °C), Min:98 °F (36.7 °C), Max:98.4 °F (36.9 °C)      No intake/output data recorded.    1901 -  0700  In: 600 [I.V.:600]  Out: 2520     Recent Results (from the past 24 hour(s))   URINALYSIS W/ REFLEX CULTURE    Collection Time: 22 10:25 AM    Specimen: Urine   Result Value Ref Range    Color Yellow/Straw      Appearance Clear Clear      Specific gravity 1.012 1.003 - 1.030      pH (UA) 5.0 5.0 - 8.0      Protein 30 (A) Negative mg/dL    Glucose Negative Negative mg/dL    Ketone Negative Negative mg/dL    Bilirubin Negative Negative      Blood Negative Negative      Urobilinogen 0.1 0.1 - 1.0 EU/dL    Nitrites Negative Negative      Leukocyte Esterase Negative Negative      UA:UC IF INDICATED Culture not indicated by UA result Culture not indicated by UA result      WBC 0-4 0 - 4 /hpf    RBC 0-5 0 - 5 /hpf    Bacteria Negative Negative /hpf   GLUCOSE, POC    Collection Time: 22 10:49 AM   Result Value Ref Range    Glucose (POC) 82 65 - 117 mg/dL    Performed by Mumtaz Garcia, POC    Collection Time: 22  5:38 PM   Result Value Ref Range    Glucose (POC) 73 65 - 117 mg/dL    Performed by Francoise 1, POC    Collection Time: 01/08/22 10:36 PM   Result Value Ref Range    Glucose (POC) 78 65 - 117 mg/dL    Performed by Mague Mcarthur    CBC W/O DIFF    Collection Time: 01/09/22  7:30 AM   Result Value Ref Range    WBC 5.2 3.6 - 11.0 K/uL    RBC 3.11 (L) 3.80 - 5.20 M/uL    HGB 9.6 (L) 11.5 - 16.0 g/dL    HCT 32.6 (L) 35.0 - 47.0 %    .8 (H) 80.0 - 99.0 FL    MCH 30.9 26.0 - 34.0 PG    MCHC 29.4 (L) 30.0 - 36.5 g/dL    RDW 18.4 (H) 11.5 - 14.5 %    PLATELET 196 (L) 992 - 400 K/uL    MPV 11.0 8.9 - 12.9 FL    NRBC 0.0 0.0  WBC    ABSOLUTE NRBC 0.00 0.00 - 0.01 K/uL   GLUCOSE, POC    Collection Time: 01/09/22  8:32 AM   Result Value Ref Range    Glucose (POC) 80 65 - 117 mg/dL    Performed by Greg Paredes         CT HEAD WO CONT   Final Result   Old ischemic disease. No acute intracranial abnormality. XR CHEST PORT   Final Result           PHYSICAL EXAM:    Physical Exam  Vitals reviewed. HENT:      Head: Normocephalic and atraumatic. Mouth/Throat:      Mouth: Mucous membranes are moist.      Pharynx: Oropharynx is clear. Eyes:      Conjunctiva/sclera: Conjunctivae normal.   Cardiovascular:      Rate and Rhythm: Regular rhythm. Heart sounds: Normal heart sounds. Pulmonary:      Breath sounds: Normal breath sounds. Abdominal:      General: Abdomen is flat. Bowel sounds are normal.      Palpations: Abdomen is soft. Musculoskeletal:         General: Normal range of motion. Cervical back: Normal range of motion and neck supple. Skin:     General: Skin is warm and dry. Neurological:      Mental Status: She is alert and oriented to person, place, and time.       Comments: Right arm weakness although improving, patient able to move her right arm with flexion and extension although wrist weakness still present   Psychiatric:         Mood and Affect: Mood normal.          Data Review    Recent Results (from the past 24 hour(s))   URINALYSIS W/ REFLEX CULTURE    Collection Time: 01/08/22 10:25 AM    Specimen: Urine   Result Value Ref Range    Color Yellow/Straw      Appearance Clear Clear      Specific gravity 1.012 1.003 - 1.030      pH (UA) 5.0 5.0 - 8.0      Protein 30 (A) Negative mg/dL    Glucose Negative Negative mg/dL    Ketone Negative Negative mg/dL    Bilirubin Negative Negative      Blood Negative Negative      Urobilinogen 0.1 0.1 - 1.0 EU/dL    Nitrites Negative Negative      Leukocyte Esterase Negative Negative      UA:UC IF INDICATED Culture not indicated by UA result Culture not indicated by UA result      WBC 0-4 0 - 4 /hpf    RBC 0-5 0 - 5 /hpf    Bacteria Negative Negative /hpf   GLUCOSE, POC    Collection Time: 01/08/22 10:49 AM   Result Value Ref Range    Glucose (POC) 82 65 - 117 mg/dL    Performed by ADAM COREY    GLUCOSE, POC    Collection Time: 01/08/22  5:38 PM   Result Value Ref Range    Glucose (POC) 73 65 - 117 mg/dL    Performed by Beatrice Landau    GLUCOSE, POC    Collection Time: 01/08/22 10:36 PM   Result Value Ref Range    Glucose (POC) 78 65 - 117 mg/dL    Performed by SWATI FRAZIER    CBC W/O DIFF    Collection Time: 01/09/22  7:30 AM   Result Value Ref Range    WBC 5.2 3.6 - 11.0 K/uL    RBC 3.11 (L) 3.80 - 5.20 M/uL    HGB 9.6 (L) 11.5 - 16.0 g/dL    HCT 32.6 (L) 35.0 - 47.0 %    .8 (H) 80.0 - 99.0 FL    MCH 30.9 26.0 - 34.0 PG    MCHC 29.4 (L) 30.0 - 36.5 g/dL    RDW 18.4 (H) 11.5 - 14.5 %    PLATELET 442 (L) 259 - 400 K/uL    MPV 11.0 8.9 - 12.9 FL    NRBC 0.0 0.0  WBC    ABSOLUTE NRBC 0.00 0.00 - 0.01 K/uL   GLUCOSE, POC    Collection Time: 01/09/22  8:32 AM   Result Value Ref Range    Glucose (POC) 80 65 - 117 mg/dL    Performed by Ariela Dawkins         Assessment/Plan:     Active Problems:    Sepsis ([address] (1/7/2022)      Hospital course: 69-year-old female admitted on 1/7/2022 after suffering a seizure-like episode with 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 Neurology consultation. Patient with a recent history of COVID-19 and continuing to suffer from generalized weakness. Patient was found to be bacteremic on last admission with staph aureus susceptible to vancomycin. Patient has end-stage renal disease currently receiving hemodialysis as well as vancomycin for 2 weeks. ID consultation and repeat cultures pending. Impression\plan:    1. Acute encephalopathy  Resolved    2. Seizure disorder  Continue Keppra  Continue lacosamide  Neurology consultation  CT of the head with no acute process  Residual right arm weakness secondary to Ruddy's paralysis    3. History of staph aureus bacteremia  Continue vancomycin  ID consultation  Blood cultures pending  No white count evident or febrile illness  Elevated procalcitonin    4. End-stage renal disease  Renal consultation  Continue hemodialysis as previously scheduled, Tuesday, Thursday and Saturday per nephrology    5. Hypothyroidism  TSH normal  Continue levothyroxine    6. Elevated troponin  Most likely type II  Trending down appropriately  Cardiac consultation  Continue aspirin    Status: Full code    DVT prophylaxis: Heparin  Ulcer prophylaxis: Not indicated    Discharge barriers: Clearance from cardiology, neurological clearance and repeat \"blood cultures     care Plan discussed with: Patient/Family    Total time spent with patient: >35 minutes.

## 2022-01-09 NOTE — ROUTINE PROCESS
Aretha Ordonez TRANSFER - OUT REPORT:    Verbal report given to shae(name) on Thomas Devries  being transferred to (unit) for routine progression of care       Report consisted of patients Situation, Background, Assessment and   Recommendations(SBAR). Information from the following report(s) SBAR, ED Summary and MAR was reviewed with the receiving nurse. Lines:   Venous Access Device Upper chest (subclavicular area, right (Active)       Peripheral IV 01/07/22 Left Wrist (Active)   Site Assessment Clean, dry, & intact 01/07/22 2023   Phlebitis Assessment 0 01/07/22 2023   Infiltration Assessment 0 01/07/22 2023   Dressing Status Clean, dry, & intact 01/07/22 2023   Dressing Type Transparent 01/07/22 2023   Hub Color/Line Status Pink 01/07/22 2023        Opportunity for questions and clarification was provided.       Patient transported with:   Graf[employer]

## 2022-01-09 NOTE — PROGRESS NOTES
Patient: Taylor Woods MRN: [id number]  SSN: [id number]    YOB: 1950  Age: 80 y.o. Sex: female          Subjective:     Patient seen at bedside, doing much better. She is alert awake and oriented. Having normal mentation. Did not report any symptoms of shortness of breath or any kind of pain. Objective:     Vitals:    01/08/22 2232 01/09/22 0102 01/09/22 0216 01/09/22 0343   BP: (!) 160/62 (!) 142/67 (!) 168/71 (!) 149/78   Pulse: 60 60 60    Resp: 21 16 22    Temp:   98 °F (36.7 °C)    TempSrc:       SpO2: 94% 95% 98%    Weight:   72.7 kg (160 lb 4.4 oz)    Height:   5' 5\" (1.651 m)         Intake and Output:  Yesterday's Intake and Output:  01/08 0701 - 01/09 0700  In: 250 [I.V.:250]  Out: 2520      Physical Exam:   GENERAL: alert, cooperative, no distress, appears stated age  EYE: negative  Respiratory no distress   ABDOMEN: Nondistended.     EXTREMITIES:  extremities normal, atraumatic, no cyanosis or edema, no ulcers,SKIN: Normal.   Skin -  no rash or abnormalities  NEUROLOGIC: negative      Data Review    Meds    Current Facility-Administered Medications   Medication Dose Route Frequency    cloNIDine HCL (CATAPRES) tablet 0.2 mg  0.2 mg Oral Q6H PRN    oxyCODONE-acetaminophen (PERCOCET) 5-325 mg per tablet 1 Tablet  1 Tablet Oral Q6H PRN    heparin (porcine) 1,000 unit/mL injection 3,200 Units  3,200 Units Hemodialysis DIALYSIS PRN    vancomycin dose per pharmacy protocol   Other Rx Dosing/Monitoring    hydrALAZINE (APRESOLINE) 20 mg/mL injection 10 mg  10 mg IntraVENous Q6H PRN    ascorbic acid (vitamin C) (VITAMIN C) tablet 500 mg  500 mg Oral BID    aspirin chewable tablet 81 mg  81 mg Oral DAILY    atorvastatin (LIPITOR) tablet 40 mg  40 mg Oral DAILY    benzonatate (TESSALON) capsule 100 mg  100 mg Oral TID PRN    calcitRIOL (ROCALTROL) capsule 0.5 mcg  0.5 mcg Oral DAILY    levothyroxine (SYNTHROID) tablet 150 mcg  150 mcg Oral DAILY    FLUoxetine (PROzac) capsule 10 mg  10 mg Oral DAILY    levETIRAcetam (KEPPRA) tablet 1,000 mg  1,000 mg Oral BID    lacosamide (VIMPAT) tablet 200 mg  200 mg Oral BID    sevelamer carbonate (RENVELA) tab 800 mg  800 mg Oral TID WITH MEALS    zinc oxide-white petrolatum 17-57 % topical paste   Topical PRN    sodium chloride (NS) flush 5-40 mL  5-40 mL IntraVENous Q8H    sodium chloride (NS) flush 5-40 mL  5-40 mL IntraVENous PRN    acetaminophen (TYLENOL) tablet 650 mg  650 mg Oral Q6H PRN    Or    acetaminophen (TYLENOL) suppository 650 mg  650 mg Rectal Q6H PRN    polyethylene glycol (MIRALAX) packet 17 g  17 g Oral DAILY PRN    ondansetron (ZOFRAN ODT) tablet 4 mg  4 mg Oral Q8H PRN    Or    ondansetron (ZOFRAN) injection 4 mg  4 mg IntraVENous Q6H PRN    enoxaparin (LOVENOX) injection 30 mg  30 mg SubCUTAneous DAILY    glucose chewable tablet 16 g  4 Tablet Oral PRN    dextrose (D50W) injection syrg 12.5-25 g  25-50 mL IntraVENous PRN    glucagon (GLUCAGEN) injection 1 mg  1 mg IntraMUSCular PRN    insulin lispro (HUMALOG) injection   SubCUTAneous AC&HS       Lab      Recent Results (from the past 24 hour(s))   URINALYSIS W/ REFLEX CULTURE    Collection Time: 01/08/22 10:25 AM    Specimen: Urine   Result Value Ref Range    Color Yellow/Straw      Appearance Clear Clear      Specific gravity 1.012 1.003 - 1.030      pH (UA) 5.0 5.0 - 8.0      Protein 30 (A) Negative mg/dL    Glucose Negative Negative mg/dL    Ketone Negative Negative mg/dL    Bilirubin Negative Negative      Blood Negative Negative      Urobilinogen 0.1 0.1 - 1.0 EU/dL    Nitrites Negative Negative      Leukocyte Esterase Negative Negative      UA:UC IF INDICATED Culture not indicated by UA result Culture not indicated by UA result      WBC 0-4 0 - 4 /hpf    RBC 0-5 0 - 5 /hpf    Bacteria Negative Negative /hpf   GLUCOSE, POC    Collection Time: 01/08/22 10:49 AM   Result Value Ref Range    Glucose (POC) 82 65 - 117 mg/dL    Performed by Simone Rout GLUCOSE, POC    Collection Time: 01/08/22  5:38 PM   Result Value Ref Range    Glucose (POC) 73 65 - 117 mg/dL    Performed by Francoise 1, POC    Collection Time: 01/08/22 10:36 PM   Result Value Ref Range    Glucose (POC) 78 65 - 117 mg/dL    Performed by Kenneth Real    CBC W/O DIFF    Collection Time: 01/09/22  7:30 AM   Result Value Ref Range    WBC 5.2 3.6 - 11.0 K/uL    RBC 3.11 (L) 3.80 - 5.20 M/uL    HGB 9.6 (L) 11.5 - 16.0 g/dL    HCT 32.6 (L) 35.0 - 47.0 %    .8 (H) 80.0 - 99.0 FL    MCH 30.9 26.0 - 34.0 PG    MCHC 29.4 (L) 30.0 - 36.5 g/dL    RDW 18.4 (H) 11.5 - 14.5 %    PLATELET 078 (L) 755 - 400 K/uL    MPV 11.0 8.9 - 12.9 FL    NRBC 0.0 0.0  WBC    ABSOLUTE NRBC 0.00 0.00 - 0.01 K/uL   C REACTIVE PROTEIN, QT    Collection Time: 01/09/22  7:30 AM   Result Value Ref Range    C-Reactive protein 0.98 (H) 0.00 - 0.60 mg/dL   LD    Collection Time: 01/09/22  7:30 AM   Result Value Ref Range     81 - 246 U/L   GLUCOSE, POC    Collection Time: 01/09/22  8:32 AM   Result Value Ref Range    Glucose (POC) 80 65 - 117 mg/dL    Performed by Josef Warner         Lab Results   Component Value Date/Time    Color Yellow/Straw 01/08/2022 10:25 AM    Appearance Clear 01/08/2022 10:25 AM    Specific gravity 1.012 01/08/2022 10:25 AM    pH (UA) 5.0 01/08/2022 10:25 AM    Protein 30 (A) 01/08/2022 10:25 AM    Glucose Negative 01/08/2022 10:25 AM    Ketone Negative 01/08/2022 10:25 AM    Bilirubin Negative 01/08/2022 10:25 AM    Urobilinogen 0.1 01/08/2022 10:25 AM    Nitrites Negative 01/08/2022 10:25 AM    Leukocyte Esterase Negative 01/08/2022 10:25 AM    Epithelial cells Moderate (A) 05/20/2021 12:56 AM    Bacteria Negative 01/08/2022 10:25 AM    WBC 0-4 01/08/2022 10:25 AM    RBC 0-5 01/08/2022 10:25 AM       Imaging         Assessment & Plan: Active Problems:    Sepsis ([facility] [address] (1/7/2022)    1. ESRD. S/p HD on 1/7/22  next HD on Monday .   Please dose all medications for creatinine clearance <15/dialysis. Avoid PICC lines on either arm in order to preserve veins for dialysis access creation.      2. Anemia of ESRD. -    Epogen 10.000 international units  with HD. Will not start IV iron for now as she was recently Dx with MSSA bacteremia   Multivitamin supplementation (Nephrocaps for b-complex folate supplementation)     3. Secondary hyperparathyroidism of ESRD. Will check Serum calcium, Phos, PTH levels. Continue Phosphate binders.      - Renal Diet     4. AMS -she is fully alert and oriented now. ? Related to seizure disorder or sepsis   Blood cultures in ED ,   CTH was normal on admission   Scheduled for MRI to r/o any acute stroke . Avoid Gadolinium due to its association with nephrogenic systemic fibrosis in a patients  ESRD    5. HTN - added low dose Amlodpine   BP was good even during HD yesterday     Signed By: Davin Morfin MD     January 9, 2022      This note was prepared using voice recognition system and is likely to have sound alike errors that may have been overlooked even during proofreading.   Please contact the author for any clarifications

## 2022-01-09 NOTE — PROGRESS NOTES
Reason for Admission:                    RUR Score:           PCP: First and Last name:  Caren Jordan MD     Name of Practice:   Are you a current patient: Yes/No:   Approximate date of last visit:    Can you do a virtual visit with your PCP:              Resources/supports as identified by patient/family:                   Top Challenges facing patient (as identified by patient/family and CM): Finances/Medication cost?                    Transportation? Support system or lack thereof? Living arrangements? Self-care/ADLs/Cognition? Current Advanced Directive/Advance Care Plan:  Full Code      Healthcare Decision Maker:   Click here to complete HealthCare Decision Makers including selection of the Healthcare Decision Maker Relationship (ie \"Primary\")      [facility] - [place] - [phone]    Payor Source Payor: [address] [place] [address] / Plan: [address] / Product Type: Managed Care Medicare /                             Plan for utilizing home health:                     Transition of Care Plan:                  CM spoke with patient's son and daughter in law. Patient lives in a home with son. Patient is independent with ADL's. Patient has ramps, wheelchair, and commode. Patient has not seen PCP in awhile as per son. No hx of HH. Patient was admitted through [facility] [facility] where she tested positive for Covid. CM will continue to follow for any discharge.

## 2022-01-10 LAB
ALBUMIN SERPL-MCNC: 2.2 G/DL (ref 3.5–5)
ALBUMIN SERPL-MCNC: 2.2 G/DL (ref 3.5–5)
ALBUMIN/GLOB SERPL: 0.8 {RATIO} (ref 1.1–2.2)
ALP SERPL-CCNC: 88 U/L (ref 45–117)
ALT SERPL-CCNC: 14 U/L (ref 12–78)
ANION GAP SERPL CALC-SCNC: 3 MMOL/L (ref 5–15)
ANION GAP SERPL CALC-SCNC: 5 MMOL/L (ref 5–15)
AST SERPL W P-5'-P-CCNC: 9 U/L (ref 15–37)
BASOPHILS # BLD: 0 K/UL (ref 0–0.1)
BASOPHILS NFR BLD: 0 % (ref 0–1)
BILIRUB SERPL-MCNC: 0.6 MG/DL (ref 0.2–1)
BUN SERPL-MCNC: 36 MG/DL (ref 6–20)
BUN SERPL-MCNC: 36 MG/DL (ref 6–20)
BUN/CREAT SERPL: 12 (ref 12–20)
BUN/CREAT SERPL: 13 (ref 12–20)
CA-I BLD-MCNC: 7.8 MG/DL (ref 8.5–10.1)
CA-I BLD-MCNC: 7.8 MG/DL (ref 8.5–10.1)
CHLORIDE SERPL-SCNC: 107 MMOL/L (ref 97–108)
CHLORIDE SERPL-SCNC: 107 MMOL/L (ref 97–108)
CO2 SERPL-SCNC: 30 MMOL/L (ref 21–32)
CO2 SERPL-SCNC: 30 MMOL/L (ref 21–32)
CREAT SERPL-MCNC: 2.87 MG/DL (ref 0.55–1.02)
CREAT SERPL-MCNC: 2.93 MG/DL (ref 0.55–1.02)
CRP SERPL-MCNC: 0.58 MG/DL (ref 0–0.6)
DIFFERENTIAL METHOD BLD: ABNORMAL
EOSINOPHIL # BLD: 0 K/UL (ref 0–0.4)
EOSINOPHIL NFR BLD: 0 % (ref 0–7)
ERYTHROCYTE [DISTWIDTH] IN BLOOD BY AUTOMATED COUNT: 18.6 % (ref 11.5–14.5)
FERRITIN SERPL-MCNC: 667 NG/ML (ref 26–388)
GLOBULIN SER CALC-MCNC: 2.6 G/DL (ref 2–4)
GLUCOSE BLD STRIP.AUTO-MCNC: 105 MG/DL (ref 65–117)
GLUCOSE BLD STRIP.AUTO-MCNC: 59 MG/DL (ref 65–117)
GLUCOSE BLD STRIP.AUTO-MCNC: 82 MG/DL (ref 65–117)
GLUCOSE BLD STRIP.AUTO-MCNC: 97 MG/DL (ref 65–117)
GLUCOSE SERPL-MCNC: 69 MG/DL (ref 65–100)
GLUCOSE SERPL-MCNC: 70 MG/DL (ref 65–100)
HCT VFR BLD AUTO: 35.2 % (ref 35–47)
HGB BLD-MCNC: 10.4 G/DL (ref 11.5–16)
IMM GRANULOCYTES # BLD AUTO: 0 K/UL (ref 0–0.04)
IMM GRANULOCYTES NFR BLD AUTO: 1 % (ref 0–0.5)
LYMPHOCYTES # BLD: 1 K/UL (ref 0.8–3.5)
LYMPHOCYTES NFR BLD: 12 % (ref 12–49)
MCH RBC QN AUTO: 31.1 PG (ref 26–34)
MCHC RBC AUTO-ENTMCNC: 29.5 G/DL (ref 30–36.5)
MCV RBC AUTO: 105.4 FL (ref 80–99)
MONOCYTES # BLD: 0.7 K/UL (ref 0–1)
MONOCYTES NFR BLD: 9 % (ref 5–13)
NEUTS SEG # BLD: 6.1 K/UL (ref 1.8–8)
NEUTS SEG NFR BLD: 78 % (ref 32–75)
NRBC # BLD: 0 K/UL (ref 0–0.01)
NRBC BLD-RTO: 0 PER 100 WBC
PERFORMED BY, TECHID: ABNORMAL
PERFORMED BY, TECHID: NORMAL
PHOSPHATE SERPL-MCNC: 2.8 MG/DL (ref 2.6–4.7)
PLATELET # BLD AUTO: 111 K/UL (ref 150–400)
PMV BLD AUTO: 10.4 FL (ref 8.9–12.9)
POTASSIUM SERPL-SCNC: 3.3 MMOL/L (ref 3.5–5.1)
POTASSIUM SERPL-SCNC: 3.4 MMOL/L (ref 3.5–5.1)
PROCALCITONIN SERPL-MCNC: 0.55 NG/ML
PROT SERPL-MCNC: 4.8 G/DL (ref 6.4–8.2)
RBC # BLD AUTO: 3.34 M/UL (ref 3.8–5.2)
SODIUM SERPL-SCNC: 140 MMOL/L (ref 136–145)
SODIUM SERPL-SCNC: 142 MMOL/L (ref 136–145)
WBC # BLD AUTO: 7.8 K/UL (ref 3.6–11)

## 2022-01-10 PROCEDURE — 80069 RENAL FUNCTION PANEL: CPT

## 2022-01-10 PROCEDURE — 97162 PT EVAL MOD COMPLEX 30 MIN: CPT

## 2022-01-10 PROCEDURE — 74011250637 HC RX REV CODE- 250/637: Performed by: INTERNAL MEDICINE

## 2022-01-10 PROCEDURE — 74011250637 HC RX REV CODE- 250/637: Performed by: HOSPITALIST

## 2022-01-10 PROCEDURE — 74011250636 HC RX REV CODE- 250/636: Performed by: HOSPITALIST

## 2022-01-10 PROCEDURE — 80053 COMPREHEN METABOLIC PANEL: CPT

## 2022-01-10 PROCEDURE — 97530 THERAPEUTIC ACTIVITIES: CPT

## 2022-01-10 PROCEDURE — 86140 C-REACTIVE PROTEIN: CPT

## 2022-01-10 PROCEDURE — 36415 COLL VENOUS BLD VENIPUNCTURE: CPT

## 2022-01-10 PROCEDURE — 82728 ASSAY OF FERRITIN: CPT

## 2022-01-10 PROCEDURE — 65270000029 HC RM PRIVATE

## 2022-01-10 PROCEDURE — 84145 PROCALCITONIN (PCT): CPT

## 2022-01-10 PROCEDURE — 97165 OT EVAL LOW COMPLEX 30 MIN: CPT

## 2022-01-10 PROCEDURE — 74011000250 HC RX REV CODE- 250: Performed by: HOSPITALIST

## 2022-01-10 PROCEDURE — 85025 COMPLETE CBC W/AUTO DIFF WBC: CPT

## 2022-01-10 PROCEDURE — 82962 GLUCOSE BLOOD TEST: CPT

## 2022-01-10 RX ORDER — HEPARIN SODIUM 5000 [USP'U]/ML
5000 INJECTION, SOLUTION INTRAVENOUS; SUBCUTANEOUS EVERY 8 HOURS
Status: DISCONTINUED | OUTPATIENT
Start: 2022-01-11 | End: 2022-01-11 | Stop reason: HOSPADM

## 2022-01-10 RX ORDER — POTASSIUM CHLORIDE 20 MEQ/1
40 TABLET, EXTENDED RELEASE ORAL DAILY
Status: DISCONTINUED | OUTPATIENT
Start: 2022-01-11 | End: 2022-01-11 | Stop reason: HOSPADM

## 2022-01-10 RX ORDER — ATORVASTATIN CALCIUM 40 MG/1
80 TABLET, FILM COATED ORAL DAILY
Status: DISCONTINUED | OUTPATIENT
Start: 2022-01-11 | End: 2022-01-11 | Stop reason: HOSPADM

## 2022-01-10 RX ADMIN — CALCITRIOL CAPSULES 0.25 MCG 0.5 MCG: 0.25 CAPSULE ORAL at 09:38

## 2022-01-10 RX ADMIN — LEVETIRACETAM 1000 MG: 500 TABLET, FILM COATED ORAL at 21:36

## 2022-01-10 RX ADMIN — SODIUM CHLORIDE, PRESERVATIVE FREE 10 ML: 5 INJECTION INTRAVENOUS at 09:30

## 2022-01-10 RX ADMIN — OXYCODONE HYDROCHLORIDE AND ACETAMINOPHEN 500 MG: 500 TABLET ORAL at 21:36

## 2022-01-10 RX ADMIN — AMLODIPINE BESYLATE 5 MG: 5 TABLET ORAL at 09:30

## 2022-01-10 RX ADMIN — LACOSAMIDE 200 MG: 200 TABLET, FILM COATED ORAL at 00:06

## 2022-01-10 RX ADMIN — OXYCODONE HYDROCHLORIDE AND ACETAMINOPHEN 500 MG: 500 TABLET ORAL at 09:30

## 2022-01-10 RX ADMIN — LEVOTHYROXINE SODIUM 150 MCG: 0.07 TABLET ORAL at 09:31

## 2022-01-10 RX ADMIN — FLUOXETINE 10 MG: 10 CAPSULE ORAL at 09:38

## 2022-01-10 RX ADMIN — SEVELAMER CARBONATE 800 MG: 800 TABLET, FILM COATED ORAL at 09:31

## 2022-01-10 RX ADMIN — LACOSAMIDE 200 MG: 200 TABLET, FILM COATED ORAL at 09:30

## 2022-01-10 RX ADMIN — LACOSAMIDE 200 MG: 200 TABLET, FILM COATED ORAL at 21:36

## 2022-01-10 RX ADMIN — ENOXAPARIN SODIUM 30 MG: 100 INJECTION SUBCUTANEOUS at 09:30

## 2022-01-10 RX ADMIN — SODIUM CHLORIDE, PRESERVATIVE FREE 10 ML: 5 INJECTION INTRAVENOUS at 21:36

## 2022-01-10 RX ADMIN — LEVETIRACETAM 1000 MG: 500 TABLET, FILM COATED ORAL at 09:38

## 2022-01-10 RX ADMIN — LEVETIRACETAM 1000 MG: 500 TABLET, FILM COATED ORAL at 00:06

## 2022-01-10 RX ADMIN — SEVELAMER CARBONATE 800 MG: 800 TABLET, FILM COATED ORAL at 18:03

## 2022-01-10 RX ADMIN — SODIUM CHLORIDE, PRESERVATIVE FREE 10 ML: 5 INJECTION INTRAVENOUS at 14:01

## 2022-01-10 RX ADMIN — OXYCODONE HYDROCHLORIDE AND ACETAMINOPHEN 500 MG: 500 TABLET ORAL at 00:06

## 2022-01-10 RX ADMIN — OXYCODONE HYDROCHLORIDE AND ACETAMINOPHEN 1 TABLET: 5; 325 TABLET ORAL at 09:31

## 2022-01-10 RX ADMIN — BENZONATATE 100 MG: 100 CAPSULE ORAL at 00:06

## 2022-01-10 RX ADMIN — ASPIRIN 81 MG CHEWABLE TABLET 81 MG: 81 TABLET CHEWABLE at 09:30

## 2022-01-10 NOTE — PROGRESS NOTES
Progress Note    Patient: Janet Frank MRN: [id number]  SSN: [id number]    YOB: 1950  Age: 80 y.o. Sex: female      Admit Date: 1/7/2022    LOS: 2 days     Subjective:   Patient followed for recent MSSA bacteremia and Covid-19 pneumonia without respiratory failure. Blood cultures negative so far. She remains afebrile with normal WBC, decreased procal.   She is still on room air and denies any SOB. She is more alert today and responsive. Objective:     Vitals:    01/09/22 0343 01/09/22 1128 01/09/22 1159 01/09/22 1630   BP: (!) 149/78 (!) 144/71  (!) 155/77   Pulse:  60  60   Resp:  20  20   Temp:  97.6 °F (36.4 °C)  98.2 °F (36.8 °C)   TempSrc:       SpO2:  96%  95%   Weight:       Height:   5' 5\" (1.651 m)         Intake and Output:  Current Shift: No intake/output data recorded. Last three shifts: 01/08 0701 - 01/09 1900  In: 250 [I.V.:250]  Out: 2520     Physical Exam:   Vitals and nursing note reviewed. Constitutional:       Appearance: She is ill-appearing. HENT: unremarkable   Neck:      Comments: Dialysis access  Cardiovascular:      Rate and Rhythm: Regular rhythm. Heart sounds: No murmur heard. Pulmonary:      Effort: Pulmonary effort is normal.      Breath sounds: Normal breath sounds. Abdominal:      General: Bowel sounds are normal.      Palpations: Abdomen is soft. Tenderness: There is no abdominal tenderness. Genitourinary:     Comments: No Cochran   Skin:     Findings: No rash. Neurological: nonfocal, no confusion     Lab/Data Review:     WBC 5,200  ,000      Ferritin 708    Procal 0.77 <1.22  CRP 0.98    Blood cultures (1/7) No growth 1 day    Assessment:     Active Problems:    Sepsis ([address] (1/7/2022)    1. Recent MSSA bacteremia   2. Covid-19 without pneumonia or hypoxia  3. ESRD on hemodialysis  4. Altered mental status  5.  Elevated procal and CRP, etiology and significance unclear     Comment:  Interesting that her Procal is decreasing though blood cultures negative so far. Plan:      1. Follow-up pending blood cultures  2. In am, check procal, CRP, LDH, ferritin  3. No indication for Covid-19 treatment at this time  4.  Hold off on Vancomycin      Signed By: Saleem Brooks MD     January 9, 2022

## 2022-01-10 NOTE — CONSULTS
Consult Date: 1/9/2022    Consults Bacteremia    Subjective  This is a 80year old female with multiple co-morbidities including ESRD on HD, brought to the ED because of altered mental status. She had low grade temperature with normal WBC. Urinalysis was unremarkable. Covid-19 was detected. CXR was unremarkable for infiltrates. CT Head showed no acute intracranial abnormality. She is on room air. Last month patient was found to have MSSA bacteremia treated with Vancomycin for 2 weeks. Also diagnosed with Covid-19 pneumonitis and treated with Azithromycin and Dexamethasone. ID has been consulted for bacteremia.      Patient seen in the ED at which time she is somnolent and nonverbal.  Past Medical History:   Diagnosis Date    Anxiety disorder     Arthritis     Atherosclerosis of native arteries of the extremities with ulceration ([address] 8/31/2020    Atrial fibrillation ([address]     Cardiac pacemaker in situ     Cellulitis and abscess of trunk 8/31/2020    Depression, postpartum     Diabetes ([address]     Heart disease     Heartburn     Hx of long term use of blood thinners     Hypercholesterolemia     Hypertension     Hypothyroidism     Migraines     Peripheral venous insufficiency 8/31/2020    Seizures ([address]     Sleep disorder     Stroke ([address]     2019    Varicose veins of lower extremity with inflammation 8/31/2020      Past Surgical History:   Procedure Laterality Date    HX BACK SURGERY      HX OTHER SURGICAL      leg surgery     HX OTHER SURGICAL      pacemaker monitor     IR INSERT TUNL CVC W/O PORT OVER 5 YR  12/10/2021     Family History   Problem Relation Age of Onset    Heart Disease Mother     Heart Disease Father     Diabetes Sister     Diabetes Brother       Social History     Tobacco Use    Smoking status: Never Smoker    Smokeless tobacco: Never Used   Substance Use Topics    Alcohol use: Never       Current Facility-Administered Medications   Medication Dose Route Frequency Provider Last Rate Last Admin    cloNIDine HCL (CATAPRES) tablet 0.2 mg  0.2 mg Oral Q6H PRN Onel Horn MD        oxyCODONE-acetaminophen (PERCOCET) 5-325 mg per tablet 1 Tablet  1 Tablet Oral Q6H PRN Onel Horn MD   1 Tablet at 01/09/22 0249    LORazepam (ATIVAN) injection 2 mg  2 mg IntraVENous Q6H PRN Celina Perez PA-C        amLODIPine (NORVASC) tablet 5 mg  5 mg Oral DAILY Griselda Coles MD   5 mg at 01/09/22 1128    heparin (porcine) 1,000 unit/mL injection 3,200 Units  3,200 Units Hemodialysis DIALYSIS PRN Nehemiah Horn MD   3,200 Units at 01/08/22 1530    vancomycin dose per pharmacy protocol   Other Rx Dosing/Monitoring Jennifer Hogue MD        hydrALAZINE (APRESOLINE) 20 mg/mL injection 10 mg  10 mg IntraVENous Q6H PRN Salomón Ragland, RACHEL        ascorbic acid (vitamin C) (VITAMIN C) tablet 500 mg  500 mg Oral BID Jennifer Hogue MD   500 mg at 01/09/22 1128    aspirin chewable tablet 81 mg  81 mg Oral DAILY Jennifer Hogue MD   81 mg at 01/09/22 1127    atorvastatin (LIPITOR) tablet 40 mg  40 mg Oral DAILY Jennifer Hogue MD   40 mg at 01/09/22 1127    benzonatate (TESSALON) capsule 100 mg  100 mg Oral TID PRN Jennifer Hogue MD   100 mg at 01/09/22 1126    calcitRIOL (ROCALTROL) capsule 0.5 mcg  0.5 mcg Oral DAILY Jennifer Hogue MD   0.5 mcg at 01/09/22 1128    levothyroxine (SYNTHROID) tablet 150 mcg  150 mcg Oral DAILY Jennifer Hogue MD   150 mcg at 01/09/22 1129    FLUoxetine (PROzac) capsule 10 mg  10 mg Oral DAILY Jennifer Hogue MD   10 mg at 01/09/22 1130    levETIRAcetam (KEPPRA) tablet 1,000 mg  1,000 mg Oral BID Jennifer Hogue MD   1,000 mg at 01/09/22 1127    lacosamide (VIMPAT) tablet 200 mg  200 mg Oral BID Jennifer Hogue MD   200 mg at 01/09/22 1127    sevelamer carbonate (RENVELA) tab 800 mg  800 mg Oral TID WITH MEALS Jennifer Hogue MD   800 mg at 01/09/22 1723    zinc oxide-white petrolatum 17-57 % topical paste   Topical PRN Catrachito Carreno MD        sodium chloride (NS) flush 5-40 mL  5-40 mL IntraVENous Q8H Catrachito Carreno MD   10 mL at 01/09/22 1630    sodium chloride (NS) flush 5-40 mL  5-40 mL IntraVENous PRN Catrachito Carreno MD        acetaminophen (TYLENOL) tablet 650 mg  650 mg Oral Q6H PRN Catrachito Carreno MD   650 mg at 01/08/22 1202    Or    acetaminophen (TYLENOL) suppository 650 mg  650 mg Rectal Q6H PRN Catrachito Carreno MD        polyethylene glycol (MIRALAX) packet 17 g  17 g Oral DAILY PRN Catrachito Carreno MD        ondansetron (ZOFRAN ODT) tablet 4 mg  4 mg Oral Q8H PRN Catrachito Carreno MD        Or    [facility] injection 4 mg  4 mg IntraVENous Q6H PRN Catrachito Carreno MD        enoxaparin (LOVENOX) injection 30 mg  30 mg SubCUTAneous DAILY Catrachito Carreno MD   30 mg at 01/09/22 1130    glucose chewable tablet 16 g  4 Tablet Oral PRN Catrachito Carreno MD        dextrose (D50W) injection syrg 12.5-25 g  25-50 mL IntraVENous PRN Catrachito Carreno MD   12.5 g at 01/08/22 0125    glucagon (GLUCAGEN) injection 1 mg  1 mg IntraMUSCular PRN Catrachito Carreno MD        insulin lispro (HUMALOG) injection   SubCUTAneous AC&HS Catrachito Carreno MD            Review of Systems   Unable to perform ROS: Patient nonverbal       Objective     Vital signs for last 24 hours:  Visit Vitals  BP (!) 155/77 (BP 1 Location: Left upper arm, BP Patient Position: At rest)   Pulse 60   Temp 98.2 °F (36.8 °C)   Resp 20   Ht 5' 5\" (1.651 m)   Wt 160 lb 4.4 oz (72.7 kg)   SpO2 95%   BMI 26.67 kg/m²       Intake/Output this shift:  Current Shift: No intake/output data recorded.   Last 3 Shifts: 01/08 0701 - 01/09 1900  In: 250 [I.V.:250]  Out: 2520     Data Review:   Recent Results (from the past 24 hour(s))   GLUCOSE, POC    Collection Time: 01/08/22 10:36 PM   Result Value Ref Range    Glucose (POC) 78 65 - 117 mg/dL    Performed by SWATI FRAZIER    CBC W/O DIFF Collection Time: 01/09/22  7:30 AM   Result Value Ref Range    WBC 5.2 3.6 - 11.0 K/uL    RBC 3.11 (L) 3.80 - 5.20 M/uL    HGB 9.6 (L) 11.5 - 16.0 g/dL    HCT 32.6 (L) 35.0 - 47.0 %    .8 (H) 80.0 - 99.0 FL    MCH 30.9 26.0 - 34.0 PG    MCHC 29.4 (L) 30.0 - 36.5 g/dL    RDW 18.4 (H) 11.5 - 14.5 %    PLATELET 120 (L) 832 - 400 K/uL    MPV 11.0 8.9 - 12.9 FL    NRBC 0.0 0.0  WBC    ABSOLUTE NRBC 0.00 0.00 - 0.01 K/uL   TROPONIN-HIGH SENSITIVITY    Collection Time: 01/09/22  7:30 AM   Result Value Ref Range    Troponin-High Sensitivity 62 (HH) 0 - 51 ng/L   C REACTIVE PROTEIN, QT    Collection Time: 01/09/22  7:30 AM   Result Value Ref Range    C-Reactive protein 0.98 (H) 0.00 - 0.60 mg/dL   PROCALCITONIN    Collection Time: 01/09/22  7:30 AM   Result Value Ref Range    Procalcitonin 0.77 (H) 0 ng/mL   LD    Collection Time: 01/09/22  7:30 AM   Result Value Ref Range     81 - 246 U/L   FERRITIN    Collection Time: 01/09/22  7:30 AM   Result Value Ref Range    Ferritin 708 (H) 8 - 252 ng/mL   RENAL FUNCTION PANEL    Collection Time: 01/09/22  7:30 AM   Result Value Ref Range    Sodium 141 136 - 145 mmol/L    Potassium 3.6 3.5 - 5.1 mmol/L    Chloride 105 97 - 108 mmol/L    CO2 30 21 - 32 mmol/L    Anion gap 6 5 - 15 mmol/L    Glucose 81 65 - 100 mg/dL    BUN 24 (H) 6 - 20 mg/dL    Creatinine 2.31 (H) 0.55 - 1.02 mg/dL    BUN/Creatinine ratio 10 (L) 12 - 20      GFR est AA 25 (L) >60 ml/min/1.73m2    GFR est non-AA 21 (L) >60 ml/min/1.73m2    Calcium 7.9 (L) 8.5 - 10.1 mg/dL    Phosphorus 3.5 2.6 - 4.7 mg/dL    Albumin 2.3 (L) 3.5 - 5.0 g/dL   GLUCOSE, POC    Collection Time: 01/09/22  8:32 AM   Result Value Ref Range    Glucose (POC) 80 65 - 117 mg/dL    Performed by Ta Randolph    GLUCOSE, POC    Collection Time: 01/09/22 11:34 AM   Result Value Ref Range    Glucose (POC) 82 65 - 117 mg/dL    Performed by Sowmya MAZA    ECHO ADULT COMPLETE    Collection Time: 01/09/22  1:30 PM   Result Value Ref Range    AV Peak Velocity 1.9 m/s    AV Peak Gradient 15 mmHg    Aortic Root 3.4 cm    IVSd 1.1 (A) 0.6 - 0.9 cm    LVIDd 4.9 3.9 - 5.3 cm    LVIDs 3.1 cm    LVOT Peak Velocity 1.6 m/s    LVOT Peak Gradient 10 mmHg    LVPWd 1.1 (A) 0.6 - 0.9 cm    LV E' Lateral Velocity 8 cm/s    LV E' Septal Velocity 6 cm/s    LA Diameter 4.5 cm    MR Peak Velocity 4.0 m/s    MR Peak Gradient 64 mmHg    MV E Wave Deceleration Time 208.0 ms    MV A Velocity 0.39 m/s    MV E Velocity 1.33 m/s    PV Max Velocity 1.3 m/s    PV Peak Gradient 6 mmHg    Est. RA Pressure 3 mmHg    RVIDd 2.7 cm    TR Max Velocity 3.07 m/s    TR Peak Gradient 38 mmHg    TAPSE 2.0 1.5 - 2.0 cm    Fractional Shortening 2D 37 28 - 44 %    LVIDd Index 2.72 cm/m2    LVIDs Index 1.72 cm/m2    LV RWT Ratio 0.45     LV Mass 2D 200.5 (A) 67 - 162 g    LV Mass 2D Index 111.4 (A) 43 - 95 g/m2    MV E/A 3.41     E/E' Ratio (Averaged) 19.40     E/E' Lateral 16.63     E/E' Septal 22.17     LA Size Index 2.50 cm/m2    LA/AO Root Ratio 1.32     Ao Root Index 1.89 cm/m2    AV Velocity Ratio 0.84     RVSP 41 mmHg    EF BP 65 55 - 100 %   GLUCOSE, POC    Collection Time: 01/09/22  4:33 PM   Result Value Ref Range    Glucose (POC) 86 65 - 117 mg/dL    Performed by Demetria Cassidy      CXR (1/7)        Physical Exam  Vitals and nursing note reviewed. Constitutional:       Appearance: She is ill-appearing. HENT:      Head: Normocephalic and atraumatic. Right Ear: External ear normal.      Left Ear: External ear normal.      Nose: Nose normal.      Mouth/Throat:      Pharynx: Oropharynx is clear. Eyes:      Pupils: Pupils are equal, round, and reactive to light. Neck:      Comments: Dialysis access  Cardiovascular:      Rate and Rhythm: Regular rhythm. Tachycardia present. Heart sounds: No murmur heard. Pulmonary:      Effort: Pulmonary effort is normal.      Breath sounds: Normal breath sounds.    Abdominal:      General: Bowel sounds are normal. Palpations: Abdomen is soft. Tenderness: There is no abdominal tenderness. Genitourinary:     Comments: No Cochran  Musculoskeletal:      Cervical back: Neck supple. Right lower leg: No edema. Left lower leg: No edema. Skin:     Findings: No rash. Neurological:      Comments: somnolent   Psychiatric:      Comments: somnolent       ASSESSMENT/PLAN    1. Recent MSSA bacteremia   2. Covid-19 without pneumonia or hypoxia  3. ESRD on hemodialysis  4. Altered mental status    Comment:  Patient appears to have been appropriately treated with Vancomycin, however, no echo was done. That being said there is no high index of suspicion for bacteremia. 1. Follow-up pending blood cultures  2. In am, check procal, CRP, LDH, ferritin  3. No indication for Covid-19 treatment at this time  4. Discontinue Vancomycin for now    Pio Acosta MD

## 2022-01-10 NOTE — PROGRESS NOTES
CM called patients son, March Grief @ [phone] to discuss DC plans. Maxwell Hi informed CM that prior to hospitalization, patient was at [facility] for rehab and was admitted their from this hospital on 1/3/22. Son wants her to return there to complete rehab. CM sent referral and asked SNF to start auth.

## 2022-01-10 NOTE — PROGRESS NOTES
OCCUPATIONAL THERAPY EVALUATION  Patient: Shawn Bruno (38 y.o. female)  Date: 1/10/2022  Primary Diagnosis: Sepsis ([facility] [address] [A41.9]        Precautions: Fall risk, seizure precautions       ASSESSMENT  Pt is a 79 y/o F with PMH of anxiety, arthritis, atherosclerosis of native arteries, atrial fibrillation, pacemaker in situ, depression, diabetes, hypercholesterolemia, HTN, hypothyroidism, migraines, seizures, peripheral venous insufficiency, and hx of stroke presenting to [facility] with c/o AMS and lethargy, admitted 01/07/22  and being treated for sepsis. Pt received seated EOB working with PT upon OT arrival, AXO x4, and agreeable to OT evaluation at this time. Per pt report, was at [facility] for rehab and unable to recall if she came from home or rehab. Pt resides in a two-story home with her son and daughter-in-law with a ramp entrance. Pt primarily resides on the first floor. Pt required assist with ADLs and Mod I using wheelchair for primary mobility at [facility]. Other DME owned includes: hospital bed, BSC, grab bars, RW. Based on current observations, pt presents with deficits in generalized strength/AROM, balance, functional activity tolerance, and increased pain impacting overall performance of ADLs and functional transfers/mobility. Pt currently requires mod A for rolling. Pt requires mod Ax2 for sup>sit EOB. Pt requires max Ax2 for sit EOB>sup. Pt requires mod A for scooting towards EOB. Pt completed sit EOB>stand and stand>sit EOB using RW with mod Ax2. Pt returned supine and washed face with set up of wet wash cloth. Overall, pt tolerates session fair. Pt would benefit from continued skilled OT services to address current impairments and improve IND and safety with self cares and functional transfers/mobility. Recommend discharge to SNF once medically appropriate. Other factors to consider for discharge: home support, severity of deficits.      Patient will benefit from skilled therapy intervention to address the above noted impairments. PLAN :  Recommendations and Planned Interventions: self care training, functional mobility training, therapeutic exercise, therapeutic activities, endurance activities and patient education    Frequency/Duration: Patient will be followed by occupational therapy:  2-3x/week to address goals. Recommendation for discharge: (in order for the patient to meet his/her long term goals)  Kaleb Jamison    This discharge recommendation:  Has been made in collaboration with the attending provider and/or case management    IF patient discharges home will need the following DME: AE: long handled bathing and AE: long handled dressing       SUBJECTIVE:   Patient stated Yes I want to wash my face.     OBJECTIVE DATA SUMMARY:   HISTORY:   Past Medical History:   Diagnosis Date    Anxiety disorder     Arthritis     Atherosclerosis of native arteries of the extremities with ulceration ([facility] [address] 8/31/2020    Atrial fibrillation (HCC)     Cardiac pacemaker in situ     Cellulitis and abscess of trunk 8/31/2020    Depression, postpartum     Diabetes ([facility] [address]     Heart disease     Heartburn     Hx of long term use of blood thinners     Hypercholesterolemia     Hypertension     Hypothyroidism     Migraines     Peripheral venous insufficiency 8/31/2020    Seizures ([facility]     Sleep disorder     Stroke ([facility] [address]     2019    Varicose veins of lower extremity with inflammation 8/31/2020     Past Surgical History:   Procedure Laterality Date    HX BACK SURGERY      HX OTHER SURGICAL      leg surgery     HX OTHER SURGICAL      pacemaker monitor     IR INSERT TUNL CVC W/O PORT OVER 5 YR  12/10/2021       Expanded or extensive additional review of patient history:     Home Situation  Home Environment: Skilled nursing facility  Living Alone: No  Support Systems: [place]  Current DME Used/Available at Home: Wheelchair,Hospital bed,Grab bars,Commode, bedside    Hand dominance: Right    EXAMINATION OF PERFORMANCE DEFICITS:  Cognitive/Behavioral Status:  Neurologic State: Alert  Orientation Level: Oriented X4  Cognition: Follows commands                                         Range of Motion:  AROM: Generally decreased, functional (Simultaneous filing. User may not have seen previous data.)                         Strength:  Strength: Generally decreased, functional (Simultaneous filing. User may not have seen previous data.)                Coordination:  Coordination: Generally decreased, functional (Simultaneous filing. User may not have seen previous data.)  Fine Motor Skills-Upper: Left Intact; Right Intact    Gross Motor Skills-Upper: Left Intact; Right Intact    Tone & Sensation:  Tone: Normal  Sensation: Intact (Simultaneous filing. User may not have seen previous data.)                      Balance:  Sitting: Impaired; With support  Sitting - Static: Fair (occasional)  Sitting - Dynamic: Poor (constant support)  Standing: Impaired; With support;Pull to stand  Standing - Static: Poor;Constant support  Standing - Dynamic : Poor;Constant support    Functional Mobility and Transfers for ADLs:  Bed Mobility:  Rolling: Moderate assistance  Supine to Sit: Moderate assistance;Assist x2  Sit to Supine: Maximum assistance;Assist x2  Scooting: Moderate assistance (towards the EOB)    Transfers:  Sit to Stand: Moderate assistance;Assist x2  Stand to Sit: Moderate assistance;Assist x2      ADL Intervention and task modifications:       Grooming  Position Performed:  (Supine in bed)  Washing Face: Set-up                                  Therapeutic Exercise:  Pt would benefit from UE HEP initiated at next session. Functional Measure:    [address] [facility] [address] [address] AM-PACTM \"6 Clicks\"                                                       Daily Activity Inpatient Short Form  How much help from another person does the patient currently need. .. Total; A Lot A Little None   1.   Putting on and taking off regular lower body clothing? [x]  1 []  2 []  3 []  4   2. Bathing (including washing, rinsing, drying)? []  1 [x]  2 []  3 []  4   3. Toileting, which includes using toilet, bedpan or urinal? [x] 1 []  2 []  3 []  4   4. Putting on and taking off regular upper body clothing? []  1 [x]  2 []  3 []  4   5. Taking care of personal grooming such as brushing teeth? []  1 []  2 [x]  3 []  4   6. Eating meals? []  1 []  2 [x]  3 []  4   © 2007, Trustees of [facility] [place] [employer], under license to [employer]. All rights reserved     Score: 12/24     Interpretation of Tool:  Represents clinically-significant functional categories (i.e. Activities of daily living). Percentage of Impairment CH    0%   CI    1-19% CJ    20-39% CK    40-59% CL    60-79% CM    80-99% CN     100%   AMPA  Score 6-24 24 23 20-22 15-19 10-14 7-9 6         Occupational Therapy Evaluation Charge Determination   History Examination Decision-Making   LOW Complexity : Brief history review  LOW Complexity : 1-3 performance deficits relating to physical, cognitive , or psychosocial skils that result in activity limitations and / or participation restrictions  LOW Complexity : No comorbidities that affect functional and no verbal or physical assistance needed to complete eval tasks       Based on the above components, the patient evaluation is determined to be of the following complexity level: LOW   Pain Rating:  10/10 - \"all over\" per pt report    Activity Tolerance:   Fair  Please refer to the flowsheet for vital signs taken during this treatment. After treatment patient left in no apparent distress:    Supine in bed, Call bell within reach and Side rails x 3    COMMUNICATION/EDUCATION:   The patients plan of care was discussed with: Physical therapist.     Patient/family agree to work toward stated goals and plan of care. This patients plan of care is appropriate for delegation to [facility].     PT/OT sessions occurred together for increased safety of pt and clinician. Thank you for this referral.  Digna Vazquez OT  Time Calculation: 24 mins    Problem: Self Care Deficits Care Plan (Adult)  Goal: *Acute Goals and Plan of Care (Insert Text)  Description: 1. Pt will be min A sup <> sit in prep for EOB ADLs  2. Pt will be min A grooming sitting EOB  3. Pt will be min A LE dressing sitting EOB/long sit  4. Pt will be min A sit <>  prep for toileting LRAD  5. Pt will be min A toileting/toilet transfer/cloth mgmt LRAD  6.  Pt will be min A following UE HEP in prep for self care tasks      Outcome: Not Met

## 2022-01-10 NOTE — PROGRESS NOTES
PHYSICAL THERAPY EVALUATION  Patient: Robert Rojo (24 y.o. female)  Date: 1/10/2022  Primary Diagnosis: Sepsis [facility]) [A41.9]       Precautions: falls  ASSESSMENT  This is a 81 y/o female who was recently discharged from the hospital on 01/03/2022 after receiving treatment for MRSA bacteremia, came to  [facility]  ED with c/o confusion, found unresponsive by daughter , admitted on 1/7/22 for sepsis. Pt with PMH of  ESRD on HD, SSS s/p PPM, DMII on SSI no lantus, seizure, CVA with confusion, Todds paralysis with R hemiparesis . CT head shows old infarct. Pt received semi-supine in bed , agreeable for PT eval/tx . Pt is A& O x 4 ,  per pt report , she was at [facility] for rehab, unable to provide more details if she came from rehab or home. Per pt, she resides with son and daughter in law in a 2 story home but resides on 1st floor with ramp entrance,  required assistance for ADLS/IADLS, uses WC for primary mobility. DME: hospital bed, BSC, grab bars, RW, and WC. Based on the objective data described below, the patient presents with c/o pain all over - 10/10 ,  decreased strength , -2/5 for R LE with foot drop and 2/5 grossly for L Le, balance deficits , coordination deficits, body tremors present, generalized weakness , decreased safety awareness , decreased activity tolerance and increased need for assist with functional mobility ( needs modA for rolling from side to side  , modA x 2  for supine <>sit transfers , fair  static sitting balance with support , modA x  2 for sit <>stand , poor  static  standing balance , unable to stand upright, stood with flexed knees  . Pt would benefit from continued skilled PT services to address current impairments and improve overall IND and safety with  functional transfers/mobility. Recommend d/c to SNF when medically appropriate . Current Level of Function Impacting Discharge (mobility/balance): requires mod A x 2  for functional transfers    Functional Outcome Measure:   The patient scored 9 on the AM PAC basic mobility outcome measure which is indicative of high complexity. Other factors to consider for discharge: severity of deficits, PLOF, time since onset        PLAN :  Recommendations and Planned Interventions: bed mobility training, transfer training, therapeutic exercises, neuromuscular re-education, patient and family training/education, and therapeutic activities      Frequency/Duration: Patient will be followed by physical therapy:  2-3x/week to address goals.     Recommendation for discharge: (in order for the patient to meet his/her long term goals)  Kaleb Jamison    This discharge recommendation:  Has been made in collaboration with the attending provider and/or case management    IF patient discharges home will need the following DME: to be determined (TBD)         SUBJECTIVE:   Patient stated  It's hurting me all over     OBJECTIVE DATA SUMMARY:   HISTORY:    Past Medical History:   Diagnosis Date    Anxiety disorder     Arthritis     Atherosclerosis of native arteries of the extremities with ulceration ([address] 8/31/2020    Atrial fibrillation ([address]     Cardiac pacemaker in situ     Cellulitis and abscess of trunk 8/31/2020    Depression, postpartum     Diabetes ([address]     Heart disease     Heartburn     Hx of long term use of blood thinners     Hypercholesterolemia     Hypertension     Hypothyroidism     Migraines     Peripheral venous insufficiency 8/31/2020    Seizures ([address]     Sleep disorder     Stroke ([address]     2019    Varicose veins of lower extremity with inflammation 8/31/2020     Past Surgical History:   Procedure Laterality Date    HX BACK SURGERY      HX OTHER SURGICAL      leg surgery     HX OTHER SURGICAL      pacemaker monitor     IR INSERT TUNL CVC W/O PORT OVER 5 YR  12/10/2021       Personal factors and/or comorbidities impacting plan of care:     Home Situation  Home Environment: Skilled nursing facility  Living Alone: No  Support Systems: [place]  Current DME Used/Available at Home: Wheelchair,Hospital bed,Grab bars,Commode, bedside    PLOF: Pt needed assist for ADLS/IADLS, needed assist for functional transfers, w/c bound prior to admission. EXAMINATION/PRESENTATION/DECISION MAKING:   Critical Behavior:  Neurologic State: Alert  Orientation Level: Oriented X4  Cognition: Follows commands   Skin:  Intact where exposed    Range Of Motion:   AROM: Generally decreased, functional    Strength:    Strength: Generally decreased, functional (  -2/5 for R LE with foot drop and 2/5 grossly for L Le)    Tone & Sensation:   Tone: Normal   Sensation: intact     Coordination:  Coordination: Generally decreased, functional (Simultaneous filing. User may not have seen previous data.)  Functional Mobility:  Bed Mobility:  Rolling: Moderate assistance  Supine to Sit: Moderate assistance;Assist x2  Sit to Supine: Maximum assistance;Assist x2  Scooting: Moderate assistance (towards the EOB)  Transfers:  Sit to Stand: Moderate assistance;Assist x2  Stand to Sit: Moderate assistance;Assist x2  Balance:   Sitting: Impaired; With support  Sitting - Static: Fair (occasional)  Sitting - Dynamic: Poor (constant support)  Standing: Impaired; With support;Pull to stand  Standing - Static: Poor;Constant support  Standing - Dynamic : Poor;Constant support      Functional Measure:    [facility] [address] [address] AM-PAC 6 Clicks         Basic Mobility Inpatient Short Form  How much difficulty does the patient currently have. .. Unable A Lot A Little None   1. Turning over in bed (including adjusting bedclothes, sheets and blankets)? [] 1   [x] 2   [] 3   [] 4   2. Sitting down on and standing up from a chair with arms ( e.g., wheelchair, bedside commode, etc.)   [] 1   [x] 2   [] 3   [] 4   3. Moving from lying on back to sitting on the side of the bed?    [] 1   [x] 2   [] 3   [] 4          How much help from another person does the patient currently need. .. Total A Lot A Little None   4. Moving to and from a bed to a chair (including a wheelchair)? [x] 1   [] 2   [] 3   [] 4   5. Need to walk in hospital room? [x] 1   [] 2   [] 3   [] 4   6. Climbing 3-5 steps with a railing? [x] 1   [] 2   [] 3   [] 4   © 2007, Trustees of [address] [place] [address] [address] [id number], under license to [employer]. All rights reserved     Score:  Initial:11 Most Recent: X (Date: -1/10/22- )   Interpretation of Tool:  Represents activities that are increasingly more difficult (i.e. Bed mobility, Transfers, Gait). Score 24 23 22-20 19-15 14-10 9-7 6   Modifier CH CI CJ CK CL CM CN          Physical Therapy Evaluation Charge Determination   History Examination Presentation Decision-Making   MEDIUM  Complexity : 1-2 comorbidities / personal factors will impact the outcome/ POC  MEDIUM Complexity : 3 Standardized tests and measures addressing body structure, function, activity limitation and / or participation in recreation  MEDIUM Complexity : Evolving with changing characteristics  Other Functional Measure [facility] 6 medium complexity      Based on the above components, the patient evaluation is determined to be of the following complexity level: MEDIUM    Pain Rating:  Pain all over- 10/10    Activity Tolerance:   Poor and requires rest breaks  Please refer to the flowsheet for vital signs taken during this treatment. After treatment patient left in no apparent distress:   Supine in bed, Call bell within reach and Bed / chair alarm activated    COMMUNICATION/EDUCATION:   The patients plan of care was discussed with: Occupational therapist and Registered nurse. Fall prevention education was provided and the patient/caregiver indicated understanding. and Patient/family agree to work toward stated goals and plan of care. Problem: Mobility Impaired (Adult and Pediatric)  Goal: *Acute Goals and Plan of Care (Insert Text)  Description: Pt will be I with LE HEP in 7 days.   Pt will perform bed mobility with Parisa in 7 days. Pt will perform transfers with Parisa in 7 days. Pt will demonstrate improvement in standing/seated static/dynamic balance from to fair in 7 days. Outcome: Not Met   Partial PT/OT sessions occurred together for increased pt's safety and maximum functional outcome.     Thank you for this referral.  Benito Lightning   Time Calculation: 39 mins

## 2022-01-10 NOTE — PROGRESS NOTES
Consult requested by: Reinaldo Disla MD    ADMIT DATE: 1/7/2022  CONSULT DATE: January 10, 2022           Patient is seen in the room  On RA  S/p dialyzed 1/08 and removed 2.5 L     Past Medical History:   Diagnosis Date    Anxiety disorder     Arthritis     Atherosclerosis of native arteries of the extremities with ulceration ([facility] 8/31/2020    Atrial fibrillation ([facility]     Cardiac pacemaker in situ     Cellulitis and abscess of trunk 8/31/2020    Depression, postpartum     Diabetes ([facility]     Heart disease     Heartburn     Hx of long term use of blood thinners     Hypercholesterolemia     Hypertension     Hypothyroidism     Migraines     Peripheral venous insufficiency 8/31/2020    Seizures ([facility]     Sleep disorder     Stroke [facility]     2019    Varicose veins of lower extremity with inflammation 8/31/2020      Past Surgical History:   Procedure Laterality Date    HX BACK SURGERY      HX OTHER SURGICAL      leg surgery     HX OTHER SURGICAL      pacemaker monitor     IR INSERT TUNL CVC W/O PORT OVER 5 YR  12/10/2021       Social History     Socioeconomic History    Marital status:      Spouse name: Not on file    Number of children: Not on file    Years of education: Not on file    Highest education level: Not on file   Occupational History    Not on file   Tobacco Use    Smoking status: Never Smoker    Smokeless tobacco: Never Used   Vaping Use    Vaping Use: Never used   Substance and Sexual Activity    Alcohol use: Never    Drug use: Never    Sexual activity: Not on file   Other Topics Concern    Not on file   Social History Narrative    Not on file     Social Determinants of Health     Financial Resource Strain:     Difficulty of Paying Living Expenses: Not on file   Food Insecurity:     Worried About Running Out of Food in the Last Year: Not on file    Janet of Food in the Last Year: Not on file   Transportation Needs:     Lack of Transportation (Medical): Not on file    Lack of Transportation (Non-Medical): Not on file   Physical Activity:     Days of Exercise per Week: Not on file    Minutes of Exercise per Session: Not on file   Stress:     Feeling of Stress : Not on file   Social Connections:     Frequency of Communication with Friends and Family: Not on file    Frequency of Social Gatherings with Friends and Family: Not on file    Attends [employer] Services: Not on file    Active Member of [address] [place] [address] [employer] or Organizations: Not on file    Attends Club or Organization Meetings: Not on file    Marital Status: Not on file   Intimate Partner Violence:     Fear of Current or Ex-Partner: Not on file    Emotionally Abused: Not on file    Physically Abused: Not on file    Sexually Abused: Not on file   Housing Stability:     Unable to Pay for Housing in the Last Year: Not on file    Number of [place] in the Last Year: Not on file    Unstable Housing in the Last Year: Not on file       Family History   Problem Relation Age of Onset    Heart Disease Mother     Heart Disease Father     Diabetes Sister     Diabetes Brother      No Known Allergies     Home Medications:     Medications Prior to Admission   Medication Sig    insulin lispro (HUMALOG) 100 unit/mL injection As per insulin sensitive ssi:    Insulin High Sensitivity (thin, ESRD)  For Blood Sugar (mg/dL) of:              Less than 150 =   0 units  150 -199 =  1 units  200 -249 =   2 units  250 -299 =   3 units  300 -349 =   4 units  350 or greater = 5 units and Call Physician  Initiate Hypoglycemic protocol if blood glucose is <70 mg/dL.  ascorbic acid, vitamin C, (VITAMIN C) 500 mg tablet Take 1 Tablet by mouth two (2) times a day for 30 days.  aspirin 81 mg chewable tablet Take 1 Tablet by mouth daily for 30 days.  benzonatate (TESSALON) 100 mg capsule Take 1 Capsule by mouth three (3) times daily as needed for Cough for up to 7 days.     vancomycin 1,000 mg 1,000 mg, vial-mate 1 Device IVPB 1,000 mg by IntraVENous route DIALYSIS MON, WED & FRI for 6 doses.  predniSONE (DELTASONE) 20 mg tablet Start prednisone 20 mg PO daily x 2 days then 10 mg PO daily x 2 days then stop.  calcitRIOL (ROCALTROL) 0.5 mcg capsule Take 1 Capsule by mouth daily.  zinc oxide-white petrolatum 17-57 % topical paste Apply  to affected area as needed for Skin Irritation. Indications: skin irritation    ondansetron hcl (Zofran) 4 mg tablet Take 1 Tablet by mouth every eight (8) hours as needed for Nausea or Vomiting.  Vimpat 200 mg tab tablet Take 1 Tablet by mouth two (2) times a day.  ondansetron (Zofran ODT) 4 mg disintegrating tablet 1 Tablet by SubLINGual route every eight (8) hours as needed for Nausea or Vomiting.  atorvastatin (LIPITOR) 40 mg tablet Take 1 tablet by mouth once daily for 90 days    FLUoxetine (PROzac) 10 mg capsule Take 1 Capsule by mouth daily for 270 days.     loperamide (IMODIUM) 2 mg capsule TAKE 1 CAPSULE BY MOUTH SIX TIMES DAILY AS NEEDED FOR 30 DAYS    Euthyrox 150 mcg tablet Take 1 tablet by mouth once daily    levETIRAcetam (KEPPRA XR) 500 mg ER tablet TAKE 2 TABLETS BY MOUTH TWICE DAILY FOR 90 DAYS    sevelamer carbonate (RENVELA) 800 mg tab tab        Current Inpatient Medications:     Current Facility-Administered Medications   Medication Dose Route Frequency    [START ON 1/11/2022] atorvastatin (LIPITOR) tablet 80 mg  80 mg Oral DAILY    cloNIDine HCL (CATAPRES) tablet 0.2 mg  0.2 mg Oral Q6H PRN    oxyCODONE-acetaminophen (PERCOCET) 5-325 mg per tablet 1 Tablet  1 Tablet Oral Q6H PRN    LORazepam (ATIVAN) injection 2 mg  2 mg IntraVENous Q6H PRN    amLODIPine (NORVASC) tablet 5 mg  5 mg Oral DAILY    heparin (porcine) 1,000 unit/mL injection 3,200 Units  3,200 Units Hemodialysis DIALYSIS PRN    hydrALAZINE (APRESOLINE) 20 mg/mL injection 10 mg  10 mg IntraVENous Q6H PRN    ascorbic acid (vitamin C) (VITAMIN C) tablet 500 mg  500 mg Oral BID    aspirin chewable tablet 81 mg  81 mg Oral DAILY    benzonatate (TESSALON) capsule 100 mg  100 mg Oral TID PRN    calcitRIOL (ROCALTROL) capsule 0.5 mcg  0.5 mcg Oral DAILY    levothyroxine (SYNTHROID) tablet 150 mcg  150 mcg Oral DAILY    FLUoxetine (PROzac) capsule 10 mg  10 mg Oral DAILY    levETIRAcetam (KEPPRA) tablet 1,000 mg  1,000 mg Oral BID    lacosamide (VIMPAT) tablet 200 mg  200 mg Oral BID    sevelamer carbonate (RENVELA) tab 800 mg  800 mg Oral TID WITH MEALS    zinc oxide-white petrolatum 17-57 % topical paste   Topical PRN    sodium chloride (NS) flush 5-40 mL  5-40 mL IntraVENous Q8H    sodium chloride (NS) flush 5-40 mL  5-40 mL IntraVENous PRN    acetaminophen (TYLENOL) tablet 650 mg  650 mg Oral Q6H PRN    Or    acetaminophen (TYLENOL) suppository 650 mg  650 mg Rectal Q6H PRN    polyethylene glycol (MIRALAX) packet 17 g  17 g Oral DAILY PRN    ondansetron (ZOFRAN ODT) tablet 4 mg  4 mg Oral Q8H PRN    Or    ondansetron (ZOFRAN) injection 4 mg  4 mg IntraVENous Q6H PRN    enoxaparin (LOVENOX) injection 30 mg  30 mg SubCUTAneous DAILY    glucose chewable tablet 16 g  4 Tablet Oral PRN    dextrose (D50W) injection syrg 12.5-25 g  25-50 mL IntraVENous PRN    glucagon (GLUCAGEN) injection 1 mg  1 mg IntraMUSCular PRN    insulin lispro (HUMALOG) injection   SubCUTAneous AC&HS       Review of Systems:   No fever or chills. No sore throat. No cough or hemoptysis. No shortness of breath or chest pain. No orthopnea or paroxysmal nocturnal dyspnea. No nausea, vomiting, abdominal pain, melena or hematochezia. No ankle swelling, no joint paints. No muscle aches. No skin changes. No dizziness or lightheadedness. No headaches.        Physical Assessment:     Vitals:    01/09/22 1630 01/10/22 0005 01/10/22 0230 01/10/22 0804   BP: (!) 155/77 128/62  (!) 168/76   Pulse: 60 65  61   Resp: 20 20  16   Temp: 98.2 °F (36.8 °C) 98.6 °F (37 °C)  98.8 °F (37.1 °C)   TempSrc: SpO2: 95% 98%  97%   Weight:   72.7 kg (160 lb 4.4 oz)    Height:         Last 3 Recorded Weights in this Encounter    01/07/22 1711 01/09/22 0216 01/10/22 0230   Weight: 81.6 kg (180 lb) 72.7 kg (160 lb 4.4 oz) 72.7 kg (160 lb 4.4 oz)     Admission weight: Weight: 81.6 kg (180 lb) (01/07/22 1711)    No intake or output data in the 24 hours ending 01/10/22 1213    Patient is in no apparent distress. HEENT: Head is normocephalic and atraumatic. Pupils are round, equal, reactive to light. Sclerae are anicteric. Oropharynx clear. Neck: no cervical lymphadenopathy or thyromegaly. Lungs: good air entry, clear to auscultation bilaterally. Trachea at the midline. Cardiovascular system: S1, S2, regular rate and rhythm. No murmurs, gallops or rubs. No jvd. Abdomen: soft, non tender, non distended. Positive bowel sounds. No hepatosplenomegaly. No abdominal bruits. Extremities: no clubbing, cyanosis or edema. Strong dorsalis pedis pulses. Brisk capillary refill on the toes bilaterally. Integumentary: skin is grossly intact. Neurologic: Alert, oriented time three. Cooperative and appropriate. No gross motor or sensory deficits. Dialysis access: cuffed tunneled catheter site right. [facility]     Data Review:    Labs: Results:       Chemistry Recent Labs     01/10/22  0648 01/09/22  0730 01/09/22  0730 01/07/22  1700   GLU 69  70  --  81 77     142  --  141 139   K 3.3*  3.4*  --  3.6 3.6     107  --  105 105   CO2 30  30  --  30 30   BUN 36*  36*  --  24* 28*   CREA 2.87*  2.93*  --  2.31* 2.75*   CA 7.8*  7.8*  --  7.9* 8.1*   AGAP 3*  5  --  6 4*   BUCR 13  12  --  10* 10*   AP 88  --   --  104   TP 4.8*  --   --  5.4*   ALB 2.2*  2.2*  --  2.3* 2.5*   GLOB 2.6  --   --  2.9   AGRAT 0.8*  --   --  0.9*   PHOS 2.8   < > 3.5  --     < > = values in this interval not displayed.          CBC w/Diff Recent Labs     01/10/22  0648 01/09/22  0730 01/07/22  1700   WBC 7.8 5.2 6.5   RBC 3.34* 3.11* 3.43*   HGB 10.4* 9.6* 10.6*   HCT 35.2 32.6* 35.8   * 100* 113*   GRANS 78*  --  83*   LYMPH 12  --  8*   EOS 0  --  0         Iron/Ferritin No results for input(s): IRON in the last 72 hours. No lab exists for component: TIBCCALC   PTH/VIT D No results for input(s): PTH in the last 72 hours. No lab exists for component: VITD            Assessment & Plan     1-ESRD:  -HD MWF at [facility]  -No volume overload/uremia  -s/p HD 1/08, removed 2.55 L  -RI perm cath as active dialysis access  -BORA AVF in place also    2-Anemia:  -Hb was 5.7  -No obvious bleeding  -s/p blood Tx 2 units with HD   -iv Epo with HD  -Hb 10.4 now      3-Secondary hyperparathyroidism of ESRD. -cont sevelamer 800 mg tid and calcitriol 0.5 mcg daily    - Renal Diet    4-COVID Pneumonia   -- started on empiric abxs because of fever,lactic acidosis and leucocytosis . -on RA     5-AMS :  -CT Head negative   Likely Metabolic and sepsis related . 6- Seizure disorder:  -break thru seizure  -seen by Neuro  -On Vimpat & Keppra    7-MSSA Bactremia:  -was on iv vancomycin   -ID on board  -[facility] 1/07  Pending    8- DVT prophylaxis. -I will change Lovenox to unfractionated subcu heparin.     9-Hypokalemia:  -K 3.3  -will put on KCL 40 meq daily  -will use 3K bath

## 2022-01-10 NOTE — CONSULTS
NEUROLOGY CONSULT    Name Eduar Nascimento Age 80 y.o. MRN [id number]  1950     Referring Physician: Natalie Mcclelland MD    Chief Complaint: Breakthrough seizure? This is a 80 y.o.  female very well-known to our service, follows in the clinic with me for seizures who was discharged from the hospital on 2022 after receiving treatment for MRSA bacteremia, was found unresponsive by the daughter around 7 PM yesterday and EMS was called. The suspicion was that she probably had a breakthrough seizure secondary to acute illness and was unresponsive from there. In the ER she started responding to voice and answering simple questions. She she was also diagnosed with Covid. Her troponin was elevated at 147 chest x-ray showed cardiomegaly. A CT scan of the head done in the ED did not reveal any acute findings. At the time of my evaluation this morning the patient started responding after some tactile and painful stimuli but her speech was difficult to understand as it was more of mumbling. However noted that she is not moving her right arm at all and it is flaccid. Could not determine from the notes what status was when she came to the ED. Assessment and Plan:  1. 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 Probably she had a breakthrough seizure because of the acute illness. 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 Right side improved today. 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 - continue vimpat and keppra  -pt/ot    Thank you for the consult.     No Known Allergies     Current Facility-Administered Medications   Medication Dose Route Frequency    [START ON 1/11/2022] atorvastatin (LIPITOR) tablet 80 mg  80 mg Oral DAILY    cloNIDine HCL (CATAPRES) tablet 0.2 mg  0.2 mg Oral Q6H PRN    oxyCODONE-acetaminophen (PERCOCET) 5-325 mg per tablet 1 Tablet  1 Tablet Oral Q6H PRN    LORazepam (ATIVAN) injection 2 mg  2 mg IntraVENous Q6H PRN    amLODIPine (NORVASC) tablet 5 mg  5 mg Oral DAILY    heparin (porcine) 1,000 unit/mL injection 3,200 Units  3,200 Units Hemodialysis DIALYSIS PRN    hydrALAZINE (APRESOLINE) 20 mg/mL injection 10 mg  10 mg IntraVENous Q6H PRN    ascorbic acid (vitamin C) (VITAMIN C) tablet 500 mg  500 mg Oral BID    aspirin chewable tablet 81 mg  81 mg Oral DAILY    benzonatate (TESSALON) capsule 100 mg  100 mg Oral TID PRN    calcitRIOL (ROCALTROL) capsule 0.5 mcg  0.5 mcg Oral DAILY    levothyroxine (SYNTHROID) tablet 150 mcg  150 mcg Oral DAILY    FLUoxetine (PROzac) capsule 10 mg  10 mg Oral DAILY    levETIRAcetam (KEPPRA) tablet 1,000 mg  1,000 mg Oral BID    lacosamide (VIMPAT) tablet 200 mg  200 mg Oral BID    sevelamer carbonate (RENVELA) tab 800 mg  800 mg Oral TID WITH MEALS    zinc oxide-white petrolatum 17-57 % topical paste   Topical PRN    sodium chloride (NS) flush 5-40 mL  5-40 mL IntraVENous Q8H    sodium chloride (NS) flush 5-40 mL  5-40 mL IntraVENous PRN    acetaminophen (TYLENOL) tablet 650 mg  650 mg Oral Q6H PRN    Or    acetaminophen (TYLENOL) suppository 650 mg  650 mg Rectal Q6H PRN    polyethylene glycol (MIRALAX) packet 17 g  17 g Oral DAILY PRN    ondansetron (ZOFRAN ODT) tablet 4 mg  4 mg Oral Q8H PRN    Or    ondansetron (ZOFRAN) injection 4 mg  4 mg IntraVENous Q6H PRN    enoxaparin (LOVENOX) injection 30 mg  30 mg SubCUTAneous DAILY    glucose chewable tablet 16 g  4 Tablet Oral PRN    dextrose (D50W) injection syrg 12.5-25 g  25-50 mL IntraVENous PRN    glucagon (GLUCAGEN) injection 1 mg  1 mg IntraMUSCular PRN    insulin lispro (HUMALOG) injection   SubCUTAneous AC&HS     Past Medical History:   Diagnosis Date    Anxiety disorder     Arthritis     Atherosclerosis of native arteries of the extremities with ulceration ([facility] 8/31/2020    Atrial fibrillation (HCC)     Cardiac pacemaker in situ     Cellulitis and abscess of trunk 8/31/2020    Depression, postpartum     Diabetes ([facility]     Heart disease     Heartburn     Hx of long term use of blood thinners     Hypercholesterolemia     Hypertension     Hypothyroidism     Migraines     Peripheral venous insufficiency 8/31/2020    Seizures [facility]     Sleep disorder     Stroke ([facility]     2019    Varicose veins of lower extremity with inflammation 8/31/2020     Social History     Tobacco Use    Smoking status: Never Smoker    Smokeless tobacco: Never Used   Vaping Use    Vaping Use: Never used   Substance Use Topics    Alcohol use: Never    Drug use: Never     Exam  Visit Vitals  BP (!) 168/76   Pulse 61   Temp 98.8 °F (37.1 °C)   Resp 16   Ht 5' 5\" (1.651 m)   Wt 72.7 kg (160 lb 4.4 oz)   SpO2 97%   BMI 26.67 kg/m²     General: Well developed, well nourished. Patient in no distress   Head: Normocephalic, atraumatic, anicteric sclera   Neck Normal ROM, No thyromegally   Lungs:     Cardiac:    Abd:    Ext: + pedal edema   Skin: Supple no rash     NeurologicExam:  Mental Status: Lethargic but arousable and answers simple questions, says right arm does not work. Speech: Fluent no aphasia or dysarthria. Cranial Nerves:   Pupils are equal round and reactive to light, spontaneous eye movements noted, face is symmetric, visual fields are intact to threat. 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 Cannot count fingers    Motor:  RUE: 3/5, RLE: 2/5. LUE: 5/5 throughout, LLE: 3/5 hip flexion   Reflexes:   Deep tendon reflexes not checked. Sensory:   Intact to light touch in all 4 ext    Gait:  Gait is deferred due to her condition   Tremor:   No tremor noted. Cerebellar:  Coordination intact for finger-nose-finger.            Lab Review  Lab Results   Component Value Date/Time    WBC 7.8 01/10/2022 06:48 AM    HCT 35.2 01/10/2022 06:48 AM    HGB 10.4 (L) 01/10/2022 06:48 AM    PLATELET 855 (L) 19/36/2775 06:48 AM     Lab Results   Component Value Date/Time    Sodium 142 01/10/2022 06:48 AM    Sodium 140 01/10/2022 06:48 AM    Potassium 3.4 (L) 01/10/2022 06:48 AM    Potassium 3.3 (L) 01/10/2022 06:48 AM    Chloride 107 01/10/2022 06:48 AM    Chloride 107 01/10/2022 06:48 AM    CO2 30 01/10/2022 06:48 AM    CO2 30 01/10/2022 06:48 AM    Glucose 70 01/10/2022 06:48 AM    Glucose 69 01/10/2022 06:48 AM    BUN 36 (H) 01/10/2022 06:48 AM    BUN 36 (H) 01/10/2022 06:48 AM    Creatinine 2.93 (H) 01/10/2022 06:48 AM    Creatinine 2.87 (H) 01/10/2022 06:48 AM    Calcium 7.8 (L) 01/10/2022 06:48 AM    Calcium 7.8 (L) 01/10/2022 06:48 AM     Lab Results   Component Value Date/Time    Vitamin B12 291 12/07/2021 06:04 PM    Folate 4.3 (L) 12/07/2021 06:04 PM     Lab Results   Component Value Date/Time    LDL, calculated 27.8 05/20/2021 05:35 AM     Lab Results   Component Value Date/Time    Hemoglobin A1c 4.7 01/07/2022 10:47 PM    Hemoglobin A1c, External 5.1 09/09/2019 12:00 AM     No components found for: TROPQUANT  No results found for: NGOZI

## 2022-01-10 NOTE — PROGRESS NOTES
Hospitalist Progress Note    Subjective:   Daily Progress Note: 1/10/2022 11:17 AM    Hospital Course:  Kenia Longo is a 73-year-old female admitted on 1/7/2022 after suffering a seizure-like episode with 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 Neurology consultation. Patient with a recent history of COVID-19 and continuing to suffer from generalized weakness. Patient was found to be bacteremic on last admission with Staph aureus susceptible to vancomycin. Repeat blood cultures showing no growth. Patient has end-stage renal disease currently receiving hemodialysis as well as vancomycin for 2 weeks. ID consultation. No indication for vancomycin at this time. Procalcitonin decreasing. CT head showing no acute findings. Neurology consultation. 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 No further workup per Neuro. PT/OT consult pending. Subjective:    Patient seen and examined at bedside. Continues to have right lower extremity > right upper extremity weakness. States she is regaining some strength.      Current Facility-Administered Medications   Medication Dose Route Frequency    [START ON 1/11/2022] atorvastatin (LIPITOR) tablet 80 mg  80 mg Oral DAILY    cloNIDine HCL (CATAPRES) tablet 0.2 mg  0.2 mg Oral Q6H PRN    oxyCODONE-acetaminophen (PERCOCET) 5-325 mg per tablet 1 Tablet  1 Tablet Oral Q6H PRN    LORazepam (ATIVAN) injection 2 mg  2 mg IntraVENous Q6H PRN    amLODIPine (NORVASC) tablet 5 mg  5 mg Oral DAILY    heparin (porcine) 1,000 unit/mL injection 3,200 Units  3,200 Units Hemodialysis DIALYSIS PRN    hydrALAZINE (APRESOLINE) 20 mg/mL injection 10 mg  10 mg IntraVENous Q6H PRN    ascorbic acid (vitamin C) (VITAMIN C) tablet 500 mg  500 mg Oral BID    aspirin chewable tablet 81 mg  81 mg Oral DAILY    benzonatate (TESSALON) capsule 100 mg  100 mg Oral TID PRN    calcitRIOL (ROCALTROL) capsule 0.5 mcg  0.5 mcg Oral DAILY    levothyroxine (SYNTHROID) tablet 150 mcg  150 mcg Oral DAILY    FLUoxetine (PROzac) capsule 10 mg  10 mg Oral DAILY    levETIRAcetam (KEPPRA) tablet 1,000 mg  1,000 mg Oral BID    lacosamide (VIMPAT) tablet 200 mg  200 mg Oral BID    sevelamer carbonate (RENVELA) tab 800 mg  800 mg Oral TID WITH MEALS    zinc oxide-white petrolatum 17-57 % topical paste   Topical PRN    sodium chloride (NS) flush 5-40 mL  5-40 mL IntraVENous Q8H    sodium chloride (NS) flush 5-40 mL  5-40 mL IntraVENous PRN    acetaminophen (TYLENOL) tablet 650 mg  650 mg Oral Q6H PRN    Or    acetaminophen (TYLENOL) suppository 650 mg  650 mg Rectal Q6H PRN    polyethylene glycol (MIRALAX) packet 17 g  17 g Oral DAILY PRN    ondansetron (ZOFRAN ODT) tablet 4 mg  4 mg Oral Q8H PRN    Or    ondansetron (ZOFRAN) injection 4 mg  4 mg IntraVENous Q6H PRN    enoxaparin (LOVENOX) injection 30 mg  30 mg SubCUTAneous DAILY    glucose chewable tablet 16 g  4 Tablet Oral PRN    dextrose (D50W) injection syrg 12.5-25 g  25-50 mL IntraVENous PRN    glucagon (GLUCAGEN) injection 1 mg  1 mg IntraMUSCular PRN    insulin lispro (HUMALOG) injection   SubCUTAneous AC&HS        Review of Systems  Constitutional: Positive for malaise/fatigue. Negative for chills and fever. HENT: Negative. Respiratory: Negative for cough and shortness of breath. Cardiovascular: Negative for chest pain and leg swelling. Gastrointestinal: Negative for abdominal pain, nausea and vomiting. Genitourinary: Negative. Musculoskeletal: Negative. Skin: Negative. Neurological: Positive for weakness. Psychiatric/Behavioral: Negative.         Objective:     Visit Vitals  BP (!) 168/76   Pulse 61   Temp 98.8 °F (37.1 °C)   Resp 16   Ht 5' 5\" (1.651 m)   Wt 72.7 kg (160 lb 4.4 oz)   SpO2 97%   BMI 26.67 kg/m²      O2 Device: None (Room air)    Temp (24hrs), Av.3 °F (36.8 °C), Min:97.6 °F (36.4 °C), Max:98.8 °F (37.1 °C)      No intake/output data recorded. 01/08 1901 - 01/10 0700  In: 250 [I.V.:250]  Out: -     PHYSICAL EXAM:  Constitutional: No acute distress  Skin: Extremities and face reveal no rashes. HEENT: Sclerae anicteric. PERRL. No oral ulcers. The neck is supple and no masses. Cardiovascular: Regular rate and rhythm. +S1/S2. No murmur or gallop. No JVD. Respiratory:  Clear breath sounds bilaterally with no wheezes, rales, or rhonchi. GI: Abdomen nondistended, soft, and nontender. Normal active bowel sounds. Rectal: Deferred   Musculoskeletal: No pitting edema of the lower legs. Able to move all ext  Neurological:  Patient is alert and oriented x3. Right arm weakness, improving. Persistent right lower extremity weakness strength 2+/5.  Cranial nerves II-XII grossly intact  Psychiatric: Mood appears appropriate       Data Review    Recent Results (from the past 24 hour(s))   GLUCOSE, POC    Collection Time: 01/09/22 11:34 AM   Result Value Ref Range    Glucose (POC) 82 65 - 117 mg/dL    Performed by Beti Cheng    ECHO ADULT COMPLETE    Collection Time: 01/09/22  1:30 PM   Result Value Ref Range    AV Peak Velocity 1.9 m/s    AV Peak Gradient 15 mmHg    Aortic Root 3.4 cm    IVSd 1.1 (A) 0.6 - 0.9 cm    LVIDd 4.9 3.9 - 5.3 cm    LVIDs 3.1 cm    LVOT Peak Velocity 1.6 m/s    LVOT Peak Gradient 10 mmHg    LVPWd 1.1 (A) 0.6 - 0.9 cm    LV E' Lateral Velocity 8 cm/s    LV E' Septal Velocity 6 cm/s    LA Diameter 4.5 cm    MR Peak Velocity 4.0 m/s    MR Peak Gradient 64 mmHg    MV E Wave Deceleration Time 208.0 ms    MV A Velocity 0.39 m/s    MV E Velocity 1.33 m/s    PV Max Velocity 1.3 m/s    PV Peak Gradient 6 mmHg    Est. RA Pressure 3 mmHg    RVIDd 2.7 cm    TR Max Velocity 3.07 m/s    TR Peak Gradient 38 mmHg    TAPSE 2.0 1.5 - 2.0 cm    Fractional Shortening 2D 37 28 - 44 %    LVIDd Index 2.72 cm/m2    LVIDs Index 1.72 cm/m2    LV RWT Ratio 0.45     LV Mass 2D 200.5 (A) 67 - 162 g    LV Mass 2D Index 111.4 (A) 43 - 95 g/m2    MV E/A 3.41     E/E' Ratio (Averaged) 19.40     E/E' Lateral 16.63     E/E' Septal 22.17     LA Size Index 2.50 cm/m2    LA/AO Root Ratio 1.32     Ao Root Index 1.89 cm/m2    AV Velocity Ratio 0.84     RVSP 41 mmHg    EF BP 65 55 - 100 %   GLUCOSE, POC    Collection Time: 01/09/22  4:33 PM   Result Value Ref Range    Glucose (POC) 86 65 - 117 mg/dL    Performed by Ely Verified Identity Pass    GLUCOSE, POC    Collection Time: 01/09/22 11:50 PM   Result Value Ref Range    Glucose (POC) 104 65 - 117 mg/dL    Performed by Leticia Taylor    CBC WITH AUTOMATED DIFF    Collection Time: 01/10/22  6:48 AM   Result Value Ref Range    WBC 7.8 3.6 - 11.0 K/uL    RBC 3.34 (L) 3.80 - 5.20 M/uL    HGB 10.4 (L) 11.5 - 16.0 g/dL    HCT 35.2 35.0 - 47.0 %    .4 (H) 80.0 - 99.0 FL    MCH 31.1 26.0 - 34.0 PG    MCHC 29.5 (L) 30.0 - 36.5 g/dL    RDW 18.6 (H) 11.5 - 14.5 %    PLATELET 969 (L) 682 - 400 K/uL    MPV 10.4 8.9 - 12.9 FL    NRBC 0.0 0.0  WBC    ABSOLUTE NRBC 0.00 0.00 - 0.01 K/uL    NEUTROPHILS 78 (H) 32 - 75 %    LYMPHOCYTES 12 12 - 49 %    MONOCYTES 9 5 - 13 %    EOSINOPHILS 0 0 - 7 %    BASOPHILS 0 0 - 1 %    IMMATURE GRANULOCYTES 1 (H) 0 - 0.5 %    ABS. NEUTROPHILS 6.1 1.8 - 8.0 K/UL    ABS. LYMPHOCYTES 1.0 0.8 - 3.5 K/UL    ABS. MONOCYTES 0.7 0.0 - 1.0 K/UL    ABS. EOSINOPHILS 0.0 0.0 - 0.4 K/UL    ABS. BASOPHILS 0.0 0.0 - 0.1 K/UL    ABS. IMM.  GRANS. 0.0 0.00 - 0.04 K/UL    DF AUTOMATED     METABOLIC PANEL, COMPREHENSIVE    Collection Time: 01/10/22  6:48 AM   Result Value Ref Range    Sodium 142 136 - 145 mmol/L    Potassium 3.4 (L) 3.5 - 5.1 mmol/L    Chloride 107 97 - 108 mmol/L    CO2 30 21 - 32 mmol/L    Anion gap 5 5 - 15 mmol/L    Glucose 70 65 - 100 mg/dL    BUN 36 (H) 6 - 20 mg/dL    Creatinine 2.93 (H) 0.55 - 1.02 mg/dL    BUN/Creatinine ratio 12 12 - 20      GFR est AA 19 (L) >60 ml/min/1.73m2    GFR est non-AA 16 (L) >60 ml/min/1.73m2    Calcium 7.8 (L) 8.5 - 10.1 mg/dL    Bilirubin, total 0.6 0.2 - 1.0 mg/dL    AST (SGOT) 9 (L) 15 - 37 U/L    ALT (SGPT) 14 12 - 78 U/L    Alk. phosphatase 88 45 - 117 U/L    Protein, total 4.8 (L) 6.4 - 8.2 g/dL    Albumin 2.2 (L) 3.5 - 5.0 g/dL    Globulin 2.6 2.0 - 4.0 g/dL    A-G Ratio 0.8 (L) 1.1 - 2.2     RENAL FUNCTION PANEL    Collection Time: 01/10/22  6:48 AM   Result Value Ref Range    Sodium 140 136 - 145 mmol/L    Potassium 3.3 (L) 3.5 - 5.1 mmol/L    Chloride 107 97 - 108 mmol/L    CO2 30 21 - 32 mmol/L    Anion gap 3 (L) 5 - 15 mmol/L    Glucose 69 65 - 100 mg/dL    BUN 36 (H) 6 - 20 mg/dL    Creatinine 2.87 (H) 0.55 - 1.02 mg/dL    BUN/Creatinine ratio 13 12 - 20      GFR est AA 20 (L) >60 ml/min/1.73m2    GFR est non-AA 16 (L) >60 ml/min/1.73m2    Calcium 7.8 (L) 8.5 - 10.1 mg/dL    Phosphorus 2.8 2.6 - 4.7 mg/dL    Albumin 2.2 (L) 3.5 - 5.0 g/dL   C REACTIVE PROTEIN, QT    Collection Time: 01/10/22  6:48 AM   Result Value Ref Range    C-Reactive protein 0.58 0.00 - 0.60 mg/dL   PROCALCITONIN    Collection Time: 01/10/22  6:48 AM   Result Value Ref Range    Procalcitonin 0.55 (H) 0 ng/mL   GLUCOSE, POC    Collection Time: 01/10/22  9:46 AM   Result Value Ref Range    Glucose (POC) 59 (L) 65 - 117 mg/dL    Performed by Pascale Barbosa        CT HEAD WO CONT   Final Result   Old ischemic disease. No acute intracranial abnormality. XR CHEST PORT   Final Result          Active Problems:    Sepsis ([address] (1/7/2022)        Assessment/Plan:     1. Acute encephalopathy - Resolved     2. Seizure disorder  Continue Keppra  Continue lacosamide  Neurology consultation  CT of the head with no acute process  Residual right arm weakness secondary to Ruddy's paralysis     3. History of staph aureus bacteremia - on 12/26  No indications for IV vancomycin at this time   ID consultation  Blood cultures showing no growth so far  No white count evident or febrile illness  Procalcitonin trending down     4.   End-stage renal disease  Renal consultation  Continue hemodialysis as previously scheduled, Tuesday, Thursday and Saturday per nephrology     5. Hypothyroidism  TSH normal  Continue levothyroxine     6.  Elevated troponin  Most likely type II  Trending down appropriately  Cardiac consultation  Continue aspirin     7.  COVID-19 positive  Stable      DVT Prophylaxis: Lovenox  Code Status: Full  POA:    Discharge Barriers: Pending repeat blood cultures, ID clearance. PT/OT consult. Care Plan discussed with: patient, nursing, and IDR team    Total time spent with patient: >35 minutes.

## 2022-01-11 ENCOUNTER — APPOINTMENT (OUTPATIENT)
Dept: MRI IMAGING | Age: 72
DRG: 177 | End: 2022-01-11
Attending: PSYCHIATRY & NEUROLOGY
Payer: MEDICARE

## 2022-01-11 VITALS
OXYGEN SATURATION: 99 % | HEART RATE: 60 BPM | TEMPERATURE: 98.4 F | HEIGHT: 65 IN | DIASTOLIC BLOOD PRESSURE: 55 MMHG | WEIGHT: 160.27 LBS | BODY MASS INDEX: 26.7 KG/M2 | SYSTOLIC BLOOD PRESSURE: 120 MMHG | RESPIRATION RATE: 19 BRPM

## 2022-01-11 PROBLEM — G93.40 ENCEPHALOPATHY ACUTE: Status: ACTIVE | Noted: 2021-06-11

## 2022-01-11 PROBLEM — G40.919 BREAKTHROUGH SEIZURE (HCC): Status: ACTIVE | Noted: 2020-07-03

## 2022-01-11 LAB
ALBUMIN SERPL-MCNC: 2.3 G/DL (ref 3.5–5)
ALBUMIN SERPL-MCNC: 2.3 G/DL (ref 3.5–5)
ALBUMIN/GLOB SERPL: 0.9 {RATIO} (ref 1.1–2.2)
ALP SERPL-CCNC: 85 U/L (ref 45–117)
ALT SERPL-CCNC: 14 U/L (ref 12–78)
ANION GAP SERPL CALC-SCNC: 5 MMOL/L (ref 5–15)
ANION GAP SERPL CALC-SCNC: 6 MMOL/L (ref 5–15)
AST SERPL W P-5'-P-CCNC: 9 U/L (ref 15–37)
ATRIAL RATE: 141 BPM
BASOPHILS # BLD: 0 K/UL (ref 0–0.1)
BASOPHILS NFR BLD: 0 % (ref 0–1)
BILIRUB SERPL-MCNC: 0.5 MG/DL (ref 0.2–1)
BUN SERPL-MCNC: 49 MG/DL (ref 6–20)
BUN SERPL-MCNC: 51 MG/DL (ref 6–20)
BUN/CREAT SERPL: 13 (ref 12–20)
BUN/CREAT SERPL: 14 (ref 12–20)
CA-I BLD-MCNC: 7.7 MG/DL (ref 8.5–10.1)
CA-I BLD-MCNC: 7.9 MG/DL (ref 8.5–10.1)
CALCULATED P AXIS, ECG09: 71 DEGREES
CALCULATED R AXIS, ECG10: 7 DEGREES
CALCULATED T AXIS, ECG11: -10 DEGREES
CHLORIDE SERPL-SCNC: 108 MMOL/L (ref 97–108)
CHLORIDE SERPL-SCNC: 108 MMOL/L (ref 97–108)
CO2 SERPL-SCNC: 28 MMOL/L (ref 21–32)
CO2 SERPL-SCNC: 29 MMOL/L (ref 21–32)
CREAT SERPL-MCNC: 3.74 MG/DL (ref 0.55–1.02)
CREAT SERPL-MCNC: 3.75 MG/DL (ref 0.55–1.02)
DIAGNOSIS, 93000: NORMAL
DIFFERENTIAL METHOD BLD: ABNORMAL
EOSINOPHIL # BLD: 0 K/UL (ref 0–0.4)
EOSINOPHIL NFR BLD: 0 % (ref 0–7)
ERYTHROCYTE [DISTWIDTH] IN BLOOD BY AUTOMATED COUNT: 18.6 % (ref 11.5–14.5)
GLOBULIN SER CALC-MCNC: 2.5 G/DL (ref 2–4)
GLUCOSE BLD STRIP.AUTO-MCNC: 58 MG/DL (ref 65–117)
GLUCOSE BLD STRIP.AUTO-MCNC: 65 MG/DL (ref 65–117)
GLUCOSE BLD STRIP.AUTO-MCNC: 69 MG/DL (ref 65–117)
GLUCOSE BLD STRIP.AUTO-MCNC: 82 MG/DL (ref 65–117)
GLUCOSE SERPL-MCNC: 73 MG/DL (ref 65–100)
GLUCOSE SERPL-MCNC: 74 MG/DL (ref 65–100)
HCT VFR BLD AUTO: 34.9 % (ref 35–47)
HGB BLD-MCNC: 10.4 G/DL (ref 11.5–16)
IMM GRANULOCYTES # BLD AUTO: 0 K/UL (ref 0–0.04)
IMM GRANULOCYTES NFR BLD AUTO: 1 % (ref 0–0.5)
LYMPHOCYTES # BLD: 1 K/UL (ref 0.8–3.5)
LYMPHOCYTES NFR BLD: 13 % (ref 12–49)
MAGNESIUM SERPL-MCNC: 1.9 MG/DL (ref 1.6–2.4)
MCH RBC QN AUTO: 31.3 PG (ref 26–34)
MCHC RBC AUTO-ENTMCNC: 29.8 G/DL (ref 30–36.5)
MCV RBC AUTO: 105.1 FL (ref 80–99)
MONOCYTES # BLD: 0.6 K/UL (ref 0–1)
MONOCYTES NFR BLD: 8 % (ref 5–13)
NEUTS SEG # BLD: 6 K/UL (ref 1.8–8)
NEUTS SEG NFR BLD: 78 % (ref 32–75)
NRBC # BLD: 0 K/UL (ref 0–0.01)
NRBC BLD-RTO: 0 PER 100 WBC
P-R INTERVAL, ECG05: 122 MS
PERFORMED BY, TECHID: ABNORMAL
PERFORMED BY, TECHID: NORMAL
PHOSPHATE SERPL-MCNC: 3.2 MG/DL (ref 2.6–4.7)
PLATELET # BLD AUTO: 112 K/UL (ref 150–400)
PMV BLD AUTO: 10.9 FL (ref 8.9–12.9)
POTASSIUM SERPL-SCNC: 3.6 MMOL/L (ref 3.5–5.1)
POTASSIUM SERPL-SCNC: 3.7 MMOL/L (ref 3.5–5.1)
PROT SERPL-MCNC: 4.8 G/DL (ref 6.4–8.2)
Q-T INTERVAL, ECG07: 270 MS
QRS DURATION, ECG06: 116 MS
QTC CALCULATION (BEZET), ECG08: 413 MS
RBC # BLD AUTO: 3.32 M/UL (ref 3.8–5.2)
SODIUM SERPL-SCNC: 142 MMOL/L (ref 136–145)
SODIUM SERPL-SCNC: 142 MMOL/L (ref 136–145)
VENTRICULAR RATE, ECG03: 141 BPM
WBC # BLD AUTO: 7.6 K/UL (ref 3.6–11)

## 2022-01-11 PROCEDURE — 74011000250 HC RX REV CODE- 250: Performed by: HOSPITALIST

## 2022-01-11 PROCEDURE — 74011250637 HC RX REV CODE- 250/637: Performed by: HOSPITALIST

## 2022-01-11 PROCEDURE — 36415 COLL VENOUS BLD VENIPUNCTURE: CPT

## 2022-01-11 PROCEDURE — 70551 MRI BRAIN STEM W/O DYE: CPT

## 2022-01-11 PROCEDURE — 90935 HEMODIALYSIS ONE EVALUATION: CPT

## 2022-01-11 PROCEDURE — 82962 GLUCOSE BLOOD TEST: CPT

## 2022-01-11 PROCEDURE — 83735 ASSAY OF MAGNESIUM: CPT

## 2022-01-11 PROCEDURE — 80053 COMPREHEN METABOLIC PANEL: CPT

## 2022-01-11 PROCEDURE — 85025 COMPLETE CBC W/AUTO DIFF WBC: CPT

## 2022-01-11 PROCEDURE — 74011250637 HC RX REV CODE- 250/637: Performed by: INTERNAL MEDICINE

## 2022-01-11 PROCEDURE — 74011250636 HC RX REV CODE- 250/636: Performed by: INTERNAL MEDICINE

## 2022-01-11 PROCEDURE — 99232 SBSQ HOSP IP/OBS MODERATE 35: CPT | Performed by: INTERNAL MEDICINE

## 2022-01-11 PROCEDURE — 74011250636 HC RX REV CODE- 250/636

## 2022-01-11 PROCEDURE — 80069 RENAL FUNCTION PANEL: CPT

## 2022-01-11 PROCEDURE — 74011250637 HC RX REV CODE- 250/637

## 2022-01-11 RX ORDER — ALBUMIN HUMAN 50 G/1000ML
25 SOLUTION INTRAVENOUS ONCE
Status: DISCONTINUED | OUTPATIENT
Start: 2022-01-11 | End: 2022-01-11 | Stop reason: HOSPADM

## 2022-01-11 RX ORDER — OXYCODONE AND ACETAMINOPHEN 5; 325 MG/1; MG/1
TABLET ORAL
Status: COMPLETED
Start: 2022-01-11 | End: 2022-01-11

## 2022-01-11 RX ORDER — HEPARIN SODIUM 1000 [USP'U]/ML
INJECTION, SOLUTION INTRAVENOUS; SUBCUTANEOUS
Status: COMPLETED
Start: 2022-01-11 | End: 2022-01-11

## 2022-01-11 RX ORDER — AMLODIPINE BESYLATE 5 MG/1
5 TABLET ORAL DAILY
Qty: 30 TABLET | Refills: 1 | Status: SHIPPED | OUTPATIENT
Start: 2022-01-11 | End: 2022-02-10

## 2022-01-11 RX ADMIN — OXYCODONE HYDROCHLORIDE AND ACETAMINOPHEN 1 TABLET: 5; 325 TABLET ORAL at 08:55

## 2022-01-11 RX ADMIN — DEXTROSE MONOHYDRATE 12.5 G: 25 INJECTION, SOLUTION INTRAVENOUS at 13:19

## 2022-01-11 RX ADMIN — ASPIRIN 81 MG CHEWABLE TABLET 81 MG: 81 TABLET CHEWABLE at 14:56

## 2022-01-11 RX ADMIN — OXYCODONE HYDROCHLORIDE AND ACETAMINOPHEN 1 TABLET: 5; 325 TABLET ORAL at 14:56

## 2022-01-11 RX ADMIN — OXYCODONE HYDROCHLORIDE AND ACETAMINOPHEN 500 MG: 500 TABLET ORAL at 14:57

## 2022-01-11 RX ADMIN — SEVELAMER CARBONATE 800 MG: 800 TABLET, FILM COATED ORAL at 12:13

## 2022-01-11 RX ADMIN — HEPARIN SODIUM 3200 UNITS: 1000 INJECTION, SOLUTION INTRAVENOUS; SUBCUTANEOUS at 10:50

## 2022-01-11 RX ADMIN — SODIUM CHLORIDE, PRESERVATIVE FREE 10 ML: 5 INJECTION INTRAVENOUS at 12:14

## 2022-01-11 RX ADMIN — AMLODIPINE BESYLATE 5 MG: 5 TABLET ORAL at 14:57

## 2022-01-11 RX ADMIN — SODIUM CHLORIDE, PRESERVATIVE FREE 10 ML: 5 INJECTION INTRAVENOUS at 05:23

## 2022-01-11 RX ADMIN — LEVETIRACETAM 1000 MG: 500 TABLET, FILM COATED ORAL at 12:13

## 2022-01-11 RX ADMIN — LACOSAMIDE 200 MG: 200 TABLET, FILM COATED ORAL at 12:13

## 2022-01-11 RX ADMIN — ACETAMINOPHEN 650 MG: 325 TABLET ORAL at 12:13

## 2022-01-11 RX ADMIN — HEPARIN SODIUM 5000 UNITS: 5000 INJECTION, SOLUTION INTRAVENOUS; SUBCUTANEOUS at 05:22

## 2022-01-11 NOTE — DISCHARGE SUMMARY
Hospitalist Discharge Summary     Patient ID:    Royer Tejada  [id number]  80 y.o.  1950    Admit date: 1/7/2022    Discharge date : 1/11/2022    Chronic Diagnoses:    Problem List as of 1/11/2022 Date Reviewed: 1/3/2022          Codes Class Noted - Resolved    Sepsis ([facility] ICD-10-CM: A41.9  ICD-9-CM: 038.9, 995.91  1/7/2022 - Present        MSSA bacteremia ICD-10-CM: R78.81, B95.61  ICD-9-CM: 790.7, 041.11  1/3/2022 - Present        Acute on chronic anemia ICD-10-CM: D64.9  ICD-9-CM: 285.9  1/3/2022 - Present        Gait disturbance, post-stroke ICD-10-CM: I69.398, R26.9  ICD-9-CM: 438.89, 781.2  1/3/2022 - Present        COVID-19 ICD-10-CM: U07.1  ICD-9-CM: 079.89  12/26/2021 - Present        Fluid overload ICD-10-CM: E87.70  ICD-9-CM: 276.69  12/5/2021 - Present        Left-sided weakness ICD-10-CM: R53.1  ICD-9-CM: 728.87  12/3/2021 - Present        Intractable nausea and vomiting ICD-10-CM: R11.2  ICD-9-CM: 536.2  12/3/2021 - Present        Hypoglycemia ICD-10-CM: E16.2  ICD-9-CM: 251.2  6/11/2021 - Present        Encephalopathy acute ICD-10-CM: G93.40  ICD-9-CM: 348.30  6/11/2021 - Present        SSS (sick sinus syndrome) ([facility] ICD-10-CM: I49.5  ICD-9-CM: 427.81  6/11/2021 - Present        Seizure ([facility] ICD-10-CM: R56.9  ICD-9-CM: 780.39  5/19/2021 - Present        Hyperkalemia ICD-10-CM: E87.5  ICD-9-CM: 276.7  10/16/2020 - Present        Atherosclerosis of native arteries of the extremities with ulceration ([facility] ICD-10-CM: I70.25  ICD-9-CM: 440.23, 707.9  8/31/2020 - Present        Cellulitis and abscess of trunk ICD-10-CM: L03.319, L02.219  ICD-9-CM: 682.2  8/31/2020 - Present        Peripheral venous insufficiency ICD-10-CM: I87.2  ICD-9-CM: 459.81  8/31/2020 - Present        Varicose veins of lower extremity with inflammation ICD-10-CM: I83.10  ICD-9-CM: 454.1  8/31/2020 - Present        Peripheral vascular disease ([facility] ICD-10-CM: I73.9  ICD-9-CM: 443.9  7/3/2020 - Present Type II diabetes mellitus ([facility] ICD-10-CM: E11.9  ICD-9-CM: 250.00  7/3/2020 - Present        Long term (current) use of antibiotics ICD-10-CM: Z79.2  ICD-9-CM: V58.62  7/3/2020 - Present        Atrial fibrillation (HCC) ICD-10-CM: I48.91  ICD-9-CM: 427.31  7/3/2020 - Present        Cardiac pacemaker in situ ICD-10-CM: Z95.0  ICD-9-CM: V45.01  7/3/2020 - Present        Carpal tunnel syndrome of right wrist ICD-10-CM: G56.01  ICD-9-CM: 354.0  7/3/2020 - Present        Chest wall pain ICD-10-CM: R07.89  ICD-9-CM: 786.52  7/3/2020 - Present        Chronic diarrhea ICD-10-CM: K52.9  ICD-9-CM: 787.91  7/3/2020 - Present        Chronic neck pain ICD-10-CM: M54.2, G89.29  ICD-9-CM: 723.1, 338.29  7/3/2020 - Present        End stage renal failure on dialysis [facility]) ICD-10-CM: N18.6, Z99.2  ICD-9-CM: 585.6, V45.11  7/3/2020 - Present        History of CVA (cerebrovascular accident) ICD-10-CM: Z86.73  ICD-9-CM: V12.54  7/3/2020 - Present        Hyponatremia ICD-10-CM: E87.1  ICD-9-CM: 276.1  7/3/2020 - Present        Acquired hypothyroidism ICD-10-CM: E03.9  ICD-9-CM: 244.9  7/3/2020 - Present        Breakthrough seizure ([facility] ICD-10-CM: D27.553  ICD-9-CM: 345.91  7/3/2020 - Present        Recurrent falls ICD-10-CM: R29.6  ICD-9-CM: V15.88  7/3/2020 - Present        RESOLVED: Metabolic encephalopathy UOJ-87-ZM: G93.41  ICD-9-CM: 348.31  1/3/2022 - 1/3/2022        RESOLVED: Sepsis ([facility] ICD-10-CM: A41.9  ICD-9-CM: 038.9, 995.91  1/3/2022 - 1/3/2022        RESOLVED: AMS (altered mental status) ICD-10-CM: A19.83  ICD-9-CM: 780.97  12/26/2021 - 1/3/2022        RESOLVED: Disorder due to well controlled type 2 diabetes mellitus ([facility] ICD-10-CM: E11.8  ICD-9-CM: 250.90  8/31/2020 - 6/11/2021          22    Final Diagnoses:    Active Problems:    Breakthrough seizure ([facility] (7/3/2020)      Encephalopathy acute (6/11/2021)      COVID-19 (12/26/2021)      Sepsis ([facility] (1/7/2022)      Hospital Course:   Cheryle Griffon is a 73-year-old female admitted on 1/7/2022 after suffering a seizure-like episode with 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  Neurology consultation. Cara Chaudhary with a recent history of COVID-19 and continuing to suffer from generalized weakness.  Patient was found to be bacteremic on last admission with Staph aureus susceptible to vancomycin. Repeat blood cultures showing no growth. Patient has end-stage renal disease currently receiving hemodialysis as well as vancomycin for 2 weeks.  ID consultation. No indication for vancomycin at this time. Procalcitonin decreasing. CT head showing no acute findings. Neurology consultation. MRI brain does not show any evidence of acute infarct or hemorrhage. Does note microvascular ischemic changes and chronic lacunar infarcts left basal ganglia as well as chronic infarction left occipital lobe. 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 Cleared from neurology standpoint for discharge. PT/OT recommending SNF at discharge. Discharge Medications:   Current Discharge Medication List        Current Discharge Medication List      START taking these medications    Details   amLODIPine (NORVASC) 5 mg tablet Take 1 Tablet by mouth daily for 60 days. Qty: 30 Tablet, Refills: 1  Start date: 1/11/2022, End date: 3/12/2022         CONTINUE these medications which have NOT CHANGED    Details   insulin lispro (HUMALOG) 100 unit/mL injection As per insulin sensitive ssi:    Insulin High Sensitivity (thin, ESRD)  For Blood Sugar (mg/dL) of:              Less than 150 =   0 units  150 -199 =  1 units  200 -249 =   2 units  250 -299 =   3 units  300 -349 =   4 units  350 or greater = 5 units and Call Physician  Initiate Hypoglycemic protocol if blood glucose is <70 mg/dL.   Qty: 1 Each, Refills: 0      ascorbic acid, vitamin C, (VITAMIN C) 500 mg tablet Take 1 Tablet by mouth two (2) times a day for 30 days.  Qty: 60 Tablet, Refills: 0      aspirin 81 mg chewable tablet Take 1 Tablet by mouth daily for 30 days. Qty: 30 Tablet, Refills: 0      vancomycin 1,000 mg 1,000 mg, vial-mate 1 Device IVPB 1,000 mg by IntraVENous route DIALYSIS MON, WED & FRI for 6 doses. Qty: 6 Dose, Refills: 0      predniSONE (DELTASONE) 20 mg tablet Start prednisone 20 mg PO daily x 2 days then 10 mg PO daily x 2 days then stop. Qty: 6 Tablet, Refills: 0      calcitRIOL (ROCALTROL) 0.5 mcg capsule Take 1 Capsule by mouth daily. Qty: 30 Capsule, Refills: 0      zinc oxide-white petrolatum 17-57 % topical paste Apply  to affected area as needed for Skin Irritation. Indications: skin irritation  Qty: 113 g, Refills: 0      ondansetron hcl (Zofran) 4 mg tablet Take 1 Tablet by mouth every eight (8) hours as needed for Nausea or Vomiting. Qty: 45 Tablet, Refills: 0      Vimpat 200 mg tab tablet Take 1 Tablet by mouth two (2) times a day. ondansetron (Zofran ODT) 4 mg disintegrating tablet 1 Tablet by SubLINGual route every eight (8) hours as needed for Nausea or Vomiting. Qty: 20 Tablet, Refills: 0      atorvastatin (LIPITOR) 40 mg tablet Take 1 tablet by mouth once daily for 90 days  Qty: 90 Tablet, Refills: 3      FLUoxetine (PROzac) 10 mg capsule Take 1 Capsule by mouth daily for 270 days. Qty: 90 Capsule, Refills: 2    Associated Diagnoses: TRISHA (generalized anxiety disorder)      loperamide (IMODIUM) 2 mg capsule TAKE 1 CAPSULE BY MOUTH SIX TIMES DAILY AS NEEDED FOR 30 DAYS  Qty: 180 Capsule, Refills: 0      Euthyrox 150 mcg tablet Take 1 tablet by mouth once daily  Qty: 90 Tablet, Refills: 0      levETIRAcetam (KEPPRA XR) 500 mg ER tablet TAKE 2 TABLETS BY MOUTH TWICE DAILY FOR 90 DAYS      sevelamer carbonate (RENVELA) 800 mg tab tab          STOP taking these medications       benzonatate (TESSALON) 100 mg capsule Comments:   Reason for Stopping: Follow up Care:    1.  Raven Ramirez MD in 1-2 weeks.      Follow-up Information     Follow up With Specialties Details Why [address] Mirlande MurciaNataly MD Internal Medicine   [address]  [phone]      Gaines Lefort, MD Neurology Schedule an appointment as soon as possible for a visit in 2 weeks Hospital follow-up breakthrough seizures with right sided weakness [address]  [phone]              Patient Follow Up Instructions: Activity: Activity as tolerated  Diet:  Resume previous diet  Wound care: None required    Condition at Discharge:  Stable  __________________________________________________________________    Disposition  [place]  ____________________________________________________________________    Code Status:  Full Code  ___________________________________________________________________    Discharge Exam:  Patient seen and examined by me on discharge day. Pertinent Findings:    Gen:    Not in distress. Chest: Clear lungs without wheezing, rhonchi, rales. On room air. CVS:   Regular rate and rhythm. +S1/S2. No murmur or gallop. No edema  Abd:  Soft, not distended, not tender. Active bowel sounds. Musk: Able to move all extremities. Neuro:  Alert and oriented x3. Right arm weakness, improving. Persistent right lower extremity weakness strength 2+/5.     CONSULTATIONS: Cardiology, ID, Nephrology and Neurology    Significant Diagnostic Studies:   Recent Results (from the past 24 hour(s))   GLUCOSE, POC    Collection Time: 01/10/22  5:57 PM   Result Value Ref Range    Glucose (POC) 82 65 - 117 mg/dL    Performed by Cady Johnson    GLUCOSE, POC    Collection Time: 01/10/22  9:49 PM   Result Value Ref Range    Glucose (POC) 105 65 - 117 mg/dL    Performed by Ricky Woods    CBC WITH AUTOMATED DIFF    Collection Time: 01/11/22  7:30 AM   Result Value Ref Range    WBC 7.6 3.6 - 11.0 K/uL    RBC 3.32 (L) 3.80 - 5.20 M/uL HGB 10.4 (L) 11.5 - 16.0 g/dL    HCT 34.9 (L) 35.0 - 47.0 %    .1 (H) 80.0 - 99.0 FL    MCH 31.3 26.0 - 34.0 PG    MCHC 29.8 (L) 30.0 - 36.5 g/dL    RDW 18.6 (H) 11.5 - 14.5 %    PLATELET 367 (L) 980 - 400 K/uL    MPV 10.9 8.9 - 12.9 FL    NRBC 0.0 0.0  WBC    ABSOLUTE NRBC 0.00 0.00 - 0.01 K/uL    NEUTROPHILS 78 (H) 32 - 75 %    LYMPHOCYTES 13 12 - 49 %    MONOCYTES 8 5 - 13 %    EOSINOPHILS 0 0 - 7 %    BASOPHILS 0 0 - 1 %    IMMATURE GRANULOCYTES 1 (H) 0 - 0.5 %    ABS. NEUTROPHILS 6.0 1.8 - 8.0 K/UL    ABS. LYMPHOCYTES 1.0 0.8 - 3.5 K/UL    ABS. MONOCYTES 0.6 0.0 - 1.0 K/UL    ABS. EOSINOPHILS 0.0 0.0 - 0.4 K/UL    ABS. BASOPHILS 0.0 0.0 - 0.1 K/UL    ABS. IMM. GRANS. 0.0 0.00 - 0.04 K/UL    DF AUTOMATED     METABOLIC PANEL, COMPREHENSIVE    Collection Time: 01/11/22  7:30 AM   Result Value Ref Range    Sodium 142 136 - 145 mmol/L    Potassium 3.6 3.5 - 5.1 mmol/L    Chloride 108 97 - 108 mmol/L    CO2 29 21 - 32 mmol/L    Anion gap 5 5 - 15 mmol/L    Glucose 74 65 - 100 mg/dL    BUN 49 (H) 6 - 20 mg/dL    Creatinine 3.75 (H) 0.55 - 1.02 mg/dL    BUN/Creatinine ratio 13 12 - 20      GFR est AA 14 (L) >60 ml/min/1.73m2    GFR est non-AA 12 (L) >60 ml/min/1.73m2    Calcium 7.7 (L) 8.5 - 10.1 mg/dL    Bilirubin, total 0.5 0.2 - 1.0 mg/dL    AST (SGOT) 9 (L) 15 - 37 U/L    ALT (SGPT) 14 12 - 78 U/L    Alk.  phosphatase 85 45 - 117 U/L    Protein, total 4.8 (L) 6.4 - 8.2 g/dL    Albumin 2.3 (L) 3.5 - 5.0 g/dL    Globulin 2.5 2.0 - 4.0 g/dL    A-G Ratio 0.9 (L) 1.1 - 2.2     RENAL FUNCTION PANEL    Collection Time: 01/11/22  7:30 AM   Result Value Ref Range    Sodium 142 136 - 145 mmol/L    Potassium 3.7 3.5 - 5.1 mmol/L    Chloride 108 97 - 108 mmol/L    CO2 28 21 - 32 mmol/L    Anion gap 6 5 - 15 mmol/L    Glucose 73 65 - 100 mg/dL    BUN 51 (H) 6 - 20 mg/dL    Creatinine 3.74 (H) 0.55 - 1.02 mg/dL    BUN/Creatinine ratio 14 12 - 20      GFR est AA 14 (L) >60 ml/min/1.73m2    GFR est non-AA 12 (L) >60 ml/min/1.73m2    Calcium 7.9 (L) 8.5 - 10.1 mg/dL    Phosphorus 3.2 2.6 - 4.7 mg/dL    Albumin 2.3 (L) 3.5 - 5.0 g/dL   MAGNESIUM    Collection Time: 01/11/22  7:30 AM   Result Value Ref Range    Magnesium 1.9 1.6 - 2.4 mg/dL   GLUCOSE, POC    Collection Time: 01/11/22 12:03 PM   Result Value Ref Range    Glucose (POC) 58 (L) 65 - 117 mg/dL    Performed by Debra Amezquita    GLUCOSE, POC    Collection Time: 01/11/22 12:33 PM   Result Value Ref Range    Glucose (POC) 65 65 - 117 mg/dL    Performed by Debra Amezquita      MRI BRAIN WO CONT   Final Result   Microvascular ischemic changes as described above. Chronic lacunar   infarcts left basal ganglia. Chronic infarction left occipital lobe. Small lipoma in the right cerebellar pontine fissure representing an incidental   finding      CT HEAD WO CONT   Final Result   Old ischemic disease. No acute intracranial abnormality.       XR CHEST PORT   Final Result              Signed:  Miguel Chapman PA-C  1/11/2022  2:03 PM

## 2022-01-11 NOTE — PROGRESS NOTES
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 Neurology consultation. Patient with a recent history of COVID-19 and continuing to suffer from generalized weakness. Patient was found to be bacteremic on last admission with Staph aureus susceptible to vancomycin. Repeat blood cultures showing no growth. Patient has end-stage renal disease currently receiving hemodialysis as well as vancomycin for 2 weeks. ID consultation. No indication for vancomycin at this time. Procalcitonin decreasing. CT head showing no acute findings. Neurology consultation. 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 No further workup per Neuro. PT/OT recommending SNF at discharge. MRI brain pending. Subjective:    Patient seen and examined at bedside. Continues to have right lower extremity > right upper extremity weakness. MRI brain.     Current Facility-Administered Medications   Medication Dose Route Frequency    albumin human 5% (BUMINATE) solution 25 g  25 g IntraVENous ONCE    heparin (porcine) 1,000 unit/mL injection        atorvastatin (LIPITOR) tablet 80 mg  80 mg Oral DAILY    heparin (porcine) injection 5,000 Units  5,000 Units SubCUTAneous Q8H    potassium chloride (K-DUR, KLOR-CON M20) SR tablet 40 mEq  40 mEq Oral DAILY    cloNIDine HCL (CATAPRES) tablet 0.2 mg  0.2 mg Oral Q6H PRN    oxyCODONE-acetaminophen (PERCOCET) 5-325 mg per tablet 1 Tablet  1 Tablet Oral Q6H PRN    LORazepam (ATIVAN) injection 2 mg  2 mg IntraVENous Q6H PRN    amLODIPine (NORVASC) tablet 5 mg  5 mg Oral DAILY    heparin (porcine) 1,000 unit/mL injection 3,200 Units  3,200 Units Hemodialysis DIALYSIS PRN    hydrALAZINE (APRESOLINE) 20 mg/mL injection 10 mg  10 mg IntraVENous Q6H PRN    ascorbic acid (vitamin C) (VITAMIN C) tablet 500 mg  500 mg Oral BID    aspirin chewable tablet 81 mg  81 mg Oral DAILY    benzonatate (TESSALON) capsule 100 mg  100 mg Oral TID PRN    calcitRIOL (ROCALTROL) capsule 0.5 mcg  0.5 mcg Oral DAILY    levothyroxine (SYNTHROID) tablet 150 mcg  150 mcg Oral DAILY    FLUoxetine (PROzac) capsule 10 mg  10 mg Oral DAILY    levETIRAcetam (KEPPRA) tablet 1,000 mg  1,000 mg Oral BID    lacosamide (VIMPAT) tablet 200 mg  200 mg Oral BID    sevelamer carbonate (RENVELA) tab 800 mg  800 mg Oral TID WITH MEALS    zinc oxide-white petrolatum 17-57 % topical paste   Topical PRN    sodium chloride (NS) flush 5-40 mL  5-40 mL IntraVENous Q8H    sodium chloride (NS) flush 5-40 mL  5-40 mL IntraVENous PRN    acetaminophen (TYLENOL) tablet 650 mg  650 mg Oral Q6H PRN    Or    acetaminophen (TYLENOL) suppository 650 mg  650 mg Rectal Q6H PRN    polyethylene glycol (MIRALAX) packet 17 g  17 g Oral DAILY PRN    ondansetron (ZOFRAN ODT) tablet 4 mg  4 mg Oral Q8H PRN    Or    ondansetron (ZOFRAN) injection 4 mg  4 mg IntraVENous Q6H PRN    glucose chewable tablet 16 g  4 Tablet Oral PRN    dextrose (D50W) injection syrg 12.5-25 g  25-50 mL IntraVENous PRN    glucagon (GLUCAGEN) injection 1 mg  1 mg IntraMUSCular PRN    insulin lispro (HUMALOG) injection   SubCUTAneous AC&HS        Review of Systems  Constitutional: Positive for malaise/fatigue. Negative for chills and fever. HENT: Negative. Respiratory: Negative for cough and shortness of breath. Cardiovascular: Negative for chest pain and leg swelling. Gastrointestinal: Negative for abdominal pain, nausea and vomiting. Genitourinary: Negative. Musculoskeletal: Negative. Skin: Negative. Neurological: Positive for weakness. Psychiatric/Behavioral: Negative.         Objective:     Visit Vitals  BP (!) 152/76   Pulse 62   Temp 98.2 °F (36.8 °C) (Oral)   Resp 18   Ht 5' 5\" (1.651 m)   Wt 72.7 kg (160 lb 4.4 oz)   SpO2 98%   BMI 26.67 kg/m²      O2 Device: None (Room air)    Temp (24hrs), Av.3 °F (36.8 °C), Min:98.2 °F (36.8 °C), Max:98.4 °F (36.9 °C)      No intake/output data recorded. No intake/output data recorded. PHYSICAL EXAM:  Constitutional: No acute distress  Skin: Extremities and face reveal no rashes. HEENT: Sclerae anicteric. PERRL. No oral ulcers. The neck is supple and no masses. Cardiovascular: Regular rate and rhythm. +S1/S2. No murmur or gallop. No JVD. Respiratory:  Clear breath sounds bilaterally with no wheezes, rales, or rhonchi. GI: Abdomen nondistended, soft, and nontender. Normal active bowel sounds. Rectal: Deferred   Musculoskeletal: No pitting edema of the lower legs. Able to move all ext  Neurological:  Patient is alert and oriented x3. Right arm weakness, improving. Persistent right lower extremity weakness strength 2+/5.  Cranial nerves II-XII grossly intact  Psychiatric: Mood appears appropriate       Data Review    Recent Results (from the past 24 hour(s))   GLUCOSE, POC    Collection Time: 01/10/22  1:42 PM   Result Value Ref Range    Glucose (POC) 97 65 - 117 mg/dL    Performed by Petrona Yee    GLUCOSE, POC    Collection Time: 01/10/22  5:57 PM   Result Value Ref Range    Glucose (POC) 82 65 - 117 mg/dL    Performed by Petrona Yee    GLUCOSE, POC    Collection Time: 01/10/22  9:49 PM   Result Value Ref Range    Glucose (POC) 105 65 - 117 mg/dL    Performed by Paul Alejo    CBC WITH AUTOMATED DIFF    Collection Time: 22  7:30 AM   Result Value Ref Range    WBC 7.6 3.6 - 11.0 K/uL    RBC 3.32 (L) 3.80 - 5.20 M/uL    HGB 10.4 (L) 11.5 - 16.0 g/dL    HCT 34.9 (L) 35.0 - 47.0 %    .1 (H) 80.0 - 99.0 FL    MCH 31.3 26.0 - 34.0 PG    MCHC 29.8 (L) 30.0 - 36.5 g/dL    RDW 18.6 (H) 11.5 - 14.5 %    PLATELET 958 (L) 724 - 400 K/uL    MPV 10.9 8.9 - 12.9 FL    NRBC 0.0 0.0  WBC    ABSOLUTE NRBC 0. 00 0.00 - 0.01 K/uL    NEUTROPHILS 78 (H) 32 - 75 %    LYMPHOCYTES 13 12 - 49 %    MONOCYTES 8 5 - 13 %    EOSINOPHILS 0 0 - 7 %    BASOPHILS 0 0 - 1 %    IMMATURE GRANULOCYTES 1 (H) 0 - 0.5 %    ABS. NEUTROPHILS 6.0 1.8 - 8.0 K/UL    ABS. LYMPHOCYTES 1.0 0.8 - 3.5 K/UL    ABS. MONOCYTES 0.6 0.0 - 1.0 K/UL    ABS. EOSINOPHILS 0.0 0.0 - 0.4 K/UL    ABS. BASOPHILS 0.0 0.0 - 0.1 K/UL    ABS. IMM. GRANS. 0.0 0.00 - 0.04 K/UL    DF AUTOMATED         CT HEAD WO CONT   Final Result   Old ischemic disease. No acute intracranial abnormality. XR CHEST PORT   Final Result      MRI BRAIN WO CONT    (Results Pending)       Active Problems:    Sepsis ([facility] (1/7/2022)        Assessment/Plan:     1. Acute encephalopathy - Resolved     2. Seizure disorder  Continue Keppra  Continue lacosamide  Neurology consultation  CT of the head with no acute process  Residual right arm weakness secondary to Ruddy's paralysis     3. History of staph aureus bacteremia - on 12/26  No indications for IV vancomycin at this time   ID consultation  Blood cultures showing no growth so far  No white count evident or febrile illness  Procalcitonin trending down     4. End-stage renal disease  Renal consultation  Continue hemodialysis as previously scheduled, Tuesday, Thursday and Saturday per nephrology     5. Hypothyroidism  TSH normal  Continue levothyroxine     6.  Elevated troponin  Most likely type II  Trending down appropriately  Cardiac consultation  Continue aspirin     7.  COVID-19 positive  Stable    8. Right-sided weakness  - Could be Ruddy's paralysis from seizures  - MRI brain to r/o stroke  - Neurology consult  - PT/OT      DVT Prophylaxis: Lovenox  Code Status: Full  POA:    Discharge Barriers: Pending MRI brain. If negative for stroke, will be clear for discharge. Care Plan discussed with: patient, nursing, and IDR team    Total time spent with patient: >35 minutes.

## 2022-01-11 NOTE — CONSULTS
NEUROLOGY CONSULT    Name Hood Odell Age 80 y.o. MRN [id number]  1950     Referring Physician: Jared Best MD    Chief Complaint: Breakthrough seizure? This is a 80 y.o.  female very well-known to our service, follows in the clinic with me for seizures who was discharged from the hospital on 2022 after receiving treatment for MRSA bacteremia, was found unresponsive by the daughter around 7 PM yesterday and EMS was called. The suspicion was that she probably had a breakthrough seizure secondary to acute illness and was unresponsive from there. In the ER she started responding to voice and answering simple questions. She she was also diagnosed with Covid. Her troponin was elevated at 147 chest x-ray showed cardiomegaly. A CT scan of the head done in the ED did not reveal any acute findings. At the time of my evaluation this morning the patient started responding after some tactile and painful stimuli but her speech was difficult to understand as it was more of mumbling. However noted that she is not moving her right arm at all and it is flaccid. Could not determine from the notes what status was when she came to the ED. Assessment and Plan:  1. 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 Probably she had a breakthrough seizure because of the acute illness. 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 Right side improved today. PATIENT HAS A HISTORY OF TODDS PARALYSIS --> not resolved. Will do mri brain tosay to rule out stroke. - continue vimpat and keppra  -pt/ot    Thank you for the consult.     No Known Allergies     Current Facility-Administered Medications   Medication Dose Route Frequency    heparin (porcine) 1,000 unit/mL injection        albumin human 5% (BUMINATE) solution 25 g  25 g IntraVENous ONCE    atorvastatin (LIPITOR) tablet 80 mg  80 mg Oral DAILY    heparin (porcine) injection 5,000 Units  5,000 Units SubCUTAneous Q8H    potassium chloride (K-DUR, KLOR-CON M20) SR tablet 40 mEq  40 mEq Oral DAILY    cloNIDine HCL (CATAPRES) tablet 0.2 mg  0.2 mg Oral Q6H PRN    oxyCODONE-acetaminophen (PERCOCET) 5-325 mg per tablet 1 Tablet  1 Tablet Oral Q6H PRN    LORazepam (ATIVAN) injection 2 mg  2 mg IntraVENous Q6H PRN    amLODIPine (NORVASC) tablet 5 mg  5 mg Oral DAILY    heparin (porcine) 1,000 unit/mL injection 3,200 Units  3,200 Units Hemodialysis DIALYSIS PRN    hydrALAZINE (APRESOLINE) 20 mg/mL injection 10 mg  10 mg IntraVENous Q6H PRN    ascorbic acid (vitamin C) (VITAMIN C) tablet 500 mg  500 mg Oral BID    aspirin chewable tablet 81 mg  81 mg Oral DAILY    benzonatate (TESSALON) capsule 100 mg  100 mg Oral TID PRN    calcitRIOL (ROCALTROL) capsule 0.5 mcg  0.5 mcg Oral DAILY    levothyroxine (SYNTHROID) tablet 150 mcg  150 mcg Oral DAILY    FLUoxetine (PROzac) capsule 10 mg  10 mg Oral DAILY    levETIRAcetam (KEPPRA) tablet 1,000 mg  1,000 mg Oral BID    lacosamide (VIMPAT) tablet 200 mg  200 mg Oral BID    sevelamer carbonate (RENVELA) tab 800 mg  800 mg Oral TID WITH MEALS    zinc oxide-white petrolatum 17-57 % topical paste   Topical PRN    sodium chloride (NS) flush 5-40 mL  5-40 mL IntraVENous Q8H    sodium chloride (NS) flush 5-40 mL  5-40 mL IntraVENous PRN    acetaminophen (TYLENOL) tablet 650 mg  650 mg Oral Q6H PRN    Or    acetaminophen (TYLENOL) suppository 650 mg  650 mg Rectal Q6H PRN    polyethylene glycol (MIRALAX) packet 17 g  17 g Oral DAILY PRN    ondansetron (ZOFRAN ODT) tablet 4 mg  4 mg Oral Q8H PRN    Or    ondansetron (ZOFRAN) injection 4 mg  4 mg IntraVENous Q6H PRN    glucose chewable tablet 16 g  4 Tablet Oral PRN    dextrose (D50W) injection syrg 12.5-25 g  25-50 mL IntraVENous PRN    glucagon (GLUCAGEN) injection 1 mg  1 mg IntraMUSCular PRN    insulin lispro (HUMALOG) injection   SubCUTAneous AC&HS     Past Medical History:   Diagnosis Date    Anxiety disorder     Arthritis     Atherosclerosis of native arteries of the extremities with ulceration (HCC) 8/31/2020    Atrial fibrillation (HCC)     Cardiac pacemaker in situ     Cellulitis and abscess of trunk 8/31/2020    Depression, postpartum     Diabetes ([facility]     Heart disease     Heartburn     Hx of long term use of blood thinners     Hypercholesterolemia     Hypertension     Hypothyroidism     Migraines     Peripheral venous insufficiency 8/31/2020    Seizures ([facility]     Sleep disorder     Stroke ([facility]     2019    Varicose veins of lower extremity with inflammation 8/31/2020     Social History     Tobacco Use    Smoking status: Never Smoker    Smokeless tobacco: Never Used   Vaping Use    Vaping Use: Never used   Substance Use Topics    Alcohol use: Never    Drug use: Never     Exam  Visit Vitals  BP (!) 152/76   Pulse 62   Temp 98.2 °F (36.8 °C) (Oral)   Resp 18   Ht 5' 5\" (1.651 m)   Wt 72.7 kg (160 lb 4.4 oz)   SpO2 98%   BMI 26.67 kg/m²     General: Well developed, well nourished. Patient in no distress   Head: Normocephalic, atraumatic, anicteric sclera   Neck Normal ROM, No thyromegally   Lungs:     Cardiac:    Abd:    Ext: + pedal edema   Skin: Supple no rash     NeurologicExam:  Mental Status: Lethargic but arousable and answers simple questions, says right arm does not work. Speech: Fluent no aphasia or dysarthria. Cranial Nerves:   Pupils are equal round and reactive to light, spontaneous eye movements noted, face is symmetric, visual fields are intact to threat. 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 Cannot count fingers    Motor:  RUE: 3/5, RLE: 2/5. LUE: 5/5 throughout, LLE: 3/5 hip flexion   Reflexes:   Deep tendon reflexes not checked.    Sensory:   Intact to light touch in all 4 ext    Gait:  Gait is deferred due to her condition   Tremor:   No tremor noted. Cerebellar:  Coordination intact for finger-nose-finger.            Lab Review  Lab Results   Component Value Date/Time    WBC 7.8 01/10/2022 06:48 AM    HCT 35.2 01/10/2022 06:48 AM    HGB 10.4 (L) 01/10/2022 06:48 AM    PLATELET 760 (L) 10/59/4991 06:48 AM     Lab Results   Component Value Date/Time    Sodium 142 01/10/2022 06:48 AM    Sodium 140 01/10/2022 06:48 AM    Potassium 3.4 (L) 01/10/2022 06:48 AM    Potassium 3.3 (L) 01/10/2022 06:48 AM    Chloride 107 01/10/2022 06:48 AM    Chloride 107 01/10/2022 06:48 AM    CO2 30 01/10/2022 06:48 AM    CO2 30 01/10/2022 06:48 AM    Glucose 70 01/10/2022 06:48 AM    Glucose 69 01/10/2022 06:48 AM    BUN 36 (H) 01/10/2022 06:48 AM    BUN 36 (H) 01/10/2022 06:48 AM    Creatinine 2.93 (H) 01/10/2022 06:48 AM    Creatinine 2.87 (H) 01/10/2022 06:48 AM    Calcium 7.8 (L) 01/10/2022 06:48 AM    Calcium 7.8 (L) 01/10/2022 06:48 AM     Lab Results   Component Value Date/Time    Vitamin B12 291 12/07/2021 06:04 PM    Folate 4.3 (L) 12/07/2021 06:04 PM     Lab Results   Component Value Date/Time    LDL, calculated 27.8 05/20/2021 05:35 AM     Lab Results   Component Value Date/Time    Hemoglobin A1c 4.7 01/07/2022 10:47 PM    Hemoglobin A1c, External 5.1 09/09/2019 12:00 AM     No components found for: TROPQUANT  No results found for: NGOZI

## 2022-01-11 NOTE — PROGRESS NOTES
Multiple attempts made to call report to [facility], however, unable to make contact with staff at [facility]. [facility] transport is here for transfer to facility, will continue to attempt contact for report.

## 2022-01-11 NOTE — PROGRESS NOTES
Consult requested by: Martin Burton MD    ADMIT DATE: 1/7/2022  CONSULT DATE: January 11, 2022           Patient is seen in the room  On RA  S/p dialyzed 1/08 and removed 2.5 L   plan for HD today  Past Medical History:   Diagnosis Date    Anxiety disorder     Arthritis     Atherosclerosis of native arteries of the extremities with ulceration ([facility] 8/31/2020    Atrial fibrillation ([address]     Cardiac pacemaker in situ     Cellulitis and abscess of trunk 8/31/2020    Depression, postpartum     Diabetes ([address]     Heart disease     Heartburn     Hx of long term use of blood thinners     Hypercholesterolemia     Hypertension     Hypothyroidism     Migraines     Peripheral venous insufficiency 8/31/2020    Seizures ([address]     Sleep disorder     Stroke ([facility]     2019    Varicose veins of lower extremity with inflammation 8/31/2020      Past Surgical History:   Procedure Laterality Date    HX BACK SURGERY      HX OTHER SURGICAL      leg surgery     HX OTHER SURGICAL      pacemaker monitor     IR INSERT TUNL CVC W/O PORT OVER 5 YR  12/10/2021       Social History     Socioeconomic History    Marital status:      Spouse name: Not on file    Number of children: Not on file    Years of education: Not on file    Highest education level: Not on file   Occupational History    Not on file   Tobacco Use    Smoking status: Never Smoker    Smokeless tobacco: Never Used   Vaping Use    Vaping Use: Never used   Substance and Sexual Activity    Alcohol use: Never    Drug use: Never    Sexual activity: Not on file   Other Topics Concern    Not on file   Social History Narrative    Not on file     Social Determinants of Health     Financial Resource Strain:     Difficulty of Paying Living Expenses: Not on file   Food Insecurity:     Worried About Running Out of Food in the Last Year: Not on file    Janet of Food in the Last Year: Not on file   Transportation Needs:     Lack of Transportation (Medical): Not on file    Lack of Transportation (Non-Medical): Not on file   Physical Activity:     Days of Exercise per Week: Not on file    Minutes of Exercise per Session: Not on file   Stress:     Feeling of Stress : Not on file   Social Connections:     Frequency of Communication with Friends and Family: Not on file    Frequency of Social Gatherings with Friends and Family: Not on file    Attends Druze Services: Not on file    Active Member of [address] [place] [address] [employer] or Organizations: Not on file    Attends Club or Organization Meetings: Not on file    Marital Status: Not on file   Intimate Partner Violence:     Fear of Current or Ex-Partner: Not on file    Emotionally Abused: Not on file    Physically Abused: Not on file    Sexually Abused: Not on file   Housing Stability:     Unable to Pay for Housing in the Last Year: Not on file    Number of [place] in the Last Year: Not on file    Unstable Housing in the Last Year: Not on file       Family History   Problem Relation Age of Onset    Heart Disease Mother     Heart Disease Father     Diabetes Sister     Diabetes Brother      No Known Allergies     Home Medications:     Medications Prior to Admission   Medication Sig    insulin lispro (HUMALOG) 100 unit/mL injection As per insulin sensitive ssi:    Insulin High Sensitivity (thin, ESRD)  For Blood Sugar (mg/dL) of:              Less than 150 =   0 units  150 -199 =  1 units  200 -249 =   2 units  250 -299 =   3 units  300 -349 =   4 units  350 or greater = 5 units and Call Physician  Initiate Hypoglycemic protocol if blood glucose is <70 mg/dL.  ascorbic acid, vitamin C, (VITAMIN C) 500 mg tablet Take 1 Tablet by mouth two (2) times a day for 30 days.  aspirin 81 mg chewable tablet Take 1 Tablet by mouth daily for 30 days.  [] benzonatate (TESSALON) 100 mg capsule Take 1 Capsule by mouth three (3) times daily as needed for Cough for up to 7 days.     vancomycin 1,000 mg 1,000 mg, vial-mate 1 Device IVPB 1,000 mg by IntraVENous route DIALYSIS MON, WED & FRI for 6 doses.  predniSONE (DELTASONE) 20 mg tablet Start prednisone 20 mg PO daily x 2 days then 10 mg PO daily x 2 days then stop.  calcitRIOL (ROCALTROL) 0.5 mcg capsule Take 1 Capsule by mouth daily.  zinc oxide-white petrolatum 17-57 % topical paste Apply  to affected area as needed for Skin Irritation. Indications: skin irritation    ondansetron hcl (Zofran) 4 mg tablet Take 1 Tablet by mouth every eight (8) hours as needed for Nausea or Vomiting.  Vimpat 200 mg tab tablet Take 1 Tablet by mouth two (2) times a day.  ondansetron (Zofran ODT) 4 mg disintegrating tablet 1 Tablet by SubLINGual route every eight (8) hours as needed for Nausea or Vomiting.  atorvastatin (LIPITOR) 40 mg tablet Take 1 tablet by mouth once daily for 90 days    FLUoxetine (PROzac) 10 mg capsule Take 1 Capsule by mouth daily for 270 days.     loperamide (IMODIUM) 2 mg capsule TAKE 1 CAPSULE BY MOUTH SIX TIMES DAILY AS NEEDED FOR 30 DAYS    Euthyrox 150 mcg tablet Take 1 tablet by mouth once daily    levETIRAcetam (KEPPRA XR) 500 mg ER tablet TAKE 2 TABLETS BY MOUTH TWICE DAILY FOR 90 DAYS    sevelamer carbonate (RENVELA) 800 mg tab tab        Current Inpatient Medications:     Current Facility-Administered Medications   Medication Dose Route Frequency    albumin human 5% (BUMINATE) solution 25 g  25 g IntraVENous ONCE    heparin (porcine) 1,000 unit/mL injection        atorvastatin (LIPITOR) tablet 80 mg  80 mg Oral DAILY    heparin (porcine) injection 5,000 Units  5,000 Units SubCUTAneous Q8H    potassium chloride (K-DUR, KLOR-CON M20) SR tablet 40 mEq  40 mEq Oral DAILY    cloNIDine HCL (CATAPRES) tablet 0.2 mg  0.2 mg Oral Q6H PRN    oxyCODONE-acetaminophen (PERCOCET) 5-325 mg per tablet 1 Tablet  1 Tablet Oral Q6H PRN    LORazepam (ATIVAN) injection 2 mg  2 mg IntraVENous Q6H PRN    amLODIPine (NORVASC) tablet 5 mg  5 mg Oral DAILY    heparin (porcine) 1,000 unit/mL injection 3,200 Units  3,200 Units Hemodialysis DIALYSIS PRN    hydrALAZINE (APRESOLINE) 20 mg/mL injection 10 mg  10 mg IntraVENous Q6H PRN    ascorbic acid (vitamin C) (VITAMIN C) tablet 500 mg  500 mg Oral BID    aspirin chewable tablet 81 mg  81 mg Oral DAILY    benzonatate (TESSALON) capsule 100 mg  100 mg Oral TID PRN    calcitRIOL (ROCALTROL) capsule 0.5 mcg  0.5 mcg Oral DAILY    levothyroxine (SYNTHROID) tablet 150 mcg  150 mcg Oral DAILY    FLUoxetine (PROzac) capsule 10 mg  10 mg Oral DAILY    levETIRAcetam (KEPPRA) tablet 1,000 mg  1,000 mg Oral BID    lacosamide (VIMPAT) tablet 200 mg  200 mg Oral BID    sevelamer carbonate (RENVELA) tab 800 mg  800 mg Oral TID WITH MEALS    zinc oxide-white petrolatum 17-57 % topical paste   Topical PRN    sodium chloride (NS) flush 5-40 mL  5-40 mL IntraVENous Q8H    sodium chloride (NS) flush 5-40 mL  5-40 mL IntraVENous PRN    acetaminophen (TYLENOL) tablet 650 mg  650 mg Oral Q6H PRN    Or    acetaminophen (TYLENOL) suppository 650 mg  650 mg Rectal Q6H PRN    polyethylene glycol (MIRALAX) packet 17 g  17 g Oral DAILY PRN    ondansetron (ZOFRAN ODT) tablet 4 mg  4 mg Oral Q8H PRN    Or    ondansetron (ZOFRAN) injection 4 mg  4 mg IntraVENous Q6H PRN    glucose chewable tablet 16 g  4 Tablet Oral PRN    dextrose (D50W) injection syrg 12.5-25 g  25-50 mL IntraVENous PRN    glucagon (GLUCAGEN) injection 1 mg  1 mg IntraMUSCular PRN    insulin lispro (HUMALOG) injection   SubCUTAneous AC&HS       Review of Systems:   No fever or chills. No sore throat. No cough or hemoptysis. No shortness of breath or chest pain. No orthopnea or paroxysmal nocturnal dyspnea. No nausea, vomiting, abdominal pain, melena or hematochezia. No ankle swelling, no joint paints. No muscle aches. No skin changes. No dizziness or lightheadedness. No headaches.        Physical Assessment:     Vitals: 01/11/22 0850 01/11/22 0920 01/11/22 0950 01/11/22 1020   BP: (!) 152/76 132/69 138/64 115/67   Pulse: 62 60 64 60   Resp: 18 18 18 18   Temp:       TempSrc:       SpO2:       Weight:       Height:         Last 3 Recorded Weights in this Encounter    01/07/22 1711 01/09/22 0216 01/10/22 0230   Weight: 81.6 kg (180 lb) 72.7 kg (160 lb 4.4 oz) 72.7 kg (160 lb 4.4 oz)     Admission weight: Weight: 81.6 kg (180 lb) (01/07/22 1711)    No intake or output data in the 24 hours ending 01/11/22 1034    Patient is in no apparent distress. HEENT: Head is normocephalic and atraumatic. Pupils are round, equal, reactive to light. Sclerae are anicteric. Oropharynx clear. Neck: no cervical lymphadenopathy or thyromegaly. Lungs: good air entry, clear to auscultation bilaterally. Trachea at the midline. Cardiovascular system: S1, S2, regular rate and rhythm. No murmurs, gallops or rubs. No jvd. Abdomen: soft, non tender, non distended. Positive bowel sounds. No hepatosplenomegaly. No abdominal bruits. Extremities: no clubbing, cyanosis or edema. Strong dorsalis pedis pulses. Brisk capillary refill on the toes bilaterally. Integumentary: skin is grossly intact. Neurologic: Alert, oriented time three. Cooperative and appropriate. No gross motor or sensory deficits. Dialysis access: cuffed tunneled catheter site right.   [facility]     Data Review:    Labs: Results:       Chemistry Recent Labs     01/11/22  0730 01/10/22  0648 01/10/22  0648 01/09/22  0730 01/09/22  0730   GLU 73  74  --  69  70  --  81     142  --  140  142  --  141   K 3.7  3.6  --  3.3*  3.4*  --  3.6     108  --  107  107  --  105   CO2 28  29  --  30  30  --  30   BUN 51*  49*  --  36*  36*  --  24*   CREA 3.74*  3.75*  --  2.87*  2.93*  --  2.31*   CA 7.9*  7.7*  --  7.8*  7.8*  --  7.9*   AGAP 6  5  --  3*  5  --  6   BUCR 14  13  --  13  12  --  10*   AP 85  --  88  --   --    TP 4.8*  --  4.8*  --   --    ALB 2.3* 2.3*  --  2.2*  2.2*  --  2.3*   GLOB 2.5  --  2.6  --   --    AGRAT 0.9*  --  0.8*  --   --    PHOS 3.2   < > 2.8   < > 3.5    < > = values in this interval not displayed. CBC w/Diff Recent Labs     01/11/22  0730 01/10/22  0648 01/09/22  0730   WBC 7.6 7.8 5.2   RBC 3.32* 3.34* 3.11*   HGB 10.4* 10.4* 9.6*   HCT 34.9* 35.2 32.6*   * 111* 100*   GRANS 78* 78*  --    LYMPH 13 12  --    EOS 0 0  --          Iron/Ferritin No results for input(s): IRON in the last 72 hours. No lab exists for component: TIBCCALC   PTH/VIT D No results for input(s): PTH in the last 72 hours. No lab exists for component: VITD            Assessment & Plan     1-ESRD:  -HD MWF at [facility], [address] [place] [address]  -No volume overload/uremia  -s/p HD 1/08, removed 2.55 L  -RI perm cath as active dialysis access  -BORA AVF in place also    2-Anemia:  -Hb was 5.7  -No obvious bleeding  -s/p blood Tx 2 units with HD   -iv Epo with HD  -Hb 10.4 now      3-Secondary hyperparathyroidism of ESRD. -cont sevelamer 800 mg tid and calcitriol 0.5 mcg daily    - Renal Diet    4-COVID Pneumonia   -- started on empiric abxs because of fever,lactic acidosis and leucocytosis . -on RA     5-AMS :  -CT Head negative   Likely Metabolic and sepsis related . 6- Seizure disorder:  -break thru seizure  -seen by Neuro  -On Vimpat & Keppra    7-MSSA Bactremia:  -was on iv vancomycin   -ID on board  -[facility] 1/07  Pending    8- DVT prophylaxis. -I will change Lovenox to unfractionated subcu heparin.     9-Hypokalemia:  -K 3.3  -will put on KCL 40 meq daily  -will use 3K bath

## 2022-01-11 NOTE — DIALYSIS
GCS 15 brighter today more movement in right arm. Pt tolerated 3 h dialysis with 2300 ml fluid removal.  Pt leaves dialysis for MRI.

## 2022-01-11 NOTE — PROGRESS NOTES
Bedside and Verbal shift change report given to Brooke Pedraza RN (oncoming nurse) by Booker Storey RN (offgoing nurse). Report included the following information SBAR and MAR.

## 2022-01-11 NOTE — PROGRESS NOTES
Physician Progress Note      PATIENT:               Marley Stanley  CSN #:                  [id number]  :                       1950  ADMIT DATE:       2022 5:09 PM  [address] [place] DATE:  RESPONDING  PROVIDER #:        LYNDA RIDLEY PA-C          QUERY TEXT:    Pt admitted with AMS and has encephalopathy documented. If possible, please document in progress notes and discharge summary further specificity regarding the type of encephalopathy:    The medical record reflects the following:  Risk Factors: 80year old female, COVID-19, generalized weakness, ESRD on HD, seizures  Clinical Indicators: 1/3 H&P - Acute encephalopathy: GCS 13: likely a combination of seixure, new infection and COVID. K+: 3.6-3.4-3.3  Procalcitonin: 1.22-0.77-0.55  CRP: .098-0.58   Blood culture - No growth   Blood gases: pH 7.31, pCO2 50, pO2 128   Rapid COVID test: COVID DETECTED  Treatment: IV Keppra, IV Vancomycin, IV Azithromycin, IV Decadron    Please call [phone] with any questions  Options provided:  -- Anoxic encephalopathy  -- Hypertensive encephalopathy  -- Metabolic encephalopathy  -- Septic encephalopathy  -- Other - I will add my own diagnosis  -- Disagree - Not applicable / Not valid  -- Disagree - Clinically unable to determine / Unknown  -- Refer to Clinical Documentation Reviewer    PROVIDER RESPONSE TEXT:    This patient has metabolic encephalopathy. Query created by:  Dilip Escobar on 1/10/2022 11:06 AM      Electronically signed by:  Marilee Stephen PA-C 2022 10:06 AM

## 2022-01-11 NOTE — PROGRESS NOTES
Discharge plan of care/case management plan validated with provider discharge order. Andra Alvarez provided with patient report and discharge paperwork along with patient belongings. CM has already made contact with patient's son Anthony Erwin and he is aware that patient will be discharging back to [facility] this evening. Multiple unsuccessful attempts to call report to [facility], no answer at time of call.

## 2022-01-11 NOTE — CONSULTS
MRI reviewed and shows no acute stroke. Right hemiparesis likely Ruddy paralysis. No further neuro workup.  Follow up with Dr Cullen Quinn in 3-4 weeks

## 2022-01-11 NOTE — PROGRESS NOTES
Progress Note    Patient: Kimberly Romano MRN: [id number]  SSN: [id number]    YOB: 1950  Age: 80 y.o. Sex: female      Admit Date: 1/7/2022    LOS: 4 days     Subjective:   Patient followed for recent MSSA bacteremia and Covid-19 pneumonia without respiratory failure. Blood cultures negative so far. She remains afebrile with normal WBC, decreased procal.   She is still on room air and denies any SOB. She is pending transfer to SNF. Patient is off the floor at this time. Objective:     Vitals:    01/10/22 2137 01/11/22 0750 01/11/22 0820 01/11/22 0850   BP:  (!) 154/68 (!) 145/76 (!) 152/76   Pulse:  61 60 62   Resp:  18 18 18   Temp: 98.4 °F (36.9 °C) 98.2 °F (36.8 °C)     TempSrc:  Oral     SpO2: 98%      Weight:       Height:            Intake and Output:  Current Shift: No intake/output data recorded. Last three shifts: No intake/output data recorded. Physical Exam:   Vitals and nursing note reviewed. Patient unavailable for re-examination  Constitutional:       Appearance: She is ill-appearing. HENT: unremarkable   Neck:      Comments: Dialysis access  Cardiovascular:      Rate and Rhythm: Regular rhythm. Heart sounds: No murmur heard. Pulmonary:      Effort: Pulmonary effort is normal.      Breath sounds: Normal breath sounds. Abdominal:      General: Bowel sounds are normal.      Palpations: Abdomen is soft. Tenderness: There is no abdominal tenderness. Genitourinary:     Comments: No Cochran   Skin:     Findings: No rash. Neurological: nonfocal, no confusion     Lab/Data Review:     WBC 7,800  ,000      Ferritin 667 <708    Procal 0.55 < 0.77 <1.22  CRP 0.58 <0.98    Blood cultures (1/7) No growth 1 day    Assessment:     Active Problems:    Sepsis ([address] (1/7/2022)    1. Recent MSSA bacteremia   2. Covid-19 without pneumonia or hypoxia  3. ESRD on hemodialysis  4. Altered mental status  5.  Elevated procal and CRP, etiology and significance unclear     Comment:  Procal and CRP decreasing without antibiotic intervention. Plan:      1. Follow-up pending blood cultures  2. No indication for Covid-19 treatment at this time  3. No indication for further treatment of recent MSSA bacteremia  4.  Cleared for discharge from ID standpoint    Signed By: Zelda Ferrer MD     January 11, 2022

## 2022-01-11 NOTE — PROGRESS NOTES
Patients Cristiano Simpson is approved to go to [facility]. CM will follow for medical clearance.

## 2022-01-11 NOTE — PROGRESS NOTES
Per Anaya Lockhart, patient cleared to DC. Patient will go to [facility], room 851V and nurse can call report to [phone]. CM called patients son, Leeanne Gould @ [phone] to notify. Sabas's spouse, Kaity Rees answered the phone and stated that Yolanda was at work and she will notify him of patients DC to SNF. Keyona Guzman ask that CM call back to notify time of transport. Discharge plan of care/case management plan validated with provider discharge order.

## 2022-01-12 ENCOUNTER — PATIENT MESSAGE (OUTPATIENT)
Dept: INTERNAL MEDICINE CLINIC | Age: 72
End: 2022-01-12

## 2022-01-12 ENCOUNTER — APPOINTMENT (OUTPATIENT)
Dept: CT IMAGING | Age: 72
DRG: 314 | End: 2022-01-12
Attending: EMERGENCY MEDICINE
Payer: MEDICARE

## 2022-01-12 ENCOUNTER — HOSPITAL ENCOUNTER (INPATIENT)
Age: 72
LOS: 5 days | Discharge: NURSING, REHAB OR LONG-TERM CARE | DRG: 314 | End: 2022-01-17
Attending: EMERGENCY MEDICINE | Admitting: INTERNAL MEDICINE
Payer: MEDICARE

## 2022-01-12 ENCOUNTER — APPOINTMENT (OUTPATIENT)
Dept: GENERAL RADIOLOGY | Age: 72
DRG: 314 | End: 2022-01-12
Attending: EMERGENCY MEDICINE
Payer: MEDICARE

## 2022-01-12 ENCOUNTER — APPOINTMENT (OUTPATIENT)
Dept: GENERAL RADIOLOGY | Age: 72
DRG: 314 | End: 2022-01-12
Attending: STUDENT IN AN ORGANIZED HEALTH CARE EDUCATION/TRAINING PROGRAM
Payer: MEDICARE

## 2022-01-12 DIAGNOSIS — I95.9 HYPOTENSION, UNSPECIFIED HYPOTENSION TYPE: ICD-10-CM

## 2022-01-12 DIAGNOSIS — U07.1 COVID-19 VIRUS INFECTION: Primary | ICD-10-CM

## 2022-01-12 PROBLEM — R56.9 SEIZURE-LIKE ACTIVITY (HCC): Status: ACTIVE | Noted: 2022-01-12

## 2022-01-12 LAB
ALBUMIN SERPL-MCNC: 1.6 G/DL (ref 3.5–5)
ALBUMIN SERPL-MCNC: 2 G/DL (ref 3.5–5)
ALBUMIN/GLOB SERPL: 0.6 {RATIO} (ref 1.1–2.2)
ALBUMIN/GLOB SERPL: 0.7 {RATIO} (ref 1.1–2.2)
ALP SERPL-CCNC: 63 U/L (ref 45–117)
ALP SERPL-CCNC: 83 U/L (ref 45–117)
ALT SERPL-CCNC: 12 U/L (ref 12–78)
ALT SERPL-CCNC: 14 U/L (ref 12–78)
ANION GAP SERPL CALC-SCNC: 5 MMOL/L (ref 5–15)
ANION GAP SERPL CALC-SCNC: 7 MMOL/L (ref 5–15)
APPEARANCE UR: ABNORMAL
APTT PPP: 27.5 SEC (ref 21.2–34.1)
AST SERPL W P-5'-P-CCNC: 10 U/L (ref 15–37)
AST SERPL W P-5'-P-CCNC: 12 U/L (ref 15–37)
ATRIAL RATE: 60 BPM
BACTERIA URNS QL MICRO: NEGATIVE /HPF
BASOPHILS # BLD: 0 K/UL (ref 0–0.1)
BASOPHILS # BLD: 0 K/UL (ref 0–0.1)
BASOPHILS NFR BLD: 0 % (ref 0–1)
BASOPHILS NFR BLD: 0 % (ref 0–1)
BILIRUB SERPL-MCNC: 0.4 MG/DL (ref 0.2–1)
BILIRUB SERPL-MCNC: 0.6 MG/DL (ref 0.2–1)
BILIRUB UR QL: NEGATIVE
BUN SERPL-MCNC: 34 MG/DL (ref 6–20)
BUN SERPL-MCNC: 57 MG/DL (ref 6–20)
BUN/CREAT SERPL: 11 (ref 12–20)
BUN/CREAT SERPL: 18 (ref 12–20)
CA-I BLD-MCNC: 7.1 MG/DL (ref 8.5–10.1)
CA-I BLD-MCNC: 7.8 MG/DL (ref 8.5–10.1)
CALCULATED R AXIS, ECG10: 83 DEGREES
CALCULATED T AXIS, ECG11: 26 DEGREES
CHLORIDE SERPL-SCNC: 106 MMOL/L (ref 97–108)
CHLORIDE SERPL-SCNC: 109 MMOL/L (ref 97–108)
CO2 SERPL-SCNC: 24 MMOL/L (ref 21–32)
CO2 SERPL-SCNC: 28 MMOL/L (ref 21–32)
COLOR UR: ABNORMAL
COVID-19 RAPID TEST, COVR: DETECTED
CREAT SERPL-MCNC: 3.03 MG/DL (ref 0.55–1.02)
CREAT SERPL-MCNC: 3.2 MG/DL (ref 0.55–1.02)
DIAGNOSIS, 93000: NORMAL
DIFFERENTIAL METHOD BLD: ABNORMAL
DIFFERENTIAL METHOD BLD: ABNORMAL
EOSINOPHIL # BLD: 0 K/UL (ref 0–0.4)
EOSINOPHIL # BLD: 0 K/UL (ref 0–0.4)
EOSINOPHIL NFR BLD: 0 % (ref 0–7)
EOSINOPHIL NFR BLD: 0 % (ref 0–7)
ERYTHROCYTE [DISTWIDTH] IN BLOOD BY AUTOMATED COUNT: 19.4 % (ref 11.5–14.5)
ERYTHROCYTE [DISTWIDTH] IN BLOOD BY AUTOMATED COUNT: 19.5 % (ref 11.5–14.5)
GLOBULIN SER CALC-MCNC: 2.5 G/DL (ref 2–4)
GLOBULIN SER CALC-MCNC: 2.8 G/DL (ref 2–4)
GLUCOSE BLD STRIP.AUTO-MCNC: 103 MG/DL (ref 65–117)
GLUCOSE BLD STRIP.AUTO-MCNC: 114 MG/DL (ref 65–117)
GLUCOSE SERPL-MCNC: 103 MG/DL (ref 65–100)
GLUCOSE SERPL-MCNC: 93 MG/DL (ref 65–100)
GLUCOSE UR STRIP.AUTO-MCNC: NEGATIVE MG/DL
HCT VFR BLD AUTO: 22.4 % (ref 35–47)
HCT VFR BLD AUTO: 34.4 % (ref 35–47)
HGB BLD-MCNC: 10.1 G/DL (ref 11.5–16)
HGB BLD-MCNC: 6.4 G/DL (ref 11.5–16)
HGB UR QL STRIP: NEGATIVE
IMM GRANULOCYTES # BLD AUTO: 0.1 K/UL (ref 0–0.04)
IMM GRANULOCYTES # BLD AUTO: 0.1 K/UL (ref 0–0.04)
IMM GRANULOCYTES NFR BLD AUTO: 1 % (ref 0–0.5)
IMM GRANULOCYTES NFR BLD AUTO: 1 % (ref 0–0.5)
KETONES UR QL STRIP.AUTO: NEGATIVE MG/DL
LACTATE SERPL-SCNC: 2.6 MMOL/L (ref 0.4–2)
LEUKOCYTE ESTERASE UR QL STRIP.AUTO: ABNORMAL
LYMPHOCYTES # BLD: 0.8 K/UL (ref 0.8–3.5)
LYMPHOCYTES # BLD: 2.3 K/UL (ref 0.8–3.5)
LYMPHOCYTES NFR BLD: 10 % (ref 12–49)
LYMPHOCYTES NFR BLD: 24 % (ref 12–49)
MCH RBC QN AUTO: 31.6 PG (ref 26–34)
MCH RBC QN AUTO: 31.7 PG (ref 26–34)
MCHC RBC AUTO-ENTMCNC: 28.6 G/DL (ref 30–36.5)
MCHC RBC AUTO-ENTMCNC: 29.4 G/DL (ref 30–36.5)
MCV RBC AUTO: 107.5 FL (ref 80–99)
MCV RBC AUTO: 110.9 FL (ref 80–99)
MONOCYTES # BLD: 0.5 K/UL (ref 0–1)
MONOCYTES # BLD: 0.6 K/UL (ref 0–1)
MONOCYTES NFR BLD: 6 % (ref 5–13)
MONOCYTES NFR BLD: 6 % (ref 5–13)
MUCOUS THREADS URNS QL MICRO: ABNORMAL /LPF
NEUTS SEG # BLD: 6.6 K/UL (ref 1.8–8)
NEUTS SEG # BLD: 6.7 K/UL (ref 1.8–8)
NEUTS SEG NFR BLD: 69 % (ref 32–75)
NEUTS SEG NFR BLD: 83 % (ref 32–75)
NITRITE UR QL STRIP.AUTO: NEGATIVE
NRBC # BLD: 0 K/UL (ref 0–0.01)
NRBC # BLD: 0 K/UL (ref 0–0.01)
NRBC BLD-RTO: 0 PER 100 WBC
NRBC BLD-RTO: 0 PER 100 WBC
P-R INTERVAL, ECG05: 240 MS
PERFORMED BY, TECHID: NORMAL
PERFORMED BY, TECHID: NORMAL
PH UR STRIP: 5 [PH] (ref 5–8)
PLATELET # BLD AUTO: 108 K/UL (ref 150–400)
PLATELET # BLD AUTO: 112 K/UL (ref 150–400)
PMV BLD AUTO: 10.6 FL (ref 8.9–12.9)
PMV BLD AUTO: 11.5 FL (ref 8.9–12.9)
POTASSIUM SERPL-SCNC: 4.5 MMOL/L (ref 3.5–5.1)
POTASSIUM SERPL-SCNC: 5.5 MMOL/L (ref 3.5–5.1)
PROCALCITONIN SERPL-MCNC: 0.26 NG/ML
PROT SERPL-MCNC: 4.1 G/DL (ref 6.4–8.2)
PROT SERPL-MCNC: 4.8 G/DL (ref 6.4–8.2)
PROT UR STRIP-MCNC: 30 MG/DL
Q-T INTERVAL, ECG07: 422 MS
QRS DURATION, ECG06: 124 MS
QTC CALCULATION (BEZET), ECG08: 431 MS
RBC # BLD AUTO: 2.02 M/UL (ref 3.8–5.2)
RBC # BLD AUTO: 3.2 M/UL (ref 3.8–5.2)
RBC #/AREA URNS HPF: ABNORMAL /HPF (ref 0–5)
SODIUM SERPL-SCNC: 139 MMOL/L (ref 136–145)
SODIUM SERPL-SCNC: 140 MMOL/L (ref 136–145)
SP GR UR REFRACTOMETRY: 1.01 (ref 1–1.03)
SPECIMEN SOURCE: DETECTED
THERAPEUTIC RANGE,PTTT: NORMAL SEC (ref 82–109)
TROPONIN-HIGH SENSITIVITY: 33 NG/L (ref 0–51)
UA: UC IF INDICATED,UAUC: ABNORMAL
UROBILINOGEN UR QL STRIP.AUTO: 0.1 EU/DL (ref 0.1–1)
VENTRICULAR RATE, ECG03: 63 BPM
WBC # BLD AUTO: 8 K/UL (ref 3.6–11)
WBC # BLD AUTO: 9.6 K/UL (ref 3.6–11)
WBC URNS QL MICRO: ABNORMAL /HPF (ref 0–4)

## 2022-01-12 PROCEDURE — 80053 COMPREHEN METABOLIC PANEL: CPT

## 2022-01-12 PROCEDURE — 99285 EMERGENCY DEPT VISIT HI MDM: CPT

## 2022-01-12 PROCEDURE — 74011250637 HC RX REV CODE- 250/637: Performed by: INTERNAL MEDICINE

## 2022-01-12 PROCEDURE — 85025 COMPLETE CBC W/AUTO DIFF WBC: CPT

## 2022-01-12 PROCEDURE — 83605 ASSAY OF LACTIC ACID: CPT

## 2022-01-12 PROCEDURE — 74011250637 HC RX REV CODE- 250/637: Performed by: STUDENT IN AN ORGANIZED HEALTH CARE EDUCATION/TRAINING PROGRAM

## 2022-01-12 PROCEDURE — P9016 RBC LEUKOCYTES REDUCED: HCPCS

## 2022-01-12 PROCEDURE — 74018 RADEX ABDOMEN 1 VIEW: CPT

## 2022-01-12 PROCEDURE — 65270000029 HC RM PRIVATE

## 2022-01-12 PROCEDURE — 93005 ELECTROCARDIOGRAM TRACING: CPT

## 2022-01-12 PROCEDURE — 36430 TRANSFUSION BLD/BLD COMPNT: CPT

## 2022-01-12 PROCEDURE — 30233N1 TRANSFUSION OF NONAUTOLOGOUS RED BLOOD CELLS INTO PERIPHERAL VEIN, PERCUTANEOUS APPROACH: ICD-10-PCS | Performed by: HOSPITALIST

## 2022-01-12 PROCEDURE — 92610 EVALUATE SWALLOWING FUNCTION: CPT

## 2022-01-12 PROCEDURE — C9113 INJ PANTOPRAZOLE SODIUM, VIA: HCPCS | Performed by: HOSPITALIST

## 2022-01-12 PROCEDURE — 87635 SARS-COV-2 COVID-19 AMP PRB: CPT

## 2022-01-12 PROCEDURE — 74011000250 HC RX REV CODE- 250: Performed by: STUDENT IN AN ORGANIZED HEALTH CARE EDUCATION/TRAINING PROGRAM

## 2022-01-12 PROCEDURE — 5A1D70Z PERFORMANCE OF URINARY FILTRATION, INTERMITTENT, LESS THAN 6 HOURS PER DAY: ICD-10-PCS | Performed by: INTERNAL MEDICINE

## 2022-01-12 PROCEDURE — 87040 BLOOD CULTURE FOR BACTERIA: CPT

## 2022-01-12 PROCEDURE — 74011250636 HC RX REV CODE- 250/636: Performed by: HOSPITALIST

## 2022-01-12 PROCEDURE — 86900 BLOOD TYPING SEROLOGIC ABO: CPT

## 2022-01-12 PROCEDURE — 74011250636 HC RX REV CODE- 250/636: Performed by: STUDENT IN AN ORGANIZED HEALTH CARE EDUCATION/TRAINING PROGRAM

## 2022-01-12 PROCEDURE — 70450 CT HEAD/BRAIN W/O DYE: CPT

## 2022-01-12 PROCEDURE — 82962 GLUCOSE BLOOD TEST: CPT

## 2022-01-12 PROCEDURE — 82607 VITAMIN B-12: CPT

## 2022-01-12 PROCEDURE — 84484 ASSAY OF TROPONIN QUANT: CPT

## 2022-01-12 PROCEDURE — 82306 VITAMIN D 25 HYDROXY: CPT

## 2022-01-12 PROCEDURE — 36415 COLL VENOUS BLD VENIPUNCTURE: CPT

## 2022-01-12 PROCEDURE — 81001 URINALYSIS AUTO W/SCOPE: CPT

## 2022-01-12 PROCEDURE — 84145 PROCALCITONIN (PCT): CPT

## 2022-01-12 PROCEDURE — 80177 DRUG SCRN QUAN LEVETIRACETAM: CPT

## 2022-01-12 PROCEDURE — 74011250636 HC RX REV CODE- 250/636

## 2022-01-12 PROCEDURE — 74011250636 HC RX REV CODE- 250/636: Performed by: EMERGENCY MEDICINE

## 2022-01-12 PROCEDURE — 71045 X-RAY EXAM CHEST 1 VIEW: CPT

## 2022-01-12 PROCEDURE — 74011000250 HC RX REV CODE- 250: Performed by: HOSPITALIST

## 2022-01-12 PROCEDURE — 85610 PROTHROMBIN TIME: CPT

## 2022-01-12 PROCEDURE — 85730 THROMBOPLASTIN TIME PARTIAL: CPT

## 2022-01-12 RX ORDER — LEVETIRACETAM 500 MG/1
1000 TABLET, EXTENDED RELEASE ORAL 2 TIMES DAILY
Status: DISCONTINUED | OUTPATIENT
Start: 2022-01-12 | End: 2022-01-12

## 2022-01-12 RX ORDER — SODIUM CHLORIDE 0.9 % (FLUSH) 0.9 %
5-40 SYRINGE (ML) INJECTION AS NEEDED
Status: DISCONTINUED | OUTPATIENT
Start: 2022-01-12 | End: 2022-01-17 | Stop reason: HOSPADM

## 2022-01-12 RX ORDER — ACETAMINOPHEN 325 MG/1
650 TABLET ORAL
Status: DISCONTINUED | OUTPATIENT
Start: 2022-01-12 | End: 2022-01-17 | Stop reason: HOSPADM

## 2022-01-12 RX ORDER — LEVOTHYROXINE SODIUM 75 UG/1
150 TABLET ORAL DAILY
Status: DISCONTINUED | OUTPATIENT
Start: 2022-01-13 | End: 2022-01-17 | Stop reason: HOSPADM

## 2022-01-12 RX ORDER — ONDANSETRON 4 MG/1
4 TABLET, ORALLY DISINTEGRATING ORAL
Status: DISCONTINUED | OUTPATIENT
Start: 2022-01-12 | End: 2022-01-17 | Stop reason: HOSPADM

## 2022-01-12 RX ORDER — MIDODRINE HYDROCHLORIDE 5 MG/1
5 TABLET ORAL 3 TIMES DAILY
Status: DISCONTINUED | OUTPATIENT
Start: 2022-01-12 | End: 2022-01-17 | Stop reason: HOSPADM

## 2022-01-12 RX ORDER — ASCORBIC ACID 500 MG
500 TABLET ORAL 2 TIMES DAILY
Status: DISCONTINUED | OUTPATIENT
Start: 2022-01-12 | End: 2022-01-17 | Stop reason: HOSPADM

## 2022-01-12 RX ORDER — GUAIFENESIN 100 MG/5ML
81 LIQUID (ML) ORAL DAILY
Status: DISCONTINUED | OUTPATIENT
Start: 2022-01-13 | End: 2022-01-12

## 2022-01-12 RX ORDER — ATORVASTATIN CALCIUM 40 MG/1
40 TABLET, FILM COATED ORAL DAILY
Status: DISCONTINUED | OUTPATIENT
Start: 2022-01-13 | End: 2022-01-17 | Stop reason: HOSPADM

## 2022-01-12 RX ORDER — ONDANSETRON 4 MG/1
4 TABLET, ORALLY DISINTEGRATING ORAL
Status: DISCONTINUED | OUTPATIENT
Start: 2022-01-12 | End: 2022-01-12

## 2022-01-12 RX ORDER — INSULIN LISPRO 100 [IU]/ML
INJECTION, SOLUTION INTRAVENOUS; SUBCUTANEOUS
Status: DISCONTINUED | OUTPATIENT
Start: 2022-01-12 | End: 2022-01-13

## 2022-01-12 RX ORDER — LORAZEPAM 2 MG/ML
INJECTION INTRAMUSCULAR
Status: COMPLETED
Start: 2022-01-12 | End: 2022-01-12

## 2022-01-12 RX ORDER — ONDANSETRON 2 MG/ML
4 INJECTION INTRAMUSCULAR; INTRAVENOUS
Status: DISCONTINUED | OUTPATIENT
Start: 2022-01-12 | End: 2022-01-12

## 2022-01-12 RX ORDER — AMLODIPINE BESYLATE 5 MG/1
5 TABLET ORAL DAILY
Status: DISCONTINUED | OUTPATIENT
Start: 2022-01-13 | End: 2022-01-12

## 2022-01-12 RX ORDER — DEXTROSE 50 % IN WATER (D50W) INTRAVENOUS SYRINGE
25-50 AS NEEDED
Status: DISCONTINUED | OUTPATIENT
Start: 2022-01-12 | End: 2022-01-17 | Stop reason: HOSPADM

## 2022-01-12 RX ORDER — FLUOXETINE 10 MG/1
10 CAPSULE ORAL DAILY
Status: DISCONTINUED | OUTPATIENT
Start: 2022-01-13 | End: 2022-01-17 | Stop reason: HOSPADM

## 2022-01-12 RX ORDER — ACETAMINOPHEN 650 MG/1
650 SUPPOSITORY RECTAL
Status: DISCONTINUED | OUTPATIENT
Start: 2022-01-12 | End: 2022-01-17 | Stop reason: HOSPADM

## 2022-01-12 RX ORDER — CALCITRIOL 0.25 UG/1
0.5 CAPSULE ORAL DAILY
Status: DISCONTINUED | OUTPATIENT
Start: 2022-01-13 | End: 2022-01-14

## 2022-01-12 RX ORDER — ONDANSETRON 2 MG/ML
4 INJECTION INTRAMUSCULAR; INTRAVENOUS
Status: DISCONTINUED | OUTPATIENT
Start: 2022-01-12 | End: 2022-01-17 | Stop reason: HOSPADM

## 2022-01-12 RX ORDER — LEVETIRACETAM 500 MG/1
1000 TABLET ORAL 2 TIMES DAILY
Status: DISCONTINUED | OUTPATIENT
Start: 2022-01-12 | End: 2022-01-13

## 2022-01-12 RX ORDER — SODIUM CHLORIDE 9 MG/ML
75 INJECTION, SOLUTION INTRAVENOUS CONTINUOUS
Status: DISCONTINUED | OUTPATIENT
Start: 2022-01-12 | End: 2022-01-13

## 2022-01-12 RX ORDER — HEPARIN SODIUM 5000 [USP'U]/ML
5000 INJECTION, SOLUTION INTRAVENOUS; SUBCUTANEOUS EVERY 8 HOURS
Status: DISCONTINUED | OUTPATIENT
Start: 2022-01-12 | End: 2022-01-12

## 2022-01-12 RX ORDER — LORAZEPAM 2 MG/ML
2 INJECTION INTRAMUSCULAR
Status: COMPLETED | OUTPATIENT
Start: 2022-01-12 | End: 2022-01-12

## 2022-01-12 RX ORDER — POLYETHYLENE GLYCOL 3350 17 G/17G
17 POWDER, FOR SOLUTION ORAL DAILY PRN
Status: DISCONTINUED | OUTPATIENT
Start: 2022-01-12 | End: 2022-01-17 | Stop reason: HOSPADM

## 2022-01-12 RX ORDER — ENOXAPARIN SODIUM 100 MG/ML
30 INJECTION SUBCUTANEOUS DAILY
Status: DISCONTINUED | OUTPATIENT
Start: 2022-01-13 | End: 2022-01-12

## 2022-01-12 RX ORDER — SODIUM CHLORIDE 0.9 % (FLUSH) 0.9 %
5-40 SYRINGE (ML) INJECTION EVERY 8 HOURS
Status: DISCONTINUED | OUTPATIENT
Start: 2022-01-12 | End: 2022-01-17 | Stop reason: HOSPADM

## 2022-01-12 RX ORDER — SODIUM CHLORIDE 9 MG/ML
250 INJECTION, SOLUTION INTRAVENOUS AS NEEDED
Status: DISCONTINUED | OUTPATIENT
Start: 2022-01-12 | End: 2022-01-17 | Stop reason: HOSPADM

## 2022-01-12 RX ORDER — LEVETIRACETAM 500 MG/1
1000 TABLET ORAL 2 TIMES DAILY
Status: DISCONTINUED | OUTPATIENT
Start: 2022-01-12 | End: 2022-01-12

## 2022-01-12 RX ORDER — LACOSAMIDE 200 MG/1
200 TABLET ORAL 2 TIMES DAILY
Status: DISCONTINUED | OUTPATIENT
Start: 2022-01-12 | End: 2022-01-17 | Stop reason: HOSPADM

## 2022-01-12 RX ORDER — SODIUM CHLORIDE 0.9 % (FLUSH) 0.9 %
5-40 SYRINGE (ML) INJECTION EVERY 8 HOURS
Status: DISCONTINUED | OUTPATIENT
Start: 2022-01-12 | End: 2022-01-13

## 2022-01-12 RX ORDER — LEVETIRACETAM 500 MG/1
500 TABLET, EXTENDED RELEASE ORAL 2 TIMES DAILY
Status: DISCONTINUED | OUTPATIENT
Start: 2022-01-12 | End: 2022-01-12

## 2022-01-12 RX ORDER — MAGNESIUM SULFATE 100 %
4 CRYSTALS MISCELLANEOUS AS NEEDED
Status: DISCONTINUED | OUTPATIENT
Start: 2022-01-12 | End: 2022-01-17 | Stop reason: HOSPADM

## 2022-01-12 RX ADMIN — LACOSAMIDE 200 MG: 200 TABLET, FILM COATED ORAL at 13:06

## 2022-01-12 RX ADMIN — OXYCODONE HYDROCHLORIDE AND ACETAMINOPHEN 500 MG: 500 TABLET ORAL at 23:29

## 2022-01-12 RX ADMIN — SODIUM CHLORIDE 1000 ML: 9 INJECTION, SOLUTION INTRAVENOUS at 21:00

## 2022-01-12 RX ADMIN — HEPARIN SODIUM 5000 UNITS: 5000 INJECTION, SOLUTION INTRAVENOUS; SUBCUTANEOUS at 13:51

## 2022-01-12 RX ADMIN — LEVETIRACETAM 1000 MG: 250 TABLET, FILM COATED ORAL at 13:05

## 2022-01-12 RX ADMIN — LORAZEPAM 2 MG: 2 INJECTION INTRAMUSCULAR; INTRAVENOUS at 12:54

## 2022-01-12 RX ADMIN — SODIUM CHLORIDE, PRESERVATIVE FREE 10 ML: 5 INJECTION INTRAVENOUS at 23:33

## 2022-01-12 RX ADMIN — LACOSAMIDE 200 MG: 200 TABLET, FILM COATED ORAL at 23:29

## 2022-01-12 RX ADMIN — OXYCODONE HYDROCHLORIDE AND ACETAMINOPHEN 500 MG: 500 TABLET ORAL at 13:06

## 2022-01-12 RX ADMIN — LEVETIRACETAM 1000 MG: 250 TABLET, FILM COATED ORAL at 23:27

## 2022-01-12 RX ADMIN — SODIUM CHLORIDE, PRESERVATIVE FREE 10 ML: 5 INJECTION INTRAVENOUS at 23:27

## 2022-01-12 RX ADMIN — MIDODRINE HYDROCHLORIDE 5 MG: 5 TABLET ORAL at 23:29

## 2022-01-12 RX ADMIN — ONDANSETRON 4 MG: 2 INJECTION INTRAMUSCULAR; INTRAVENOUS at 16:44

## 2022-01-12 RX ADMIN — SODIUM CHLORIDE, PRESERVATIVE FREE 80 MG: 5 INJECTION INTRAVENOUS at 22:23

## 2022-01-12 RX ADMIN — ONDANSETRON 4 MG: 2 INJECTION INTRAMUSCULAR; INTRAVENOUS at 20:25

## 2022-01-12 RX ADMIN — SODIUM CHLORIDE 1000 ML: 9 INJECTION, SOLUTION INTRAVENOUS at 12:16

## 2022-01-12 RX ADMIN — LORAZEPAM 2 MG: 2 INJECTION INTRAMUSCULAR at 12:54

## 2022-01-12 RX ADMIN — CEFTRIAXONE SODIUM 1 G: 1 INJECTION, POWDER, FOR SOLUTION INTRAMUSCULAR; INTRAVENOUS at 13:07

## 2022-01-12 RX ADMIN — SODIUM CHLORIDE 75 ML/HR: 9 INJECTION, SOLUTION INTRAVENOUS at 13:51

## 2022-01-12 RX ADMIN — SODIUM CHLORIDE, PRESERVATIVE FREE 10 ML: 5 INJECTION INTRAVENOUS at 14:12

## 2022-01-12 RX ADMIN — SODIUM CHLORIDE 500 ML: 9 INJECTION, SOLUTION INTRAVENOUS at 23:33

## 2022-01-12 NOTE — PROGRESS NOTES
Consult requested by: Philip Russell MD    ADMIT DATE: 1/12/2022  CONSULT DATE: January 12, 2022             the patient is seen in the ER. She was discharged yesterday and got readmitted due to generalized weakness and low blood pressure of 97/59, T head showed no acute process.   On RA  S/p dialyzed 1/11 in the hospital    plan for HD tomorrow    Past Medical History:   Diagnosis Date    Anxiety disorder     Arthritis     Atherosclerosis of native arteries of the extremities with ulceration ([address] 8/31/2020    Atrial fibrillation ([address]     Cardiac pacemaker in situ     Cellulitis and abscess of trunk 8/31/2020    Depression, postpartum     Diabetes ([address]     Heart disease     Heartburn     Hx of long term use of blood thinners     Hypercholesterolemia     Hypertension     Hypothyroidism     Migraines     Peripheral venous insufficiency 8/31/2020    Seizures ([address]     Sleep disorder     Stroke ([address]     2019    Varicose veins of lower extremity with inflammation 8/31/2020      Past Surgical History:   Procedure Laterality Date    HX BACK SURGERY      HX OTHER SURGICAL      leg surgery     HX OTHER SURGICAL      pacemaker monitor     IR INSERT TUNL CVC W/O PORT OVER 5 YR  12/10/2021       Social History     Socioeconomic History    Marital status:      Spouse name: Not on file    Number of children: Not on file    Years of education: Not on file    Highest education level: Not on file   Occupational History    Not on file   Tobacco Use    Smoking status: Never Smoker    Smokeless tobacco: Never Used   Vaping Use    Vaping Use: Never used   Substance and Sexual Activity    Alcohol use: Never    Drug use: Never    Sexual activity: Not on file   Other Topics Concern    Not on file   Social History Narrative    Not on file     Social Determinants of Health     Financial Resource Strain:     Difficulty of Paying Living Expenses: Not on file   Food Insecurity:     Worried About [facility] in the Last Year: Not on file    Janet of Food in the Last Year: Not on file   Transportation Needs:     Lack of Transportation (Medical): Not on file    Lack of Transportation (Non-Medical):  Not on file   Physical Activity:     Days of Exercise per Week: Not on file    Minutes of Exercise per Session: Not on file   Stress:     Feeling of Stress : Not on file   Social Connections:     Frequency of Communication with Friends and Family: Not on file    Frequency of Social Gatherings with Friends and Family: Not on file    Attends [employer] Services: Not on file    Active Member of Clubs or Organizations: Not on file    Attends Club or Organization Meetings: Not on file    Marital Status: Not on file   Intimate Partner Violence:     Fear of Current or Ex-Partner: Not on file    Emotionally Abused: Not on file    Physically Abused: Not on file    Sexually Abused: Not on file   Housing Stability:     Unable to Pay for Housing in the Last Year: Not on file    Number of Places Lived in the Last Year: Not on file    Unstable Housing in the Last Year: Not on file       Family History   Problem Relation Age of Onset    Heart Disease Mother     Heart Disease Father     Diabetes Sister     Diabetes Brother      No Known Allergies     Home Medications:     (Not in a hospital admission)      Current Inpatient Medications:     Current Facility-Administered Medications   Medication Dose Route Frequency    0.9% sodium chloride infusion  75 mL/hr IntraVENous CONTINUOUS    sodium chloride (NS) flush 5-40 mL  5-40 mL IntraVENous Q8H    sodium chloride (NS) flush 5-40 mL  5-40 mL IntraVENous PRN    acetaminophen (TYLENOL) tablet 650 mg  650 mg Oral Q6H PRN    Or    acetaminophen (TYLENOL) suppository 650 mg  650 mg Rectal Q6H PRN    polyethylene glycol (MIRALAX) packet 17 g  17 g Oral DAILY PRN    ondansetron (ZOFRAN ODT) tablet 4 mg  4 mg Oral Q8H PRN    Or    ondansetron (ZOFRAN) injection 4 mg  4 mg IntraVENous Q6H PRN    [START ON 1/13/2022] amLODIPine (NORVASC) tablet 5 mg  5 mg Oral DAILY    ascorbic acid (vitamin C) (VITAMIN C) tablet 500 mg  500 mg Oral BID    [START ON 1/13/2022] aspirin chewable tablet 81 mg  81 mg Oral DAILY    [START ON 1/13/2022] atorvastatin (LIPITOR) tablet 40 mg  40 mg Oral DAILY    [START ON 1/13/2022] calcitRIOL (ROCALTROL) capsule 0.5 mcg  0.5 mcg Oral DAILY    [START ON 1/13/2022] levothyroxine (SYNTHROID) tablet 150 mcg  150 mcg Oral DAILY    [START ON 1/13/2022] FLUoxetine (PROzac) capsule 10 mg  10 mg Oral DAILY    lacosamide (VIMPAT) tablet 200 mg  200 mg Oral BID    zinc oxide-white petrolatum 20-75 % topical paste   Topical PRN    cefTRIAXone (ROCEPHIN) 1 g in sterile water (preservative free) 10 mL IV syringe  1 g IntraVENous Q24H    heparin (porcine) injection 5,000 Units  5,000 Units SubCUTAneous Q8H    insulin lispro (HUMALOG) injection   SubCUTAneous AC&HS    glucose chewable tablet 16 g  4 Tablet Oral PRN    glucagon (GLUCAGEN) injection 1 mg  1 mg IntraMUSCular PRN    dextrose (D50W) injection syrg 12.5-25 g  25-50 mL IntraVENous PRN    levETIRAcetam (KEPPRA) tablet 1,000 mg  1,000 mg Oral BID     Current Outpatient Medications   Medication Sig    amLODIPine (NORVASC) 5 mg tablet Take 1 Tablet by mouth daily for 60 days.  insulin lispro (HUMALOG) 100 unit/mL injection As per insulin sensitive ssi:    Insulin High Sensitivity (thin, ESRD)  For Blood Sugar (mg/dL) of:              Less than 150 =   0 units  150 -199 =  1 units  200 -249 =   2 units  250 -299 =   3 units  300 -349 =   4 units  350 or greater = 5 units and Call Physician  Initiate Hypoglycemic protocol if blood glucose is <70 mg/dL.  ascorbic acid, vitamin C, (VITAMIN C) 500 mg tablet Take 1 Tablet by mouth two (2) times a day for 30 days.  aspirin 81 mg chewable tablet Take 1 Tablet by mouth daily for 30 days.     vancomycin 1,000 mg 1,000 mg, vial-mate 1 Device IVPB 1,000 mg by IntraVENous route DIALYSIS MON, WED & FRI for 6 doses.  predniSONE (DELTASONE) 20 mg tablet Start prednisone 20 mg PO daily x 2 days then 10 mg PO daily x 2 days then stop.  calcitRIOL (ROCALTROL) 0.5 mcg capsule Take 1 Capsule by mouth daily.  zinc oxide-white petrolatum 17-57 % topical paste Apply  to affected area as needed for Skin Irritation. Indications: skin irritation    ondansetron hcl (Zofran) 4 mg tablet Take 1 Tablet by mouth every eight (8) hours as needed for Nausea or Vomiting.  Vimpat 200 mg tab tablet Take 1 Tablet by mouth two (2) times a day.  ondansetron (Zofran ODT) 4 mg disintegrating tablet 1 Tablet by SubLINGual route every eight (8) hours as needed for Nausea or Vomiting.  atorvastatin (LIPITOR) 40 mg tablet Take 1 tablet by mouth once daily for 90 days    FLUoxetine (PROzac) 10 mg capsule Take 1 Capsule by mouth daily for 270 days.  loperamide (IMODIUM) 2 mg capsule TAKE 1 CAPSULE BY MOUTH SIX TIMES DAILY AS NEEDED FOR 30 DAYS    Euthyrox 150 mcg tablet Take 1 tablet by mouth once daily    levETIRAcetam (KEPPRA XR) 500 mg ER tablet TAKE 2 TABLETS BY MOUTH TWICE DAILY FOR 90 DAYS    sevelamer carbonate (RENVELA) 800 mg tab tab        Review of Systems:   No fever or chills. No sore throat. No cough or hemoptysis. No shortness of breath or chest pain. No orthopnea or paroxysmal nocturnal dyspnea. No nausea, vomiting, abdominal pain, melena or hematochezia. No ankle swelling, no joint paints. No muscle aches. No skin changes. No dizziness or lightheadedness. No headaches.        Physical Assessment:     Vitals:    01/12/22 0753 01/12/22 1100 01/12/22 1228 01/12/22 1431   BP: (!) 97/59 (!) 87/60 91/61 127/70   Pulse: 61 62 62 60   Resp: 16 16 18 16   Temp: 98.1 °F (36.7 °C)      SpO2: 94% 95% 97% 98%   Weight: 72.6 kg (160 lb)      Height: 5' 5\" (1.651 m)        Last 3 Recorded Weights in this Encounter 01/12/22 0753   Weight: 72.6 kg (160 lb)     Admission weight: Weight: 72.6 kg (160 lb) (01/12/22 0753)      Intake/Output Summary (Last 24 hours) at 1/12/2022 1736  Last data filed at 1/12/2022 1413  Gross per 24 hour   Intake 1000 ml   Output    Net 1000 ml       Patient is in no apparent distress. HEENT: Head is normocephalic and atraumatic. Pupils are round, equal, reactive to light. Sclerae are anicteric. Oropharynx clear. Neck: no cervical lymphadenopathy or thyromegaly. Lungs: good air entry, clear to auscultation bilaterally. Trachea at the midline. Cardiovascular system: S1, S2, regular rate and rhythm. No murmurs, gallops or rubs. No jvd. Abdomen: soft, non tender, non distended. Positive bowel sounds. No hepatosplenomegaly. No abdominal bruits. Extremities: no clubbing, cyanosis or edema. Strong dorsalis pedis pulses. Brisk capillary refill on the toes bilaterally. Integumentary: skin is grossly intact. Neurologic: Alert, oriented time three. Cooperative and appropriate. No gross motor or sensory deficits. Dialysis access: cuffed tunneled catheter site right. [facility]     Data Review:    Labs: Results:       Chemistry Recent Labs     01/12/22  0811 01/11/22  0730 01/10/22  0648 01/10/22  0648   GLU 93 73  74  --  69  70    142  142  --  140  142   K 4.5 3.7  3.6  --  3.3*  3.4*    108  108  --  107  107   CO2 28 28  29  --  30  30   BUN 34* 51*  49*  --  36*  36*   CREA 3.03* 3.74*  3.75*  --  2.87*  2.93*   CA 7.8* 7.9*  7.7*  --  7.8*  7.8*   AGAP 5 6  5  --  3*  5   BUCR 11* 14  13  --  13  12   AP 83 85  --  88   TP 4.8* 4.8*  --  4.8*   ALB 2.0* 2.3*  2.3*  --  2.2*  2.2*   GLOB 2.8 2.5  --  2.6   AGRAT 0.7* 0.9*  --  0.8*   PHOS  --  3.2   < > 2.8    < > = values in this interval not displayed.          CBC w/Diff Recent Labs     01/12/22  0811 01/11/22  0730 01/10/22  0648   WBC 8.0 7.6 7.8   RBC 3.20* 3.32* 3.34*   HGB 10.1* 10.4* 10.4*   HCT 34.4* 34.9* 35.2   * 112* 111*   GRANS 83* 78* 78*   LYMPH 10* 13 12   EOS 0 0 0         Iron/Ferritin No results for input(s): IRON in the last 72 hours. No lab exists for component: TIBCCALC   PTH/VIT D No results for input(s): PTH in the last 72 hours. No lab exists for component: VITD            Assessment & Plan     1-ESRD:  -HD MWF at [facility], [address] [place] [address]  -No volume overload/uremia  -s/p HD 1/11  -plan for HD AM   -RI perm cath as active dialysis access  -BORA AVF in place also    2-Anemia:  -Hb was 5.7  -No obvious bleeding  -s/p blood Tx 2 units with HD   -iv Epo with HD  -Hb 10.1 now      3-Secondary hyperparathyroidism of ESRD. -cont sevelamer 800 mg tid and calcitriol 0.5 mcg daily    - Renal Diet    4-COVID Pneumonia   -- started on empiric abxs because of fever,lactic acidosis and leucocytosis . -on RA     5-AMS :  -CT Head negative   Likely Metabolic and sepsis related . 6- Seizure disorder:  -break thru seizure  -seen by Neuro  -On Vimpat & Keppra    7-MSSA Bactremia:  -was on iv vancomycin   -ID on board  -[facility] 1/07  Pending    8- DVT prophylaxis. -I will change Lovenox to unfractionated subcu heparin.     9-Hypotension:  -BP 87 systolic only in ER  -will dc amlodipine   -will add midodrine 5 mg tid

## 2022-01-12 NOTE — PROGRESS NOTES
SPEECH LANGUAGE PATHOLOGY BEDSIDE SWALLOW EVALUATIONS  Patient: Jhoan Lux  (12 y.o. )  Date: 1/12/2022  Primary Diagnosis: seizures, hypotension   Precautions:      ASSESSMENT :  Patient is initially alert, however following seizure she is drowsy she remains oriented x4 following seizure and follows basic commands. Per RN, patient experiencing intermittent seizures all morning. Patient presents w/ wfl oropharyngeal swallow fxn. Oral phase c/b slow but adequate mastication. Pharyngeal phase c/b timely swallow and HLE is wfl upon palpation. No overt s/s of pen/asp observed. Patient is at risk for aspiration s/t recurrent seizures. Rec initiate diet once cleared by MD and seizures improve. Patient will benefit from skilled intervention to address the above impairments. Patients rehabilitation potential is considered to be Good     PLAN :  Recommendations and Planned Interventions:  Rec regular/thin diet w/ strict asp/GERD precautions once cleared by MD.   Frequency/Duration: Patient will be followed by speech-language pathology 1 time a week to address goals. Discharge Recommendations: None     SUBJECTIVE:   Patient reports she feels a seizure coming on. She denies hx of dysphagia. OBJECTIVE:   Patient admitted w/ seizure and hypotension was d/c'd from hospital on 1/11/22.     Past Medical History:   Diagnosis Date    Anxiety disorder     Arthritis     Atherosclerosis of native arteries of the extremities with ulceration ([address] 8/31/2020    Atrial fibrillation (HCC)     Cardiac pacemaker in situ     Cellulitis and abscess of trunk 8/31/2020    Depression, postpartum     Diabetes ([address]     Heart disease     Heartburn     Hx of long term use of blood thinners     Hypercholesterolemia     Hypertension     Hypothyroidism     Migraines     Peripheral venous insufficiency 8/31/2020    Seizures ([address]     Sleep disorder     Stroke [address]     2019    Varicose veins of lower extremity with inflammation 8/31/2020       CXR Results  (Last 48 hours)                 01/12/22 0827  XR CHEST SNGL V Final result    Narrative:  Chest single view. Comparison single view chest January 7, 2022. Stable right-sided permacath. Improved aeration through lungs; clearing interstitial edema. Cardiac and   mediastinal structures unchanged. Right-sided cardiac device with similar   positioned leads overlying the heart. No pneumothorax or sizable pleural   effusion. Diet prior to admission: Regular  Current Diet:  DIET NPO     Cognitive and Communication Status:  Neurologic State: Alert  Orientation Level: Oriented X4  Cognition: Follows commands  Perception: Appears intact  Perseveration: No perseveration noted  Safety/Judgement: Awareness of environment  Swallowing Evaluation:   Oral Assessment:  Oral Assessment  Labial: No impairment  Dentition: Intact  Oral Hygiene: wfl  Lingual: No impairment  Velum: No impairment  Mandible: No impairment  P.O. Trials:  Patient Position: upright in stretcher  Vocal quality prior to P.O.: No impairment  Consistency Presented: Puree; Solid; Thin liquid  How Presented: Self-fed/presented;SLP-fed/presented;Cup/sip;Spoon;Straw;Successive swallows     Bolus Acceptance: No impairment  Bolus Formation/Control: No impairment     Propulsion: No impairment  Oral Residue: None  Initiation of Swallow: No impairment  Laryngeal Elevation: Functional  Aspiration Signs/Symptoms: None  Pharyngeal Phase Characteristics: No impairment, issues, or problems              Oral Phase Severity: No impairment  Pharyngeal Phase Severity : No impairment  Voice:                 Vocal Quality: No impairment                               Pain:  Pain Scale 1: Numeric (0 - 10)  Pain Intensity 1: 0  Pain Location 1: Flank      After treatment:   Patient left in no apparent distress in bed, Call bell within reach, and Nursing notified    COMMUNICATION/EDUCATION:   Patient was educated regarding diet recs, s/s aspiration and aspiration precautions. She demonstrated Fair understanding as evidenced by reduced engagement s/t recurring seizures. The patient's plan of care including recommendations, planned interventions, and recommended diet changes were discussed with: Registered nurse. Patient/family agree to work toward stated goals and plan of care. Problem: Dysphagia (Adult)  Goal: *Acute Goals and Plan of Care (Insert Text)  Description: Speech Therapy Swallow Goals  Initiated 1/12/2022  -Patient will tolerate regular diet with thin liquids without clinical indicators of aspiration given no cues within 7 day(s). -Patient will demonstrate understanding of swallow safety precautions and aspiration precautions, diet recs with no cues within 7 day(s).          Outcome: Initial     Thank you for this referral.  Alecia Lane M.S., M.Ed., CCC-SLP  Time Calculation: 18 mins

## 2022-01-12 NOTE — ED PROVIDER NOTES
EMERGENCY DEPARTMENT HISTORY AND PHYSICAL EXAM      Date: 1/12/2022  Patient Name: Magdy Snyder    History of Presenting Illness     Chief Complaint   Patient presents with    Seizure    Generalized Body Aches       History Provided By: Patient    HPI: Magdy Snyder, 80 y.o. female with a past medical history significant No significant past medical history presents to the ED with chief complaint of Seizure and Generalized Body Aches  . 80year-old with generalized body aches. Has had a history of seizures. Presents with seizures again. Increased confusion. Patient is a limited historian. Has been compliant on her medications per family. No falls or trauma. Has had nausea vomiting diarrhea also. There are no other complaints, changes, or physical findings at this time. PCP: Bren Corbin MD    Current Outpatient Medications   Medication Sig Dispense Refill    amLODIPine (NORVASC) 5 mg tablet Take 1 Tablet by mouth daily for 60 days. 30 Tablet 1    insulin lispro (HUMALOG) 100 unit/mL injection As per insulin sensitive ssi:    Insulin High Sensitivity (thin, ESRD)  For Blood Sugar (mg/dL) of:              Less than 150 =   0 units  150 -199 =  1 units  200 -249 =   2 units  250 -299 =   3 units  300 -349 =   4 units  350 or greater = 5 units and Call Physician  Initiate Hypoglycemic protocol if blood glucose is <70 mg/dL. 1 Each 0    ascorbic acid, vitamin C, (VITAMIN C) 500 mg tablet Take 1 Tablet by mouth two (2) times a day for 30 days. 60 Tablet 0    aspirin 81 mg chewable tablet Take 1 Tablet by mouth daily for 30 days. 30 Tablet 0    vancomycin 1,000 mg 1,000 mg, vial-mate 1 Device IVPB 1,000 mg by IntraVENous route DIALYSIS MON, WED & FRI for 6 doses. 6 Dose 0    predniSONE (DELTASONE) 20 mg tablet Start prednisone 20 mg PO daily x 2 days then 10 mg PO daily x 2 days then stop.  6 Tablet 0    calcitRIOL (ROCALTROL) 0.5 mcg capsule Take 1 Capsule by mouth daily. 30 Capsule 0    zinc oxide-white petrolatum 17-57 % topical paste Apply  to affected area as needed for Skin Irritation. Indications: skin irritation 113 g 0    ondansetron hcl (Zofran) 4 mg tablet Take 1 Tablet by mouth every eight (8) hours as needed for Nausea or Vomiting. 45 Tablet 0    Vimpat 200 mg tab tablet Take 1 Tablet by mouth two (2) times a day.  ondansetron (Zofran ODT) 4 mg disintegrating tablet 1 Tablet by SubLINGual route every eight (8) hours as needed for Nausea or Vomiting. 20 Tablet 0    atorvastatin (LIPITOR) 40 mg tablet Take 1 tablet by mouth once daily for 90 days 90 Tablet 3    FLUoxetine (PROzac) 10 mg capsule Take 1 Capsule by mouth daily for 270 days.  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) 8/31/2020    Atrial fibrillation (HCC)     Cardiac pacemaker in situ     Cellulitis and abscess of trunk 8/31/2020    Depression, postpartum     Diabetes ([address]     Heart disease     Heartburn     Hx of long term use of blood thinners     Hypercholesterolemia     Hypertension     Hypothyroidism     Migraines     Peripheral venous insufficiency 8/31/2020    Seizures ([address]     Sleep disorder     Stroke [facility] [address]     2019    Varicose veins of lower extremity with inflammation 8/31/2020       Past Surgical History:  Past Surgical History:   Procedure Laterality Date    HX BACK SURGERY      HX OTHER SURGICAL      leg surgery     HX OTHER SURGICAL      pacemaker monitor  IR INSERT CHIKISL CVC W/O PORT OVER 5 YR  12/10/2021       Family History:  Family History   Problem Relation Age of Onset    Heart Disease Mother     Heart Disease Father     Diabetes Sister     Diabetes Brother        Social History:  Social History     Tobacco Use    Smoking status: Never Smoker    Smokeless tobacco: Never Used   Vaping Use    Vaping Use: Never used   Substance Use Topics    Alcohol use: Never    Drug use: Never       Allergies:  No Known Allergies      Review of Systems   Review of Systems   Constitutional: Positive for activity change, appetite change, chills and fatigue. Negative for fever. HENT: Positive for congestion. Negative for nosebleeds and sore throat. Eyes: Negative. Negative for pain, discharge and visual disturbance. Respiratory: Positive for cough. Negative for chest tightness and shortness of breath. Cardiovascular: Negative for chest pain, palpitations and leg swelling. Gastrointestinal: Negative for abdominal pain, blood in stool, constipation, diarrhea, nausea and vomiting. Endocrine: Negative. Genitourinary: Negative. Negative for difficulty urinating, dysuria, pelvic pain and vaginal bleeding. Musculoskeletal: Negative. Negative for arthralgias, back pain and myalgias. Skin: Negative. Negative for rash and wound. Allergic/Immunologic: Negative. Neurological: Positive for weakness. Negative for dizziness, syncope, numbness and headaches. Hematological: Negative. Psychiatric/Behavioral: Negative. Negative for agitation, confusion and suicidal ideas. All other systems reviewed and are negative. Physical Exam   Physical Exam  Vitals and nursing note reviewed. Exam conducted with a chaperone present. Constitutional:       Appearance: Normal appearance. She is normal weight. She is ill-appearing. HENT:      Head: Normocephalic and atraumatic.       Nose: Nose normal.      Mouth/Throat:      Mouth: Mucous membranes are moist. Pharynx: Oropharynx is clear. Eyes:      Extraocular Movements: Extraocular movements intact. Conjunctiva/sclera: Conjunctivae normal.      Pupils: Pupils are equal, round, and reactive to light. Cardiovascular:      Rate and Rhythm: Normal rate and regular rhythm. Pulses: Normal pulses. Heart sounds: Normal heart sounds. Pulmonary:      Effort: Pulmonary effort is normal. No respiratory distress. Breath sounds: Normal breath sounds. Abdominal:      General: Abdomen is flat. Bowel sounds are normal. There is no distension. Palpations: Abdomen is soft. Tenderness: There is no abdominal tenderness. There is no guarding. Musculoskeletal:         General: No swelling, tenderness, deformity or signs of injury. Normal range of motion. Cervical back: Normal range of motion and neck supple. Right lower leg: No edema. Left lower leg: No edema. Skin:     General: Skin is warm and dry. Capillary Refill: Capillary refill takes less than 2 seconds. Findings: No lesion or rash. Neurological:      General: No focal deficit present. Mental Status: She is alert and oriented to person, place, and time. Mental status is at baseline. Cranial Nerves: No cranial nerve deficit. Psychiatric:         Mood and Affect: Mood normal.         Behavior: Behavior normal.         Thought Content:  Thought content normal.         Judgment: Judgment normal.         Diagnostic Study Results     Labs -   01/12/22 1643  GLUCOSE, POC   Collected: 01/12/22 1641  Final result  Specimen: Whole Blood     Glucose (POC) 103 mg/dL    Performed by Elvira Don          01/15/22 1637  LEVETIRACETAM (KEPPRA)   Collected: 01/12/22 1414  Final result  Specimen: Blood from Serum     Levetiracetam (Keppra) 51.9 High  ug/mL           01/15/22 0558  CULTURE, BLOOD, PAIRED   Collected: 01/12/22 1414  Final result  Specimen: Blood     Special Requests: No Special Requests   Culture result: Staphylococcus species, coagulase negative GROWING IN THE AEROBIC BOTTLE NO SITE INDICATED Abnormal      Culture result: (NOTE) [facility] 1 OF 2 CALLED TO JOSE VELAZQUEZ MT AT 1200 ON 1.14.22. [facility] LANCE CHRISTENSEN          01/12/22 1533  PROCALCITONIN   Collected: 01/12/22 1414  Final result  Specimen: Serum     Procalcitonin 0.26 High  ng/mL           01/12/22 1459  LACTIC ACID   Collected: 01/12/22 1414  Final result  Specimen: Whole Blood from Plasma     Lactic acid 2.6 High Panic  mmol/L           01/13/22 0428  VITAMIN D, 25 HYDROXY   Collected: 01/12/22 1115  Final result  Specimen: Serum     Vitamin D 25-Hydroxy 27.6 Low  ng/mL           01/13/22 0321  VITAMIN B12 & FOLATE   Collected: 01/12/22 1115  Final result  Specimen: Serum     Vitamin B12 312 pg/mL   Folate 5.4 ng/mL           01/12/22 1153  URINALYSIS W/ REFLEX CULTURE   Collected: 01/12/22 1033  Final result  Specimen: Urine     Color Yellow/Straw      Appearance Turbid Abnormal      Specific gravity 1.012     pH (UA) 5.0     Protein 30 Abnormal  mg/dL   Glucose Negative mg/dL   Ketone Negative mg/dL   Bilirubin Negative     Blood Negative     Urobilinogen 0.1 EU/dL   Nitrites Negative     Leukocyte Esterase Trace Abnormal      UA:UC IF INDICATED Culture not indicated by UA result     WBC 0-4 /hpf   RBC 0-5 /hpf   Bacteria Negative /hpf   Mucus Trace /lpf          70/61/06 7269  METABOLIC PANEL, COMPREHENSIVE   Collected: 01/12/22 0811  Final result  Specimen: Serum or Plasma     Sodium 139 mmol/L   Potassium 4.5 mmol/L   Chloride 106 mmol/L   CO2 28 mmol/L   Anion gap 5 mmol/L   Glucose 93 mg/dL   BUN 34 High  mg/dL   Creatinine 3.03 High  mg/dL   BUN/Creatinine ratio 11 Low      GFR est AA 18 Low  ml/min/1.73m2   GFR est non-AA 15 Low  ml/min/1.73m2   Calcium 7.8 Low  mg/dL   Bilirubin, total 0.6 mg/dL   AST (SGOT) 12 Low  U/L   ALT (SGPT) 14 U/L   Alk.  phosphatase 83 U/L   Protein, total 4.8 Low  g/dL   Albumin 2.0 Low  g/dL   Globulin 2.8 g/dL   A-G Ratio 0.7 Low             01/12/22 0907  TROPONIN-HIGH SENSITIVITY   Collected: 01/12/22 0811  Final result  Specimen: Plasma     Troponin-High Sensitivity 33 ng/L           01/12/22 0904  CBC WITH AUTOMATED DIFF   Collected: 01/12/22 5930  Final result  Specimen: Whole Blood     WBC 8.0 K/uL   RBC 3.20 Low  M/uL   HGB 10.1 Low  g/dL   HCT 34.4 Low  %   .5 High  FL   MCH 31.6 PG   MCHC 29.4 Low  g/dL   RDW 19.4 High  %   PLATELET 650 YBM  K/uL   MPV 10.6 FL   NRBC 0.0  WBC   ABSOLUTE NRBC 0.00 K/uL   NEUTROPHILS 83 High  %   LYMPHOCYTES 10 Low  %   MONOCYTES 6 %   EOSINOPHILS 0 %   BASOPHILS 0 %   IMMATURE GRANULOCYTES 1 High  %   ABS. NEUTROPHILS 6.6 K/UL   ABS. LYMPHOCYTES 0.8 K/UL   ABS. MONOCYTES 0.5 K/UL   ABS. EOSINOPHILS 0.0 K/UL   ABS. BASOPHILS 0.0 K/UL   ABS. IMM. GRANS. 0.1 High  K/UL   DF AUTOMATED            01/12/22 0848  COVID-19 RAPID TEST   Collected: 01/12/22 3650  Final result  Specimen: Nasopharyngeal     Specimen source DETECTED     COVID-19 rapid test DETECTED Abnormal               No results found for this or any previous visit (from the past 12 hour(s)). Radiologic Studies -   CT HEAD WO CONT    (Results Pending)   XR CHEST SNGL V    (Results Pending)     CT Results  (Last 48 hours)    None        CXR Results  (Last 48 hours)    None        01/12/22 1709  XR ABD (KUB)   Performed: 01/12/22 1704  Final         Impression: Air-filled large and small bowel loops. No definite evidence of mechanical bowel obstruction. Other findings as above.       01/12/22 1011  CT HEAD WO CONT   Performed: 01/12/22 3237  Final         Impression: Similar findings compared to CT head January 7, 2022.       01/12/22 0844  XR CHEST SNGL V   Performed: 01/12/22 7533  Final         Narrative: Chest single view. Comparison single view chest January 7, 2022. Stable right-sided permacath. Improved aeration through lungs; clearing interstitial edema.  Cardiac and mediastinal structures unchang. .. Medical Decision Making and ED Course   I am the first provider for this patient. I reviewed the vital signs, available nursing notes, past medical history, past surgical history, family history and social history. Vital Signs-Reviewed the patient's vital signs. Patient Vitals for the past 12 hrs:   Temp Pulse Resp BP SpO2   01/12/22 0753 98.1 °F (36.7 °C) 61 16 (!) 97/59 94 %       EKG interpretation:   EKG at 759. Atrial paced. Rate of 63. No ST changes. No T wave inversions. Normal intervals. Reason rule out ACS. Interpreted by ER physician. Records Reviewed: Previous Hospital chart. EMS run report      ED Course:   Initial assessment performed. The patients presenting problems have been discussed, and they are in agreement with the care plan formulated and outlined with them. I have encouraged them to ask questions as they arise throughout their visit. Orders Placed This Encounter    COVID-19 RAPID TEST     Standing Status:   Standing     Number of Occurrences:   1     Order Specific Question:   Is this test for diagnosis or screening? Answer:   Diagnosis of ill patient     Order Specific Question:   Symptomatic for COVID-19 as defined by CDC? Answer:   Yes     Order Specific Question:   Date of Symptom Onset     Answer:   1/10/2022     Order Specific Question:   Hospitalized for COVID-19? Answer:   No     Order Specific Question:   Admitted to ICU for COVID-19? Answer:   No     Order Specific Question:   Employed in healthcare setting? Answer:   No     Order Specific Question:   Resident in a congregate (group) care setting? Answer:   Yes     Order Specific Question:   Pregnant? Answer:   No     Order Specific Question:   Previously tested for COVID-19? Answer:    Yes    CT HEAD WO CONT     Standing Status:   Standing     Number of Occurrences:   1     Order Specific Question:   Transport     Answer:   Srinivas [5]     Order Specific Question:   Reason for Exam     Answer:   progressive vision loss     Order Specific Question:   Decision Support Exception     Answer:   Emergency Medical Condition (MA) [1]    XR CHEST SNGL V     Standing Status:   Standing     Number of Occurrences:   1     Order Specific Question:   Transport     Answer:   No Transport [3]     Order Specific Question:   Reason for Exam     Answer:   oniel perera    CBC WITH AUTOMATED DIFF     Standing Status:   Standing     Number of Occurrences:   1    METABOLIC PANEL, COMPREHENSIVE     Standing Status:   Standing     Number of Occurrences:   1    URINALYSIS W/ REFLEX CULTURE     Standing Status:   Standing     Number of Occurrences:   1    TROPONIN-HIGH SENSITIVITY     Standing Status:   Standing     Number of Occurrences:   1    Droplet Plus Isolation     Standing Status:   Standing     Number of Occurrences:   1                 Provider Notes (Medical Decision Making):   71year-old with generalized weakness hypotension URI symptoms diarrhea. Also seizure activity on medication. Plan for admission to the hospitalist service for IV fluids improving with IV fluids. Empiric seizure medication here. Consults    [address] admit               Procedures             CRITICAL CARE NOTE :  8:40 AM  Amount of Critical Care Time: 30 minutes    IMPENDING DETERIORATION -Airway, Respiratory, Cardiovascular and CNS  ASSOCIATED RISK FACTORS - Hypotension and CNS Decompensation  MANAGEMENT- Bedside Assessment and Supervision of Care  INTERPRETATION -  Xrays  INTERVENTIONS - hemodynamic mngmt  CASE REVIEW - Hospitalist/Intensivist  TREATMENT RESPONSE -Improved  PERFORMED BY - Self    NOTES   :  I have spent critical care time involved in lab review, consultations with specialist, family decision- making, bedside attention and documentation. This time excludes time spent in any separate billed procedures.   During this entire length of time I was immediately available to the patient . Lashay Mansfield MD              Disposition       Emergency Department Disposition:  admit      Diagnosis     Clinical Impression: Seizure. Hypotension. COVID-19 infection. Sepsis. Anemia. Attestations:    Lashay Mansfield MD    Please note that this dictation was completed with [employer], the computer voice recognition software. Quite often unanticipated grammatical, syntax, homophones, and other interpretive errors are inadvertently transcribed by the computer software. Please disregard these errors. Please excuse any errors that have escaped final proofreading. Thank you.

## 2022-01-12 NOTE — H&P
History and Physical    Patient: Hood Odell MRN: [id number]  SSN: [id number]    YOB: 1950  Age: 80 y.o. Sex: female      Subjective:      Hood Odell is a 80 y.o. female with a past medical history of seizure disorder, hx CVA, atrial fibrillation not on anticoagulation, hx pacemaker, diabetes mellitus, hypertension, hypercholesterolemia presenting to the ED via EMS for seizure-like activity and hypotension. 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 Neurology was consulted at the time and MRI brain did not show any acute changes. She was cleared from neurologic standpoint for discharge. On EMS arrival, they did not find patient to have active seizures, but did find systolic blood pressure low in 90s-100s. Patient also noted to have weakness in right arm from prior stroke. Patient states she has had worsening vision loss over the last 3+ months and can only see through peripheral vision. She was recently + COVID on Dec 26th, 2021. Patient endorses shortness of breath and productive cough. She denies any fever, chills, dizziness, headaches, chest pain, palpitations, N/V, abdominal pain, dysuria, diarrhea, or new sensory/motor deficits. In the ED, blood pressure low at 97/59. Significant labs showing BUN 49, creatinine 3.75, baseline creatinine appears to be around 2.5. Urinalysis suggestive of acute urinary tract infection. Started on IV Rocephin. Neurology consult. Patient admitted for seizures. CT head negative for acute findings similar to prior CT on January 7. Rapid Covid remains positive. Chest x-ray showing improved aeration throughout lungs since previous admission.     Past Medical History:   Diagnosis Date    Anxiety disorder     Arthritis     [facility]rosclerosis [facility] native arteries of the extremities [facility] 8/31/2020    Atrial fibrillation ([facility]     Cardiac pacemaker in situ     Cellulitis and abscess of trunk 8/31/2020    Depression, postpartum     Diabetes ([facility]     Heart disease     Heartburn     Hx of long term use of blood thinners     Hypercholesterolemia     Hypertension     Hypothyroidism     Migraines     Peripheral venous insufficiency 8/31/2020    Seizures ([facility]     Sleep disorder     [facility]     2019    Varicose veins of lower extremity with inflammation 8/31/2020     Past Surgical History:   Procedure Laterality Date    HX BACK SURGERY      HX OTHER SURGICAL      leg surgery     HX OTHER SURGICAL      pacemaker monitor     IR INSERT TUNL CVC W/O PORT OVER 5 YR  12/10/2021      Family History   Problem Relation Age of Onset    Heart Disease Mother     Heart Disease Father     Diabetes Sister     Diabetes Brother      Social History     Tobacco Use    Smoking status: Never Smoker    Smokeless tobacco: Never Used   Substance Use Topics    Alcohol use: Never      Prior to Admission medications    Medication Sig Start Date End Date Taking? Authorizing Provider   amLODIPine (NORVASC) 5 mg tablet Take 1 Tablet by mouth daily for 60 days. 1/11/22 3/12/22  Kenneth Mcclain PA-C   insulin lispro (HUMALOG) 100 unit/mL injection As per insulin sensitive ssi:    Insulin High Sensitivity (thin, ESRD)  For Blood Sugar (mg/dL) of:              Less than 150 =   0 units  150 -199 =  1 units  200 -249 =   2 units  250 -299 =   3 units  300 -349 =   4 units  350 or greater = 5 units and Call Physician  Initiate Hypoglycemic protocol if blood glucose is <70 mg/dL. 1/3/22   Zack Mcclelland MD   ascorbic acid, vitamin C, (VITAMIN C) 500 mg tablet Take 1 Tablet by mouth two (2) times a day for 30 days. 1/3/22 2/2/22  Zack Mcclelland MD   aspirin 81 mg chewable tablet Take 1 Tablet by mouth daily for 30 days.  1/3/22 2/2/22  Zack Mcclelland MD   vancomycin 1,000 mg 1,000 mg, vial-mate 1 Device IVPB 1,000 mg by IntraVENous route DIALYSIS MON, WED & FRI for 6 doses. 1/3/22 1/15/22  Winter Madera MD   predniSONE (DELTASONE) 20 mg tablet Start prednisone 20 mg PO daily x 2 days then 10 mg PO daily x 2 days then stop. 1/3/22   Winter Madera MD   calcitRIOL (ROCALTROL) 0.5 mcg capsule Take 1 Capsule by mouth daily. 12/12/21   Humza Vargas MD   zinc oxide-white petrolatum 17-57 % topical paste Apply  to affected area as needed for Skin Irritation. Indications: skin irritation 12/11/21   Humza Vargas MD   ondansetron hcl (Zofran) 4 mg tablet Take 1 Tablet by mouth every eight (8) hours as needed for Nausea or Vomiting. 12/11/21   Humza Vargas MD   Vimpat 200 mg tab tablet Take 1 Tablet by mouth two (2) times a day. 11/19/21   Provider, Historical   ondansetron (Zofran ODT) 4 mg disintegrating tablet 1 Tablet by SubLINGual route every eight (8) hours as needed for Nausea or Vomiting. 12/1/21   Arslan Hensley MD   atorvastatin (LIPITOR) 40 mg tablet Take 1 tablet by mouth once daily for 90 days 11/22/21 8/19/22  Ambrose Fiore MD   FLUoxetine (PROzac) 10 mg capsule Take 1 Capsule by mouth daily for 270 days. 11/5/21 8/2/22  Ambrose Fiore MD   loperamide (IMODIUM) 2 mg capsule TAKE 1 CAPSULE BY MOUTH SIX TIMES DAILY AS NEEDED FOR 30 DAYS 10/17/21   Chema Sanchez MD   Euthyrox 150 mcg tablet Take 1 tablet by mouth once daily 10/13/21   Chema Valadez MD   levETIRAcetam (KEPPRA XR) 500 mg ER tablet TAKE 2 TABLETS BY MOUTH TWICE DAILY FOR 90 DAYS 6/1/21   Provider, Historical   sevelamer carbonate (RENVELA) 800 mg tab tab  5/10/21   Provider, Historical        No Known Allergies    Review of Systems:  Constitutional: Positive for malaise/fatigue. No fevers, No chills.   Eyes: Positive for vision loss  ENT: No nasal congestion, No sore throat  Respiratory:  No wheezing, + SOB, +cough, + sputum  Cardiovascular: No chest pain, No lower extremity edema, No Palpitations   Gastrointestinal: No nausea, No vomiting, No diarrhea, No constipation, No abdominal pain  Genitourinary: No frequency, No dysuria, No hematuria  Integument/Breast: No rash, No skin lesion(s), No dryness  Musculoskeletal: No arthralgias, No neck pain, No back pain  Neurological: No headaches, No dizziness, No confusion,  + seizures  Behavioral/Psychiatric: No anxiety, No depression      Objective:     Vitals:    01/12/22 0753   BP: (!) 97/59   Pulse: 61   Resp: 16   Temp: 98.1 °F (36.7 °C)   SpO2: 94%   Weight: 72.6 kg (160 lb)   Height: 5' 5\" (1.651 m)        Physical Exam:  General: Ill-appearing  female. Alert, cooperative, no distress  Eye: conjunctivae/corneas clear. PERRL. Central vision loss. Throat and Neck: normal and no erythema or exudates noted. No mass   Lung: Symmetric chest wall expansion. No wheezing, rhonchi, or rales. On room air. Heart: regular rate and rhythm, +S1/S2. No murmur or gallop. Abdomen: soft, non-tender, non-distended. Bowel sounds normal. No masses,  Extremities:  No peripheral edema. Able to move all extremities normal, atraumatic  Skin: Generalized pallor. Poor turgor. No rashes or lesions. Neurologic: Generalized weakness. AOx3. No dysarthria. No facial droop. R upper extremity 3+/5 strength. Right lower extremity weakness strength 2+/5. Cranial nerves 2-12 and sensation grossly intact.   Psychiatric: non focal    Recent Results (from the past 24 hour(s))   GLUCOSE, POC    Collection Time: 01/11/22 12:03 PM   Result Value Ref Range    Glucose (POC) 58 (L) 65 - 117 mg/dL    Performed by Vivienne Sharps    GLUCOSE, POC    Collection Time: 01/11/22 12:33 PM   Result Value Ref Range    Glucose (POC) 65 65 - 117 mg/dL    Performed by Vivienne Sharps    GLUCOSE, POC    Collection Time: 01/11/22  3:04 PM   Result Value Ref Range    Glucose (POC) 69 65 - 117 mg/dL    Performed by Nasim Beckford STEPHEN    GLUCOSE, POC    Collection Time: 01/11/22  4:12 PM   Result Value Ref Range    Glucose (POC) 82 65 - 117 mg/dL    Performed by Amrit Pope CNA ([employer])    CBC WITH AUTOMATED DIFF    Collection Time: 01/12/22  8:11 AM   Result Value Ref Range    WBC 8.0 3.6 - 11.0 K/uL    RBC 3.20 (L) 3.80 - 5.20 M/uL    HGB 10.1 (L) 11.5 - 16.0 g/dL    HCT 34.4 (L) 35.0 - 47.0 %    .5 (H) 80.0 - 99.0 FL    MCH 31.6 26.0 - 34.0 PG    MCHC 29.4 (L) 30.0 - 36.5 g/dL    RDW 19.4 (H) 11.5 - 14.5 %    PLATELET 007 (L) 090 - 400 K/uL    MPV 10.6 8.9 - 12.9 FL    NRBC 0.0 0.0  WBC    ABSOLUTE NRBC 0.00 0.00 - 0.01 K/uL    NEUTROPHILS 83 (H) 32 - 75 %    LYMPHOCYTES 10 (L) 12 - 49 %    MONOCYTES 6 5 - 13 %    EOSINOPHILS 0 0 - 7 %    BASOPHILS 0 0 - 1 %    IMMATURE GRANULOCYTES 1 (H) 0 - 0.5 %    ABS. NEUTROPHILS 6.6 1.8 - 8.0 K/UL    ABS. LYMPHOCYTES 0.8 0.8 - 3.5 K/UL    ABS. MONOCYTES 0.5 0.0 - 1.0 K/UL    ABS. EOSINOPHILS 0.0 0.0 - 0.4 K/UL    ABS. BASOPHILS 0.0 0.0 - 0.1 K/UL    ABS. IMM. GRANS. 0.1 (H) 0.00 - 0.04 K/UL    DF AUTOMATED     METABOLIC PANEL, COMPREHENSIVE    Collection Time: 01/12/22  8:11 AM   Result Value Ref Range    Sodium 139 136 - 145 mmol/L    Potassium 4.5 3.5 - 5.1 mmol/L    Chloride 106 97 - 108 mmol/L    CO2 28 21 - 32 mmol/L    Anion gap 5 5 - 15 mmol/L    Glucose 93 65 - 100 mg/dL    BUN 34 (H) 6 - 20 mg/dL    Creatinine 3.03 (H) 0.55 - 1.02 mg/dL    BUN/Creatinine ratio 11 (L) 12 - 20      GFR est AA 18 (L) >60 ml/min/1.73m2    GFR est non-AA 15 (L) >60 ml/min/1.73m2    Calcium 7.8 (L) 8.5 - 10.1 mg/dL    Bilirubin, total 0.6 0.2 - 1.0 mg/dL    AST (SGOT) 12 (L) 15 - 37 U/L    ALT (SGPT) 14 12 - 78 U/L    Alk.  phosphatase 83 45 - 117 U/L    Protein, total 4.8 (L) 6.4 - 8.2 g/dL    Albumin 2.0 (L) 3.5 - 5.0 g/dL    Globulin 2.8 2.0 - 4.0 g/dL    A-G Ratio 0.7 (L) 1.1 - 2.2     COVID-19 RAPID TEST    Collection Time: 01/12/22  8:11 AM   Result Value Ref Range    Specimen source DETECTED      COVID-19 rapid test DETECTED (A) Not Detected     TROPONIN-HIGH SENSITIVITY    Collection Time: 01/12/22  8:11 AM   Result Value Ref Range    Troponin-High Sensitivity 33 0 - 51 ng/L       XR Results (maximum last 3): Results from [place] encounter on 01/12/22    XR CHEST SNGL V    Narrative  Chest single view. Comparison single view chest January 7, 2022. Stable right-sided permacath. Improved aeration through lungs; clearing interstitial edema. Cardiac and  mediastinal structures unchanged. Right-sided cardiac device with similar  positioned leads overlying the heart. No pneumothorax or sizable pleural  effusion. Results from [place] encounter on 01/07/22    XR CHEST PORT    Narrative  1 view comparison September 26    Cardiomegaly, congestion and mild interstitial edema. No consolidation. No  effusion or pneumothorax      Results from Hospital Encounter encounter on 12/26/21    XR CHEST PORT    Narrative  HISTORY:  AMS    TECHNIQUE:  XR CHEST PORT    COMPARISON: 12/21/2021  LIMITATIONS: None    TUBES/LINES: Dual-lumen right IJ catheter, right chest wall dual-lead AICD  remain in place. Suture line again noted in the upper abdomen    LUNG PARENCHYMA: Normal  TRACHEA/BRONCHI: Normal  PULMONARY VESSELS: Normal  PLEURA: Normal  HEART: Normal  AORTIC SHADOW:Normal.  MEDIASTINUM: Normal  BONE/SOFT TISSUES: No acute abnormality. OTHER: None    Impression  No acute cardiopulmonary abnormality. .      CT Results (maximum last 3): Results from [place] encounter on 01/12/22    CT HEAD WO CONT    Narrative  CT head    Comparison CT head January 7, 2022. Axial images are reviewed along with reformatted sagittal/coronal images.   Dose reduction: All CT scans at this facility are performed using dose reduction  optimization techniques as appropriate to a performed exam including the  following-  automated exposure control, adjustments of mA and/or Kv according to patient  size, or use of iterative reconstructive technique. Bone windows demonstrate small volume dependent fluid right maxillary sinus. Partial opacification frontoethmoid air cells. Small volume fluid sphenoid air  cells. Similar findings compared to prior imaging; sinusitis. Mastoid air cells are normally aerated. Review of intracranial content reveals cerebral atrophy. Periventricular white  matter gliosis. Left basal ganglia lacune. Evidence for advanced nonacute end  vessel occlusive change. Geographic encephalomalacia left occipital cerebrum  similar compared to prior imaging, suspect prior CVA. No hydrocephalus. No  intracranial hemorrhage. Impression  Similar findings compared to CT head January 7, 2022. Results from [place] encounter on 01/07/22    CT HEAD WO CONT    Narrative  Head CT    Altered mental status    Dose reduction technique: All CT scans at this facility are performed using dose reduction optimization  technique as appropriate on the exam including the following: Automated exposure  control, adjustment of the MA and/or KV according to patient size and/or use of  iterative reconstructive technique. This is compared to exam 12/26/2021. Ventricles are mildly enlarged but without hydrocephalus, shift or herniation. There is significant deepening of the sulci of the left cerebral hemisphere. There is significant old ischemic disease in the left parietal occipital region  with resultant deepening of the adjacent sulci and white matter changes. There  is an old left occipital infarction, as well. No acute infarction. There is old  lacunar infarction in the left basal ganglion. There is no major vascular  territory infarction otherwise identified. There is no hemorrhage or mass. The  corpus callosum is intact. Cerebellum shows atrophy. Cerebellar tonsils are in normal position. Brainstem  is intact. Foramen magnum is widely patent.  Sella shows no acute abnormality. The calvarium and skull base are intact. There is scattered paranasal sinus  disease which appears chronic. The mastoids and middle ears are clear. Orbits  and globes show no acute abnormality. Impression  Old ischemic disease. No acute intracranial abnormality. Results from [place] encounter on 12/26/21    CT SPINE CERV WO CONT    Narrative  HISTORY:  recurrent falls, AMS  Dose reduction technique: All CT scans at this facility are performed using dose reduction optimization  technique as appropriate on the exam including the following: Automated exposure  control, adjustment of the MA and/or KV according to patient size and/or use of  iterative reconstructive technique. TECHNIQUE: [BRAIN CT without contrast]  COMPARISON: [4 weeks prior]]  LIMITATIONS: [None]    BRAIN: Mild patchy areas of subcortical and periventricular white matter  hypodensity. More focal encephalomalacia of the left parietal-occipital region  as on prior    No acute parenchymal hemorrhage, mass effect or midline shift. VENTRICLES: Mild prominence of the cerebral ventricles commensurate with the  degree of atrophy. EXTRA-AXIAL SPACES/SULCI: Mild generalized cerebral/cerebellar atrophy. No  extra-axial hemorrhage, fluid collection or mass. CALVARIUM/SKULL BASE: Normal    FACE/SINUSES: Visualized portions normal    SOFT TISSUES: Normal    OTHER: None    Impression  No acute intracranial abnormality. Remote left parietal-occipital  infarct. Mild generalized atrophy with nonspecific white matter abnormality that  may indicate chronic small vessel disease. Algis Broaden HISTORY:  recurrent falls, AMS  Dose reduction technique:   All CT scans at this facility are performed using dose reduction optimization  technique as appropriate on the exam including the following: Automated exposure  control, adjustment of the MA and/or KV according to patient size and/or use of  iterative reconstructive technique. TECHNIQUE: Noncontrast CT of the cervical spine with multiplanar  reconstructions. COMPARISON: None  LIMITATIONS: None    FRACTURES: None  ALIGNMENT: Normal  VERTEBRAL BODIES: Multilevel degenerative/arthritic changes characterized by  osteophyte formation and associated endplate changes; findings most notable at  C5-6, C7-T1. DISC SPACES: Decreased with multiple posterior disc protrusions some of which  are mildly calcified  POSTERIOR ELEMENTS: Normal for age. SPINAL CANAL: Normal  PARASPINAL SOFT TISSUES: Normal    OTHER: None    IMPRESSION: Multilevel degenerative disc and degenerative arthritic changes of  the cervical spine with no acute abnormality. Nuclear Medicine Results (maximum last 3): No results found for this or any previous visit. US Results (maximum last 3): No results found for this or any previous visit. Active Problems:    COVID-19 (12/26/2021)      Seizure-like activity ([facility] (1/12/2022)        Assessment/Plan:     1.  Seizure-like activity  - Continue Keppra 1000 mg BID  - Continue lacosamide 200 mg BID  - Neurology consultation  - CT of the head with no acute process  - Recent MRI brain does not show any evidence of acute infarct or hemorrhage. 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 Urinary tract infection  - Seen on UA. Follow urine cultures. - Start IV ceftriaxone     3.  History of staph aureus bacteremia - on 12/26  - No indications for IV vancomycin at this time   - Blood cultures showing no growth so far  - No white count evident or febrile illness     4.  End-stage renal disease  - Renal consultation  - Continue hemodialysis as previously scheduled, Tues, Th and Sat     5.  Hypothyroidism  - TSH normal  - Continue levothyroxine     6.  COVID-19 positive  Stable    7.  Diabetes mellitus - ISS and accu-checks    8. Atrial fibrillation not on anticoagulation - Continue ASA and statin. 9.  Hx CVA - continue ASA and statin       DVT Prophylaxis - heparin subcu  Code Status - full  POA    Total Time >55 minutes      Signed By: Janak Culp PA-C     January 12, 2022

## 2022-01-12 NOTE — ED NOTES
no BM x 4 days per pt, abd soft nondistended, had small non formed BM on her own after being disimpacted, removing mod amt. This provided relief for a while allowing pt to get some rest. Beltching returned as soon as pt woke along with the same same complaints of inability to pass stool, nausea c/o back pain.  Message sent to provider to see if any imaging necessary

## 2022-01-12 NOTE — ED TRIAGE NOTES
EMS was called for Sz like activity and hypotension. EMS found Pt no having sz but alert and talking with BP in upper 90s and low 100s. Pt has flacid rt arm with hx of same. Complaining of entire lt side of body hurting. Pt also has had progressive decline in vision x 3 months to point she can no longer see.  Pt COVID+ end of December

## 2022-01-12 NOTE — ED NOTES
TRANSFER - OUT REPORT:    [facility] for routine progression of care       Report consisted of patients Situation, Background, Assessment and   Recommendations(SBAR). Information from the following report(s) SBAR, ED Summary, [facility] and Recent Results was reviewed with the receiving nurse. Lines:   Venous Access Device Upper chest (subclavicular area, right (Active)       Peripheral IV 01/12/22 Left Antecubital (Active)   Site Assessment Clean, dry, & intact 01/12/22 1215   Phlebitis Assessment 0 01/12/22 1215   Infiltration Assessment 0 01/12/22 1215   Dressing Status Clean, dry, & intact 01/12/22 1215   Dressing Type Transparent 01/12/22 1215   Hub Color/Line Status Pink 01/12/22 1215        Opportunity for questions and clarification was provided.       Patient transported with:   [employer]

## 2022-01-13 ENCOUNTER — ANESTHESIA (OUTPATIENT)
Dept: ENDOSCOPY | Age: 72
DRG: 314 | End: 2022-01-13
Payer: MEDICARE

## 2022-01-13 ENCOUNTER — APPOINTMENT (OUTPATIENT)
Dept: ENDOSCOPY | Age: 72
DRG: 314 | End: 2022-01-13
Attending: INTERNAL MEDICINE
Payer: MEDICARE

## 2022-01-13 ENCOUNTER — ANESTHESIA EVENT (OUTPATIENT)
Dept: ENDOSCOPY | Age: 72
DRG: 314 | End: 2022-01-13
Payer: MEDICARE

## 2022-01-13 LAB
25(OH)D3 SERPL-MCNC: 27.6 NG/ML (ref 30–100)
ABO + RH BLD: NORMAL
ANION GAP SERPL CALC-SCNC: 7 MMOL/L (ref 5–15)
BASOPHILS # BLD: 0 K/UL (ref 0–0.1)
BASOPHILS NFR BLD: 0 % (ref 0–1)
BLD PROD TYP BPU: NORMAL
BLD PROD TYP BPU: NORMAL
BLOOD GROUP ANTIBODIES SERPL: NEGATIVE
BPU ID: NORMAL
BPU ID: NORMAL
BUN SERPL-MCNC: 68 MG/DL (ref 6–20)
BUN/CREAT SERPL: 21 (ref 12–20)
CA-I BLD-MCNC: 7 MG/DL (ref 8.5–10.1)
CHLORIDE SERPL-SCNC: 107 MMOL/L (ref 97–108)
CO2 SERPL-SCNC: 26 MMOL/L (ref 21–32)
CREAT SERPL-MCNC: 3.31 MG/DL (ref 0.55–1.02)
CROSSMATCH RESULT,%XM: NORMAL
CROSSMATCH RESULT,%XM: NORMAL
DIFFERENTIAL METHOD BLD: ABNORMAL
EOSINOPHIL # BLD: 0 K/UL (ref 0–0.4)
EOSINOPHIL NFR BLD: 0 % (ref 0–7)
ERYTHROCYTE [DISTWIDTH] IN BLOOD BY AUTOMATED COUNT: 19.9 % (ref 11.5–14.5)
ERYTHROCYTE [DISTWIDTH] IN BLOOD BY AUTOMATED COUNT: 20.6 % (ref 11.5–14.5)
FOLATE SERPL-MCNC: 5.4 NG/ML (ref 5–21)
GLUCOSE BLD STRIP.AUTO-MCNC: 83 MG/DL (ref 65–117)
GLUCOSE SERPL-MCNC: 95 MG/DL (ref 65–100)
HCT VFR BLD AUTO: 28.5 % (ref 35–47)
HCT VFR BLD AUTO: 33.3 % (ref 35–47)
HGB BLD-MCNC: 10.5 G/DL (ref 11.5–16)
HGB BLD-MCNC: 9 G/DL (ref 11.5–16)
IMM GRANULOCYTES # BLD AUTO: 0.2 K/UL (ref 0–0.04)
IMM GRANULOCYTES NFR BLD AUTO: 2 % (ref 0–0.5)
INR PPP: 1.3 (ref 0.9–1.1)
INR PPP: 1.5 (ref 0.9–1.1)
LACTATE SERPL-SCNC: 3 MMOL/L (ref 0.4–2)
LYMPHOCYTES # BLD: 0.8 K/UL (ref 0.8–3.5)
LYMPHOCYTES NFR BLD: 8 % (ref 12–49)
MCH RBC QN AUTO: 31 PG (ref 26–34)
MCH RBC QN AUTO: 31.3 PG (ref 26–34)
MCHC RBC AUTO-ENTMCNC: 31.5 G/DL (ref 30–36.5)
MCHC RBC AUTO-ENTMCNC: 31.6 G/DL (ref 30–36.5)
MCV RBC AUTO: 98.3 FL (ref 80–99)
MCV RBC AUTO: 99.1 FL (ref 80–99)
MONOCYTES # BLD: 0.7 K/UL (ref 0–1)
MONOCYTES NFR BLD: 6 % (ref 5–13)
MRSA DNA SPEC QL NAA+PROBE: NOT DETECTED
NEUTS SEG # BLD: 8.6 K/UL (ref 1.8–8)
NEUTS SEG NFR BLD: 84 % (ref 32–75)
NRBC # BLD: 0 K/UL (ref 0–0.01)
NRBC # BLD: 0 K/UL (ref 0–0.01)
NRBC BLD-RTO: 0 PER 100 WBC
NRBC BLD-RTO: 0 PER 100 WBC
PERFORMED BY, TECHID: NORMAL
PLATELET # BLD AUTO: 107 K/UL (ref 150–400)
PLATELET # BLD AUTO: 98 K/UL (ref 150–400)
PMV BLD AUTO: 11.3 FL (ref 8.9–12.9)
PMV BLD AUTO: 11.8 FL (ref 8.9–12.9)
POTASSIUM SERPL-SCNC: 5.4 MMOL/L (ref 3.5–5.1)
PROTHROMBIN TIME: 16.1 SEC (ref 11.9–14.7)
PROTHROMBIN TIME: 17.5 SEC (ref 11.9–14.7)
RBC # BLD AUTO: 2.9 M/UL (ref 3.8–5.2)
RBC # BLD AUTO: 3.36 M/UL (ref 3.8–5.2)
SODIUM SERPL-SCNC: 140 MMOL/L (ref 136–145)
SPECIMEN EXP DATE BLD: NORMAL
STATUS OF UNIT,%ST: NORMAL
STATUS OF UNIT,%ST: NORMAL
TRANSFUSION STATUS PATIENT QL: NORMAL
TRANSFUSION STATUS PATIENT QL: NORMAL
UNIT DIVISION, %UDIV: 0
UNIT DIVISION, %UDIV: 0
UNIT TAG COMMENT,%CM: NORMAL
UNIT TAG COMMENT,%CM: NORMAL
VIT B12 SERPL-MCNC: 312 PG/ML (ref 193–986)
WBC # BLD AUTO: 10.3 K/UL (ref 3.6–11)
WBC # BLD AUTO: 12 K/UL (ref 3.6–11)

## 2022-01-13 PROCEDURE — C9113 INJ PANTOPRAZOLE SODIUM, VIA: HCPCS | Performed by: HOSPITALIST

## 2022-01-13 PROCEDURE — 77030041709 HC NDL SCLER BSC -C: Performed by: INTERNAL MEDICINE

## 2022-01-13 PROCEDURE — 85025 COMPLETE CBC W/AUTO DIFF WBC: CPT

## 2022-01-13 PROCEDURE — 74011000250 HC RX REV CODE- 250: Performed by: HOSPITALIST

## 2022-01-13 PROCEDURE — 74011250637 HC RX REV CODE- 250/637: Performed by: INTERNAL MEDICINE

## 2022-01-13 PROCEDURE — 80048 BASIC METABOLIC PNL TOTAL CA: CPT

## 2022-01-13 PROCEDURE — 74011250637 HC RX REV CODE- 250/637: Performed by: HOSPITALIST

## 2022-01-13 PROCEDURE — 0W3P8ZZ CONTROL BLEEDING IN GASTROINTESTINAL TRACT, VIA NATURAL OR ARTIFICIAL OPENING ENDOSCOPIC: ICD-10-PCS | Performed by: INTERNAL MEDICINE

## 2022-01-13 PROCEDURE — 74011250636 HC RX REV CODE- 250/636: Performed by: NURSE ANESTHETIST, CERTIFIED REGISTERED

## 2022-01-13 PROCEDURE — 74011250636 HC RX REV CODE- 250/636: Performed by: INTERNAL MEDICINE

## 2022-01-13 PROCEDURE — 3E0G8GC INTRODUCTION OF OTHER THERAPEUTIC SUBSTANCE INTO UPPER GI, VIA NATURAL OR ARTIFICIAL OPENING ENDOSCOPIC: ICD-10-PCS | Performed by: INTERNAL MEDICINE

## 2022-01-13 PROCEDURE — 74011000250 HC RX REV CODE- 250: Performed by: STUDENT IN AN ORGANIZED HEALTH CARE EDUCATION/TRAINING PROGRAM

## 2022-01-13 PROCEDURE — 74011000258 HC RX REV CODE- 258: Performed by: HOSPITALIST

## 2022-01-13 PROCEDURE — 87641 MR-STAPH DNA AMP PROBE: CPT

## 2022-01-13 PROCEDURE — 77030010936 HC CLP LIG BSC -C: Performed by: INTERNAL MEDICINE

## 2022-01-13 PROCEDURE — 76040000007: Performed by: INTERNAL MEDICINE

## 2022-01-13 PROCEDURE — 83605 ASSAY OF LACTIC ACID: CPT

## 2022-01-13 PROCEDURE — 74011250636 HC RX REV CODE- 250/636: Performed by: HOSPITALIST

## 2022-01-13 PROCEDURE — 82962 GLUCOSE BLOOD TEST: CPT

## 2022-01-13 PROCEDURE — 74011250637 HC RX REV CODE- 250/637: Performed by: PSYCHIATRY & NEUROLOGY

## 2022-01-13 PROCEDURE — 76060000032 HC ANESTHESIA 0.5 TO 1 HR: Performed by: INTERNAL MEDICINE

## 2022-01-13 PROCEDURE — 74011000250 HC RX REV CODE- 250: Performed by: NURSE ANESTHETIST, CERTIFIED REGISTERED

## 2022-01-13 PROCEDURE — 85610 PROTHROMBIN TIME: CPT

## 2022-01-13 PROCEDURE — 85027 COMPLETE CBC AUTOMATED: CPT

## 2022-01-13 PROCEDURE — 2709999900 HC NON-CHARGEABLE SUPPLY: Performed by: INTERNAL MEDICINE

## 2022-01-13 PROCEDURE — 65610000006 HC RM INTENSIVE CARE

## 2022-01-13 DEVICE — WORKING LENGTH 235CM, WORKING CHANNEL 2.8MM
Type: IMPLANTABLE DEVICE | Site: STOMACH | Status: FUNCTIONAL
Brand: RESOLUTION 360 CLIP

## 2022-01-13 RX ORDER — LEVETIRACETAM 250 MG/1
500 TABLET ORAL 2 TIMES DAILY
Status: DISCONTINUED | OUTPATIENT
Start: 2022-01-13 | End: 2022-01-17 | Stop reason: HOSPADM

## 2022-01-13 RX ORDER — OXCARBAZEPINE 150 MG/1
150 TABLET, FILM COATED ORAL EVERY 12 HOURS
Status: DISCONTINUED | OUTPATIENT
Start: 2022-01-13 | End: 2022-01-17 | Stop reason: HOSPADM

## 2022-01-13 RX ORDER — MAGNESIUM SULFATE 100 %
4 CRYSTALS MISCELLANEOUS AS NEEDED
Status: DISCONTINUED | OUTPATIENT
Start: 2022-01-13 | End: 2022-01-13 | Stop reason: SDUPTHER

## 2022-01-13 RX ORDER — LIDOCAINE HYDROCHLORIDE 20 MG/ML
INJECTION, SOLUTION EPIDURAL; INFILTRATION; INTRACAUDAL; PERINEURAL AS NEEDED
Status: DISCONTINUED | OUTPATIENT
Start: 2022-01-13 | End: 2022-01-13 | Stop reason: HOSPADM

## 2022-01-13 RX ORDER — DEXTROSE 50 % IN WATER (D50W) INTRAVENOUS SYRINGE
25-50 AS NEEDED
Status: DISCONTINUED | OUTPATIENT
Start: 2022-01-13 | End: 2022-01-13 | Stop reason: SDUPTHER

## 2022-01-13 RX ORDER — EPINEPHRINE 1 MG/ML
INJECTION, SOLUTION, CONCENTRATE INTRAVENOUS AS NEEDED
Status: DISCONTINUED | OUTPATIENT
Start: 2022-01-13 | End: 2022-01-17 | Stop reason: HOSPADM

## 2022-01-13 RX ORDER — INSULIN LISPRO 100 [IU]/ML
INJECTION, SOLUTION INTRAVENOUS; SUBCUTANEOUS
Status: DISCONTINUED | OUTPATIENT
Start: 2022-01-13 | End: 2022-01-17 | Stop reason: HOSPADM

## 2022-01-13 RX ORDER — SODIUM CHLORIDE 9 MG/ML
INJECTION, SOLUTION INTRAVENOUS
Status: DISCONTINUED | OUTPATIENT
Start: 2022-01-13 | End: 2022-01-13 | Stop reason: HOSPADM

## 2022-01-13 RX ORDER — PROPOFOL 10 MG/ML
INJECTION, EMULSION INTRAVENOUS AS NEEDED
Status: DISCONTINUED | OUTPATIENT
Start: 2022-01-13 | End: 2022-01-13 | Stop reason: HOSPADM

## 2022-01-13 RX ORDER — SODIUM POLYSTYRENE SULFONATE 15 G/60ML
15 SUSPENSION ORAL; RECTAL
Status: COMPLETED | OUTPATIENT
Start: 2022-01-13 | End: 2022-01-13

## 2022-01-13 RX ADMIN — OXYCODONE HYDROCHLORIDE AND ACETAMINOPHEN 500 MG: 500 TABLET ORAL at 23:17

## 2022-01-13 RX ADMIN — OXYCODONE HYDROCHLORIDE AND ACETAMINOPHEN 500 MG: 500 TABLET ORAL at 12:11

## 2022-01-13 RX ADMIN — CEFTRIAXONE SODIUM 1 G: 1 INJECTION, POWDER, FOR SOLUTION INTRAMUSCULAR; INTRAVENOUS at 13:05

## 2022-01-13 RX ADMIN — LIDOCAINE HYDROCHLORIDE 100 MG: 20 INJECTION, SOLUTION EPIDURAL; INFILTRATION; INTRACAUDAL; PERINEURAL at 13:58

## 2022-01-13 RX ADMIN — LEVETIRACETAM 500 MG: 250 TABLET, FILM COATED ORAL at 23:17

## 2022-01-13 RX ADMIN — LACOSAMIDE 200 MG: 200 TABLET, FILM COATED ORAL at 23:17

## 2022-01-13 RX ADMIN — SODIUM CHLORIDE 8 MG/HR: 900 INJECTION INTRAVENOUS at 01:41

## 2022-01-13 RX ADMIN — DEXTROSE MONOHYDRATE 25 G: 25 INJECTION, SOLUTION INTRAVENOUS at 13:05

## 2022-01-13 RX ADMIN — SODIUM CHLORIDE, PRESERVATIVE FREE 10 ML: 5 INJECTION INTRAVENOUS at 05:14

## 2022-01-13 RX ADMIN — ATORVASTATIN CALCIUM 40 MG: 40 TABLET, FILM COATED ORAL at 12:11

## 2022-01-13 RX ADMIN — SODIUM CHLORIDE, PRESERVATIVE FREE 10 ML: 5 INJECTION INTRAVENOUS at 23:18

## 2022-01-13 RX ADMIN — CALCITRIOL CAPSULES 0.25 MCG 0.5 MCG: 0.25 CAPSULE ORAL at 12:12

## 2022-01-13 RX ADMIN — PROPOFOL 30 MG: 10 INJECTION, EMULSION INTRAVENOUS at 13:58

## 2022-01-13 RX ADMIN — SODIUM POLYSTYRENE SULFONATE 15 G: 15 SUSPENSION ORAL; RECTAL at 18:09

## 2022-01-13 RX ADMIN — OXCARBAZEPINE 150 MG: 150 TABLET, FILM COATED ORAL at 23:17

## 2022-01-13 RX ADMIN — LEVOTHYROXINE SODIUM 150 MCG: 0.07 TABLET ORAL at 12:14

## 2022-01-13 RX ADMIN — ACETAMINOPHEN 650 MG: 325 TABLET ORAL at 12:11

## 2022-01-13 RX ADMIN — ONDANSETRON 4 MG: 2 INJECTION INTRAMUSCULAR; INTRAVENOUS at 18:09

## 2022-01-13 RX ADMIN — MIDODRINE HYDROCHLORIDE 5 MG: 5 TABLET ORAL at 23:18

## 2022-01-13 RX ADMIN — SODIUM CHLORIDE 8 MG/HR: 900 INJECTION INTRAVENOUS at 17:56

## 2022-01-13 RX ADMIN — ONDANSETRON 4 MG: 2 INJECTION INTRAMUSCULAR; INTRAVENOUS at 22:21

## 2022-01-13 RX ADMIN — SODIUM CHLORIDE, PRESERVATIVE FREE 10 ML: 5 INJECTION INTRAVENOUS at 14:00

## 2022-01-13 RX ADMIN — PROPOFOL 10 MG: 10 INJECTION, EMULSION INTRAVENOUS at 13:59

## 2022-01-13 RX ADMIN — ONDANSETRON 4 MG: 2 INJECTION INTRAMUSCULAR; INTRAVENOUS at 10:30

## 2022-01-13 RX ADMIN — LACOSAMIDE 200 MG: 200 TABLET, FILM COATED ORAL at 12:13

## 2022-01-13 RX ADMIN — PROPOFOL 10 MG: 10 INJECTION, EMULSION INTRAVENOUS at 14:10

## 2022-01-13 RX ADMIN — SODIUM CHLORIDE 8 MG/HR: 900 INJECTION INTRAVENOUS at 06:19

## 2022-01-13 RX ADMIN — MIDODRINE HYDROCHLORIDE 5 MG: 5 TABLET ORAL at 18:09

## 2022-01-13 RX ADMIN — SODIUM CHLORIDE: 9 INJECTION, SOLUTION INTRAVENOUS at 13:54

## 2022-01-13 RX ADMIN — MIDODRINE HYDROCHLORIDE 5 MG: 5 TABLET ORAL at 12:14

## 2022-01-13 RX ADMIN — PROPOFOL 20 MG: 10 INJECTION, EMULSION INTRAVENOUS at 14:03

## 2022-01-13 RX ADMIN — PROPOFOL 20 MG: 10 INJECTION, EMULSION INTRAVENOUS at 14:00

## 2022-01-13 RX ADMIN — SODIUM CHLORIDE, PRESERVATIVE FREE 10 ML: 5 INJECTION INTRAVENOUS at 05:23

## 2022-01-13 RX ADMIN — PROPOFOL 20 MG: 10 INJECTION, EMULSION INTRAVENOUS at 14:07

## 2022-01-13 NOTE — PROGRESS NOTES
Hospitalist Progress Note         Digna Agudelo MD          Daily Progress Note: 1/13/2022      Subjective: The patient is seen for follow  up.  80 y.o. female with a past medical history of seizure disorder, hx CVA, atrial fibrillation not on anticoagulation, hx pacemaker, diabetes mellitus, hypertension, hypercholesterolemia presenting to the ED via EMS for seizure-like activity and hypotension. 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 Evaluation in the ED she was noted to be hypotensive, tachycardic and positive for COVID. Admitted to medical telemetry floor. Staff reports several bouts of bloody vomitus and hypotension requiring transfer to the intensive care unit. Sukumar Montenegro Received 2 units of packed red blood cells and fluid bolus with improvement in blood pressure. She is seen in follow-up. Offers no complaints.   On room and not requiring vasopressors    Problem List:  Problem List as of 1/13/2022 Date Reviewed: 1/3/2022          Codes Class Noted - Resolved    Seizure-like activity ([facility] ICD-10-CM: R56.9  ICD-9-CM: 780.39  1/12/2022 - Present        Sepsis ([facility] [facility] ICD-10-CM: A41.9  ICD-9-CM: 038.9, 995.91  1/7/2022 - Present        MSSA bacteremia ICD-10-CM: R78.81, B95.61  ICD-9-CM: 790.7, 041.11  1/3/2022 - Present        Acute on chronic anemia ICD-10-CM: D64.9  ICD-9-CM: 285.9  1/3/2022 - Present        Gait disturbance, post-stroke ICD-10-CM: I69.398, R26.9  ICD-9-CM: 438.89, 781.2  1/3/2022 - Present        COVID-19 ICD-10-CM: U07.1  ICD-9-CM: 079.89  12/26/2021 - Present        Fluid overload ICD-10-CM: E87.70  ICD-9-CM: 276.69  12/5/2021 - Present        Left-sided weakness ICD-10-CM: R53.1  ICD-9-CM: 728.87  12/3/2021 - Present        Intractable nausea and vomiting ICD-10-CM: R11.2  ICD-9-CM: 536.2  12/3/2021 - Present Hypoglycemia ICD-10-CM: E16.2  ICD-9-CM: 251.2  6/11/2021 - Present        Encephalopathy acute ICD-10-CM: G93.40  ICD-9-CM: 348.30  6/11/2021 - Present        SSS (sick sinus syndrome) (HCC) ICD-10-CM: I49.5  ICD-9-CM: 427.81  6/11/2021 - Present        Seizure ([facility] ICD-10-CM: R56.9  ICD-9-CM: 780.39  5/19/2021 - Present        Hyperkalemia ICD-10-CM: E87.5  ICD-9-CM: 276.7  10/16/2020 - Present        Atherosclerosis of native arteries of the extremities with ulceration (HCC) ICD-10-CM: I70.25  ICD-9-CM: 440.23, 707.9  8/31/2020 - Present        Cellulitis and abscess of trunk ICD-10-CM: L03.319, L02.219  ICD-9-CM: 682.2  8/31/2020 - Present        Peripheral venous insufficiency ICD-10-CM: I87.2  ICD-9-CM: 459.81  8/31/2020 - Present        Varicose veins of lower extremity with inflammation ICD-10-CM: I83.10  ICD-9-CM: 454.1  8/31/2020 - Present        Peripheral vascular disease ([facility] ICD-10-CM: I73.9  ICD-9-CM: 443.9  7/3/2020 - Present        Type II diabetes mellitus ([facility] ICD-10-CM: E11.9  ICD-9-CM: 250.00  7/3/2020 - Present        Long term (current) use of antibiotics ICD-10-CM: Z79.2  ICD-9-CM: V58.62  7/3/2020 - Present        Atrial fibrillation ([facility] ICD-10-CM: I48.91  ICD-9-CM: 427.31  7/3/2020 - Present        Cardiac pacemaker in situ ICD-10-CM: Z95.0  ICD-9-CM: V45.01  7/3/2020 - Present        Carpal tunnel syndrome of right wrist ICD-10-CM: G56.01  ICD-9-CM: 354.0  7/3/2020 - Present        Chest wall pain ICD-10-CM: R07.89  ICD-9-CM: 786.52  7/3/2020 - Present        Chronic diarrhea ICD-10-CM: K52.9  ICD-9-CM: 787.91  7/3/2020 - Present        Chronic neck pain ICD-10-CM: M54.2, G89.29  ICD-9-CM: 723.1, 338.29  7/3/2020 - Present        End stage renal failure on dialysis [facility]) ICD-10-CM: N18.6, Z99.2  ICD-9-CM: 585.6, V45.11  7/3/2020 - Present        History of CVA (cerebrovascular accident) ICD-10-CM: Z86.73  ICD-9-CM: V12.54  7/3/2020 - Present        Hyponatremia ICD-10-CM: E87.1  ICD-9-CM: 276.1  7/3/2020 - Present        Acquired hypothyroidism ICD-10-CM: E03.9  ICD-9-CM: 244.9  7/3/2020 - Present        Breakthrough seizure ([facility] ICD-10-CM: M44.483  ICD-9-CM: 345.91  7/3/2020 - Present        Recurrent falls ICD-10-CM: R29.6  ICD-9-CM: V15.88  7/3/2020 - Present        RESOLVED: Metabolic encephalopathy [facility]: G93.41  ICD-9-CM: 348.31  1/3/2022 - 1/3/2022        RESOLVED: Sepsis ([facility] ICD-10-CM: A41.9  ICD-9-CM: 038.9, 995.91  1/3/2022 - 1/3/2022        RESOLVED: AMS (altered mental status) ICD-10-CM: C03.09  ICD-9-CM: 780.97  12/26/2021 - 1/3/2022        RESOLVED: Disorder due to well controlled type 2 diabetes mellitus ([facility] ICD-10-CM: E11.8  ICD-9-CM: 250.90  8/31/2020 - 6/11/2021              Medications reviewed  Current Facility-Administered Medications   Medication Dose Route Frequency    insulin lispro (HUMALOG) injection   SubCUTAneous AC&HS    levETIRAcetam (KEPPRA) tablet 500 mg  500 mg Oral BID    OXcarbazepine (TRILEPTAL) tablet 150 mg  150 mg Oral Q12H    sodium chloride (NS) flush 5-40 mL  5-40 mL IntraVENous PRN    acetaminophen (TYLENOL) tablet 650 mg  650 mg Oral Q6H PRN    Or    acetaminophen (TYLENOL) suppository 650 mg  650 mg Rectal Q6H PRN    polyethylene glycol (MIRALAX) packet 17 g  17 g Oral DAILY PRN    ascorbic acid (vitamin C) (VITAMIN C) tablet 500 mg  500 mg Oral BID    atorvastatin (LIPITOR) tablet 40 mg  40 mg Oral DAILY    calcitRIOL (ROCALTROL) capsule 0.5 mcg  0.5 mcg Oral DAILY    levothyroxine (SYNTHROID) tablet 150 mcg  150 mcg Oral DAILY    FLUoxetine (PROzac) capsule 10 mg  10 mg Oral DAILY    lacosamide (VIMPAT) tablet 200 mg  200 mg Oral BID    zinc oxide-white petrolatum 20-75 % topical paste   Topical PRN    cefTRIAXone (ROCEPHIN) 1 g in sterile water (preservative free) 10 mL IV syringe  1 g IntraVENous Q24H    glucose chewable tablet 16 g  4 Tablet Oral PRN    glucagon (GLUCAGEN) injection 1 mg  1 mg IntraMUSCular PRN    dextrose (D50W) injection syrg 12.5-25 g  25-50 mL IntraVENous PRN    midodrine (PROAMATINE) tablet 5 mg  5 mg Oral TID    sodium chloride (NS) flush 5-40 mL  5-40 mL IntraVENous Q8H    sodium chloride (NS) flush 5-40 mL  5-40 mL IntraVENous PRN    ondansetron (ZOFRAN) injection 4 mg  4 mg IntraVENous Q4H PRN    Or    ondansetron (ZOFRAN ODT) tablet 4 mg  4 mg Oral Q8H PRN    0.9% sodium chloride infusion 250 mL  250 mL IntraVENous PRN    pantoprazole (PROTONIX) 40 mg in 0.9% sodium chloride (MBP/ADV) 50 mL MBP  8 mg/hr IntraVENous CONTINUOUS       Review of Systems:   A comprehensive review of systems was negative except for that written in the HPI. Objective:   Physical Exam:     Visit Vitals  BP (!) 143/101   Pulse 60   Temp 98.8 °F (37.1 °C)   Resp 14   Ht 5' 5\" (1.651 m)   Wt 71.2 kg (156 lb 15.5 oz)   SpO2 99%   BMI 26.12 kg/m²      O2 Device: None (Room air)    Temp (24hrs), Av.9 °F (36.6 °C), Min:97.4 °F (36.3 °C), Max:98.8 °F (37.1 °C)    No intake/output data recorded.  1901 -  0700  In: 3210.6 [I.V.:2460.6]  Out: -     General:  Alert, cooperative, no distress, appears stated age. Lungs:   Clear to auscultation bilaterally. Chest wall:  No tenderness or deformity. Heart:  Regular rate and rhythm, S1, S2 normal, no murmur, click, rub or gallop. Abdomen:   Soft, non-tender. Bowel sounds normal. No masses,  No organomegaly. Extremities: Extremities normal, atraumatic, no cyanosis or edema. Pulses: 2+ and symmetric all extremities. Skin: Skin color, texture, turgor normal. No rashes or lesions   Neurologic: CNII-XII intact.  No gross sensory or motor deficits     Data Review:       Recent Days:  Recent Labs     22  0400 226 22  0811   WBC 12.0* 9.6 8.0   HGB 10.5* 6.4* 10.1*   HCT 33.3* 22.4* 34.4*   PLT 98* 112* 108*     Recent Labs     22  0151 22  2036 22  0811 22  0730 22  0730    140 139   < > 142 142   K 5.4* 5.5* 4.5   < > 3.7  3.6    109* 106   < > 108  108   CO2 26 24 28   < > 28  29   GLU 95 103* 93   < > 73  74   BUN 68* 57* 34*   < > 51*  49*   CREA 3.31* 3.20* 3.03*   < > 3.74*  3.75*   CA 7.0* 7.1* 7.8*   < > 7.9*  7.7*   MG  --   --   --   --  1.9   PHOS  --   --   --   --  3.2   ALB  --  1.6* 2.0*  --  2.3*  2.3*   TBILI  --  0.4 0.6  --  0.5   ALT  --  12 14  --  14   INR 1.5* 1.3*  --   --   --     < > = values in this interval not displayed. No results for input(s): PH, PCO2, PO2, HCO3, FIO2 in the last 72 hours. 24 Hour Results:  Recent Results (from the past 24 hour(s))   GLUCOSE, POC    Collection Time: 01/12/22 12:26 PM   Result Value Ref Range    Glucose (POC) 114 65 - 117 mg/dL    Performed by Kristi Wood    PROCALCITONIN    Collection Time: 01/12/22  2:14 PM   Result Value Ref Range    Procalcitonin 0.26 (H) 0 ng/mL   LACTIC ACID    Collection Time: 01/12/22  2:14 PM   Result Value Ref Range    Lactic acid 2.6 (HH) 0.4 - 2.0 mmol/L   GLUCOSE, POC    Collection Time: 01/12/22  4:41 PM   Result Value Ref Range    Glucose (POC) 103 65 - 117 mg/dL    [facility]    Collection Time: 01/12/22  8:36 PM   Result Value Ref Range    Sodium 140 136 - 145 mmol/L    Potassium 5.5 (H) 3.5 - 5.1 mmol/L    Chloride 109 (H) 97 - 108 mmol/L    CO2 24 21 - 32 mmol/L    Anion gap 7 5 - 15 mmol/L    Glucose 103 (H) 65 - 100 mg/dL    BUN 57 (H) 6 - 20 mg/dL    Creatinine 3.20 (H) 0.55 - 1.02 mg/dL    BUN/Creatinine ratio 18 12 - 20      GFR est AA 17 (L) >60 ml/min/1.73m2    GFR est non-AA 14 (L) >60 ml/min/1.73m2    Calcium 7.1 (L) 8.5 - 10.1 mg/dL    Bilirubin, total 0.4 0.2 - 1.0 mg/dL    AST (SGOT) 10 (L) 15 - 37 U/L    ALT (SGPT) 12 12 - 78 U/L    Alk.  phosphatase 63 45 - 117 U/L    Protein, total 4.1 (L) 6.4 - 8.2 g/dL    Albumin 1.6 (L) 3.5 - 5.0 g/dL    Globulin 2.5 2.0 - 4.0 g/dL    A-G Ratio 0.6 (L) 1.1 - 2.2     CBC WITH AUTOMATED DIFF    Collection Time: 01/12/22  8:36 PM   Result Value Ref Range    WBC 9.6 3.6 - 11.0 K/uL    RBC 2.02 (L) 3.80 - 5.20 M/uL    HGB 6.4 (L) 11.5 - 16.0 g/dL    HCT 22.4 (L) 35.0 - 47.0 %    .9 (H) 80.0 - 99.0 FL    MCH 31.7 26.0 - 34.0 PG    MCHC 28.6 (L) 30.0 - 36.5 g/dL    RDW 19.5 (H) 11.5 - 14.5 %    PLATELET 593 (L) 994 - 400 K/uL    MPV 11.5 8.9 - 12.9 FL    NRBC 0.0 0.0  WBC    ABSOLUTE NRBC 0.00 0.00 - 0.01 K/uL    NEUTROPHILS 69 32 - 75 %    LYMPHOCYTES 24 12 - 49 %    MONOCYTES 6 5 - 13 %    EOSINOPHILS 0 0 - 7 %    BASOPHILS 0 0 - 1 %    IMMATURE GRANULOCYTES 1 (H) 0 - 0.5 %    ABS. NEUTROPHILS 6.7 1.8 - 8.0 K/UL    ABS. LYMPHOCYTES 2.3 0.8 - 3.5 K/UL    ABS. MONOCYTES 0.6 0.0 - 1.0 K/UL    ABS. EOSINOPHILS 0.0 0.0 - 0.4 K/UL    ABS. BASOPHILS 0.0 0.0 - 0.1 K/UL    ABS. IMM.  GRANS. 0.1 (H) 0.00 - 0.04 K/UL    DF AUTOMATED     PTT    Collection Time: 01/12/22  8:36 PM   Result Value Ref Range    aPTT 27.5 21.2 - 34.1 sec    aPTT, therapeutic range   82 - 109 sec   TYPE & SCREEN    Collection Time: 01/12/22  8:36 PM   Result Value Ref Range    Crossmatch Expiration 01/15/2022,2359     ABO/Rh(D) B Positive     Antibody screen Negative     Unit number [id number]     Blood component type  LR,1     Unit division 00     Status of unit Issued,final     UNIT TAG COMMENT Emergency Release     TRANSFUSION STATUS Ok to transfuse     Crossmatch result Compatible     Unit number [id number]     Blood component type  LR     Unit division 00     Status of unit Issued,final     UNIT TAG COMMENT Emergency Release     TRANSFUSION STATUS Ok to transfuse     Crossmatch result Compatible    PROTHROMBIN TIME + INR    Collection Time: 01/12/22  8:36 PM   Result Value Ref Range    Prothrombin time 16.1 (H) 11.9 - 14.7 sec    INR 1.3 (H) 0.9 - 1.1     PROTHROMBIN TIME + INR    Collection Time: 01/13/22  1:51 AM   Result Value Ref Range    Prothrombin time 17.5 (H) 11.9 - 14.7 sec    INR 1.5 (H) 0.9 - 1.1     METABOLIC PANEL, BASIC    Collection Time: 01/13/22  1:51 AM   Result Value Ref Range    Sodium 140 136 - 145 mmol/L    Potassium 5.4 (H) 3.5 - 5.1 mmol/L    Chloride 107 97 - 108 mmol/L    CO2 26 21 - 32 mmol/L    Anion gap 7 5 - 15 mmol/L    Glucose 95 65 - 100 mg/dL    BUN 68 (H) 6 - 20 mg/dL    Creatinine 3.31 (H) 0.55 - 1.02 mg/dL    BUN/Creatinine ratio 21 (H) 12 - 20      GFR est AA 17 (L) >60 ml/min/1.73m2    GFR est non-AA 14 (L) >60 ml/min/1.73m2    Calcium 7.0 (L) 8.5 - 10.1 mg/dL   LACTIC ACID    Collection Time: 01/13/22  4:00 AM   Result Value Ref Range    Lactic acid 3.0 (HH) 0.4 - 2.0 mmol/L   CBC W/O DIFF    Collection Time: 01/13/22  4:00 AM   Result Value Ref Range    WBC 12.0 (H) 3.6 - 11.0 K/uL    RBC 3.36 (L) 3.80 - 5.20 M/uL    HGB 10.5 (L) 11.5 - 16.0 g/dL    HCT 33.3 (L) 35.0 - 47.0 %    MCV 99.1 (H) 80.0 - 99.0 FL    MCH 31.3 26.0 - 34.0 PG    MCHC 31.5 30.0 - 36.5 g/dL    RDW 19.9 (H) 11.5 - 14.5 %    PLATELET 98 (L) 432 - 400 K/uL    MPV 11.8 8.9 - 12.9 FL    NRBC 0.0 0.0  WBC    ABSOLUTE NRBC 0.00 0.00 - 0.01 K/uL   GLUCOSE, POC    Collection Time: 01/13/22  8:17 AM   Result Value Ref Range    Glucose (POC) 83 65 - 117 mg/dL    Performed by Brent Oh            Assessment/     Hemorrhagic shock due to upper GI bleed  Acute on chronic seizures  Urinary tract infection  History of staph aureus bacteremia  End-stage renal disease on hemodialysis  COVID-19 positive without hypoxia  Paroxysmal atrial fibrillation not on anticoagulation  History of CVA    Plan:  Transfer out of ICU to med telemetry floor  Continue supportive care including IV antibiotics and antiepileptics  Obtain PT OT evaluation tomorrow  Await GI input for possible EGD/colonoscopy  Overall clinically improved  May Require skilled care facility placement    Care Plan discussed with: Nurse    Total time spent with patient: 30 minutes.     Jordan Davis MD

## 2022-01-13 NOTE — PROGRESS NOTES
Patient currently on OT caseload, however patient transferred to ICU from 4W due to medical decline. OT to hold at this time. Will need new OT evaluation order once pt medically stable for re-assessment. Thank you.

## 2022-01-13 NOTE — ANESTHESIA POSTPROCEDURE EVALUATION
Procedure(s):  ESOPHAGOGASTRODUODENOSCOPY (EGD). general, total IV anesthesia, MAC    Anesthesia Post Evaluation      Multimodal analgesia: multimodal analgesia not used between 6 hours prior to anesthesia start to PACU discharge  Patient location during evaluation: bedside (Endoscopy suite)  Patient participation: complete - patient cannot participate  Level of consciousness: sleepy but conscious  Pain score: 0  Pain management: adequate  Airway patency: patent  Anesthetic complications: no  Cardiovascular status: acceptable  Respiratory status: acceptable and nasal cannula  Hydration status: acceptable  Comments: This patient remained on the stretcher. The patient was handed off to the endoscopy nursing team.  All questions regarding pre-, intra-, and postoperative care were answered.   Post anesthesia nausea and vomiting:  none      INITIAL Post-op Vital signs:   Vitals Value Taken Time   /56 01/13/22 1424   Temp     Pulse 60 01/13/22 1424   Resp 15 01/13/22 1424   SpO2 100 % 01/13/22 1424

## 2022-01-13 NOTE — CONSULTS
Consult Date: 1/13/2022    Consults Ms. Juan Pablo Lobato is a 71year old woman with seizures, A fib, history of stroke who was discharged 2 days ago after admission for seizures and Ruddy's paralysis ( MRI rule out stroke) who comes in for a breakthrough seizure yesterday. She is also hypotensive in to the 49V systolic  And currently in ICU.      Subjective     Past Medical History:   Diagnosis Date    Anxiety disorder     Arthritis     Atherosclerosis of native arteries of the extremities with ulceration ([address] 8/31/2020    Atrial fibrillation ([address]     Cardiac pacemaker in situ     Cellulitis and abscess of trunk 8/31/2020    Depression, postpartum     Diabetes ([address]     Heart disease     Heartburn     Hx of long term use of blood thinners     Hypercholesterolemia     Hypertension     Hypothyroidism     Migraines     Peripheral venous insufficiency 8/31/2020    Seizures ([address]     Sleep disorder     Stroke ([address]     2019    Varicose veins of lower extremity with inflammation 8/31/2020      Past Surgical History:   Procedure Laterality Date    HX BACK SURGERY      HX OTHER SURGICAL      leg surgery     HX OTHER SURGICAL      pacemaker monitor     IR INSERT TUNL CVC W/O PORT OVER 5 YR  12/10/2021     Family History   Problem Relation Age of Onset    Heart Disease Mother     Heart Disease Father     Diabetes Sister     Diabetes Brother       Social History     Tobacco Use    Smoking status: Never Smoker    Smokeless tobacco: Never Used   Substance Use Topics    Alcohol use: Never       Current Facility-Administered Medications   Medication Dose Route Frequency Provider Last Rate Last Admin    insulin lispro (HUMALOG) injection   SubCUTAneous AC&HS Hui Woods PA-C        sodium chloride (NS) flush 5-40 mL  5-40 mL IntraVENous PRN Sheryle Dale, MD        acetaminophen (TYLENOL) tablet 650 mg  650 mg Oral Q6H PRN Sheryle Dale, MD        Or    acetaminophen (TYLENOL) suppository 650 mg  650 mg Rectal Q6H PRN Bharat Arizmendi MD        polyethylene glycol (MIRALAX) packet 17 g  17 g Oral DAILY PRN Bharat Arizmendi MD        ascorbic acid (vitamin C) (VITAMIN C) tablet 500 mg  500 mg Oral BID Bharat Arizmendi MD   500 mg at 01/12/22 2329    atorvastatin (LIPITOR) tablet 40 mg  40 mg Oral DAILY Bharat Arizmendi MD        calcitRIOL (ROCALTROL) capsule 0.5 mcg  0.5 mcg Oral DAILY Bharat Arizmendi MD        levothyroxine (SYNTHROID) tablet 150 mcg  150 mcg Oral DAILY Bharat Arizmendi MD        FLUoxetine (PROzac) capsule 10 mg  10 mg Oral DAILY Bharat Arizmendi MD        lacosamide (VIMPAT) tablet 200 mg  200 mg Oral BID Bharat Arizmendi MD   200 mg at 01/12/22 2329    zinc oxide-white petrolatum 20-75 % topical paste   Topical PRN Bharat Arizmendi MD        cefTRIAXone (ROCEPHIN) 1 g in sterile water (preservative free) 10 mL IV syringe  1 g IntraVENous Q24H Bharat Arizmendi MD   1 g at 01/12/22 1307    glucose chewable tablet 16 g  4 Tablet Oral PRN Bharat Arizmendi MD        glucagon (GLUCAGEN) injection 1 mg  1 mg IntraMUSCular PRN Bharat Arizmendi MD        dextrose (D50W) injection syrg 12.5-25 g  25-50 mL IntraVENous PRN Bharat Arizmendi MD        levETIRAcetam (KEPPRA) tablet 1,000 mg  1,000 mg Oral BID Bharat Arizmendi MD   1,000 mg at 01/12/22 2327    midodrine (PROAMATINE) tablet 5 mg  5 mg Oral TID Bharat Arizmendi MD   5 mg at 01/12/22 2329    sodium chloride (NS) flush 5-40 mL  5-40 mL IntraVENous Q8H Bharat Arizmendi MD   10 mL at 01/13/22 0523    sodium chloride (NS) flush 5-40 mL  5-40 mL IntraVENous PRN Bharat Arizmendi MD        ondansetron [facility]) injection 4 mg  4 mg IntraVENous Q4H PRN Bharat Arizmendi MD   4 mg at 01/12/22 2025    Or    ondansetron (ZOFRAN ODT) tablet 4 mg  4 mg Oral Q8H PRN hBarat Arizmendi MD        0.9% sodium chloride infusion 250 mL  250 mL IntraVENous PRN Dwain Cohn MD        pantoprazole (PROTONIX) 40 mg in 0.9% sodium chloride (MBP/ADV) 50 mL MBP  8 mg/hr IntraVENous CONTINUOUS Dwain Cohn MD 10 mL/hr at 01/13/22 0619 8 mg/hr at 01/13/22 4760        Review of Systems   Neurological: Positive for tremors and weakness. All other systems reviewed and are negative. Objective     Vital signs for last 24 hours:  Visit Vitals  BP (!) 121/39 (BP 1 Location: Left lower arm, BP Patient Position: At rest)   Pulse 62   Temp 98.8 °F (37.1 °C)   Resp 19   Ht 5' 5\" (1.651 m)   Wt 71.2 kg (156 lb 15.5 oz)   SpO2 98%   BMI 26.12 kg/m²       Intake/Output this shift:  Current Shift: No intake/output data recorded.   Last 3 Shifts: 01/11 1901 - 01/13 0700  In: 3210.6 [I.V.:2460.6]  Out: -     Data Review:   Recent Results (from the past 24 hour(s))   URINALYSIS W/ REFLEX CULTURE    Collection Time: 01/12/22 10:33 AM    Specimen: Urine   Result Value Ref Range    Color Yellow/Straw      Appearance Turbid (A) Clear      Specific gravity 1.012 1.003 - 1.030      pH (UA) 5.0 5.0 - 8.0      Protein 30 (A) Negative mg/dL    Glucose Negative Negative mg/dL    Ketone Negative Negative mg/dL    Bilirubin Negative Negative      Blood Negative Negative      Urobilinogen 0.1 0.1 - 1.0 EU/dL    Nitrites Negative Negative      Leukocyte Esterase Trace (A) Negative      UA:UC IF INDICATED Culture not indicated by UA result Culture not indicated by UA result      WBC 0-4 0 - 4 /hpf    RBC 0-5 0 - 5 /hpf    Bacteria Negative Negative /hpf    Mucus Trace /lpf   VITAMIN B12 & FOLATE    Collection Time: 01/12/22 11:15 AM   Result Value Ref Range    Vitamin B12 312 193 - 986 pg/mL    Folate 5.4 5.0 - 21.0 ng/mL   VITAMIN D, 25 HYDROXY    Collection Time: 01/12/22 11:15 AM   Result Value Ref Range    Vitamin D 25-Hydroxy 27.6 (L) 30 - 100 ng/mL   GLUCOSE, POC    Collection Time: 01/12/22 12:26 PM   Result Value Ref Range    Glucose (POC) 114 65 - 117 mg/dL    Performed by [email or web address] [facility]    PROCALCITONIN    Collection Time: 01/12/22  2:14 PM   Result Value Ref Range    Procalcitonin 0.26 (H) 0 ng/mL   LACTIC ACID    Collection Time: 01/12/22  2:14 PM   Result Value Ref Range    Lactic acid 2.6 (HH) 0.4 - 2.0 mmol/L   GLUCOSE, POC    Collection Time: 01/12/22  4:41 PM   Result Value Ref Range    Glucose (POC) 103 65 - 117 mg/dL    Performed by [employer], [facility]    Collection Time: 01/12/22  8:36 PM   Result Value Ref Range    Sodium 140 136 - 145 mmol/L    Potassium 5.5 (H) 3.5 - 5.1 mmol/L    Chloride 109 (H) 97 - 108 mmol/L    CO2 24 21 - 32 mmol/L    Anion gap 7 5 - 15 mmol/L    Glucose 103 (H) 65 - 100 mg/dL    BUN 57 (H) 6 - 20 mg/dL    Creatinine 3.20 (H) 0.55 - 1.02 mg/dL    BUN/Creatinine ratio 18 12 - 20      GFR est AA 17 (L) >60 ml/min/1.73m2    GFR est non-AA 14 (L) >60 ml/min/1.73m2    Calcium 7.1 (L) 8.5 - 10.1 mg/dL    Bilirubin, total 0.4 0.2 - 1.0 mg/dL    AST (SGOT) 10 (L) 15 - 37 U/L    ALT (SGPT) 12 12 - 78 U/L    Alk. phosphatase 63 45 - 117 U/L    Protein, total 4.1 (L) 6.4 - 8.2 g/dL    Albumin 1.6 (L) 3.5 - 5.0 g/dL    Globulin 2.5 2.0 - 4.0 g/dL    A-G Ratio 0.6 (L) 1.1 - 2.2     CBC WITH AUTOMATED DIFF    Collection Time: 01/12/22  8:36 PM   Result Value Ref Range    WBC 9.6 3.6 - 11.0 K/uL    RBC 2.02 (L) 3.80 - 5.20 M/uL    HGB 6.4 (L) 11.5 - 16.0 g/dL    HCT 22.4 (L) 35.0 - 47.0 %    .9 (H) 80.0 - 99.0 FL    MCH 31.7 26.0 - 34.0 PG    MCHC 28.6 (L) 30.0 - 36.5 g/dL    RDW 19.5 (H) 11.5 - 14.5 %    PLATELET 157 (L) 934 - 400 K/uL    MPV 11.5 8.9 - 12.9 FL    NRBC 0.0 0.0  WBC    ABSOLUTE NRBC 0.00 0.00 - 0.01 K/uL    NEUTROPHILS 69 32 - 75 %    LYMPHOCYTES 24 12 - 49 %    MONOCYTES 6 5 - 13 %    EOSINOPHILS 0 0 - 7 %    BASOPHILS 0 0 - 1 %    IMMATURE GRANULOCYTES 1 (H) 0 - 0.5 %    ABS. NEUTROPHILS 6.7 1.8 - 8.0 K/UL    ABS. LYMPHOCYTES 2.3 0.8 - 3.5 K/UL    ABS. MONOCYTES 0.6 0.0 - 1.0 K/UL    ABS.  EOSINOPHILS 0.0 0.0 - 0.4 K/UL    ABS. BASOPHILS 0.0 0.0 - 0.1 K/UL    ABS. IMM.  GRANS. 0.1 (H) 0.00 - 0.04 K/UL    DF AUTOMATED     PTT    Collection Time: 01/12/22  8:36 PM   Result Value Ref Range    aPTT 27.5 21.2 - 34.1 sec    aPTT, therapeutic range   82 - 109 sec   TYPE & SCREEN    Collection Time: 01/12/22  8:36 PM   Result Value Ref Range    Crossmatch Expiration 01/15/2022,2359     ABO/Rh(D) B Positive     Antibody screen Negative     Unit number [id number]     Blood component type  LR,1     Unit division 00     Status of unit Issued,final     UNIT TAG COMMENT Emergency Release     TRANSFUSION STATUS Ok to transfuse     Crossmatch result Compatible     Unit number [id number]     Blood component type RC LR     Unit division 00     Status of unit Issued,final     UNIT TAG COMMENT Emergency Release     TRANSFUSION STATUS Ok to transfuse     Crossmatch result Compatible    PROTHROMBIN TIME + INR    Collection Time: 01/12/22  8:36 PM   Result Value Ref Range    Prothrombin time 16.1 (H) 11.9 - 14.7 sec    INR 1.3 (H) 0.9 - 1.1     PROTHROMBIN TIME + INR    Collection Time: 01/13/22  1:51 AM   Result Value Ref Range    Prothrombin time 17.5 (H) 11.9 - 14.7 sec    INR 1.5 (H) 0.9 - 1.1     METABOLIC PANEL, BASIC    Collection Time: 01/13/22  1:51 AM   Result Value Ref Range    Sodium 140 136 - 145 mmol/L    Potassium 5.4 (H) 3.5 - 5.1 mmol/L    Chloride 107 97 - 108 mmol/L    CO2 26 21 - 32 mmol/L    Anion gap 7 5 - 15 mmol/L    Glucose 95 65 - 100 mg/dL    BUN 68 (H) 6 - 20 mg/dL    Creatinine 3.31 (H) 0.55 - 1.02 mg/dL    BUN/Creatinine ratio 21 (H) 12 - 20      GFR est AA 17 (L) >60 ml/min/1.73m2    GFR est non-AA 14 (L) >60 ml/min/1.73m2    Calcium 7.0 (L) 8.5 - 10.1 mg/dL   LACTIC ACID    Collection Time: 01/13/22  4:00 AM   Result Value Ref Range    Lactic acid 3.0 (HH) 0.4 - 2.0 mmol/L   CBC W/O DIFF    Collection Time: 01/13/22  4:00 AM   Result Value Ref Range    WBC 12.0 (H) 3.6 - 11.0 K/uL    RBC 3.36 (L) 3.80 - 5.20 M/uL    HGB 10.5 (L) 11.5 - 16.0 g/dL    HCT 33.3 (L) 35.0 - 47.0 %    MCV 99.1 (H) 80.0 - 99.0 FL    MCH 31.3 26.0 - 34.0 PG    MCHC 31.5 30.0 - 36.5 g/dL    RDW 19.9 (H) 11.5 - 14.5 %    PLATELET 98 (L) 076 - 400 K/uL    MPV 11.8 8.9 - 12.9 FL    NRBC 0.0 0.0  WBC    ABSOLUTE NRBC 0.00 0.00 - 0.01 K/uL   GLUCOSE, POC    Collection Time: 01/13/22  8:17 AM   Result Value Ref Range    Glucose (POC) 83 65 - 117 mg/dL    Performed by Diego Velasco        Physical Exam    Neuro Physical Exam      General: Well developed, well nourished. Patient in no apparent distress. Lungs:    Clear to auscultation bilaterally. Neurological Exam:  Mental Status: Awake, oriented x4, normal speech   Cranial Nerves:   Unable to count fingers with both eyes, PERRL, EOM's full, no nystagmus, no ptosis. Facial sensation is normal. Facial movement is symmetric. Palate is midline. Normal sternocleidomastoid strength. Tongue is midline. Hearing is intact bilaterally. Motor:  3/5 RUE, 5/5 LUE, 2/5 b/l LE   Reflexes:   Deep tendon reflexes 2+/4 and symmetrical.   Sensory:   Normal to light touch throughout   Gait:  Not tested   Tremor:   No tremor noted. Cerebellar:  No cerebellar signs present. Assessment and Plan: Ms. Antoinette Pina is a 71year old woman who comes in after a breakthrough seizure outside hospital. She is a dialysis patient so keppra should be decreased to 500 mg bid. Continue vimpat 200 mg bid and add on oxcarbazepine 150 mg bid. 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 Has happened before. Will need to some PT/OT when out of ICU   Hypotension: nephrology consulted.

## 2022-01-13 NOTE — PROGRESS NOTES
Patient arrived to my care as an urgent transfer from  after a rapid response was called. Upon arrival, patient was lethargic appearing, but arousable and oriented. Initial BP 70s over 40s. 100% O2 on room air. Patient vomiting moderate amount of red blood and complaining of feeling nauseous. Dr. Eloisa Allred ordered for 500mL NS bolus to be started and to give 80mg Protonix. Also transfused 2 units PRBCs. Both units ran at 600 mL/hr per Dr. Eloisa Allred as he wanted both units to transfuse within 1 hour. BP improved to 106/55 by the end of both units being transfused and patient reports feeling \"much better. \" At this time, patient had large bowel movement with bright red blood and clots in it. Patient A+Ox4 and resting comfortably. Called patient's son Dave Pham to update about patient's current situation and informed about pending transfer to ICU.

## 2022-01-13 NOTE — PROGRESS NOTES
Patient continues to have vomiting up blood with a blood pressure in the 70s over 40s. She is awake and still states that she feels very unwell. Of note, her last dose of heparin 5000 units subQ was at 2 PM, this has been discontinued. Hemoglobin found to be 6.4 down from 10.1 only 12 hrs ago. Platelets 014. We will give 2 units of packed red blood cells stat, start Protonix drip, transferred to the ICU and consult gastroenterology.  -Transfer to ICU  -2 units PRBCs to be given rapidly-30 units per unit  -500cc normal saline bolus  -Protonix drip  -Another 6.25 IV phenergan dose  -GI-Dr. Rah Li consulted- I personally spoke to Dr. Rah Li    50 minutes of critical care time was spent, not including time spent on separately billable procedures.

## 2022-01-13 NOTE — PROGRESS NOTES
Dr. Li Shields aware of lower extremity mottleing and bilat feet left worse than right purple discolorations and cold, poor cap refill despite palpating thready pulse.   Pedal Pulses confirmed with doppler

## 2022-01-13 NOTE — ANESTHESIA PREPROCEDURE EVALUATION
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)   (+) Atrial fibrillation (HCC)   (+) Cardiac pacemaker in situ   (+) SSS (sick sinus syndrome) (HCC)      RENAL FAILURE   (+) End stage renal failure on dialysis ([facility]      ENDOCRINE   (+) Acquired hypothyroidism   (+) Type II diabetes mellitus (HCC)      HEMATOLOGY   (+) Acute on chronic anemia       Anesthetic History     PONV          Review of Systems / Medical History  Patient summary reviewed, nursing notes reviewed and pertinent labs reviewed    Pulmonary        Sleep apnea  Shortness of breath  Asthma     Comments: COVID19 (+)    Neuro/Psych     seizures  CVA  TIA, headaches and psychiatric history (ANXIETY)    Comments: MIGRAINES Cardiovascular    Hypertension        Dysrhythmias (Sick Sinus Syndrome & Atrial Fibrillation) : atrial fibrillation  Pacemaker (Pacemaker only), past MI, PAD and hyperlipidemia      Comments: ECHO (1-9-22)  Interpretation Summary    Left Ventricle: Left ventricle size is normal. Normal wall thickness. Normal wall motion. Normal left ventricular systolic function with a visually estimated EF of 60 - 65%. Normal diastolic function.   Aortic Valve: Valve structure is normal. Mild sclerosis of the aortic valve cusps.   Mitral Valve: Mild mitral annular calcification. Moderate transvalvular regurgitation.   Left Atrium: Left atrium is dilated. EKG (1-12-22) Atrial-paced rhythm with prolonged AV conduction   Abnormal ECG   When compared with ECG of 07-JAN-2022 19:07,   Electronic atrial pacemaker has replaced Sinus rhythm   Vent.  rate has decreased BY  78 BPM      GI/Hepatic/Renal     GERD    Renal disease (12/8 - last dialysis ): ESRD and dialysis      Comments: GI BLEED Endo/Other    Diabetes: type 2  Hypothyroidism  Blood dyscrasia (Idiopathic Thrombocytopenia with platelet count of 95O), arthritis and anemia     Other Findings            Physical Exam    Airway  Mallampati: II  TM Distance: 4 - 6 cm  Neck ROM: normal range of motion   Mouth opening: Normal     Cardiovascular    Rhythm: regular  Rate: normal         Dental    Dentition: Poor dentition     Pulmonary  Breath sounds clear to auscultation               Abdominal  GI exam deferred       Other Findings   Comments: Results for Elroy Moore (MRN [id number]) as of 12/9/2021 13:19    12/9/2021 09:18  Sodium: 136  Potassium: 4.1  Chloride: 104  CO2: 24  Anion gap: 8  Glucose: 63 (L)  BUN: 26 (H)  Creatinine: 3.06 (H)  BUN/Creatinine ratio: 8 (L)  Calcium: 7.9 (L)  GFR est non-AA: 15 (L)  GFR est AA: 18 (L)  Bilirubin, total: 0.6  Protein, total: 5.5 (L)  Albumin: 2.8 (L)  Globulin: 2.7  A-G Ratio: 1.0 (L)  ALT: 23  AST: 22  Alk.  phosphatase: 112    Results for Elroy Moore (MRN [id number]) as of 12/9/2021 13:19    12/8/2021 08:30  WBC: 4.9  NRBC: 0.0  RBC: 4.20  HGB: 12.7  HCT: 42.3  MCV: 100.7 (H)  MCH: 30.2  MCHC: 30.0  RDW: 13.6  PLATELET: 74 (L)         Anesthetic Plan    ASA: 3  Anesthesia type: general          Induction: Intravenous  Anesthetic plan and risks discussed with: Patient

## 2022-01-13 NOTE — PROGRESS NOTES
Patient s/p RR w/ vomiting and passing blood w/ clots per chart review. Patient transferred to ICU. ST will require new orders to follow as medically indicated.

## 2022-01-13 NOTE — ROUTINE PROCESS
TRANSFER - OUT REPORT:    Verbal report given to URIAH Resendiz on Robert Rojo  being transferred to ICU Room 268 for urgent transfer       Report consisted of patients Situation, Background, Assessment and   Recommendations(SBAR). Information from the following report(s) SBAR and MAR was reviewed with the receiving nurse. Opportunity for questions and clarification was provided.       Patient transported with:   Monitor  Patient-specific medications from Pharmacy  Registered Nurse

## 2022-01-13 NOTE — PROGRESS NOTES
Reason for Readmission:     Seizures/hypotension         RUR Score/Risk Level:     29%    PCP: First and Last name:  Val   Name of Practice:    Are you a current patient: Yes/No: Yes   Approximate date of last visit:    Can you participate in a virtual visit with your PCP:     Is a Care Conference indicated: No      Did you attend your follow up appointment (s): If not, why not:       No follow up appointmets--patient went to SNF, seen by their physician  Resources/supports as identified by patient/family:          Top Challenges facing patient (as identified by patient/family and CM): Finances/Medication cost?     No  Transportation      No  Support system or lack thereof? No    Living arrangements? No     Self-care/ADLs/Cognition? No          Current Advanced Directive/Advance Care Plan:             Plan for utilizing home health:                Transition of Care Plan:    Based on readmission, the patient's previous Plan of Care   has been evaluated and/or modified. The current Transition of Care Plan is:                           CM spoke with patient's son, Fidel Higginbotham ([phone]) to complete discharge planning assessment. Patient lives with her son and daughter in law at [address], in a single story home with steps to enter and egress. She uses a wheelchair for mobility, and her son drives her to appointments. Patient came to the hospital from [facility], where she was for rehab. Family would like her to return to [place] [id number]. for rehab upon discharge. Choice letter completed, place on chart. Referral made via [address]. Discharge disposition: SNF [place] [id number].)    [id number] team will continue to follow.

## 2022-01-13 NOTE — CONSULTS
Consult    Patient: Linda Benjamin MRN: [id number]  SSN: [id number]    YOB: 1950  Age: 71 y.o.   Sex: female      Subjective:      Linda Benjamin is a 71 y.o. female who is being seen for hematemesis  Patient has a COVID 19  infection, was on full treatment for the last 2 to 3 weeks, presented to patient with seizure, possible Ruddy's paralysis, patient received heparin yesterday afternoon, but the hospital for evaluation CT showed no new finding, COVID test still positive, blood pressure was on low side 520 systolic., last night, developed hematemesis multiple episodes, hemoglobin dropping to 6.4, patient was transferred to ICU last night, he was on IV Protonix drip, patient given IV fluid resuscitation and patient received blood transfusion, this morning hemoglobin hypotension, still has some abdominal pain, nausea, no acute hematemesis,    Past Medical History:   Diagnosis Date    Anxiety disorder     Arthritis     Atherosclerosis of native arteries of the extremities with ulceration ([address] 8/31/2020    Atrial fibrillation ([address]     Cardiac pacemaker in situ     Cellulitis and abscess of trunk 8/31/2020    Depression, postpartum     Diabetes ([address]     Heart disease     Heartburn     Hx of long term use of blood thinners     Hypercholesterolemia     Hypertension     Hypothyroidism     Migraines     Peripheral venous insufficiency 8/31/2020    Seizures ([address]     Sleep disorder     Stroke ([address]     2019    Varicose veins of lower extremity with inflammation 8/31/2020     Past Surgical History:   Procedure Laterality Date    HX BACK SURGERY      HX OTHER SURGICAL      leg surgery     HX OTHER SURGICAL      pacemaker monitor     IR INSERT TUNL CVC W/O PORT OVER 5 YR  12/10/2021      Family History   Problem Relation Age of Onset    Heart Disease Mother     Heart Disease Father     Diabetes Sister     Diabetes Brother      Social History     Tobacco Use    Smoking status: Never Smoker    Smokeless tobacco: Never Used   Substance Use Topics    Alcohol use: Never      Current Facility-Administered Medications   Medication Dose Route Frequency Provider Last Rate Last Admin    insulin lispro (HUMALOG) injection   SubCUTAneous AC&HS Gil Madrigal PA-C        levETIRAcetam (KEPPRA) tablet 500 mg  500 mg Oral BID Josselin Singh MD        OXcarbazepine (TRILEPTAL) tablet 150 mg  150 mg Oral Q12H Josselin Singh MD        sodium chloride (NS) flush 5-40 mL  5-40 mL IntraVENous PRN Otf Ovalle MD        acetaminophen (TYLENOL) tablet 650 mg  650 mg Oral Q6H PRN Otf Ovalle MD        Or    acetaminophen (TYLENOL) suppository 650 mg  650 mg Rectal Q6H PRKELLEN Ovalle MD        polyethylene glycol (MIRALAX) packet 17 g  17 g Oral DAILY PRN Otf Ovalle MD        ascorbic acid (vitamin C) (VITAMIN C) tablet 500 mg  500 mg Oral BID Otf Ovalle MD   500 mg at 01/12/22 2329    atorvastatin (LIPITOR) tablet 40 mg  40 mg Oral DAILY Otf Ovalle MD        calcitRIOL (ROCALTROL) capsule 0.5 mcg  0.5 mcg Oral DAILY Otf Ovalle MD        levothyroxine (SYNTHROID) tablet 150 mcg  150 mcg Oral DAILY Otf Ovalle MD        FLUoxetine (PROzac) capsule 10 mg  10 mg Oral DAILY Otf Ovalle MD        lacosamide (VIMPAT) tablet 200 mg  200 mg Oral BID Otf Ovalle MD   200 mg at 01/12/22 2329    zinc oxide-white petrolatum 20-75 % topical paste   Topical PRN Otf Ovalle MD        cefTRIAXone (ROCEPHIN) 1 g in sterile water (preservative free) 10 mL IV syringe  1 g IntraVENous Q24H Otf Ovalle MD   1 g at 01/12/22 1307    glucose chewable tablet 16 g  4 Tablet Oral PRKELLEN Ovalle MD        glucagon (GLUCAGEN) injection 1 mg  1 mg IntraMUSCular PRKELLEN Ovalle MD        dextrose (D50W) injection syrg 12.5-25 g  25-50 mL IntraVENous PRN Otf Ovalle MD        midodrine (PROAMATINE) tablet 5 mg  5 mg Oral TID Adan Godoy MD   5 mg at 01/12/22 2329    sodium chloride (NS) flush 5-40 mL  5-40 mL IntraVENous Q8H Adan Godoy MD   10 mL at 01/13/22 0523    sodium chloride (NS) flush 5-40 mL  5-40 mL IntraVENous PRN Adan Godoy MD        ondansetron [facility]) injection 4 mg  4 mg IntraVENous Q4H PRN Adan Godoy MD   4 mg at 01/12/22 2025    Or    ondansetron (ZOFRAN ODT) tablet 4 mg  4 mg Oral Q8H PRN Adan Godoy MD        0.9% sodium chloride infusion 250 mL  250 mL IntraVENous PRN Adan Godoy MD        pantoprazole (PROTONIX) 40 mg in 0.9% sodium chloride (MBP/ADV) 50 mL MBP  8 mg/hr IntraVENous CONTINUOUS Adan Godoy MD 10 mL/hr at 01/13/22 5332 8 mg/hr at 01/13/22 0619        No Known Allergies    Review of Systems:  Review of Systems   Constitutional: Positive for malaise/fatigue. HENT: Negative. Respiratory: Positive for cough and shortness of breath. Cardiovascular: Negative. Gastrointestinal: Positive for heartburn and vomiting. Genitourinary: Negative. Musculoskeletal: Negative. Skin: Negative. Neurological: Negative. Objective:     Vitals:    01/13/22 0500 01/13/22 0600 01/13/22 0700 01/13/22 0800   BP: (!) 113/50 (!) 121/39     Pulse: 60 62  60   Resp: 20 19     Temp:   98.8 °F (37.1 °C)    SpO2: 98% 98%     Weight:       Height:            Physical Exam:  Physical Exam  Constitutional:       Appearance: She is ill-appearing. HENT:      Head: Atraumatic. Mouth/Throat:      Mouth: Mucous membranes are dry. Cardiovascular:      Rate and Rhythm: Normal rate. Heart sounds: Normal heart sounds. Pulmonary:      Effort: No respiratory distress. Breath sounds: Normal breath sounds. Abdominal:      General: Abdomen is flat. Bowel sounds are normal.      Palpations: Abdomen is soft. There is no mass. Tenderness: There is no abdominal tenderness.  There is no guarding or rebound. Musculoskeletal:         General: Normal range of motion. Cervical back: Normal range of motion. Skin:     General: Skin is warm. Neurological:      General: No focal deficit present.    Psychiatric:         Mood and Affect: Mood normal.          Recent Results (from the past 24 hour(s))   URINALYSIS W/ REFLEX CULTURE    Collection Time: 01/12/22 10:33 AM    Specimen: Urine   Result Value Ref Range    Color Yellow/Straw      Appearance Turbid (A) Clear      Specific gravity 1.012 1.003 - 1.030      pH (UA) 5.0 5.0 - 8.0      Protein 30 (A) Negative mg/dL    Glucose Negative Negative mg/dL    Ketone Negative Negative mg/dL    Bilirubin Negative Negative      Blood Negative Negative      Urobilinogen 0.1 0.1 - 1.0 EU/dL    Nitrites Negative Negative      Leukocyte Esterase Trace (A) Negative      UA:UC IF INDICATED Culture not indicated by UA result Culture not indicated by UA result      WBC 0-4 0 - 4 /hpf    RBC 0-5 0 - 5 /hpf    Bacteria Negative Negative /hpf    Mucus Trace /lpf   VITAMIN B12 & FOLATE    Collection Time: 01/12/22 11:15 AM   Result Value Ref Range    Vitamin B12 312 193 - 986 pg/mL    Folate 5.4 5.0 - 21.0 ng/mL   VITAMIN D, 25 HYDROXY    Collection Time: 01/12/22 11:15 AM   Result Value Ref Range    Vitamin D 25-Hydroxy 27.6 (L) 30 - 100 ng/mL   GLUCOSE, POC    Collection Time: 01/12/22 12:26 PM   Result Value Ref Range    Glucose (POC) 114 65 - 117 mg/dL    Performed by Marta Nolan    PROCALCITONIN    Collection Time: 01/12/22  2:14 PM   Result Value Ref Range    Procalcitonin 0.26 (H) 0 ng/mL   LACTIC ACID    Collection Time: 01/12/22  2:14 PM   Result Value Ref Range    Lactic acid 2.6 (HH) 0.4 - 2.0 mmol/L   GLUCOSE, POC    Collection Time: 01/12/22  4:41 PM   Result Value Ref Range    Glucose (POC) 103 65 - 117 mg/dL    Performed by [email or web address]    Collection Time: 01/12/22  8:36 PM   Result Value Ref Range    Sodium 140 136 - 145 mmol/L    Potassium 5.5 (H) 3.5 - 5.1 mmol/L    Chloride 109 (H) 97 - 108 mmol/L    CO2 24 21 - 32 mmol/L    Anion gap 7 5 - 15 mmol/L    Glucose 103 (H) 65 - 100 mg/dL    BUN 57 (H) 6 - 20 mg/dL    Creatinine 3.20 (H) 0.55 - 1.02 mg/dL    BUN/Creatinine ratio 18 12 - 20      GFR est AA 17 (L) >60 ml/min/1.73m2    GFR est non-AA 14 (L) >60 ml/min/1.73m2    Calcium 7.1 (L) 8.5 - 10.1 mg/dL    Bilirubin, total 0.4 0.2 - 1.0 mg/dL    AST (SGOT) 10 (L) 15 - 37 U/L    ALT (SGPT) 12 12 - 78 U/L    Alk. phosphatase 63 45 - 117 U/L    Protein, total 4.1 (L) 6.4 - 8.2 g/dL    Albumin 1.6 (L) 3.5 - 5.0 g/dL    Globulin 2.5 2.0 - 4.0 g/dL    A-G Ratio 0.6 (L) 1.1 - 2.2     CBC WITH AUTOMATED DIFF    Collection Time: 01/12/22  8:36 PM   Result Value Ref Range    WBC 9.6 3.6 - 11.0 K/uL    RBC 2.02 (L) 3.80 - 5.20 M/uL    HGB 6.4 (L) 11.5 - 16.0 g/dL    HCT 22.4 (L) 35.0 - 47.0 %    .9 (H) 80.0 - 99.0 FL    MCH 31.7 26.0 - 34.0 PG    MCHC 28.6 (L) 30.0 - 36.5 g/dL    RDW 19.5 (H) 11.5 - 14.5 %    PLATELET 538 (L) 681 - 400 K/uL    MPV 11.5 8.9 - 12.9 FL    NRBC 0.0 0.0  WBC    ABSOLUTE NRBC 0.00 0.00 - 0.01 K/uL    NEUTROPHILS 69 32 - 75 %    LYMPHOCYTES 24 12 - 49 %    MONOCYTES 6 5 - 13 %    EOSINOPHILS 0 0 - 7 %    BASOPHILS 0 0 - 1 %    IMMATURE GRANULOCYTES 1 (H) 0 - 0.5 %    ABS. NEUTROPHILS 6.7 1.8 - 8.0 K/UL    ABS. LYMPHOCYTES 2.3 0.8 - 3.5 K/UL    ABS. MONOCYTES 0.6 0.0 - 1.0 K/UL    ABS. EOSINOPHILS 0.0 0.0 - 0.4 K/UL    ABS. BASOPHILS 0.0 0.0 - 0.1 K/UL    ABS. IMM.  GRANS. 0.1 (H) 0.00 - 0.04 K/UL    DF AUTOMATED     PTT    Collection Time: 01/12/22  8:36 PM   Result Value Ref Range    aPTT 27.5 21.2 - 34.1 sec    aPTT, therapeutic range   82 - 109 sec   TYPE & SCREEN    Collection Time: 01/12/22  8:36 PM   Result Value Ref Range    Crossmatch Expiration 01/15/2022,2359     ABO/Rh(D) B Positive     Antibody screen Negative     Unit number [id number]     Blood component type RC LR,1     Unit division 00     Status of unit Issued,final     UNIT TAG COMMENT Emergency Release     TRANSFUSION STATUS Ok to transfuse     Crossmatch result Compatible     Unit number [id number]     Blood component type RC LR     Unit division 00     Status of unit Issued,final     UNIT TAG COMMENT Emergency Release     TRANSFUSION STATUS Ok to transfuse     Crossmatch result Compatible    PROTHROMBIN TIME + INR    Collection Time: 01/12/22  8:36 PM   Result Value Ref Range    Prothrombin time 16.1 (H) 11.9 - 14.7 sec    INR 1.3 (H) 0.9 - 1.1     PROTHROMBIN TIME + INR    Collection Time: 01/13/22  1:51 AM   Result Value Ref Range    Prothrombin time 17.5 (H) 11.9 - 14.7 sec    INR 1.5 (H) 0.9 - 1.1     METABOLIC PANEL, BASIC    Collection Time: 01/13/22  1:51 AM   Result Value Ref Range    Sodium 140 136 - 145 mmol/L    Potassium 5.4 (H) 3.5 - 5.1 mmol/L    Chloride 107 97 - 108 mmol/L    CO2 26 21 - 32 mmol/L    Anion gap 7 5 - 15 mmol/L    Glucose 95 65 - 100 mg/dL    BUN 68 (H) 6 - 20 mg/dL    Creatinine 3.31 (H) 0.55 - 1.02 mg/dL    BUN/Creatinine ratio 21 (H) 12 - 20      GFR est AA 17 (L) >60 ml/min/1.73m2    GFR est non-AA 14 (L) >60 ml/min/1.73m2    Calcium 7.0 (L) 8.5 - 10.1 mg/dL   LACTIC ACID    Collection Time: 01/13/22  4:00 AM   Result Value Ref Range    Lactic acid 3.0 (HH) 0.4 - 2.0 mmol/L   CBC W/O DIFF    Collection Time: 01/13/22  4:00 AM   Result Value Ref Range    WBC 12.0 (H) 3.6 - 11.0 K/uL    RBC 3.36 (L) 3.80 - 5.20 M/uL    HGB 10.5 (L) 11.5 - 16.0 g/dL    HCT 33.3 (L) 35.0 - 47.0 %    MCV 99.1 (H) 80.0 - 99.0 FL    MCH 31.3 26.0 - 34.0 PG    MCHC 31.5 30.0 - 36.5 g/dL    RDW 19.9 (H) 11.5 - 14.5 %    PLATELET 98 (L) 175 - 400 K/uL    MPV 11.8 8.9 - 12.9 FL    NRBC 0.0 0.0  WBC    ABSOLUTE NRBC 0.00 0.00 - 0.01 K/uL   GLUCOSE, POC    Collection Time: 01/13/22  8:17 AM   Result Value Ref Range    Glucose (POC) 83 65 - 117 mg/dL    Performed by Carla TIWARI ABD (KUB)   Final Result Air-filled large and small bowel loops. No definite evidence of   mechanical bowel obstruction. Other findings as above. CT HEAD WO CONT   Final Result   Similar findings compared to CT head January 7, 2022. XR CHEST SNGL V   Final Result         Assessment:     Hospital Problems  Date Reviewed: 1/3/2022          Codes Class Noted POA    Seizure-like activity ([facility] [address] ICD-10-CM: R56.9  ICD-9-CM: 780.39  1/12/2022 Unknown        COVID-19 ICD-10-CM: U07.1  ICD-9-CM: 079.89  12/26/2021 Unknown          Patient was in the hospital with seizure-like activity, had UTI treated, stent positive bacteremia,  Patient had heparin yesterday, developed hematemesis, hemoglobin dropped dramatically to 6.5,  Transferred to ICU, overnight the patient is stable, received PPI IV,    This morning hemoglobin 10.6    Plan:   Continue n.p.o.   Continue IV antibiotic treatment  Neurological evaluation  Hold the heparin and any anticoagulation antiplatelet drugs  PPI as ordered  EGD reschedule evaluation for the acute massive GI bleeding last night,   indication, risks, discussed with patient    Signed By: Janna Chacko MD     January 13, 2022         Thank you for allowing me to participate in this patients care  Cc Referring Physician   Telma Bassett MD

## 2022-01-13 NOTE — PROGRESS NOTES
Consult requested by: Inge Tran MD    ADMIT DATE: 1/12/2022  CONSULT DATE: January 13, 2022           The patient was transferred to ICU yesterday due to vomiting blood and hypotension. She did receive 2 units of packed RBCs for hemoglobin of 6.4. She was put on IV PPIs. She underwent EGD this morning. She was last dialyzed on 1/11. She is currently not on any pressors. Plan to dialyze her tomorrow morning.       Past Medical History:   Diagnosis Date    Anxiety disorder     Arthritis     Atherosclerosis of native arteries of the extremities with ulceration ([address] 8/31/2020    Atrial fibrillation (HCC)     Cardiac pacemaker in situ     Cellulitis and abscess of trunk 8/31/2020    Depression, postpartum     Diabetes ([address]     Heart disease     Heartburn     Hx of long term use of blood thinners     Hypercholesterolemia     Hypertension     Hypothyroidism     Migraines     Peripheral venous insufficiency 8/31/2020    Seizures ([address]     Sleep disorder     Stroke ([facility]     2019    Varicose veins of lower extremity with inflammation 8/31/2020      Past Surgical History:   Procedure Laterality Date    HX BACK SURGERY      HX OTHER SURGICAL      leg surgery     HX OTHER SURGICAL      pacemaker monitor     IR INSERT TUNL CVC W/O PORT OVER 5 YR  12/10/2021       Social History     Socioeconomic History    Marital status:      Spouse name: Not on file    Number of children: Not on file    Years of education: Not on file    Highest education level: Not on file   Occupational History    Not on file   Tobacco Use    Smoking status: Never Smoker    Smokeless tobacco: Never Used   Vaping Use    Vaping Use: Never used   Substance and Sexual Activity    Alcohol use: Never    Drug use: Never    Sexual activity: Not on file   Other Topics Concern    Not on file   Social History Narrative    Not on file     Social Determinants of Health     Financial Resource Strain:    [address] [place] [address] Difficulty of Paying Living Expenses: Not on file   Food Insecurity:     Worried About Running Out of Food in the Last Year: Not on file    Ran Out of Food in the Last Year: Not on file   Transportation Needs:     Lack of Transportation (Medical): Not on file    Lack of Transportation (Non-Medical): Not on file   Physical Activity:     Days of Exercise per Week: Not on file    Minutes of Exercise per Session: Not on file   Stress:     Feeling of Stress : Not on file   Social Connections:     Frequency of Communication with Friends and Family: Not on file    Frequency of Social Gatherings with Friends and Family: Not on file    Attends Evangelical Services: Not on file    Active Member of [address] [place] [address] [employer] or Organizations: Not on file    Attends Club or Organization Meetings: Not on file    Marital Status: Not on file   Intimate Partner Violence:     Fear of Current or Ex-Partner: Not on file    Emotionally Abused: Not on file    Physically Abused: Not on file    Sexually Abused: Not on file   Housing Stability:     Unable to Pay for Housing in the Last Year: Not on file    Number of [place] in the Last Year: Not on file    Unstable Housing in the Last Year: Not on file       Family History   Problem Relation Age of Onset    Heart Disease Mother     Heart Disease Father     Diabetes Sister     Diabetes Brother      No Known Allergies     Home Medications:     Medications Prior to Admission   Medication Sig    amLODIPine (NORVASC) 5 mg tablet Take 1 Tablet by mouth daily for 60 days.  insulin lispro (HUMALOG) 100 unit/mL injection As per insulin sensitive ssi:    Insulin High Sensitivity (thin, ESRD)  For Blood Sugar (mg/dL) of:              Less than 150 =   0 units  150 -199 =  1 units  200 -249 =   2 units  250 -299 =   3 units  300 -349 =   4 units  350 or greater = 5 units and Call Physician  Initiate Hypoglycemic protocol if blood glucose is <70 mg/dL.     ascorbic acid, vitamin C, (VITAMIN C) 500 mg tablet Take 1 Tablet by mouth two (2) times a day for 30 days.  aspirin 81 mg chewable tablet Take 1 Tablet by mouth daily for 30 days.  vancomycin 1,000 mg 1,000 mg, vial-mate 1 Device IVPB 1,000 mg by IntraVENous route DIALYSIS MON, WED & FRI for 6 doses.  predniSONE (DELTASONE) 20 mg tablet Start prednisone 20 mg PO daily x 2 days then 10 mg PO daily x 2 days then stop.  calcitRIOL (ROCALTROL) 0.5 mcg capsule Take 1 Capsule by mouth daily.  zinc oxide-white petrolatum 17-57 % topical paste Apply  to affected area as needed for Skin Irritation. Indications: skin irritation    ondansetron hcl (Zofran) 4 mg tablet Take 1 Tablet by mouth every eight (8) hours as needed for Nausea or Vomiting.  Vimpat 200 mg tab tablet Take 1 Tablet by mouth two (2) times a day.  ondansetron (Zofran ODT) 4 mg disintegrating tablet 1 Tablet by SubLINGual route every eight (8) hours as needed for Nausea or Vomiting.  atorvastatin (LIPITOR) 40 mg tablet Take 1 tablet by mouth once daily for 90 days    FLUoxetine (PROzac) 10 mg capsule Take 1 Capsule by mouth daily for 270 days.     loperamide (IMODIUM) 2 mg capsule TAKE 1 CAPSULE BY MOUTH SIX TIMES DAILY AS NEEDED FOR 30 DAYS    Euthyrox 150 mcg tablet Take 1 tablet by mouth once daily    levETIRAcetam (KEPPRA XR) 500 mg ER tablet TAKE 2 TABLETS BY MOUTH TWICE DAILY FOR 90 DAYS    sevelamer carbonate (RENVELA) 800 mg tab tab        Current Inpatient Medications:     Current Facility-Administered Medications   Medication Dose Route Frequency    insulin lispro (HUMALOG) injection   SubCUTAneous AC&HS    levETIRAcetam (KEPPRA) tablet 500 mg  500 mg Oral BID    OXcarbazepine (TRILEPTAL) tablet 150 mg  150 mg Oral Q12H    EPINEPHrine HCl (PF) (ADRENALIN) 1 mg/mL (1 mL) injection    PRN    sodium chloride (NS) flush 5-40 mL  5-40 mL IntraVENous PRN    acetaminophen (TYLENOL) tablet 650 mg  650 mg Oral Q6H PRN    Or    acetaminophen (TYLENOL) suppository 650 mg  650 mg Rectal Q6H PRN    polyethylene glycol (MIRALAX) packet 17 g  17 g Oral DAILY PRN    ascorbic acid (vitamin C) (VITAMIN C) tablet 500 mg  500 mg Oral BID    atorvastatin (LIPITOR) tablet 40 mg  40 mg Oral DAILY    calcitRIOL (ROCALTROL) capsule 0.5 mcg  0.5 mcg Oral DAILY    levothyroxine (SYNTHROID) tablet 150 mcg  150 mcg Oral DAILY    FLUoxetine (PROzac) capsule 10 mg  10 mg Oral DAILY    lacosamide (VIMPAT) tablet 200 mg  200 mg Oral BID    zinc oxide-white petrolatum 20-75 % topical paste   Topical PRN    cefTRIAXone (ROCEPHIN) 1 g in sterile water (preservative free) 10 mL IV syringe  1 g IntraVENous Q24H    glucose chewable tablet 16 g  4 Tablet Oral PRN    glucagon (GLUCAGEN) injection 1 mg  1 mg IntraMUSCular PRN    dextrose (D50W) injection syrg 12.5-25 g  25-50 mL IntraVENous PRN    midodrine (PROAMATINE) tablet 5 mg  5 mg Oral TID    sodium chloride (NS) flush 5-40 mL  5-40 mL IntraVENous Q8H    sodium chloride (NS) flush 5-40 mL  5-40 mL IntraVENous PRN    ondansetron (ZOFRAN) injection 4 mg  4 mg IntraVENous Q4H PRN    Or    ondansetron (ZOFRAN ODT) tablet 4 mg  4 mg Oral Q8H PRN    0.9% sodium chloride infusion 250 mL  250 mL IntraVENous PRN    pantoprazole (PROTONIX) 40 mg in 0.9% sodium chloride (MBP/ADV) 50 mL MBP  8 mg/hr IntraVENous CONTINUOUS       Review of Systems:   No fever or chills. No sore throat. No cough or hemoptysis. No shortness of breath or chest pain. No orthopnea or paroxysmal nocturnal dyspnea. No nausea, vomiting, abdominal pain, melena or hematochezia. No ankle swelling, no joint paints. No muscle aches. No skin changes. No dizziness or lightheadedness. No headaches.        Physical Assessment:     Vitals:    01/13/22 1100 01/13/22 1200 01/13/22 1424 01/13/22 1600   BP: 115/68 116/82 (!) 111/56    Pulse: 60 60 60 60   Resp: 18 15 15    Temp: 98.3 °F (36.8 °C)      SpO2: 100% 98% 100%    Weight:       Height: Last 3 Recorded Weights in this Encounter    01/12/22 0753 01/13/22 0151   Weight: 72.6 kg (160 lb) 71.2 kg (156 lb 15.5 oz)     Admission weight: Weight: 72.6 kg (160 lb) (01/12/22 0753)      Intake/Output Summary (Last 24 hours) at 1/13/2022 1706  Last data filed at 1/13/2022 1420  Gross per 24 hour   Intake 2410.58 ml   Output    Net 2410.58 ml       Patient is in no apparent distress. HEENT: Head is normocephalic and atraumatic. Pupils are round, equal, reactive to light. Sclerae are anicteric. Oropharynx clear. Neck: no cervical lymphadenopathy or thyromegaly. Lungs: good air entry, clear to auscultation bilaterally. Trachea at the midline. Cardiovascular system: S1, S2, regular rate and rhythm. No murmurs, gallops or rubs. No jvd. Abdomen: soft, non tender, non distended. Positive bowel sounds. No hepatosplenomegaly. No abdominal bruits. Extremities: no clubbing, cyanosis or edema. Strong dorsalis pedis pulses. Brisk capillary refill on the toes bilaterally. Integumentary: skin is grossly intact. Neurologic: Alert, oriented time three. Cooperative and appropriate. No gross motor or sensory deficits. Dialysis access: cuffed tunneled catheter site right. [address]     Data Review:    Labs: Results:       Chemistry Recent Labs     01/13/22  0151 01/12/22 2036 01/12/22  0811 01/11/22  0730 01/11/22  0730   GLU 95 103* 93   < > 73  74    140 139   < > 142  142   K 5.4* 5.5* 4.5   < > 3.7  3.6    109* 106   < > 108  108   CO2 26 24 28   < > 28  29   BUN 68* 57* 34*   < > 51*  49*   CREA 3.31* 3.20* 3.03*   < > 3.74*  3.75*   CA 7.0* 7.1* 7.8*   < > 7.9*  7.7*   AGAP 7 7 5   < > 6  5   BUCR 21* 18 11*   < > 14  13   AP  --  63 83  --  85   TP  --  4.1* 4.8*  --  4.8*   ALB  --  1.6* 2.0*  --  2.3*  2.3*   GLOB  --  2.5 2.8  --  2.5   AGRAT  --  0.6* 0.7*  --  0.9*   PHOS  --   --   --   --  3.2    < > = values in this interval not displayed.          CBC w/Diff Recent Labs     01/13/22  1546 01/13/22  0400 01/12/22  2036 01/12/22  0811 01/12/22  0811   WBC 10.3 12.0* 9.6   < > 8.0   RBC 2.90* 3.36* 2.02*   < > 3.20*   HGB 9.0* 10.5* 6.4*   < > 10.1*   HCT 28.5* 33.3* 22.4*   < > 34.4*   * 98* 112*   < > 108*   GRANS 84*  --  69  --  83*   LYMPH 8*  --  24  --  10*   EOS 0  --  0  --  0    < > = values in this interval not displayed. Iron/Ferritin No results for input(s): IRON in the last 72 hours. No lab exists for component: TIBCCALC   PTH/VIT D No results for input(s): PTH in the last 72 hours. No lab exists for component: VITD            Assessment & Plan     1-ESRD:  -HD MWF at [facility], [address] [place] [address]  -No volume overload/uremia  -s/p HD 1/11  -plan for HD AM   -RI perm cath as active dialysis access  -BORA AVF in place also    2-Acute GI bleed:  -Transfer to ICU on 1/12 due to vomiting blood and hypotension   -Hemoglobin was 6.4 status post blood transfusion 2 units   -On IV PPIs. -Status post EGD this morning nemia:  -iv Epo with HD      3-Secondary hyperparathyroidism of ESRD. -cont sevelamer 800 mg tid and calcitriol 0.5 mcg daily    - Renal Diet    4-COVID Pneumonia   -- started on empiric abxs because of fever,lactic acidosis and leucocytosis . -on RA     5-AMS :  -CT Head negative   Likely Metabolic and sepsis related . 6- Seizure disorder:  -break thru seizure  -seen by Neuro  -On Vimpat & Keppra    7-MSSA Bactremia:  -was on iv vancomycin   -ID on board  -[facility] 1/07  Pending    8- DVT prophylaxis. -I will change Lovenox to unfractionated subcu heparin.     9-Hypotension:  -BP 87 systolic only in ER  -will dc amlodipine   -will add midodrine 5 mg tid

## 2022-01-13 NOTE — ROUTINE PROCESS
Patient was just taken to 484 report given to [facility] on 4w. I just called son Anthony Erwin and told him what happened and let him know she was moved. When we went into room for report we noticed a large blood clots and a large formed stool so when [place] went to get items to change her Patient started vomiting up bright red blood. A rapid was called at 750 pm and Zulma Dunne and ICU nurse and Dr. Johnson Camejo came to rapid response and he ordered blood and I got her consent after he went over the pros and cons of getting blood. She signed consent with her shaking hand an x and consented to blood. Her vitals were 61/38 and heart rate was 61 and temp 97.3 and 100% on raElvia Mantilla

## 2022-01-13 NOTE — PROGRESS NOTES
I was paged for a rapid response as the patient was vomiting blood, had blood in her stool and was hypotensive with a blood pressure in the 70s over 30s. She was awake and talking and stated that she generally felt unwell. She was actively vomiting up blood. 1 L normal saline bolus was ordered STAT as was a Type and screen CBC, PTT, PT and CMP. She was given 80 mg IV Protonix, Zofran and Phenergan. The patient was transferred to the telemetry floor at this time as no ICU beds were open. -STAT Type and Screen  -STAT CBC, coags and CMP  -Zofran 4 mg IV and Phenergan 6.25 mg IV one-time dose  -80 mg IV Protonix  -Transfer to [facility].

## 2022-01-14 LAB
ALBUMIN SERPL-MCNC: 2 G/DL (ref 3.5–5)
ANION GAP SERPL CALC-SCNC: 9 MMOL/L (ref 5–15)
BUN SERPL-MCNC: 90 MG/DL (ref 6–20)
BUN/CREAT SERPL: 23 (ref 12–20)
CA-I BLD-MCNC: 7.5 MG/DL (ref 8.5–10.1)
CHLORIDE SERPL-SCNC: 108 MMOL/L (ref 97–108)
CO2 SERPL-SCNC: 23 MMOL/L (ref 21–32)
CREAT SERPL-MCNC: 3.91 MG/DL (ref 0.55–1.02)
ERYTHROCYTE [DISTWIDTH] IN BLOOD BY AUTOMATED COUNT: 20.3 % (ref 11.5–14.5)
GLUCOSE BLD STRIP.AUTO-MCNC: 56 MG/DL (ref 65–117)
GLUCOSE SERPL-MCNC: 70 MG/DL (ref 65–100)
HCT VFR BLD AUTO: 27.8 % (ref 35–47)
HGB BLD-MCNC: 8.7 G/DL (ref 11.5–16)
MCH RBC QN AUTO: 31.3 PG (ref 26–34)
MCHC RBC AUTO-ENTMCNC: 31.3 G/DL (ref 30–36.5)
MCV RBC AUTO: 100 FL (ref 80–99)
NRBC # BLD: 0 K/UL (ref 0–0.01)
NRBC BLD-RTO: 0 PER 100 WBC
PERFORMED BY, TECHID: ABNORMAL
PHOSPHATE SERPL-MCNC: 4.9 MG/DL (ref 2.6–4.7)
PLATELET # BLD AUTO: 108 K/UL (ref 150–400)
PMV BLD AUTO: 10.9 FL (ref 8.9–12.9)
POTASSIUM SERPL-SCNC: 5 MMOL/L (ref 3.5–5.1)
RBC # BLD AUTO: 2.78 M/UL (ref 3.8–5.2)
SODIUM SERPL-SCNC: 140 MMOL/L (ref 136–145)
WBC # BLD AUTO: 8.7 K/UL (ref 3.6–11)

## 2022-01-14 PROCEDURE — 99222 1ST HOSP IP/OBS MODERATE 55: CPT | Performed by: INTERNAL MEDICINE

## 2022-01-14 PROCEDURE — 74011250636 HC RX REV CODE- 250/636: Performed by: INTERNAL MEDICINE

## 2022-01-14 PROCEDURE — 74011250637 HC RX REV CODE- 250/637: Performed by: HOSPITALIST

## 2022-01-14 PROCEDURE — 90935 HEMODIALYSIS ONE EVALUATION: CPT

## 2022-01-14 PROCEDURE — 74011250636 HC RX REV CODE- 250/636: Performed by: HOSPITALIST

## 2022-01-14 PROCEDURE — 85027 COMPLETE CBC AUTOMATED: CPT

## 2022-01-14 PROCEDURE — C9113 INJ PANTOPRAZOLE SODIUM, VIA: HCPCS | Performed by: HOSPITALIST

## 2022-01-14 PROCEDURE — 74011000250 HC RX REV CODE- 250: Performed by: HOSPITALIST

## 2022-01-14 PROCEDURE — 36415 COLL VENOUS BLD VENIPUNCTURE: CPT

## 2022-01-14 PROCEDURE — 74011250637 HC RX REV CODE- 250/637: Performed by: PSYCHIATRY & NEUROLOGY

## 2022-01-14 PROCEDURE — 65270000029 HC RM PRIVATE

## 2022-01-14 PROCEDURE — 74011000258 HC RX REV CODE- 258: Performed by: HOSPITALIST

## 2022-01-14 PROCEDURE — 80069 RENAL FUNCTION PANEL: CPT

## 2022-01-14 PROCEDURE — 82962 GLUCOSE BLOOD TEST: CPT

## 2022-01-14 RX ORDER — HEPARIN SODIUM 1000 [USP'U]/ML
INJECTION, SOLUTION INTRAVENOUS; SUBCUTANEOUS
Status: DISPENSED
Start: 2022-01-14 | End: 2022-01-15

## 2022-01-14 RX ORDER — PANTOPRAZOLE SODIUM 40 MG/1
40 TABLET, DELAYED RELEASE ORAL
Status: DISCONTINUED | OUTPATIENT
Start: 2022-01-15 | End: 2022-01-17 | Stop reason: HOSPADM

## 2022-01-14 RX ORDER — HEPARIN SODIUM 1000 [USP'U]/ML
3200 INJECTION, SOLUTION INTRAVENOUS; SUBCUTANEOUS
Status: DISCONTINUED | OUTPATIENT
Start: 2022-01-14 | End: 2022-01-17 | Stop reason: HOSPADM

## 2022-01-14 RX ORDER — VANCOMYCIN/0.9 % SOD CHLORIDE 1.5G/250ML
1500 PLASTIC BAG, INJECTION (ML) INTRAVENOUS ONCE
Status: COMPLETED | OUTPATIENT
Start: 2022-01-14 | End: 2022-01-14

## 2022-01-14 RX ORDER — CALCITRIOL 0.25 UG/1
0.25 CAPSULE ORAL DAILY
Status: DISCONTINUED | OUTPATIENT
Start: 2022-01-15 | End: 2022-01-17 | Stop reason: HOSPADM

## 2022-01-14 RX ADMIN — SODIUM CHLORIDE 8 MG/HR: 900 INJECTION INTRAVENOUS at 01:43

## 2022-01-14 RX ADMIN — CALCITRIOL CAPSULES 0.25 MCG 0.5 MCG: 0.25 CAPSULE ORAL at 08:31

## 2022-01-14 RX ADMIN — CEFTRIAXONE SODIUM 1 G: 1 INJECTION, POWDER, FOR SOLUTION INTRAMUSCULAR; INTRAVENOUS at 12:00

## 2022-01-14 RX ADMIN — MIDODRINE HYDROCHLORIDE 5 MG: 5 TABLET ORAL at 19:09

## 2022-01-14 RX ADMIN — SODIUM CHLORIDE, PRESERVATIVE FREE 10 ML: 5 INJECTION INTRAVENOUS at 23:04

## 2022-01-14 RX ADMIN — OXYCODONE HYDROCHLORIDE AND ACETAMINOPHEN 500 MG: 500 TABLET ORAL at 21:00

## 2022-01-14 RX ADMIN — Medication 1500 MG: at 19:00

## 2022-01-14 RX ADMIN — OXYCODONE HYDROCHLORIDE AND ACETAMINOPHEN 500 MG: 500 TABLET ORAL at 08:31

## 2022-01-14 RX ADMIN — OXCARBAZEPINE 150 MG: 150 TABLET, FILM COATED ORAL at 21:00

## 2022-01-14 RX ADMIN — SODIUM CHLORIDE 8 MG/HR: 900 INJECTION INTRAVENOUS at 08:28

## 2022-01-14 RX ADMIN — LEVETIRACETAM 500 MG: 250 TABLET, FILM COATED ORAL at 21:00

## 2022-01-14 RX ADMIN — SODIUM CHLORIDE, PRESERVATIVE FREE 10 ML: 5 INJECTION INTRAVENOUS at 19:09

## 2022-01-14 RX ADMIN — ONDANSETRON 4 MG: 2 INJECTION INTRAMUSCULAR; INTRAVENOUS at 03:51

## 2022-01-14 RX ADMIN — HEPARIN SODIUM 3200 UNITS: 1000 INJECTION, SOLUTION INTRAVENOUS; SUBCUTANEOUS at 18:01

## 2022-01-14 RX ADMIN — LEVOTHYROXINE SODIUM 150 MCG: 0.07 TABLET ORAL at 08:31

## 2022-01-14 RX ADMIN — ATORVASTATIN CALCIUM 40 MG: 40 TABLET, FILM COATED ORAL at 08:31

## 2022-01-14 RX ADMIN — LACOSAMIDE 200 MG: 200 TABLET, FILM COATED ORAL at 08:31

## 2022-01-14 RX ADMIN — LACOSAMIDE 200 MG: 200 TABLET, FILM COATED ORAL at 21:00

## 2022-01-14 RX ADMIN — MIDODRINE HYDROCHLORIDE 5 MG: 5 TABLET ORAL at 23:02

## 2022-01-14 RX ADMIN — SODIUM CHLORIDE, PRESERVATIVE FREE 10 ML: 5 INJECTION INTRAVENOUS at 05:31

## 2022-01-14 RX ADMIN — MIDODRINE HYDROCHLORIDE 5 MG: 5 TABLET ORAL at 08:31

## 2022-01-14 RX ADMIN — LEVETIRACETAM 500 MG: 250 TABLET, FILM COATED ORAL at 08:31

## 2022-01-14 RX ADMIN — FLUOXETINE HYDROCHLORIDE 10 MG: 10 CAPSULE ORAL at 08:32

## 2022-01-14 NOTE — CONSULTS
Consult Date: 1/14/2022    Consults Ms. Linsey Landry is a 71year old woman with seizures, A fib, history of stroke who was discharged 2 days ago after admission for seizures and Ruddy's paralysis ( MRI rule out stroke) who comes in for a breakthrough seizure yesterday. She is also hypotensive in to the 53I systolic  And currently in ICU.      Subjective  awake and talking, right hemiparesis improving    Past Medical History:   Diagnosis Date    Anxiety disorder     Arthritis     Atherosclerosis of native arteries of the extremities with ulceration ([address] 8/31/2020    Atrial fibrillation (HCC)     Cardiac pacemaker in situ     Cellulitis and abscess of trunk 8/31/2020    Depression, postpartum     Diabetes ([address]     Heart disease     Heartburn     Hx of long term use of blood thinners     Hypercholesterolemia     Hypertension     Hypothyroidism     Migraines     Peripheral venous insufficiency 8/31/2020    Seizures ([address]     Sleep disorder     Stroke ([address]     2019    Varicose veins of lower extremity with inflammation 8/31/2020      Past Surgical History:   Procedure Laterality Date    HX BACK SURGERY      HX OTHER SURGICAL      leg surgery     HX OTHER SURGICAL      pacemaker monitor     IR INSERT TUNL CVC W/O PORT OVER 5 YR  12/10/2021     Family History   Problem Relation Age of Onset    Heart Disease Mother     Heart Disease Father     Diabetes Sister     Diabetes Brother       Social History     Tobacco Use    Smoking status: Never Smoker    Smokeless tobacco: Never Used   Substance Use Topics    Alcohol use: Never       Current Facility-Administered Medications   Medication Dose Route Frequency Provider Last Rate Last Admin    insulin lispro (HUMALOG) injection   SubCUTAneous AC&HS Dede Olivas PA-C        levETIRAcetam (KEPPRA) tablet 500 mg  500 mg Oral BID Clyde Collins MD   500 mg at 01/14/22 0831    OXcarbazepine (TRILEPTAL) tablet 150 mg  150 mg Oral Q12H Scott Nunez MD [place]   150 mg at 01/13/22 2317    EPINEPHrine HCl (PF) (ADRENALIN) 1 mg/mL (1 mL) injection    PRN Margarita Curtis MD   0.5 mg at 01/13/22 1411    epoetin anjelica-epbx (RETACRIT) injection 10,000 Units  10,000 Units IntraVENous Q MON, WED & Gisselle Davis MD        sodium chloride (NS) flush 5-40 mL  5-40 mL IntraVENous PRN Danay Velasquez MD        acetaminophen (TYLENOL) tablet 650 mg  650 mg Oral Q6H PRN Danay Velasquez MD   650 mg at 01/13/22 1211    Or    acetaminophen (TYLENOL) suppository 650 mg  650 mg Rectal Q6H PRN Danay Velasquez MD        polyethylene glycol (MIRALAX) packet 17 g  17 g Oral DAILY PRN Danay Velasquez MD        ascorbic acid (vitamin C) (VITAMIN C) tablet 500 mg  500 mg Oral BID Danay Velasquez MD   500 mg at 01/14/22 0831    atorvastatin (LIPITOR) tablet 40 mg  40 mg Oral DAILY Danay Velasquez MD   40 mg at 01/14/22 0831    calcitRIOL (ROCALTROL) capsule 0.5 mcg  0.5 mcg Oral DAILY Danay Velasquez MD   0.5 mcg at 01/14/22 0831    levothyroxine (SYNTHROID) tablet 150 mcg  150 mcg Oral DAILY Danay Velasquez MD   150 mcg at 01/14/22 0831    FLUoxetine (PROzac) capsule 10 mg  10 mg Oral DAILY Danay Velasquez MD   10 mg at 01/14/22 8358    lacosamide (VIMPAT) tablet 200 mg  200 mg Oral BID Danay Velasquez MD   200 mg at 01/14/22 0831    zinc oxide-white petrolatum 20-75 % topical paste   Topical PRN Danay Velasquez MD        cefTRIAXone (ROCEPHIN) 1 g in sterile water (preservative free) 10 mL IV syringe  1 g IntraVENous Q24H Danay Velasquez MD   1 g at 01/13/22 1305    glucose chewable tablet 16 g  4 Tablet Oral PRN Danay Velasquez MD        glucagon (GLUCAGEN) injection 1 mg  1 mg IntraMUSCular PRN Danay Velasquez MD        dextrose (D50W) injection syrg 12.5-25 g  25-50 mL IntraVENous PRN Danay Velasquez MD   25 g at 01/13/22 1305    midodrine (PROAMATINE) tablet 5 mg  5 mg Oral TID Danay Velasquez MD   5 mg at 01/14/22 0831    sodium chloride (NS) flush 5-40 mL  5-40 mL IntraVENous Q8H Suzy Kawasaki, MD   10 mL at 01/14/22 0531    sodium chloride (NS) flush 5-40 mL  5-40 mL IntraVENous PRN Suzy Kawasaki, MD        ondansetron [facility]) injection 4 mg  4 mg IntraVENous Q4H PRN Suzy Kawasaki, MD   4 mg at 01/14/22 0351    Or    ondansetron (ZOFRAN ODT) tablet 4 mg  4 mg Oral Q8H PRN Suzy Kawasaki, MD        0.9% sodium chloride infusion 250 mL  250 mL IntraVENous PRN Suzy Kawasaki, MD        pantoprazole (PROTONIX) 40 mg in 0.9% sodium chloride (MBP/ADV) 50 mL MBP  8 mg/hr IntraVENous CONTINUOUS Suzy Kawasaki, MD 10 mL/hr at 01/14/22 0828 8 mg/hr at 01/14/22 0929        Review of Systems   Neurological: Positive for tremors and weakness. All other systems reviewed and are negative. Objective     Vital signs for last 24 hours:  Visit Vitals  BP (!) 120/58   Pulse 61   Temp (!) 96.3 °F (35.7 °C)   Resp 15   Ht 5' 5\" (1.651 m)   Wt 70.7 kg (155 lb 13.8 oz)   SpO2 100%   BMI 25.94 kg/m²       Intake/Output this shift:  Current Shift: No intake/output data recorded. Last 3 Shifts: 01/12 1901 - 01/14 0700  In: 2410.6 [I.V.:1660.6]  Out: -     Data Review:   Recent Results (from the past 24 hour(s))   CBC WITH AUTOMATED DIFF    Collection Time: 01/13/22  3:46 PM   Result Value Ref Range    WBC 10.3 3.6 - 11.0 K/uL    RBC 2.90 (L) 3.80 - 5.20 M/uL    HGB 9.0 (L) 11.5 - 16.0 g/dL    HCT 28.5 (L) 35.0 - 47.0 %    MCV 98.3 80.0 - 99.0 FL    MCH 31.0 26.0 - 34.0 PG    MCHC 31.6 30.0 - 36.5 g/dL    RDW 20.6 (H) 11.5 - 14.5 %    PLATELET 972 (L) 859 - 400 K/uL    MPV 11.3 8.9 - 12.9 FL    NRBC 0.0 0.0  WBC    ABSOLUTE NRBC 0.00 0.00 - 0.01 K/uL    NEUTROPHILS 84 (H) 32 - 75 %    LYMPHOCYTES 8 (L) 12 - 49 %    MONOCYTES 6 5 - 13 %    EOSINOPHILS 0 0 - 7 %    BASOPHILS 0 0 - 1 %    IMMATURE GRANULOCYTES 2 (H) 0 - 0.5 %    ABS. NEUTROPHILS 8.6 (H) 1.8 - 8.0 K/UL    ABS.  LYMPHOCYTES 0.8 0.8 - 3.5 K/UL    ABS. MONOCYTES 0.7 0.0 - 1.0 K/UL    ABS. EOSINOPHILS 0.0 0.0 - 0.4 K/UL    ABS. BASOPHILS 0.0 0.0 - 0.1 K/UL    ABS. IMM. GRANS. 0.2 (H) 0.00 - 0.04 K/UL    DF AUTOMATED     CBC W/O DIFF    Collection Time: 01/14/22  6:28 AM   Result Value Ref Range    WBC 8.7 3.6 - 11.0 K/uL    RBC 2.78 (L) 3.80 - 5.20 M/uL    HGB 8.7 (L) 11.5 - 16.0 g/dL    HCT 27.8 (L) 35.0 - 47.0 %    .0 (H) 80.0 - 99.0 FL    MCH 31.3 26.0 - 34.0 PG    MCHC 31.3 30.0 - 36.5 g/dL    RDW 20.3 (H) 11.5 - 14.5 %    PLATELET 922 (L) 280 - 400 K/uL    MPV 10.9 8.9 - 12.9 FL    NRBC 0.0 0.0  WBC    ABSOLUTE NRBC 0.00 0.00 - 0.01 K/uL   RENAL FUNCTION PANEL    Collection Time: 01/14/22  6:28 AM   Result Value Ref Range    Sodium 140 136 - 145 mmol/L    Potassium 5.0 3.5 - 5.1 mmol/L    Chloride 108 97 - 108 mmol/L    CO2 23 21 - 32 mmol/L    Anion gap 9 5 - 15 mmol/L    Glucose 70 65 - 100 mg/dL    BUN 90 (H) 6 - 20 mg/dL    Creatinine 3.91 (H) 0.55 - 1.02 mg/dL    BUN/Creatinine ratio 23 (H) 12 - 20      GFR est AA 14 (L) >60 ml/min/1.73m2    GFR est non-AA 11 (L) >60 ml/min/1.73m2    Calcium 7.5 (L) 8.5 - 10.1 mg/dL    Phosphorus 4.9 (H) 2.6 - 4.7 mg/dL    Albumin 2.0 (L) 3.5 - 5.0 g/dL       Physical Exam    Neuro Physical Exam      General: Well developed, well nourished. Patient in no apparent distress. Lungs:    Clear to auscultation bilaterally. Neurological Exam:  Mental Status: Awake, oriented x4, normal speech   Cranial Nerves:   Unable to count fingers with both eyes, PERRL, EOM's full, no nystagmus, no ptosis. Facial sensation is normal. Facial movement is symmetric. Palate is midline. Normal sternocleidomastoid strength. Tongue is midline. Hearing is intact bilaterally.    Motor:  4/5 RUE (is not applying full effort when strength testing), 5/5 LUE, 2/5 b/l LE   Reflexes:   Deep tendon reflexes 2+/4 and symmetrical.   Sensory:   Normal to light touch throughout   Gait:  Not tested   Tremor: Tremors in b/l UE are psychogenic. Cerebellar:  No cerebellar signs present. Assessment and Plan: Ms. Fabrizio Welsh is a 71year old woman who comes in after a breakthrough seizure outside hospital. She is a dialysis patient so keppra should be decreased to 500 mg bid. Continue vimpat 200 mg bid and add on oxcarbazepine 150 mg bid. 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 Will need to some PT/OT when out of ICU     Tremors: psychogenic tremors. They resolve with distraction. Do not need any treatment except re-assurance. No further neurological workup needed.  Follow up with Dr Raphael Keene in 3 weeks

## 2022-01-14 NOTE — PROGRESS NOTES
Hospitalist Progress Note         Juanita Hampton MD          Daily Progress Note: 1/14/2022      Subjective: The patient is seen for follow  up.  80 y.o. female with a past medical history of seizure disorder, hx CVA, atrial fibrillation not on anticoagulation, hx pacemaker, diabetes mellitus, hypertension, hypercholesterolemia presenting to the ED via EMS for seizure-like activity and hypotension. 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 Evaluation in the ED she was noted to be hypotensive, tachycardic and positive for COVID. Admitted to medical telemetry floor. Staff reports several bouts of bloody vomitus and hypotension requiring transfer to the intensive care unit. Boone Hinds Received 2 units of packed red blood cells and fluid bolus with improvement in blood pressure. She is seen in follow-up. Offers no complaints.   On room and not requiring vasopressors    Problem List:  Problem List as of 1/14/2022 Date Reviewed: 1/3/2022          Codes Class Noted - Resolved    Seizure-like activity ([facility] ICD-10-CM: R56.9  ICD-9-CM: 780.39  1/12/2022 - Present        Sepsis ([facility] ICD-10-CM: A41.9  ICD-9-CM: 038.9, 995.91  1/7/2022 - Present        MSSA bacteremia ICD-10-CM: R78.81, B95.61  ICD-9-CM: 790.7, 041.11  1/3/2022 - Present        Acute on chronic anemia ICD-10-CM: D64.9  ICD-9-CM: 285.9  1/3/2022 - Present        Gait disturbance, post-stroke ICD-10-CM: I69.398, R26.9  ICD-9-CM: 438.89, 781.2  1/3/2022 - Present        COVID-19 ICD-10-CM: U07.1  ICD-9-CM: 079.89  12/26/2021 - Present        Fluid overload ICD-10-CM: E87.70  ICD-9-CM: 276.69  12/5/2021 - Present        Left-sided weakness ICD-10-CM: R53.1  ICD-9-CM: 728.87  12/3/2021 - Present        Intractable nausea and vomiting ICD-10-CM: R11.2  ICD-9-CM: 536.2  12/3/2021 - Present Hypoglycemia ICD-10-CM: E16.2  ICD-9-CM: 251.2  6/11/2021 - Present        Encephalopathy acute ICD-10-CM: G93.40  ICD-9-CM: 348.30  6/11/2021 - Present        SSS (sick sinus syndrome) (HCC) ICD-10-CM: I49.5  ICD-9-CM: 427.81  6/11/2021 - Present        Seizure ([facility] ICD-10-CM: R56.9  ICD-9-CM: 780.39  5/19/2021 - Present        Hyperkalemia ICD-10-CM: E87.5  ICD-9-CM: 276.7  10/16/2020 - Present        Atherosclerosis of native arteries of the extremities with ulceration (HCC) ICD-10-CM: I70.25  ICD-9-CM: 440.23, 707.9  8/31/2020 - Present        Cellulitis and abscess of trunk ICD-10-CM: L03.319, L02.219  ICD-9-CM: 682.2  8/31/2020 - Present        Peripheral venous insufficiency ICD-10-CM: I87.2  ICD-9-CM: 459.81  8/31/2020 - Present        Varicose veins of lower extremity with inflammation ICD-10-CM: I83.10  ICD-9-CM: 454.1  8/31/2020 - Present        Peripheral vascular disease ([facility] ICD-10-CM: I73.9  ICD-9-CM: 443.9  7/3/2020 - Present        Type II diabetes mellitus ([facility] ICD-10-CM: E11.9  ICD-9-CM: 250.00  7/3/2020 - Present        Long term (current) use of antibiotics ICD-10-CM: Z79.2  ICD-9-CM: V58.62  7/3/2020 - Present        Atrial fibrillation ([facility] ICD-10-CM: I48.91  ICD-9-CM: 427.31  7/3/2020 - Present        Cardiac pacemaker in situ ICD-10-CM: Z95.0  ICD-9-CM: V45.01  7/3/2020 - Present        Carpal tunnel syndrome of right wrist ICD-10-CM: G56.01  ICD-9-CM: 354.0  7/3/2020 - Present        Chest wall pain ICD-10-CM: R07.89  ICD-9-CM: 786.52  7/3/2020 - Present        Chronic diarrhea ICD-10-CM: K52.9  ICD-9-CM: 787.91  7/3/2020 - Present        Chronic neck pain ICD-10-CM: M54.2, G89.29  ICD-9-CM: 723.1, 338.29  7/3/2020 - Present        End stage renal failure on dialysis [facility]) ICD-10-CM: N18.6, Z99.2  ICD-9-CM: 585.6, V45.11  7/3/2020 - Present        History of CVA (cerebrovascular accident) ICD-10-CM: Z86.73  ICD-9-CM: V12.54  7/3/2020 - Present        Hyponatremia ICD-10-CM: E87.1  ICD-9-CM: 276.1  7/3/2020 - Present        Acquired hypothyroidism ICD-10-CM: E03.9  ICD-9-CM: 244.9  7/3/2020 - Present        Breakthrough seizure ([facility] ICD-10-CM: S57.811  ICD-9-CM: 345.91  7/3/2020 - Present        Recurrent falls ICD-10-CM: R29.6  ICD-9-CM: V15.88  7/3/2020 - Present        RESOLVED: Metabolic encephalopathy ZGB-50-SI: G93.41  ICD-9-CM: 348.31  1/3/2022 - 1/3/2022        RESOLVED: Sepsis ([facility] ICD-10-CM: A41.9  ICD-9-CM: 038.9, 995.91  1/3/2022 - 1/3/2022        RESOLVED: AMS (altered mental status) ICD-10-CM: I75.99  ICD-9-CM: 780.97  12/26/2021 - 1/3/2022        RESOLVED: Disorder due to well controlled type 2 diabetes mellitus ([facility] ICD-10-CM: E11.8  ICD-9-CM: 250.90  8/31/2020 - 6/11/2021              Medications reviewed  Current Facility-Administered Medications   Medication Dose Route Frequency    insulin lispro (HUMALOG) injection   SubCUTAneous AC&HS    levETIRAcetam (KEPPRA) tablet 500 mg  500 mg Oral BID    OXcarbazepine (TRILEPTAL) tablet 150 mg  150 mg Oral Q12H    EPINEPHrine HCl (PF) (ADRENALIN) 1 mg/mL (1 mL) injection    PRN    epoetin anjelica-epbx (RETACRIT) injection 10,000 Units  10,000 Units IntraVENous Q MON, WED & FRI    sodium chloride (NS) flush 5-40 mL  5-40 mL IntraVENous PRN    acetaminophen (TYLENOL) tablet 650 mg  650 mg Oral Q6H PRN    Or    acetaminophen (TYLENOL) suppository 650 mg  650 mg Rectal Q6H PRN    polyethylene glycol (MIRALAX) packet 17 g  17 g Oral DAILY PRN    ascorbic acid (vitamin C) (VITAMIN C) tablet 500 mg  500 mg Oral BID    atorvastatin (LIPITOR) tablet 40 mg  40 mg Oral DAILY    calcitRIOL (ROCALTROL) capsule 0.5 mcg  0.5 mcg Oral DAILY    levothyroxine (SYNTHROID) tablet 150 mcg  150 mcg Oral DAILY    FLUoxetine (PROzac) capsule 10 mg  10 mg Oral DAILY    lacosamide (VIMPAT) tablet 200 mg  200 mg Oral BID    zinc oxide-white petrolatum 20-75 % topical paste   Topical PRN    cefTRIAXone (ROCEPHIN) 1 g in sterile water (preservative free) 10 mL IV syringe  1 g IntraVENous Q24H    glucose chewable tablet 16 g  4 Tablet Oral PRN    glucagon (GLUCAGEN) injection 1 mg  1 mg IntraMUSCular PRN    dextrose (D50W) injection syrg 12.5-25 g  25-50 mL IntraVENous PRN    midodrine (PROAMATINE) tablet 5 mg  5 mg Oral TID    sodium chloride (NS) flush 5-40 mL  5-40 mL IntraVENous Q8H    sodium chloride (NS) flush 5-40 mL  5-40 mL IntraVENous PRN    ondansetron (ZOFRAN) injection 4 mg  4 mg IntraVENous Q4H PRN    Or    ondansetron (ZOFRAN ODT) tablet 4 mg  4 mg Oral Q8H PRN    0.9% sodium chloride infusion 250 mL  250 mL IntraVENous PRN    pantoprazole (PROTONIX) 40 mg in 0.9% sodium chloride (MBP/ADV) 50 mL MBP  8 mg/hr IntraVENous CONTINUOUS       Review of Systems:   A comprehensive review of systems was negative except for that written in the HPI. Objective:   Physical Exam:     Visit Vitals  BP (!) 139/58   Pulse 60   Temp (!) 96.3 °F (35.7 °C)   Resp 15   Ht 5' 5\" (1.651 m)   Wt 70.7 kg (155 lb 13.8 oz)   SpO2 100%   BMI 25.94 kg/m²      O2 Device: None (Room air)    Temp (24hrs), Av.6 °F (36.4 °C), Min:96.3 °F (35.7 °C), Max:98.3 °F (36.8 °C)    No intake/output data recorded.  1901 -  0700  In: 2410.6 [I.V.:1660.6]  Out: -     General:  Alert, cooperative, no distress, appears stated age. Lungs:   Clear to auscultation bilaterally. Chest wall:  No tenderness or deformity. Heart:  Regular rate and rhythm, S1, S2 normal, no murmur, click, rub or gallop. Abdomen:   Soft, non-tender. Bowel sounds normal. No masses,  No organomegaly. Extremities: Extremities normal, atraumatic, no cyanosis or edema. Pulses: 2+ and symmetric all extremities. Skin: Skin color, texture, turgor normal. No rashes or lesions   Neurologic: CNII-XII intact.  No gross sensory or motor deficits     Data Review:       Recent Days:  Recent Labs     22  0628 22  1546 22  0400   WBC 8.7 10.3 12.0*   HGB 8.7* 9.0* 10.5*   HCT 27.8* 28.5* 33.3*   * 107* 98*     Recent Labs     01/14/22  0628 01/13/22  0151 01/12/22  2036 01/12/22  0811 01/12/22  0811    140 140   < > 139   K 5.0 5.4* 5.5*   < > 4.5    107 109*   < > 106   CO2 23 26 24   < > 28   GLU 70 95 103*   < > 93   BUN 90* 68* 57*   < > 34*   CREA 3.91* 3.31* 3.20*   < > 3.03*   CA 7.5* 7.0* 7.1*   < > 7.8*   PHOS 4.9*  --   --   --   --    ALB 2.0*  --  1.6*  --  2.0*   TBILI  --   --  0.4  --  0.6   ALT  --   --  12  --  14   INR  --  1.5* 1.3*  --   --     < > = values in this interval not displayed. No results for input(s): PH, PCO2, PO2, HCO3, FIO2 in the last 72 hours. 24 Hour Results:  Recent Results (from the past 24 hour(s))   CBC WITH AUTOMATED DIFF    Collection Time: 01/13/22  3:46 PM   Result Value Ref Range    WBC 10.3 3.6 - 11.0 K/uL    RBC 2.90 (L) 3.80 - 5.20 M/uL    HGB 9.0 (L) 11.5 - 16.0 g/dL    HCT 28.5 (L) 35.0 - 47.0 %    MCV 98.3 80.0 - 99.0 FL    MCH 31.0 26.0 - 34.0 PG    MCHC 31.6 30.0 - 36.5 g/dL    RDW 20.6 (H) 11.5 - 14.5 %    PLATELET 886 (L) 947 - 400 K/uL    MPV 11.3 8.9 - 12.9 FL    NRBC 0.0 0.0  WBC    ABSOLUTE NRBC 0.00 0.00 - 0.01 K/uL    NEUTROPHILS 84 (H) 32 - 75 %    LYMPHOCYTES 8 (L) 12 - 49 %    MONOCYTES 6 5 - 13 %    EOSINOPHILS 0 0 - 7 %    BASOPHILS 0 0 - 1 %    IMMATURE GRANULOCYTES 2 (H) 0 - 0.5 %    ABS. NEUTROPHILS 8.6 (H) 1.8 - 8.0 K/UL    ABS. LYMPHOCYTES 0.8 0.8 - 3.5 K/UL    ABS. MONOCYTES 0.7 0.0 - 1.0 K/UL    ABS. EOSINOPHILS 0.0 0.0 - 0.4 K/UL    ABS. BASOPHILS 0.0 0.0 - 0.1 K/UL    ABS. IMM.  GRANS. 0.2 (H) 0.00 - 0.04 K/UL    DF AUTOMATED     CBC W/O DIFF    Collection Time: 01/14/22  6:28 AM   Result Value Ref Range    WBC 8.7 3.6 - 11.0 K/uL    RBC 2.78 (L) 3.80 - 5.20 M/uL    HGB 8.7 (L) 11.5 - 16.0 g/dL    HCT 27.8 (L) 35.0 - 47.0 %    .0 (H) 80.0 - 99.0 FL    MCH 31.3 26.0 - 34.0 PG    MCHC 31.3 30.0 - 36.5 g/dL    RDW 20.3 (H) 11.5 - 14.5 %    PLATELET 368 (L) 971 - 400 K/uL    MPV 10.9 8.9 - 12.9 FL    NRBC 0.0 0.0  WBC    ABSOLUTE NRBC 0.00 0.00 - 0.01 K/uL   RENAL FUNCTION PANEL    Collection Time: 01/14/22  6:28 AM   Result Value Ref Range    Sodium 140 136 - 145 mmol/L    Potassium 5.0 3.5 - 5.1 mmol/L    Chloride 108 97 - 108 mmol/L    CO2 23 21 - 32 mmol/L    Anion gap 9 5 - 15 mmol/L    Glucose 70 65 - 100 mg/dL    BUN 90 (H) 6 - 20 mg/dL    Creatinine 3.91 (H) 0.55 - 1.02 mg/dL    BUN/Creatinine ratio 23 (H) 12 - 20      GFR est AA 14 (L) >60 ml/min/1.73m2    GFR est non-AA 11 (L) >60 ml/min/1.73m2    Calcium 7.5 (L) 8.5 - 10.1 mg/dL    Phosphorus 4.9 (H) 2.6 - 4.7 mg/dL    Albumin 2.0 (L) 3.5 - 5.0 g/dL           Assessment/     Hemorrhagic shock due to upper GI bleed  Acute on chronic seizures  Urinary tract infection  History of staph aureus bacteremia  End-stage renal disease on hemodialysis  COVID-19 positive without hypoxia  Paroxysmal atrial fibrillation not on anticoagulation  History of CVA    Plan:  Transfer out of ICU to med telemetry floor  Continue supportive care including IV antibiotics and antiepileptics  Obtain PT OT evaluation tomorrow  Await GI input for possible EGD/colonoscopy  Overall clinically improved  May Require skilled care facility placement  Transfer out of ICU    Care Plan discussed with: Nurse    Total time spent with patient: 30 minutes.     Carlos Aburto MD

## 2022-01-14 NOTE — PROGRESS NOTES
PT order received, eval attempted; however, pt was in dialysis and was being transferred to 422 afterwards. Will continue to follow and evaluate at a later time.

## 2022-01-14 NOTE — PROGRESS NOTES
Clinical chart reviewed by CM. Discharge disposition: [facility]). CM sent referral via ROB. CM team will continue to follow.

## 2022-01-14 NOTE — PROGRESS NOTES
Vancomycin Note-01/14/2022    Admission date 011222   Attending [place]   Indication Bloodstream infection        Height Ht Readings from Last 1 Encounters:   01/12/22 165.1 cm (65\")       Weight Wt Readings from Last 1 Encounters:   01/14/22 70.7 kg (155 lb 13.8 oz)      IBW ideal body weight      SCr Creatinine   Date Value Ref Range Status   01/14/2022 3.91 (H) 0.55 - 1.02 mg/dL Final   01/13/2022 3.31 (H) 0.55 - 1.02 mg/dL Final   01/12/2022 3.20 (H) 0.55 - 1.02 mg/dL Final      CrCl (based on IBW) 11.9 ml/min       Load/ER dose 1500mg x 1 dose 01/14   Current regimen  New start   Goal level 21-25   NEW regimen Pulse dose post HD   Level Scheduled for Random 01/15 in AM         Current Antimicrobial Therapy (168h ago, onward)       Ordered     Start Stop    01/14/22 1758  Vancomycin lab reminder - lab draw due AM 01/15/2022  Other,   ONCE        References:    Shanae    01/15/22 0400 01/15/22 1559    01/14/22 1758  vancomycin (VANCOCIN) 1500 mg in  ml infusion  1,500 mg,   IntraVENous,   ONCE        References:    Shanae    01/14/22 1900 01/15/22 0659    01/14/22 1758  vancomycin dose per pharmacy protocol  Other,   RX DOSING/MONITORING        References:    Shanae    01/14/22 1758 --    01/12/22 1153  cefTRIAXone (ROCEPHIN) 1 g in sterile water (preservative free) 10 mL IV syringe  1 g,   IntraVENous,   EVERY 24 HOURS        References:    Shanae    01/12/22 1154 --                      Submitted by:  Marybeth Velarde, PHARMD

## 2022-01-14 NOTE — CONSULTS
Consult Date: 1/14/2022    Consults Persistent bacteremia    Subjective  This is a 71year old female with multiple co-morbidities including ESRD on HD who was treated for MSSA bacteremia in December 2021 with 2 weeks of Vancomycin in early January 2022. She was readmitted later in the month for altered mental status. ID was consulted but repeat blood cultures were negative. TTE showed no vegetations. She was brought back to the hospital by EMS because of seizure like activity and hypotension. She was afebrile but WBC increased to 12,000. Blood cultures were sent and are growing Gram positive cocci in clusters. Patient has been on IV Ceftriaxone. ID has been consulted for this reason. She was also seen by Neurology for breakthrough seizure. Patient just returned from hemodialysis and is somniolent.      Past Medical History:   Diagnosis Date    Anxiety disorder     Arthritis     Atherosclerosis of native arteries of the extremities with ulceration ([address] 8/31/2020    Atrial fibrillation (HCC)     Cardiac pacemaker in situ     Cellulitis and abscess of trunk 8/31/2020    Depression, postpartum     Diabetes ([address]     Heart disease     Heartburn     Hx of long term use of blood thinners     Hypercholesterolemia     Hypertension     Hypothyroidism     Migraines     Peripheral venous insufficiency 8/31/2020    Seizures ([address]     Sleep disorder     Stroke ([address]     2019    Varicose veins of lower extremity with inflammation 8/31/2020      Past Surgical History:   Procedure Laterality Date    HX BACK SURGERY      HX OTHER SURGICAL      leg surgery     HX OTHER SURGICAL      pacemaker monitor     IR INSERT TUNL CVC W/O PORT OVER 5 YR  12/10/2021     Family History   Problem Relation Age of Onset    Heart Disease Mother     Heart Disease Father     Diabetes Sister     Diabetes Brother       Social History     Tobacco Use    Smoking status: Never Smoker    Smokeless tobacco: Never Used   Substance Use Topics    Alcohol use: Never       Current Facility-Administered Medications   Medication Dose Route Frequency Provider Last Rate Last Admin    [START ON 1/15/2022] calcitRIOL (ROCALTROL) capsule 0.25 mcg  0.25 mcg Oral DAILY Jensen Tineo MD        heparin (porcine) 1,000 unit/mL injection 3,200 Units  3,200 Units Hemodialysis DIALYSIS PRN Jessenia Coles MD        insulin lispro (HUMALOG) injection   SubCUTAneous AC&HS Milind Sanderson PA-C        levETIRAcetam (KEPPRA) tablet 500 mg  500 mg Oral BID Jennett Apley, MD   500 mg at 01/14/22 0831    OXcarbazepine (TRILEPTAL) tablet 150 mg  150 mg Oral Q12H Jennett Apley, MD   150 mg at 01/13/22 2317    EPINEPHrine HCl (PF) (ADRENALIN) 1 mg/mL (1 mL) injection    PRN Iris Ramirez MD   0.5 mg at 01/13/22 1411    epoetin anjelica-epbx (RETACRIT) injection 10,000 Units  10,000 Units IntraVENous Q MON, WED & Carolyne Hurtado MD        sodium chloride (NS) flush 5-40 mL  5-40 mL IntraVENous PRN Kahlil Roman MD        acetaminophen (TYLENOL) tablet 650 mg  650 mg Oral Q6H PRN Kahlil Roman MD   650 mg at 01/13/22 1211    Or    acetaminophen (TYLENOL) suppository 650 mg  650 mg Rectal Q6H PRN Kahlil Roman MD        polyethylene glycol (MIRALAX) packet 17 g  17 g Oral DAILY PRN Kahlil Roman MD        ascorbic acid (vitamin C) (VITAMIN C) tablet 500 mg  500 mg Oral BID Kahlil Roman MD   500 mg at 01/14/22 0831    atorvastatin (LIPITOR) tablet 40 mg  40 mg Oral DAILY Kahlil Roman MD   40 mg at 01/14/22 0831    levothyroxine (SYNTHROID) tablet 150 mcg  150 mcg Oral DAILY Kahlil Roman MD   150 mcg at 01/14/22 0831    FLUoxetine (PROzac) capsule 10 mg  10 mg Oral DAILY Kahlil Roman MD   10 mg at 01/14/22 9398    lacosamide (VIMPAT) tablet 200 mg  200 mg Oral BID Kahlil Roman MD   200 mg at 01/14/22 0831    zinc oxide-white petrolatum 20-75 % topical paste   Topical PRN Tim Sarmiento MD        cefTRIAXone (ROCEPHIN) 1 g in sterile water (preservative free) 10 mL IV syringe  1 g IntraVENous Q24H Tim Sarmiento MD   1 g at 01/14/22 1200    glucose chewable tablet 16 g  4 Tablet Oral PRN Tim Sarmiento MD        glucagon (GLUCAGEN) injection 1 mg  1 mg IntraMUSCular PRN Tim Sarmiento MD        dextrose (D50W) injection syrg 12.5-25 g  25-50 mL IntraVENous PRN Tim Sarmiento MD   25 g at 01/13/22 1305    midodrine (PROAMATINE) tablet 5 mg  5 mg Oral TID Tim Sarmiento MD   5 mg at 01/14/22 0831    sodium chloride (NS) flush 5-40 mL  5-40 mL IntraVENous Q8H Tim Sarmiento MD   10 mL at 01/14/22 0531    sodium chloride (NS) flush 5-40 mL  5-40 mL IntraVENous PRN Tim Sarmiento MD        ondansetron [facility]) injection 4 mg  4 mg IntraVENous Q4H PRN Tim Sarmiento MD   4 mg at 01/14/22 0351    Or    ondansetron (ZOFRAN ODT) tablet 4 mg  4 mg Oral Q8H PRN Tim Sarmiento MD        0.9% sodium chloride infusion 250 mL  250 mL IntraVENous PRN Tim Sarmiento MD        pantoprazole (PROTONIX) 40 mg in 0.9% sodium chloride (MBP/ADV) 50 mL MBP  8 mg/hr IntraVENous CONTINUOUS Tim Sarmiento MD 10 mL/hr at 01/14/22 0828 8 mg/hr at 01/14/22 0245        Review of Systems   Unable to perform ROS: Patient nonverbal       Objective     Vital signs for last 24 hours:  Visit Vitals  /69   Pulse 60   Temp 97.6 °F (36.4 °C) (Oral)   Resp 15   Ht 5' 5\" (1.651 m)   Wt 155 lb 13.8 oz (70.7 kg)   SpO2 99%   BMI 25.94 kg/m²       Intake/Output this shift:  Current Shift: No intake/output data recorded.   Last 3 Shifts: 01/12 1901 - 01/14 0700  In: 2410.6 [I.V.:1660.6]  Out: -     Data Review:   Recent Results (from the past 24 hour(s))   CBC WITH AUTOMATED DIFF    Collection Time: 01/13/22  3:46 PM   Result Value Ref Range    WBC 10.3 3.6 - 11.0 K/uL    RBC 2.90 (L) 3.80 - 5.20 M/uL    HGB 9.0 (L) 11.5 - 16.0 g/dL    HCT 28.5 (L) 35.0 - 47.0 %    MCV 98.3 80.0 - 99.0 FL    MCH 31.0 26.0 - 34.0 PG    MCHC 31.6 30.0 - 36.5 g/dL    RDW 20.6 (H) 11.5 - 14.5 %    PLATELET 004 (L) 768 - 400 K/uL    MPV 11.3 8.9 - 12.9 FL    NRBC 0.0 0.0  WBC    ABSOLUTE NRBC 0.00 0.00 - 0.01 K/uL    NEUTROPHILS 84 (H) 32 - 75 %    LYMPHOCYTES 8 (L) 12 - 49 %    MONOCYTES 6 5 - 13 %    EOSINOPHILS 0 0 - 7 %    BASOPHILS 0 0 - 1 %    IMMATURE GRANULOCYTES 2 (H) 0 - 0.5 %    ABS. NEUTROPHILS 8.6 (H) 1.8 - 8.0 K/UL    ABS. LYMPHOCYTES 0.8 0.8 - 3.5 K/UL    ABS. MONOCYTES 0.7 0.0 - 1.0 K/UL    ABS. EOSINOPHILS 0.0 0.0 - 0.4 K/UL    ABS. BASOPHILS 0.0 0.0 - 0.1 K/UL    ABS. IMM. GRANS. 0.2 (H) 0.00 - 0.04 K/UL    DF AUTOMATED     CBC W/O DIFF    Collection Time: 01/14/22  6:28 AM   Result Value Ref Range    WBC 8.7 3.6 - 11.0 K/uL    RBC 2.78 (L) 3.80 - 5.20 M/uL    HGB 8.7 (L) 11.5 - 16.0 g/dL    HCT 27.8 (L) 35.0 - 47.0 %    .0 (H) 80.0 - 99.0 FL    MCH 31.3 26.0 - 34.0 PG    MCHC 31.3 30.0 - 36.5 g/dL    RDW 20.3 (H) 11.5 - 14.5 %    PLATELET 521 (L) 708 - 400 K/uL    MPV 10.9 8.9 - 12.9 FL    NRBC 0.0 0.0  WBC    ABSOLUTE NRBC 0.00 0.00 - 0.01 K/uL   RENAL FUNCTION PANEL    Collection Time: 01/14/22  6:28 AM   Result Value Ref Range    Sodium 140 136 - 145 mmol/L    Potassium 5.0 3.5 - 5.1 mmol/L    Chloride 108 97 - 108 mmol/L    CO2 23 21 - 32 mmol/L    Anion gap 9 5 - 15 mmol/L    Glucose 70 65 - 100 mg/dL    BUN 90 (H) 6 - 20 mg/dL    Creatinine 3.91 (H) 0.55 - 1.02 mg/dL    BUN/Creatinine ratio 23 (H) 12 - 20      GFR est AA 14 (L) >60 ml/min/1.73m2    GFR est non-AA 11 (L) >60 ml/min/1.73m2    Calcium 7.5 (L) 8.5 - 10.1 mg/dL    Phosphorus 4.9 (H) 2.6 - 4.7 mg/dL    Albumin 2.0 (L) 3.5 - 5.0 g/dL       Physical Exam  Vitals and nursing note reviewed. Constitutional:       Appearance: She is not ill-appearing. HENT:      Head: Normocephalic and atraumatic.       Right Ear: External ear normal.      Left Ear: External ear normal.      Nose: Nose normal.   Eyes:      Comments: closed   Cardiovascular:      Rate and Rhythm: Normal rate and regular rhythm. Heart sounds: No murmur heard. Comments: Right sided dialysis catheter, site unremarkable  Pulmonary:      Effort: Pulmonary effort is normal.      Breath sounds: Normal breath sounds. Abdominal:      General: Bowel sounds are normal.      Palpations: Abdomen is soft. Tenderness: There is no abdominal tenderness. Genitourinary:     Comments: No Cochran  Musculoskeletal:      Cervical back: Neck supple. Right lower leg: No edema. Left lower leg: No edema. Skin:     Findings: No rash. Comments: Some of her distal fingertips are cyanotic but no splinter hemorrhages   Neurological:      Comments: somnolent   Psychiatric:      Comments: somniolent       ASSESSMENT/PLAN    1. Positive blood cultures for Gram positive cocci in clusters, consistent MSSA, MRSA or Coagulase negative staphylococci  2. Sepsis with leukocytosis, elevated lactic acid and procalcitonin  3. ESRD on hemodialysis  4. Seizures    Comment:  Patient has either relapse on reinfection. Relapse would imply unresolved focus of infection such as endocarditis, however, TTE last admission did not show any vegetations or reinfection from source such as her dialysis catheter. First we have to verify that the [address] in clusters are S. Aureus, however, even if coagulase negative staphylococci, they would likely be significant in the setting of a dialysis catheter and sepsis. 1. Discontinue Ceftriaxone  2. Start IV Vancomycin   3. In am, repeat CBC, procal, check CRP  4. If MSSA or MRSA, would repeat TTE    Pio Sherwood MD

## 2022-01-14 NOTE — PROGRESS NOTES
Progress Note    Patient: Meghan Colon MRN: [id number]  SSN: [id number]    YOB: 1950  Age: 71 y.o.   Sex: female      Admit Date: 1/12/2022    LOS: 2 days     Subjective:   Examined, no nausea vomiting  , no hematemesis,  Will do dialysis today  Transferred to the ICU to regular medical floor  Past Medical History:   Diagnosis Date    Anxiety disorder     Arthritis     Atherosclerosis of native arteries of the extremities with ulceration ([facility] 8/31/2020    Atrial fibrillation ([facility]     Cardiac pacemaker in situ     Cellulitis and abscess of trunk 8/31/2020    Depression, postpartum     Diabetes ([facility]     Heart disease     Heartburn     Hx of long term use of blood thinners     Hypercholesterolemia     Hypertension     Hypothyroidism     Migraines     Peripheral venous insufficiency 8/31/2020    Seizures ([facility]     Sleep disorder     Stroke ([facility]     2019    Varicose veins of lower extremity with inflammation 8/31/2020        Current Facility-Administered Medications:     [START ON 1/15/2022] calcitRIOL (ROCALTROL) capsule 0.25 mcg, 0.25 mcg, Oral, DAILY, Georgia Sanon MD    heparin (porcine) 1,000 unit/mL injection 3,200 Units, 3,200 Units, Hemodialysis, DIALYSIS PRN, Griselda Coles MD    insulin lispro (HUMALOG) injection, , SubCUTAneous, AC&HS, Chelita Ellis PA-C    levETIRAcetam (KEPPRA) tablet 500 mg, 500 mg, Oral, BID, Ajay Cárdenas MD, 500 mg at 01/14/22 0831    OXcarbazepine (TRILEPTAL) tablet 150 mg, 150 mg, Oral, Q12H, Ajay Cárdenas MD, 150 mg at 01/13/22 2317    EPINEPHrine HCl (PF) (ADRENALIN) 1 mg/mL (1 mL) injection, , , PRN, Shukri Cedillo MD, 0.5 mg at 01/13/22 1411    epoetin anjelica-epbx (RETACRIT) injection 10,000 Units, 10,000 Units, IntraVENous, Q MON, Luciana Wan MD    sodium chloride (NS) flush 5-40 mL, 5-40 mL, IntraVENous, PRN, Melvin Glorietta Maxwell, MD    acetaminophen (TYLENOL) tablet 650 mg, 650 mg, Oral, Q6H PRN, 650 mg at 01/13/22 1211 **OR** acetaminophen (TYLENOL) suppository 650 mg, 650 mg, Rectal, Q6H PRN, Hebert Charles MD    polyethylene glycol (MIRALAX) packet 17 g, 17 g, Oral, DAILY PRN, Heebrt Charles MD    ascorbic acid (vitamin C) (VITAMIN C) tablet 500 mg, 500 mg, Oral, BID, Mohan Jimenez MD, 500 mg at 01/14/22 0831    atorvastatin (LIPITOR) tablet 40 mg, 40 mg, Oral, DAILY, Mohan Jimenez MD, 40 mg at 01/14/22 0831    levothyroxine (SYNTHROID) tablet 150 mcg, 150 mcg, Oral, DAILY, Mohan Jimenez MD, 150 mcg at 01/14/22 0831    FLUoxetine (PROzac) capsule 10 mg, 10 mg, Oral, DAILY, Mohan Jimenez MD, 10 mg at 01/14/22 3088    lacosamide (VIMPAT) tablet 200 mg, 200 mg, Oral, BID, Mohan Jimenez MD, 200 mg at 01/14/22 0831    zinc oxide-white petrolatum 20-75 % topical paste, , Topical, PRN, Mohan Jimenez MD    cefTRIAXone (ROCEPHIN) 1 g in sterile water (preservative free) 10 mL IV syringe, 1 g, IntraVENous, Q24H, Mohan Jimenez MD, 1 g at 01/14/22 1200    glucose chewable tablet 16 g, 4 Tablet, Oral, PRN, Mohan Jimenez MD    glucagon (GLUCAGEN) injection 1 mg, 1 mg, IntraMUSCular, PRN, Mohan iJmenez MD    dextrose (D50W) injection syrg 12.5-25 g, 25-50 mL, IntraVENous, PRN, Mohan Jimenez MD, 25 g at 01/13/22 1305    midodrine (PROAMATINE) tablet 5 mg, 5 mg, Oral, TID, Mohan Jimenez MD, 5 mg at 01/14/22 0831    sodium chloride (NS) flush 5-40 mL, 5-40 mL, IntraVENous, Q8H, Hebert Charles MD, 10 mL at 01/14/22 0531    sodium chloride (NS) flush 5-40 mL, 5-40 mL, IntraVENous, PRN, Hebert Charles MD    ondansetron (ZOFRAN ODT) tablet 4 mg, 4 mg, Oral, Q8H PRN **OR** ondansetron (ZOFRAN) injection 4 mg, 4 mg, IntraVENous, Q4H PRN, Mohan Jimenez MD, 4 mg at 01/14/22 0351    0.9% sodium chloride infusion 250 mL, 250 mL, IntraVENous, PRN, Mohan Jimenez MD    pantoprazole (PROTONIX) 40 mg in 0.9% sodium chloride (MBP/ADV) 50 mL MBP, 8 mg/hr, IntraVENous, CONTINUOUS, Hebert Charles MD, Last Rate: 10 mL/hr at 01/14/22 0828, 8 mg/hr at 01/14/22 0828    Objective:     Vitals:    01/14/22 1500 01/14/22 1530 01/14/22 1600 01/14/22 1630   BP: 124/69 126/62 122/69 120/69   Pulse: 60 71 60 60   Resp:       Temp:       TempSrc:       SpO2:       Weight:       Height:            Intake and Output:  Current Shift: No intake/output data recorded. Last three shifts: 01/12 1901 - 01/14 0700  In: 2410.6 [I.V.:1660.6]  Out: -     Physical Exam:   Physical Exam  Constitutional:       Appearance: She is ill-appearing. HENT:      Mouth/Throat:      Mouth: Mucous membranes are dry. Eyes:      General:         Left eye: No discharge. Cardiovascular:      Pulses: Normal pulses. Abdominal:      General: Bowel sounds are normal.   Musculoskeletal:         General: Normal range of motion. Cervical back: Neck supple. Skin:     General: Skin is warm. Psychiatric:         Thought Content:  Thought content normal.          Lab/Data Review:  Recent Results (from the past 24 hour(s))   CBC W/O DIFF    Collection Time: 01/14/22  6:28 AM   Result Value Ref Range    WBC 8.7 3.6 - 11.0 K/uL    RBC 2.78 (L) 3.80 - 5.20 M/uL    HGB 8.7 (L) 11.5 - 16.0 g/dL    HCT 27.8 (L) 35.0 - 47.0 %    .0 (H) 80.0 - 99.0 FL    MCH 31.3 26.0 - 34.0 PG    MCHC 31.3 30.0 - 36.5 g/dL    RDW 20.3 (H) 11.5 - 14.5 %    PLATELET 155 (L) 290 - 400 K/uL    MPV 10.9 8.9 - 12.9 FL    NRBC 0.0 0.0  WBC    ABSOLUTE NRBC 0.00 0.00 - 0.01 K/uL   RENAL FUNCTION PANEL    Collection Time: 01/14/22  6:28 AM   Result Value Ref Range    Sodium 140 136 - 145 mmol/L    Potassium 5.0 3.5 - 5.1 mmol/L    Chloride 108 97 - 108 mmol/L    CO2 23 21 - 32 mmol/L    Anion gap 9 5 - 15 mmol/L    Glucose 70 65 - 100 mg/dL    BUN 90 (H) 6 - 20 mg/dL    Creatinine 3.91 (H) 0.55 - 1.02 mg/dL    BUN/Creatinine ratio 23 (H) 12 - 20      GFR est AA 14 (L) >60 ml/min/1.73m2    GFR est non-AA 11 (L) >60 ml/min/1.73m2    Calcium 7.5 (L) 8.5 - 10.1 mg/dL    Phosphorus 4.9 (H) 2.6 - 4.7 mg/dL    Albumin 2.0 (L) 3.5 - 5.0 g/dL        XR ABD (KUB)   Final Result   Air-filled large and small bowel loops. No definite evidence of   mechanical bowel obstruction. Other findings as above. CT HEAD WO CONT   Final Result   Similar findings compared to CT head January 7, 2022.       XR CHEST SNGL V   Final Result           Assessment:     Active Problems:    COVID-19 (12/26/2021)      Seizure-like activity ([address] (1/12/2022)         Patient was in the hospital with seizure-like activity, had UTI treated, stent positive bacteremia,  Patient had heparin yesterday, developed hematemesis, hemoglobin dropped dramatically to 6.5,  Yesterday EGD showed duodenal ulcer, status post epinephrine injection, hemoclips placement,  No Signs of active bleeding     This morning hemoglobin 8.6     Plan:   Renal diet  Renal hemodialysis,  void  heparin  Hold the heparin and any anticoagulation antiplatelet drugs  PPI as ordered to po             Signed By: Anselmo Amaral MD     January 14, 2022        Thank you for allowing me to participate in this patients care  Cc Referring Physician   Venecia Ceja MD

## 2022-01-14 NOTE — PROGRESS NOTES
Consult requested by: Baldemar Alejandra MD    ADMIT DATE: 1/12/2022  CONSULT DATE: January 14, 2022           The patient was transferred to ICU  due to vomiting blood and hypotension. She did receive 2 units of packed RBCs for hemoglobin of 6.4. She was put on IV PPI drip. She was last dialyzed on 1/11. She is currently not on any pressors.     On RA  Pt getting transferred to the medical floor  She will be dialyzed in the dialysis room      Past Medical History:   Diagnosis Date    Anxiety disorder     Arthritis     Atherosclerosis of native arteries of the extremities with ulceration ([address] 8/31/2020    Atrial fibrillation ([address]     Cardiac pacemaker in situ     Cellulitis and abscess of trunk 8/31/2020    Depression, postpartum     Diabetes ([address]     Heart disease     Heartburn     Hx of long term use of blood thinners     Hypercholesterolemia     Hypertension     Hypothyroidism     Migraines     Peripheral venous insufficiency 8/31/2020    Seizures ([address]     Sleep disorder     Stroke ([address]     2019    Varicose veins of lower extremity with inflammation 8/31/2020      Past Surgical History:   Procedure Laterality Date    HX BACK SURGERY      HX OTHER SURGICAL      leg surgery     HX OTHER SURGICAL      pacemaker monitor     IR INSERT TUNL CVC W/O PORT OVER 5 YR  12/10/2021       Social History     Socioeconomic History    Marital status:      Spouse name: Not on file    Number of children: Not on file    Years of education: Not on file    Highest education level: Not on file   Occupational History    Not on file   Tobacco Use    Smoking status: Never Smoker    Smokeless tobacco: Never Used   Vaping Use    Vaping Use: Never used   Substance and Sexual Activity    Alcohol use: Never    Drug use: Never    Sexual activity: Not on file   Other Topics Concern    Not on file   Social History Narrative    Not on file     Social Determinants of Health Financial Resource Strain:     Difficulty of Paying Living Expenses: Not on file   Food Insecurity:     Worried About Running Out of Food in the Last Year: Not on file    Janet of Food in the Last Year: Not on file   Transportation Needs:     Lack of Transportation (Medical): Not on file    Lack of Transportation (Non-Medical): Not on file   Physical Activity:     Days of Exercise per Week: Not on file    Minutes of Exercise per Session: Not on file   Stress:     Feeling of Stress : Not on file   Social Connections:     Frequency of Communication with Friends and Family: Not on file    Frequency of Social Gatherings with Friends and Family: Not on file    Attends Scientologist Services: Not on file    Active Member of [address] [place] [address] [employer] or Organizations: Not on file    Attends Club or Organization Meetings: Not on file    Marital Status: Not on file   Intimate Partner Violence:     Fear of Current or Ex-Partner: Not on file    Emotionally Abused: Not on file    Physically Abused: Not on file    Sexually Abused: Not on file   Housing Stability:     Unable to Pay for Housing in the Last Year: Not on file    Number of [place] in the Last Year: Not on file    Unstable Housing in the Last Year: Not on file       Family History   Problem Relation Age of Onset    Heart Disease Mother     Heart Disease Father     Diabetes Sister     Diabetes Brother      No Known Allergies     Home Medications:     Medications Prior to Admission   Medication Sig    amLODIPine (NORVASC) 5 mg tablet Take 1 Tablet by mouth daily for 60 days.  insulin lispro (HUMALOG) 100 unit/mL injection As per insulin sensitive ssi:    Insulin High Sensitivity (thin, ESRD)  For Blood Sugar (mg/dL) of:              Less than 150 =   0 units  150 -199 =  1 units  200 -249 =   2 units  250 -299 =   3 units  300 -349 =   4 units  350 or greater = 5 units and Call Physician  Initiate Hypoglycemic protocol if blood glucose is <70 mg/dL.     ascorbic acid, vitamin C, (VITAMIN C) 500 mg tablet Take 1 Tablet by mouth two (2) times a day for 30 days.  aspirin 81 mg chewable tablet Take 1 Tablet by mouth daily for 30 days.  vancomycin 1,000 mg 1,000 mg, vial-mate 1 Device IVPB 1,000 mg by IntraVENous route DIALYSIS MON, WED & FRI for 6 doses.  predniSONE (DELTASONE) 20 mg tablet Start prednisone 20 mg PO daily x 2 days then 10 mg PO daily x 2 days then stop.  calcitRIOL (ROCALTROL) 0.5 mcg capsule Take 1 Capsule by mouth daily.  zinc oxide-white petrolatum 17-57 % topical paste Apply  to affected area as needed for Skin Irritation. Indications: skin irritation    ondansetron hcl (Zofran) 4 mg tablet Take 1 Tablet by mouth every eight (8) hours as needed for Nausea or Vomiting.  Vimpat 200 mg tab tablet Take 1 Tablet by mouth two (2) times a day.  ondansetron (Zofran ODT) 4 mg disintegrating tablet 1 Tablet by SubLINGual route every eight (8) hours as needed for Nausea or Vomiting.  atorvastatin (LIPITOR) 40 mg tablet Take 1 tablet by mouth once daily for 90 days    FLUoxetine (PROzac) 10 mg capsule Take 1 Capsule by mouth daily for 270 days.     loperamide (IMODIUM) 2 mg capsule TAKE 1 CAPSULE BY MOUTH SIX TIMES DAILY AS NEEDED FOR 30 DAYS    Euthyrox 150 mcg tablet Take 1 tablet by mouth once daily    levETIRAcetam (KEPPRA XR) 500 mg ER tablet TAKE 2 TABLETS BY MOUTH TWICE DAILY FOR 90 DAYS    sevelamer carbonate (RENVELA) 800 mg tab tab        Current Inpatient Medications:     Current Facility-Administered Medications   Medication Dose Route Frequency    insulin lispro (HUMALOG) injection   SubCUTAneous AC&HS    levETIRAcetam (KEPPRA) tablet 500 mg  500 mg Oral BID    OXcarbazepine (TRILEPTAL) tablet 150 mg  150 mg Oral Q12H    EPINEPHrine HCl (PF) (ADRENALIN) 1 mg/mL (1 mL) injection    PRN    epoetin anjelica-epbx (RETACRIT) injection 10,000 Units  10,000 Units IntraVENous Q MON, WED & FRI    sodium chloride (NS) flush 5-40 mL  5-40 mL IntraVENous PRN    acetaminophen (TYLENOL) tablet 650 mg  650 mg Oral Q6H PRN    Or    acetaminophen (TYLENOL) suppository 650 mg  650 mg Rectal Q6H PRN    polyethylene glycol (MIRALAX) packet 17 g  17 g Oral DAILY PRN    ascorbic acid (vitamin C) (VITAMIN C) tablet 500 mg  500 mg Oral BID    atorvastatin (LIPITOR) tablet 40 mg  40 mg Oral DAILY    calcitRIOL (ROCALTROL) capsule 0.5 mcg  0.5 mcg Oral DAILY    levothyroxine (SYNTHROID) tablet 150 mcg  150 mcg Oral DAILY    FLUoxetine (PROzac) capsule 10 mg  10 mg Oral DAILY    lacosamide (VIMPAT) tablet 200 mg  200 mg Oral BID    zinc oxide-white petrolatum 20-75 % topical paste   Topical PRN    cefTRIAXone (ROCEPHIN) 1 g in sterile water (preservative free) 10 mL IV syringe  1 g IntraVENous Q24H    glucose chewable tablet 16 g  4 Tablet Oral PRN    glucagon (GLUCAGEN) injection 1 mg  1 mg IntraMUSCular PRN    dextrose (D50W) injection syrg 12.5-25 g  25-50 mL IntraVENous PRN    midodrine (PROAMATINE) tablet 5 mg  5 mg Oral TID    sodium chloride (NS) flush 5-40 mL  5-40 mL IntraVENous Q8H    sodium chloride (NS) flush 5-40 mL  5-40 mL IntraVENous PRN    ondansetron (ZOFRAN) injection 4 mg  4 mg IntraVENous Q4H PRN    Or    ondansetron (ZOFRAN ODT) tablet 4 mg  4 mg Oral Q8H PRN    0.9% sodium chloride infusion 250 mL  250 mL IntraVENous PRN    pantoprazole (PROTONIX) 40 mg in 0.9% sodium chloride (MBP/ADV) 50 mL MBP  8 mg/hr IntraVENous CONTINUOUS       Review of Systems:   No fever or chills. No sore throat. No cough or hemoptysis. No shortness of breath or chest pain. No orthopnea or paroxysmal nocturnal dyspnea. No nausea, vomiting, abdominal pain, melena or hematochezia. No ankle swelling, no joint paints. No muscle aches. No skin changes. No dizziness or lightheadedness. No headaches.        Physical Assessment:     Vitals:    01/14/22 1000 01/14/22 1100 01/14/22 1200 01/14/22 1300   BP: (!) 139/58 (!) 125/54 (!) 98/57 (!) 142/90   Pulse: 60 60 60 60   Resp: 15 14 17 15   Temp:  97.9 °F (36.6 °C)     SpO2: 100% 100% 97% 99%   Weight:       Height:         Last 3 Recorded Weights in this Encounter    01/12/22 0753 01/13/22 0151 01/14/22 0600   Weight: 72.6 kg (160 lb) 71.2 kg (156 lb 15.5 oz) 70.7 kg (155 lb 13.8 oz)     Admission weight: Weight: 72.6 kg (160 lb) (01/12/22 0753)      Intake/Output Summary (Last 24 hours) at 1/14/2022 1332  Last data filed at 1/13/2022 1420  Gross per 24 hour   Intake 200 ml   Output    Net 200 ml       Patient is in no apparent distress. HEENT: Head is normocephalic and atraumatic. Pupils are round, equal, reactive to light. Sclerae are anicteric. Oropharynx clear. Neck: no cervical lymphadenopathy or thyromegaly. Lungs: good air entry, clear to auscultation bilaterally. Trachea at the midline. Cardiovascular system: S1, S2, regular rate and rhythm. No murmurs, gallops or rubs. No jvd. Abdomen: soft, non tender, non distended. Positive bowel sounds. No hepatosplenomegaly. No abdominal bruits. Extremities: no clubbing, cyanosis or edema. Strong dorsalis pedis pulses. Brisk capillary refill on the toes bilaterally. Integumentary: skin is grossly intact. Neurologic: Alert, oriented time three. Cooperative and appropriate. No gross motor or sensory deficits. Dialysis access: cuffed tunneled catheter site right.   [facility]     Data Review:    Labs: Results:       Chemistry Recent Labs     01/14/22  0628 01/13/22  0151 01/12/22  2036 01/12/22  0811 01/12/22  0811   GLU 70 95 103*   < > 93    140 140   < > 139   K 5.0 5.4* 5.5*   < > 4.5    107 109*   < > 106   CO2 23 26 24   < > 28   BUN 90* 68* 57*   < > 34*   CREA 3.91* 3.31* 3.20*   < > 3.03*   CA 7.5* 7.0* 7.1*   < > 7.8*   AGAP 9 7 7   < > 5   BUCR 23* 21* 18   < > 11*   AP  --   --  63  --  83   TP  --   --  4.1*  --  4.8*   ALB 2.0*  --  1.6*  --  2.0*   GLOB  --   --  2.5  --  2.8   AGRAT  --   --  0.6*  -- 0.7*   PHOS 4.9*  --   --   --   --     < > = values in this interval not displayed. CBC w/Diff Recent Labs     01/14/22  0628 01/13/22  1546 01/13/22  0400 01/12/22 2036 01/12/22 2036 01/12/22  0811 01/12/22  0811   WBC 8.7 10.3 12.0*   < > 9.6   < > 8.0   RBC 2.78* 2.90* 3.36*   < > 2.02*   < > 3.20*   HGB 8.7* 9.0* 10.5*   < > 6.4*   < > 10.1*   HCT 27.8* 28.5* 33.3*   < > 22.4*   < > 34.4*   * 107* 98*   < > 112*   < > 108*   GRANS  --  84*  --   --  69  --  83*   LYMPH  --  8*  --   --  24  --  10*   EOS  --  0  --   --  0  --  0    < > = values in this interval not displayed. Iron/Ferritin No results for input(s): IRON in the last 72 hours. No lab exists for component: TIBCCALC   PTH/VIT D No results for input(s): PTH in the last 72 hours. No lab exists for component: VITD            Assessment & Plan     1-ESRD:  -HD MWF at [facility], [address] [place] [address]  -No volume overload/uremia  -BUN/Cr 90/3.91  -s/p HD on 1/11  -plan for HD today  -will keep HD MWF   -RI perm cath as active dialysis access  -BORA AVF in place also    2-Acute GI bleed:  -Transferred to ICU on 1/12 due to vomiting blood and hypotension   -Hemoglobin was 6.4 status post blood transfusion 2 units   -On IV PPI drip. -GI on board  -iv Epo with HD      3-Secondary hyperparathyroidism of ESRD. -Ca and phos are ok.  -cont sevelamer 800 mg tid and calcitriol 0.5 mcg daily    - Renal Diet    4-COVID Pneumonia   -- started on empiric abxs because of fever,lactic acidosis and leucocytosis .   -on RA       5- Seizure disorder:  -break thru seizure  -seen by Neuro  -On Vimpat & Keppra    6-MSSA Bactremia:  -BC grew MSSA  12/26/21  -was bee on iv vancomycin   -repeat BC from 1/12 growing GPC in clusters   -will consult ID    7-Hypotension:  -episodes of low BPs  -dced  amlodipine   -put on midodrine 5 mg tid

## 2022-01-14 NOTE — PROGRESS NOTES
TRANSFER - OUT REPORT:    Verbal report given to Leilani Fung (name) on Meghan Colon  being transferred to [address] [place] [address] room 0502 1582 (unit) for routine progression of care. Report consisted of patients Situation, Background, Assessment and   Recommendations(SBAR). Information from the following report(s) ED Summary, Intake/Output, MAR, Recent Results, Med Rec Status and Cardiac Rhythm a-paced was reviewed with the receiving nurse. Lines:   Venous Access Device Upper chest (subclavicular area, right (Active)   Central Line Being Utilized No 01/13/22 1242   Criteria for Appropriate Use Dialysis/apheresis 01/13/22 1242   Site Assessment Clean, dry, & intact 01/13/22 1242   Date of Last Dressing Change 01/13/22 01/13/22 0631   Dressing Status Clean, dry, & intact 01/13/22 1242   Dressing Type Transparent 01/13/22 1242   Action Taken Open ports on tubing capped 01/13/22 0200       Peripheral IV 01/12/22 Left Antecubital (Active)   Site Assessment Clean, dry, & intact 01/14/22 1100   Phlebitis Assessment 0 01/14/22 1100   Infiltration Assessment 0 01/14/22 1100   Dressing Status Clean, dry, & intact 01/14/22 1100   Dressing Type Transparent 01/14/22 1100   Hub Color/Line Status Patent 01/14/22 1100   Action Taken Open ports on tubing capped 01/14/22 0305   Alcohol Cap Used Yes 01/14/22 1100       Peripheral IV 75/66/34 Left Cephalic (Active)   Site Assessment Clean, dry, & intact 01/14/22 1100   Phlebitis Assessment 0 01/14/22 1100   Infiltration Assessment 0 01/14/22 1100   Dressing Status Clean, dry, & intact 01/14/22 1100   Dressing Type Transparent 01/14/22 1100   Hub Color/Line Status Patent 01/14/22 1100   Action Taken Open ports on tubing capped 01/14/22 1100   Alcohol Cap Used Yes 01/14/22 1100        Opportunity for questions and clarification was provided.       Patient transported with:   Monitor

## 2022-01-14 NOTE — ADDENDUM NOTE
Addendum  created 01/13/22 2115 by Stephane Garza MD    Attestation recorded in "[employer] filed

## 2022-01-14 NOTE — PROGRESS NOTES
Physician Progress Note      PATIENT:               Khari Koch  CSN #:                  [id number]  :                       1950  ADMIT DATE:       2022 7:40 AM  [address] [place] DATE:  RESPONDING  PROVIDER #:        Hero Howe MD          QUERY TEXT:    Dear Dr. Davion Jenkins -    Pt admitted with Covid PNA, GI bleed with ABLA, seizure d/o. Noted documentation of sepsis on  by ordered Nephrology consultant. If possible, please document in progress notes and discharge summary:    The medical record reflects the following:  Risk Factors: 80 F recently discharged on 22 presented with questionable seizure and hypotension; developed GI bleed with ABLA; patient Covid +; patient with recent bacteremia  Clinical Indicators: per 22 Nephrology consult note, patient with sepsis; WBC 8.0...9.6... 12.0; lactic acid 2.6. ..3.0; procalcitonin 0.26; afebrile; + hypotension  Treatment: labs, CXR, IV ABT    Thank you,  GHAZALA HerringN, RN, [facility]  Clinical   [phone]  Options provided:  -- Sepsis confirmed present on admission  -- Sepsis ruled out  -- Other - I will add my own diagnosis  -- Disagree - Not applicable / Not valid  -- Disagree - Clinically unable to determine / Unknown  -- Refer to Clinical Documentation Reviewer    PROVIDER RESPONSE TEXT:    The diagnosis of sepsis was ruled out. Query created by: Prakash Gonzalez on 2022 11:06 AM      QUERY TEXT:    Dear Dr. Davion Jenkins -    Pt admitted with GI bleed with ABLA, Covid PNA, seizure d/o. Pt noted to have AMS. If possible, please document in the progress notes and discharge summary if you are evaluating and / or treating any of the following:     The medical record reflects the following:  Risk Factors: 80 F recently discharged on 22 presented with questionable seizure, hypotension; admitted with GI bleed with ABLA, Covid PNA, seizure d/o  Clinical Indicators: per Nephrology consult note dated 22, patient with AMS, likely metabolic and sepsis related; K levels 4.5. ..5.5.. [place] Crape 5.4; creatinine 3.03. ..3.20. [place] Crape .3.31; lactic acid 2.6. ..3.0; Hgb 10.1. ..6.4.. [place] Crape 10.5  Treatment: labs, CXR, IV ABT, PRBC, xf to ICU    Thank you,  GHAZALA GaliciaN, RN, [facility]  Clinical   [phone]  Options provided:  -- Metabolic encephalopathy  -- Septic encephalopathy  -- Toxic encephalopathy  -- Toxic metabolic encephalopathy  -- Delirium due to, Please specify cause. -- Delirium  -- Other - I will add my own diagnosis  -- Disagree - Not applicable / Not valid  -- Disagree - Clinically unable to determine / Unknown  -- Refer to Clinical Documentation Reviewer    PROVIDER RESPONSE TEXT:    This patient has metabolic encephalopathy.     Query created by: Romayne Hobby on 1/13/2022 11:11 AM      Electronically signed by:  Jia Pearson MD 1/14/2022 1:53 PM

## 2022-01-15 LAB
ALBUMIN SERPL-MCNC: 2.3 G/DL (ref 3.5–5)
ANION GAP SERPL CALC-SCNC: 9 MMOL/L (ref 5–15)
BACTERIA SPEC CULT: ABNORMAL
BACTERIA SPEC CULT: ABNORMAL
BACTERIA SPEC CULT: NORMAL
BUN SERPL-MCNC: 50 MG/DL (ref 6–20)
BUN/CREAT SERPL: 16 (ref 12–20)
CA-I BLD-MCNC: 7.9 MG/DL (ref 8.5–10.1)
CHLORIDE SERPL-SCNC: 108 MMOL/L (ref 97–108)
CO2 SERPL-SCNC: 25 MMOL/L (ref 21–32)
CREAT SERPL-MCNC: 3.08 MG/DL (ref 0.55–1.02)
CRP SERPL-MCNC: 0.74 MG/DL (ref 0–0.6)
ERYTHROCYTE [DISTWIDTH] IN BLOOD BY AUTOMATED COUNT: 20.8 % (ref 11.5–14.5)
GLUCOSE BLD STRIP.AUTO-MCNC: 54 MG/DL (ref 65–117)
GLUCOSE BLD STRIP.AUTO-MCNC: 72 MG/DL (ref 65–117)
GLUCOSE BLD STRIP.AUTO-MCNC: 83 MG/DL (ref 65–117)
GLUCOSE BLD STRIP.AUTO-MCNC: 88 MG/DL (ref 65–117)
GLUCOSE SERPL-MCNC: 70 MG/DL (ref 65–100)
HCT VFR BLD AUTO: 32.1 % (ref 35–47)
HGB BLD-MCNC: 9.7 G/DL (ref 11.5–16)
LEVETIRACETAM SERPL-MCNC: 51.9 UG/ML (ref 10–40)
MCH RBC QN AUTO: 31.8 PG (ref 26–34)
MCHC RBC AUTO-ENTMCNC: 30.2 G/DL (ref 30–36.5)
MCV RBC AUTO: 105.2 FL (ref 80–99)
NRBC # BLD: 0 K/UL (ref 0–0.01)
NRBC BLD-RTO: 0 PER 100 WBC
PERFORMED BY, TECHID: ABNORMAL
PERFORMED BY, TECHID: NORMAL
PHOSPHATE SERPL-MCNC: 3.9 MG/DL (ref 2.6–4.7)
PLATELET # BLD AUTO: 100 K/UL (ref 150–400)
PMV BLD AUTO: 10.9 FL (ref 8.9–12.9)
POTASSIUM SERPL-SCNC: 3.8 MMOL/L (ref 3.5–5.1)
PROCALCITONIN SERPL-MCNC: 0.63 NG/ML
RBC # BLD AUTO: 3.05 M/UL (ref 3.8–5.2)
SODIUM SERPL-SCNC: 142 MMOL/L (ref 136–145)
SPECIAL REQUESTS,SREQ: ABNORMAL
SPECIAL REQUESTS,SREQ: NORMAL
VANCOMYCIN SERPL-MCNC: 20.5 UG/ML
WBC # BLD AUTO: 7.4 K/UL (ref 3.6–11)

## 2022-01-15 PROCEDURE — 97530 THERAPEUTIC ACTIVITIES: CPT

## 2022-01-15 PROCEDURE — 36415 COLL VENOUS BLD VENIPUNCTURE: CPT

## 2022-01-15 PROCEDURE — 82962 GLUCOSE BLOOD TEST: CPT

## 2022-01-15 PROCEDURE — 84145 PROCALCITONIN (PCT): CPT

## 2022-01-15 PROCEDURE — 97161 PT EVAL LOW COMPLEX 20 MIN: CPT

## 2022-01-15 PROCEDURE — 74011250637 HC RX REV CODE- 250/637: Performed by: HOSPITALIST

## 2022-01-15 PROCEDURE — 80069 RENAL FUNCTION PANEL: CPT

## 2022-01-15 PROCEDURE — 86140 C-REACTIVE PROTEIN: CPT

## 2022-01-15 PROCEDURE — 74011250637 HC RX REV CODE- 250/637: Performed by: PSYCHIATRY & NEUROLOGY

## 2022-01-15 PROCEDURE — 74011250637 HC RX REV CODE- 250/637: Performed by: INTERNAL MEDICINE

## 2022-01-15 PROCEDURE — 80202 ASSAY OF VANCOMYCIN: CPT

## 2022-01-15 PROCEDURE — 74011000250 HC RX REV CODE- 250: Performed by: HOSPITALIST

## 2022-01-15 PROCEDURE — 85027 COMPLETE CBC AUTOMATED: CPT

## 2022-01-15 PROCEDURE — 65270000029 HC RM PRIVATE

## 2022-01-15 PROCEDURE — 74011250636 HC RX REV CODE- 250/636: Performed by: INTERNAL MEDICINE

## 2022-01-15 PROCEDURE — 97165 OT EVAL LOW COMPLEX 30 MIN: CPT

## 2022-01-15 RX ADMIN — FLUOXETINE HYDROCHLORIDE 10 MG: 10 CAPSULE ORAL at 09:46

## 2022-01-15 RX ADMIN — OXYCODONE HYDROCHLORIDE AND ACETAMINOPHEN 500 MG: 500 TABLET ORAL at 09:46

## 2022-01-15 RX ADMIN — OXYCODONE HYDROCHLORIDE AND ACETAMINOPHEN 500 MG: 500 TABLET ORAL at 21:55

## 2022-01-15 RX ADMIN — MIDODRINE HYDROCHLORIDE 5 MG: 5 TABLET ORAL at 22:47

## 2022-01-15 RX ADMIN — EPOETIN ALFA-EPBX 10000 UNITS: 10000 INJECTION, SOLUTION INTRAVENOUS; SUBCUTANEOUS at 01:15

## 2022-01-15 RX ADMIN — SODIUM CHLORIDE, PRESERVATIVE FREE 10 ML: 5 INJECTION INTRAVENOUS at 21:57

## 2022-01-15 RX ADMIN — ACETAMINOPHEN 650 MG: 325 TABLET ORAL at 22:07

## 2022-01-15 RX ADMIN — CALCITRIOL CAPSULES 0.25 MCG 0.25 MCG: 0.25 CAPSULE ORAL at 09:46

## 2022-01-15 RX ADMIN — MIDODRINE HYDROCHLORIDE 5 MG: 5 TABLET ORAL at 17:30

## 2022-01-15 RX ADMIN — OXCARBAZEPINE 150 MG: 150 TABLET, FILM COATED ORAL at 09:46

## 2022-01-15 RX ADMIN — LEVOTHYROXINE SODIUM 150 MCG: 0.07 TABLET ORAL at 09:46

## 2022-01-15 RX ADMIN — ATORVASTATIN CALCIUM 40 MG: 40 TABLET, FILM COATED ORAL at 09:46

## 2022-01-15 RX ADMIN — LACOSAMIDE 200 MG: 200 TABLET, FILM COATED ORAL at 21:55

## 2022-01-15 RX ADMIN — PANTOPRAZOLE SODIUM 40 MG: 40 TABLET, DELAYED RELEASE ORAL at 09:48

## 2022-01-15 RX ADMIN — OXCARBAZEPINE 150 MG: 150 TABLET, FILM COATED ORAL at 21:56

## 2022-01-15 RX ADMIN — LACOSAMIDE 200 MG: 200 TABLET, FILM COATED ORAL at 09:46

## 2022-01-15 RX ADMIN — MIDODRINE HYDROCHLORIDE 5 MG: 5 TABLET ORAL at 09:46

## 2022-01-15 RX ADMIN — LEVETIRACETAM 500 MG: 250 TABLET, FILM COATED ORAL at 21:56

## 2022-01-15 RX ADMIN — SODIUM CHLORIDE, PRESERVATIVE FREE 10 ML: 5 INJECTION INTRAVENOUS at 05:47

## 2022-01-15 RX ADMIN — LEVETIRACETAM 500 MG: 250 TABLET, FILM COATED ORAL at 09:46

## 2022-01-15 NOTE — PROGRESS NOTES
PHYSICAL THERAPY EVALUATION  Patient: Geovanna Carreon (77 y.o. female)  Date: 1/15/2022   Primary Diagnosis: Seizure-like activity ([facility] [address] [R56.9]  COVID-19 [U07.1]  Procedure(s) (LRB):  ESOPHAGOGASTRODUODENOSCOPY (EGD) (N/A) 2 Days Post-Op   Precautions: Khari Rota (comment) (COVID-19)    ASSESSMENT  Pt is a 70 yo female who previously stayed at [facility] early January 2022. Discharged January 11th and admitted to [facility] ED following day for seizure-like activity and hypotension. Pt transferred to ICU 1/13/2022 due to vomitting blood and hypotension. Pt received transfusion of 2 units RBCs and put on IV PPIs. Pt dialyzed 1/14/2022 and transferred to main floor. Pt is COVID+.      PMH: seizure disorder, CVA, a fib, pacemaker, DM II, HTN, hypercholesterolemia     Per CM note on 1/13, pt lives with her son and daughter in a one story home with steps to enter. Pt uses wheelchair as AD and son assists with transportation. Pt was previously discharged to [facility] for rehab.     Pt received semi-supine, agreeable to PT/OT evaluation. Pt c/o discomfort, slight nausea and spitting up mucous in emesis bag. Pt reported appetite is \"not fully there but doable\", pain in RLE >LLE at this time. Pt able to demonstrate LE ROM bed level, however generally decreased. BUE ROM WFL but with essential tremors present. Pt presents with decreased BLE strength. Pt transferred min Ax2 sidelying then mod Ax2 sidelying>sitting EOB. Pt c/o legs and back pain 10/10 but tolerated upright sitting for pulse oximeter. Pt with tremors and onset of seizure, so not able to present accurate SPO2 reading. Max Ax2 to return pt to semi-supine, presented with sturred speech but appropriate with responses to therapists. Min Ax2 for rolling to adjust chuk pads and mod Ax2 for scooting up in bed. Notified nursing of pt's seizure activity.  Pt presents with decreased activity tolerance, generalized weakness, decreased sitting tolerance, impaired functional mobility which impact her safety and independence with her ADLs. Recommend skilled PT services to address pt's deficits and improve safety for discharge. Recommend pt discharge to SNF when medically appropriate.       Current Level of Function Impacting Discharge (mobility/balance): weakness, decreased activity tolerance    Other factors to consider for discharge: safety, DME needs      PLAN :  Recommendations and Planned Interventions: bed mobility training, transfer training, gait training, therapeutic exercises, patient and family training/education and therapeutic activities      Recommend with staff: pulse ox    Frequency/Duration: Patient will be followed by physical therapy:  3-5x/week to address goals. Recommendation for discharge: (in order for the patient to meet his/her long term goals)  Kaleb Jamison    This discharge recommendation:  Has not yet been discussed the attending provider and/or case management    IF patient discharges home will need the following DME: transfer bench and sliding/transfer board         SUBJECTIVE:   Patient stated My left leg is starting to go bad like the right leg.     OBJECTIVE DATA SUMMARY:   HISTORY:    Past Medical History:   Diagnosis Date    Anxiety disorder     Arthritis     Atherosclerosis of native arteries of the extremities with ulceration ([address] 8/31/2020    Atrial fibrillation ([address]     Cardiac pacemaker in situ     Cellulitis and abscess of trunk 8/31/2020    Depression, postpartum     Diabetes ([address]     Heart disease     Heartburn     Hx of long term use of blood thinners     Hypercholesterolemia     Hypertension     Hypothyroidism     Migraines     Peripheral venous insufficiency 8/31/2020    Seizures ([address]     Sleep disorder     Stroke ([address]     2019    Varicose veins of lower extremity with inflammation 8/31/2020     Past Surgical History:   Procedure Laterality Date    HX BACK SURGERY      HX OTHER SURGICAL      leg surgery     HX OTHER SURGICAL      pacemaker monitor     IR INSERT TUNL CVC W/O PORT OVER 5 YR  12/10/2021       Home Situation  Home Environment: Private residence  One/Two Story Residence: One story  Living Alone: No  Support Systems: Child(jose carlos)  Patient Expects to be Discharged to[de-identified] Skilled nursing facility  Current DME Used/Available at Home: Wheelchair  Tub or Shower Type: Tub/Shower combination    EXAMINATION/PRESENTATION/DECISION MAKING:   Critical Behavior:  Neurologic State: Alert (Nauseated)  Orientation Level: Disoriented to time,Oriented to person,Oriented to place,Oriented to situation  Cognition: Memory loss,Follows commands  Safety/Judgement: Awareness of environment     Range Of Motion:  AROM: Grossly decreased, non-functional           PROM: Generally decreased, functional           Strength:    Strength: Grossly decreased, non-functional        Functional Mobility:  Bed Mobility:  Rolling: Moderate assistance;Assist x2  Supine to Sit: Moderate assistance;Assist x2  Sit to Supine: Moderate assistance;Maximum assistance;Assist x2 (max A required as pt was having seizure)  Scooting: Moderate assistance;Assist x2    Balance:   Sitting: Impaired  Sitting - Static: Fair (occasional); Supported sitting  Sitting - Dynamic: Poor (constant support)         Therapeutic Exercises:   Heel slides, ankle pumps    Functional Measure:  [facility] [address] [address] AM-PAC 6 Clicks         Basic Mobility Inpatient Short Form  How much difficulty does the patient currently have. .. Unable A Lot A Little None   1. Turning over in bed (including adjusting bedclothes, sheets and blankets)? [] 1   [x] 2   [] 3   [] 4   2. Sitting down on and standing up from a chair with arms ( e.g., wheelchair, bedside commode, etc.)   [] 1   [x] 2   [] 3   [] 4   3. Moving from lying on back to sitting on the side of the bed? [] 1   [x] 2   [] 3   [] 4          How much help from another person does the patient currently need. ..  Total A Lot A Little None   4. Moving to and from a bed to a chair (including a wheelchair)? [] 1   [x] 2   [] 3   [] 4   5. Need to walk in hospital room? [] 1   [x] 2   [] 3   [] 4   6. Climbing 3-5 steps with a railing? [] 1   [x] 2   [] 3   [] 4   © 2007, Trustees of [address] [place] [address] [address] [address], under license to [employer]. All rights reserved     Score:  Initial: 12/24 Most Recent: 12/24 (Date: 1/15/22 )   Interpretation of Tool:  Represents activities that are increasingly more difficult (i.e. Bed mobility, Transfers, Gait). Score 24 23 22-20 19-15 14-10 9-7 6   Modifier CH CI CJ CK CL CM CN         Physical Therapy Evaluation Charge Determination   History Examination Presentation Decision-Making   HIGH Complexity :3+ comorbidities / personal factors will impact the outcome/ POC  HIGH Complexity : 4+ Standardized tests and measures addressing body structure, function, activity limitation and / or participation in recreation  MEDIUM Complexity : Evolving with changing characteristics  Other outcome measures [facility] 6  12/24 initial score      Based on the above components, the patient evaluation is determined to be of the following complexity level: MEDIUM    Pain Rating:  10/10 in her legs    Activity Tolerance:   Poor, requires frequent rest breaks and observed SOB with activity   Unable to get accurate reading of SpO2 via pulse ox as pt began having seizure activity    Pt had seizure while sitting at EOB after supine to sit transfer. Required max A x2 to pay patient back down. After treatment patient left in no apparent distress:   Supine in bed, Call bell within reach and Side rails x 3 and NSG updated regarding seizure activity. GOALS:    Problem: Mobility Impaired (Adult and Pediatric)  Goal: *Acute Goals and Plan of Care (Insert Text)  Description: Pt will be I with LE HEP in 7 days. Pt will perform bed mobility with min A in 7 days. Pt will perform transfers with min A in 7 days.    Pt will independently verbalize and perform energy conservation techniques such as pursed lip breathing in 7 days. Pt will demonstrate improvement in static sitting balance from fair to good in 7 days. Outcome: Not Met       COMMUNICATION/EDUCATION:   The patients plan of care was discussed with: Occupational therapist.     Patient/family agree to work toward stated goals and plan of care.       Thank you for this referral.  Subha Herrera, PT, PT, DPT   Time Calculation: 30 mins

## 2022-01-15 NOTE — PROGRESS NOTES
Two person skin assessment performed by Edwin Magaña RN and Leti Pierre RN. Patient has a skin tear to right side of sacrum. Patient has skin tears scattered all over body. Patient has a rash to her left foot and toe nails that do not have cap refill. However, patient has a pedal pulse and says she can feel foot. MD aware. No further skin concerns. Z-guard applied to sacrum.

## 2022-01-15 NOTE — PROGRESS NOTES
Nurse was returning the daughter in [address] [facility] phone call. She was just wanting to check on patient and get an update on patient status. Explained to daughter in law that patient had just arrived up stairs right before this nurse reported to work. Patient is lying in bed, resting quietly. Nurse will continue to monitor and assist as needed.

## 2022-01-15 NOTE — PROGRESS NOTES
Problem: Self Care Deficits Care Plan (Adult)  Goal: *Acute Goals and Plan of Care (Insert Text)  Description: Pt will be minimal assistance/contact guard assist Ax2 sup<->sit in prep for EOB ADL's  Pt will be supervision/set-up  sit EOB 10 minutes in prep for EOB ADL's  Pt will be supervision/set-up  grooming EOB level and SPO2 >88%  Pt will be MI yousuf UE HEP in prep for self care tasks   Outcome: Not Met     Problem: Patient Education: Go to Patient Education Activity  Goal: Patient/Family Education  Outcome: Not Met   OCCUPATIONAL THERAPY EVALUATION  Patient: Burke العراقي (20 y.o. female)  Date: 1/15/2022  Primary Diagnosis: [facility] [R56.9]  COVID-19 [U07.1]  Procedure(s) (LRB):  ESOPHAGOGASTRODUODENOSCOPY (EGD) (N/A) 2 Days Post-Op   Precautions: seizures  , COVID+    ASSESSMENT  Pt is a 72 yo female who was previously stayed at [facility] early January 2022. Discharged January 11th and admitted to [facility] ED following day for seizure-like activity adn hypotension. Pt transferred to ICU 1/13/2022 due to vomitting blood and hypotension. Pt received transfusion of 2 units RBCs and put on IV PPIs. Pt dialyzed 1/14/2022 and transferred to main floor. Pt is COVID+. PMH: seizure disorder, CVA, a fib, pacemaker, DM II, HTN, hypercholesterolemia    Per CM note on 1/13, pt lives with her son and daughter in a one story home with steps to enter. Pt uses wheelchair as AD and son assists with transportation. Pt was previously discharged to [facility] for rehab. Pt received semi-supine, agreeable to OT/PT evaluation. Pt c/o discomfort, slight nausea and spitting up mucous in emesis bag. Pt reported appetite is \"not fully there but doable\", pain in RLE >LLE at this time. Pt able to demonstrate LE ROM bed level. BUE ROM WFL but with essential tremors present. Pt presents with decreased BUE strength but functional with reach during bed mobility.  Pt transferred min Ax2 sidelying then mod Ax2 sidelying>sitting EOB. Pt c/o back pain 10/10 but tolerated upright sitting for pulse oximeter. Pt with tremors and onset of seizure, so not able to present accurate SPO2 reading. Max Ax2 to return pt to semi-supine, presented with sturred speech but appropriate with responses to therapists. Min Ax2 for rolling to adjust chuk pads and mod Ax2 for scooting up in bed. Notified nursing of pt's seizure activity. Pt presents with decreased activity tolerance, generalized weakness, decreased sitting tolerance, impaired functional mobility which impact her safety and independence with her ADLs. Recommend skilled OT services to address pt's deficits and improve safety for discharge. Recommend pt discharge to SNF when medically appropriate. Functional Outcome Measure: The patient scored 11/24 on the AM-PAC outcome measure which is indicative of 60-79% impairment in self care. Other factors to consider for discharge: safety, DME needs, 24/7 supervision     Patient will benefit from skilled therapy intervention to address the above noted impairments. PLAN :  Recommendations and Planned Interventions: self care training, functional mobility training, therapeutic exercise, balance training, therapeutic activities, endurance activities and patient education    Frequency/Duration: Patient will be followed by occupational therapy:  3-5x/week to address goals. Recommendation for discharge: (in order for the patient to meet his/her long term goals)  Kaleb Jamison    This discharge recommendation:  Has not yet been discussed the attending provider and/or case management    IF patient discharges home will need the following DME: hospital bed, transfer board, tub bench       SUBJECTIVE:   Patient stated My left leg is starting to go bad like the right leg.     OBJECTIVE DATA SUMMARY:   HISTORY:   Past Medical History:   Diagnosis Date    Anxiety disorder     Arthritis     Atherosclerosis of native arteries of the extremities with ulceration [facility] 8/31/2020    Atrial fibrillation (H[facility])     Cardiac pacemaker in situ     Cellulitis and abscess of trunk 8/31/2020    Depression, postpartum     Diabetes [facility]     Heart disease     Heartburn     Hx of long term use of blood thinners     Hypercholesterolemia     Hypertension     Hypothyroidism     Migraines     Peripheral venous insufficiency 8/31/2020    Seizures [facility]     Sleep disorder     Stroke ([facility]     2019    Varicose veins of lower extremity with inflammation 8/31/2020     Past Surgical History:   Procedure Laterality Date    HX BACK SURGERY      HX OTHER SURGICAL      leg surgery     HX OTHER SURGICAL      pacemaker monitor     IR INSERT TUNL CVC W/O PORT OVER 5 YR  12/10/2021       Expanded or extensive additional review of patient history:     Home Situation  Home Environment: Private residence  One/Two Story Residence: One story  Living Alone: No  Support Systems: Child(jose carlos)  Patient Expects to be Discharged to[de-identified] Skilled nursing facility  Current DME Used/Available at Home: Wheelchair  Tub or Shower Type: Tub/Shower combination    Hand dominance: Left    EXAMINATION OF PERFORMANCE DEFICITS:  Cognitive/Behavioral Status:  Neurologic State: Alert  Orientation Level: Disoriented to time;Oriented to person;Oriented to place;Oriented to situation  Cognition: Follows commands;Memory loss      Range of Motion:    AROM: Generally decreased, functional  PROM: Within functional limits    Strength:    Strength: Generally decreased, functional    Coordination:  Coordination: Generally decreased, functional    Tone & Sensation:  No abnormal tone    Balance:  Sitting: Impaired  Sitting - Static: Fair (occasional); Supported sitting  Sitting - Dynamic: Poor (constant support)    Functional Mobility and Transfers for ADLs:  Bed Mobility:  Rolling: Moderate assistance;Assist x2  Supine to Sit: Moderate assistance;Assist x2  Sit to Supine:  Moderate assistance;Maximum assistance;Assist x2 (due to seizure like activity)  Scooting: Moderate assistance;Assist x2    Transfers:   Unable to assess due to safety and seizure-like activity    ADL Assessment:     Oral Facial Hygiene/Grooming: Minimum assistance    ADL Intervention and task modifications:    Grooming  Grooming Assistance: Contact guard assistance;Minimum assistance  Position Performed: Long sitting on bed  Washing Face: Contact guard assistance;Minimum assistance      Functional Measure:    [facility] AM-PACTM \"6 Clicks\"                                                       Daily Activity Inpatient Short Form  How much help from another person does the patient currently need. .. Total; A Lot A Little None   1. Putting on and taking off regular lower body clothing? [x]  1 []  2 []  3 []  4   2. Bathing (including washing, rinsing, drying)? []  1 [x]  2 []  3 []  4   3. Toileting, which includes using toilet, bedpan or urinal? [x] 1 []  2 []  3 []  4   4. Putting on and taking off regular upper body clothing? []  1 [x]  2 []  3 []  4   5. Taking care of personal grooming such as brushing teeth? []  1 []  2 [x]  3 []  4   6. Eating meals? []  1 []  2 [x]  3 []  4   © 2007, Trustees of [facility], under license to [employer]. All rights reserved     Score: 11/24     Interpretation of Tool:  Represents clinically-significant functional categories (i.e. Activities of daily living).   Percentage of Impairment CH    0%   CI    1-19% CJ    20-39% CK    40-59% CL    60-79% CM    80-99% CN     100%   [facility]  Score 6-24 24 23 20-22 15-19 10-14 7-9 6       Occupational Therapy Evaluation Charge Determination   History Examination Decision-Making   MEDIUM Complexity : Expanded review of history including physical, cognitive and psychosocial  history  MEDIUM Complexity : 3-5 performance deficits relating to physical, cognitive , or psychosocial skils that result in activity limitations and / or participation restrictions MEDIUM Complexity : Patient may present with comorbidities that affect occupational performnce. Miniml to moderate modification of tasks or assistance (eg, physical or verbal ) with assesment(s) is necessary to enable patient to complete evaluation       Based on the above components, the patient evaluation is determined to be of the following complexity level: MEDIUM  Pain Ratin-10/10 LLE and back; notified nursing    Activity Tolerance:   Poor, requires frequent rest breaks and seizure-like activity  Please refer to the flowsheet for vital signs taken during this treatment. After treatment patient left in no apparent distress:    Supine in bed, Call bell within reach and Side rails x 3    COMMUNICATION/EDUCATION:   The patients plan of care was discussed with: Physical therapist and Registered nurse. Patient/family agree to work toward stated goals and plan of care. This patients plan of care is appropriate for delegation to RICK.     Thank you for this referral.  Esther Trevizo, OT  Time Calculation: 30 mins

## 2022-01-15 NOTE — PROGRESS NOTES
Day #2 of Vancomycin  Consult provided for this 71 y.o. female for indication of bacteremia. Antibiotic regimen:  Vancomycin monotherapy  Concomitant nephrotoxic drugs: None  Frequency of BMP: Not scheduled    Recent Labs     01/15/22  1541 22  0628 22  1546 22  0400 22  0151   WBC 7.4 8.7 10.3   < >  --    CREA 3.08* 3.91*  --   --  3.31*   BUN 50* 90*  --   --  68*    < > = values in this interval not displayed. Est CrCl: ESRD on HD MWF  Temp (24hrs), Av.2 °F (36.8 °C), Min:97.9 °F (36.6 °C), Max:98.4 °F (36.9 °C)    Cultures:    Blood: coagulase-negative Staphylococcus sp.  in the aerobic bottle    MRSA Swab: Not detected     Target range: Trough 20-25 mcg/mL    Recent level history:  Date/Time Dose & Interval Measured Level (mcg/mL)   1/15 @ 1541 1500 mg x 1 20.5        Assessment/Plan:   Afebrile, leukocytosis resolved, procal moderately elevated, CRP mildly elevated, lactic acidosis   Level within therapeutic range, will schedule a level prior to next HD session   Antimicrobial stop date TBD

## 2022-01-15 NOTE — PROGRESS NOTES
Progress Note    Patient: Andrea Mclaughlin MRN: [id number]  SSN: [id number]    YOB: 1950  Age: 71 y.o.   Sex: female      Admit Date: 1/12/2022    LOS: 3 days     Subjective:   Examined, no nausea vomiting     no hematemesis,    Past Medical History:   Diagnosis Date    Anxiety disorder     Arthritis     Atherosclerosis of native arteries of the extremities with ulceration ([facility] 8/31/2020    Atrial fibrillation ([facility]     Cardiac pacemaker in situ     Cellulitis and abscess of trunk 8/31/2020    Depression, postpartum     Diabetes ([facility]     Heart disease     Heartburn     Hx of long term use of blood thinners     Hypercholesterolemia     Hypertension     Hypothyroidism     Migraines     Peripheral venous insufficiency 8/31/2020    Seizures ([facility]     Sleep disorder     Stroke ([facility]     2019    Varicose veins of lower extremity with inflammation 8/31/2020        Current Facility-Administered Medications:     calcitRIOL (ROCALTROL) capsule 0.25 mcg, 0.25 mcg, Oral, DAILY, Georgia Sanon MD, 0.25 mcg at 01/15/22 0946    heparin (porcine) 1,000 unit/mL injection 3,200 Units, 3,200 Units, Hemodialysis, DIALYSIS PRN, Daryle Robes, MD, 3,200 Units at 01/14/22 1801    pantoprazole (PROTONIX) tablet 40 mg, 40 mg, Oral, ACB, Roselia Lozano MD, 40 mg at 01/15/22 0948    vancomycin dose per pharmacy protocol, , Other, Rx Dosing/Monitoring, Nakia Whitt MD    Vancomycin lab reminder - lab draw due AM 01/15/2022, , Other, ONCE, Nakia Whitt MD    insulin lispro (HUMALOG) injection, , SubCUTAneous, AC&HS, Luis Kruger PA-C    levETIRAcetam (KEPPRA) tablet 500 mg, 500 mg, Oral, BID, Isaac Light MD, 500 mg at 01/15/22 0946    OXcarbazepine (TRILEPTAL) tablet 150 mg, 150 mg, Oral, Q12H, Isaac Light MD, 150 mg at 01/15/22 0946    EPINEPHrine HCl (PF) (ADRENALIN) 1 mg/mL (1 mL) injection, , , PRN, Roselia Lozano MD, 0.5 mg at 01/13/22 1411    epoetin anjelica-epbx (RETACRIT) injection 10,000 Units, 10,000 Units, IntraVENous, Q MON, WED & Singh Pierce MD, 10,000 Units at 01/15/22 0115    sodium chloride (NS) flush 5-40 mL, 5-40 mL, IntraVENous, PRN, Margaux Charles MD    acetaminophen (TYLENOL) tablet 650 mg, 650 mg, Oral, Q6H PRN, 650 mg at 01/13/22 1211 **OR** acetaminophen (TYLENOL) suppository 650 mg, 650 mg, Rectal, Q6H PRN, Margaux Charles MD    polyethylene glycol (MIRALAX) packet 17 g, 17 g, Oral, DAILY PRN, Margaux Charles MD    ascorbic acid (vitamin C) (VITAMIN C) tablet 500 mg, 500 mg, Oral, BID, Boyd Jay MD, 500 mg at 01/15/22 0946    atorvastatin (LIPITOR) tablet 40 mg, 40 mg, Oral, DAILY, Boyd Jay MD, 40 mg at 01/15/22 0946    levothyroxine (SYNTHROID) tablet 150 mcg, 150 mcg, Oral, DAILY, Boyd Jay MD, 150 mcg at 01/15/22 0946    FLUoxetine (PROzac) capsule 10 mg, 10 mg, Oral, DAILY, Boyd Jay MD, 10 mg at 01/15/22 0946    lacosamide (VIMPAT) tablet 200 mg, 200 mg, Oral, BID, Boyd Jay MD, 200 mg at 01/15/22 0946    zinc oxide-white petrolatum 20-75 % topical paste, , Topical, PRN, Margaux Charles MD    glucose chewable tablet 16 g, 4 Tablet, Oral, PRN, Margaux Charles MD    glucagon (GLUCAGEN) injection 1 mg, 1 mg, IntraMUSCular, PRN, Boyd Jay MD    dextrose (D50W) injection syrg 12.5-25 g, 25-50 mL, IntraVENous, PRN, Boyd Jay MD, 25 g at 01/13/22 1305    midodrine (PROAMATINE) tablet 5 mg, 5 mg, Oral, TID, Boyd Jay MD, 5 mg at 01/15/22 0946    sodium chloride (NS) flush 5-40 mL, 5-40 mL, IntraVENous, Q8H, Boyd Jay MD, 10 mL at 01/15/22 0547    sodium chloride (NS) flush 5-40 mL, 5-40 mL, IntraVENous, PRN, Margaux Charles MD    ondansetron (ZOFRAN ODT) tablet 4 mg, 4 mg, Oral, Q8H PRN **OR** ondansetron (ZOFRAN) injection 4 mg, 4 mg, IntraVENous, Q4H PRN, Boyd Jay MD, 4 mg at 01/14/22 0351    0.9% sodium chloride infusion 250 mL, 250 mL, IntraVENous, PRN, Tim Sarmiento MD    Objective:     Vitals:    01/14/22 1954 01/15/22 0000 01/15/22 0400 01/15/22 0849   BP:    (!) 109/44   Pulse:  72 68 62   Resp:    19   Temp:    98.4 °F (36.9 °C)   TempSrc:       SpO2: 100%   100%   Weight:       Height:            Intake and Output:  Current Shift: No intake/output data recorded. Last three shifts: 01/13 1901 - 01/15 0700  In: -   Out: 2001     Physical Exam:   Physical Exam  Constitutional:       Appearance: She is ill-appearing. HENT:      Mouth/Throat:      Mouth: Mucous membranes are dry. Eyes:      General:         Left eye: No discharge. Cardiovascular:      Pulses: Normal pulses. Abdominal:      General: Bowel sounds are normal.   Musculoskeletal:         General: Normal range of motion. Cervical back: Neck supple. Skin:     General: Skin is warm. Psychiatric:         Thought Content: Thought content normal.          Lab/Data Review:  Recent Results (from the past 24 hour(s))   GLUCOSE, POC    Collection Time: 01/14/22  9:12 PM   Result Value Ref Range    Glucose (POC) 56 (L) 65 - 117 mg/dL    Performed by Gail, POC    Collection Time: 01/15/22  8:43 AM   Result Value Ref Range    Glucose (POC) 54 (L) 65 - 117 mg/dL    Performed by Amrit Pope CNA (Traveler)    GLUCOSE, POC    Collection Time: 01/15/22 10:08 AM   Result Value Ref Range    Glucose (POC) 83 65 - 117 mg/dL    Performed by Amrit Pope CNA (Traveler)         XR ABD (KUB)   Final Result   Air-filled large and small bowel loops. No definite evidence of   mechanical bowel obstruction. Other findings as above. CT HEAD WO CONT   Final Result   Similar findings compared to CT head January 7, 2022.       XR CHEST SNGL V   Final Result           Assessment:     Active Problems:    COVID-19 (12/26/2021)      Seizure-like activity ([address] (1/12/2022)         Patient was in the hospital with seizure-like activity, had UTI treated, stent positive bacteremia,  Patient had heparin yesterday, developed hematemesis, hemoglobin dropped dramatically to 6.5,  Yesterday EGD showed duodenal ulcer, status post epinephrine injection, hemoclips placement,  No Signs of active bleeding     This morning hemoglobin labs are pending,     Plan:   Renal diet  Renal hemodialysis,    PPI as ordered to po  I will sign off   recommend outpatient colonoscopy             Signed By: Panchito Sandoval MD     January 15, 2022        Thank you for allowing me to participate in this patients care  Cc Referring Physician   Michelle Rodríguez MD

## 2022-01-15 NOTE — PROGRESS NOTES
Hospitalist Progress Note         Argelia Stephens MD          Daily Progress Note: 1/15/2022      Subjective: The patient is seen for follow  up.  80 y.o. female with a past medical history of seizure disorder, hx CVA, atrial fibrillation not on anticoagulation, hx pacemaker, diabetes mellitus, hypertension, hypercholesterolemia presenting to the ED via EMS for seizure-like activity and hypotension. 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 Evaluation in the ED she was noted to be hypotensive, tachycardic and positive for COVID. Admitted to medical telemetry floor. Staff reports several bouts of bloody vomitus and hypotension requiring transfer to the intensive care unit. Dang Toscano Received 2 units of packed red blood cells and fluid bolus with improvement in blood pressure. She is seen in follow-up. Offers no complaints. On room.   1 blood culture from January 12 shows Staphyloccus specie coagulase negative    Problem List:  Problem List as of 1/15/2022 Date Reviewed: 1/3/2022          Codes Class Noted - Resolved    Seizure-like activity ([facility] ICD-10-CM: R56.9  ICD-9-CM: 780.39  1/12/2022 - Present        Sepsis ([facility] ICD-10-CM: A41.9  ICD-9-CM: 038.9, 995.91  1/7/2022 - Present        MSSA bacteremia ICD-10-CM: R78.81, B95.61  ICD-9-CM: 790.7, 041.11  1/3/2022 - Present        Acute on chronic anemia ICD-10-CM: D64.9  ICD-9-CM: 285.9  1/3/2022 - Present        Gait disturbance, post-stroke ICD-10-CM: I69.398, R26.9  ICD-9-CM: 438.89, 781.2  1/3/2022 - Present        COVID-19 ICD-10-CM: U07.1  ICD-9-CM: 079.89  12/26/2021 - Present        Fluid overload ICD-10-CM: E87.70  ICD-9-CM: 276.69  12/5/2021 - Present        Left-sided weakness ICD-10-CM: R53.1  ICD-9-CM: 728.87  12/3/2021 - Present        Intractable nausea and vomiting ICD-10-CM: R11.2  ICD-9-CM: 536.2  12/3/2021 - Present        Hypoglycemia ICD-10-CM: E16.2  ICD-9-CM: 251.2  6/11/2021 - Present        Encephalopathy acute ICD-10-CM: G93.40  ICD-9-CM: 348.30  6/11/2021 - Present        SSS (sick sinus syndrome) ([facility]) ICD-10-CM: I49.5  ICD-9-CM: 427.81  6/11/2021 - Present        Seizure ([facility] ICD-10-CM: R56.9  ICD-9-CM: 780.39  5/19/2021 - Present        Hyperkalemia ICD-10-CM: E87.5  ICD-9-CM: 276.7  10/16/2020 - Present        Atherosclerosis of native arteries of the extremities with ulceration ([facility] ICD-10-CM: I70.25  ICD-9-CM: 440.23, 707.9  8/31/2020 - Present        Cellulitis and abscess of trunk ICD-10-CM: L03.319, L02.219  ICD-9-CM: 682.2  8/31/2020 - Present        Peripheral venous insufficiency ICD-10-CM: I87.2  ICD-9-CM: 459.81  8/31/2020 - Present        Varicose veins of lower extremity with inflammation ICD-10-CM: I83.10  ICD-9-CM: 454.1  8/31/2020 - Present        Peripheral vascular disease ([facility] ICD-10-CM: I73.9  ICD-9-CM: 443.9  7/3/2020 - Present        Type II diabetes mellitus ([facility] ICD-10-CM: E11.9  ICD-9-CM: 250.00  7/3/2020 - Present        Long term (current) use of antibiotics ICD-10-CM: Z79.2  ICD-9-CM: V58.62  7/3/2020 - Present        Atrial fibrillation ([facility] ICD-10-CM: I48.91  ICD-9-CM: 427.31  7/3/2020 - Present        Cardiac pacemaker in situ ICD-10-CM: Z95.0  ICD-9-CM: V45.01  7/3/2020 - Present        Carpal tunnel syndrome of right wrist ICD-10-CM: G56.01  ICD-9-CM: 354.0  7/3/2020 - Present        Chest wall pain ICD-10-CM: R07.89  ICD-9-CM: 786.52  7/3/2020 - Present        Chronic diarrhea ICD-10-CM: K52.9  ICD-9-CM: 787.91  7/3/2020 - Present        Chronic neck pain ICD-10-CM: M54.2, G89.29  ICD-9-CM: 723.1, 338.29  7/3/2020 - Present        End stage renal failure on dialysis [facility]) ICD-10-CM: N18.6, Z99.2  ICD-9-CM: 585.6, V45.11  7/3/2020 - Present        History of CVA (cerebrovascular accident) ICD-10-CM: J56.06  ICD-9-CM: V12.54  7/3/2020 - Present        Hyponatremia ICD-10-CM: E87.1  ICD-9-CM: 276.1  7/3/2020 - Present        Acquired hypothyroidism ICD-10-CM: E03.9  ICD-9-CM: 244.9  7/3/2020 - Present        Breakthrough seizure ([facility] ICD-10-CM: E14.324  ICD-9-CM: 345.91  7/3/2020 - Present        Recurrent falls ICD-10-CM: R29.6  ICD-9-CM: V15.88  7/3/2020 - Present        RESOLVED: Metabolic encephalopathy MZA-45-PK: G93.41  ICD-9-CM: 348.31  1/3/2022 - 1/3/2022        RESOLVED: Sepsis ([facility] ICD-10-CM: A41.9  ICD-9-CM: 038.9, 995.91  1/3/2022 - 1/3/2022        RESOLVED: AMS (altered mental status) ICD-10-CM: T74.68  ICD-9-CM: 780.97  12/26/2021 - 1/3/2022        RESOLVED: Disorder due to well controlled type 2 diabetes mellitus ([facility] ICD-10-CM: E11.8  ICD-9-CM: 250.90  8/31/2020 - 6/11/2021              Medications reviewed  Current Facility-Administered Medications   Medication Dose Route Frequency    calcitRIOL (ROCALTROL) capsule 0.25 mcg  0.25 mcg Oral DAILY    heparin (porcine) 1,000 unit/mL injection 3,200 Units  3,200 Units Hemodialysis DIALYSIS PRN    pantoprazole (PROTONIX) tablet 40 mg  40 mg Oral ACB    vancomycin dose per pharmacy protocol   Other Rx Dosing/Monitoring    Vancomycin lab reminder - lab draw due AM 01/15/2022   Other ONCE    insulin lispro (HUMALOG) injection   SubCUTAneous AC&HS    levETIRAcetam (KEPPRA) tablet 500 mg  500 mg Oral BID    OXcarbazepine (TRILEPTAL) tablet 150 mg  150 mg Oral Q12H    EPINEPHrine HCl (PF) (ADRENALIN) 1 mg/mL (1 mL) injection    PRN    epoetin anjelica-epbx (RETACRIT) injection 10,000 Units  10,000 Units IntraVENous Q MON, WED & FRI    sodium chloride (NS) flush 5-40 mL  5-40 mL IntraVENous PRN    acetaminophen (TYLENOL) tablet 650 mg  650 mg Oral Q6H PRN    Or    acetaminophen (TYLENOL) suppository 650 mg  650 mg Rectal Q6H PRN    polyethylene glycol (MIRALAX) packet 17 g  17 g Oral DAILY PRN    ascorbic acid (vitamin C) (VITAMIN C) tablet 500 mg  500 mg Oral BID    atorvastatin (LIPITOR) tablet 40 mg  40 mg Oral DAILY    levothyroxine (SYNTHROID) tablet 150 mcg  150 mcg Oral DAILY    FLUoxetine (PROzac) capsule 10 mg  10 mg Oral DAILY    lacosamide (VIMPAT) tablet 200 mg  200 mg Oral BID    zinc oxide-white petrolatum 20-75 % topical paste   Topical PRN    glucose chewable tablet 16 g  4 Tablet Oral PRN    glucagon (GLUCAGEN) injection 1 mg  1 mg IntraMUSCular PRN    dextrose (D50W) injection syrg 12.5-25 g  25-50 mL IntraVENous PRN    midodrine (PROAMATINE) tablet 5 mg  5 mg Oral TID    sodium chloride (NS) flush 5-40 mL  5-40 mL IntraVENous Q8H    sodium chloride (NS) flush 5-40 mL  5-40 mL IntraVENous PRN    ondansetron (ZOFRAN) injection 4 mg  4 mg IntraVENous Q4H PRN    Or    ondansetron (ZOFRAN ODT) tablet 4 mg  4 mg Oral Q8H PRN    0.9% sodium chloride infusion 250 mL  250 mL IntraVENous PRN       Review of Systems:   A comprehensive review of systems was negative except for that written in the HPI. Objective:   Physical Exam:     Visit Vitals  BP (!) 109/44   Pulse 62   Temp 98.4 °F (36.9 °C)   Resp 19   Ht 5' 5\" (1.651 m)   Wt 70.7 kg (155 lb 13.8 oz)   SpO2 100%   BMI 25.94 kg/m²      O2 Device: None (Room air)    Temp (24hrs), Av.1 °F (36.7 °C), Min:97.6 °F (36.4 °C), Max:98.4 °F (36.9 °C)    No intake/output data recorded.  1901 - 01/15 0700  In: -   Out:      General:  Alert, cooperative, no distress, appears stated age. Lungs:   Clear to auscultation bilaterally. Chest wall:  No tenderness or deformity. Heart:  Regular rate and rhythm, S1, S2 normal, no murmur, click, rub or gallop. Abdomen:   Soft, non-tender. Bowel sounds normal. No masses,  No organomegaly. Extremities: Extremities normal, atraumatic, no cyanosis or edema. Pulses: 2+ and symmetric all extremities. Skin: Skin color, texture, turgor normal. No rashes or lesions   Neurologic: CNII-XII intact.  No gross sensory or motor deficits     Data Review:       Recent Days:  Recent Labs     01/14/22  0628 01/13/22  1546 01/13/22  0400   WBC 8.7 10.3 12.0*   HGB 8.7* 9.0* 10.5*   HCT 27.8* 28.5* 33.3*   * 107* 98*     Recent Labs     01/14/22  0628 01/13/22  0151 01/12/22  2036    140 140   K 5.0 5.4* 5.5*    107 109*   CO2 23 26 24   GLU 70 95 103*   BUN 90* 68* 57*   CREA 3.91* 3.31* 3.20*   CA 7.5* 7.0* 7.1*   PHOS 4.9*  --   --    ALB 2.0*  --  1.6*   TBILI  --   --  0.4   ALT  --   --  12   INR  --  1.5* 1.3*     No results for input(s): PH, PCO2, PO2, HCO3, FIO2 in the last 72 hours. 24 Hour Results:  Recent Results (from the past 24 hour(s))   GLUCOSE, POC    Collection Time: 01/14/22  9:12 PM   Result Value Ref Range    Glucose (POC) 56 (L) 65 - 117 mg/dL    Performed by Gail, POC    Collection Time: 01/15/22  8:43 AM   Result Value Ref Range    Glucose (POC) 54 (L) 65 - 117 mg/dL    Performed by Anant Ren CNA (Traveler)    GLUCOSE, POC    Collection Time: 01/15/22 10:08 AM   Result Value Ref Range    Glucose (POC) 83 65 - 117 mg/dL    Performed by Anant Ren CNA (Traveler)            Assessment/     Hemorrhagic shock due to upper GI bleed. Resolved. Transferred out of ICU 01/14  Acute on chronic seizures  Urinary tract infection  History of staph aureus bacteremia with likely reinfection on IV Vancomycin  End-stage renal disease on hemodialysis  COVID-19 positive without hypoxia  Paroxysmal atrial fibrillation not on anticoagulation  History of CVA    Plan:  Transfer out of ICU to med telemetry floor  Continue supportive care including IV antibiotics and antiepileptics  Obtain PT OT evaluation tomorrow  Overall clinically improved  May Require skilled care facility placement      Care Plan discussed with: Nurse    Total time spent with patient: 30 minutes.     Mario Alberto Rivero MD

## 2022-01-16 LAB
ALBUMIN SERPL-MCNC: 2 G/DL (ref 3.5–5)
ANION GAP SERPL CALC-SCNC: 9 MMOL/L (ref 5–15)
BUN SERPL-MCNC: 60 MG/DL (ref 6–20)
BUN/CREAT SERPL: 17 (ref 12–20)
CA-I BLD-MCNC: 7.5 MG/DL (ref 8.5–10.1)
CHLORIDE SERPL-SCNC: 107 MMOL/L (ref 97–108)
CO2 SERPL-SCNC: 25 MMOL/L (ref 21–32)
CREAT SERPL-MCNC: 3.53 MG/DL (ref 0.55–1.02)
GLUCOSE BLD STRIP.AUTO-MCNC: 74 MG/DL (ref 65–117)
GLUCOSE BLD STRIP.AUTO-MCNC: 76 MG/DL (ref 65–117)
GLUCOSE BLD STRIP.AUTO-MCNC: 84 MG/DL (ref 65–117)
GLUCOSE SERPL-MCNC: 61 MG/DL (ref 65–100)
PERFORMED BY, TECHID: NORMAL
PHOSPHATE SERPL-MCNC: 4.6 MG/DL (ref 2.6–4.7)
POTASSIUM SERPL-SCNC: 4.1 MMOL/L (ref 3.5–5.1)
SODIUM SERPL-SCNC: 141 MMOL/L (ref 136–145)

## 2022-01-16 PROCEDURE — 74011250637 HC RX REV CODE- 250/637: Performed by: PSYCHIATRY & NEUROLOGY

## 2022-01-16 PROCEDURE — 74011250637 HC RX REV CODE- 250/637: Performed by: INTERNAL MEDICINE

## 2022-01-16 PROCEDURE — 82962 GLUCOSE BLOOD TEST: CPT

## 2022-01-16 PROCEDURE — 74011250637 HC RX REV CODE- 250/637: Performed by: HOSPITALIST

## 2022-01-16 PROCEDURE — 65270000029 HC RM PRIVATE

## 2022-01-16 PROCEDURE — 36415 COLL VENOUS BLD VENIPUNCTURE: CPT

## 2022-01-16 PROCEDURE — 74011000250 HC RX REV CODE- 250: Performed by: HOSPITALIST

## 2022-01-16 PROCEDURE — 80069 RENAL FUNCTION PANEL: CPT

## 2022-01-16 RX ADMIN — LEVETIRACETAM 500 MG: 250 TABLET, FILM COATED ORAL at 21:40

## 2022-01-16 RX ADMIN — OXCARBAZEPINE 150 MG: 150 TABLET, FILM COATED ORAL at 09:30

## 2022-01-16 RX ADMIN — LACOSAMIDE 200 MG: 200 TABLET, FILM COATED ORAL at 09:30

## 2022-01-16 RX ADMIN — LEVOTHYROXINE SODIUM 150 MCG: 0.07 TABLET ORAL at 09:30

## 2022-01-16 RX ADMIN — LACOSAMIDE 200 MG: 200 TABLET, FILM COATED ORAL at 21:41

## 2022-01-16 RX ADMIN — OXCARBAZEPINE 150 MG: 150 TABLET, FILM COATED ORAL at 21:41

## 2022-01-16 RX ADMIN — ACETAMINOPHEN 650 MG: 325 TABLET ORAL at 18:07

## 2022-01-16 RX ADMIN — MIDODRINE HYDROCHLORIDE 5 MG: 5 TABLET ORAL at 18:07

## 2022-01-16 RX ADMIN — ATORVASTATIN CALCIUM 40 MG: 40 TABLET, FILM COATED ORAL at 09:30

## 2022-01-16 RX ADMIN — PANTOPRAZOLE SODIUM 40 MG: 40 TABLET, DELAYED RELEASE ORAL at 09:30

## 2022-01-16 RX ADMIN — LEVETIRACETAM 500 MG: 250 TABLET, FILM COATED ORAL at 09:30

## 2022-01-16 RX ADMIN — OXYCODONE HYDROCHLORIDE AND ACETAMINOPHEN 500 MG: 500 TABLET ORAL at 09:31

## 2022-01-16 RX ADMIN — WHITE PETROLATUM,ZINC OXIDE: 20; 75 PASTE TOPICAL at 22:19

## 2022-01-16 RX ADMIN — OXYCODONE HYDROCHLORIDE AND ACETAMINOPHEN 500 MG: 500 TABLET ORAL at 21:40

## 2022-01-16 RX ADMIN — MIDODRINE HYDROCHLORIDE 5 MG: 5 TABLET ORAL at 09:30

## 2022-01-16 RX ADMIN — SODIUM CHLORIDE, PRESERVATIVE FREE 10 ML: 5 INJECTION INTRAVENOUS at 18:07

## 2022-01-16 RX ADMIN — FLUOXETINE HYDROCHLORIDE 10 MG: 10 CAPSULE ORAL at 09:30

## 2022-01-16 RX ADMIN — SODIUM CHLORIDE, PRESERVATIVE FREE 10 ML: 5 INJECTION INTRAVENOUS at 21:40

## 2022-01-16 RX ADMIN — CALCITRIOL CAPSULES 0.25 MCG 0.25 MCG: 0.25 CAPSULE ORAL at 09:30

## 2022-01-16 RX ADMIN — SODIUM CHLORIDE, PRESERVATIVE FREE 10 ML: 5 INJECTION INTRAVENOUS at 06:08

## 2022-01-16 NOTE — PROGRESS NOTES
Hospitalist Progress Note    Subjective:   Daily Progress Note: 1/16/2022 12:03 PM    Hospital Course:  Koffi Turner is a 80 y.o. female with a past medical history of seizure disorder, hx CVA, atrial fibrillation not on anticoagulation, hx pacemaker, diabetes mellitus, hypertension, hypercholesterolemia presenting to the ED via EMS for seizure-like activity and hypotension. 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 Neurology was consulted at the time and MRI brain did not show any acute changes. She was cleared from neurologic standpoint for discharge. On EMS arrival, they did not find patient to have active seizures, but did find systolic blood pressure low in 90s-100s. Patient also noted to have weakness in right arm from prior stroke. Patient states she has had worsening vision loss over the last 3+ months and can only see through peripheral vision. She was recently + COVID on Dec 26th, 2021. Patient endorses shortness of breath and productive cough. She denies any fever, chills, dizziness, headaches, chest pain, palpitations, N/V, abdominal pain, dysuria, diarrhea, or new sensory/motor deficits. In the ED, blood pressure low at 97/59. Significant labs showing BUN 49, creatinine 3.75, baseline creatinine appears to be around 2.5. Urinalysis suggestive of acute urinary tract infection. Started on IV Rocephin. Neurology consult. Patient admitted for seizures. CT head negative for acute findings similar to prior CT on January 7. Rapid Covid remains positive. Chest x-ray showing improved aeration throughout lungs since previous admission. Staff reports several bouts of bloody vomitus and hypotension requiring transfer to the intensive care unit. Received 2 units of packed red blood cells and fluid bolus with improvement in blood pressure. GI consult.  EGD 1/13/21 showed duodenal ulcer, status post epinephrine injection and hemoclips placement. No active bleeding noted. Hemoglobin stable. Clear from GI perspective. Medically stable for discharge. Pending SNF placement. Subjective:    Patient seen and examined at bedside. Generalized weakness. No further episodes of bloody vomiting. Hemoglobin stable. No overnight events.      Current Facility-Administered Medications   Medication Dose Route Frequency    [START ON 1/17/2022] Vancomycin - Please draw level prior to HD 1/17   Other ONCE    calcitRIOL (ROCALTROL) capsule 0.25 mcg  0.25 mcg Oral DAILY    heparin (porcine) 1,000 unit/mL injection 3,200 Units  3,200 Units Hemodialysis DIALYSIS PRN    pantoprazole (PROTONIX) tablet 40 mg  40 mg Oral ACB    vancomycin dose per pharmacy protocol   Other Rx Dosing/Monitoring    insulin lispro (HUMALOG) injection   SubCUTAneous AC&HS    levETIRAcetam (KEPPRA) tablet 500 mg  500 mg Oral BID    OXcarbazepine (TRILEPTAL) tablet 150 mg  150 mg Oral Q12H    EPINEPHrine HCl (PF) (ADRENALIN) 1 mg/mL (1 mL) injection    PRN    epoetin anjelica-epbx (RETACRIT) injection 10,000 Units  10,000 Units IntraVENous Q MON, WED & FRI    sodium chloride (NS) flush 5-40 mL  5-40 mL IntraVENous PRN    acetaminophen (TYLENOL) tablet 650 mg  650 mg Oral Q6H PRN    Or    acetaminophen (TYLENOL) suppository 650 mg  650 mg Rectal Q6H PRN    polyethylene glycol (MIRALAX) packet 17 g  17 g Oral DAILY PRN    ascorbic acid (vitamin C) (VITAMIN C) tablet 500 mg  500 mg Oral BID    atorvastatin (LIPITOR) tablet 40 mg  40 mg Oral DAILY    levothyroxine (SYNTHROID) tablet 150 mcg  150 mcg Oral DAILY    FLUoxetine (PROzac) capsule 10 mg  10 mg Oral DAILY    lacosamide (VIMPAT) tablet 200 mg  200 mg Oral BID    zinc oxide-white petrolatum 20-75 % topical paste   Topical PRN    glucose chewable tablet 16 g  4 Tablet Oral PRN    glucagon (GLUCAGEN) injection 1 mg  1 mg IntraMUSCular PRN    dextrose (D50W) injection syrg 12.5-25 g  25-50 mL IntraVENous PRN    midodrine (PROAMATINE) tablet 5 mg  5 mg Oral TID    sodium chloride (NS) flush 5-40 mL  5-40 mL IntraVENous Q8H    sodium chloride (NS) flush 5-40 mL  5-40 mL IntraVENous PRN    ondansetron (ZOFRAN) injection 4 mg  4 mg IntraVENous Q4H PRN    Or    ondansetron (ZOFRAN ODT) tablet 4 mg  4 mg Oral Q8H PRN    0.9% sodium chloride infusion 250 mL  250 mL IntraVENous PRN        Review of Systems  Constitutional: Positive for malaise/fatigue. No fevers, No chills. Eyes: Positive for vision loss  ENT: No nasal congestion, No sore throat  Respiratory:  No wheezing, + SOB, +cough, + sputum  Cardiovascular: No chest pain, No lower extremity edema, No Palpitations   Gastrointestinal: No nausea, No vomiting, No diarrhea, No constipation, No abdominal pain  Genitourinary: No frequency, No dysuria, No hematuria  Integument/Breast: No rash, No skin lesion(s), No dryness  Musculoskeletal: No arthralgias, No neck pain, No back pain  Neurological: No headaches, No dizziness, No confusion,  + seizures  Behavioral/Psychiatric: No anxiety, No depression      Objective:     Visit Vitals  BP (!) 100/34 (BP 1 Location: Left lower arm, BP Patient Position: Lying)   Pulse 60   Temp 97.6 °F (36.4 °C)   Resp 18   Ht 5' 5\" (1.651 m)   Wt 70.7 kg (155 lb 13.8 oz)   SpO2 100%   BMI 25.94 kg/m²      O2 Device: None (Room air)    Temp (24hrs), Av °F (36.7 °C), Min:97.6 °F (36.4 °C), Max:98.2 °F (36.8 °C)      No intake/output data recorded.  1901 -  0700  In: -   Out: 1     PHYSICAL EXAM:  General: Ill-appearing  female. Alert, cooperative, no distress  Eye: conjunctivae/corneas clear. PERRL. Central vision loss. Throat and Neck: normal and no erythema or exudates noted. No mass   Lung: Symmetric chest wall expansion. No wheezing, rhonchi, or rales. On room air. Heart: regular rate and rhythm, +S1/S2. No murmur or gallop.    Abdomen: soft, non-tender, non-distended. Bowel sounds normal. No masses,  Extremities:  No peripheral edema. Able to move all extremities normal, atraumatic  Skin: Generalized pallor. Poor turgor. No rashes or lesions. Neurologic: Generalized weakness. AOx3. No dysarthria. No facial droop. R upper extremity 3+/5 strength. Right lower extremity weakness strength 2+/5. Cranial nerves 2-12 and sensation grossly intact.   Psychiatric: non focal       Data Review    Recent Results (from the past 24 hour(s))   GLUCOSE, POC    Collection Time: 01/15/22  3:06 PM   Result Value Ref Range    Glucose (POC) 88 65 - 117 mg/dL    Performed by Markus Aguirre    CBC W/O DIFF    Collection Time: 01/15/22  3:41 PM   Result Value Ref Range    WBC 7.4 3.6 - 11.0 K/uL    RBC 3.05 (L) 3.80 - 5.20 M/uL    HGB 9.7 (L) 11.5 - 16.0 g/dL    HCT 32.1 (L) 35.0 - 47.0 %    .2 (H) 80.0 - 99.0 FL    MCH 31.8 26.0 - 34.0 PG    MCHC 30.2 30.0 - 36.5 g/dL    RDW 20.8 (H) 11.5 - 14.5 %    PLATELET 112 (L) 446 - 400 K/uL    MPV 10.9 8.9 - 12.9 FL    NRBC 0.0 0.0  WBC    ABSOLUTE NRBC 0.00 0.00 - 0.01 K/uL   RENAL FUNCTION PANEL    Collection Time: 01/15/22  3:41 PM   Result Value Ref Range    Sodium 142 136 - 145 mmol/L    Potassium 3.8 3.5 - 5.1 mmol/L    Chloride 108 97 - 108 mmol/L    CO2 25 21 - 32 mmol/L    Anion gap 9 5 - 15 mmol/L    Glucose 70 65 - 100 mg/dL    BUN 50 (H) 6 - 20 mg/dL    Creatinine 3.08 (H) 0.55 - 1.02 mg/dL    BUN/Creatinine ratio 16 12 - 20      GFR est AA 18 (L) >60 ml/min/1.73m2    GFR est non-AA 15 (L) >60 ml/min/1.73m2    Calcium 7.9 (L) 8.5 - 10.1 mg/dL    Phosphorus 3.9 2.6 - 4.7 mg/dL    Albumin 2.3 (L) 3.5 - 5.0 g/dL   C REACTIVE PROTEIN, QT    Collection Time: 01/15/22  3:41 PM   Result Value Ref Range    C-Reactive protein 0.74 (H) 0.00 - 0.60 mg/dL   PROCALCITONIN    Collection Time: 01/15/22  3:41 PM   Result Value Ref Range    Procalcitonin 0.63 (H) 0 ng/mL   VANCOMYCIN, RANDOM    Collection Time: 01/15/22  3:41 PM   Result Value Ref Range    Vancomycin, random 20.5 ug/mL   GLUCOSE, POC    Collection Time: 01/15/22 10:30 PM   Result Value Ref Range    Glucose (POC) 72 65 - 117 mg/dL    Performed by Ro Ny RN    RENAL FUNCTION PANEL    Collection Time: 01/16/22  8:40 AM   Result Value Ref Range    Sodium 141 136 - 145 mmol/L    Potassium 4.1 3.5 - 5.1 mmol/L    Chloride 107 97 - 108 mmol/L    CO2 25 21 - 32 mmol/L    Anion gap 9 5 - 15 mmol/L    Glucose 61 (L) 65 - 100 mg/dL    BUN 60 (H) 6 - 20 mg/dL    Creatinine 3.53 (H) 0.55 - 1.02 mg/dL    BUN/Creatinine ratio 17 12 - 20      GFR est AA 15 (L) >60 ml/min/1.73m2    GFR est non-AA 13 (L) >60 ml/min/1.73m2    Calcium 7.5 (L) 8.5 - 10.1 mg/dL    Phosphorus 4.6 2.6 - 4.7 mg/dL    Albumin 2.0 (L) 3.5 - 5.0 g/dL       XR ABD (KUB)   Final Result   Air-filled large and small bowel loops. No definite evidence of   mechanical bowel obstruction. Other findings as above. CT HEAD WO CONT   Final Result   Similar findings compared to CT head January 7, 2022. XR CHEST SNGL V   Final Result          Active Problems:    COVID-19 (12/26/2021)      Seizure-like activity (HCC) (1/12/2022)        Assessment/Plan:     1.  Acute on chronic seizures   - Continue Keppra 1000 mg BID  - Continue lacosamide 200 mg BID  - Neurology consultation  - CT of the head with no acute process  - Recent MRI brain does not show any evidence of acute infarct or hemorrhage. 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 Hemorrhagic shock s/t upper GI bleed   - Resolved  - Received 2 units of packed red blood cells  - GI consult  - EGD 1/13/21 showed duodenal ulcer, status post epinephrine injection and hemoclips placement. No active bleeding noted. - Protonix 40 mg daily   - Hemoglobin stable  - Clear from GI perspective     3.  Urinary tract infection  - Seen on UA. Follow urine cultures. - S/p IV ceftriaxone      4.  History of staph aureus bacteremia - on 12/26  - No indications for IV vancomycin at this time   - Blood cultures showing no growth so far  - No white count evident or febrile illness     5.  End-stage renal disease  - Renal consultation  - Continue hemodialysis as previously scheduled, Tues, Th and Sat     6.  Hypothyroidism  - TSH normal  - Continue levothyroxine     7.  COVID-19 positive - Stable     8. Diabetes mellitus - ISS and accu-checks     9. Atrial fibrillation not on anticoagulation - Continue ASA and statin.      10. Hx CVA - continue ASA and statin      DVT Prophylaxis: SCDs  GI Prophylaxis: Protonix  Code Status: Full  POA:    Discharge Barriers:   - PT/OT recommending SNF   - Medically stable for discharge   - Awaiting SNF placement  Care Plan discussed with: patient and nursing    Total time spent with patient: >35 minutes.

## 2022-01-16 NOTE — PROGRESS NOTES
Spoke with daughter in law, in regards to patient update. Daughter in law stated that she would call back tomorrow to face chat with mother in law. Nurse will relay in report tomorrow that family wants to do face time.

## 2022-01-16 NOTE — PROGRESS NOTES
Renal Progress Note    Patient: Viridiana Yost MRN: [id number]  SSN: [id number]    YOB: 1950  Age: 71 y.o. Sex: female      Admit Date: 1/12/2022    LOS: 4 days     Subjective:   Patient seen at bedside. Alert and awake, no acute distress. No new c/o today  No shortness of breath.          Current Facility-Administered Medications   Medication Dose Route Frequency    [START ON 1/17/2022] Vancomycin - Please draw level prior to HD 1/17   Other ONCE    calcitRIOL (ROCALTROL) capsule 0.25 mcg  0.25 mcg Oral DAILY    heparin (porcine) 1,000 unit/mL injection 3,200 Units  3,200 Units Hemodialysis DIALYSIS PRN    pantoprazole (PROTONIX) tablet 40 mg  40 mg Oral ACB    vancomycin dose per pharmacy protocol   Other Rx Dosing/Monitoring    insulin lispro (HUMALOG) injection   SubCUTAneous AC&HS    levETIRAcetam (KEPPRA) tablet 500 mg  500 mg Oral BID    OXcarbazepine (TRILEPTAL) tablet 150 mg  150 mg Oral Q12H    EPINEPHrine HCl (PF) (ADRENALIN) 1 mg/mL (1 mL) injection    PRN    epoetin anjelica-epbx (RETACRIT) injection 10,000 Units  10,000 Units IntraVENous Q MON, WED & FRI    sodium chloride (NS) flush 5-40 mL  5-40 mL IntraVENous PRN    acetaminophen (TYLENOL) tablet 650 mg  650 mg Oral Q6H PRN    Or    acetaminophen (TYLENOL) suppository 650 mg  650 mg Rectal Q6H PRN    polyethylene glycol (MIRALAX) packet 17 g  17 g Oral DAILY PRN    ascorbic acid (vitamin C) (VITAMIN C) tablet 500 mg  500 mg Oral BID    atorvastatin (LIPITOR) tablet 40 mg  40 mg Oral DAILY    levothyroxine (SYNTHROID) tablet 150 mcg  150 mcg Oral DAILY    FLUoxetine (PROzac) capsule 10 mg  10 mg Oral DAILY    lacosamide (VIMPAT) tablet 200 mg  200 mg Oral BID    zinc oxide-white petrolatum 20-75 % topical paste   Topical PRN    glucose chewable tablet 16 g  4 Tablet Oral PRN    glucagon (GLUCAGEN) injection 1 mg  1 mg IntraMUSCular PRN    dextrose (D50W) injection syrg 12.5-25 g  25-50 mL IntraVENous PRN    midodrine (PROAMATINE) tablet 5 mg  5 mg Oral TID    sodium chloride (NS) flush 5-40 mL  5-40 mL IntraVENous Q8H    sodium chloride (NS) flush 5-40 mL  5-40 mL IntraVENous PRN    ondansetron (ZOFRAN) injection 4 mg  4 mg IntraVENous Q4H PRN    Or    ondansetron (ZOFRAN ODT) tablet 4 mg  4 mg Oral Q8H PRN    0.9% sodium chloride infusion 250 mL  250 mL IntraVENous PRN        Vitals:    01/15/22 2247 01/16/22 0000 01/16/22 0400 01/16/22 0910   BP: (!) 117/47   (!) 100/34   Pulse: 60 64 68 60   Resp:    18   Temp:    97.6 °F (36.4 °C)   TempSrc:       SpO2:    100%   Weight:       Height:         Objective:   General: alert awake , no acute distress. HEENT: EOMI, no Icterus, no Pallor,  mucosa moist.  Neck: Neck is supple, No JVD  Lungs: breathsounds normal,  no rhonchi, no rales,   CVS: heart sounds normal, no murmurs, no rubs. GI: soft, nontender, normal BS. Extremeties: no cyanosis, no edema,   Neuro: Alert, awake,   Skin: normal skin turgor, no skin rashes. Intake and Output:  Current Shift: No intake/output data recorded. Last three shifts: 01/14 1901 - 01/16 0700  In: -   Out: 1       Lab/Data Review:  Recent Labs     01/15/22  1541 01/14/22  0628   WBC 7.4 8.7   HGB 9.7* 8.7*   HCT 32.1* 27.8*   * 108*     Recent Labs     01/16/22  0840 01/15/22  1541 01/14/22  0628    142 140   K 4.1 3.8 5.0    108 108   CO2 25 25 23   GLU 61* 70 70   BUN 60* 50* 90*   CREA 3.53* 3.08* 3.91*   CA 7.5* 7.9* 7.5*   PHOS 4.6 3.9 4.9*   ALB 2.0* 2.3* 2.0*     No results for input(s): PH, PCO2, PO2, HCO3, FIO2 in the last 72 hours.   Recent Results (from the past 24 hour(s))   GLUCOSE, POC    Collection Time: 01/15/22 10:30 PM   Result Value Ref Range    Glucose (POC) 72 65 - 117 mg/dL    Performed by Ro Ny RN    RENAL FUNCTION PANEL    Collection Time: 01/16/22  8:40 AM   Result Value Ref Range    Sodium 141 136 - 145 mmol/L    Potassium 4.1 3.5 - 5.1 mmol/L    Chloride 107 97 - 108 mmol/L CO2 25 21 - 32 mmol/L    Anion gap 9 5 - 15 mmol/L    Glucose 61 (L) 65 - 100 mg/dL    BUN 60 (H) 6 - 20 mg/dL    Creatinine 3.53 (H) 0.55 - 1.02 mg/dL    BUN/Creatinine ratio 17 12 - 20      GFR est AA 15 (L) >60 ml/min/1.73m2    GFR est non-AA 13 (L) >60 ml/min/1.73m2    Calcium 7.5 (L) 8.5 - 10.1 mg/dL    Phosphorus 4.6 2.6 - 4.7 mg/dL    Albumin 2.0 (L) 3.5 - 5.0 g/dL   GLUCOSE, POC    Collection Time: 01/16/22  3:42 PM   Result Value Ref Range    Glucose (POC) 84 65 - 117 mg/dL    Performed by King Analisa and Plan:     1-ESRD:-HD MWF at [facility]  -No volume overload/uremia  next hemodialysis arranged for tomorrow  Will continue maintenance HD MWF   -RI perm cath as active dialysis access  -BORA AVF in place also     2-Acute GI bleed: Improved and stable hemoglobin posttransfusion  Continue to monitor H&H  -GI on board  -iv Epo with HD      3-Secondary hyperparathyroidism of ESRD.    -Ca and phos are ok.  -cont sevelamer 800 mg tid and calcitriol 0.5 mcg daily    - Renal Diet     4-COVID Pneumonia : Clinically stable  Continue antibiotics and medical management as per primary service    - Seizure disorder:-break thru seizure  -seen by Neuro, -On Vimpat & Keppra     6-GPC Bactremia: -BC grew MSSA  12/26/21   on iv vancomycin   -repeat BC 1of 2 from 1/12 growing GPC in clusters   ID following the patient      7-Hypotension: increase midodrine to 10 tid, continue to monitor    Signed By: Sofia Cruz MD     January 16, 2022

## 2022-01-16 NOTE — PROGRESS NOTES
DC order noted. Chart reviewed. Called Liaison for [facility], who has accepted this pt for post hospital care. The pt will need authorization for the SNF/rehab care. [email or web address] will initiate the process today. Do not expect confirmation before Monday 01/17/2022. Delay entered  Facility updated.

## 2022-01-16 NOTE — PROGRESS NOTES
Renal Progress Note    Patient: Meghan Colon MRN: [id number]  SSN: [id number]    YOB: 1950  Age: 71 y.o. Sex: female      Admit Date: 1/12/2022    LOS: 3 days     Subjective:   Patient seen at bedside. Alert and awake, no acute distress. No swelling in lower extremities. No shortness of breath.          Current Facility-Administered Medications   Medication Dose Route Frequency    [START ON 1/17/2022] Vancomycin - Please draw level prior to HD 1/17   Other ONCE    calcitRIOL (ROCALTROL) capsule 0.25 mcg  0.25 mcg Oral DAILY    heparin (porcine) 1,000 unit/mL injection 3,200 Units  3,200 Units Hemodialysis DIALYSIS PRN    pantoprazole (PROTONIX) tablet 40 mg  40 mg Oral ACB    vancomycin dose per pharmacy protocol   Other Rx Dosing/Monitoring    insulin lispro (HUMALOG) injection   SubCUTAneous AC&HS    levETIRAcetam (KEPPRA) tablet 500 mg  500 mg Oral BID    OXcarbazepine (TRILEPTAL) tablet 150 mg  150 mg Oral Q12H    EPINEPHrine HCl (PF) (ADRENALIN) 1 mg/mL (1 mL) injection    PRN    epoetin anjelica-epbx (RETACRIT) injection 10,000 Units  10,000 Units IntraVENous Q MON, WED & FRI    sodium chloride (NS) flush 5-40 mL  5-40 mL IntraVENous PRN    acetaminophen (TYLENOL) tablet 650 mg  650 mg Oral Q6H PRN    Or    acetaminophen (TYLENOL) suppository 650 mg  650 mg Rectal Q6H PRN    polyethylene glycol (MIRALAX) packet 17 g  17 g Oral DAILY PRN    ascorbic acid (vitamin C) (VITAMIN C) tablet 500 mg  500 mg Oral BID    atorvastatin (LIPITOR) tablet 40 mg  40 mg Oral DAILY    levothyroxine (SYNTHROID) tablet 150 mcg  150 mcg Oral DAILY    FLUoxetine (PROzac) capsule 10 mg  10 mg Oral DAILY    lacosamide (VIMPAT) tablet 200 mg  200 mg Oral BID    zinc oxide-white petrolatum 20-75 % topical paste   Topical PRN    glucose chewable tablet 16 g  4 Tablet Oral PRN    glucagon (GLUCAGEN) injection 1 mg  1 mg IntraMUSCular PRN    dextrose (D50W) injection syrg 12.5-25 g  25-50 mL IntraVENous PRN    midodrine (PROAMATINE) tablet 5 mg  5 mg Oral TID    sodium chloride (NS) flush 5-40 mL  5-40 mL IntraVENous Q8H    sodium chloride (NS) flush 5-40 mL  5-40 mL IntraVENous PRN    ondansetron (ZOFRAN) injection 4 mg  4 mg IntraVENous Q4H PRN    Or    ondansetron (ZOFRAN ODT) tablet 4 mg  4 mg Oral Q8H PRN    0.9% sodium chloride infusion 250 mL  250 mL IntraVENous PRN        Vitals:    01/15/22 0400 01/15/22 0849 01/15/22 1529 01/15/22 1648   BP:  (!) 109/44 (!) 141/69 (!) 141/69   Pulse: 68 62 66 66   Resp:  19 18 18   Temp:  98.4 °F (36.9 °C) 98.2 °F (36.8 °C) 98.2 °F (36.8 °C)   TempSrc:       SpO2:  100% 96%    Weight:       Height:         Objective:   General: alert awake , no acute distress. HEENT: EOMI, no Icterus, no Pallor,  mucosa moist.  Neck: Neck is supple, No JVD  Lungs: breathsounds normal,  no rhonchi, no rales,   CVS: heart sounds normal, no murmurs, no rubs. GI: soft, nontender, normal BS. Extremeties: no cyanosis, no edema,   Neuro: Alert, awake,   Skin: normal skin turgor, no skin rashes. Intake and Output:  Current Shift: No intake/output data recorded. Last three shifts: 01/14 0701 - 01/15 1900  In: -   Out: 2001       Lab/Data Review:  Recent Labs     01/15/22  1541 01/14/22  0628 01/13/22  1546   WBC 7.4 8.7 10.3   HGB 9.7* 8.7* 9.0*   HCT 32.1* 27.8* 28.5*   * 108* 107*     Recent Labs     01/15/22  1541 01/14/22  0628 01/13/22  0151    140 140   K 3.8 5.0 5.4*    108 107   CO2 25 23 26   GLU 70 70 95   BUN 50* 90* 68*   CREA 3.08* 3.91* 3.31*   CA 7.9* 7.5* 7.0*   PHOS 3.9 4.9*  --    ALB 2.3* 2.0*  --    INR  --   --  1.5*     No results for input(s): PH, PCO2, PO2, HCO3, FIO2 in the last 72 hours.   Recent Results (from the past 24 hour(s))   GLUCOSE, POC    Collection Time: 01/14/22  9:12 PM   Result Value Ref Range    Glucose (POC) 56 (L) 65 - 117 mg/dL    Performed by Gail POC    Collection Time: 01/15/22  8:43 AM   Result Value Ref Range    Glucose (POC) 54 (L) 65 - 117 mg/dL    Performed by Ro Patterson CNA (Traveler)    GLUCOSE, POC    Collection Time: 01/15/22 10:08 AM   Result Value Ref Range    Glucose (POC) 83 65 - 117 mg/dL    Performed by Ro Patterson CNA (Traveler)    GLUCOSE, POC    Collection Time: 01/15/22  3:06 PM   Result Value Ref Range    Glucose (POC) 88 65 - 117 mg/dL    Performed by Rasheed Mathews    CBC W/O DIFF    Collection Time: 01/15/22  3:41 PM   Result Value Ref Range    WBC 7.4 3.6 - 11.0 K/uL    RBC 3.05 (L) 3.80 - 5.20 M/uL    HGB 9.7 (L) 11.5 - 16.0 g/dL    HCT 32.1 (L) 35.0 - 47.0 %    .2 (H) 80.0 - 99.0 FL    MCH 31.8 26.0 - 34.0 PG    MCHC 30.2 30.0 - 36.5 g/dL    RDW 20.8 (H) 11.5 - 14.5 %    PLATELET 476 (L) 454 - 400 K/uL    MPV 10.9 8.9 - 12.9 FL    NRBC 0.0 0.0  WBC    ABSOLUTE NRBC 0.00 0.00 - 0.01 K/uL   RENAL FUNCTION PANEL    Collection Time: 01/15/22  3:41 PM   Result Value Ref Range    Sodium 142 136 - 145 mmol/L    Potassium 3.8 3.5 - 5.1 mmol/L    Chloride 108 97 - 108 mmol/L    CO2 25 21 - 32 mmol/L    Anion gap 9 5 - 15 mmol/L    Glucose 70 65 - 100 mg/dL    BUN 50 (H) 6 - 20 mg/dL    Creatinine 3.08 (H) 0.55 - 1.02 mg/dL    BUN/Creatinine ratio 16 12 - 20      GFR est AA 18 (L) >60 ml/min/1.73m2    GFR est non-AA 15 (L) >60 ml/min/1.73m2    Calcium 7.9 (L) 8.5 - 10.1 mg/dL    Phosphorus 3.9 2.6 - 4.7 mg/dL    Albumin 2.3 (L) 3.5 - 5.0 g/dL   C REACTIVE PROTEIN, QT    Collection Time: 01/15/22  3:41 PM   Result Value Ref Range    C-Reactive protein 0.74 (H) 0.00 - 0.60 mg/dL   PROCALCITONIN    Collection Time: 01/15/22  3:41 PM   Result Value Ref Range    Procalcitonin 0.63 (H) 0 ng/mL   VANCOMYCIN, RANDOM    Collection Time: 01/15/22  3:41 PM   Result Value Ref Range    Vancomycin, random 20.5 ug/mL        Assessment and Plan:     1-ESRD:-HD MWF at [facility]  -No volume overload/uremia  Status post hemodialysis yesterday  Will continue maintenance HD MWF -RI perm cath as active dialysis access  -BORA AVF in place also     2-Acute GI bleed: Improved and stable hemoglobin posttransfusion  Continue to monitor H&H  -GI on board  -iv Epo with HD        3-Secondary hyperparathyroidism of ESRD.    -Ca and phos are ok.  -cont sevelamer 800 mg tid and calcitriol 0.5 mcg daily    - Renal Diet     4-COVID Pneumonia : Clinically stable  Continue antibiotics and medical management as per primary service    - Seizure disorder:-break thru seizure  -seen by Neuro, -On Vimpat & Keppra     6-GPC Bactremia: -BC grew MSSA  12/26/21   on iv vancomycin   -repeat BC 1of 2 from 1/12 growing GPC in clusters   ID following the patient       7-Hypotension: Improved hemodynamic status with midodrine, continue to monitor    Signed By: Cassie Sandra MD     January 15, 2022

## 2022-01-17 VITALS
HEIGHT: 65 IN | WEIGHT: 155.87 LBS | SYSTOLIC BLOOD PRESSURE: 120 MMHG | DIASTOLIC BLOOD PRESSURE: 60 MMHG | HEART RATE: 68 BPM | RESPIRATION RATE: 16 BRPM | OXYGEN SATURATION: 98 % | TEMPERATURE: 98.1 F | BODY MASS INDEX: 25.97 KG/M2

## 2022-01-17 LAB
ALBUMIN SERPL-MCNC: 2.2 G/DL (ref 3.5–5)
ANION GAP SERPL CALC-SCNC: 8 MMOL/L (ref 5–15)
BUN SERPL-MCNC: 67 MG/DL (ref 6–20)
BUN/CREAT SERPL: 16 (ref 12–20)
CA-I BLD-MCNC: 7.7 MG/DL (ref 8.5–10.1)
CHLORIDE SERPL-SCNC: 107 MMOL/L (ref 97–108)
CO2 SERPL-SCNC: 26 MMOL/L (ref 21–32)
CREAT SERPL-MCNC: 4.16 MG/DL (ref 0.55–1.02)
CRP SERPL-MCNC: 0.42 MG/DL (ref 0–0.6)
ERYTHROCYTE [DISTWIDTH] IN BLOOD BY AUTOMATED COUNT: 20.2 % (ref 11.5–14.5)
GLUCOSE BLD STRIP.AUTO-MCNC: 107 MG/DL (ref 65–117)
GLUCOSE BLD STRIP.AUTO-MCNC: 51 MG/DL (ref 65–117)
GLUCOSE BLD STRIP.AUTO-MCNC: 51 MG/DL (ref 65–117)
GLUCOSE BLD STRIP.AUTO-MCNC: 67 MG/DL (ref 65–117)
GLUCOSE SERPL-MCNC: 87 MG/DL (ref 65–100)
HCT VFR BLD AUTO: 31 % (ref 35–47)
HGB BLD-MCNC: 9.1 G/DL (ref 11.5–16)
MCH RBC QN AUTO: 30.8 PG (ref 26–34)
MCHC RBC AUTO-ENTMCNC: 29.4 G/DL (ref 30–36.5)
MCV RBC AUTO: 105.1 FL (ref 80–99)
NRBC # BLD: 0 K/UL (ref 0–0.01)
NRBC BLD-RTO: 0 PER 100 WBC
PERFORMED BY, TECHID: ABNORMAL
PERFORMED BY, TECHID: ABNORMAL
PERFORMED BY, TECHID: NORMAL
PERFORMED BY, TECHID: NORMAL
PHOSPHATE SERPL-MCNC: 5.1 MG/DL (ref 2.6–4.7)
PLATELET # BLD AUTO: 96 K/UL (ref 150–400)
PMV BLD AUTO: 10.4 FL (ref 8.9–12.9)
POTASSIUM SERPL-SCNC: 4.5 MMOL/L (ref 3.5–5.1)
PROCALCITONIN SERPL-MCNC: 0.36 NG/ML
RBC # BLD AUTO: 2.95 M/UL (ref 3.8–5.2)
SODIUM SERPL-SCNC: 141 MMOL/L (ref 136–145)
VANCOMYCIN SERPL-MCNC: 19.1 UG/ML
WBC # BLD AUTO: 5.8 K/UL (ref 3.6–11)

## 2022-01-17 PROCEDURE — 74011250636 HC RX REV CODE- 250/636: Performed by: HOSPITALIST

## 2022-01-17 PROCEDURE — 84145 PROCALCITONIN (PCT): CPT

## 2022-01-17 PROCEDURE — 90935 HEMODIALYSIS ONE EVALUATION: CPT

## 2022-01-17 PROCEDURE — 74011250637 HC RX REV CODE- 250/637: Performed by: HOSPITALIST

## 2022-01-17 PROCEDURE — 74011250636 HC RX REV CODE- 250/636: Performed by: INTERNAL MEDICINE

## 2022-01-17 PROCEDURE — 80202 ASSAY OF VANCOMYCIN: CPT

## 2022-01-17 PROCEDURE — 99232 SBSQ HOSP IP/OBS MODERATE 35: CPT | Performed by: INTERNAL MEDICINE

## 2022-01-17 PROCEDURE — 74011250637 HC RX REV CODE- 250/637

## 2022-01-17 PROCEDURE — 74011250637 HC RX REV CODE- 250/637: Performed by: PSYCHIATRY & NEUROLOGY

## 2022-01-17 PROCEDURE — 82962 GLUCOSE BLOOD TEST: CPT

## 2022-01-17 PROCEDURE — 85027 COMPLETE CBC AUTOMATED: CPT

## 2022-01-17 PROCEDURE — 86140 C-REACTIVE PROTEIN: CPT

## 2022-01-17 PROCEDURE — 80069 RENAL FUNCTION PANEL: CPT

## 2022-01-17 PROCEDURE — 74011250637 HC RX REV CODE- 250/637: Performed by: INTERNAL MEDICINE

## 2022-01-17 PROCEDURE — 36415 COLL VENOUS BLD VENIPUNCTURE: CPT

## 2022-01-17 PROCEDURE — 74011000250 HC RX REV CODE- 250: Performed by: HOSPITALIST

## 2022-01-17 RX ORDER — TRAMADOL HYDROCHLORIDE 50 MG/1
TABLET ORAL
Status: COMPLETED
Start: 2022-01-17 | End: 2022-01-17

## 2022-01-17 RX ORDER — PANTOPRAZOLE SODIUM 40 MG/1
40 TABLET, DELAYED RELEASE ORAL
Qty: 30 TABLET | Refills: 0 | Status: SHIPPED | OUTPATIENT
Start: 2022-01-18 | End: 2022-02-24 | Stop reason: SDUPTHER

## 2022-01-17 RX ORDER — HEPARIN SODIUM 1000 [USP'U]/ML
INJECTION, SOLUTION INTRAVENOUS; SUBCUTANEOUS
Status: DISCONTINUED
Start: 2022-01-17 | End: 2022-01-17 | Stop reason: HOSPADM

## 2022-01-17 RX ORDER — TRAMADOL HYDROCHLORIDE 50 MG/1
50 TABLET ORAL
Status: DISCONTINUED | OUTPATIENT
Start: 2022-01-17 | End: 2022-01-17 | Stop reason: HOSPADM

## 2022-01-17 RX ORDER — OXCARBAZEPINE 150 MG/1
150 TABLET, FILM COATED ORAL EVERY 12 HOURS
Qty: 60 TABLET | Refills: 0 | Status: SHIPPED | OUTPATIENT
Start: 2022-01-17 | End: 2022-01-21

## 2022-01-17 RX ADMIN — TRAMADOL HYDROCHLORIDE 50 MG: 50 TABLET ORAL at 09:55

## 2022-01-17 RX ADMIN — LACOSAMIDE 200 MG: 200 TABLET, FILM COATED ORAL at 11:13

## 2022-01-17 RX ADMIN — VANCOMYCIN HYDROCHLORIDE 750 MG: 750 INJECTION, POWDER, LYOPHILIZED, FOR SOLUTION INTRAVENOUS at 13:11

## 2022-01-17 RX ADMIN — LEVETIRACETAM 500 MG: 250 TABLET, FILM COATED ORAL at 15:04

## 2022-01-17 RX ADMIN — ATORVASTATIN CALCIUM 40 MG: 40 TABLET, FILM COATED ORAL at 11:12

## 2022-01-17 RX ADMIN — ACETAMINOPHEN 650 MG: 325 TABLET ORAL at 11:12

## 2022-01-17 RX ADMIN — ACETAMINOPHEN 650 MG: 325 TABLET ORAL at 01:54

## 2022-01-17 RX ADMIN — MIDODRINE HYDROCHLORIDE 5 MG: 5 TABLET ORAL at 11:13

## 2022-01-17 RX ADMIN — TRAMADOL HYDROCHLORIDE 50 MG: 50 TABLET, COATED ORAL at 09:55

## 2022-01-17 RX ADMIN — ACETAMINOPHEN 650 MG: 325 TABLET ORAL at 15:04

## 2022-01-17 RX ADMIN — OXYCODONE HYDROCHLORIDE AND ACETAMINOPHEN 500 MG: 500 TABLET ORAL at 11:13

## 2022-01-17 RX ADMIN — LEVOTHYROXINE SODIUM 150 MCG: 0.07 TABLET ORAL at 11:12

## 2022-01-17 RX ADMIN — FLUOXETINE HYDROCHLORIDE 10 MG: 10 CAPSULE ORAL at 11:13

## 2022-01-17 RX ADMIN — SODIUM CHLORIDE, PRESERVATIVE FREE 10 ML: 5 INJECTION INTRAVENOUS at 05:28

## 2022-01-17 RX ADMIN — OXCARBAZEPINE 150 MG: 150 TABLET, FILM COATED ORAL at 15:04

## 2022-01-17 RX ADMIN — CALCITRIOL CAPSULES 0.25 MCG 0.25 MCG: 0.25 CAPSULE ORAL at 11:13

## 2022-01-17 RX ADMIN — SODIUM CHLORIDE, PRESERVATIVE FREE 10 ML: 5 INJECTION INTRAVENOUS at 13:16

## 2022-01-17 RX ADMIN — MIDODRINE HYDROCHLORIDE 5 MG: 5 TABLET ORAL at 15:04

## 2022-01-17 RX ADMIN — EPOETIN ALFA-EPBX 10000 UNITS: 10000 INJECTION, SOLUTION INTRAVENOUS; SUBCUTANEOUS at 09:29

## 2022-01-17 RX ADMIN — ONDANSETRON 4 MG: 2 INJECTION INTRAMUSCULAR; INTRAVENOUS at 02:03

## 2022-01-17 NOTE — PROGRESS NOTES
Progress Note    Patient: Meghan Colon MRN: [id number]  SSN: [id number]    YOB: 1950  Age: 71 y.o. Sex: female      Admit Date: 1/12/2022    LOS: 5 days     Subjective:   Patient with recent history of MSSA bacteremia followed for positive blood cultures growing GPC in clusters which were identified as Coagulase negative staphylococci. She remains afebrile with normal WBC and decreasing procal and CRP on Vancomycin. It appears that patient has been discharge. Spoke with Attending earlier today with recommendation that she receive 2 weeks of IV Vancomycin at hemodialysis. Objective:     Vitals:    01/17/22 1000 01/17/22 1030 01/17/22 1100 01/17/22 1504   BP: 132/60 128/60 122/68 120/60   Pulse: 72 78 70 68   Resp: 18 18 16 16   Temp:   98 °F (36.7 °C) 98.1 °F (36.7 °C)   TempSrc:   Oral    SpO2:    98%   Weight:       Height:            Intake and Output:  Current Shift: 01/17 0701 - 01/17 1900  In: -   Out: 1680   Last three shifts: No intake/output data recorded. Physical Exam:    Vitals and nursing note reviewed. Patient unavailable for re-examination  Constitutional:       Appearance: She is not ill-appearing. HENT:      Head: Normocephalic and atraumatic. Right Ear: External ear normal.      Left Ear: External ear normal.      Nose: Nose normal.   Eyes:      Comments: closed   Cardiovascular:      Rate and Rhythm: Normal rate and regular rhythm. Heart sounds: No murmur heard. Comments: Right sided dialysis catheter, site unremarkable  Pulmonary:      Effort: Pulmonary effort is normal.      Breath sounds: Normal breath sounds. Abdominal:      General: Bowel sounds are normal.      Palpations: Abdomen is soft. Tenderness: There is no abdominal tenderness. Genitourinary:     Comments: No Cochran  Musculoskeletal:      Cervical back: Neck supple. Right lower leg: No edema. Left lower leg: No edema. Skin:     Findings: No rash.       Comments: Some of her distal fingertips are cyanotic but no splinter hemorrhages   Neurological:      Comments: somnolent   Psychiatric:      Comments: somniolent     Lab/Data Review:     WBC 5,800  PLT 96,000    Procal 0.36 <0.63  CRP 0.42 <0.74    Blood cultures (1/12) Coagulase negative staphylococcus species      Assessment:     Active Problems:    COVID-19 (12/26/2021)      Seizure-like activity ([address] (1/12/2022)      1. Bacteremia with Coagulase negative Staphylococcus species, dialysis catheter related, on IV Vancomycin  2. Sepsis with leukocytosis, elevated lactic acid, CRP and procalcitonin, resolved or resolving  3. ESRD on hemodialysis  4. Seizures     Comment:  Since patient was septic and responded to IV Vancomycin, I think that the Coag negative Staph is a true pathogen in this case and not a contaminant. Plan:   1. Continue IV Vancomycin for 2 weeks (can be done at hemodialysis  2.  Patient has been discharged      Signed By: Christy Carlos MD     January 17, 2022

## 2022-01-17 NOTE — ROUTINE PROCESS
Discharge plan of care/case management plan validated with provider discharge order. Spoke with Delemassiel Moreno LPN report given. IV removed . Patients bs 107. Patient  Left with transport with all belongs.

## 2022-01-17 NOTE — PROGRESS NOTES
Hospitalist Progress Note    Subjective:   Daily Progress Note: 1/17/2022 12:03 PM    Hospital Course:  Meghan Colon is a 80 y.o. female with a past medical history of seizure disorder, hx CVA, atrial fibrillation not on anticoagulation, hx pacemaker, diabetes mellitus, hypertension, hypercholesterolemia presenting to the ED via EMS for seizure-like activity and hypotension. 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 Neurology was consulted at the time and MRI brain did not show any acute changes. She was cleared from neurologic standpoint for discharge. On EMS arrival, they did not find patient to have active seizures, but did find systolic blood pressure low in 90s-100s. Patient also noted to have weakness in right arm from prior stroke. Patient states she has had worsening vision loss over the last 3+ months and can only see through peripheral vision. She was recently + COVID on Dec 26th, 2021. Patient endorses shortness of breath and productive cough. She denies any fever, chills, dizziness, headaches, chest pain, palpitations, N/V, abdominal pain, dysuria, diarrhea, or new sensory/motor deficits. In the ED, blood pressure low at 97/59. Significant labs showing BUN 49, creatinine 3.75, baseline creatinine appears to be around 2.5. Urinalysis suggestive of acute urinary tract infection. Started on IV Rocephin. Neurology consult. Patient admitted for seizures. CT head negative for acute findings similar to prior CT on January 7. Rapid Covid remains positive. Chest x-ray showing improved aeration throughout lungs since previous admission. Staff reports several bouts of bloody vomitus and hypotension requiring transfer to the intensive care unit. Received 2 units of packed red blood cells and fluid bolus with improvement in blood pressure. GI consult.  EGD 1/13/21 showed duodenal ulcer, status post epinephrine injection and hemoclips placement. No active bleeding noted. Hemoglobin stable. Clear from GI perspective. Medically stable for discharge. Pending SNF placement. Subjective:    Patient seen and examined at bedside. Generalized weakness. During dialysis patient started shaking when ask by RN where she cold patient states no one having a seizure.     Current Facility-Administered Medications   Medication Dose Route Frequency    traMADoL (ULTRAM) tablet 50 mg  50 mg Oral Q6H PRN    heparin (porcine) 1,000 unit/mL injection        [START ON 1/19/2022] Vancomycin - Random level to be drawn pre-HD 1/19   Other ONCE    Vancomycin - Please draw level prior to HD 1/17   Other ONCE    calcitRIOL (ROCALTROL) capsule 0.25 mcg  0.25 mcg Oral DAILY    heparin (porcine) 1,000 unit/mL injection 3,200 Units  3,200 Units Hemodialysis DIALYSIS PRN    pantoprazole (PROTONIX) tablet 40 mg  40 mg Oral ACB    vancomycin dose per pharmacy protocol   Other Rx Dosing/Monitoring    insulin lispro (HUMALOG) injection   SubCUTAneous AC&HS    levETIRAcetam (KEPPRA) tablet 500 mg  500 mg Oral BID    OXcarbazepine (TRILEPTAL) tablet 150 mg  150 mg Oral Q12H    EPINEPHrine HCl (PF) (ADRENALIN) 1 mg/mL (1 mL) injection    PRN    epoetin anjelica-epbx (RETACRIT) injection 10,000 Units  10,000 Units IntraVENous Q MON, WED & FRI    sodium chloride (NS) flush 5-40 mL  5-40 mL IntraVENous PRN    acetaminophen (TYLENOL) tablet 650 mg  650 mg Oral Q6H PRN    Or    acetaminophen (TYLENOL) suppository 650 mg  650 mg Rectal Q6H PRN    polyethylene glycol (MIRALAX) packet 17 g  17 g Oral DAILY PRN    ascorbic acid (vitamin C) (VITAMIN C) tablet 500 mg  500 mg Oral BID    atorvastatin (LIPITOR) tablet 40 mg  40 mg Oral DAILY    levothyroxine (SYNTHROID) tablet 150 mcg  150 mcg Oral DAILY    FLUoxetine (PROzac) capsule 10 mg  10 mg Oral DAILY    lacosamide (VIMPAT) tablet 200 mg  200 mg Oral BID    zinc oxide-white petrolatum 20-75 % topical paste   Topical PRN    glucose chewable tablet 16 g  4 Tablet Oral PRN    glucagon (GLUCAGEN) injection 1 mg  1 mg IntraMUSCular PRN    dextrose (D50W) injection syrg 12.5-25 g  25-50 mL IntraVENous PRN    midodrine (PROAMATINE) tablet 5 mg  5 mg Oral TID    sodium chloride (NS) flush 5-40 mL  5-40 mL IntraVENous Q8H    sodium chloride (NS) flush 5-40 mL  5-40 mL IntraVENous PRN    ondansetron (ZOFRAN) injection 4 mg  4 mg IntraVENous Q4H PRN    Or    ondansetron (ZOFRAN ODT) tablet 4 mg  4 mg Oral Q8H PRN    0.9% sodium chloride infusion 250 mL  250 mL IntraVENous PRN        Review of Systems  Constitutional: Positive for malaise/fatigue. No fevers, No chills. Eyes: Positive for vision loss  ENT: No nasal congestion, No sore throat  Respiratory:  No wheezing, + SOB, +cough, + sputum  Cardiovascular: No chest pain, No lower extremity edema, No Palpitations   Gastrointestinal: No nausea, No vomiting, No diarrhea, No constipation, No abdominal pain  Genitourinary: No frequency, No dysuria, No hematuria  Integument/Breast: No rash, No skin lesion(s), No dryness  Musculoskeletal: No arthralgias, No neck pain, No back pain  Neurological: No headaches, No dizziness, No confusion,  + seizures  Behavioral/Psychiatric: No anxiety, No depression      Objective:     Visit Vitals  /68   Pulse 70   Temp 98 °F (36.7 °C) (Oral)   Resp 16   Ht 5' 5\" (1.651 m)   Wt 70.7 kg (155 lb 13.8 oz)   SpO2 99%   BMI 25.94 kg/m²      O2 Device: None (Room air)    Temp (24hrs), Av.9 °F (36.6 °C), Min:97.6 °F (36.4 °C), Max:98.1 °F (36.7 °C)       0701 -  1900  In: -   Out: 1680   No intake/output data recorded. PHYSICAL EXAM:  General: Ill-appearing  female. Alert, cooperative, no distress  Eye: conjunctivae/corneas clear. PERRL. Central vision loss. Throat and Neck: normal and no erythema or exudates noted. No mass   Lung: Symmetric chest wall expansion.  No wheezing, rhonchi, or rales. On room air. Heart: regular rate and rhythm, +S1/S2. No murmur or gallop. Abdomen: soft, non-tender, non-distended. Bowel sounds normal. No masses,  Extremities:  No peripheral edema. Able to move all extremities normal, atraumatic  Skin: Generalized pallor. Poor turgor. No rashes or lesions. Neurologic: Generalized weakness. AOx3. No dysarthria. No facial droop. R upper extremity 3+/5 strength. Right lower extremity weakness strength 2+/5. Cranial nerves 2-12 and sensation grossly intact.   Psychiatric: non focal       Data Review    Recent Results (from the past 24 hour(s))   GLUCOSE, POC    Collection Time: 01/16/22  3:42 PM   Result Value Ref Range    Glucose (POC) 84 65 - 117 mg/dL    Performed by Brit Naidu    GLUCOSE, POC    Collection Time: 01/16/22 10:25 PM   Result Value Ref Range    Glucose (POC) 76 65 - 117 mg/dL    Performed by Fabio Meneses RN    C REACTIVE PROTEIN, QT    Collection Time: 01/17/22  4:48 AM   Result Value Ref Range    C-Reactive protein 0.42 0.00 - 0.60 mg/dL   PROCALCITONIN    Collection Time: 01/17/22  4:48 AM   Result Value Ref Range    Procalcitonin 0.36 (H) 0 ng/mL   VANCOMYCIN, RANDOM    Collection Time: 01/17/22  4:48 AM   Result Value Ref Range    Vancomycin, random 19.1 ug/mL   RENAL FUNCTION PANEL    Collection Time: 01/17/22  4:48 AM   Result Value Ref Range    Sodium 141 136 - 145 mmol/L    Potassium 4.5 3.5 - 5.1 mmol/L    Chloride 107 97 - 108 mmol/L    CO2 26 21 - 32 mmol/L    Anion gap 8 5 - 15 mmol/L    Glucose 87 65 - 100 mg/dL    BUN 67 (H) 6 - 20 mg/dL    Creatinine 4.16 (H) 0.55 - 1.02 mg/dL    BUN/Creatinine ratio 16 12 - 20      GFR est AA 13 (L) >60 ml/min/1.73m2    GFR est non-AA 11 (L) >60 ml/min/1.73m2    Calcium 7.7 (L) 8.5 - 10.1 mg/dL    Phosphorus 5.1 (H) 2.6 - 4.7 mg/dL    Albumin 2.2 (L) 3.5 - 5.0 g/dL   CBC W/O DIFF    Collection Time: 01/17/22  4:48 AM   Result Value Ref Range    WBC 5.8 3.6 - 11.0 K/uL    RBC 2.95 (L) 3.80 - 5.20 M/uL    HGB 9.1 (L) 11.5 - 16.0 g/dL    HCT 31.0 (L) 35.0 - 47.0 %    .1 (H) 80.0 - 99.0 FL    MCH 30.8 26.0 - 34.0 PG    MCHC 29.4 (L) 30.0 - 36.5 g/dL    RDW 20.2 (H) 11.5 - 14.5 %    PLATELET 96 (L) 995 - 400 K/uL    MPV 10.4 8.9 - 12.9 FL    NRBC 0.0 0.0  WBC    ABSOLUTE NRBC 0.00 0.00 - 0.01 K/uL   GLUCOSE, POC    Collection Time: 01/17/22 11:45 AM   Result Value Ref Range    Glucose (POC) 51 (LL) 65 - 117 mg/dL    Performed by Lokesh Mckeon ([employer])    GLUCOSE, POC    Collection Time: 01/17/22 11:45 AM   Result Value Ref Range    Glucose (POC) 51 (LL) 65 - 117 mg/dL    Performed by Lokesh Mckeon ([employer])    GLUCOSE, POC    Collection Time: 01/17/22 11:49 AM   Result Value Ref Range    Glucose (POC) 67 65 - 117 mg/dL    Performed by Lokesh Mckeon ([employer])        XR ABD (KUB)   Final Result   Air-filled large and small bowel loops. No definite evidence of   mechanical bowel obstruction. Other findings as above. CT HEAD WO CONT   Final Result   Similar findings compared to CT head January 7, 2022. XR CHEST SNGL V   Final Result          Active Problems:    COVID-19 (12/26/2021)      Seizure-like activity ([facility]) (1/12/2022)        Assessment/Plan:     1.  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 Hemorrhagic shock s/t upper GI bleed   - Resolved  - Received 2 units of packed red blood cells  - GI consult  - EGD 1/13/21 showed duodenal ulcer, status post epinephrine injection and hemoclips placement. No active bleeding noted.   - Protonix 40 mg daily   - Hemoglobin stable  - Clear from GI perspective     3. Urinary tract infection  - Seen on UA. Follow urine cultures. - S/p IV ceftriaxone      4.  History of staph aureus bacteremia - on 12/26  - No indications for IV vancomycin at this time   - Blood cultures showing no growth so far  - No white count evident or febrile illness     5.  End-stage renal disease  - Renal consultation  - Continue hemodialysis as previously scheduled, Tues, Th and Sat     6.  Hypothyroidism  - TSH normal  - Continue levothyroxine     7.  COVID-19 positive - Stable     8. Diabetes mellitus - ISS and accu-checks     9. Atrial fibrillation not on anticoagulation - Continue ASA and statin.      10. Hx CVA - continue ASA and statin      DVT Prophylaxis: SCDs  GI Prophylaxis: Protonix  Code Status: Full  POA:    Discharge Barriers:   - PT/OT recommending SNF   - Medically stable for discharge   - Awaiting SNF placement  Care Plan discussed with: patient and nursing    Total time spent with patient: >35 minutes.

## 2022-01-17 NOTE — PROGRESS NOTES
Spiritual Care Assessment/Progress Note  [facility]      NAME: Kimberly Romano      MRN: [id number]  AGE: 71 y.o. SEX: female  Muslim Affiliation: Tasha Denise   Language: English     1/17/2022     Total Time (in minutes): 15     Spiritual Assessment begun in [address] through conversation with:         [x]Patient        [] Family    [] Friend(s)        Reason for Consult: Initial/Spiritual assessment, patient floor     Spiritual beliefs: (Please include comment if needed)     [] Identifies with a iraida tradition:         [] Supported by a iraida community:            [] Claims no spiritual orientation:           [] Seeking spiritual identity:                [] Adheres to an individual form of spirituality:           [x] Not able to assess:                           Identified resources for coping:      [] Prayer                               [] Music                  [] Guided Imagery     [] Family/friends                 [] Pet visits     [] Devotional reading                         [x] Unknown     [] Other:                                               Interventions offered during this visit: (See comments for more details)                Plan of Care:     [] Support spiritual and/or cultural needs    [] Support AMD and/or advance care planning process      [] Support grieving process   [] Coordinate Rites and/or Rituals    [] Coordination with community clergy   [] No spiritual needs identified at this time   [] Detailed Plan of Care below (See Comments)  [] Make referral to Music Therapy  [] Make referral to Pet Therapy     [] Make referral to Addiction services  [] Make referral to [facility]  [] Make referral to Spiritual Care Partner  [] No future visits requested        [x] Contact Spiritual Care for further referrals     Comments: The purpose of the visit was to do a spiritual assessment on the patient. The patient was unable share during the visit.   provided the ministry of presence and the comfort of spiritual care. [address] [facility] [address] Longo.    can be reached by calling the  at [facility]  [phone]

## 2022-01-17 NOTE — DISCHARGE SUMMARY
Hospitalist Discharge Summary     Patient ID:    Viridiana Yost  [id number]  29 y.o.  1950    Admit date: 1/12/2022    Discharge date : 1/17/2022    Chronic Diagnoses:    Problem List as of 1/17/2022 Date Reviewed: 1/3/2022          Codes Class Noted - Resolved    Seizure-like activity ([facility] ICD-10-CM: R56.9  ICD-9-CM: 780.39  1/12/2022 - Present        Sepsis ([facility] ICD-10-CM: A41.9  ICD-9-CM: 038.9, 995.91  1/7/2022 - Present        MSSA bacteremia ICD-10-CM: R78.81, B95.61  ICD-9-CM: 790.7, 041.11  1/3/2022 - Present        Acute on chronic anemia ICD-10-CM: D64.9  ICD-9-CM: 285.9  1/3/2022 - Present        Gait disturbance, post-stroke ICD-10-CM: I69.398, R26.9  ICD-9-CM: 438.89, 781.2  1/3/2022 - Present        COVID-19 ICD-10-CM: U07.1  ICD-9-CM: 079.89  12/26/2021 - Present        Fluid overload ICD-10-CM: E87.70  ICD-9-CM: 276.69  12/5/2021 - Present        Left-sided weakness ICD-10-CM: R53.1  ICD-9-CM: 728.87  12/3/2021 - Present        Intractable nausea and vomiting ICD-10-CM: R11.2  ICD-9-CM: 536.2  12/3/2021 - Present        Hypoglycemia ICD-10-CM: E16.2  ICD-9-CM: 251.2  6/11/2021 - Present        Encephalopathy acute ICD-10-CM: G93.40  ICD-9-CM: 348.30  6/11/2021 - Present        SSS (sick sinus syndrome) ([facility] ICD-10-CM: I49.5  ICD-9-CM: 427.81  6/11/2021 - Present        Seizure ([facility] ICD-10-CM: R56.9  ICD-9-CM: 780.39  5/19/2021 - Present        Hyperkalemia ICD-10-CM: E87.5  ICD-9-CM: 276.7  10/16/2020 - Present        Atherosclerosis of native arteries of the extremities with ulceration ([facility] ICD-10-CM: I70.25  ICD-9-CM: 440.23, 707.9  8/31/2020 - Present        Cellulitis and abscess of trunk ICD-10-CM: L03.319, L02.219  ICD-9-CM: 682.2  8/31/2020 - Present        Peripheral venous insufficiency ICD-10-CM: I87.2  ICD-9-CM: 459.81  8/31/2020 - Present        Varicose veins of lower extremity with inflammation ICD-10-CM: I83.10  ICD-9-CM: 454.1  8/31/2020 - Present Peripheral vascular disease ([facility] ICD-10-CM: I73.9  ICD-9-CM: 443.9  7/3/2020 - Present        Type II diabetes mellitus ([facility] ICD-10-CM: E11.9  ICD-9-CM: 250.00  7/3/2020 - Present        Long term (current) use of antibiotics ICD-10-CM: Z79.2  ICD-9-CM: V58.62  7/3/2020 - Present        Atrial fibrillation (HCC) ICD-10-CM: I48.91  ICD-9-CM: 427.31  7/3/2020 - Present        Cardiac pacemaker in situ ICD-10-CM: Z95.0  ICD-9-CM: V45.01  7/3/2020 - Present        Carpal tunnel syndrome of right wrist ICD-10-CM: G56.01  ICD-9-CM: 354.0  7/3/2020 - Present        Chest wall pain ICD-10-CM: R07.89  ICD-9-CM: 786.52  7/3/2020 - Present        Chronic diarrhea ICD-10-CM: K52.9  ICD-9-CM: 787.91  7/3/2020 - Present        Chronic neck pain ICD-10-CM: M54.2, G89.29  ICD-9-CM: 723.1, 338.29  7/3/2020 - Present        End stage renal failure on dialysis [facility]) ICD-10-CM: N18.6, Z99.2  ICD-9-CM: 585.6, V45.11  7/3/2020 - Present        History of CVA (cerebrovascular accident) ICD-10-CM: Z86.73  ICD-9-CM: V12.54  7/3/2020 - Present        Hyponatremia ICD-10-CM: E87.1  ICD-9-CM: 276.1  7/3/2020 - Present        Acquired hypothyroidism ICD-10-CM: E03.9  ICD-9-CM: 244.9  7/3/2020 - Present        Breakthrough seizure ([facility] ICD-10-CM: G79.817  ICD-9-CM: 345.91  7/3/2020 - Present        Recurrent falls ICD-10-CM: R29.6  ICD-9-CM: V15.88  7/3/2020 - Present        RESOLVED: Metabolic encephalopathy [id number]: G93.41  ICD-9-CM: 348.31  1/3/2022 - 1/3/2022        RESOLVED: Sepsis ([facility] ICD-10-CM: A41.9  ICD-9-CM: 038.9, 995.91  1/3/2022 - 1/3/2022        RESOLVED: AMS (altered mental status) ICD-10-CM: P70.37  ICD-9-CM: 780.97  12/26/2021 - 1/3/2022        RESOLVED: Disorder due to well controlled type 2 diabetes mellitus ([facility] ICD-10-CM: E11.8  ICD-9-CM: 250.90  8/31/2020 - 6/11/2021          22    Final Diagnoses:    Active Problems:    COVID-19 (12/26/2021)      Seizure-like activity ([facility] (1/12/2022)        Reason for Hospitalization/Hospital Course:   Ludmila James is a 80 y.o. female with a past medical history of seizure disorder, hx CVA, atrial fibrillation not on anticoagulation, hx pacemaker, diabetes mellitus, hypertension, hypercholesterolemia presenting to the ED via EMS for seizure-like activity and hypotension.  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  Neurology was consulted at the time and MRI brain did not show any acute changes.  She was cleared from neurologic standpoint for discharge.  On EMS arrival, they did not find patient to have active seizures, but did find systolic blood pressure low in 90s-100s. Patient also noted to have weakness in right arm from prior stroke. Patient states she has had worsening vision loss over the last 3+ months and can only see through peripheral vision. She was recently + COVID on Dec 26th, 2021. Patient endorses shortness of breath and productive cough. She denies any fever, chills, dizziness, headaches, chest pain, palpitations, N/V, abdominal pain, dysuria, diarrhea, or new sensory/motor deficits. In the ED, blood pressure low at 97/59.  Significant labs showing BUN 49, creatinine 3.75, baseline creatinine appears to be around 2.5.  Urinalysis suggestive of acute urinary tract infection.  Started on IV Rocephin.  Neurology consult.  Patient admitted for seizures.  CT head negative for acute findings similar to prior CT on January 7. Rapid Covid remains positive. Chest x-ray showing improved aeration throughout lungs since previous admission.  Staff reports several bouts of bloody vomitus and hypotension requiring transfer to the intensive care unit.  Received 2 units of packed red blood cells and fluid bolus with improvement in blood pressure. GI consult. EGD 1/13/21 showed duodenal ulcer, status post epinephrine injection and hemoclips placement. No active bleeding noted. Hemoglobin stable. Clear from GI perspective. Medically stable for discharge. Pending SNF placement. Patient is noted to have shaking seizure-like activity but able to speak, follow commands and understand, pseudoseizure activity. Discharge Medications:   Current Discharge Medication List      START taking these medications    Details   OXcarbazepine (TRILEPTAL) 150 mg tablet Take 1 Tablet by mouth every twelve (12) hours. Qty: 60 Tablet, Refills: 0  Start date: 1/17/2022      pantoprazole (PROTONIX) 40 mg tablet Take 1 Tablet by mouth Daily (before breakfast). Qty: 30 Tablet, Refills: 0  Start date: 1/18/2022         CONTINUE these medications which have NOT CHANGED    Details   amLODIPine (NORVASC) 5 mg tablet Take 1 Tablet by mouth daily for 60 days. Qty: 30 Tablet, Refills: 1      insulin lispro (HUMALOG) 100 unit/mL injection As per insulin sensitive ssi:    Insulin High Sensitivity (thin, ESRD)  For Blood Sugar (mg/dL) of:              Less than 150 =   0 units  150 -199 =  1 units  200 -249 =   2 units  250 -299 =   3 units  300 -349 =   4 units  350 or greater = 5 units and Call Physician  Initiate Hypoglycemic protocol if blood glucose is <70 mg/dL. Qty: 1 Each, Refills: 0      ascorbic acid, vitamin C, (VITAMIN C) 500 mg tablet Take 1 Tablet by mouth two (2) times a day for 30 days. Qty: 60 Tablet, Refills: 0      aspirin 81 mg chewable tablet Take 1 Tablet by mouth daily for 30 days. Qty: 30 Tablet, Refills: 0      predniSONE (DELTASONE) 20 mg tablet Start prednisone 20 mg PO daily x 2 days then 10 mg PO daily x 2 days then stop. Qty: 6 Tablet, Refills: 0      calcitRIOL (ROCALTROL) 0.5 mcg capsule Take 1 Capsule by mouth daily. Qty: 30 Capsule, Refills: 0      zinc oxide-white petrolatum 17-57 % topical paste Apply  to affected area as needed for Skin Irritation.  Indications: skin irritation  Qty: 113 g, Refills: 0      ondansetron hcl (Zofran) 4 mg tablet Take 1 Tablet by mouth every eight (8) hours as needed for Nausea or Vomiting. Qty: 45 Tablet, Refills: 0      Vimpat 200 mg tab tablet Take 1 Tablet by mouth two (2) times a day. ondansetron (Zofran ODT) 4 mg disintegrating tablet 1 Tablet by SubLINGual route every eight (8) hours as needed for Nausea or Vomiting. Qty: 20 Tablet, Refills: 0      atorvastatin (LIPITOR) 40 mg tablet Take 1 tablet by mouth once daily for 90 days  Qty: 90 Tablet, Refills: 3      FLUoxetine (PROzac) 10 mg capsule Take 1 Capsule by mouth daily for 270 days. Qty: 90 Capsule, Refills: 2    Associated Diagnoses: TRISHA (generalized anxiety disorder)      loperamide (IMODIUM) 2 mg capsule TAKE 1 CAPSULE BY MOUTH SIX TIMES DAILY AS NEEDED FOR 30 DAYS  Qty: 180 Capsule, Refills: 0      Euthyrox 150 mcg tablet Take 1 tablet by mouth once daily  Qty: 90 Tablet, Refills: 0      levETIRAcetam (KEPPRA XR) 500 mg ER tablet TAKE 2 TABLETS BY MOUTH TWICE DAILY FOR 90 DAYS      sevelamer carbonate (RENVELA) 800 mg tab tab                Follow up Care:    1. Mark Marc MD in 1-2 weeks. Follow-up Information     Follow up With Specialties Details Why Pepe Shepard MD [facility]   [address]  [phone]              Patient Follow Up Instructions: Activity: Activity as tolerated  Diet:  Renal Diet  Wound Care: None needed     Condition at Discharge:  Stable  __________________________________________________________________    [facility]  ____________________________________________________________________    Code Status:  Full Code  ___________________________________________________________________    Discharge Exam:  Patient seen and examined by me on discharge day. Pertinent Findings:  Gen:    Not in distress  Chest: Clear lungs  CVS:   Regular rhythm.   No edema  Abd:  Soft, not distended, not tender  Neuro:  Alert        CONSULTATIONS: ID, GI and Nephrology    Significant Diagnostic Studies:   Recent Results (from the past 24 hour(s))   GLUCOSE, POC    Collection Time: 01/16/22  3:42 PM   Result Value Ref Range    Glucose (POC) 84 65 - 117 mg/dL    Performed by Alyssa Doe    GLUCOSE, POC    Collection Time: 01/16/22 10:25 PM   Result Value Ref Range    Glucose (POC) 76 65 - 117 mg/dL    Performed by Shira Mcgregor RN    C REACTIVE PROTEIN, QT    Collection Time: 01/17/22  4:48 AM   Result Value Ref Range    C-Reactive protein 0.42 0.00 - 0.60 mg/dL   PROCALCITONIN    Collection Time: 01/17/22  4:48 AM   Result Value Ref Range    Procalcitonin 0.36 (H) 0 ng/mL   VANCOMYCIN, RANDOM    Collection Time: 01/17/22  4:48 AM   Result Value Ref Range    Vancomycin, random 19.1 ug/mL   RENAL FUNCTION PANEL    Collection Time: 01/17/22  4:48 AM   Result Value Ref Range    Sodium 141 136 - 145 mmol/L    Potassium 4.5 3.5 - 5.1 mmol/L    Chloride 107 97 - 108 mmol/L    CO2 26 21 - 32 mmol/L    Anion gap 8 5 - 15 mmol/L    Glucose 87 65 - 100 mg/dL    BUN 67 (H) 6 - 20 mg/dL    Creatinine 4.16 (H) 0.55 - 1.02 mg/dL    BUN/Creatinine ratio 16 12 - 20      GFR est AA 13 (L) >60 ml/min/1.73m2    GFR est non-AA 11 (L) >60 ml/min/1.73m2    Calcium 7.7 (L) 8.5 - 10.1 mg/dL    Phosphorus 5.1 (H) 2.6 - 4.7 mg/dL    Albumin 2.2 (L) 3.5 - 5.0 g/dL   CBC W/O DIFF    Collection Time: 01/17/22  4:48 AM   Result Value Ref Range    WBC 5.8 3.6 - 11.0 K/uL    RBC 2.95 (L) 3.80 - 5.20 M/uL    HGB 9.1 (L) 11.5 - 16.0 g/dL    HCT 31.0 (L) 35.0 - 47.0 %    .1 (H) 80.0 - 99.0 FL    MCH 30.8 26.0 - 34.0 PG    MCHC 29.4 (L) 30.0 - 36.5 g/dL    RDW 20.2 (H) 11.5 - 14.5 %    PLATELET 96 (L) 982 - 400 K/uL    MPV 10.4 8.9 - 12.9 FL    NRBC 0.0 0.0  WBC    ABSOLUTE NRBC 0.00 0.00 - 0.01 K/uL   GLUCOSE, POC    Collection Time: 01/17/22 11:45 AM   Result Value Ref Range    Glucose (POC) 51 (LL) 65 - 117 mg/dL    Performed by Natasha Plummer ([employer])    GLUCOSE, POC Collection Time: 01/17/22 11:45 AM   Result Value Ref Range    Glucose (POC) 51 (LL) 65 - 117 mg/dL    Performed by Almaz Lopez ([employer])    GLUCOSE, POC    Collection Time: 01/17/22 11:49 AM   Result Value Ref Range    Glucose (POC) 67 65 - 117 mg/dL    Performed by Almaz Lopez ([employer]er)      XR ABD (KUB)   Final Result   Air-filled large and small bowel loops. No definite evidence of   mechanical bowel obstruction. Other findings as above. CT HEAD WO CONT   Final Result   Similar findings compared to CT head January 7, 2022.       XR CHEST SNGL V   Final Result          Time spent in direct and indirect care including coordination of services: Greater than 35 minutes    Signed:  Latricia Cedeño NP  1/17/2022  3:09 PM

## 2022-01-17 NOTE — PROGRESS NOTES
DC Plan: [address] (need auth)    Pt transferred from the ICU to . Cm messaged [facility] via ROB to see if Iris Likens was started. Awaiting response.

## 2022-01-17 NOTE — DIALYSIS
GCS 15 pt tolerated 3h dialysis with 1680 ml fluid removal.  Pt had episode of head thrashing while placing pt on machine, when asked what was going on pt announced \"I am having a seizure\" lasted approx 1 minute with pt able to have intelligent conversation during spell.

## 2022-01-17 NOTE — PROGRESS NOTES
Day #4 of Vancomycin  Consult provided for this 71 y.o. female for indication of bacteremia. Antibiotic regimen:  Vancomycin monotherapy  Concomitant nephrotoxic drugs: None  Frequency of BMP: Not scheduled    Recent Labs     22  0448 22  0840 01/15/22  1541   WBC 5.8  --  7.4   CREA 4.16* 3.53* 3.08*   BUN 67* 60* 50*     Est CrCl: ESRD on HD MWF  Temp (24hrs), Av.8 °F (36.6 °C), Min:97.6 °F (36.4 °C), Max:98.1 °F (36.7 °C)    Cultures:    Blood: coagulase-negative Staphylococcus sp.  in the aerobic bottle, preliminary    MRSA Swab: Not detected     Target range: Trough 20-25 mcg/mL    Recent level history:  Date/Time Dose & Interval Measured Level (mcg/mL)   1/15 @ 1541 1500 mg x 1 Post-HD level 20.5    @ 0448 Same as above, pre-HD level 19.1        Assessment/Plan:   Afebrile, leukocytosis resolved, procal and CRP trending down  ESRD on HD MWF, dialysis session today for 3 hrs w/ 1680 mL fluid removed  Will order 750 mg x 1 and check a pre-HD level   Antimicrobial stop date TBD

## 2022-01-17 NOTE — PROGRESS NOTES
DC Plan: [facility]       Room# 215B       Report: [phone]       Transportation: 3:30pm    Yumiko spoke with Donal from [address] [place] [address] Street [phone]. Cm notified her of discharge. Cm was informed pt can return to their facility this Wednesday. Pt has a chair time of Álvaro Valdez is aware of dialysis for Wednesday and chair time. CM met with pt and pt's daughter-in-law Frankie Mcfarlane). Cm notified pt and daughter-in-law of discharge back to [facility] today. Medicare pt has received, reviewed, and signed 2nd IM letter informing them of their right to appeal the discharge. Signed copied has been placed on pt bedside chart. Daughter-in-law expressed some concerns regarding the nurse that was taking care of the pt yesterday. Daughter-in-law would like to speak with patient advocate. Cm informed daughter-in-law CM will get in touch with the patient advocate. Cm obtained daughter-in-law's contact# [phone]. YUMIKO messaged Paris Petit (Patient advocate) via [employer]. Radha messaged Cm back. Paris Petit will contact pt's daughter-in-law. Cm called [employer] transportation. Transportation arranged for a 3:30-4pm . Cm stopped by pt's room and updated pt and daughter-in-law of transportation time. Cm informed daughter-in-law patient advocate will contact her. Discharge plan of care/case management plan validated with provider's discharge order.

## 2022-01-17 NOTE — PROGRESS NOTES
Patient: Janet Frank MRN: [id number]  SSN: [id number]    YOB: 1950  Age: 71 y.o. Sex: female          Subjective:     Patient was seen today when she was getting hemodialysis, tolerating procedure well. Did not report any symptoms of shortness of breath or any kind of pain. Objective:     Vitals:    01/17/22 0800 01/17/22 0830 01/17/22 0900 01/17/22 0930   BP: (!) 146/79 (!) 147/75 134/67 (!) 133/91   Pulse: 65 62 68 78   Resp: 18 18 18 18   Temp: 98.1 °F (36.7 °C)      TempSrc: Oral      SpO2:       Weight:       Height:            Intake and Output:  Yesterday's Intake and Output:  No intake/output data recorded. Physical Exam:   GENERAL: alert, cooperative, no distress, appears stated age  EYE: negative  Respiratory no distress   ABDOMEN: Nondistended.     EXTREMITIES:  extremities normal, atraumatic, no cyanosis or edema, no ulcers,SKIN: Normal.   Skin -  no rash or abnormalities  NEUROLOGIC: negative  Right upper extremity aVF is her dialysis access    Data Review    Meds    Current Facility-Administered Medications   Medication Dose Route Frequency    Vancomycin - Please draw level prior to HD 1/17   Other ONCE    calcitRIOL (ROCALTROL) capsule 0.25 mcg  0.25 mcg Oral DAILY    heparin (porcine) 1,000 unit/mL injection 3,200 Units  3,200 Units Hemodialysis DIALYSIS PRN    pantoprazole (PROTONIX) tablet 40 mg  40 mg Oral ACB    vancomycin dose per pharmacy protocol   Other Rx Dosing/Monitoring    insulin lispro (HUMALOG) injection   SubCUTAneous AC&HS    levETIRAcetam (KEPPRA) tablet 500 mg  500 mg Oral BID    OXcarbazepine (TRILEPTAL) tablet 150 mg  150 mg Oral Q12H    EPINEPHrine HCl (PF) (ADRENALIN) 1 mg/mL (1 mL) injection    PRN    epoetin anjelica-epbx (RETACRIT) injection 10,000 Units  10,000 Units IntraVENous Q MON, WED & FRI    sodium chloride (NS) flush 5-40 mL  5-40 mL IntraVENous PRN    acetaminophen (TYLENOL) tablet 650 mg  650 mg Oral Q6H PRN    Or    acetaminophen (TYLENOL) suppository 650 mg  650 mg Rectal Q6H PRN    polyethylene glycol (MIRALAX) packet 17 g  17 g Oral DAILY PRN    ascorbic acid (vitamin C) (VITAMIN C) tablet 500 mg  500 mg Oral BID    atorvastatin (LIPITOR) tablet 40 mg  40 mg Oral DAILY    levothyroxine (SYNTHROID) tablet 150 mcg  150 mcg Oral DAILY    FLUoxetine (PROzac) capsule 10 mg  10 mg Oral DAILY    lacosamide (VIMPAT) tablet 200 mg  200 mg Oral BID    zinc oxide-white petrolatum 20-75 % topical paste   Topical PRN    glucose chewable tablet 16 g  4 Tablet Oral PRN    glucagon (GLUCAGEN) injection 1 mg  1 mg IntraMUSCular PRN    dextrose (D50W) injection syrg 12.5-25 g  25-50 mL IntraVENous PRN    midodrine (PROAMATINE) tablet 5 mg  5 mg Oral TID    sodium chloride (NS) flush 5-40 mL  5-40 mL IntraVENous Q8H    sodium chloride (NS) flush 5-40 mL  5-40 mL IntraVENous PRN    ondansetron (ZOFRAN) injection 4 mg  4 mg IntraVENous Q4H PRN    Or    ondansetron (ZOFRAN ODT) tablet 4 mg  4 mg Oral Q8H PRN    0.9% sodium chloride infusion 250 mL  250 mL IntraVENous PRN       Lab      Recent Results (from the past 24 hour(s))   GLUCOSE, POC    Collection Time: 01/16/22  3:42 PM   Result Value Ref Range    Glucose (POC) 84 65 - 117 mg/dL    Performed by Silva Grayson    GLUCOSE, POC    Collection Time: 01/16/22 10:25 PM   Result Value Ref Range    Glucose (POC) 76 65 - 117 mg/dL    Performed by Edwin Prasad RN    C REACTIVE PROTEIN, QT    Collection Time: 01/17/22  4:48 AM   Result Value Ref Range    C-Reactive protein 0.42 0.00 - 0.60 mg/dL   PROCALCITONIN    Collection Time: 01/17/22  4:48 AM   Result Value Ref Range    Procalcitonin 0.36 (H) 0 ng/mL   VANCOMYCIN, RANDOM    Collection Time: 01/17/22  4:48 AM   Result Value Ref Range    Vancomycin, random 19.1 ug/mL   RENAL FUNCTION PANEL    Collection Time: 01/17/22  4:48 AM   Result Value Ref Range    Sodium 141 136 - 145 mmol/L    Potassium 4.5 3.5 - 5.1 mmol/L    Chloride 107 97 - 108 mmol/L    CO2 26 21 - 32 mmol/L    Anion gap 8 5 - 15 mmol/L    Glucose 87 65 - 100 mg/dL    BUN 67 (H) 6 - 20 mg/dL    Creatinine 4.16 (H) 0.55 - 1.02 mg/dL    BUN/Creatinine ratio 16 12 - 20      GFR est AA 13 (L) >60 ml/min/1.73m2    GFR est non-AA 11 (L) >60 ml/min/1.73m2    Calcium 7.7 (L) 8.5 - 10.1 mg/dL    Phosphorus 5.1 (H) 2.6 - 4.7 mg/dL    Albumin 2.2 (L) 3.5 - 5.0 g/dL   CBC W/O DIFF    Collection Time: 01/17/22  4:48 AM   Result Value Ref Range    WBC 5.8 3.6 - 11.0 K/uL    RBC 2.95 (L) 3.80 - 5.20 M/uL    HGB 9.1 (L) 11.5 - 16.0 g/dL    HCT 31.0 (L) 35.0 - 47.0 %    .1 (H) 80.0 - 99.0 FL    MCH 30.8 26.0 - 34.0 PG    MCHC 29.4 (L) 30.0 - 36.5 g/dL    RDW 20.2 (H) 11.5 - 14.5 %    PLATELET 96 (L) 856 - 400 K/uL    MPV 10.4 8.9 - 12.9 FL    NRBC 0.0 0.0  WBC    ABSOLUTE NRBC 0.00 0.00 - 0.01 K/uL        Lab Results   Component Value Date/Time    Color Yellow/Straw 01/12/2022 10:33 AM    Appearance Turbid (A) 01/12/2022 10:33 AM    Specific gravity 1.012 01/12/2022 10:33 AM    pH (UA) 5.0 01/12/2022 10:33 AM    Protein 30 (A) 01/12/2022 10:33 AM    Glucose Negative 01/12/2022 10:33 AM    Ketone Negative 01/12/2022 10:33 AM    Bilirubin Negative 01/12/2022 10:33 AM    Urobilinogen 0.1 01/12/2022 10:33 AM    Nitrites Negative 01/12/2022 10:33 AM    Leukocyte Esterase Trace (A) 01/12/2022 10:33 AM    Epithelial cells Moderate (A) 05/20/2021 12:56 AM    Bacteria Negative 01/12/2022 10:33 AM    WBC 0-4 01/12/2022 10:33 AM    RBC 0-5 01/12/2022 10:33 AM       Imaging         Assessment & Plan: Active Problems:    COVID-19 (12/26/2021)      Seizure-like activity ([facility] (1/12/2022)    1. ESRD. HD today. Please dose all medications for  creatinine clearance <15/dialysis. Avoid PICC lines on either arm in order to preserve veins for dialysis access creation.      2. Anemia of ESRD.   -    Epogen 10.000 international units  with HD.   -Also had acute GI bleed during this hospital admission following which she was sent to the ICU and was placed on PPI drip. -EGD showed duodenal ulcer -hemoclips were placed and epinephrine injection was given to control bleeding  -Currently H&H are stable.     3. Secondary hyperparathyroidism of ESRD. \  Continue with sevelamer 800 mg 3 times daily and calcitriol 0.5 mg daily. Renal diet. 4. AMS -she is fully alert and oriented now. ? Related to seizure disorder or sepsis   Blood cultures in ED ,   CTH was normal on admission   MRI - Chr lacunar infacrts     5. HTN - added low dose Amlodpine   BP was good even during HD yesterday     COVID 19 infection  - currently stable     H/o Seizure disorder -currently on Vimpat and Keppra    Signed By: Sherrie Velarde MD     January 17, 2022      This note was prepared using voice recognition system and is likely to have sound alike errors that may have been overlooked even during proofreading.   Please contact the author for any clarifications

## 2022-01-18 LAB
GLUCOSE BLD STRIP.AUTO-MCNC: 112 MG/DL (ref 65–117)
GLUCOSE BLD STRIP.AUTO-MCNC: 71 MG/DL (ref 65–117)
GLUCOSE BLD STRIP.AUTO-MCNC: 74 MG/DL (ref 65–117)
GLUCOSE BLD STRIP.AUTO-MCNC: 86 MG/DL (ref 65–117)
GLUCOSE BLD STRIP.AUTO-MCNC: 89 MG/DL (ref 65–117)
PERFORMED BY, TECHID: NORMAL

## 2022-01-20 ENCOUNTER — HOSPITAL ENCOUNTER (OUTPATIENT)
Age: 72
Setting detail: OBSERVATION
Discharge: NURSING, REHAB OR LONG-TERM CARE | End: 2022-01-21
Attending: EMERGENCY MEDICINE | Admitting: HOSPITALIST
Payer: MEDICARE

## 2022-01-20 ENCOUNTER — APPOINTMENT (OUTPATIENT)
Dept: CT IMAGING | Age: 72
End: 2022-01-20
Attending: EMERGENCY MEDICINE
Payer: MEDICARE

## 2022-01-20 DIAGNOSIS — R56.9 SEIZURE (HCC): Primary | ICD-10-CM

## 2022-01-20 DIAGNOSIS — U07.1 COVID: ICD-10-CM

## 2022-01-20 PROBLEM — G93.40 ACUTE ENCEPHALOPATHY: Status: ACTIVE | Noted: 2022-01-20

## 2022-01-20 LAB
ALBUMIN SERPL-MCNC: 2.1 G/DL (ref 3.5–5)
ALBUMIN/GLOB SERPL: 0.8 {RATIO} (ref 1.1–2.2)
ALP SERPL-CCNC: 90 U/L (ref 45–117)
ALT SERPL-CCNC: 17 U/L (ref 12–78)
ANION GAP SERPL CALC-SCNC: 8 MMOL/L (ref 5–15)
APPEARANCE UR: ABNORMAL
AST SERPL W P-5'-P-CCNC: 12 U/L (ref 15–37)
BACTERIA URNS QL MICRO: NEGATIVE /HPF
BASOPHILS # BLD: 0 K/UL (ref 0–0.1)
BASOPHILS NFR BLD: 0 % (ref 0–1)
BILIRUB SERPL-MCNC: 0.4 MG/DL (ref 0.2–1)
BILIRUB UR QL: NEGATIVE
BUN SERPL-MCNC: 41 MG/DL (ref 6–20)
BUN/CREAT SERPL: 13 (ref 12–20)
CA-I BLD-MCNC: 8 MG/DL (ref 8.5–10.1)
CHLORIDE SERPL-SCNC: 103 MMOL/L (ref 97–108)
CO2 SERPL-SCNC: 26 MMOL/L (ref 21–32)
COLOR UR: ABNORMAL
CREAT SERPL-MCNC: 3.09 MG/DL (ref 0.55–1.02)
DIFFERENTIAL METHOD BLD: ABNORMAL
EOSINOPHIL # BLD: 0.1 K/UL (ref 0–0.4)
EOSINOPHIL NFR BLD: 1 % (ref 0–7)
ERYTHROCYTE [DISTWIDTH] IN BLOOD BY AUTOMATED COUNT: 19.9 % (ref 11.5–14.5)
GLOBULIN SER CALC-MCNC: 2.8 G/DL (ref 2–4)
GLUCOSE BLD STRIP.AUTO-MCNC: 65 MG/DL (ref 65–117)
GLUCOSE BLD STRIP.AUTO-MCNC: 76 MG/DL (ref 65–117)
GLUCOSE BLD STRIP.AUTO-MCNC: 87 MG/DL (ref 65–117)
GLUCOSE BLD STRIP.AUTO-MCNC: 91 MG/DL (ref 65–117)
GLUCOSE SERPL-MCNC: 73 MG/DL (ref 65–100)
GLUCOSE UR STRIP.AUTO-MCNC: NEGATIVE MG/DL
HCT VFR BLD AUTO: 25.2 % (ref 35–47)
HGB BLD-MCNC: 7.5 G/DL (ref 11.5–16)
HGB UR QL STRIP: ABNORMAL
IMM GRANULOCYTES # BLD AUTO: 0 K/UL (ref 0–0.04)
IMM GRANULOCYTES NFR BLD AUTO: 0 % (ref 0–0.5)
KETONES UR QL STRIP.AUTO: NEGATIVE MG/DL
LACTATE SERPL-SCNC: 1 MMOL/L (ref 0.4–2)
LACTATE SERPL-SCNC: 2.1 MMOL/L (ref 0.4–2)
LACTATE SERPL-SCNC: 3.3 MMOL/L (ref 0.4–2)
LEUKOCYTE ESTERASE UR QL STRIP.AUTO: ABNORMAL
LYMPHOCYTES # BLD: 1.1 K/UL (ref 0.8–3.5)
LYMPHOCYTES NFR BLD: 20 % (ref 12–49)
MCH RBC QN AUTO: 31.6 PG (ref 26–34)
MCHC RBC AUTO-ENTMCNC: 29.8 G/DL (ref 30–36.5)
MCV RBC AUTO: 106.3 FL (ref 80–99)
MONOCYTES # BLD: 0.4 K/UL (ref 0–1)
MONOCYTES NFR BLD: 8 % (ref 5–13)
MUCOUS THREADS URNS QL MICRO: ABNORMAL /LPF
NEUTS SEG # BLD: 4 K/UL (ref 1.8–8)
NEUTS SEG NFR BLD: 71 % (ref 32–75)
NITRITE UR QL STRIP.AUTO: NEGATIVE
NRBC # BLD: 0 K/UL (ref 0–0.01)
NRBC BLD-RTO: 0 PER 100 WBC
PERFORMED BY, TECHID: NORMAL
PH UR STRIP: 5 [PH] (ref 5–8)
PLATELET # BLD AUTO: 139 K/UL (ref 150–400)
PMV BLD AUTO: 10.3 FL (ref 8.9–12.9)
POTASSIUM SERPL-SCNC: 4.1 MMOL/L (ref 3.5–5.1)
PROT SERPL-MCNC: 4.9 G/DL (ref 6.4–8.2)
PROT UR STRIP-MCNC: NEGATIVE MG/DL
RBC # BLD AUTO: 2.37 M/UL (ref 3.8–5.2)
RBC #/AREA URNS HPF: ABNORMAL /HPF (ref 0–5)
SODIUM SERPL-SCNC: 137 MMOL/L (ref 136–145)
SP GR UR REFRACTOMETRY: 1.01 (ref 1–1.03)
UA: UC IF INDICATED,UAUC: ABNORMAL
UROBILINOGEN UR QL STRIP.AUTO: 0.1 EU/DL (ref 0.1–1)
WBC # BLD AUTO: 5.6 K/UL (ref 3.6–11)
WBC URNS QL MICRO: ABNORMAL /HPF (ref 0–4)

## 2022-01-20 PROCEDURE — 83605 ASSAY OF LACTIC ACID: CPT

## 2022-01-20 PROCEDURE — 80053 COMPREHEN METABOLIC PANEL: CPT

## 2022-01-20 PROCEDURE — 81001 URINALYSIS AUTO W/SCOPE: CPT

## 2022-01-20 PROCEDURE — 74011250636 HC RX REV CODE- 250/636: Performed by: HOSPITALIST

## 2022-01-20 PROCEDURE — 99285 EMERGENCY DEPT VISIT HI MDM: CPT

## 2022-01-20 PROCEDURE — 74011250637 HC RX REV CODE- 250/637: Performed by: HOSPITALIST

## 2022-01-20 PROCEDURE — 80177 DRUG SCRN QUAN LEVETIRACETAM: CPT

## 2022-01-20 PROCEDURE — 87186 SC STD MICRODIL/AGAR DIL: CPT

## 2022-01-20 PROCEDURE — 96375 TX/PRO/DX INJ NEW DRUG ADDON: CPT

## 2022-01-20 PROCEDURE — 74011250636 HC RX REV CODE- 250/636: Performed by: EMERGENCY MEDICINE

## 2022-01-20 PROCEDURE — 36415 COLL VENOUS BLD VENIPUNCTURE: CPT

## 2022-01-20 PROCEDURE — 65270000029 HC RM PRIVATE

## 2022-01-20 PROCEDURE — 96365 THER/PROPH/DIAG IV INF INIT: CPT

## 2022-01-20 PROCEDURE — 87077 CULTURE AEROBIC IDENTIFY: CPT

## 2022-01-20 PROCEDURE — 87086 URINE CULTURE/COLONY COUNT: CPT

## 2022-01-20 PROCEDURE — 85025 COMPLETE CBC W/AUTO DIFF WBC: CPT

## 2022-01-20 PROCEDURE — 70450 CT HEAD/BRAIN W/O DYE: CPT

## 2022-01-20 PROCEDURE — 82962 GLUCOSE BLOOD TEST: CPT

## 2022-01-20 PROCEDURE — 87040 BLOOD CULTURE FOR BACTERIA: CPT

## 2022-01-20 PROCEDURE — G0378 HOSPITAL OBSERVATION PER HR: HCPCS

## 2022-01-20 PROCEDURE — 96374 THER/PROPH/DIAG INJ IV PUSH: CPT

## 2022-01-20 PROCEDURE — 74011000250 HC RX REV CODE- 250: Performed by: HOSPITALIST

## 2022-01-20 RX ORDER — PANTOPRAZOLE SODIUM 40 MG/1
40 TABLET, DELAYED RELEASE ORAL
Status: DISCONTINUED | OUTPATIENT
Start: 2022-01-21 | End: 2022-01-21 | Stop reason: HOSPADM

## 2022-01-20 RX ORDER — ASCORBIC ACID 500 MG
500 TABLET ORAL 2 TIMES DAILY
Status: DISCONTINUED | OUTPATIENT
Start: 2022-01-20 | End: 2022-01-21 | Stop reason: HOSPADM

## 2022-01-20 RX ORDER — ENOXAPARIN SODIUM 100 MG/ML
30 INJECTION SUBCUTANEOUS DAILY
Status: DISCONTINUED | OUTPATIENT
Start: 2022-01-21 | End: 2022-01-21 | Stop reason: HOSPADM

## 2022-01-20 RX ORDER — POLYETHYLENE GLYCOL 3350 17 G/17G
17 POWDER, FOR SOLUTION ORAL DAILY PRN
Status: DISCONTINUED | OUTPATIENT
Start: 2022-01-20 | End: 2022-01-21 | Stop reason: HOSPADM

## 2022-01-20 RX ORDER — ACETAMINOPHEN 325 MG/1
650 TABLET ORAL
Status: DISCONTINUED | OUTPATIENT
Start: 2022-01-20 | End: 2022-01-21 | Stop reason: HOSPADM

## 2022-01-20 RX ORDER — OXCARBAZEPINE 150 MG/1
150 TABLET, FILM COATED ORAL EVERY 12 HOURS
Status: DISCONTINUED | OUTPATIENT
Start: 2022-01-20 | End: 2022-01-21

## 2022-01-20 RX ORDER — LOPERAMIDE HYDROCHLORIDE 2 MG/1
2 CAPSULE ORAL
Status: DISCONTINUED | OUTPATIENT
Start: 2022-01-20 | End: 2022-01-21 | Stop reason: HOSPADM

## 2022-01-20 RX ORDER — DEXTROSE 50 % IN WATER (D50W) INTRAVENOUS SYRINGE
50 AS NEEDED
Status: DISCONTINUED | OUTPATIENT
Start: 2022-01-20 | End: 2022-01-21 | Stop reason: HOSPADM

## 2022-01-20 RX ORDER — ONDANSETRON 2 MG/ML
4 INJECTION INTRAMUSCULAR; INTRAVENOUS
Status: DISCONTINUED | OUTPATIENT
Start: 2022-01-20 | End: 2022-01-21 | Stop reason: HOSPADM

## 2022-01-20 RX ORDER — FLUOXETINE 10 MG/1
10 CAPSULE ORAL DAILY
Status: DISCONTINUED | OUTPATIENT
Start: 2022-01-21 | End: 2022-01-21 | Stop reason: HOSPADM

## 2022-01-20 RX ORDER — ATORVASTATIN CALCIUM 40 MG/1
40 TABLET, FILM COATED ORAL DAILY
Status: DISCONTINUED | OUTPATIENT
Start: 2022-01-21 | End: 2022-01-21 | Stop reason: HOSPADM

## 2022-01-20 RX ORDER — ACETAMINOPHEN 650 MG/1
650 SUPPOSITORY RECTAL
Status: DISCONTINUED | OUTPATIENT
Start: 2022-01-20 | End: 2022-01-21 | Stop reason: HOSPADM

## 2022-01-20 RX ORDER — SEVELAMER CARBONATE 800 MG/1
800 TABLET, FILM COATED ORAL
Status: DISCONTINUED | OUTPATIENT
Start: 2022-01-20 | End: 2022-01-21 | Stop reason: HOSPADM

## 2022-01-20 RX ORDER — SODIUM CHLORIDE 0.9 % (FLUSH) 0.9 %
5-40 SYRINGE (ML) INJECTION AS NEEDED
Status: DISCONTINUED | OUTPATIENT
Start: 2022-01-20 | End: 2022-01-21 | Stop reason: HOSPADM

## 2022-01-20 RX ORDER — LORAZEPAM 2 MG/ML
1 INJECTION INTRAMUSCULAR
Status: COMPLETED | OUTPATIENT
Start: 2022-01-20 | End: 2022-01-20

## 2022-01-20 RX ORDER — GUAIFENESIN 100 MG/5ML
81 LIQUID (ML) ORAL DAILY
Status: DISCONTINUED | OUTPATIENT
Start: 2022-01-21 | End: 2022-01-21 | Stop reason: HOSPADM

## 2022-01-20 RX ORDER — ONDANSETRON 4 MG/1
4 TABLET, ORALLY DISINTEGRATING ORAL
Status: DISCONTINUED | OUTPATIENT
Start: 2022-01-20 | End: 2022-01-21 | Stop reason: HOSPADM

## 2022-01-20 RX ORDER — LEVETIRACETAM 250 MG/1
1000 TABLET ORAL 2 TIMES DAILY
Status: DISCONTINUED | OUTPATIENT
Start: 2022-01-21 | End: 2022-01-21 | Stop reason: HOSPADM

## 2022-01-20 RX ORDER — AMLODIPINE BESYLATE 5 MG/1
5 TABLET ORAL DAILY
Status: DISCONTINUED | OUTPATIENT
Start: 2022-01-21 | End: 2022-01-21 | Stop reason: HOSPADM

## 2022-01-20 RX ORDER — LEVETIRACETAM 10 MG/ML
1000 INJECTION INTRAVASCULAR ONCE
Status: COMPLETED | OUTPATIENT
Start: 2022-01-20 | End: 2022-01-20

## 2022-01-20 RX ORDER — SODIUM CHLORIDE 0.9 % (FLUSH) 0.9 %
5-40 SYRINGE (ML) INJECTION EVERY 8 HOURS
Status: DISCONTINUED | OUTPATIENT
Start: 2022-01-20 | End: 2022-01-21 | Stop reason: HOSPADM

## 2022-01-20 RX ORDER — LACOSAMIDE 100 MG/1
200 TABLET ORAL 2 TIMES DAILY
Status: DISCONTINUED | OUTPATIENT
Start: 2022-01-20 | End: 2022-01-21 | Stop reason: HOSPADM

## 2022-01-20 RX ORDER — CALCITRIOL 0.25 UG/1
0.5 CAPSULE ORAL DAILY
Status: DISCONTINUED | OUTPATIENT
Start: 2022-01-21 | End: 2022-01-21 | Stop reason: HOSPADM

## 2022-01-20 RX ADMIN — ACETAMINOPHEN 650 MG: 325 TABLET ORAL at 16:09

## 2022-01-20 RX ADMIN — LEVETIRACETAM 1000 MG: 10 INJECTION, SOLUTION INTRAVENOUS at 17:43

## 2022-01-20 RX ADMIN — SODIUM CHLORIDE, PRESERVATIVE FREE 10 ML: 5 INJECTION INTRAVENOUS at 16:10

## 2022-01-20 RX ADMIN — SEVELAMER CARBONATE 800 MG: 800 TABLET, FILM COATED ORAL at 16:10

## 2022-01-20 RX ADMIN — LORAZEPAM 1 MG: 2 INJECTION INTRAMUSCULAR at 11:52

## 2022-01-20 RX ADMIN — VANCOMYCIN HYDROCHLORIDE 1750 MG: 500 INJECTION, POWDER, LYOPHILIZED, FOR SOLUTION INTRAVENOUS at 19:15

## 2022-01-20 NOTE — ED PROVIDER NOTES
EMERGENCY DEPARTMENT HISTORY AND PHYSICAL EXAM      Date: 1/20/2022  Patient Name: Teto Parada      History of Presenting Illness     Chief Complaint   Patient presents with    Dizziness       History Provided By: Patient    HPI: Teto Parada, 71 y.o. female with a past medical history significant seizures, pretension, end-stage renal disease on dialysis. presents to the ED with cc of altered mental status at dialysis. Per patient was in her normal state of health this morning. She got her AEDs before dialysis. When she was at dialysis she had an episode of vomiting and became confused and unresponsive. Afterwards she had right upper and lower extremity weakness. 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 Blood sugar was normal per daughter. As patient was not improving as quickly as normal they brought her in. On arrival here patient denies any complaints. Weakness has resolved. There are no other complaints, changes, or physical findings at this time.     PCP: Jeannette Da Silva MD    Current Facility-Administered Medications   Medication Dose Route Frequency Provider Last Rate Last Admin    [START ON 1/21/2022] amLODIPine (NORVASC) tablet 5 mg  5 mg Oral DAILY Patrice Duenas MD        ascorbic acid (vitamin C) (VITAMIN C) tablet 500 mg  500 mg Oral BID Catrachito Carreno MD        [START ON 1/21/2022] aspirin chewable tablet 81 mg  81 mg Oral DAILY MD Lawanda Barajas ON 1/21/2022] atorvastatin (LIPITOR) tablet 40 mg  40 mg Oral DAILY MD Lawanda Barajas ON 1/21/2022] calcitRIOL (ROCALTROL) capsule 0.5 mcg  0.5 mcg Oral DAILY Catrachito Carreno MD       [facility] ON 1/21/2022] levothyroxine (SYNTHROID) tablet 150 mcg  150 mcg Oral DAILY MD Lawanda Barajas ON 1/21/2022] FLUoxetine (PROzac) capsule 10 mg  10 mg Oral DAILY MD Lawanda Barajas ON 1/21/2022] levETIRAcetam (KEPPRA XR) ER tablet 500 mg  500 mg Oral DAILY Pradeep Duenas MD        loperamide (IMODIUM) capsule 2 mg  2 mg Oral Q4H PRN Arnulfo Castillo MD        OXcarbazepine (TRILEPTAL) tablet 150 mg  150 mg Oral Q12H MD Lawanda Powers Mask ON 1/21/2022] pantoprazole (PROTONIX) tablet 40 mg  40 mg Oral ACB Arnulfo Castillo MD        lacosamide (VIMPAT) tablet 200 mg  200 mg Oral BID Arnulfo Castillo MD        sevelamer carbonate (RENVELA) tab 800 mg  800 mg Oral TID WITH MEALS Arnulfo Castillo MD   800 mg at 01/20/22 1610    zinc oxide-white petrolatum 17-57 % topical paste   Topical PRN Arnulfo Castillo MD        sodium chloride (NS) flush 5-40 mL  5-40 mL IntraVENous Q8H Arnulfo Castillo MD   10 mL at 01/20/22 1610    sodium chloride (NS) flush 5-40 mL  5-40 mL IntraVENous PRN Arnulfo Castillo MD        acetaminophen (TYLENOL) tablet 650 mg  650 mg Oral Q6H PRN Arnulfo Castillo MD   650 mg at 01/20/22 1609    Or    acetaminophen (TYLENOL) suppository 650 mg  650 mg Rectal Q6H PRN Arnulfo Castillo MD        polyethylene glycol (MIRALAX) packet 17 g  17 g Oral DAILY PRN Arnulfo Castillo MD        ondansetron (ZOFRAN ODT) tablet 4 mg  4 mg Oral Q8H PRN Arnulfo Castillo MD        Or    [facility]ron [facility]) injection 4 mg  4 mg IntraVENous Q6H PRN Arnulfo Castillo MD        [START ON 1/21/2022] enoxaparin (LOVENOX) injection 30 mg  30 mg SubCUTAneous DAILY Arnulfo Castillo MD        dextrose (D50W) injection syrg 25 g  50 mL IntraVENous PRN Arnulfo Castillo MD        vancomycin (VANCOCIN) 1,750 mg in 0.9% sodium chloride 500 mL IVPB  1,750 mg IntraVENous ONCE Arnulfo Castillo  mL/hr at 01/20/22 1915 1,750 mg at 01/20/22 1915    VANCOMYCIN INFORMATION NOTE   Other Rx Dosing/Monitoring Arnulfo Castillo MD         Current Outpatient Medications   Medication Sig Dispense Refill    OXcarbazepine (TRILEPTAL) 150 mg tablet Take 1 Tablet by mouth every twelve (12) hours.  60 Tablet 0  pantoprazole (PROTONIX) 40 mg tablet Take 1 Tablet by mouth Daily (before breakfast). 30 Tablet 0    amLODIPine (NORVASC) 5 mg tablet Take 1 Tablet by mouth daily for 60 days. 30 Tablet 1    insulin lispro (HUMALOG) 100 unit/mL injection As per insulin sensitive ssi:    Insulin High Sensitivity (thin, ESRD)  For Blood Sugar (mg/dL) of:              Less than 150 =   0 units  150 -199 =  1 units  200 -249 =   2 units  250 -299 =   3 units  300 -349 =   4 units  350 or greater = 5 units and Call Physician  Initiate Hypoglycemic protocol if blood glucose is <70 mg/dL. 1 Each 0    ascorbic acid, vitamin C, (VITAMIN C) 500 mg tablet Take 1 Tablet by mouth two (2) times a day for 30 days. 60 Tablet 0    aspirin 81 mg chewable tablet Take 1 Tablet by mouth daily for 30 days. 30 Tablet 0    predniSONE (DELTASONE) 20 mg tablet Start prednisone 20 mg PO daily x 2 days then 10 mg PO daily x 2 days then stop. 6 Tablet 0    calcitRIOL (ROCALTROL) 0.5 mcg capsule Take 1 Capsule by mouth daily. 30 Capsule 0    zinc oxide-white petrolatum 17-57 % topical paste Apply  to affected area as needed for Skin Irritation. Indications: skin irritation 113 g 0    ondansetron hcl (Zofran) 4 mg tablet Take 1 Tablet by mouth every eight (8) hours as needed for Nausea or Vomiting. 45 Tablet 0    Vimpat 200 mg tab tablet Take 1 Tablet by mouth two (2) times a day.  ondansetron (Zofran ODT) 4 mg disintegrating tablet 1 Tablet by SubLINGual route every eight (8) hours as needed for Nausea or Vomiting. 20 Tablet 0    atorvastatin (LIPITOR) 40 mg tablet Take 1 tablet by mouth once daily for 90 days 90 Tablet 3    FLUoxetine (PROzac) 10 mg capsule Take 1 Capsule by mouth daily for 270 days.  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) 8/31/2020    Atrial fibrillation (HCC)     Cardiac pacemaker in situ     Cellulitis and abscess of trunk 8/31/2020    Depression, postpartum     [facility] [address]     Heart disease     Heartburn     Hx of long term use of blood thinners     Hypercholesterolemia     Hypertension     Hypothyroidism     Migraines     Peripheral venous insufficiency 8/31/2020    Seizures ([facility] [address]     Sleep disorder     Stroke ([facility] [address]     2019    Varicose veins of lower extremity with inflammation 8/31/2020       Past Surgical History:  Past Surgical History:   Procedure Laterality Date    HX BACK SURGERY      HX OTHER SURGICAL      leg surgery     HX OTHER SURGICAL      pacemaker monitor     IR INSERT TUNL CVC W/O PORT OVER 5 YR  12/10/2021       Family History:  Family History   Problem Relation Age of Onset    Heart Disease Mother     Heart Disease Father     Diabetes Sister     Diabetes Brother        Social History:  Social History     Tobacco Use    Smoking status: Never Smoker    Smokeless tobacco: Never Used   Vaping Use    Vaping Use: Never used   Substance Use Topics    Alcohol use: Never    Drug use: Never       Allergies:  No Known Allergies      Review of Systems     Review of Systems   Unable to perform ROS: Mental status change       Physical Exam     Physical Exam  Vitals and nursing note reviewed. Constitutional:       Appearance: Normal appearance. She is normal weight. HENT:      Head: Normocephalic and atraumatic.       Nose: Nose normal.      Mouth/Throat:      Mouth: Mucous membranes are moist.   Eyes:      Conjunctiva/sclera: Conjunctivae normal.   Cardiovascular:      Rate and Rhythm: Normal rate. Pulses: Normal pulses. Pulmonary:      Effort: Pulmonary effort is normal. No respiratory distress. Musculoskeletal:         General: No swelling or deformity. Normal range of motion. Skin:     General: Skin is warm and dry. Coloration: Skin is pale. Findings: No rash. Neurological:      General: No focal deficit present. Mental Status: She is alert. Sensory: No sensory deficit. Motor: No weakness. Comments: Somnolent but easily arousable   Psychiatric:         Mood and Affect: Mood normal.         Behavior: Behavior normal.         Lab and Diagnostic Study Results     Labs -     Recent Results (from the past 12 hour(s))   LACTIC ACID    Collection Time: 01/20/22 11:30 AM   Result Value Ref Range    Lactic acid 2.1 (HH) 0.4 - 2.0 mmol/L   GLUCOSE, POC    Collection Time: 01/20/22 11:39 AM   Result Value Ref Range    Glucose (POC) 76 65 - 117 mg/dL    Performed by Edmundo Valenzuela    CBC WITH AUTOMATED DIFF    Collection Time: 01/20/22 11:50 AM   Result Value Ref Range    WBC 5.6 3.6 - 11.0 K/uL    RBC 2.37 (L) 3.80 - 5.20 M/uL    HGB 7.5 (L) 11.5 - 16.0 g/dL    HCT 25.2 (L) 35.0 - 47.0 %    .3 (H) 80.0 - 99.0 FL    MCH 31.6 26.0 - 34.0 PG    MCHC 29.8 (L) 30.0 - 36.5 g/dL    RDW 19.9 (H) 11.5 - 14.5 %    PLATELET 693 (L) 215 - 400 K/uL    MPV 10.3 8.9 - 12.9 FL    NRBC 0.0 0.0  WBC    ABSOLUTE NRBC 0.00 0.00 - 0.01 K/uL    NEUTROPHILS 71 32 - 75 %    LYMPHOCYTES 20 12 - 49 %    MONOCYTES 8 5 - 13 %    EOSINOPHILS 1 0 - 7 %    BASOPHILS 0 0 - 1 %    IMMATURE GRANULOCYTES 0 0 - 0.5 %    ABS. NEUTROPHILS 4.0 1.8 - 8.0 K/UL    ABS. LYMPHOCYTES 1.1 0.8 - 3.5 K/UL    ABS. MONOCYTES 0.4 0.0 - 1.0 K/UL    ABS. EOSINOPHILS 0.1 0.0 - 0.4 K/UL    ABS. BASOPHILS 0.0 0.0 - 0.1 K/UL    ABS. IMM.  GRANS. 0.0 0.00 - 0.04 K/UL    DF AUTOMATED     METABOLIC PANEL, COMPREHENSIVE    Collection Time: 01/20/22 11:50 AM   Result Value Ref Range    Sodium 137 136 - 145 mmol/L Potassium 4.1 3.5 - 5.1 mmol/L    Chloride 103 97 - 108 mmol/L    CO2 26 21 - 32 mmol/L    Anion gap 8 5 - 15 mmol/L    Glucose 73 65 - 100 mg/dL    BUN 41 (H) 6 - 20 mg/dL    Creatinine 3.09 (H) 0.55 - 1.02 mg/dL    BUN/Creatinine ratio 13 12 - 20      GFR est AA 18 (L) >60 ml/min/1.73m2    GFR est non-AA 15 (L) >60 ml/min/1.73m2    Calcium 8.0 (L) 8.5 - 10.1 mg/dL    Bilirubin, total 0.4 0.2 - 1.0 mg/dL    AST (SGOT) 12 (L) 15 - 37 U/L    ALT (SGPT) 17 12 - 78 U/L    Alk.  phosphatase 90 45 - 117 U/L    Protein, total 4.9 (L) 6.4 - 8.2 g/dL    Albumin 2.1 (L) 3.5 - 5.0 g/dL    Globulin 2.8 2.0 - 4.0 g/dL    A-G Ratio 0.8 (L) 1.1 - 2.2     URINALYSIS W/ REFLEX CULTURE    Collection Time: 01/20/22 11:50 AM    Specimen: Urine   Result Value Ref Range    Color Yellow/Straw      Appearance Turbid (A) Clear      Specific gravity 1.015 1.003 - 1.030      pH (UA) 5.0 5.0 - 8.0      Protein Negative Negative mg/dL    Glucose Negative Negative mg/dL    Ketone Negative Negative mg/dL    Bilirubin Negative Negative      Blood Large (A) Negative      Urobilinogen 0.1 0.1 - 1.0 EU/dL    Nitrites Negative Negative      Leukocyte Esterase Large (A) Negative      UA:UC IF INDICATED Urine Culture Ordered (A) Culture not indicated by UA result      WBC 10-20 0 - 4 /hpf    RBC 0-5 0 - 5 /hpf    Bacteria Negative Negative /hpf    Mucus Trace /lpf   GLUCOSE, POC    Collection Time: 01/20/22  3:29 PM   Result Value Ref Range    Glucose (POC) 65 65 - 117 mg/dL    Performed by Larry Gary (ED Tech)    LACTIC ACID    Collection Time: 01/20/22  4:00 PM   Result Value Ref Range    Lactic acid 3.3 (HH) 0.4 - 2.0 mmol/L   GLUCOSE, POC    Collection Time: 01/20/22  4:22 PM   Result Value Ref Range    Glucose (POC) 87 65 - 117 mg/dL    Performed by [employer], POC    Collection Time: 01/20/22  6:24 PM   Result Value Ref Range    Glucose (POC) 91 65 - 117 mg/dL    Performed by Jihan Patel        Radiologic Studies - [unfilled]  CT Results  (Last 48 hours)               01/20/22 1058  CT HEAD WO CONT Final result    Impression:  No acute intracranial abnormality. No bleed. Cortical and central white matter changes of aging. Small remote lacune   involving left basal ganglia. Small ill-defined area of encephalomalacia   involving the left occipital lobe. Changes of pansinusitis. Narrative:  CT head without contrast: Transaxial and reformatted sagittal and coronal   images. History: Weakness. Dizziness. COMPARISON: CT head 1/12/2022       Dose reduction: All CT scans at this facility are performed using dose reduction   optimization techniques as appropriate to the performed exam including the   following: Automated exposure control, adjustments of the mA and/or kV according   to patient size, or use of iterative reconstruction technique. The ventricles show mild dilatation, no midline shift. Midline calcified pineal.   Cortical cerebral sulci are prominent involving both hemispheres. There is   ill-defined decreased attenuation density in the central white matter of both   hemispheres consistent with changes of aging/small vessel disease. There is a   low-attenuation lacune involving left basal ganglia without change. Small area   of encephalomalacia involving left occipital lobe without change. No focal acute   abnormal contrast density is seen involving the brain parenchyma on this   nonenhanced study. Specifically no evidence of cerebral or subarachnoid   hemorrhage and no extra-axial fluid collections. The pituitary and cerebellum   and brainstem are grossly normal in appearance. There is mucosal thickening of   the right maxillary sinus. Also patchy opacification of right and left frontal   and ethmoid air cells and right and left sphenoid sinuses. Mastoid air cells are   pneumatized.  The bony calvarium is normal.               CXR Results  (Last 48 hours)    None          Medical Decision Making and ED Course   - I am the first and primary provider for this patient AND AM THE PRIMARY PROVIDER OF RECORD. - I reviewed the vital signs, available nursing notes, past medical history, past surgical history, family history and social history. - Initial assessment performed. The patients presenting problems have been discussed, and the staff are in agreement with the care plan formulated and outlined with them. I have encouraged them to ask questions as they arise throughout their visit. Vital Signs-Reviewed the patient's vital signs. Patient Vitals for the past 12 hrs:   Temp Pulse Resp BP SpO2   01/20/22 2004 97.9 °F (36.6 °C) 62 21 (!) 138/51 100 %   01/20/22 1920 97.9 °F (36.6 °C) 60 18 (!) 126/53 100 %   01/20/22 1747  63 17 (!) 142/55 100 %   01/20/22 1612  62 19 (!) 146/71 100 %   01/20/22 1330  60 15 108/79 99 %   01/20/22 1226  60 14 (!) 108/50 100 %   01/20/22 1018 97.6 °F (36.4 °C) 61 20 130/63 100 %         Records Reviewed: Nursing Notes    ED Course:       ED Course as of 01/20/22 2014   Thu Jan 20, 2022   [age]-year-old female with a recent diagnosis of COVID and recent hospital admission presents for evaluation of vomiting and altered mental status during dialysis. This morning her daughter saw her and she was normal.  She went to dialysis and had an episode of vomiting. She then developed some right upper and lower extremity weakness which has now resolved. This is consistent with her known history of Ruddy's paralysis. No falls. Did not hit her head. Daughter states she did get her home seizure medications. Patient otherwise been in her normal state of health. On evaluation patient is able to move all 4 extremities symmetrically. Symmetric smile. Differential diagnosis includes seizure versus ICH versus electrolyte disarray. Patient was not able to complete her dialysis today. Getting labs including a CBC, CMP, lactate, blood cultures and a head CT. Disposition as per clinical course. [LW]   [id number] 80 22 97 to bedside after patient had a seizure. It was already resolved when I arrived. Giving Ativan as needed for seizure. Checking a  Keppra level. Patient admitted to Dr. Leandra Skinner.  [LW]      ED Course User Index  [LW] Iwona Mistry MD           Consultations:       Consultations: -  Hospitalist Consultant: Dr. Leandra Skinner: We have asked for emergent assistance with regard to this patient. We have discussed the patients HPI, ROS, PE and results this far. They will come and evaluate the patient for admission. Disposition     Disposition: Admitted to Floor Medical Floor the case was discussed with the admitting physician Henrique. Admitted  Diagnosis     Clinical Impression:   1. Seizure ([address]    2. COVID        Attestations:    Antionette Tate MD    Please note that this dictation was completed with [employer], the computer voice recognition software. Quite often unanticipated grammatical, syntax, homophones, and other interpretive errors are inadvertently transcribed by the computer software. Please disregard these errors. Please excuse any errors that have escaped final proofreading. Thank you.

## 2022-01-20 NOTE — PROGRESS NOTES
Day #1 of Vancomycin    Indication for Antimicrobials: Bacteremia     Consult placed by: Dr. Sapna Cárdenas    Significant Cultures:    blood: pending    Labs:  Recent Labs     22  1150   CREA 3.09*   BUN 41*   WBC 5.6     Temp (24hrs), Av.6 °F (36.4 °C), Min:97.6 °F (36.4 °C), Max:97.6 °F (36.4 °C)      Is the Patient on Dialysis? Yes - outpatient    Creatinine Clearance (mL/min):   CrCl (Actual Body Weight): 19.0  CrCl (Adjusted Body Weight): 16.3  CrCl (Ideal Body Weight): 14.4    Goal level: 21-25 mcg/mL     Impression/Plan:   Patient on outpatient HD  No mention of restarting HD inpatient yet  Will order loading dose: vancomycin 1750 mg IV x1  Without a definitive HD inpatient schedule, will order a random level for tomorrow AM  Antimicrobial stop date TBD     Pharmacy will follow daily and adjust medications as appropriate for renal function and/or serum levels.     Thank you,  [facility]-NAYELI

## 2022-01-20 NOTE — H&P
History and Physical    Subjective:   Chief Complaint : seizure during dialysis  Source of information : charts, EMS    History of present illness:     72F, H/o seizures with todds paralysis, CVA with right sided weakness, SSS with PPM not on AC, ESRD on HD with drowsiness since 1 day    He receives 1 hour of HD today, she arrived drowsy to HD today and was passing out mid conversation, was on her way to ER when enroute had a wittnessed seizure- describes as movement of neck. She had another seizure while in the ER    She was admitted recently with COVID on December 26th 2021, and also had UGIB with ulcer on EGD where it showed non bleeding ulcer, where AC was stopped and protonix was started. At the time of discharge his Bcx was positive for MSSA and was sent home on IV vancomycin for 2 weeks 1/17. Patient does remember having seizure activity, and says that her right hand becomes weak post seizure and regains her strength in a matter of minutes. CT head shows: Cortical and central white matter changes of aging. Small remote lacune involving left basal ganglia. Small ill-defined area of encephalomalacia  involving the left occipital lobe.      No cough, congestion, shortness of breath, + diarrhea, no urinary changes    Past Medical History:   Diagnosis Date    Anxiety disorder     Arthritis     Atherosclerosis of native arteries of the extremities with ulceration ([address] 8/31/2020    Atrial fibrillation ([address]     Cardiac pacemaker in situ     Cellulitis and abscess of trunk 8/31/2020    Depression, postpartum     Diabetes ([address]     Heart disease     Heartburn     Hx of long term use of blood thinners     Hypercholesterolemia     Hypertension     Hypothyroidism     Migraines     Peripheral venous insufficiency 8/31/2020    Seizures ([address]     Sleep disorder     Stroke ([address]     2019    Varicose veins of lower extremity with inflammation 8/31/2020     Past Surgical History:   Procedure Laterality Date    HX BACK SURGERY      HX OTHER SURGICAL      leg surgery     HX OTHER SURGICAL      pacemaker monitor     IR INSERT TUNL CVC W/O PORT OVER 5 YR  12/10/2021     Family History   Problem Relation Age of Onset    Heart Disease Mother     Heart Disease Father     Diabetes Sister     Diabetes Brother       Social History     Tobacco Use    Smoking status: Never Smoker    Smokeless tobacco: Never Used   Substance Use Topics    Alcohol use: Never       Prior to Admission medications    Medication Sig Start Date End Date Taking? Authorizing Provider   OXcarbazepine (TRILEPTAL) 150 mg tablet Take 1 Tablet by mouth every twelve (12) hours. 1/17/22   Dave Markham NP   pantoprazole (PROTONIX) 40 mg tablet Take 1 Tablet by mouth Daily (before breakfast). 1/18/22   Dave Markham NP   amLODIPine (NORVASC) 5 mg tablet Take 1 Tablet by mouth daily for 60 days. 1/11/22 3/12/22  Giacomo Jamison PA-C   insulin lispro (HUMALOG) 100 unit/mL injection As per insulin sensitive ssi:    Insulin High Sensitivity (thin, ESRD)  For Blood Sugar (mg/dL) of:              Less than 150 =   0 units  150 -199 =  1 units  200 -249 =   2 units  250 -299 =   3 units  300 -349 =   4 units  350 or greater = 5 units and Call Physician  Initiate Hypoglycemic protocol if blood glucose is <70 mg/dL. 1/3/22   Winter Madera MD   ascorbic acid, vitamin C, (VITAMIN C) 500 mg tablet Take 1 Tablet by mouth two (2) times a day for 30 days. 1/3/22 2/2/22  Winter Madera MD   aspirin 81 mg chewable tablet Take 1 Tablet by mouth daily for 30 days. 1/3/22 2/2/22  Winter Madera MD   predniSONE (DELTASONE) 20 mg tablet Start prednisone 20 mg PO daily x 2 days then 10 mg PO daily x 2 days then stop. 1/3/22   Winter Madera MD   calcitRIOL (ROCALTROL) 0.5 mcg capsule Take 1 Capsule by mouth daily.  12/12/21   Humza Vargas MD   zinc oxide-white petrolatum 17-57 % topical paste Apply  to affected area as needed for Skin Irritation. Indications: skin irritation 12/11/21   Calvin Paredes MD   ondansetron hcl (Zofran) 4 mg tablet Take 1 Tablet by mouth every eight (8) hours as needed for Nausea or Vomiting. 12/11/21   Calvin Paredes MD   Vimpat 200 mg tab tablet Take 1 Tablet by mouth two (2) times a day. 11/19/21   Provider, Historical   ondansetron (Zofran ODT) 4 mg disintegrating tablet 1 Tablet by SubLINGual route every eight (8) hours as needed for Nausea or Vomiting. 12/1/21   Arslan Mcginnis MD   atorvastatin (LIPITOR) 40 mg tablet Take 1 tablet by mouth once daily for 90 days 11/22/21 8/19/22  Lydia Novoa MD   FLUoxetine (PROzac) 10 mg capsule Take 1 Capsule by mouth daily for 270 days. 11/5/21 8/2/22  Lydia Novoa MD   loperamide (IMODIUM) 2 mg capsule TAKE 1 CAPSULE BY MOUTH SIX TIMES DAILY AS NEEDED FOR 30 DAYS 10/17/21   Doug Herrera MD   Euthyrox 150 mcg tablet Take 1 tablet by mouth once daily 10/13/21   Tali Schaffer MD   levETIRAcetam (KEPPRA XR) 500 mg ER tablet TAKE 2 TABLETS BY MOUTH TWICE DAILY FOR 90 DAYS 6/1/21   Provider, Historical   sevelamer carbonate (RENVELA) 800 mg tab tab  5/10/21   Provider, Historical     No Known Allergies          Review of Systems:  Constitutional: Appetite is good, denies weight loss, no fever, no chills, no night sweats  Eye: No recent visual disturbances, no discharge, no double vision++ poor vision chronic  Ear/nose/mouth/throat : No hearing disturbance, no ear pain, no nasal congestion, no sore throat, no trouble swallowing.   Respiratory : No trouble breathing, no cough, no shortness of breath, no hemoptysis, no wheezing  Cardiovascular : No chest pain, no palpitation, no racing of heart, no orthopnea, no paroxysmal nocturnal dyspnea, no peripheral edema  Gastrointestinal : No nausea, no vomiting, no diarrhea, constipation, heartburn, abdominal pain  Genitourinary : No dysuria, no hematuria, no increased frequency, incontinence,  Lymphatics : No swollen glands -Neck, axillary, inguinal  Endocrine : No excessive thirst, no polyuria no cold intolerance, no heat intolerance. Immunologic : No hives, urticaria, no seasonal allergies,   Musculoskeletal : No joint swelling, pain, effusion,  no back pain, no neck pain,   Integumentary : No rash, no pruritus, no ecchymosis  Hematology : No petechiae, No easy bruising,  No tendency to bleed easy  Neurology : intermittent drowsiness after seizure, right sided weakness that reasolves  Psychiatric : No mood swings, no anxiety, depression    Vitals:     Visit Vitals  /79 (BP Patient Position: Semi fowlers)   Pulse 60   Temp 97.6 °F (36.4 °C)   Resp 15   Ht 5' 4\" (1.626 m)   Wt 72.1 kg (159 lb)   SpO2 99%   BMI 27.29 kg/m²       Physical Exam:   General:  Alert, cooperative, no distress, appears stated age. Lungs:   Clear to auscultation bilaterally. Chest wall:  No tenderness or deformity. Heart:  Regular rate and rhythm, S1, S2 normal, no murmur, click, rub or gallop. Abdomen:   Soft, non-tender. Bowel sounds normal. No masses,  No organomegaly. Extremities: Extremities normal, atraumatic, no cyanosis or edema. Pulses: 2+ and symmetric all extremities. Skin: Skin color, texture, turgor normal. No rashes or lesions   Neurologic: CNII-XII intact.  No gross sensory or motor deficits             Data Review:   Recent Results (from the past 24 hour(s))   LACTIC ACID    Collection Time: 01/20/22 11:30 AM   Result Value Ref Range    Lactic acid 2.1 (HH) 0.4 - 2.0 mmol/L   GLUCOSE, POC    Collection Time: 01/20/22 11:39 AM   Result Value Ref Range    Glucose (POC) 76 65 - 117 mg/dL    Performed by [employer]    CBC WITH AUTOMATED DIFF    Collection Time: 01/20/22 11:50 AM   Result Value Ref Range    WBC 5.6 3.6 - 11.0 K/uL    RBC 2.37 (L) 3.80 - 5.20 M/uL    HGB 7.5 (L) 11.5 - 16.0 g/dL    HCT 25.2 (L) 35.0 - 47.0 %    .3 (H) 80.0 - 99.0 FL    MCH 31.6 26.0 - 34.0 PG    MCHC 29.8 (L) 30.0 - 36.5 g/dL    RDW 19.9 (H) 11.5 - 14.5 %    PLATELET 073 (L) 736 - 400 K/uL    MPV 10.3 8.9 - 12.9 FL    NRBC 0.0 0.0  WBC    ABSOLUTE NRBC 0.00 0.00 - 0.01 K/uL    NEUTROPHILS 71 32 - 75 %    LYMPHOCYTES 20 12 - 49 %    MONOCYTES 8 5 - 13 %    EOSINOPHILS 1 0 - 7 %    BASOPHILS 0 0 - 1 %    IMMATURE GRANULOCYTES 0 0 - 0.5 %    ABS. NEUTROPHILS 4.0 1.8 - 8.0 K/UL    ABS. LYMPHOCYTES 1.1 0.8 - 3.5 K/UL    ABS. MONOCYTES 0.4 0.0 - 1.0 K/UL    ABS. EOSINOPHILS 0.1 0.0 - 0.4 K/UL    ABS. BASOPHILS 0.0 0.0 - 0.1 K/UL    ABS. IMM. GRANS. 0.0 0.00 - 0.04 K/UL    DF AUTOMATED     METABOLIC PANEL, COMPREHENSIVE    Collection Time: 01/20/22 11:50 AM   Result Value Ref Range    Sodium 137 136 - 145 mmol/L    Potassium 4.1 3.5 - 5.1 mmol/L    Chloride 103 97 - 108 mmol/L    CO2 26 21 - 32 mmol/L    Anion gap 8 5 - 15 mmol/L    Glucose 73 65 - 100 mg/dL    BUN 41 (H) 6 - 20 mg/dL    Creatinine 3.09 (H) 0.55 - 1.02 mg/dL    BUN/Creatinine ratio 13 12 - 20      GFR est AA 18 (L) >60 ml/min/1.73m2    GFR est non-AA 15 (L) >60 ml/min/1.73m2    Calcium 8.0 (L) 8.5 - 10.1 mg/dL    Bilirubin, total 0.4 0.2 - 1.0 mg/dL    AST (SGOT) 12 (L) 15 - 37 U/L    ALT (SGPT) 17 12 - 78 U/L    Alk. phosphatase 90 45 - 117 U/L    Protein, total 4.9 (L) 6.4 - 8.2 g/dL    Albumin 2.1 (L) 3.5 - 5.0 g/dL    Globulin 2.8 2.0 - 4.0 g/dL    A-G Ratio 0.8 (L) 1.1 - 2.2               Assessment and Plan :     (1) seizure with todds paralysis, epileptics: one time keppra 1gm, resume oxcarbazepine, keppra and vimpat Likely source of why she went into seizure is possible MSSA bacteremia. Will repeat Bc to see its clearance. Continue IV vancomycin. D/D: include hypoglycema: sugars 85 and 65, received D25 amp.     (2) MSSA bacteremia: continue IV vancomycin, repeat Bcx to see clearance.      (3)  CVA with right sided weakness     (4) SSS with PPM not on AC: (5)  ESRD on HD : stable. Renvela.      (6) hypothyroidism: synthroid    DVT ppx: lovenox     DISPO: PT. Likely after repeat bcx negative    Signed By: Vanessa Lott MD     January 20, 2022

## 2022-01-20 NOTE — ED TRIAGE NOTES
Coming from [address] [facility] [address], Resident at [facility]. Patient rec'd approx 1 hr of dialysis. EMS states pt drowsy since arrival to dialysis, accucheck 87 - Rec'd D50 at dialysis, current accucheck 153. Passed out mid conversation. But easily arousable. Hx CVA with right side weakness.

## 2022-01-21 VITALS
SYSTOLIC BLOOD PRESSURE: 123 MMHG | WEIGHT: 159 LBS | HEART RATE: 60 BPM | OXYGEN SATURATION: 100 % | RESPIRATION RATE: 16 BRPM | TEMPERATURE: 97.7 F | DIASTOLIC BLOOD PRESSURE: 65 MMHG | HEIGHT: 64 IN | BODY MASS INDEX: 27.14 KG/M2

## 2022-01-21 LAB
COVID-19 RAPID TEST, COVR: DETECTED
SPECIMEN SOURCE: ABNORMAL
VANCOMYCIN SERPL-MCNC: 37.5 UG/ML

## 2022-01-21 PROCEDURE — 74011250636 HC RX REV CODE- 250/636: Performed by: HOSPITALIST

## 2022-01-21 PROCEDURE — 87635 SARS-COV-2 COVID-19 AMP PRB: CPT

## 2022-01-21 PROCEDURE — 96372 THER/PROPH/DIAG INJ SC/IM: CPT

## 2022-01-21 PROCEDURE — 96375 TX/PRO/DX INJ NEW DRUG ADDON: CPT

## 2022-01-21 PROCEDURE — 74011250637 HC RX REV CODE- 250/637: Performed by: PSYCHIATRY & NEUROLOGY

## 2022-01-21 PROCEDURE — 74011250637 HC RX REV CODE- 250/637: Performed by: HOSPITALIST

## 2022-01-21 PROCEDURE — G0378 HOSPITAL OBSERVATION PER HR: HCPCS

## 2022-01-21 PROCEDURE — 97161 PT EVAL LOW COMPLEX 20 MIN: CPT

## 2022-01-21 PROCEDURE — 97530 THERAPEUTIC ACTIVITIES: CPT

## 2022-01-21 PROCEDURE — 74011000250 HC RX REV CODE- 250: Performed by: HOSPITALIST

## 2022-01-21 PROCEDURE — 80202 ASSAY OF VANCOMYCIN: CPT

## 2022-01-21 PROCEDURE — 36415 COLL VENOUS BLD VENIPUNCTURE: CPT

## 2022-01-21 RX ORDER — OXCARBAZEPINE 300 MG/1
300 TABLET, FILM COATED ORAL EVERY 12 HOURS
Status: DISCONTINUED | OUTPATIENT
Start: 2022-01-21 | End: 2022-01-21 | Stop reason: HOSPADM

## 2022-01-21 RX ORDER — OXCARBAZEPINE 300 MG/1
300 TABLET, FILM COATED ORAL EVERY 12 HOURS
Qty: 60 TABLET | Refills: 2 | Status: SHIPPED | OUTPATIENT
Start: 2022-01-21 | End: 2022-01-29 | Stop reason: SDUPTHER

## 2022-01-21 RX ADMIN — OXYCODONE HYDROCHLORIDE AND ACETAMINOPHEN 500 MG: 500 TABLET ORAL at 08:23

## 2022-01-21 RX ADMIN — ATORVASTATIN CALCIUM 40 MG: 40 TABLET, FILM COATED ORAL at 08:23

## 2022-01-21 RX ADMIN — LOPERAMIDE HYDROCHLORIDE 2 MG: 2 CAPSULE ORAL at 00:53

## 2022-01-21 RX ADMIN — LACOSAMIDE 200 MG: 100 TABLET, FILM COATED ORAL at 00:49

## 2022-01-21 RX ADMIN — ASPIRIN 81 MG CHEWABLE TABLET 81 MG: 81 TABLET CHEWABLE at 08:23

## 2022-01-21 RX ADMIN — ONDANSETRON 4 MG: 2 INJECTION INTRAMUSCULAR; INTRAVENOUS at 04:54

## 2022-01-21 RX ADMIN — LACOSAMIDE 200 MG: 100 TABLET, FILM COATED ORAL at 11:27

## 2022-01-21 RX ADMIN — AMLODIPINE BESYLATE 5 MG: 5 TABLET ORAL at 08:23

## 2022-01-21 RX ADMIN — LEVOTHYROXINE SODIUM 150 MCG: 0.03 TABLET ORAL at 11:27

## 2022-01-21 RX ADMIN — LOPERAMIDE HYDROCHLORIDE 2 MG: 2 CAPSULE ORAL at 11:27

## 2022-01-21 RX ADMIN — ENOXAPARIN SODIUM 30 MG: 100 INJECTION SUBCUTANEOUS at 08:23

## 2022-01-21 RX ADMIN — OXCARBAZEPINE 300 MG: 300 TABLET, FILM COATED ORAL at 11:27

## 2022-01-21 RX ADMIN — OXYCODONE HYDROCHLORIDE AND ACETAMINOPHEN 500 MG: 500 TABLET ORAL at 00:49

## 2022-01-21 RX ADMIN — SEVELAMER CARBONATE 800 MG: 800 TABLET, FILM COATED ORAL at 13:02

## 2022-01-21 RX ADMIN — SODIUM CHLORIDE, PRESERVATIVE FREE 10 ML: 5 INJECTION INTRAVENOUS at 04:57

## 2022-01-21 RX ADMIN — FLUOXETINE 10 MG: 10 CAPSULE ORAL at 11:27

## 2022-01-21 RX ADMIN — SODIUM CHLORIDE, PRESERVATIVE FREE 10 ML: 5 INJECTION INTRAVENOUS at 08:24

## 2022-01-21 RX ADMIN — PANTOPRAZOLE SODIUM 40 MG: 40 TABLET, DELAYED RELEASE ORAL at 08:23

## 2022-01-21 RX ADMIN — OXCARBAZEPINE 150 MG: 150 TABLET, FILM COATED ORAL at 00:49

## 2022-01-21 RX ADMIN — CALCITRIOL CAPSULES 0.25 MCG 0.5 MCG: 0.25 CAPSULE ORAL at 11:27

## 2022-01-21 RX ADMIN — LEVETIRACETAM 1000 MG: 250 TABLET, FILM COATED ORAL at 11:27

## 2022-01-21 NOTE — PROGRESS NOTES
Report given to Jesse Mcallister at [facility]. Patient transported to [facility] via ambulance. Patient alert and oriented x3. On room air. No distress noted. Discharge plan of care/case management plan validated with provider discharge order.

## 2022-01-21 NOTE — ED NOTES
Care assumed and bedside SBAR report [facility]. Pt cardiac monitoring continued , spO2 Monitoring continued, IV reassed, call bell within reach, side rails up x2, resting comfortable but easily arousable, no signs of acute distress. , bed in lowest position, MAR reviewed, Labs reviewed, will continue to monitor.

## 2022-01-21 NOTE — DISCHARGE SUMMARY
Physician Discharge Summary     Patient ID:    Marc Velazquez  [id number]  79 y.o.  1950    Admit date: 1/20/2022    Discharge date : 1/21/2022    Chronic Diagnoses:    Problem List as of 1/21/2022 Date Reviewed: 1/3/2022          Codes Class Noted - Resolved    Acute encephalopathy ICD-10-CM: G93.40  ICD-9-CM: 348.30  1/20/2022 - Present        Seizure-like activity ([facility] ICD-10-CM: R56.9  ICD-9-CM: 780.39  1/12/2022 - Present        Sepsis ([facility] ICD-10-CM: A41.9  ICD-9-CM: 038.9, 995.91  1/7/2022 - Present        MSSA bacteremia ICD-10-CM: R78.81, B95.61  ICD-9-CM: 790.7, 041.11  1/3/2022 - Present        Acute on chronic anemia ICD-10-CM: D64.9  ICD-9-CM: 285.9  1/3/2022 - Present        Gait disturbance, post-stroke ICD-10-CM: I69.398, R26.9  ICD-9-CM: 438.89, 781.2  1/3/2022 - Present        COVID-19 ICD-10-CM: U07.1  ICD-9-CM: 079.89  12/26/2021 - Present        Fluid overload ICD-10-CM: E87.70  ICD-9-CM: 276.69  12/5/2021 - Present        Left-sided weakness ICD-10-CM: R53.1  ICD-9-CM: 728.87  12/3/2021 - Present        Intractable nausea and vomiting ICD-10-CM: R11.2  ICD-9-CM: 536.2  12/3/2021 - Present        Hypoglycemia ICD-10-CM: E16.2  ICD-9-CM: 251.2  6/11/2021 - Present        Encephalopathy acute ICD-10-CM: G93.40  ICD-9-CM: 348.30  6/11/2021 - Present        SSS (sick sinus syndrome) ([facility] ICD-10-CM: I49.5  ICD-9-CM: 427.81  6/11/2021 - Present        Seizure ([facility] ICD-10-CM: R56.9  ICD-9-CM: 780.39  5/19/2021 - Present        Hyperkalemia ICD-10-CM: E87.5  ICD-9-CM: 276.7  10/16/2020 - Present        Atherosclerosis of native arteries of the extremities with ulceration ([facility] ICD-10-CM: I70.25  ICD-9-CM: 440.23, 707.9  8/31/2020 - Present        Cellulitis and abscess of trunk ICD-10-CM: L03.319, L02.219  ICD-9-CM: 682.2  8/31/2020 - Present        Peripheral venous insufficiency ICD-10-CM: I87.2  ICD-9-CM: 459.81  8/31/2020 - Present        Varicose veins of lower extremity with inflammation ICD-10-CM: I83.10  ICD-9-CM: 454.1  8/31/2020 - Present        Peripheral vascular disease ([facility] ICD-10-CM: I73.9  ICD-9-CM: 443.9  7/3/2020 - Present        Type II diabetes mellitus ([facility] ICD-10-CM: E11.9  ICD-9-CM: 250.00  7/3/2020 - Present        Long term (current) use of antibiotics ICD-10-CM: Z79.2  ICD-9-CM: V58.62  7/3/2020 - Present        Atrial fibrillation (H[facility]) ICD-10-CM: I48.91  ICD-9-CM: 427.31  7/3/2020 - Present        Cardiac pacemaker in situ ICD-10-CM: Z95.0  ICD-9-CM: V45.01  7/3/2020 - Present        Carpal tunnel syndrome of right wrist ICD-10-CM: G56.01  ICD-9-CM: 354.0  7/3/2020 - Present        Chest wall pain ICD-10-CM: R07.89  ICD-9-CM: 786.52  7/3/2020 - Present        Chronic diarrhea ICD-10-CM: K52.9  ICD-9-CM: 787.91  7/3/2020 - Present        Chronic neck pain ICD-10-CM: M54.2, G89.29  ICD-9-CM: 723.1, 338.29  7/3/2020 - Present        End stage renal failure on dialysis [facility]) ICD-10-CM: N18.6, Z99.2  ICD-9-CM: 585.6, V45.11  7/3/2020 - Present        History of CVA (cerebrovascular accident) ICD-10-CM: Z86.73  ICD-9-CM: V12.54  7/3/2020 - Present        Hyponatremia ICD-10-CM: E87.1  ICD-9-CM: 276.1  7/3/2020 - Present        Acquired hypothyroidism ICD-10-CM: E03.9  ICD-9-CM: 244.9  7/3/2020 - Present        Breakthrough seizure ([facility] ICD-10-CM: Q57.681  ICD-9-CM: 345.91  7/3/2020 - Present        Recurrent falls ICD-10-CM: R29.6  ICD-9-CM: V15.88  7/3/2020 - Present        RESOLVED: Metabolic encephalopathy [id number]: G93.41  ICD-9-CM: 348.31  1/3/2022 - 1/3/2022        RESOLVED: Sepsis ([facility] ICD-10-CM: A41.9  ICD-9-CM: 038.9, 995.91  1/3/2022 - 1/3/2022        RESOLVED: AMS (altered mental status) ICD-10-CM: L35.64  ICD-9-CM: 780.97  12/26/2021 - 1/3/2022        RESOLVED: Disorder due to well controlled type 2 diabetes mellitus ([facility] ICD-10-CM: E11.8  ICD-9-CM: 250.90  8/31/2020 - 6/11/2021          22    Final Diagnoses:   Acute encephalopathy [G93.40]    Reason for Hospitalization:    (1) seizure with todds paralysis, epileptics: one time keppra 1gm, resume oxcarbazepine, keppra and vimpat Likely source of why she went into seizure is possible MSSA bacteremia. Will repeat Bc to see its clearance. Continue IV vancomycin. D/D: include hypoglycema: sugars 85 and 65, received D25 amp.      (2) MSSA bacteremia: continue IV vancomycin, repeat Bcx to see clearance.      (3)  CVA with right sided weakness      (4) SSS with PPM not on AC:      (5)  ESRD on HD : stable. Renvela.      (6) hypothyroidism: synthroid       Hospital Course:   72F, H/o seizures with todds paralysis, CVA with right sided weakness, SSS with PPM not on AC, ESRD on HD with drowsiness since 1 day     He receives 1 hour of HD today, she arrived drowsy to HD today and was passing out mid conversation, was on her way to ER when enroute had a wittnessed seizure- describes as movement of neck. She had another seizure while in the ER     She was admitted recently with COVID on December 26th 2021, and also had UGIB with ulcer on EGD where it showed non bleeding ulcer, where AC was stopped and protonix was started. At the time of discharge his Bcx was positive for MSSA and was sent home on IV vancomycin for 2 weeks 1/17.     Patient does remember having seizure activity, and says that her right hand becomes weak post seizure and regains her strength in a matter of minutes.      CT head shows: Cortical and central white matter changes of aging. Small remote lacune involving left basal ganglia. Small ill-defined area of encephalomalacia  involving the left occipital lobe. Neurology was consulted- and recommended dose of oxcarbamazepine to 300 BID. Repeat blood cultures were taken      INSTRUCTIONS ON DISCHARGE    (1) your oxcarbazepine was changed to 300 mg twice a day    (2) please continue to take IV vancomycin during ESRD toll 1/31/2021.      (3) F/u with infectious disease for Bcx and MSSA bacteremia        Discharge Medications:   Current Discharge Medication List      CONTINUE these medications which have CHANGED    Details   OXcarbazepine (TRILEPTAL) 300 mg tablet Take 1 Tablet by mouth every twelve (12) hours. Qty: 60 Tablet, Refills: 2  Start date: 1/21/2022         CONTINUE these medications which have NOT CHANGED    Details   levETIRAcetam (KEPPRA XR) 500 mg ER tablet TAKE 2 TABLETS BY MOUTH TWICE DAILY FOR 90 DAYS      pantoprazole (PROTONIX) 40 mg tablet Take 1 Tablet by mouth Daily (before breakfast). Qty: 30 Tablet, Refills: 0      amLODIPine (NORVASC) 5 mg tablet Take 1 Tablet by mouth daily for 60 days. Qty: 30 Tablet, Refills: 1      insulin lispro (HUMALOG) 100 unit/mL injection As per insulin sensitive ssi:    Insulin High Sensitivity (thin, ESRD)  For Blood Sugar (mg/dL) of:              Less than 150 =   0 units  150 -199 =  1 units  200 -249 =   2 units  250 -299 =   3 units  300 -349 =   4 units  350 or greater = 5 units and Call Physician  Initiate Hypoglycemic protocol if blood glucose is <70 mg/dL. Qty: 1 Each, Refills: 0      ascorbic acid, vitamin C, (VITAMIN C) 500 mg tablet Take 1 Tablet by mouth two (2) times a day for 30 days. Qty: 60 Tablet, Refills: 0      aspirin 81 mg chewable tablet Take 1 Tablet by mouth daily for 30 days. Qty: 30 Tablet, Refills: 0      calcitRIOL (ROCALTROL) 0.5 mcg capsule Take 1 Capsule by mouth daily. Qty: 30 Capsule, Refills: 0      zinc oxide-white petrolatum 17-57 % topical paste Apply  to affected area as needed for Skin Irritation. Indications: skin irritation  Qty: 113 g, Refills: 0      ondansetron hcl (Zofran) 4 mg tablet Take 1 Tablet by mouth every eight (8) hours as needed for Nausea or Vomiting. Qty: 45 Tablet, Refills: 0      Vimpat 200 mg tab tablet Take 1 Tablet by mouth two (2) times a day.       ondansetron (Zofran ODT) 4 mg disintegrating tablet 1 Tablet by SubLINGual route every eight (8) hours as needed for Nausea or Vomiting. Qty: 20 Tablet, Refills: 0      atorvastatin (LIPITOR) 40 mg tablet Take 1 tablet by mouth once daily for 90 days  Qty: 90 Tablet, Refills: 3      FLUoxetine (PROzac) 10 mg capsule Take 1 Capsule by mouth daily for 270 days. Qty: 90 Capsule, Refills: 2    Associated Diagnoses: TRISHA (generalized anxiety disorder)      loperamide (IMODIUM) 2 mg capsule TAKE 1 CAPSULE BY MOUTH SIX TIMES DAILY AS NEEDED FOR 30 DAYS  Qty: 180 Capsule, Refills: 0      Euthyrox 150 mcg tablet Take 1 tablet by mouth once daily  Qty: 90 Tablet, Refills: 0      sevelamer carbonate (RENVELA) 800 mg tab tab          STOP taking these medications       predniSONE (DELTASONE) 20 mg tablet Comments:   Reason for Stopping: Follow up Care:    1. Kirstin Moralez MD in 1-2 weeks. Please call to set up an appointment shortly after discharge. Diet: cardiac    Disposition:  SNF    Advanced Directive:   FULL x   DNR      Discharge Exam:  General:  Alert, cooperative, no distress, appears stated age. Lungs:   Clear to auscultation bilaterally. Chest wall:  No tenderness or deformity. Heart:  Regular rate and rhythm, S1, S2 normal, no murmur, click, rub or gallop. Abdomen:   Soft, non-tender. Bowel sounds normal. No masses,  No organomegaly. Extremities: Extremities normal, atraumatic, no cyanosis or edema. Pulses: 2+ and symmetric all extremities. Skin: Skin color, texture, turgor normal. No rashes or lesions   Neurologic: CNII-XII intact. No gross sensory or motor deficits         CONSULTATIONS: neuro    Significant Diagnostic Studies:     Radiologic Studies -   Results from Hospital Encounter encounter on 01/12/22    XR ABD (KUB)    Narrative  Abdomen, 2 views, 1/12/2022    History: Abdominal pain. Comparison: Including CT abdomen pelvis 12/1/2019. Findings: The included lung bases are grossly clear. Pacemaker leads are noted. Distal tip of the catheter is near the SVC/right atrial junction. Suture lines  are seen at the epigastric region and left upper quadrant. Air-filled large and  small bowel loops are noted. No air-fluid levels are seen. There is no  definite evidence of mechanical bowel obstruction. Moderate to large amount of  stool seen at the rectosigmoid colon. Stool volume is otherwise  small-to-moderate. Degenerative changes are present in the spine. Vascular  calcifications are present. Surgical hardware in the left femur is incompletely  imaged. Impression  Air-filled large and small bowel loops. No definite evidence of  mechanical bowel obstruction. Other findings as above. CT Results  (Last 48 hours)               01/20/22 1058  CT HEAD WO CONT Final result    Impression:  No acute intracranial abnormality. No bleed. Cortical and central white matter changes of aging. Small remote lacune   involving left basal ganglia. Small ill-defined area of encephalomalacia   involving the left occipital lobe. Changes of pansinusitis. Narrative:  CT head without contrast: Transaxial and reformatted sagittal and coronal   images. History: Weakness. Dizziness. COMPARISON: CT head 1/12/2022       Dose reduction: All CT scans at this facility are performed using dose reduction   optimization techniques as appropriate to the performed exam including the   following: Automated exposure control, adjustments of the mA and/or kV according   to patient size, or use of iterative reconstruction technique. The ventricles show mild dilatation, no midline shift. Midline calcified pineal.   Cortical cerebral sulci are prominent involving both hemispheres. There is   ill-defined decreased attenuation density in the central white matter of both   hemispheres consistent with changes of aging/small vessel disease. There is a   low-attenuation lacune involving left basal ganglia without change.  Small area   of encephalomalacia involving left occipital lobe without change. No focal acute   abnormal contrast density is seen involving the brain parenchyma on this   nonenhanced study. Specifically no evidence of cerebral or subarachnoid   hemorrhage and no extra-axial fluid collections. The pituitary and cerebellum   and brainstem are grossly normal in appearance. There is mucosal thickening of   the right maxillary sinus. Also patchy opacification of right and left frontal   and ethmoid air cells and right and left sphenoid sinuses. Mastoid air cells are   pneumatized.  The bony calvarium is normal.                     Discharge time spent 35 minutes    Signed:  Joycelyn Palma MD  1/21/2022  12:28 PM

## 2022-01-21 NOTE — ED NOTES
Pt placed on hospital bed. RR even and unlabored, pt resting quietly, no distress noted, will continue to monitor.

## 2022-01-21 NOTE — PROGRESS NOTES
Leyla Ca RN and [facility]  performed a dual skin assessment on this patient     PRESSURE INJURY NOTED ON SACRUM   MOISTURE INCONTINENCE TO SACRUM AND INNER  THIGHS     GENERALIZED BRUISING

## 2022-01-21 NOTE — CONSULTS
Consult requested by: Liudmila Simons MD    ADMIT DATE: 1/20/2022  CONSULT DATE: January 21, 2022                 Reason for Nephrology Consultation: Management of ESRD    HPI: Burke Escoto is a 71 y.o. female [address] [facility] with known diagnosis of ESRD for which he receives hemodialysis on  TTS schedule at  outpatient unit on [address] [facility]. Patient is currently at the rehab facility and she came to her regular dialysis session yesterday. Approximately 2 hours into the treatment she started having seizure like activity. She had involuntary movements on her right side and her blood sugar was checked and presented. She was not hypoglycemic and EMS was called and she was sent to the hospital for further evaluation. CT head was done upon arrival and was negative.    -She was seen by neurology who have adjusted her dose of antiseizure medications. She is currently on ox carbamazepine 300 mg twice a day and Vimpat twice a day along with Keppra 1000 mg twice a day. Patient was seen in the ED yesterday when she came in initially. She was alert awake oriented. Was following all simple commands. Was able to move the right side of her body. Power was equal on both sides. Her daughter was also at bedside and patient could recognize her. She was also oriented to time and place.    -She was again seen in her room this morning. She was comfortably resting. Was having lunch. Did not have any acute symptoms.      Past Medical History:   Diagnosis Date    Anxiety disorder     Arthritis     Atherosclerosis of native arteries of the extremities with ulceration ([address] 8/31/2020    Atrial fibrillation (HCC)     Cardiac pacemaker in situ     Cellulitis and abscess of trunk 8/31/2020    Depression, postpartum     Diabetes ([address]     Heart disease     Heartburn     Hx of long term use of blood thinners     Hypercholesterolemia     Hypertension     Hypothyroidism     Migraines     Peripheral venous insufficiency 8/31/2020    Seizures ([address]     Sleep disorder     Stroke [facility])     2019    Varicose veins of lower extremity with inflammation 8/31/2020      Past Surgical History:   Procedure Laterality Date    HX BACK SURGERY      HX OTHER SURGICAL      leg surgery     HX OTHER SURGICAL      pacemaker monitor     IR INSERT TUNL CVC W/O PORT OVER 5 YR  12/10/2021       Social History     Socioeconomic History    Marital status:      Spouse name: Not on file    Number of children: Not on file    Years of education: Not on file    Highest education level: Not on file   Occupational History    Not on file   Tobacco Use    Smoking status: Never Smoker    Smokeless tobacco: Never Used   Vaping Use    Vaping Use: Never used   Substance and Sexual Activity    Alcohol use: Never    Drug use: Never    Sexual activity: Not on file   Other Topics Concern    Not on file   Social History Narrative    Not on file     Social Determinants of Health     Financial Resource Strain:     Difficulty of Paying Living Expenses: Not on file   Food Insecurity:     Worried About Running Out of Food in the Last Year: Not on file    Janet of Food in the Last Year: Not on file   Transportation Needs:     Lack of Transportation (Medical): Not on file    Lack of Transportation (Non-Medical):  Not on file   Physical Activity:     Days of Exercise per Week: Not on file    Minutes of Exercise per Session: Not on file   Stress:     Feeling of Stress : Not on file   Social Connections:     Frequency of Communication with Friends and Family: Not on file    Frequency of Social Gatherings with Friends and Family: Not on file    Attends Restorationist Services: Not on file    Active Member of Clubs or Organizations: Not on file    Attends Club or Organization Meetings: Not on file    Marital Status: Not on file   Intimate Partner Violence:     Fear of Current or Ex-Partner: Not on file   Faiza Kirkpatrick Emotionally Abused: Not on file    Physically Abused: Not on file    Sexually Abused: Not on file   Housing Stability:     Unable to Pay for Housing in the Last Year: Not on file    Number of Places Lived in the Last Year: Not on file    Unstable Housing in the Last Year: Not on file       Family History   Problem Relation Age of Onset    Heart Disease Mother     Heart Disease Father     Diabetes Sister     Diabetes Brother      No Known Allergies     Home Medications:     Medications Prior to Admission   Medication Sig    OXcarbazepine (TRILEPTAL) 150 mg tablet Take 1 Tablet by mouth every twelve (12) hours.  levETIRAcetam (KEPPRA XR) 500 mg ER tablet TAKE 2 TABLETS BY MOUTH TWICE DAILY FOR 90 DAYS    pantoprazole (PROTONIX) 40 mg tablet Take 1 Tablet by mouth Daily (before breakfast).  amLODIPine (NORVASC) 5 mg tablet Take 1 Tablet by mouth daily for 60 days.  insulin lispro (HUMALOG) 100 unit/mL injection As per insulin sensitive ssi:    Insulin High Sensitivity (thin, ESRD)  For Blood Sugar (mg/dL) of:              Less than 150 =   0 units  150 -199 =  1 units  200 -249 =   2 units  250 -299 =   3 units  300 -349 =   4 units  350 or greater = 5 units and Call Physician  Initiate Hypoglycemic protocol if blood glucose is <70 mg/dL.  ascorbic acid, vitamin C, (VITAMIN C) 500 mg tablet Take 1 Tablet by mouth two (2) times a day for 30 days.  aspirin 81 mg chewable tablet Take 1 Tablet by mouth daily for 30 days.  predniSONE (DELTASONE) 20 mg tablet Start prednisone 20 mg PO daily x 2 days then 10 mg PO daily x 2 days then stop.  calcitRIOL (ROCALTROL) 0.5 mcg capsule Take 1 Capsule by mouth daily.  zinc oxide-white petrolatum 17-57 % topical paste Apply  to affected area as needed for Skin Irritation. Indications: skin irritation    ondansetron hcl (Zofran) 4 mg tablet Take 1 Tablet by mouth every eight (8) hours as needed for Nausea or Vomiting.     Vimpat 200 mg tab tablet Take 1 Tablet by mouth two (2) times a day.  ondansetron (Zofran ODT) 4 mg disintegrating tablet 1 Tablet by SubLINGual route every eight (8) hours as needed for Nausea or Vomiting.  atorvastatin (LIPITOR) 40 mg tablet Take 1 tablet by mouth once daily for 90 days    FLUoxetine (PROzac) 10 mg capsule Take 1 Capsule by mouth daily for 270 days.     loperamide (IMODIUM) 2 mg capsule TAKE 1 CAPSULE BY MOUTH SIX TIMES DAILY AS NEEDED FOR 30 DAYS    Euthyrox 150 mcg tablet Take 1 tablet by mouth once daily    sevelamer carbonate (RENVELA) 800 mg tab tab        Current Inpatient Medications:     Current Facility-Administered Medications   Medication Dose Route Frequency    [START ON 1/22/2022] Vancomycin random level to be drawn on 1/22/22 at 0600 am.   Other ONCE    OXcarbazepine (TRILEPTAL) tablet 300 mg  300 mg Oral Q12H    amLODIPine (NORVASC) tablet 5 mg  5 mg Oral DAILY    ascorbic acid (vitamin C) (VITAMIN C) tablet 500 mg  500 mg Oral BID    aspirin chewable tablet 81 mg  81 mg Oral DAILY    atorvastatin (LIPITOR) tablet 40 mg  40 mg Oral DAILY    calcitRIOL (ROCALTROL) capsule 0.5 mcg  0.5 mcg Oral DAILY    levothyroxine (SYNTHROID) tablet 150 mcg  150 mcg Oral DAILY    FLUoxetine (PROzac) capsule 10 mg  10 mg Oral DAILY    levETIRAcetam (KEPPRA) tablet 1,000 mg  1,000 mg Oral BID    loperamide (IMODIUM) capsule 2 mg  2 mg Oral Q4H PRN    pantoprazole (PROTONIX) tablet 40 mg  40 mg Oral ACB    lacosamide (VIMPAT) tablet 200 mg  200 mg Oral BID    sevelamer carbonate (RENVELA) tab 800 mg  800 mg Oral TID WITH MEALS    zinc oxide-white petrolatum 17-57 % topical paste   Topical PRN    sodium chloride (NS) flush 5-40 mL  5-40 mL IntraVENous Q8H    sodium chloride (NS) flush 5-40 mL  5-40 mL IntraVENous PRN    acetaminophen (TYLENOL) tablet 650 mg  650 mg Oral Q6H PRN    Or    acetaminophen (TYLENOL) suppository 650 mg  650 mg Rectal Q6H PRN    polyethylene glycol (MIRALAX) packet 17 g 17 g Oral DAILY PRN    ondansetron (ZOFRAN ODT) tablet 4 mg  4 mg Oral Q8H PRN    Or    ondansetron (ZOFRAN) injection 4 mg  4 mg IntraVENous Q6H PRN    enoxaparin (LOVENOX) injection 30 mg  30 mg SubCUTAneous DAILY    dextrose (D50W) injection syrg 25 g  50 mL IntraVENous PRN    VANCOMYCIN INFORMATION NOTE   Other Rx Dosing/Monitoring         Physical Assessment:     Vitals:    01/21/22 0126 01/21/22 0455 01/21/22 0839 01/21/22 1140   BP: (!) 138/56 (!) 128/57 (!) 141/62 (!) 122/57   Pulse: 60 (!) 31 61 67   Resp: 19 17 15 16   Temp:    97.5 °F (36.4 °C)   SpO2: 100% 100% 100% 93%   Weight:       Height:         Last 3 Recorded Weights in this Encounter    01/20/22 1018   Weight: 72.1 kg (159 lb)     Admission weight: Weight: 72.1 kg (159 lb) (01/20/22 1018)    No intake or output data in the 24 hours ending 01/21/22 1323    Patient is in no apparent distress. HEENT: Head is normocephalic and atraumatic. Pupils are round, equal, reactive to light. Sclerae are anicteric. Oropharynx clear. Neck: no cervical lymphadenopathy or thyromegaly. Lungs: good air entry, clear to auscultation bilaterally. Trachea at the midline. Cardiovascular system: S1, S2, regular rate and rhythm. No murmurs, gallops or rubs. No jvd. Abdomen: soft, non tender, non distended. Positive bowel sounds. No hepatosplenomegaly. No abdominal bruits. Extremities: no clubbing, cyanosis or edema. Strong dorsalis pedis pulses. Brisk capillary refill on the toes bilaterally. Integumentary: skin is grossly intact. Neurologic: Alert, oriented time three. Cooperative and appropriate. No gross motor or sensory deficits.        Data Review:    Labs: Results:       Chemistry Recent Labs     01/20/22  1150   GLU 73      K 4.1      CO2 26   BUN 41*   CREA 3.09*   CA 8.0*   AGAP 8   BUCR 13   AP 90   TP 4.9*   ALB 2.1*   GLOB 2.8   AGRAT 0.8*         CBC w/Diff Recent Labs     01/20/22  1150   WBC 5.6   RBC 2.37*   HGB 7.5*   HCT 25.2*   *   GRANS 71   LYMPH 20   EOS 1         Iron/Ferritin No results for input(s): IRON in the last 72 hours. No lab exists for component: TIBCCALC   PTH/VIT D No results for input(s): PTH in the last 72 hours. No lab exists for component: VITD            Assessment & Plan     1. ESRD. -If patient is still admitted here, will dialyze her tomorrow according to her TTS schedule. Currently no acute indications for dialysis. 2. Anemia of ESRD. -We will give her IV Epogen with next dialysis. -Transfusion if hemoglobin less than 7.5.  -Appears to have iron deficiency    3. Secondary hyperparathyroidism of ESRD. Continue phosphate binders. On calcitriol. We will check electrolytes tomorrow morning labs if she still here. Seizure disorder -she has been seen by neurology and medication doses have been adjusted. Hypothyroidism -Home dose has been resumed.

## 2022-01-21 NOTE — PROGRESS NOTES
Patient scheduled to discharge back to [facility] for SNF. CM spoke with daughter in law [phone] to inform. She agreed. Also spoke with [facility] admissions [phone]. They are ready to receive her back.     Patient will discharge to [facility] center   [address] [facility]  [address]  Nurse to call report to [phone]

## 2022-01-21 NOTE — PROGRESS NOTES
Day # 2 of Vancomycin    Indication for Antimicrobials: Bacteremia     Consult placed by: Dr. Linda Hill    Significant Cultures:    blood: pending    Labs:  Recent Labs     22  1150   CREA 3.09*   BUN 41*   WBC 5.6     Temp (24hrs), Av.8 °F (36.6 °C), Min:97.6 °F (36.4 °C), Max:97.9 °F (36.6 °C)      Is the Patient on Dialysis? Yes - outpatient    Creatinine Clearance (mL/min):   CrCl (Actual Body Weight): 19.0  CrCl (Adjusted Body Weight): 16.3  CrCl (Ideal Body Weight): 14.4    Goal level: 21-25 mcg/mL     Impression/Plan:   Patient on outpatient HD  No mention of restarting HD inpatient yet  Pt Random level today is at  37.5 . NO Vancomycin dose today . Will get another random level in AM 22 at 0600 since the HD has not been scheduled up to this point. Antimicrobial stop date TBD     Pharmacy will follow daily and adjust medications as appropriate for renal function and/or serum levels.     Thank you,  Joe Link, PHARMD

## 2022-01-21 NOTE — DISCHARGE INSTRUCTIONS
INSTRUCTIONS ON DISCHARGE    (1) your oxcarbazepine was changed to 300 mg twice a day    (2) please continue to take IV vancomycin during ESRD toll 1/31/2021.      (3) F/u with infectious disease for Bcx and MSSA bacteremia

## 2022-01-21 NOTE — PROGRESS NOTES
Reason for Admission:   Acute Encephelopathy               RUR Score:  34%      PCP: First and Last name:  Venecia Ceja MD     Name of Practice:   Are you a current patient: Yes/No:   Approximate date of last visit:    Can you do a virtual visit with your PCP: Unknown             Resources/supports as identified by patient/family:   No issues                Top Challenges facing patient (as identified by patient/family and CM):       Daughter in law voiced concern with patient being a readmit due to discharge medication dosage. She feel that dosage was inaccurate resulting in patient having seizures. Patient was not able to complete SNF stay. Finances/Medication cost? No issues                   Transportation? No issues            Support system or lack thereof? Family support                 Living arrangements? Lives with family      Self-care/ADLs/Cognition? Moderate assist   Current Advanced Directive/Advance Care Plan:  Full Code      Healthcare Decision Maker:   Click here to complete HealthCare Decision Makers including selection of the Healthcare Decision Maker Relationship (ie \"Primary\")      Primary Decision Maker: Beata [facility] - [phone]    Payor Source Payor: [address] [place] [address] / Plan: Radha Gates / Product Type: Managed Care Medicare /                             Plan for utilizing home health:  Patient to return to [facility] for Rehab                   Transition of Care Plan:                CM discussed discharge planning with patient daughter in law. She voiced concern with patient being on COVID precautions. She states patient was COVID+ in December. During this hospital stay family asked that patient not be tested for COVID due to residual effect on test results. Patient was tested. Results are positive. Family feel they are not able to adequately advocate for patient due to her being on isolation.   Family request patient return to [facility] at discharge to complete Rehab.

## 2022-01-21 NOTE — ED NOTES
TRANSFER - OUT REPORT:    Verbal report given to Pelon Leslie 879 on Issa Alarcon  being transferred to [facility] (unit) for routine progression of care       Report consisted of patients Situation, Background, Assessment and   Recommendations(SBAR). Information from the following report(s) SBAR was reviewed with the receiving nurse. Lines:   Venous Access Device Upper chest (subclavicular area, right (Active)       Peripheral IV 01/20/22 Left Antecubital (Active)        Opportunity for questions and clarification was provided.       Patient transported with:   [employer]

## 2022-01-21 NOTE — PROGRESS NOTES
PHYSICAL THERAPY EVALUATION  Patient: Cami Bergman (11 y.o. female)  Date: 1/21/2022  Primary Diagnosis: Acute encephalopathy [G93.40]       Precautions: falls, COVID +ve      ASSESSMENT  This is a 70 y/o female who came to  [facility]  ED with c/o  passing out at dialysis center, was on her way to ER when enroute had a wittnessed seizure, had another seizure while in the ER, admitted on 1/20/22 for acute encephalopathy. Pt with PMH of  todds paralysis, CVA with right sided weakness, seizure disorder, a fib, pacemaker, DM II, HTN, hypercholesterolemia, and ESRD on HD. Pt received semi-supine in bed , agreeable for PT eval/tx . Pt is A& O x self and place, disoriented to time,  per pt report , she came from SNF  [facility]), needed assist with ADL's and  for functional transfers, w/c bound prior to admission . Prior to going to SNF for rehab,  pt reported , she resides with son and daughter in law in a 2 story home but stays on 1st floor with ramp entrance,  required assistance for ADLS/IADLS, uses WC for primary mobility. DME: hospital bed, BSC, grab bars, RW, and WC. Based on the objective data described below, the patient presents with c/o pain at back - 10/10,  decreased strength , 2+/5 grossly for L Le, -2/5 grossly for R Le , generalized weakness , decreased safety awareness , decreased activity tolerance and increased need for assist with functional mobility ( needs Parisa for rolling from side to side  , modA/maxA for supine >sit transfers , fair  static sitting balance , maxA for sit <>stand , unable to stand upright, flexed knees in standing , demonstrates poor static  standing balance with support. Pt returned to bed from sit>semi-supine  with maxA ) . Pt would benefit from continued skilled PT services to address current impairments and improve overall IND and safety with  functional transfers/mobility. Recommend d/c to SNF when medically appropriate .      Current Level of Function Impacting Discharge (mobility/balance): Requires mod/maxA for bed mobility and maxA for STS transfers    Functional Outcome Measure: The patient scored 10 on the AM PAC basic mobility outcome measure which is indicative of medium complexity. Other factors to consider for discharge: severity of deficits         PLAN :  Recommendations and Planned Interventions: bed mobility training, transfer training, therapeutic exercises, neuromuscular re-education, patient and family training/education and therapeutic activities      Frequency/Duration: Patient will be followed by physical therapy:  2-3x/week to address goals.     Recommendation for discharge: (in order for the patient to meet his/her long term goals)  Kaleb Jamison    This discharge recommendation:  Has been made in collaboration with the attending provider and/or case management    IF patient discharges home will need the following DME: to be determined (TBD)         SUBJECTIVE:   Patient stated  My back hurts    OBJECTIVE DATA SUMMARY:   HISTORY:    Past Medical History:   Diagnosis Date    Anxiety disorder     Arthritis     Atherosclerosis of native arteries of the extremities with ulceration ([address] 8/31/2020    Atrial fibrillation ([address]     Cardiac pacemaker in situ     Cellulitis and abscess of trunk 8/31/2020    Depression, postpartum     Diabetes ([address]     Heart disease     Heartburn     Hx of long term use of blood thinners     Hypercholesterolemia     Hypertension     Hypothyroidism     Migraines     Peripheral venous insufficiency 8/31/2020    Seizures ([address]     Sleep disorder     Stroke ([address]     2019    Varicose veins of lower extremity with inflammation 8/31/2020     Past Surgical History:   Procedure Laterality Date    HX BACK SURGERY      HX OTHER SURGICAL      leg surgery     HX OTHER SURGICAL      pacemaker monitor     IR INSERT TUNL CVC W/O PORT OVER 5 YR  12/10/2021       Personal factors and/or comorbidities impacting plan of care:     Home Situation  Home Environment: Skilled nursing facility  One/Two Story Residence: One story  Living Alone: No  Support Systems: Kaleb Shaheen  Current DME Used/Available at Home: Wheelchair    PLOF: Pt needed asssist for ADLS/IADLS, needed assist for transfers, w/c bound  prior to admission. EXAMINATION/PRESENTATION/DECISION MAKING:   Critical Behavior:  Neurologic State: Alert  Orientation Level: Oriented to person,Oriented to place,Disoriented to time  Cognition: Follows commands     Hearing:     Skin:  Intact where exposed    Range Of Motion:  AROM: Generally decreased, functional  Strength:    Strength: Generally decreased, functional (2+/5 grossly for L Le, -2/5 grossly for R Le)  Tone & Sensation:   Tone: Normal  Sensation: Intact (intact to light touch)     Coordination:  Coordination: Generally decreased, functional  Functional Mobility:  Bed Mobility:  Rolling: Minimum assistance  Supine to Sit: Moderate assistance;Maximum assistance  Sit to Supine: Maximum assistance  Scooting: Moderate assistance (towards the EOB)  Transfers:  Sit to Stand: Maximum assistance  Stand to Sit: Maximum assistance  Balance:   Sitting: Impaired; Without support  Sitting - Static: Fair (occasional)  Sitting - Dynamic: Fair (occasional)  Standing: Impaired; With support  Standing - Static: Constant support;Poor  Standing - Dynamic : Constant support;Poor      Functional Measure:    [address] [facility] [address] [address] AM-PAC 6 Clicks         Basic Mobility Inpatient Short Form  How much difficulty does the patient currently have. .. Unable A Lot A Little None   1. Turning over in bed (including adjusting bedclothes, sheets and blankets)? [] 1   [] 2   [x] 3   [] 4   2. Sitting down on and standing up from a chair with arms ( e.g., wheelchair, bedside commode, etc.)   [] 1   [x] 2   [] 3   [] 4   3. Moving from lying on back to sitting on the side of the bed?    [] 1   [x] 2   [] 3   [] 4          How much help from another person does the patient currently need. .. Total A Lot A Little None   4. Moving to and from a bed to a chair (including a wheelchair)? [x] 1   [] 2   [] 3   [] 4   5. Need to walk in hospital room? [x] 1   [] 2   [] 3   [] 4   6. Climbing 3-5 steps with a railing? [x] 1   [] 2   [] 3   [] 4   © 2007, Trustees of [facility] [employer], under license to [employer]. All rights reserved     Score:  Initial:10 Most Recent: X (Date: 1/21/22-- )   Interpretation of Tool:  Represents activities that are increasingly more difficult (i.e. Bed mobility, Transfers, Gait). Score 24 23 22-20 19-15 14-10 9-7 6   Modifier CH CI CJ CK CL CM CN          Physical Therapy Evaluation Charge Determination   History Examination Presentation Decision-Making   MEDIUM  Complexity : 1-2 comorbidities / personal factors will impact the outcome/ POC  MEDIUM Complexity : 3 Standardized tests and measures addressing body structure, function, activity limitation and / or participation in recreation  LOW Complexity : Stable, uncomplicated  Other Functional Measure [facility] 6 medium complexity      Based on the above components, the patient evaluation is determined to be of the following complexity level: LOW     Pain Rating:  Pain at back - 10/10    Activity Tolerance:   Fair  Please refer to the flowsheet for vital signs taken during this treatment. After treatment patient left in no apparent distress:   Supine in bed, Call bell within reach and Bed / chair alarm activated    COMMUNICATION/EDUCATION:   The patients plan of care was discussed with: Registered nurse. Fall prevention education was provided and the patient/caregiver indicated understanding. and Patient/family agree to work toward stated goals and plan of care. Problem: Mobility Impaired (Adult and Pediatric)  Goal: *Acute Goals and Plan of Care (Insert Text)  Description: Pt will be I with LE HEP in 7 days.   Pt will perform bed mobility with Parisa in 7 days.  Pt will perform transfers with modA in 7 days. Pt will demonstrate improvement in standing/seated static/dynamic  in 7 days.    Pt will be able to tolerate sitting at the EOB for 10 minutes with SUP in 7 days   Outcome: Not Met       Thank you for this referral.  Juanis Quintero   Time Calculation: 44 mins

## 2022-01-23 LAB
BACTERIA SPEC CULT: ABNORMAL
BACTERIA SPEC CULT: ABNORMAL
COLONY COUNT,CNT: ABNORMAL
COLONY COUNT,CNT: ABNORMAL
SPECIAL REQUESTS,SREQ: ABNORMAL

## 2022-01-24 ENCOUNTER — HOSPITAL ENCOUNTER (EMERGENCY)
Age: 72
Discharge: ANOTHER HOSPITAL | End: 2022-01-24
Attending: EMERGENCY MEDICINE
Payer: MEDICARE

## 2022-01-24 VITALS
WEIGHT: 135 LBS | HEIGHT: 65 IN | DIASTOLIC BLOOD PRESSURE: 70 MMHG | BODY MASS INDEX: 22.49 KG/M2 | TEMPERATURE: 98.4 F | OXYGEN SATURATION: 100 % | SYSTOLIC BLOOD PRESSURE: 134 MMHG | RESPIRATION RATE: 16 BRPM | HEART RATE: 60 BPM

## 2022-01-24 DIAGNOSIS — Z99.2 ESRD ON DIALYSIS (HCC): ICD-10-CM

## 2022-01-24 DIAGNOSIS — R56.9 SEIZURE (HCC): Primary | ICD-10-CM

## 2022-01-24 DIAGNOSIS — E87.5 ACUTE HYPERKALEMIA: ICD-10-CM

## 2022-01-24 DIAGNOSIS — N18.6 ESRD ON DIALYSIS (HCC): ICD-10-CM

## 2022-01-24 LAB
ALBUMIN SERPL-MCNC: 2 G/DL (ref 3.5–5)
ALBUMIN/GLOB SERPL: 0.7 {RATIO} (ref 1.1–2.2)
ALP SERPL-CCNC: 90 U/L (ref 45–117)
ALT SERPL-CCNC: 19 U/L (ref 12–78)
ANION GAP SERPL CALC-SCNC: 6 MMOL/L (ref 5–15)
AST SERPL W P-5'-P-CCNC: 15 U/L (ref 15–37)
BASOPHILS # BLD: 0 K/UL (ref 0–0.1)
BASOPHILS NFR BLD: 0 % (ref 0–1)
BILIRUB SERPL-MCNC: 0.3 MG/DL (ref 0.2–1)
BUN SERPL-MCNC: 84 MG/DL (ref 6–20)
BUN/CREAT SERPL: 14 (ref 12–20)
CA-I BLD-MCNC: 7.7 MG/DL (ref 8.5–10.1)
CHLORIDE SERPL-SCNC: 105 MMOL/L (ref 97–108)
CO2 SERPL-SCNC: 24 MMOL/L (ref 21–32)
CREAT SERPL-MCNC: 5.89 MG/DL (ref 0.55–1.02)
DIFFERENTIAL METHOD BLD: ABNORMAL
EOSINOPHIL # BLD: 0 K/UL (ref 0–0.4)
EOSINOPHIL NFR BLD: 1 % (ref 0–7)
ERYTHROCYTE [DISTWIDTH] IN BLOOD BY AUTOMATED COUNT: 17.1 % (ref 11.5–14.5)
GLOBULIN SER CALC-MCNC: 2.8 G/DL (ref 2–4)
GLUCOSE SERPL-MCNC: 58 MG/DL (ref 65–100)
HCT VFR BLD AUTO: 23.3 % (ref 35–47)
HGB BLD-MCNC: 7.1 G/DL (ref 11.5–16)
IMM GRANULOCYTES # BLD AUTO: 0 K/UL (ref 0–0.04)
IMM GRANULOCYTES NFR BLD AUTO: 1 % (ref 0–0.5)
LEVETIRACETAM SERPL-MCNC: 54.8 UG/ML (ref 10–40)
LYMPHOCYTES # BLD: 0.8 K/UL (ref 0.8–3.5)
LYMPHOCYTES NFR BLD: 19 % (ref 12–49)
MCH RBC QN AUTO: 32.7 PG (ref 26–34)
MCHC RBC AUTO-ENTMCNC: 30.5 G/DL (ref 30–36.5)
MCV RBC AUTO: 107.4 FL (ref 80–99)
MONOCYTES # BLD: 0.4 K/UL (ref 0–1)
MONOCYTES NFR BLD: 10 % (ref 5–13)
NEUTS SEG # BLD: 2.9 K/UL (ref 1.8–8)
NEUTS SEG NFR BLD: 69 % (ref 32–75)
NRBC # BLD: 0 K/UL (ref 0–0.01)
NRBC BLD-RTO: 0 PER 100 WBC
PLATELET # BLD AUTO: 111 K/UL (ref 150–400)
PMV BLD AUTO: 10.8 FL (ref 8.9–12.9)
POTASSIUM SERPL-SCNC: 5.5 MMOL/L (ref 3.5–5.1)
PROT SERPL-MCNC: 4.8 G/DL (ref 6.4–8.2)
RBC # BLD AUTO: 2.17 M/UL (ref 3.8–5.2)
SODIUM SERPL-SCNC: 135 MMOL/L (ref 136–145)
WBC # BLD AUTO: 4.1 K/UL (ref 3.6–11)

## 2022-01-24 PROCEDURE — 74011250636 HC RX REV CODE- 250/636: Performed by: INTERNAL MEDICINE

## 2022-01-24 PROCEDURE — G0257 UNSCHED DIALYSIS ESRD PT HOS: HCPCS

## 2022-01-24 PROCEDURE — 96374 THER/PROPH/DIAG INJ IV PUSH: CPT

## 2022-01-24 PROCEDURE — 85025 COMPLETE CBC W/AUTO DIFF WBC: CPT

## 2022-01-24 PROCEDURE — 99284 EMERGENCY DEPT VISIT MOD MDM: CPT

## 2022-01-24 PROCEDURE — 96375 TX/PRO/DX INJ NEW DRUG ADDON: CPT

## 2022-01-24 PROCEDURE — 80177 DRUG SCRN QUAN LEVETIRACETAM: CPT

## 2022-01-24 PROCEDURE — 74011250636 HC RX REV CODE- 250/636

## 2022-01-24 PROCEDURE — 80053 COMPREHEN METABOLIC PANEL: CPT

## 2022-01-24 PROCEDURE — 36415 COLL VENOUS BLD VENIPUNCTURE: CPT

## 2022-01-24 PROCEDURE — 74011250636 HC RX REV CODE- 250/636: Performed by: EMERGENCY MEDICINE

## 2022-01-24 PROCEDURE — 74011000250 HC RX REV CODE- 250: Performed by: EMERGENCY MEDICINE

## 2022-01-24 RX ORDER — HEPARIN SODIUM 1000 [USP'U]/ML
INJECTION, SOLUTION INTRAVENOUS; SUBCUTANEOUS
Status: COMPLETED
Start: 2022-01-24 | End: 2022-01-24

## 2022-01-24 RX ORDER — SODIUM BICARBONATE 84 MG/ML
50 INJECTION, SOLUTION INTRAVENOUS
Status: COMPLETED | OUTPATIENT
Start: 2022-01-24 | End: 2022-01-24

## 2022-01-24 RX ORDER — LEVETIRACETAM 10 MG/ML
1000 INJECTION INTRAVASCULAR EVERY 12 HOURS
Status: DISCONTINUED | OUTPATIENT
Start: 2022-01-24 | End: 2022-01-25 | Stop reason: HOSPADM

## 2022-01-24 RX ORDER — HEPARIN SODIUM 1000 [USP'U]/ML
3200 INJECTION, SOLUTION INTRAVENOUS; SUBCUTANEOUS ONCE
Status: COMPLETED | OUTPATIENT
Start: 2022-01-24 | End: 2022-01-24

## 2022-01-24 RX ADMIN — SODIUM BICARBONATE 50 MEQ: 84 INJECTION, SOLUTION INTRAVENOUS at 11:08

## 2022-01-24 RX ADMIN — EPOETIN ALFA-EPBX 10000 UNITS: 10000 INJECTION, SOLUTION INTRAVENOUS; SUBCUTANEOUS at 15:26

## 2022-01-24 RX ADMIN — LEVETIRACETAM 1000 MG: 10 INJECTION, SOLUTION INTRAVENOUS at 11:09

## 2022-01-24 RX ADMIN — HEPARIN SODIUM 3200 UNITS: 1000 INJECTION, SOLUTION INTRAVENOUS; SUBCUTANEOUS at 15:25

## 2022-01-24 NOTE — DIALYSIS
Pt erik 3 hour hemodialysis fair, due to one drop in BP.   0.5 liter net fluid removed. Epogen 59004 units given IV.

## 2022-01-24 NOTE — DISCHARGE INSTRUCTIONS
Thank you! Thank you for allowing me to care for you in the emergency department. I sincerely hope that you are satisfied with your visit today. It is my goal to provide you with excellent care. Below you will find a list of your labs and imaging from your visit today. Should you have any questions regarding these results please do not hesitate to call the emergency department. Labs -     Recent Results (from the past 12 hour(s))   CBC WITH AUTOMATED DIFF    Collection Time: 01/24/22  9:24 AM   Result Value Ref Range    WBC 4.1 3.6 - 11.0 K/uL    RBC 2.17 (L) 3.80 - 5.20 M/uL    HGB 7.1 (L) 11.5 - 16.0 g/dL    HCT 23.3 (L) 35.0 - 47.0 %    .4 (H) 80.0 - 99.0 FL    MCH 32.7 26.0 - 34.0 PG    MCHC 30.5 30.0 - 36.5 g/dL    RDW 17.1 (H) 11.5 - 14.5 %    PLATELET 209 (L) 880 - 400 K/uL    MPV 10.8 8.9 - 12.9 FL    NRBC 0.0 0.0  WBC    ABSOLUTE NRBC 0.00 0.00 - 0.01 K/uL    NEUTROPHILS 69 32 - 75 %    LYMPHOCYTES 19 12 - 49 %    MONOCYTES 10 5 - 13 %    EOSINOPHILS 1 0 - 7 %    BASOPHILS 0 0 - 1 %    IMMATURE GRANULOCYTES 1 (H) 0 - 0.5 %    ABS. NEUTROPHILS 2.9 1.8 - 8.0 K/UL    ABS. LYMPHOCYTES 0.8 0.8 - 3.5 K/UL    ABS. MONOCYTES 0.4 0.0 - 1.0 K/UL    ABS. EOSINOPHILS 0.0 0.0 - 0.4 K/UL    ABS. BASOPHILS 0.0 0.0 - 0.1 K/UL    ABS. IMM. GRANS. 0.0 0.00 - 0.04 K/UL    DF AUTOMATED     METABOLIC PANEL, COMPREHENSIVE    Collection Time: 01/24/22  9:24 AM   Result Value Ref Range    Sodium 135 (L) 136 - 145 mmol/L    Potassium 5.5 (H) 3.5 - 5.1 mmol/L    Chloride 105 97 - 108 mmol/L    CO2 24 21 - 32 mmol/L    Anion gap 6 5 - 15 mmol/L    Glucose 58 (L) 65 - 100 mg/dL    BUN 84 (H) 6 - 20 mg/dL    Creatinine 5.89 (H) 0.55 - 1.02 mg/dL    BUN/Creatinine ratio 14 12 - 20      GFR est AA 9 (L) >60 ml/min/1.73m2    GFR est non-AA 7 (L) >60 ml/min/1.73m2    Calcium 7.7 (L) 8.5 - 10.1 mg/dL    Bilirubin, total 0.3 0.2 - 1.0 mg/dL    AST (SGOT) 15 15 - 37 U/L    ALT (SGPT) 19 12 - 78 U/L    Alk.  phosphatase 90 45 - 117 U/L    Protein, total 4.8 (L) 6.4 - 8.2 g/dL    Albumin 2.0 (L) 3.5 - 5.0 g/dL    Globulin 2.8 2.0 - 4.0 g/dL    A-G Ratio 0.7 (L) 1.1 - 2.2         Radiologic Studies -   No orders to display     CT Results  (Last 48 hours)      None          CXR Results  (Last 48 hours)      None               If you feel that you have not received excellent quality care or timely care, please ask to speak to the nurse manager. Please choose us in the future for your continued health care needs. ------------------------------------------------------------------------------------------------------------  The exam and treatment you received in the Emergency Department were for an urgent problem and are not intended as complete care. It is important that you follow-up with a doctor, nurse practitioner, or physician assistant to:  (1) confirm your diagnosis,  (2) re-evaluation of changes in your illness and treatment, and  (3) for ongoing care. If your symptoms become worse or you do not improve as expected and you are unable to reach your usual health care provider, you should return to the Emergency Department. We are available 24 hours a day. Please take your discharge instructions with you when you go to your follow-up appointment. If you have any problem arranging a follow-up appointment, contact the Emergency Department immediately. If a prescription has been provided, please have it filled as soon as possible to prevent a delay in treatment. Read the entire medication instruction sheet provided to you by the pharmacy. If you have any questions or reservations about taking the medication due to side effects or interactions with other medications, please call your primary care physician or contact the ER to speak with the charge nurse.      Make an appointment with your family doctor or the physician you were referred to for follow-up of this visit as instructed on your discharge paperwork, as this is a mandatory follow-up. Return to the ER if you are unable to be seen or if you are unable to be seen in a timely manner. If you have any problem arranging the follow-up visit, contact the Emergency Department immediately.

## 2022-01-24 NOTE — ED TRIAGE NOTES
EMS called to [facility] [facility] for a patient who was having seizure like activity blood glucose was 116 for the staff at facility but 66 with EMS

## 2022-01-25 LAB — LEVETIRACETAM SERPL-MCNC: 50.7 UG/ML (ref 10–40)

## 2022-01-25 NOTE — ED NOTES
Report called to nurse Boone Nichols at [facility] for pt returning via [id number] [facility].

## 2022-01-26 LAB — LEVETIRACETAM SERPL-MCNC: 44.6 UG/ML (ref 10–40)

## 2022-01-27 LAB
BACTERIA SPEC CULT: NORMAL
SPECIAL REQUESTS,SREQ: NORMAL

## 2022-01-29 ENCOUNTER — APPOINTMENT (OUTPATIENT)
Dept: GENERAL RADIOLOGY | Age: 72
End: 2022-01-29
Attending: STUDENT IN AN ORGANIZED HEALTH CARE EDUCATION/TRAINING PROGRAM
Payer: MEDICARE

## 2022-01-29 ENCOUNTER — HOSPITAL ENCOUNTER (EMERGENCY)
Age: 72
Discharge: NURSING, REHAB OR LONG-TERM CARE | End: 2022-01-29
Attending: STUDENT IN AN ORGANIZED HEALTH CARE EDUCATION/TRAINING PROGRAM
Payer: MEDICARE

## 2022-01-29 ENCOUNTER — APPOINTMENT (OUTPATIENT)
Dept: CT IMAGING | Age: 72
End: 2022-01-29
Attending: STUDENT IN AN ORGANIZED HEALTH CARE EDUCATION/TRAINING PROGRAM
Payer: MEDICARE

## 2022-01-29 VITALS
OXYGEN SATURATION: 97 % | RESPIRATION RATE: 16 BRPM | HEART RATE: 60 BPM | WEIGHT: 135 LBS | SYSTOLIC BLOOD PRESSURE: 132 MMHG | DIASTOLIC BLOOD PRESSURE: 60 MMHG | TEMPERATURE: 97.8 F | BODY MASS INDEX: 22.49 KG/M2 | HEIGHT: 65 IN

## 2022-01-29 DIAGNOSIS — T88.7XXA MEDICATION SIDE EFFECT: Primary | ICD-10-CM

## 2022-01-29 LAB
ANION GAP SERPL CALC-SCNC: 7 MMOL/L (ref 5–15)
BASOPHILS # BLD: 0 K/UL (ref 0–0.1)
BASOPHILS NFR BLD: 1 % (ref 0–1)
BUN SERPL-MCNC: 52 MG/DL (ref 6–20)
BUN/CREAT SERPL: 10 (ref 12–20)
CA-I BLD-MCNC: 8.2 MG/DL (ref 8.5–10.1)
CHLORIDE SERPL-SCNC: 107 MMOL/L (ref 97–108)
CO2 SERPL-SCNC: 23 MMOL/L (ref 21–32)
CREAT SERPL-MCNC: 5.23 MG/DL (ref 0.55–1.02)
DIFFERENTIAL METHOD BLD: ABNORMAL
EOSINOPHIL # BLD: 0 K/UL (ref 0–0.4)
EOSINOPHIL NFR BLD: 0 % (ref 0–7)
ERYTHROCYTE [DISTWIDTH] IN BLOOD BY AUTOMATED COUNT: 16 % (ref 11.5–14.5)
GLUCOSE SERPL-MCNC: 91 MG/DL (ref 65–100)
HCT VFR BLD AUTO: 28.6 % (ref 35–47)
HGB BLD-MCNC: 8.5 G/DL (ref 11.5–16)
IMM GRANULOCYTES # BLD AUTO: 0 K/UL (ref 0–0.04)
IMM GRANULOCYTES NFR BLD AUTO: 1 % (ref 0–0.5)
LYMPHOCYTES # BLD: 0.3 K/UL (ref 0.8–3.5)
LYMPHOCYTES NFR BLD: 8 % (ref 12–49)
MAGNESIUM SERPL-MCNC: 2.3 MG/DL (ref 1.6–2.4)
MCH RBC QN AUTO: 32.1 PG (ref 26–34)
MCHC RBC AUTO-ENTMCNC: 29.7 G/DL (ref 30–36.5)
MCV RBC AUTO: 107.9 FL (ref 80–99)
MONOCYTES # BLD: 0.3 K/UL (ref 0–1)
MONOCYTES NFR BLD: 7 % (ref 5–13)
NEUTS SEG # BLD: 3.3 K/UL (ref 1.8–8)
NEUTS SEG NFR BLD: 83 % (ref 32–75)
NRBC # BLD: 0 K/UL (ref 0–0.01)
NRBC BLD-RTO: 0 PER 100 WBC
PHOSPHATE SERPL-MCNC: 5.1 MG/DL (ref 2.6–4.7)
PLATELET # BLD AUTO: 167 K/UL (ref 150–400)
PMV BLD AUTO: 10.5 FL (ref 8.9–12.9)
POTASSIUM SERPL-SCNC: 5 MMOL/L (ref 3.5–5.1)
RBC # BLD AUTO: 2.65 M/UL (ref 3.8–5.2)
SODIUM SERPL-SCNC: 137 MMOL/L (ref 136–145)
TROPONIN-HIGH SENSITIVITY: 26 NG/L (ref 0–51)
TROPONIN-HIGH SENSITIVITY: 29 NG/L (ref 0–51)
WBC # BLD AUTO: 3.9 K/UL (ref 3.6–11)

## 2022-01-29 PROCEDURE — 85025 COMPLETE CBC W/AUTO DIFF WBC: CPT

## 2022-01-29 PROCEDURE — 84100 ASSAY OF PHOSPHORUS: CPT

## 2022-01-29 PROCEDURE — 96374 THER/PROPH/DIAG INJ IV PUSH: CPT

## 2022-01-29 PROCEDURE — 99285 EMERGENCY DEPT VISIT HI MDM: CPT

## 2022-01-29 PROCEDURE — 84484 ASSAY OF TROPONIN QUANT: CPT

## 2022-01-29 PROCEDURE — 80048 BASIC METABOLIC PNL TOTAL CA: CPT

## 2022-01-29 PROCEDURE — G0257 UNSCHED DIALYSIS ESRD PT HOS: HCPCS

## 2022-01-29 PROCEDURE — 93005 ELECTROCARDIOGRAM TRACING: CPT

## 2022-01-29 PROCEDURE — 74011250636 HC RX REV CODE- 250/636: Performed by: INTERNAL MEDICINE

## 2022-01-29 PROCEDURE — 71045 X-RAY EXAM CHEST 1 VIEW: CPT

## 2022-01-29 PROCEDURE — 83735 ASSAY OF MAGNESIUM: CPT

## 2022-01-29 PROCEDURE — 36415 COLL VENOUS BLD VENIPUNCTURE: CPT

## 2022-01-29 PROCEDURE — 70450 CT HEAD/BRAIN W/O DYE: CPT

## 2022-01-29 RX ORDER — OXCARBAZEPINE 300 MG/1
300 TABLET, FILM COATED ORAL
Qty: 30 TABLET | Refills: 3 | Status: SHIPPED | OUTPATIENT
Start: 2022-01-29 | End: 2022-04-13

## 2022-01-29 RX ORDER — OXCARBAZEPINE 150 MG/1
150 TABLET, FILM COATED ORAL EVERY MORNING
Qty: 60 TABLET | Refills: 2 | Status: SHIPPED | OUTPATIENT
Start: 2022-01-29 | End: 2022-04-13

## 2022-01-29 RX ADMIN — EPOETIN ALFA-EPBX 10000 UNITS: 10000 INJECTION, SOLUTION INTRAVENOUS; SUBCUTANEOUS at 18:00

## 2022-01-29 NOTE — DISCHARGE INSTRUCTIONS
Thank you! Thank you for allowing me to care for you in the emergency department. I sincerely hope that you are satisfied with your visit today. It is my goal to provide you with excellent care. Below you will find a list of your labs and imaging from your visit today. Should you have any questions regarding these results please do not hesitate to call the emergency department. Labs -     Recent Results (from the past 12 hour(s))   CBC WITH AUTOMATED DIFF    Collection Time: 01/29/22 12:17 PM   Result Value Ref Range    WBC 3.9 3.6 - 11.0 K/uL    RBC 2.65 (L) 3.80 - 5.20 M/uL    HGB 8.5 (L) 11.5 - 16.0 g/dL    HCT 28.6 (L) 35.0 - 47.0 %    .9 (H) 80.0 - 99.0 FL    MCH 32.1 26.0 - 34.0 PG    MCHC 29.7 (L) 30.0 - 36.5 g/dL    RDW 16.0 (H) 11.5 - 14.5 %    PLATELET 123 322 - 997 K/uL    MPV 10.5 8.9 - 12.9 FL    NRBC 0.0 0.0  WBC    ABSOLUTE NRBC 0.00 0.00 - 0.01 K/uL    NEUTROPHILS 83 (H) 32 - 75 %    LYMPHOCYTES 8 (L) 12 - 49 %    MONOCYTES 7 5 - 13 %    EOSINOPHILS 0 0 - 7 %    BASOPHILS 1 0 - 1 %    IMMATURE GRANULOCYTES 1 (H) 0 - 0.5 %    ABS. NEUTROPHILS 3.3 1.8 - 8.0 K/UL    ABS. LYMPHOCYTES 0.3 (L) 0.8 - 3.5 K/UL    ABS. MONOCYTES 0.3 0.0 - 1.0 K/UL    ABS. EOSINOPHILS 0.0 0.0 - 0.4 K/UL    ABS. BASOPHILS 0.0 0.0 - 0.1 K/UL    ABS. IMM.  GRANS. 0.0 0.00 - 0.04 K/UL    DF AUTOMATED     METABOLIC PANEL, BASIC    Collection Time: 01/29/22 12:17 PM   Result Value Ref Range    Sodium 137 136 - 145 mmol/L    Potassium 5.0 3.5 - 5.1 mmol/L    Chloride 107 97 - 108 mmol/L    CO2 23 21 - 32 mmol/L    Anion gap 7 5 - 15 mmol/L    Glucose 91 65 - 100 mg/dL    BUN 52 (H) 6 - 20 mg/dL    Creatinine 5.23 (H) 0.55 - 1.02 mg/dL    BUN/Creatinine ratio 10 (L) 12 - 20      GFR est AA 10 (L) >60 ml/min/1.73m2    GFR est non-AA 8 (L) >60 ml/min/1.73m2    Calcium 8.2 (L) 8.5 - 10.1 mg/dL   MAGNESIUM    Collection Time: 01/29/22 12:17 PM   Result Value Ref Range    Magnesium 2.3 1.6 - 2.4 mg/dL TROPONIN-HIGH SENSITIVITY    Collection Time: 01/29/22 12:17 PM   Result Value Ref Range    Troponin-High Sensitivity 29 0 - 51 ng/L   PHOSPHORUS    Collection Time: 01/29/22 12:17 PM   Result Value Ref Range    Phosphorus 5.1 (H) 2.6 - 4.7 mg/dL   TROPONIN-HIGH SENSITIVITY    Collection Time: 01/29/22  2:05 PM   Result Value Ref Range    Troponin-High Sensitivity 26 0 - 51 ng/L       Radiologic Studies -   CT HEAD WO CONT   Final Result   No acute intracranial abnormality is seen, unchanged. Findings of   sinusitis, cortical atrophy and chronic small vessel disease are again noted. XR CHEST PORT   Final Result   Interval development of CHF. CT Results  (Last 48 hours)                 01/29/22 1442  CT HEAD WO CONT Final result    Impression:  No acute intracranial abnormality is seen, unchanged. Findings of   sinusitis, cortical atrophy and chronic small vessel disease are again noted. Narrative:  Dose Reduction:        All CT scans at this facility are performed using dose reduction optimization   techniques as appropriate to a performed exam including the following: Automated   exposure control, adjustments of the mA and/or kV according to patient size, or   use of iterative reconstruction technique. CT of the head without contrast. Axial images of the head were obtained   unenhanced and sagittal and coronal reconstructions were created. Comparison to   prior exam 1/20/2022. Findings of cortical atrophy and white matter changes   suggesting chronic small vessel disease seen on the prior exam are again noted   and unchanged. Fluid within the ethmoid and sphenoid sinuses and minimal fluid   within the right maxillary sinus is noted consistent with sinusitis. No acute   bony abnormality is seen. No shift of the midline or mass is seen. No pathologic   extra-axial fluid collection is identified. There is no evidence of acute   hemorrhage or infarction. No bony abnormality is seen.  The exam is otherwise   unremarkable. CXR Results  (Last 48 hours)                 01/29/22 1242  XR CHEST PORT Final result    Impression:  Interval development of CHF. Narrative:  Chest one view. AP semiupright portable at 1238 dated 1/29/2022. Comparison 1/12/2022. The central line remains in place. There are pacing electrodes in the right   atrium and right ventricle. The heart remains enlarged. Pulmonary edema has   developed in the interim with bilateral interstitial fluid and likely bilateral   pleural effusions. If you feel that you have not received excellent quality care or timely care, please ask to speak to the nurse manager. Please choose us in the future for your continued health care needs. ------------------------------------------------------------------------------------------------------------  The exam and treatment you received in the Emergency Department were for an urgent problem and are not intended as complete care. It is important that you follow-up with a doctor, nurse practitioner, or physician assistant to:  (1) confirm your diagnosis,  (2) re-evaluation of changes in your illness and treatment, and  (3) for ongoing care. If your symptoms become worse or you do not improve as expected and you are unable to reach your usual health care provider, you should return to the Emergency Department. We are available 24 hours a day. Please take your discharge instructions with you when you go to your follow-up appointment. If a prescription has been provided, please have it filled as soon as possible to prevent a delay in treatment. Read the entire medication instruction sheet provided to you by the pharmacy. If you have any questions or reservations about taking the medication due to side effects or interactions with other medications, please call your primary care physician or contact the ER to speak with the charge nurse.      Make an appointment with your family doctor or the physician you were referred to for follow-up of this visit as instructed on your discharge paperwork, as this is a mandatory follow-up. Return to the ER if you are unable to be seen or if you are unable to be seen in a timely manner. If you have any problem arranging the follow-up visit, contact the Emergency Department immediately.

## 2022-01-29 NOTE — ED NOTES
ED NOTE    ED Course as of 01/29/22 1837   Sat Jan 29, 2022   1208 Called son. He reports that usually the patient is ANO x3 however she has been worsening steadily since she was started on the Trileptal instead of her Keppra for her seizures. She has been having recurrent intermittent episodes of confusion. [SS]   1332 CXR: CHF. Patient requires dialysis [SS]   1332 Trop 29, will trend. [SS]   4447 The non-contrasted head CT was negative for acute process making acute intracranial bleed unlikely. No evidence of large subacute stroke, ventriculomegaly, or masses. [SS]   5425 CBC does not show any evidence of acute process. Leukocytosis not present to suggest infection. Hemoglobin not suggestive of acute anemia. Platelet count is normal.    CMP: normal electrolytes. Creatinine elevated as expected for ESRD. [SS]   1549 Second troponin with negative delta change. ACS ruled out. [SS]   2269 Discussion with the son. The reason she came to the ER today was because she is experiencing multiple side effects since the Trileptal dose was increased including dizziness, drowsiness, feeling tired, fatigue, mental slowness, trouble concentrating, intermittent shaking, blurred vision, dry mouth. [SS]   Pelon Ramirez 1460 neurology. They recommended halfing the dose of the morning Trileptal to 150 and keeping the night dose at 300. Discussed extensively with the family and they are agreeable with the plan until they follow-up with neurology. [SS]   [address] 39. NOTE    I was given sign out on this patient from the [address]. All directly relevant available labs, images, and records were reviewed. The patient is currently stable. Please see the previous note for more details. Briefly, 72F w/fatigue on trileptal, had dose doubled 1wk ago. Has neuro f/u and w/discussion w/family will d/c after HD pending reassessment and clinical stability.     Karen Malcolm MD  4:43 PM   [YA]   0106 Patient returns from dialysis. States she feels about the same as before but slightly better, not worse. Feels safe to go home with plan as previously outlined - night dose of trileptal to stay at 300mg and morning to be cut back to 150mg again w/close neurology f/u. Return precautions discussed, will d/c patient with previously agreed-upon plan.  [YA]      ED Course User Index  [SS] Letty Moritz, MD  [YA] Aminta Baldreas MD

## 2022-01-29 NOTE — ED PROVIDER NOTES
EMERGENCY DEPARTMENT HISTORY AND PHYSICAL EXAM      Date: 1/29/2022  Patient Name: Nelly Forte    History of Presenting Illness     Chief Complaint   Patient presents with    Fatigue       HPI: Nelly Forte, 71 y.o. female with with a past medical history of ESRD, diabetes, hypertension, hyperlipidemia, seizures, A. fib, CVA, PVD, and sick sinus syndrome presenting for fatigue for the past week. She was admitted around 1 week ago and discharged for seizures after her medications were adjusted by increasing Trileptal from 150 twice daily to 300 twice daily. Since then she has been experiencing symptoms including blurry vision fatigue, slow responses, dry skin. She was being taken today to dialysis after missing 1 session yesterday when she was noted to be slow to respond by EMS and was brought to the emergency room instead. On arrival, the patient denies any chest pain, shortness breath, headache, nausea, vomiting and was ANO times 3. PCP: Yvon Dudley MD    Current Facility-Administered Medications   Medication Dose Route Frequency Provider Last Rate Last Admin    epoetin anjelica-epbx (RETACRIT) injection 10,000 Units  10,000 Units IntraVENous Nayla Devine MD         Current Outpatient Medications   Medication Sig Dispense Refill    OXcarbazepine (TRILEPTAL) 150 mg tablet Take 1 Tablet by mouth Every morning. 60 Tablet 2    OXcarbazepine (TrileptaL) 300 mg tablet Take 1 Tablet by mouth nightly. 30 Tablet 3    pantoprazole (PROTONIX) 40 mg tablet Take 1 Tablet by mouth Daily (before breakfast). 30 Tablet 0    amLODIPine (NORVASC) 5 mg tablet Take 1 Tablet by mouth daily for 60 days.  30 Tablet 1    insulin lispro (HUMALOG) 100 unit/mL injection As per insulin sensitive ssi:    Insulin High Sensitivity (thin, ESRD)  For Blood Sugar (mg/dL) of:              Less than 150 =   0 units  150 -199 =  1 units  200 -249 =   2 units  250 -299 =   3 units  300 -349 =   4 units  350 or greater = 5 units and Call Physician  Initiate Hypoglycemic protocol if blood glucose is <70 mg/dL. 1 Each 0    ascorbic acid, vitamin C, (VITAMIN C) 500 mg tablet Take 1 Tablet by mouth two (2) times a day for 30 days. 60 Tablet 0    aspirin 81 mg chewable tablet Take 1 Tablet by mouth daily for 30 days. 30 Tablet 0    calcitRIOL (ROCALTROL) 0.5 mcg capsule Take 1 Capsule by mouth daily. 30 Capsule 0    zinc oxide-white petrolatum 17-57 % topical paste Apply  to affected area as needed for Skin Irritation. Indications: skin irritation 113 g 0    ondansetron hcl (Zofran) 4 mg tablet Take 1 Tablet by mouth every eight (8) hours as needed for Nausea or Vomiting. 45 Tablet 0    Vimpat 200 mg tab tablet Take 1 Tablet by mouth two (2) times a day.  ondansetron (Zofran ODT) 4 mg disintegrating tablet 1 Tablet by SubLINGual route every eight (8) hours as needed for Nausea or Vomiting. 20 Tablet 0    atorvastatin (LIPITOR) 40 mg tablet Take 1 tablet by mouth once daily for 90 days 90 Tablet 3    FLUoxetine (PROzac) 10 mg capsule Take 1 Capsule by mouth daily for 270 days.  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) 8/31/2020    Atrial fibrillation (HCC)     Cardiac pacemaker in situ     Cellulitis and abscess of trunk 8/31/2020    Depression, postpartum     Diabetes ([facility] [address]     Heart disease     Heartburn     Hx of long term use of blood thinners     Hypercholesterolemia     Hypertension     Hypothyroidism     Migraines     Peripheral venous insufficiency 8/31/2020    Seizures ([address]     Sleep disorder     Stroke [facility])     2019    Varicose veins of lower extremity with inflammation 8/31/2020       Past Surgical History:  Past Surgical History:   Procedure Laterality Date    HX BACK SURGERY      HX OTHER SURGICAL      leg surgery     HX OTHER SURGICAL      pacemaker monitor     IR INSERT TUNL CVC W/O PORT OVER 5 YR  12/10/2021       Family History:  Family History   Problem Relation Age of Onset    Heart Disease Mother     Heart Disease Father     Diabetes Sister     Diabetes Brother        Social History:  Social History     Tobacco Use    Smoking status: Never Smoker    Smokeless tobacco: Never Used   Vaping Use    Vaping Use: Never used   Substance Use Topics    Alcohol use: Never    Drug use: Never       Allergies:  No Known Allergies    Review of Systems     Review of Systems  All other systems negative. Physical Exam and Vital Signs   Vital Signs - Reviewed the patient's vital signs.     Patient Vitals for the past 12 hrs:   Temp Pulse Resp BP SpO2   01/29/22 1729  60  (!) 102/59    01/29/22 1725  60  (!) 89/53    01/29/22 1710  60  (!) 126/59    01/29/22 1659  60  (!) 82/50    01/29/22 1640  60  111/60    01/29/22 1610  60  119/62    01/29/22 1604  60  117/60    01/29/22 1552  60  (!) 88/50    01/29/22 1540  63  (!) 106/52    01/29/22 1510 97.3 °F (36.3 °C) 60  (!) 157/69    01/29/22 1459 97.6 °F (36.4 °C) 62 14 (!) 171/62 98 %   01/29/22 1243     100 %   01/29/22 1124    (!) 154/70    01/29/22 1119 97.4 °F (36.3 °C) 64 16  100 %       Physical Exam:    GENERAL: awake, alert, cooperative, not in distress, slow responses  HEENT:  * Pupils equal, EOMI  * Head atraumatic  CV:  * regular rhythm  * warm and perfused extremities bilaterally  PULMONARY: Good air movement, no wheezes or crackles  ABDOMEN: soft, not distended, no guarding, not tenderness to palpation  : No suprapubic tenderness  EXTREMITIES/BACK: warm and perfused, no tenderness, no edema  SKIN: no rashes or signs of trauma  NEURO:   * GCS = 15 (E=4, V=5, M=6)  * Awake, alert, oriented x 3, slow speech  * CNs II-XII intact  * Strength 4/5 in all extremities  * Sensory exam grossly normal  * No pronator drift or dysmetria    Medical Decision Making and ED Course   - I am the first and primary provider for this patient and am the primary provider of record. - I reviewed the vital signs, available nursing notes, past medical history, past surgical history, family history and social history. - Initial assessment performed. The patients presenting problems have been discussed, and the staff are in agreement with the care plan formulated and outlined with them. I have encouraged them to ask questions as they arise throughout their visit. - Available medical records, nursing notes, old EKGs, and EMS run sheets (if patient was EMS transported) were reviewed    MDM:   Patient is a 71 y.o. female presenting for slow responses and fatigue after increasing Trileptal dose. Vitals reveal no abnormalities and physical exam reveals no abnormalities. Based on the history, physical exam, risk factors, and vitals signs, differential includes: Electrolyte abnormalities, pulmonary edema due to ESRD and missed dialysis sessions, ICH.  CVA is unlikely as the patient has normal movement in the upper and lower extremities.       Results     Labs:  Recent Results (from the past 12 hour(s))   CBC WITH AUTOMATED DIFF    Collection Time: 01/29/22 12:17 PM   Result Value Ref Range    WBC 3.9 3.6 - 11.0 K/uL    RBC 2.65 (L) 3.80 - 5.20 M/uL    HGB 8.5 (L) 11.5 - 16.0 g/dL    HCT 28.6 (L) 35.0 - 47.0 %    .9 (H) 80.0 - 99.0 FL    MCH 32.1 26.0 - 34.0 PG    MCHC 29.7 (L) 30.0 - 36.5 g/dL    RDW 16.0 (H) 11.5 - 14.5 %    PLATELET 487 358 - 673 K/uL MPV 10.5 8.9 - 12.9 FL    NRBC 0.0 0.0  WBC    ABSOLUTE NRBC 0.00 0.00 - 0.01 K/uL    NEUTROPHILS 83 (H) 32 - 75 %    LYMPHOCYTES 8 (L) 12 - 49 %    MONOCYTES 7 5 - 13 %    EOSINOPHILS 0 0 - 7 %    BASOPHILS 1 0 - 1 %    IMMATURE GRANULOCYTES 1 (H) 0 - 0.5 %    ABS. NEUTROPHILS 3.3 1.8 - 8.0 K/UL    ABS. LYMPHOCYTES 0.3 (L) 0.8 - 3.5 K/UL    ABS. MONOCYTES 0.3 0.0 - 1.0 K/UL    ABS. EOSINOPHILS 0.0 0.0 - 0.4 K/UL    ABS. BASOPHILS 0.0 0.0 - 0.1 K/UL    ABS. IMM. GRANS. 0.0 0.00 - 0.04 K/UL    DF AUTOMATED     METABOLIC PANEL, BASIC    Collection Time: 01/29/22 12:17 PM   Result Value Ref Range    Sodium 137 136 - 145 mmol/L    Potassium 5.0 3.5 - 5.1 mmol/L    Chloride 107 97 - 108 mmol/L    CO2 23 21 - 32 mmol/L    Anion gap 7 5 - 15 mmol/L    Glucose 91 65 - 100 mg/dL    BUN 52 (H) 6 - 20 mg/dL    Creatinine 5.23 (H) 0.55 - 1.02 mg/dL    BUN/Creatinine ratio 10 (L) 12 - 20      GFR est AA 10 (L) >60 ml/min/1.73m2    GFR est non-AA 8 (L) >60 ml/min/1.73m2    Calcium 8.2 (L) 8.5 - 10.1 mg/dL   MAGNESIUM    Collection Time: 01/29/22 12:17 PM   Result Value Ref Range    Magnesium 2.3 1.6 - 2.4 mg/dL   TROPONIN-HIGH SENSITIVITY    Collection Time: 01/29/22 12:17 PM   Result Value Ref Range    Troponin-High Sensitivity 29 0 - 51 ng/L   PHOSPHORUS    Collection Time: 01/29/22 12:17 PM   Result Value Ref Range    Phosphorus 5.1 (H) 2.6 - 4.7 mg/dL   TROPONIN-HIGH SENSITIVITY    Collection Time: 01/29/22  2:05 PM   Result Value Ref Range    Troponin-High Sensitivity 26 0 - 51 ng/L       Radiologic Studies:  CT Results  (Last 48 hours)               01/29/22 1442  CT HEAD WO CONT Final result    Impression:  No acute intracranial abnormality is seen, unchanged. Findings of   sinusitis, cortical atrophy and chronic small vessel disease are again noted.        Narrative:  Dose Reduction:        All CT scans at this facility are performed using dose reduction optimization   techniques as appropriate to a performed exam including the following: Automated   exposure control, adjustments of the mA and/or kV according to patient size, or   use of iterative reconstruction technique. CT of the head without contrast. Axial images of the head were obtained   unenhanced and sagittal and coronal reconstructions were created. Comparison to   prior exam 1/20/2022. Findings of cortical atrophy and white matter changes   suggesting chronic small vessel disease seen on the prior exam are again noted   and unchanged. Fluid within the ethmoid and sphenoid sinuses and minimal fluid   within the right maxillary sinus is noted consistent with sinusitis. No acute   bony abnormality is seen. No shift of the midline or mass is seen. No pathologic   extra-axial fluid collection is identified. There is no evidence of acute   hemorrhage or infarction. No bony abnormality is seen. The exam is otherwise   unremarkable. CXR Results  (Last 48 hours)               01/29/22 1242  XR CHEST PORT Final result    Impression:  Interval development of CHF. Narrative:  Chest one view. AP semiupright portable at 1238 dated 1/29/2022. Comparison 1/12/2022. The central line remains in place. There are pacing electrodes in the right   atrium and right ventricle. The heart remains enlarged. Pulmonary edema has   developed in the interim with bilateral interstitial fluid and likely bilateral   pleural effusions. Medications ordered:  Medications   epoetin anjelica-epbx (RETACRIT) injection 10,000 Units (has no administration in time range)        ED Course     ED Course:     ED Course as of 01/29/22 1756   Sat Jan 29, 2022   1208 Called son. He reports that usually the patient is ANO x3 however she has been worsening steadily since she was started on the Trileptal instead of her Keppra for her seizures. She has been having recurrent intermittent episodes of confusion. [SS]   1332 CXR: CHF.  Patient requires dialysis [SS]   1332 Trop 29, will trend. [SS]   0065 The non-contrasted head CT was negative for acute process making acute intracranial bleed unlikely. No evidence of large subacute stroke, ventriculomegaly, or masses. [SS]   7195 CBC does not show any evidence of acute process. Leukocytosis not present to suggest infection. Hemoglobin not suggestive of acute anemia. Platelet count is normal.    CMP: normal electrolytes. Creatinine elevated as expected for ESRD. [SS]   1549 Second troponin with negative delta change. ACS ruled out. [SS]   5647 Discussion with the son. The reason she came to the ER today was because she is experiencing multiple side effects since the Trileptal dose was increased including dizziness, drowsiness, feeling tired, fatigue, mental slowness, trouble concentrating, intermittent shaking, blurred vision, dry mouth. [SS]   Pelon Ramirez 1460 neurology. They recommended halfing the dose of the morning Trileptal to 150 and keeping the night dose at 300. Discussed extensively with the family and they are agreeable with the plan until they follow-up with neurology. [SS]   [address] NOTE    I was given sign out on this patient from the [address]. All directly relevant available labs, images, and records were reviewed. The patient is currently stable. Please see the previous note for more details. Briefly, 72F w/fatigue on trileptal, had dose doubled 1wk ago. Has neuro f/u and w/discussion w/family will d/c after HD pending reassessment and clinical stability. Georgie Gleason MD  4:43 PM   [YA]      ED Course User Index  [SS] Richie Perez MD  [YA] Katerina Munson MD       Reassessment / Disposition / Discussion:    Signed out to incoming provider pending reassessment  while patient is in dialysis. Anticipated plan of discharge with follow-up and return precautions.     Final Disposition     Disposition: Condition stable    ED Procedures & Consultations     Diagnosis     Clinical Impression:   1. Medication side effect        Attestations:    Gladys Alvarez MD    Please note that this dictation was completed with GVISP 1, the computer voice recognition software. Quite often unanticipated grammatical, syntax, homophones, and other interpretive errors are inadvertently transcribed by the computer software. Please disregard these errors. Please excuse any errors that have escaped final proofreading. Thank you.

## 2022-01-29 NOTE — DIALYSIS
Pt had 3hrs of dialysis tolerated fair. Pt kept becoming hypotensive during tx. Pt had 1950ml removed with 300ml rinseback.  CVC rewrapped and heparin locked after tx

## 2022-01-30 NOTE — ED NOTES
Attempted to call report to [facility] x 6. Was hung up on the first 4 calls when I introduced myself. The 5th attempt I was transferred to the pt's nurse who didnt answer the phone. The 6th attempt I was placed on hold for over 10 min and no one came to  my call.

## 2022-01-30 NOTE — ED PROVIDER NOTES
EMERGENCY DEPARTMENT HISTORY AND PHYSICAL EXAM      Date: 1/24/2022  Patient Name: Yolande Habermann    History of Presenting Illness     Chief Complaint   Patient presents with    Seizure       History Provided By: Patient, EMS and Nursing Home/SNF/Rehab Center    HPI: Yolande Habermann, 71 y.o. female with a past medical history significant renal insufficiency and seizure presents to the ED with chief complaint of Seizure  . 69-year-old female end-stage renal disease on dialysis with frequent seizures bringing her to the ER. Multiple negative work-ups. No reported falls or trauma. Patient also is on dialysis but has not missed any treatments. Has no complaints now including headache chest pain shortness of breath dizziness or passing out. There are no other complaints, changes, or physical findings at this time. PCP: Sridhar Quinteros MD    Current Outpatient Medications   Medication Sig Dispense Refill    OXcarbazepine (TRILEPTAL) 150 mg tablet Take 1 Tablet by mouth Every morning. 60 Tablet 2    OXcarbazepine (TrileptaL) 300 mg tablet Take 1 Tablet by mouth nightly. 30 Tablet 3    pantoprazole (PROTONIX) 40 mg tablet Take 1 Tablet by mouth Daily (before breakfast). 30 Tablet 0    amLODIPine (NORVASC) 5 mg tablet Take 1 Tablet by mouth daily for 60 days. 30 Tablet 1    insulin lispro (HUMALOG) 100 unit/mL injection As per insulin sensitive ssi:    Insulin High Sensitivity (thin, ESRD)  For Blood Sugar (mg/dL) of:              Less than 150 =   0 units  150 -199 =  1 units  200 -249 =   2 units  250 -299 =   3 units  300 -349 =   4 units  350 or greater = 5 units and Call Physician  Initiate Hypoglycemic protocol if blood glucose is <70 mg/dL. 1 Each 0    ascorbic acid, vitamin C, (VITAMIN C) 500 mg tablet Take 1 Tablet by mouth two (2) times a day for 30 days. 60 Tablet 0    aspirin 81 mg chewable tablet Take 1 Tablet by mouth daily for 30 days.  30 Tablet 0    calcitRIOL (ROCALTROL) 0.5 mcg capsule Take 1 Capsule by mouth daily. 30 Capsule 0    zinc oxide-white petrolatum 17-57 % topical paste Apply  to affected area as needed for Skin Irritation. Indications: skin irritation 113 g 0    ondansetron hcl (Zofran) 4 mg tablet Take 1 Tablet by mouth every eight (8) hours as needed for Nausea or Vomiting. 45 Tablet 0    Vimpat 200 mg tab tablet Take 1 Tablet by mouth two (2) times a day.  ondansetron (Zofran ODT) 4 mg disintegrating tablet 1 Tablet by SubLINGual route every eight (8) hours as needed for Nausea or Vomiting. 20 Tablet 0    atorvastatin (LIPITOR) 40 mg tablet Take 1 tablet by mouth once daily for 90 days 90 Tablet 3    FLUoxetine (PROzac) 10 mg capsule Take 1 Capsule by mouth daily for 270 days.  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 [address]     2019    Varicose veins of lower extremity with inflammation 8/31/2020       Past Surgical History:  Past Surgical History:   Procedure Laterality Date    HX BACK SURGERY      HX OTHER SURGICAL      leg surgery     HX OTHER SURGICAL      pacemaker monitor     IR INSERT TUNL CVC W/O PORT OVER 5 YR  12/10/2021       Family History:  Family History   Problem Relation Age of Onset    Heart Disease Mother     Heart Disease Father     Diabetes Sister     Diabetes Brother        Social History:  Social History     Tobacco Use    Smoking status: Never Smoker    Smokeless tobacco: Never Used   Vaping Use    Vaping Use: Never used   Substance Use Topics    Alcohol use: Never    Drug use: Never       Allergies:  No Known Allergies      Review of Systems   Review of Systems   Constitutional: Negative. Negative for chills, fatigue and fever. HENT: Negative. Negative for congestion, nosebleeds and sore throat. Eyes: Negative. Negative for pain, discharge and visual disturbance. Respiratory: Negative. Negative for cough, chest tightness and shortness of breath. Cardiovascular: Negative for chest pain, palpitations and leg swelling. Gastrointestinal: Negative for abdominal pain, blood in stool, constipation, diarrhea, nausea and vomiting. Endocrine: Negative. Genitourinary: Negative. Negative for difficulty urinating, dysuria, pelvic pain and vaginal bleeding. Musculoskeletal: Negative. Negative for arthralgias, back pain and myalgias. Skin: Negative. Negative for rash and wound. Allergic/Immunologic: Negative. Neurological: Positive for seizures. Negative for dizziness, syncope, weakness, numbness and headaches. Hematological: Negative. Psychiatric/Behavioral: Negative. Negative for agitation, confusion and suicidal ideas. All other systems reviewed and are negative. Physical Exam   Physical Exam  Vitals and nursing note reviewed. Exam conducted with a chaperone present. Constitutional:       Appearance: Normal appearance. She is normal weight. HENT:      Head: Normocephalic and atraumatic.       Nose: Nose normal.      Mouth/Throat:      Mouth: Mucous membranes are moist.      Pharynx: Oropharynx is clear. Eyes:      Extraocular Movements: Extraocular movements intact. Conjunctiva/sclera: Conjunctivae normal.      Pupils: Pupils are equal, round, and reactive to light. Cardiovascular:      Rate and Rhythm: Normal rate and regular rhythm. Pulses: Normal pulses. Heart sounds: Normal heart sounds. Pulmonary:      Effort: Pulmonary effort is normal. No respiratory distress. Breath sounds: Normal breath sounds. Abdominal:      General: Abdomen is flat. Bowel sounds are normal. There is no distension. Palpations: Abdomen is soft. Tenderness: There is no abdominal tenderness. There is no guarding. Musculoskeletal:         General: No swelling, tenderness, deformity or signs of injury. Normal range of motion. Cervical back: Normal range of motion and neck supple. Right lower leg: No edema. Left lower leg: No edema. Skin:     General: Skin is warm and dry. Capillary Refill: Capillary refill takes less than 2 seconds. Findings: No lesion or rash. Neurological:      General: No focal deficit present. Mental Status: She is alert and oriented to person, place, and time. Mental status is at baseline. Cranial Nerves: No cranial nerve deficit. Psychiatric:         Mood and Affect: Mood normal.         Behavior: Behavior normal.         Thought Content:  Thought content normal.         Judgment: Judgment normal.         Diagnostic Study Results     Labs -   01/26/22 1736  LEVETIRACETAM (KEPPRA)   Collected: 01/24/22 0924  Final result  Specimen: Blood from Serum     Levetiracetam (Keppra) 44.6 High  ug/mL           26/81/01 4100  METABOLIC PANEL, COMPREHENSIVE   Collected: 01/24/22 0924  Final result  Specimen: Serum or Plasma     Sodium 135 Low  mmol/L   Potassium 5.5 High  mmol/L   Chloride 105 mmol/L   CO2 24 mmol/L   Anion gap 6 mmol/L   Glucose 58 Low  mg/dL   BUN 84 High  mg/dL   Creatinine 5.89 High  mg/dL    BUN/Creatinine ratio 14     GFR est AA 9 Low  ml/min/1.73m2   GFR est non-AA 7 Low  ml/min/1.73m2   Calcium 7.7 Low  mg/dL   Bilirubin, total 0.3 mg/dL   AST (SGOT) 15 U/L   ALT (SGPT) 19 U/L   Alk. phosphatase 90 U/L   Protein, total 4.8 Low  g/dL   Albumin 2.0 Low  g/dL   Globulin 2.8 g/dL   A-G Ratio 0.7 Low             01/24/22 0942  CBC WITH AUTOMATED DIFF   Collected: 01/24/22 5888  Final result  Specimen: Whole Blood     WBC 4.1 K/uL   RBC 2.17 Low  M/uL   HGB 7.1 Low  g/dL   HCT 23.3 Low  %   .4 High  FL   MCH 32.7 PG   MCHC 30.5 g/dL   RDW 17.1 High  %   PLATELET 162 NICOLLE  K/uL   MPV 10.8 FL   NRBC 0.0  WBC   ABSOLUTE NRBC 0.00 K/uL   NEUTROPHILS 69 %   LYMPHOCYTES 19 %   MONOCYTES 10 %   EOSINOPHILS 1 %   BASOPHILS 0 %   IMMATURE GRANULOCYTES 1 High  %   ABS. NEUTROPHILS 2.9 K/UL   ABS. LYMPHOCYTES 0.8 K/UL   ABS. MONOCYTES 0.4 K/UL   ABS. EOSINOPHILS 0.0 K/UL   ABS. BASOPHILS 0.0 K/UL   ABS. IMM. GRANS. 0.0 K/UL   DF AUTOMATED             No results found for this or any previous visit (from the past 12 hour(s)). Radiologic Studies -   No orders to display     CT Results  (Last 48 hours)               01/29/22 1442  CT HEAD WO CONT Final result    Impression:  No acute intracranial abnormality is seen, unchanged. Findings of   sinusitis, cortical atrophy and chronic small vessel disease are again noted. Narrative:  Dose Reduction:        All CT scans at this facility are performed using dose reduction optimization   techniques as appropriate to a performed exam including the following: Automated   exposure control, adjustments of the mA and/or kV according to patient size, or   use of iterative reconstruction technique. CT of the head without contrast. Axial images of the head were obtained   unenhanced and sagittal and coronal reconstructions were created. Comparison to   prior exam 1/20/2022.  Findings of cortical atrophy and white matter changes   suggesting chronic small vessel disease seen on the prior exam are again noted   and unchanged. Fluid within the ethmoid and sphenoid sinuses and minimal fluid   within the right maxillary sinus is noted consistent with sinusitis. No acute   bony abnormality is seen. No shift of the midline or mass is seen. No pathologic   extra-axial fluid collection is identified. There is no evidence of acute   hemorrhage or infarction. No bony abnormality is seen. The exam is otherwise   unremarkable. CXR Results  (Last 48 hours)               01/29/22 1242  XR CHEST PORT Final result    Impression:  Interval development of CHF. Narrative:  Chest one view. AP semiupright portable at 1238 dated 1/29/2022. Comparison 1/12/2022. The central line remains in place. There are pacing electrodes in the right   atrium and right ventricle. The heart remains enlarged. Pulmonary edema has   developed in the interim with bilateral interstitial fluid and likely bilateral   pleural effusions. Medical Decision Making and ED Course   I am the first provider for this patient. I reviewed the vital signs, available nursing notes, past medical history, past surgical history, family history and social history. Vital Signs-Reviewed the patient's vital signs. No data found. EKG interpretation:   EKG interpretation:  Normal sinus rhythm. Rate 60. No ST segment changes. No T wave Inversions. Normal Intervals. Interpreted by ED physician. Reason rule out dysarrythmia        Records Reviewed: Previous Hospital chart. EMS run report      ED Course:   Initial assessment performed. The patients presenting problems have been discussed, and they are in agreement with the care plan formulated and outlined with them. I have encouraged them to ask questions as they arise throughout their visit.     Orders Placed This Encounter    CBC WITH AUTOMATED DIFF     Standing Status:   Standing     Number of Occurrences:   1    COMPREHENSIVE METABOLIC PANEL     Standing Status:   Standing     Number of Occurrences:   1    LEVETIRACETAM (KEPPRA)     Standing Status:   Standing     Number of Occurrences:   1    DISCONTD: levETIRAcetam in saline (iso-os) (KEPPRA) infusion 1,000 mg    sodium bicarbonate (8.4%) injection 50 mEq    epoetin anjelica-epbx (RETACRIT) injection 10,000 Units    heparin (porcine) 1,000 unit/mL injection 3,200 Units    heparin (porcine) 1,000 unit/mL injection     Krupa Michael: cabinet override                 Provider Notes (Medical Decision Making):   81-year-old female end-stage renal disease on dialysis compliant with the schedule presents with a seizures terminated on its own. This has been having frequent spitting frequently. Is on Keppra. Dose given. Is the case with nephrologysood, recommend short 3 hours of dialysis because her elevated potassium. And patient would be stable to be discharged. Consults        ED Course as of 01/30/22 1535   Mon Jan 24, 2022   1020 Potassium(!): 5.5 [HP]      ED Course User Index  [HP] Brianna Ramirez MD         Discharged    Procedures                       Disposition       Emergency Department Disposition:  Discharged      Diagnosis     Clinical Impression:   1. Seizure ([address]    2. ESRD on dialysis ([address]    3. Acute hyperkalemia        Attestations:    Antionette Browning MD    Please note that this dictation was completed with [employer], the computer voice recognition software. Quite often unanticipated grammatical, syntax, homophones, and other interpretive errors are inadvertently transcribed by the computer software. Please disregard these errors. Please excuse any errors that have escaped final proofreading. Thank you.

## 2022-02-01 LAB
ATRIAL RATE: 58 BPM
CALCULATED R AXIS, ECG10: 78 DEGREES
CALCULATED T AXIS, ECG11: 15 DEGREES
DIAGNOSIS, 93000: NORMAL
P-R INTERVAL, ECG05: 312 MS
Q-T INTERVAL, ECG07: 448 MS
QRS DURATION, ECG06: 142 MS
QTC CALCULATION (BEZET), ECG08: 454 MS
VENTRICULAR RATE, ECG03: 62 BPM

## 2022-02-02 ENCOUNTER — HOSPITAL ENCOUNTER (EMERGENCY)
Age: 72
Discharge: NURSING, REHAB OR LONG-TERM CARE | End: 2022-02-02
Attending: EMERGENCY MEDICINE
Payer: MEDICARE

## 2022-02-02 ENCOUNTER — APPOINTMENT (OUTPATIENT)
Dept: GENERAL RADIOLOGY | Age: 72
End: 2022-02-02
Attending: EMERGENCY MEDICINE
Payer: MEDICARE

## 2022-02-02 VITALS
RESPIRATION RATE: 14 BRPM | SYSTOLIC BLOOD PRESSURE: 141 MMHG | TEMPERATURE: 98.6 F | HEART RATE: 60 BPM | OXYGEN SATURATION: 99 % | WEIGHT: 160 LBS | BODY MASS INDEX: 26.63 KG/M2 | DIASTOLIC BLOOD PRESSURE: 67 MMHG

## 2022-02-02 DIAGNOSIS — S81.812A LEG LACERATION, LEFT, INITIAL ENCOUNTER: Primary | ICD-10-CM

## 2022-02-02 PROCEDURE — 74011250636 HC RX REV CODE- 250/636: Performed by: EMERGENCY MEDICINE

## 2022-02-02 PROCEDURE — 99284 EMERGENCY DEPT VISIT MOD MDM: CPT

## 2022-02-02 PROCEDURE — 74011250637 HC RX REV CODE- 250/637: Performed by: EMERGENCY MEDICINE

## 2022-02-02 PROCEDURE — 90715 TDAP VACCINE 7 YRS/> IM: CPT | Performed by: EMERGENCY MEDICINE

## 2022-02-02 PROCEDURE — 74011000250 HC RX REV CODE- 250: Performed by: EMERGENCY MEDICINE

## 2022-02-02 PROCEDURE — 75810000293 HC SIMP/SUPERF WND  RPR

## 2022-02-02 PROCEDURE — 73590 X-RAY EXAM OF LOWER LEG: CPT

## 2022-02-02 PROCEDURE — 90471 IMMUNIZATION ADMIN: CPT

## 2022-02-02 RX ORDER — LIDOCAINE HYDROCHLORIDE 10 MG/ML
10 INJECTION INFILTRATION; PERINEURAL ONCE
Status: COMPLETED | OUTPATIENT
Start: 2022-02-02 | End: 2022-02-02

## 2022-02-02 RX ORDER — HYDROCODONE BITARTRATE AND ACETAMINOPHEN 5; 325 MG/1; MG/1
1 TABLET ORAL
Status: COMPLETED | OUTPATIENT
Start: 2022-02-02 | End: 2022-02-02

## 2022-02-02 RX ADMIN — TETANUS TOXOID, REDUCED DIPHTHERIA TOXOID AND ACELLULAR PERTUSSIS VACCINE, ADSORBED 0.5 ML: 5; 2.5; 8; 8; 2.5 SUSPENSION INTRAMUSCULAR at 09:08

## 2022-02-02 RX ADMIN — LIDOCAINE HYDROCHLORIDE 10 ML: 10 INJECTION, SOLUTION INFILTRATION; PERINEURAL at 09:14

## 2022-02-02 RX ADMIN — HYDROCODONE BITARTRATE AND ACETAMINOPHEN 1 TABLET: 5; 325 TABLET ORAL at 09:13

## 2022-02-02 NOTE — ED PROVIDER NOTES
EMERGENCY DEPARTMENT HISTORY AND PHYSICAL EXAM      Date: 2/2/2022  Patient Name: Magdy Snyder    History of Presenting Illness     Chief Complaint   Patient presents with   [facility] ([place], [place]) Laceration       History Provided By: Patient and EMS    HPI: Magdy Snyder, 71 y.o. female with a past medical history significant seizure presents to the ED with chief complaint of Fall and Laceration  . 43-year-old female presents after a ground-level fall where she caught her left leg unclear on what. Bandage applied. No bleeding now. Unclear when her last tetanus was. Patient cannot provide any history. History of seizures but has not had one yet recently. Injury occurred just prior to arrival.          There are no other complaints, changes, or physical findings at this time. PCP: Allie Aburto MD    Current Outpatient Medications   Medication Sig Dispense Refill    OXcarbazepine (TRILEPTAL) 150 mg tablet Take 1 Tablet by mouth Every morning. 60 Tablet 2    OXcarbazepine (TrileptaL) 300 mg tablet Take 1 Tablet by mouth nightly. 30 Tablet 3    pantoprazole (PROTONIX) 40 mg tablet Take 1 Tablet by mouth Daily (before breakfast). 30 Tablet 0    amLODIPine (NORVASC) 5 mg tablet Take 1 Tablet by mouth daily for 60 days. 30 Tablet 1    insulin lispro (HUMALOG) 100 unit/mL injection As per insulin sensitive ssi:    Insulin High Sensitivity (thin, ESRD)  For Blood Sugar (mg/dL) of:              Less than 150 =   0 units  150 -199 =  1 units  200 -249 =   2 units  250 -299 =   3 units  300 -349 =   4 units  350 or greater = 5 units and Call Physician  Initiate Hypoglycemic protocol if blood glucose is <70 mg/dL. 1 Each 0    ascorbic acid, vitamin C, (VITAMIN C) 500 mg tablet Take 1 Tablet by mouth two (2) times a day for 30 days. 60 Tablet 0    aspirin 81 mg chewable tablet Take 1 Tablet by mouth daily for 30 days. 30 Tablet 0    calcitRIOL (ROCALTROL) 0.5 mcg capsule Take 1 Capsule by mouth daily.  30 Capsule 0    zinc oxide-white petrolatum 17-57 % topical paste Apply  to affected area as needed for Skin Irritation. Indications: skin irritation 113 g 0    ondansetron hcl (Zofran) 4 mg tablet Take 1 Tablet by mouth every eight (8) hours as needed for Nausea or Vomiting. 45 Tablet 0    Vimpat 200 mg tab tablet Take 1 Tablet by mouth two (2) times a day.  ondansetron (Zofran ODT) 4 mg disintegrating tablet 1 Tablet by SubLINGual route every eight (8) hours as needed for Nausea or Vomiting. 20 Tablet 0    atorvastatin (LIPITOR) 40 mg tablet Take 1 tablet by mouth once daily for 90 days 90 Tablet 3    FLUoxetine (PROzac) 10 mg capsule Take 1 Capsule by mouth daily for 270 days.  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) 8/31/2020    Atrial fibrillation (HCC)     Cardiac pacemaker in situ     Cellulitis and abscess of trunk 8/31/2020    Depression, postpartum     Diabetes ([address]     Heart disease     Heartburn     Hx of long term use of blood thinners     Hypercholesterolemia     Hypertension     Hypothyroidism     Migraines     Peripheral venous insufficiency 8/31/2020    Seizures ([address]     Sleep disorder     St[facility] [address]     2019    Varicose veins of lower extremity with inflammation 8/31/2020       Past Surgical History:  Past Surgical History:   Procedure Laterality Date    HX BACK SURGERY      HX OTHER SURGICAL      leg surgery     HX OTHER SURGICAL      pacemaker monitor     IR INSERT TUNL CVC W/O CATHY OVER 5 YR  12/10/2021       Family History:  Family History   Problem Relation Age of Onset    Heart Disease Mother     Heart Disease Father     Diabetes Sister     Diabetes Brother        Social History:  Social History     Tobacco Use    Smoking status: Never Smoker    Smokeless tobacco: Never Used   Vaping Use    Vaping Use: Never used   Substance Use Topics    Alcohol use: Never    Drug use: Never       Allergies:  No Known Allergies      Review of Systems   Review of Systems   Constitutional: Negative. Negative for chills, fatigue and fever. HENT: Negative. Negative for congestion, nosebleeds and sore throat. Eyes: Negative. Negative for pain, discharge and visual disturbance. Respiratory: Negative. Negative for cough, chest tightness and shortness of breath. Cardiovascular: Negative for chest pain, palpitations and leg swelling. Gastrointestinal: Negative for abdominal pain, blood in stool, constipation, diarrhea, nausea and vomiting. Endocrine: Negative. Genitourinary: Negative. Negative for difficulty urinating, dysuria, pelvic pain and vaginal bleeding. Musculoskeletal: Negative. Negative for arthralgias, back pain and myalgias. Skin: Positive for wound. Negative for rash. Allergic/Immunologic: Negative. Neurological: Negative. Negative for dizziness, syncope, weakness, numbness and headaches. Hematological: Negative. Psychiatric/Behavioral: Negative. Negative for agitation, confusion and suicidal ideas. All other systems reviewed and are negative. Physical Exam   Physical Exam  Vitals and nursing note reviewed. Exam conducted with a chaperone present. Constitutional:       Appearance: Normal appearance. She is normal weight. HENT:      Head: Normocephalic and atraumatic. Nose: Nose normal.      Mouth/Throat:      Mouth: Mucous membranes are moist.      Pharynx: Oropharynx is clear.    Eyes:      Extraocular Movements: Extraocular movements intact. Conjunctiva/sclera: Conjunctivae normal.      Pupils: Pupils are equal, round, and reactive to light. Cardiovascular:      Rate and Rhythm: Normal rate and regular rhythm. Pulses: Normal pulses. Heart sounds: Normal heart sounds. Pulmonary:      Effort: Pulmonary effort is normal. No respiratory distress. Breath sounds: Normal breath sounds. Abdominal:      General: Abdomen is flat. Bowel sounds are normal. There is no distension. Palpations: Abdomen is soft. Tenderness: There is no abdominal tenderness. There is no guarding. Musculoskeletal:         General: No swelling, tenderness, deformity or signs of injury. Normal range of motion. Cervical back: Normal range of motion and neck supple. Right lower leg: No edema. Left lower leg: No edema. Skin:     General: Skin is warm and dry. Capillary Refill: Capillary refill takes less than 2 seconds. Findings: No lesion or rash. Comments: L leg lac 12 cm linear vertical   Neurological:      General: No focal deficit present. Mental Status: She is alert and oriented to person, place, and time. Mental status is at baseline. Cranial Nerves: No cranial nerve deficit. Psychiatric:         Mood and Affect: Mood normal.         Behavior: Behavior normal.         Thought Content: Thought content normal.         Judgment: Judgment normal.         Diagnostic Study Results     Labs -   No results found for this or any previous visit (from the past 12 hour(s)). Radiologic Studies -   02/02/22 0852  XR TIB/FIB LT   Performed: 02/02/22 0746  Final         Narrative: Left tibia/fibula, 3 views Osteopenia. No plain film evidence for acute fracture or dislocation. Partially imaged hardware distal left femur with callus formation. LLE arteriosclerosis.            XR TIB/FIB LT   Final Result        CT Results  (Last 48 hours)    None        CXR Results  (Last 48 hours)    None          Medical Decision Making and ED Course   I am the first provider for this patient. I reviewed the vital signs, available nursing notes, past medical history, past surgical history, family history and social history. Vital Signs-Reviewed the patient's vital signs. Patient Vitals for the past 12 hrs:   Temp Pulse Resp BP SpO2   02/02/22 0642 98.6 °F (37 °C) 60 17 (!) 105/56 98 %       EKG interpretation:         Records Reviewed: Previous Hospital chart. EMS run report      ED Course:   Initial assessment performed. The patients presenting problems have been discussed, and they are in agreement with the care plan formulated and outlined with them. I have encouraged them to ask questions as they arise throughout their visit. Orders Placed This Encounter    XR TIB/FIB LT     Standing Status:   Standing     Number of Occurrences:   1     Order Specific Question:   Transport     Answer:   BED [2]     Order Specific Question:   Reason for Exam     Answer:   laceration    diph,Pertuss(AC),Tet Vac-PF (BOOSTRIX) suspension 0.5 mL    lidocaine (XYLOCAINE) 10 mg/mL (1 %) injection 10 mL    HYDROcodone-acetaminophen (NORCO) 5-325 mg per tablet 1 Tablet                 Provider Notes (Medical Decision Making):   71year-old ground-level fall with no evidence of head injury with laceration to the left leg. X-ray for screening for foreign body versus fracture. Will do wound irrigation wound care and suture. Consults               Discharged    Procedures     LACERATION repair:   Laceration repair utilizing lidocaine 1% 10 cc.  10 simple interrupted sutures after copious irrigation. 4-0 nylon. Good approximation. Patient tolerated procedure well. Dressing applied. Disposition       Emergency Department Disposition:  Discharged      Diagnosis     Clinical Impression:   1. Leg laceration, left, initial encounter    ground Level fall.     Attestations:    Renetta Jackson MD    Please note that this dictation was completed with [employer] the [employer] voice recognition software. Quite often unanticipated grammatical, syntax, homophones, and other interpretive errors are inadvertently transcribed by the computer software. Please disregard these errors. Please excuse any errors that have escaped final proofreading. Thank you.

## 2022-02-02 NOTE — DISCHARGE INSTRUCTIONS
Thank you! Thank you for allowing me to care for you in the emergency department. I sincerely hope that you are satisfied with your visit today. It is my goal to provide you with excellent care. Below you will find a list of your labs and imaging from your visit today. Should you have any questions regarding these results please do not hesitate to call the emergency department. Labs -   No results found for this or any previous visit (from the past 12 hour(s)). Radiologic Studies -   XR TIB/FIB LT   Final Result        CT Results  (Last 48 hours)      None          CXR Results  (Last 48 hours)      None               If you feel that you have not received excellent quality care or timely care, please ask to speak to the nurse manager. Please choose us in the future for your continued health care needs. ------------------------------------------------------------------------------------------------------------  The exam and treatment you received in the Emergency Department were for an urgent problem and are not intended as complete care. It is important that you follow-up with a doctor, nurse practitioner, or physician assistant to:  (1) confirm your diagnosis,  (2) re-evaluation of changes in your illness and treatment, and  (3) for ongoing care. If your symptoms become worse or you do not improve as expected and you are unable to reach your usual health care provider, you should return to the Emergency Department. We are available 24 hours a day. Please take your discharge instructions with you when you go to your follow-up appointment. If you have any problem arranging a follow-up appointment, contact the Emergency Department immediately. If a prescription has been provided, please have it filled as soon as possible to prevent a delay in treatment. Read the entire medication instruction sheet provided to you by the pharmacy.  If you have any questions or reservations about taking the medication due to side effects or interactions with other medications, please call your primary care physician or contact the ER to speak with the charge nurse. Make an appointment with your family doctor or the physician you were referred to for follow-up of this visit as instructed on your discharge paperwork, as this is a mandatory follow-up. Return to the ER if you are unable to be seen or if you are unable to be seen in a timely manner. If you have any problem arranging the follow-up visit, contact the Emergency Department immediately.

## 2022-02-02 NOTE — ED NOTES
Discharge instructions explained to pt's daughter-n-law as well as report called to [facility]. Michael Vela Spoke to Julia regarding need to re-evaluate pt's ambulatory status. . Family states that when she initially went to rehab, pt was able to stand (holding her weight) pt can NOT bear weight at all now !!  Thus having to pick her up and place her in a wheelchair

## 2022-02-02 NOTE — ED TRIAGE NOTES
Patient cut her leg getting out of the car not sure what it was cut on EMS states it was bandaged on their arrival and they reinforced the dressing

## 2022-02-08 ENCOUNTER — HOSPITAL ENCOUNTER (OUTPATIENT)
Dept: INFUSION THERAPY | Age: 72
Discharge: HOME | DRG: 377 | End: 2022-02-08
Payer: MEDICARE

## 2022-02-08 VITALS
RESPIRATION RATE: 18 BRPM | SYSTOLIC BLOOD PRESSURE: 124 MMHG | TEMPERATURE: 97.9 F | OXYGEN SATURATION: 98 % | HEART RATE: 60 BPM | DIASTOLIC BLOOD PRESSURE: 62 MMHG

## 2022-02-08 PROCEDURE — 74011250636 HC RX REV CODE- 250/636

## 2022-02-08 PROCEDURE — 36415 COLL VENOUS BLD VENIPUNCTURE: CPT

## 2022-02-08 PROCEDURE — P9016 RBC LEUKOCYTES REDUCED: HCPCS

## 2022-02-08 PROCEDURE — 86900 BLOOD TYPING SEROLOGIC ABO: CPT

## 2022-02-08 PROCEDURE — 86923 COMPATIBILITY TEST ELECTRIC: CPT

## 2022-02-08 PROCEDURE — G0257 UNSCHED DIALYSIS ESRD PT HOS: HCPCS

## 2022-02-08 RX ORDER — HEPARIN SODIUM 1000 [USP'U]/ML
INJECTION, SOLUTION INTRAVENOUS; SUBCUTANEOUS
Status: COMPLETED
Start: 2022-02-08 | End: 2022-02-08

## 2022-02-08 RX ORDER — HEPARIN SODIUM 1000 [USP'U]/ML
3200 INJECTION, SOLUTION INTRAVENOUS; SUBCUTANEOUS ONCE
Status: COMPLETED | OUTPATIENT
Start: 2022-02-08 | End: 2022-02-08

## 2022-02-08 RX ORDER — SODIUM CHLORIDE 9 MG/ML
250 INJECTION, SOLUTION INTRAVENOUS AS NEEDED
Status: DISCONTINUED | OUTPATIENT
Start: 2022-02-08 | End: 2022-02-10 | Stop reason: HOSPADM

## 2022-02-08 RX ADMIN — HEPARIN SODIUM 3200 UNITS: 1000 INJECTION, SOLUTION INTRAVENOUS; SUBCUTANEOUS at 12:58

## 2022-02-08 RX ADMIN — HEPARIN SODIUM 3200 UNITS: 1000 INJECTION INTRAVENOUS; SUBCUTANEOUS at 12:58

## 2022-02-08 NOTE — DIALYSIS
gcs 15 pt consented for dialysis and blood transfusion today. As per US Renal  Hep B negative surface antigen as of 2/2/2022. Right tunneled perm cath dressing changed site wnl w/o s/g infection. Dialyzed for 3. 5h with 2.1 L fluid removal (compensated for 2 units PRBc's ).  Pt via her wheelchair to car and greeted by family at front entrance of hospital.

## 2022-02-09 ENCOUNTER — TELEPHONE (OUTPATIENT)
Dept: INTERNAL MEDICINE CLINIC | Age: 72
End: 2022-02-09

## 2022-02-09 LAB
ABO + RH BLD: NORMAL
BLD PROD TYP BPU: NORMAL
BLD PROD TYP BPU: NORMAL
BLOOD GROUP ANTIBODIES SERPL: NEGATIVE
BPU ID: NORMAL
BPU ID: NORMAL
CROSSMATCH RESULT,%XM: NORMAL
CROSSMATCH RESULT,%XM: NORMAL
SPECIMEN EXP DATE BLD: NORMAL
STATUS OF UNIT,%ST: NORMAL
STATUS OF UNIT,%ST: NORMAL
TRANSFUSION STATUS PATIENT QL: NORMAL
TRANSFUSION STATUS PATIENT QL: NORMAL
UNIT DIVISION, %UDIV: 0
UNIT DIVISION, %UDIV: 0

## 2022-02-09 NOTE — TELEPHONE ENCOUNTER
----- Message from Tomás Garcia sent at 2/9/2022  3:14 PM EST -----  Subject: Appointment Request    Reason for Call: Routine Hospital Follow Up    QUESTIONS  Type of Appointment? Established Patient  Reason for appointment request? No appointments available during search  Additional Information for Provider? Patient Request Call back. Hospital   follow-up. Screen [place]   ---------------------------------------------------------------------------  --------------   [employer] MUKUL  What is the best way for the office to contact you? OK to leave message on   voicemail  Preferred Call Back Phone Number? [phone]  ---------------------------------------------------------------------------  --------------  SCRIPT ANSWERS  Relationship to Patient? Other  Representative Name? Taryn hogue  Additional information verified (besides Name and Date of Birth)? Address  (Patient requests to see provider urgently. )? No  (Has the patient been discharged from the hospital within 2 business days   AND does not have a Telephone Encounter  Follow Up From [address] [place] [address]   documented in [address]?)? No  Do you have any questions for your primary care provider that need to be   answered prior to your appointment? (Use RN Triage if question pertains to   anything on the red flag list)? No  (Patient needs follow up visit after hospital discharge) Book first   available appointment within 7 days OF DISCHARGE, if no appt, proceed to   book the next available time slot within 14 days OF DISCHARGE AND Send   Message to Provider. [facility] Follow Up appointment cannot be booked   beyond 14 Days and should result in a Message to Provider. ? Yes   Have you been diagnosed with, awaiting test results for, or told that you   are suspected of having COVID-19 (Coronavirus)? (If patient has tested   negative or was tested as a requirement for work, school, or travel and   not based on symptoms, answer no)?  No  Within the past two weeks have you developed any of the following symptoms   (answer no if symptoms have been present longer than 2 weeks or began   more than 2 weeks ago)? Fever or Chills, Cough, Shortness of breath or   difficulty breathing, Loss of taste or smell, Sore throat, Nasal   congestion, Sneezing or runny nose, Fatigue or generalized body aches   (answer no if pain is specific to a body part e.g. back pain), Diarrhea,   Headache? No  Have you had close contact with someone with COVID-19 in the last 14 days? No  (Service Expert  click yes below to proceed with [employer] As Usual   Scheduling)?  Yes

## 2022-02-10 ENCOUNTER — VIRTUAL VISIT (OUTPATIENT)
Dept: INTERNAL MEDICINE CLINIC | Age: 72
End: 2022-02-10
Payer: MEDICARE

## 2022-02-10 DIAGNOSIS — E44.0 MODERATE PROTEIN-CALORIE MALNUTRITION (HCC): ICD-10-CM

## 2022-02-10 DIAGNOSIS — G40.909 SEIZURE DISORDER (HCC): ICD-10-CM

## 2022-02-10 DIAGNOSIS — L98.429 SKIN ULCER OF SACRUM, UNSPECIFIED ULCER STAGE (HCC): ICD-10-CM

## 2022-02-10 DIAGNOSIS — I95.1 ORTHOSTATIC HYPOTENSION: ICD-10-CM

## 2022-02-10 DIAGNOSIS — Z09 HOSPITAL DISCHARGE FOLLOW-UP: Primary | ICD-10-CM

## 2022-02-10 DIAGNOSIS — Z91.89 AT HIGH RISK FOR COMPLICATION OF IMMOBILITY: ICD-10-CM

## 2022-02-10 DIAGNOSIS — Z86.73 HISTORY OF CVA (CEREBROVASCULAR ACCIDENT): ICD-10-CM

## 2022-02-10 PROCEDURE — 1090F PRES/ABSN URINE INCON ASSESS: CPT | Performed by: NURSE PRACTITIONER

## 2022-02-10 PROCEDURE — G8419 CALC BMI OUT NRM PARAM NOF/U: HCPCS | Performed by: NURSE PRACTITIONER

## 2022-02-10 PROCEDURE — 1101F PT FALLS ASSESS-DOCD LE1/YR: CPT | Performed by: NURSE PRACTITIONER

## 2022-02-10 PROCEDURE — 1111F DSCHRG MED/CURRENT MED MERGE: CPT | Performed by: NURSE PRACTITIONER

## 2022-02-10 PROCEDURE — G8536 NO DOC ELDER MAL SCRN: HCPCS | Performed by: NURSE PRACTITIONER

## 2022-02-10 PROCEDURE — 99214 OFFICE O/P EST MOD 30 MIN: CPT | Performed by: NURSE PRACTITIONER

## 2022-02-10 PROCEDURE — G8432 DEP SCR NOT DOC, RNG: HCPCS | Performed by: NURSE PRACTITIONER

## 2022-02-10 PROCEDURE — 3017F COLORECTAL CA SCREEN DOC REV: CPT | Performed by: NURSE PRACTITIONER

## 2022-02-10 PROCEDURE — G8400 PT W/DXA NO RESULTS DOC: HCPCS | Performed by: NURSE PRACTITIONER

## 2022-02-10 PROCEDURE — G8427 DOCREV CUR MEDS BY ELIG CLIN: HCPCS | Performed by: NURSE PRACTITIONER

## 2022-02-10 RX ORDER — FEEDER CONTAINER WITH PUMP SET
237 EACH MISCELLANEOUS
Qty: 60 EACH | Refills: 5 | Status: SHIPPED | OUTPATIENT
Start: 2022-02-10 | End: 2022-05-09

## 2022-02-10 RX ORDER — MIDODRINE HYDROCHLORIDE 2.5 MG/1
2.5 TABLET ORAL 2 TIMES DAILY
Qty: 60 TABLET | Refills: 1 | Status: SHIPPED | OUTPATIENT
Start: 2022-02-10 | End: 2022-02-22

## 2022-02-10 NOTE — PROGRESS NOTES
History was provided by the other: daughter in law. Sixto Cortés is a 71 y.o. female who presents for evaluation of hospital f/u,     1. Have you been to the ER, urgent care clinic since your last visit? Hospitalized since your last visit? Yes When: 2/2/22 Where: [facility] Reason for visit: Fall     Pt has an appointment today for a hospital follow up. Also need home health ([facility]) orders for wound care. Pt seen in ER 5 times in the last month mostly due to seizure, last time was a fall resulting in a cut on her leg. She is accompanied by belia hogue her daughter in law. She reports several recent discharges from for hospital for covid, seizuress, last episode involved sepsis, endocarditis, encephalitis, staph infection. Was hospitalized from Dec for 11 days, went home 1.5 weeks, several ED visits in between. Contracted covid on dec 24. Also needed surgery, for gi bleed, vomiting blood. Was at inpatient rehab [place] had issues with getting on time anti seizure meds, she also had bed sores started I hospital and worsened while at outpatient rehab. Her daughter reports she was not being cared for, bandages were left on and sores are now size of dinner plate. Pt is unable to get out of bed, prior to hospital/rehab she is too weak she can't stand or turn herself over. Her daughter also reports side effects of trileptal, belia is trying go f/u with up with neuro, difficulty reaching neuro. Additionally, she was taken off midodrine in rehab, this med was helping keep her bp stablized. Now having dizzy spells when attempting PT/OT at home. Would like to resume this medication.       Objective:    [INSTRUCTIONS:  \"[x]\" Indicates a positive item  \"[]\" Indicates a negative item  -- DELETE ALL ITEMS NOT EXAMINED]    Constitutional: [] Appears well-developed and well-nourished [] No apparent distress      [x] Abnormal - non verbal lying in hospital bed, appears frail    Mental status: [] Alert and awake  [] Oriented to person/place/time [] Able to follow commands    [x] Abnormal - not able to follow commands    Eyes:   EOM    [x]  Normal    [] Abnormal -   Sclera  [x]  Normal    [] Abnormal -          Discharge [x]  None visible   [] Abnormal -     HENT: [x] Normocephalic, atraumatic  [] Abnormal -   [x] Mouth/Throat: Mucous membranes are moist    External Ears [x] Normal  [] Abnormal -    Neck: [x] No visualized mass [] Abnormal -     Pulmonary/Chest: [x] Respiratory effort normal   [x] No visualized signs of difficulty breathing or respiratory distress        [] Abnormal -      Musculoskeletal:   [x] Normal gait with no signs of ataxia         [x] Normal range of motion of neck        [] Abnormal -     Neurological:        [x] No Facial Asymmetry (Cranial nerve 7 motor function) (limited exam due to video visit)          [x] No gaze palsy        [] Abnormal -          Skin:        [x] No significant exanthematous lesions or discoloration noted on facial skin         [x] Abnormal - unable to visualize wounds described by daughter due to vv, ordered home health to evaluate. Psychiatric:       [x] Normal Affect [] Abnormal -        [x] No Hallucinations    Other pertinent observable physical exam findings:-    Diagnoses and all orders for this visit:    1. Hospital discharge follow-up    2. Skin ulcer of sacrum, unspecified ulcer stage ([facility]  -     REFERRAL TO HOME HEALTH    3. Seizure disorder ([facility] [address]  -     [address] [place] [address]    4. History of CVA (cerebrovascular accident)  -     [address] [place] [address]    5. At high risk for complication of immobility  -     REFERRAL TO HOME HEALTH    6. Orthostatic hypotension  -     midodrine (PROAMATINE) 2.5 mg tablet; Take 1 Tablet by mouth two (2) times a day for 30 days.  Indications: a feeling of dizziness upon standing due to a drop in blood pressure    pt needs transportation to dialysis 3x a week, requires ambulance to drive her due to immobility. She probably discussed with PCP hospice care she appears frail. We discussed care of decubitus ulcers  Patient every-hour to decrease pressure over site, use of gel mattress and other assistive aids, avoiding of alcohol and peroxide, rinsing with tap water at lukewarm at roughly 8 mmHg pressure. Patient really needs to have home health care to evaluate the severity of the wounds determine need for additional therapies, and advise us of worsening symptoms or development of pus, discharge, fever. .  Added protein shakes to ensure enough caloric intake for healing    Patient-Reported Systolic (Top): 364  Patient-Reported Diastolic (Bottom): 64  Patient-Reported Pulse: 62  Patient-Reported Weight: 170lbs      Nelly Forte, was evaluated through a synchronous (real-time) audio-video encounter. The patient (or guardian if applicable) is aware that this is a billable service. Verbal consent to proceed has been obtained within the past 12 months. The visit was conducted pursuant to the emergency declaration under the [address] [place] [address] authority and the [employer] General Act. Patient identification was verified, and a caregiver was present when appropriate. The patient was located in a state where the provider was credentialed to provide care.

## 2022-02-10 NOTE — PROGRESS NOTES
1. Have you been to the ER, urgent care clinic since your last visit? Hospitalized since your last visit? Yes When: 2/2/22 Where: [facility] Reason for visit: Fall    2. Have you seen or consulted any other health care providers outside of the [address] [place] [address] since your last visit? Include any pap smears or colon screening. No      Pt has an appointment today for a hospital follow up. Also need home health ([facility]) orders for wound care. Pt seen in ER 5 times in the last month mostly due to seizure, last time was a fall resulting in a cut on her leg.     Chief Complaint   Patient presents with   [facility] Follow Up    Seizure    Chronic Kidney Disease

## 2022-02-10 NOTE — PATIENT INSTRUCTIONS
Pressure Injuries: Care Instructions  Your Care Instructions     A pressure injury on the skin is caused by constant pressure to that area. These injuriesalso called decubitus ulcers or bedsoresmay happen when you lie in bed or sit in a wheelchair for a long time. The constant pressure blocks the blood supply to the skin. This causes skin cells to die and creates a sore. Pressure injuries usually occur over bony areas, such as the hips, lower back, elbows, heels, and shoulders. They also can occur in places where the skin folds over on itself. You may have mild redness or open sores that are harder to heal.  Good care at home can help heal pressure injuries. You or your caregiver needs to check your skin every day for sores. You need good nutrition and plenty of fluids to keep your skin healthy and prevent new pressure injuries. Follow-up care is a key part of your treatment and safety. Be sure to make and go to all appointments, and call your doctor if you are having problems. It's also a good idea to know your test results and keep a list of the medicines you take. How can you care for yourself at home? · If your doctor prescribed a medicated ointment or cream, use it exactly as prescribed. Call your doctor if you think you are having a problem with your medicine. · Wash pressure injuries every day, or as often as your doctor recommends. Most tap water is safe, but follow the advice of your doctor or nurse. He or she may recommend that you use a saline solution. This is a salt and water solution that you can buy over the counter. · Put on bandages as your doctor or wound care specialist says. · Keep healthy tissue around the sore clean and dry. · Check your skin every day for sores (or have a caregiver do it). · If you know what is causing the pressure that caused the sore, find a way to remove that pressure.   To prevent pressure injuries  · Change your position or have your caregiver help you change your position often. You may need to do this every 2 hours if you are in bed or every 15 minutes if you are in a wheelchair. This lowers the chance of making sores worse and getting new sores. · Use special mattresses or other support. These may include low-pressure mattresses or cushions made of foam that can be filled with air, water, beads, or fiber. · Eat healthy foods with plenty of protein to help heal damaged skin and to help new skin grow. · Try to stay at a healthy weight. Being overweight can lead to more pressure on your skin. · Do not slide across sheets or slump in a chair or bed. · Do not smoke. Smoking dries the skin and reduces its blood supply. If you need help quitting, talk to your doctor about stop-smoking programs and medicines. These can increase your chances of quitting for good. When should you call for help? Call your doctor now or seek immediate medical care if:    · You have signs of infection, such as:  ? Increased pain, swelling, warmth, or redness. ? Red streaks leading from the sore. ? Pus draining from the sore. ? A fever. Watch closely for changes in your health, and be sure to contact your doctor if:    · Your pressure injuries are not healing.     · You have new pressure injuries.     · You need help changing positions in bed or in a chair.     · Your caregiver needs help to move you. Where can you learn more? Go to http://geoff-ashkan.info/  Enter F114 in the search box to learn more about \"Pressure Injuries: Care Instructions. \"  Current as of: July 6, 2021               Content Version: 13.0  © 2006-2021 [employer]. Care instructions adapted under license by [employer] (which disclaims liability or warranty for this information).  If you have questions about a medical condition or this instruction, always ask your healthcare professional. [address] any warranty or liability for your use of this information.

## 2022-02-11 ENCOUNTER — HOSPITAL ENCOUNTER (INPATIENT)
Age: 72
LOS: 10 days | Discharge: HOME | DRG: 377 | End: 2022-02-21
Attending: STUDENT IN AN ORGANIZED HEALTH CARE EDUCATION/TRAINING PROGRAM | Admitting: HOSPITALIST
Payer: MEDICARE

## 2022-02-11 ENCOUNTER — APPOINTMENT (OUTPATIENT)
Dept: GENERAL RADIOLOGY | Age: 72
DRG: 377 | End: 2022-02-11
Attending: STUDENT IN AN ORGANIZED HEALTH CARE EDUCATION/TRAINING PROGRAM
Payer: MEDICARE

## 2022-02-11 ENCOUNTER — APPOINTMENT (OUTPATIENT)
Dept: CT IMAGING | Age: 72
DRG: 377 | End: 2022-02-11
Attending: HOSPITALIST
Payer: MEDICARE

## 2022-02-11 DIAGNOSIS — G93.40 ACUTE ENCEPHALOPATHY: ICD-10-CM

## 2022-02-11 DIAGNOSIS — R09.89 SUSPECTED DEEP VEIN THROMBOSIS (DVT): ICD-10-CM

## 2022-02-11 DIAGNOSIS — I95.9 HYPOTENSION, UNSPECIFIED HYPOTENSION TYPE: ICD-10-CM

## 2022-02-11 DIAGNOSIS — K92.2 GASTROINTESTINAL HEMORRHAGE, UNSPECIFIED GASTROINTESTINAL HEMORRHAGE TYPE: Primary | ICD-10-CM

## 2022-02-11 PROBLEM — A41.9 SEPTIC SHOCK (HCC): Status: ACTIVE | Noted: 2022-02-11

## 2022-02-11 PROBLEM — R65.21 SEPTIC SHOCK (HCC): Status: ACTIVE | Noted: 2022-02-11

## 2022-02-11 LAB
ALBUMIN SERPL-MCNC: 1.3 G/DL (ref 3.5–5)
ALBUMIN/GLOB SERPL: 0.5 {RATIO} (ref 1.1–2.2)
ALP SERPL-CCNC: 97 U/L (ref 45–117)
ALT SERPL-CCNC: 33 U/L (ref 12–78)
ANION GAP SERPL CALC-SCNC: 10 MMOL/L (ref 5–15)
ANION GAP SERPL CALC-SCNC: 13 MMOL/L (ref 5–15)
AST SERPL W P-5'-P-CCNC: 43 U/L (ref 15–37)
ATRIAL RATE: 111 BPM
BILIRUB SERPL-MCNC: 0.4 MG/DL (ref 0.2–1)
BNP SERPL-MCNC: 5856 PG/ML
BUN SERPL-MCNC: 75 MG/DL (ref 6–20)
BUN SERPL-MCNC: 78 MG/DL (ref 6–20)
BUN/CREAT SERPL: 20 (ref 12–20)
BUN/CREAT SERPL: 21 (ref 12–20)
CA-I BLD-MCNC: 6.6 MG/DL (ref 8.5–10.1)
CA-I BLD-MCNC: 6.7 MG/DL (ref 8.5–10.1)
CALCULATED R AXIS, ECG10: -90 DEGREES
CALCULATED T AXIS, ECG11: 49 DEGREES
CHLORIDE SERPL-SCNC: 109 MMOL/L (ref 97–108)
CHLORIDE SERPL-SCNC: 109 MMOL/L (ref 97–108)
CO2 SERPL-SCNC: 16 MMOL/L (ref 21–32)
CO2 SERPL-SCNC: 22 MMOL/L (ref 21–32)
COLLECT DATE STL: ABNORMAL
CREAT SERPL-MCNC: 3.72 MG/DL (ref 0.55–1.02)
CREAT SERPL-MCNC: 3.84 MG/DL (ref 0.55–1.02)
DIAGNOSIS, 93000: NORMAL
ERYTHROCYTE [DISTWIDTH] IN BLOOD BY AUTOMATED COUNT: 15.8 % (ref 11.5–14.5)
GLOBULIN SER CALC-MCNC: 2.5 G/DL (ref 2–4)
GLUCOSE BLD STRIP.AUTO-MCNC: 150 MG/DL (ref 65–117)
GLUCOSE BLD STRIP.AUTO-MCNC: 64 MG/DL (ref 65–117)
GLUCOSE SERPL-MCNC: 56 MG/DL (ref 65–100)
GLUCOSE SERPL-MCNC: 92 MG/DL (ref 65–100)
HCT VFR BLD AUTO: 26 % (ref 35–47)
HCT VFR BLD AUTO: 29.4 % (ref 35–47)
HEMOCCULT SP1 STL QL: POSITIVE
HGB BLD-MCNC: 7.6 G/DL (ref 11.5–16)
HGB BLD-MCNC: 9 G/DL (ref 11.5–16)
INR PPP: 1.3 (ref 0.9–1.1)
MCH RBC QN AUTO: 31.4 PG (ref 26–34)
MCHC RBC AUTO-ENTMCNC: 29.2 G/DL (ref 30–36.5)
MCV RBC AUTO: 107.4 FL (ref 80–99)
NRBC # BLD: 0 K/UL (ref 0–0.01)
NRBC BLD-RTO: 0 PER 100 WBC
PERFORMED BY, TECHID: ABNORMAL
PERFORMED BY, TECHID: ABNORMAL
PLATELET # BLD AUTO: 100 K/UL (ref 150–400)
PMV BLD AUTO: 10.6 FL (ref 8.9–12.9)
POTASSIUM SERPL-SCNC: 5 MMOL/L (ref 3.5–5.1)
POTASSIUM SERPL-SCNC: 6.5 MMOL/L (ref 3.5–5.1)
PROT SERPL-MCNC: 3.8 G/DL (ref 6.4–8.2)
PROTHROMBIN TIME: 16.2 SEC (ref 11.9–14.6)
Q-T INTERVAL, ECG07: 186 MS
QRS DURATION, ECG06: 36 MS
QTC CALCULATION (BEZET), ECG08: 357 MS
RBC # BLD AUTO: 2.42 M/UL (ref 3.8–5.2)
SODIUM SERPL-SCNC: 138 MMOL/L (ref 136–145)
SODIUM SERPL-SCNC: 141 MMOL/L (ref 136–145)
TROPONIN-HIGH SENSITIVITY: 25 NG/L (ref 0–51)
TROPONIN-HIGH SENSITIVITY: 576 NG/L (ref 0–51)
TROPONIN-HIGH SENSITIVITY: 62 NG/L (ref 0–51)
VENTRICULAR RATE, ECG03: 222 BPM
WBC # BLD AUTO: 5.7 K/UL (ref 3.6–11)

## 2022-02-11 PROCEDURE — 74011636637 HC RX REV CODE- 636/637: Performed by: STUDENT IN AN ORGANIZED HEALTH CARE EDUCATION/TRAINING PROGRAM

## 2022-02-11 PROCEDURE — 85018 HEMOGLOBIN: CPT

## 2022-02-11 PROCEDURE — 70450 CT HEAD/BRAIN W/O DYE: CPT

## 2022-02-11 PROCEDURE — 80053 COMPREHEN METABOLIC PANEL: CPT

## 2022-02-11 PROCEDURE — 74011250636 HC RX REV CODE- 250/636: Performed by: STUDENT IN AN ORGANIZED HEALTH CARE EDUCATION/TRAINING PROGRAM

## 2022-02-11 PROCEDURE — 36430 TRANSFUSION BLD/BLD COMPNT: CPT

## 2022-02-11 PROCEDURE — 86900 BLOOD TYPING SEROLOGIC ABO: CPT

## 2022-02-11 PROCEDURE — 96375 TX/PRO/DX INJ NEW DRUG ADDON: CPT

## 2022-02-11 PROCEDURE — 74011000250 HC RX REV CODE- 250: Performed by: HOSPITALIST

## 2022-02-11 PROCEDURE — 5A12012 PERFORMANCE OF CARDIAC OUTPUT, SINGLE, MANUAL: ICD-10-PCS | Performed by: HOSPITALIST

## 2022-02-11 PROCEDURE — 85610 PROTHROMBIN TIME: CPT

## 2022-02-11 PROCEDURE — 5A1D70Z PERFORMANCE OF URINARY FILTRATION, INTERMITTENT, LESS THAN 6 HOURS PER DAY: ICD-10-PCS | Performed by: INTERNAL MEDICINE

## 2022-02-11 PROCEDURE — 83880 ASSAY OF NATRIURETIC PEPTIDE: CPT

## 2022-02-11 PROCEDURE — C9113 INJ PANTOPRAZOLE SODIUM, VIA: HCPCS | Performed by: HOSPITALIST

## 2022-02-11 PROCEDURE — 75810000455 HC PLCMT CENT VENOUS CATH LVL 2 5182

## 2022-02-11 PROCEDURE — 74011250636 HC RX REV CODE- 250/636: Performed by: HOSPITALIST

## 2022-02-11 PROCEDURE — 84484 ASSAY OF TROPONIN QUANT: CPT

## 2022-02-11 PROCEDURE — 80048 BASIC METABOLIC PNL TOTAL CA: CPT

## 2022-02-11 PROCEDURE — 99285 EMERGENCY DEPT VISIT HI MDM: CPT

## 2022-02-11 PROCEDURE — 74011000250 HC RX REV CODE- 250: Performed by: STUDENT IN AN ORGANIZED HEALTH CARE EDUCATION/TRAINING PROGRAM

## 2022-02-11 PROCEDURE — P9016 RBC LEUKOCYTES REDUCED: HCPCS

## 2022-02-11 PROCEDURE — 30233N1 TRANSFUSION OF NONAUTOLOGOUS RED BLOOD CELLS INTO PERIPHERAL VEIN, PERCUTANEOUS APPROACH: ICD-10-PCS | Performed by: HOSPITALIST

## 2022-02-11 PROCEDURE — 93005 ELECTROCARDIOGRAM TRACING: CPT

## 2022-02-11 PROCEDURE — 02HV33Z INSERTION OF INFUSION DEVICE INTO SUPERIOR VENA CAVA, PERCUTANEOUS APPROACH: ICD-10-PCS | Performed by: STUDENT IN AN ORGANIZED HEALTH CARE EDUCATION/TRAINING PROGRAM

## 2022-02-11 PROCEDURE — 82962 GLUCOSE BLOOD TEST: CPT

## 2022-02-11 PROCEDURE — 36415 COLL VENOUS BLD VENIPUNCTURE: CPT

## 2022-02-11 PROCEDURE — 65270000029 HC RM PRIVATE

## 2022-02-11 PROCEDURE — 85027 COMPLETE CBC AUTOMATED: CPT

## 2022-02-11 PROCEDURE — 74011250637 HC RX REV CODE- 250/637: Performed by: HOSPITALIST

## 2022-02-11 PROCEDURE — 82272 OCCULT BLD FECES 1-3 TESTS: CPT

## 2022-02-11 PROCEDURE — 86920 COMPATIBILITY TEST SPIN: CPT

## 2022-02-11 PROCEDURE — 96374 THER/PROPH/DIAG INJ IV PUSH: CPT

## 2022-02-11 PROCEDURE — 71045 X-RAY EXAM CHEST 1 VIEW: CPT

## 2022-02-11 PROCEDURE — 74176 CT ABD & PELVIS W/O CONTRAST: CPT

## 2022-02-11 RX ORDER — NOREPINEPHRINE BIT/0.9 % NACL 8 MG/250ML
.5-16 INFUSION BOTTLE (ML) INTRAVENOUS
Status: DISCONTINUED | OUTPATIENT
Start: 2022-02-11 | End: 2022-02-20

## 2022-02-11 RX ORDER — OXCARBAZEPINE 150 MG/1
150 TABLET, FILM COATED ORAL EVERY MORNING
Status: DISCONTINUED | OUTPATIENT
Start: 2022-02-12 | End: 2022-02-22 | Stop reason: HOSPADM

## 2022-02-11 RX ORDER — CALCIUM GLUCONATE 94 MG/ML
1 INJECTION, SOLUTION INTRAVENOUS
Status: COMPLETED | OUTPATIENT
Start: 2022-02-11 | End: 2022-02-11

## 2022-02-11 RX ORDER — DEXTROSE MONOHYDRATE 100 MG/ML
125-250 INJECTION, SOLUTION INTRAVENOUS ONCE
Status: COMPLETED | OUTPATIENT
Start: 2022-02-11 | End: 2022-02-11

## 2022-02-11 RX ORDER — ACETAMINOPHEN 325 MG/1
650 TABLET ORAL
Status: DISCONTINUED | OUTPATIENT
Start: 2022-02-11 | End: 2022-02-22 | Stop reason: HOSPADM

## 2022-02-11 RX ORDER — DEXTROSE MONOHYDRATE 100 MG/ML
125-250 INJECTION, SOLUTION INTRAVENOUS AS NEEDED
Status: DISCONTINUED | OUTPATIENT
Start: 2022-02-11 | End: 2022-02-11

## 2022-02-11 RX ORDER — SODIUM CHLORIDE 0.9 % (FLUSH) 0.9 %
5-40 SYRINGE (ML) INJECTION EVERY 8 HOURS
Status: DISCONTINUED | OUTPATIENT
Start: 2022-02-11 | End: 2022-02-22 | Stop reason: HOSPADM

## 2022-02-11 RX ORDER — LEVOTHYROXINE SODIUM 75 UG/1
150 TABLET ORAL DAILY
Status: DISCONTINUED | OUTPATIENT
Start: 2022-02-12 | End: 2022-02-22 | Stop reason: HOSPADM

## 2022-02-11 RX ORDER — SEVELAMER CARBONATE 800 MG/1
800 TABLET, FILM COATED ORAL
Status: DISCONTINUED | OUTPATIENT
Start: 2022-02-11 | End: 2022-02-22 | Stop reason: HOSPADM

## 2022-02-11 RX ORDER — SODIUM CHLORIDE 9 MG/ML
250 INJECTION, SOLUTION INTRAVENOUS AS NEEDED
Status: DISCONTINUED | OUTPATIENT
Start: 2022-02-11 | End: 2022-02-22 | Stop reason: HOSPADM

## 2022-02-11 RX ORDER — DEXTROSE 50 % IN WATER (D50W) INTRAVENOUS SYRINGE
25 ONCE
Status: DISCONTINUED | OUTPATIENT
Start: 2022-02-11 | End: 2022-02-11

## 2022-02-11 RX ORDER — LEVETIRACETAM 500 MG/1
1000 TABLET, EXTENDED RELEASE ORAL 2 TIMES DAILY
Status: DISCONTINUED | OUTPATIENT
Start: 2022-02-11 | End: 2022-02-12

## 2022-02-11 RX ORDER — LACOSAMIDE 100 MG/1
200 TABLET ORAL 2 TIMES DAILY
Status: DISCONTINUED | OUTPATIENT
Start: 2022-02-11 | End: 2022-02-22 | Stop reason: HOSPADM

## 2022-02-11 RX ORDER — CALCITRIOL 0.25 UG/1
0.5 CAPSULE ORAL DAILY
Status: DISCONTINUED | OUTPATIENT
Start: 2022-02-12 | End: 2022-02-22 | Stop reason: HOSPADM

## 2022-02-11 RX ORDER — ATORVASTATIN CALCIUM 40 MG/1
40 TABLET, FILM COATED ORAL DAILY
Status: DISCONTINUED | OUTPATIENT
Start: 2022-02-12 | End: 2022-02-22 | Stop reason: HOSPADM

## 2022-02-11 RX ORDER — ACETAMINOPHEN 650 MG/1
650 SUPPOSITORY RECTAL
Status: DISCONTINUED | OUTPATIENT
Start: 2022-02-11 | End: 2022-02-22 | Stop reason: HOSPADM

## 2022-02-11 RX ORDER — POLYETHYLENE GLYCOL 3350 17 G/17G
17 POWDER, FOR SOLUTION ORAL DAILY PRN
Status: DISCONTINUED | OUTPATIENT
Start: 2022-02-11 | End: 2022-02-22 | Stop reason: HOSPADM

## 2022-02-11 RX ORDER — ONDANSETRON 4 MG/1
4 TABLET, ORALLY DISINTEGRATING ORAL
Status: DISCONTINUED | OUTPATIENT
Start: 2022-02-11 | End: 2022-02-22 | Stop reason: HOSPADM

## 2022-02-11 RX ORDER — FLUOXETINE 10 MG/1
10 CAPSULE ORAL DAILY
Status: DISCONTINUED | OUTPATIENT
Start: 2022-02-12 | End: 2022-02-22 | Stop reason: HOSPADM

## 2022-02-11 RX ORDER — MIDODRINE HYDROCHLORIDE 5 MG/1
2.5 TABLET ORAL 2 TIMES DAILY
Status: DISCONTINUED | OUTPATIENT
Start: 2022-02-11 | End: 2022-02-15

## 2022-02-11 RX ORDER — NOREPINEPHRINE BITARTRATE/D5W 8 MG/250ML
.5-16 PLASTIC BAG, INJECTION (ML) INTRAVENOUS
Status: DISCONTINUED | OUTPATIENT
Start: 2022-02-11 | End: 2022-02-11

## 2022-02-11 RX ORDER — OXCARBAZEPINE 150 MG/1
300 TABLET, FILM COATED ORAL
Status: DISCONTINUED | OUTPATIENT
Start: 2022-02-11 | End: 2022-02-22 | Stop reason: HOSPADM

## 2022-02-11 RX ORDER — PANTOPRAZOLE SODIUM 40 MG/1
40 TABLET, DELAYED RELEASE ORAL
Status: DISCONTINUED | OUTPATIENT
Start: 2022-02-12 | End: 2022-02-11

## 2022-02-11 RX ORDER — SODIUM BICARBONATE 1 MEQ/ML
50 SYRINGE (ML) INTRAVENOUS ONCE
Status: COMPLETED | OUTPATIENT
Start: 2022-02-11 | End: 2022-02-11

## 2022-02-11 RX ORDER — ONDANSETRON 2 MG/ML
4 INJECTION INTRAMUSCULAR; INTRAVENOUS
Status: DISCONTINUED | OUTPATIENT
Start: 2022-02-11 | End: 2022-02-22 | Stop reason: HOSPADM

## 2022-02-11 RX ORDER — SODIUM CHLORIDE 0.9 % (FLUSH) 0.9 %
5-40 SYRINGE (ML) INJECTION AS NEEDED
Status: DISCONTINUED | OUTPATIENT
Start: 2022-02-11 | End: 2022-02-22 | Stop reason: HOSPADM

## 2022-02-11 RX ADMIN — SODIUM CHLORIDE, PRESERVATIVE FREE 40 MG: 5 INJECTION INTRAVENOUS at 13:56

## 2022-02-11 RX ADMIN — SODIUM BICARBONATE 50 MEQ: 84 INJECTION INTRAVENOUS at 13:38

## 2022-02-11 RX ADMIN — CALCIUM GLUCONATE 1 G: 98 INJECTION, SOLUTION INTRAVENOUS at 13:38

## 2022-02-11 RX ADMIN — Medication 4 MCG/MIN: at 18:20

## 2022-02-11 RX ADMIN — SODIUM CHLORIDE, PRESERVATIVE FREE 10 ML: 5 INJECTION INTRAVENOUS at 14:00

## 2022-02-11 RX ADMIN — SODIUM CHLORIDE, PRESERVATIVE FREE 10 ML: 5 INJECTION INTRAVENOUS at 22:13

## 2022-02-11 RX ADMIN — Medication 4 MCG/MIN: at 14:09

## 2022-02-11 RX ADMIN — MIDODRINE HYDROCHLORIDE 2.5 MG: 5 TABLET ORAL at 22:01

## 2022-02-11 RX ADMIN — DEXTROSE MONOHYDRATE 125 ML: 10 INJECTION, SOLUTION INTRAVENOUS at 13:48

## 2022-02-11 RX ADMIN — LACOSAMIDE 200 MG: 100 TABLET, FILM COATED ORAL at 22:02

## 2022-02-11 RX ADMIN — INSULIN HUMAN 10 UNITS: 100 INJECTION, SOLUTION PARENTERAL at 13:38

## 2022-02-11 RX ADMIN — SODIUM CHLORIDE, PRESERVATIVE FREE 40 MG: 5 INJECTION INTRAVENOUS at 22:13

## 2022-02-11 NOTE — ED NOTES
Levo to be initiated while waiting on blood to bed ready. Patient remains alert. Hypotensive. Paced rhythm showing on monitor.

## 2022-02-11 NOTE — ED NOTES
Bed coordinator called to asked if there was any icu bed so patient could received dialysis. None at this time. Dialysis is talking to nephrologist at this time about next step.

## 2022-02-11 NOTE — CONSULTS
Received consult for ESRD and hyperkalemia   She received calcium gluconate , insulin D50 and kayexalte for K of 6.5     Placed HD orders for 3 hours and informed Dialysis Nurse . Discussed with Primary Attending . Full consult to follow . Addendum     Spoke to Dialysis Nurse Booker Mccain and Nursing Supervisor Ms. Mirella Taveras , due to Staff shortage patient cannot get HD in this hospital today for Hyperkalemia . Suggest repeat RFP in 2 hours as she got treated medically , if repeat K is > 6 she needs to be transferred to an outside Hospital where after hours HD services are available for emergent HD . Reviewed EKG - V paced rhythm .

## 2022-02-11 NOTE — H&P
History and Physical    Subjective:   Chief Complaint : blood in stools since 7 AM  Source of information : patient     History of present illness:     72F, H/o seizures with todds paralysis, CVA with right sided weakness, SSS with PPM not on AC, ESRD on HD with blood in stools since AM    She graduated from nursing home and went to home yesterday, she was admitted on 1/21 with MSSA bacteremia with seizures and was discharged to snf with IV abx,    She was at home, when in AM, family noticed black tarry stools and bright blood per rectum, and she became drowsy and EMS was called. Upon arrival, she was unresponsive, CPR was started, but pulse was revived instantaneously. ER: BP systolic 50, hyper kalemia and increased bicarb, hb 7.6. She was resuscitated with 1 PRBC, and IVF. And then levophed was started.  Her o2 requirements increased to venti mask          Past Medical History:   Diagnosis Date    Anxiety disorder     Arthritis     Atherosclerosis of native arteries of the extremities with ulceration ([address] 8/31/2020    Atrial fibrillation ([address]     Cardiac pacemaker in situ     Cellulitis and abscess of trunk 8/31/2020    Depression, postpartum     Diabetes ([address]     Heart disease     Heartburn     Hx of long term use of blood thinners     Hypercholesterolemia     Hypertension     Hypothyroidism     Migraines     Peripheral venous insufficiency 8/31/2020    Seizures ([address]     Sleep disorder     Stroke ([address]     2019    Varicose veins of lower extremity with inflammation 8/31/2020     Past Surgical History:   Procedure Laterality Date    HX BACK SURGERY      HX OTHER SURGICAL      leg surgery     HX OTHER SURGICAL      pacemaker monitor     IR INSERT TUNL CVC W/O PORT OVER 5 YR  12/10/2021     Family History   Problem Relation Age of Onset    Heart Disease Mother     Heart Disease Father     Diabetes Sister     Diabetes Brother       Social History     Tobacco Use    Smoking status: Never Smoker    Smokeless tobacco: Never Used   Substance Use Topics    Alcohol use: Never       Prior to Admission medications    Medication Sig Start Date End Date Taking? Authorizing Provider   midodrine (PROAMATINE) 2.5 mg tablet Take 1 Tablet by mouth two (2) times a day for 30 days. Indications: a feeling of dizziness upon standing due to a drop in blood pressure 2/10/22 3/12/22  Tomasa Hernandez NP   food supplemt, lactose-reduced (Ensure Active High Protein) liqd Take 237 mL by mouth four (4) times daily as needed for PRN Reason (Other) (as pt can tolerate). 2/10/22   Tomasa Hernandez NP   OXcarbazepine (TRILEPTAL) 150 mg tablet Take 1 Tablet by mouth Every morning. 1/29/22   Mary Lou Denson MD   OXcarbazepine (TrileptaL) 300 mg tablet Take 1 Tablet by mouth nightly. 1/29/22   Mary Lou Denson MD   pantoprazole (PROTONIX) 40 mg tablet Take 1 Tablet by mouth Daily (before breakfast). 1/18/22   Rowena Valente NP   calcitRIOL (ROCALTROL) 0.5 mcg capsule Take 1 Capsule by mouth daily. 12/12/21   Cheli Taveras MD   zinc oxide-white petrolatum 17-57 % topical paste Apply  to affected area as needed for Skin Irritation. Indications: skin irritation 12/11/21   Cheli Taveras MD   Vimpat 200 mg tab tablet Take 1 Tablet by mouth two (2) times a day. 11/19/21   Provider, Historical   ondansetron (Zofran ODT) 4 mg disintegrating tablet 1 Tablet by SubLINGual route every eight (8) hours as needed for Nausea or Vomiting. 12/1/21   Arslan Gonzalez MD   atorvastatin (LIPITOR) 40 mg tablet Take 1 tablet by mouth once daily for 90 days 11/22/21 8/19/22  Nely Pearson MD   FLUoxetine (PROzac) 10 mg capsule Take 1 Capsule by mouth daily for 270 days.  11/5/21 8/2/22  Nely Pearson MD   loperamide (IMODIUM) 2 mg capsule TAKE 1 CAPSULE BY MOUTH SIX TIMES DAILY AS NEEDED FOR 30 DAYS 10/17/21   Nely Pearson MD   Euthyrox 150 mcg tablet Take 1 tablet by mouth once daily 10/13/21   Yi Glynn MD   levETIRAcetam (KEPPRA XR) 500 mg ER tablet TAKE 2 TABLETS BY MOUTH TWICE DAILY FOR 90 DAYS 6/1/21   Provider, Historical   sevelamer carbonate (RENVELA) 800 mg tab tab  5/10/21   Provider, Historical     Allergies   Allergen Reactions    Lactose Diarrhea             Review of Systems:  Cannot be ascertained due to mental status    Vitals:     Visit Vitals  BP (!) 85/52   Pulse (!) 106   Temp 97.6 °F (36.4 °C)   Resp 20   Ht 5' 6\" (1.676 m)   Wt 49 kg (108 lb)   SpO2 96%   BMI 17.43 kg/m²       Physical Epale, but answering questions , wearing a venti mask  HEENT : PERRLA, normal oral mucosa, atraumatic normocephalic, Normal ear and nose. oropharynx  Neck : Supple, no JVD, no masses noted, no carotid bruit  Lungs :decreased breath sounds bilaterally  CVS : tachycardic  Abdomen : soft, non tender  Extremities : pale  Musculoskeletal : cannot be assessed  Skin : Moist, warm, skin turgor, no pathological rash  Lymphatic : No cervical lymphadenopathy.   Neurological : cannot be assessed  Psychiatric : cannot be assesed        Data Review:   Recent Results (from the past 24 hour(s))   TYPE & SCREEN    Collection Time: 02/11/22 12:36 PM   Result Value Ref Range    Crossmatch Expiration 02/14/2022,2359     ABO/Rh(D) B Positive     Antibody screen Negative     Unit number [id number]     Blood component type RC LR     Unit division 00     Status of unit Issued     UNIT TAG COMMENT Emergency Release     TRANSFUSION STATUS Ok to transfuse     Crossmatch result Emergency Release    CBC W/O DIFF    Collection Time: 02/11/22 12:56 PM   Result Value Ref Range    WBC 5.7 3.6 - 11.0 K/uL    RBC 2.42 (L) 3.80 - 5.20 M/uL    HGB 7.6 (L) 11.5 - 16.0 g/dL    HCT 26.0 (L) 35.0 - 47.0 %    .4 (H) 80.0 - 99.0 FL    MCH 31.4 26.0 - 34.0 PG    MCHC 29.2 (L) 30.0 - 36.5 g/dL    RDW 15.8 (H) 11.5 - 14.5 %    PLATELET 945 (L) 569 - 400 K/uL    MPV 10.6 8.9 - 12.9 FL    NRBC 0.0 0.0  WBC    ABSOLUTE NRBC 0.00 0.00 - 2.79 K/uL   METABOLIC PANEL, COMPREHENSIVE    Collection Time: 02/11/22 12:56 PM   Result Value Ref Range    Sodium 138 136 - 145 mmol/L    Potassium 6.5 (H) 3.5 - 5.1 mmol/L    Chloride 109 (H) 97 - 108 mmol/L    CO2 16 (L) 21 - 32 mmol/L    Anion gap 13 5 - 15 mmol/L    Glucose 92 65 - 100 mg/dL    BUN 75 (H) 6 - 20 mg/dL    Creatinine 3.84 (H) 0.55 - 1.02 mg/dL    BUN/Creatinine ratio 20 12 - 20      GFR est AA 14 (L) >60 ml/min/1.73m2    GFR est non-AA 12 (L) >60 ml/min/1.73m2    Calcium 6.7 (L) 8.5 - 10.1 mg/dL    Bilirubin, total 0.4 0.2 - 1.0 mg/dL    AST (SGOT) 43 (H) 15 - 37 U/L    ALT (SGPT) 33 12 - 78 U/L    Alk. phosphatase 97 45 - 117 U/L    Protein, total 3.8 (L) 6.4 - 8.2 g/dL    Albumin 1.3 (L) 3.5 - 5.0 g/dL    Globulin 2.5 2.0 - 4.0 g/dL    A-G Ratio 0.5 (L) 1.1 - 2.2     TROPONIN-HIGH SENSITIVITY    Collection Time: 02/11/22 12:56 PM   Result Value Ref Range    Troponin-High Sensitivity 25 0 - 51 ng/L   NT-PRO BNP    Collection Time: 02/11/22 12:56 PM   Result Value Ref Range    NT pro-BNP 5,856 (H) <125 pg/mL             Assessment and Plan :     ()1 hypovolemic shock: levophed to keep MAP > 65    (1) acute encephalopathy: GCS 13, s/t metabolic. (2) Acute hypoxic respiratory failure : ventimask, wean to keep saturation > 92    (3) ABLA with anemia: 1 PRBC, repeat Hb 9.1. HH Q12. protonix 40 BID    (4) anemia: s.t #3 and AOCI    (5) seizures with todds paralysis: continuetrileptal, fluoxetine, keppra    (6) CVA with right sided weakness: complicates all aspects of care    (7)  SSS with PPM not on AC:     (8) ESRD on HD with hyperkalemia: hyperkalemia protocol.      DVT ppx: SCDs    DISPO: PT once stable    Signed By: Willem Mitchell MD     February 11, 2022

## 2022-02-11 NOTE — ED NOTES
Upon patient arrival to ed patient into a period of unresposiveness. Sternal rub given to patient with no response. Patient had faint pulse. Vagal response when patient was moved over from ems stretcher. Hypotensive. Fluids administered. central line established. Patient blood pressure improving with fluids. Emergent release blood to be initiated. Patient gave verbal consent. Blood bank notified.

## 2022-02-11 NOTE — CONSULTS
Gastroenterology Consult     Referring Physician: Shi Mcclain MD     Consult Date: 2/11/2022     Subjective:     Chief Complaint:     History of Present Illness: Blake Ellis is a 71 y.o. female who is seen in consultation for GI bleed - dark tarry stool. Patient was admitted to the hospital with reports of dark stool since this morning. Multiple ER visits. She was transferred to SNF and was discharged home yesterday. Family noticed dark stools and and red blood per rectum so they called EMS. Upon arrival to the ED, patient became unresponsive with faint pulse. CPR started, she was resuscitated immediately. She was given IVF for hypotension, started levophed, and  placed on ventimask. PMH: H/o seizures with todds paralysis, CVA with right sided weakness, SSS with PPM not on AC, ESRD on HD. She is on Eliquis. EGD on 1/13/22 showed Acute gastric ulcer at anastomosis with hemorrhage, treated with 2 hemoclips and injected with epinephrine. -ED labs: Hgb 7.6, creatinine 3.72, elevated troponin 62, BNP 5,856. -CXR - Lungs clear; no interstitial or alveolar pulmonary edema    Patient seen in ER lethargic, arousable. Pale looking. Abdomen soft, non-tender. Unable to given any history. On Ventimask. Family at bedside. She received 1 unit PRBC. Latest Hgb 9.0. Nephrology consulted.      Past Medical History:   Diagnosis Date    Anxiety disorder     Arthritis     Atherosclerosis of native arteries of the extremities with ulceration ([address] 8/31/2020    Atrial fibrillation (HCC)     Cardiac pacemaker in situ     Cellulitis and abscess of trunk 8/31/2020    Depression, postpartum     Diabetes ([address]     Heart disease     Heartburn     Hx of long term use of blood thinners     Hypercholesterolemia     Hypertension     Hypothyroidism     Migraines     Peripheral venous insufficiency 8/31/2020    Seizures ([address]     Sleep disorder     Stroke ([address]     2019    Varicose veins of lower extremity with inflammation 8/31/2020     Past Surgical History:   Procedure Laterality Date    HX BACK SURGERY      HX OTHER SURGICAL      leg surgery     HX OTHER SURGICAL      pacemaker monitor     IR INSERT TUNL CVC W/O PORT OVER 5 YR  12/10/2021      Family History   Problem Relation Age of Onset    Heart Disease Mother     Heart Disease Father     Diabetes Sister     Diabetes Brother      Social History     Tobacco Use    Smoking status: Never Smoker    Smokeless tobacco: Never Used   Substance Use Topics    Alcohol use: Never      Allergies   Allergen Reactions    Lactose Diarrhea     Current Facility-Administered Medications   Medication Dose Route Frequency    0.9% sodium chloride infusion 250 mL  250 mL IntraVENous PRN    sodium chloride (NS) flush 5-40 mL  5-40 mL IntraVENous Q8H    sodium chloride (NS) flush 5-40 mL  5-40 mL IntraVENous PRN    acetaminophen (TYLENOL) tablet 650 mg  650 mg Oral Q6H PRN    Or    acetaminophen (TYLENOL) suppository 650 mg  650 mg Rectal Q6H PRN    polyethylene glycol (MIRALAX) packet 17 g  17 g Oral DAILY PRN    ondansetron (ZOFRAN ODT) tablet 4 mg  4 mg Oral Q8H PRN    Or    ondansetron (ZOFRAN) injection 4 mg  4 mg IntraVENous Q6H PRN    pantoprazole (PROTONIX) 40 mg in 0.9% sodium chloride 10 mL injection  40 mg IntraVENous Q12H    [START ON 2/12/2022] atorvastatin (LIPITOR) tablet 40 mg  40 mg Oral DAILY    [START ON 2/12/2022] calcitRIOL (ROCALTROL) capsule 0.5 mcg  0.5 mcg Oral DAILY    [START ON 2/12/2022] levothyroxine (SYNTHROID) tablet 150 mcg  150 mcg Oral DAILY    [START ON 2/12/2022] FLUoxetine (PROzac) capsule 10 mg  10 mg Oral DAILY    levETIRAcetam (KEPPRA XR) ER tablet 1,000 mg  1,000 mg Oral BID    midodrine (PROAMATINE) tablet 2.5 mg  2.5 mg Oral BID    [START ON 2/12/2022] OXcarbazepine (TRILEPTAL) tablet 150 mg  150 mg Oral QAM    OXcarbazepine (TRILEPTAL) tablet 300 mg  300 mg Oral QHS    lacosamide (VIMPAT) tablet 200 mg 200 mg Oral BID    sevelamer carbonate (RENVELA) tab 800 mg  800 mg Oral TID WITH MEALS    zinc oxide-white petrolatum 17-57 % topical paste   Topical PRN    NOREPINephrine (LEVOPHED) 8 mg in 0.9% NS 250ml infusion  0.5-16 mcg/min IntraVENous TITRATE     Current Outpatient Medications   Medication Sig    midodrine (PROAMATINE) 2.5 mg tablet Take 1 Tablet by mouth two (2) times a day for 30 days. Indications: a feeling of dizziness upon standing due to a drop in blood pressure    food supplemt, lactose-reduced (Ensure Active High Protein) liqd Take 237 mL by mouth four (4) times daily as needed for PRN Reason (Other) (as pt can tolerate).  OXcarbazepine (TRILEPTAL) 150 mg tablet Take 1 Tablet by mouth Every morning.  OXcarbazepine (TrileptaL) 300 mg tablet Take 1 Tablet by mouth nightly.  pantoprazole (PROTONIX) 40 mg tablet Take 1 Tablet by mouth Daily (before breakfast).  calcitRIOL (ROCALTROL) 0.5 mcg capsule Take 1 Capsule by mouth daily.  zinc oxide-white petrolatum 17-57 % topical paste Apply  to affected area as needed for Skin Irritation. Indications: skin irritation    Vimpat 200 mg tab tablet Take 1 Tablet by mouth two (2) times a day.  ondansetron (Zofran ODT) 4 mg disintegrating tablet 1 Tablet by SubLINGual route every eight (8) hours as needed for Nausea or Vomiting.  atorvastatin (LIPITOR) 40 mg tablet Take 1 tablet by mouth once daily for 90 days    FLUoxetine (PROzac) 10 mg capsule Take 1 Capsule by mouth daily for 270 days.     loperamide (IMODIUM) 2 mg capsule TAKE 1 CAPSULE BY MOUTH SIX TIMES DAILY AS NEEDED FOR 30 DAYS    Euthyrox 150 mcg tablet Take 1 tablet by mouth once daily    levETIRAcetam (KEPPRA XR) 500 mg ER tablet TAKE 2 TABLETS BY MOUTH TWICE DAILY FOR 90 DAYS    sevelamer carbonate (RENVELA) 800 mg tab tab         Review of Systems:  A detailed 10 organ review of systems is obtained with pertinent positives as listed in the History of Present Illness and Past Medical History. All others are negative. Objective:     Physical Exam:  Visit Vitals  BP (!) 149/82   Pulse 71   Temp 97.6 °F (36.4 °C)   Resp 20   Ht 5' 6\" (1.676 m)   Wt 49 kg (108 lb)   SpO2 100%   BMI 17.43 kg/m²        Skin:  Extremities and face reveal no rashes. No brizuela erythema. No telangiectasias on the chest wall. Pale-looking. HEENT: Sclerae anicteric. Extra-occular muscles are intact. No oral ulcers. No abnormal pigmentation of the lips. The neck is supple. Cardiovascular: Regular rate and rhythm. No murmurs, gallops, or rubs. PMI nondisplaced. Carotids without bruits. Respiratory:  Comfortable breathing with no accessory muscle use. Clear breath sounds with no wheezes, rales, or rhonchi. On ventimask  GI:  Abdomen nondistended, soft, and nontender. Normal active bowel sounds. No enlargement of the liver or spleen. No masses palpable. Rectal:  Deferred  Musculoskeletal:  Generalized weakness. Neurological:  Lethargic  Psychiatric:  Lethargic  Lymphatic:  No cervical or supraclavicular adenopathy.     Lab/Data Review:  Recent Results (from the past 24 hour(s))   TYPE & SCREEN    Collection Time: 02/11/22 12:36 PM   Result Value Ref Range    Crossmatch Expiration 02/14/2022,2359     ABO/Rh(D) B Positive     Antibody screen Negative     Unit number [id number]     Blood component type RC LR     Unit division 00     Status of unit Issued     UNIT TAG COMMENT Emergency Release     TRANSFUSION STATUS Ok to transfuse     Crossmatch result Compatible    EKG, 12 LEAD, INITIAL    Collection Time: 02/11/22 12:51 PM   Result Value Ref Range    Ventricular Rate 222 BPM    Atrial Rate 111 BPM    QRS Duration 36 ms    Q-T Interval 186 ms    QTC Calculation (Bezet) 357 ms    Calculated R Axis -90 degrees    Calculated T Axis 49 degrees    Diagnosis       Ventricular-paced rhythm    Confirmed by Ellen Verdin MD, Christina Olivera ([id number]) on 2/11/2022 3:28:32 PM     CBC W/O DIFF    Collection Time: 02/11/22 12:56 PM Result Value Ref Range    WBC 5.7 3.6 - 11.0 K/uL    RBC 2.42 (L) 3.80 - 5.20 M/uL    HGB 7.6 (L) 11.5 - 16.0 g/dL    HCT 26.0 (L) 35.0 - 47.0 %    .4 (H) 80.0 - 99.0 FL    MCH 31.4 26.0 - 34.0 PG    MCHC 29.2 (L) 30.0 - 36.5 g/dL    RDW 15.8 (H) 11.5 - 14.5 %    PLATELET 542 (L) 588 - 400 K/uL    MPV 10.6 8.9 - 12.9 FL    NRBC 0.0 0.0  WBC    ABSOLUTE NRBC 0.00 0.00 - 6.04 K/uL   METABOLIC PANEL, COMPREHENSIVE    Collection Time: 02/11/22 12:56 PM   Result Value Ref Range    Sodium 138 136 - 145 mmol/L    Potassium 6.5 (H) 3.5 - 5.1 mmol/L    Chloride 109 (H) 97 - 108 mmol/L    CO2 16 (L) 21 - 32 mmol/L    Anion gap 13 5 - 15 mmol/L    Glucose 92 65 - 100 mg/dL    BUN 75 (H) 6 - 20 mg/dL    Creatinine 3.84 (H) 0.55 - 1.02 mg/dL    BUN/Creatinine ratio 20 12 - 20      GFR est AA 14 (L) >60 ml/min/1.73m2    GFR est non-AA 12 (L) >60 ml/min/1.73m2    Calcium 6.7 (L) 8.5 - 10.1 mg/dL    Bilirubin, total 0.4 0.2 - 1.0 mg/dL    AST (SGOT) 43 (H) 15 - 37 U/L    ALT (SGPT) 33 12 - 78 U/L    Alk.  phosphatase 97 45 - 117 U/L    Protein, total 3.8 (L) 6.4 - 8.2 g/dL    Albumin 1.3 (L) 3.5 - 5.0 g/dL    Globulin 2.5 2.0 - 4.0 g/dL    A-G Ratio 0.5 (L) 1.1 - 2.2     TROPONIN-HIGH SENSITIVITY    Collection Time: 02/11/22 12:56 PM   Result Value Ref Range    Troponin-High Sensitivity 25 0 - 51 ng/L   NT-PRO BNP    Collection Time: 02/11/22 12:56 PM   Result Value Ref Range    NT pro-BNP 5,856 (H) <125 pg/mL   PROTHROMBIN TIME + INR    Collection Time: 02/11/22  2:30 PM   Result Value Ref Range    Prothrombin time 16.2 (H) 11.9 - 14.6 sec    INR 1.3 (H) 0.9 - 1.1     HGB & HCT    Collection Time: 02/11/22  2:30 PM   Result Value Ref Range    HGB 9.0 (L) 11.5 - 16.0 g/dL    HCT 29.4 (L) 35.0 - 47.0 %   TROPONIN-HIGH SENSITIVITY    Collection Time: 02/11/22  3:26 PM   Result Value Ref Range    Troponin-High Sensitivity 62 (HH) 0 - 51 ng/L   METABOLIC PANEL, BASIC    Collection Time: 02/11/22  3:45 PM   Result Value Ref Range    Sodium 141 136 - 145 mmol/L    Potassium 5.0 3.5 - 5.1 mmol/L    Chloride 109 (H) 97 - 108 mmol/L    CO2 22 21 - 32 mmol/L    Anion gap 10 5 - 15 mmol/L    Glucose 56 (L) 65 - 100 mg/dL    BUN 78 (H) 6 - 20 mg/dL    Creatinine 3.72 (H) 0.55 - 1.02 mg/dL    BUN/Creatinine ratio 21 (H) 12 - 20      GFR est AA 15 (L) >60 ml/min/1.73m2    GFR est non-AA 12 (L) >60 ml/min/1.73m2    Calcium 6.6 (L) 8.5 - 10.1 mg/dL        XR CHEST PORT   Final Result             Assessment/Plan:   1. GI Bleed - dark stools. -Initial hgb 7.6. 1unit PRBC transfused.     -latest Hgb 9.0. Monitor and transfuse as needed.      -Continue PPI     -No anti-coagulant     -Repeat CBC in am     -will wait for patient to be clinically stable, then consider EGD. If she continues to bleed over the weekend with low Hgb, schedule for EGD. 2. Anemia      -latest Hgb 9.0 Monitor and transfuse as needed. - ESRD  3. Hypovolemic shock      -BP initially low, started on levophed. Given blood transfusion      -Pending ICU transfer  4. Acute respiratory failure      -currently on ventimask. 5. ESRD      -hemodialysis      -creatinine 3.72      -nephrology consulted    Active Problems:    GI bleed (2/11/2022)      Septic shock (HCC) (2/11/2022)         IP CONSULT TO GASTROENTEROLOGY  IP CONSULT TO NEPHROLOGY    Patient discussed with Dr Philipp Ch and agrees to above impression and plan.   Thank you for allowing me to participate in this patients care    Rita KELLYP-C  Cc Referring Physician   Victorino Beckwith MD

## 2022-02-11 NOTE — ED NOTES
Talked with hospitalist Dr. Sarah Zuniga. If patient potassium is less than six with redraw no transfer, dialysis tomorrow. If potassium above six to transfer patient for dialysis today.

## 2022-02-11 NOTE — ED PROVIDER NOTES
EMERGENCY DEPARTMENT HISTORY AND PHYSICAL EXAM      Date: 2/11/2022  Patient Name: Hood Odell    History of Presenting Illness     Chief Complaint   Patient presents with    GI Problem       History Provided By: Patient and EMS    HPI: Hood Odell, 71 y.o. female with a past medical history significant End-stage renal disease, seizure disorder presents to the ED with cc of GI bleed. Patient was recently discharged home from nursing home, states that overnight noticed multiple episodes of black tarry stool, this morning stool had changed to bright red blood. EMS was called. On arrival patient tachycardic hypotensive. EMS administered 1 L of normal saline through patient's dialysis port, brought patient to emergency department. On arrival patient awake, alert, ill-appearing,  denies any abdominal pain nausea or vomiting. Denies any anticoagulation use. There are no other complaints, changes, or physical findings at this time.     PCP: Jared Best MD    Current Facility-Administered Medications   Medication Dose Route Frequency Provider Last Rate Last Admin    0.9% sodium chloride infusion 250 mL  250 mL IntraVENous PRN iAden Padilla MD        sodium chloride (NS) flush 5-40 mL  5-40 mL IntraVENous Q8H Cesar Woodruff MD   10 mL at 02/11/22 1400    sodium chloride (NS) flush 5-40 mL  5-40 mL IntraVENous PRN Cesar Woodruff MD        acetaminophen (TYLENOL) tablet 650 mg  650 mg Oral Q6H PRN Cesar Woodruff MD        Or   Rasta Addison acetaminophen (TYLENOL) suppository 650 mg  650 mg Rectal Q6H PRN Cesar Woodruff MD        polyethylene glycol (MIRALAX) packet 17 g  17 g Oral DAILY PRN Cesar Woodruff MD        ondansetron (ZOFRAN ODT) tablet 4 mg  4 mg Oral Q8H PRN Cesar Woodruff MD        Or    [facility] injection 4 mg  4 mg IntraVENous Q6H PRN Cesar Woodruff MD        pantoprazole (PROTONIX) 40 mg in 0.9% sodium chloride 10 mL injection  40 mg IntraVENous Q12H Shaylee Govea MD   40 mg at 02/11/22 1356    [START ON 2/12/2022] atorvastatin (LIPITOR) tablet 40 mg  40 mg Oral DAILY MD Lawanda Granados ON 2/12/2022] calcitRIOL (ROCALTROL) capsule 0.5 mcg  0.5 mcg Oral DAILY MD Lawanda Granados ON 2/12/2022] levothyroxine (SYNTHROID) tablet 150 mcg  150 mcg Oral DAILY MD Lawanda Granados ON 2/12/2022] FLUoxetine (PROzac) capsule 10 mg  10 mg Oral DAILY Shaylee Govea MD        levETIRAcetam (KEPPRA XR) ER tablet 1,000 mg  1,000 mg Oral BID Shayele Govea MD        midodrine (PROAMATINE) tablet 2.5 mg  2.5 mg Oral BID Shaylee Govea MD        [START ON 2/12/2022] OXcarbazepine (TRILEPTAL) tablet 150 mg  150 mg Oral QAM Shaylee Govea MD        OXcarbazepine (TRILEPTAL) tablet 300 mg  300 mg Oral QHS Shaylee Govea MD        lacosamide (VIMPAT) tablet 200 mg  200 mg Oral BID Shaylee Govea MD        sevelamer carbonate (RENVELA) tab 800 mg  800 mg Oral TID WITH MEALS Rere Duenas MD        zinc oxide-white petrolatum 17-57 % topical paste   Topical PRN Shaylee Govea MD        NOREPINephrine (LEVOPHED) 8 mg in 0.9% NS 250ml infusion  0.5-16 mcg/min IntraVENous TITRATE Shaylee Govea MD 7.5 mL/hr at 02/11/22 1820 4 mcg/min at 02/11/22 1820     Current Outpatient Medications   Medication Sig Dispense Refill    midodrine (PROAMATINE) 2.5 mg tablet Take 1 Tablet by mouth two (2) times a day for 30 days. Indications: a feeling of dizziness upon standing due to a drop in blood pressure 60 Tablet 1    food supplemt, lactose-reduced (Ensure Active High Protein) liqd Take 237 mL by mouth four (4) times daily as needed for PRN Reason (Other) (as pt can tolerate). 60 Each 5    OXcarbazepine (TRILEPTAL) 150 mg tablet Take 1 Tablet by mouth Every morning. 60 Tablet 2    OXcarbazepine (TrileptaL) 300 mg tablet Take 1 Tablet by mouth nightly.  30 Tablet 3    pantoprazole (PROTONIX) 40 mg tablet Take 1 Tablet by mouth Daily (before breakfast). 30 Tablet 0    calcitRIOL (ROCALTROL) 0.5 mcg capsule Take 1 Capsule by mouth daily. 30 Capsule 0    zinc oxide-white petrolatum 17-57 % topical paste Apply  to affected area as needed for Skin Irritation. Indications: skin irritation 113 g 0    Vimpat 200 mg tab tablet Take 1 Tablet by mouth two (2) times a day.  ondansetron (Zofran ODT) 4 mg disintegrating tablet 1 Tablet by SubLINGual route every eight (8) hours as needed for Nausea or Vomiting. 20 Tablet 0    atorvastatin (LIPITOR) 40 mg tablet Take 1 tablet by mouth once daily for 90 days 90 Tablet 3    FLUoxetine (PROzac) 10 mg capsule Take 1 Capsule by mouth daily for 270 days.  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) 8/31/2020    Atrial fibrillation (HCC)     Cardiac pacemaker in situ     Cellulitis and abscess of trunk 8/31/2020    Depression, postpartum     Diabetes ([address]     Heart disease     Heartburn     Hx of long term use of blood thinners     Hypercholesterolemia     Hypertension     Hypothyroidism     Migraines     Peripheral venous insufficiency 8/31/2020    Seizures ([address]     Sleep disorder     Stroke [address] [address]     2019    Varicose veins of lower extremity with inflammation 8/31/2020       Past Surgical History:  Past Surgical History:   Procedure Laterality Date    HX BACK SURGERY      HX OTHER SURGICAL      leg surgery     HX OTHER SURGICAL pacemaker monitor     IR INSERT TUNL CVC W/O PORT OVER 5 YR  12/10/2021       Family History:  Family History   Problem Relation Age of Onset    Heart Disease Mother     Heart Disease Father     Diabetes Sister     Diabetes Brother        Social History:  Social History     Tobacco Use    Smoking status: Never Smoker    Smokeless tobacco: Never Used   Vaping Use    Vaping Use: Never used   Substance Use Topics    Alcohol use: Never    Drug use: Never       Allergies: Allergies   Allergen Reactions    Lactose Diarrhea         Review of Systems     Review of Systems   Unable to perform ROS: Acuity of condition       Physical Exam     Physical Exam  Vitals and nursing note reviewed. Constitutional:       General: She is not in acute distress. Appearance: Normal appearance. She is obese. She is ill-appearing. HENT:      Head: Normocephalic and atraumatic. Nose: Nose normal.      Mouth/Throat:      Mouth: Mucous membranes are moist.   Eyes:      Extraocular Movements: Extraocular movements intact. Cardiovascular:      Rate and Rhythm: Regular rhythm. Tachycardia present. Pulses: Normal pulses. Heart sounds: Normal heart sounds. Pulmonary:      Effort: Pulmonary effort is normal.      Breath sounds: Normal breath sounds. Abdominal:      General: Abdomen is flat. Bowel sounds are normal. There is no distension. Palpations: Abdomen is soft. Tenderness: There is no abdominal tenderness. There is no guarding. Genitourinary:     Comments: Rectal exam shows dark blood, no active bleeding  Musculoskeletal:         General: No tenderness. Normal range of motion. Cervical back: Normal range of motion and neck supple. No muscular tenderness. Skin:     General: Skin is dry. Capillary Refill: Capillary refill takes 2 to 3 seconds. Coloration: Skin is pale. Neurological:      General: No focal deficit present. Mental Status: She is alert.       Sensory: No sensory deficit. Motor: No weakness.    Psychiatric:         Mood and Affect: Mood normal.         Diagnostic Study Results     Labs -     Recent Results (from the past 12 hour(s))   TYPE & SCREEN    Collection Time: 02/11/22 12:36 PM   Result Value Ref Range    Crossmatch Expiration 02/14/2022,2359     ABO/Rh(D) B Positive     Antibody screen Negative     Unit number [id number]     Blood component type RC LR     Unit division 00     Status of unit Issued     UNIT TAG COMMENT Emergency Release     TRANSFUSION STATUS Ok to transfuse     Crossmatch result Compatible    EKG, 12 LEAD, INITIAL    Collection Time: 02/11/22 12:51 PM   Result Value Ref Range    Ventricular Rate 222 BPM    Atrial Rate 111 BPM    QRS Duration 36 ms    Q-T Interval 186 ms    QTC Calculation (Bezet) 357 ms    Calculated R Axis -90 degrees    Calculated T Axis 49 degrees    Diagnosis       Ventricular-paced rhythm    Confirmed by Kelly Payton MD, Janak Hunt ([phone] on 2/11/2022 3:28:32 PM     CBC W/O DIFF    Collection Time: 02/11/22 12:56 PM   Result Value Ref Range    WBC 5.7 3.6 - 11.0 K/uL    RBC 2.42 (L) 3.80 - 5.20 M/uL    HGB 7.6 (L) 11.5 - 16.0 g/dL    HCT 26.0 (L) 35.0 - 47.0 %    .4 (H) 80.0 - 99.0 FL    MCH 31.4 26.0 - 34.0 PG    MCHC 29.2 (L) 30.0 - 36.5 g/dL    RDW 15.8 (H) 11.5 - 14.5 %    PLATELET 984 (L) 559 - 400 K/uL    MPV 10.6 8.9 - 12.9 FL    NRBC 0.0 0.0  WBC    ABSOLUTE NRBC 0.00 0.00 - 5.60 K/uL   METABOLIC PANEL, COMPREHENSIVE    Collection Time: 02/11/22 12:56 PM   Result Value Ref Range    Sodium 138 136 - 145 mmol/L    Potassium 6.5 (H) 3.5 - 5.1 mmol/L    Chloride 109 (H) 97 - 108 mmol/L    CO2 16 (L) 21 - 32 mmol/L    Anion gap 13 5 - 15 mmol/L    Glucose 92 65 - 100 mg/dL    BUN 75 (H) 6 - 20 mg/dL    Creatinine 3.84 (H) 0.55 - 1.02 mg/dL    BUN/Creatinine ratio 20 12 - 20      GFR est AA 14 (L) >60 ml/min/1.73m2    GFR est non-AA 12 (L) >60 ml/min/1.73m2    Calcium 6.7 (L) 8.5 - 10.1 mg/dL    Bilirubin, total 0.4 0.2 - 1.0 mg/dL    AST (SGOT) 43 (H) 15 - 37 U/L    ALT (SGPT) 33 12 - 78 U/L    Alk.  phosphatase 97 45 - 117 U/L    Protein, total 3.8 (L) 6.4 - 8.2 g/dL    Albumin 1.3 (L) 3.5 - 5.0 g/dL    Globulin 2.5 2.0 - 4.0 g/dL    A-G Ratio 0.5 (L) 1.1 - 2.2     TROPONIN-HIGH SENSITIVITY    Collection Time: 02/11/22 12:56 PM   Result Value Ref Range    Troponin-High Sensitivity 25 0 - 51 ng/L   NT-PRO BNP    Collection Time: 02/11/22 12:56 PM   Result Value Ref Range    NT pro-BNP 5,856 (H) <125 pg/mL   PROTHROMBIN TIME + INR    Collection Time: 02/11/22  2:30 PM   Result Value Ref Range    Prothrombin time 16.2 (H) 11.9 - 14.6 sec    INR 1.3 (H) 0.9 - 1.1     HGB & HCT    Collection Time: 02/11/22  2:30 PM   Result Value Ref Range    HGB 9.0 (L) 11.5 - 16.0 g/dL    HCT 29.4 (L) 35.0 - 47.0 %   TROPONIN-HIGH SENSITIVITY    Collection Time: 02/11/22  3:26 PM   Result Value Ref Range    Troponin-High Sensitivity 62 (HH) 0 - 51 ng/L   METABOLIC PANEL, BASIC    Collection Time: 02/11/22  3:45 PM   Result Value Ref Range    Sodium 141 136 - 145 mmol/L    Potassium 5.0 3.5 - 5.1 mmol/L    Chloride 109 (H) 97 - 108 mmol/L    CO2 22 21 - 32 mmol/L    Anion gap 10 5 - 15 mmol/L    Glucose 56 (L) 65 - 100 mg/dL    BUN 78 (H) 6 - 20 mg/dL    Creatinine 3.72 (H) 0.55 - 1.02 mg/dL    BUN/Creatinine ratio 21 (H) 12 - 20      GFR est AA 15 (L) >60 ml/min/1.73m2    GFR est non-AA 12 (L) >60 ml/min/1.73m2    Calcium 6.6 (L) 8.5 - 10.1 mg/dL   GLUCOSE, POC    Collection Time: 02/11/22  6:08 PM   Result Value Ref Range    Glucose (POC) 64 (L) 65 - 117 mg/dL    Performed by Fito Hernandez, POC    Collection Time: 02/11/22  6:35 PM   Result Value Ref Range    Glucose (POC) 150 (H) 65 - 117 mg/dL    Performed by Isaias Aburto    TROPONIN-HIGH SENSITIVITY    Collection Time: 02/11/22  8:11 PM   Result Value Ref Range    Troponin-High Sensitivity 576 (HH) 0 - 51 ng/L       Radiologic Studies -   [unfilled]  CT Results (Last 48 hours)               02/11/22 1930  CT HEAD WO CONT Final result    Impression:  No acute intracranial abnormality. Old ischemic disease in the high left parietal lobe and in the left basal   ganglion. There is significant atrophy with significant deepening of the left sylvian   fissure. Narrative:  CT of the head without contrast       Acute mental status       All CT scans at this facility are performed using dose reduction optimization   techniques as appropriate perform the exam including the following: Automated   exposure control, adjustments to the MA and/or KV according to patient size, or   use of iterative reconstructive technique. This is compared to exam 1/29/2022 and 1/20/2022. Ventricles show no hydrocephalus, shift or herniation. There is diffuse   deepening of all sulci consistent with moderate cerebral atrophy. This is most   prominent in the left sylvian fissure. There is old ischemic disease in the high left parietal lobe. There is old   lacunar infarction in the left basal ganglion. There is no major vascular   territory infarction. There is no hemorrhage. There is no mass. Corpus callosum   is intact. There is cerebellar atrophy. Cerebellar tonsils are in normal position. Foramen   magnum is widely patent. Brainstem is intact. The sella shows no focal   abnormality. Calvarium is intact. Skull base is intact. There are air-fluid levels in the   sphenoid sinuses and opacification of the mid right ethmoid air cell. The other   paranasal sinuses are clear. Mastoids and middle ears are clear. Orbits and   globes show no acute abnormality. Nasopharynx shows no mass. 02/11/22 1930  CT ABD PELV WO CONT Final result    Impression:  Fluid overload.  No evidence to suggest a source of GI bleeding       CT dose reduction was achieved through use of a standardized protocol tailored   for this examination and automatic exposure control for dose modulation. Narrative:  Noncontrast study shows large pleural effusions with adjacent atelectasis       Liver, spleen, atrophic pancreas are normal. Gallbladder is out. Normal   adrenals. Symmetric renal atrophy with small cysts, including a small hyperdense   cyst on the right. No hydronephrosis or stone. Gastric bypass       Advanced arterial calcification. No small bowel abnormality, lymphadenopathy or   free fluid       Catheterize bladder is empty. Uterus is small or out. No mass or free fluid. Advanced iliac artery calcification. Normal colon. No mesenteric fat edema. Extensive body wall edema. Past degenerative changes in the spine               CXR Results  (Last 48 hours)               02/11/22 1325  XR CHEST PORT Final result    Narrative:  Chest single view. Comparison single view chest January 29, 2022. Stable right-sided permacath. Right-sided cardiac device with leads overlying the base of the heart. Lungs clear; no interstitial or alveolar pulmonary edema. Cardiac and   mediastinal structures magnified on this AP view. Thoracic aorta   atherosclerosis. No pneumothorax or sizable pleural effusion. Medical Decision Making and ED Course   I am the first provider for this patient. I reviewed the vital signs, available nursing notes, past medical history, past surgical history, family history and social history. Vital Signs-Reviewed the patient's vital signs.   Patient Vitals for the past 12 hrs:   Temp Pulse Resp BP SpO2   02/11/22 2108  75 18 118/68 100 %   02/11/22 2022  75 17 107/69 99 %   02/11/22 1800 97.4 °F (36.3 °C) 73 20 (!) 103/59 100 %   02/11/22 1700 97.6 °F (36.4 °C) 71 20 (!) 149/82 100 %   02/11/22 1645  72 20 (!) 146/85 98 %   02/11/22 1607  60 20 (!) 157/72 99 %   02/11/22 1549 97.6 °F (36.4 °C) 60 20 134/67 100 %   02/11/22 1543  60 16 134/67 99 %   02/11/22 1521  73 20 125/73 98 %   02/11/22 1500  73 20 (!) 117/59 100 %   02/11/22 1450  (!) 102 16 102/74 96 %   02/11/22 1424  (!) 123 20 (!) 94/36 99 %   02/11/22 1420  91 20 124/83 100 %   02/11/22 1410 97.6 °F (36.4 °C) (!) 104 16 (!) 84/67 100 %   02/11/22 1410 97.6 °F (36.4 °C) 100 20 (!) 78/61 100 %   02/11/22 1348 97.6 °F (36.4 °C) (!) 106 20 (!) 85/52 96 %   02/11/22 1348  (!) 105  (!) 85/52 96 %   02/11/22 1341  (!) 106 20     02/11/22 1340  (!) 104 20 (!) 71/38 100 %   02/11/22 1337  (!) 104 20 (!) 74/36 100 %   02/11/22 1312  (!) 106 20 (!) 83/61 100 %   02/11/22 1253 97.6 °F (36.4 °C) (!) 111 20 (!) 93/55 93 %       EKG interpretation: (Preliminary)  Ventricular paced at 170,    Records Reviewed: Nursing Notes and Old Medical Records    The patient presents with lower GI bleed with a differential diagnosis of AVM, brisk upper GI bleed, diverticulitis      Provider Notes (Medical Decision Making):     MDM   78-year-old female history of end-stage renal disease, pacemaker, seizure disorder, presents to emergency department for bloody stools today. Physical exam shows hypotensive female, tachycardic, ill-appearing however in no acute distress. GEN awake alert, follows commands, maintaining airway, oxygenation supported with nonrebreather. Abdomen soft, rectal exam shows dark blood however no active extravasation    Should markedly hypotensive, 90H to 90A systolic, received 1 L of normal saline via EMS. IV line established, given patient's renal failure will hold off on any more fluids, as patient remains hypotensive after 1 L, given history of hemorrhage, will transfuse 1 unit of emergency uncrossed blood products. Central line establishing right femoral vein. Currently type and screen and coags sent. Patient's respiratory status stable right now, will monitor closely, low threshold for intubation. ED Course:   Initial assessment performed.  The patients presenting problems have been discussed, and they are in agreement with the care plan formulated and outlined with them. I have encouraged them to ask questions as they arise throughout their visit. ED Course as of 02/11/22 2154   Fri Feb 11, 2022   1343 Spoke with Dr. Zohra Cruz, will evaluate patient. [PZ]   5 Spoke with Dr. Abe Interiano, will admit patient to ICU for GI bleed. [PZ]   1875 Patient's lab work resulting, metabolic panel significant for hyperkalemia 6.5, bicarb 16, chronic renal insufficiency BUN 75 creatinine 3.84. Will administer calcium gluconate, bicarb, insulin, dextrose. CBC shows hemoglobin 7.6 hematocrit 26, which appears to be patient's baseline. Given history of acute hemorrhage will proceed with emergent transfusion. Additionally patient still continues to be hypotensive, will initiate norepinephrine through central line. [PZ]      ED Course User Index  [PZ] Sera Viera MD         Procedures       Rhina Yoder MD  Central Line    Date/Time: 2/11/2022 1:54 PM  Performed by: Sera Viera MD  Authorized by: Sera Viera MD     Consent:     Consent obtained:  Emergent situation  Pre-procedure details:     Hand hygiene: Hand hygiene performed prior to insertion      Sterile barrier technique: All elements of maximal sterile technique followed      Skin preparation:  2% chlorhexidine    Skin preparation agent: Skin preparation agent completely dried prior to procedure    Anesthesia (see MAR for exact dosages):      Anesthesia method:  Local infiltration    Local anesthetic:  Lidocaine 1% w/o epi  Procedure details:     Location:  R femoral    Patient position:  Flat    Procedural supplies:  Triple lumen    Catheter size:  7.5 Fr    Landmarks identified: yes      Ultrasound guidance: yes      Sterile ultrasound techniques: Sterile gel and sterile probe covers were used      Number of attempts:  1    Successful placement: yes    Post-procedure details:     Post-procedure:  Dressing applied and line sutured    Assessment:  Blood return through all ports and free fluid flow    Patient tolerance of procedure: Tolerated well, no immediate complications                   CRITICAL CARE NOTE :  1:47 PM  Amount of Critical Care Time: _60___(minutes)__    IMPENDING DETERIORATION -Cardiovascular, Metabolic and Renal  ASSOCIATED RISK FACTORS - Hypotension, Shock, Bleeding, Dysrhythmia and Metabolic changes  MANAGEMENT- Bedside Assessment and Supervision of Care  INTERPRETATION -  Blood Gases, ECG and Blood Pressure  INTERVENTIONS - hemodynamic mngmt, vascular control and Metobolic interventions  CASE REVIEW - Hospitalist/Intensivist  TREATMENT RESPONSE -Improved  PERFORMED BY - Self    NOTES   :  I have spent critical care time involved in lab review, consultations with specialist, family decision- making, bedside attention and documentation. This time excludes time spent in any separate billed procedures. During this entire length of time I was immediately available to the patient . Alejandro Ellis MD        Disposition       Admitted    Diagnosis     Clinical Impression:   1. Gastrointestinal hemorrhage, unspecified gastrointestinal hemorrhage type    2. Hypotension, unspecified hypotension type        Attestations:    Alejandro Ellis MD    Please note that this dictation was completed with [employer], the computer voice recognition software. Quite often unanticipated grammatical, syntax, homophones, and other interpretive errors are inadvertently transcribed by the computer software. Please disregard these errors. Please excuse any errors that have escaped final proofreading. Thank you.

## 2022-02-11 NOTE — ED TRIAGE NOTES
Pt is an alert and oriented female coming from home. Recently discharged for nursing home. Active gi bleeding since 0400 this am. [employer].

## 2022-02-12 ENCOUNTER — APPOINTMENT (OUTPATIENT)
Dept: NON INVASIVE DIAGNOSTICS | Age: 72
DRG: 377 | End: 2022-02-12
Attending: INTERNAL MEDICINE
Payer: MEDICARE

## 2022-02-12 LAB
ABO + RH BLD: NORMAL
ANION GAP SERPL CALC-SCNC: 8 MMOL/L (ref 5–15)
APPEARANCE UR: ABNORMAL
ATRIAL RATE: 0 BPM
ATRIAL RATE: 53 BPM
ATRIAL RATE: 72 BPM
BACTERIA URNS QL MICRO: NEGATIVE /HPF
BASOPHILS # BLD: 0 K/UL (ref 0–0.1)
BASOPHILS NFR BLD: 0 % (ref 0–1)
BILIRUB UR QL: NEGATIVE
BLD PROD TYP BPU: NORMAL
BLOOD GROUP ANTIBODIES SERPL: NEGATIVE
BPU ID: NORMAL
BUN SERPL-MCNC: 82 MG/DL (ref 6–20)
BUN/CREAT SERPL: 21 (ref 12–20)
CA-I BLD-MCNC: 6.9 MG/DL (ref 8.5–10.1)
CALCULATED R AXIS, ECG10: -78 DEGREES
CALCULATED R AXIS, ECG10: -88 DEGREES
CALCULATED R AXIS, ECG10: 156 DEGREES
CALCULATED T AXIS, ECG11: 23 DEGREES
CALCULATED T AXIS, ECG11: 86 DEGREES
CALCULATED T AXIS, ECG11: 87 DEGREES
CHLORIDE SERPL-SCNC: 109 MMOL/L (ref 97–108)
CO2 SERPL-SCNC: 22 MMOL/L (ref 21–32)
COLOR UR: ABNORMAL
CREAT SERPL-MCNC: 3.95 MG/DL (ref 0.55–1.02)
CROSSMATCH RESULT,%XM: NORMAL
CRP SERPL HS-MCNC: 8.8 MG/L
DIAGNOSIS, 93000: NORMAL
DIFFERENTIAL METHOD BLD: ABNORMAL
EOSINOPHIL # BLD: 0 K/UL (ref 0–0.4)
EOSINOPHIL NFR BLD: 0 % (ref 0–7)
ERYTHROCYTE [DISTWIDTH] IN BLOOD BY AUTOMATED COUNT: 16.5 % (ref 11.5–14.5)
ERYTHROCYTE [SEDIMENTATION RATE] IN BLOOD: 9 MM/HR
GLUCOSE BLD STRIP.AUTO-MCNC: 85 MG/DL (ref 65–117)
GLUCOSE BLD STRIP.AUTO-MCNC: 87 MG/DL (ref 65–117)
GLUCOSE BLD STRIP.AUTO-MCNC: 93 MG/DL (ref 65–117)
GLUCOSE BLD STRIP.AUTO-MCNC: 94 MG/DL (ref 65–117)
GLUCOSE SERPL-MCNC: 90 MG/DL (ref 65–100)
GLUCOSE UR STRIP.AUTO-MCNC: NEGATIVE MG/DL
HCT VFR BLD AUTO: 25.9 % (ref 35–47)
HCT VFR BLD AUTO: 28 % (ref 35–47)
HCT VFR BLD AUTO: 28.5 % (ref 35–47)
HGB BLD-MCNC: 8.1 G/DL (ref 11.5–16)
HGB BLD-MCNC: 8.8 G/DL (ref 11.5–16)
HGB BLD-MCNC: 8.8 G/DL (ref 11.5–16)
HGB UR QL STRIP: ABNORMAL
IMM GRANULOCYTES # BLD AUTO: 0.1 K/UL (ref 0–0.04)
IMM GRANULOCYTES NFR BLD AUTO: 1 % (ref 0–0.5)
KETONES UR QL STRIP.AUTO: 5 MG/DL
LEUKOCYTE ESTERASE UR QL STRIP.AUTO: ABNORMAL
LYMPHOCYTES # BLD: 1.2 K/UL (ref 0.8–3.5)
LYMPHOCYTES NFR BLD: 9 % (ref 12–49)
MCH RBC QN AUTO: 31.1 PG (ref 26–34)
MCHC RBC AUTO-ENTMCNC: 30.9 G/DL (ref 30–36.5)
MCV RBC AUTO: 100.7 FL (ref 80–99)
MONOCYTES # BLD: 1 K/UL (ref 0–1)
MONOCYTES NFR BLD: 7 % (ref 5–13)
NEUTS SEG # BLD: 11.9 K/UL (ref 1.8–8)
NEUTS SEG NFR BLD: 83 % (ref 32–75)
NITRITE UR QL STRIP.AUTO: NEGATIVE
NRBC # BLD: 0 K/UL (ref 0–0.01)
NRBC BLD-RTO: 0 PER 100 WBC
OTHER URINE MICRO,5051: PRESENT
PERFORMED BY, TECHID: NORMAL
PH UR STRIP: 5 [PH] (ref 5–8)
PLATELET # BLD AUTO: 170 K/UL (ref 150–400)
PMV BLD AUTO: 11.1 FL (ref 8.9–12.9)
POTASSIUM SERPL-SCNC: 5.5 MMOL/L (ref 3.5–5.1)
PROCALCITONIN SERPL-MCNC: 0.54 NG/ML
PROT UR STRIP-MCNC: 100 MG/DL
Q-T INTERVAL, ECG07: 420 MS
Q-T INTERVAL, ECG07: 426 MS
Q-T INTERVAL, ECG07: 434 MS
QRS DURATION, ECG06: 142 MS
QRS DURATION, ECG06: 154 MS
QRS DURATION, ECG06: 164 MS
QTC CALCULATION (BEZET), ECG08: 407 MS
QTC CALCULATION (BEZET), ECG08: 576 MS
QTC CALCULATION (BEZET), ECG08: 675 MS
RBC # BLD AUTO: 2.83 M/UL (ref 3.8–5.2)
RBC #/AREA URNS HPF: ABNORMAL /HPF (ref 0–5)
SODIUM SERPL-SCNC: 139 MMOL/L (ref 136–145)
SP GR UR REFRACTOMETRY: 1.02 (ref 1–1.03)
SPECIMEN EXP DATE BLD: NORMAL
STATUS OF UNIT,%ST: NORMAL
TRANSFUSION STATUS PATIENT QL: NORMAL
TROPONIN-HIGH SENSITIVITY: 1128 NG/L (ref 0–51)
TROPONIN-HIGH SENSITIVITY: 1138 NG/L (ref 0–51)
TROPONIN-HIGH SENSITIVITY: 732 NG/L (ref 0–51)
UA: UC IF INDICATED,UAUC: ABNORMAL
UNIT DIVISION, %UDIV: 0
UNIT TAG COMMENT,%CM: NORMAL
UROBILINOGEN UR QL STRIP.AUTO: 0.1 EU/DL (ref 0.1–1)
VENTRICULAR RATE, ECG03: 113 BPM
VENTRICULAR RATE, ECG03: 151 BPM
VENTRICULAR RATE, ECG03: 53 BPM
WBC # BLD AUTO: 14.2 K/UL (ref 3.6–11)
WBC URNS QL MICRO: >100 /HPF (ref 0–4)

## 2022-02-12 PROCEDURE — 93971 EXTREMITY STUDY: CPT

## 2022-02-12 PROCEDURE — 74011250636 HC RX REV CODE- 250/636: Performed by: HOSPITALIST

## 2022-02-12 PROCEDURE — 80048 BASIC METABOLIC PNL TOTAL CA: CPT

## 2022-02-12 PROCEDURE — 84145 PROCALCITONIN (PCT): CPT

## 2022-02-12 PROCEDURE — 87077 CULTURE AEROBIC IDENTIFY: CPT

## 2022-02-12 PROCEDURE — 81001 URINALYSIS AUTO W/SCOPE: CPT

## 2022-02-12 PROCEDURE — 85018 HEMOGLOBIN: CPT

## 2022-02-12 PROCEDURE — 85025 COMPLETE CBC W/AUTO DIFF WBC: CPT

## 2022-02-12 PROCEDURE — 87641 MR-STAPH DNA AMP PROBE: CPT

## 2022-02-12 PROCEDURE — 84484 ASSAY OF TROPONIN QUANT: CPT

## 2022-02-12 PROCEDURE — 74011000250 HC RX REV CODE- 250: Performed by: HOSPITALIST

## 2022-02-12 PROCEDURE — 74011250636 HC RX REV CODE- 250/636: Performed by: INTERNAL MEDICINE

## 2022-02-12 PROCEDURE — 74011000258 HC RX REV CODE- 258: Performed by: INTERNAL MEDICINE

## 2022-02-12 PROCEDURE — G0257 UNSCHED DIALYSIS ESRD PT HOS: HCPCS

## 2022-02-12 PROCEDURE — 85652 RBC SED RATE AUTOMATED: CPT

## 2022-02-12 PROCEDURE — 87086 URINE CULTURE/COLONY COUNT: CPT

## 2022-02-12 PROCEDURE — 65610000006 HC RM INTENSIVE CARE

## 2022-02-12 PROCEDURE — 87040 BLOOD CULTURE FOR BACTERIA: CPT

## 2022-02-12 PROCEDURE — 87186 SC STD MICRODIL/AGAR DIL: CPT

## 2022-02-12 PROCEDURE — 93971 EXTREMITY STUDY: CPT | Performed by: SURGERY

## 2022-02-12 PROCEDURE — 74011250637 HC RX REV CODE- 250/637: Performed by: HOSPITALIST

## 2022-02-12 PROCEDURE — 93005 ELECTROCARDIOGRAM TRACING: CPT

## 2022-02-12 PROCEDURE — C9113 INJ PANTOPRAZOLE SODIUM, VIA: HCPCS | Performed by: HOSPITALIST

## 2022-02-12 PROCEDURE — 36415 COLL VENOUS BLD VENIPUNCTURE: CPT

## 2022-02-12 PROCEDURE — 82962 GLUCOSE BLOOD TEST: CPT

## 2022-02-12 PROCEDURE — 86141 C-REACTIVE PROTEIN HS: CPT

## 2022-02-12 RX ORDER — LEVETIRACETAM 500 MG/1
1000 TABLET ORAL 2 TIMES DAILY
Status: DISCONTINUED | OUTPATIENT
Start: 2022-02-12 | End: 2022-02-22 | Stop reason: HOSPADM

## 2022-02-12 RX ORDER — AMIODARONE HYDROCHLORIDE 150 MG/3ML
INJECTION, SOLUTION INTRAVENOUS
Status: DISPENSED
Start: 2022-02-12 | End: 2022-02-12

## 2022-02-12 RX ORDER — HEPARIN SODIUM 1000 [USP'U]/ML
INJECTION, SOLUTION INTRAVENOUS; SUBCUTANEOUS
Status: DISPENSED
Start: 2022-02-12 | End: 2022-02-13

## 2022-02-12 RX ORDER — HEPARIN SODIUM 1000 [USP'U]/ML
3200 INJECTION, SOLUTION INTRAVENOUS; SUBCUTANEOUS
Status: DISCONTINUED | OUTPATIENT
Start: 2022-02-12 | End: 2022-02-22 | Stop reason: HOSPADM

## 2022-02-12 RX ORDER — MAGNESIUM SULFATE HEPTAHYDRATE 40 MG/ML
2 INJECTION, SOLUTION INTRAVENOUS ONCE
Status: COMPLETED | OUTPATIENT
Start: 2022-02-12 | End: 2022-02-12

## 2022-02-12 RX ADMIN — MAGNESIUM SULFATE HEPTAHYDRATE 2 G: 40 INJECTION, SOLUTION INTRAVENOUS at 04:05

## 2022-02-12 RX ADMIN — SODIUM CHLORIDE, PRESERVATIVE FREE 10 ML: 5 INJECTION INTRAVENOUS at 14:00

## 2022-02-12 RX ADMIN — SODIUM CHLORIDE, PRESERVATIVE FREE 40 MG: 5 INJECTION INTRAVENOUS at 10:02

## 2022-02-12 RX ADMIN — LACOSAMIDE 200 MG: 100 TABLET, FILM COATED ORAL at 20:53

## 2022-02-12 RX ADMIN — MIDODRINE HYDROCHLORIDE 2.5 MG: 5 TABLET ORAL at 20:52

## 2022-02-12 RX ADMIN — SODIUM CHLORIDE, PRESERVATIVE FREE 10 ML: 5 INJECTION INTRAVENOUS at 21:00

## 2022-02-12 RX ADMIN — PIPERACILLIN SODIUM AND TAZOBACTAM SODIUM 3.38 G: 3; .375 INJECTION, POWDER, LYOPHILIZED, FOR SOLUTION INTRAVENOUS at 11:10

## 2022-02-12 RX ADMIN — Medication 8 MCG/MIN: at 19:47

## 2022-02-12 RX ADMIN — SODIUM CHLORIDE, PRESERVATIVE FREE 10 ML: 5 INJECTION INTRAVENOUS at 06:23

## 2022-02-12 RX ADMIN — PIPERACILLIN SODIUM AND TAZOBACTAM SODIUM 3.38 G: 3; .375 INJECTION, POWDER, LYOPHILIZED, FOR SOLUTION INTRAVENOUS at 21:00

## 2022-02-12 RX ADMIN — LEVETIRACETAM 1000 MG: 250 TABLET, FILM COATED ORAL at 02:40

## 2022-02-12 RX ADMIN — VANCOMYCIN HYDROCHLORIDE 1000 MG: 1 INJECTION, POWDER, LYOPHILIZED, FOR SOLUTION INTRAVENOUS at 09:55

## 2022-02-12 RX ADMIN — LEVETIRACETAM 1000 MG: 250 TABLET, FILM COATED ORAL at 20:53

## 2022-02-12 RX ADMIN — OXCARBAZEPINE 300 MG: 300 TABLET, FILM COATED ORAL at 00:55

## 2022-02-12 RX ADMIN — SODIUM CHLORIDE, PRESERVATIVE FREE 40 MG: 5 INJECTION INTRAVENOUS at 20:49

## 2022-02-12 NOTE — PROGRESS NOTES
Progress Note    Patient: Cami Bergman MRN: [id number]  SSN: [id number]    YOB: 1950  Age: 71 y.o. Sex: female      Admit Date: 2/11/2022    LOS: 1 day     Subjective:   GI is following in consultation for dark tarry stools. 2/12: Patient seen lethargic, responsive to voice. On non-rebreather 15L. CT abd shows Fluid overload. Dialysis today. Still on pressors.   -Creatinine 3.95. Hgb 8.8. History of Present Illness: Cami Bergman is a 71 y.o. female who is seen in consultation for GI bleed - dark tarry stool. Patient was admitted to the hospital with reports of dark stool since this morning. Multiple ER visits. She was transferred to SNF and was discharged home yesterday. Family noticed dark stools and and red blood per rectum so they called EMS. Upon arrival to the ED, patient became unresponsive with faint pulse. CPR started, she was resuscitated immediately. She was given IVF for hypotension, started levophed, and  placed on ventimask. PMH: H/o seizures with todds paralysis, CVA with right sided weakness, SSS with PPM not on AC, ESRD on HD. She is on Eliquis. EGD on 1/13/22 showed Acute gastric ulcer at anastomosis with hemorrhage, treated with 2 hemoclips and injected with epinephrine.      -ED labs: Hgb 7.6, creatinine 3.72, elevated troponin 62, BNP 5,856. -CXR - Lungs clear; no interstitial or alveolar pulmonary edema     Patient seen in ER lethargic, arousable. Pale looking. Abdomen soft, non-tender. Unable to given any history. On Ventimask. Family at bedside. She received 1 unit PRBC. Latest Hgb 9.0.  Nephrology consulted.      Objective:     Vitals:    02/12/22 1515 02/12/22 1534 02/12/22 1547 02/12/22 1644   BP: 137/77 125/78 (!) 110/92 (!) 160/70   Pulse: 72 85 79 68   Resp:  14 16 20   Temp:   97.8 °F (36.6 °C) 97.8 °F (36.6 °C)   SpO2: 95% 100% 100% 100%   Weight:       Height:            Intake and Output:  Current Shift: 02/12 0701 - 02/12 1900  In: -   Out: 1000 Last three shifts: No intake/output data recorded. Physical Exam:   Skin:  Extremities and face reveal no rashes. No brizuela erythema. HEENT: Sclerae anicteric. Extra-occular muscles are intact. No abnormal pigmentation of the lips. The neck is supple. Cardiovascular: Regular rate and rhythm. Respiratory:  Comfortable breathing with no accessory muscle use. GI:  Abdomen nondistended, soft, and nontender. No enlargement of the liver or spleen. No masses palpable. Rectal:  Deferred  Musculoskeletal: Generalized weakness  Neurological:  Gross memory appears intact. Patient is alert and oriented. Psychiatric:  Mood appears appropriate with judgement intact. Lymphatic:  No visible adenopathy      Lab/Data Review:  Recent Results (from the past 24 hour(s))   GLUCOSE, POC    Collection Time: 02/11/22  6:08 PM   Result Value Ref Range    Glucose (POC) 64 (L) 65 - 117 mg/dL    Performed by CHARISMA Rosas, POC    Collection Time: 02/11/22  6:35 PM   Result Value Ref Range    Glucose (POC) 150 (H) 65 - 117 mg/dL    Performed by Baldemar Alexander    TROPONIN-HIGH SENSITIVITY    Collection Time: 02/11/22  8:11 PM   Result Value Ref Range    Troponin-High Sensitivity 576 (HH) 0 - 51 ng/L   EKG, 12 LEAD, INITIAL    Collection Time: 02/12/22  3:49 AM   Result Value Ref Range    Ventricular Rate 113 BPM    Atrial Rate 72 BPM    QRS Duration 164 ms    Q-T Interval 420 ms    QTC Calculation (Bezet) 576 ms    Calculated R Axis -78 degrees    Calculated T Axis 87 degrees    Diagnosis       Ventricular-paced rhythm  Abnormal ECG  When compared with ECG of 11-FEB-2022 15:20, (Unconfirmed)  Vent.  rate has increased BY   9 BPM  Confirmed by Willie Abbott MD, Panchito Mcleod ([id number]) on 2/12/2022 9:21:57 AM     EKG, 12 LEAD, INITIAL    Collection Time: 02/12/22  3:54 AM   Result Value Ref Range    Ventricular Rate 53 BPM    Atrial Rate 53 BPM    QRS Duration 142 ms    Q-T Interval 434 ms    QTC Calculation (Bezet) 407 ms    Calculated R Axis 156 degrees    Calculated T Axis 23 degrees    Diagnosis       Atrial fibrillation with slow ventricular response with occasional   ventricular-paced complexes  Right bundle branch block  Abnormal ECG  When compared with ECG of 12-FEB-2022 03:51, (Unconfirmed)  Vent. rate has decreased BY   6 BPM  Confirmed by Marlon Huerta MD ([id number]) on 2/12/2022 9:22:21 AM     EKG, 12 LEAD, INITIAL    Collection Time: 02/12/22  3:57 AM   Result Value Ref Range    Ventricular Rate 151 BPM    Atrial Rate 0 BPM    QRS Duration 154 ms    Q-T Interval 426 ms    QTC Calculation (Bezet) 675 ms    Calculated R Axis -88 degrees    Calculated T Axis 86 degrees    Diagnosis       Ventricular-paced rhythm with frequent supraventricular complexes and with   occasional and consecutive Premature ventricular complexes with junctional  escape complexes  Abnormal ECG  When compared with ECG of 12-FEB-2022 03:57, (Unconfirmed)  Vent.  rate has increased BY  38 BPM  Confirmed by 130Marlon Cooley MD ([id number]) on 2/12/2022 7:59:67 AM     METABOLIC PANEL, BASIC    Collection Time: 02/12/22  3:59 AM   Result Value Ref Range    Sodium 139 136 - 145 mmol/L    Potassium 5.5 (H) 3.5 - 5.1 mmol/L    Chloride 109 (H) 97 - 108 mmol/L    CO2 22 21 - 32 mmol/L    Anion gap 8 5 - 15 mmol/L    Glucose 90 65 - 100 mg/dL    BUN 82 (H) 6 - 20 mg/dL    Creatinine 3.95 (H) 0.55 - 1.02 mg/dL    BUN/Creatinine ratio 21 (H) 12 - 20      GFR est AA 14 (L) >60 ml/min/1.73m2    GFR est non-AA 11 (L) >60 ml/min/1.73m2    Calcium 6.9 (L) 8.5 - 10.1 mg/dL   HGB & HCT    Collection Time: 02/12/22  3:59 AM   Result Value Ref Range    HGB 8.8 (L) 11.5 - 16.0 g/dL    HCT 28.0 (L) 35.0 - 47.0 %   TROPONIN-HIGH SENSITIVITY    Collection Time: 02/12/22  4:03 AM   Result Value Ref Range    Troponin-High Sensitivity 1,128 (HH) 0 - 51 ng/L   GLUCOSE, POC    Collection Time: 02/12/22  4:04 AM   Result Value Ref Range    Glucose (POC) 87 65 - 117 mg/dL    Performed by Jason Velásquez GLUCOSE, POC    Collection Time: 22  7:25 AM   Result Value Ref Range    Glucose (POC) 85 65 - 117 mg/dL    Performed by Jacy  ([email or web address])    TROPONIN-HIGH SENSITIVITY    Collection Time: 22  7:30 AM   Result Value Ref Range    Troponin-High Sensitivity 1,138 (HH) 0 - 51 ng/L   CBC WITH AUTOMATED DIFF    Collection Time: 22  7:35 AM   Result Value Ref Range    WBC 14.2 (H) 3.6 - 11.0 K/uL    RBC 2.83 (L) 3.80 - 5.20 M/uL    HGB 8.8 (L) 11.5 - 16.0 g/dL    HCT 28.5 (L) 35.0 - 47.0 %    .7 (H) 80.0 - 99.0 FL    MCH 31.1 26.0 - 34.0 PG    MCHC 30.9 30.0 - 36.5 g/dL    RDW 16.5 (H) 11.5 - 14.5 %    PLATELET 796 500 - 037 K/uL    MPV 11.1 8.9 - 12.9 FL    NRBC 0.0 0.0  WBC    ABSOLUTE NRBC 0.00 0.00 - 0.01 K/uL    NEUTROPHILS 83 (H) 32 - 75 %    LYMPHOCYTES 9 (L) 12 - 49 %    MONOCYTES 7 5 - 13 %    EOSINOPHILS 0 0 - 7 %    BASOPHILS 0 0 - 1 %    IMMATURE GRANULOCYTES 1 (H) 0 - 0.5 %    ABS. NEUTROPHILS 11.9 (H) 1.8 - 8.0 K/UL    ABS. LYMPHOCYTES 1.2 0.8 - 3.5 K/UL    ABS. MONOCYTES 1.0 0.0 - 1.0 K/UL    ABS. EOSINOPHILS 0.0 0.0 - 0.4 K/UL    ABS. BASOPHILS 0.0 0.0 - 0.1 K/UL    ABS. IMM.  GRANS. 0.1 (H) 0.00 - 0.04 K/UL    DF AUTOMATED     URINALYSIS W/ REFLEX CULTURE    Collection Time: 22  9:00 AM    Specimen: Urine   Result Value Ref Range    Color Yellow/Straw      Appearance Turbid (A) Clear      Specific gravity 1.017 1.003 - 1.030      pH (UA) 5.0 5.0 - 8.0      Protein 100 (A) Negative mg/dL    Glucose Negative Negative mg/dL    Ketone 5 (A) Negative mg/dL    Bilirubin Negative Negative      Blood Small (A) Negative      Urobilinogen 0.1 0.1 - 1.0 EU/dL    Nitrites Negative Negative      Leukocyte Esterase Moderate (A) Negative      UA:UC IF INDICATED Urine Culture Ordered (A) Culture not indicated by UA result      WBC >100 (H) 0 - 4 /hpf    RBC 20-50 0 - 5 /hpf    Bacteria Negative Negative /hpf    Other Urine Micro Present     PROCALCITONIN    Collection Time: 22 10:00 AM   Result Value Ref Range    Procalcitonin 0.54 (H) 0 ng/mL   SED RATE (ESR)    Collection Time: 02/12/22 10:00 AM   Result Value Ref Range    Sed rate, automated 9 mm/hr   GLUCOSE, POC    Collection Time: 02/12/22 10:57 AM   Result Value Ref Range    Glucose (POC) 93 65 - 117 mg/dL    Performed by Mumtaz Garcia, POC    Collection Time: 02/12/22  4:00 PM   Result Value Ref Range    Glucose (POC) 94 65 - 117 mg/dL    Performed by ADAM COREY               CT HEAD WO CONT   Final Result   No acute intracranial abnormality. Old ischemic disease in the high left parietal lobe and in the left basal   ganglion. There is significant atrophy with significant deepening of the left sylvian   fissure. CT ABD PELV WO CONT   Final Result   Fluid overload. No evidence to suggest a source of GI bleeding      CT dose reduction was achieved through use of a standardized protocol tailored   for this examination and automatic exposure control for dose modulation. XR CHEST PORT   Final Result      DUPLEX LOWER EXT VENOUS RIGHT    (Results Pending)       Assessment:     Active Problems:    GI bleed (2/11/2022)      Septic shock ([address] (2/11/2022)        Plan:   1. GI Bleed - dark stools. -Initial hgb 7.6. 1unit PRBC transfused.     -latest Hgb 8.8 . Monitor and transfuse as needed.      -Continue PPI     -No anti-coagulant     -Repeat CBC in am     -will wait for patient to be clinically stable, then consider EGD. If she continues to bleed over the weekend with low Hgb, schedule for EGD. 2. Anemia      -latest Hgb 8.8 Monitor and transfuse as needed. - ESRD  3. Hypovolemic shock      -BP initially low, started on levophed. Given blood transfusion      -Pending ICU transfer  4. Acute respiratory failure      -currently on non-rebreather 15L  5.  ESRD      -hemodialysis      -creatinine 3.95      -nephrology consulted    Patient discussed with Dr Fernando Mata and agrees to above impression and plan.  Thank you for allowing me to participate in this patients care    Signed By: Tim Medellin.  RACHEL Barry     February 12, 2022

## 2022-02-12 NOTE — CONSULTS
Full consultation dictated. [id number]. Acute hypoxic respiratory failure. CT of the abdomen pelvis shows bilateral large pleural effusions with atelectasis. She is fluid overloaded. Continue with dialysis. On empiric antibiotics. Thank you.

## 2022-02-12 NOTE — PROGRESS NOTES
Notified by nursing staff that patient went into wide complex tachycardia, with breaks in between. She has been stable on 4mcg/kg levophed for hypovolemic shock prior to the change. Concerned for tachyarrhythmia, 2mg Magnesium sulfate was given and cardiology consulted. Patient's tachycardia becomes more persistent BP then decreased, requiring increased of levophed rate. Patient also appears more fatigued. Dr. Paola Ewing reviewed EKG and determined that this this a paced rhythm with tachycardia, advised to treat underlying pathology. Plan  - Check H&H, CBC, and troponin. Hemoglobin stable compared to earlier.   - 1L IVF bolus  - Titrate levophed to MAP 65.   - Will treat for infection of leukocytosis present.

## 2022-02-12 NOTE — CONSULTS
Consult requested by: Helen Alvarez MD    ADMIT DATE: 2/11/2022  CONSULT DATE: February 12, 2022               Admission diagnosis: <principal problem not specified>   Reason for Nephrology Consultation: Management of ESRD  HPI: Yolande Habermann is a 71 y.o. female [address] [facility] with known diagnosis of ESRD for which he receives hemodialysis on MWF . She was brought to the ED as family noticed patient was having dark-colored stools and also was very weak. -In the ED she was found to be significantly anemic with a hemoglobin of 7.6. She was transfused with 1 unit PRBCs. When patient was seen in the ED she was on nonrebreather mask for respiratory distress. Dialysis was initiated. -Spoke to dialysis nurse at bedside, treatment was going well without any blood pressure issues. She was on Levophed for hypotension.        Past Medical History:   Diagnosis Date    Anxiety disorder     Arthritis     Atherosclerosis of native arteries of the extremities with ulceration ([address] 8/31/2020    Atrial fibrillation (HCC)     Cardiac pacemaker in situ     Cellulitis and abscess of trunk 8/31/2020    Depression, postpartum     Diabetes ([address]     Heart disease     Heartburn     Hx of long term use of blood thinners     Hypercholesterolemia     Hypertension     Hypothyroidism     Migraines     Peripheral venous insufficiency 8/31/2020    Seizures ([address]     Sleep disorder     Stroke ([address]     2019    Varicose veins of lower extremity with inflammation 8/31/2020      Past Surgical History:   Procedure Laterality Date    HX BACK SURGERY      HX OTHER SURGICAL      leg surgery     HX OTHER SURGICAL      pacemaker monitor     IR INSERT TUNL CVC W/O PORT OVER 5 YR  12/10/2021       Social History     Socioeconomic History    Marital status:      Spouse name: Not on file    Number of children: Not on file    Years of education: Not on file    Highest education level: Not on file Occupational History    Not on file   Tobacco Use    Smoking status: Never Smoker    Smokeless tobacco: Never Used   Vaping Use    Vaping Use: Never used   Substance and Sexual Activity    Alcohol use: Never    Drug use: Never    Sexual activity: Not on file   Other Topics Concern    Not on file   Social History Narrative    Not on file     Social Determinants of Health     Financial Resource Strain:     Difficulty of Paying Living Expenses: Not on file   Food Insecurity:     Worried About Running Out of Food in the Last Year: Not on file    Janet of Food in the Last Year: Not on file   Transportation Needs:     Lack of Transportation (Medical): Not on file    Lack of Transportation (Non-Medical):  Not on file   Physical Activity:     Days of Exercise per Week: Not on file    Minutes of Exercise per Session: Not on file   Stress:     Feeling of Stress : Not on file   Social Connections:     Frequency of Communication with Friends and Family: Not on file    Frequency of Social Gatherings with Friends and Family: Not on file    Attends Hoahaoism Services: Not on file    Active Member of Clubs or Organizations: Not on file    Attends Club or Organization Meetings: Not on file    Marital Status: Not on file   Intimate Partner Violence:     Fear of Current or Ex-Partner: Not on file    Emotionally Abused: Not on file    Physically Abused: Not on file    Sexually Abused: Not on file   Housing Stability:     Unable to Pay for Housing in the Last Year: Not on file    Number of [place] in the Last Year: Not on file    Unstable Housing in the Last Year: Not on file       Family History   Problem Relation Age of Onset    Heart Disease Mother     Heart Disease Father     Diabetes Sister     Diabetes Brother      Allergies   Allergen Reactions    Lactose Diarrhea        Home Medications:   (Not in a hospital admission)      Current Inpatient Medications:     Current Facility-Administered Medications   Medication Dose Route Frequency    heparin (porcine) 1,000 unit/mL injection        levETIRAcetam (KEPPRA) tablet 1,000 mg  1,000 mg Oral BID    amiodarone (CORDARONE) 150 mg in dextrose 5% 100 mL bolus infusion  150 mg IntraVENous ONCE    Vancomycin - Pharmacy to Dose   Other Rx Dosing/Monitoring    piperacillin-tazobactam (ZOSYN) 3.375 g in 0.9% sodium chloride (MBP/ADV) 100 mL MBP  3.375 g IntraVENous Q12H    [START ON 2/13/2022] Vancomycin - Please draw random level with AM labs 2/13   Other ONCE    heparin (porcine) 1,000 unit/mL injection 3,200 Units  3,200 Units IntraVENous DIALYSIS PRN    0.9% sodium chloride infusion 250 mL  250 mL IntraVENous PRN    sodium chloride (NS) flush 5-40 mL  5-40 mL IntraVENous Q8H    sodium chloride (NS) flush 5-40 mL  5-40 mL IntraVENous PRN    acetaminophen (TYLENOL) tablet 650 mg  650 mg Oral Q6H PRN    Or    acetaminophen (TYLENOL) suppository 650 mg  650 mg Rectal Q6H PRN    polyethylene glycol (MIRALAX) packet 17 g  17 g Oral DAILY PRN    ondansetron (ZOFRAN ODT) tablet 4 mg  4 mg Oral Q8H PRN    Or    ondansetron (ZOFRAN) injection 4 mg  4 mg IntraVENous Q6H PRN    pantoprazole (PROTONIX) 40 mg in 0.9% sodium chloride 10 mL injection  40 mg IntraVENous Q12H    atorvastatin (LIPITOR) tablet 40 mg  40 mg Oral DAILY    calcitRIOL (ROCALTROL) capsule 0.5 mcg  0.5 mcg Oral DAILY    levothyroxine (SYNTHROID) tablet 150 mcg  150 mcg Oral DAILY    FLUoxetine (PROzac) capsule 10 mg  10 mg Oral DAILY    midodrine (PROAMATINE) tablet 2.5 mg  2.5 mg Oral BID    OXcarbazepine (TRILEPTAL) tablet 150 mg  150 mg Oral QAM    OXcarbazepine (TRILEPTAL) tablet 300 mg  300 mg Oral QHS    lacosamide (VIMPAT) tablet 200 mg  200 mg Oral BID    sevelamer carbonate (RENVELA) tab 800 mg  800 mg Oral TID WITH MEALS    zinc oxide-white petrolatum 17-57 % topical paste   Topical PRN    NOREPINephrine (LEVOPHED) 8 mg in 0.9% NS 250ml infusion  0.5-16 mcg/min IntraVENous TITRATE     Current Outpatient Medications   Medication Sig    midodrine (PROAMATINE) 2.5 mg tablet Take 1 Tablet by mouth two (2) times a day for 30 days. Indications: a feeling of dizziness upon standing due to a drop in blood pressure    food supplemt, lactose-reduced (Ensure Active High Protein) liqd Take 237 mL by mouth four (4) times daily as needed for PRN Reason (Other) (as pt can tolerate).  OXcarbazepine (TRILEPTAL) 150 mg tablet Take 1 Tablet by mouth Every morning.  OXcarbazepine (TrileptaL) 300 mg tablet Take 1 Tablet by mouth nightly.  pantoprazole (PROTONIX) 40 mg tablet Take 1 Tablet by mouth Daily (before breakfast).  calcitRIOL (ROCALTROL) 0.5 mcg capsule Take 1 Capsule by mouth daily.  zinc oxide-white petrolatum 17-57 % topical paste Apply  to affected area as needed for Skin Irritation. Indications: skin irritation    Vimpat 200 mg tab tablet Take 1 Tablet by mouth two (2) times a day.  ondansetron (Zofran ODT) 4 mg disintegrating tablet 1 Tablet by SubLINGual route every eight (8) hours as needed for Nausea or Vomiting.  atorvastatin (LIPITOR) 40 mg tablet Take 1 tablet by mouth once daily for 90 days    FLUoxetine (PROzac) 10 mg capsule Take 1 Capsule by mouth daily for 270 days.     loperamide (IMODIUM) 2 mg capsule TAKE 1 CAPSULE BY MOUTH SIX TIMES DAILY AS NEEDED FOR 30 DAYS    Euthyrox 150 mcg tablet Take 1 tablet by mouth once daily    levETIRAcetam (KEPPRA XR) 500 mg ER tablet TAKE 2 TABLETS BY MOUTH TWICE DAILY FOR 90 DAYS    sevelamer carbonate (RENVELA) 800 mg tab tab          Physical Assessment:     Vitals:    02/12/22 1500 02/12/22 1515 02/12/22 1534 02/12/22 1547   BP: 134/77 137/77 (P) 125/78 (!) (P) 110/92   Pulse: 74 72 (P) 85 (P) 79   Resp: 16  (P) 14 (P) 16   Temp:       SpO2: 97% 95% (P) 100% (P) 100%   Weight:       Height:         Last 3 Recorded Weights in this Encounter    02/11/22 1252   Weight: 49 kg (108 lb)     Admission weight: Weight: 49 kg (108 lb) (02/11/22 1252)      Intake/Output Summary (Last 24 hours) at 2/12/2022 1607  Last data filed at 2/12/2022 1547  Gross per 24 hour   Intake    Output 1000 ml   Net -1000 ml       Patient is  apparent respiratory distress. HEENT: Head is normocephalic and atraumatic. Pupils are round, equal, reactive to light. Sclerae are anicteric. Oropharynx clear. Neck: no cervical lymphadenopathy or thyromegaly. Lungs: good air entry, clear to auscultation bilaterally. Trachea at the midline. Cardiovascular system: S1, S2, regular rate and rhythm. No murmurs, gallops or rubs. No jvd. Abdomen: soft, non tender, non distended. Positive bowel sounds. No hepatosplenomegaly. No abdominal bruits. Extremities: no clubbing, cyanosis or edema. Strong dorsalis pedis pulses. Brisk capillary refill on the toes bilaterally. Integumentary: skin is grossly intact. Neurologic: Alert, oriented time three. Cooperative and appropriate. No gross motor or sensory deficits. Dialysis access: cuffed tunneled catheter site right and IJ. Data Review:    Labs: Results:       Chemistry Recent Labs     02/12/22  0359 02/11/22  1545 02/11/22  1256   GLU 90 56* 92    141 138   K 5.5* 5.0 6.5*   * 109* 109*   CO2 22 22 16*   BUN 82* 78* 75*   CREA 3.95* 3.72* 3.84*   CA 6.9* 6.6* 6.7*   AGAP 8 10 13   BUCR 21* 21* 20   AP  --   --  97   TP  --   --  3.8*   ALB  --   --  1.3*   GLOB  --   --  2.5   AGRAT  --   --  0.5*         CBC w/Diff Recent Labs     02/12/22  0735 02/12/22  0359 02/11/22  1430 02/11/22  1256 02/11/22  1256   WBC 14.2*  --   --   --  5.7   RBC 2.83*  --   --   --  2.42*   HGB 8.8* 8.8* 9.0*   < > 7.6*   HCT 28.5* 28.0* 29.4*   < > 26.0*     --   --   --  100*   GRANS 83*  --   --   --   --    LYMPH 9*  --   --   --   --    EOS 0  --   --   --   --     < > = values in this interval not displayed.          Iron/Ferritin No results for input(s): IRON in the last 72 hours. No lab exists for component: TIBCCALC   PTH/VIT D No results for input(s): PTH in the last 72 hours. No lab exists for component: VITD            Assessment & Plan     1. ESRD. Volume overloaded ,   Will dialyze today with 2-3 Litres volume removal   -She also has massive bilateral effusions which need to be tapped if she has recurrent respiratory symptoms which do not improve with dialysis. 2. Anemia due to ESRD and GI bleed   -  Received 1 U PRBC yesterday   -Secondary to GI bleed. GI evaluation pending.  -Was started on Protonix drip. 3  Hypotension -probably from acute blood loss anemia. She is receiving PRBCs and is also on low-dose Levophed. 4 Secondary hyperparathyroidism of ESRD. Will check Serum calcium, Phos, PTH levels. Continue Phosphate binders.      - Renal Diet    5. B/L Pleural effusions - causing respiratory distress   For possible IR guided thoracentesis if respiratory symptoms do not improve after dialysis. 6. On Broad spectrum antibiotics for sepsis     Thank you for the consult will follow the patient very closely when she is here.

## 2022-02-12 NOTE — ED NOTES
Told hospitalist justus schroeder about pt situation, including now rapidly changing rythms and extremely variable hr. Other staff came into room to assist; verified pad placement with lifepak, oriented pt, printed rhythm strips, obtained ekg.

## 2022-02-12 NOTE — ED NOTES
Received care of patient, currently alert to painful stimuli. On NRB, levo. Has been having abnormal rhythm abnormality since 0300 this morning. Patient also expierced runs of abnormal rhythm abnormalities yesterday. Pressure holding with levo. Trop increasing.  Additional trop sent this AM>

## 2022-02-12 NOTE — ED NOTES
Pt continuing to be monitored in-room for any status change. Cardiologist paged.  Another ekg obtained

## 2022-02-12 NOTE — ED NOTES
Pt observed in v-tach, pt came out after physically waking her up. Pt then went back into v-tach appx 60 seconds later, hr ranged from 50 to 130.  masters md notified, told this rn to call cardiologist.

## 2022-02-12 NOTE — ED NOTES
Another ekg obtained, pt rhythm continuing to change rapidly. Pt still paced. Denies pain at this time.  Pt hr fluctuating

## 2022-02-12 NOTE — ED NOTES
After receiving dialysis patient is alert, vitals, heart rate have stabilized. No further action required at this time.

## 2022-02-12 NOTE — CONSULTS
CARDIOLOGY CONSULTATION      REASON FOR CONSULT: AF, PPM    REQUESTING PROVIDER: Vida    CHIEF COMPLAINT:  GI bleeding    HISTORY OF PRESENT ILLNESS:  Koffi Turner is a 71y.o. year-old female with past medical history significant for atrial fibrillation, high degree AVB s/p PPM, CVA, seizure, ESRD on HD, HLD who presented to ED with reports of GI bleeding. Was unresponsive on arrival but improved with fluids, blood. Did require pressor support. Remains on levo. On HD. Issues with rhythm overnight, but appears to be paced AF RVR. She is sleepy, but nods her head that she is ok and shakes head no to chest pain. Telemetry reviewed. INPATIENT MEDICATIONS:  Home medications reviewed.     Current Facility-Administered Medications:     levETIRAcetam (KEPPRA) tablet 1,000 mg, 1,000 mg, Oral, BID, Berto Lewis MD, 1,000 mg at 02/12/22 0240    amiodarone (CORDARONE) 150 mg in dextrose 5% 100 mL bolus infusion, 150 mg, IntraVENous, ONCE, Vida, Evert Tomlin MD, Held at 02/12/22 0430    amiodarone (CORDARONE) 50 mg/mL injection, , , ,     Vancomycin - Pharmacy to Dose, , Other, Rx Dosing/Monitoring, Imer Hinds MD    piperacillin-tazobactam (ZOSYN) 3.375 g in 0.9% sodium chloride (MBP/ADV) 100 mL MBP, 3.375 g, IntraVENous, Q12H, Jada Rees MD, Last Rate: 25 mL/hr at 02/12/22 1110, 3.375 g at 02/12/22 1110    [START ON 2/13/2022] Vancomycin - Please draw random level with AM labs 2/13, , Other, ONCE, Jada Rees MD    0.9% sodium chloride infusion 250 mL, 250 mL, IntraVENous, PRN, Cary Goodman MD    sodium chloride (NS) flush 5-40 mL, 5-40 mL, IntraVENous, Q8H, Halina Duenas MD, 10 mL at 02/12/22 5526    sodium chloride (NS) flush 5-40 mL, 5-40 mL, IntraVENous, PRN, Berto Lewis MD    acetaminophen (TYLENOL) tablet 650 mg, 650 mg, Oral, Q6H PRN **OR** acetaminophen (TYLENOL) suppository 650 mg, 650 mg, Rectal, Q6H PRN, Berto Lewis MD    polyethylene glycol (MIRALAX) packet 17 g, 17 g, Oral, DAILY PRN, Gian Harmon MD    ondansetron (ZOFRAN ODT) tablet 4 mg, 4 mg, Oral, Q8H PRN **OR** ondansetron (ZOFRAN) injection 4 mg, 4 mg, IntraVENous, Q6H PRN, Gian Harmon MD    pantoprazole (PROTONIX) 40 mg in 0.9% sodium chloride 10 mL injection, 40 mg, IntraVENous, Q12H, Mirella Duenas MD, 40 mg at 02/12/22 1002    atorvastatin (LIPITOR) tablet 40 mg, 40 mg, Oral, DAILY, Mirella Duenas MD    calcitRIOL (ROCALTROL) capsule 0.5 mcg, 0.5 mcg, Oral, DAILY, Mirella Duenas MD    levothyroxine (SYNTHROID) tablet 150 mcg, 150 mcg, Oral, DAILY, Mirella Duenas MD    FLUoxetine (PROzac) capsule 10 mg, 10 mg, Oral, DAILY, Mirella Duenas MD    midodrine (PROAMATINE) tablet 2.5 mg, 2.5 mg, Oral, BID, Rosa Duenas MD, 2.5 mg at 02/11/22 2201    OXcarbazepine (TRILEPTAL) tablet 150 mg, 150 mg, Oral, QAM, Jesse Duenas MD    OXcarbazepine (TRILEPTAL) tablet 300 mg, 300 mg, Oral, QHS, Rosa Duenas MD, 300 mg at 02/12/22 0055    lacosamide (VIMPAT) tablet 200 mg, 200 mg, Oral, BID, Gian Harmon MD, 200 mg at 02/11/22 2202    sevelamer carbonate (RENVELA) tab 800 mg, 800 mg, Oral, TID WITH MEALS, Mirella Duenas MD    zinc oxide-white petrolatum 17-57 % topical paste, , Topical, PRN, Gian Harmon MD    NOREPINephrine (LEVOPHED) 8 mg in 0.9% NS 250ml infusion, 0.5-16 mcg/min, IntraVENous, TITRATE, Gian Harmon MD, Last Rate: 9.4 mL/hr at 02/12/22 0622, 5 mcg/min at 02/12/22 2952    Current Outpatient Medications:     midodrine (PROAMATINE) 2.5 mg tablet, Take 1 Tablet by mouth two (2) times a day for 30 days. Indications: a feeling of dizziness upon standing due to a drop in blood pressure, Disp: 60 Tablet, Rfl: 1    food supplemt, lactose-reduced (Ensure Active High Protein) liqd, Take 237 mL by mouth four (4) times daily as needed for PRN Reason (Other) (as pt can tolerate). , Disp: 60 Each, Rfl: 5   OXcarbazepine (TRILEPTAL) 150 mg tablet, Take 1 Tablet by mouth Every morning., Disp: 60 Tablet, Rfl: 2    OXcarbazepine (TrileptaL) 300 mg tablet, Take 1 Tablet by mouth nightly., Disp: 30 Tablet, Rfl: 3    pantoprazole (PROTONIX) 40 mg tablet, Take 1 Tablet by mouth Daily (before breakfast). , Disp: 30 Tablet, Rfl: 0    calcitRIOL (ROCALTROL) 0.5 mcg capsule, Take 1 Capsule by mouth daily. , Disp: 30 Capsule, Rfl: 0    zinc oxide-white petrolatum 17-57 % topical paste, Apply  to affected area as needed for Skin Irritation. Indications: skin irritation, Disp: 113 g, Rfl: 0    Vimpat 200 mg tab tablet, Take 1 Tablet by mouth two (2) times a day., Disp: , Rfl:     ondansetron (Zofran ODT) 4 mg disintegrating tablet, 1 Tablet by SubLINGual route every eight (8) hours as needed for Nausea or Vomiting., Disp: 20 Tablet, Rfl: 0    atorvastatin (LIPITOR) 40 mg tablet, Take 1 tablet by mouth once daily for 90 days, Disp: 90 Tablet, Rfl: 3    FLUoxetine (PROzac) 10 mg capsule, Take 1 Capsule by mouth daily for 270 days. , Disp: 90 Capsule, Rfl: 2    loperamide (IMODIUM) 2 mg capsule, TAKE 1 CAPSULE BY MOUTH SIX TIMES DAILY AS NEEDED FOR 30 DAYS, Disp: 180 Capsule, Rfl: 0    Euthyrox 150 mcg tablet, Take 1 tablet by mouth once daily, Disp: 90 Tablet, Rfl: 0    levETIRAcetam (KEPPRA XR) 500 mg ER tablet, TAKE 2 TABLETS BY MOUTH TWICE DAILY FOR 90 DAYS, Disp: , Rfl:     sevelamer carbonate (RENVELA) 800 mg tab tab, , Disp: , Rfl:      ALLERGIES:  Allergies reviewed with the patient,  Allergies   Allergen Reactions    Lactose Diarrhea    . FAMILY HISTORY:  Family history reviewed.     SOCIAL HISTORY:  Reviewed    REVIEW OF SYSTEMS:  Complete review of systems limited due to pateint status      PHYSICAL EXAMINATION:   Sleepy but answers simple questions  Irregularly irregular  Diminished lungs  Abdomen is soft  No lower extremity edema    Visit Vitals  /70   Pulse 75   Temp 97.6 °F (36.4 °C)   Resp 15   Ht 5' 6\" (1.676 m)   Wt 49 kg (108 lb)   SpO2 100%   BMI 17.43 kg/m²         Recent labs results and imaging reviewed. Discussed case with Dr. Leon Michel and our impression and recommendations are as follows:  1. Atrial fibrillation:  Known AF, not on [facility] at home though chadsvasc is elevated. Now with GI bleeding. Rate is presently controlled, continue to closely monitor. On pressor support. 2. High degree AVB s/p PPM:  Normal function per last device transmission, reviewed by Dr. Jamilah Macario yesterday. 3. Elevated troponin:  -[id number]. Denies chest pain. Elevation in setting of sepsis, ESRD, GI bleed/anemia. Obtain echocardiogram. Echo in Jan with preserved EF.  NST in November 2021 with no ischemia. Continue workup. No ASA given GI bleeding, no bb while on pressors. Med management as able. 4. Hypotension:  On midodrine at home, continue pressors, bp acceptable. Thank you for involving us in the care of this patient. Please do not hesitate to call me or Dr. Leon Michel if additional questions arise.

## 2022-02-12 NOTE — PROGRESS NOTES
Day #1 of Vancomycin  Consult provided for this 71 y.o. female for indication of sepsis.   Antibiotic regimen:  Vancomycin + Zosyn  Concomitant nephrotoxic drugs: None  Frequency of BMP: Not scheduled    Recent Labs     22  0735 22  0359 22  1545 22  1256   WBC 14.2*  --   --  5.7   CREA  --  3.95* 3.72* 3.84*   BUN  --  82* 78* 75*     Est CrCl: ESRD on HD  Temp (24hrs), Av.6 °F (36.4 °C), Min:97.4 °F (36.3 °C), Max:97.8 °F (36.6 °C)    Cultures:    Blood: Pending   Urine: Pending    MRSA Swab: Pending    Target range: Trough 20-25 mcg/mL      Assessment/Plan:   Afebrile, hypotensive and tachycardic on admission, leukocytosis  ESRD on HD, inpatient schedule not yet established, single dialysis session planned for this afternoon  Will initiate therapy with a 1000 mg loading dose and will schedule a random level for tomorrow AM - will plan on giving a post-HD dose today following session  Antimicrobial stop date TBD

## 2022-02-12 NOTE — ED NOTES
Patient unable to take PO medication at this time due to increased AMS. Responds to voice. No further action required at this time.

## 2022-02-12 NOTE — PROGRESS NOTES
Hospitalist Progress Note    Subjective:   Daily Progress Note: 2/12/2022 1:05 PM    Hospital Course:  72F, H/o seizures with todds paralysis, CVA with right sided weakness, SSS with PPM not on AC, ESRD on HD with blood in stools for 1 days. Upon arrival, patient was unresponsive, CPR was started, but pulse was revived instantaneously. Patient was resuscitated with 1 unit of PRBC and ivf, Levophed was started. Patient had increasing oxygen requirement to ventimask. Hb is stable. Still on pressors, started on vancomycin and zosyn for sepsis. Subjective:  Patient is lethargic  Called patient's son and updated him about patient's condition.     Current Facility-Administered Medications   Medication Dose Route Frequency    levETIRAcetam (KEPPRA) tablet 1,000 mg  1,000 mg Oral BID    amiodarone (CORDARONE) 150 mg in dextrose 5% 100 mL bolus infusion  150 mg IntraVENous ONCE    amiodarone (CORDARONE) 50 mg/mL injection        Vancomycin - Pharmacy to Dose   Other Rx Dosing/Monitoring    piperacillin-tazobactam (ZOSYN) 3.375 g in 0.9% sodium chloride (MBP/ADV) 100 mL MBP  3.375 g IntraVENous Q12H    [START ON 2/13/2022] Vancomycin - Please draw random level with AM labs 2/13   Other ONCE    0.9% sodium chloride infusion 250 mL  250 mL IntraVENous PRN    sodium chloride (NS) flush 5-40 mL  5-40 mL IntraVENous Q8H    sodium chloride (NS) flush 5-40 mL  5-40 mL IntraVENous PRN    acetaminophen (TYLENOL) tablet 650 mg  650 mg Oral Q6H PRN    Or    acetaminophen (TYLENOL) suppository 650 mg  650 mg Rectal Q6H PRN    polyethylene glycol (MIRALAX) packet 17 g  17 g Oral DAILY PRN    ondansetron (ZOFRAN ODT) tablet 4 mg  4 mg Oral Q8H PRN    Or    ondansetron (ZOFRAN) injection 4 mg  4 mg IntraVENous Q6H PRN    pantoprazole (PROTONIX) 40 mg in 0.9% sodium chloride 10 mL injection  40 mg IntraVENous Q12H    atorvastatin (LIPITOR) tablet 40 mg  40 mg Oral DAILY    calcitRIOL (ROCALTROL) capsule 0.5 mcg  0.5 mcg Oral DAILY    levothyroxine (SYNTHROID) tablet 150 mcg  150 mcg Oral DAILY    FLUoxetine (PROzac) capsule 10 mg  10 mg Oral DAILY    midodrine (PROAMATINE) tablet 2.5 mg  2.5 mg Oral BID    OXcarbazepine (TRILEPTAL) tablet 150 mg  150 mg Oral QAM    OXcarbazepine (TRILEPTAL) tablet 300 mg  300 mg Oral QHS    lacosamide (VIMPAT) tablet 200 mg  200 mg Oral BID    sevelamer carbonate (RENVELA) tab 800 mg  800 mg Oral TID WITH MEALS    zinc oxide-white petrolatum 17-57 % topical paste   Topical PRN    NOREPINephrine (LEVOPHED) 8 mg in 0.9% NS 250ml infusion  0.5-16 mcg/min IntraVENous TITRATE     Current Outpatient Medications   Medication Sig    midodrine (PROAMATINE) 2.5 mg tablet Take 1 Tablet by mouth two (2) times a day for 30 days. Indications: a feeling of dizziness upon standing due to a drop in blood pressure    food supplemt, lactose-reduced (Ensure Active High Protein) liqd Take 237 mL by mouth four (4) times daily as needed for PRN Reason (Other) (as pt can tolerate).  OXcarbazepine (TRILEPTAL) 150 mg tablet Take 1 Tablet by mouth Every morning.  OXcarbazepine (TrileptaL) 300 mg tablet Take 1 Tablet by mouth nightly.  pantoprazole (PROTONIX) 40 mg tablet Take 1 Tablet by mouth Daily (before breakfast).  calcitRIOL (ROCALTROL) 0.5 mcg capsule Take 1 Capsule by mouth daily.  zinc oxide-white petrolatum 17-57 % topical paste Apply  to affected area as needed for Skin Irritation. Indications: skin irritation    Vimpat 200 mg tab tablet Take 1 Tablet by mouth two (2) times a day.  ondansetron (Zofran ODT) 4 mg disintegrating tablet 1 Tablet by SubLINGual route every eight (8) hours as needed for Nausea or Vomiting.  atorvastatin (LIPITOR) 40 mg tablet Take 1 tablet by mouth once daily for 90 days    FLUoxetine (PROzac) 10 mg capsule Take 1 Capsule by mouth daily for 270 days.     loperamide (IMODIUM) 2 mg capsule TAKE 1 CAPSULE BY MOUTH SIX TIMES DAILY AS NEEDED FOR 30 DAYS    Euthyrox 150 mcg tablet Take 1 tablet by mouth once daily    levETIRAcetam (KEPPRA XR) 500 mg ER tablet TAKE 2 TABLETS BY MOUTH TWICE DAILY FOR 90 DAYS    sevelamer carbonate (RENVELA) 800 mg tab tab         Review of Systems  Unable to obtain due to patient's condition. Objective:     Visit Vitals  /70   Pulse (!) 127   Temp 97.6 °F (36.4 °C)   Resp 20   Ht 5' 6\" (1.676 m)   Wt 49 kg (108 lb)   SpO2 100%   BMI 17.43 kg/m²    O2 Flow Rate (L/min): 15 l/min O2 Device: Non-rebreather mask    Temp (24hrs), Av.7 °F (36.5 °C), Min:97.4 °F (36.3 °C), Max:98.2 °F (36.8 °C)      No intake/output data recorded. No intake/output data recorded. PHYSICAL EXAM:  Constitutional: Patient is lethargic on venti mask  Skin: Extremities and face reveal no rashes. HEENT: Sclerae anicteric. Cardiovascular: tachycardic  Respiratory:  B/L decrease air entry  GI: Abdomen nondistended, soft, and nontender. Normal active bowel sounds.   Musculoskeletal: right leg swelling+ Able to move all ext  Neurological: lethargic  Psychiatric: lethargic      Data Review    Recent Results (from the past 24 hour(s))   PROTHROMBIN TIME + INR    Collection Time: 22  2:30 PM   Result Value Ref Range    Prothrombin time 16.2 (H) 11.9 - 14.6 sec    INR 1.3 (H) 0.9 - 1.1     HGB & HCT    Collection Time: 22  2:30 PM   Result Value Ref Range    HGB 9.0 (L) 11.5 - 16.0 g/dL    HCT 29.4 (L) 35.0 - 47.0 %   TROPONIN-HIGH SENSITIVITY    Collection Time: 22  3:26 PM   Result Value Ref Range    Troponin-High Sensitivity 62 (HH) 0 - 51 ng/L   METABOLIC PANEL, BASIC    Collection Time: 22  3:45 PM   Result Value Ref Range    Sodium 141 136 - 145 mmol/L    Potassium 5.0 3.5 - 5.1 mmol/L    Chloride 109 (H) 97 - 108 mmol/L    CO2 22 21 - 32 mmol/L    Anion gap 10 5 - 15 mmol/L    Glucose 56 (L) 65 - 100 mg/dL    BUN 78 (H) 6 - 20 mg/dL    Creatinine 3.72 (H) 0.55 - 1.02 mg/dL    BUN/Creatinine ratio 21 (H) 12 - 20      GFR est AA 15 (L) >60 ml/min/1.73m2    GFR est non-AA 12 (L) >60 ml/min/1.73m2    Calcium 6.6 (L) 8.5 - 10.1 mg/dL   OCCULT BLOOD, STOOL    Collection Time: 02/11/22  5:00 PM   Result Value Ref Range    Occult Blood,day 1 Positive (A) Negative      Day 1 date: 2,112,022     GLUCOSE, POC    Collection Time: 02/11/22  6:08 PM   Result Value Ref Range    Glucose (POC) 64 (L) 65 - 117 mg/dL    Performed by [employer]    GLUCOSE, POC    Collection Time: 02/11/22  6:35 PM   Result Value Ref Range    Glucose (POC) 150 (H) 65 - 117 mg/dL    Performed by [employer]    TROPONIN-HIGH SENSITIVITY    Collection Time: 02/11/22  8:11 PM   Result Value Ref Range    Troponin-High Sensitivity 576 (HH) 0 - 51 ng/L   EKG, 12 LEAD, INITIAL    Collection Time: 02/12/22  3:49 AM   Result Value Ref Range    Ventricular Rate 113 BPM    Atrial Rate 72 BPM    QRS Duration 164 ms    Q-T Interval 420 ms    QTC Calculation (Bezet) 576 ms    Calculated R Axis -78 degrees    Calculated T Axis 87 degrees    Diagnosis       Ventricular-paced rhythm  Abnormal ECG  When compared with ECG of 11-FEB-2022 15:20, (Unconfirmed)  Vent. rate has increased BY   9 BPM  Confirmed by Katarzyna Dunn MD ([id number]) on 2/12/2022 9:21:57 AM     EKG, 12 LEAD, INITIAL    Collection Time: 02/12/22  3:54 AM   Result Value Ref Range    Ventricular Rate 53 BPM    Atrial Rate 53 BPM    QRS Duration 142 ms    Q-T Interval 434 ms    QTC Calculation (Bezet) 407 ms    Calculated R Axis 156 degrees    Calculated T Axis 23 degrees    Diagnosis       Atrial fibrillation with slow ventricular response with occasional   ventricular-paced complexes  Right bundle branch block  Abnormal ECG  When compared with ECG of 12-FEB-2022 03:51, (Unconfirmed)  Vent.  rate has decreased BY   6 BPM  Confirmed by Katarzyna Dunn MD ([id number]) on 2/12/2022 9:22:21 AM     EKG, 12 LEAD, INITIAL    Collection Time: 02/12/22  3:57 AM   Result Value Ref Range    Ventricular Rate 151 BPM    Atrial Rate 0 BPM QRS Duration 154 ms    Q-T Interval 426 ms    QTC Calculation (Bezet) 675 ms    Calculated R Axis -88 degrees    Calculated T Axis 86 degrees    Diagnosis       Ventricular-paced rhythm with frequent supraventricular complexes and with   occasional and consecutive Premature ventricular complexes with junctional  escape complexes  Abnormal ECG  When compared with ECG of 12-FEB-2022 03:57, (Unconfirmed)  Vent.  rate has increased BY  38 BPM  Confirmed by Henry Malave MD, Bert Shea [id number]) on 2/12/2022 4:14:23 AM     METABOLIC PANEL, BASIC    Collection Time: 02/12/22  3:59 AM   Result Value Ref Range    Sodium 139 136 - 145 mmol/L    Potassium 5.5 (H) 3.5 - 5.1 mmol/L    Chloride 109 (H) 97 - 108 mmol/L    CO2 22 21 - 32 mmol/L    Anion gap 8 5 - 15 mmol/L    Glucose 90 65 - 100 mg/dL    BUN 82 (H) 6 - 20 mg/dL    Creatinine 3.95 (H) 0.55 - 1.02 mg/dL    BUN/Creatinine ratio 21 (H) 12 - 20      GFR est AA 14 (L) >60 ml/min/1.73m2    GFR est non-AA 11 (L) >60 ml/min/1.73m2    Calcium 6.9 (L) 8.5 - 10.1 mg/dL   HGB & HCT    Collection Time: 02/12/22  3:59 AM   Result Value Ref Range    HGB 8.8 (L) 11.5 - 16.0 g/dL    HCT 28.0 (L) 35.0 - 47.0 %   TROPONIN-HIGH SENSITIVITY    Collection Time: 02/12/22  4:03 AM   Result Value Ref Range    Troponin-High Sensitivity 1,128 (HH) 0 - 51 ng/L   GLUCOSE, POC    Collection Time: 02/12/22  4:04 AM   Result Value Ref Range    Glucose (POC) 87 65 - 117 mg/dL    Performed by [address] [place] [id number], POC    Collection Time: 02/12/22  7:25 AM   Result Value Ref Range    Glucose (POC) 85 65 - 117 mg/dL    Performed by Samina [place] ([email or web address])    TROPONIN-HIGH SENSITIVITY    Collection Time: 02/12/22  7:30 AM   Result Value Ref Range    Troponin-High Sensitivity 1,138 (HH) 0 - 51 ng/L   CBC WITH AUTOMATED DIFF    Collection Time: 02/12/22  7:35 AM   Result Value Ref Range    WBC 14.2 (H) 3.6 - 11.0 K/uL    RBC 2.83 (L) 3.80 - 5.20 M/uL    HGB 8.8 (L) 11.5 - 16.0 g/dL    HCT 28.5 (L) 35.0 - 47.0 % .7 (H) 80.0 - 99.0 FL    MCH 31.1 26.0 - 34.0 PG    MCHC 30.9 30.0 - 36.5 g/dL    RDW 16.5 (H) 11.5 - 14.5 %    PLATELET 647 706 - 572 K/uL    MPV 11.1 8.9 - 12.9 FL    NRBC 0.0 0.0  WBC    ABSOLUTE NRBC 0.00 0.00 - 0.01 K/uL    NEUTROPHILS 83 (H) 32 - 75 %    LYMPHOCYTES 9 (L) 12 - 49 %    MONOCYTES 7 5 - 13 %    EOSINOPHILS 0 0 - 7 %    BASOPHILS 0 0 - 1 %    IMMATURE GRANULOCYTES 1 (H) 0 - 0.5 %    ABS. NEUTROPHILS 11.9 (H) 1.8 - 8.0 K/UL    ABS. LYMPHOCYTES 1.2 0.8 - 3.5 K/UL    ABS. MONOCYTES 1.0 0.0 - 1.0 K/UL    ABS. EOSINOPHILS 0.0 0.0 - 0.4 K/UL    ABS. BASOPHILS 0.0 0.0 - 0.1 K/UL    ABS. IMM.  GRANS. 0.1 (H) 0.00 - 0.04 K/UL    DF AUTOMATED     URINALYSIS W/ REFLEX CULTURE    Collection Time: 02/12/22  9:00 AM    Specimen: Urine   Result Value Ref Range    Color Yellow/Straw      Appearance Turbid (A) Clear      Specific gravity 1.017 1.003 - 1.030      pH (UA) 5.0 5.0 - 8.0      Protein 100 (A) Negative mg/dL    Glucose Negative Negative mg/dL    Ketone 5 (A) Negative mg/dL    Bilirubin Negative Negative      Blood Small (A) Negative      Urobilinogen 0.1 0.1 - 1.0 EU/dL    Nitrites Negative Negative      Leukocyte Esterase Moderate (A) Negative      UA:UC IF INDICATED Urine Culture Ordered (A) Culture not indicated by UA result      WBC >100 (H) 0 - 4 /hpf    RBC 20-50 0 - 5 /hpf    Bacteria Negative Negative /hpf    Other Urine Micro Present     PROCALCITONIN    Collection Time: 02/12/22 10:00 AM   Result Value Ref Range    Procalcitonin 0.54 (H) 0 ng/mL   SED RATE (ESR)    Collection Time: 02/12/22 10:00 AM   Result Value Ref Range    Sed rate, automated 9 mm/hr   GLUCOSE, POC    Collection Time: 02/12/22 10:57 AM   Result Value Ref Range    Glucose (POC) 93 65 - 117 mg/dL    Performed by ADAM COREY          Assessment / Plan:  Septic shock:  Unclear source  Start zosyn and vancomycin  Will follow blood culture  Continue levophed and titrate as needed    Acute hypoxemic respiratory failure: Unclear cause  Continue supplemental oxygen  Continue antibiotics  Patient is getting HD today for volume management. Will consult pulmonary    Acute encephalopathy  Likely s/s infection vs seizures  Continue antibiotics  Continue anti epileptics    NSTEMI:  S/s tachyarrhythmias, cardiology recommends to treat underlying condition. Cardiology consulted      Acute blood loss anemia:  Initial Hb 7.6, s/p 1 unit of PRBC  Hb 8.8 this am.  Continue pantoprazole  Appreciate GI consult    Seizures:  Continue anti epileptics  Will consult neurology    SSS with PPM not on AC    ESRD on HD  Plan for HD today    Chronic superficial thrombophlebitis of RLE  Monitor      less than 18.5 Underweight / Body mass index is 17.43 kg/m². Code status: Full  Prophylaxis: SCD's  Recommended Disposition: SNF/LTC       Critical care Time spent 35 minutes involving direct patient care as well as reviewing patient's labs and coordination of care with nursing staff     Care Plan discussed with: Patient/Family/RN/Case Management        Total time spent with patient: 35 minutes.

## 2022-02-13 ENCOUNTER — ANESTHESIA EVENT (OUTPATIENT)
Dept: ENDOSCOPY | Age: 72
DRG: 377 | End: 2022-02-13
Payer: MEDICARE

## 2022-02-13 LAB
ANION GAP SERPL CALC-SCNC: 9 MMOL/L (ref 5–15)
ARTERIAL PATENCY WRIST A: ABNORMAL
BASE DEFICIT BLDA-SCNC: 2.7 MMOL/L (ref 0–2)
BASOPHILS # BLD: 0 K/UL (ref 0–0.1)
BASOPHILS NFR BLD: 0 % (ref 0–1)
BDY SITE: ABNORMAL
BUN SERPL-MCNC: 51 MG/DL (ref 6–20)
BUN/CREAT SERPL: 17 (ref 12–20)
CA-I BLD-MCNC: 7.1 MG/DL (ref 8.5–10.1)
CHLORIDE SERPL-SCNC: 105 MMOL/L (ref 97–108)
CO2 SERPL-SCNC: 24 MMOL/L (ref 21–32)
CREAT SERPL-MCNC: 3.04 MG/DL (ref 0.55–1.02)
DIFFERENTIAL METHOD BLD: ABNORMAL
EOSINOPHIL # BLD: 0 K/UL (ref 0–0.4)
EOSINOPHIL NFR BLD: 0 % (ref 0–7)
ERYTHROCYTE [DISTWIDTH] IN BLOOD BY AUTOMATED COUNT: 16.7 % (ref 11.5–14.5)
GAS FLOW.O2 O2 DELIVERY SYS: 4 L/MIN
GLUCOSE SERPL-MCNC: 74 MG/DL (ref 65–100)
HCO3 BLDA-SCNC: 23 MMOL/L (ref 22–26)
HCT VFR BLD AUTO: 23.6 % (ref 35–47)
HCT VFR BLD AUTO: 24.7 % (ref 35–47)
HGB BLD-MCNC: 7.2 G/DL (ref 11.5–16)
HGB BLD-MCNC: 7.7 G/DL (ref 11.5–16)
IMM GRANULOCYTES # BLD AUTO: 0 K/UL (ref 0–0.04)
IMM GRANULOCYTES NFR BLD AUTO: 0 % (ref 0–0.5)
LYMPHOCYTES # BLD: 1.6 K/UL (ref 0.8–3.5)
LYMPHOCYTES NFR BLD: 16 % (ref 12–49)
MCH RBC QN AUTO: 31.3 PG (ref 26–34)
MCHC RBC AUTO-ENTMCNC: 31.2 G/DL (ref 30–36.5)
MCV RBC AUTO: 100.4 FL (ref 80–99)
MONOCYTES # BLD: 0.8 K/UL (ref 0–1)
MONOCYTES NFR BLD: 9 % (ref 5–13)
MRSA DNA SPEC QL NAA+PROBE: NOT DETECTED
NEUTS SEG # BLD: 7.5 K/UL (ref 1.8–8)
NEUTS SEG NFR BLD: 75 % (ref 32–75)
NRBC # BLD: 0 K/UL (ref 0–0.01)
NRBC BLD-RTO: 0 PER 100 WBC
PCO2 BLDA: 42 MMHG (ref 35–45)
PH BLDA: 7.34 [PH] (ref 7.35–7.45)
PLATELET # BLD AUTO: 143 K/UL (ref 150–400)
PMV BLD AUTO: 10.6 FL (ref 8.9–12.9)
PO2 BLDA: 153 MMHG (ref 75–100)
POTASSIUM SERPL-SCNC: 4.8 MMOL/L (ref 3.5–5.1)
RBC # BLD AUTO: 2.46 M/UL (ref 3.8–5.2)
SAO2 % BLD: >100 %
SAO2% DEVICE SAO2% SENSOR NAME: ABNORMAL
SODIUM SERPL-SCNC: 138 MMOL/L (ref 136–145)
VANCOMYCIN SERPL-MCNC: 10.8 UG/ML
WBC # BLD AUTO: 9.9 K/UL (ref 3.6–11)

## 2022-02-13 PROCEDURE — C9113 INJ PANTOPRAZOLE SODIUM, VIA: HCPCS | Performed by: HOSPITALIST

## 2022-02-13 PROCEDURE — 85025 COMPLETE CBC W/AUTO DIFF WBC: CPT

## 2022-02-13 PROCEDURE — 85018 HEMOGLOBIN: CPT

## 2022-02-13 PROCEDURE — 74011250637 HC RX REV CODE- 250/637: Performed by: HOSPITALIST

## 2022-02-13 PROCEDURE — 77010033678 HC OXYGEN DAILY

## 2022-02-13 PROCEDURE — 74011250636 HC RX REV CODE- 250/636: Performed by: INTERNAL MEDICINE

## 2022-02-13 PROCEDURE — 74011250636 HC RX REV CODE- 250/636: Performed by: HOSPITALIST

## 2022-02-13 PROCEDURE — 65610000006 HC RM INTENSIVE CARE

## 2022-02-13 PROCEDURE — 74011000250 HC RX REV CODE- 250: Performed by: HOSPITALIST

## 2022-02-13 PROCEDURE — 80202 ASSAY OF VANCOMYCIN: CPT

## 2022-02-13 PROCEDURE — 36600 WITHDRAWAL OF ARTERIAL BLOOD: CPT

## 2022-02-13 PROCEDURE — 82803 BLOOD GASES ANY COMBINATION: CPT

## 2022-02-13 PROCEDURE — 80048 BASIC METABOLIC PNL TOTAL CA: CPT

## 2022-02-13 PROCEDURE — 74011000258 HC RX REV CODE- 258: Performed by: INTERNAL MEDICINE

## 2022-02-13 PROCEDURE — 36415 COLL VENOUS BLD VENIPUNCTURE: CPT

## 2022-02-13 RX ORDER — SODIUM CHLORIDE 0.9 % (FLUSH) 0.9 %
5-40 SYRINGE (ML) INJECTION EVERY 8 HOURS
Status: CANCELLED | OUTPATIENT
Start: 2022-02-13

## 2022-02-13 RX ORDER — SODIUM CHLORIDE 9 MG/ML
75 INJECTION, SOLUTION INTRAVENOUS CONTINUOUS
Status: CANCELLED | OUTPATIENT
Start: 2022-02-13

## 2022-02-13 RX ORDER — SODIUM CHLORIDE 0.9 % (FLUSH) 0.9 %
5-40 SYRINGE (ML) INJECTION AS NEEDED
Status: CANCELLED | OUTPATIENT
Start: 2022-02-13

## 2022-02-13 RX ADMIN — LEVETIRACETAM 1000 MG: 250 TABLET, FILM COATED ORAL at 08:54

## 2022-02-13 RX ADMIN — OXCARBAZEPINE 300 MG: 300 TABLET, FILM COATED ORAL at 21:21

## 2022-02-13 RX ADMIN — LEVOTHYROXINE SODIUM 150 MCG: 0.07 TABLET ORAL at 08:55

## 2022-02-13 RX ADMIN — LACOSAMIDE 200 MG: 100 TABLET, FILM COATED ORAL at 08:54

## 2022-02-13 RX ADMIN — PIPERACILLIN SODIUM AND TAZOBACTAM SODIUM 3.38 G: 3; .375 INJECTION, POWDER, LYOPHILIZED, FOR SOLUTION INTRAVENOUS at 09:03

## 2022-02-13 RX ADMIN — ATORVASTATIN CALCIUM 40 MG: 40 TABLET, FILM COATED ORAL at 08:54

## 2022-02-13 RX ADMIN — Medication 6 MCG/MIN: at 11:59

## 2022-02-13 RX ADMIN — FLUOXETINE HYDROCHLORIDE 10 MG: 10 CAPSULE ORAL at 09:11

## 2022-02-13 RX ADMIN — ACETAMINOPHEN 650 MG: 325 TABLET ORAL at 12:04

## 2022-02-13 RX ADMIN — OXCARBAZEPINE 300 MG: 300 TABLET, FILM COATED ORAL at 00:13

## 2022-02-13 RX ADMIN — SODIUM CHLORIDE, PRESERVATIVE FREE 10 ML: 5 INJECTION INTRAVENOUS at 14:00

## 2022-02-13 RX ADMIN — SEVELAMER CARBONATE 800 MG: 800 TABLET, FILM COATED ORAL at 08:56

## 2022-02-13 RX ADMIN — SODIUM CHLORIDE, PRESERVATIVE FREE 40 MG: 5 INJECTION INTRAVENOUS at 08:55

## 2022-02-13 RX ADMIN — LEVETIRACETAM 1000 MG: 250 TABLET, FILM COATED ORAL at 21:17

## 2022-02-13 RX ADMIN — SODIUM CHLORIDE, PRESERVATIVE FREE 10 ML: 5 INJECTION INTRAVENOUS at 21:27

## 2022-02-13 RX ADMIN — OXCARBAZEPINE 150 MG: 150 TABLET, FILM COATED ORAL at 08:54

## 2022-02-13 RX ADMIN — SODIUM CHLORIDE, PRESERVATIVE FREE 10 ML: 5 INJECTION INTRAVENOUS at 12:04

## 2022-02-13 RX ADMIN — MIDODRINE HYDROCHLORIDE 2.5 MG: 5 TABLET ORAL at 08:54

## 2022-02-13 RX ADMIN — VANCOMYCIN HYDROCHLORIDE 1000 MG: 1 INJECTION, POWDER, LYOPHILIZED, FOR SOLUTION INTRAVENOUS at 15:38

## 2022-02-13 RX ADMIN — SODIUM CHLORIDE, PRESERVATIVE FREE 40 MG: 5 INJECTION INTRAVENOUS at 21:18

## 2022-02-13 RX ADMIN — MIDODRINE HYDROCHLORIDE 2.5 MG: 5 TABLET ORAL at 21:18

## 2022-02-13 RX ADMIN — LACOSAMIDE 200 MG: 100 TABLET, FILM COATED ORAL at 21:18

## 2022-02-13 RX ADMIN — CALCITRIOL CAPSULES 0.25 MCG 0.5 MCG: 0.25 CAPSULE ORAL at 08:54

## 2022-02-13 RX ADMIN — Medication 7 MCG/MIN: at 11:00

## 2022-02-13 RX ADMIN — PIPERACILLIN SODIUM AND TAZOBACTAM SODIUM 3.38 G: 3; .375 INJECTION, POWDER, LYOPHILIZED, FOR SOLUTION INTRAVENOUS at 21:21

## 2022-02-13 NOTE — PROGRESS NOTES
Progress Note    Patient: Nelly Forte MRN: [id number]  SSN: [id number]    YOB: 1950  Age: 71 y.o. Sex: female      Admit Date: 2/11/2022    LOS: 2 days     Subjective:   GI is following for dark tarry stools    2/13/22: Patient lethargic. She is very sleepy but does respond to verbal stimuli. She does answer questions. She is currently on  4 liters of oxygen via nasal cannula. Family at the bedside. She has a history of seizures, CVA with right side weakness, ESRD on hemodialysis. She did receive 1 unti of PRBC's, she is on levophed. She is to have EGD On Monday 2/14/22. History of Present Illness: Alisha James is a 71 y. o. female who is seen in consultation for GI bleed - dark tarry stool. Patient was admitted to the hospital with reports of dark stool since this morning. Multiple ER visits. She was transferred to SNF and was discharged home yesterday. Family noticed dark stools and and red blood per rectum so they called EMS. Upon arrival to the ED, patient became unresponsive with faint pulse. CPR started, she was resuscitated immediately. She was given IVF for hypotension, started levophed, and  placed on ventimask. PMH: H/o seizures with todds paralysis, CVA with right sided weakness, SSS with PPM not on AC, ESRD on HD. She is on Eliquis. EGD on 1/13/22 showed Acute gastric ulcer at anastomosis with hemorrhage, treated with 2 hemoclips and injected with epinephrine.      -ED labs: Hgb 7.6, creatinine 3.72, elevated troponin 62, BNP 5,856. -CXR - Lungs clear; no interstitial or alveolar pulmonary edema       Objective:     Vitals:    02/13/22 0900 02/13/22 1000 02/13/22 1100 02/13/22 1200   BP: (!) 103/56 (!) 103/56 (!) 124/53 (!) 117/54   Pulse: 60 74 83 72   Resp: 18 20 19 18   Temp:       SpO2: 100% 97% 96% 100%   Weight:       Height:            Intake and Output:  Current Shift: No intake/output data recorded.   Last three shifts: 02/11 1901 - 02/13 0700  In: 849.5 [I.V.:849.5]  Out: 1015 [Urine:15]    Physical Exam:   Skin:  Extremities and face reveal no rashes. No brizuela erythema. HEENT: Sclerae anicteric. Extra-occular muscles are intact. Cardiovascular: Regular rate and rhythm. Respiratory:  Diminished bilateral lower bases. GI:  Abdomen nondistended, soft, and nontender. No enlargement of the liver or spleen. No masses palpable. Rectal:  Deferred  Musculoskeletal: Generalized weakness  Neurological:  lethargic  Psychiatric:  Not anxious  Lymphatic:  No visible adenopathy      Lab/Data Review:  Recent Results (from the past 24 hour(s))   GLUCOSE, POC    Collection Time: 02/12/22  4:00 PM   Result Value Ref Range    Glucose (POC) 94 65 - 117 mg/dL    Performed by ADAM COREY    HGB & HCT    Collection Time: 02/12/22  5:45 PM   Result Value Ref Range    HGB 8.1 (L) 11.5 - 16.0 g/dL    HCT 25.9 (L) 35.0 - 47.0 %   TROPONIN-HIGH SENSITIVITY    Collection Time: 02/12/22  5:45 PM   Result Value Ref Range    Troponin-High Sensitivity 732 (HH) 0 - 51 ng/L   MRSA SCREEN - PCR (NASAL)    Collection Time: 02/12/22 10:30 PM   Result Value Ref Range    MRSA by PCR, Nasal Not Detected Not Detected     CBC WITH AUTOMATED DIFF    Collection Time: 02/13/22  4:00 AM   Result Value Ref Range    WBC 9.9 3.6 - 11.0 K/uL    RBC 2.46 (L) 3.80 - 5.20 M/uL    HGB 7.7 (L) 11.5 - 16.0 g/dL    HCT 24.7 (L) 35.0 - 47.0 %    .4 (H) 80.0 - 99.0 FL    MCH 31.3 26.0 - 34.0 PG    MCHC 31.2 30.0 - 36.5 g/dL    RDW 16.7 (H) 11.5 - 14.5 %    PLATELET 144 (L) 958 - 400 K/uL    MPV 10.6 8.9 - 12.9 FL    NRBC 0.0 0.0  WBC    ABSOLUTE NRBC 0.00 0.00 - 0.01 K/uL    NEUTROPHILS 75 32 - 75 %    LYMPHOCYTES 16 12 - 49 %    MONOCYTES 9 5 - 13 %    EOSINOPHILS 0 0 - 7 %    BASOPHILS 0 0 - 1 %    IMMATURE GRANULOCYTES 0 0 - 0.5 %    ABS. NEUTROPHILS 7.5 1.8 - 8.0 K/UL    ABS. LYMPHOCYTES 1.6 0.8 - 3.5 K/UL    ABS. MONOCYTES 0.8 0.0 - 1.0 K/UL    ABS. EOSINOPHILS 0.0 0.0 - 0.4 K/UL    ABS. BASOPHILS 0.0 0.0 - 0.1 K/UL    ABS. IMM. GRANS. 0.0 0.00 - 0.04 K/UL    DF AUTOMATED     METABOLIC PANEL, BASIC    Collection Time: 02/13/22  4:00 AM   Result Value Ref Range    Sodium 138 136 - 145 mmol/L    Potassium 4.8 3.5 - 5.1 mmol/L    Chloride 105 97 - 108 mmol/L    CO2 24 21 - 32 mmol/L    Anion gap 9 5 - 15 mmol/L    Glucose 74 65 - 100 mg/dL    BUN 51 (H) 6 - 20 mg/dL    Creatinine 3.04 (H) 0.55 - 1.02 mg/dL    BUN/Creatinine ratio 17 12 - 20      GFR est AA 18 (L) >60 ml/min/1.73m2    GFR est non-AA 15 (L) >60 ml/min/1.73m2    Calcium 7.1 (L) 8.5 - 10.1 mg/dL   VANCOMYCIN, RANDOM    Collection Time: 02/13/22  4:00 AM   Result Value Ref Range    Vancomycin, random 10.8 ug/mL   BLOOD GAS, ARTERIAL    Collection Time: 02/13/22 10:50 AM   Result Value Ref Range    pH 7.34 (L) 7.35 - 7.45      PCO2 42 35 - 45 mmHg    PO2 153 (H) 75 - 100 mmHg    O2 SAT >100 >95 %    BICARBONATE 23 22 - 26 mmol/L    BASE DEFICIT 2.7 (H) 0 - 2 mmol/L    O2 METHOD Nasal Cannula      O2 FLOW RATE 4 L/min    SITE Left Brachial      ERNESTINE'S TEST PASS                DUPLEX LOWER EXT VENOUS RIGHT   Final Result      CT HEAD WO CONT   Final Result   No acute intracranial abnormality. Old ischemic disease in the high left parietal lobe and in the left basal   ganglion. There is significant atrophy with significant deepening of the left sylvian   fissure. CT ABD PELV WO CONT   Final Result   Fluid overload. No evidence to suggest a source of GI bleeding      CT dose reduction was achieved through use of a standardized protocol tailored   for this examination and automatic exposure control for dose modulation. XR CHEST PORT   Final Result          Assessment:     Active Problems:    GI bleed (2/11/2022)      Septic shock ([address] (2/11/2022)        Plan:   1.  GI Bleed - dark stools.      -Initial hgb 7.6 1unit PRBC transfused on 2/12     -latest Hgb 7.7 this am . Monitor and transfuse as needed.      -Continue PPI     -No anti-coagulant     -Repeat CBC in am     -EGD Monday 2/14/22      -NPO after midnight       -Please get consentl  2. Anemia      -latest Hgb 7.7 Monitor and transfuse as needed.       - ESRD  3. Hypovolemic shock      -BP initially low, started on levophed. Given blood transfusion      -Pending ICU transfer  4. Acute respiratory failure      -currently on non-rebreather 15L  5. ESRD      -hemodialysis      -creatinine 3.04      -nephrology consulted     Thank you for allowing me to participate in this patients care. Plan discussed with Dr. Natalia Leonardo and he approves.     Signed By: Raffy Hammer NP     February 13, 2022

## 2022-02-13 NOTE — PROGRESS NOTES
Patient: Cami Bergman MRN: [id number]  SSN: [id number]    YOB: 1950  Age: 71 y.o. Sex: female          Subjective:     Patient was seen at bedside this morning, she was comfortably resting. She is on oxygen via nasal cannula at 4 L/min and is not in any respiratory distress. Spoke to ICU nurse at bedside.    -She was being evaluated by GI team at the time of the visit. Objective:     Vitals:    02/13/22 0400 02/13/22 0500 02/13/22 0600 02/13/22 0806   BP: (!) 107/50 (!) 108/52 (!) 115/52    Pulse: 60 60 60    Resp: 14 16     Temp:       SpO2: 100% 100% 100% 100%   Weight:       Height:            Intake and Output:  Yesterday's Intake and Output:  02/12 0701 - 02/13 0700  In: 849.5 [I.V.:849.5]  Out: 1015 [Urine:15]     Physical Exam:   GENERAL: no distress, appears stated age  EYE: negative  Respiratory -no distress. ABDOMEN: Nondistended. Musculoskeletal -normal on inspection. NEUROLOGIC: negative      Dialysis access: cuffed tunneled catheter site right and IJ.       Data Review    Meds    Current Facility-Administered Medications   Medication Dose Route Frequency    levETIRAcetam (KEPPRA) tablet 1,000 mg  1,000 mg Oral BID    Vancomycin - Pharmacy to Dose   Other Rx Dosing/Monitoring    piperacillin-tazobactam (ZOSYN) 3.375 g in 0.9% sodium chloride (MBP/ADV) 100 mL MBP  3.375 g IntraVENous Q12H    heparin (porcine) 1,000 unit/mL injection 3,200 Units  3,200 Units IntraVENous DIALYSIS PRN    0.9% sodium chloride infusion 250 mL  250 mL IntraVENous PRN    sodium chloride (NS) flush 5-40 mL  5-40 mL IntraVENous Q8H    sodium chloride (NS) flush 5-40 mL  5-40 mL IntraVENous PRN    acetaminophen (TYLENOL) tablet 650 mg  650 mg Oral Q6H PRN    Or    acetaminophen (TYLENOL) suppository 650 mg  650 mg Rectal Q6H PRN    polyethylene glycol (MIRALAX) packet 17 g  17 g Oral DAILY PRN    ondansetron (ZOFRAN ODT) tablet 4 mg  4 mg Oral Q8H PRN    Or    ondansetron (ZOFRAN) injection 4 mg  4 mg IntraVENous Q6H PRN    pantoprazole (PROTONIX) 40 mg in 0.9% sodium chloride 10 mL injection  40 mg IntraVENous Q12H    atorvastatin (LIPITOR) tablet 40 mg  40 mg Oral DAILY    calcitRIOL (ROCALTROL) capsule 0.5 mcg  0.5 mcg Oral DAILY    levothyroxine (SYNTHROID) tablet 150 mcg  150 mcg Oral DAILY    FLUoxetine (PROzac) capsule 10 mg  10 mg Oral DAILY    midodrine (PROAMATINE) tablet 2.5 mg  2.5 mg Oral BID    OXcarbazepine (TRILEPTAL) tablet 150 mg  150 mg Oral QAM    OXcarbazepine (TRILEPTAL) tablet 300 mg  300 mg Oral QHS    lacosamide (VIMPAT) tablet 200 mg  200 mg Oral BID    sevelamer carbonate (RENVELA) tab 800 mg  800 mg Oral TID WITH MEALS    zinc oxide-white petrolatum 17-57 % topical paste   Topical PRN    NOREPINephrine (LEVOPHED) 8 mg in 0.9% NS 250ml infusion  0.5-16 mcg/min IntraVENous TITRATE       Lab      Recent Results (from the past 24 hour(s))   GLUCOSE, POC    Collection Time: 02/12/22  4:00 PM   Result Value Ref Range    Glucose (POC) 94 65 - 117 mg/dL    Performed by ADAM COREY    HGB & HCT    Collection Time: 02/12/22  5:45 PM   Result Value Ref Range    HGB 8.1 (L) 11.5 - 16.0 g/dL    HCT 25.9 (L) 35.0 - 47.0 %   TROPONIN-HIGH SENSITIVITY    Collection Time: 02/12/22  5:45 PM   Result Value Ref Range    Troponin-High Sensitivity 732 (HH) 0 - 51 ng/L   MRSA SCREEN - PCR (NASAL)    Collection Time: 02/12/22 10:30 PM   Result Value Ref Range    MRSA by PCR, Nasal Not Detected Not Detected     CBC WITH AUTOMATED DIFF    Collection Time: 02/13/22  4:00 AM   Result Value Ref Range    WBC 9.9 3.6 - 11.0 K/uL    RBC 2.46 (L) 3.80 - 5.20 M/uL    HGB 7.7 (L) 11.5 - 16.0 g/dL    HCT 24.7 (L) 35.0 - 47.0 %    .4 (H) 80.0 - 99.0 FL    MCH 31.3 26.0 - 34.0 PG    MCHC 31.2 30.0 - 36.5 g/dL    RDW 16.7 (H) 11.5 - 14.5 %    PLATELET 109 (L) 861 - 400 K/uL    MPV 10.6 8.9 - 12.9 FL    NRBC 0.0 0.0  WBC    ABSOLUTE NRBC 0.00 0.00 - 0.01 K/uL    NEUTROPHILS 75 32 - 75 %    LYMPHOCYTES 16 12 - 49 %    MONOCYTES 9 5 - 13 %    EOSINOPHILS 0 0 - 7 %    BASOPHILS 0 0 - 1 %    IMMATURE GRANULOCYTES 0 0 - 0.5 %    ABS. NEUTROPHILS 7.5 1.8 - 8.0 K/UL    ABS. LYMPHOCYTES 1.6 0.8 - 3.5 K/UL    ABS. MONOCYTES 0.8 0.0 - 1.0 K/UL    ABS. EOSINOPHILS 0.0 0.0 - 0.4 K/UL    ABS. BASOPHILS 0.0 0.0 - 0.1 K/UL    ABS. IMM.  GRANS. 0.0 0.00 - 0.04 K/UL    DF AUTOMATED     METABOLIC PANEL, BASIC    Collection Time: 02/13/22  4:00 AM   Result Value Ref Range    Sodium 138 136 - 145 mmol/L    Potassium 4.8 3.5 - 5.1 mmol/L    Chloride 105 97 - 108 mmol/L    CO2 24 21 - 32 mmol/L    Anion gap 9 5 - 15 mmol/L    Glucose 74 65 - 100 mg/dL    BUN 51 (H) 6 - 20 mg/dL    Creatinine 3.04 (H) 0.55 - 1.02 mg/dL    BUN/Creatinine ratio 17 12 - 20      GFR est AA 18 (L) >60 ml/min/1.73m2    GFR est non-AA 15 (L) >60 ml/min/1.73m2    Calcium 7.1 (L) 8.5 - 10.1 mg/dL   VANCOMYCIN, RANDOM    Collection Time: 02/13/22  4:00 AM   Result Value Ref Range    Vancomycin, random 10.8 ug/mL   BLOOD GAS, ARTERIAL    Collection Time: 02/13/22 10:50 AM   Result Value Ref Range    pH 7.34 (L) 7.35 - 7.45      PCO2 42 35 - 45 mmHg    PO2 153 (H) 75 - 100 mmHg    O2 SAT >100 >95 %    BICARBONATE 23 22 - 26 mmol/L    BASE DEFICIT 2.7 (H) 0 - 2 mmol/L    O2 METHOD Nasal Cannula      O2 FLOW RATE 4 L/min    SITE Left Brachial      ERNESTINE'S TEST PASS          Lab Results   Component Value Date/Time    Color Yellow/Straw 02/12/2022 09:00 AM    Appearance Turbid (A) 02/12/2022 09:00 AM    Specific gravity 1.017 02/12/2022 09:00 AM    pH (UA) 5.0 02/12/2022 09:00 AM    Protein 100 (A) 02/12/2022 09:00 AM    Glucose Negative 02/12/2022 09:00 AM    Ketone 5 (A) 02/12/2022 09:00 AM    Bilirubin Negative 02/12/2022 09:00 AM    Urobilinogen 0.1 02/12/2022 09:00 AM    Nitrites Negative 02/12/2022 09:00 AM    Leukocyte Esterase Moderate (A) 02/12/2022 09:00 AM    Epithelial cells Moderate (A) 05/20/2021 12:56 AM    Bacteria Negative 02/12/2022 09:00 AM    WBC >100 (H) 02/12/2022 09:00 AM    RBC 20-50 02/12/2022 09:00 AM       Imaging         Assessment & Plan: Active Problems:    GI bleed (2/11/2022)      Septic shock ([address] (2/11/2022)    1. ESRD. Volume overloaded ,   Will dialyze again tomorrow with 2-3 Litres volume removal   -She also has massive bilateral effusions which need to be tapped if she has recurrent respiratory symptoms as do not improve with dialysis.     2. Anemia due to ESRD and GI bleed   -  Received 1 U PRBC yesterday   -Secondary to GI bleed. GI evaluation pending.  -Was started on Protonix drip.  -Hemoglobin 7.7 today, changed from 8.8 a day ago. -We will also add a Procrit with dialysis tomorrow.  -Transfusion as needed to keep hemoglobin more than 7     3  Hypotension -probably from acute blood loss anemia. She has received PRBCs and is also on low-dose Levophed.     4 Secondary hyperparathyroidism of ESRD. Will check Serum calcium, Phos, PTH levels. Continue Phosphate binders once cleared for po intake      5. B/L Pleural effusions , CHF  H/o AICD placement-  For possible IR guided thoracentesis if respiratory symptoms recurr after dialysis   -HD tomorrow /    6. Seizure disorder - resumed on Home meds     Signed By: Manish Meza MD     February 13, 2022      This note was prepared using voice recognition system and is likely to have sound alike errors that may have been overlooked even during proofreading.   Please contact the author for any clarifications

## 2022-02-13 NOTE — PROGRESS NOTES
Day #2 of Vancomycin  Consult provided for this 71 y.o. female for indication of sepsis. Antibiotic regimen:  Vancomycin + Zosyn  Concomitant nephrotoxic drugs: None  Frequency of BMP: Not scheduled    Recent Labs     22  0400 22  0735 22  0359 22  1545 22  1256 22  1256   WBC 9.9 14.2*  --   --   --  5.7   CREA 3.04*  --  3.95* 3.72*   < > 3.84*   BUN 51*  --  82* 78*   < > 75*    < > = values in this interval not displayed.      Est CrCl: ESRD on HD  Temp (24hrs), Av.1 °F (36.7 °C), Min:97.8 °F (36.6 °C), Max:98.6 °F (37 °C)    Cultures:    Blood: Pending   Urine: >100,000 col/mL, cx pending    MRSA Swab: Not detected     Target range: Trough 20-25 mcg/mL    Recent level history:  Date/Time Dose & Interval Measured Level (mcg/mL)    @ 0400 1000 mg x 1 10.8 (post-HD)        Assessment/Plan:   Afebrile, leukocytosis improved, moderate procal elevation  ESRD on HD MWF, dialysis session yesterday  Post-dialysis level this morning was 10.8, will give 1000 mg x 1 and check a random pre-HD level tomorrow AM  Antimicrobial stop date TBD

## 2022-02-13 NOTE — ANESTHESIA PREPROCEDURE EVALUATION
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)   (+) Atrial fibrillation (HCC)   (+) Cardiac pacemaker in situ   (+) SSS (sick sinus syndrome) (HCC)      RENAL FAILURE   (+) End stage renal failure on dialysis ([facility]      ENDOCRINE   (+) Acquired hypothyroidism   (+) Type II diabetes mellitus (HCC)      HEMATOLOGY   (+) Acute on chronic anemia       Anesthetic History     PONV          Review of Systems / Medical History  Patient summary reviewed, nursing notes reviewed and pertinent labs reviewed    Pulmonary        Sleep apnea  Shortness of breath  Asthma     Comments: COVID19 (+)     ACUTE RESPIRATORY FAILURE. Neuro/Psych     seizures  CVA  TIA, headaches and psychiatric history (ANXIETY, DEPRESSION. )    Comments: MIGRAINES. ACUTE ENCEPHALOPATHY. Cardiovascular    Hypertension      CHF  Dysrhythmias (Sick Sinus Syndrome & Atrial Fibrillation) : atrial fibrillation  Pacemaker (SICK SINUS SYNDROME. ), past MI, PAD and hyperlipidemia      Comments: HYPOVOLEMIC SHOCK     ECHO (1-9-22)  Interpretation Summary    Left Ventricle: Left ventricle size is normal. Normal wall thickness. Normal wall motion. Normal left ventricular systolic function with a visually estimated EF of 60 - 65%. Normal diastolic function.   Aortic Valve: Valve structure is normal. Mild sclerosis of the aortic valve cusps.   Mitral Valve: Mild mitral annular calcification. Moderate transvalvular regurgitation.   Left Atrium: Left atrium is dilated. EKG (2-12-22) Ventricular-paced rhythm with frequent supraventricular complexes and with   occasional and consecutive Premature ventricular complexes with junctional   escape complexes   Abnormal ECG   When compared with ECG of 12-FEB-2022 03:57, (Unconfirmed)   Vent.  rate has increased BY  38 BPM    GI/Hepatic/Renal     GERD    Renal disease (DIALYSED ON ? ): ESRD and dialysis      Comments: GI BLEED ( DARK STOOLS)  Endo/Other    Diabetes: type 2  Hypothyroidism  Blood dyscrasia (Idiopathic Thrombocytopenia with platelet count of 09Z), arthritis and anemia (Hb/Hct 7.7/24.7)     Other Findings   Comments: 02/11/22 1430     PT16.2 High       INR1. 3 High      Eliquis: Last dose on 02-11-22 (?)              Physical Exam    Airway  Mallampati: II  TM Distance: 4 - 6 cm  Neck ROM: normal range of motion   Mouth opening: Normal     Cardiovascular    Rhythm: regular  Rate: normal         Dental    Dentition: Poor dentition     Pulmonary  Breath sounds clear to auscultation               Abdominal  GI exam deferred      Comments: Levophed infusion @ 5 mcg/min. Other Findings   Comments: Results for Des Mckeon (MRN [id number]) as of 12/9/2021 13:19    12/9/2021 09:18  Sodium: 136  Potassium: 4.1  Chloride: 104  CO2: 24  Anion gap: 8  Glucose: 63 (L)  BUN: 26 (H)  Creatinine: 3.06 (H)  BUN/Creatinine ratio: 8 (L)  Calcium: 7.9 (L)  GFR est non-AA: 15 (L)  GFR est AA: 18 (L)  Bilirubin, total: 0.6  Protein, total: 5.5 (L)  Albumin: 2.8 (L)  Globulin: 2.7  A-G Ratio: 1.0 (L)  ALT: 23  AST: 22  Alk.  phosphatase: 112    Results for Des Mckeon (MRN [id number]) as of 12/9/2021 13:19    12/8/2021 08:30  WBC: 4.9  NRBC: 0.0  RBC: 4.20  HGB: 12.7  HCT: 42.3  MCV: 100.7 (H)  MCH: 30.2  MCHC: 30.0  RDW: 13.6  PLATELET: 74 (L)         Anesthetic Plan    ASA: 4  Anesthesia type: total IV anesthesia and MAC          Induction: Intravenous  Anesthetic plan and risks discussed with: Patient

## 2022-02-13 NOTE — PROGRESS NOTES
Pulmonary Progress Note      NAME: Nelly Forte   :  1950  MRM:  [id number]    Date/Time: 2022  6:20 PM         Subjective:     Patient seen and examined. Discussed with the ICU team.  Her granddaughter is at the bedside. She is now on 4 L oxygen. Saturation 100%. Much more comfortable. She is for EGD tomorrow. She had hemodialysis yesterday. Past Medical History reviewed and unchanged from Admission History and Physical       Objective:     Physical Exam     Vitals:      Last 24hrs VS reviewed since prior progress note. Most recent are:    Visit Vitals  /64 (BP 1 Location: Left upper arm, BP Patient Position: At rest)   Pulse 64   Temp 98 °F (36.7 °C)   Resp 18   Ht 5' 6\" (1.676 m)   Wt 75 kg (165 lb 5.5 oz)   SpO2 100%   Breastfeeding No   BMI 26.69 kg/m²     SpO2 Readings from Last 6 Encounters:   22 100%   22 98%   22 99%   22 97%   22 100%   22 100%    O2 Flow Rate (L/min): 2 l/min       Intake/Output Summary (Last 24 hours) at 2022 1820  Last data filed at 2022 0600  Gross per 24 hour   Intake 849.54 ml   Output 15 ml   Net 834.54 ml      GENERAL:  The patient is an elderly frail white female who appears to be comfortable. On 4 L of oxygen. Dennisa Bridgette HEENT:  Shows pupils are equal and reactive to light. Pallor is noted. No icterus. Nasal passages are patent. She appears to be edentulous. No obvious thrush. NECK:  Supple. Trachea is central.  No JVD or lymphadenopathy. She is not using accessory muscle of respiration. She has a tunneled hemodialysis catheter on the right side of the neck. CHEST:  Symmetrical with equal, but diminished air entry bilaterally. She has very diminished breath sounds over the lung bases, but impaired percussion due to bilateral pleural effusions. HEART:  Rhythm is regular without any loud murmur or gallop. ABDOMEN:  Obese and benign. Bowel sounds are audible.   EXTREMITIES:  Did not show any cyanosis or clubbing. Pulses are palpable. She does have chronic-appearing pitting edema. She appears to have a chronic wound on her left leg as well. There are mild areas of ecchymosis noted. NEUROLOGIC:  Examination is grossly intact. SKIN:  Warm and dry. DUPLEX LOWER EXT VENOUS RIGHT   Final Result      CT HEAD WO CONT   Final Result   No acute intracranial abnormality. Old ischemic disease in the high left parietal lobe and in the left basal   ganglion. There is significant atrophy with significant deepening of the left sylvian   fissure. CT ABD PELV WO CONT   Final Result   Fluid overload. No evidence to suggest a source of GI bleeding      CT dose reduction was achieved through use of a standardized protocol tailored   for this examination and automatic exposure control for dose modulation.       XR CHEST PORT   Final Result          Lab Data Reviewed: (see below)      Medications:  Current Facility-Administered Medications   Medication Dose Route Frequency    [START ON 2/14/2022] Vancomycin - Please draw level prior to HD 2/14   Other ONCE    levETIRAcetam (KEPPRA) tablet 1,000 mg  1,000 mg Oral BID    Vancomycin - Pharmacy to Dose   Other Rx Dosing/Monitoring    piperacillin-tazobactam (ZOSYN) 3.375 g in 0.9% sodium chloride (MBP/ADV) 100 mL MBP  3.375 g IntraVENous Q12H    heparin (porcine) 1,000 unit/mL injection 3,200 Units  3,200 Units IntraVENous DIALYSIS PRN    0.9% sodium chloride infusion 250 mL  250 mL IntraVENous PRN    sodium chloride (NS) flush 5-40 mL  5-40 mL IntraVENous Q8H    sodium chloride (NS) flush 5-40 mL  5-40 mL IntraVENous PRN    acetaminophen (TYLENOL) tablet 650 mg  650 mg Oral Q6H PRN    Or    acetaminophen (TYLENOL) suppository 650 mg  650 mg Rectal Q6H PRN    polyethylene glycol (MIRALAX) packet 17 g  17 g Oral DAILY PRN    ondansetron (ZOFRAN ODT) tablet 4 mg  4 mg Oral Q8H PRN    Or    ondansetron (ZOFRAN) injection 4 mg  4 mg IntraVENous Q6H PRN    pantoprazole (PROTONIX) 40 mg in 0.9% sodium chloride 10 mL injection  40 mg IntraVENous Q12H    atorvastatin (LIPITOR) tablet 40 mg  40 mg Oral DAILY    calcitRIOL (ROCALTROL) capsule 0.5 mcg  0.5 mcg Oral DAILY    levothyroxine (SYNTHROID) tablet 150 mcg  150 mcg Oral DAILY    FLUoxetine (PROzac) capsule 10 mg  10 mg Oral DAILY    midodrine (PROAMATINE) tablet 2.5 mg  2.5 mg Oral BID    OXcarbazepine (TRILEPTAL) tablet 150 mg  150 mg Oral QAM    OXcarbazepine (TRILEPTAL) tablet 300 mg  300 mg Oral QHS    lacosamide (VIMPAT) tablet 200 mg  200 mg Oral BID    [Held by provider] sevelamer carbonate (RENVELA) tab 800 mg  800 mg Oral TID WITH MEALS    zinc oxide-white petrolatum 17-57 % topical paste   Topical PRN    NOREPINephrine (LEVOPHED) 8 mg in 0.9% NS 250ml infusion  0.5-16 mcg/min IntraVENous TITRATE       ______________________________________________________________________      Lab Review:     Recent Labs     02/13/22  1659 02/13/22  0400 02/12/22  1745 02/12/22  0735 02/12/22  0735 02/11/22  1430 02/11/22  1256   WBC  --  9.9  --   --  14.2*  --  5.7   HGB 7.2* 7.7* 8.1*   < > 8.8*   < > 7.6*   HCT 23.6* 24.7* 25.9*   < > 28.5*   < > 26.0*   PLT  --  143*  --   --  170  --  100*    < > = values in this interval not displayed. Recent Labs     02/13/22  0400 02/12/22  0359 02/11/22  1545 02/11/22  1430 02/11/22  1256 02/11/22  1256    139 141  --    < > 138   K 4.8 5.5* 5.0  --    < > 6.5*    109* 109*  --    < > 109*   CO2 24 22 22  --    < > 16*   GLU 74 90 56*  --    < > 92   BUN 51* 82* 78*  --    < > 75*   CREA 3.04* 3.95* 3.72*  --    < > 3.84*   CA 7.1* 6.9* 6.6*  --    < > 6.7*   ALB  --   --   --   --   --  1.3*   ALT  --   --   --   --   --  33   INR  --   --   --  1.3*  --   --     < > = values in this interval not displayed.      No components found for: [address] [facility] [address]     02/13/22  1050   PH 7.34*   PCO2 42   PO2 153*   HCO3 23     Recent Labs     02/11/22  1430 INR 1.3*       Other pertinent lab:   Chest x-ray done on 02/11/2022 was personally reviewed. The report shows no acute process; however, she does appear to have hypoventilatory changes and pulmonary vascular congestion. CT of the abdomen and pelvis done on 02/11/2022 was also reviewed. The lung bases show large bilateral pleural effusions with associated atelectasis. Assessment & Plan:      1. The patient has acute hypoxic respiratory failure. This is due to fluid overload. CT of the abdomen and pelvis basically shows large bilateral pleural effusions with associated atelectasis. Continue with dialysis and try to remove as much fluid as possible. I believe that with ongoing dialysis, there will be an improvement in her oxygen requirement. She had hemodialysis yesterday. This morning she is much more comfortable on 4 L oxygen. Blood gases done this morning showed 7.3 4/42/153 on 4 L oxygen. We will wean down oxygen to keep saturation more than 92%. Discussed with RN. It is noted that she is empirically started on vancomycin and Zosyn. Clinical suspicion for pneumonia is relatively low; however, antibiotics can be continued. 2.  End-stage renal disease. on hemodialysis. However, she is relatively hypotensive and is on a small dose of Levophed, currently at 4 mics. Target map of 65 and higher. 3.  Gastrointestinal bleed. Apparently, she had black tarry stools and also bright red blood per rectum for which paramedics were called and she was brought over to the emergency room. Gastroenterology is consulted. She received 1 unit of packed red blood cells. Continue to monitor hemoglobin and hematocrit. She is on Protonix intravenously. She is for EGD in the morning. 4.  Multiple cardiac issues including atrial fibrillation, history of atrioventricular block, and pacemaker insertion. Her troponins are elevated. Cardiology is consulted. Echocardiogram is pending.   Stress test in 11/2021 showed no ischemia. 5.  History of stroke and seizure. 6.  Hypertension and dyslipidemia. 7.  Diabetes and depression. 8.  No chemical deep venous thrombosis prophylaxis due to gastrointestinal bleed. Consider sequential compression devices to the lower extremities. Right lower extremity venous Dopplers were done on 12/12/2022 showing chronic DVT in the right greater saphenous vein. 9.  Hypothyroidism.     Thank you for allowing me to participate in the care of this patient. I will follow the patient closely with you. She is critically ill. Discussed with the ICU team.  More than 50 minutes spent with patient care. She mayalso have underlying sleep disordered breathing. She is apparently a full code.        Kiel Mckeon MD

## 2022-02-13 NOTE — PROGRESS NOTES
Progress Note    Patient: Katarina Lafleur MRN: [id number]  SSN: [id number]    YOB: 1950  Age: 71 y.o. Sex: female      Admit Date: 2/11/2022    LOS: 2 days     Subjective:     72F, H/o seizures with todds paralysis, CVA with right sided weakness, SSS with PPM not on AC, ESRD on HD with blood in stools s/t UGIB with ABLA s/t hypovolemic shock in the setting of pleural effusion with acute hypoxic respiratory failure. HOSPITAL COURSE:   Started with 1 PRBC, on levophed at 10, underwent HD, EGD upon stability, o2 requirements weaned to 4L. Plan for HD and thoracenthesis if pleural effusion doesnot improve. Review of Systems:  Cannot be ascertained due to mental statsu      Objective:     Vitals:    02/13/22 0400 02/13/22 0500 02/13/22 0600 02/13/22 0806   BP: (!) 107/50 (!) 108/52 (!) 115/52    Pulse: 60 60 60    Resp: 14 16     Temp:       SpO2: 100% 100% 100% 100%   Weight:       Height:            Intake and Output:  Current Shift: No intake/output data recorded. Last three shifts: 02/11 1901 - 02/13 0700  In: 849.5 [I.V.:849.5]  Out: 1015 [Urine:15]      Physical Exam:   Physical Epale, but answering questions , on 4L NC  HEENT : PERRLA, normal oral mucosa, atraumatic normocephalic, Normal ear and nose. oropharynx  Neck : Supple, no JVD, no masses noted, no carotid bruit  Lungs :decreased breath sounds bilaterally  CVS : tachycardic  Abdomen : soft, non tender  Extremities : pale  Musculoskeletal : cannot be assessed  Skin : Moist, warm, skin turgor, no pathological rash  Lymphatic : No cervical lymphadenopathy.   Neurological : cannot be assessed  Psychiatric : cannot be assesed      Lab/Data Review:  Recent Results (from the past 24 hour(s))   GLUCOSE, POC    Collection Time: 02/12/22  4:00 PM   Result Value Ref Range    Glucose (POC) 94 65 - 117 mg/dL    Performed by ADAM COREY    HGB & HCT    Collection Time: 02/12/22  5:45 PM   Result Value Ref Range    HGB 8.1 (L) 11.5 - 16.0 g/dL    HCT 25.9 (L) 35.0 - 47.0 %   TROPONIN-HIGH SENSITIVITY    Collection Time: 02/12/22  5:45 PM   Result Value Ref Range    Troponin-High Sensitivity 732 (HH) 0 - 51 ng/L   MRSA SCREEN - PCR (NASAL)    Collection Time: 02/12/22 10:30 PM   Result Value Ref Range    MRSA by PCR, Nasal Not Detected Not Detected     CBC WITH AUTOMATED DIFF    Collection Time: 02/13/22  4:00 AM   Result Value Ref Range    WBC 9.9 3.6 - 11.0 K/uL    RBC 2.46 (L) 3.80 - 5.20 M/uL    HGB 7.7 (L) 11.5 - 16.0 g/dL    HCT 24.7 (L) 35.0 - 47.0 %    .4 (H) 80.0 - 99.0 FL    MCH 31.3 26.0 - 34.0 PG    MCHC 31.2 30.0 - 36.5 g/dL    RDW 16.7 (H) 11.5 - 14.5 %    PLATELET 058 (L) 888 - 400 K/uL    MPV 10.6 8.9 - 12.9 FL    NRBC 0.0 0.0  WBC    ABSOLUTE NRBC 0.00 0.00 - 0.01 K/uL    NEUTROPHILS 75 32 - 75 %    LYMPHOCYTES 16 12 - 49 %    MONOCYTES 9 5 - 13 %    EOSINOPHILS 0 0 - 7 %    BASOPHILS 0 0 - 1 %    IMMATURE GRANULOCYTES 0 0 - 0.5 %    ABS. NEUTROPHILS 7.5 1.8 - 8.0 K/UL    ABS. LYMPHOCYTES 1.6 0.8 - 3.5 K/UL    ABS. MONOCYTES 0.8 0.0 - 1.0 K/UL    ABS. EOSINOPHILS 0.0 0.0 - 0.4 K/UL    ABS. BASOPHILS 0.0 0.0 - 0.1 K/UL    ABS. IMM.  GRANS. 0.0 0.00 - 0.04 K/UL    DF AUTOMATED     METABOLIC PANEL, BASIC    Collection Time: 02/13/22  4:00 AM   Result Value Ref Range    Sodium 138 136 - 145 mmol/L    Potassium 4.8 3.5 - 5.1 mmol/L    Chloride 105 97 - 108 mmol/L    CO2 24 21 - 32 mmol/L    Anion gap 9 5 - 15 mmol/L    Glucose 74 65 - 100 mg/dL    BUN 51 (H) 6 - 20 mg/dL    Creatinine 3.04 (H) 0.55 - 1.02 mg/dL    BUN/Creatinine ratio 17 12 - 20      GFR est AA 18 (L) >60 ml/min/1.73m2    GFR est non-AA 15 (L) >60 ml/min/1.73m2    Calcium 7.1 (L) 8.5 - 10.1 mg/dL   VANCOMYCIN, RANDOM    Collection Time: 02/13/22  4:00 AM   Result Value Ref Range    Vancomycin, random 10.8 ug/mL   BLOOD GAS, ARTERIAL    Collection Time: 02/13/22 10:50 AM   Result Value Ref Range    pH 7.34 (L) 7.35 - 7.45      PCO2 42 35 - 45 mmHg    PO2 153 (H) 75 - 100 mmHg    O2 SAT >100 >95 %    BICARBONATE 23 22 - 26 mmol/L    BASE DEFICIT 2.7 (H) 0 - 2 mmol/L    O2 METHOD Nasal Cannula      O2 FLOW RATE 4 L/min    SITE Left Brachial      ERNESTINE'S TEST PASS           Assessment and plan:         (1) hypovolemic shock: levophed to keep MAP > 65     (1) acute encephalopathy: GCS 13, s/t metabolic.      (2) Acute hypoxic respiratory failure : ventimask, wean to keep saturation > 92     (3) ABLA with anemia: 1 PRBC, repeat Hb 9.1. HH Q12. protonix 40 BID     (4) anemia: s.t #3 and AOCI     (5) seizures with todds paralysis: continuetrileptal, fluoxetine, keppra     (6) CVA with right sided weakness: complicates all aspects of care     (7)  SSS with PPM not on AC:      (8) ESRD on HD with hyperkalemia: hyperkalemia protocol.      DVT ppx: SCDs     DISPO: P  Discussed with family- they will discuss amongst themselves for DNR DNI, and 48 hours, if course is declinding then further discuss comfort care.      Signed By: Ramona Forte MD     February 13, 2022

## 2022-02-13 NOTE — PROGRESS NOTES
CARDIOLOGY PROGRESS NOTE    Patient seen and examined. This is a patient who is followed for atrial fibrillation, history of pacemaker. Resting in bed, says she doesn't feel well but denies chest pain. Breathing stable. No palpitations or dizziness. On levo at 7. No other complaints reported. Telemetry reviewed, Vpaced 60s    Pertinent review of systems items noted above, all other systems are negative. Current medications reviewed. Physical Examination  Vital signs are stable. Blood pressure 115/52, Pulse 60  No apparent distress. Heart is regular, rate and rhythm. Normal S1, S2, no murmurs are appreciated. Lungs are clear bilaterally. Abdomen is distended  Extremities have mild edema. Labs reviewed:    Case discussed with Dr. Louise Fields and our impression and recommendations are as follows:  1. Atrial fibrillation:  Known AF, not on [address] at home though chadsvasc is elevated. Now with GI bleeding, so will not start at this time. Rate is presently controlled, continue to closely monitor on telemetry. 2. High degree AVB s/p PPM:  Normal function per last device transmission. 3. Elevated troponin:  [id number]. Elevation in setting of sepsis, ESRD, GI bleed/anemia. Continues to deny chest pain. Obtain echocardiogram to reassess EF. Echo in Jan with preserved EF.  NST in November 2021 with no ischemia. Continue workup. No ASA given GI bleeding, no bb while on pressors. Med management as able. 4. Hypotension:  On midodrine at home, continue pressors, bp acceptable at present. Please do not hesitate to call me or Dr. Louise Fields if additional questions arise.

## 2022-02-13 NOTE — CONSULTS
[address] [facility]    Name:  Gokul Shirley  MR#:  [id number]  :  1950  ACCOUNT #:  [de-identified]  DATE OF SERVICE:  2022    ATTENDING PHYSICIAN:  Rodríguez Clinton MD    REASON FOR CONSULTATION:  Shortness of breath and bilateral pleural effusions. HISTORY OF PRESENTING ILLNESS:  The patient is a 29-year-old  female patient. Information obtained from current medical records. The patient was evaluated in the emergency room. She is somnolent and is on a nonrebreather mask. She is undergoing hemodialysis. She is complaining of being cold. It appears that she has been on hemodialysis for a long time. She has multiple other medical problems including chronic atrial fibrillation, AV block, status post pacemaker insertion. Also, history of stroke and seizure disorder. She is mostly nonambulatory. She presented to the hospital because the family noticed black tarry stools. Then, bright red blood per rectum. She also became drowsy. Paramedics were called and she was brought over to the emergency room. As per current records, apparently, she was just discharged from the nursing home and went home day before yesterday, on 02/10/2022. Apparently, she had a prior hospitalization with MSSA bacteremia and seizures. In the emergency room, further evaluation ensued. She was noted to be hyperkalemic. Hemoglobin initially was 7.6. She received 1 unit of packed RBCs. She is on small dose of Levophed. So, currently, she is on a nonrebreather mask. She is somnolent, but arousable. She tells me that she did not smoke. She has been vaccinated. No further information can be obtained. Of note, she had a CT of the abdomen and pelvis done which shows very large bilateral pleural effusions with associated atelectasis.     PAST MEDICAL HISTORY:  Significant for end-stage renal disease, on hemodialysis; hypertension; dyslipidemia; chronic atrial fibrillation; history of AV block, status post pacemaker insertion; stroke; and seizure disorder. She has diabetes and migraine headaches as well as depression. ALLERGIES:  LACTULOSE. MEDICATIONS:  Currently, the patient is started on,  1. Small dose of Levophed. 2.  Amiodarone 150 mg IV x1 was given. 3.  Lipitor 40 mg p.o. daily. 4.  Prozac 10 mg p.o. daily. 5.  Vimpat 200 mg p.o. b.i.d.  6.  Keppra 1 g p.o. b.i.d.  7.  Levothyroxine 150 mcg p.o. daily. 8.  Midodrine 2.5 mg p.o. b.i.d.  9.  Trileptal 150 mg p.o. daily in a.m. and 300 mg in p.m.  10.  Protonix 40 mg IV q.12 hourly. 11.  Zosyn 3.375 g IV q.12 hourly. 12.  Renvela 800 mg p.o. t.i.d. with meals. 13.  Vancomycin per pharmacy protocol. 14.  Other p.r.n. medications. SOCIAL HISTORY:  She apparently lives with her son. Denies any history of drug, alcohol, or tobacco abuse. OCCUPATIONAL HISTORY:  Insignificant exposures. FAMILY HISTORY:  Diabetes and heart disease runs in her family. REVIEW OF SYSTEMS:  A complete review of systems was undertaken. Pertinent findings are discussed above. All other systems were reviewed and were negative. PHYSICAL EXAMINATION:  GENERAL:  The patient is an elderly frail white female who is undergoing hemodialysis. VITAL SIGNS:  Her temperature is 97.6, pulse is 85 per minute, respiratory rate is 16 per minute, and blood pressure is 137/77. Saturation is 97% on nonrebreather mask. HEENT:  Shows pupils are equal and reactive to light. Pallor is noted. No icterus. Nasal passages are patent. She has a nonrebreather mask in place. She appears to be edentulous. No obvious thrush. NECK:  Supple. Trachea is central.  No JVD or lymphadenopathy. She is not using accessory muscle of respiration. She has a tunneled hemodialysis catheter on the right side of the neck. CHEST:  Symmetrical with equal, but diminished air entry bilaterally.   She has very diminished breath sounds over the lung bases, but impaired percussion due to bilateral pleural effusions. HEART:  Rhythm is regular without any loud murmur or gallop. ABDOMEN:  Obese and benign. Bowel sounds are audible. EXTREMITIES:  Did not show any cyanosis or clubbing. Pulses are palpable. She does have chronic-appearing pitting edema. She appears to have a chronic wound on her left leg as well. There are mild areas of ecchymosis noted. NEUROLOGIC:  Examination is grossly intact. SKIN:  Warm and dry. DIAGNOSTIC AND LABORATORY DATA:  Chest x-ray done on 02/11/2022 was personally reviewed. The report shows no acute process; however, she does appear to have hypoventilatory changes and pulmonary vascular congestion. CT of the abdomen and pelvis done on 02/11/2022 was also reviewed. The lung bases show large bilateral pleural effusions with associated atelectasis. Troponin is elevated at 1.38. Procalcitonin is 0.54. Urinalysis is showing leukocyte esterase, rbc's, and wbc's. WBC count is 14.2 with neutrophils of 83%, hemoglobin is 8.8 and platelet count is 834. Electrolytes show a sodium of 139, potassium is 5.5, bicarb is 22, BUN is 82, creatinine is 3.95, blood glucose of 90. ASSESSMENT AND PLAN:  1. The patient has acute hypoxic respiratory failure. This is due to fluid overload. CT of the abdomen and pelvis basically shows large bilateral pleural effusions with associated atelectasis. Continue with dialysis and try to remove as much fluid as possible. I believe that with ongoing dialysis, there will be an improvement in her oxygen requirement. I will also obtain a blood gas in the morning. It is noted that she is empirically started on vancomycin and Zosyn. Clinical suspicion for pneumonia is relatively low; however, antibiotics can be continued. 2.  End-stage renal disease. She is currently getting hemodialysis. However, she is relatively hypotensive and is on a small dose of Levophed. 3.  Gastrointestinal bleed.   Apparently, she had black tarry stools and also bright red blood per rectum for which paramedics were called and she was brought over to the emergency room. Gastroenterology is consulted. She received 1 unit of packed red blood cells. Continue to monitor hemoglobin and hematocrit. She is on Protonix intravenously. 4.  Multiple cardiac issues including atrial fibrillation, history of atrioventricular block, and pacemaker insertion. Her troponins are elevated. Cardiology is consulted. Echocardiogram is pending. Stress test in 11/2021 showed no ischemia. 5.  History of stroke and seizure. 6.  Hypertension and dyslipidemia. 7.  Diabetes and depression. 8.  No chemical deep venous thrombosis prophylaxis due to gastrointestinal bleed. Consider sequential compression devices to the lower extremities. 9.  Hypothyroidism. Thank you for allowing me to participate in the care of this patient. I will follow the patient closely with you. She is critically ill. She was evaluated in the emergency room and approximately 1 hour was spent in the management of this patient. If she decompensates, she will need BiPAP. She is apparently a full code.       Heidi Taylor MD      [id number]  D:  02/12/2022 15:38  T:  02/12/2022 23:20  JOB #:  [id number]

## 2022-02-13 NOTE — PROGRESS NOTES
Reason for Admission:                    RUR Score:           PCP: First and Last name:  Barbara Dozier MD     Name of Practice:   Are you a current patient: Yes/No:   Approximate date of last visit:    Can you do a virtual visit with your PCP:              Resources/supports as identified by patient/family:                   Top Challenges facing patient (as identified by patient/family and CM): Finances/Medication cost?   No                 Transportation? NO              Support system or lack thereof? NO                     Living arrangements? NO             Self-care/ADLs/Cognition? Patient was independent prior to admissions          Current Advanced Directive/Advance Care Plan:  Full Code      Healthcare Decision Maker:   Click here to complete [address] including selection of the Healthcare Decision Maker Relationship (ie \"Primary\")      Primary [facility] - [phone]    Payor Source Payor: Lily Sheldon / Plan: [address] / Product Type: Managed Care Medicare /                             Plan for utilizing home health:    Family considers hospice. List provided to daughter -in-law. Transition of Care Plan:                  CM met with patients \"sister friend\" and daughter in law. Patient was unable was not alert or oriented. Patient lives with son and daughter in law. Patients son is her POA. Patient has a hospital bed, wheelchair, and a commode. Patient was a patient of [facility] and [address] [place] [address], but now family considers hospice. Patient also had [facility] shortly thereafter. CM provided family with a list of hospices to make a choice. Daughter in law states that they are about 80 percent sure they will lean towards hospice. Daughter in law stated that she had to speak to her  first to sort out arrangements for the patient, his mother. Patient is also on dialysis. Daughter states that family considers taking patient off of dialysis. Patient saw her PCP on Thursday. Patients daughter in law expressed how fast everything happened so fast and will not make any decisions before she speaks to her . CM expressed to daughter in  Law that it was understandable. CM will continue to follow for any discharge needs.

## 2022-02-14 ENCOUNTER — APPOINTMENT (OUTPATIENT)
Dept: NON INVASIVE DIAGNOSTICS | Age: 72
DRG: 377 | End: 2022-02-14
Attending: NURSE PRACTITIONER
Payer: MEDICARE

## 2022-02-14 ENCOUNTER — APPOINTMENT (OUTPATIENT)
Dept: ENDOSCOPY | Age: 72
DRG: 377 | End: 2022-02-14
Attending: INTERNAL MEDICINE
Payer: MEDICARE

## 2022-02-14 ENCOUNTER — APPOINTMENT (OUTPATIENT)
Dept: GENERAL RADIOLOGY | Age: 72
DRG: 377 | End: 2022-02-14
Attending: INTERNAL MEDICINE
Payer: MEDICARE

## 2022-02-14 ENCOUNTER — ANESTHESIA (OUTPATIENT)
Dept: ENDOSCOPY | Age: 72
DRG: 377 | End: 2022-02-14
Payer: MEDICARE

## 2022-02-14 LAB
ANION GAP SERPL CALC-SCNC: 9 MMOL/L (ref 5–15)
ATRIAL RATE: 0 BPM
BACTERIA SPEC CULT: NORMAL
BACTERIA SPEC CULT: NORMAL
BASOPHILS # BLD: 0 K/UL (ref 0–0.1)
BASOPHILS NFR BLD: 1 % (ref 0–1)
BUN SERPL-MCNC: 54 MG/DL (ref 6–20)
BUN/CREAT SERPL: 15 (ref 12–20)
CA-I BLD-MCNC: 6.8 MG/DL (ref 8.5–10.1)
CALCULATED R AXIS, ECG10: -87 DEGREES
CALCULATED T AXIS, ECG11: 91 DEGREES
CHLORIDE SERPL-SCNC: 106 MMOL/L (ref 97–108)
CO2 SERPL-SCNC: 24 MMOL/L (ref 21–32)
CREAT SERPL-MCNC: 3.53 MG/DL (ref 0.55–1.02)
DIAGNOSIS, 93000: NORMAL
DIFFERENTIAL METHOD BLD: ABNORMAL
ECHO AO DESC DIAM: 1.9 CM
ECHO AO DESCENDING AORTA INDEX: 1.01 CM/M2
ECHO AO ROOT DIAM: 3.1 CM
ECHO AO ROOT INDEX: 1.65 CM/M2
ECHO AV AREA PEAK VELOCITY: 2.2 CM2
ECHO AV AREA/BSA PEAK VELOCITY: 1.2 CM2/M2
ECHO AV PEAK GRADIENT: 11 MMHG
ECHO AV PEAK VELOCITY: 1.7 M/S
ECHO AV VELOCITY RATIO: 0.82
ECHO EST RA PRESSURE: 8 MMHG
ECHO LA AREA 2C: 22.4 CM2
ECHO LA DIAMETER INDEX: 2.18 CM/M2
ECHO LA DIAMETER: 4.1 CM
ECHO LA MINOR AXIS: 5.7 CM
ECHO LA TO AORTIC ROOT RATIO: 1.32
ECHO LV FRACTIONAL SHORTENING: 41 % (ref 28–44)
ECHO LV INTERNAL DIMENSION DIASTOLE INDEX: 2.87 CM/M2
ECHO LV INTERNAL DIMENSION DIASTOLIC: 5.4 CM (ref 3.9–5.3)
ECHO LV INTERNAL DIMENSION SYSTOLIC INDEX: 1.7 CM/M2
ECHO LV INTERNAL DIMENSION SYSTOLIC: 3.2 CM
ECHO LV IVSD: 0.9 CM (ref 0.6–0.9)
ECHO LV MASS 2D: 167.4 G (ref 67–162)
ECHO LV MASS INDEX 2D: 89 G/M2 (ref 43–95)
ECHO LV POSTERIOR WALL DIASTOLIC: 0.8 CM (ref 0.6–0.9)
ECHO LV RELATIVE WALL THICKNESS RATIO: 0.3
ECHO LVOT AREA: 2.5 CM2
ECHO LVOT DIAM: 1.8 CM
ECHO LVOT PEAK GRADIENT: 8 MMHG
ECHO LVOT PEAK VELOCITY: 1.4 M/S
ECHO MV A VELOCITY: 0.49 M/S
ECHO MV E DECELERATION TIME (DT): 155 MS
ECHO MV E VELOCITY: 1.1 M/S
ECHO MV E/A RATIO: 2.24
ECHO MV MAX VELOCITY: 1.5 M/S
ECHO MV MEAN GRADIENT: 2 MMHG
ECHO MV MEAN VELOCITY: 0.7 M/S
ECHO MV PEAK GRADIENT: 9 MMHG
ECHO MV REGURGITANT PEAK GRADIENT: 92 MMHG
ECHO MV REGURGITANT PEAK VELOCITY: 4.8 M/S
ECHO MV VTI: 36.6 CM
ECHO RIGHT VENTRICULAR SYSTOLIC PRESSURE (RVSP): 50 MMHG
ECHO RV TAPSE: 1.6 CM (ref 1.5–2)
ECHO TV REGURGITANT MAX VELOCITY: 3.23 M/S
ECHO TV REGURGITANT PEAK GRADIENT: 42 MMHG
EOSINOPHIL # BLD: 0 K/UL (ref 0–0.4)
EOSINOPHIL NFR BLD: 1 % (ref 0–7)
ERYTHROCYTE [DISTWIDTH] IN BLOOD BY AUTOMATED COUNT: 16.7 % (ref 11.5–14.5)
GLUCOSE BLD STRIP.AUTO-MCNC: 102 MG/DL (ref 65–117)
GLUCOSE BLD STRIP.AUTO-MCNC: 61 MG/DL (ref 65–117)
GLUCOSE BLD STRIP.AUTO-MCNC: 64 MG/DL (ref 65–117)
GLUCOSE BLD STRIP.AUTO-MCNC: 66 MG/DL (ref 65–117)
GLUCOSE SERPL-MCNC: 51 MG/DL (ref 65–100)
HCT VFR BLD AUTO: 24.1 % (ref 35–47)
HGB BLD-MCNC: 7.4 G/DL (ref 11.5–16)
IMM GRANULOCYTES # BLD AUTO: 0 K/UL (ref 0–0.04)
IMM GRANULOCYTES NFR BLD AUTO: 0 % (ref 0–0.5)
LYMPHOCYTES # BLD: 1.4 K/UL (ref 0.8–3.5)
LYMPHOCYTES NFR BLD: 18 % (ref 12–49)
MCH RBC QN AUTO: 31.5 PG (ref 26–34)
MCHC RBC AUTO-ENTMCNC: 30.7 G/DL (ref 30–36.5)
MCV RBC AUTO: 102.6 FL (ref 80–99)
MONOCYTES # BLD: 0.6 K/UL (ref 0–1)
MONOCYTES NFR BLD: 8 % (ref 5–13)
NEUTS SEG # BLD: 5.4 K/UL (ref 1.8–8)
NEUTS SEG NFR BLD: 72 % (ref 32–75)
NRBC # BLD: 0 K/UL (ref 0–0.01)
NRBC BLD-RTO: 0 PER 100 WBC
PERFORMED BY, TECHID: ABNORMAL
PERFORMED BY, TECHID: ABNORMAL
PERFORMED BY, TECHID: NORMAL
PERFORMED BY, TECHID: NORMAL
PLATELET # BLD AUTO: 124 K/UL (ref 150–400)
PMV BLD AUTO: 10.5 FL (ref 8.9–12.9)
POTASSIUM SERPL-SCNC: 4.6 MMOL/L (ref 3.5–5.1)
Q-T INTERVAL, ECG07: 434 MS
QRS DURATION, ECG06: 152 MS
QTC CALCULATION (BEZET), ECG08: 570 MS
RBC # BLD AUTO: 2.35 M/UL (ref 3.8–5.2)
SODIUM SERPL-SCNC: 139 MMOL/L (ref 136–145)
SPECIAL REQUESTS,SREQ: NORMAL
VANCOMYCIN SERPL-MCNC: 16.3 UG/ML
VENTRICULAR RATE, ECG03: 104 BPM
WBC # BLD AUTO: 7.5 K/UL (ref 3.6–11)

## 2022-02-14 PROCEDURE — 71045 X-RAY EXAM CHEST 1 VIEW: CPT

## 2022-02-14 PROCEDURE — 74011000258 HC RX REV CODE- 258: Performed by: INTERNAL MEDICINE

## 2022-02-14 PROCEDURE — 80048 BASIC METABOLIC PNL TOTAL CA: CPT

## 2022-02-14 PROCEDURE — 74011250636 HC RX REV CODE- 250/636: Performed by: ANESTHESIOLOGY

## 2022-02-14 PROCEDURE — 85025 COMPLETE CBC W/AUTO DIFF WBC: CPT

## 2022-02-14 PROCEDURE — 2709999900 HC NON-CHARGEABLE SUPPLY: Performed by: INTERNAL MEDICINE

## 2022-02-14 PROCEDURE — 76060000031 HC ANESTHESIA FIRST 0.5 HR: Performed by: INTERNAL MEDICINE

## 2022-02-14 PROCEDURE — 0DJ08ZZ INSPECTION OF UPPER INTESTINAL TRACT, VIA NATURAL OR ARTIFICIAL OPENING ENDOSCOPIC: ICD-10-PCS | Performed by: INTERNAL MEDICINE

## 2022-02-14 PROCEDURE — 82962 GLUCOSE BLOOD TEST: CPT

## 2022-02-14 PROCEDURE — 93308 TTE F-UP OR LMTD: CPT

## 2022-02-14 PROCEDURE — 80202 ASSAY OF VANCOMYCIN: CPT

## 2022-02-14 PROCEDURE — 74011000250 HC RX REV CODE- 250: Performed by: HOSPITALIST

## 2022-02-14 PROCEDURE — C9113 INJ PANTOPRAZOLE SODIUM, VIA: HCPCS | Performed by: HOSPITALIST

## 2022-02-14 PROCEDURE — 74011250636 HC RX REV CODE- 250/636: Performed by: HOSPITALIST

## 2022-02-14 PROCEDURE — 65610000006 HC RM INTENSIVE CARE

## 2022-02-14 PROCEDURE — 74011250637 HC RX REV CODE- 250/637: Performed by: HOSPITALIST

## 2022-02-14 PROCEDURE — 74011250636 HC RX REV CODE- 250/636: Performed by: INTERNAL MEDICINE

## 2022-02-14 PROCEDURE — 36415 COLL VENOUS BLD VENIPUNCTURE: CPT

## 2022-02-14 PROCEDURE — 76040000019: Performed by: INTERNAL MEDICINE

## 2022-02-14 RX ORDER — SODIUM CHLORIDE, SODIUM LACTATE, POTASSIUM CHLORIDE, CALCIUM CHLORIDE 600; 310; 30; 20 MG/100ML; MG/100ML; MG/100ML; MG/100ML
INJECTION, SOLUTION INTRAVENOUS
Status: DISCONTINUED | OUTPATIENT
Start: 2022-02-14 | End: 2022-02-14 | Stop reason: HOSPADM

## 2022-02-14 RX ORDER — PROPOFOL 10 MG/ML
INJECTION, EMULSION INTRAVENOUS AS NEEDED
Status: DISCONTINUED | OUTPATIENT
Start: 2022-02-14 | End: 2022-02-14 | Stop reason: HOSPADM

## 2022-02-14 RX ADMIN — LACOSAMIDE 200 MG: 100 TABLET, FILM COATED ORAL at 09:57

## 2022-02-14 RX ADMIN — SODIUM CHLORIDE, PRESERVATIVE FREE 40 MG: 5 INJECTION INTRAVENOUS at 21:22

## 2022-02-14 RX ADMIN — PIPERACILLIN SODIUM AND TAZOBACTAM SODIUM 3.38 G: 3; .375 INJECTION, POWDER, LYOPHILIZED, FOR SOLUTION INTRAVENOUS at 09:02

## 2022-02-14 RX ADMIN — FLUOXETINE HYDROCHLORIDE 10 MG: 10 CAPSULE ORAL at 09:57

## 2022-02-14 RX ADMIN — MIDODRINE HYDROCHLORIDE 2.5 MG: 5 TABLET ORAL at 21:21

## 2022-02-14 RX ADMIN — OXCARBAZEPINE 150 MG: 150 TABLET, FILM COATED ORAL at 09:57

## 2022-02-14 RX ADMIN — SODIUM CHLORIDE, PRESERVATIVE FREE 40 MG: 5 INJECTION INTRAVENOUS at 09:02

## 2022-02-14 RX ADMIN — PROPOFOL 40 MG: 10 INJECTION, EMULSION INTRAVENOUS at 13:00

## 2022-02-14 RX ADMIN — ACETAMINOPHEN 650 MG: 325 TABLET ORAL at 17:23

## 2022-02-14 RX ADMIN — PROPOFOL 40 MG: 10 INJECTION, EMULSION INTRAVENOUS at 13:03

## 2022-02-14 RX ADMIN — LEVETIRACETAM 1000 MG: 250 TABLET, FILM COATED ORAL at 21:22

## 2022-02-14 RX ADMIN — SODIUM CHLORIDE, PRESERVATIVE FREE 10 ML: 5 INJECTION INTRAVENOUS at 21:23

## 2022-02-14 RX ADMIN — ATORVASTATIN CALCIUM 40 MG: 40 TABLET, FILM COATED ORAL at 09:57

## 2022-02-14 RX ADMIN — LACOSAMIDE 200 MG: 100 TABLET, FILM COATED ORAL at 21:22

## 2022-02-14 RX ADMIN — OXCARBAZEPINE 300 MG: 300 TABLET, FILM COATED ORAL at 21:22

## 2022-02-14 RX ADMIN — PIPERACILLIN SODIUM AND TAZOBACTAM SODIUM 3.38 G: 3; .375 INJECTION, POWDER, LYOPHILIZED, FOR SOLUTION INTRAVENOUS at 21:23

## 2022-02-14 RX ADMIN — LEVOTHYROXINE SODIUM 150 MCG: 0.07 TABLET ORAL at 08:58

## 2022-02-14 RX ADMIN — SODIUM CHLORIDE, PRESERVATIVE FREE 10 ML: 5 INJECTION INTRAVENOUS at 17:26

## 2022-02-14 RX ADMIN — MIDODRINE HYDROCHLORIDE 2.5 MG: 5 TABLET ORAL at 09:57

## 2022-02-14 RX ADMIN — SODIUM CHLORIDE, PRESERVATIVE FREE 10 ML: 5 INJECTION INTRAVENOUS at 05:42

## 2022-02-14 RX ADMIN — SODIUM CHLORIDE, POTASSIUM CHLORIDE, SODIUM LACTATE AND CALCIUM CHLORIDE: 600; 310; 30; 20 INJECTION, SOLUTION INTRAVENOUS at 12:55

## 2022-02-14 RX ADMIN — LEVETIRACETAM 1000 MG: 250 TABLET, FILM COATED ORAL at 09:56

## 2022-02-14 RX ADMIN — CALCITRIOL CAPSULES 0.25 MCG 0.5 MCG: 0.25 CAPSULE ORAL at 09:57

## 2022-02-14 NOTE — PROGRESS NOTES
Clinical chart reviewed by CM. Patient had endoscopy done today. Per attending physician, patient's family would like to give her some time for improvement before discussing changing code status and comfort care measures. Discharge plan is pending her progress. CM will continue to follow.

## 2022-02-14 NOTE — PROGRESS NOTES
CARDIOLOGY PROGRESS NOTE    Patient seen and examined. This is a patient who is followed for atrial fibrillation, history of pacemaker. Sleeping this am. No acute distress. On levo at 3. No other complaints reported. Telemetry reviewed, Vpaced 60s    Pertinent review of systems items noted above, all other systems are negative. Current medications reviewed. Physical Examination  Vital signs are stable. Blood pressure 128/53 , Pulse 60  No apparent distress. Heart is regular, rate and rhythm. Normal S1, S2, no murmurs are appreciated. Lungs are clear bilaterally. Abdomen is distended  Extremities have mild edema. Labs reviewed:    Case discussed with Dr. Rain Cote and our impression and recommendations are as follows:    1. Atrial fibrillation:  Known AF, not on [facility] at home though chadsvasc is elevated. Now with GI bleeding,hold [facility] and resume once hgb is stable. Rate is presently controlled, continue to closely monitor on telemetry. 2. High degree AVB s/p PPM:  Normal function per last device transmission. 3. Elevated troponin:  [id number]. Elevation in setting of sepsis, ESRD, GI bleed/anemia. Continues to deny chest pain. Echo pending. Echo in Jan with preserved EF.  NST in November 2021 with no ischemia. Continue workup. No ASA given GI bleeding, no bb while on pressors. Med management as able. 4. Hypotension:  On midodrine at home, continue pressors, bp acceptable at present. Please do not hesitate to call me or Dr. Rain Cote if additional questions arise.

## 2022-02-14 NOTE — PROGRESS NOTES
Progress Note    Patient: Katarina Lafleur MRN: [id number]  SSN: [id number]    YOB: 1950  Age: 71 y.o. Sex: female      Admit Date: 2/11/2022    LOS: 3 days     Subjective:   GI is following for dark tarry stools     2/14/22: Patient seen in ICU. She is more alert today, answering appropriately to  Questions. Head and right arm tremors. CVA with right side weakness. Denies chest pain of shortness of breath. Still with dark stools. EGD today showed esophagitis, chronic gastritis without bleeding. three clips from previous intervention still in place No active bleeding noted. Hgb today is 7.4.    2/14 EGD - esophagitis, chronic gastritis without bleeding. three clips from previous intervention still in place No active bleeding noted. History of Present Illness: Alisha James is a 71 y. o. female who is seen in consultation for GI bleed - dark tarry stool. Patient was admitted to the hospital with reports of dark stool since this morning. Multiple ER visits. She was transferred to SNF and was discharged home yesterday. Family noticed dark stools and and red blood per rectum so they called EMS. Upon arrival to the ED, patient became unresponsive with faint pulse. CPR started, she was resuscitated immediately. She was given IVF for hypotension, started levophed, and  placed on ventimask. PMH: H/o seizures with todds paralysis, CVA with right sided weakness, SSS with PPM not on AC, ESRD on HD. She is on Eliquis. EGD on 1/13/22 showed Acute gastric ulcer at anastomosis with hemorrhage, treated with 2 hemoclips and injected with epinephrine.      -ED labs: Hgb 7.6, creatinine 3.72, elevated troponin 62, BNP 5,856.   -CXR - Lungs clear; no interstitial or alveolar pulmonary edema      Objective:     Vitals:    02/14/22 1500 02/14/22 1600 02/14/22 1700 02/14/22 1800   BP: 116/62 (!) 134/55 (!) 123/54 106/76   Pulse: 60 61 60 64   Resp: 16 16 18 18   Temp:       SpO2: 94% 96% 100% 98%   Weight: Height:            Intake and Output:  Current Shift: 02/14 0701 - 02/14 1900  In: 560.3 [I.V.:560.3]  Out: -   Last three shifts: 02/12 1901 - 02/14 0700  In: 1010.3 [I.V.:1010.3]  Out: 15 [Urine:15]    Physical Exam:   Skin:  Extremities and face reveal no rashes. No brizuela erythema. HEENT: Sclerae anicteric. Extra-occular muscles are intact. No abnormal pigmentation of the lips. The neck is supple. Cardiovascular: Regular rate and rhythm. Respiratory:  Comfortable breathing with no accessory muscle use. GI:  Abdomen nondistended, soft, and nontender. No enlargement of the liver or spleen. No masses palpable. Rectal:  Deferred  Musculoskeletal: right sided weakness. Tremors. Neurological:  Gross memory appears intact. Patient is alert and oriented. Psychiatric:  Mood appears appropriate with judgement intact. Lymphatic:  No visible adenopathy      Lab/Data Review:  Recent Results (from the past 24 hour(s))   VANCOMYCIN, RANDOM    Collection Time: 02/14/22  4:00 AM   Result Value Ref Range    Vancomycin, random 16.3 ug/mL   CBC WITH AUTOMATED DIFF    Collection Time: 02/14/22  4:00 AM   Result Value Ref Range    WBC 7.5 3.6 - 11.0 K/uL    RBC 2.35 (L) 3.80 - 5.20 M/uL    HGB 7.4 (L) 11.5 - 16.0 g/dL    HCT 24.1 (L) 35.0 - 47.0 %    .6 (H) 80.0 - 99.0 FL    MCH 31.5 26.0 - 34.0 PG    MCHC 30.7 30.0 - 36.5 g/dL    RDW 16.7 (H) 11.5 - 14.5 %    PLATELET 429 (L) 188 - 400 K/uL    MPV 10.5 8.9 - 12.9 FL    NRBC 0.0 0.0  WBC    ABSOLUTE NRBC 0.00 0.00 - 0.01 K/uL    NEUTROPHILS 72 32 - 75 %    LYMPHOCYTES 18 12 - 49 %    MONOCYTES 8 5 - 13 %    EOSINOPHILS 1 0 - 7 %    BASOPHILS 1 0 - 1 %    IMMATURE GRANULOCYTES 0 0 - 0.5 %    ABS. NEUTROPHILS 5.4 1.8 - 8.0 K/UL    ABS. LYMPHOCYTES 1.4 0.8 - 3.5 K/UL    ABS. MONOCYTES 0.6 0.0 - 1.0 K/UL    ABS. EOSINOPHILS 0.0 0.0 - 0.4 K/UL    ABS. BASOPHILS 0.0 0.0 - 0.1 K/UL    ABS. IMM.  GRANS. 0.0 0.00 - 0.04 K/UL    DF AUTOMATED     METABOLIC PANEL, BASIC Collection Time: 02/14/22  4:00 AM   Result Value Ref Range    Sodium 139 136 - 145 mmol/L    Potassium 4.6 3.5 - 5.1 mmol/L    Chloride 106 97 - 108 mmol/L    CO2 24 21 - 32 mmol/L    Anion gap 9 5 - 15 mmol/L    Glucose 51 (LL) 65 - 100 mg/dL    BUN 54 (H) 6 - 20 mg/dL    Creatinine 3.53 (H) 0.55 - 1.02 mg/dL    BUN/Creatinine ratio 15 12 - 20      GFR est AA 15 (L) >60 ml/min/1.73m2    GFR est non-AA 13 (L) >60 ml/min/1.73m2    Calcium 6.8 (L) 8.5 - 10.1 mg/dL   GLUCOSE, POC    Collection Time: 02/14/22  5:47 AM   Result Value Ref Range    Glucose (POC) 102 65 - 117 mg/dL    Performed by Gabriel KEEN/ Giovanny Gonzales [facility], POC    Collection Time: 02/14/22 11:16 AM   Result Value Ref Range    Glucose (POC) 64 (L) 65 - 117 mg/dL    Performed by Devonda Phalen ([facility])    ECHO ADULT FOLLOW-UP OR LIMITED    Collection Time: 02/14/22 11:32 AM   Result Value Ref Range    LA Minor Godley 5.7 cm    LA Area 2C 22.4 cm2    LA Diameter 4.1 cm    AV Peak Velocity 1.7 m/s    AV Peak Gradient 11 mmHg    AV Area by Peak Velocity 2.2 cm2    Aortic Root 3.1 cm    Descending Aorta 1.9 cm    IVSd 0.9 0.6 - 0.9 cm    LVIDd 5.4 (A) 3.9 - 5.3 cm    LVIDs 3.2 cm    LVOT Diameter 1.8 cm    LVOT Peak Velocity 1.4 m/s    LVOT Peak Gradient 8 mmHg    LVPWd 0.8 0.6 - 0.9 cm    LVOT Area 2.5 cm2    MR Peak Velocity 4.8 m/s    MR Peak Gradient 92 mmHg    MV Max Velocity 1.5 m/s    MV Peak Gradient 9 mmHg    MV E Wave Deceleration Time 155.0 ms    MV A Velocity 0.49 m/s    MV E Velocity 1.10 m/s    MV Mean Gradient 2 mmHg    MV VTI 36.6 cm    MV Mean Velocity 0.7 m/s    TAPSE 1.6 1.5 - 2.0 cm    Fractional Shortening 2D 41 28 - 44 %    LVIDd Index 2.87 cm/m2    LVIDs Index 1.70 cm/m2    LV RWT Ratio 0.30     LV Mass 2D 167.4 (A) 67 - 162 g    LV Mass 2D Index 89.0 43 - 95 g/m2    MV E/A 2.24     LA Size Index 2.18 cm/m2    LA/AO Root Ratio 1.32     Ao Root Index 1.65 cm/m2    AV Velocity Ratio 0.82     KATHIE/BSA Peak Velocity 1.2 cm2/m2    Descending Aorta Index 1.01 cm/m2    Est. RA Pressure 8 mmHg    TR Max Velocity 3.23 m/s    TR Peak Gradient 42 mmHg    RVSP 50 mmHg   GLUCOSE, POC    Collection Time: 02/14/22 12:13 PM   Result Value Ref Range    Glucose (POC) 61 (L) 65 - 117 mg/dL    Performed by Kat Gan ([facility])    GLUCOSE, POC    Collection Time: 02/14/22  2:27 PM   Result Value Ref Range    Glucose (POC) 66 65 - 117 mg/dL    Performed by Kat Gan ([facility])               XR CHEST PORT   Final Result   Worsening congestive heart failure pattern. DUPLEX LOWER EXT VENOUS RIGHT   Final Result      CT HEAD WO CONT   Final Result   No acute intracranial abnormality. Old ischemic disease in the high left parietal lobe and in the left basal   ganglion. There is significant atrophy with significant deepening of the left sylvian   fissure. CT ABD PELV WO CONT   Final Result   Fluid overload. No evidence to suggest a source of GI bleeding      CT dose reduction was achieved through use of a standardized protocol tailored   for this examination and automatic exposure control for dose modulation. XR CHEST PORT   Final Result          Assessment:     Active Problems:    GI bleed (2/11/2022)      Septic shock ([address] (2/11/2022)        Plan:   1. GI Bleed - dark stools.      -Initial hgb 7.6. 1unit PRBC transfused.     -latest Hgb 7.4 . Monitor and transfuse as needed.      -Continue PPI     -No anti-coagulant     -Repeat CBC in am     -2/14 EGD esophagitis, chronic gastritis without bleeding. three clips from previous intervention still in place No active bleeding noted. 2. Anemia      -latest Hgb 7.4. Monitor and transfuse as needed.       - ESRD  3. Hypovolemic shock      -BP initially low, started on levophed. Given blood transfusion      -Pending ICU transfer  4. Acute respiratory failure      -currently on nasal cannula  5.  ESRD      -hemodialysis      -creatinine 3.53      -nephrology consulted    Patient discussed with Dr Atilano Bumpers and agrees to above impression and plan. Thank you for allowing me to participate in this patients care    Signed By: Lizy Parmar.  RACHEL Barry     February 14, 2022

## 2022-02-14 NOTE — PROGRESS NOTES
Physician Progress Note      PATIENT:               Shea Reynolds  [facility] #:                  [id number]  :                       1950  ADMIT DATE:       2022 12:40 PM  [address] [place] DATE:  RESPONDING  PROVIDER #:        Sharif Harp MD          QUERY TEXT:    Dear Dr. Bambi Gan or Attending Physician -    Patient admitted with GI bleed with ABLA, hypovolemic shock, metabolic encephalopathy, ESRD with fluid overload. Noted documentation of sepsis in attending progress note dated 22. In order to support the diagnosis of sepsis, please include additional clinical indicators in your documentation. Or please document if the diagnosis of sepsis has been ruled out after further study. The medical record reflects the following:  Risk Factors: 71 F presented with GI bleed, brief cardiac arrest on arrival; admitted with GI bleed with ABLA, hypovolemic shock, metabolic encephalopathy, ESRD with fluid overload, NSTEMI, acute respiratory failure  Clinical Indicators: per attending progress note dated 22, patient with \"Septic shock: Unclear source\"; WBC 5.7. ..14.2...9.9.. Pinky Angles 7.5; afebrile; +tachycardia; +hypotension; patient noted with hypovolemic shock and ABLA  Treatment: labs, CT scan, PRBCs, IV fluids, IV Vasopressor, IV Zosyn, IV Vancomycin    Thank you,  MIKE Simon, RN, CRCR  Clinical   [email or web address] or contact via [employer]  Options provided:  -- Sepsis present on admission as evidenced by, Please document evidence. -- Sepsis present but not present on admission, as evidenced by, Please document evidence. -- Sepsis was ruled out after study  -- Other - I will add my own diagnosis  -- Disagree - Not applicable / Not valid  -- Disagree - Clinically unable to determine / Unknown  -- Refer to Clinical Documentation Reviewer    PROVIDER RESPONSE TEXT:    Sepsis was ruled out after study.     Query created by: Maria Esther Rodriguez on 2022 12:51 PM      Electronically signed by:  Loren Aguilar MD 2/14/2022 6:10 PM

## 2022-02-14 NOTE — PROGRESS NOTES
Pulmonary Progress Note      NAME: Viridiana Yost   :  1950  MRM:  [id number]    Date/Time: 2022  6:20 PM         Subjective:     Patient seen and examined in ICU  Overnight events noted  Lying in bed,  Awake and alert  No acute distress  On 3 L nasal cannula oxygen      Past Medical History reviewed and unchanged from Admission History and Physical       Objective:     Physical Exam     Vitals:      Last 24hrs VS reviewed since prior progress note. Most recent are:    Visit Vitals  BP (!) 128/53 (BP 1 Location: Left upper arm, BP Patient Position: At rest)   Pulse 60   Temp 97.5 °F (36.4 °C)   Resp 16   Ht 5' 6\" (1.676 m)   Wt 78.2 kg (172 lb 6.4 oz)   SpO2 100%   Breastfeeding No   BMI 27.83 kg/m²     SpO2 Readings from Last 6 Encounters:   22 100%   22 98%   22 99%   22 97%   22 100%   22 100%    O2 Flow Rate (L/min): 3 l/min       Intake/Output Summary (Last 24 hours) at 2022 1204  Last data filed at 2022 0805  Gross per 24 hour   Intake 455.14 ml   Output    Net 455.14 ml      GENERAL:  The patient is an elderly frail white female who appears to be comfortable. On 3 L of oxygen. Isi Fanning HEENT:  Shows pupils are equal and reactive to light. Pallor is noted. No icterus. Nasal passages are patent. She appears to be edentulous. No obvious thrush. NECK:  Supple. Trachea is central.  No JVD or lymphadenopathy. She is not using accessory muscle of respiration. She has a tunneled hemodialysis catheter on the right side of the neck. CHEST:  Symmetrical with equal, but diminished air entry bilaterally. She has very diminished breath sounds over the lung bases, but impaired percussion due to bilateral pleural effusions. HEART:  Rhythm is regular without any loud murmur or gallop. ABDOMEN:  Obese and benign. Bowel sounds are audible. EXTREMITIES:  Did not show any cyanosis or clubbing. Pulses are palpable. She does have chronic-appearing pitting edema. She appears to have a chronic wound on her left leg as well. There are mild areas of ecchymosis noted. NEUROLOGIC:  Examination is grossly intact. SKIN:  Warm and dry. XR CHEST PORT   Final Result   Worsening congestive heart failure pattern. DUPLEX LOWER EXT VENOUS RIGHT   Final Result      CT HEAD WO CONT   Final Result   No acute intracranial abnormality. Old ischemic disease in the high left parietal lobe and in the left basal   ganglion. There is significant atrophy with significant deepening of the left sylvian   fissure. CT ABD PELV WO CONT   Final Result   Fluid overload. No evidence to suggest a source of GI bleeding      CT dose reduction was achieved through use of a standardized protocol tailored   for this examination and automatic exposure control for dose modulation.       XR CHEST PORT   Final Result          Lab Data Reviewed: (see below)      Medications:  Current Facility-Administered Medications   Medication Dose Route Frequency    levETIRAcetam (KEPPRA) tablet 1,000 mg  1,000 mg Oral BID    Vancomycin - Pharmacy to Dose   Other Rx Dosing/Monitoring    piperacillin-tazobactam (ZOSYN) 3.375 g in 0.9% sodium chloride (MBP/ADV) 100 mL MBP  3.375 g IntraVENous Q12H    heparin (porcine) 1,000 unit/mL injection 3,200 Units  3,200 Units IntraVENous DIALYSIS PRN    0.9% sodium chloride infusion 250 mL  250 mL IntraVENous PRN    sodium chloride (NS) flush 5-40 mL  5-40 mL IntraVENous Q8H    sodium chloride (NS) flush 5-40 mL  5-40 mL IntraVENous PRN    acetaminophen (TYLENOL) tablet 650 mg  650 mg Oral Q6H PRN    Or    acetaminophen (TYLENOL) suppository 650 mg  650 mg Rectal Q6H PRN    polyethylene glycol (MIRALAX) packet 17 g  17 g Oral DAILY PRN    ondansetron (ZOFRAN ODT) tablet 4 mg  4 mg Oral Q8H PRN    Or    ondansetron (ZOFRAN) injection 4 mg  4 mg IntraVENous Q6H PRN    pantoprazole (PROTONIX) 40 mg in 0.9% sodium chloride 10 mL injection  40 mg IntraVENous Q12H  atorvastatin (LIPITOR) tablet 40 mg  40 mg Oral DAILY    calcitRIOL (ROCALTROL) capsule 0.5 mcg  0.5 mcg Oral DAILY    levothyroxine (SYNTHROID) tablet 150 mcg  150 mcg Oral DAILY    FLUoxetine (PROzac) capsule 10 mg  10 mg Oral DAILY    midodrine (PROAMATINE) tablet 2.5 mg  2.5 mg Oral BID    OXcarbazepine (TRILEPTAL) tablet 150 mg  150 mg Oral QAM    OXcarbazepine (TRILEPTAL) tablet 300 mg  300 mg Oral QHS    lacosamide (VIMPAT) tablet 200 mg  200 mg Oral BID    [Held by provider] sevelamer carbonate (RENVELA) tab 800 mg  800 mg Oral TID WITH MEALS    zinc oxide-white petrolatum 17-57 % topical paste   Topical PRN    NOREPINephrine (LEVOPHED) 8 mg in 0.9% NS 250ml infusion  0.5-16 mcg/min IntraVENous TITRATE       ______________________________________________________________________      Lab Review:     Recent Labs     02/14/22  0400 02/13/22  1659 02/13/22  0400 02/12/22  1745 02/12/22  0735   WBC 7.5  --  9.9  --  14.2*   HGB 7.4* 7.2* 7.7*   < > 8.8*   HCT 24.1* 23.6* 24.7*   < > 28.5*   *  --  143*  --  170    < > = values in this interval not displayed. Recent Labs     02/14/22  0400 02/13/22  0400 02/12/22  0359 02/11/22  1545 02/11/22  1430 02/11/22  1256    138 139   < >  --  138   K 4.6 4.8 5.5*   < >  --  6.5*    105 109*   < >  --  109*   CO2 24 24 22   < >  --  16*   GLU 51* 74 90   < >  --  92   BUN 54* 51* 82*   < >  --  75*   CREA 3.53* 3.04* 3.95*   < >  --  3.84*   CA 6.8* 7.1* 6.9*   < >  --  6.7*   ALB  --   --   --   --   --  1.3*   ALT  --   --   --   --   --  33   INR  --   --   --   --  1.3*  --     < > = values in this interval not displayed. No components found for: [address] [place] [address] Way     02/13/22  1050   PH 7.34*   PCO2 42   PO2 153*   HCO3 23     Recent Labs     02/11/22  1430   INR 1.3*       Other pertinent lab:   Chest x-ray done on 02/11/2022 was personally reviewed.   The report shows no acute process; however, she does appear to have hypoventilatory changes and pulmonary vascular congestion. CT of the abdomen and pelvis done on 02/11/2022 was also reviewed. The lung bases show large bilateral pleural effusions with associated atelectasis. Assessment & Plan:      1. Acute hypoxic respiratory failure. This is due to fluid overload. CT of the abdomen and pelvis basically shows large bilateral pleural effusions with associated atelectasis. Continue with dialysis and try to remove as much fluid as possible. I believe that with ongoing dialysis, there will be an improvement in her oxygen requirement. She had hemodialysis yesterday. This morning she is much more comfortable on 3 L oxygen. Blood gases done this morning showed 7.3 4/42/153 on 4 L oxygen. We will wean down oxygen to keep saturation more than 92%. Discussed with RN. It is noted that she is empirically started on vancomycin and Zosyn. Clinical suspicion for pneumonia is relatively low; however, antibiotics can be continued. 2.  End-stage renal disease. on hemodialysis. However, she is relatively hypotensive and is on a small dose of Levophed, currently at 4 mics. Target map of 65 and higher. 3.  Gastrointestinal bleed. Apparently, she had black tarry stools and also bright red blood per rectum for which paramedics were called and she was brought over to the emergency room. Gastroenterology is consulted. She received 1 unit of packed red blood cells. Continue to monitor hemoglobin and hematocrit. She is on Protonix intravenously. She is for EGD in the morning. 4.  Multiple cardiac issues including atrial fibrillation, history of atrioventricular block, and pacemaker insertion. Her troponins are elevated. Cardiology is consulted. Echocardiogram is pending. Stress test in 11/2021 showed no ischemia. 5.  History of stroke and seizure. 6.  Hypertension and dyslipidemia. 7.  Diabetes and depression.   8.  No chemical deep venous thrombosis prophylaxis due to gastrointestinal bleed. Consider sequential compression devices to the lower extremities. Right lower extremity venous Dopplers were done on 12/12/2022 showing chronic DVT in the right greater saphenous vein. 9.  Hypothyroidism.     Questions of patient were answered at bedside in detail  Case discussed in detail with RN, RT, and care team  Thank you for involving me in the care of this patient  I will follow with you closely during hospitalization    Time spent more than 30 minutes under patient care with no overlap reviewing results and records, decision making, and answering questions.     Talib Camejo MD  Pulmonary/CC

## 2022-02-14 NOTE — PROGRESS NOTES
Problem: Pressure Injury - Risk of  Goal: *Prevention of pressure injury  Description: Document Nitin Scale and appropriate interventions in the flowsheet. Outcome: Progressing Towards Goal  Note: Pressure Injury Interventions:  Sensory Interventions: Float heels,Keep linens dry and wrinkle-free,Minimize linen layers    Moisture Interventions: Absorbent underpads,Apply protective barrier, creams and emollients    Activity Interventions: Assess need for specialty bed    Mobility Interventions: Assess need for specialty bed    Nutrition Interventions: Discuss nutritional consult with provider    Friction and Shear Interventions: Apply protective barrier, creams and emollients                Problem: Patient Education: Go to Patient Education Activity  Goal: Patient/Family Education  Outcome: Progressing Towards Goal     Problem: Falls - Risk of  Goal: *Absence of Falls  Description: Document Madina Hinojosa Fall Risk and appropriate interventions in the flowsheet.   Outcome: Progressing Towards Goal  Note: Fall Risk Interventions:       Mentation Interventions: Bed/chair exit alarm    Medication Interventions: Bed/chair exit alarm    Elimination Interventions: Bed/chair exit alarm    History of Falls Interventions: Bed/chair exit alarm         Problem: Patient Education: Go to Patient Education Activity  Goal: Patient/Family Education  Outcome: Progressing Towards Goal     Problem: Anemia Care Plan (Adult and Pediatric)  Goal: *Labs within defined limits  Outcome: Progressing Towards Goal  Goal: *Tolerates increased activity  Outcome: Progressing Towards Goal     Problem: Chronic Renal Failure  Goal: *Fluid and electrolytes stabilized  Outcome: Progressing Towards Goal     Problem: Breathing Pattern - Ineffective  Goal: *Absence of hypoxia  Outcome: Progressing Towards Goal  Goal: *Use of effective breathing techniques  Outcome: Progressing Towards Goal     Problem: Discharge Planning  Goal: *Discharge to safe environment  Outcome: Progressing Towards Goal  Goal: *Knowledge of medication management  Outcome: Progressing Towards Goal  Goal: *Knowledge of discharge instructions  Outcome: Progressing Towards Goal     Problem: Patient Education: Go to Patient Education Activity  Goal: Patient/Family Education  Outcome: Progressing Towards Goal

## 2022-02-14 NOTE — ANESTHESIA POSTPROCEDURE EVALUATION
Procedure(s):  ESOPHAGOGASTRODUODENOSCOPY (EGD).     total IV anesthesia, MAC    Anesthesia Post Evaluation      Multimodal analgesia: multimodal analgesia not used between 6 hours prior to anesthesia start to PACU discharge  Patient location during evaluation: ICU  Patient participation: complete - patient participated  Level of consciousness: awake  Pain score: 0  Pain management: adequate  Airway patency: patent  Anesthetic complications: no  Cardiovascular status: acceptable, blood pressure returned to baseline and hemodynamically stable  Respiratory status: acceptable, nonlabored ventilation, spontaneous ventilation, nasal cannula and unassisted  Hydration status: acceptable  Post anesthesia nausea and vomiting:  none  Final Post Anesthesia Temperature Assessment:  Normothermia (36.0-37.5 degrees C)      INITIAL Post-op Vital signs:   Vitals Value Taken Time   /62 02/14/22 1330   Temp     Pulse 64 02/14/22 1330   Resp 18 02/14/22 1330   SpO2 98 % 02/14/22 1330

## 2022-02-14 NOTE — PROGRESS NOTES
Patient: Andrea Mclaughlin MRN: [id number]  SSN: [id number]    YOB: 1950  Age: 71 y.o. Sex: female          Subjective:   Patient seen in the ICU. She is due for endoscopy today  Lying in bed,  Awake and alert  No acute distress  On 3 L nasal cannula oxygen    Objective:     Vitals:    02/14/22 0600 02/14/22 0856 02/14/22 0902 02/14/22 0923   BP: (!) 128/53      Pulse: 60      Resp: 16      Temp:   97.5 °F (36.4 °C)    SpO2: 100%   100%   Weight:  78.2 kg (172 lb 6.4 oz)     Height:            Intake and Output:  Yesterday's Intake and Output:  02/13 0701 - 02/14 0700  In: 160.8 [I.V.:160.8]  Out: -      Physical Exam:   GENERAL: no distress, appears stated age  EYE: negative  Respiratory -no distress. ABDOMEN: Nondistended. Musculoskeletal -normal on inspection. NEUROLOGIC: negative      Dialysis access: cuffed tunneled catheter site right and IJ.       Data Review    Meds    Current Facility-Administered Medications   Medication Dose Route Frequency    levETIRAcetam (KEPPRA) tablet 1,000 mg  1,000 mg Oral BID    Vancomycin - Pharmacy to Dose   Other Rx Dosing/Monitoring    piperacillin-tazobactam (ZOSYN) 3.375 g in 0.9% sodium chloride (MBP/ADV) 100 mL MBP  3.375 g IntraVENous Q12H    heparin (porcine) 1,000 unit/mL injection 3,200 Units  3,200 Units IntraVENous DIALYSIS PRN    0.9% sodium chloride infusion 250 mL  250 mL IntraVENous PRN    sodium chloride (NS) flush 5-40 mL  5-40 mL IntraVENous Q8H    sodium chloride (NS) flush 5-40 mL  5-40 mL IntraVENous PRN    acetaminophen (TYLENOL) tablet 650 mg  650 mg Oral Q6H PRN    Or    acetaminophen (TYLENOL) suppository 650 mg  650 mg Rectal Q6H PRN    polyethylene glycol (MIRALAX) packet 17 g  17 g Oral DAILY PRN    ondansetron (ZOFRAN ODT) tablet 4 mg  4 mg Oral Q8H PRN    Or    ondansetron (ZOFRAN) injection 4 mg  4 mg IntraVENous Q6H PRN    pantoprazole (PROTONIX) 40 mg in 0.9% sodium chloride 10 mL injection  40 mg IntraVENous Q12H    atorvastatin (LIPITOR) tablet 40 mg  40 mg Oral DAILY    calcitRIOL (ROCALTROL) capsule 0.5 mcg  0.5 mcg Oral DAILY    levothyroxine (SYNTHROID) tablet 150 mcg  150 mcg Oral DAILY    FLUoxetine (PROzac) capsule 10 mg  10 mg Oral DAILY    midodrine (PROAMATINE) tablet 2.5 mg  2.5 mg Oral BID    OXcarbazepine (TRILEPTAL) tablet 150 mg  150 mg Oral QAM    OXcarbazepine (TRILEPTAL) tablet 300 mg  300 mg Oral QHS    lacosamide (VIMPAT) tablet 200 mg  200 mg Oral BID    [Held by provider] sevelamer carbonate (RENVELA) tab 800 mg  800 mg Oral TID WITH MEALS    zinc oxide-white petrolatum 17-57 % topical paste   Topical PRN    NOREPINephrine (LEVOPHED) 8 mg in 0.9% NS 250ml infusion  0.5-16 mcg/min IntraVENous TITRATE       Lab      Recent Results (from the past 24 hour(s))   HGB & HCT    Collection Time: 02/13/22  4:59 PM   Result Value Ref Range    HGB 7.2 (L) 11.5 - 16.0 g/dL    HCT 23.6 (L) 35.0 - 47.0 %   VANCOMYCIN, RANDOM    Collection Time: 02/14/22  4:00 AM   Result Value Ref Range    Vancomycin, random 16.3 ug/mL   CBC WITH AUTOMATED DIFF    Collection Time: 02/14/22  4:00 AM   Result Value Ref Range    WBC 7.5 3.6 - 11.0 K/uL    RBC 2.35 (L) 3.80 - 5.20 M/uL    HGB 7.4 (L) 11.5 - 16.0 g/dL    HCT 24.1 (L) 35.0 - 47.0 %    .6 (H) 80.0 - 99.0 FL    MCH 31.5 26.0 - 34.0 PG    MCHC 30.7 30.0 - 36.5 g/dL    RDW 16.7 (H) 11.5 - 14.5 %    PLATELET 885 (L) 656 - 400 K/uL    MPV 10.5 8.9 - 12.9 FL    NRBC 0.0 0.0  WBC    ABSOLUTE NRBC 0.00 0.00 - 0.01 K/uL    NEUTROPHILS 72 32 - 75 %    LYMPHOCYTES 18 12 - 49 %    MONOCYTES 8 5 - 13 %    EOSINOPHILS 1 0 - 7 %    BASOPHILS 1 0 - 1 %    IMMATURE GRANULOCYTES 0 0 - 0.5 %    ABS. NEUTROPHILS 5.4 1.8 - 8.0 K/UL    ABS. LYMPHOCYTES 1.4 0.8 - 3.5 K/UL    ABS. MONOCYTES 0.6 0.0 - 1.0 K/UL    ABS. EOSINOPHILS 0.0 0.0 - 0.4 K/UL    ABS. BASOPHILS 0.0 0.0 - 0.1 K/UL    ABS. IMM.  GRANS. 0.0 0.00 - 0.04 K/UL    DF AUTOMATED     METABOLIC PANEL, BASIC    Collection Time: 02/14/22  4:00 AM   Result Value Ref Range    Sodium 139 136 - 145 mmol/L    Potassium 4.6 3.5 - 5.1 mmol/L    Chloride 106 97 - 108 mmol/L    CO2 24 21 - 32 mmol/L    Anion gap 9 5 - 15 mmol/L    Glucose 51 (LL) 65 - 100 mg/dL    BUN 54 (H) 6 - 20 mg/dL    Creatinine 3.53 (H) 0.55 - 1.02 mg/dL    BUN/Creatinine ratio 15 12 - 20      GFR est AA 15 (L) >60 ml/min/1.73m2    GFR est non-AA 13 (L) >60 ml/min/1.73m2    Calcium 6.8 (L) 8.5 - 10.1 mg/dL   GLUCOSE, POC    Collection Time: 02/14/22  5:47 AM   Result Value Ref Range    Glucose (POC) 102 65 - 117 mg/dL    Performed by Gabriel KEEN/ Giovanny Gonzales [facility], POC    Collection Time: 02/14/22 11:16 AM   Result Value Ref Range    Glucose (POC) 64 (L) 65 - 117 mg/dL    Performed by Carolyne Bronson ([facility])    ECHO ADULT FOLLOW-UP OR LIMITED    Collection Time: 02/14/22 11:32 AM   Result Value Ref Range    LA Minor [place] 5.7 cm    LA Area 2C 22.4 cm2    LA Diameter 4.1 cm    AV Peak Velocity 1.7 m/s    AV Peak Gradient 11 mmHg    AV Area by Peak Velocity 2.2 cm2    Aortic Root 3.1 cm    Descending Aorta 1.9 cm    IVSd 0.9 0.6 - 0.9 cm    LVIDd 5.4 (A) 3.9 - 5.3 cm    LVIDs 3.2 cm    LVOT Diameter 1.8 cm    LVOT Peak Velocity 1.4 m/s    LVOT Peak Gradient 8 mmHg    LVPWd 0.8 0.6 - 0.9 cm    LVOT Area 2.5 cm2    MR Peak Velocity 4.8 m/s    MR Peak Gradient 91 mmHg    MV Max Velocity 1.5 m/s    MV Peak Gradient 9 mmHg    MV E Wave Deceleration Time 155.0 ms    MV A Velocity 0.49 m/s    MV E Velocity 1.10 m/s    MV Mean Gradient 2 mmHg    MV VTI 36.6 cm    MV Mean Velocity 0.7 m/s    MV Area by PHT 2.4 cm2    TR Max Velocity 3.24 m/s    TR Peak Gradient 42 mmHg    TAPSE 1.6 1.5 - 2.0 cm   GLUCOSE, POC    Collection Time: 02/14/22 12:13 PM   Result Value Ref Range    Glucose (POC) 61 (L) 65 - 117 mg/dL    Performed by Carolyne Bronson ([facility])         Lab Results   Component Value Date/Time    Color Yellow/Straw 02/12/2022 09:00 AM    Appearance Turbid (A) 02/12/2022 09:00 AM    Specific gravity 1.017 02/12/2022 09:00 AM    pH (UA) 5.0 02/12/2022 09:00 AM    Protein 100 (A) 02/12/2022 09:00 AM    Glucose Negative 02/12/2022 09:00 AM    Ketone 5 (A) 02/12/2022 09:00 AM    Bilirubin Negative 02/12/2022 09:00 AM    Urobilinogen 0.1 02/12/2022 09:00 AM    Nitrites Negative 02/12/2022 09:00 AM    Leukocyte Esterase Moderate (A) 02/12/2022 09:00 AM    Epithelial cells Moderate (A) 05/20/2021 12:56 AM    Bacteria Negative 02/12/2022 09:00 AM    WBC >100 (H) 02/12/2022 09:00 AM    RBC 20-50 02/12/2022 09:00 AM       Imaging         Assessment & Plan: Active Problems:    GI bleed (2/11/2022)      Septic shock ([address] (2/11/2022)    1. ESRD. -On dialysis at [address] renal care in [facility] every MWF   -volume overloaded ,   -Patient will be dialyzed today with plan to remove 2 to 3 L of fluid  -She also has massive bilateral effusions which need to be tapped if she has recurrent respiratory symptoms as do not improve with dialysis.     2. Anemia due to ESRD and GI bleed   -  Received 1 U PRBC yesterday   -Secondary to GI bleed. GI has evaluated the patient and plan for endoscopy today.  -Was started on Protonix drip.  -Hemoglobin 7.4 today, changed from 8.8 a day ago. -We will also add a Procrit with dialysis today  -Transfusion as needed to keep hemoglobin more than 7     3  Hypotension -probably from acute blood loss anemia. She has received PRBCs and is also on low-dose Levophed.     4 Secondary hyperparathyroidism of ESRD. Will check Serum calcium, Phos, PTH levels. Continue Phosphate binders once cleared for po intake      5. B/L Pleural effusions , CHF  H/o AICD placement-  For possible IR guided thoracentesis if respiratory symptoms recurr after dialysis   -HD tomorrow /    6. Seizure disorder - resumed on Home meds     Signed By: Nat Peck MD     February 14, 2022      This note was prepared using voice recognition system and is likely to have sound alike errors that may have been overlooked even during proofreading.   Please contact the author for any clarifications

## 2022-02-14 NOTE — PROGRESS NOTES
Progress Note    Patient: Blake Ellis MRN: [id number]  SSN: [id number]    YOB: 1950  Age: 71 y.o. Sex: female      Admit Date: 2/11/2022    LOS: 3 days     Subjective:     72F, H/o seizures with todds paralysis, CVA with right sided weakness, SSS with PPM not on AC, ESRD on HD with blood in stools s/t UGIB with ABLA s/t hypovolemic shock in the setting of pleural effusion with acute hypoxic respiratory failure. HOSPITAL COURSE:   Started with 1 PRBC, on levophed at 10, underwent HD, EGD upon stability, o2 requirements weaned to 4L. Plan for HD. S/p EGD on 2/14- no active source of bleeding, levophed down to 2, if Hb drops will require capsule EGD    Review of Systems:  Cannot be ascertained due to mental statsu      Objective:     Vitals:    02/14/22 0856 02/14/22 0902 02/14/22 0923 02/14/22 1330   BP:    122/62   Pulse:    64   Resp:    18   Temp:  97.5 °F (36.4 °C)     SpO2:   100% 98%   Weight: 78.2 kg (172 lb 6.4 oz)      Height:            Intake and Output:  Current Shift: 02/14 0701 - 02/14 1900  In: 552.7 [I.V.:552.7]  Out: -   Last three shifts: 02/12 1901 - 02/14 0700  In: 1010.3 [I.V.:1010.3]  Out: 15 [Urine:15]      Physical Exam:   Physical Epale, but answering questions , on 4L NC  HEENT : PERRLA, normal oral mucosa, atraumatic normocephalic, Normal ear and nose. oropharynx  Neck : Supple, no JVD, no masses noted, no carotid bruit  Lungs :decreased breath sounds bilaterally  CVS : tachycardic  Abdomen : soft, non tender  Extremities : pale  Musculoskeletal : cannot be assessed  Skin : Moist, warm, skin turgor, no pathological rash  Lymphatic : No cervical lymphadenopathy.   Neurological : cannot be assessed  Psychiatric : cannot be assesed      Lab/Data Review:  Recent Results (from the past 24 hour(s))   VANCOMYCIN, RANDOM    Collection Time: 02/14/22  4:00 AM   Result Value Ref Range    Vancomycin, random 16.3 ug/mL   CBC WITH AUTOMATED DIFF    Collection Time: 02/14/22 4:00 AM   Result Value Ref Range    WBC 7.5 3.6 - 11.0 K/uL    RBC 2.35 (L) 3.80 - 5.20 M/uL    HGB 7.4 (L) 11.5 - 16.0 g/dL    HCT 24.1 (L) 35.0 - 47.0 %    .6 (H) 80.0 - 99.0 FL    MCH 31.5 26.0 - 34.0 PG    MCHC 30.7 30.0 - 36.5 g/dL    RDW 16.7 (H) 11.5 - 14.5 %    PLATELET 882 (L) 622 - 400 K/uL    MPV 10.5 8.9 - 12.9 FL    NRBC 0.0 0.0  WBC    ABSOLUTE NRBC 0.00 0.00 - 0.01 K/uL    NEUTROPHILS 72 32 - 75 %    LYMPHOCYTES 18 12 - 49 %    MONOCYTES 8 5 - 13 %    EOSINOPHILS 1 0 - 7 %    BASOPHILS 1 0 - 1 %    IMMATURE GRANULOCYTES 0 0 - 0.5 %    ABS. NEUTROPHILS 5.4 1.8 - 8.0 K/UL    ABS. LYMPHOCYTES 1.4 0.8 - 3.5 K/UL    ABS. MONOCYTES 0.6 0.0 - 1.0 K/UL    ABS. EOSINOPHILS 0.0 0.0 - 0.4 K/UL    ABS. BASOPHILS 0.0 0.0 - 0.1 K/UL    ABS. IMM.  GRANS. 0.0 0.00 - 0.04 K/UL    DF AUTOMATED     METABOLIC PANEL, BASIC    Collection Time: 02/14/22  4:00 AM   Result Value Ref Range    Sodium 139 136 - 145 mmol/L    Potassium 4.6 3.5 - 5.1 mmol/L    Chloride 106 97 - 108 mmol/L    CO2 24 21 - 32 mmol/L    Anion gap 9 5 - 15 mmol/L    Glucose 51 (LL) 65 - 100 mg/dL    BUN 54 (H) 6 - 20 mg/dL    Creatinine 3.53 (H) 0.55 - 1.02 mg/dL    BUN/Creatinine ratio 15 12 - 20      GFR est AA 15 (L) >60 ml/min/1.73m2    GFR est non-AA 13 (L) >60 ml/min/1.73m2    Calcium 6.8 (L) 8.5 - 10.1 mg/dL   GLUCOSE, POC    Collection Time: 02/14/22  5:47 AM   Result Value Ref Range    Glucose (POC) 102 65 - 117 mg/dL    Performed by Gabriel KEEN/ Giovanny Gonzales [facility], POC    Collection Time: 02/14/22 11:16 AM   Result Value Ref Range    Glucose (POC) 64 (L) 65 - 117 mg/dL    Performed by Jos Jeter ([facility])    ECHO ADULT FOLLOW-UP OR LIMITED    Collection Time: 02/14/22 11:32 AM   Result Value Ref Range    LA Minor [place] 5.7 cm    LA Area 2C 22.4 cm2    LA Diameter 4.1 cm    AV Peak Velocity 1.7 m/s    AV Peak Gradient 11 mmHg    AV Area by Peak Velocity 2.2 cm2    Aortic Root 3.1 cm    Descending Aorta 1.9 cm    IVSd 0.9 0.6 - 0.9 cm    LVIDd 5.4 (A) 3.9 - 5.3 cm    LVIDs 3.2 cm    LVOT Diameter 1.8 cm    LVOT Peak Velocity 1.4 m/s    LVOT Peak Gradient 8 mmHg    LVPWd 0.8 0.6 - 0.9 cm    LVOT Area 2.5 cm2    MR Peak Velocity 4.8 m/s    MR Peak Gradient 92 mmHg    MV Max Velocity 1.5 m/s    MV Peak Gradient 9 mmHg    MV E Wave Deceleration Time 155.0 ms    MV A Velocity 0.49 m/s    MV E Velocity 1.10 m/s    MV Mean Gradient 2 mmHg    MV VTI 36.6 cm    MV Mean Velocity 0.7 m/s    TAPSE 1.6 1.5 - 2.0 cm    Fractional Shortening 2D 41 28 - 44 %    LVIDd Index 2.87 cm/m2    LVIDs Index 1.70 cm/m2    LV RWT Ratio 0.30     LV Mass 2D 167.4 (A) 67 - 162 g    LV Mass 2D Index 89.0 43 - 95 g/m2    MV E/A 2.24     LA Size Index 2.18 cm/m2    LA/AO Root Ratio 1.32     Ao Root Index 1.65 cm/m2    AV Velocity Ratio 0.82     KATHIE/BSA Peak Velocity 1.2 cm2/m2    Descending Aorta Index 1.01 cm/m2    Est. RA Pressure 8 mmHg    TR Max Velocity 3.23 m/s    TR Peak Gradient 42 mmHg    RVSP 50 mmHg   GLUCOSE, POC    Collection Time: 02/14/22 12:13 PM   Result Value Ref Range    Glucose (POC) 61 (L) 65 - 117 mg/dL    Performed by Ericka Means ([facility])    GLUCOSE, POC    Collection Time: 02/14/22  2:27 PM   Result Value Ref Range    Glucose (POC) 66 65 - 117 mg/dL    Performed by Ericka Means (v)          Assessment and plan:         (1) hypovolemic shock: levophed to keep MAP > 65     (1) acute encephalopathy: GCS 13, s/t metabolic.      (2) Acute hypoxic respiratory failure : ventimask, wean to keep saturation > 92     (3) UGIB with ABLA with anemia: 1 PRBC, repeat Hb 9.1. HH Q12. protonix 40 BID.  S?p EGD no active source of bleeding     (4) anemia: s.t #3 and AOCI     (5) seizures with todds paralysis: continuetrileptal, fluoxetine, keppra     (6) CVA with right sided weakness: complicates all aspects of care     (7)  SSS with PPM not on AC:      (8) ESRD on HD with hyperkalemia: hyperkalemia protocol.      DVT ppx: SCDs     DISPO: P  Discussed with family- they will discuss amongst themselves for DNR DNI, and 48 hours, if course is declinding then further discuss comfort care.      Signed By: Rosita Gray MD     February 14, 2022

## 2022-02-15 LAB
ANION GAP SERPL CALC-SCNC: 8 MMOL/L (ref 5–15)
BASOPHILS # BLD: 0 K/UL (ref 0–0.1)
BASOPHILS NFR BLD: 1 % (ref 0–1)
BUN SERPL-MCNC: 55 MG/DL (ref 6–20)
BUN/CREAT SERPL: 14 (ref 12–20)
CA-I BLD-MCNC: 6.9 MG/DL (ref 8.5–10.1)
CHLORIDE SERPL-SCNC: 107 MMOL/L (ref 97–108)
CO2 SERPL-SCNC: 23 MMOL/L (ref 21–32)
CREAT SERPL-MCNC: 4.06 MG/DL (ref 0.55–1.02)
DIFFERENTIAL METHOD BLD: ABNORMAL
EOSINOPHIL # BLD: 0 K/UL (ref 0–0.4)
EOSINOPHIL NFR BLD: 1 % (ref 0–7)
ERYTHROCYTE [DISTWIDTH] IN BLOOD BY AUTOMATED COUNT: 16.4 % (ref 11.5–14.5)
GLUCOSE BLD STRIP.AUTO-MCNC: 74 MG/DL (ref 65–117)
GLUCOSE SERPL-MCNC: 66 MG/DL (ref 65–100)
HCT VFR BLD AUTO: 25.4 % (ref 35–47)
HGB BLD-MCNC: 7.7 G/DL (ref 11.5–16)
IMM GRANULOCYTES # BLD AUTO: 0 K/UL (ref 0–0.04)
IMM GRANULOCYTES NFR BLD AUTO: 0 % (ref 0–0.5)
LYMPHOCYTES # BLD: 1 K/UL (ref 0.8–3.5)
LYMPHOCYTES NFR BLD: 17 % (ref 12–49)
MCH RBC QN AUTO: 30.9 PG (ref 26–34)
MCHC RBC AUTO-ENTMCNC: 30.3 G/DL (ref 30–36.5)
MCV RBC AUTO: 102 FL (ref 80–99)
MONOCYTES # BLD: 0.5 K/UL (ref 0–1)
MONOCYTES NFR BLD: 8 % (ref 5–13)
NEUTS SEG # BLD: 4.6 K/UL (ref 1.8–8)
NEUTS SEG NFR BLD: 73 % (ref 32–75)
NRBC # BLD: 0 K/UL (ref 0–0.01)
NRBC BLD-RTO: 0 PER 100 WBC
PERFORMED BY, TECHID: NORMAL
PLATELET # BLD AUTO: 100 K/UL (ref 150–400)
PMV BLD AUTO: 10.6 FL (ref 8.9–12.9)
POTASSIUM SERPL-SCNC: 4.4 MMOL/L (ref 3.5–5.1)
RBC # BLD AUTO: 2.49 M/UL (ref 3.8–5.2)
SODIUM SERPL-SCNC: 138 MMOL/L (ref 136–145)
WBC # BLD AUTO: 6.2 K/UL (ref 3.6–11)

## 2022-02-15 PROCEDURE — 74011250636 HC RX REV CODE- 250/636: Performed by: INTERNAL MEDICINE

## 2022-02-15 PROCEDURE — 74011250637 HC RX REV CODE- 250/637: Performed by: HOSPITALIST

## 2022-02-15 PROCEDURE — 74011000250 HC RX REV CODE- 250: Performed by: HOSPITALIST

## 2022-02-15 PROCEDURE — 74011000258 HC RX REV CODE- 258: Performed by: INTERNAL MEDICINE

## 2022-02-15 PROCEDURE — C9113 INJ PANTOPRAZOLE SODIUM, VIA: HCPCS | Performed by: HOSPITALIST

## 2022-02-15 PROCEDURE — 90935 HEMODIALYSIS ONE EVALUATION: CPT

## 2022-02-15 PROCEDURE — 36415 COLL VENOUS BLD VENIPUNCTURE: CPT

## 2022-02-15 PROCEDURE — 82962 GLUCOSE BLOOD TEST: CPT

## 2022-02-15 PROCEDURE — 74011250636 HC RX REV CODE- 250/636: Performed by: HOSPITALIST

## 2022-02-15 PROCEDURE — 85025 COMPLETE CBC W/AUTO DIFF WBC: CPT

## 2022-02-15 PROCEDURE — 74011250637 HC RX REV CODE- 250/637: Performed by: INTERNAL MEDICINE

## 2022-02-15 PROCEDURE — 65610000006 HC RM INTENSIVE CARE

## 2022-02-15 PROCEDURE — 80048 BASIC METABOLIC PNL TOTAL CA: CPT

## 2022-02-15 RX ORDER — MIDODRINE HYDROCHLORIDE 5 MG/1
10 TABLET ORAL
Status: DISCONTINUED | OUTPATIENT
Start: 2022-02-15 | End: 2022-02-22 | Stop reason: HOSPADM

## 2022-02-15 RX ADMIN — SODIUM CHLORIDE, PRESERVATIVE FREE 10 ML: 5 INJECTION INTRAVENOUS at 21:38

## 2022-02-15 RX ADMIN — PIPERACILLIN SODIUM AND TAZOBACTAM SODIUM 3.38 G: 3; .375 INJECTION, POWDER, LYOPHILIZED, FOR SOLUTION INTRAVENOUS at 09:08

## 2022-02-15 RX ADMIN — Medication 1 MCG/MIN: at 22:16

## 2022-02-15 RX ADMIN — LACOSAMIDE 200 MG: 100 TABLET, FILM COATED ORAL at 21:37

## 2022-02-15 RX ADMIN — SODIUM CHLORIDE, PRESERVATIVE FREE 10 ML: 5 INJECTION INTRAVENOUS at 05:20

## 2022-02-15 RX ADMIN — Medication 1.5 MCG/MIN: at 01:02

## 2022-02-15 RX ADMIN — LEVETIRACETAM 1000 MG: 250 TABLET, FILM COATED ORAL at 21:37

## 2022-02-15 RX ADMIN — VANCOMYCIN HYDROCHLORIDE 1000 MG: 1 INJECTION, POWDER, LYOPHILIZED, FOR SOLUTION INTRAVENOUS at 16:48

## 2022-02-15 RX ADMIN — CALCITRIOL CAPSULES 0.25 MCG 0.5 MCG: 0.25 CAPSULE ORAL at 09:06

## 2022-02-15 RX ADMIN — LACOSAMIDE 200 MG: 100 TABLET, FILM COATED ORAL at 09:05

## 2022-02-15 RX ADMIN — PIPERACILLIN SODIUM AND TAZOBACTAM SODIUM 3.38 G: 3; .375 INJECTION, POWDER, LYOPHILIZED, FOR SOLUTION INTRAVENOUS at 21:37

## 2022-02-15 RX ADMIN — FLUOXETINE HYDROCHLORIDE 10 MG: 10 CAPSULE ORAL at 10:02

## 2022-02-15 RX ADMIN — LEVOTHYROXINE SODIUM 150 MCG: 0.07 TABLET ORAL at 05:18

## 2022-02-15 RX ADMIN — ATORVASTATIN CALCIUM 40 MG: 40 TABLET, FILM COATED ORAL at 09:05

## 2022-02-15 RX ADMIN — SODIUM CHLORIDE, PRESERVATIVE FREE 40 MG: 5 INJECTION INTRAVENOUS at 21:36

## 2022-02-15 RX ADMIN — ACETAMINOPHEN 650 MG: 325 TABLET ORAL at 10:02

## 2022-02-15 RX ADMIN — MIDODRINE HYDROCHLORIDE 2.5 MG: 5 TABLET ORAL at 09:06

## 2022-02-15 RX ADMIN — SODIUM CHLORIDE, PRESERVATIVE FREE 40 MG: 5 INJECTION INTRAVENOUS at 09:05

## 2022-02-15 RX ADMIN — LEVETIRACETAM 1000 MG: 250 TABLET, FILM COATED ORAL at 09:05

## 2022-02-15 RX ADMIN — HEPARIN SODIUM 3200 UNITS: 1000 INJECTION, SOLUTION INTRAVENOUS; SUBCUTANEOUS at 10:48

## 2022-02-15 RX ADMIN — OXCARBAZEPINE 150 MG: 150 TABLET, FILM COATED ORAL at 09:05

## 2022-02-15 RX ADMIN — OXCARBAZEPINE 300 MG: 300 TABLET, FILM COATED ORAL at 21:37

## 2022-02-15 RX ADMIN — SODIUM CHLORIDE, PRESERVATIVE FREE 10 ML: 5 INJECTION INTRAVENOUS at 16:48

## 2022-02-15 RX ADMIN — MIDODRINE HYDROCHLORIDE 10 MG: 5 TABLET ORAL at 16:48

## 2022-02-15 NOTE — PROGRESS NOTES
Problem: Pressure Injury - Risk of  Goal: *Prevention of pressure injury  Description: Document Nitin Scale and appropriate interventions in the flowsheet. Outcome: Progressing Towards Goal  Note: Pressure Injury Interventions:  Sensory Interventions: Assess changes in LOC    Moisture Interventions: Absorbent underpads,Apply protective barrier, creams and emollients    Activity Interventions: Assess need for specialty bed    Mobility Interventions: Assess need for specialty bed    Nutrition Interventions: Document food/fluid/supplement intake,Offer support with meals,snacks and hydration    Friction and Shear Interventions: Apply protective barrier, creams and emollients                Problem: Patient Education: Go to Patient Education Activity  Goal: Patient/Family Education  Outcome: Progressing Towards Goal     Problem: Falls - Risk of  Goal: *Absence of Falls  Description: Document Cuco Fall Risk and appropriate interventions in the flowsheet.   Outcome: Progressing Towards Goal  Note: Fall Risk Interventions:       Mentation Interventions: Bed/chair exit alarm    Medication Interventions: Bed/chair exit alarm    Elimination Interventions: Bed/chair exit alarm    History of Falls Interventions: Bed/chair exit alarm         Problem: Patient Education: Go to Patient Education Activity  Goal: Patient/Family Education  Outcome: Progressing Towards Goal     Problem: Anemia Care Plan (Adult and Pediatric)  Goal: *Labs within defined limits  Outcome: Progressing Towards Goal  Goal: *Tolerates increased activity  Outcome: Progressing Towards Goal     Problem: Chronic Renal Failure  Goal: *Fluid and electrolytes stabilized  Outcome: Progressing Towards Goal     Problem: Breathing Pattern - Ineffective  Goal: *Absence of hypoxia  Outcome: Progressing Towards Goal  Goal: *Use of effective breathing techniques  Outcome: Progressing Towards Goal     Problem: Discharge Planning  Goal: *Discharge to safe environment  Outcome: Progressing Towards Goal  Goal: *Knowledge of medication management  Outcome: Progressing Towards Goal  Goal: *Knowledge of discharge instructions  Outcome: Progressing Towards Goal     Problem: Patient Education: Go to Patient Education Activity  Goal: Patient/Family Education  Outcome: Progressing Towards Goal     Problem: Risk for Spread of Infection  Goal: Prevent transmission of infectious organism to others  Description: Prevent the transmission of infectious organisms to other patients, staff members, and visitors.   Outcome: Progressing Towards Goal     Problem: Patient Education:  Go to Education Activity  Goal: Patient/Family Education  Outcome: Progressing Towards Goal

## 2022-02-15 NOTE — PROGRESS NOTES
Patient: Andrea Mclaughlin MRN: [id number]  SSN: [id number]    YOB: 1950  Age: 71 y.o. Sex: female          Subjective:   Patient seen in the ICU. He was dialyzed this morning with 2.5 L of fluid removal.  She is lethargic though she responds to all  Questions appropriately. No acute distress  On 3 L nasal cannula oxygen    Objective:     Vitals:    02/15/22 1030 02/15/22 1045 02/15/22 1050 02/15/22 1100   BP: (!) 75/51 (!) 67/47 (!) 93/54 (!) 87/56   Pulse: 72 81 74 70   Resp: 18 18 18 16   Temp:  98 °F (36.7 °C)  98.3 °F (36.8 °C)   TempSrc:  Oral     SpO2: 100% 100% 100% 100%   Weight:       Height:            Intake and Output:  Yesterday's Intake and Output:  02/14 0701 - 02/15 0700  In: 898 [P.O.:200; I.V.:698]  Out: -      Physical Exam:   GENERAL: no distress, appears stated age  EYE: negative  Respiratory -no distress. ABDOMEN: Nondistended. Musculoskeletal -normal on inspection. NEUROLOGIC: She is awake but lethargic      Dialysis access: cuffed tunneled catheter site right and IJ. Data Review    Meds    Current Facility-Administered Medications   Medication Dose Route Frequency    [START ON 2/16/2022] Vancomycin random level - please draw with AM labs. Thanks!    Other ONCE    levETIRAcetam (KEPPRA) tablet 1,000 mg  1,000 mg Oral BID    Vancomycin - Pharmacy to Dose   Other Rx Dosing/Monitoring    piperacillin-tazobactam (ZOSYN) 3.375 g in 0.9% sodium chloride (MBP/ADV) 100 mL MBP  3.375 g IntraVENous Q12H    heparin (porcine) 1,000 unit/mL injection 3,200 Units  3,200 Units IntraVENous DIALYSIS PRN    0.9% sodium chloride infusion 250 mL  250 mL IntraVENous PRN    sodium chloride (NS) flush 5-40 mL  5-40 mL IntraVENous Q8H    sodium chloride (NS) flush 5-40 mL  5-40 mL IntraVENous PRN    acetaminophen (TYLENOL) tablet 650 mg  650 mg Oral Q6H PRN    Or    acetaminophen (TYLENOL) suppository 650 mg  650 mg Rectal Q6H PRN    polyethylene glycol (MIRALAX) packet 17 g  17 g Oral DAILY PRN    ondansetron (ZOFRAN ODT) tablet 4 mg  4 mg Oral Q8H PRN    Or    ondansetron (ZOFRAN) injection 4 mg  4 mg IntraVENous Q6H PRN    pantoprazole (PROTONIX) 40 mg in 0.9% sodium chloride 10 mL injection  40 mg IntraVENous Q12H    atorvastatin (LIPITOR) tablet 40 mg  40 mg Oral DAILY    calcitRIOL (ROCALTROL) capsule 0.5 mcg  0.5 mcg Oral DAILY    levothyroxine (SYNTHROID) tablet 150 mcg  150 mcg Oral DAILY    FLUoxetine (PROzac) capsule 10 mg  10 mg Oral DAILY    midodrine (PROAMATINE) tablet 2.5 mg  2.5 mg Oral BID    OXcarbazepine (TRILEPTAL) tablet 150 mg  150 mg Oral QAM    OXcarbazepine (TRILEPTAL) tablet 300 mg  300 mg Oral QHS    lacosamide (VIMPAT) tablet 200 mg  200 mg Oral BID    [Held by provider] sevelamer carbonate (RENVELA) tab 800 mg  800 mg Oral TID WITH MEALS    zinc oxide-white petrolatum 17-57 % topical paste   Topical PRN    NOREPINephrine (LEVOPHED) 8 mg in 0.9% NS 250ml infusion  0.5-16 mcg/min IntraVENous TITRATE       Lab      Recent Results (from the past 24 hour(s))   GLUCOSE, POC    Collection Time: 02/14/22  2:27 PM   Result Value Ref Range    Glucose (POC) 66 65 - 117 mg/dL    Performed by Selvin Gonzalez ([facility])    CBC WITH AUTOMATED DIFF    Collection Time: 02/15/22  4:30 AM   Result Value Ref Range    WBC 6.2 3.6 - 11.0 K/uL    RBC 2.49 (L) 3.80 - 5.20 M/uL    HGB 7.7 (L) 11.5 - 16.0 g/dL    HCT 25.4 (L) 35.0 - 47.0 %    .0 (H) 80.0 - 99.0 FL    MCH 30.9 26.0 - 34.0 PG    MCHC 30.3 30.0 - 36.5 g/dL    RDW 16.4 (H) 11.5 - 14.5 %    PLATELET 097 (L) 531 - 400 K/uL    MPV 10.6 8.9 - 12.9 FL    NRBC 0.0 0.0  WBC    ABSOLUTE NRBC 0.00 0.00 - 0.01 K/uL    NEUTROPHILS 73 32 - 75 %    LYMPHOCYTES 17 12 - 49 %    MONOCYTES 8 5 - 13 %    EOSINOPHILS 1 0 - 7 %    BASOPHILS 1 0 - 1 %    IMMATURE GRANULOCYTES 0 0 - 0.5 %    ABS. NEUTROPHILS 4.6 1.8 - 8.0 K/UL    ABS. LYMPHOCYTES 1.0 0.8 - 3.5 K/UL    ABS. MONOCYTES 0.5 0.0 - 1.0 K/UL    ABS. EOSINOPHILS 0.0 0.0 - 0.4 K/UL    ABS. BASOPHILS 0.0 0.0 - 0.1 K/UL    ABS. IMM. GRANS. 0.0 0.00 - 0.04 K/UL    DF AUTOMATED     METABOLIC PANEL, BASIC    Collection Time: 02/15/22  4:30 AM   Result Value Ref Range    Sodium 138 136 - 145 mmol/L    Potassium 4.4 3.5 - 5.1 mmol/L    Chloride 107 97 - 108 mmol/L    CO2 23 21 - 32 mmol/L    Anion gap 8 5 - 15 mmol/L    Glucose 66 65 - 100 mg/dL    BUN 55 (H) 6 - 20 mg/dL    Creatinine 4.06 (H) 0.55 - 1.02 mg/dL    BUN/Creatinine ratio 14 12 - 20      GFR est AA 13 (L) >60 ml/min/1.73m2    GFR est non-AA 11 (L) >60 ml/min/1.73m2    Calcium 6.9 (L) 8.5 - 10.1 mg/dL   GLUCOSE, POC    Collection Time: 02/15/22  7:46 AM   Result Value Ref Range    Glucose (POC) 74 65 - 117 mg/dL    Performed by MCKAY CHRISTIANSON         Lab Results   Component Value Date/Time    Color Yellow/Straw 02/12/2022 09:00 AM    Appearance Turbid (A) 02/12/2022 09:00 AM    Specific gravity 1.017 02/12/2022 09:00 AM    pH (UA) 5.0 02/12/2022 09:00 AM    Protein 100 (A) 02/12/2022 09:00 AM    Glucose Negative 02/12/2022 09:00 AM    Ketone 5 (A) 02/12/2022 09:00 AM    Bilirubin Negative 02/12/2022 09:00 AM    Urobilinogen 0.1 02/12/2022 09:00 AM    Nitrites Negative 02/12/2022 09:00 AM    Leukocyte Esterase Moderate (A) 02/12/2022 09:00 AM    Epithelial cells Moderate (A) 05/20/2021 12:56 AM    Bacteria Negative 02/12/2022 09:00 AM    WBC >100 (H) 02/12/2022 09:00 AM    RBC 20-50 02/12/2022 09:00 AM       Imaging         Assessment & Plan: Active Problems:    GI bleed (2/11/2022)      Septic shock ([address] (2/11/2022)    1. ESRD.    -On dialysis at [address] renal care in [place] every MWF   -On admission she was volume overloaded  -Last dialyzed on 2/15 because of staffing issues with 2.5 L of fluid removal  -I will plan for dialysis in a.m. to put her back on the MWF schedule  -Permanent dialysis catheter as current access  -She also has massive bilateral effusions which need to be tapped if she has recurrent respiratory symptoms as do not improve with dialysis.     2. Anemia due to ESRD and GI bleed   -  Received 1 U PRBC yesterday   -Secondary to GI bleed. Apparently she had black tarry stools and bright red bleeding per rectum. She received 1 unit of blood  -s/p EGD on 2/14 which showed esophagitis chronic gastritis without bleeding. 3 clips from previous intervention were still present without any active bleeding noted  -Hemoglobin 7.7 today  -Continue Procrit 10,000 units with dialysis-Transfusion as needed to keep hemoglobin more than 7     3  Hypotension   -probably from acute blood loss anemia.   -Normal WBC count. Afebrile   -She is on midodrine 2.5 mg twice daily which I will increase to 10 mg 3 times daily  -She has received PRBCs and is also on low-dose Levophed.     4 Secondary hyperparathyroidism of ESRD. -Will check Phos, PTH levels. [facility] currently is on hold  -Continue Phosphate binders once cleared for po intake      5. B/L Pleural effusions , CHF  H/o AICD placement-  -For possible IR guided thoracentesis if respiratory symptoms recurr after dialysis   -HD again planned for tomorrow    6. Seizure disorder - resumed on Home meds     Signed By: Lily Magana MD     February 15, 2022      This note was prepared using voice recognition system and is likely to have sound alike errors that may have been overlooked even during proofreading.   Please contact the author for any clarifications

## 2022-02-15 NOTE — PROGRESS NOTES
Pulmonary Progress Note      NAME: Blake Ellis   :  1950  MRM:  [id number]    Date/Time: 2/15/2022  6:20 PM         Subjective:     Patient seen and examined in ICU  Overnight events noted    Lying in bed,  Awake and alert  No acute distress  On nasal cannula oxygen  Receiving hemodialysis      Past Medical History reviewed and unchanged from Admission History and Physical       Objective:     Physical Exam     Vitals:      Last 24hrs VS reviewed since prior progress note. Most recent are:    Visit Vitals  BP (!) 67/47   Pulse 81   Temp 98 °F (36.7 °C) (Oral)   Resp 18   Ht 5' 6\" (1.676 m)   Wt 78.2 kg (172 lb 6.4 oz)   SpO2 100%   Breastfeeding No   BMI 27.83 kg/m²     SpO2 Readings from Last 6 Encounters:   02/15/22 100%   22 98%   22 99%   22 97%   22 100%   22 100%    O2 Flow Rate (L/min): 4 l/min       Intake/Output Summary (Last 24 hours) at 2/15/2022 1054  Last data filed at 2/15/2022 1045  Gross per 24 hour   Intake 603.62 ml   Output 2500 ml   Net -1896.38 ml      GENERAL:  The patient is an elderly frail white female who appears to be comfortable. On 3 L of oxygen. Lawernce Roughen HEENT:  Shows pupils are equal and reactive to light. Pallor is noted. No icterus. Nasal passages are patent. She appears to be edentulous. No obvious thrush. NECK:  Supple. Trachea is central.  No JVD or lymphadenopathy. She is not using accessory muscle of respiration. She has a tunneled hemodialysis catheter on the right side of the neck. CHEST:  Symmetrical with equal, but diminished air entry bilaterally. She has very diminished breath sounds over the lung bases, but impaired percussion due to bilateral pleural effusions. HEART:  Rhythm is regular without any loud murmur or gallop. ABDOMEN:  Obese and benign. Bowel sounds are audible. EXTREMITIES:  Did not show any cyanosis or clubbing. Pulses are palpable. She does have chronic-appearing pitting edema.   She appears to have a chronic wound on her left leg as well. There are mild areas of ecchymosis noted. NEUROLOGIC:  Examination is grossly intact. SKIN:  Warm and dry. XR CHEST PORT   Final Result   Worsening congestive heart failure pattern. DUPLEX LOWER EXT VENOUS RIGHT   Final Result      CT HEAD WO CONT   Final Result   No acute intracranial abnormality. Old ischemic disease in the high left parietal lobe and in the left basal   ganglion. There is significant atrophy with significant deepening of the left sylvian   fissure. CT ABD PELV WO CONT   Final Result   Fluid overload. No evidence to suggest a source of GI bleeding      CT dose reduction was achieved through use of a standardized protocol tailored   for this examination and automatic exposure control for dose modulation. XR CHEST PORT   Final Result          Lab Data Reviewed: (see below)      Medications:  Current Facility-Administered Medications   Medication Dose Route Frequency    [START ON 2/16/2022] Vancomycin random level - please draw with AM labs. Thanks!    Other ONCE    levETIRAcetam (KEPPRA) tablet 1,000 mg  1,000 mg Oral BID    Vancomycin - Pharmacy to Dose   Other Rx Dosing/Monitoring    piperacillin-tazobactam (ZOSYN) 3.375 g in 0.9% sodium chloride (MBP/ADV) 100 mL MBP  3.375 g IntraVENous Q12H    heparin (porcine) 1,000 unit/mL injection 3,200 Units  3,200 Units IntraVENous DIALYSIS PRN    0.9% sodium chloride infusion 250 mL  250 mL IntraVENous PRN    sodium chloride (NS) flush 5-40 mL  5-40 mL IntraVENous Q8H    sodium chloride (NS) flush 5-40 mL  5-40 mL IntraVENous PRN    acetaminophen (TYLENOL) tablet 650 mg  650 mg Oral Q6H PRN    Or    acetaminophen (TYLENOL) suppository 650 mg  650 mg Rectal Q6H PRN    polyethylene glycol (MIRALAX) packet 17 g  17 g Oral DAILY PRN    ondansetron (ZOFRAN ODT) tablet 4 mg  4 mg Oral Q8H PRN    Or    ondansetron (ZOFRAN) injection 4 mg  4 mg IntraVENous Q6H PRN    pantoprazole (PROTONIX) 40 mg in 0.9% sodium chloride 10 mL injection  40 mg IntraVENous Q12H    atorvastatin (LIPITOR) tablet 40 mg  40 mg Oral DAILY    calcitRIOL (ROCALTROL) capsule 0.5 mcg  0.5 mcg Oral DAILY    levothyroxine (SYNTHROID) tablet 150 mcg  150 mcg Oral DAILY    FLUoxetine (PROzac) capsule 10 mg  10 mg Oral DAILY    midodrine (PROAMATINE) tablet 2.5 mg  2.5 mg Oral BID    OXcarbazepine (TRILEPTAL) tablet 150 mg  150 mg Oral QAM    OXcarbazepine (TRILEPTAL) tablet 300 mg  300 mg Oral QHS    lacosamide (VIMPAT) tablet 200 mg  200 mg Oral BID    [Held by provider] sevelamer carbonate (RENVELA) tab 800 mg  800 mg Oral TID WITH MEALS    zinc oxide-white petrolatum 17-57 % topical paste   Topical PRN    NOREPINephrine (LEVOPHED) 8 mg in 0.9% NS 250ml infusion  0.5-16 mcg/min IntraVENous TITRATE       ______________________________________________________________________      Lab Review:     Recent Labs     02/15/22  0430 02/14/22  0400 02/13/22  1659 02/13/22  0400 02/13/22  0400   WBC 6.2 7.5  --   --  9.9   HGB 7.7* 7.4* 7.2*   < > 7.7*   HCT 25.4* 24.1* 23.6*   < > 24.7*   * 124*  --   --  143*    < > = values in this interval not displayed. Recent Labs     02/15/22  0430 02/14/22  0400 02/13/22  0400    139 138   K 4.4 4.6 4.8    106 105   CO2 23 24 24   GLU 66 51* 74   BUN 55* 54* 51*   CREA 4.06* 3.53* 3.04*   CA 6.9* 6.8* 7.1*     No components found for: GLPOC  Recent Labs     02/13/22  1050   PH 7.34*   PCO2 42   PO2 153*   HCO3 23     No results for input(s): INR, INREXT, INREXT, INREXT in the last 72 hours. Other pertinent lab:   Chest x-ray done on 02/11/2022 was personally reviewed. The report shows no acute process; however, she does appear to have hypoventilatory changes and pulmonary vascular congestion. CT of the abdomen and pelvis done on 02/11/2022 was also reviewed. The lung bases show large bilateral pleural effusions with associated atelectasis. Assessment & Plan:      1. Acute hypoxic respiratory failure. This is due to fluid overload. CT of the abdomen and pelvis basically shows large bilateral pleural effusions with associated atelectasis. Continue with dialysis and try to remove as much fluid as possible. I believe that with ongoing dialysis, there will be an improvement in her oxygen requirement. She had hemodialysis yesterday. This morning she is much more comfortable on 3 L oxygen. Blood gases done this morning showed 7.3 4/42/153 on 4 L oxygen. We will wean down oxygen to keep saturation more than 92%. Discussed with RN. It is noted that she is empirically started on vancomycin and Zosyn. Clinical suspicion for pneumonia is relatively low; however, antibiotics can be continued. 2.  End-stage renal disease. on hemodialysis. However, she is relatively hypotensive and is on a small dose of Levophed, currently at 4 mics. Target map of 65 and higher. 3.  Gastrointestinal bleed. Apparently, she had black tarry stools and also bright red blood per rectum for which paramedics were called and she was brought over to the emergency room. Gastroenterology is consulted. She received 1 unit of packed red blood cells. Continue to monitor hemoglobin and hematocrit. She is on Protonix intravenously. She is for EGD in the morning. 4.  Multiple cardiac issues including atrial fibrillation, history of atrioventricular block, and pacemaker insertion. Her troponins are elevated. Cardiology is consulted. Echocardiogram is pending. Stress test in 11/2021 showed no ischemia. 5.  History of stroke and seizure. 6.  Hypertension and dyslipidemia. 7.  Diabetes and depression. 8.  No chemical deep venous thrombosis prophylaxis due to gastrointestinal bleed. Consider sequential compression devices to the lower extremities.   Right lower extremity venous Dopplers were done on 12/12/2022 showing chronic DVT in the right greater saphenous vein. 9.  Hypothyroidism.     Questions of patient were answered at bedside in detail  Case discussed in detail with RN, RT, and care team  Thank you for involving me in the care of this patient  I will follow with you closely during hospitalization    Time spent more than 30 minutes under patient care with no overlap reviewing results and records, decision making, and answering questions.     Elly Dallas MD  Pulmonary/CC

## 2022-02-15 NOTE — PROGRESS NOTES
Day # 4 of Vancomycin  Consult provided for this 71 y.o. female for indication of sepsis. Antibiotic regimen:  Vancomycin + Zosyn  Concomitant nephrotoxic drugs: None  Frequency of BMP: Not scheduled    Recent Labs     02/15/22  0430 22  0400 22  0400   WBC 6.2 7.5 9.9   CREA 4.06* 3.53* 3.04*   BUN 55* 54* 51*     Est CrCl: ESRD on HD  Temp (24hrs), Av.8 °F (36.6 °C), Min:97 °F (36.1 °C), Max:98.6 °F (37 °C)    Cultures:    Blood: Pending   Urine: >100,000 col/mL, cx pending    MRSA Swab: Not detected     Target range: Trough 20-25 mcg/mL  Trough on 22 = 16.3    Recent level history:  Date/Time Dose & Interval Measured Level (mcg/mL)    @ 0400 1000 mg x 1 10.8 (post-HD)        Assessment/Plan:   Afebrile,   ESRD on HD MWF, pt didn't dialyzed on 22. Pt got dialyzed today 2/15/22 and per Dr. Claudia Goff pt will be dialyzed again on 22 to get back on MWF schedule. Gave 1000 mg Vancomycin dose today. pre-HD level tomorrow AM 22.   Antimicrobial stop date TBD

## 2022-02-15 NOTE — PROGRESS NOTES
CARDIOLOGY PROGRESS NOTE    Patient seen and examined. This is a patient who is followed for atrial fibrillation, history of pacemaker. Awake and alert. Dialysis this am. No chest pain or discomfort. No other complaints reported. Family at the bedside. Telemetry reviewed, Vpaced 60s    Pertinent review of systems items noted above, all other systems are negative. Current medications reviewed. Physical Examination  Vital signs are stable. Blood pressure 110/55 , Pulse 60  No apparent distress. Heart is regular, rate and rhythm. Normal S1, S2, no murmurs are appreciated. Lungs are clear bilaterally. Abdomen is distended  Extremities have mild edema. Labs reviewed:    Case discussed with Dr. Adelaide Brittle and our impression and recommendations are as follows:    1. Atrial fibrillation:  Known AF, not on [facility] at home though chadsvasc is elevated. Continue tohold [facility] and resume once hgb is stable. Rate is presently controlled, continue to closely monitor on telemetry. 2. High degree AVB s/p PPM:  Normal function per last device transmission. 3. Elevated troponin:  [id number]. Elevation in setting of sepsis, ESRD, GI bleed/anemia. Continues to deny chest pain. Echo pending. Echo in Jan with preserved EF.  NST in November 2021 with no ischemia. Continue workup. No ASA given GI bleeding, no bb while on pressors. Med management as able. 4. Hypotension:  On midodrine at home, continue pressors, bp acceptable at present. Dialysis treatment today. Please do not hesitate to call me or Dr. Adelaide Brittle if additional questions arise.

## 2022-02-15 NOTE — PROGRESS NOTES
Clinical chart reviewed by YUMIKO. Patient was receiving hemodialysis at [address]al in [facility]. Patient's discharge plans are pending her clinical improvement. CM will continue to follow.

## 2022-02-15 NOTE — PROGRESS NOTES
Progress Note    Patient: Kimberly Romano MRN: [id number]  SSN: [id number]    YOB: 1950  Age: 71 y.o. Sex: female      Admit Date: 2/11/2022    LOS: 4 days      72F, H/o seizures with todds paralysis, CVA with right sided weakness, SSS with PPM not on AC, ESRD on HD with blood in stools s/t UGIB with ABLA s/t hypovolemic shock in the setting of pleural effusion with acute hypoxic respiratory failure. HOSPITAL COURSE:   Started with 1 PRBC, on levophed at 10, underwent HD, EGD upon stability, o2 requirements weaned to 4L. Plan for HD. S/p EGD on 2/14- no active source of bleeding, levophed down to 2, if Hb drops will require capsule EGD    Subjective:     72F, H/o seizures with todds paralysis, CVA with right sided weakness, SSS with PPM not on AC, ESRD on HD with blood in stools s/t UGIB with ABLA s/t hypovolemic shock in the setting of pleural effusion with acute hypoxic respiratory failure. HOSPITAL COURSE:   Started with 1 PRBC, on levophed at 10, underwent HD, EGD upon stability, o2 requirements weaned to 4L. Plan for HD. S/p EGD on 2/14- gastritis/chronic,no active source of bleeding, levophed down to 2, if Hb drops will require capsule EGD      Patient seen and examined in ICU  Overnight events noted  Alert and lucid  Sons at bedside-updated  Patient denies specific complaints  Remains on levophed 1 mcg/min, midodrine(pta 2.5 mg tid) increased to 10 mg tid. On room air with spo2 > 97%            Objective:     Vitals:    02/15/22 1050 02/15/22 1100 02/15/22 1500 02/15/22 1648   BP: (!) 93/54 (!) 87/56  120/65   Pulse: 74 70  67   Resp: 18 16     Temp:  98.3 °F (36.8 °C) 98.6 °F (37 °C)    TempSrc:       SpO2: 100% 100%     Weight:       Height:            Intake and Output:  Current Shift: 02/15 0701 - 02/15 1900  In: -   Out: 2500   Last three shifts: 02/13 1901 - 02/15 0700  In: 1058.8 [P.O.:200;  I.V.:858.8]  Out: -       Physical Exam:   Physical   Alert, nad, stephan and appears lucid  HEENT : EOMI normal oral mucosa, atraumatic normocephalic, Normal ear and nose. oropharynx  Neck : Supple, no JVD, no masses noted, no carotid bruit  Lungs :decreased breath sounds bilaterally  CVS : rrr  Abdomen : soft, non tender BS+  Extremities : moves all extremities  Skin : warm and dry  Lymphatic : No cervical lymphadenopathy. Neurological :aao x 3, rt side weaker on exam, tremmors hands. Psychiatric : calm, appropriate      Lab/Data Review:  Recent Results (from the past 24 hour(s))   CBC WITH AUTOMATED DIFF    Collection Time: 02/15/22  4:30 AM   Result Value Ref Range    WBC 6.2 3.6 - 11.0 K/uL    RBC 2.49 (L) 3.80 - 5.20 M/uL    HGB 7.7 (L) 11.5 - 16.0 g/dL    HCT 25.4 (L) 35.0 - 47.0 %    .0 (H) 80.0 - 99.0 FL    MCH 30.9 26.0 - 34.0 PG    MCHC 30.3 30.0 - 36.5 g/dL    RDW 16.4 (H) 11.5 - 14.5 %    PLATELET 091 (L) 113 - 400 K/uL    MPV 10.6 8.9 - 12.9 FL    NRBC 0.0 0.0  WBC    ABSOLUTE NRBC 0.00 0.00 - 0.01 K/uL    NEUTROPHILS 73 32 - 75 %    LYMPHOCYTES 17 12 - 49 %    MONOCYTES 8 5 - 13 %    EOSINOPHILS 1 0 - 7 %    BASOPHILS 1 0 - 1 %    IMMATURE GRANULOCYTES 0 0 - 0.5 %    ABS. NEUTROPHILS 4.6 1.8 - 8.0 K/UL    ABS. LYMPHOCYTES 1.0 0.8 - 3.5 K/UL    ABS. MONOCYTES 0.5 0.0 - 1.0 K/UL    ABS. EOSINOPHILS 0.0 0.0 - 0.4 K/UL    ABS. BASOPHILS 0.0 0.0 - 0.1 K/UL    ABS. IMM.  GRANS. 0.0 0.00 - 0.04 K/UL    DF AUTOMATED     METABOLIC PANEL, BASIC    Collection Time: 02/15/22  4:30 AM   Result Value Ref Range    Sodium 138 136 - 145 mmol/L    Potassium 4.4 3.5 - 5.1 mmol/L    Chloride 107 97 - 108 mmol/L    CO2 23 21 - 32 mmol/L    Anion gap 8 5 - 15 mmol/L    Glucose 66 65 - 100 mg/dL    BUN 55 (H) 6 - 20 mg/dL    Creatinine 4.06 (H) 0.55 - 1.02 mg/dL    BUN/Creatinine ratio 14 12 - 20      GFR est AA 13 (L) >60 ml/min/1.73m2    GFR est non-AA 11 (L) >60 ml/min/1.73m2    Calcium 6.9 (L) 8.5 - 10.1 mg/dL   GLUCOSE, POC    Collection Time: 02/15/22  7:46 AM   Result Value Ref Range    Glucose (POC) 74 65 - 117 mg/dL    Performed by MCKAY CHRISTIANSON          Assessment and plan:         (1) hypovolemic shock: levophed to keep MAP > 65. Pt on midodrine at home 2.5 mg tid increased to 10 mg tid. Wean off Levophed as tolerates.     (1) acute encephalopathy: s/t metabolic. Improved.     (2) Acute hypoxic respiratory failure : d/t fluid overload. Weaned to room air. Keep spo2>91%     (3) UGIB with ABLA with anemia: s/p prbc, continue protonix 40 BID. S/p EGD 2/14 showed esophagitis chronic gastritis without bleeding. 3 clips from previous intervention were still present without any active bleeding noted.      (4) anemia: s.t #3 and AOCI. Procrit 10K units with HD. Transfuse to keep hgb>7.     (5) seizures with todds paralysis: continuetrileptal, fluoxetine, keppra     (6) CVA with right sided weakness: complicates all aspects of care     (7)  SSS with PPM not on AC:      (8) ESRD on HD with hyperkalemia: hyperkalemia protocol/ k to wnl. HD per nephrology mwf     DVT ppx: SCDs     DISPO: P  Discussed with family/sons at bedside, updated. Remains full code,  2/14: they will discuss amongst themselves for DNR DNI, and 48 hours, if course is declinding then further discuss comfort care.      Signed By: Yuridia Garrison MD     February 15, 2022

## 2022-02-15 NOTE — PROGRESS NOTES
Progress Note    Patient: Kavitha Crew MRN: [id number]  SSN: [id number]    YOB: 1950  Age: 71 y.o. Sex: female      Admit Date: 2/11/2022    LOS: 4 days     Subjective:   GI is following in consultation for dark tarry stools     2/15: Patient seen in ICU. She's sleeping, easily arousable. Answers questions appropriately. Denies chest pain of shortness of breath. On room air. No active bleeding noted. Hgb today is 7.7.     2/14 EGD - esophagitis, chronic gastritis without bleeding. three clips from previous intervention still in place No active bleeding noted.     History of Present Illness: Alisha James is a 71 y. o. female who is seen in consultation for GI bleed - dark tarry stool. Patient was admitted to the hospital with reports of dark stool since this morning. Multiple ER visits. She was transferred to SNF and was discharged home yesterday. Family noticed dark stools and and red blood per rectum so they called EMS. Upon arrival to the ED, patient became unresponsive with faint pulse. CPR started, she was resuscitated immediately. She was given IVF for hypotension, started levophed, and  placed on ventimask. PMH: H/o seizures with todds paralysis, CVA with right sided weakness, SSS with PPM not on AC, ESRD on HD. She is on Eliquis. EGD on 1/13/22 showed Acute gastric ulcer at anastomosis with hemorrhage, treated with 2 hemoclips and injected with epinephrine.      -ED labs: Hgb 7.6, creatinine 3.72, elevated troponin 62, BNP 5,856.   -CXR - Lungs clear; no interstitial or alveolar pulmonary edema      Objective:     Vitals:    02/15/22 1500 02/15/22 1600 02/15/22 1648 02/15/22 1700   BP: (!) 117/51 122/62 120/65 122/62   Pulse: 60 60 67 67   Resp: 16 18  18   Temp: 98.6 °F (37 °C) 98.6 °F (37 °C)     TempSrc:       SpO2: 100% 100%  100%   Weight:       Height:            Intake and Output:  Current Shift: 02/15 0701 - 02/15 1900  In: 40.3 [I.V.:40.3]  Out: 2500   Last three shifts: 02/13 1901 - 02/15 0700  In: 1058.8 [P.O.:200; I.V.:858.8]  Out: -     Physical Exam:   Skin:  Bruising lower extremities. HEENT: Sclerae anicteric. Extra-occular muscles are intact. No abnormal pigmentation of the lips. The neck is supple. Cardiovascular: Regular rate and rhythm. Respiratory:  Comfortable breathing with no accessory muscle use. GI:  Abdomen nondistended, soft, and nontender. No enlargement of the liver or spleen. No masses palpable. Rectal:  Deferred  Musculoskeletal: generalized weakness. Neurological:  Gross memory appears intact. Patient is alert and oriented. Psychiatric:  Mood appears appropriate with judgement intact. Lymphatic:  No visible adenopathy      Lab/Data Review:  Recent Results (from the past 24 hour(s))   CBC WITH AUTOMATED DIFF    Collection Time: 02/15/22  4:30 AM   Result Value Ref Range    WBC 6.2 3.6 - 11.0 K/uL    RBC 2.49 (L) 3.80 - 5.20 M/uL    HGB 7.7 (L) 11.5 - 16.0 g/dL    HCT 25.4 (L) 35.0 - 47.0 %    .0 (H) 80.0 - 99.0 FL    MCH 30.9 26.0 - 34.0 PG    MCHC 30.3 30.0 - 36.5 g/dL    RDW 16.4 (H) 11.5 - 14.5 %    PLATELET 929 (L) 356 - 400 K/uL    MPV 10.6 8.9 - 12.9 FL    NRBC 0.0 0.0  WBC    ABSOLUTE NRBC 0.00 0.00 - 0.01 K/uL    NEUTROPHILS 73 32 - 75 %    LYMPHOCYTES 17 12 - 49 %    MONOCYTES 8 5 - 13 %    EOSINOPHILS 1 0 - 7 %    BASOPHILS 1 0 - 1 %    IMMATURE GRANULOCYTES 0 0 - 0.5 %    ABS. NEUTROPHILS 4.6 1.8 - 8.0 K/UL    ABS. LYMPHOCYTES 1.0 0.8 - 3.5 K/UL    ABS. MONOCYTES 0.5 0.0 - 1.0 K/UL    ABS. EOSINOPHILS 0.0 0.0 - 0.4 K/UL    ABS. BASOPHILS 0.0 0.0 - 0.1 K/UL    ABS. IMM.  GRANS. 0.0 0.00 - 0.04 K/UL    DF AUTOMATED     METABOLIC PANEL, BASIC    Collection Time: 02/15/22  4:30 AM   Result Value Ref Range    Sodium 138 136 - 145 mmol/L    Potassium 4.4 3.5 - 5.1 mmol/L    Chloride 107 97 - 108 mmol/L    CO2 23 21 - 32 mmol/L    Anion gap 8 5 - 15 mmol/L    Glucose 66 65 - 100 mg/dL    BUN 55 (H) 6 - 20 mg/dL    Creatinine 4.06 (H) 0.55 - 1.02 mg/dL    BUN/Creatinine ratio 14 12 - 20      GFR est AA 13 (L) >60 ml/min/1.73m2    GFR est non-AA 11 (L) >60 ml/min/1.73m2    Calcium 6.9 (L) 8.5 - 10.1 mg/dL   GLUCOSE, POC    Collection Time: 02/15/22  7:46 AM   Result Value Ref Range    Glucose (POC) 74 65 - 117 mg/dL    Performed by MCKAY CRHISTIANSON               XR CHEST PORT   Final Result   Worsening congestive heart failure pattern. DUPLEX LOWER EXT VENOUS RIGHT   Final Result      CT HEAD WO CONT   Final Result   No acute intracranial abnormality. Old ischemic disease in the high left parietal lobe and in the left basal   ganglion. There is significant atrophy with significant deepening of the left sylvian   fissure. CT ABD PELV WO CONT   Final Result   Fluid overload. No evidence to suggest a source of GI bleeding      CT dose reduction was achieved through use of a standardized protocol tailored   for this examination and automatic exposure control for dose modulation. XR CHEST PORT   Final Result          Assessment:     Active Problems:    GI bleed (2/11/2022)      Septic shock ([address] (2/11/2022)        Plan:   1. GI Bleed - dark stools.      -latest Hgb 7.7 . Monitor and transfuse as needed.      -Continue PPI     -No anti-coagulant     -Repeat CBC in am     -2/14 EGD esophagitis, chronic gastritis without bleeding. three clips from previous intervention still in place No active bleeding noted. 2. Anemia      -latest Hgb 7.7.  Monitor and transfuse as needed.       - ESRD  3. Hypovolemic shock      -BP initially low, started on levophed. Given blood transfusion      -was transferred to ICU  4. Acute respiratory failure (resolved)      -currently on room air not in distress  5. ESRD      -hemodialysis      -creatinine 4.06      -nephrology input appreciated    Patient discussed with Dr Loan Parish and agrees to above impression and plan.   Thank you for allowing me to participate in this patients care    Signed By: Frank Eldridge. RACHEL Barry     February 15, 2022

## 2022-02-16 LAB
ALBUMIN SERPL-MCNC: 1.4 G/DL (ref 3.5–5)
ANION GAP SERPL CALC-SCNC: 8 MMOL/L (ref 5–15)
BACTERIA SPEC CULT: ABNORMAL
BUN SERPL-MCNC: 36 MG/DL (ref 6–20)
BUN/CREAT SERPL: 11 (ref 12–20)
CA-I BLD-MCNC: 7 MG/DL (ref 8.5–10.1)
CA-I BLD-MCNC: 7.1 MG/DL (ref 8.5–10.1)
CHLORIDE SERPL-SCNC: 105 MMOL/L (ref 97–108)
CO2 SERPL-SCNC: 25 MMOL/L (ref 21–32)
COLONY COUNT,CNT: ABNORMAL
CREAT SERPL-MCNC: 3.19 MG/DL (ref 0.55–1.02)
ERYTHROCYTE [DISTWIDTH] IN BLOOD BY AUTOMATED COUNT: 16.7 % (ref 11.5–14.5)
GLUCOSE SERPL-MCNC: 66 MG/DL (ref 65–100)
HCT VFR BLD AUTO: 23.7 % (ref 35–47)
HGB BLD-MCNC: 7.2 G/DL (ref 11.5–16)
MCH RBC QN AUTO: 30.9 PG (ref 26–34)
MCHC RBC AUTO-ENTMCNC: 30.4 G/DL (ref 30–36.5)
MCV RBC AUTO: 101.7 FL (ref 80–99)
NRBC # BLD: 0 K/UL (ref 0–0.01)
NRBC BLD-RTO: 0 PER 100 WBC
PHOSPHATE SERPL-MCNC: 4.6 MG/DL (ref 2.6–4.7)
PLATELET # BLD AUTO: 73 K/UL (ref 150–400)
PMV BLD AUTO: 11.6 FL (ref 8.9–12.9)
POTASSIUM SERPL-SCNC: 3.5 MMOL/L (ref 3.5–5.1)
PTH-INTACT SERPL-MCNC: 193.3 PG/ML (ref 18.4–88)
RBC # BLD AUTO: 2.33 M/UL (ref 3.8–5.2)
SODIUM SERPL-SCNC: 138 MMOL/L (ref 136–145)
SPECIAL REQUESTS,SREQ: ABNORMAL
VANCOMYCIN SERPL-MCNC: 23.1 UG/ML
WBC # BLD AUTO: 4.2 K/UL (ref 3.6–11)

## 2022-02-16 PROCEDURE — 74011000258 HC RX REV CODE- 258: Performed by: INTERNAL MEDICINE

## 2022-02-16 PROCEDURE — 36415 COLL VENOUS BLD VENIPUNCTURE: CPT

## 2022-02-16 PROCEDURE — C9113 INJ PANTOPRAZOLE SODIUM, VIA: HCPCS | Performed by: HOSPITALIST

## 2022-02-16 PROCEDURE — 74011250636 HC RX REV CODE- 250/636: Performed by: INTERNAL MEDICINE

## 2022-02-16 PROCEDURE — 85027 COMPLETE CBC AUTOMATED: CPT

## 2022-02-16 PROCEDURE — 74011250636 HC RX REV CODE- 250/636: Performed by: HOSPITALIST

## 2022-02-16 PROCEDURE — 80069 RENAL FUNCTION PANEL: CPT

## 2022-02-16 PROCEDURE — 74011250637 HC RX REV CODE- 250/637: Performed by: INTERNAL MEDICINE

## 2022-02-16 PROCEDURE — 65610000006 HC RM INTENSIVE CARE

## 2022-02-16 PROCEDURE — 74011250637 HC RX REV CODE- 250/637: Performed by: HOSPITALIST

## 2022-02-16 PROCEDURE — 83970 ASSAY OF PARATHORMONE: CPT

## 2022-02-16 PROCEDURE — 80202 ASSAY OF VANCOMYCIN: CPT

## 2022-02-16 PROCEDURE — 74011000250 HC RX REV CODE- 250: Performed by: HOSPITALIST

## 2022-02-16 PROCEDURE — 74011000250 HC RX REV CODE- 250: Performed by: INTERNAL MEDICINE

## 2022-02-16 RX ORDER — LIDOCAINE 4 G/100G
2 PATCH TOPICAL EVERY 24 HOURS
Status: DISCONTINUED | OUTPATIENT
Start: 2022-02-16 | End: 2022-02-22 | Stop reason: HOSPADM

## 2022-02-16 RX ADMIN — PIPERACILLIN SODIUM AND TAZOBACTAM SODIUM 3.38 G: 3; .375 INJECTION, POWDER, LYOPHILIZED, FOR SOLUTION INTRAVENOUS at 21:07

## 2022-02-16 RX ADMIN — ACETAMINOPHEN 650 MG: 325 TABLET ORAL at 08:02

## 2022-02-16 RX ADMIN — SODIUM CHLORIDE, PRESERVATIVE FREE 10 ML: 5 INJECTION INTRAVENOUS at 21:07

## 2022-02-16 RX ADMIN — LEVETIRACETAM 1000 MG: 250 TABLET, FILM COATED ORAL at 08:02

## 2022-02-16 RX ADMIN — OXCARBAZEPINE 300 MG: 300 TABLET, FILM COATED ORAL at 21:06

## 2022-02-16 RX ADMIN — SODIUM CHLORIDE, PRESERVATIVE FREE 40 MG: 5 INJECTION INTRAVENOUS at 21:07

## 2022-02-16 RX ADMIN — HEPARIN SODIUM 3200 UNITS: 1000 INJECTION, SOLUTION INTRAVENOUS; SUBCUTANEOUS at 18:13

## 2022-02-16 RX ADMIN — LACOSAMIDE 200 MG: 100 TABLET, FILM COATED ORAL at 21:06

## 2022-02-16 RX ADMIN — LEVOTHYROXINE SODIUM 150 MCG: 0.07 TABLET ORAL at 05:06

## 2022-02-16 RX ADMIN — MIDODRINE HYDROCHLORIDE 10 MG: 5 TABLET ORAL at 11:31

## 2022-02-16 RX ADMIN — PIPERACILLIN SODIUM AND TAZOBACTAM SODIUM 3.38 G: 3; .375 INJECTION, POWDER, LYOPHILIZED, FOR SOLUTION INTRAVENOUS at 10:21

## 2022-02-16 RX ADMIN — CALCITRIOL CAPSULES 0.25 MCG 0.5 MCG: 0.25 CAPSULE ORAL at 08:01

## 2022-02-16 RX ADMIN — MIDODRINE HYDROCHLORIDE 10 MG: 5 TABLET ORAL at 18:18

## 2022-02-16 RX ADMIN — FLUOXETINE HYDROCHLORIDE 10 MG: 10 CAPSULE ORAL at 10:21

## 2022-02-16 RX ADMIN — MIDODRINE HYDROCHLORIDE 10 MG: 5 TABLET ORAL at 08:00

## 2022-02-16 RX ADMIN — OXCARBAZEPINE 150 MG: 150 TABLET, FILM COATED ORAL at 08:01

## 2022-02-16 RX ADMIN — SODIUM CHLORIDE, PRESERVATIVE FREE 40 MG: 5 INJECTION INTRAVENOUS at 08:03

## 2022-02-16 RX ADMIN — ATORVASTATIN CALCIUM 40 MG: 40 TABLET, FILM COATED ORAL at 08:01

## 2022-02-16 RX ADMIN — LEVETIRACETAM 1000 MG: 250 TABLET, FILM COATED ORAL at 21:06

## 2022-02-16 RX ADMIN — SODIUM CHLORIDE, PRESERVATIVE FREE 40 ML: 5 INJECTION INTRAVENOUS at 18:18

## 2022-02-16 RX ADMIN — LACOSAMIDE 200 MG: 100 TABLET, FILM COATED ORAL at 08:01

## 2022-02-16 NOTE — PROGRESS NOTES
Progress Note    Patient: Linda Benjamin MRN: [id number]  SSN: [id number]    YOB: 1950  Age: 71 y.o. Sex: female      Admit Date: 2/11/2022    LOS: 5 days     Subjective:   GI is following in consultation for dark tarry stools     2/16: Patient seen in ICU. She's sleeping, easily arousable. Denies chest pain of shortness of breath. On room air. No active bleeding noted. Hgb today is 7.2.     2/14 EGD - esophagitis, chronic gastritis without bleeding. three clips from previous intervention still in place No active bleeding noted.     History of Present Illness: Alisha James is a 71 y. o. female who is seen in consultation for GI bleed - dark tarry stool. Patient was admitted to the hospital with reports of dark stool since this morning. Multiple ER visits. She was transferred to SNF and was discharged home yesterday. Family noticed dark stools and and red blood per rectum so they called EMS. Upon arrival to the ED, patient became unresponsive with faint pulse. CPR started, she was resuscitated immediately. She was given IVF for hypotension, started levophed, and  placed on ventimask. PMH: H/o seizures with todds paralysis, CVA with right sided weakness, SSS with PPM not on AC, ESRD on HD. She is on Eliquis. EGD on 1/13/22 showed Acute gastric ulcer at anastomosis with hemorrhage, treated with 2 hemoclips and injected with epinephrine.      -ED labs: Hgb 7.6, creatinine 3.72, elevated troponin 62, BNP 5,856. -CXR - Lungs clear; no interstitial or alveolar pulmonary edema      Objective:     Vitals:    02/16/22 1000 02/16/22 1100 02/16/22 1131 02/16/22 1200   BP: 109/60 (!) 108/59 (!) 108/59 131/60   Pulse: 66 60 61 61   Resp:       Temp:       TempSrc:       SpO2: 95% 97%  96%   Weight:       Height:            Intake and Output:  Current Shift: 02/16 0701 - 02/16 1900  In: 132.3 [I.V.:132.3]  Out: -   Last three shifts: 02/14 1901 - 02/16 0700  In: 378 [P.O.:200;  I.V.:178]  Out: 2500 Physical Exam:   Skin:   Bruising lower extremities. HEENT: Sclerae anicteric. Extra-occular muscles are intact. No abnormal pigmentation of the lips. The neck is supple. Cardiovascular: Regular rate and rhythm. Respiratory:  Comfortable breathing with no accessory muscle use. GI:  Abdomen nondistended, soft, and nontender. No enlargement of the liver or spleen. No masses palpable. Rectal:  Deferred  Musculoskeletal: generalized weakness. Neurological:  Gross memory appears intact. Patient is alert and oriented. Psychiatric:  Mood appears appropriate with judgement intact.   Lymphatic:  No visible adenopathy      Lab/Data Review:  Recent Results (from the past 24 hour(s))   VANCOMYCIN, RANDOM    Collection Time: 02/16/22  5:00 AM   Result Value Ref Range    Vancomycin, random 23.1 ug/mL   RENAL FUNCTION PANEL    Collection Time: 02/16/22  5:00 AM   Result Value Ref Range    Sodium 138 136 - 145 mmol/L    Potassium 3.5 3.5 - 5.1 mmol/L    Chloride 105 97 - 108 mmol/L    CO2 25 21 - 32 mmol/L    Anion gap 8 5 - 15 mmol/L    Glucose 66 65 - 100 mg/dL    BUN 36 (H) 6 - 20 mg/dL    Creatinine 3.19 (H) 0.55 - 1.02 mg/dL    BUN/Creatinine ratio 11 (L) 12 - 20      GFR est AA 17 (L) >60 ml/min/1.73m2    GFR est non-AA 14 (L) >60 ml/min/1.73m2    Calcium 7.0 (L) 8.5 - 10.1 mg/dL    Phosphorus 4.6 2.6 - 4.7 mg/dL    Albumin 1.4 (L) 3.5 - 5.0 g/dL   PTH INTACT    Collection Time: 02/16/22  5:00 AM   Result Value Ref Range    Calcium 7.1 (L) 8.5 - 10.1 mg/dL    PTH, Intact 193.3 (H) 18.4 - 88.0 pg/mL   CBC W/O DIFF    Collection Time: 02/16/22  5:00 AM   Result Value Ref Range    WBC 4.2 3.6 - 11.0 K/uL    RBC 2.33 (L) 3.80 - 5.20 M/uL    HGB 7.2 (L) 11.5 - 16.0 g/dL    HCT 23.7 (L) 35.0 - 47.0 %    .7 (H) 80.0 - 99.0 FL    MCH 30.9 26.0 - 34.0 PG    MCHC 30.4 30.0 - 36.5 g/dL    RDW 16.7 (H) 11.5 - 14.5 %    PLATELET 73 (L) 243 - 400 K/uL    MPV 11.6 8.9 - 12.9 FL    NRBC 0.0 0.0  WBC    ABSOLUTE NRBC 0. 00 0.00 - 0.01 K/uL              XR CHEST PORT   Final Result   Worsening congestive heart failure pattern. DUPLEX LOWER EXT VENOUS RIGHT   Final Result      CT HEAD WO CONT   Final Result   No acute intracranial abnormality. Old ischemic disease in the high left parietal lobe and in the left basal   ganglion. There is significant atrophy with significant deepening of the left sylvian   fissure. CT ABD PELV WO CONT   Final Result   Fluid overload. No evidence to suggest a source of GI bleeding      CT dose reduction was achieved through use of a standardized protocol tailored   for this examination and automatic exposure control for dose modulation. XR CHEST PORT   Final Result          Assessment:     Active Problems:    GI bleed (2/11/2022)      Septic shock ([address] (2/11/2022)        Plan:   1. GI Bleed - dark stools.      -latest Hgb 7.2 . Monitor and transfuse as needed.      -Continue PPI     -No anti-coagulant     -Repeat CBC in am     -2/14 EGD esophagitis, chronic gastritis without bleeding. three clips from previous intervention still in place No active bleeding noted. 2. Anemia      -latest Hgb 7.2.  Monitor and transfuse as needed.       - ESRD  3. Hypovolemic shock      -BP initially low, started on levophed. Given blood transfusion      -was transferred to ICU  4. Acute respiratory failure (resolved)      -currently on room air not in distress  5. ESRD      -hemodialysis      -creatinine 3.19      -nephrology input appreciated    Patient discussed with Dr Hilda Diaz and agrees to above impression and plan. Thank you for allowing me to participate in this patients care    Signed By: Tammie Diop.  RACHEL Barry     February 16, 2022

## 2022-02-16 NOTE — PROGRESS NOTES
Progress Note    Patient: Jhoan Lux MRN: [id number]  SSN: [id number]    YOB: 1950  Age: 71 y.o. Sex: female      Admit Date: 2/11/2022    LOS: 5 days      72F, H/o seizures with todds paralysis, CVA with right sided weakness, SSS with PPM not on AC, ESRD on HD with blood in stools s/t UGIB with ABLA s/t hypovolemic shock in the setting of pleural effusion with acute hypoxic respiratory failure. HOSPITAL COURSE:   Started with 1 PRBC, on levophed at 10, underwent HD, EGD upon stability, o2 requirements weaned to 4L. Plan for HD. S/p EGD on 2/14- no active source of bleeding, levophed weaned, if Hb drops will require capsule EGD    Subjective:     72F, H/o seizures with todds paralysis, CVA with right sided weakness, SSS with PPM not on AC, ESRD on HD with blood in stools s/t UGIB with ABLA s/t hypovolemic shock in the setting of pleural effusion with acute hypoxic respiratory failure. HOSPITAL COURSE:   Started with 1 PRBC, on levophed at 10, underwent HD, EGD upon stability, o2 requirements weaned to 4L. Plan for HD. S/p EGD on 2/14- gastritis/chronic,no active source of bleeding, levophed down to 2, if Hb drops will require capsule EGD      Patient seen and examined in ICU  Overnight events noted  Alert and lucid  Currently oh HD, goal 3 L off, BP remaining adequate so far. Has been cramping since onset and states not uncommon with HD.   hgb 7.2          Objective:     Vitals:    02/16/22 1000 02/16/22 1100 02/16/22 1131 02/16/22 1200   BP: 109/60 (!) 108/59 (!) 108/59 131/60   Pulse: 66 60 61 61   Resp:       Temp:       TempSrc:       SpO2: 95% 97%  96%   Weight:       Height:            Intake and Output:  Current Shift: 02/16 0701 - 02/16 1900  In: 132.3 [I.V.:132.3]  Out: -   Last three shifts: 02/14 1901 - 02/16 0700  In: 378 [P.O.:200;  I.V.:178]  Out: 2500       Physical Exam:   Physical   Alert, nad, calm and appears lucid  HEENT : EOMI normal oral mucosa, atraumatic normocephalic, Normal ear and nose. oropharynx  Neck : Supple, no JVD, no masses noted, no carotid bruit  Lungs :decreased breath sounds bilaterally, not labored  CVS : rrr  Abdomen : soft, non tender BS+  Extremities : moves all extremities  Skin : warm and dry  Lymphatic : No cervical lymphadenopathy. Neurological :aao x 3, rt side weaker on exam, tremmors hands. Psychiatric : calm, appropriate      Lab/Data Review:  Recent Results (from the past 24 hour(s))   VANCOMYCIN, RANDOM    Collection Time: 02/16/22  5:00 AM   Result Value Ref Range    Vancomycin, random 23.1 ug/mL   RENAL FUNCTION PANEL    Collection Time: 02/16/22  5:00 AM   Result Value Ref Range    Sodium 138 136 - 145 mmol/L    Potassium 3.5 3.5 - 5.1 mmol/L    Chloride 105 97 - 108 mmol/L    CO2 25 21 - 32 mmol/L    Anion gap 8 5 - 15 mmol/L    Glucose 66 65 - 100 mg/dL    BUN 36 (H) 6 - 20 mg/dL    Creatinine 3.19 (H) 0.55 - 1.02 mg/dL    BUN/Creatinine ratio 11 (L) 12 - 20      GFR est AA 17 (L) >60 ml/min/1.73m2    GFR est non-AA 14 (L) >60 ml/min/1.73m2    Calcium 7.0 (L) 8.5 - 10.1 mg/dL    Phosphorus 4.6 2.6 - 4.7 mg/dL    Albumin 1.4 (L) 3.5 - 5.0 g/dL   PTH INTACT    Collection Time: 02/16/22  5:00 AM   Result Value Ref Range    Calcium 7.1 (L) 8.5 - 10.1 mg/dL    PTH, Intact 193.3 (H) 18.4 - 88.0 pg/mL   CBC W/O DIFF    Collection Time: 02/16/22  5:00 AM   Result Value Ref Range    WBC 4.2 3.6 - 11.0 K/uL    RBC 2.33 (L) 3.80 - 5.20 M/uL    HGB 7.2 (L) 11.5 - 16.0 g/dL    HCT 23.7 (L) 35.0 - 47.0 %    .7 (H) 80.0 - 99.0 FL    MCH 30.9 26.0 - 34.0 PG    MCHC 30.4 30.0 - 36.5 g/dL    RDW 16.7 (H) 11.5 - 14.5 %    PLATELET 73 (L) 141 - 400 K/uL    MPV 11.6 8.9 - 12.9 FL    NRBC 0.0 0.0  WBC    ABSOLUTE NRBC 0.00 0.00 - 0.01 K/uL         Assessment and plan:         (1) hypovolemic shock: levophed to keep MAP > 65. Pt on midodrine at home 2.5 mg tid increased to 10 mg tid.  Off levophed, tolerating HD currently.      (1) acute encephalopathy: s/t metabolic. Appears to be improving.     (2) Acute hypoxic respiratory failure : d/t fluid overload. Weaned to room air. Keep spo2>91%. Maintaining>94%.     (3) UGIB with ABLA with anemia: s/p prbc, continue protonix 40 BID. S/p EGD 2/14 showed esophagitis chronic gastritis without bleeding. 3 clips from previous intervention were still present without any active bleeding noted.      (4) anemia: s.t #3 and AOCI. Procrit 10K units with HD. Transfuse to keep hgb>7.      (5) seizures with todds paralysis: continuetrileptal, fluoxetine, keppra     (6) CVA with right sided weakness: complicates all aspects of care     (7)  SSS with PPM not on AC:      (8) ESRD on HD with hyperkalemia: hyperkalemia protocol/ k to wnl. HD per nephrology mwf Getting hd currently, c/o cramps usually with HD.      DVT ppx: SCDs     DISPO: P  Discussed with family/sons at bedside, updated. Remains full code,  May transfer out of icu to remote telemetry after HD session completes. 2/14: they will discuss amongst themselves for DNR DNI, and 48 hours, if course is declinding then further discuss comfort care.      Signed By: Toma Mckeon MD     February 16, 2022

## 2022-02-16 NOTE — PROGRESS NOTES
Pulmonary Progress Note      NAME: Karon Damon   :  1950  MRM:  [id number]    Date/Time: 2022  6:20 PM         Subjective:     Patient seen and examined in ICU  Overnight events noted    Lying in bed,  Awake and alert  No acute distress  On room air  Received hemodialysis 2/15      Past Medical History reviewed and unchanged from Admission History and Physical       Objective:     Physical Exam     Vitals:      Last 24hrs VS reviewed since prior progress note. Most recent are:    Visit Vitals  /60   Pulse 60   Temp 97.7 °F (36.5 °C)   Resp 16   Ht 5' 6\" (1.676 m)   Wt 78.2 kg (172 lb 6.4 oz)   SpO2 98%   Breastfeeding No   BMI 27.83 kg/m²     SpO2 Readings from Last 6 Encounters:   22 98%   22 98%   22 99%   22 97%   22 100%   22 100%    O2 Flow Rate (L/min): 4 l/min       Intake/Output Summary (Last 24 hours) at 2022 1127  Last data filed at 2/15/2022 1800  Gross per 24 hour   Intake 40.33 ml   Output    Net 40.33 ml      GENERAL:  The patient is an elderly frail white female who appears to be comfortable. HEENT:  Shows pupils are equal and reactive to light. Pallor is noted. No icterus. Nasal passages are patent. She appears to be edentulous. No obvious thrush. NECK:  Supple. Trachea is central.  No JVD or lymphadenopathy. She is not using accessory muscle of respiration. She has a tunneled hemodialysis catheter on the right side of the neck. CHEST:  Symmetrical with equal, but diminished air entry bilaterally. She has very diminished breath sounds over the lung bases, but impaired percussion due to bilateral pleural effusions. HEART:  Rhythm is regular without any loud murmur or gallop. ABDOMEN:  Obese and benign. Bowel sounds are audible. EXTREMITIES:  Did not show any cyanosis or clubbing. Pulses are palpable. She does have chronic-appearing pitting edema. She appears to have a chronic wound on her left leg as well.   There are mild areas of ecchymosis noted. NEUROLOGIC:  Examination is grossly intact. SKIN:  Warm and dry. XR CHEST PORT   Final Result   Worsening congestive heart failure pattern. DUPLEX LOWER EXT VENOUS RIGHT   Final Result      CT HEAD WO CONT   Final Result   No acute intracranial abnormality. Old ischemic disease in the high left parietal lobe and in the left basal   ganglion. There is significant atrophy with significant deepening of the left sylvian   fissure. CT ABD PELV WO CONT   Final Result   Fluid overload. No evidence to suggest a source of GI bleeding      CT dose reduction was achieved through use of a standardized protocol tailored   for this examination and automatic exposure control for dose modulation.       XR CHEST PORT   Final Result          Lab Data Reviewed: (see below)      Medications:  Current Facility-Administered Medications   Medication Dose Route Frequency    lidocaine 4 % patch 2 Patch  2 Patch TransDERmal Q24H    midodrine (PROAMATINE) tablet 10 mg  10 mg Oral TID WITH MEALS    levETIRAcetam (KEPPRA) tablet 1,000 mg  1,000 mg Oral BID    Vancomycin - Pharmacy to Dose   Other Rx Dosing/Monitoring    piperacillin-tazobactam (ZOSYN) 3.375 g in 0.9% sodium chloride (MBP/ADV) 100 mL MBP  3.375 g IntraVENous Q12H    heparin (porcine) 1,000 unit/mL injection 3,200 Units  3,200 Units IntraVENous DIALYSIS PRN    0.9% sodium chloride infusion 250 mL  250 mL IntraVENous PRN    sodium chloride (NS) flush 5-40 mL  5-40 mL IntraVENous Q8H    sodium chloride (NS) flush 5-40 mL  5-40 mL IntraVENous PRN    acetaminophen (TYLENOL) tablet 650 mg  650 mg Oral Q6H PRN    Or    acetaminophen (TYLENOL) suppository 650 mg  650 mg Rectal Q6H PRN    polyethylene glycol (MIRALAX) packet 17 g  17 g Oral DAILY PRN    ondansetron (ZOFRAN ODT) tablet 4 mg  4 mg Oral Q8H PRN    Or    ondansetron (ZOFRAN) injection 4 mg  4 mg IntraVENous Q6H PRN    pantoprazole (PROTONIX) 40 mg in 0.9% sodium chloride 10 mL injection  40 mg IntraVENous Q12H    atorvastatin (LIPITOR) tablet 40 mg  40 mg Oral DAILY    calcitRIOL (ROCALTROL) capsule 0.5 mcg  0.5 mcg Oral DAILY    levothyroxine (SYNTHROID) tablet 150 mcg  150 mcg Oral DAILY    FLUoxetine (PROzac) capsule 10 mg  10 mg Oral DAILY    OXcarbazepine (TRILEPTAL) tablet 150 mg  150 mg Oral QAM    OXcarbazepine (TRILEPTAL) tablet 300 mg  300 mg Oral QHS    lacosamide (VIMPAT) tablet 200 mg  200 mg Oral BID    [Held by provider] sevelamer carbonate (RENVELA) tab 800 mg  800 mg Oral TID WITH MEALS    zinc oxide-white petrolatum 17-57 % topical paste   Topical PRN    NOREPINephrine (LEVOPHED) 8 mg in 0.9% NS 250ml infusion  0.5-16 mcg/min IntraVENous TITRATE       ______________________________________________________________________      Lab Review:     Recent Labs     02/16/22  0500 02/15/22  0430 02/14/22  0400   WBC 4.2 6.2 7.5   HGB 7.2* 7.7* 7.4*   HCT 23.7* 25.4* 24.1*   PLT 73* 100* 124*     Recent Labs     02/16/22  0500 02/15/22  0430 02/14/22  0400    138 139   K 3.5 4.4 4.6    107 106   CO2 25 23 24   GLU 66 66 51*   BUN 36* 55* 54*   CREA 3.19* 4.06* 3.53*   CA 7.0* 6.9* 6.8*   PHOS 4.6  --   --    ALB 1.4*  --   --      No components found for: GLPOC  No results for input(s): PH, PCO2, PO2, HCO3, FIO2 in the last 72 hours. No results for input(s): INR, INREXT, INREXT, INREXT in the last 72 hours. Other pertinent lab:   Chest x-ray done on 02/11/2022 was personally reviewed. The report shows no acute process; however, she does appear to have hypoventilatory changes and pulmonary vascular congestion. CT of the abdomen and pelvis done on 02/11/2022 was also reviewed. The lung bases show large bilateral pleural effusions with associated atelectasis. Assessment & Plan:      1. Acute hypoxic respiratory failure. This is due to fluid overload.   CT of the abdomen and pelvis basically shows large bilateral pleural effusions with associated atelectasis. Continue with dialysis and try to remove as much fluid as possible. I believe that with ongoing dialysis, there will be an improvement in her oxygen requirement. She had hemodialysis yesterday. This morning she is much more comfortable on 3 L oxygen. Blood gases done this morning showed 7.3 4/42/153 on 4 L oxygen. We will wean down oxygen to keep saturation more than 92%. Discussed with RN. It is noted that she is empirically started on vancomycin and Zosyn. Clinical suspicion for pneumonia is relatively low; however, antibiotics can be continued. 2.  End-stage renal disease. on hemodialysis. However, she is relatively hypotensive and is on a small dose of Levophed, currently at 4 mics. Target map of 65 and higher. 3.  Gastrointestinal bleed. Apparently, she had black tarry stools and also bright red blood per rectum for which paramedics were called and she was brought over to the emergency room. Gastroenterology is consulted. She received 1 unit of packed red blood cells. Continue to monitor hemoglobin and hematocrit. She is on Protonix intravenously. She is for EGD in the morning. 4.  Multiple cardiac issues including atrial fibrillation, history of atrioventricular block, and pacemaker insertion. Her troponins are elevated. Cardiology is consulted. Echocardiogram is pending. Stress test in 11/2021 showed no ischemia. 5.  History of stroke and seizure. 6.  Hypertension and dyslipidemia. 7.  Diabetes and depression. 8.  No chemical deep venous thrombosis prophylaxis due to gastrointestinal bleed. Consider sequential compression devices to the lower extremities. Right lower extremity venous Dopplers were done on 12/12/2022 showing chronic DVT in the right greater saphenous vein.   9.  Hypothyroidism.     Questions of patient were answered at bedside in detail  Case discussed in detail with RN, RT, and care team  Thank you for involving me in the care of this patient  I will follow with you closely during hospitalization    Time spent more than 30 minutes under patient care with no overlap reviewing results and records, decision making, and answering questions.     Rdoríguez Houston MD  Pulmonary/CC

## 2022-02-16 NOTE — PROGRESS NOTES
CARDIOLOGY PROGRESS NOTE    Patient seen and examined. This is a patient who is followed for atrial fibrillation, history of pacemaker. Drowsy this am but arousable. Complaining of abdominal pain. No chest pain or shortness of breath. S/p dialysis yesterday with 2.5 L fluid removed. Telemetry reviewed, Vpaced 60s    Pertinent review of systems items noted above, all other systems are negative. Current medications reviewed. Physical Examination  Vital signs are stable. Blood pressure 131/60 , Pulse 61  No apparent distress. Heart is regular, rate and rhythm. Normal S1, S2, no murmurs are appreciated. Lungs are clear bilaterally. Abdomen is distended  Extremities have mild edema. Labs reviewed:    Case discussed with Dr. Danielle Carrillo and our impression and recommendations are as follows:    1. Atrial fibrillation:  Known AF, not on [address] at home though chadsvasc is elevated. Continue tohold [address] and resume once hgb is stable. Rate is presently controlled, continue to closely monitor on telemetry. 2. High degree AVB s/p PPM:  Normal function per last device transmission. 3. Elevated troponin: no active chest pain. [id number]. Elevation in setting of sepsis, ESRD, GI bleed/anemia. Continues to deny chest pain. Echo pending. Echo in Jan with preserved EF.  NST in November 2021 with no ischemia. Continue workup. No ASA given GI bleeding, no bb while on pressors. Med management as able. 4. Hypotension:  On midodrine at home, continue pressors, bp acceptable at present. Dialysis treatment yesterday s/p 2.5 L fluid removed. Please do not hesitate to call me or Dr. Danielle Carrillo if additional questions arise.

## 2022-02-16 NOTE — DIALYSIS
Pt had 3 hours of dialysis tolerated fairly well. Pt had two seizures during tx and became hypotensive and tachy towards the end. Pt had 2.5L removed with 300ml rinseback. Pt CVC heparin locked and rewrapped after tx. Pt handed back off to ICU nurse.

## 2022-02-16 NOTE — PROGRESS NOTES
Patient: Jhoan Lux MRN: [id number]  SSN: [id number]    YOB: 1950  Age: 71 y.o. Sex: female          Subjective:   Patient seen in the ICU. He was dialyzed yesterday with 2.5 L of fluid removal.  She is lethargic though she responds to all Questions appropriately. No acute distress  On 3 L nasal cannula oxygen    Objective:     Vitals:    02/16/22 1000 02/16/22 1100 02/16/22 1131 02/16/22 1200   BP: 109/60 (!) 108/59 (!) 108/59 131/60   Pulse: 66 60 61 61   Resp:       Temp:       TempSrc:       SpO2: 95% 97%  96%   Weight:       Height:            Intake and Output:  Yesterday's Intake and Output:  02/15 0701 - 02/16 0700  In: 40.3 [I.V.:40.3]  Out: 2500      Physical Exam:   GENERAL: no distress, appears stated age  EYE: negative  Respiratory -no distress. ABDOMEN: Nondistended. Musculoskeletal -normal on inspection. NEUROLOGIC: She is awake but lethargic      Dialysis access: cuffed tunneled catheter site right and IJ.       Data Review    Meds    Current Facility-Administered Medications   Medication Dose Route Frequency    lidocaine 4 % patch 2 Patch  2 Patch TransDERmal Q24H    midodrine (PROAMATINE) tablet 10 mg  10 mg Oral TID WITH MEALS    levETIRAcetam (KEPPRA) tablet 1,000 mg  1,000 mg Oral BID    Vancomycin - Pharmacy to Dose   Other Rx Dosing/Monitoring    piperacillin-tazobactam (ZOSYN) 3.375 g in 0.9% sodium chloride (MBP/ADV) 100 mL MBP  3.375 g IntraVENous Q12H    heparin (porcine) 1,000 unit/mL injection 3,200 Units  3,200 Units IntraVENous DIALYSIS PRN    0.9% sodium chloride infusion 250 mL  250 mL IntraVENous PRN    sodium chloride (NS) flush 5-40 mL  5-40 mL IntraVENous Q8H    sodium chloride (NS) flush 5-40 mL  5-40 mL IntraVENous PRN    acetaminophen (TYLENOL) tablet 650 mg  650 mg Oral Q6H PRN    Or    acetaminophen (TYLENOL) suppository 650 mg  650 mg Rectal Q6H PRN    polyethylene glycol (MIRALAX) packet 17 g  17 g Oral DAILY PRN    ondansetron (ZOFRAN ODT) tablet 4 mg  4 mg Oral Q8H PRN    Or    ondansetron (ZOFRAN) injection 4 mg  4 mg IntraVENous Q6H PRN    pantoprazole (PROTONIX) 40 mg in 0.9% sodium chloride 10 mL injection  40 mg IntraVENous Q12H    atorvastatin (LIPITOR) tablet 40 mg  40 mg Oral DAILY    calcitRIOL (ROCALTROL) capsule 0.5 mcg  0.5 mcg Oral DAILY    levothyroxine (SYNTHROID) tablet 150 mcg  150 mcg Oral DAILY    FLUoxetine (PROzac) capsule 10 mg  10 mg Oral DAILY    OXcarbazepine (TRILEPTAL) tablet 150 mg  150 mg Oral QAM    OXcarbazepine (TRILEPTAL) tablet 300 mg  300 mg Oral QHS    lacosamide (VIMPAT) tablet 200 mg  200 mg Oral BID    [Held by provider] sevelamer carbonate (RENVELA) tab 800 mg  800 mg Oral TID WITH MEALS    zinc oxide-white petrolatum 17-57 % topical paste   Topical PRN    NOREPINephrine (LEVOPHED) 8 mg in 0.9% NS 250ml infusion  0.5-16 mcg/min IntraVENous TITRATE       Lab      Recent Results (from the past 24 hour(s))   VANCOMYCIN, RANDOM    Collection Time: 02/16/22  5:00 AM   Result Value Ref Range    Vancomycin, random 23.1 ug/mL   RENAL FUNCTION PANEL    Collection Time: 02/16/22  5:00 AM   Result Value Ref Range    Sodium 138 136 - 145 mmol/L    Potassium 3.5 3.5 - 5.1 mmol/L    Chloride 105 97 - 108 mmol/L    CO2 25 21 - 32 mmol/L    Anion gap 8 5 - 15 mmol/L    Glucose 66 65 - 100 mg/dL    BUN 36 (H) 6 - 20 mg/dL    Creatinine 3.19 (H) 0.55 - 1.02 mg/dL    BUN/Creatinine ratio 11 (L) 12 - 20      GFR est AA 17 (L) >60 ml/min/1.73m2    GFR est non-AA 14 (L) >60 ml/min/1.73m2    Calcium 7.0 (L) 8.5 - 10.1 mg/dL    Phosphorus 4.6 2.6 - 4.7 mg/dL    Albumin 1.4 (L) 3.5 - 5.0 g/dL   PTH INTACT    Collection Time: 02/16/22  5:00 AM   Result Value Ref Range    Calcium 7.1 (L) 8.5 - 10.1 mg/dL    PTH, Intact 193.3 (H) 18.4 - 88.0 pg/mL   CBC W/O DIFF    Collection Time: 02/16/22  5:00 AM   Result Value Ref Range    WBC 4.2 3.6 - 11.0 K/uL    RBC 2.33 (L) 3.80 - 5.20 M/uL    HGB 7.2 (L) 11.5 - 16.0 g/dL    HCT 23.7 (L) 35.0 - 47.0 %    .7 (H) 80.0 - 99.0 FL    MCH 30.9 26.0 - 34.0 PG    MCHC 30.4 30.0 - 36.5 g/dL    RDW 16.7 (H) 11.5 - 14.5 %    PLATELET 73 (L) 991 - 400 K/uL    MPV 11.6 8.9 - 12.9 FL    NRBC 0.0 0.0  WBC    ABSOLUTE NRBC 0.00 0.00 - 0.01 K/uL        Lab Results   Component Value Date/Time    Color Yellow/Straw 02/12/2022 09:00 AM    Appearance Turbid (A) 02/12/2022 09:00 AM    Specific gravity 1.017 02/12/2022 09:00 AM    pH (UA) 5.0 02/12/2022 09:00 AM    Protein 100 (A) 02/12/2022 09:00 AM    Glucose Negative 02/12/2022 09:00 AM    Ketone 5 (A) 02/12/2022 09:00 AM    Bilirubin Negative 02/12/2022 09:00 AM    Urobilinogen 0.1 02/12/2022 09:00 AM    Nitrites Negative 02/12/2022 09:00 AM    Leukocyte Esterase Moderate (A) 02/12/2022 09:00 AM    Epithelial cells Moderate (A) 05/20/2021 12:56 AM    Bacteria Negative 02/12/2022 09:00 AM    WBC >100 (H) 02/12/2022 09:00 AM    RBC 20-50 02/12/2022 09:00 AM       Imaging         Assessment & Plan: Active Problems:    GI bleed (2/11/2022)      Septic shock ([facility] [address] (2/11/2022)    1. ESRD. -On dialysis at [facility] in [place] every MWF   -On admission she was volume overloaded  -Last dialyzed on 2/15 because of staffing issues with 2.5 L of fluid removal  -Hopefully she will be dialyzed again today to put her back on the MWF schedule  -Permanent dialysis catheter as current access  -She also has massive bilateral effusions which need to be tapped if she has recurrent respiratory symptoms that might not improve with dialysis    2. Anemia   -2/2 ESRD and GI bleed     -s/p blood transfusion  -Apparently had black tarry stools and bright red bleeding per rectum. -s/p EGD on 2/14 which showed esophagitis chronic gastritis without bleeding.   3 clips from previous intervention were still present without any active bleeding noted  -Hemoglobin 7.2 today  -Continue Procrit 10,000 units with dialysis  -Transfusion as needed to keep hemoglobin  > 7     3  Hypotension   -probably from acute blood loss anemia.   -Normal WBC count. Afebrile   -Increase midodrine to 10 mg 3 times daily yesterday. Continues same dose  -She is off Levophed today     4 Secondary hyperparathyroidism of ESRD. -Will check Phos, PTH levels. Natalio Bobby currently is on hold  -Continue Phosphate binders once cleared for po intake      5. B/L Pleural effusions , CHF  H/o AICD placement-  -For possible IR guided thoracentesis if respiratory symptoms recurr after dialysis   -HD again planned for today    6. Seizure disorder - resumed on Home meds     Signed By: Zaheer Stanley MD     February 16, 2022      This note was prepared using voice recognition system and is likely to have sound alike errors that may have been overlooked even during proofreading.   Please contact the author for any clarifications

## 2022-02-16 NOTE — PROGRESS NOTES
Patient's transfer order has been discontinued, patient restarted back on levophed at 11 ml/hr. Patient's blood presure 83/55, 75/47, 65/43, 63/47. Per Dialysis nurse Inna Chapel, RN patient's blood pressure begin to drop after pulling over 2 1/2 liters. Message sent to Dr. Sammie Drake spoke with Marcelino Kelly, nursing sup,  Charge nurse URIAH Reyna made aware. 1842hrs-patient's levophed turned off patient's blood pressure 99/70. Dr. Sammie Drake is aware that levophed was turned off. Will continue to monitor. Patient is also asymptomatic.

## 2022-02-17 LAB
ERYTHROCYTE [DISTWIDTH] IN BLOOD BY AUTOMATED COUNT: 16.7 % (ref 11.5–14.5)
HCT VFR BLD AUTO: 24.9 % (ref 35–47)
HGB BLD-MCNC: 7.8 G/DL (ref 11.5–16)
MCH RBC QN AUTO: 31.3 PG (ref 26–34)
MCHC RBC AUTO-ENTMCNC: 31.3 G/DL (ref 30–36.5)
MCV RBC AUTO: 100 FL (ref 80–99)
NRBC # BLD: 0 K/UL (ref 0–0.01)
NRBC BLD-RTO: 0 PER 100 WBC
PLATELET # BLD AUTO: 70 K/UL (ref 150–400)
PMV BLD AUTO: 11.5 FL (ref 8.9–12.9)
RBC # BLD AUTO: 2.49 M/UL (ref 3.8–5.2)
WBC # BLD AUTO: 4.6 K/UL (ref 3.6–11)

## 2022-02-17 PROCEDURE — 74011250637 HC RX REV CODE- 250/637: Performed by: HOSPITALIST

## 2022-02-17 PROCEDURE — 65270000029 HC RM PRIVATE

## 2022-02-17 PROCEDURE — 74011250636 HC RX REV CODE- 250/636: Performed by: INTERNAL MEDICINE

## 2022-02-17 PROCEDURE — 36415 COLL VENOUS BLD VENIPUNCTURE: CPT

## 2022-02-17 PROCEDURE — 85027 COMPLETE CBC AUTOMATED: CPT

## 2022-02-17 PROCEDURE — C9113 INJ PANTOPRAZOLE SODIUM, VIA: HCPCS | Performed by: HOSPITALIST

## 2022-02-17 PROCEDURE — 74011000250 HC RX REV CODE- 250: Performed by: INTERNAL MEDICINE

## 2022-02-17 PROCEDURE — 74011250636 HC RX REV CODE- 250/636: Performed by: HOSPITALIST

## 2022-02-17 PROCEDURE — 74011000258 HC RX REV CODE- 258: Performed by: INTERNAL MEDICINE

## 2022-02-17 PROCEDURE — 74011000250 HC RX REV CODE- 250: Performed by: HOSPITALIST

## 2022-02-17 PROCEDURE — 74011250637 HC RX REV CODE- 250/637: Performed by: INTERNAL MEDICINE

## 2022-02-17 RX ADMIN — SODIUM CHLORIDE, PRESERVATIVE FREE 10 ML: 5 INJECTION INTRAVENOUS at 22:32

## 2022-02-17 RX ADMIN — SODIUM CHLORIDE, PRESERVATIVE FREE 10 ML: 5 INJECTION INTRAVENOUS at 13:32

## 2022-02-17 RX ADMIN — LACOSAMIDE 200 MG: 100 TABLET, FILM COATED ORAL at 09:07

## 2022-02-17 RX ADMIN — OXCARBAZEPINE 150 MG: 150 TABLET, FILM COATED ORAL at 09:11

## 2022-02-17 RX ADMIN — ACETAMINOPHEN 650 MG: 325 TABLET ORAL at 13:30

## 2022-02-17 RX ADMIN — LACOSAMIDE 200 MG: 100 TABLET, FILM COATED ORAL at 22:31

## 2022-02-17 RX ADMIN — MIDODRINE HYDROCHLORIDE 10 MG: 5 TABLET ORAL at 09:11

## 2022-02-17 RX ADMIN — ACETAMINOPHEN 650 MG: 325 TABLET ORAL at 22:38

## 2022-02-17 RX ADMIN — SODIUM CHLORIDE, PRESERVATIVE FREE 10 ML: 5 INJECTION INTRAVENOUS at 05:43

## 2022-02-17 RX ADMIN — LEVETIRACETAM 1000 MG: 250 TABLET, FILM COATED ORAL at 09:06

## 2022-02-17 RX ADMIN — MIDODRINE HYDROCHLORIDE 10 MG: 5 TABLET ORAL at 11:06

## 2022-02-17 RX ADMIN — SODIUM CHLORIDE, PRESERVATIVE FREE 40 MG: 5 INJECTION INTRAVENOUS at 09:06

## 2022-02-17 RX ADMIN — LEVOTHYROXINE SODIUM 150 MCG: 0.07 TABLET ORAL at 05:38

## 2022-02-17 RX ADMIN — SODIUM CHLORIDE, PRESERVATIVE FREE 40 MG: 5 INJECTION INTRAVENOUS at 22:38

## 2022-02-17 RX ADMIN — OXCARBAZEPINE 300 MG: 300 TABLET, FILM COATED ORAL at 22:30

## 2022-02-17 RX ADMIN — ATORVASTATIN CALCIUM 40 MG: 40 TABLET, FILM COATED ORAL at 09:07

## 2022-02-17 RX ADMIN — LEVETIRACETAM 1000 MG: 250 TABLET, FILM COATED ORAL at 22:30

## 2022-02-17 RX ADMIN — CALCITRIOL CAPSULES 0.25 MCG 0.5 MCG: 0.25 CAPSULE ORAL at 09:06

## 2022-02-17 RX ADMIN — PIPERACILLIN SODIUM AND TAZOBACTAM SODIUM 3.38 G: 3; .375 INJECTION, POWDER, LYOPHILIZED, FOR SOLUTION INTRAVENOUS at 09:06

## 2022-02-17 RX ADMIN — MIDODRINE HYDROCHLORIDE 10 MG: 5 TABLET ORAL at 17:07

## 2022-02-17 NOTE — PROGRESS NOTES
Pulmonary Progress Note      NAME: Andrea Mclaughlin   :  1950  MRM:  [id number]    Date/Time: 2022  6:20 PM         Subjective:     Patient seen and examined in ICU  Overnight events noted    Lying in bed,  Awake and alert  No acute distress  On room air      Past Medical History reviewed and unchanged from Admission History and Physical       Objective:     Physical Exam     Vitals:      Last 24hrs VS reviewed since prior progress note. Most recent are:    Visit Vitals  /60 (BP 1 Location: Left upper arm, BP Patient Position: At rest)   Pulse 60   Temp 97.6 °F (36.4 °C)   Resp 18   Ht 5' 6\" (1.676 m)   Wt 73.5 kg (162 lb 0.6 oz)   SpO2 100%   Breastfeeding No   BMI 26.15 kg/m²     SpO2 Readings from Last 6 Encounters:   22 100%   22 98%   22 99%   22 97%   22 100%   22 100%    O2 Flow Rate (L/min): 4 l/min       Intake/Output Summary (Last 24 hours) at 2022 1040  Last data filed at 2022 1100  Gross per 24 hour   Intake 132.3 ml   Output    Net 132.3 ml      GENERAL:  The patient is an elderly frail white female who appears to be comfortable. HEENT:  Shows pupils are equal and reactive to light. Pallor is noted. No icterus. Nasal passages are patent. She appears to be edentulous. No obvious thrush. NECK:  Supple. Trachea is central.  No JVD or lymphadenopathy. She is not using accessory muscle of respiration. She has a tunneled hemodialysis catheter on the right side of the neck. CHEST:  Symmetrical with equal, but diminished air entry bilaterally. She has very diminished breath sounds over the lung bases, but impaired percussion due to bilateral pleural effusions. HEART:  Rhythm is regular without any loud murmur or gallop. ABDOMEN:  Obese and benign. Bowel sounds are audible. EXTREMITIES:  Did not show any cyanosis or clubbing. Pulses are palpable. She does have chronic-appearing pitting edema.   She appears to have a chronic wound on her left leg as well. There are mild areas of ecchymosis noted. NEUROLOGIC:  Examination is grossly intact. SKIN:  Warm and dry. XR CHEST PORT   Final Result   Worsening congestive heart failure pattern. DUPLEX LOWER EXT VENOUS RIGHT   Final Result      CT HEAD WO CONT   Final Result   No acute intracranial abnormality. Old ischemic disease in the high left parietal lobe and in the left basal   ganglion. There is significant atrophy with significant deepening of the left sylvian   fissure. CT ABD PELV WO CONT   Final Result   Fluid overload. No evidence to suggest a source of GI bleeding      CT dose reduction was achieved through use of a standardized protocol tailored   for this examination and automatic exposure control for dose modulation.       XR CHEST PORT   Final Result          Lab Data Reviewed: (see below)      Medications:  Current Facility-Administered Medications   Medication Dose Route Frequency    lidocaine 4 % patch 2 Patch  2 Patch TransDERmal Q24H    midodrine (PROAMATINE) tablet 10 mg  10 mg Oral TID WITH MEALS    levETIRAcetam (KEPPRA) tablet 1,000 mg  1,000 mg Oral BID    Vancomycin - Pharmacy to Dose   Other Rx Dosing/Monitoring    piperacillin-tazobactam (ZOSYN) 3.375 g in 0.9% sodium chloride (MBP/ADV) 100 mL MBP  3.375 g IntraVENous Q12H    heparin (porcine) 1,000 unit/mL injection 3,200 Units  3,200 Units IntraVENous DIALYSIS PRN    0.9% sodium chloride infusion 250 mL  250 mL IntraVENous PRN    sodium chloride (NS) flush 5-40 mL  5-40 mL IntraVENous Q8H    sodium chloride (NS) flush 5-40 mL  5-40 mL IntraVENous PRN    acetaminophen (TYLENOL) tablet 650 mg  650 mg Oral Q6H PRN    Or    acetaminophen (TYLENOL) suppository 650 mg  650 mg Rectal Q6H PRN    polyethylene glycol (MIRALAX) packet 17 g  17 g Oral DAILY PRN    ondansetron (ZOFRAN ODT) tablet 4 mg  4 mg Oral Q8H PRN    Or    ondansetron (ZOFRAN) injection 4 mg  4 mg IntraVENous Q6H PRN    pantoprazole (PROTONIX) 40 mg in 0.9% sodium chloride 10 mL injection  40 mg IntraVENous Q12H    atorvastatin (LIPITOR) tablet 40 mg  40 mg Oral DAILY    calcitRIOL (ROCALTROL) capsule 0.5 mcg  0.5 mcg Oral DAILY    levothyroxine (SYNTHROID) tablet 150 mcg  150 mcg Oral DAILY    FLUoxetine (PROzac) capsule 10 mg  10 mg Oral DAILY    OXcarbazepine (TRILEPTAL) tablet 150 mg  150 mg Oral QAM    OXcarbazepine (TRILEPTAL) tablet 300 mg  300 mg Oral QHS    lacosamide (VIMPAT) tablet 200 mg  200 mg Oral BID    [Held by provider] sevelamer carbonate (RENVELA) tab 800 mg  800 mg Oral TID WITH MEALS    zinc oxide-white petrolatum 17-57 % topical paste   Topical PRN    NOREPINephrine (LEVOPHED) 8 mg in 0.9% NS 250ml infusion  0.5-16 mcg/min IntraVENous TITRATE       ______________________________________________________________________      Lab Review:     Recent Labs     02/17/22  0430 02/16/22  0500 02/15/22  0430   WBC 4.6 4.2 6.2   HGB 7.8* 7.2* 7.7*   HCT 24.9* 23.7* 25.4*   PLT 70* 73* 100*     Recent Labs     02/16/22  0500 02/15/22  0430    138   K 3.5 4.4    107   CO2 25 23   GLU 66 66   BUN 36* 55*   CREA 3.19* 4.06*   CA 7.1*  7.0* 6.9*   PHOS 4.6  --    ALB 1.4*  --      No components found for: GLPOC  No results for input(s): PH, PCO2, PO2, HCO3, FIO2 in the last 72 hours. No results for input(s): INR, INREXT, INREXT, INREXT in the last 72 hours. Other pertinent lab:   Chest x-ray done on 02/14/2022 was personally reviewed. Shows increasing pulmonary vascular congestion and bilateral pleural effusions       Assessment & Plan:      1. Acute hypoxic respiratory failure. Predominant etiology is volume overload    CT of the abdomen and pelvis basically shows large bilateral pleural effusions with associated atelectasis. Continue with dialysis and try to remove as much fluid as possible. I believe that with ongoing dialysis, there will be an improvement in her oxygen requirement.    She had hemodialysis yesterday. This morning she is much more comfortable   Blood gases done this morning showed 7.3 4/42/153 on 4 L oxygen. We will wean down oxygen to keep saturation more than 92%. Discussed with RN. It is noted that she is empirically started on vancomycin and Zosyn. Clinical suspicion for pneumonia is relatively low; however, antibiotics can be continued. 2.  End-stage renal disease. on hemodialysis. Nephrology continuing with fluid removal appropriately  However, she is relatively hypotensive and is on a small dose of Levophed, currently at 4 mics. Target map of 65 and higher. 3.  Gastrointestinal bleed. Apparently, she had black tarry stools and also bright red blood per rectum for which paramedics were called and she was brought over to the emergency room. Gastroenterology is consulted. She received 1 unit of packed red blood cells. Continue to monitor hemoglobin and hematocrit. She is on Protonix intravenously. She is for EGD in the morning. 4.  Multiple cardiac issues including atrial fibrillation, history of atrioventricular block, and pacemaker insertion. Her troponins are elevated. Cardiology is consulted. Echocardiogram is pending. Stress test in 11/2021 showed no ischemia. 5.  History of stroke and seizure. 6.  Hypertension and dyslipidemia. 7.  Diabetes and depression. 8.  No chemical deep venous thrombosis prophylaxis due to gastrointestinal bleed. Consider sequential compression devices to the lower extremities. Right lower extremity venous Dopplers were done on 12/12/2022 showing chronic DVT in the right greater saphenous vein.   9.  Hypothyroidism.     Questions of patient were answered at bedside in detail  Case discussed in detail with RN, RT, and care team  Thank you for involving me in the care of this patient  I will follow with you closely during hospitalization    Time spent more than 30 minutes under patient care with no overlap reviewing results and records, decision making, and answering questions.     Marvin Hanna MD  Pulmonary/CC

## 2022-02-17 NOTE — PROGRESS NOTES
CARDIOLOGY PROGRESS NOTE    Patient seen and examined. This is a patient who is followed for atrial fibrillation, history of pacemaker. Drowsy this am but arousable. Complaining of abdominal pain. S/p dialysis yesterday with 2.5 L fluid removed. Telemetry reviewed, Vpaced 60s    Pertinent review of systems items noted above, all other systems are negative. Current medications reviewed. Physical Examination  Vital signs are stable. Blood pressure 125/60 , Pulse 60  No apparent distress. Heart is regular, rate and rhythm. Normal S1, S2, no murmurs are appreciated. Lungs are clear bilaterally. Abdomen is distended  Extremities have mild edema. .  Recent Results (from the past 12 hour(s))   CBC W/O DIFF    Collection Time: 02/17/22  4:30 AM   Result Value Ref Range    WBC 4.6 3.6 - 11.0 K/uL    RBC 2.49 (L) 3.80 - 5.20 M/uL    HGB 7.8 (L) 11.5 - 16.0 g/dL    HCT 24.9 (L) 35.0 - 47.0 %    .0 (H) 80.0 - 99.0 FL    MCH 31.3 26.0 - 34.0 PG    MCHC 31.3 30.0 - 36.5 g/dL    RDW 16.7 (H) 11.5 - 14.5 %    PLATELET 70 (L) 191 - 400 K/uL    MPV 11.5 8.9 - 12.9 FL    NRBC 0.0 0.0  WBC    ABSOLUTE NRBC 0.00 0.00 - 0.01 K/uL       Case discussed with Dr. Gustabo Lima and our impression and recommendations are as follows:    1. Atrial fibrillation:  Known AF, not on [facility] at home though chadsvasc is elevated. Continue to hold [facility] and resume once hgb is stable. Rate is presently controlled, continue to closely monitor on telemetry. 2. High degree AVB s/p PPM:  Normal function per last device transmission. 3. Elevated troponin: no active chest pain. -[phone]. Elevation in setting of sepsis, ESRD, GI bleed/anemia. Continues to deny chest pain. Echo pending. Echo in Jan with preserved EF.  NST in November 2021 with no ischemia. Continue workup. No ASA given GI bleeding, no bb while on pressors. Med management as able.   4. Hypotension:  On midodrine at home, continue pressors, bp acceptable at present. Dialysis treatment yesterday s/p 2.5 L fluid removed. Please do not hesitate to call if additional questions arise.

## 2022-02-17 NOTE — PROGRESS NOTES
CM reviewed chart. Patient received hemodialysis at [address] in [facility] prior to admission. Discharge plan is pending her clinical improvement. CM will continue to follow.

## 2022-02-17 NOTE — PROGRESS NOTES
Patient: Tyson Moore MRN: [id number]  SSN: [id number]    YOB: 1950  Age: 71 y.o. Sex: female          Subjective:   Patient seen in the ICU. She was dialyzed yesterday 2.5 L fluid removal.  Apparently she had 2 seizures during treatment and became hypotensive and tachycardic towards the end  Today she is alert and lucid. Seems to be in no distress    Objective:     Vitals:    02/17/22 0500 02/17/22 0600 02/17/22 0700 02/17/22 0800   BP: (!) 84/59 128/60 125/60    Pulse: 60 60 60 60   Resp: 18 20 18    Temp:   97.6 °F (36.4 °C)    TempSrc:       SpO2: 98% 99% 100%    Weight:       Height:            Intake and Output:  Yesterday's Intake and Output:  02/16 0701 - 02/17 0700  In: 132.3 [I.V.:132.3]  Out: -      Physical Exam:   GENERAL: no distress, appears stated age  EYE: negative  Respiratory -no distress. ABDOMEN: Nondistended. Musculoskeletal -normal on inspection. NEUROLOGIC: She is awake but lethargic      Dialysis access: cuffed tunneled catheter site right and IJ.       Data Review    Meds    Current Facility-Administered Medications   Medication Dose Route Frequency    lidocaine 4 % patch 2 Patch  2 Patch TransDERmal Q24H    midodrine (PROAMATINE) tablet 10 mg  10 mg Oral TID WITH MEALS    levETIRAcetam (KEPPRA) tablet 1,000 mg  1,000 mg Oral BID    Vancomycin - Pharmacy to Dose   Other Rx Dosing/Monitoring    piperacillin-tazobactam (ZOSYN) 3.375 g in 0.9% sodium chloride (MBP/ADV) 100 mL MBP  3.375 g IntraVENous Q12H    heparin (porcine) 1,000 unit/mL injection 3,200 Units  3,200 Units IntraVENous DIALYSIS PRN    0.9% sodium chloride infusion 250 mL  250 mL IntraVENous PRN    sodium chloride (NS) flush 5-40 mL  5-40 mL IntraVENous Q8H    sodium chloride (NS) flush 5-40 mL  5-40 mL IntraVENous PRN    acetaminophen (TYLENOL) tablet 650 mg  650 mg Oral Q6H PRN    Or    acetaminophen (TYLENOL) suppository 650 mg  650 mg Rectal Q6H PRN    polyethylene glycol (MIRALAX) packet 17 g  17 g Oral DAILY PRN    ondansetron (ZOFRAN ODT) tablet 4 mg  4 mg Oral Q8H PRN    Or    ondansetron (ZOFRAN) injection 4 mg  4 mg IntraVENous Q6H PRN    pantoprazole (PROTONIX) 40 mg in 0.9% sodium chloride 10 mL injection  40 mg IntraVENous Q12H    atorvastatin (LIPITOR) tablet 40 mg  40 mg Oral DAILY    calcitRIOL (ROCALTROL) capsule 0.5 mcg  0.5 mcg Oral DAILY    levothyroxine (SYNTHROID) tablet 150 mcg  150 mcg Oral DAILY    FLUoxetine (PROzac) capsule 10 mg  10 mg Oral DAILY    OXcarbazepine (TRILEPTAL) tablet 150 mg  150 mg Oral QAM    OXcarbazepine (TRILEPTAL) tablet 300 mg  300 mg Oral QHS    lacosamide (VIMPAT) tablet 200 mg  200 mg Oral BID    [Held by provider] sevelamer carbonate (RENVELA) tab 800 mg  800 mg Oral TID WITH MEALS    zinc oxide-white petrolatum 17-57 % topical paste   Topical PRN    NOREPINephrine (LEVOPHED) 8 mg in 0.9% NS 250ml infusion  0.5-16 mcg/min IntraVENous TITRATE       Lab      Recent Results (from the past 24 hour(s))   CBC W/O DIFF    Collection Time: 02/17/22  4:30 AM   Result Value Ref Range    WBC 4.6 3.6 - 11.0 K/uL    RBC 2.49 (L) 3.80 - 5.20 M/uL    HGB 7.8 (L) 11.5 - 16.0 g/dL    HCT 24.9 (L) 35.0 - 47.0 %    .0 (H) 80.0 - 99.0 FL    MCH 31.3 26.0 - 34.0 PG    MCHC 31.3 30.0 - 36.5 g/dL    RDW 16.7 (H) 11.5 - 14.5 %    PLATELET 70 (L) 710 - 400 K/uL    MPV 11.5 8.9 - 12.9 FL    NRBC 0.0 0.0  WBC    ABSOLUTE NRBC 0.00 0.00 - 0.01 K/uL        Lab Results   Component Value Date/Time    Color Yellow/Straw 02/12/2022 09:00 AM    Appearance Turbid (A) 02/12/2022 09:00 AM    Specific gravity 1.017 02/12/2022 09:00 AM    pH (UA) 5.0 02/12/2022 09:00 AM    Protein 100 (A) 02/12/2022 09:00 AM    Glucose Negative 02/12/2022 09:00 AM    Ketone 5 (A) 02/12/2022 09:00 AM    Bilirubin Negative 02/12/2022 09:00 AM    Urobilinogen 0.1 02/12/2022 09:00 AM    Nitrites Negative 02/12/2022 09:00 AM    Leukocyte Esterase Moderate (A) 02/12/2022 09:00 AM    Epithelial cells Moderate (A) 05/20/2021 12:56 AM    Bacteria Negative 02/12/2022 09:00 AM    WBC >100 (H) 02/12/2022 09:00 AM    RBC 20-50 02/12/2022 09:00 AM       Imaging         Assessment & Plan: Active Problems:    GI bleed (2/11/2022)      Septic shock ([address] (2/11/2022)    1. ESRD. -On dialysis at [address] renal care in [facility] every MWF   -On admission she was volume overloaded  -Last dialyzed on 2/16 with 2.5 L of fluid removal  -We will continue MWF dialysis while inpatient  -Permanent dialysis catheter as current access  -She also has massive bilateral effusions which need to be tapped if she has recurrent respiratory symptoms that might not improve with dialysis    2. Anemia   -2/2 ESRD and GI bleed     -s/p blood transfusion  -Apparently had black tarry stools and bright red bleeding per rectum. -s/p EGD on 2/14 which showed esophagitis chronic gastritis without bleeding. 3 clips from previous intervention were still present without any active bleeding noted  -Hemoglobin 7.8 today  -Continue Procrit 10,000 units with dialysis  -Transfusion as needed to keep hemoglobin  > 7     3  Hypotension   -probably from acute blood loss anemia.   -Normal WBC count. Afebrile   -Increased midodrine to 10 mg 3 times daily . Continues same dose  -She is on 4mcg Levophed today  -She became hypotensive/tachycardic after treatment yesterday. Normotensive. Will give albumin with dialysis and decreased UF goal     4 Secondary hyperparathyroidism of ESRD. -Calcium is okay. Chandler Rowland currently is on hold which I will continue to hold. Phosphorus is 4.6  -PTH is 194 which is within target range for her ESRD. Continue calcitriol at the current dose     5. B/L Pleural effusions , CHF  H/o AICD placement-  -CT of the abdomen/pelvic shows large bilateral pleural effusions with associated atelectasis  -Unable to remove >3L fluid with dialysis because of cramping/hypotension/tachycardia  -She was dialyzed on 2/16 and again on 2/17  -Appears more comfortable today from respiratory standpoint    6. Seizure disorder  -Apparently she had 2 seizures on dialysis yesterday  - resumed on Home meds     Signed By: Yrn Han MD     February 17, 2022      This note was prepared using voice recognition system and is likely to have sound alike errors that may have been overlooked even during proofreading.   Please contact the author for any clarifications

## 2022-02-17 NOTE — PROGRESS NOTES
Progress Note    Patient: Renetta Smith MRN: [id number]  SSN: [id number]    YOB: 1950  Age: 71 y.o. Sex: female      Admit Date: 2/11/2022    LOS: 6 days     Subjective:   GI is following in consultation for dark tarry stools.     2/17: Patient seen in ICU. She's more alert today. Family at bedside stated \"she's in and out\"  Denies chest pain of shortness of breath. On room air. No active bleeding noted. Hgb today is 7.8.     2/14 EGD - esophagitis, chronic gastritis without bleeding. three clips from previous intervention still in place No active bleeding noted.     History of Present Illness: Alisha James is a 71 y. o. female who is seen in consultation for GI bleed - dark tarry stool. Patient was admitted to the hospital with reports of dark stool since this morning. Multiple ER visits. She was transferred to SNF and was discharged home yesterday. Family noticed dark stools and and red blood per rectum so they called EMS. Upon arrival to the ED, patient became unresponsive with faint pulse. CPR started, she was resuscitated immediately. She was given IVF for hypotension, started levophed, and  placed on ventimask. PMH: H/o seizures with todds paralysis, CVA with right sided weakness, SSS with PPM not on AC, ESRD on HD. She is on Eliquis. EGD on 1/13/22 showed Acute gastric ulcer at anastomosis with hemorrhage, treated with 2 hemoclips and injected with epinephrine.      -ED labs: Hgb 7.6, creatinine 3.72, elevated troponin 62, BNP 5,856. -CXR - Lungs clear; no interstitial or alveolar pulmonary edema      Objective:     Vitals:    02/17/22 1000 02/17/22 1100 02/17/22 1200 02/17/22 1300   BP: 93/67 113/61 (!) 114/55 116/64   Pulse: 60 60 60 60   Resp: 20 20 20 18   Temp:  97.7 °F (36.5 °C)     TempSrc:       SpO2: 95% 94% 99% 97%   Weight:       Height:            Intake and Output:  Current Shift: No intake/output data recorded.   Last three shifts: 02/15 1901 - 02/17 0700  In: 132.3 [I.V.:132.3]  Out: -     Physical Exam:   Skin:    HEENT: Sclerae anicteric. Extra-occular muscles are intact. No abnormal pigmentation of the lips. The neck is supple. Cardiovascular: Regular rate and rhythm. Respiratory:  Comfortable breathing with no accessory muscle use. GI:  Abdomen nondistended, soft, and nontender. No enlargement of the liver or spleen. No masses palpable. Rectal:  Deferred  Musculoskeletal: Generalized weakness  Neurological:  Gross memory appears intact. Patient is alert and oriented. Psychiatric:  Mood appears appropriate with judgement intact. Lymphatic:  No visible adenopathy      Lab/Data Review:  Recent Results (from the past 24 hour(s))   CBC W/O DIFF    Collection Time: 02/17/22  4:30 AM   Result Value Ref Range    WBC 4.6 3.6 - 11.0 K/uL    RBC 2.49 (L) 3.80 - 5.20 M/uL    HGB 7.8 (L) 11.5 - 16.0 g/dL    HCT 24.9 (L) 35.0 - 47.0 %    .0 (H) 80.0 - 99.0 FL    MCH 31.3 26.0 - 34.0 PG    MCHC 31.3 30.0 - 36.5 g/dL    RDW 16.7 (H) 11.5 - 14.5 %    PLATELET 70 (L) 243 - 400 K/uL    MPV 11.5 8.9 - 12.9 FL    NRBC 0.0 0.0  WBC    ABSOLUTE NRBC 0.00 0.00 - 0.01 K/uL              XR CHEST PORT   Final Result   Worsening congestive heart failure pattern. DUPLEX LOWER EXT VENOUS RIGHT   Final Result      CT HEAD WO CONT   Final Result   No acute intracranial abnormality. Old ischemic disease in the high left parietal lobe and in the left basal   ganglion. There is significant atrophy with significant deepening of the left sylvian   fissure. CT ABD PELV WO CONT   Final Result   Fluid overload. No evidence to suggest a source of GI bleeding      CT dose reduction was achieved through use of a standardized protocol tailored   for this examination and automatic exposure control for dose modulation. XR CHEST PORT   Final Result          Assessment:     Active Problems:    GI bleed (2/11/2022)      Septic shock ([address] (2/11/2022)        Plan:   1.  GI Bleed - dark stools.      -latest Hgb 7.8 . Monitor and transfuse as needed.      -Continue PPI     -No anti-coagulant     -Repeat CBC in am     -2/14 EGD esophagitis, chronic gastritis without bleeding. three clips from previous intervention still in place No active bleeding noted. 2. Anemia      -latest Hgb 7.8.  Monitor and transfuse as needed.       - ESRD  3. Hypovolemic shock      -BP initially low, started on levophed. Given blood transfusion      -was transferred to ICU  4. Acute respiratory failure (resolved)      -currently on room air not in distress  5. ESRD      -hemodialysis      -creatinine 3. 19      -nephrology input appreciated    Patient discussed with Dr Meaghan Dangelo and agrees to above impression and plan. Thank you for allowing me to participate in this patients care    Signed By: Carolee Severance.  RACHEL Barry     February 17, 2022

## 2022-02-17 NOTE — PROGRESS NOTES
Report given to Colin feldman RN. Patient to go to room 522 rather than 502. Nursing supervisor to be made aware by receiving unit.

## 2022-02-17 NOTE — PROGRESS NOTES
Progress Note    Patient: Linda Benjamin MRN: [id number]  SSN: [id number]    YOB: 1950  Age: 71 y.o. Sex: female      Admit Date: 2/11/2022    LOS: 6 days      72F, H/o seizures with todds paralysis, CVA with right sided weakness, SSS with PPM not on AC, ESRD on HD with blood in stools s/t UGIB with ABLA s/t hypovolemic shock in the setting of pleural effusion with acute hypoxic respiratory failure. HOSPITAL COURSE:   Started with 1 PRBC, on levophed at 10, underwent HD, EGD upon stability, o2 requirements weaned to 4L. Plan for HD. S/p EGD on 2/14- no active source of bleeding, levophed weaned, if Hb drops will require capsule EGD    Subjective:     72F, H/o seizures with todds paralysis, CVA with right sided weakness, SSS with PPM not on AC, ESRD on HD with blood in stools s/t UGIB with ABLA s/t hypovolemic shock in the setting of pleural effusion with acute hypoxic respiratory failure. HOSPITAL COURSE:   Started with 1 PRBC, on levophed at 10, underwent HD, EGD upon stability, o2 requirements weaned to 4L. Plan for HD. S/p EGD on 2/14- gastritis/chronic,no active source of bleeding, levophed down to 2, if Hb drops will require capsule EGD      Patient seen and examined in ICU  Overnight events noted  Transfer held as transiently on levophed but off since ~ 7pm last evening. She is alert, seems lucid.  hgb 7.7          Objective:     Vitals:    02/17/22 1200 02/17/22 1300 02/17/22 1500 02/17/22 1600   BP: (!) 114/55 116/64     Pulse: 60 60  60   Resp: 20 18     Temp:   97.6 °F (36.4 °C)    TempSrc:       SpO2: 99% 97%     Weight:       Height:            Intake and Output:  Current Shift: No intake/output data recorded. Last three shifts: 02/15 1901 - 02/17 0700  In: 132.3 [I.V.:132.3]  Out: -       Physical Exam:   Physical   Alert, nad, calm and appears lucid  HEENT : EOMI normal oral mucosa, atraumatic normocephalic, Normal ear and nose. oropharynx  Neck : Supple, no JVD, no masses noted, no carotid bruit  Lungs :decreased breath sounds bilaterally, not labored  CVS : rrr  Abdomen : soft, non tender BS+  Extremities : moves all extremities  Skin : warm and dry  Lymphatic : No cervical lymphadenopathy. Neurological :aao x 3, rt side weaker on exam, tremmors hands. Psychiatric : calm, appropriate      Lab/Data Review:  Recent Results (from the past 24 hour(s))   CBC W/O DIFF    Collection Time: 02/17/22  4:30 AM   Result Value Ref Range    WBC 4.6 3.6 - 11.0 K/uL    RBC 2.49 (L) 3.80 - 5.20 M/uL    HGB 7.8 (L) 11.5 - 16.0 g/dL    HCT 24.9 (L) 35.0 - 47.0 %    .0 (H) 80.0 - 99.0 FL    MCH 31.3 26.0 - 34.0 PG    MCHC 31.3 30.0 - 36.5 g/dL    RDW 16.7 (H) 11.5 - 14.5 %    PLATELET 70 (L) 510 - 400 K/uL    MPV 11.5 8.9 - 12.9 FL    NRBC 0.0 0.0  WBC    ABSOLUTE NRBC 0.00 0.00 - 0.01 K/uL         Assessment and plan:         (1) hypovolemic shock: levophed to keep MAP > 65. Pt on midodrine at home 2.5 mg tid increased to 10 mg tid. Off levophed, tolerating HD. Off levophed last evening.     (1) acute encephalopathy: s/t metabolic. Improving.     (2) Acute hypoxic respiratory failure : d/t fluid overload. Weaned to room air. Keep spo2>91%. Maintaining>94%.     (3) UGIB with ABLA with anemia: s/p prbc, continue protonix 40 BID. S/p EGD 2/14 showed esophagitis chronic gastritis without bleeding. 3 clips from previous intervention were still present without any active bleeding noted.      (4) anemia: s.t #3 and AOCI. Procrit 10K units with HD. Transfuse to keep hgb>7.      (5) seizures with todds paralysis: continuetrileptal, fluoxetine, keppra     (6) CVA with right sided weakness: complicates all aspects of care     (7)  SSS with PPM not on AC:      (8) ESRD on HD with hyperkalemia: hyperkalemia protocol/ k to wnl. HD per nephrology mwf Getting hd currently, c/o cramps usually with HD. (9)uti, vre reported. mrsa pcr nares neg, will stop vanco, cx id, continue zosyn.    DVT ppx: SCDs     DISPO: P  Discussed with family/sons at bedside, updated. Remains full code,  May transfer out of icu  2/14: they will discuss amongst themselves for DNR DNI, and 48 hours, if course is declinding then further discuss comfort care.      Signed By: Allyson Yoon MD     February 17, 2022

## 2022-02-18 PROBLEM — J96.91 RESPIRATORY FAILURE WITH HYPOXIA (HCC): Status: ACTIVE | Noted: 2022-02-18

## 2022-02-18 PROBLEM — N39.0 COMPLICATED UTI (URINARY TRACT INFECTION): Status: ACTIVE | Noted: 2022-02-18

## 2022-02-18 LAB
ANION GAP SERPL CALC-SCNC: 10 MMOL/L (ref 5–15)
BASOPHILS # BLD: 0 K/UL (ref 0–0.1)
BASOPHILS NFR BLD: 1 % (ref 0–1)
BUN SERPL-MCNC: 29 MG/DL (ref 6–20)
BUN/CREAT SERPL: 8 (ref 12–20)
CA-I BLD-MCNC: 7.3 MG/DL (ref 8.5–10.1)
CHLORIDE SERPL-SCNC: 105 MMOL/L (ref 97–108)
CO2 SERPL-SCNC: 22 MMOL/L (ref 21–32)
CREAT SERPL-MCNC: 3.42 MG/DL (ref 0.55–1.02)
DIFFERENTIAL METHOD BLD: ABNORMAL
EOSINOPHIL # BLD: 0.1 K/UL (ref 0–0.4)
EOSINOPHIL NFR BLD: 1 % (ref 0–7)
ERYTHROCYTE [DISTWIDTH] IN BLOOD BY AUTOMATED COUNT: 16.6 % (ref 11.5–14.5)
GLUCOSE SERPL-MCNC: 62 MG/DL (ref 65–100)
HCT VFR BLD AUTO: 26 % (ref 35–47)
HGB BLD-MCNC: 8 G/DL (ref 11.5–16)
IMM GRANULOCYTES # BLD AUTO: 0 K/UL (ref 0–0.04)
IMM GRANULOCYTES NFR BLD AUTO: 1 % (ref 0–0.5)
LYMPHOCYTES # BLD: 1.3 K/UL (ref 0.8–3.5)
LYMPHOCYTES NFR BLD: 30 % (ref 12–49)
MCH RBC QN AUTO: 30.9 PG (ref 26–34)
MCHC RBC AUTO-ENTMCNC: 30.8 G/DL (ref 30–36.5)
MCV RBC AUTO: 100.4 FL (ref 80–99)
MONOCYTES # BLD: 0.4 K/UL (ref 0–1)
MONOCYTES NFR BLD: 10 % (ref 5–13)
NEUTS SEG # BLD: 2.5 K/UL (ref 1.8–8)
NEUTS SEG NFR BLD: 57 % (ref 32–75)
NRBC # BLD: 0 K/UL (ref 0–0.01)
NRBC BLD-RTO: 0 PER 100 WBC
PLATELET # BLD AUTO: 88 K/UL (ref 150–400)
PMV BLD AUTO: 11.3 FL (ref 8.9–12.9)
POTASSIUM SERPL-SCNC: 3.3 MMOL/L (ref 3.5–5.1)
RBC # BLD AUTO: 2.59 M/UL (ref 3.8–5.2)
SODIUM SERPL-SCNC: 137 MMOL/L (ref 136–145)
WBC # BLD AUTO: 4.3 K/UL (ref 3.6–11)

## 2022-02-18 PROCEDURE — 99223 1ST HOSP IP/OBS HIGH 75: CPT | Performed by: INTERNAL MEDICINE

## 2022-02-18 PROCEDURE — 80048 BASIC METABOLIC PNL TOTAL CA: CPT

## 2022-02-18 PROCEDURE — 74011250636 HC RX REV CODE- 250/636: Performed by: STUDENT IN AN ORGANIZED HEALTH CARE EDUCATION/TRAINING PROGRAM

## 2022-02-18 PROCEDURE — 85025 COMPLETE CBC W/AUTO DIFF WBC: CPT

## 2022-02-18 PROCEDURE — 74011250636 HC RX REV CODE- 250/636: Performed by: INTERNAL MEDICINE

## 2022-02-18 PROCEDURE — 74011250637 HC RX REV CODE- 250/637: Performed by: INTERNAL MEDICINE

## 2022-02-18 PROCEDURE — 74011000250 HC RX REV CODE- 250: Performed by: HOSPITALIST

## 2022-02-18 PROCEDURE — 90935 HEMODIALYSIS ONE EVALUATION: CPT

## 2022-02-18 PROCEDURE — 74011000250 HC RX REV CODE- 250: Performed by: INTERNAL MEDICINE

## 2022-02-18 PROCEDURE — C9113 INJ PANTOPRAZOLE SODIUM, VIA: HCPCS | Performed by: HOSPITALIST

## 2022-02-18 PROCEDURE — 74011250636 HC RX REV CODE- 250/636: Performed by: HOSPITALIST

## 2022-02-18 PROCEDURE — 36415 COLL VENOUS BLD VENIPUNCTURE: CPT

## 2022-02-18 PROCEDURE — 74011000258 HC RX REV CODE- 258: Performed by: INTERNAL MEDICINE

## 2022-02-18 PROCEDURE — 74011250637 HC RX REV CODE- 250/637: Performed by: HOSPITALIST

## 2022-02-18 PROCEDURE — 65270000029 HC RM PRIVATE

## 2022-02-18 RX ADMIN — FLUOXETINE HYDROCHLORIDE 10 MG: 10 CAPSULE ORAL at 09:45

## 2022-02-18 RX ADMIN — ACETAMINOPHEN 650 MG: 325 TABLET ORAL at 21:13

## 2022-02-18 RX ADMIN — ACETAMINOPHEN 650 MG: 325 TABLET ORAL at 05:35

## 2022-02-18 RX ADMIN — LEVOTHYROXINE SODIUM 150 MCG: 0.07 TABLET ORAL at 05:25

## 2022-02-18 RX ADMIN — PIPERACILLIN SODIUM AND TAZOBACTAM SODIUM 3.38 G: 3; .375 INJECTION, POWDER, LYOPHILIZED, FOR SOLUTION INTRAVENOUS at 02:08

## 2022-02-18 RX ADMIN — SODIUM CHLORIDE, PRESERVATIVE FREE 10 ML: 5 INJECTION INTRAVENOUS at 15:07

## 2022-02-18 RX ADMIN — OXCARBAZEPINE 300 MG: 300 TABLET, FILM COATED ORAL at 21:13

## 2022-02-18 RX ADMIN — CALCITRIOL CAPSULES 0.25 MCG 0.5 MCG: 0.25 CAPSULE ORAL at 09:45

## 2022-02-18 RX ADMIN — LEVETIRACETAM 1000 MG: 250 TABLET, FILM COATED ORAL at 21:13

## 2022-02-18 RX ADMIN — ATORVASTATIN CALCIUM 40 MG: 40 TABLET, FILM COATED ORAL at 09:44

## 2022-02-18 RX ADMIN — SODIUM CHLORIDE, PRESERVATIVE FREE 10 ML: 5 INJECTION INTRAVENOUS at 21:14

## 2022-02-18 RX ADMIN — SODIUM CHLORIDE, PRESERVATIVE FREE 40 MG: 5 INJECTION INTRAVENOUS at 09:43

## 2022-02-18 RX ADMIN — LACOSAMIDE 200 MG: 100 TABLET, FILM COATED ORAL at 09:45

## 2022-02-18 RX ADMIN — MIDODRINE HYDROCHLORIDE 10 MG: 5 TABLET ORAL at 09:44

## 2022-02-18 RX ADMIN — SODIUM CHLORIDE 250 ML: 9 INJECTION, SOLUTION INTRAVENOUS at 02:02

## 2022-02-18 RX ADMIN — LACOSAMIDE 200 MG: 100 TABLET, FILM COATED ORAL at 21:13

## 2022-02-18 RX ADMIN — PIPERACILLIN SODIUM AND TAZOBACTAM SODIUM 3.38 G: 3; .375 INJECTION, POWDER, LYOPHILIZED, FOR SOLUTION INTRAVENOUS at 14:44

## 2022-02-18 RX ADMIN — SODIUM CHLORIDE, PRESERVATIVE FREE 40 MG: 5 INJECTION INTRAVENOUS at 22:30

## 2022-02-18 RX ADMIN — ACETAMINOPHEN 650 MG: 325 TABLET ORAL at 14:43

## 2022-02-18 RX ADMIN — OXCARBAZEPINE 150 MG: 150 TABLET, FILM COATED ORAL at 09:44

## 2022-02-18 RX ADMIN — MIDODRINE HYDROCHLORIDE 10 MG: 5 TABLET ORAL at 16:23

## 2022-02-18 RX ADMIN — LEVETIRACETAM 1000 MG: 250 TABLET, FILM COATED ORAL at 09:45

## 2022-02-18 NOTE — PROGRESS NOTES
Comprehensive Nutrition Assessment    Type and Reason for Visit: Initial,RD nutrition re-screen/LOS    Nutrition Recommendations/Plan:   Continue diet. Recommend ONS x2/d; add ONS PRN. Monitor while Inpatient. Nutrition Assessment:   Admitted for upper GI bleed, septic shock, ESRD on HD, ARF 2/2 fluid overload. Started on levophed in ED, (2/14) EGD showed chronic gastritis, (2/17)seizure disorder x2 while on HD. PO intake reported as good; ate >75% of Breakfast & Lunch s/p HD today. Rec'd ONS x1/d; add ONS PRN. UTO food preferences. Labs: K 3.3, BG 62, BUN 29, Cr 3.42, GFR 13, Ca 7.3, H/H 8.0/26.0, .4. Meds: PPI, Calcitriol, lipitor, synthroid, midodrine, zosyn, lacosamide, prozac, Tylenol. Malnutrition Assessment:  Malnutrition Status:  Mild malnutrition    Context:  Chronic illness     Findings of the 6 clinical characteristics of malnutrition:   Energy Intake:  No significant decrease in energy intake  Weight Loss:  Unable to assess     Body Fat Loss:  Unable to assess,     Muscle Mass Loss:  Unable to assess,    Fluid Accumulation:  1 - Mild,     Strength:  Not performed     Estimated Daily Nutrient Needs:  Energy (kcal): 1838 kcal/d (25 kcal/kg); Weight Used for Energy Requirements: Current  Protein (g): 96 gm/d (1.3 gm/kg); Weight Used for Protein Requirements: Current  Fluid (ml/day): 1838 mL/d; Method Used for Fluid Requirements: 1 ml/kcal    Nutrition Related Findings:   No NFPE completed- on isolation. Mild BLE edema per NP Ramond Shouts. No N/V/D/C reported nor c/s issues- continues on [employer]. Last BM on 2/18- soft.       Wounds:    Surgical incision       Current Nutrition Therapies:  ADULT DIET Easy to Chew    Anthropometric Measures:  · Height:  5' 6\" (167.6 cm)  · Current Body Wt:  73.5 kg (162 lb 0.6 oz)   · Admission Body Wt:       · Usual Body Wt:   (UTO)     · Ideal Body Wt:  130 lbs:  124.6 %   · Adjusted Body Weight:   ; Weight Adjustment for: No adjustment   · Adjusted BMI: · BMI Category: Overweight (BMI 25.0-29. 9)       Nutrition Diagnosis:   · Increased nutrient needs related to renal dysfunction,inadequate protein-energy intake as evidenced by dialysis    Nutrition Interventions:   Food and/or Nutrient Delivery: Continue current diet,Start oral nutrition supplement  Nutrition Education and Counseling: No recommendations at this time  Coordination of Nutrition Care: Continue to monitor while inpatient,Feeding assistance/environmental change    Goals:  Meet >75% of EENs in 5-7 days. Maintain CBW within +/- 0.5 kg x7 days. Improve labs/lytes to WNL for CKD management. Improve wound healing. Nutrition Monitoring and Evaluation:   Behavioral-Environmental Outcomes: None identified  Food/Nutrient Intake Outcomes: Supplement intake,Food and nutrient intake,Diet advancement/tolerance  Physical Signs/Symptoms Outcomes: Biochemical data,GI status,Weight,Fluid status or edema,Meal time behavior    Discharge Planning:    Continue oral nutrition supplement,Continue current diet     Electronically signed by Harjinder Freeman RD on 2/18/2022 at 3:50 PM    Contact: ext. Valerie Cervantes or Luis.

## 2022-02-18 NOTE — CONSULTS
Consult Date: 2/18/2022    Consults  VRE urine, antibiotic recommendations    Subjective   This is a 71year old female with ESRD on HD, known to me from last month when followed for bacteremia with Coag negative staphylococci attributed to dialysis catheter. She was discharged to receive 2 weeks of IV Vancomycin at HD. She was readmitted last week with GI bleed. She has been afebrile but her WBC was elevated on admission. Urinalysis showed marked pyuria but no bacteria. Urine culture grew VRE but was susceptible to Ampicillin. It appears that patient has been on IV Zosyn. She remains afebrile and WBC is normal. CT Abdomen showed renal atrophy with no hydronephrosis or stones. Images reviewed by me. Patient resting comfortably but does not know why she was admitted. She offers no complaints at this time and denied any urinary symptoms.   Past Medical History:   Diagnosis Date    Anxiety disorder     Arthritis     Atherosclerosis of native arteries of the extremities with ulceration ([address] 8/31/2020    Atrial fibrillation (HCC)     Cardiac pacemaker in situ     Cellulitis and abscess of trunk 8/31/2020    Depression, postpartum     Diabetes ([address]     Heart disease     Heartburn     Hx of long term use of blood thinners     Hypercholesterolemia     Hypertension     Hypothyroidism     Migraines     Peripheral venous insufficiency 8/31/2020    Seizures ([address]     Sleep disorder     Stroke ([address]     2019    Varicose veins of lower extremity with inflammation 8/31/2020      Past Surgical History:   Procedure Laterality Date    HX BACK SURGERY      HX OTHER SURGICAL      leg surgery     HX OTHER SURGICAL      pacemaker monitor     IR INSERT TUNL CVC W/O PORT OVER 5 YR  12/10/2021     Family History   Problem Relation Age of Onset    Heart Disease Mother     Heart Disease Father     Diabetes Sister     Diabetes Brother       Social History     Tobacco Use    Smoking status: Never Smoker    Smokeless tobacco: Never Used   Substance Use Topics    Alcohol use: Never       Current Facility-Administered Medications   Medication Dose Route Frequency Provider Last Rate Last Admin    lidocaine 4 % patch 2 Patch  2 Patch TransDERmal Q24H Fam ONEIL MD   2 Patch at 02/18/22 1444    midodrine (PROAMATINE) tablet 10 mg  10 mg Oral TID WITH MEALS Jacqui Mccracken MD   10 mg at 02/18/22 1623    levETIRAcetam (KEPPRA) tablet 1,000 mg  1,000 mg Oral BID Jovany Rivero MD   1,000 mg at 02/18/22 0945    piperacillin-tazobactam (ZOSYN) 3.375 g in 0.9% sodium chloride (MBP/ADV) 100 mL MBP  3.375 g IntraVENous Q12H Jen Turner MD 25 mL/hr at 02/18/22 1444 3.375 g at 02/18/22 1444    heparin (porcine) 1,000 unit/mL injection 3,200 Units  3,200 Units IntraVENous DIALYSIS PRN Rivera Garcia MD   3,200 Units at 02/16/22 1813    0.9% sodium chloride infusion 250 mL  250 mL IntraVENous PRN Abelardo Morgan MD 15 mL/hr at 02/18/22 0202 250 mL at 02/18/22 0202    sodium chloride (NS) flush 5-40 mL  5-40 mL IntraVENous Q8H Jovany Rivero MD   10 mL at 02/18/22 1507    sodium chloride (NS) flush 5-40 mL  5-40 mL IntraVENous PRN Jovany Rivero MD        acetaminophen (TYLENOL) tablet 650 mg  650 mg Oral Q6H PRN Jovany Rivero MD   650 mg at 02/18/22 1443    Or    acetaminophen (TYLENOL) suppository 650 mg  650 mg Rectal Q6H PRN Jovany Rivero MD        polyethylene glycol (MIRALAX) packet 17 g  17 g Oral DAILY PRN Jovany Rivero MD        ondansetron (ZOFRAN ODT) tablet 4 mg  4 mg Oral Q8H PRN Jovany Rivero MD        Or    ondansetron [facility]) injection 4 mg  4 mg IntraVENous Q6H PRN Jovany Rivreo MD        pantoprazole (PROTONIX) 40 mg in 0.9% sodium chloride 10 mL injection  40 mg IntraVENous Q12H Jovany Rivero MD   40 mg at 02/18/22 0943    atorvastatin (LIPITOR) tablet 40 mg  40 mg Oral DAILY Jovany Rivero MD   40 mg at 02/18/22 0944    calcitRIOL (ROCALTROL) capsule 0.5 mcg  0.5 mcg Oral DAILY Balwinder Mendez MD   0.5 mcg at 02/18/22 0945    levothyroxine (SYNTHROID) tablet 150 mcg  150 mcg Oral DAILY Balwinder Mendez MD   150 mcg at 02/18/22 0525    FLUoxetine (PROzac) capsule 10 mg  10 mg Oral DAILY Balwinder Mendez MD   10 mg at 02/18/22 0945    OXcarbazepine (TRILEPTAL) tablet 150 mg  150 mg Oral QAM Balwinder Mendez MD   150 mg at 02/18/22 0944    OXcarbazepine (TRILEPTAL) tablet 300 mg  300 mg Oral QHS Balwinder Mendez MD   300 mg at 02/17/22 2230    lacosamide (VIMPAT) tablet 200 mg  200 mg Oral BID Balwinder Mendez MD   200 mg at 02/18/22 0945    [Held by provider] sevelamer carbonate (RENVELA) tab 800 mg  800 mg Oral TID WITH MEALS Balwinder Mendez MD   800 mg at 02/13/22 9475    zinc oxide-white petrolatum 17-57 % topical paste   Topical PRN Balwinder Mendez MD        NOREPINephrine (LEVOPHED) 8 mg in 0.9% NS 250ml infusion  0.5-16 mcg/min IntraVENous TITRATE Balwinder Mendez MD   Stopped at 02/16/22 1842        Review of Systems   Constitutional: Negative for chills and fever. HENT: Negative. Eyes: Negative. Respiratory: Negative. Cardiovascular: Negative. Endocrine: Negative. Genitourinary: Negative. Musculoskeletal: Negative. Allergic/Immunologic: Negative. Neurological: Negative. Hematological: Negative. Psychiatric/Behavioral: Negative. Objective     Vital signs for last 24 hours:  Visit Vitals  /64   Pulse 64   Temp 98.1 °F (36.7 °C)   Resp 18   Ht 5' 6\" (1.676 m)   Wt 162 lb 0.6 oz (73.5 kg)   SpO2 95%   Breastfeeding No   BMI 26.15 kg/m²       Intake/Output this shift:  Current Shift: No intake/output data recorded. Last 3 Shifts: No intake/output data recorded.     Data Review:   Recent Results (from the past 24 hour(s))   METABOLIC PANEL, BASIC    Collection Time: 02/18/22  8:34 AM   Result Value Ref Range    Sodium 137 136 - 145 mmol/L    Potassium 3.3 (L) 3.5 - 5.1 mmol/L    Chloride 105 97 - 108 mmol/L    CO2 22 21 - 32 mmol/L    Anion gap 10 5 - 15 mmol/L    Glucose 62 (L) 65 - 100 mg/dL    BUN 29 (H) 6 - 20 mg/dL    Creatinine 3.42 (H) 0.55 - 1.02 mg/dL    BUN/Creatinine ratio 8 (L) 12 - 20      GFR est AA 16 (L) >60 ml/min/1.73m2    GFR est non-AA 13 (L) >60 ml/min/1.73m2    Calcium 7.3 (L) 8.5 - 10.1 mg/dL   CBC WITH AUTOMATED DIFF    Collection Time: 02/18/22  8:34 AM   Result Value Ref Range    WBC 4.3 3.6 - 11.0 K/uL    RBC 2.59 (L) 3.80 - 5.20 M/uL    HGB 8.0 (L) 11.5 - 16.0 g/dL    HCT 26.0 (L) 35.0 - 47.0 %    .4 (H) 80.0 - 99.0 FL    MCH 30.9 26.0 - 34.0 PG    MCHC 30.8 30.0 - 36.5 g/dL    RDW 16.6 (H) 11.5 - 14.5 %    PLATELET 88 (L) 802 - 400 K/uL    MPV 11.3 8.9 - 12.9 FL    NRBC 0.0 0.0  WBC    ABSOLUTE NRBC 0.00 0.00 - 0.01 K/uL    NEUTROPHILS 57 32 - 75 %    LYMPHOCYTES 30 12 - 49 %    MONOCYTES 10 5 - 13 %    EOSINOPHILS 1 0 - 7 %    BASOPHILS 1 0 - 1 %    IMMATURE GRANULOCYTES 1 (H) 0 - 0.5 %    ABS. NEUTROPHILS 2.5 1.8 - 8.0 K/UL    ABS. LYMPHOCYTES 1.3 0.8 - 3.5 K/UL    ABS. MONOCYTES 0.4 0.0 - 1.0 K/UL    ABS. EOSINOPHILS 0.1 0.0 - 0.4 K/UL    ABS. BASOPHILS 0.0 0.0 - 0.1 K/UL    ABS. IMM. GRANS. 0.0 0.00 - 0.04 K/UL    DF AUTOMATED       CT Abdomen (2/11)        Physical Exam  Vitals and nursing note reviewed. Constitutional:       Appearance: She is not ill-appearing. HENT:      Head: Normocephalic and atraumatic. Nose: Nose normal.      Mouth/Throat:      Pharynx: Oropharynx is clear. Eyes:      Pupils: Pupils are equal, round, and reactive to light. Cardiovascular:      Rate and Rhythm: Regular rhythm. Pulmonary:      Effort: Pulmonary effort is normal.      Breath sounds: Normal breath sounds. Abdominal:      General: Bowel sounds are normal.      Palpations: Abdomen is soft. Tenderness: There is no abdominal tenderness.    Genitourinary:     Comments: No Cochran  Musculoskeletal:      Cervical back: Neck supple. Right lower leg: No edema. Left lower leg: No edema. Skin:     Findings: No rash. Neurological:      General: No focal deficit present. Mental Status: She is alert and oriented to person, place, and time. Psychiatric:         Mood and Affect: Mood normal.         Behavior: Behavior normal.         Thought Content: Thought content normal.         Judgment: Judgment normal.       ASSESSMENT/PLAN    1. Uncomplicated UTI with marked pyuria, secondary to Vancomycin-Resistant, but Ampicillin sensitive Enterococcus faecalis, Day #7 IV Zosyn  2. Positive blood cultures with Coagulase negative Staphylococci consistent with contaminant  3. Renal failure    Comment:  No anatomical problems to make this a complicated UTI, therefore, 7 days of antibiotic should be adequate. I am concerned about the presence of yeasts in the original culture, which may have been colonization. 1. Discontinue Zosyn after today  2. In am, check procal and CRP  3. Repeat urinalysis to make sure yeasts are not still present  4. No antibiotic indicated for positive blood culture    Pio Maria MD

## 2022-02-18 NOTE — PROGRESS NOTES
CM reviewing chart. Patient transferred out of ICU now on medically floor. Per Dr. Sarah Pollock note, monitoring patient and discussing with family about DNR/DNI status and possibility of comfort care if further declining in health. Family still undecided at this time. Patient still being treated medically. Dc plan TBD.

## 2022-02-18 NOTE — PROGRESS NOTES
Patient: Kavitha Crew MRN: [id number]  SSN: [id number]    YOB: 1950  Age: 71 y.o. Sex: female          Subjective:   Patient seen in the dialysis unit. She is tolerating procedure well. No seizures reported as of today. She denies any complaints    Objective:     Vitals:    02/17/22 2142 02/17/22 2200 02/17/22 2318 02/18/22 1014   BP:  (!) 115/56 129/66 (!) 122/57   Pulse:  60 61 60   Resp:  16 18    Temp:   97.4 °F (36.3 °C) 97.7 °F (36.5 °C)   TempSrc:       SpO2: 98% 100% 97% 100%   Weight:       Height:            Intake and Output:  Yesterday's Intake and Output:  No intake/output data recorded. Physical Exam:   GENERAL: no distress, appears stated age  EYE: negative  Respiratory -no distress. ABDOMEN: Nondistended. Musculoskeletal -normal on inspection. NEUROLOGIC: She is awake but lethargic      Dialysis access: cuffed tunneled catheter site right and IJ.       Data Review    Meds    Current Facility-Administered Medications   Medication Dose Route Frequency    lidocaine 4 % patch 2 Patch  2 Patch TransDERmal Q24H    midodrine (PROAMATINE) tablet 10 mg  10 mg Oral TID WITH MEALS    levETIRAcetam (KEPPRA) tablet 1,000 mg  1,000 mg Oral BID    piperacillin-tazobactam (ZOSYN) 3.375 g in 0.9% sodium chloride (MBP/ADV) 100 mL MBP  3.375 g IntraVENous Q12H    heparin (porcine) 1,000 unit/mL injection 3,200 Units  3,200 Units IntraVENous DIALYSIS PRN    0.9% sodium chloride infusion 250 mL  250 mL IntraVENous PRN    sodium chloride (NS) flush 5-40 mL  5-40 mL IntraVENous Q8H    sodium chloride (NS) flush 5-40 mL  5-40 mL IntraVENous PRN    acetaminophen (TYLENOL) tablet 650 mg  650 mg Oral Q6H PRN    Or    acetaminophen (TYLENOL) suppository 650 mg  650 mg Rectal Q6H PRN    polyethylene glycol (MIRALAX) packet 17 g  17 g Oral DAILY PRN    ondansetron (ZOFRAN ODT) tablet 4 mg  4 mg Oral Q8H PRN    Or    ondansetron (ZOFRAN) injection 4 mg  4 mg IntraVENous Q6H PRN  pantoprazole (PROTONIX) 40 mg in 0.9% sodium chloride 10 mL injection  40 mg IntraVENous Q12H    atorvastatin (LIPITOR) tablet 40 mg  40 mg Oral DAILY    calcitRIOL (ROCALTROL) capsule 0.5 mcg  0.5 mcg Oral DAILY    levothyroxine (SYNTHROID) tablet 150 mcg  150 mcg Oral DAILY    FLUoxetine (PROzac) capsule 10 mg  10 mg Oral DAILY    OXcarbazepine (TRILEPTAL) tablet 150 mg  150 mg Oral QAM    OXcarbazepine (TRILEPTAL) tablet 300 mg  300 mg Oral QHS    lacosamide (VIMPAT) tablet 200 mg  200 mg Oral BID    [Held by provider] sevelamer carbonate (RENVELA) tab 800 mg  800 mg Oral TID WITH MEALS    zinc oxide-white petrolatum 17-57 % topical paste   Topical PRN    NOREPINephrine (LEVOPHED) 8 mg in 0.9% NS 250ml infusion  0.5-16 mcg/min IntraVENous TITRATE       Lab      Recent Results (from the past 24 hour(s))   METABOLIC PANEL, BASIC    Collection Time: 02/18/22  8:34 AM   Result Value Ref Range    Sodium 137 136 - 145 mmol/L    Potassium 3.3 (L) 3.5 - 5.1 mmol/L    Chloride 105 97 - 108 mmol/L    CO2 22 21 - 32 mmol/L    Anion gap 10 5 - 15 mmol/L    Glucose 62 (L) 65 - 100 mg/dL    BUN 29 (H) 6 - 20 mg/dL    Creatinine 3.42 (H) 0.55 - 1.02 mg/dL    BUN/Creatinine ratio 8 (L) 12 - 20      GFR est AA 16 (L) >60 ml/min/1.73m2    GFR est non-AA 13 (L) >60 ml/min/1.73m2    Calcium 7.3 (L) 8.5 - 10.1 mg/dL   CBC WITH AUTOMATED DIFF    Collection Time: 02/18/22  8:34 AM   Result Value Ref Range    WBC 4.3 3.6 - 11.0 K/uL    RBC 2.59 (L) 3.80 - 5.20 M/uL    HGB 8.0 (L) 11.5 - 16.0 g/dL    HCT 26.0 (L) 35.0 - 47.0 %    .4 (H) 80.0 - 99.0 FL    MCH 30.9 26.0 - 34.0 PG    MCHC 30.8 30.0 - 36.5 g/dL    RDW 16.6 (H) 11.5 - 14.5 %    PLATELET 88 (L) 584 - 400 K/uL    MPV 11.3 8.9 - 12.9 FL    NRBC 0.0 0.0  WBC    ABSOLUTE NRBC 0.00 0.00 - 0.01 K/uL    NEUTROPHILS 57 32 - 75 %    LYMPHOCYTES 30 12 - 49 %    MONOCYTES 10 5 - 13 %    EOSINOPHILS 1 0 - 7 %    BASOPHILS 1 0 - 1 %    IMMATURE GRANULOCYTES 1 (H) 0 - 0.5 %    ABS. NEUTROPHILS 2.5 1.8 - 8.0 K/UL    ABS. LYMPHOCYTES 1.3 0.8 - 3.5 K/UL    ABS. MONOCYTES 0.4 0.0 - 1.0 K/UL    ABS. EOSINOPHILS 0.1 0.0 - 0.4 K/UL    ABS. BASOPHILS 0.0 0.0 - 0.1 K/UL    ABS. IMM. GRANS. 0.0 0.00 - 0.04 K/UL    DF AUTOMATED          Lab Results   Component Value Date/Time    Color Yellow/Straw 02/12/2022 09:00 AM    Appearance Turbid (A) 02/12/2022 09:00 AM    Specific gravity 1.017 02/12/2022 09:00 AM    pH (UA) 5.0 02/12/2022 09:00 AM    Protein 100 (A) 02/12/2022 09:00 AM    Glucose Negative 02/12/2022 09:00 AM    Ketone 5 (A) 02/12/2022 09:00 AM    Bilirubin Negative 02/12/2022 09:00 AM    Urobilinogen 0.1 02/12/2022 09:00 AM    Nitrites Negative 02/12/2022 09:00 AM    Leukocyte Esterase Moderate (A) 02/12/2022 09:00 AM    Epithelial cells Moderate (A) 05/20/2021 12:56 AM    Bacteria Negative 02/12/2022 09:00 AM    WBC >100 (H) 02/12/2022 09:00 AM    RBC 20-50 02/12/2022 09:00 AM       Imaging         Assessment & Plan: Active Problems:    GI bleed (2/11/2022)      Septic shock ([address] (2/11/2022)    1. ESRD. -On dialysis at [address] renal care in [place] every MWF   -On admission she was volume overloaded  -She is currently being dialyzed with/2.5 bath. Potassium today 3.3  -We will continue MWF dialysis while inpatient  -Permanent dialysis catheter as current access  -She also has massive bilateral effusions which need to be tapped if she has recurrent respiratory symptoms that might not improve with dialysis    2. Anemia   -2/2 ESRD and GI bleed     -s/p blood transfusion  -Apparently had black tarry stools and bright red bleeding per rectum. -s/p EGD on 2/14 which showed esophagitis chronic gastritis without bleeding.   3 clips from previous intervention were still present without any active bleeding noted  -Hemoglobin 8.0 today  -Continue Procrit 10,000 units with dialysis  -Transfusion as needed to keep hemoglobin  > 7     3  Hypotension   -probably from acute blood loss anemia.   -Normal WBC count. Afebrile   -Increased midodrine to 10 mg 3 times daily . Continues same dose  -She is on 4mcg Levophed today  -She became hypotensive/tachycardic after treatment yesterday. Normotensive. Will give albumin with dialysis and decreased UF goal     4 Secondary hyperparathyroidism of ESRD. -Calcium is okay. Compa Cover currently is on hold which I will continue to hold. Phosphorus is 4.6  -PTH is 194 which is within target range for her ESRD. Continue calcitriol at the current dose     5. B/L Pleural effusions , CHF  H/o AICD placement-  -CT of the abdomen/pelvic shows large bilateral pleural effusions with associated atelectasis  -Unable to remove >3L fluid with dialysis because of cramping/hypotension/tachycardia  -She was dialyzed on 2/16 and again on 2/17. She is currently being dialyzed  -Appears more comfortable today from respiratory standpoint    6. Seizure disorder  -Apparently she had 2 seizures on dialysis yesterday  - resumed on Home meds     Signed By: Marixa Umana MD     February 18, 2022      This note was prepared using voice recognition system and is likely to have sound alike errors that may have been overlooked even during proofreading.   Please contact the author for any clarifications

## 2022-02-18 NOTE — PROGRESS NOTES
CARDIOLOGY PROGRESS NOTE    Patient seen and examined. This is a patient who is followed for atrial fibrillation, history of pacemaker. Sleeping this afternoon. No acute distress. Transferred to medical floor. Telemetry reviewed, Vpaced 60s    Pertinent review of systems items noted above, all other systems are negative. Current medications reviewed. Physical Examination  Vital signs are stable. Blood pressure 138/78 , Pulse 103  No apparent distress. Heart is regular, rate and rhythm. Normal S1, S2, no murmurs are appreciated. Lungs are clear bilaterally. Abdomen is distended  Extremities have mild edema. .  Recent Results (from the past 12 hour(s))   METABOLIC PANEL, BASIC    Collection Time: 02/18/22  8:34 AM   Result Value Ref Range    Sodium 137 136 - 145 mmol/L    Potassium 3.3 (L) 3.5 - 5.1 mmol/L    Chloride 105 97 - 108 mmol/L    CO2 22 21 - 32 mmol/L    Anion gap 10 5 - 15 mmol/L    Glucose 62 (L) 65 - 100 mg/dL    BUN 29 (H) 6 - 20 mg/dL    Creatinine 3.42 (H) 0.55 - 1.02 mg/dL    BUN/Creatinine ratio 8 (L) 12 - 20      GFR est AA 16 (L) >60 ml/min/1.73m2    GFR est non-AA 13 (L) >60 ml/min/1.73m2    Calcium 7.3 (L) 8.5 - 10.1 mg/dL   CBC WITH AUTOMATED DIFF    Collection Time: 02/18/22  8:34 AM   Result Value Ref Range    WBC 4.3 3.6 - 11.0 K/uL    RBC 2.59 (L) 3.80 - 5.20 M/uL    HGB 8.0 (L) 11.5 - 16.0 g/dL    HCT 26.0 (L) 35.0 - 47.0 %    .4 (H) 80.0 - 99.0 FL    MCH 30.9 26.0 - 34.0 PG    MCHC 30.8 30.0 - 36.5 g/dL    RDW 16.6 (H) 11.5 - 14.5 %    PLATELET 88 (L) 564 - 400 K/uL    MPV 11.3 8.9 - 12.9 FL    NRBC 0.0 0.0  WBC    ABSOLUTE NRBC 0.00 0.00 - 0.01 K/uL    NEUTROPHILS 57 32 - 75 %    LYMPHOCYTES 30 12 - 49 %    MONOCYTES 10 5 - 13 %    EOSINOPHILS 1 0 - 7 %    BASOPHILS 1 0 - 1 %    IMMATURE GRANULOCYTES 1 (H) 0 - 0.5 %    ABS. NEUTROPHILS 2.5 1.8 - 8.0 K/UL    ABS. LYMPHOCYTES 1.3 0.8 - 3.5 K/UL    ABS. MONOCYTES 0.4 0.0 - 1.0 K/UL    ABS.  EOSINOPHILS 0.1 0.0 - 0.4 K/UL    ABS. BASOPHILS 0.0 0.0 - 0.1 K/UL    ABS. IMM. GRANS. 0.0 0.00 - 0.04 K/UL    DF AUTOMATED         Case discussed with Dr. Onofre Ochoa and our impression and recommendations are as follows:    1. Atrial fibrillation:  Known AF, not on [address] at home though chadsvasc is elevated. Will start patient on [address] tomorrow if Hgb is stable. Rate is presently controlled, continue to closely monitor on telemetry. 2. High degree AVB s/p PPM:  Normal function per last device transmission. 3. Elevated troponin: no active chest pain. -[phone]. Elevation in setting of sepsis, ESRD, GI bleed/anemia. Continues to deny chest pain. Echo pending. Echo in Jan with preserved EF.  NST in November 2021 with no ischemia. Continue workup. No ASA given GI bleeding, no bb while on pressors. Med management as able. 4. Hypotension:  On midodrine at home, continue pressors, bp acceptable at present. Please do not hesitate to call if additional questions arise. Dr. Olga Hyatt Cardiology  [address] [facility] [address], [address] [facility]  [phone]

## 2022-02-18 NOTE — PROGRESS NOTES
Pulmonary Progress Note      NAME: Bernard Opitz   :  1950  MRM:  [id number]    Date/Time: 2022  6:20 PM         Subjective:     Patient seen and examined  Transferred out of ICU  Overnight events noted    Lying in bed,  Awake and alert  No acute distress  On room air  Hemodialysis today      Past Medical History reviewed and unchanged from Admission History and Physical       Objective:     Physical Exam     Vitals:      Last 24hrs VS reviewed since prior progress note. Most recent are:    Visit Vitals  /64   Pulse 64   Temp 98.1 °F (36.7 °C)   Resp 18   Ht 5' 6\" (1.676 m)   Wt 73.5 kg (162 lb 0.6 oz)   SpO2 95%   Breastfeeding No   BMI 26.15 kg/m²     SpO2 Readings from Last 6 Encounters:   22 95%   22 98%   22 99%   22 97%   22 100%   22 100%    O2 Flow Rate (L/min): 4 l/min     No intake or output data in the 24 hours ending 22 1531   GENERAL:  The patient is an elderly frail white female who appears to be comfortable. HEENT:  Shows pupils are equal and reactive to light. Pallor is noted. No icterus. Nasal passages are patent. She appears to be edentulous. No obvious thrush. NECK:  Supple. Trachea is central.  No JVD or lymphadenopathy. She is not using accessory muscle of respiration. She has a tunneled hemodialysis catheter on the right side of the neck. CHEST:  Symmetrical with equal, but diminished air entry bilaterally. She has very diminished breath sounds over the lung bases, but impaired percussion due to bilateral pleural effusions. HEART:  Rhythm is regular without any loud murmur or gallop. ABDOMEN:  Obese and benign. Bowel sounds are audible. EXTREMITIES:  Did not show any cyanosis or clubbing. Pulses are palpable. She does have chronic-appearing pitting edema. She appears to have a chronic wound on her left leg as well. There are mild areas of ecchymosis noted. NEUROLOGIC:  Examination is grossly intact.   SKIN:  Warm and dry. XR CHEST PORT   Final Result   Worsening congestive heart failure pattern. DUPLEX LOWER EXT VENOUS RIGHT   Final Result      CT HEAD WO CONT   Final Result   No acute intracranial abnormality. Old ischemic disease in the high left parietal lobe and in the left basal   ganglion. There is significant atrophy with significant deepening of the left sylvian   fissure. CT ABD PELV WO CONT   Final Result   Fluid overload. No evidence to suggest a source of GI bleeding      CT dose reduction was achieved through use of a standardized protocol tailored   for this examination and automatic exposure control for dose modulation.       XR CHEST PORT   Final Result          Lab Data Reviewed: (see below)      Medications:  Current Facility-Administered Medications   Medication Dose Route Frequency    lidocaine 4 % patch 2 Patch  2 Patch TransDERmal Q24H    midodrine (PROAMATINE) tablet 10 mg  10 mg Oral TID WITH MEALS    levETIRAcetam (KEPPRA) tablet 1,000 mg  1,000 mg Oral BID    piperacillin-tazobactam (ZOSYN) 3.375 g in 0.9% sodium chloride (MBP/ADV) 100 mL MBP  3.375 g IntraVENous Q12H    heparin (porcine) 1,000 unit/mL injection 3,200 Units  3,200 Units IntraVENous DIALYSIS PRN    0.9% sodium chloride infusion 250 mL  250 mL IntraVENous PRN    sodium chloride (NS) flush 5-40 mL  5-40 mL IntraVENous Q8H    sodium chloride (NS) flush 5-40 mL  5-40 mL IntraVENous PRN    acetaminophen (TYLENOL) tablet 650 mg  650 mg Oral Q6H PRN    Or    acetaminophen (TYLENOL) suppository 650 mg  650 mg Rectal Q6H PRN    polyethylene glycol (MIRALAX) packet 17 g  17 g Oral DAILY PRN    ondansetron (ZOFRAN ODT) tablet 4 mg  4 mg Oral Q8H PRN    Or    ondansetron (ZOFRAN) injection 4 mg  4 mg IntraVENous Q6H PRN    pantoprazole (PROTONIX) 40 mg in 0.9% sodium chloride 10 mL injection  40 mg IntraVENous Q12H    atorvastatin (LIPITOR) tablet 40 mg  40 mg Oral DAILY    calcitRIOL (ROCALTROL) capsule 0.5 mcg  0.5 mcg Oral DAILY    levothyroxine (SYNTHROID) tablet 150 mcg  150 mcg Oral DAILY    FLUoxetine (PROzac) capsule 10 mg  10 mg Oral DAILY    OXcarbazepine (TRILEPTAL) tablet 150 mg  150 mg Oral QAM    OXcarbazepine (TRILEPTAL) tablet 300 mg  300 mg Oral QHS    lacosamide (VIMPAT) tablet 200 mg  200 mg Oral BID    [Held by provider] sevelamer carbonate (RENVELA) tab 800 mg  800 mg Oral TID WITH MEALS    zinc oxide-white petrolatum 17-57 % topical paste   Topical PRN    NOREPINephrine (LEVOPHED) 8 mg in 0.9% NS 250ml infusion  0.5-16 mcg/min IntraVENous TITRATE       ______________________________________________________________________      Lab Review:     Recent Labs     02/18/22  0834 02/17/22  0430 02/16/22  0500   WBC 4.3 4.6 4.2   HGB 8.0* 7.8* 7.2*   HCT 26.0* 24.9* 23.7*   PLT 88* 70* 73*     Recent Labs     02/18/22  0834 02/16/22  0500    138   K 3.3* 3.5    105   CO2 22 25   GLU 62* 66   BUN 29* 36*   CREA 3.42* 3.19*   CA 7.3* 7.1*  7.0*   PHOS  --  4.6   ALB  --  1.4*     No components found for: GLPOC  No results for input(s): PH, PCO2, PO2, HCO3, FIO2 in the last 72 hours. No results for input(s): INR, INREXT, INREXT, INREXT in the last 72 hours. Other pertinent lab:   Chest x-ray done on 02/14/2022 was personally reviewed. Shows increasing pulmonary vascular congestion and bilateral pleural effusions       Assessment & Plan:      1. Acute hypoxic respiratory failure. Predominant etiology is volume overload    CT of the abdomen and pelvis basically shows large bilateral pleural effusions with associated atelectasis. Continue with dialysis and try to remove as much fluid as possible. I believe that with ongoing dialysis, there will be an improvement in her oxygen requirement. She had hemodialysis yesterday. This morning she is much more comfortable   Blood gases done this morning showed 7.3 4/42/153 on 4 L oxygen.   We will wean down oxygen to keep saturation more than 92%. Discussed with RN. It is noted that she is empirically started on vancomycin and Zosyn. Clinical suspicion for pneumonia is relatively low; however, antibiotics can be continued. Large bilateral pleural effusions  Likely contributing to her breathing issues  If not improved over the weekend may consider thoracentesis early next week    2. End-stage renal disease. on hemodialysis. Nephrology continuing with fluid removal appropriately  However, she is relatively hypotensive and is on a small dose of Levophed, currently at 4 mics. Target map of 65 and higher. 3.  Gastrointestinal bleed. Apparently, she had black tarry stools and also bright red blood per rectum for which paramedics were called and she was brought over to the emergency room. Gastroenterology is consulted. She received 1 unit of packed red blood cells. Continue to monitor hemoglobin and hematocrit. She is on Protonix intravenously. She is for EGD in the morning. 4.  Multiple cardiac issues including atrial fibrillation, history of atrioventricular block, and pacemaker insertion. Her troponins are elevated. Cardiology is consulted. Echocardiogram is pending. Stress test in 11/2021 showed no ischemia. 5.  History of stroke and seizure. 6.  Hypertension and dyslipidemia. 7.  Diabetes and depression. 8.  No chemical deep venous thrombosis prophylaxis due to gastrointestinal bleed. Consider sequential compression devices to the lower extremities. Right lower extremity venous Dopplers were done on 12/12/2022 showing chronic DVT in the right greater saphenous vein.   9.  Hypothyroidism.     Questions of patient were answered at bedside in detail  Case discussed in detail with RN, RT, and care team  Thank you for involving me in the care of this patient  I will follow with you closely during hospitalization    Time spent more than 30 minutes under patient care with no overlap reviewing results and records, decision making, and answering questions.     Marc Stallworth MD  Pulmonary/CC

## 2022-02-18 NOTE — PROGRESS NOTES
Spiritual Care Assessment/Progress Note  [facility]      NAME: Rosendo Torrez      MRN: [id number]  AGE: 71 y.o.  SEX: female  Sabianism Affiliation: [place]   Language: English     2/18/2022     Total Time (in minutes): 10     Spiritual Assessment begun in [facility] [address] [facility] through conversation with:         [x]Patient        [x] Family    [] Friend(s)        Reason for Consult: Initial visit     Spiritual beliefs: (Please include comment if needed)     [] Identifies with a iraida tradition:         [] Supported by a iraida community:            [] Claims no spiritual orientation:           [] Seeking spiritual identity:                [] Adheres to an individual form of spirituality:           [x] Not able to assess:                           Identified resources for coping:      [] Prayer                               [] Music                  [] Guided Imagery     [] Family/friends                 [] Pet visits     [] Devotional reading                         [x] Unknown     [] Other:                                             Interventions offered during this visit: (See comments for more details)    Patient Interventions: Initial visit,Other (comment) (Silent support and prayer)     Family/Friend(s): Other (comment) (Silent support and prayer)     Plan of Care:     [] Support spiritual and/or cultural needs    [] Support AMD and/or advance care planning process      [] Support grieving process   [] Coordinate Rites and/or Rituals    [] Coordination with community clergy   [] No spiritual needs identified at this time   [] Detailed Plan of Care below (See Comments)  [] Make referral to Music Therapy  [] Make referral to Pet Therapy     [] Make referral to Addiction services  [] Make referral to [facility]  [] Make referral to Spiritual Care Partner  [] No future visits requested        [x] Contact Spiritual Care for further referrals     Visit attempted for patient in [address] [place] [address] for initial assessment. Medical staff were providing care for the patient during the visit. There was one visitor in the room. Provided silent support and prayer per listed iraida tradition. Contact chaplains for further spiritual care and support.  Mara Tim M.Div.    can be reached by calling the  at [facility]  [phone]

## 2022-02-18 NOTE — PROGRESS NOTES
Hospitalist Progress Note       Daily Progress Note: 2/18/2022 4:26 PM  Hospital course:     Patient is a 71-year-old female with a PMH significant for ESRD on HD, HTN, HLD, chronic atrial fibrillation, history of AV block status post pacemaker, DM, CVA and seizure disorders. She comes to the hospital for weakness I graduated to somnolence, dark tarry stools, then bright red blood per rectum. Discharged just 1 day ago from SNF after being hospitalized for MRSA bacteremia and seizures. During this admission she presents hyperkalemic with hemoglobin of 7.6, BNP 5856, hypotensive with systolic pressure in the 28L and bicarb elevated. Chest x-ray negative for acute findings. Resuscitated with IV fluids, 1 unit PRBC and started on Levophed drip, IV Zosyn and Ventimask for oxygen support, placed in the ICU. GI, nephrology, pulmonary and cardiology were consulted. Weaned off oxygen and pressor therapy over the course of a few days and transferred to remote telemetry. EGD performed on 2/14 revealing esophagitis without bleeding, 3 clips from previous intervention still in place without bleeding. Her hemoglobin has been stable without any further signs or symptoms of bleeding. 2D echo with normal findings. Urine culture positive for Vanco resistant Enterococcus faecalis with MDR and yeast.  Will consult ID. Started on oral fluconazole. Subjective:     Examined patient at the bedside. She is alert and oriented. She complains of generalized weakness ongoing. She states she does not want to go back to SNF she would rather go home with home health.       Assessment/Plan:   Active Problems:    Anemia (1/3/2022)      GI bleed (2/11/2022)      Septic shock ([address] (5/08/0213)      Complicated UTI (urinary tract infection) (2/18/2022)      Respiratory failure with hypoxia ([address] (2/18/2022)      Hypovolemic shock  Of Levophed, started on midodrine 3 times daily 10 mg    Acute encephalopathyresolved    Respiratory failure with hypoxia  Secondary to fluid overload  Weaned to room air    Upper GI bleed with acute blood loss anemia  Status post 1 unit PRBC  GI consultedEGD performed revealing esophagitis, chronic gastritis without bleeding, 3 clips from previous intervention still present without active bleeding    ESRD on HD  Anemia of chronic disease  Tolerating dialysis, Procrit with HD, transfuse for Hgb <7    Seizure disorder with Ruddy's paralysis  Continue Trileptal, fluoxetine and Keppra    CVA with right-sided weakness  PT OT to evaluate  Patient states she does not want SNF placement    Complicated UTI  Urine culture positive for VRE multi resistant, yeast  ID consulted    KADI Lewis  SSS status post PPM  Cardiology consulted  Stable rate controlled    DVT Prophylaxis: SCDs  Code Status: Full Code  POA/NOK:    Disposition and discharge barriers:    IV antibiotics   ID consult   PT OT consult  Care Plan discussed with: Patient, staff, IDR team    Current Facility-Administered Medications   Medication Dose Route Frequency    lidocaine 4 % patch 2 Patch  2 Patch TransDERmal Q24H    midodrine (PROAMATINE) tablet 10 mg  10 mg Oral TID WITH MEALS    levETIRAcetam (KEPPRA) tablet 1,000 mg  1,000 mg Oral BID    piperacillin-tazobactam (ZOSYN) 3.375 g in 0.9% sodium chloride (MBP/ADV) 100 mL MBP  3.375 g IntraVENous Q12H    heparin (porcine) 1,000 unit/mL injection 3,200 Units  3,200 Units IntraVENous DIALYSIS PRN    0.9% sodium chloride infusion 250 mL  250 mL IntraVENous PRN    sodium chloride (NS) flush 5-40 mL  5-40 mL IntraVENous Q8H    sodium chloride (NS) flush 5-40 mL  5-40 mL IntraVENous PRN    acetaminophen (TYLENOL) tablet 650 mg  650 mg Oral Q6H PRN    Or    acetaminophen (TYLENOL) suppository 650 mg  650 mg Rectal Q6H PRN    polyethylene glycol (MIRALAX) packet 17 g  17 g Oral DAILY PRN    ondansetron (ZOFRAN ODT) tablet 4 mg  4 mg Oral Q8H PRN    Or    ondansetron (ZOFRAN) injection 4 mg  4 mg IntraVENous Q6H PRN    pantoprazole (PROTONIX) 40 mg in 0.9% sodium chloride 10 mL injection  40 mg IntraVENous Q12H    atorvastatin (LIPITOR) tablet 40 mg  40 mg Oral DAILY    calcitRIOL (ROCALTROL) capsule 0.5 mcg  0.5 mcg Oral DAILY    levothyroxine (SYNTHROID) tablet 150 mcg  150 mcg Oral DAILY    FLUoxetine (PROzac) capsule 10 mg  10 mg Oral DAILY    OXcarbazepine (TRILEPTAL) tablet 150 mg  150 mg Oral QAM    OXcarbazepine (TRILEPTAL) tablet 300 mg  300 mg Oral QHS    lacosamide (VIMPAT) tablet 200 mg  200 mg Oral BID    [Held by provider] sevelamer carbonate (RENVELA) tab 800 mg  800 mg Oral TID WITH MEALS    zinc oxide-white petrolatum 17-57 % topical paste   Topical PRN    NOREPINephrine (LEVOPHED) 8 mg in 0.9% NS 250ml infusion  0.5-16 mcg/min IntraVENous TITRATE        REVIEW OF SYSTEMS    Review of Systems   Constitutional: Positive for malaise/fatigue. Respiratory: Positive for shortness of breath. Cardiovascular: Positive for leg swelling. Negative for chest pain. Gastrointestinal: Negative for abdominal pain. Musculoskeletal: Positive for myalgias. Neurological: Positive for weakness. Psychiatric/Behavioral: The patient is nervous/anxious. Objective:     Visit Vitals  /64   Pulse 64   Temp 98.1 °F (36.7 °C)   Resp 18   Ht 5' 6\" (1.676 m)   Wt 73.5 kg (162 lb 0.6 oz)   SpO2 95%   Breastfeeding No   BMI 26.15 kg/m²    O2 Flow Rate (L/min): 4 l/min O2 Device: None (Room air)    Temp (24hrs), Av.7 °F (36.5 °C), Min:97.4 °F (36.3 °C), Max:98.1 °F (36.7 °C)      No intake/output data recorded. No intake/output data recorded. PHYSICAL EXAM:    Physical Exam  Constitutional:       Appearance: She is obese. She is ill-appearing. Cardiovascular:      Rate and Rhythm: Normal rate. Rhythm irregular. Pulmonary:      Effort: Respiratory distress present. Abdominal:      General: There is distension. Musculoskeletal:      Right lower leg: Edema present.       Left lower leg: Edema present. Neurological:      Motor: Weakness present. Gait: Gait abnormal.          Data Review    Recent Results (from the past 24 hour(s))   METABOLIC PANEL, BASIC    Collection Time: 02/18/22  8:34 AM   Result Value Ref Range    Sodium 137 136 - 145 mmol/L    Potassium 3.3 (L) 3.5 - 5.1 mmol/L    Chloride 105 97 - 108 mmol/L    CO2 22 21 - 32 mmol/L    Anion gap 10 5 - 15 mmol/L    Glucose 62 (L) 65 - 100 mg/dL    BUN 29 (H) 6 - 20 mg/dL    Creatinine 3.42 (H) 0.55 - 1.02 mg/dL    BUN/Creatinine ratio 8 (L) 12 - 20      GFR est AA 16 (L) >60 ml/min/1.73m2    GFR est non-AA 13 (L) >60 ml/min/1.73m2    Calcium 7.3 (L) 8.5 - 10.1 mg/dL   CBC WITH AUTOMATED DIFF    Collection Time: 02/18/22  8:34 AM   Result Value Ref Range    WBC 4.3 3.6 - 11.0 K/uL    RBC 2.59 (L) 3.80 - 5.20 M/uL    HGB 8.0 (L) 11.5 - 16.0 g/dL    HCT 26.0 (L) 35.0 - 47.0 %    .4 (H) 80.0 - 99.0 FL    MCH 30.9 26.0 - 34.0 PG    MCHC 30.8 30.0 - 36.5 g/dL    RDW 16.6 (H) 11.5 - 14.5 %    PLATELET 88 (L) 307 - 400 K/uL    MPV 11.3 8.9 - 12.9 FL    NRBC 0.0 0.0  WBC    ABSOLUTE NRBC 0.00 0.00 - 0.01 K/uL    NEUTROPHILS 57 32 - 75 %    LYMPHOCYTES 30 12 - 49 %    MONOCYTES 10 5 - 13 %    EOSINOPHILS 1 0 - 7 %    BASOPHILS 1 0 - 1 %    IMMATURE GRANULOCYTES 1 (H) 0 - 0.5 %    ABS. NEUTROPHILS 2.5 1.8 - 8.0 K/UL    ABS. LYMPHOCYTES 1.3 0.8 - 3.5 K/UL    ABS. MONOCYTES 0.4 0.0 - 1.0 K/UL    ABS. EOSINOPHILS 0.1 0.0 - 0.4 K/UL    ABS. BASOPHILS 0.0 0.0 - 0.1 K/UL    ABS. IMM. GRANS. 0.0 0.00 - 0.04 K/UL    DF AUTOMATED         XR CHEST PORT   Final Result   Worsening congestive heart failure pattern. DUPLEX LOWER EXT VENOUS RIGHT   Final Result      CT HEAD WO CONT   Final Result   No acute intracranial abnormality. Old ischemic disease in the high left parietal lobe and in the left basal   ganglion. There is significant atrophy with significant deepening of the left sylvian   fissure.       CT ABD PELV WO CONT   Final Result   Fluid overload. No evidence to suggest a source of GI bleeding      CT dose reduction was achieved through use of a standardized protocol tailored   for this examination and automatic exposure control for dose modulation. XR CHEST PORT   Final Result          Active Problems:    Anemia (1/3/2022)      GI bleed (2/11/2022)      Septic shock ([address] (8/75/7310)      Complicated UTI (urinary tract infection) (2/18/2022)      Respiratory failure with hypoxia ([address] (2/18/2022)          _____________________________________________________________________________  Time spent in direct care including coordination of service, review of data and examination: > 35 minutes    ______________________________________________________________________________    Suki Lester NP    This is dictation was done by dragon, computer voice recognition software. Quite often unanticipated grammatical, syntax, homophones and other interpretive errors or inadvertently transcribed by the computer software. Please excuse errors that have escaped final proofreading. Thank you.

## 2022-02-18 NOTE — PROGRESS NOTES
Tx initiated via a patent right chest catheter. No s/s of infection or skin break down. Dressing dry and intact.  Net fluid goal removal is 2kgs and 3 hours

## 2022-02-18 NOTE — PROGRESS NOTES
Called night supervisor to request for telemetry box for patient,  telemetry not connected on patient because hospital is out t of telemetry boxes.

## 2022-02-18 NOTE — PROGRESS NOTES
Progress Note    Patient: Shelby Brunson MRN: [id number]  SSN: [id number]    YOB: 1950  Age: 71 y.o. Sex: female      Admit Date: 2/11/2022    LOS: 7 days     Subjective:   GI is following in consultation for dark tarry stools.     2/18: Patient seen in room, sleepy easily arousable. She was transferred out of ICU on 2/17. No active bleeding noted. Hgb today is 8.0. Had dialysis today.      2/14 EGD - esophagitis, chronic gastritis without bleeding. three clips from previous intervention still in place No active bleeding noted.     History of Present Illness: Alisha James is a 71 y. o. female who is seen in consultation for GI bleed - dark tarry stool. Patient was admitted to the hospital with reports of dark stool since this morning. Multiple ER visits. She was transferred to SNF and was discharged home yesterday. Family noticed dark stools and and red blood per rectum so they called EMS. Upon arrival to the ED, patient became unresponsive with faint pulse. CPR started, she was resuscitated immediately. She was given IVF for hypotension, started levophed, and  placed on ventimask. PMH: H/o seizures with todds paralysis, CVA with right sided weakness, SSS with PPM not on AC, ESRD on HD. She is on Eliquis. EGD on 1/13/22 showed Acute gastric ulcer at anastomosis with hemorrhage, treated with 2 hemoclips and injected with epinephrine.      -ED labs: Hgb 7.6, creatinine 3.72, elevated troponin 62, BNP 5,856. -CXR - Lungs clear; no interstitial or alveolar pulmonary edema         Objective:     Vitals:    02/17/22 2318 02/18/22 1014 02/18/22 1030 02/18/22 1438   BP: 129/66 (!) 122/57  106/64   Pulse:  60  64   Resp: 18   18   Temp: 97.4 °F (36.3 °C) 97.7 °F (36.5 °C)  98.1 °F (36.7 °C)   TempSrc:   Oral    SpO2: 97% 100%  95%   Weight:       Height:            Intake and Output:  Current Shift: No intake/output data recorded. Last three shifts: No intake/output data recorded.     Physical Exam:   Skin:  Extremities and face reveal no rashes. No brizuela erythema. HEENT: Sclerae anicteric. Extra-occular muscles are intact. No abnormal pigmentation of the lips. The neck is supple. Cardiovascular: Regular rate and rhythm. Respiratory:  Comfortable breathing with no accessory muscle use. GI:  Abdomen nondistended, soft, and nontender. No enlargement of the liver or spleen. No masses palpable. Rectal:  Deferred  Musculoskeletal: Generalized weakness  Neurological:  Gross memory appears intact. Patient is alert and oriented. Psychiatric:  Mood appears appropriate with judgement intact. Lymphatic:  No visible adenopathy      Lab/Data Review:  Recent Results (from the past 24 hour(s))   METABOLIC PANEL, BASIC    Collection Time: 02/18/22  8:34 AM   Result Value Ref Range    Sodium 137 136 - 145 mmol/L    Potassium 3.3 (L) 3.5 - 5.1 mmol/L    Chloride 105 97 - 108 mmol/L    CO2 22 21 - 32 mmol/L    Anion gap 10 5 - 15 mmol/L    Glucose 62 (L) 65 - 100 mg/dL    BUN 29 (H) 6 - 20 mg/dL    Creatinine 3.42 (H) 0.55 - 1.02 mg/dL    BUN/Creatinine ratio 8 (L) 12 - 20      GFR est AA 16 (L) >60 ml/min/1.73m2    GFR est non-AA 13 (L) >60 ml/min/1.73m2    Calcium 7.3 (L) 8.5 - 10.1 mg/dL   CBC WITH AUTOMATED DIFF    Collection Time: 02/18/22  8:34 AM   Result Value Ref Range    WBC 4.3 3.6 - 11.0 K/uL    RBC 2.59 (L) 3.80 - 5.20 M/uL    HGB 8.0 (L) 11.5 - 16.0 g/dL    HCT 26.0 (L) 35.0 - 47.0 %    .4 (H) 80.0 - 99.0 FL    MCH 30.9 26.0 - 34.0 PG    MCHC 30.8 30.0 - 36.5 g/dL    RDW 16.6 (H) 11.5 - 14.5 %    PLATELET 88 (L) 219 - 400 K/uL    MPV 11.3 8.9 - 12.9 FL    NRBC 0.0 0.0  WBC    ABSOLUTE NRBC 0.00 0.00 - 0.01 K/uL    NEUTROPHILS 57 32 - 75 %    LYMPHOCYTES 30 12 - 49 %    MONOCYTES 10 5 - 13 %    EOSINOPHILS 1 0 - 7 %    BASOPHILS 1 0 - 1 %    IMMATURE GRANULOCYTES 1 (H) 0 - 0.5 %    ABS. NEUTROPHILS 2.5 1.8 - 8.0 K/UL    ABS. LYMPHOCYTES 1.3 0.8 - 3.5 K/UL    ABS.  MONOCYTES 0.4 0.0 - 1.0 K/UL    ABS. EOSINOPHILS 0.1 0.0 - 0.4 K/UL    ABS. BASOPHILS 0.0 0.0 - 0.1 K/UL    ABS. IMM. GRANS. 0.0 0.00 - 0.04 K/UL    DF AUTOMATED                XR CHEST PORT   Final Result   Worsening congestive heart failure pattern. DUPLEX LOWER EXT VENOUS RIGHT   Final Result      CT HEAD WO CONT   Final Result   No acute intracranial abnormality. Old ischemic disease in the high left parietal lobe and in the left basal   ganglion. There is significant atrophy with significant deepening of the left sylvian   fissure. CT ABD PELV WO CONT   Final Result   Fluid overload. No evidence to suggest a source of GI bleeding      CT dose reduction was achieved through use of a standardized protocol tailored   for this examination and automatic exposure control for dose modulation. XR CHEST PORT   Final Result          Assessment:     Active Problems:    GI bleed (2/11/2022)      Septic shock ([address] (2/11/2022)        Plan:   1. GI Bleed - dark stools.      -latest Hgb 8.0 . Monitor and transfuse as needed.      -Continue PPI     -No anti-coagulant     -Repeat CBC in am     -2/14 EGD esophagitis, chronic gastritis without bleeding. three clips from previous intervention still in place No active bleeding noted. 2. Anemia      -latest Hgb 8.0.  Monitor and transfuse as needed.       - ESRD  3. Hypovolemic shock      -BP initially low, started on levophed. Given blood transfusion      -was transferred to ICU  4. Acute respiratory failure (resolved)      -currently on room air not in distress  5. ESRD      -hemodialysis      -creatinine 3.42      -nephrology input appreciated       Patient discussed with Dr Alex Castillo and agrees to above impression and plan. Thank you for allowing me to participate in this patients care    Signed By: Charis Curling.  RACHEL Barry     February 18, 2022

## 2022-02-19 LAB
ANION GAP SERPL CALC-SCNC: 7 MMOL/L (ref 5–15)
BASOPHILS # BLD: 0 K/UL (ref 0–0.1)
BASOPHILS NFR BLD: 1 % (ref 0–1)
BUN SERPL-MCNC: 20 MG/DL (ref 6–20)
BUN/CREAT SERPL: 7 (ref 12–20)
CA-I BLD-MCNC: 7.3 MG/DL (ref 8.5–10.1)
CHLORIDE SERPL-SCNC: 104 MMOL/L (ref 97–108)
CO2 SERPL-SCNC: 27 MMOL/L (ref 21–32)
CREAT SERPL-MCNC: 2.81 MG/DL (ref 0.55–1.02)
CRP SERPL-MCNC: 0.44 MG/DL (ref 0–0.6)
DIFFERENTIAL METHOD BLD: ABNORMAL
EOSINOPHIL # BLD: 0 K/UL (ref 0–0.4)
EOSINOPHIL NFR BLD: 1 % (ref 0–7)
ERYTHROCYTE [DISTWIDTH] IN BLOOD BY AUTOMATED COUNT: 16.7 % (ref 11.5–14.5)
GLUCOSE SERPL-MCNC: 63 MG/DL (ref 65–100)
HCT VFR BLD AUTO: 24 % (ref 35–47)
HGB BLD-MCNC: 7.5 G/DL (ref 11.5–16)
IMM GRANULOCYTES # BLD AUTO: 0 K/UL (ref 0–0.04)
IMM GRANULOCYTES NFR BLD AUTO: 0 % (ref 0–0.5)
LYMPHOCYTES # BLD: 1.6 K/UL (ref 0.8–3.5)
LYMPHOCYTES NFR BLD: 33 % (ref 12–49)
MCH RBC QN AUTO: 31.3 PG (ref 26–34)
MCHC RBC AUTO-ENTMCNC: 31.3 G/DL (ref 30–36.5)
MCV RBC AUTO: 100 FL (ref 80–99)
MONOCYTES # BLD: 0.5 K/UL (ref 0–1)
MONOCYTES NFR BLD: 10 % (ref 5–13)
NEUTS SEG # BLD: 2.7 K/UL (ref 1.8–8)
NEUTS SEG NFR BLD: 55 % (ref 32–75)
NRBC # BLD: 0 K/UL (ref 0–0.01)
NRBC BLD-RTO: 0 PER 100 WBC
PLATELET # BLD AUTO: 87 K/UL (ref 150–400)
PMV BLD AUTO: 11.4 FL (ref 8.9–12.9)
POTASSIUM SERPL-SCNC: 2.9 MMOL/L (ref 3.5–5.1)
PROCALCITONIN SERPL-MCNC: 0.2 NG/ML
RBC # BLD AUTO: 2.4 M/UL (ref 3.8–5.2)
SODIUM SERPL-SCNC: 138 MMOL/L (ref 136–145)
WBC # BLD AUTO: 4.9 K/UL (ref 3.6–11)

## 2022-02-19 PROCEDURE — 74011250637 HC RX REV CODE- 250/637: Performed by: HOSPITALIST

## 2022-02-19 PROCEDURE — 74011250636 HC RX REV CODE- 250/636: Performed by: HOSPITALIST

## 2022-02-19 PROCEDURE — C9113 INJ PANTOPRAZOLE SODIUM, VIA: HCPCS | Performed by: HOSPITALIST

## 2022-02-19 PROCEDURE — 74011250636 HC RX REV CODE- 250/636: Performed by: INTERNAL MEDICINE

## 2022-02-19 PROCEDURE — 86140 C-REACTIVE PROTEIN: CPT

## 2022-02-19 PROCEDURE — 99232 SBSQ HOSP IP/OBS MODERATE 35: CPT | Performed by: INTERNAL MEDICINE

## 2022-02-19 PROCEDURE — 85025 COMPLETE CBC W/AUTO DIFF WBC: CPT

## 2022-02-19 PROCEDURE — 65270000029 HC RM PRIVATE

## 2022-02-19 PROCEDURE — 80048 BASIC METABOLIC PNL TOTAL CA: CPT

## 2022-02-19 PROCEDURE — 74011250637 HC RX REV CODE- 250/637: Performed by: INTERNAL MEDICINE

## 2022-02-19 PROCEDURE — 84145 PROCALCITONIN (PCT): CPT

## 2022-02-19 PROCEDURE — 74011000250 HC RX REV CODE- 250: Performed by: HOSPITALIST

## 2022-02-19 PROCEDURE — 74011000258 HC RX REV CODE- 258: Performed by: INTERNAL MEDICINE

## 2022-02-19 PROCEDURE — 74011000250 HC RX REV CODE- 250: Performed by: INTERNAL MEDICINE

## 2022-02-19 PROCEDURE — 74011250637 HC RX REV CODE- 250/637: Performed by: NURSE PRACTITIONER

## 2022-02-19 PROCEDURE — 36415 COLL VENOUS BLD VENIPUNCTURE: CPT

## 2022-02-19 RX ORDER — POTASSIUM CHLORIDE 750 MG/1
40 TABLET, FILM COATED, EXTENDED RELEASE ORAL
Status: COMPLETED | OUTPATIENT
Start: 2022-02-19 | End: 2022-02-19

## 2022-02-19 RX ORDER — POTASSIUM CHLORIDE 20 MEQ/1
40 TABLET, EXTENDED RELEASE ORAL
Status: COMPLETED | OUTPATIENT
Start: 2022-02-19 | End: 2022-02-19

## 2022-02-19 RX ORDER — TRAMADOL HYDROCHLORIDE 50 MG/1
50 TABLET ORAL
Status: DISCONTINUED | OUTPATIENT
Start: 2022-02-19 | End: 2022-02-22 | Stop reason: HOSPADM

## 2022-02-19 RX ADMIN — PIPERACILLIN SODIUM AND TAZOBACTAM SODIUM 3.38 G: 3; .375 INJECTION, POWDER, LYOPHILIZED, FOR SOLUTION INTRAVENOUS at 02:18

## 2022-02-19 RX ADMIN — LACOSAMIDE 200 MG: 100 TABLET, FILM COATED ORAL at 21:10

## 2022-02-19 RX ADMIN — CALCITRIOL CAPSULES 0.25 MCG 0.5 MCG: 0.25 CAPSULE ORAL at 08:00

## 2022-02-19 RX ADMIN — LEVETIRACETAM 1000 MG: 250 TABLET, FILM COATED ORAL at 08:00

## 2022-02-19 RX ADMIN — LACOSAMIDE 200 MG: 100 TABLET, FILM COATED ORAL at 08:01

## 2022-02-19 RX ADMIN — POTASSIUM CHLORIDE 40 MEQ: 1500 TABLET, EXTENDED RELEASE ORAL at 14:00

## 2022-02-19 RX ADMIN — POTASSIUM CHLORIDE 40 MEQ: 750 TABLET, FILM COATED, EXTENDED RELEASE ORAL at 10:02

## 2022-02-19 RX ADMIN — SODIUM CHLORIDE, PRESERVATIVE FREE 40 MG: 5 INJECTION INTRAVENOUS at 08:02

## 2022-02-19 RX ADMIN — MIDODRINE HYDROCHLORIDE 10 MG: 5 TABLET ORAL at 07:56

## 2022-02-19 RX ADMIN — OXCARBAZEPINE 150 MG: 150 TABLET, FILM COATED ORAL at 07:59

## 2022-02-19 RX ADMIN — POTASSIUM CHLORIDE 40 MEQ: 1500 TABLET, EXTENDED RELEASE ORAL at 16:53

## 2022-02-19 RX ADMIN — FLUOXETINE HYDROCHLORIDE 10 MG: 10 CAPSULE ORAL at 08:01

## 2022-02-19 RX ADMIN — TRAMADOL HYDROCHLORIDE 50 MG: 50 TABLET, COATED ORAL at 13:28

## 2022-02-19 RX ADMIN — LEVOTHYROXINE SODIUM 150 MCG: 0.07 TABLET ORAL at 07:08

## 2022-02-19 RX ADMIN — SODIUM CHLORIDE, PRESERVATIVE FREE 10 ML: 5 INJECTION INTRAVENOUS at 22:18

## 2022-02-19 RX ADMIN — LEVETIRACETAM 1000 MG: 250 TABLET, FILM COATED ORAL at 21:10

## 2022-02-19 RX ADMIN — SODIUM CHLORIDE, PRESERVATIVE FREE 40 MG: 5 INJECTION INTRAVENOUS at 21:10

## 2022-02-19 RX ADMIN — POTASSIUM CHLORIDE 40 MEQ: 750 TABLET, FILM COATED, EXTENDED RELEASE ORAL at 10:01

## 2022-02-19 RX ADMIN — SODIUM CHLORIDE, PRESERVATIVE FREE 10 ML: 5 INJECTION INTRAVENOUS at 13:20

## 2022-02-19 RX ADMIN — OXCARBAZEPINE 300 MG: 300 TABLET, FILM COATED ORAL at 21:10

## 2022-02-19 RX ADMIN — ATORVASTATIN CALCIUM 40 MG: 40 TABLET, FILM COATED ORAL at 08:01

## 2022-02-19 RX ADMIN — ACETAMINOPHEN 650 MG: 325 TABLET ORAL at 12:12

## 2022-02-19 NOTE — PROGRESS NOTES
Progress Note    Patient: Blake lElis MRN: [id number]  SSN: [id number]    YOB: 1950  Age: 71 y.o. Sex: female      Admit Date: 2/11/2022    LOS: 8 days     Subjective:   Patient followed for uncomplicated UTI secondary to VRE sensitive to Ampicillin and treated with Zosyn, which was deemed appropriate and completing 7 days of therapy today. Positive blood cultures considered contamination. Patient resting comfortably with multiple family members at bedside. Objective:     Vitals:    02/18/22 1438 02/18/22 1606 02/18/22 2037 02/19/22 0746   BP: 106/64  131/74 117/65   Pulse: 64  61 66   Resp: 18  16 17   Temp: 98.1 °F (36.7 °C)  98.3 °F (36.8 °C) 97.9 °F (36.6 °C)   TempSrc:       SpO2: 95%  100% 100%   Weight:   163 lb 12.8 oz (74.3 kg)    Height:  5' 6\" (1.676 m)          Intake and Output:  Current Shift: No intake/output data recorded. Last three shifts: No intake/output data recorded. Physical Exam:   Vitals and nursing note reviewed. Constitutional:       Appearance: She is not ill-appearing. HENT: unremarkable  Genitourinary:     Comments: No Cochran  Musculoskeletal:      Right lower leg: No edema. Left lower leg: No edema. Skin:     Findings: No rash. Neurological: nonfocal, mild confusion   Psychiatric:    normal behavior     Thought Content: Thought content normal.         Judgment: Judgment normal.      Lab/Data Review:     WBC 4,900  PLT 87,000    Procal 0.20  CRP 0.44    No new cultures    Assessment:        1. Uncomplicated UTI with marked pyuria, secondary to Vancomycin-Resistant, but Ampicillin sensitive Enterococcus faecalis, Day #7 IV Zosyn  2. Positive blood cultures with Coagulase negative Staphylococci consistent with contaminant  3. Renal failure  4. Thrombocytopenia, moderate, etiology unclear    Comment:  Procal and CRP remain mildly elevated but not a reason to extend antibiotic course, though still concerned about possible Candida UTI. Plan:   1. Discontinue Zosyn   2. In am, repeat procal and CRP  3. Awaiting repeat urinalysis to make sure yeasts are not still present  4.  No antibiotic indicated for positive blood culture       Signed By: Irma Junior MD     February 19, 2022

## 2022-02-19 NOTE — PROGRESS NOTES
PT eval acknowledged at 0945 however pt not appropriate at this time due to elevated troponin level trending upwards. Will continue to follow and attempt to eval at another time when appropriate. Thank you.

## 2022-02-19 NOTE — PROGRESS NOTES
Pulmonary Progress Note      NAME: Nelly Forte   :  1950  MRM:  [id number]    Date/Time: 2022  6:20 PM         Subjective:     Patient seen and examined  Overnight events noted    Lying in bed,  Awake and alert  No acute distress  On room air  Hemodialysis today      Past Medical History reviewed and unchanged from Admission History and Physical       Objective:     Physical Exam     Vitals:      Last 24hrs VS reviewed since prior progress note. Most recent are:    Visit Vitals  BP (!) 143/64 (BP 1 Location: Left upper arm, BP Patient Position: Supine)   Pulse 60   Temp 98.1 °F (36.7 °C)   Resp 20   Ht 5' 6\" (1.676 m)   Wt 74.3 kg (163 lb 12.8 oz)   SpO2 100%   Breastfeeding No   BMI 26.44 kg/m²     SpO2 Readings from Last 6 Encounters:   22 100%   22 98%   22 99%   22 97%   22 100%   22 100%    O2 Flow Rate (L/min): 4 l/min     No intake or output data in the 24 hours ending 22 2709   GENERAL:  The patient is an elderly frail white female who appears to be comfortable. HEENT:  Shows pupils are equal and reactive to light. Pallor is noted. No icterus. Nasal passages are patent. She appears to be edentulous. No obvious thrush. NECK:  Supple. Trachea is central.  No JVD or lymphadenopathy. She is not using accessory muscle of respiration. She has a tunneled hemodialysis catheter on the right side of the neck. CHEST:  Symmetrical with equal, but diminished air entry bilaterally. She has very diminished breath sounds over the lung bases, but impaired percussion due to bilateral pleural effusions. HEART:  Rhythm is regular without any loud murmur or gallop. ABDOMEN:  Obese and benign. Bowel sounds are audible. EXTREMITIES:  Did not show any cyanosis or clubbing. Pulses are palpable. She does have chronic-appearing pitting edema. She appears to have a chronic wound on her left leg as well. There are mild areas of ecchymosis noted.   NEUROLOGIC: Examination is grossly intact. SKIN:  Warm and dry. XR CHEST PORT   Final Result   Worsening congestive heart failure pattern. DUPLEX LOWER EXT VENOUS RIGHT   Final Result      CT HEAD WO CONT   Final Result   No acute intracranial abnormality. Old ischemic disease in the high left parietal lobe and in the left basal   ganglion. There is significant atrophy with significant deepening of the left sylvian   fissure. CT ABD PELV WO CONT   Final Result   Fluid overload. No evidence to suggest a source of GI bleeding      CT dose reduction was achieved through use of a standardized protocol tailored   for this examination and automatic exposure control for dose modulation.       XR CHEST PORT   Final Result          Lab Data Reviewed: (see below)      Medications:  Current Facility-Administered Medications   Medication Dose Route Frequency    traMADoL (ULTRAM) tablet 50 mg  50 mg Oral Q6H PRN    potassium chloride (K-DUR, KLOR-CON M20) SR tablet 40 mEq  40 mEq Oral Q3H    lidocaine 4 % patch 2 Patch  2 Patch TransDERmal Q24H    midodrine (PROAMATINE) tablet 10 mg  10 mg Oral TID WITH MEALS    levETIRAcetam (KEPPRA) tablet 1,000 mg  1,000 mg Oral BID    heparin (porcine) 1,000 unit/mL injection 3,200 Units  3,200 Units IntraVENous DIALYSIS PRN    0.9% sodium chloride infusion 250 mL  250 mL IntraVENous PRN    sodium chloride (NS) flush 5-40 mL  5-40 mL IntraVENous Q8H    sodium chloride (NS) flush 5-40 mL  5-40 mL IntraVENous PRN    acetaminophen (TYLENOL) tablet 650 mg  650 mg Oral Q6H PRN    Or    acetaminophen (TYLENOL) suppository 650 mg  650 mg Rectal Q6H PRN    polyethylene glycol (MIRALAX) packet 17 g  17 g Oral DAILY PRN    ondansetron (ZOFRAN ODT) tablet 4 mg  4 mg Oral Q8H PRN    Or    ondansetron (ZOFRAN) injection 4 mg  4 mg IntraVENous Q6H PRN    pantoprazole (PROTONIX) 40 mg in 0.9% sodium chloride 10 mL injection  40 mg IntraVENous Q12H    atorvastatin (LIPITOR) tablet 40 mg  40 mg Oral DAILY    calcitRIOL (ROCALTROL) capsule 0.5 mcg  0.5 mcg Oral DAILY    levothyroxine (SYNTHROID) tablet 150 mcg  150 mcg Oral DAILY    FLUoxetine (PROzac) capsule 10 mg  10 mg Oral DAILY    OXcarbazepine (TRILEPTAL) tablet 150 mg  150 mg Oral QAM    OXcarbazepine (TRILEPTAL) tablet 300 mg  300 mg Oral QHS    lacosamide (VIMPAT) tablet 200 mg  200 mg Oral BID    [Held by provider] sevelamer carbonate (RENVELA) tab 800 mg  800 mg Oral TID WITH MEALS    zinc oxide-white petrolatum 17-57 % topical paste   Topical PRN    NOREPINephrine (LEVOPHED) 8 mg in 0.9% NS 250ml infusion  0.5-16 mcg/min IntraVENous TITRATE       ______________________________________________________________________      Lab Review:     Recent Labs     02/19/22  0644 02/18/22  0834 02/17/22  0430   WBC 4.9 4.3 4.6   HGB 7.5* 8.0* 7.8*   HCT 24.0* 26.0* 24.9*   PLT 87* 88* 70*     Recent Labs     02/19/22  0644 02/18/22  0834    137   K 2.9* 3.3*    105   CO2 27 22   GLU 63* 62*   BUN 20 29*   CREA 2.81* 3.42*   CA 7.3* 7.3*     No components found for: GLPOC  No results for input(s): PH, PCO2, PO2, HCO3, FIO2 in the last 72 hours. No results for input(s): INR, INREXT, INREXT, INREXT in the last 72 hours. Other pertinent lab:   Chest x-ray done on 02/14/2022 was personally reviewed. Shows increasing pulmonary vascular congestion and bilateral pleural effusions       Assessment & Plan:      1. Acute hypoxic respiratory failure. Predominant etiology is volume overload    CT of the abdomen and pelvis basically shows large bilateral pleural effusions with associated atelectasis. Continue with dialysis and try to remove as much fluid as possible. I believe that with ongoing dialysis, there will be an improvement in her oxygen requirement. This morning she is much more comfortable   Blood gases done showed 7.3 4/42/153 on 4 L oxygen. We will wean down oxygen to keep saturation more than 92%.   Discussed with RN.    It is noted that she is empirically started on vancomycin and Zosyn. Clinical suspicion for pneumonia is relatively low; however, antibiotics can be continued. Large bilateral pleural effusions  Likely contributing to her breathing issues  If not improved over the weekend may consider thoracentesis early next week    2. End-stage renal disease. on hemodialysis. Nephrology continuing with fluid removal appropriately  However, she is relatively hypotensive and is on a small dose of Levophed, currently at 4 mics. Target map of 65 and higher. 3.  Gastrointestinal bleed. Apparently, she had black tarry stools and also bright red blood per rectum for which paramedics were called and she was brought over to the emergency room. Gastroenterology is consulted. She received 1 unit of packed red blood cells. endoscopy was done. Patient has gastritis, esophagitis  She is on Protonix intravenously. She is for EGD in the morning. 4.  Multiple cardiac issues including atrial fibrillation, history of atrioventricular block, and pacemaker insertion. Her troponins are elevated. Cardiology is consulted. Echocardiogram is pending. Stress test in 11/2021 showed no ischemia. 5.  History of stroke and seizure. 6.  Hypertension and dyslipidemia. 7.  Diabetes and depression. 8.  No chemical deep venous thrombosis prophylaxis due to gastrointestinal bleed. Consider sequential compression devices to the lower extremities. Right lower extremity venous Dopplers were done on 12/12/2022 showing chronic DVT in the right greater saphenous vein.   9.  Hypothyroidism.     Questions of patient were answered at bedside in detail  Case discussed in detail with RN, RT, and care team  Thank you for involving me in the care of this patient  I will follow with you closely during hospitalization    Time spent more than 20 minutes under patient care with no overlap reviewing results and records, decision making, and answering questions.     Caryn Cardenas MD  Pulmonary/CC

## 2022-02-19 NOTE — PROGRESS NOTES
Patient: Hood Odell MRN: [id number]  SSN: [id number]    YOB: 1950  Age: 71 y.o. Sex: female          Subjective:   Patient seen in the room. She was dialyzed yesterday with 2 L of fluid removal no seizures reported as of today. She denies any complaints other than back pain    Objective:     Vitals:    02/18/22 1606 02/18/22 2037 02/19/22 0746 02/19/22 1216   BP:  131/74 117/65 (!) 143/64   Pulse:  61 66 60   Resp:  16 17 20   Temp:  98.3 °F (36.8 °C) 97.9 °F (36.6 °C) 98.1 °F (36.7 °C)   TempSrc:       SpO2:  100% 100% 100%   Weight:  74.3 kg (163 lb 12.8 oz)     Height: 5' 6\" (1.676 m)           Intake and Output:  Yesterday's Intake and Output:  No intake/output data recorded. Physical Exam:   GENERAL: no distress, appears stated age  EYE: negative  Respiratory -no distress. ABDOMEN: Nondistended. Musculoskeletal -normal on inspection. NEUROLOGIC: She is awake but lethargic      Dialysis access: cuffed tunneled catheter site right and IJ.       Data Review    Meds    Current Facility-Administered Medications   Medication Dose Route Frequency    traMADoL (ULTRAM) tablet 50 mg  50 mg Oral Q6H PRN    lidocaine 4 % patch 2 Patch  2 Patch TransDERmal Q24H    midodrine (PROAMATINE) tablet 10 mg  10 mg Oral TID WITH MEALS    levETIRAcetam (KEPPRA) tablet 1,000 mg  1,000 mg Oral BID    heparin (porcine) 1,000 unit/mL injection 3,200 Units  3,200 Units IntraVENous DIALYSIS PRN    0.9% sodium chloride infusion 250 mL  250 mL IntraVENous PRN    sodium chloride (NS) flush 5-40 mL  5-40 mL IntraVENous Q8H    sodium chloride (NS) flush 5-40 mL  5-40 mL IntraVENous PRN    acetaminophen (TYLENOL) tablet 650 mg  650 mg Oral Q6H PRN    Or    acetaminophen (TYLENOL) suppository 650 mg  650 mg Rectal Q6H PRN    polyethylene glycol (MIRALAX) packet 17 g  17 g Oral DAILY PRN    ondansetron (ZOFRAN ODT) tablet 4 mg  4 mg Oral Q8H PRN    Or    ondansetron (ZOFRAN) injection 4 mg  4 mg IntraVENous Q6H PRN    pantoprazole (PROTONIX) 40 mg in 0.9% sodium chloride 10 mL injection  40 mg IntraVENous Q12H    atorvastatin (LIPITOR) tablet 40 mg  40 mg Oral DAILY    calcitRIOL (ROCALTROL) capsule 0.5 mcg  0.5 mcg Oral DAILY    levothyroxine (SYNTHROID) tablet 150 mcg  150 mcg Oral DAILY    FLUoxetine (PROzac) capsule 10 mg  10 mg Oral DAILY    OXcarbazepine (TRILEPTAL) tablet 150 mg  150 mg Oral QAM    OXcarbazepine (TRILEPTAL) tablet 300 mg  300 mg Oral QHS    lacosamide (VIMPAT) tablet 200 mg  200 mg Oral BID    [Held by provider] sevelamer carbonate (RENVELA) tab 800 mg  800 mg Oral TID WITH MEALS    zinc oxide-white petrolatum 17-57 % topical paste   Topical PRN    NOREPINephrine (LEVOPHED) 8 mg in 0.9% NS 250ml infusion  0.5-16 mcg/min IntraVENous TITRATE       Lab      Recent Results (from the past 24 hour(s))   CBC WITH AUTOMATED DIFF    Collection Time: 02/19/22  6:44 AM   Result Value Ref Range    WBC 4.9 3.6 - 11.0 K/uL    RBC 2.40 (L) 3.80 - 5.20 M/uL    HGB 7.5 (L) 11.5 - 16.0 g/dL    HCT 24.0 (L) 35.0 - 47.0 %    .0 (H) 80.0 - 99.0 FL    MCH 31.3 26.0 - 34.0 PG    MCHC 31.3 30.0 - 36.5 g/dL    RDW 16.7 (H) 11.5 - 14.5 %    PLATELET 87 (L) 283 - 400 K/uL    MPV 11.4 8.9 - 12.9 FL    NRBC 0.0 0.0  WBC    ABSOLUTE NRBC 0.00 0.00 - 0.01 K/uL    NEUTROPHILS 55 32 - 75 %    LYMPHOCYTES 33 12 - 49 %    MONOCYTES 10 5 - 13 %    EOSINOPHILS 1 0 - 7 %    BASOPHILS 1 0 - 1 %    IMMATURE GRANULOCYTES 0 0 - 0.5 %    ABS. NEUTROPHILS 2.7 1.8 - 8.0 K/UL    ABS. LYMPHOCYTES 1.6 0.8 - 3.5 K/UL    ABS. MONOCYTES 0.5 0.0 - 1.0 K/UL    ABS. EOSINOPHILS 0.0 0.0 - 0.4 K/UL    ABS. BASOPHILS 0.0 0.0 - 0.1 K/UL    ABS. IMM.  GRANS. 0.0 0.00 - 0.04 K/UL    DF AUTOMATED     METABOLIC PANEL, BASIC    Collection Time: 02/19/22  6:44 AM   Result Value Ref Range    Sodium 138 136 - 145 mmol/L    Potassium 2.9 (L) 3.5 - 5.1 mmol/L    Chloride 104 97 - 108 mmol/L    CO2 27 21 - 32 mmol/L    Anion gap 7 5 - 15 mmol/L    Glucose 63 (L) 65 - 100 mg/dL    BUN 20 6 - 20 mg/dL    Creatinine 2.81 (H) 0.55 - 1.02 mg/dL    BUN/Creatinine ratio 7 (L) 12 - 20      GFR est AA 20 (L) >60 ml/min/1.73m2    GFR est non-AA 17 (L) >60 ml/min/1.73m2    Calcium 7.3 (L) 8.5 - 10.1 mg/dL   C REACTIVE PROTEIN, QT    Collection Time: 02/19/22  6:44 AM   Result Value Ref Range    C-Reactive protein 0.44 0.00 - 0.60 mg/dL   PROCALCITONIN    Collection Time: 02/19/22  6:44 AM   Result Value Ref Range    Procalcitonin 0.20 (H) 0 ng/mL        Lab Results   Component Value Date/Time    Color Yellow/Straw 02/12/2022 09:00 AM    Appearance Turbid (A) 02/12/2022 09:00 AM    Specific gravity 1.017 02/12/2022 09:00 AM    pH (UA) 5.0 02/12/2022 09:00 AM    Protein 100 (A) 02/12/2022 09:00 AM    Glucose Negative 02/12/2022 09:00 AM    Ketone 5 (A) 02/12/2022 09:00 AM    Bilirubin Negative 02/12/2022 09:00 AM    Urobilinogen 0.1 02/12/2022 09:00 AM    Nitrites Negative 02/12/2022 09:00 AM    Leukocyte Esterase Moderate (A) 02/12/2022 09:00 AM    Epithelial cells Moderate (A) 05/20/2021 12:56 AM    Bacteria Negative 02/12/2022 09:00 AM    WBC >100 (H) 02/12/2022 09:00 AM    RBC 20-50 02/12/2022 09:00 AM       Imaging         Assessment & Plan: Active Problems:    Anemia (1/3/2022)      GI bleed (2/11/2022)      Septic shock ([address] (0/25/9718)      Complicated UTI (urinary tract infection) (2/18/2022)      Respiratory failure with hypoxia ([address] (2/18/2022)    1. ESRD. -On dialysis at [address] renal care in [place] every MWF   -On admission she was volume overloaded  -He was last dialyzed yesterday with 2 L fluid removal  -We will continue MWF dialysis while inpatient  -Permanent dialysis catheter as current access  -She also has massive bilateral effusions which need to be tapped if she has recurrent respiratory symptoms that might not improve with dialysis    2.  Anemia   -2/2 ESRD and GI bleed     -s/p blood transfusion  -Apparently had black tarry stools and bright red bleeding per rectum. -s/p EGD on 2/14 which showed esophagitis chronic gastritis without bleeding. 3 clips from previous intervention were still present without any active bleeding noted  -Hemoglobin 7.5 today  -Continue Procrit 10,000 units with dialysis  -Transfusion as needed to keep hemoglobin  > 7     3  Hypotension   -probably from acute blood loss anemia.   -Normal WBC count. Afebrile   -Increased midodrine to 10 mg 3 times daily . Continues same dose  -She is on 4mcg Levophed today  -She became hypotensive/tachycardic after treatment yesterday. Normotensive. Will give albumin with dialysis and decreased UF goal     4 Secondary hyperparathyroidism of ESRD. -Calcium is okay. Lilo Duque currently is on hold which I will continue to hold. Phosphorus is 4.6  -PTH is 194 which is within target range for her ESRD. Continue calcitriol at the current dose     5. hypokalemia  -Potassium today is only 2.9. Will give potassium chloride 40 mEq 2 doses    6. B/L Pleural effusions , CHF  H/o AICD placement-  -CT of the abdomen/pelvic shows large bilateral pleural effusions with associated atelectasis  -Unable to remove >3L fluid with dialysis because of cramping/hypotension/tachycardia  -Appears more comfortable today from respiratory standpoint    6. Seizure disorder  -Apparently she had 2 seizures on dialysis on 2/16. No seizures reported yesterday  - resumed on Home meds     Signed By: Cassi Pardo MD     February 19, 2022      This note was prepared using voice recognition system and is likely to have sound alike errors that may have been overlooked even during proofreading.   Please contact the author for any clarifications

## 2022-02-19 NOTE — PROGRESS NOTES
Hospitalist Progress Note       Daily Progress Note: 2/19/2022 4:26 PM  Hospital course:     Patient is a 29-year-old female with a PMH significant for ESRD on HD, HTN, HLD, chronic atrial fibrillation, history of AV block status post pacemaker, DM, CVA and seizure disorders. She comes to the hospital for weakness I graduated to somnolence, dark tarry stools, then bright red blood per rectum. Discharged just 1 day ago from SNF after being hospitalized for MRSA bacteremia and seizures. During this admission she presents hyperkalemic with hemoglobin of 7.6, BNP 5856, hypotensive with systolic pressure in the 76T and bicarb elevated. Chest x-ray negative for acute findings. Resuscitated with IV fluids, 1 unit PRBC and started on Levophed drip, IV Zosyn and Ventimask for oxygen support, placed in the ICU. GI, nephrology, pulmonary and cardiology were consulted. Weaned off oxygen and pressor therapy over the course of a few days and transferred to remote telemetry. EGD performed on 2/14 revealing esophagitis without bleeding, 3 clips from previous intervention still in place without bleeding. Her hemoglobin has been stable without any further signs or symptoms of bleeding. 2D echo with normal findings. Urine culture positive for Vanco resistant Enterococcus faecalis with MDR and yeast.  Will consult ID. Started on oral fluconazole. Subjective:     Examined patient at the bedside. She is alert and oriented. She complains of generalized weakness ongoing. She states she does not want to go back to SNF she would rather go home with home health. Today she is complaining of back pain.       Assessment/Plan:   Active Problems:    Anemia (1/3/2022)      GI bleed (2/11/2022)      Septic shock ([address] (4/57/8096)      Complicated UTI (urinary tract infection) (2/18/2022)      Respiratory failure with hypoxia ([address] (2/18/2022)      Hypovolemic shock  Of Levophed, started on midodrine 3 times daily 10 mg    Acute encephalopathyresolved    Respiratory failure with hypoxia  Secondary to fluid overload  Weaned to room air    Upper GI bleed with acute blood loss anemia  Status post 1 unit PRBC  GI consultedEGD performed revealing esophagitis, chronic gastritis without bleeding, 3 clips from previous intervention still present without active bleeding    ESRD on HD  Anemia of chronic disease  Tolerating dialysis, Procrit with HD, transfuse for Hgb <7    Seizure disorder with Ruddy's paralysis  Continue Trileptal, fluoxetine and Keppra    CVA with right-sided weakness  PT OT to evaluate  Patient states she does not want SNF placement    UnComplicated UTI  Urine culture positive for VRE multi resistant, yeast  ID consulted she has completed an 7-day course of IV Zosyn which is adequate coverage  Repeat UA to make sure yeast still not present    A.  Fib  SSS status post PPM  Cardiology consulted  Stable rate controlled    DVT Prophylaxis: SCDs  Code Status: Full Code  POA/NOK:    Disposition and discharge barriers:    Patient states she does not want SNF she wants to return to home with home health   ID consult   PT OT consult  Care Plan discussed with: Patient, staff, IDR team    Current Facility-Administered Medications   Medication Dose Route Frequency    traMADoL (ULTRAM) tablet 50 mg  50 mg Oral Q6H PRN    lidocaine 4 % patch 2 Patch  2 Patch TransDERmal Q24H    midodrine (PROAMATINE) tablet 10 mg  10 mg Oral TID WITH MEALS    levETIRAcetam (KEPPRA) tablet 1,000 mg  1,000 mg Oral BID    heparin (porcine) 1,000 unit/mL injection 3,200 Units  3,200 Units IntraVENous DIALYSIS PRN    0.9% sodium chloride infusion 250 mL  250 mL IntraVENous PRN    sodium chloride (NS) flush 5-40 mL  5-40 mL IntraVENous Q8H    sodium chloride (NS) flush 5-40 mL  5-40 mL IntraVENous PRN    acetaminophen (TYLENOL) tablet 650 mg  650 mg Oral Q6H PRN    Or    acetaminophen (TYLENOL) suppository 650 mg  650 mg Rectal Q6H PRN    polyethylene glycol (MIRALAX) packet 17 g  17 g Oral DAILY PRN    ondansetron (ZOFRAN ODT) tablet 4 mg  4 mg Oral Q8H PRN    Or    ondansetron (ZOFRAN) injection 4 mg  4 mg IntraVENous Q6H PRN    pantoprazole (PROTONIX) 40 mg in 0.9% sodium chloride 10 mL injection  40 mg IntraVENous Q12H    atorvastatin (LIPITOR) tablet 40 mg  40 mg Oral DAILY    calcitRIOL (ROCALTROL) capsule 0.5 mcg  0.5 mcg Oral DAILY    levothyroxine (SYNTHROID) tablet 150 mcg  150 mcg Oral DAILY    FLUoxetine (PROzac) capsule 10 mg  10 mg Oral DAILY    OXcarbazepine (TRILEPTAL) tablet 150 mg  150 mg Oral QAM    OXcarbazepine (TRILEPTAL) tablet 300 mg  300 mg Oral QHS    lacosamide (VIMPAT) tablet 200 mg  200 mg Oral BID    [Held by provider] sevelamer carbonate (RENVELA) tab 800 mg  800 mg Oral TID WITH MEALS    zinc oxide-white petrolatum 17-57 % topical paste   Topical PRN    NOREPINephrine (LEVOPHED) 8 mg in 0.9% NS 250ml infusion  0.5-16 mcg/min IntraVENous TITRATE        REVIEW OF SYSTEMS    Review of Systems   Constitutional: Positive for malaise/fatigue. Respiratory: Positive for shortness of breath. Cardiovascular: Positive for leg swelling. Negative for chest pain. Gastrointestinal: Negative for abdominal pain. Musculoskeletal: Positive for myalgias. Neurological: Positive for weakness. Psychiatric/Behavioral: The patient is nervous/anxious. Objective:     Visit Vitals  BP (!) 143/64 (BP 1 Location: Left upper arm, BP Patient Position: Supine)   Pulse 60   Temp 98.1 °F (36.7 °C)   Resp 20   Ht 5' 6\" (1.676 m)   Wt 74.3 kg (163 lb 12.8 oz)   SpO2 100%   Breastfeeding No   BMI 26.44 kg/m²    O2 Flow Rate (L/min): 4 l/min O2 Device: None (Room air)    Temp (24hrs), Av.1 °F (36.7 °C), Min:97.9 °F (36.6 °C), Max:98.3 °F (36.8 °C)      No intake/output data recorded. No intake/output data recorded. PHYSICAL EXAM:    Physical Exam  Constitutional:       Appearance: She is obese. She is ill-appearing. Cardiovascular:      Rate and Rhythm: Normal rate. Rhythm irregular. Pulmonary:      Effort: Respiratory distress present. Abdominal:      General: There is distension. Musculoskeletal:      Right lower leg: Edema present. Left lower leg: Edema present. Neurological:      Motor: Weakness present. Gait: Gait abnormal.          Data Review    Recent Results (from the past 24 hour(s))   CBC WITH AUTOMATED DIFF    Collection Time: 02/19/22  6:44 AM   Result Value Ref Range    WBC 4.9 3.6 - 11.0 K/uL    RBC 2.40 (L) 3.80 - 5.20 M/uL    HGB 7.5 (L) 11.5 - 16.0 g/dL    HCT 24.0 (L) 35.0 - 47.0 %    .0 (H) 80.0 - 99.0 FL    MCH 31.3 26.0 - 34.0 PG    MCHC 31.3 30.0 - 36.5 g/dL    RDW 16.7 (H) 11.5 - 14.5 %    PLATELET 87 (L) 794 - 400 K/uL    MPV 11.4 8.9 - 12.9 FL    NRBC 0.0 0.0  WBC    ABSOLUTE NRBC 0.00 0.00 - 0.01 K/uL    NEUTROPHILS 55 32 - 75 %    LYMPHOCYTES 33 12 - 49 %    MONOCYTES 10 5 - 13 %    EOSINOPHILS 1 0 - 7 %    BASOPHILS 1 0 - 1 %    IMMATURE GRANULOCYTES 0 0 - 0.5 %    ABS. NEUTROPHILS 2.7 1.8 - 8.0 K/UL    ABS. LYMPHOCYTES 1.6 0.8 - 3.5 K/UL    ABS. MONOCYTES 0.5 0.0 - 1.0 K/UL    ABS. EOSINOPHILS 0.0 0.0 - 0.4 K/UL    ABS. BASOPHILS 0.0 0.0 - 0.1 K/UL    ABS. IMM.  GRANS. 0.0 0.00 - 0.04 K/UL    DF AUTOMATED     METABOLIC PANEL, BASIC    Collection Time: 02/19/22  6:44 AM   Result Value Ref Range    Sodium 138 136 - 145 mmol/L    Potassium 2.9 (L) 3.5 - 5.1 mmol/L    Chloride 104 97 - 108 mmol/L    CO2 27 21 - 32 mmol/L    Anion gap 7 5 - 15 mmol/L    Glucose 63 (L) 65 - 100 mg/dL    BUN 20 6 - 20 mg/dL    Creatinine 2.81 (H) 0.55 - 1.02 mg/dL    BUN/Creatinine ratio 7 (L) 12 - 20      GFR est AA 20 (L) >60 ml/min/1.73m2    GFR est non-AA 17 (L) >60 ml/min/1.73m2    Calcium 7.3 (L) 8.5 - 10.1 mg/dL   C REACTIVE PROTEIN, QT    Collection Time: 02/19/22  6:44 AM   Result Value Ref Range    C-Reactive protein 0.44 0.00 - 0.60 mg/dL   PROCALCITONIN    Collection Time: 02/19/22  6:44 AM   Result Value Ref Range    Procalcitonin 0.20 (H) 0 ng/mL       XR CHEST PORT   Final Result   Worsening congestive heart failure pattern. DUPLEX LOWER EXT VENOUS RIGHT   Final Result      CT HEAD WO CONT   Final Result   No acute intracranial abnormality. Old ischemic disease in the high left parietal lobe and in the left basal   ganglion. There is significant atrophy with significant deepening of the left sylvian   fissure. CT ABD PELV WO CONT   Final Result   Fluid overload. No evidence to suggest a source of GI bleeding      CT dose reduction was achieved through use of a standardized protocol tailored   for this examination and automatic exposure control for dose modulation. XR CHEST PORT   Final Result          Active Problems:    Anemia (1/3/2022)      GI bleed (2/11/2022)      Septic shock ([address] (8/86/8411)      Complicated UTI (urinary tract infection) (2/18/2022)      Respiratory failure with hypoxia ([address] (2/18/2022)          _____________________________________________________________________________  Time spent in direct care including coordination of service, review of data and examination: > 35 minutes    ______________________________________________________________________________    Nereida Delgadillo NP    This is dictation was done by dragon, computer voice recognition software. Quite often unanticipated grammatical, syntax, homophones and other interpretive errors or inadvertently transcribed by the computer software. Please excuse errors that have escaped final proofreading. Thank you.

## 2022-02-19 NOTE — PROGRESS NOTES
CARDIOLOGY PROGRESS NOTE    Patient seen and examined. This is a patient who is followed for atrial fibrillation, history of pacemaker. No acute cardiac events overnight. Telemetry reviewed, Vpaced 60s    Pertinent review of systems items noted above, all other systems are negative. Current medications reviewed. Physical Examination  Vital signs are stable. Blood pressure 117/65 , Pulse 66  No apparent distress. Heart is regular, rate and rhythm. Normal S1, S2, no murmurs are appreciated. Lungs are clear bilaterally. Abdomen is distended  Extremities have mild edema. .  Recent Results (from the past 12 hour(s))   CBC WITH AUTOMATED DIFF    Collection Time: 02/19/22  6:44 AM   Result Value Ref Range    WBC 4.9 3.6 - 11.0 K/uL    RBC 2.40 (L) 3.80 - 5.20 M/uL    HGB 7.5 (L) 11.5 - 16.0 g/dL    HCT 24.0 (L) 35.0 - 47.0 %    .0 (H) 80.0 - 99.0 FL    MCH 31.3 26.0 - 34.0 PG    MCHC 31.3 30.0 - 36.5 g/dL    RDW 16.7 (H) 11.5 - 14.5 %    PLATELET 87 (L) 737 - 400 K/uL    MPV 11.4 8.9 - 12.9 FL    NRBC 0.0 0.0  WBC    ABSOLUTE NRBC 0.00 0.00 - 0.01 K/uL    NEUTROPHILS 55 32 - 75 %    LYMPHOCYTES 33 12 - 49 %    MONOCYTES 10 5 - 13 %    EOSINOPHILS 1 0 - 7 %    BASOPHILS 1 0 - 1 %    IMMATURE GRANULOCYTES 0 0 - 0.5 %    ABS. NEUTROPHILS 2.7 1.8 - 8.0 K/UL    ABS. LYMPHOCYTES 1.6 0.8 - 3.5 K/UL    ABS. MONOCYTES 0.5 0.0 - 1.0 K/UL    ABS. EOSINOPHILS 0.0 0.0 - 0.4 K/UL    ABS. BASOPHILS 0.0 0.0 - 0.1 K/UL    ABS. IMM.  GRANS. 0.0 0.00 - 0.04 K/UL    DF AUTOMATED     METABOLIC PANEL, BASIC    Collection Time: 02/19/22  6:44 AM   Result Value Ref Range    Sodium 138 136 - 145 mmol/L    Potassium 2.9 (L) 3.5 - 5.1 mmol/L    Chloride 104 97 - 108 mmol/L    CO2 27 21 - 32 mmol/L    Anion gap 7 5 - 15 mmol/L    Glucose 63 (L) 65 - 100 mg/dL    BUN 20 6 - 20 mg/dL    Creatinine 2.81 (H) 0.55 - 1.02 mg/dL    BUN/Creatinine ratio 7 (L) 12 - 20      GFR est AA 20 (L) >60 ml/min/1.73m2    GFR est non-AA 17 (L) >60 ml/min/1.73m2    Calcium 7.3 (L) 8.5 - 10.1 mg/dL   C REACTIVE PROTEIN, QT    Collection Time: 02/19/22  6:44 AM   Result Value Ref Range    C-Reactive protein 0.44 0.00 - 0.60 mg/dL   PROCALCITONIN    Collection Time: 02/19/22  6:44 AM   Result Value Ref Range    Procalcitonin 0.20 (H) 0 ng/mL       Case discussed with Dr. Tray Hein and our impression and recommendations are as follows:    Atrial fibrillation:  Known AF, not on [address] at home though chadsvasc is elevated. Continue to hold [address] due to low Hgb. Rate is presently controlled, continue to closely monitor on telemetry. High degree AVB s/p PPM:  Normal function per last device transmission. Elevated troponin: no active chest pain. [phone]. Elevation in setting of sepsis, ESRD, GI bleed/anemia. Continues to deny chest pain. Echo pending. Echo in Jan with preserved EF.  NST in November 2021 with no ischemia. Continue workup. No ASA given GI bleeding, no bb while on pressors. Med management as able. 4. Hypotension:  On midodrine at home, continue pressors, bp acceptable at present. Please do not hesitate to call if additional questions arise. Natividad Hyatt Addendum:  Agree with findings and plan noted above. Rate is acceptable, continue to hold anticoagulation due to anemia. [facility]  [address]    [address] [facility] Martin Castro, [facility]  [phone]

## 2022-02-19 NOTE — PROGRESS NOTES
Problem: Pressure Injury - Risk of  Goal: *Prevention of pressure injury  Description: Document Nitin Scale and appropriate interventions in the flowsheet. Outcome: Progressing Towards Goal  Note: Pressure Injury Interventions:  Sensory Interventions: Assess changes in LOC,Assess need for specialty bed,Minimize linen layers,Keep linens dry and wrinkle-free,Float heels    Moisture Interventions: Absorbent underpads,Apply protective barrier, creams and emollients,Maintain skin hydration (lotion/cream),Check for incontinence Q2 hours and as needed    Activity Interventions: Assess need for specialty bed,Pressure redistribution bed/mattress(bed type)    Mobility Interventions: Assess need for specialty bed,Float heels,HOB 30 degrees or less    Nutrition Interventions: Document food/fluid/supplement intake    Friction and Shear Interventions: Apply protective barrier, creams and emollients,HOB 30 degrees or less,Lift team/patient mobility team,Minimize layers                Problem: Patient Education: Go to Patient Education Activity  Goal: Patient/Family Education  Outcome: Progressing Towards Goal     Problem: Falls - Risk of  Goal: *Absence of Falls  Description: Document Cuco Fall Risk and appropriate interventions in the flowsheet.   Outcome: Progressing Towards Goal  Note: Fall Risk Interventions:       Mentation Interventions: Adequate sleep, hydration, pain control,Bed/chair exit alarm,Family/sitter at bedside    Medication Interventions: Bed/chair exit alarm    Elimination Interventions: Bed/chair exit alarm,Call light in reach    History of Falls Interventions: Bed/chair exit alarm,Door open when patient unattended,Room close to nurse's station         Problem: Patient Education: Go to Patient Education Activity  Goal: Patient/Family Education  Outcome: Progressing Towards Goal     Problem: Anemia Care Plan (Adult and Pediatric)  Goal: *Labs within defined limits  Outcome: Progressing Towards Goal  Goal: *Tolerates increased activity  Outcome: Progressing Towards Goal     Problem: Chronic Renal Failure  Goal: *Fluid and electrolytes stabilized  Outcome: Progressing Towards Goal     Problem: Breathing Pattern - Ineffective  Goal: *Absence of hypoxia  Outcome: Progressing Towards Goal  Goal: *Use of effective breathing techniques  Outcome: Progressing Towards Goal     Problem: Discharge Planning  Goal: *Discharge to safe environment  Outcome: Progressing Towards Goal  Goal: *Knowledge of medication management  Outcome: Progressing Towards Goal  Goal: *Knowledge of discharge instructions  Outcome: Progressing Towards Goal     Problem: Patient Education: Go to Patient Education Activity  Goal: Patient/Family Education  Outcome: Progressing Towards Goal     Problem: Risk for Spread of Infection  Goal: Prevent transmission of infectious organism to others  Description: Prevent the transmission of infectious organisms to other patients, staff members, and visitors.   Outcome: Progressing Towards Goal     Problem: Patient Education:  Go to Education Activity  Goal: Patient/Family Education  Outcome: Progressing Towards Goal

## 2022-02-19 NOTE — PROGRESS NOTES
Patient with uneventful shift. Patient had family members visit with her today. Pt experienced 10/10 back pain. RN spoke with Marquis Aparna RAMOS to make her aware- prn Tramadol ordered. Patient reports pain relief with tylenol and tramadol. No other needs at this time.

## 2022-02-20 LAB
ANION GAP SERPL CALC-SCNC: 6 MMOL/L (ref 5–15)
BASOPHILS # BLD: 0 K/UL (ref 0–0.1)
BASOPHILS NFR BLD: 1 % (ref 0–1)
BUN SERPL-MCNC: 24 MG/DL (ref 6–20)
BUN/CREAT SERPL: 7 (ref 12–20)
CA-I BLD-MCNC: 7.4 MG/DL (ref 8.5–10.1)
CHLORIDE SERPL-SCNC: 106 MMOL/L (ref 97–108)
CO2 SERPL-SCNC: 26 MMOL/L (ref 21–32)
CREAT SERPL-MCNC: 3.41 MG/DL (ref 0.55–1.02)
CRP SERPL-MCNC: 0.32 MG/DL (ref 0–0.6)
DIFFERENTIAL METHOD BLD: ABNORMAL
EOSINOPHIL # BLD: 0 K/UL (ref 0–0.4)
EOSINOPHIL NFR BLD: 1 % (ref 0–7)
ERYTHROCYTE [DISTWIDTH] IN BLOOD BY AUTOMATED COUNT: 16.6 % (ref 11.5–14.5)
GLUCOSE SERPL-MCNC: 64 MG/DL (ref 65–100)
HCT VFR BLD AUTO: 26.5 % (ref 35–47)
HGB BLD-MCNC: 8.1 G/DL (ref 11.5–16)
IMM GRANULOCYTES # BLD AUTO: 0 K/UL (ref 0–0.04)
IMM GRANULOCYTES NFR BLD AUTO: 1 % (ref 0–0.5)
LYMPHOCYTES # BLD: 1.6 K/UL (ref 0.8–3.5)
LYMPHOCYTES NFR BLD: 30 % (ref 12–49)
MCH RBC QN AUTO: 30.8 PG (ref 26–34)
MCHC RBC AUTO-ENTMCNC: 30.6 G/DL (ref 30–36.5)
MCV RBC AUTO: 100.8 FL (ref 80–99)
MONOCYTES # BLD: 0.5 K/UL (ref 0–1)
MONOCYTES NFR BLD: 9 % (ref 5–13)
NEUTS SEG # BLD: 3.3 K/UL (ref 1.8–8)
NEUTS SEG NFR BLD: 58 % (ref 32–75)
NRBC # BLD: 0 K/UL (ref 0–0.01)
NRBC BLD-RTO: 0 PER 100 WBC
PLATELET # BLD AUTO: 110 K/UL (ref 150–400)
PMV BLD AUTO: 11.6 FL (ref 8.9–12.9)
POTASSIUM SERPL-SCNC: 4.7 MMOL/L (ref 3.5–5.1)
PROCALCITONIN SERPL-MCNC: 0.16 NG/ML
RBC # BLD AUTO: 2.63 M/UL (ref 3.8–5.2)
SODIUM SERPL-SCNC: 138 MMOL/L (ref 136–145)
WBC # BLD AUTO: 5.6 K/UL (ref 3.6–11)

## 2022-02-20 PROCEDURE — 74011250637 HC RX REV CODE- 250/637: Performed by: NURSE PRACTITIONER

## 2022-02-20 PROCEDURE — 74011250637 HC RX REV CODE- 250/637: Performed by: HOSPITALIST

## 2022-02-20 PROCEDURE — 97162 PT EVAL MOD COMPLEX 30 MIN: CPT

## 2022-02-20 PROCEDURE — 36415 COLL VENOUS BLD VENIPUNCTURE: CPT

## 2022-02-20 PROCEDURE — 65270000029 HC RM PRIVATE

## 2022-02-20 PROCEDURE — 74011000250 HC RX REV CODE- 250: Performed by: INTERNAL MEDICINE

## 2022-02-20 PROCEDURE — 80048 BASIC METABOLIC PNL TOTAL CA: CPT

## 2022-02-20 PROCEDURE — 74011000250 HC RX REV CODE- 250: Performed by: HOSPITALIST

## 2022-02-20 PROCEDURE — 99232 SBSQ HOSP IP/OBS MODERATE 35: CPT | Performed by: INTERNAL MEDICINE

## 2022-02-20 PROCEDURE — 74011250636 HC RX REV CODE- 250/636: Performed by: HOSPITALIST

## 2022-02-20 PROCEDURE — 86140 C-REACTIVE PROTEIN: CPT

## 2022-02-20 PROCEDURE — 97110 THERAPEUTIC EXERCISES: CPT

## 2022-02-20 PROCEDURE — 84145 PROCALCITONIN (PCT): CPT

## 2022-02-20 PROCEDURE — C9113 INJ PANTOPRAZOLE SODIUM, VIA: HCPCS | Performed by: HOSPITALIST

## 2022-02-20 PROCEDURE — 85025 COMPLETE CBC W/AUTO DIFF WBC: CPT

## 2022-02-20 RX ADMIN — ACETAMINOPHEN 650 MG: 325 TABLET ORAL at 12:23

## 2022-02-20 RX ADMIN — SODIUM CHLORIDE, PRESERVATIVE FREE 10 ML: 5 INJECTION INTRAVENOUS at 21:09

## 2022-02-20 RX ADMIN — SODIUM CHLORIDE, PRESERVATIVE FREE 10 ML: 5 INJECTION INTRAVENOUS at 13:35

## 2022-02-20 RX ADMIN — MIDODRINE HYDROCHLORIDE 10 MG: 5 TABLET ORAL at 17:55

## 2022-02-20 RX ADMIN — OXCARBAZEPINE 300 MG: 300 TABLET, FILM COATED ORAL at 21:02

## 2022-02-20 RX ADMIN — CALCITRIOL CAPSULES 0.25 MCG 0.5 MCG: 0.25 CAPSULE ORAL at 09:34

## 2022-02-20 RX ADMIN — SODIUM CHLORIDE, PRESERVATIVE FREE 40 MG: 5 INJECTION INTRAVENOUS at 09:34

## 2022-02-20 RX ADMIN — LACOSAMIDE 200 MG: 100 TABLET, FILM COATED ORAL at 21:02

## 2022-02-20 RX ADMIN — LEVETIRACETAM 1000 MG: 250 TABLET, FILM COATED ORAL at 21:02

## 2022-02-20 RX ADMIN — SODIUM CHLORIDE, PRESERVATIVE FREE 40 MG: 5 INJECTION INTRAVENOUS at 21:02

## 2022-02-20 RX ADMIN — LEVETIRACETAM 1000 MG: 250 TABLET, FILM COATED ORAL at 09:34

## 2022-02-20 RX ADMIN — SODIUM CHLORIDE, PRESERVATIVE FREE 10 ML: 5 INJECTION INTRAVENOUS at 05:30

## 2022-02-20 RX ADMIN — TRAMADOL HYDROCHLORIDE 50 MG: 50 TABLET, COATED ORAL at 21:06

## 2022-02-20 RX ADMIN — OXCARBAZEPINE 150 MG: 150 TABLET, FILM COATED ORAL at 09:52

## 2022-02-20 RX ADMIN — LACOSAMIDE 200 MG: 100 TABLET, FILM COATED ORAL at 09:34

## 2022-02-20 RX ADMIN — FLUOXETINE HYDROCHLORIDE 10 MG: 10 CAPSULE ORAL at 09:34

## 2022-02-20 RX ADMIN — TRAMADOL HYDROCHLORIDE 50 MG: 50 TABLET, COATED ORAL at 09:34

## 2022-02-20 RX ADMIN — LEVOTHYROXINE SODIUM 150 MCG: 0.07 TABLET ORAL at 06:00

## 2022-02-20 RX ADMIN — ATORVASTATIN CALCIUM 40 MG: 40 TABLET, FILM COATED ORAL at 09:34

## 2022-02-20 NOTE — PROGRESS NOTES
Pulmonary Progress Note      NAME: Issa Alarcon   :  1950  MRM:  [id number]    Date/Time: 2022  6:20 PM         Subjective:     Patient seen and examined  Overnight events noted    Lying in bed,  Awake and alert  No acute distress  On room air  Getting Hemodialysis       Past Medical History reviewed and unchanged from Admission History and Physical       Objective:     Physical Exam     Vitals:      Last 24hrs VS reviewed since prior progress note. Most recent are:    Visit Vitals  /67   Pulse 65   Temp 97.9 °F (36.6 °C)   Resp 18   Ht 5' 6\" (1.676 m)   Wt 74.3 kg (163 lb 12.8 oz)   SpO2 95%   Breastfeeding No   BMI 26.44 kg/m²     SpO2 Readings from Last 6 Encounters:   22 95%   22 98%   22 99%   22 97%   22 100%   22 100%    O2 Flow Rate (L/min): 4 l/min       Intake/Output Summary (Last 24 hours) at 2022 1538  Last data filed at 2022 1744  Gross per 24 hour   Intake    Output 1 ml   Net -1 ml      GENERAL:  The patient is an elderly frail white female who appears to be comfortable. HEENT:  Shows pupils are equal and reactive to light. Pallor is noted. No icterus. Nasal passages are patent. She appears to be edentulous. No obvious thrush. NECK:  Supple. Trachea is central.  No JVD or lymphadenopathy. She is not using accessory muscle of respiration. She has a tunneled hemodialysis catheter on the right side of the neck. CHEST:  Symmetrical with equal, but diminished air entry bilaterally. She has very diminished breath sounds over the lung bases, but impaired percussion due to bilateral pleural effusions. HEART:  Rhythm is regular without any loud murmur or gallop. ABDOMEN:  Obese and benign. Bowel sounds are audible. EXTREMITIES:  Did not show any cyanosis or clubbing. Pulses are palpable. She does have chronic-appearing pitting edema. She appears to have a chronic wound on her left leg as well.   There are mild areas of ecchymosis noted. NEUROLOGIC:  Examination is grossly intact. SKIN:  Warm and dry. XR CHEST PORT   Final Result   Worsening congestive heart failure pattern. DUPLEX LOWER EXT VENOUS RIGHT   Final Result      CT HEAD WO CONT   Final Result   No acute intracranial abnormality. Old ischemic disease in the high left parietal lobe and in the left basal   ganglion. There is significant atrophy with significant deepening of the left sylvian   fissure. CT ABD PELV WO CONT   Final Result   Fluid overload. No evidence to suggest a source of GI bleeding      CT dose reduction was achieved through use of a standardized protocol tailored   for this examination and automatic exposure control for dose modulation.       XR CHEST PORT   Final Result          Lab Data Reviewed: (see below)      Medications:  Current Facility-Administered Medications   Medication Dose Route Frequency    traMADoL (ULTRAM) tablet 50 mg  50 mg Oral Q6H PRN    lidocaine 4 % patch 2 Patch  2 Patch TransDERmal Q24H    midodrine (PROAMATINE) tablet 10 mg  10 mg Oral TID WITH MEALS    levETIRAcetam (KEPPRA) tablet 1,000 mg  1,000 mg Oral BID    heparin (porcine) 1,000 unit/mL injection 3,200 Units  3,200 Units IntraVENous DIALYSIS PRN    0.9% sodium chloride infusion 250 mL  250 mL IntraVENous PRN    sodium chloride (NS) flush 5-40 mL  5-40 mL IntraVENous Q8H    sodium chloride (NS) flush 5-40 mL  5-40 mL IntraVENous PRN    acetaminophen (TYLENOL) tablet 650 mg  650 mg Oral Q6H PRN    Or    acetaminophen (TYLENOL) suppository 650 mg  650 mg Rectal Q6H PRN    polyethylene glycol (MIRALAX) packet 17 g  17 g Oral DAILY PRN    ondansetron (ZOFRAN ODT) tablet 4 mg  4 mg Oral Q8H PRN    Or    ondansetron (ZOFRAN) injection 4 mg  4 mg IntraVENous Q6H PRN    pantoprazole (PROTONIX) 40 mg in 0.9% sodium chloride 10 mL injection  40 mg IntraVENous Q12H    atorvastatin (LIPITOR) tablet 40 mg  40 mg Oral DAILY    calcitRIOL (ROCALTROL) capsule 0.5 mcg  0.5 mcg Oral DAILY    levothyroxine (SYNTHROID) tablet 150 mcg  150 mcg Oral DAILY    FLUoxetine (PROzac) capsule 10 mg  10 mg Oral DAILY    OXcarbazepine (TRILEPTAL) tablet 150 mg  150 mg Oral QAM    OXcarbazepine (TRILEPTAL) tablet 300 mg  300 mg Oral QHS    lacosamide (VIMPAT) tablet 200 mg  200 mg Oral BID    [Held by provider] sevelamer carbonate (RENVELA) tab 800 mg  800 mg Oral TID WITH MEALS    zinc oxide-white petrolatum 17-57 % topical paste   Topical PRN       ______________________________________________________________________      Lab Review:     Recent Labs     02/20/22  0714 02/19/22  0644 02/18/22  0834   WBC 5.6 4.9 4.3   HGB 8.1* 7.5* 8.0*   HCT 26.5* 24.0* 26.0*   * 87* 88*     Recent Labs     02/20/22  0714 02/19/22  0644 02/18/22  0834    138 137   K 4.7 2.9* 3.3*    104 105   CO2 26 27 22   GLU 64* 63* 62*   BUN 24* 20 29*   CREA 3.41* 2.81* 3.42*   CA 7.4* 7.3* 7.3*     No components found for: GLPOC  No results for input(s): PH, PCO2, PO2, HCO3, FIO2 in the last 72 hours. No results for input(s): INR, INREXT, INREXT, INREXT in the last 72 hours. Other pertinent lab:   Chest x-ray done on 02/14/2022 was personally reviewed. Shows increasing pulmonary vascular congestion and bilateral pleural effusions       Assessment & Plan:      1. Acute hypoxic respiratory failure. Predominant etiology is volume overload  Better now. On room air    CT of the abdomen and pelvis basically shows large bilateral pleural effusions with associated atelectasis. Continue with dialysis and try to remove as much fluid as possible. I believe that with ongoing dialysis, there will be an improvement in her oxygen requirement. This morning she is much more comfortable   Blood gases done showed 7.3 4/42/153 on 4 L oxygen. We will wean down oxygen to keep saturation more than 92%. Discussed with RN.     It is noted that she is empirically started on vancomycin and Zosyn. Clinical suspicion for pneumonia is relatively low; however, antibiotics can be continued. Large bilateral pleural effusions  Likely contributing to her breathing issues  If not improved over the weekend may consider thoracentesis early next week    2. End-stage renal disease. on hemodialysis. Nephrology continuing with fluid removal appropriately  However, she is relatively hypotensive and is on a small dose of Levophed, currently at 4 mics. Target map of 65 and higher. 3.  Gastrointestinal bleed. Apparently, she had black tarry stools and also bright red blood per rectum for which paramedics were called and she was brought over to the emergency room. Gastroenterology is consulted. She received 1 unit of packed red blood cells. endoscopy was done. Patient has gastritis, esophagitis  She is on Protonix intravenously. She is for EGD in the morning. 4.  Multiple cardiac issues including atrial fibrillation, history of atrioventricular block, and pacemaker insertion. Her troponins are elevated. Cardiology is consulted. Echocardiogram is pending. Stress test in 11/2021 showed no ischemia. 5.  History of stroke and seizure. 6.  Hypertension and dyslipidemia. 7.  Diabetes and depression. 8.  No chemical deep venous thrombosis prophylaxis due to gastrointestinal bleed. Consider sequential compression devices to the lower extremities. Right lower extremity venous Dopplers were done on 12/12/2022 showing chronic DVT in the right greater saphenous vein. 9.  Hypothyroidism.     Questions of patient were answered at bedside in detail  Case discussed in detail with RN, RT, and care team  Thank you for involving me in the care of this patient  I will follow with you closely during hospitalization    Time spent more than 20 minutes under patient care with no overlap reviewing results and records, decision making, and answering questions.     Young Livingston, MD  Pulmonary/CC

## 2022-02-20 NOTE — PROGRESS NOTES
Patient had uneventful shift. Pt c/o 6/10 back pain. Relieved by prn tramadol. Pt purewick intact- small output for the day. No other needs at this time.

## 2022-02-20 NOTE — PROGRESS NOTES
Problem: Pressure Injury - Risk of  Goal: *Prevention of pressure injury  Description: Document Nitin Scale and appropriate interventions in the flowsheet. Outcome: Progressing Towards Goal  Note: Pressure Injury Interventions:  Sensory Interventions: Assess changes in LOC    Moisture Interventions: Absorbent underpads    Activity Interventions: Assess need for specialty bed    Mobility Interventions: Assess need for specialty bed    Nutrition Interventions: Document food/fluid/supplement intake    Friction and Shear Interventions: Apply protective barrier, creams and emollients                Problem: Patient Education: Go to Patient Education Activity  Goal: Patient/Family Education  Outcome: Progressing Towards Goal     Problem: Falls - Risk of  Goal: *Absence of Falls  Description: Document Thea Marking Fall Risk and appropriate interventions in the flowsheet.   Outcome: Progressing Towards Goal  Note: Fall Risk Interventions:       Mentation Interventions: Adequate sleep, hydration, pain control    Medication Interventions: Bed/chair exit alarm    Elimination Interventions: Bed/chair exit alarm    History of Falls Interventions: Bed/chair exit alarm         Problem: Patient Education: Go to Patient Education Activity  Goal: Patient/Family Education  Outcome: Progressing Towards Goal     Problem: Anemia Care Plan (Adult and Pediatric)  Goal: *Labs within defined limits  Outcome: Progressing Towards Goal  Goal: *Tolerates increased activity  Outcome: Progressing Towards Goal     Problem: Chronic Renal Failure  Goal: *Fluid and electrolytes stabilized  Outcome: Progressing Towards Goal     Problem: Breathing Pattern - Ineffective  Goal: *Absence of hypoxia  Outcome: Progressing Towards Goal  Goal: *Use of effective breathing techniques  Outcome: Progressing Towards Goal     Problem: Discharge Planning  Goal: *Discharge to safe environment  Outcome: Progressing Towards Goal  Goal: *Knowledge of medication management  Outcome: Progressing Towards Goal  Goal: *Knowledge of discharge instructions  Outcome: Progressing Towards Goal     Problem: Patient Education: Go to Patient Education Activity  Goal: Patient/Family Education  Outcome: Progressing Towards Goal     Problem: Risk for Spread of Infection  Goal: Prevent transmission of infectious organism to others  Description: Prevent the transmission of infectious organisms to other patients, staff members, and visitors.   Outcome: Progressing Towards Goal     Problem: Patient Education:  Go to Education Activity  Goal: Patient/Family Education  Outcome: Progressing Towards Goal

## 2022-02-20 NOTE — PROGRESS NOTES
Hospitalist Progress Note       Daily Progress Note: 2/20/2022 4:26 PM  Hospital course:     Patient is a 78-year-old female with a PMH significant for ESRD on HD, HTN, HLD, chronic atrial fibrillation, history of AV block status post pacemaker, DM, CVA and seizure disorders. She comes to the hospital for weakness I graduated to somnolence, dark tarry stools, then bright red blood per rectum. Discharged just 1 day ago from SNF after being hospitalized for MRSA bacteremia and seizures. During this admission she presents hyperkalemic with hemoglobin of 7.6, BNP 5856, hypotensive with systolic pressure in the 17D and bicarb elevated. Chest x-ray negative for acute findings. Resuscitated with IV fluids, 1 unit PRBC and started on Levophed drip, IV Zosyn and Ventimask for oxygen support, placed in the ICU. GI, nephrology, pulmonary and cardiology were consulted. Weaned off oxygen and pressor therapy over the course of a few days and transferred to remote telemetry. EGD performed on 2/14 revealing esophagitis without bleeding, 3 clips from previous intervention still in place without bleeding. Her hemoglobin has been stable without any further signs or symptoms of bleeding. 2D echo with normal findings. Urine culture positive for Vanco resistant Enterococcus faecalis with MDR and yeast.  Will consult ID. Awaiting repeat UA. Subjective:     Examined patient at the bedside. She is resting comfortably today. She states back pain has improved with tramadol.       Assessment/Plan:   Active Problems:    Anemia (1/3/2022)      GI bleed (2/11/2022)      Septic shock ([address] (0/97/9744)      Complicated UTI (urinary tract infection) (2/18/2022)      Respiratory failure with hypoxia ([address] (2/18/2022)      Hypovolemic shock  Of Levophed, started on midodrine 3 times daily 10 mg    Acute encephalopathyresolved    Respiratory failure with hypoxia  Secondary to fluid overload  Weaned to room air    Upper GI bleed with acute blood loss anemia  Status post 1 unit PRBC  GI consultedEGD performed revealing esophagitis, chronic gastritis without bleeding, 3 clips from previous intervention still present without active bleeding    ESRD on HD  Anemia of chronic disease  Tolerating dialysis, Procrit with HD, transfuse for Hgb <7    Seizure disorder with Ruddy's paralysis  Continue Trileptal, fluoxetine and Keppra    CVA with right-sided weakness  PT OT patient is unable to stand or to hold herself upright without assistance they recommend SNF placement  Patient states she does not want SNF placement she wants to go home with home health    UnComplicated UTI  Urine culture positive for VRE multi resistant, yeast  ID consulted she has completed an 7-day course of IV Zosyn which is adequate coverage  Repeat UA to make sure yeast still not present    A. Fib  SSS status post PPM  Cardiology consulted  Stable rate controlled    DVT Prophylaxis: SCDs  Code Status: Full Code  POA/NOK:    Disposition and discharge barriers:    Patient states she does not want SNF she wants to return to home with home health   ID consult   PT OTpatient is unable to transfer, stand, perform ADLs are to sit upright without assistance.    She has bed, bedside commode and wheelchair at home  [facility] discussed with: Patient, staff, IDR team    Current Facility-Administered Medications   Medication Dose Route Frequency    traMADoL (ULTRAM) tablet 50 mg  50 mg Oral Q6H PRN    lidocaine 4 % patch 2 Patch  2 Patch TransDERmal Q24H    midodrine (PROAMATINE) tablet 10 mg  10 mg Oral TID WITH MEALS    levETIRAcetam (KEPPRA) tablet 1,000 mg  1,000 mg Oral BID    heparin (porcine) 1,000 unit/mL injection 3,200 Units  3,200 Units IntraVENous DIALYSIS PRN    0.9% sodium chloride infusion 250 mL  250 mL IntraVENous PRN    sodium chloride (NS) flush 5-40 mL  5-40 mL IntraVENous Q8H    sodium chloride (NS) flush 5-40 mL  5-40 mL IntraVENous PRN    acetaminophen (TYLENOL) tablet 650 mg  650 mg Oral Q6H PRN    Or    acetaminophen (TYLENOL) suppository 650 mg  650 mg Rectal Q6H PRN    polyethylene glycol (MIRALAX) packet 17 g  17 g Oral DAILY PRN    ondansetron (ZOFRAN ODT) tablet 4 mg  4 mg Oral Q8H PRN    Or    ondansetron (ZOFRAN) injection 4 mg  4 mg IntraVENous Q6H PRN    pantoprazole (PROTONIX) 40 mg in 0.9% sodium chloride 10 mL injection  40 mg IntraVENous Q12H    atorvastatin (LIPITOR) tablet 40 mg  40 mg Oral DAILY    calcitRIOL (ROCALTROL) capsule 0.5 mcg  0.5 mcg Oral DAILY    levothyroxine (SYNTHROID) tablet 150 mcg  150 mcg Oral DAILY    FLUoxetine (PROzac) capsule 10 mg  10 mg Oral DAILY    OXcarbazepine (TRILEPTAL) tablet 150 mg  150 mg Oral QAM    OXcarbazepine (TRILEPTAL) tablet 300 mg  300 mg Oral QHS    lacosamide (VIMPAT) tablet 200 mg  200 mg Oral BID    [Held by provider] sevelamer carbonate (RENVELA) tab 800 mg  800 mg Oral TID WITH MEALS    zinc oxide-white petrolatum 17-57 % topical paste   Topical PRN    NOREPINephrine (LEVOPHED) 8 mg in 0.9% NS 250ml infusion  0.5-16 mcg/min IntraVENous TITRATE        REVIEW OF SYSTEMS    Review of Systems   Constitutional: Positive for malaise/fatigue. Respiratory: Positive for shortness of breath. Cardiovascular: Positive for leg swelling. Negative for chest pain. Gastrointestinal: Negative for abdominal pain. Musculoskeletal: Positive for myalgias. Neurological: Positive for weakness. Objective:     Visit Vitals  /67   Pulse 65   Temp 97.9 °F (36.6 °C)   Resp 18   Ht 5' 6\" (1.676 m)   Wt 74.3 kg (163 lb 12.8 oz)   SpO2 95%   Breastfeeding No   BMI 26.44 kg/m²    O2 Flow Rate (L/min): 4 l/min O2 Device: None (Room air)    Temp (24hrs), Av.9 °F (36.6 °C), Min:97.9 °F (36.6 °C), Max:97.9 °F (36.6 °C)      No intake/output data recorded. 1901 -  0700  In: -   Out: 1     PHYSICAL EXAM:    Physical Exam  Constitutional:       Appearance: She is obese.  She is ill-appearing. Cardiovascular:      Rate and Rhythm: Normal rate. Rhythm irregular. Pulmonary:      Effort: No respiratory distress. Abdominal:      General: There is distension. Musculoskeletal:      Right lower leg: Edema present. Left lower leg: Edema present. Neurological:      Motor: Weakness present. Gait: Gait abnormal.          Data Review    Recent Results (from the past 24 hour(s))   CBC WITH AUTOMATED DIFF    Collection Time: 02/20/22  7:14 AM   Result Value Ref Range    WBC 5.6 3.6 - 11.0 K/uL    RBC 2.63 (L) 3.80 - 5.20 M/uL    HGB 8.1 (L) 11.5 - 16.0 g/dL    HCT 26.5 (L) 35.0 - 47.0 %    .8 (H) 80.0 - 99.0 FL    MCH 30.8 26.0 - 34.0 PG    MCHC 30.6 30.0 - 36.5 g/dL    RDW 16.6 (H) 11.5 - 14.5 %    PLATELET 403 (L) 200 - 400 K/uL    MPV 11.6 8.9 - 12.9 FL    NRBC 0.0 0.0  WBC    ABSOLUTE NRBC 0.00 0.00 - 0.01 K/uL    NEUTROPHILS 58 32 - 75 %    LYMPHOCYTES 30 12 - 49 %    MONOCYTES 9 5 - 13 %    EOSINOPHILS 1 0 - 7 %    BASOPHILS 1 0 - 1 %    IMMATURE GRANULOCYTES 1 (H) 0 - 0.5 %    ABS. NEUTROPHILS 3.3 1.8 - 8.0 K/UL    ABS. LYMPHOCYTES 1.6 0.8 - 3.5 K/UL    ABS. MONOCYTES 0.5 0.0 - 1.0 K/UL    ABS. EOSINOPHILS 0.0 0.0 - 0.4 K/UL    ABS. BASOPHILS 0.0 0.0 - 0.1 K/UL    ABS. IMM.  GRANS. 0.0 0.00 - 0.04 K/UL    DF AUTOMATED     METABOLIC PANEL, BASIC    Collection Time: 02/20/22  7:14 AM   Result Value Ref Range    Sodium 138 136 - 145 mmol/L    Potassium 4.7 3.5 - 5.1 mmol/L    Chloride 106 97 - 108 mmol/L    CO2 26 21 - 32 mmol/L    Anion gap 6 5 - 15 mmol/L    Glucose 64 (L) 65 - 100 mg/dL    BUN 24 (H) 6 - 20 mg/dL    Creatinine 3.41 (H) 0.55 - 1.02 mg/dL    BUN/Creatinine ratio 7 (L) 12 - 20      GFR est AA 16 (L) >60 ml/min/1.73m2    GFR est non-AA 13 (L) >60 ml/min/1.73m2    Calcium 7.4 (L) 8.5 - 10.1 mg/dL   C REACTIVE PROTEIN, QT    Collection Time: 02/20/22  7:14 AM   Result Value Ref Range    C-Reactive protein 0.32 0.00 - 0.60 mg/dL   PROCALCITONIN Collection Time: 02/20/22  7:14 AM   Result Value Ref Range    Procalcitonin 0.16 (H) 0 ng/mL       XR CHEST PORT   Final Result   Worsening congestive heart failure pattern. DUPLEX LOWER EXT VENOUS RIGHT   Final Result      CT HEAD WO CONT   Final Result   No acute intracranial abnormality. Old ischemic disease in the high left parietal lobe and in the left basal   ganglion. There is significant atrophy with significant deepening of the left sylvian   fissure. CT ABD PELV WO CONT   Final Result   Fluid overload. No evidence to suggest a source of GI bleeding      CT dose reduction was achieved through use of a standardized protocol tailored   for this examination and automatic exposure control for dose modulation. XR CHEST PORT   Final Result          Active Problems:    Anemia (1/3/2022)      GI bleed (2/11/2022)      Septic shock ([address] (9/29/2283)      Complicated UTI (urinary tract infection) (2/18/2022)      Respiratory failure with hypoxia ([address] (2/18/2022)          _____________________________________________________________________________  Time spent in direct care including coordination of service, review of data and examination: > 35 minutes    ______________________________________________________________________________    Sima Magana NP    This is dictation was done by dragon, computer voice recognition software. Quite often unanticipated grammatical, syntax, homophones and other interpretive errors or inadvertently transcribed by the computer software. Please excuse errors that have escaped final proofreading. Thank you.

## 2022-02-20 NOTE — PROGRESS NOTES
Patient: Shawn Bruno MRN: [id number]  SSN: [id number]    YOB: 1950  Age: 71 y.o. Sex: female          Subjective:   Patient seen in the room. She was dialyzed last on Friday with 2 L of fluid removal no seizures reported as of today. She denies any complaints other than back pain    Objective:     Vitals:    02/19/22 2012 02/20/22 1113 02/20/22 1223 02/20/22 1229   BP: (!) 145/69 (!) 145/69  136/67   Pulse: 60 62  65   Resp: 16 18     Temp: 97.9 °F (36.6 °C) 97.9 °F (36.6 °C)     TempSrc:       SpO2: 100%   95%   Weight:   74.3 kg (163 lb 12.8 oz)    Height:            Intake and Output:  Yesterday's Intake and Output:  02/19 0701 - 02/20 0700  In: -   Out: 1      Physical Exam:   GENERAL: no distress, appears stated age  EYE: negative  Respiratory -no distress. ABDOMEN: Nondistended. Musculoskeletal -normal on inspection. NEUROLOGIC: She is awake but lethargic      Dialysis access: cuffed tunneled catheter site right and IJ.       Data Review    Meds    Current Facility-Administered Medications   Medication Dose Route Frequency    traMADoL (ULTRAM) tablet 50 mg  50 mg Oral Q6H PRN    lidocaine 4 % patch 2 Patch  2 Patch TransDERmal Q24H    midodrine (PROAMATINE) tablet 10 mg  10 mg Oral TID WITH MEALS    levETIRAcetam (KEPPRA) tablet 1,000 mg  1,000 mg Oral BID    heparin (porcine) 1,000 unit/mL injection 3,200 Units  3,200 Units IntraVENous DIALYSIS PRN    0.9% sodium chloride infusion 250 mL  250 mL IntraVENous PRN    sodium chloride (NS) flush 5-40 mL  5-40 mL IntraVENous Q8H    sodium chloride (NS) flush 5-40 mL  5-40 mL IntraVENous PRN    acetaminophen (TYLENOL) tablet 650 mg  650 mg Oral Q6H PRN    Or    acetaminophen (TYLENOL) suppository 650 mg  650 mg Rectal Q6H PRN    polyethylene glycol (MIRALAX) packet 17 g  17 g Oral DAILY PRN    ondansetron (ZOFRAN ODT) tablet 4 mg  4 mg Oral Q8H PRN    Or    ondansetron (ZOFRAN) injection 4 mg  4 mg IntraVENous Q6H PRN  pantoprazole (PROTONIX) 40 mg in 0.9% sodium chloride 10 mL injection  40 mg IntraVENous Q12H    atorvastatin (LIPITOR) tablet 40 mg  40 mg Oral DAILY    calcitRIOL (ROCALTROL) capsule 0.5 mcg  0.5 mcg Oral DAILY    levothyroxine (SYNTHROID) tablet 150 mcg  150 mcg Oral DAILY    FLUoxetine (PROzac) capsule 10 mg  10 mg Oral DAILY    OXcarbazepine (TRILEPTAL) tablet 150 mg  150 mg Oral QAM    OXcarbazepine (TRILEPTAL) tablet 300 mg  300 mg Oral QHS    lacosamide (VIMPAT) tablet 200 mg  200 mg Oral BID    [Held by provider] sevelamer carbonate (RENVELA) tab 800 mg  800 mg Oral TID WITH MEALS    zinc oxide-white petrolatum 17-57 % topical paste   Topical PRN       Lab      Recent Results (from the past 24 hour(s))   CBC WITH AUTOMATED DIFF    Collection Time: 02/20/22  7:14 AM   Result Value Ref Range    WBC 5.6 3.6 - 11.0 K/uL    RBC 2.63 (L) 3.80 - 5.20 M/uL    HGB 8.1 (L) 11.5 - 16.0 g/dL    HCT 26.5 (L) 35.0 - 47.0 %    .8 (H) 80.0 - 99.0 FL    MCH 30.8 26.0 - 34.0 PG    MCHC 30.6 30.0 - 36.5 g/dL    RDW 16.6 (H) 11.5 - 14.5 %    PLATELET 721 (L) 988 - 400 K/uL    MPV 11.6 8.9 - 12.9 FL    NRBC 0.0 0.0  WBC    ABSOLUTE NRBC 0.00 0.00 - 0.01 K/uL    NEUTROPHILS 58 32 - 75 %    LYMPHOCYTES 30 12 - 49 %    MONOCYTES 9 5 - 13 %    EOSINOPHILS 1 0 - 7 %    BASOPHILS 1 0 - 1 %    IMMATURE GRANULOCYTES 1 (H) 0 - 0.5 %    ABS. NEUTROPHILS 3.3 1.8 - 8.0 K/UL    ABS. LYMPHOCYTES 1.6 0.8 - 3.5 K/UL    ABS. MONOCYTES 0.5 0.0 - 1.0 K/UL    ABS. EOSINOPHILS 0.0 0.0 - 0.4 K/UL    ABS. BASOPHILS 0.0 0.0 - 0.1 K/UL    ABS. IMM.  GRANS. 0.0 0.00 - 0.04 K/UL    DF AUTOMATED     METABOLIC PANEL, BASIC    Collection Time: 02/20/22  7:14 AM   Result Value Ref Range    Sodium 138 136 - 145 mmol/L    Potassium 4.7 3.5 - 5.1 mmol/L    Chloride 106 97 - 108 mmol/L    CO2 26 21 - 32 mmol/L    Anion gap 6 5 - 15 mmol/L    Glucose 64 (L) 65 - 100 mg/dL    BUN 24 (H) 6 - 20 mg/dL    Creatinine 3.41 (H) 0.55 - 1.02 mg/dL    BUN/Creatinine ratio 7 (L) 12 - 20      GFR est AA 16 (L) >60 ml/min/1.73m2    GFR est non-AA 13 (L) >60 ml/min/1.73m2    Calcium 7.4 (L) 8.5 - 10.1 mg/dL   C REACTIVE PROTEIN, QT    Collection Time: 02/20/22  7:14 AM   Result Value Ref Range    C-Reactive protein 0.32 0.00 - 0.60 mg/dL   PROCALCITONIN    Collection Time: 02/20/22  7:14 AM   Result Value Ref Range    Procalcitonin 0.16 (H) 0 ng/mL        Lab Results   Component Value Date/Time    Color Yellow/Straw 02/12/2022 09:00 AM    Appearance Turbid (A) 02/12/2022 09:00 AM    Specific gravity 1.017 02/12/2022 09:00 AM    pH (UA) 5.0 02/12/2022 09:00 AM    Protein 100 (A) 02/12/2022 09:00 AM    Glucose Negative 02/12/2022 09:00 AM    Ketone 5 (A) 02/12/2022 09:00 AM    Bilirubin Negative 02/12/2022 09:00 AM    Urobilinogen 0.1 02/12/2022 09:00 AM    Nitrites Negative 02/12/2022 09:00 AM    Leukocyte Esterase Moderate (A) 02/12/2022 09:00 AM    Epithelial cells Moderate (A) 05/20/2021 12:56 AM    Bacteria Negative 02/12/2022 09:00 AM    WBC >100 (H) 02/12/2022 09:00 AM    RBC 20-50 02/12/2022 09:00 AM       Imaging         Assessment & Plan: Active Problems:    Anemia (1/3/2022)      GI bleed (2/11/2022)      Septic shock ([address] (4/33/0851)      Complicated UTI (urinary tract infection) (2/18/2022)      Respiratory failure with hypoxia ([address] (2/18/2022)    1. ESRD. -On dialysis at [address] renal care in [place] every MWF   -On admission she was volume overloaded  -He was last dialyzed on Friday with 2 L fluid removal  -We will continue MWF dialysis while inpatient  -Permanent dialysis catheter as current access  -She also has massive bilateral effusions which need to be tapped if she has recurrent respiratory symptoms that might not improve with dialysis    2. Anemia   -2/2 ESRD and GI bleed     -s/p blood transfusion  -Apparently had black tarry stools and bright red bleeding per rectum.     -s/p EGD on 2/14 which showed esophagitis chronic gastritis without bleeding. 3 clips from previous intervention were still present without any active bleeding noted  -Hemoglobin 7.5 today  -Continue Procrit 10,000 units with dialysis  -Transfusion as needed to keep hemoglobin  > 7     3  Hypotension   -probably from acute blood loss anemia.   -Normal WBC count. Afebrile   -Increased midodrine to 10 mg 3 times daily . Continues same dose  -Will give albumin with dialysis and decreased UF goal     4 Secondary hyperparathyroidism of ESRD. -Calcium is okay. Zeinab Begun currently is on hold which I will continue to hold. Phosphorus is 4.6  -PTH is 194 which is within target range for her ESRD. Continue calcitriol at the current dose     5. hypokalemia  -Potassium today improved to 4.7 after aggressive replacement yesterday    6. B/L Pleural effusions , CHF  H/o AICD placement-  -CT of the abdomen/pelvic shows large bilateral pleural effusions with associated atelectasis  -Unable to remove >3L fluid with dialysis because of cramping/hypotension/tachycardia  -Appears more comfortable today from respiratory standpoint    6. Seizure disorder  -Apparently she had 2 seizures on dialysis on 2/16. No seizures reported yesterday  - resumed on Home meds     Signed By: Kylie Gaming MD     February 20, 2022      This note was prepared using voice recognition system and is likely to have sound alike errors that may have been overlooked even during proofreading.   Please contact the author for any clarifications

## 2022-02-20 NOTE — PROGRESS NOTES
CARDIOLOGY PROGRESS NOTE    Patient seen and examined. This is a patient who is followed for atrial fibrillation, history of pacemaker. Resting comfortably. Offer no new complaints. Telemetry reviewed, Vpaced 60s    Pertinent review of systems items noted above, all other systems are negative. Current medications reviewed. Physical Examination  Vital signs are stable. Blood pressure 145/69 , Pulse 60  No apparent distress. Heart is regular, rate and rhythm. Normal S1, S2, no murmurs are appreciated. Lungs are clear bilaterally. Abdomen is distended  Extremities have mild edema. .  Recent Results (from the past 12 hour(s))   CBC WITH AUTOMATED DIFF    Collection Time: 02/20/22  7:14 AM   Result Value Ref Range    WBC 5.6 3.6 - 11.0 K/uL    RBC 2.63 (L) 3.80 - 5.20 M/uL    HGB 8.1 (L) 11.5 - 16.0 g/dL    HCT 26.5 (L) 35.0 - 47.0 %    .8 (H) 80.0 - 99.0 FL    MCH 30.8 26.0 - 34.0 PG    MCHC 30.6 30.0 - 36.5 g/dL    RDW 16.6 (H) 11.5 - 14.5 %    PLATELET 438 (L) 870 - 400 K/uL    MPV 11.6 8.9 - 12.9 FL    NRBC 0.0 0.0  WBC    ABSOLUTE NRBC 0.00 0.00 - 0.01 K/uL    NEUTROPHILS 58 32 - 75 %    LYMPHOCYTES 30 12 - 49 %    MONOCYTES 9 5 - 13 %    EOSINOPHILS 1 0 - 7 %    BASOPHILS 1 0 - 1 %    IMMATURE GRANULOCYTES 1 (H) 0 - 0.5 %    ABS. NEUTROPHILS 3.3 1.8 - 8.0 K/UL    ABS. LYMPHOCYTES 1.6 0.8 - 3.5 K/UL    ABS. MONOCYTES 0.5 0.0 - 1.0 K/UL    ABS. EOSINOPHILS 0.0 0.0 - 0.4 K/UL    ABS. BASOPHILS 0.0 0.0 - 0.1 K/UL    ABS. IMM.  GRANS. 0.0 0.00 - 0.04 K/UL    DF AUTOMATED     METABOLIC PANEL, BASIC    Collection Time: 02/20/22  7:14 AM   Result Value Ref Range    Sodium 138 136 - 145 mmol/L    Potassium 4.7 3.5 - 5.1 mmol/L    Chloride 106 97 - 108 mmol/L    CO2 26 21 - 32 mmol/L    Anion gap 6 5 - 15 mmol/L    Glucose 64 (L) 65 - 100 mg/dL    BUN 24 (H) 6 - 20 mg/dL    Creatinine 3.41 (H) 0.55 - 1.02 mg/dL    BUN/Creatinine ratio 7 (L) 12 - 20      GFR est AA 16 (L) >60 ml/min/1.73m2    GFR est non-AA 13 (L) >60 ml/min/1.73m2    Calcium 7.4 (L) 8.5 - 10.1 mg/dL   C REACTIVE PROTEIN, QT    Collection Time: 02/20/22  7:14 AM   Result Value Ref Range    C-Reactive protein 0.32 0.00 - 0.60 mg/dL   PROCALCITONIN    Collection Time: 02/20/22  7:14 AM   Result Value Ref Range    Procalcitonin 0.16 (H) 0 ng/mL       Case discussed with Dr. Mk Hogan and our impression and recommendations are as follows:    Atrial fibrillation:  Known AF, History of bleed in the past and overall condition, likely not a candidate for Spreetales. Rate is presently controlled, continue to closely monitor on telemetry. High degree AVB s/p PPM:  Normal function per last device transmission. Elevated troponin: no active chest pain. -[phone]. Elevation in setting of sepsis, ESRD, GI bleed/anemia. Continues to deny chest pain. Echo pending. Echo in Jan with preserved EF.  NST in November 2021 with no ischemia. Continue workup. No ASA given GI bleeding, no bb while on pressors. Med management as able. 4. Hypotension:  On midodrine at home, continue pressors, bp acceptable at present. Please do not hesitate to call if additional questions arise. Vanessa Lester Addendum:  Agree with findings and plan noted above. Rate remains controlled. [facility]  [address]    [address] [facility], [facility] [facility]  [phone]

## 2022-02-20 NOTE — PROGRESS NOTES
Progress Note    Patient: Eduar Nascimento MRN: [id number]  SSN: [id number]    YOB: 1950  Age: 71 y.o. Sex: female      Admit Date: 2/11/2022    LOS: 9 days     Subjective:   Patient followed for uncomplicated UTI secondary to VRE sensitive to Ampicillin and treated with Zosyn, which was deemed appropriate and completing 7 days of therapy. Positive blood cultures considered contamination. Patient asleep at this time but she appears to be resting comfortably. Objective:     Vitals:    02/19/22 0746 02/19/22 1216 02/19/22 1654 02/19/22 2012   BP: 117/65 (!) 143/64 (!) 149/64 (!) 145/69   Pulse: 66 60 60 60   Resp: 17 20 18 16   Temp: 97.9 °F (36.6 °C) 98.1 °F (36.7 °C)  97.9 °F (36.6 °C)   TempSrc:       SpO2: 100% 100% 99% 100%   Weight:       Height:            Intake and Output:  Current Shift: No intake/output data recorded. Last three shifts: 02/18 1901 - 02/20 0700  In: -   Out: 1     Physical Exam:   Vitals and nursing note reviewed. Constitutional:       Appearance: She is not ill-appearing. HENT: unremarkable  Genitourinary:     Comments: No Cochran  Musculoskeletal:      Right lower leg: No edema. Left lower leg: No edema. Skin:     Findings: No rash. Neurological: nonfocal, mild confusion   Psychiatric:    normal behavior     Thought Content: Thought content normal.         Judgment: Judgment normal.      Lab/Data Review:     WBC 5,600  ,000    Procal 0.16 <0.20  CRP 0.32 <0.44    No new cultures    Assessment:        1. Uncomplicated UTI with marked pyuria, secondary to Vancomycin-Resistant, but Ampicillin sensitive Enterococcus faecalis, status post 7 days IV Zosyn  2. Positive blood cultures with Coagulase negative Staphylococci consistent with contaminant  3. Renal failure  4. Thrombocytopenia, moderate, etiology unclear    Comment:  Procal and CRP decreasing, now off antibiotics. This makes Candida superinfection less likely. Plan:   1.  No further antibiotic recommendations   2. In am, repeat procal and CRP  3. No antibiotic indicated for positive blood culture  4.  Cleared for discharge from ID standpoint     Signed By: Nneka Goel MD     February 20, 2022

## 2022-02-20 NOTE — PROGRESS NOTES
PHYSICAL THERAPY EVALUATION  Patient: Yolande Habermann (83 y.o. female)  Date: 2/20/2022  Primary Diagnosis: Septic shock ([facility] [address] [A41.9, R65.21]  GI bleed [K92.2]  Procedure(s) (LRB):  ESOPHAGOGASTRODUODENOSCOPY (EGD) (N/A) 6 Days Post-Op   Precautions:  Fall,Skin- sacral wound with dressing noted ; COVID - isolation     ASSESSMENT  Based on the objective data described below, the patient presents with ongoing debility / LE and trunk weakness ; prolonged immobility ; WC bound status as she stated having not ambulated for the last 2 years ; Swelling on R UE noted as patient demonstrated need for max assistance to roll in each side with use of bed rails . Patient unable to initiate sitting at EOB and has been stating back pain upon mobility ;+ wound over sacral area noted with dressing as patient needed MOD assist to do AROME on B LE ms grps . SNF DC still recommended once medically stable due to complexity of patients needs to attend to her wellness but family stated planning her to be at home with Home health services instead as hospice care considered      Patient was admitted to the hospital with reports of dark stool after a week from returning from previous hospitalization . Patient has been thru living from SNF setting and going back to hospital thru multiple visits while attending HD appointments . Family noticed dark stools and and red blood per rectum so they called EMS. PMH: H/o seizures with todds paralysis, CVA with right sided weakness, SSS with PPM not on AC, ESRD on HD. Patient expressed wanting to be DC to home with continued home health despite her debility and total dependence with overall transfers and ADL needs ; Patient stated having set up at home with hospital bed - bedside commode and WC for OOB activities . Patients son is POA and patient stated understanding of current status    Current Level of Function Impacting Discharge (mobility/balance): Total dependence with bed mobility ; unable to sit unsupported on back/ trunk     Functional Outcome Measure: The patient scored CM  on the  outcome measure    Other factors to consider for discharge: Patient expressed strong desire to be DC to home despite realization of complexity of care needed to attend to her ADL needs      Patient will benefit from skilled therapy intervention to address the above noted impairments. PLAN :  Recommendations and Planned Interventions: bed mobility training, transfer training, and therapeutic exercises      Frequency/Duration: Patient will be followed by physical therapy:  1-2x/week to address goals. Recommendation for discharge: (in order for the patient to meet his/her long term goals)  Kaleb Jamison    This discharge recommendation:  A follow-up discussion with the attending provider and/or case management is planned    IF patient discharges home will need the following DME: none         SUBJECTIVE:   Patient stated I want to be sent home after this ; I cant be sent to that place again .     OBJECTIVE DATA SUMMARY:   HISTORY:    Past Medical History:   Diagnosis Date    Anxiety disorder     Arthritis     Atherosclerosis of native arteries of the extremities with ulceration ([address] 8/31/2020    Atrial fibrillation (HCC)     Cardiac pacemaker in situ     Cellulitis and abscess of trunk 8/31/2020    Depression, postpartum     Diabetes ([address]     Heart disease     Heartburn     Hx of long term use of blood thinners     Hypercholesterolemia     Hypertension     Hypothyroidism     Migraines     Peripheral venous insufficiency 8/31/2020    Seizures ([address]     Sleep disorder     Stroke ([address]     2019    Varicose veins of lower extremity with inflammation 8/31/2020     Past Surgical History:   Procedure Laterality Date    HX BACK SURGERY      HX OTHER SURGICAL      leg surgery     HX OTHER SURGICAL      pacemaker monitor     IR INSERT TUNL CVC W/O PORT OVER 5 YR  12/10/2021       Personal factors and/or comorbidities impacting plan of care:     Home Situation  Home Environment: Private residence  Living Alone: No  Support Systems: Child(jose carlos),Caregiver/Home Care Staff  Patient Expects to be Discharged to[de-identified] Home  Current DME Used/Available at Home: Hospital bed,Wheelchair    PLOF: Pt dependent  for ADLS/IADLS with mobility prior to admission. WC bound ; non ambulatory     EXAMINATION/PRESENTATION/DECISION MAKING:   Critical Behavior:  Neurologic State: Alert,Appropriate for age,Eyes open to voice  Orientation Level: Disoriented to time  Cognition: Poor safety awareness  Safety/Judgement: Awareness of environment,Decreased insight into deficits,Lack of insight into deficits  Hearing: Auditory  Auditory Impairment: None  Skin:  see wound record  Edema: no edema palpated on LE ms grps ; bruising noted on L back of knee   Range Of Motion:     WFL on B LE joints passively done      Strength:        Grossly graded at 2/5 on B hips knees and ankle ms grps   Tone & Sensation:    Hypotonic on B LE ms grps      Coordination:NT     Vision: NT   Resident stated being legally blind      Functional Mobility:  Bed Mobility:  Rolling: Maximum assistance  Supine to Sit: Maximum assistance  Sit to Supine: Maximum assistance  Scooting: Total assistance  Transfers:      NT ; total dependence with bed mobility      Balance:   Sitting: Impaired  Sitting - Static: Supported sitting  Sitting - Dynamic: Not tested  Ambulation/Gait Training:      NT ; non ambulatory for last 3 years as patient stated              Right Side Weight Bearing: As tolerated  Left Side Weight Bearing: As tolerated        Stairs:NT              Therapeutic Exercises:   Bed mobility exercises ; AAROME on B hips knees and ankle ms grps ; sitting balance activity at EOB when able     Functional Measure:    [facility] [address] [id number] AM-PAC 6 Clicks         Basic Mobility Inpatient Short Form  How much difficulty does the patient currently have. .. Unable A Lot A Little None   1.   Turning over in bed (including adjusting bedclothes, sheets and blankets)? [] 1   [x] 2   [] 3   [] 4   2. Sitting down on and standing up from a chair with arms ( e.g., wheelchair, bedside commode, etc.)   [x] 1   [] 2   [] 3   [] 4   3. Moving from lying on back to sitting on the side of the bed? [] 1   [x] 2   [] 3   [] 4          How much help from another person does the patient currently need. .. Total A Lot A Little None   4. Moving to and from a bed to a chair (including a wheelchair)? [x] 1   [] 2   [] 3   [] 4   5. Need to walk in hospital room? [] 1   [] 2   [] 3   [] 4   6. Climbing 3-5 steps with a railing? [] 1   [] 2   [] 3   [] 4   © , Trustees of [address] [place] [address], under license to [employer]. All rights reserved     Score:  Initial: CM Most Recent: CM (Date: 22)   Interpretation of Tool:  Represents activities that are increasingly more difficult (i.e. Bed mobility, Transfers, Gait). Score 24 23 22-20 19-15 14-10 9-7 6   Modifier CH CI CJ CK CL CM CN          Physical Therapy Evaluation Charge Determination   History Examination Presentation Decision-Making   MEDIUM  Complexity : 1-2 comorbidities / personal factors will impact the outcome/ POC  MEDIUM Complexity : 3 Standardized tests and measures addressing body structure, function, activity limitation and / or participation in recreation  MEDIUM Complexity : Evolving with changing characteristics  Other Functional Measure AMPAC 6 CM      Based on the above components, the patient evaluation is determined to be of the following complexity level: MEDIUM    Pain Ratin/10 on low back when asked to do bed rolling - bridging     Activity Tolerance:   Poor  Please refer to the flowsheet for vital signs taken during this treatment.     After treatment patient left in no apparent distress:   Supine in bed, Call bell within reach, Bed / chair alarm activated, and Caregiver / family present          Problem: Mobility Impaired (Adult and Pediatric)  Goal: *Acute Goals and Plan of Care (Insert Text)  Outcome: Progressing Towards Goal  Note:   Pt will be MIN assist x 1  with LE HEP in 7 days. Pt will perform bed mobility with MIN A x 1 in 7 days. Pt will perform transfers with MIN A x 1 - scooting / bridging  in 7 days. Problem: Patient Education: Go to Patient Education Activity  Goal: Patient/Family Education  Outcome: Progressing Towards Goal  Note:   Pt will verbalize and demonstrate compliance with mobility and sitting balance precautions/restrictions to include transfers to [facility] from bed as tolerated  in 7 days. Pt will demonstrate improvement in seated static/dynamic balance from POOR/ NIL to FAIR  in 7 days. COMMUNICATION/EDUCATION:   The patients plan of care was discussed with: Physical therapist.     Fall prevention education was provided and the patient/caregiver indicated understanding. and Patient understands intent and goals of therapy, but is neutral about his/her participation.     Thank you for this referral.  Marv Medrano   Time Calculation: 30 mins

## 2022-02-21 VITALS
HEART RATE: 67 BPM | TEMPERATURE: 98.1 F | RESPIRATION RATE: 18 BRPM | BODY MASS INDEX: 26.33 KG/M2 | SYSTOLIC BLOOD PRESSURE: 125 MMHG | WEIGHT: 163.8 LBS | HEIGHT: 66 IN | OXYGEN SATURATION: 100 % | DIASTOLIC BLOOD PRESSURE: 65 MMHG

## 2022-02-21 LAB
ANION GAP SERPL CALC-SCNC: 9 MMOL/L (ref 5–15)
BASOPHILS # BLD: 0 K/UL (ref 0–0.1)
BASOPHILS NFR BLD: 0 % (ref 0–1)
BUN SERPL-MCNC: 29 MG/DL (ref 6–20)
BUN/CREAT SERPL: 7 (ref 12–20)
CA-I BLD-MCNC: 7.5 MG/DL (ref 8.5–10.1)
CHLORIDE SERPL-SCNC: 107 MMOL/L (ref 97–108)
CO2 SERPL-SCNC: 23 MMOL/L (ref 21–32)
CREAT SERPL-MCNC: 4.2 MG/DL (ref 0.55–1.02)
DIFFERENTIAL METHOD BLD: ABNORMAL
EOSINOPHIL # BLD: 0.1 K/UL (ref 0–0.4)
EOSINOPHIL NFR BLD: 1 % (ref 0–7)
ERYTHROCYTE [DISTWIDTH] IN BLOOD BY AUTOMATED COUNT: 16.4 % (ref 11.5–14.5)
GLUCOSE SERPL-MCNC: 64 MG/DL (ref 65–100)
HCT VFR BLD AUTO: 25.2 % (ref 35–47)
HGB BLD-MCNC: 7.9 G/DL (ref 11.5–16)
IMM GRANULOCYTES # BLD AUTO: 0 K/UL (ref 0–0.04)
IMM GRANULOCYTES NFR BLD AUTO: 0 % (ref 0–0.5)
LYMPHOCYTES # BLD: 1.5 K/UL (ref 0.8–3.5)
LYMPHOCYTES NFR BLD: 22 % (ref 12–49)
MCH RBC QN AUTO: 31.6 PG (ref 26–34)
MCHC RBC AUTO-ENTMCNC: 31.3 G/DL (ref 30–36.5)
MCV RBC AUTO: 100.8 FL (ref 80–99)
MONOCYTES # BLD: 0.7 K/UL (ref 0–1)
MONOCYTES NFR BLD: 10 % (ref 5–13)
NEUTS SEG # BLD: 4.6 K/UL (ref 1.8–8)
NEUTS SEG NFR BLD: 67 % (ref 32–75)
NRBC # BLD: 0 K/UL (ref 0–0.01)
NRBC BLD-RTO: 0 PER 100 WBC
PLATELET # BLD AUTO: 111 K/UL (ref 150–400)
PMV BLD AUTO: 11.5 FL (ref 8.9–12.9)
POTASSIUM SERPL-SCNC: 4.2 MMOL/L (ref 3.5–5.1)
RBC # BLD AUTO: 2.5 M/UL (ref 3.8–5.2)
SODIUM SERPL-SCNC: 139 MMOL/L (ref 136–145)
WBC # BLD AUTO: 7 K/UL (ref 3.6–11)

## 2022-02-21 PROCEDURE — 85025 COMPLETE CBC W/AUTO DIFF WBC: CPT

## 2022-02-21 PROCEDURE — 74011000250 HC RX REV CODE- 250: Performed by: INTERNAL MEDICINE

## 2022-02-21 PROCEDURE — 99232 SBSQ HOSP IP/OBS MODERATE 35: CPT | Performed by: INTERNAL MEDICINE

## 2022-02-21 PROCEDURE — 74011250637 HC RX REV CODE- 250/637

## 2022-02-21 PROCEDURE — C9113 INJ PANTOPRAZOLE SODIUM, VIA: HCPCS | Performed by: HOSPITALIST

## 2022-02-21 PROCEDURE — 74011250636 HC RX REV CODE- 250/636: Performed by: HOSPITALIST

## 2022-02-21 PROCEDURE — 80048 BASIC METABOLIC PNL TOTAL CA: CPT

## 2022-02-21 PROCEDURE — 74011250637 HC RX REV CODE- 250/637: Performed by: NURSE PRACTITIONER

## 2022-02-21 PROCEDURE — 74011250636 HC RX REV CODE- 250/636: Performed by: INTERNAL MEDICINE

## 2022-02-21 PROCEDURE — 74011000250 HC RX REV CODE- 250: Performed by: HOSPITALIST

## 2022-02-21 PROCEDURE — 36415 COLL VENOUS BLD VENIPUNCTURE: CPT

## 2022-02-21 PROCEDURE — 90935 HEMODIALYSIS ONE EVALUATION: CPT

## 2022-02-21 PROCEDURE — 74011250637 HC RX REV CODE- 250/637: Performed by: HOSPITALIST

## 2022-02-21 PROCEDURE — 65270000029 HC RM PRIVATE

## 2022-02-21 PROCEDURE — 74011250636 HC RX REV CODE- 250/636

## 2022-02-21 RX ORDER — LEVETIRACETAM 1000 MG/1
1000 TABLET ORAL 2 TIMES DAILY
Qty: 60 TABLET | Refills: 0 | Status: SHIPPED | OUTPATIENT
Start: 2022-02-21 | End: 2022-03-23

## 2022-02-21 RX ORDER — MIDODRINE HYDROCHLORIDE 10 MG/1
10 TABLET ORAL
Qty: 90 TABLET | Refills: 0 | Status: SHIPPED | OUTPATIENT
Start: 2022-02-21 | End: 2022-03-23

## 2022-02-21 RX ORDER — HEPARIN SODIUM 1000 [USP'U]/ML
INJECTION, SOLUTION INTRAVENOUS; SUBCUTANEOUS
Status: COMPLETED
Start: 2022-02-21 | End: 2022-02-21

## 2022-02-21 RX ORDER — TRAMADOL HYDROCHLORIDE 50 MG/1
TABLET ORAL
Status: COMPLETED
Start: 2022-02-21 | End: 2022-02-21

## 2022-02-21 RX ADMIN — ACETAMINOPHEN 650 MG: 325 TABLET ORAL at 11:26

## 2022-02-21 RX ADMIN — HEPARIN SODIUM 3200 UNITS: 1000 INJECTION, SOLUTION INTRAVENOUS; SUBCUTANEOUS at 11:26

## 2022-02-21 RX ADMIN — SODIUM CHLORIDE, PRESERVATIVE FREE 10 ML: 5 INJECTION INTRAVENOUS at 05:16

## 2022-02-21 RX ADMIN — TRAMADOL HYDROCHLORIDE 50 MG: 50 TABLET, COATED ORAL at 09:11

## 2022-02-21 RX ADMIN — HEPARIN SODIUM 3200 UNITS: 1000 INJECTION, SOLUTION INTRAVENOUS; SUBCUTANEOUS at 10:31

## 2022-02-21 RX ADMIN — MIDODRINE HYDROCHLORIDE 10 MG: 5 TABLET ORAL at 18:10

## 2022-02-21 RX ADMIN — SODIUM CHLORIDE, PRESERVATIVE FREE 10 ML: 5 INJECTION INTRAVENOUS at 13:39

## 2022-02-21 RX ADMIN — CALCITRIOL CAPSULES 0.25 MCG 0.5 MCG: 0.25 CAPSULE ORAL at 11:26

## 2022-02-21 RX ADMIN — FLUOXETINE HYDROCHLORIDE 10 MG: 10 CAPSULE ORAL at 11:26

## 2022-02-21 RX ADMIN — LEVETIRACETAM 1000 MG: 250 TABLET, FILM COATED ORAL at 11:26

## 2022-02-21 RX ADMIN — LACOSAMIDE 200 MG: 100 TABLET, FILM COATED ORAL at 11:26

## 2022-02-21 RX ADMIN — ATORVASTATIN CALCIUM 40 MG: 40 TABLET, FILM COATED ORAL at 11:26

## 2022-02-21 RX ADMIN — OXCARBAZEPINE 150 MG: 150 TABLET, FILM COATED ORAL at 11:26

## 2022-02-21 RX ADMIN — SODIUM CHLORIDE, PRESERVATIVE FREE 40 MG: 5 INJECTION INTRAVENOUS at 11:26

## 2022-02-21 RX ADMIN — LEVOTHYROXINE SODIUM 150 MCG: 0.07 TABLET ORAL at 05:18

## 2022-02-21 NOTE — PROGRESS NOTES
Progress Note    Patient: Hood Odell MRN: [id number]  SSN: [id number]    YOB: 1950  Age: 71 y.o. Sex: female      Admit Date: 2/11/2022    LOS: 10 days     Subjective:   Patient followed for uncomplicated UTI secondary to VRE sensitive to Ampicillin and treated with Zosyn, which was deemed appropriate and completing 7 days of therapy. Positive blood cultures considered contamination. She remains afebrile. Patient is again asleep but appears to be resting comfortably; family member at bedside. Objective:     Vitals:    02/21/22 1000 02/21/22 1030 02/21/22 1203 02/21/22 1219   BP: 120/60 122/68 127/61 127/61   Pulse: 70 84 67 67   Resp: 18 18 20 20   Temp:  98.2 °F (36.8 °C) 98.1 °F (36.7 °C) 98.1 °F (36.7 °C)   TempSrc:  Oral     SpO2:   100%    Weight:       Height:            Intake and Output:  Current Shift: 02/21 0701 - 02/21 1900  In: -   Out: 2000   Last three shifts: No intake/output data recorded. Physical Exam:   Vitals and nursing note reviewed. Constitutional:       Appearance: She is not ill-appearing. HENT: unremarkable  Genitourinary:     Comments: No Cochran  Musculoskeletal:      Right lower leg: No edema. Left lower leg: No edema. Skin:     Findings: No rash. Neurological: nonfocal, mild confusion   Psychiatric:    normal behavior     Thought Content: Thought content normal.         Judgment: Judgment normal.      Lab/Data Review:     WBC 7,000  ,000    Procal 0.16 <0.20  CRP 0.32 <0.44    No new cultures    Assessment:        1. Uncomplicated UTI with marked pyuria, secondary to Vancomycin-Resistant, but Ampicillin sensitive Enterococcus faecalis, status post 7 days IV Zosyn  2. Positive blood cultures with Coagulase negative Staphylococci consistent with contaminant  3. Renal failure  4. Thrombocytopenia, moderate, etiology unclear    Comment:  Procal and CRP decreasing, now off antibiotics. Plan:   1.  No further antibiotic recommendations   2. No antibiotic indicated for positive blood culture  3.  Cleared for discharge from ID standpoint     Signed By: Laci Wang MD     February 21, 2022

## 2022-02-21 NOTE — PROGRESS NOTES
Problem: Pressure Injury - Risk of  Goal: *Prevention of pressure injury  Description: Document Nitin Scale and appropriate interventions in the flowsheet. Outcome: Progressing Towards Goal  Note: Pressure Injury Interventions:  Sensory Interventions: Assess changes in LOC    Moisture Interventions: Absorbent underpads    Activity Interventions: Assess need for specialty bed    Mobility Interventions: Assess need for specialty bed    Nutrition Interventions: Document food/fluid/supplement intake    Friction and Shear Interventions: Apply protective barrier, creams and emollients                Problem: Patient Education: Go to Patient Education Activity  Goal: Patient/Family Education  Outcome: Progressing Towards Goal     Problem: Falls - Risk of  Goal: *Absence of Falls  Description: Document Lettie Duverney Fall Risk and appropriate interventions in the flowsheet.   Outcome: Progressing Towards Goal  Note: Fall Risk Interventions:       Mentation Interventions: Adequate sleep, hydration, pain control,Bed/chair exit alarm    Medication Interventions: Bed/chair exit alarm    Elimination Interventions: Bed/chair exit alarm,Call light in reach,Patient to call for help with toileting needs    History of Falls Interventions: Bed/chair exit alarm,Door open when patient unattended         Problem: Patient Education: Go to Patient Education Activity  Goal: Patient/Family Education  Outcome: Progressing Towards Goal     Problem: Anemia Care Plan (Adult and Pediatric)  Goal: *Labs within defined limits  Outcome: Progressing Towards Goal  Goal: *Tolerates increased activity  Outcome: Progressing Towards Goal     Problem: Chronic Renal Failure  Goal: *Fluid and electrolytes stabilized  Outcome: Progressing Towards Goal     Problem: Breathing Pattern - Ineffective  Goal: *Absence of hypoxia  Outcome: Progressing Towards Goal  Goal: *Use of effective breathing techniques  Outcome: Progressing Towards Goal     Problem: Discharge Planning  Goal: *Discharge to safe environment  Outcome: Progressing Towards Goal  Goal: *Knowledge of medication management  Outcome: Progressing Towards Goal  Goal: *Knowledge of discharge instructions  Outcome: Progressing Towards Goal     Problem: Patient Education: Go to Patient Education Activity  Goal: Patient/Family Education  Outcome: Progressing Towards Goal     Problem: Risk for Spread of Infection  Goal: Prevent transmission of infectious organism to others  Description: Prevent the transmission of infectious organisms to other patients, staff members, and visitors.   Outcome: Progressing Towards Goal     Problem: Patient Education:  Go to Education Activity  Goal: Patient/Family Education  Outcome: Progressing Towards Goal     Problem: Patient Education: Go to Patient Education Activity  Goal: Patient/Family Education  Outcome: Progressing Towards Goal

## 2022-02-21 NOTE — DIALYSIS
gcs 15 visited with DR. Sanon during treatment, tolerated well 3h dialysis with 2 L fluid removal.  Had large loose bm during treatment.

## 2022-02-21 NOTE — PROGRESS NOTES
Patient: Shawn Bruno MRN: [id number]  SSN: [id number]    YOB: 1950  Age: 71 y.o. Sex: female          Subjective:   Patient seen in the room. She was dialyzed last on Friday with 2 L of fluid removal no seizures reported as of today. Getting dialyzed today    Objective:     Vitals:    02/21/22 0900 02/21/22 0930 02/21/22 1000 02/21/22 1030   BP: 112/66 119/66 120/60 122/68   Pulse: 77 73 70 84   Resp: 18 18 18 18   Temp:    98.2 °F (36.8 °C)   TempSrc:    Oral   SpO2:       Weight:       Height:            Intake and Output:  Yesterday's Intake and Output:  No intake/output data recorded. Physical Exam:   GENERAL: no distress, appears stated age  EYE: negative  Respiratory -no distress. ABDOMEN: Nondistended. Musculoskeletal -normal on inspection. NEUROLOGIC: She is awake but lethargic      Dialysis access: cuffed tunneled catheter site right and IJ.       Data Review    Meds    Current Facility-Administered Medications   Medication Dose Route Frequency    traMADoL (ULTRAM) tablet 50 mg  50 mg Oral Q6H PRN    lidocaine 4 % patch 2 Patch  2 Patch TransDERmal Q24H    midodrine (PROAMATINE) tablet 10 mg  10 mg Oral TID WITH MEALS    levETIRAcetam (KEPPRA) tablet 1,000 mg  1,000 mg Oral BID    heparin (porcine) 1,000 unit/mL injection 3,200 Units  3,200 Units IntraVENous DIALYSIS PRN    0.9% sodium chloride infusion 250 mL  250 mL IntraVENous PRN    sodium chloride (NS) flush 5-40 mL  5-40 mL IntraVENous Q8H    sodium chloride (NS) flush 5-40 mL  5-40 mL IntraVENous PRN    acetaminophen (TYLENOL) tablet 650 mg  650 mg Oral Q6H PRN    Or    acetaminophen (TYLENOL) suppository 650 mg  650 mg Rectal Q6H PRN    polyethylene glycol (MIRALAX) packet 17 g  17 g Oral DAILY PRN    ondansetron (ZOFRAN ODT) tablet 4 mg  4 mg Oral Q8H PRN    Or    ondansetron (ZOFRAN) injection 4 mg  4 mg IntraVENous Q6H PRN    pantoprazole (PROTONIX) 40 mg in 0.9% sodium chloride 10 mL injection  40 mg IntraVENous Q12H    atorvastatin (LIPITOR) tablet 40 mg  40 mg Oral DAILY    calcitRIOL (ROCALTROL) capsule 0.5 mcg  0.5 mcg Oral DAILY    levothyroxine (SYNTHROID) tablet 150 mcg  150 mcg Oral DAILY    FLUoxetine (PROzac) capsule 10 mg  10 mg Oral DAILY    OXcarbazepine (TRILEPTAL) tablet 150 mg  150 mg Oral QAM    OXcarbazepine (TRILEPTAL) tablet 300 mg  300 mg Oral QHS    lacosamide (VIMPAT) tablet 200 mg  200 mg Oral BID    [Held by provider] sevelamer carbonate (RENVELA) tab 800 mg  800 mg Oral TID WITH MEALS    zinc oxide-white petrolatum 17-57 % topical paste   Topical PRN       Lab      Recent Results (from the past 24 hour(s))   CBC WITH AUTOMATED DIFF    Collection Time: 02/21/22  7:30 AM   Result Value Ref Range    WBC 7.0 3.6 - 11.0 K/uL    RBC 2.50 (L) 3.80 - 5.20 M/uL    HGB 7.9 (L) 11.5 - 16.0 g/dL    HCT 25.2 (L) 35.0 - 47.0 %    .8 (H) 80.0 - 99.0 FL    MCH 31.6 26.0 - 34.0 PG    MCHC 31.3 30.0 - 36.5 g/dL    RDW 16.4 (H) 11.5 - 14.5 %    PLATELET 726 (L) 487 - 400 K/uL    MPV 11.5 8.9 - 12.9 FL    NRBC 0.0 0.0  WBC    ABSOLUTE NRBC 0.00 0.00 - 0.01 K/uL    NEUTROPHILS 67 32 - 75 %    LYMPHOCYTES 22 12 - 49 %    MONOCYTES 10 5 - 13 %    EOSINOPHILS 1 0 - 7 %    BASOPHILS 0 0 - 1 %    IMMATURE GRANULOCYTES 0 0 - 0.5 %    ABS. NEUTROPHILS 4.6 1.8 - 8.0 K/UL    ABS. LYMPHOCYTES 1.5 0.8 - 3.5 K/UL    ABS. MONOCYTES 0.7 0.0 - 1.0 K/UL    ABS. EOSINOPHILS 0.1 0.0 - 0.4 K/UL    ABS. BASOPHILS 0.0 0.0 - 0.1 K/UL    ABS. IMM.  GRANS. 0.0 0.00 - 0.04 K/UL    DF AUTOMATED     METABOLIC PANEL, BASIC    Collection Time: 02/21/22  7:30 AM   Result Value Ref Range    Sodium 139 136 - 145 mmol/L    Potassium 4.2 3.5 - 5.1 mmol/L    Chloride 107 97 - 108 mmol/L    CO2 23 21 - 32 mmol/L    Anion gap 9 5 - 15 mmol/L    Glucose 64 (L) 65 - 100 mg/dL    BUN 29 (H) 6 - 20 mg/dL    Creatinine 4.20 (H) 0.55 - 1.02 mg/dL    BUN/Creatinine ratio 7 (L) 12 - 20      GFR est AA 13 (L) >60 ml/min/1.73m2    GFR est non-AA 10 (L) >60 ml/min/1.73m2    Calcium 7.5 (L) 8.5 - 10.1 mg/dL        Lab Results   Component Value Date/Time    Color Yellow/Straw 02/12/2022 09:00 AM    Appearance Turbid (A) 02/12/2022 09:00 AM    Specific gravity 1.017 02/12/2022 09:00 AM    pH (UA) 5.0 02/12/2022 09:00 AM    Protein 100 (A) 02/12/2022 09:00 AM    Glucose Negative 02/12/2022 09:00 AM    Ketone 5 (A) 02/12/2022 09:00 AM    Bilirubin Negative 02/12/2022 09:00 AM    Urobilinogen 0.1 02/12/2022 09:00 AM    Nitrites Negative 02/12/2022 09:00 AM    Leukocyte Esterase Moderate (A) 02/12/2022 09:00 AM    Epithelial cells Moderate (A) 05/20/2021 12:56 AM    Bacteria Negative 02/12/2022 09:00 AM    WBC >100 (H) 02/12/2022 09:00 AM    RBC 20-50 02/12/2022 09:00 AM       Imaging         Assessment & Plan:   1. ESRD. -On dialysis at [address] renal care in [place] every MWF   -On admission she was volume overloaded  -He was last dialyzed on Friday with 2 L fluid removal  -We will continue MWF dialysis while inpatient  -Permanent dialysis catheter as current access  -She also has massive bilateral effusions which need to be tapped if she has recurrent respiratory symptoms that might not improve with dialysis    2. Anemia   -2/2 ESRD and GI bleed     -s/p blood transfusion  -Apparently had black tarry stools and bright red bleeding per rectum. -s/p EGD on 2/14 which showed esophagitis chronic gastritis without bleeding. 3 clips from previous intervention were still present without any active bleeding noted  -Hemoglobin 7.9 today  -Continue Procrit 10,000 units with dialysis  -Transfusion as needed to keep hemoglobin  > 7     3  Hypotension   -probably from acute blood loss anemia.   -Normal WBC count. Afebrile   -Increased midodrine to 10 mg 3 times daily . Continues same dose  -will give albumin with dialysis and decreased UF goal     4 Secondary hyperparathyroidism of ESRD. -Calcium is okay.     -Farzaneh Hernandez currently is on hold which I will continue to hold. Phosphorus is 4.6  -PTH is 194 which is within target range for her ESRD. -Continue calcitriol at the current dose     5. hypokalemia  -Potassium today improved to 4.2 after aggressive replacement    6. B/L Pleural effusions , CHF  H/o AICD placement-  -CT of the abdomen/pelvic shows large bilateral pleural effusions with associated atelectasis  -Unable to remove >3L fluid with dialysis because of cramping/hypotension/tachycardia  -Appears more comfortable today from respiratory standpoint    6. Seizure disorder  -Apparently she had 2 seizures on dialysis on 2/16.   No seizures reported yesterday  - resumed on Home meds     Signed By: Sofiya Alvarado MD     February 21, 2022

## 2022-02-21 NOTE — DISCHARGE SUMMARY
Admit date: 2/11/2022   Admitting Provider: Kip Perez MD    Discharge date: 2/21/2022  Discharging Provider: Shavonne Silva NP      * Admission Diagnoses: [facility]ic shock ([facility] [A41.9, R65.21]  GI bleed [K92.2]    * Discharge Diagnoses:    Hospital Problems as of 2/21/2022 Date Reviewed: 1/3/2022          Codes Class Noted - Resolved POA    Complicated UTI (urinary tract infection) ICD-10-CM: N39.0  ICD-9-CM: 599.0  2/18/2022 - Present Yes        Respiratory failure with hypoxia ([facility] ICD-10-CM: J96.91  ICD-9-CM: 518.81  2/18/2022 - Present Yes        GI bleed ICD-10-CM: K92.2  ICD-9-CM: 578.9  2/11/2022 - Present Yes        Septic shock ([facility] ICD-10-CM: A41.9, R65.21  ICD-9-CM: 038.9, 785.52, 995.92  2/11/2022 - Present Unknown        Anemia ICD-10-CM: D64.9  ICD-9-CM: 285.9  1/3/2022 - Present Yes              * Hospital Course: Patient is a 46-year-old female with a PMH significant for ESRD on HD, HTN, HLD, chronic atrial fibrillation, history of AV block status post pacemaker, DM, CVA and seizure disorders. She comes to the hospital for weakness I graduated to somnolence, dark tarry stools, then bright red blood per rectum. Discharged just 1 day ago from SNF after being hospitalized for MRSA bacteremia and seizures. During this admission she presents hyperkalemic with hemoglobin of 7.6, BNP 5856, hypotensive with systolic pressure in the 29K and bicarb elevated. Chest x-ray negative for acute findings. Resuscitated with IV fluids, 1 unit PRBC and started on Levophed drip, IV Zosyn and Ventimask for oxygen support, placed in the ICU. GI, nephrology, pulmonary and cardiology were consulted. Weaned off oxygen and pressor therapy over the course of a few days and transferred to remote telemetry. EGD performed on 2/14 revealing esophagitis without bleeding, 3 clips from previous intervention still in place without bleeding.   Her hemoglobin has been stable without any further signs or symptoms of bleeding. 2D echo with normal findings. Urine culture positive for Vanco resistant Enterococcus faecalis with MDR and yeast. ID consulted, no further recommendations.       * Procedures:   Procedure(s):  ESOPHAGOGASTRODUODENOSCOPY (EGD)      Consults: Cardiology, ID and Nephrology    Significant Diagnostic Studies:  XR CHEST PORT   Final Result   Worsening congestive heart failure pattern.       DUPLEX LOWER EXT VENOUS RIGHT   Final Result       CT HEAD WO CONT   Final Result   No acute intracranial abnormality. Old ischemic disease in the high left parietal lobe and in the left basal   ganglion.       There is significant atrophy with significant deepening of the left sylvian   fissure.       CT ABD PELV WO CONT   Final Result   Fluid overload. No evidence to suggest a source of GI bleeding       CT dose reduction was achieved through use of a standardized protocol tailored   for this examination and automatic exposure control for dose modulation.       XR CHEST PORT   Final Result         Discharge Exam:  Physical Exam  Vitals and nursing note reviewed. Eyes:      Extraocular Movements: Extraocular movements intact. Cardiovascular:      Rate and Rhythm: Normal rate. Rhythm irregular. Pulmonary:      Effort: Pulmonary effort is normal.      Breath sounds: Normal breath sounds. Abdominal:      General: Bowel sounds are normal.      Palpations: Abdomen is soft. Musculoskeletal:      Right lower leg: Edema present. Left lower leg: Edema present. Skin:     General: Skin is warm and dry. Capillary Refill: Capillary refill takes less than 2 seconds. Findings: Bruising present. Neurological:      Mental Status: She is alert and oriented to person, place, and time.            * Discharge Condition: stable  * Disposition: Home    Discharge Medications:  Current Discharge Medication List      START taking these medications    Details   levETIRAcetam 1,000 mg tablet Take 1 Tablet by mouth two (2) times a day for 30 days. Qty: 60 Tablet, Refills: 0  Start date: 2/21/2022, End date: 3/23/2022         CONTINUE these medications which have CHANGED    Details   midodrine (PROAMATINE) 10 mg tablet Take 1 Tablet by mouth three (3) times daily (with meals) for 30 days. Indications: a feeling of dizziness upon standing due to a drop in blood pressure  Qty: 90 Tablet, Refills: 0  Start date: 2/21/2022, End date: 3/23/2022         CONTINUE these medications which have NOT CHANGED    Details   food supplemt, lactose-reduced (Ensure Active High Protein) liqd Take 237 mL by mouth four (4) times daily as needed for PRN Reason (Other) (as pt can tolerate). Qty: 60 Each, Refills: 5    Associated Diagnoses: Moderate protein-calorie malnutrition (HCC)      !! OXcarbazepine (TRILEPTAL) 150 mg tablet Take 1 Tablet by mouth Every morning. Qty: 60 Tablet, Refills: 2      !! OXcarbazepine (TrileptaL) 300 mg tablet Take 1 Tablet by mouth nightly. Qty: 30 Tablet, Refills: 3      pantoprazole (PROTONIX) 40 mg tablet Take 1 Tablet by mouth Daily (before breakfast). Qty: 30 Tablet, Refills: 0      zinc oxide-white petrolatum 17-57 % topical paste Apply  to affected area as needed for Skin Irritation. Indications: skin irritation  Qty: 113 g, Refills: 0      Vimpat 200 mg tab tablet Take 1 Tablet by mouth two (2) times a day. ondansetron (Zofran ODT) 4 mg disintegrating tablet 1 Tablet by SubLINGual route every eight (8) hours as needed for Nausea or Vomiting. Qty: 20 Tablet, Refills: 0      atorvastatin (LIPITOR) 40 mg tablet Take 1 tablet by mouth once daily for 90 days  Qty: 90 Tablet, Refills: 3      FLUoxetine (PROzac) 10 mg capsule Take 1 Capsule by mouth daily for 270 days.   Qty: 90 Capsule, Refills: 2    Associated Diagnoses: TRISHA (generalized anxiety disorder)      Euthyrox 150 mcg tablet Take 1 tablet by mouth once daily  Qty: 90 Tablet, Refills: 0      sevelamer carbonate (RENVELA) 800 mg tab tab        !! - Potential duplicate medications found. Please discuss with provider. STOP taking these medications       calcitRIOL (ROCALTROL) 0.5 mcg capsule Comments:   Reason for Stopping:         loperamide (IMODIUM) 2 mg capsule Comments:   Reason for Stopping:         levETIRAcetam (KEPPRA XR) 500 mg ER tablet Comments:   Reason for Stopping:               * Follow-up Care/Patient Instructions:   Activity: Activity as tolerated  Diet: Renal Diet  Wound Care: None needed      Patient to continue all medications as prescribed  Patient to maintain all f/u appointments  Patient counseled on medication adjustments  Patient stable for discharge    Follow-up Information     Follow up With Specialties Details Why Jad Alvarez MD Internal Medicine In 1 week  [address] [place] [place] [address] Savannah Jones MD [address] [place] [address] Vascular Surgery, Cardiology In 1 month  [address] [place] [address]      Girma Marrero MD Nephrology, Nephrology In 2 weeks  [facility]  [phone]            Time Spent: > 35 minutes    CC: Noemy Aguilera MD    Signed:  Manuel Russo NP  2/21/2022  4:27 PM

## 2022-02-21 NOTE — PROGRESS NOTES
Progress Note    Patient: Arias Alvarez MRN: [id number]  SSN: [id number]    YOB: 1950  Age: 71 y.o. Sex: female      Admit Date: 2/11/2022    LOS: 10 days     Subjective:   GI is following in consultation for dark tarry stools.     2/21: Patient seen in room awake and alert, not in distress. Patient denies pain. She had dialysis today. Reported loose stools, not dark in color. Hgb stable 7.9.    2/14 EGD - esophagitis, chronic gastritis without bleeding. three clips from previous intervention still in place No active bleeding noted.     History of Present Illness: Alisha James is a 71 y. o. female who is seen in consultation for GI bleed - dark tarry stool. Patient was admitted to the hospital with reports of dark stool since this morning. Multiple ER visits. She was transferred to SNF and was discharged home yesterday. Family noticed dark stools and and red blood per rectum so they called EMS. Upon arrival to the ED, patient became unresponsive with faint pulse. CPR started, she was resuscitated immediately. She was given IVF for hypotension, started levophed, and  placed on ventimask. PMH: H/o seizures with todds paralysis, CVA with right sided weakness, SSS with PPM not on AC, ESRD on HD. She is on Eliquis. EGD on 1/13/22 showed Acute gastric ulcer at anastomosis with hemorrhage, treated with 2 hemoclips and injected with epinephrine.      -ED labs: Hgb 7.6, creatinine 3.72, elevated troponin 62, BNP 5,856.   -CXR - Lungs clear; no interstitial or alveolar pulmonary edema       Objective:     Vitals:    02/21/22 1000 02/21/22 1030 02/21/22 1203 02/21/22 1219   BP: 120/60 122/68 127/61 127/61   Pulse: 70 84 67 67   Resp: 18 18 20 20   Temp:  98.2 °F (36.8 °C) 98.1 °F (36.7 °C) 98.1 °F (36.7 °C)   TempSrc:  Oral     SpO2:   100%    Weight:       Height:            Intake and Output:  Current Shift: 02/21 0701 - 02/21 1900  In: -   Out: 2000   Last three shifts: No intake/output data recorded. Physical Exam:   Skin:  Extremities and face reveal no rashes. No brizuela erythema. HEENT: Sclerae anicteric. Extra-occular muscles are intact. No abnormal pigmentation of the lips. The neck is supple. Cardiovascular: Regular rate and rhythm. Respiratory:  Comfortable breathing with no accessory muscle use. GI:  Abdomen nondistended, soft, and nontender. No enlargement of the liver or spleen. No masses palpable. Rectal:  Deferred  Musculoskeletal:Generaized weakness  Neurological:  Gross memory appears intact. Patient is alert and oriented. Psychiatric:  Mood appears appropriate with judgement intact. Lymphatic:  No visible adenopathy      Lab/Data Review:  Recent Results (from the past 24 hour(s))   CBC WITH AUTOMATED DIFF    Collection Time: 02/21/22  7:30 AM   Result Value Ref Range    WBC 7.0 3.6 - 11.0 K/uL    RBC 2.50 (L) 3.80 - 5.20 M/uL    HGB 7.9 (L) 11.5 - 16.0 g/dL    HCT 25.2 (L) 35.0 - 47.0 %    .8 (H) 80.0 - 99.0 FL    MCH 31.6 26.0 - 34.0 PG    MCHC 31.3 30.0 - 36.5 g/dL    RDW 16.4 (H) 11.5 - 14.5 %    PLATELET 113 (L) 405 - 400 K/uL    MPV 11.5 8.9 - 12.9 FL    NRBC 0.0 0.0  WBC    ABSOLUTE NRBC 0.00 0.00 - 0.01 K/uL    NEUTROPHILS 67 32 - 75 %    LYMPHOCYTES 22 12 - 49 %    MONOCYTES 10 5 - 13 %    EOSINOPHILS 1 0 - 7 %    BASOPHILS 0 0 - 1 %    IMMATURE GRANULOCYTES 0 0 - 0.5 %    ABS. NEUTROPHILS 4.6 1.8 - 8.0 K/UL    ABS. LYMPHOCYTES 1.5 0.8 - 3.5 K/UL    ABS. MONOCYTES 0.7 0.0 - 1.0 K/UL    ABS. EOSINOPHILS 0.1 0.0 - 0.4 K/UL    ABS. BASOPHILS 0.0 0.0 - 0.1 K/UL    ABS. IMM.  GRANS. 0.0 0.00 - 0.04 K/UL    DF AUTOMATED     METABOLIC PANEL, BASIC    Collection Time: 02/21/22  7:30 AM   Result Value Ref Range    Sodium 139 136 - 145 mmol/L    Potassium 4.2 3.5 - 5.1 mmol/L    Chloride 107 97 - 108 mmol/L    CO2 23 21 - 32 mmol/L    Anion gap 9 5 - 15 mmol/L    Glucose 64 (L) 65 - 100 mg/dL    BUN 29 (H) 6 - 20 mg/dL    Creatinine 4.20 (H) 0.55 - 1.02 mg/dL BUN/Creatinine ratio 7 (L) 12 - 20      GFR est AA 13 (L) >60 ml/min/1.73m2    GFR est non-AA 10 (L) >60 ml/min/1.73m2    Calcium 7.5 (L) 8.5 - 10.1 mg/dL              XR CHEST PORT   Final Result   Worsening congestive heart failure pattern. DUPLEX LOWER EXT VENOUS RIGHT   Final Result      CT HEAD WO CONT   Final Result   No acute intracranial abnormality. Old ischemic disease in the high left parietal lobe and in the left basal   ganglion. There is significant atrophy with significant deepening of the left sylvian   fissure. CT ABD PELV WO CONT   Final Result   Fluid overload. No evidence to suggest a source of GI bleeding      CT dose reduction was achieved through use of a standardized protocol tailored   for this examination and automatic exposure control for dose modulation. XR CHEST PORT   Final Result          Assessment:     Active Problems:    Anemia (1/3/2022)      GI bleed (2/11/2022)      Septic shock ([address] (2/61/7954)      Complicated UTI (urinary tract infection) (2/18/2022)      Respiratory failure with hypoxia ([address] (2/18/2022)        Plan:   1. GI Bleed - dark stools.      -latest Hgb 7.9 . Monitor and transfuse as needed.      -Continue PPI     -No anti-coagulant     -Repeat CBC in am     -2/14 EGD esophagitis, chronic gastritis without bleeding. three clips from previous intervention still in place No active bleeding noted. 2. Anemia      -latest Hgb 7.9.  Monitor and transfuse as needed.       - ESRD  3. Hypovolemic shock      -BP initially low, started on levophed. Given blood transfusion      -was transferred to ICU, now on medical floor. 4. Acute respiratory failure (resolved)      -currently on room air not in distress  5. ESRD      -hemodialysis      -creatinine 4.20      -nephrology input appreciated       Patient discussed with Dr Jodie tSark and agrees to above impression and plan.   Thank you for allowing me to participate in this patients care    Signed By: Debbie Barry, RACHEL     February 21, 2022

## 2022-02-21 NOTE — DISCHARGE INSTRUCTIONS
* Follow-up Care/Patient Instructions:   Activity: Activity as tolerated  Diet: Renal Diet  Wound Care: None needed      Patient to continue all medications as prescribed  Patient to maintain all f/u appointments  Patient counseled on medication adjustments  Patient stable for discharge    Follow-up Information     Follow up With Specialties Details Why Contact Marcelo Coello MD Internal Medicine In 1 week  [facility] Vascular Surgery, Cardiology In 1 month  [address] [facility] [address] [place] [address]      Ant Bean MD Nephrology, Nephrology In 2 weeks  [address]  [phone]

## 2022-02-21 NOTE — PROGRESS NOTES
CARDIOLOGY PROGRESS NOTE    Patient seen and examined. This is a patient who is followed for atrial fibrillation, history of pacemaker. Resting comfortably. No acute events noted overnight. Telemetry reviewed, Vpaced 60s    Pertinent review of systems items noted above, all other systems are negative. Current medications reviewed. Physical Examination  Vital signs are stable. Blood pressure 165/70 , Pulse 77  No apparent distress. Heart is regular, rate and rhythm. Normal S1, S2, no murmurs are appreciated. Lungs are clear bilaterally. Abdomen is distended  Extremities have mild edema. .  Recent Results (from the past 12 hour(s))   CBC WITH AUTOMATED DIFF    Collection Time: 02/21/22  7:30 AM   Result Value Ref Range    WBC 7.0 3.6 - 11.0 K/uL    RBC 2.50 (L) 3.80 - 5.20 M/uL    HGB 7.9 (L) 11.5 - 16.0 g/dL    HCT 25.2 (L) 35.0 - 47.0 %    .8 (H) 80.0 - 99.0 FL    MCH 31.6 26.0 - 34.0 PG    MCHC 31.3 30.0 - 36.5 g/dL    RDW 16.4 (H) 11.5 - 14.5 %    PLATELET 780 (L) 534 - 400 K/uL    MPV 11.5 8.9 - 12.9 FL    NRBC 0.0 0.0  WBC    ABSOLUTE NRBC 0.00 0.00 - 0.01 K/uL    NEUTROPHILS 67 32 - 75 %    LYMPHOCYTES 22 12 - 49 %    MONOCYTES 10 5 - 13 %    EOSINOPHILS 1 0 - 7 %    BASOPHILS 0 0 - 1 %    IMMATURE GRANULOCYTES 0 0 - 0.5 %    ABS. NEUTROPHILS 4.6 1.8 - 8.0 K/UL    ABS. LYMPHOCYTES 1.5 0.8 - 3.5 K/UL    ABS. MONOCYTES 0.7 0.0 - 1.0 K/UL    ABS. EOSINOPHILS 0.1 0.0 - 0.4 K/UL    ABS. BASOPHILS 0.0 0.0 - 0.1 K/UL    ABS. IMM.  GRANS. 0.0 0.00 - 0.04 K/UL    DF AUTOMATED     METABOLIC PANEL, BASIC    Collection Time: 02/21/22  7:30 AM   Result Value Ref Range    Sodium 139 136 - 145 mmol/L    Potassium 4.2 3.5 - 5.1 mmol/L    Chloride 107 97 - 108 mmol/L    CO2 23 21 - 32 mmol/L    Anion gap 9 5 - 15 mmol/L    Glucose 64 (L) 65 - 100 mg/dL    BUN 29 (H) 6 - 20 mg/dL    Creatinine 4.20 (H) 0.55 - 1.02 mg/dL    BUN/Creatinine ratio 7 (L) 12 - 20      GFR est AA 13 (L) >60 ml/min/1.73m2 GFR est non-AA 10 (L) >60 ml/min/1.73m2    Calcium 7.5 (L) 8.5 - 10.1 mg/dL       Case discussed with Dr. Aaliyah Nguyen and our impression and recommendations are as follows:    1. Atrial fibrillation:  Known AF, History of bleed in the past and overall condition, likely not a candidate for [facility] given current condition. Rate is presently controlled, continue to closely monitor on telemetry. 2. High degree AVB s/p PPM:  Normal function per last device transmission. 3. Elevated troponin: no active chest pain. Currently 732. Elevation in setting of sepsis, ESRD, GI bleed/anemia. Continues to deny chest pain. Echo with preserved EF, no wall  Motion abnormalities, mild to moderate MR . NST in November 2021 with no ischemia. No ASA given GI bleeding, no bb while on pressors. Med management as able. 4. Hypotension:  On midodrine at home, continue pressors, bp acceptable at present. Will continue to follow peripherally. Please do not hesitate to call if additional questions arise. [facility] Card[facility]  [address]    [facility] [place], [address] [facility]  [phone]

## 2022-02-21 NOTE — PROGRESS NOTES
CM reviewing chart. Talked with patient and daughter Mary Dominguez about discharge plan. Plan is for home health, choice for [facility]. Referral sent via [facility] and has been accepted. Have also asked about transportation setup for dialysis. Will look into transport for them. Patient discharging home today with home health through [address] [place] [address]. Unable to set up transport for patient from home to dialysis. Explained to [employer]. Did inform her we can set up transport home due to her being bed bound. Order have been sent via [facility]. Medicare pt has received, reviewed, and signed 2nd IM letter informing them of their right to appeal the discharge. Signed copied has been placed on pt bedside chart.

## 2022-02-21 NOTE — PROGRESS NOTES
Problem: Pressure Injury - Risk of  Goal: *Prevention of pressure injury  Description: Document Nitin Scale and appropriate interventions in the flowsheet. Outcome: Progressing Towards Goal  Note: Pressure Injury Interventions:  Sensory Interventions: Assess changes in LOC,Keep linens dry and wrinkle-free    Moisture Interventions: Absorbent underpads,Apply protective barrier, creams and emollients,Check for incontinence Q2 hours and as needed    Activity Interventions: PT/OT evaluation    Mobility Interventions: PT/OT evaluation    Nutrition Interventions: Document food/fluid/supplement intake    Friction and Shear Interventions: Apply protective barrier, creams and emollients,Feet elevated on foot rest,HOB 30 degrees or less                Problem: Patient Education: Go to Patient Education Activity  Goal: Patient/Family Education  Outcome: Progressing Towards Goal     Problem: Falls - Risk of  Goal: *Absence of Falls  Description: Document Cuco Fall Risk and appropriate interventions in the flowsheet.   Outcome: Progressing Towards Goal  Note: Fall Risk Interventions:       Mentation Interventions: Bed/chair exit alarm    Medication Interventions: Bed/chair exit alarm    Elimination Interventions: Bed/chair exit alarm,Call light in reach,Toileting schedule/hourly rounds    History of Falls Interventions: Bed/chair exit alarm         Problem: Patient Education: Go to Patient Education Activity  Goal: Patient/Family Education  Outcome: Progressing Towards Goal     Problem: Anemia Care Plan (Adult and Pediatric)  Goal: *Labs within defined limits  Outcome: Progressing Towards Goal  Goal: *Tolerates increased activity  Outcome: Progressing Towards Goal     Problem: Chronic Renal Failure  Goal: *Fluid and electrolytes stabilized  Outcome: Progressing Towards Goal     Problem: Breathing Pattern - Ineffective  Goal: *Absence of hypoxia  Outcome: Progressing Towards Goal  Goal: *Use of effective breathing techniques  Outcome: Progressing Towards Goal     Problem: Discharge Planning  Goal: *Discharge to safe environment  Outcome: Progressing Towards Goal  Goal: *Knowledge of medication management  Outcome: Progressing Towards Goal  Goal: *Knowledge of discharge instructions  Outcome: Progressing Towards Goal     Problem: Patient Education: Go to Patient Education Activity  Goal: Patient/Family Education  Outcome: Progressing Towards Goal     Problem: Risk for Spread of Infection  Goal: Prevent transmission of infectious organism to others  Description: Prevent the transmission of infectious organisms to other patients, staff members, and visitors.   Outcome: Progressing Towards Goal     Problem: Patient Education:  Go to Education Activity  Goal: Patient/Family Education  Outcome: Progressing Towards Goal     Problem: Patient Education: Go to Patient Education Activity  Goal: Patient/Family Education  Outcome: Progressing Towards Goal

## 2022-02-22 ENCOUNTER — TELEPHONE (OUTPATIENT)
Dept: INTERNAL MEDICINE CLINIC | Age: 72
End: 2022-02-22

## 2022-02-22 ENCOUNTER — PATIENT OUTREACH (OUTPATIENT)
Dept: CASE MANAGEMENT | Age: 72
End: 2022-02-22

## 2022-02-22 DIAGNOSIS — M54.50 CHRONIC LOW BACK PAIN, UNSPECIFIED BACK PAIN LATERALITY, UNSPECIFIED WHETHER SCIATICA PRESENT: Primary | ICD-10-CM

## 2022-02-22 DIAGNOSIS — G89.29 CHRONIC LOW BACK PAIN, UNSPECIFIED BACK PAIN LATERALITY, UNSPECIFIED WHETHER SCIATICA PRESENT: Primary | ICD-10-CM

## 2022-02-22 RX ORDER — TRAMADOL HYDROCHLORIDE 50 MG/1
50 TABLET ORAL
Qty: 60 TABLET | Refills: 0 | Status: SHIPPED | OUTPATIENT
Start: 2022-02-22 | End: 2022-03-08 | Stop reason: SDUPTHER

## 2022-02-22 NOTE — PROGRESS NOTES
Care Transitions Initial Call    Call within 2 business days of discharge: Yes     Patient: Sixto Cortés Patient : 1950 MRN: [id number]    Last Discharge [address]       Complaint Diagnosis Description Type Department Provider    22 GI Problem Gastrointestinal hemorrhage, unspecified gastrointestinal hemorrhage type . .. ED to Hosp-Admission (Discharged) (ADMIT) Trisha Ceja MD; Luiz Monsalve, ... Was this an external facility discharge? No Discharge Facility: n/a    Challenges to be reviewed by the provider   Per family, patient has pain d/t sacral wound and arthritis in back. She was given tramadol 50 mg every 6 hours as need during hospital stay with some relief. CTN contact PCP office request order for tramadol and hospital follow up. Method of communication with provider : phone    Discussed [phone] related testing which was not done at this time. Test results were not done. Patient informed of results, if available? n/a     Advance Care Planning:   Does patient have an Advance Directive:  not currently on file    Inpatient Readmission Risk score: Unplanned Readmit Risk Score: 36.9 ( )    Was this a readmission? yes   Patient stated reason for the admission: dark tarry stool/GI bleed    Patients top risk factors for readmission: functional physical ability, medical condition-ESRD on HD, bed bound, requires 24/7 assistance, transportation and caregiver stress   Interventions to address risk factors: Scheduled appointment with [email or web address] and Obtained and reviewed discharge summary and/or continuity of care documents   Patient is bed bound and family is attempting to arrange transportation to HD for MWF. They will attempt to reach out to dialysis center today for resources. Care Transition Nurse (CTN) contacted the family by telephone to perform post hospital discharge assessment. Verified name and  with family as identifiers.  Provided introduction to self, and explanation of the CTN role. CTN reviewed discharge instructions, medical action plan and red flags with family who verbalized understanding. Were discharge instructions available to patient? yes. Reviewed appropriate site of care based on symptoms and resources available to patient including: PCP, Specialist and  [facility]. Family given an opportunity to ask questions and does not have any further questions or concerns at this time. The family agrees to contact the PCP office for questions related to their healthcare. Medication reconciliation was performed with family, who verbalizes understanding of administration of home medications. Advised obtaining a 90-day supply of all daily and as-needed medications. Referral to Pharm D needed: no     Home Health/Outpatient orders at discharge: home health care, PT, OT and [address]: [address]  Date of initial visit: family has been in contact with [facility]. Durable Medical Equipment ordered at discharge: None     Discussed follow-up appointments. If no appointment was previously scheduled, appointment scheduling offered: yes. Is follow up appointment scheduled within 7 days of discharge? no, first available. [address] follow up appointment(s):   Future Appointments   Date Time Provider Kassandra Nix   3/8/2022  5:00 PM Erick Lemsu NP [facility] BS AMB   6/1/2022  1:15 PM Ney Alvarado MD [facility] AMB     [facility] follow up appointment(s):   Neuro-Dr. Eve Schwartz- family will call    Plan for follow-up call in 5-7 days based on severity of symptoms and risk factors. Plan for next call: self management-transportation/home aid/medicaid application complete and follow up appointment-attend as directed. CTN provided contact information for future needs. Goals Addressed                 This Visit's Progress     Prevent complications post hospitalization. 02/22/22   Attend hospital follow up as directed.   PCP- Dr Angelito Junior William (out of office) KELLY appt scheduled with Dr. Julianne Garcia on [email or web address]   Activity- HH PT/OT to increase mobility and prevent falls.  Transportation- family in process of applying for medicaid. Will need to arrange transportation to and from HD. If patient is able to transport by [facility] in the future, family has service available in their area that would cost $10 each way.  CM insurance- family will work with [facility]/ CM to try and arrange transport to HD.  Medication compliance   Pain- patient has sacral wound described by HANNAH as size of dinner plate. She has [facility] nurse coming to home for wound care. Patient has pain in sacral area and back request order for tramadol from provider. Update- medication sent to pharmacy, information relayed to family and will pickup.

## 2022-02-22 NOTE — TELEPHONE ENCOUNTER
Charley Stark with [facility] transitional care called stating that pt was dc'd from the hospital yesterday. She is having severe in her back and an ulcer on her buttock that is painful. While in the hospital pt was given tramadol 50mg q6h prn and it was helping relieve some of the pain. They tried Lidocaine patched but they did not help.  She was taking Codeine with Tylenol but caused her to feel \"loopy\" her daughter would like to know if you can prescribe her Tramadol until her hospital f/u

## 2022-02-23 ENCOUNTER — PATIENT MESSAGE (OUTPATIENT)
Dept: INTERNAL MEDICINE CLINIC | Age: 72
End: 2022-02-23

## 2022-02-23 DIAGNOSIS — F41.1 GAD (GENERALIZED ANXIETY DISORDER): ICD-10-CM

## 2022-02-24 RX ORDER — FLUOXETINE 10 MG/1
10 CAPSULE ORAL DAILY
Qty: 90 CAPSULE | Refills: 2 | Status: SHIPPED | OUTPATIENT
Start: 2022-02-24 | End: 2022-11-21

## 2022-02-24 RX ORDER — PANTOPRAZOLE SODIUM 40 MG/1
40 TABLET, DELAYED RELEASE ORAL
Qty: 90 TABLET | Refills: 0 | Status: CANCELLED | OUTPATIENT
Start: 2022-02-24

## 2022-02-24 RX ORDER — PANTOPRAZOLE SODIUM 40 MG/1
40 TABLET, DELAYED RELEASE ORAL
Qty: 90 TABLET | Refills: 2 | Status: SHIPPED | OUTPATIENT
Start: 2022-02-24 | End: 2022-05-09

## 2022-02-24 RX ORDER — ONDANSETRON 4 MG/1
4 TABLET, ORALLY DISINTEGRATING ORAL
Qty: 30 TABLET | Refills: 2 | Status: SHIPPED | OUTPATIENT
Start: 2022-02-24 | End: 2022-05-09

## 2022-02-24 NOTE — TELEPHONE ENCOUNTER
From: Rosendo Torrez  To: Bertha Lewis NP  Sent: 2/23/2022 12:51 PM EST  Subject: Medicine     The hospital did not call in her protonix, she has none at home and she is vomiting again.

## 2022-02-28 RX ORDER — LEVOFLOXACIN 250 MG/1
250 TABLET ORAL EVERY OTHER DAY
Qty: 3 TABLET | Refills: 0 | Status: SHIPPED | OUTPATIENT
Start: 2022-02-28 | End: 2022-03-03

## 2022-02-28 NOTE — TELEPHONE ENCOUNTER
Patients daughter-in-law left a voicemail inquiring about receiving Zosyn for the patient. She states the patient was discharged recently and the hospital did not send home the Zosyn for her UTI, her symptoms aren't getting better. Family member is treating patient with an antibiotic that she has on hand Azithromycin. Also, is requesting that the doctor put in an order for her stitches to be removed from her leg by the  nurse form [facility]. Stitches have been in since February and order can be sent to the company, their number is [phone].

## 2022-03-08 ENCOUNTER — PATIENT OUTREACH (OUTPATIENT)
Dept: CASE MANAGEMENT | Age: 72
End: 2022-03-08

## 2022-03-08 ENCOUNTER — VIRTUAL VISIT (OUTPATIENT)
Dept: INTERNAL MEDICINE CLINIC | Age: 72
End: 2022-03-08
Payer: MEDICARE

## 2022-03-08 DIAGNOSIS — I73.9 PERIPHERAL VASCULAR DISEASE (HCC): ICD-10-CM

## 2022-03-08 DIAGNOSIS — Z09 HOSPITAL DISCHARGE FOLLOW-UP: Primary | ICD-10-CM

## 2022-03-08 DIAGNOSIS — E11.69 TYPE 2 DIABETES MELLITUS WITH OTHER SPECIFIED COMPLICATION, WITHOUT LONG-TERM CURRENT USE OF INSULIN (HCC): ICD-10-CM

## 2022-03-08 DIAGNOSIS — R06.89 RESPIRATORY DEPRESSION: ICD-10-CM

## 2022-03-08 DIAGNOSIS — K92.2 GASTROINTESTINAL HEMORRHAGE, UNSPECIFIED GASTROINTESTINAL HEMORRHAGE TYPE: ICD-10-CM

## 2022-03-08 DIAGNOSIS — A41.01 SEPSIS DUE TO METHICILLIN SUSCEPTIBLE STAPHYLOCOCCUS AUREUS (MSSA) WITHOUT ACUTE ORGAN DYSFUNCTION (HCC): ICD-10-CM

## 2022-03-08 DIAGNOSIS — M54.50 CHRONIC LOW BACK PAIN, UNSPECIFIED BACK PAIN LATERALITY, UNSPECIFIED WHETHER SCIATICA PRESENT: ICD-10-CM

## 2022-03-08 DIAGNOSIS — G89.29 CHRONIC LOW BACK PAIN, UNSPECIFIED BACK PAIN LATERALITY, UNSPECIFIED WHETHER SCIATICA PRESENT: ICD-10-CM

## 2022-03-08 DIAGNOSIS — F11.99 OPIOID USE, UNSPECIFIED WITH UNSPECIFIED OPIOID-INDUCED DISORDER (HCC): ICD-10-CM

## 2022-03-08 PROBLEM — R65.21 SEPTIC SHOCK (HCC): Status: RESOLVED | Noted: 2022-02-11 | Resolved: 2022-03-08

## 2022-03-08 PROBLEM — A41.9 SEPSIS (HCC): Status: RESOLVED | Noted: 2022-01-07 | Resolved: 2022-03-08

## 2022-03-08 PROBLEM — A41.9 SEPTIC SHOCK (HCC): Status: RESOLVED | Noted: 2022-02-11 | Resolved: 2022-03-08

## 2022-03-08 PROCEDURE — 1090F PRES/ABSN URINE INCON ASSESS: CPT | Performed by: NURSE PRACTITIONER

## 2022-03-08 PROCEDURE — G8432 DEP SCR NOT DOC, RNG: HCPCS | Performed by: NURSE PRACTITIONER

## 2022-03-08 PROCEDURE — G8536 NO DOC ELDER MAL SCRN: HCPCS | Performed by: NURSE PRACTITIONER

## 2022-03-08 PROCEDURE — 2022F DILAT RTA XM EVC RTNOPTHY: CPT | Performed by: NURSE PRACTITIONER

## 2022-03-08 PROCEDURE — 1111F DSCHRG MED/CURRENT MED MERGE: CPT | Performed by: NURSE PRACTITIONER

## 2022-03-08 PROCEDURE — G8427 DOCREV CUR MEDS BY ELIG CLIN: HCPCS | Performed by: NURSE PRACTITIONER

## 2022-03-08 PROCEDURE — 3017F COLORECTAL CA SCREEN DOC REV: CPT | Performed by: NURSE PRACTITIONER

## 2022-03-08 PROCEDURE — 3044F HG A1C LEVEL LT 7.0%: CPT | Performed by: NURSE PRACTITIONER

## 2022-03-08 PROCEDURE — G8400 PT W/DXA NO RESULTS DOC: HCPCS | Performed by: NURSE PRACTITIONER

## 2022-03-08 PROCEDURE — 1101F PT FALLS ASSESS-DOCD LE1/YR: CPT | Performed by: NURSE PRACTITIONER

## 2022-03-08 PROCEDURE — 99214 OFFICE O/P EST MOD 30 MIN: CPT | Performed by: NURSE PRACTITIONER

## 2022-03-08 PROCEDURE — G8419 CALC BMI OUT NRM PARAM NOF/U: HCPCS | Performed by: NURSE PRACTITIONER

## 2022-03-08 RX ORDER — TRAMADOL HYDROCHLORIDE 50 MG/1
50 TABLET ORAL
Qty: 120 TABLET | Refills: 0 | Status: SHIPPED | OUTPATIENT
Start: 2022-03-08 | End: 2022-04-07

## 2022-03-08 NOTE — PROGRESS NOTES
Nnamdi Morales is a 71 y.o. F here for hospital/ER visit follow up. History was provided by the patient, other: daughter in law. Discharge summary reviewed today by me, and relevant data from the visit is copied and/or noted below. Visit and/or admission dates:   Admit date: 2/11/2022      Discharge date: 2/21/2022   Admission dx:  Septic shock ([facility] [A41.9, R65.21]  GI bleed [K92.2]     Discharge dx:  Complicated UTI (urinary tract infection)     Respiratory failure with hypoxia (HCC)     GI bleed     Septic shock ([facility]    Anemia            * Hospital Course: Patient is a 71-year-old female with a PMH significant for ESRD on HD, HTN, HLD, chronic atrial fibrillation, history of AV block status post pacemaker, DM, CVA and seizure disorders.  She comes to the hospital for weakness I graduated to somnolence, dark tarry stools, then bright red blood per rectum.  Discharged just 1 day ago from SNF after being hospitalized for MRSA bacteremia and seizures.  During this admission she presents hyperkalemic with hemoglobin of 7.6, BNP 5856, hypotensive with systolic pressure in the 66K and bicarb elevated.  Chest x-ray negative for acute findings.  Resuscitated with IV fluids, 1 unit PRBC and started on Levophed drip, IV Zosyn and Ventimask for oxygen support, placed in the ICU. Cherry Carty, nephrology, pulmonary and cardiology were consulted.   Weaned off oxygen and pressor therapy over the course of a few days and transferred to remote telemetry.  EGD performed on 2/14 revealing esophagitis without bleeding, 3 clips from previous intervention still in place without bleeding.  Her hemoglobin has been stable without any further signs or symptoms of bleeding.  2D echo with normal findings.  Urine culture positive for Vanco resistant Enterococcus faecalis with MDR and yeast. ID consulted, no further recommendations.        * Procedures:   Procedure(s):  ESOPHAGOGASTRODUODENOSCOPY (EGD)     Consults: Cardiology, ID and Nephrology     Significant Diagnostic Studies:  XR CHEST PORT  Final Result  Worsening congestive heart failure pattern.     DUPLEX LOWER EXT VENOUS RIGHT  Final Result     CT HEAD WO CONT  Final Result  No acute intracranial abnormality. Old ischemic disease in the high left parietal lobe and in the left basal  ganglion. There is significant atrophy with significant deepening of the left sylvian  fissure.     CT ABD PELV WO CONT  Final Result  Fluid overload. No evidence to suggest a source of GI bleeding    Update: 3/8/22  Patient is having nephro consults at dialysis. Going to have a /vv with neurology. Her daughter in law belia, serves as primary historian; removed some stiches because they couldn't get a doctors order. Nurses are coming by the home, having wound management with medihoney being applied the the left outer leg. They were in over a month. She reports she is doing better than in a long time. She is eating and drinking better, still drinking protein shakes. The nausea is under control  Since discharge patient denies any fevers, shortness of breath, lower extremity edema, diarrhea, constipation. She has swelling in her right arm from the port for dialysis. Reports blood loss led to breathing issue and heart issue from gi bleed, but AICD is functioning normally.       Current Outpatient Medications   Medication Instructions    atorvastatin (LIPITOR) 40 mg tablet Take 1 tablet by mouth once daily for 90 days    Euthyrox 150 mcg tablet Take 1 tablet by mouth once daily    FLUoxetine (PROZAC) 10 mg, Oral, DAILY    food supplemt, lactose-reduced (Ensure Active High Protein) liqd 237 mL, Oral, 4 TIMES DAILY AS NEEDED    levETIRAcetam 1,000 mg, Oral, 2 TIMES DAILY    midodrine (PROAMATINE) 10 mg, Oral, 3 TIMES DAILY WITH MEALS    ondansetron (ZOFRAN ODT) 4 mg, SubLINGual, EVERY 8 HOURS AS NEEDED    OXcarbazepine (TRILEPTAL) 150 mg, Oral, EVERY MORNING    OXcarbazepine (TRILEPTAL) 300 mg, Oral, EVERY BEDTIME    pantoprazole (PROTONIX) 40 mg, Oral, DAILY BEFORE BREAKFAST    sevelamer carbonate (RENVELA) 800 mg tab tab No dose, route, or frequency recorded.  traMADoL (ULTRAM) 50 mg, Oral, EVERY 6 HOURS AS NEEDED    Vimpat 200 mg tab tablet 1 Tablet, Oral, 2 TIMES DAILY    zinc oxide-white petrolatum 17-57 % topical paste Topical, AS NEEDED     Past Medical History:   Diagnosis Date    Anxiety disorder     Arthritis     Atherosclerosis of native arteries of the extremities with ulceration ([facility] 8/31/2020    Atrial fibrillation (HCC)     Cardiac pacemaker in situ     Cellulitis and abscess of trunk 8/31/2020    Depression, postpartum     Diabetes ([facility]     Heart disease     Heartburn     Hx of long term use of blood thinners     Hypercholesterolemia     Hypertension     Hypothyroidism     Migraines     Peripheral venous insufficiency 8/31/2020    Seizures ([facility]     Sleep disorder     Stroke ([facility]     2019    Varicose veins of lower extremity with inflammation 8/31/2020       Objective:     [INSTRUCTIONS:  \"[x]? \" Indicates a positive item  \"[]? \" Indicates a negative item  -- DELETE ALL ITEMS NOT EXAMINED]     Constitutional: []? Appears well-developed and well-nourished [x]? No apparent distress , verbal lying in hospital bed, she looks improved from previous                            []? Abnormal -       Mental status: [x]? Alert and awake  []? Oriented to person/place/time []? Able to follow commands    []? Abnormal - not able to follow commands     Eyes:   EOM    [x]? Normal                  []? Abnormal -   Sclera  [x]? Normal                  []? Abnormal -              Discharge [x]? None visible     []? Abnormal -      HENT: [x]? Normocephalic, atraumatic  []? Abnormal -   [x]? Mouth/Throat: Mucous membranes are moist     External Ears [x]? Normal  []? Abnormal -     Neck: [x]? No visualized mass []? Abnormal -      Pulmonary/Chest: [x]?  Respiratory effort normal [x]? No visualized signs of difficulty breathing or respiratory distress        []? Abnormal -      Musculoskeletal:   [x]? Normal gait with no signs of ataxia         [x]? Normal range of motion of neck        []? Abnormal -      Neurological:        [x]? No Facial Asymmetry (Cranial nerve 7 motor function) (limited exam due to video visit)                            [x]? No gaze palsy        [x]? Abnormal -  she has a small seizure \"getting read to have a shake\"        Skin:                     [x]? No significant exanthematous lesions or discoloration noted on facial skin         [x]? Abnormal - unable to visualize wounds described by daughter due to vv, ordered home health to evaluate. Psychiatric:           [x]? Normal Affect []? Abnormal -        [x]? No Hallucinations    Lab Results   Component Value Date/Time    WBC 7.0 02/21/2022 07:30 AM    HGB 7.9 (L) 02/21/2022 07:30 AM    HCT 25.2 (L) 02/21/2022 07:30 AM    PLATELET 596 (L) 25/83/3089 07:30 AM    .8 (H) 02/21/2022 07:30 AM     Lab Results   Component Value Date/Time    Sodium 139 02/21/2022 07:30 AM    Potassium 4.2 02/21/2022 07:30 AM    Chloride 107 02/21/2022 07:30 AM    CO2 23 02/21/2022 07:30 AM    Anion gap 9 02/21/2022 07:30 AM    Glucose 64 (L) 02/21/2022 07:30 AM    BUN 29 (H) 02/21/2022 07:30 AM    Creatinine 4.20 (H) 02/21/2022 07:30 AM    BUN/Creatinine ratio 7 (L) 02/21/2022 07:30 AM    GFR est AA 13 (L) 02/21/2022 07:30 AM    GFR est non-AA 10 (L) 02/21/2022 07:30 AM    Calcium 7.5 (L) 02/21/2022 07:30 AM    Bilirubin, total 0.4 02/11/2022 12:56 PM    ALT (SGPT) 33 02/11/2022 12:56 PM    Alk. phosphatase 97 02/11/2022 12:56 PM    Protein, total 3.8 (L) 02/11/2022 12:56 PM    Albumin 1.4 (L) 02/16/2022 05:00 AM    Globulin 2.5 02/11/2022 12:56 PM    A-G Ratio 0.5 (L) 02/11/2022 12:56 PM          Diagnoses and all orders for this visit:    1. Hospital discharge follow-up    2.  Sepsis due to methicillin susceptible Staphylococcus aureus (MSSA) without acute organ dysfunction ([facility]    3. Gastrointestinal hemorrhage, unspecified gastrointestinal hemorrhage type    4. Type 2 diabetes mellitus with other specified complication, without long-term current use of insulin ([facility]    5. Peripheral vascular disease ([facility]    6. Chronic low back pain, unspecified back pain laterality, unspecified whether sciatica present  -     traMADoL (ULTRAM) 50 mg tablet; Take 1 Tablet by mouth every six (6) hours as needed for Pain for up to 30 days. Max Daily Amount: 200 mg. Indications: pain    7. Opioid use, unspecified with unspecified opioid-induced disorder    Patient is high risk for readmission to the hospital due to multiple chronic conditions. Current medications were reviewed including potential side effects and monitoring of any new therapy and medications were reconciled at the visit. she is having pain. In the past has had tylenol with codeine without problems advised I will refill her pain tramadol for her because of less likelihood of respiratory depression versus other opioids, advised pt and daughter-in law regarding this side effect and to be cautious and mindful when giving this med, and she is unable to take NSAIDs due to kidney failure. Also ordered incentive spirometry for the nurse to administer at home to help with lung function reduce risk of repeat episode of respiratory failure. Continue home health continue wound care, follow-up with PCP    Patient-Reported Systolic (Top): 822  Patient-Reported Diastolic (Bottom): 75  Patient-Reported Pulse: 69  Patient-Reported Pulse Oximetry: 96  Patient-Reported Weight: [address] [facility] [place], was evaluated through a synchronous (real-time) audio-video encounter. The patient (or guardian if applicable) is aware that this is a billable service. Verbal consent to proceed has been obtained within the past 12 months.  The visit was conducted pursuant to the emergency declaration under the [address] [place] [address], [address] [place] [address] waiver authority and the [employer] General Act. Patient identification was verified, and a caregiver was present when appropriate. The patient was located in a state where the provider was credentialed to provide care.

## 2022-03-08 NOTE — PROGRESS NOTES
Chief Complaint   Patient presents with   [facility] Follow Up     [address] 02/11-02/21 GI Bleed      1. \"Have you been to the ER, urgent care clinic since your last visit? Hospitalized since your last visit? \" [address] 02/11-02/21 GI Bleed     2. \"Have you seen or consulted any other health care providers outside of the [address] [place] [address] since your last visit? \"  @ [facility] 03/04/2022      3. For patients aged 39-70: Has the patient had a colonoscopy / FIT/ Cologuard? Yes - Care Gap present. Most recent result on file      If the patient is female:    4. For patients aged 41-77: Has the patient had a mammogram within the past 2 years? No      5. For patients aged 21-65: Has the patient had a pap smear?  NA - based on age or sex    Send link to [phone]

## 2022-03-15 ENCOUNTER — PATIENT OUTREACH (OUTPATIENT)
Dept: CASE MANAGEMENT | Age: 72
End: 2022-03-15

## 2022-03-15 NOTE — PROGRESS NOTES
Care Transitions Follow Up Call    Care Transition Nurse (CTN) contacted the family by telephone to follow up after admission   Follow up appointment completed? yes. Was follow up appointment scheduled within 7 days of discharge? no.   Patients top risk factors for readmission: functional physical ability and medical condition-ESRD on HD, mostly bedbound     REHABILITATION [facility] follow up appointment(s):   Future Appointments   Date Time Provider Kassandra Nix   6/1/2022  1:15 PM Tristan Selby MD [facility] BS AMB     HD- MWF    CTN provided contact information for future needs. Plan for follow-up call in 7-10 days based on severity of symptoms and risk factors. Plan for next call: needs to follow up with DSS and complete forms      Goals Addressed                 This Visit's Progress     Prevent complications post hospitalization. 02/22/22   Attend hospital follow up as directed. PCP- Dr Jerry Suero (out of office) DEBBIE DIAMOND appt scheduled with Dr. Aminah Shipley on [email or web address]   Activity- [facility] PT/OT to increase mobility and prevent falls.  Transportation- family in process of applying for medicaid. Will need to arrange transportation to and from HD. If patient is able to transport by [facility] in the future, family has service available in their area that would cost $10 each way.  CM insurance- family will work with [facility]/ CM to try and arrange transport to HD.  Medication compliance   Pain- patient has sacral wound described by DIL as size of dinner plate. She has [facility] nurse coming to home for wound care. Patient has pain in sacral area and back request order for tramadol from provider. Update- medication sent to pharmacy, information relayed to family and will pickup.    03/08/22  Patient attended PCP [facility] today. 03/15/22  Patient remains bedbound for the most part. She is working with [facility] PT/OT. She is moving arms and legs some.  She is transported to HD via ambulance-stretcher covered thru ins.  Family is looking into home HD. HH does not have personal care aides or MSW. Not many options available in their area [facility]).  CTN suggested contacting DSS and completing medicaid paperwork, family does not think patient will qualify as she did not in the past.

## 2022-03-22 ENCOUNTER — TELEPHONE (OUTPATIENT)
Dept: INTERNAL MEDICINE CLINIC | Age: 72
End: 2022-03-22

## 2022-03-22 RX ORDER — NYSTATIN 100000 [USP'U]/G
POWDER TOPICAL 4 TIMES DAILY
Qty: 60 G | Refills: 2 | Status: SHIPPED | OUTPATIENT
Start: 2022-03-22

## 2022-03-22 NOTE — CONSULTS
NEUROLOGY CONSULT    Name Italo Gill Age 71 y.o. MRN [id number]  1950     Referring Physician: Chava George MD    Chief Complaint: Breakthrough seizure     This is a 71 y.o.  female very well-known to our service, seen several times in this hospital and in the clinic he was admitted through the ED overnight where she presented with a breakthrough seizure at the rehab place. Patient was recently discharged from the hospital may be not even a week ago for the same reason. She was discharged on Vimpat 200 mg twice a day, Keppra 1000 mg twice a day and oxcarbazepine 150 mg twice a day. [facility] was not [facility]. The details of the seizure not available    Assessment and Plan:  1. Breakthrough seizure: Etiology seems to be combination of deteriorating kidney functions significant anemia contributing to low flow state and hypoxia. We will increase the dose of oxcarbazepine to 300 mg twice a day, continue Vimpat 200 mg twice a day and Keppra 1000 mg twice a day. Her CT scan of the head in the ED was unremarkable. No additional neurological work-up is indicated. 2.  History of stroke. 3.  Atrial fibrillation. 4.  Diabetes mellitus  5. Hypertension.     Thank you for the consult    No Known Allergies     Current Facility-Administered Medications   Medication Dose Route Frequency    [START ON 2022] Vancomycin random level to be drawn on 22 at 0600 am.   Other ONCE    amLODIPine (NORVASC) tablet 5 mg  5 mg Oral DAILY    ascorbic acid (vitamin C) (VITAMIN C) tablet 500 mg  500 mg Oral BID    aspirin chewable tablet 81 mg  81 mg Oral DAILY    atorvastatin (LIPITOR) tablet 40 mg  40 mg Oral DAILY    calcitRIOL (ROCALTROL) capsule 0.5 mcg  0.5 mcg Oral DAILY    levothyroxine (SYNTHROID) tablet 150 mcg  150 mcg Oral DAILY    FLUoxetine (PROzac) capsule 10 mg  10 mg Oral DAILY    levETIRAcetam (KEPPRA XR) ER tablet 500 mg  500 mg Oral DAILY    loperamide (IMODIUM) capsule 2 mg  2 mg Oral Q4H PRN    OXcarbazepine (TRILEPTAL) tablet 150 mg  150 mg Oral Q12H    pantoprazole (PROTONIX) tablet 40 mg  40 mg Oral ACB    lacosamide (VIMPAT) tablet 200 mg  200 mg Oral BID    sevelamer carbonate (RENVELA) tab 800 mg  800 mg Oral TID WITH MEALS    zinc oxide-white petrolatum 17-57 % topical paste   Topical PRN    sodium chloride (NS) flush 5-40 mL  5-40 mL IntraVENous Q8H    sodium chloride (NS) flush 5-40 mL  5-40 mL IntraVENous PRN    acetaminophen (TYLENOL) tablet 650 mg  650 mg Oral Q6H PRN    Or    acetaminophen (TYLENOL) suppository 650 mg  650 mg Rectal Q6H PRN    polyethylene glycol (MIRALAX) packet 17 g  17 g Oral DAILY PRN    ondansetron (ZOFRAN ODT) tablet 4 mg  4 mg Oral Q8H PRN    Or    ondansetron (ZOFRAN) injection 4 mg  4 mg IntraVENous Q6H PRN    enoxaparin (LOVENOX) injection 30 mg  30 mg SubCUTAneous DAILY    dextrose (D50W) injection syrg 25 g  50 mL IntraVENous PRN    VANCOMYCIN INFORMATION NOTE   Other Rx Dosing/Monitoring     Current Outpatient Medications   Medication Sig    OXcarbazepine (TRILEPTAL) 150 mg tablet Take 1 Tablet by mouth every twelve (12) hours.  pantoprazole (PROTONIX) 40 mg tablet Take 1 Tablet by mouth Daily (before breakfast).  amLODIPine (NORVASC) 5 mg tablet Take 1 Tablet by mouth daily for 60 days.  insulin lispro (HUMALOG) 100 unit/mL injection As per insulin sensitive ssi:    Insulin High Sensitivity (thin, ESRD)  For Blood Sugar (mg/dL) of:              Less than 150 =   0 units  150 -199 =  1 units  200 -249 =   2 units  250 -299 =   3 units  300 -349 =   4 units  350 or greater = 5 units and Call Physician  Initiate Hypoglycemic protocol if blood glucose is <70 mg/dL.  ascorbic acid, vitamin C, (VITAMIN C) 500 mg tablet Take 1 Tablet by mouth two (2) times a day for 30 days.  aspirin 81 mg chewable tablet Take 1 Tablet by mouth daily for 30 days.     predniSONE (DELTASONE) 20 mg tablet Start prednisone 20 mg PO daily x 2 days then 10 mg PO daily x 2 days then stop.  calcitRIOL (ROCALTROL) 0.5 mcg capsule Take 1 Capsule by mouth daily.  zinc oxide-white petrolatum 17-57 % topical paste Apply  to affected area as needed for Skin Irritation. Indications: skin irritation    ondansetron hcl (Zofran) 4 mg tablet Take 1 Tablet by mouth every eight (8) hours as needed for Nausea or Vomiting.  Vimpat 200 mg tab tablet Take 1 Tablet by mouth two (2) times a day.  ondansetron (Zofran ODT) 4 mg disintegrating tablet 1 Tablet by SubLINGual route every eight (8) hours as needed for Nausea or Vomiting.  atorvastatin (LIPITOR) 40 mg tablet Take 1 tablet by mouth once daily for 90 days    FLUoxetine (PROzac) 10 mg capsule Take 1 Capsule by mouth daily for 270 days.     loperamide (IMODIUM) 2 mg capsule TAKE 1 CAPSULE BY MOUTH SIX TIMES DAILY AS NEEDED FOR 30 DAYS    Euthyrox 150 mcg tablet Take 1 tablet by mouth once daily    levETIRAcetam (KEPPRA XR) 500 mg ER tablet TAKE 2 TABLETS BY MOUTH TWICE DAILY FOR 90 DAYS    sevelamer carbonate (RENVELA) 800 mg tab tab      Past Medical History:   Diagnosis Date    Anxiety disorder     Arthritis     Atherosclerosis of native arteries of the extremities with ulceration (HCC) 8/31/2020    Atrial fibrillation (HCC)     Cardiac pacemaker in situ     Cellulitis and abscess of trunk 8/31/2020    Depression, postpartum     Diabetes [facility] [address]     Heart disease     Heartburn     Hx of long term use of blood thinners     Hypercholesterolemia     Hypertension     Hypothyroidism     Migraines     Peripheral venous insufficiency 8/31/2020    Seizures ([facility] [address]     Sleep disorder     Stroke ([facility] [address]     2019    Varicose veins of lower extremity with inflammation 8/31/2020     Social History     Tobacco Use    Smoking status: Never Smoker    Smokeless tobacco: Never Used   Vaping Use    Vaping Use: Never used   Substance Use Topics    Alcohol use: Never    Drug use: Never     Exam  Visit Vitals  BP (!) 141/62   Pulse 61   Temp 97.9 °F (36.6 °C)   Resp 15   Ht 5' 4\" (1.626 m)   Wt 72.1 kg (159 lb)   SpO2 100%   BMI 27.29 kg/m²     General: Well developed, well nourished. Patient in no distress   Head: Normocephalic, atraumatic, anicteric sclera   Neck Normal ROM, No thyromegally   Lungs:     Cardiac:    Abd:    Ext: No pedal edema   Skin: Supple no rash     NeurologicExam:   Mental Status: Alert and oriented to person place and time   Speech: Fluent no aphasia or dysarthria. Cranial Nerves:   Pupils are equal round and reactive to light and accommodation, extraocular movements are intact and full, visual fields are intact by confrontation, no nystagmus noted, face is symmetric, sensation on face is intact and symmetric, hearing is intact and symmetric, tongue and uvula are in midline with normal movements, palate is elevating equally, shoulder shrug is intact and symmetric. Motor:  Full and symmetric strength of upper and lower ext . Normal bulk and tone. Reflexes:   Deep tendon reflexes 2+/4 and symmetric. Sensory:   Symmetric and intact    Gait:   Deferred. Tremor:   No tremor noted. Cerebellar:  Coordination intact for finger-nose-finger. Neurovascular: No carotid bruits.  No JVD      Lab Review  Lab Results   Component Value Date/Time    WBC 5.6 01/20/2022 11:50 AM    HCT 25.2 (L) 01/20/2022 11:50 AM    HGB 7.5 (L) 01/20/2022 11:50 AM    PLATELET 542 (L) 51/10/9416 11:50 AM     Lab Results   Component Value Date/Time    Sodium 137 01/20/2022 11:50 AM    Potassium 4.1 01/20/2022 11:50 AM    Chloride 103 01/20/2022 11:50 AM    CO2 26 01/20/2022 11:50 AM    Glucose 73 01/20/2022 11:50 AM    BUN 41 (H) 01/20/2022 11:50 AM    Creatinine 3.09 (H) 01/20/2022 11:50 AM    Calcium 8.0 (L) 01/20/2022 11:50 AM     Lab Results   Component Value Date/Time    Vitamin B12 312 01/12/2022 11:15 AM    Folate 5.4 01/12/2022 11:15 AM     Lab Results Component Value Date/Time    LDL, calculated 27.8 05/20/2021 05:35 AM     Lab Results   Component Value Date/Time    Hemoglobin A1c 4.7 01/07/2022 10:47 PM    Hemoglobin A1c, External 5.1 09/09/2019 12:00 AM     No components found for: TROPQUANT  No results found for: NGOZI None

## 2022-03-22 NOTE — TELEPHONE ENCOUNTER
Merlin Fuller with [facility] pt needs a rx for for nystatin powder or cream for yeast infection in between legs and vaginal area, infection has been present for 2 weeks.

## 2022-04-07 ENCOUNTER — APPOINTMENT (OUTPATIENT)
Dept: CT IMAGING | Age: 72
End: 2022-04-07
Attending: EMERGENCY MEDICINE
Payer: MEDICARE

## 2022-04-07 ENCOUNTER — HOSPITAL ENCOUNTER (EMERGENCY)
Age: 72
Discharge: HOME | End: 2022-04-07
Attending: EMERGENCY MEDICINE
Payer: MEDICARE

## 2022-04-07 VITALS
HEART RATE: 66 BPM | TEMPERATURE: 98.6 F | BODY MASS INDEX: 25.71 KG/M2 | DIASTOLIC BLOOD PRESSURE: 79 MMHG | OXYGEN SATURATION: 99 % | SYSTOLIC BLOOD PRESSURE: 151 MMHG | WEIGHT: 160 LBS | RESPIRATION RATE: 18 BRPM | HEIGHT: 66 IN

## 2022-04-07 DIAGNOSIS — S02.2XXA CLOSED FRACTURE OF NASAL BONE, INITIAL ENCOUNTER: Primary | ICD-10-CM

## 2022-04-07 PROCEDURE — 70450 CT HEAD/BRAIN W/O DYE: CPT

## 2022-04-07 PROCEDURE — 99284 EMERGENCY DEPT VISIT MOD MDM: CPT

## 2022-04-07 PROCEDURE — 72125 CT NECK SPINE W/O DYE: CPT

## 2022-04-07 PROCEDURE — 70486 CT MAXILLOFACIAL W/O DYE: CPT

## 2022-04-07 RX ORDER — NAPROXEN 500 MG/1
500 TABLET ORAL
Qty: 20 TABLET | Refills: 0 | Status: SHIPPED | OUTPATIENT
Start: 2022-04-07 | End: 2022-04-13

## 2022-04-07 NOTE — ED TRIAGE NOTES
Pt fell out of wheelchair last night, fell face first. Bruising noted to left eye and left forehead.      No LOC, no blood thinners

## 2022-04-07 NOTE — ED PROVIDER NOTES
EMERGENCY DEPARTMENT HISTORY AND PHYSICAL EXAM      Date: 4/7/2022  Patient Name: Juhi Garcia    History of Presenting Illness     Chief Complaint   Patient presents with    Fall       History Provided By: Patient    HPI: Juhi Garcia, 71 y.o. female with a past medical history significant Multiple comorbidities presents to the ED with cc of having fallen out of her wheelchair onto her left side yesterday. She says she has some mild aching in her head but denies being on a blood thinning medication. There was no loss of consciousness. She denies any fever, chills, nausea, vomiting, chest pain, shortness of breath, rash, diarrhea, headache, night sweats. There are no other complaints, changes, or physical findings at this time. PCP: Kylah Martin MD    No current facility-administered medications on file prior to encounter. Current Outpatient Medications on File Prior to Encounter   Medication Sig Dispense Refill    nystatin (MYCOSTATIN) powder Apply  to affected area four (4) times daily. 60 g 2    traMADoL (ULTRAM) 50 mg tablet Take 1 Tablet by mouth every six (6) hours as needed for Pain for up to 30 days. Max Daily Amount: 200 mg. Indications: pain 120 Tablet 0    Spirometers and Accessories avril 1 Each by Does Not Apply route three (3) times daily. 1 Each 1    FLUoxetine (PROzac) 10 mg capsule Take 1 Capsule by mouth daily for 270 days. 90 Capsule 2    pantoprazole (PROTONIX) 40 mg tablet Take 1 Tablet by mouth Daily (before breakfast). Indications: bleeding from stomach, esophagus or duodenum 90 Tablet 2    ondansetron (Zofran ODT) 4 mg disintegrating tablet 1 Tablet by SubLINGual route every eight (8) hours as needed for Nausea or Vomiting. 30 Tablet 2    food supplemt, lactose-reduced (Ensure Active High Protein) liqd Take 237 mL by mouth four (4) times daily as needed for PRN Reason (Other) (as pt can tolerate).  (Patient not taking: Reported on 3/8/2022) 60 Each 5    OXcarbazepine (TRILEPTAL) 150 mg tablet Take 1 Tablet by mouth Every morning. (Patient not taking: Reported on 3/8/2022) 60 Tablet 2    OXcarbazepine (TrileptaL) 300 mg tablet Take 1 Tablet by mouth nightly. (Patient not taking: Reported on 3/8/2022) 30 Tablet 3    zinc oxide-white petrolatum 17-57 % topical paste Apply  to affected area as needed for Skin Irritation. Indications: skin irritation 113 g 0    Vimpat 200 mg tab tablet Take 1 Tablet by mouth two (2) times a day.       atorvastatin (LIPITOR) 40 mg tablet Take 1 tablet by mouth once daily for 90 days 90 Tablet 3    Euthyrox 150 mcg tablet Take 1 tablet by mouth once daily 90 Tablet 0    sevelamer carbonate (RENVELA) 800 mg tab tab          Past History     Past Medical History:  Past Medical History:   Diagnosis Date    Anxiety disorder     Arthritis     Atherosclerosis of native arteries of the extremities with ulceration ([address] 8/31/2020    Atrial fibrillation ([address]     Cardiac pacemaker in situ     Cellulitis and abscess of trunk 8/31/2020    Depression, postpartum     Diabetes ([address]     Heart disease     Heartburn     Hx of long term use of blood thinners     Hypercholesterolemia     Hypertension     Hypothyroidism     Migraines     Peripheral venous insufficiency 8/31/2020    Seizures ([address]     Sleep disorder     Stroke ([address]     2019    Varicose veins of lower extremity with inflammation 8/31/2020       Past Surgical History:  Past Surgical History:   Procedure Laterality Date    HX BACK SURGERY      HX OTHER SURGICAL      leg surgery     HX OTHER SURGICAL      pacemaker monitor     IR INSERT TUNL CVC W/O PORT OVER 5 YR  12/10/2021       Family History:  Family History   Problem Relation Age of Onset    Heart Disease Mother     Heart Disease Father     Diabetes Sister     Diabetes Brother        Social History:  Social History     Tobacco Use    Smoking status: Never Smoker    Smokeless tobacco: Never Used Vaping Use    Vaping Use: Never used   Substance Use Topics    Alcohol use: Never    Drug use: Never       Allergies: Allergies   Allergen Reactions    Lactose Diarrhea         Review of Systems     Review of Systems   Constitutional: Negative. Negative for appetite change, chills, fatigue and fever. HENT: Negative. Negative for congestion and sinus pain. Eyes: Negative. Negative for pain and visual disturbance. Respiratory: Negative. Negative for chest tightness and shortness of breath. Cardiovascular: Negative. Negative for chest pain. Gastrointestinal: Negative. Negative for abdominal pain, diarrhea, nausea and vomiting. Genitourinary: Negative. Negative for difficulty urinating. No discharge   Musculoskeletal: Negative. Negative for arthralgias. Skin: Negative. Negative for rash. Neurological: Positive for headaches. Negative for weakness. Syncope: No acute injuries other than nasal bone frature. Hematological: Negative. Psychiatric/Behavioral: Negative. Negative for agitation. The patient is not nervous/anxious. All other systems reviewed and are negative. Physical Exam     Physical Exam  Vitals and nursing note reviewed. Constitutional:       General: She is not in acute distress. Appearance: She is well-developed. HENT:      Head: Normocephalic and atraumatic. Comments: Multiple contusions and bruises about the face and head     Nose: Nose normal.      Mouth/Throat:      Mouth: Mucous membranes are moist.      Pharynx: Oropharynx is clear. No oropharyngeal exudate. Eyes:      General:         Right eye: No discharge. Left eye: No discharge. Conjunctiva/sclera: Conjunctivae normal.      Pupils: Pupils are equal, round, and reactive to light. Cardiovascular:      Rate and Rhythm: Normal rate and regular rhythm. Chest Wall: PMI is not displaced. No thrill. Heart sounds: Normal heart sounds. No murmur heard.   No friction rub. No gallop. Pulmonary:      Effort: Pulmonary effort is normal. No respiratory distress. Breath sounds: Normal breath sounds. No wheezing or rales. Chest:      Chest wall: No tenderness. Abdominal:      General: Bowel sounds are normal. There is no distension. Palpations: Abdomen is soft. There is no mass. Tenderness: There is no abdominal tenderness. There is no guarding or rebound. Musculoskeletal:         General: Normal range of motion. Cervical back: Normal range of motion and neck supple. Lymphadenopathy:      Cervical: No cervical adenopathy. Skin:     General: Skin is warm and dry. Capillary Refill: Capillary refill takes less than 2 seconds. Findings: No erythema or rash. Neurological:      Mental Status: She is alert and oriented to person, place, and time. Cranial Nerves: No cranial nerve deficit. Coordination: Coordination normal.   Psychiatric:         Mood and Affect: Mood normal.         Behavior: Behavior normal.         Lab and Diagnostic Study Results     Labs -   No results found for this or any previous visit (from the past 12 hour(s)). Radiologic Studies -   @lastxrresult@  CT Results  (Last 48 hours)               04/07/22 1545  CT HEAD WO CONT Final result    Impression:  Age-appropriate atrophy. No acute findings. Narrative:  CT dose reduction was achieved through use of a standardized protocol tailored   for this examination and automatic exposure control for dose modulation. CT Head       History:        The sulci are prominent but symmetric. Periventricular and deep white matter   hypodensity is not unexpected for age. No acute abnormality seen gray or white   matter. Ventricles are symmetric and appropriate for atrophy. There is no   midline shift or mass effect, or evidence of hemorrhage. Bone windows show no   fracture.            04/07/22 1545  CT MAXILLOFACIAL WO CONT Final result    Narrative:  CT dose reduction was achieved through use of a standardized protocol tailored   for this examination and automatic exposure control for dose modulation. Protocol study shows a small left supraorbital scalp hematoma. Smaller   noncontiguous hematoma at the base of the nasal bone. Slightly overriding fracture of the right nasal bone. . No fracture seen anywhere   else. Air-fluid level in the sphenoid sinus. Madie Dust are maintained           CXR Results  (Last 48 hours)    None            Medical Decision Making   - I am the first provider for this patient. - I reviewed the vital signs, available nursing notes, past medical history, past surgical history, family history and social history. - Initial assessment performed. The patients presenting problems have been discussed, and they are in agreement with the care plan formulated and outlined with them. I have encouraged them to ask questions as they arise throughout their visit. Vital Signs-Reviewed the patient's vital signs. Patient Vitals for the past 12 hrs:   Temp Pulse Resp BP SpO2   04/07/22 1528 98.6 °F (37 °C) 66 18 (!) 151/79 99 %     Assess with CT scan of the head and face neck    ED Course:          Provider Notes (Medical Decision Making):   Nasal bone fracture  MDM       Procedures   Medical Decision Makingedical Decision Making  Performed by: Cherie Toro MD  PROCEDURES:    Procedures       Disposition   Disposition: Condition stable        DISCHARGE PLAN:  1. Current Discharge Medication List      CONTINUE these medications which have NOT CHANGED    Details   nystatin (MYCOSTATIN) powder Apply  to affected area four (4) times daily. Qty: 60 g, Refills: 2      traMADoL (ULTRAM) 50 mg tablet Take 1 Tablet by mouth every six (6) hours as needed for Pain for up to 30 days. Max Daily Amount: 200 mg.  Indications: pain  Qty: 120 Tablet, Refills: 0    Associated Diagnoses: Chronic low back pain, unspecified back pain laterality, unspecified whether sciatica present      Spirometers and Accessories avril 1 Each by Does Not Apply route three (3) times daily. Qty: 1 Each, Refills: 1    Associated Diagnoses: Opioid use, unspecified with unspecified opioid-induced disorder ([facility] [address] Respiratory depression      FLUoxetine (PROzac) 10 mg capsule Take 1 Capsule by mouth daily for 270 days. Qty: 90 Capsule, Refills: 2    Associated Diagnoses: TRISHA (generalized anxiety disorder)      pantoprazole (PROTONIX) 40 mg tablet Take 1 Tablet by mouth Daily (before breakfast). Indications: bleeding from stomach, esophagus or duodenum  Qty: 90 Tablet, Refills: 2      ondansetron (Zofran ODT) 4 mg disintegrating tablet 1 Tablet by SubLINGual route every eight (8) hours as needed for Nausea or Vomiting. Qty: 30 Tablet, Refills: 2      food supplemt, lactose-reduced (Ensure Active High Protein) liqd Take 237 mL by mouth four (4) times daily as needed for PRN Reason (Other) (as pt can tolerate). Qty: 60 Each, Refills: 5    Associated Diagnoses: Moderate protein-calorie malnutrition (HCC)      !! OXcarbazepine (TRILEPTAL) 150 mg tablet Take 1 Tablet by mouth Every morning. Qty: 60 Tablet, Refills: 2      !! OXcarbazepine (TrileptaL) 300 mg tablet Take 1 Tablet by mouth nightly. Qty: 30 Tablet, Refills: 3      zinc oxide-white petrolatum 17-57 % topical paste Apply  to affected area as needed for Skin Irritation. Indications: skin irritation  Qty: 113 g, Refills: 0      Vimpat 200 mg tab tablet Take 1 Tablet by mouth two (2) times a day. atorvastatin (LIPITOR) 40 mg tablet Take 1 tablet by mouth once daily for 90 days  Qty: 90 Tablet, Refills: 3      Euthyrox 150 mcg tablet Take 1 tablet by mouth once daily  Qty: 90 Tablet, Refills: 0      sevelamer carbonate (RENVELA) 800 mg tab tab        !! - Potential duplicate medications found. Please discuss with provider. 2.   Follow-up Information    None       3. Return to ED if worse   4.    Current Discharge Medication List            Diagnosis     Clinical Impression: No diagnosis found. Attestations:    Jacklyn Cabrera MD    Please note that this dictation was completed with [employer], the computer voice recognition software. Quite often unanticipated grammatical, syntax, homophones, and other interpretive errors are inadvertently transcribed by the computer software. Please disregard these errors. Please excuse any errors that have escaped final proofreading. Thank you.

## 2022-04-11 ENCOUNTER — APPOINTMENT (OUTPATIENT)
Dept: GENERAL RADIOLOGY | Age: 72
End: 2022-04-11
Attending: EMERGENCY MEDICINE
Payer: MEDICARE

## 2022-04-11 ENCOUNTER — HOSPITAL ENCOUNTER (OUTPATIENT)
Age: 72
Setting detail: OBSERVATION
Discharge: HOME HEALTH | End: 2022-04-13
Attending: EMERGENCY MEDICINE | Admitting: INTERNAL MEDICINE
Payer: MEDICARE

## 2022-04-11 ENCOUNTER — APPOINTMENT (OUTPATIENT)
Dept: CT IMAGING | Age: 72
End: 2022-04-11
Attending: EMERGENCY MEDICINE
Payer: MEDICARE

## 2022-04-11 DIAGNOSIS — N18.6 ESRD ON DIALYSIS (HCC): ICD-10-CM

## 2022-04-11 DIAGNOSIS — R41.82 ALTERED MENTAL STATUS, UNSPECIFIED ALTERED MENTAL STATUS TYPE: Primary | ICD-10-CM

## 2022-04-11 DIAGNOSIS — R55 SYNCOPE, UNSPECIFIED SYNCOPE TYPE: ICD-10-CM

## 2022-04-11 DIAGNOSIS — Z99.2 ESRD ON DIALYSIS (HCC): ICD-10-CM

## 2022-04-11 PROBLEM — Z86.69 HISTORY OF SEIZURE DISORDER: Status: ACTIVE | Noted: 2022-04-11

## 2022-04-11 LAB
ALBUMIN SERPL-MCNC: 2 G/DL (ref 3.5–5)
ALBUMIN/GLOB SERPL: 0.9 {RATIO} (ref 1.1–2.2)
ALP SERPL-CCNC: 108 U/L (ref 45–117)
ALT SERPL-CCNC: 36 U/L (ref 12–78)
ANION GAP SERPL CALC-SCNC: 6 MMOL/L (ref 5–15)
AST SERPL W P-5'-P-CCNC: 38 U/L (ref 15–37)
BASOPHILS # BLD: 0.1 K/UL (ref 0–0.1)
BASOPHILS NFR BLD: 1 % (ref 0–1)
BILIRUB SERPL-MCNC: 0.2 MG/DL (ref 0.2–1)
BUN SERPL-MCNC: 55 MG/DL (ref 6–20)
BUN/CREAT SERPL: 17 (ref 12–20)
CA-I BLD-MCNC: 7.7 MG/DL (ref 8.5–10.1)
CHLORIDE SERPL-SCNC: 105 MMOL/L (ref 97–108)
CO2 SERPL-SCNC: 25 MMOL/L (ref 21–32)
CREAT SERPL-MCNC: 3.15 MG/DL (ref 0.55–1.02)
DIFFERENTIAL METHOD BLD: ABNORMAL
EOSINOPHIL # BLD: 0 K/UL (ref 0–0.4)
EOSINOPHIL NFR BLD: 0 % (ref 0–7)
ERYTHROCYTE [DISTWIDTH] IN BLOOD BY AUTOMATED COUNT: 15.6 % (ref 11.5–14.5)
GLOBULIN SER CALC-MCNC: 2.2 G/DL (ref 2–4)
GLUCOSE BLD STRIP.AUTO-MCNC: 73 MG/DL (ref 65–117)
GLUCOSE SERPL-MCNC: 77 MG/DL (ref 65–100)
HCT VFR BLD AUTO: 24.7 % (ref 35–47)
HGB BLD-MCNC: 7.5 G/DL (ref 11.5–16)
IMM GRANULOCYTES # BLD AUTO: 0 K/UL (ref 0–0.04)
IMM GRANULOCYTES NFR BLD AUTO: 0 % (ref 0–0.5)
LYMPHOCYTES # BLD: 1.9 K/UL (ref 0.8–3.5)
LYMPHOCYTES NFR BLD: 26 % (ref 12–49)
MAGNESIUM SERPL-MCNC: 2.1 MG/DL (ref 1.6–2.4)
MCH RBC QN AUTO: 28.5 PG (ref 26–34)
MCHC RBC AUTO-ENTMCNC: 30.4 G/DL (ref 30–36.5)
MCV RBC AUTO: 93.9 FL (ref 80–99)
MONOCYTES # BLD: 0.4 K/UL (ref 0–1)
MONOCYTES NFR BLD: 5 % (ref 5–13)
NEUTS SEG # BLD: 5.1 K/UL (ref 1.8–8)
NEUTS SEG NFR BLD: 68 % (ref 32–75)
NRBC # BLD: 0 K/UL (ref 0–0.01)
NRBC BLD-RTO: 0 PER 100 WBC
PERFORMED BY, TECHID: NORMAL
PHOSPHATE SERPL-MCNC: 4.2 MG/DL (ref 2.6–4.7)
PLATELET # BLD AUTO: 120 K/UL (ref 150–400)
PMV BLD AUTO: 12.1 FL (ref 8.9–12.9)
POTASSIUM SERPL-SCNC: 5 MMOL/L (ref 3.5–5.1)
PROT SERPL-MCNC: 4.2 G/DL (ref 6.4–8.2)
RBC # BLD AUTO: 2.63 M/UL (ref 3.8–5.2)
SODIUM SERPL-SCNC: 136 MMOL/L (ref 136–145)
TROPONIN-HIGH SENSITIVITY: 20 NG/L (ref 0–51)
WBC # BLD AUTO: 7.5 K/UL (ref 3.6–11)

## 2022-04-11 PROCEDURE — 74011000250 HC RX REV CODE- 250: Performed by: NURSE PRACTITIONER

## 2022-04-11 PROCEDURE — 80053 COMPREHEN METABOLIC PANEL: CPT

## 2022-04-11 PROCEDURE — 81001 URINALYSIS AUTO W/SCOPE: CPT

## 2022-04-11 PROCEDURE — 84100 ASSAY OF PHOSPHORUS: CPT

## 2022-04-11 PROCEDURE — 74011250637 HC RX REV CODE- 250/637: Performed by: NURSE PRACTITIONER

## 2022-04-11 PROCEDURE — 70486 CT MAXILLOFACIAL W/O DYE: CPT

## 2022-04-11 PROCEDURE — G0378 HOSPITAL OBSERVATION PER HR: HCPCS

## 2022-04-11 PROCEDURE — 73502 X-RAY EXAM HIP UNI 2-3 VIEWS: CPT

## 2022-04-11 PROCEDURE — 36415 COLL VENOUS BLD VENIPUNCTURE: CPT

## 2022-04-11 PROCEDURE — 70450 CT HEAD/BRAIN W/O DYE: CPT

## 2022-04-11 PROCEDURE — 93005 ELECTROCARDIOGRAM TRACING: CPT

## 2022-04-11 PROCEDURE — 85025 COMPLETE CBC W/AUTO DIFF WBC: CPT

## 2022-04-11 PROCEDURE — 74011250637 HC RX REV CODE- 250/637: Performed by: EMERGENCY MEDICINE

## 2022-04-11 PROCEDURE — 80235 DRUG ASSAY LACOSAMIDE: CPT

## 2022-04-11 PROCEDURE — 71045 X-RAY EXAM CHEST 1 VIEW: CPT

## 2022-04-11 PROCEDURE — 99285 EMERGENCY DEPT VISIT HI MDM: CPT

## 2022-04-11 PROCEDURE — 80307 DRUG TEST PRSMV CHEM ANLYZR: CPT

## 2022-04-11 PROCEDURE — 83735 ASSAY OF MAGNESIUM: CPT

## 2022-04-11 PROCEDURE — 82962 GLUCOSE BLOOD TEST: CPT

## 2022-04-11 PROCEDURE — 84484 ASSAY OF TROPONIN QUANT: CPT

## 2022-04-11 RX ORDER — ATORVASTATIN CALCIUM 40 MG/1
40 TABLET, FILM COATED ORAL
Status: DISCONTINUED | OUTPATIENT
Start: 2022-04-11 | End: 2022-04-14 | Stop reason: HOSPADM

## 2022-04-11 RX ORDER — SEVELAMER CARBONATE 800 MG/1
800 TABLET, FILM COATED ORAL
Status: DISCONTINUED | OUTPATIENT
Start: 2022-04-11 | End: 2022-04-14 | Stop reason: HOSPADM

## 2022-04-11 RX ORDER — OXCARBAZEPINE 150 MG/1
150 TABLET, FILM COATED ORAL EVERY MORNING
Status: DISCONTINUED | OUTPATIENT
Start: 2022-04-12 | End: 2022-04-13 | Stop reason: ALTCHOICE

## 2022-04-11 RX ORDER — TRAMADOL HYDROCHLORIDE 50 MG/1
50 TABLET ORAL
COMMUNITY
End: 2022-04-13

## 2022-04-11 RX ORDER — POLYETHYLENE GLYCOL 3350 17 G/17G
17 POWDER, FOR SOLUTION ORAL DAILY PRN
Status: DISCONTINUED | OUTPATIENT
Start: 2022-04-11 | End: 2022-04-14 | Stop reason: HOSPADM

## 2022-04-11 RX ORDER — NAPROXEN 500 MG/1
500 TABLET ORAL 2 TIMES DAILY WITH MEALS
Status: DISCONTINUED | OUTPATIENT
Start: 2022-04-11 | End: 2022-04-13

## 2022-04-11 RX ORDER — SODIUM CHLORIDE 0.9 % (FLUSH) 0.9 %
5-40 SYRINGE (ML) INJECTION EVERY 8 HOURS
Status: DISCONTINUED | OUTPATIENT
Start: 2022-04-11 | End: 2022-04-14 | Stop reason: HOSPADM

## 2022-04-11 RX ORDER — ACETAMINOPHEN 650 MG/1
650 SUPPOSITORY RECTAL
Status: DISCONTINUED | OUTPATIENT
Start: 2022-04-11 | End: 2022-04-14 | Stop reason: HOSPADM

## 2022-04-11 RX ORDER — ONDANSETRON 2 MG/ML
4 INJECTION INTRAMUSCULAR; INTRAVENOUS
Status: DISCONTINUED | OUTPATIENT
Start: 2022-04-11 | End: 2022-04-14 | Stop reason: HOSPADM

## 2022-04-11 RX ORDER — PANTOPRAZOLE SODIUM 40 MG/1
40 TABLET, DELAYED RELEASE ORAL
Status: DISCONTINUED | OUTPATIENT
Start: 2022-04-12 | End: 2022-04-14 | Stop reason: HOSPADM

## 2022-04-11 RX ORDER — OXCARBAZEPINE 300 MG/1
300 TABLET, FILM COATED ORAL
Status: DISCONTINUED | OUTPATIENT
Start: 2022-04-11 | End: 2022-04-13 | Stop reason: ALTCHOICE

## 2022-04-11 RX ORDER — MIDODRINE HYDROCHLORIDE 5 MG/1
5 TABLET ORAL
Status: COMPLETED | OUTPATIENT
Start: 2022-04-11 | End: 2022-04-11

## 2022-04-11 RX ORDER — FLUOXETINE 10 MG/1
10 CAPSULE ORAL DAILY
Status: DISCONTINUED | OUTPATIENT
Start: 2022-04-12 | End: 2022-04-14 | Stop reason: HOSPADM

## 2022-04-11 RX ORDER — SODIUM CHLORIDE 0.9 % (FLUSH) 0.9 %
5-40 SYRINGE (ML) INJECTION AS NEEDED
Status: DISCONTINUED | OUTPATIENT
Start: 2022-04-11 | End: 2022-04-14 | Stop reason: HOSPADM

## 2022-04-11 RX ORDER — MIDODRINE HYDROCHLORIDE 5 MG/1
5 TABLET ORAL
COMMUNITY
End: 2022-05-09

## 2022-04-11 RX ORDER — NYSTATIN 100000 [USP'U]/G
POWDER TOPICAL 3 TIMES DAILY
Status: DISCONTINUED | OUTPATIENT
Start: 2022-04-11 | End: 2022-04-14 | Stop reason: HOSPADM

## 2022-04-11 RX ORDER — ONDANSETRON 4 MG/1
4 TABLET, ORALLY DISINTEGRATING ORAL
Status: DISCONTINUED | OUTPATIENT
Start: 2022-04-11 | End: 2022-04-14 | Stop reason: HOSPADM

## 2022-04-11 RX ORDER — ACETAMINOPHEN 325 MG/1
650 TABLET ORAL
Status: DISCONTINUED | OUTPATIENT
Start: 2022-04-11 | End: 2022-04-14 | Stop reason: HOSPADM

## 2022-04-11 RX ORDER — LEVOTHYROXINE SODIUM 75 UG/1
150 TABLET ORAL
Status: DISCONTINUED | OUTPATIENT
Start: 2022-04-12 | End: 2022-04-12

## 2022-04-11 RX ORDER — LACOSAMIDE 100 MG/1
200 TABLET ORAL 2 TIMES DAILY
Status: DISCONTINUED | OUTPATIENT
Start: 2022-04-11 | End: 2022-04-14 | Stop reason: HOSPADM

## 2022-04-11 RX ORDER — METOPROLOL SUCCINATE 25 MG/1
12.5 TABLET, EXTENDED RELEASE ORAL DAILY
COMMUNITY
End: 2022-05-09

## 2022-04-11 RX ADMIN — ACETAMINOPHEN 650 MG: 325 TABLET ORAL at 22:50

## 2022-04-11 RX ADMIN — ATORVASTATIN CALCIUM 40 MG: 40 TABLET, FILM COATED ORAL at 22:50

## 2022-04-11 RX ADMIN — SODIUM CHLORIDE, PRESERVATIVE FREE 10 ML: 5 INJECTION INTRAVENOUS at 22:53

## 2022-04-11 RX ADMIN — LACOSAMIDE 200 MG: 100 TABLET, FILM COATED ORAL at 22:50

## 2022-04-11 RX ADMIN — MIDODRINE HYDROCHLORIDE 5 MG: 5 TABLET ORAL at 13:37

## 2022-04-11 RX ADMIN — NYSTATIN: 100000 POWDER TOPICAL at 22:50

## 2022-04-11 NOTE — ED PROVIDER NOTES
EMERGENCY DEPARTMENT HISTORY AND PHYSICAL EXAM      Date: 4/11/2022  Patient Name: Janki Navarro      History of Presenting Illness     Chief Complaint   Patient presents with    Unresponsive       History Provided By: EMS    HPI: Janki Navarro, 71 y.o. female with a past medical history significant hypertension, renal insufficiency, seizure and Atrial fibrillation, ESRD on dialysis presents to the ED with cc of syncopal episode while at dialysis. History limited due to patient's being responsive to pain. EMS states they were called for the same, states they were told that the bruises including her face have been there for 4 days. There are no other complaints, changes, or physical findings at this time. PCP: Leanna Amador MD    Current Facility-Administered Medications   Medication Dose Route Frequency Provider Last Rate Last Admin    midodrine (PROAMATINE) tablet 5 mg  5 mg Oral NOW Macho Randolph MD         Current Outpatient Medications   Medication Sig Dispense Refill    naproxen (NAPROSYN) 500 mg tablet Take 1 Tablet by mouth every twelve (12) hours as needed for Pain. 20 Tablet 0    nystatin (MYCOSTATIN) powder Apply  to affected area four (4) times daily. 60 g 2    Spirometers and Accessories avril 1 Each by Does Not Apply route three (3) times daily. 1 Each 1    FLUoxetine (PROzac) 10 mg capsule Take 1 Capsule by mouth daily for 270 days. 90 Capsule 2    pantoprazole (PROTONIX) 40 mg tablet Take 1 Tablet by mouth Daily (before breakfast). Indications: bleeding from stomach, esophagus or duodenum 90 Tablet 2    ondansetron (Zofran ODT) 4 mg disintegrating tablet 1 Tablet by SubLINGual route every eight (8) hours as needed for Nausea or Vomiting. 30 Tablet 2    food supplemt, lactose-reduced (Ensure Active High Protein) liqd Take 237 mL by mouth four (4) times daily as needed for PRN Reason (Other) (as pt can tolerate).  (Patient not taking: Reported on 3/8/2022) 60 Each 5    OXcarbazepine (TRILEPTAL) 150 mg tablet Take 1 Tablet by mouth Every morning. (Patient not taking: Reported on 3/8/2022) 60 Tablet 2    OXcarbazepine (TrileptaL) 300 mg tablet Take 1 Tablet by mouth nightly. (Patient not taking: Reported on 3/8/2022) 30 Tablet 3    zinc oxide-white petrolatum 17-57 % topical paste Apply  to affected area as needed for Skin Irritation. Indications: skin irritation 113 g 0    Vimpat 200 mg tab tablet Take 1 Tablet by mouth two (2) times a day.       atorvastatin (LIPITOR) 40 mg tablet Take 1 tablet by mouth once daily for 90 days 90 Tablet 3    Euthyrox 150 mcg tablet Take 1 tablet by mouth once daily 90 Tablet 0    sevelamer carbonate (RENVELA) 800 mg tab tab          Past History     Past Medical History:  Past Medical History:   Diagnosis Date    Anxiety disorder     Arthritis     Atherosclerosis of native arteries of the extremities with ulceration ([address] 8/31/2020    Atrial fibrillation ([address]     Cardiac pacemaker in situ     Cellulitis and abscess of trunk 8/31/2020    Depression, postpartum     Diabetes ([address]     Heart disease     Heartburn     Hx of long term use of blood thinners     Hypercholesterolemia     Hypertension     Hypothyroidism     Migraines     Peripheral venous insufficiency 8/31/2020    Seizures ([address]     Sleep disorder     Stroke ([address]     2019    Varicose veins of lower extremity with inflammation 8/31/2020       Past Surgical History:  Past Surgical History:   Procedure Laterality Date    HX BACK SURGERY      HX OTHER SURGICAL      leg surgery     HX OTHER SURGICAL      pacemaker monitor     IR INSERT TUNL CVC W/O PORT OVER 5 YR  12/10/2021       Family History:  Family History   Problem Relation Age of Onset    Heart Disease Mother     Heart Disease Father     Diabetes Sister     Diabetes Brother        Social History:  Social History     Tobacco Use    Smoking status: Never Smoker    Smokeless tobacco: Never Used   Vaping Use    Vaping Use: Never used   Substance Use Topics    Alcohol use: Never    Drug use: Never       Allergies: Allergies   Allergen Reactions    Lactose Diarrhea         Review of Systems   Unable to obtain due to patient being minimally responsive  Review of Systems    Physical Exam   Physical Exam  Constitutional:       General: Responsive to pain and voice  HENT:      Head: Normocephalic, left facial bruising noted (subacute appearing)     Nose: Nose normal.      Mouth/Throat:      Mouth: Mucous membranes are moist.   Eyes:      Extraocular Movements: Extraocular movements intact. Pupils: Pupils are equal, round, and reactive to light. Cardiovascular:      Rate and Rhythm: Normal rate. Pulses: Normal pulses. Pulmonary:      Effort: Pulmonary effort is normal.      Breath sounds: No stridor. Abdominal:      General: Abdomen is flat. There is no distension. Musculoskeletal:         General: Normal time, unable to test range of motion due to current medical condition       Skin:     General: Skin is warm and dry. Capillary Refill: Capillary refill takes less than 2 seconds. Neurological:      General: Responsive to pain and voice. Psychiatric:         Mood and Affect: Unable to test due to patient's medical condition      Physical Exam    Lab and Diagnostic Study Results     Labs -   No results found for this or any previous visit (from the past 12 hour(s)). Radiologic Studies -   [unfilled]  CT Results  (Last 48 hours)    None        CXR Results  (Last 48 hours)    None          Medical Decision Making and ED Course   - I am the first and primary provider for this patient AND AM THE PRIMARY PROVIDER OF RECORD. - I reviewed the vital signs, available nursing notes, past medical history, past surgical history, family history and social history. - Initial assessment performed.  The patients presenting problems have been discussed, and the staff are in agreement with the care plan formulated and outlined with them. I have encouraged them to ask questions as they arise throughout their visit. Vital Signs-Reviewed the patient's vital signs. No data found. EKG interpretation: (Preliminary): Performed at 1226, and read at 1230  Rhythm: atrial fib; and irregular. Rate (approx.): 78; Axis: normal; AL interval: ; QRS interval: normal ; ST/T wave: T wave inverted; Other findings: Right bundle branch block, abnormal EKG. Records Reviewed: Nursing Notes and Old Medical Records      Provider Notes (Medical Decision Making):   Patient altered after syncopal episode at dialysis. Initially hypotensive but normotensive in the emergency department. Will admit for continued monitoring and treatment. [facility]             CRITICAL CARE NOTE :  12:33 PM  Amount of Critical Care Time: 35(minutes)    IMPENDING DETERIORATION -CNS  ASSOCIATED RISK FACTORS - Hypotension  MANAGEMENT- Bedside Assessment, Supervision of Care and admission  INTERPRETATION -  CT Scan and Blood Pressure  INTERVENTIONS -   CASE REVIEW - Hospitalist/Intensivist  TREATMENT RESPONSE -Stable  PERFORMED BY - Self    NOTES   :  I have spent critical care time involved in lab review, consultations with specialist, family decision- making, bedside attention and documentation. This time excludes time spent in any separate billed procedures. During this entire length of time I was immediately available to the patient . Betito Woods MD        Disposition     Disposition: Admitted to Floor Medical Floor the case was discussed with the admitting physician       Diagnosis     Clinical Impression:   1. Altered mental status, unspecified altered mental status type    2. ESRD on dialysis [facility])        Attestations:    Betito Woods MD    Please note that this dictation was completed with [employer], the computer voice recognition software.   Quite often unanticipated grammatical, syntax, homophones, and other interpretive errors are inadvertently transcribed by the computer software. Please disregard these errors. Please excuse any errors that have escaped final proofreading. Thank you.

## 2022-04-11 NOTE — PROGRESS NOTES
4 eye skin assessment performed with Rogelio Vargas RN. Left sided facial bruising from previous fall, redness to sacrum; zpaste applied, scar to lower left leg, bruising to both legs.

## 2022-04-11 NOTE — ACP (ADVANCE CARE PLANNING)
Advance Care Planning   Healthcare Decision Maker:       Primary Decision Maker: Zaria Romero - [phone]

## 2022-04-11 NOTE — H&P
Khalif CARTER, FNP-C    History and Physical      Patient: Edward Martin MRN: [id number]  SSN: [id number]    YOB: 1950  Age: 71 y.o. Sex: female        Chief Complaint   Patient presents with   Earna Frieze       Subjective:      Edward Martin is a 71 y.o. female who presented to the ED via EMS after becoming unresponsive during hemodialysis. Per EMS patient was only responsive to pain. PMH significant for ESRD on HD, HTN, HLD, chronic atrial fibrillation, history of AV block status post pacemaker, DM, CVA and seizure disorders. Patient herself is a poor historian due to pain. Of note patient was recently treated in the ED following fall from wheelchair which resulted in nasal bone fracture and bruising. In the ED head CT repeated which showed no acute changes. Patient CT shows fracture of the right nasal wall with minimally displacement noted. Routine lab work shows hemoglobin 7.5, creatinine 3.15. Vitals /70, pulse 77, temp 97.6, respiratory 18, O2 98% room air. Hospitalist team consulted for further evaluation and treatment. Admit to observation.  Obtain cardiology evaluation    Past Medical History:   Diagnosis Date    Anxiety disorder     Arthritis     Atherosclerosis of native arteries of the extremities with ulceration ([address] 8/31/2020    Atrial fibrillation (HCC)     Cardiac pacemaker in situ     Cellulitis and abscess of trunk 8/31/2020    Depression, postpartum     Diabetes ([address]     Heart disease     Heartburn     Hx of long term use of blood thinners     Hypercholesterolemia     Hypertension     Hypothyroidism     Migraines     Peripheral venous insufficiency 8/31/2020    Seizures ([address]     Sleep disorder     Stroke ([address]     2019    Varicose veins of lower extremity with inflammation 8/31/2020     Past Surgical History:   Procedure Laterality Date    HX BACK SURGERY      HX OTHER SURGICAL      leg surgery     HX OTHER SURGICAL pacemaker monitor     IR INSERT KATARINA CVC W/O PORT OVER 5 YR  12/10/2021      Family History   Problem Relation Age of Onset    Heart Disease Mother     Heart Disease Father     Diabetes Sister     Diabetes Brother      Social History     Tobacco Use    Smoking status: Never Smoker    Smokeless tobacco: Never Used   Substance Use Topics    Alcohol use: Never      Prior to Admission medications    Medication Sig Start Date End Date Taking? Authorizing Provider   naproxen (NAPROSYN) 500 mg tablet Take 1 Tablet by mouth every twelve (12) hours as needed for Pain. 4/7/22   Aleja Tafoya MD   nystatin (MYCOSTATIN) powder Apply  to affected area four (4) times daily. 3/22/22   Leela Garner NP   Spirometers and Accessories avril 1 Each by Does Not Apply route three (3) times daily. 3/8/22   Leela Garner NP   FLUoxetine (PROzac) 10 mg capsule Take 1 Capsule by mouth daily for 270 days. 2/24/22 11/21/22  Leela Garner NP   pantoprazole (PROTONIX) 40 mg tablet Take 1 Tablet by mouth Daily (before breakfast). Indications: bleeding from stomach, esophagus or duodenum 2/24/22   Leela Garner NP   ondansetron (Zofran ODT) 4 mg disintegrating tablet 1 Tablet by SubLINGual route every eight (8) hours as needed for Nausea or Vomiting. 2/24/22   Leela Garner NP   food supplemt, lactose-reduced (Ensure Active High Protein) liqd Take 237 mL by mouth four (4) times daily as needed for PRN Reason (Other) (as pt can tolerate). Patient not taking: Reported on 3/8/2022 2/10/22   Leela Garner NP   OXcarbazepine (TRILEPTAL) 150 mg tablet Take 1 Tablet by mouth Every morning. Patient not taking: Reported on 3/8/2022 1/29/22   Chris Vines MD   OXcarbazepine (TrileptaL) 300 mg tablet Take 1 Tablet by mouth nightly. Patient not taking: Reported on 3/8/2022 1/29/22   Chris Vines MD   zinc oxide-white petrolatum 17-57 % topical paste Apply  to affected area as needed for Skin Irritation.  Indications: skin irritation 12/11/21   Kamar Peralta MD   Vimpat 200 mg tab tablet Take 1 Tablet by mouth two (2) times a day. 11/19/21   Provider, Historical   atorvastatin (LIPITOR) 40 mg tablet Take 1 tablet by mouth once daily for 90 days 11/22/21 8/19/22  Lillian Adams MD   Euthyrox 150 mcg tablet Take 1 tablet by mouth once daily 10/13/21   Nabeel Marc MD   sevelamer carbonate (RENVELA) 800 mg tab tab  5/10/21   Provider, Historical        Allergies   Allergen Reactions    Lactose Diarrhea       Review of Systems:  Review of Systems   Reason unable to perform ROS: limited due to medical condition. Musculoskeletal: Positive for myalgias. Neurological: Positive for weakness. Objective:     Vitals:    04/11/22 1239 04/11/22 1528   BP: 130/70 (!) 141/79   Pulse: 77 71   Resp: 18 18   Temp: 97.6 °F (36.4 °C)    SpO2: 98% 100%   Weight: 72.6 kg (160 lb)    Height: 5' 6\" (1.676 m)         Physical Exam:  Physical Exam  Vitals and nursing note reviewed. Eyes:      Extraocular Movements: Extraocular movements intact. Cardiovascular:      Rate and Rhythm: Normal rate. Rhythm irregular. Pulses: Normal pulses. Pulmonary:      Effort: Pulmonary effort is normal.   Abdominal:      General: Bowel sounds are normal.   Skin:     General: Skin is warm. Comments: Facial bruising, left eye scar. Left ankle healing surgical scar. Neurological:      Mental Status: She is alert. Assessment:     Hospital Problems  Date Reviewed: 1/3/2022          Codes Class Noted POA    Syncope ICD-10-CM: R55  ICD-9-CM: 780.2  4/11/2022 Unknown        History of seizure disorder ICD-10-CM: Z86.69  ICD-9-CM: V12.49  4/11/2022 Unknown        ESRD (end stage renal disease) on dialysis [facility]) ICD-10-CM: N18.6, Z99.2  ICD-9-CM: 585.6, V45.11  4/11/2022 Unknown            1. Syncope- will obtain neuro and cardiology evaluation. Fall precautions. PT/OT    2.  Hx Sz d/o- managed with trileptal and vimpat    3. ESRD-HD on MWF. Will consult nephrology    4. Ambulatory dysfunction- pt/ot eval. Fall precautions    5. Depression-managed with prozac    6. Hypothyroidism-managed with synthroid 150mcg    7. Afib/SSS s/p PPM- cardiology consulted      Prior  GI PROPHYLAXIS protonix  DVT PROPHYLAXIS scd's  Home medications reviewed and reconciled    Above treatment plan reviewed and discussed with patient and nurse in detail at bedside, all questions answered.        Time Spent: > 75 minutes    Signed By: Steffi Hull NP     April 11, 2022

## 2022-04-11 NOTE — PROGRESS NOTES
4/11/22. Pt admitted under OBS. CM spoke with son Giuliano Werner @ [phone]) . Per son - D/C Plan is home with son via transportation. Pt is w/c bound. Pt has [facility] & goes to [address] [facility] Renal Dialysis MWF via transportation. Pt asleep & hard to arouse during inter view.

## 2022-04-11 NOTE — PROGRESS NOTES
[address] [facility]      Herbal and Nutritional Product Restrictions       The following herbal, alternative, and/or nutritional/dietary supplement product(s) has been discontinued per [employer] approved policy:       Ensure Active High Protein Liquid    Please reorder upon discharge if appropriate.      Thank you, Lacie Litten, [facility]    4/11/2022 6:07 PM

## 2022-04-11 NOTE — ROUTINE PROCESS
TRANSFER - OUT REPORT:    Verbal report given to [facility] (name) on Juhi Garcia  being transferred to [facility](unit) for routine progression of care       Report consisted of patients Situation, Background, Assessment and   Recommendations(SBAR). Information from the following report(s) SBAR, ED Summary, Intake/Output, MAR and Recent Results was reviewed with the receiving nurse. Lines:   Venous Access Device Upper chest (subclavicular area, right (Active)       Venous Access Device tunneled perm cath (Active)       Peripheral IV 04/11/22 Left Antecubital (Active)   Site Assessment Clean, dry, & intact 04/11/22 1335   Phlebitis Assessment 0 04/11/22 1335   Infiltration Assessment 0 04/11/22 1335   Dressing Status Clean, dry, & intact 04/11/22 1335   Dressing Type Transparent 04/11/22 1335   Hub Color/Line Status Pink;Flushed 04/11/22 1335        Opportunity for questions and clarification was provided.       Patient transported with:   Monitor  Tech

## 2022-04-11 NOTE — ED TRIAGE NOTES
Pt went unresponsive at dialysis. On arrival to ED, only responsive to pain. Pt has multiple bruises and cuts on body, including left sided facial bruising.

## 2022-04-12 LAB
ALBUMIN SERPL-MCNC: 2.1 G/DL (ref 3.5–5)
AMPHET UR QL SCN: NEGATIVE
ANION GAP SERPL CALC-SCNC: 9 MMOL/L (ref 5–15)
APPEARANCE UR: ABNORMAL
ATRIAL RATE: 125 BPM
ATRIAL RATE: 78 BPM
BACTERIA URNS QL MICRO: NEGATIVE /HPF
BARBITURATES UR QL SCN: NEGATIVE
BASOPHILS # BLD: 0 K/UL (ref 0–0.1)
BASOPHILS NFR BLD: 0 % (ref 0–1)
BENZODIAZ UR QL: NEGATIVE
BILIRUB UR QL: NEGATIVE
BUN SERPL-MCNC: 68 MG/DL (ref 6–20)
BUN/CREAT SERPL: 18 (ref 12–20)
CA-I BLD-MCNC: 7.9 MG/DL (ref 8.5–10.1)
CALCULATED R AXIS, ECG10: 127 DEGREES
CALCULATED R AXIS, ECG10: 93 DEGREES
CALCULATED T AXIS, ECG11: -62 DEGREES
CALCULATED T AXIS, ECG11: -70 DEGREES
CANNABINOIDS UR QL SCN: NEGATIVE
CASTS URNS QL MICRO: 1 /LPF
CHLORIDE SERPL-SCNC: 105 MMOL/L (ref 97–108)
CO2 SERPL-SCNC: 24 MMOL/L (ref 21–32)
COCAINE UR QL SCN: NEGATIVE
COLOR UR: ABNORMAL
CREAT SERPL-MCNC: 3.72 MG/DL (ref 0.55–1.02)
CRYSTALS URNS QL MICRO: PRESENT
DIAGNOSIS, 93000: NORMAL
DIAGNOSIS, 93000: NORMAL
DIFFERENTIAL METHOD BLD: ABNORMAL
DRUG SCRN COMMENT,DRGCM: ABNORMAL
EOSINOPHIL # BLD: 0.1 K/UL (ref 0–0.4)
EOSINOPHIL NFR BLD: 1 % (ref 0–7)
ERYTHROCYTE [DISTWIDTH] IN BLOOD BY AUTOMATED COUNT: 15.7 % (ref 11.5–14.5)
GLUCOSE BLD STRIP.AUTO-MCNC: 61 MG/DL (ref 65–117)
GLUCOSE BLD STRIP.AUTO-MCNC: 66 MG/DL (ref 65–117)
GLUCOSE BLD STRIP.AUTO-MCNC: 80 MG/DL (ref 65–117)
GLUCOSE BLD STRIP.AUTO-MCNC: 82 MG/DL (ref 65–117)
GLUCOSE SERPL-MCNC: 70 MG/DL (ref 65–100)
GLUCOSE UR STRIP.AUTO-MCNC: NEGATIVE MG/DL
HCT VFR BLD AUTO: 23.9 % (ref 35–47)
HGB BLD-MCNC: 7.3 G/DL (ref 11.5–16)
HGB UR QL STRIP: ABNORMAL
IMM GRANULOCYTES # BLD AUTO: 0 K/UL (ref 0–0.04)
IMM GRANULOCYTES NFR BLD AUTO: 0 % (ref 0–0.5)
IRON SATN MFR SERPL: 55 % (ref 20–50)
IRON SERPL-MCNC: 64 UG/DL (ref 35–150)
KETONES UR QL STRIP.AUTO: NEGATIVE MG/DL
LEUKOCYTE ESTERASE UR QL STRIP.AUTO: ABNORMAL
LYMPHOCYTES # BLD: 1.5 K/UL (ref 0.8–3.5)
LYMPHOCYTES NFR BLD: 16 % (ref 12–49)
MCH RBC QN AUTO: 28.9 PG (ref 26–34)
MCHC RBC AUTO-ENTMCNC: 30.5 G/DL (ref 30–36.5)
MCV RBC AUTO: 94.5 FL (ref 80–99)
METHADONE UR QL: NEGATIVE
MONOCYTES # BLD: 0.6 K/UL (ref 0–1)
MONOCYTES NFR BLD: 6 % (ref 5–13)
MUCOUS THREADS URNS QL MICRO: ABNORMAL /LPF
NEUTS SEG # BLD: 7.3 K/UL (ref 1.8–8)
NEUTS SEG NFR BLD: 77 % (ref 32–75)
NITRITE UR QL STRIP.AUTO: NEGATIVE
NRBC # BLD: 0 K/UL (ref 0–0.01)
NRBC BLD-RTO: 0 PER 100 WBC
OPIATES UR QL: POSITIVE
PCP UR QL: NEGATIVE
PERFORMED BY, TECHID: ABNORMAL
PERFORMED BY, TECHID: NORMAL
PH UR STRIP: 5 [PH] (ref 5–8)
PHOSPHATE SERPL-MCNC: 4.6 MG/DL (ref 2.6–4.7)
PLATELET # BLD AUTO: 140 K/UL (ref 150–400)
PMV BLD AUTO: 12.2 FL (ref 8.9–12.9)
POTASSIUM SERPL-SCNC: 5.5 MMOL/L (ref 3.5–5.1)
PROT UR STRIP-MCNC: 30 MG/DL
Q-T INTERVAL, ECG07: 408 MS
Q-T INTERVAL, ECG07: 416 MS
QRS DURATION, ECG06: 144 MS
QRS DURATION, ECG06: 150 MS
QTC CALCULATION (BEZET), ECG08: 465 MS
QTC CALCULATION (BEZET), ECG08: 474 MS
RBC # BLD AUTO: 2.53 M/UL (ref 3.8–5.2)
RBC #/AREA URNS HPF: ABNORMAL /HPF (ref 0–5)
SODIUM SERPL-SCNC: 138 MMOL/L (ref 136–145)
SP GR UR REFRACTOMETRY: 1.02 (ref 1–1.03)
T3FREE SERPL-MCNC: 1 PG/ML (ref 2.2–4)
T4 FREE SERPL-MCNC: 0.9 NG/DL (ref 0.8–1.5)
TIBC SERPL-MCNC: 116 UG/DL (ref 250–450)
TSH SERPL DL<=0.05 MIU/L-ACNC: 9.28 UIU/ML (ref 0.36–3.74)
UROBILINOGEN UR QL STRIP.AUTO: 0.1 EU/DL (ref 0.1–1)
VENTRICULAR RATE, ECG03: 78 BPM
VENTRICULAR RATE, ECG03: 78 BPM
WBC # BLD AUTO: 9.5 K/UL (ref 3.6–11)
WBC URNS QL MICRO: >100 /HPF (ref 0–4)

## 2022-04-12 PROCEDURE — 84443 ASSAY THYROID STIM HORMONE: CPT

## 2022-04-12 PROCEDURE — G0378 HOSPITAL OBSERVATION PER HR: HCPCS

## 2022-04-12 PROCEDURE — 97530 THERAPEUTIC ACTIVITIES: CPT

## 2022-04-12 PROCEDURE — 36415 COLL VENOUS BLD VENIPUNCTURE: CPT

## 2022-04-12 PROCEDURE — 82962 GLUCOSE BLOOD TEST: CPT

## 2022-04-12 PROCEDURE — 96374 THER/PROPH/DIAG INJ IV PUSH: CPT

## 2022-04-12 PROCEDURE — 83540 ASSAY OF IRON: CPT

## 2022-04-12 PROCEDURE — 74011250636 HC RX REV CODE- 250/636: Performed by: STUDENT IN AN ORGANIZED HEALTH CARE EDUCATION/TRAINING PROGRAM

## 2022-04-12 PROCEDURE — 93005 ELECTROCARDIOGRAM TRACING: CPT

## 2022-04-12 PROCEDURE — 74011250637 HC RX REV CODE- 250/637: Performed by: NURSE PRACTITIONER

## 2022-04-12 PROCEDURE — 96375 TX/PRO/DX INJ NEW DRUG ADDON: CPT

## 2022-04-12 PROCEDURE — 97165 OT EVAL LOW COMPLEX 30 MIN: CPT

## 2022-04-12 PROCEDURE — 74011000250 HC RX REV CODE- 250: Performed by: NURSE PRACTITIONER

## 2022-04-12 PROCEDURE — 74011000250 HC RX REV CODE- 250: Performed by: STUDENT IN AN ORGANIZED HEALTH CARE EDUCATION/TRAINING PROGRAM

## 2022-04-12 PROCEDURE — 80069 RENAL FUNCTION PANEL: CPT

## 2022-04-12 PROCEDURE — 97162 PT EVAL MOD COMPLEX 30 MIN: CPT

## 2022-04-12 PROCEDURE — 85025 COMPLETE CBC W/AUTO DIFF WBC: CPT

## 2022-04-12 PROCEDURE — 84481 FREE ASSAY (FT-3): CPT

## 2022-04-12 PROCEDURE — 84439 ASSAY OF FREE THYROXINE: CPT

## 2022-04-12 RX ORDER — LEVOTHYROXINE SODIUM 100 UG/1
200 TABLET ORAL
Status: DISCONTINUED | OUTPATIENT
Start: 2022-04-13 | End: 2022-04-14 | Stop reason: HOSPADM

## 2022-04-12 RX ORDER — DEXTROSE MONOHYDRATE AND SODIUM CHLORIDE 5; .45 G/100ML; G/100ML
75 INJECTION, SOLUTION INTRAVENOUS CONTINUOUS
Status: DISCONTINUED | OUTPATIENT
Start: 2022-04-12 | End: 2022-04-13

## 2022-04-12 RX ADMIN — NAPROXEN 500 MG: 500 TABLET ORAL at 16:32

## 2022-04-12 RX ADMIN — DEXTROSE AND SODIUM CHLORIDE 75 ML/HR: 5; 450 INJECTION, SOLUTION INTRAVENOUS at 11:32

## 2022-04-12 RX ADMIN — FLUOXETINE HYDROCHLORIDE 10 MG: 10 CAPSULE ORAL at 08:37

## 2022-04-12 RX ADMIN — PANTOPRAZOLE SODIUM 40 MG: 40 TABLET, DELAYED RELEASE ORAL at 08:37

## 2022-04-12 RX ADMIN — LACOSAMIDE 200 MG: 100 TABLET, FILM COATED ORAL at 08:37

## 2022-04-12 RX ADMIN — SODIUM CHLORIDE, PRESERVATIVE FREE 10 ML: 5 INJECTION INTRAVENOUS at 05:56

## 2022-04-12 RX ADMIN — LACOSAMIDE 200 MG: 100 TABLET, FILM COATED ORAL at 22:32

## 2022-04-12 RX ADMIN — SEVELAMER CARBONATE 800 MG: 800 TABLET, FILM COATED ORAL at 08:37

## 2022-04-12 RX ADMIN — ATORVASTATIN CALCIUM 40 MG: 40 TABLET, FILM COATED ORAL at 22:32

## 2022-04-12 RX ADMIN — LEVOTHYROXINE SODIUM 150 MCG: 0.07 TABLET ORAL at 08:37

## 2022-04-12 RX ADMIN — NYSTATIN: 100000 POWDER TOPICAL at 16:32

## 2022-04-12 RX ADMIN — SEVELAMER CARBONATE 800 MG: 800 TABLET, FILM COATED ORAL at 16:32

## 2022-04-12 RX ADMIN — GLUCAGON HYDROCHLORIDE 1 MG: KIT at 08:37

## 2022-04-12 RX ADMIN — SODIUM CHLORIDE, PRESERVATIVE FREE 10 ML: 5 INJECTION INTRAVENOUS at 22:32

## 2022-04-12 RX ADMIN — NYSTATIN: 100000 POWDER TOPICAL at 08:38

## 2022-04-12 RX ADMIN — NYSTATIN: 100000 POWDER TOPICAL at 22:33

## 2022-04-12 RX ADMIN — SEVELAMER CARBONATE 800 MG: 800 TABLET, FILM COATED ORAL at 11:29

## 2022-04-12 RX ADMIN — NAPROXEN 500 MG: 500 TABLET ORAL at 08:37

## 2022-04-12 NOTE — CONSULTS
NEUROLOGY CONSULT    Name Janki Navarro Age 71 y.o. MRN [id number]  1950     Referring Physician: Leanna Amador MD    Chief Complaint: Syncope     This is a 71 y.o. right handed  female known to the neurology service very well from repeated admissions at this hospital and follow-ups in the clinic, admitted overnight through the ED where she was brought in from the dialysis place where she passed out during the dialysis. Patient denied any prodrome, like dizziness/lightheadedness. The nurse with the patient did not notice any seizure activity. Patient had a fall couple of days prior to this, face down causing bruise around the left eye. She is recovering from that. Assessment and Plan:  1. Syncopal episode: Probably secondary to drop in the pressure due to dialysis. There is no report of any seizure activity by the nurse present at the scene. No additional work-up is indicated for this patient and she can be discharged from neurology standpoint. Continue with the same dose of her seizure medicines without any changes and follow-up in the clinic as scheduled. 2.  History of stroke in the past with good recovery. 3.  Seizure disorder, partial complex. 4.  Diabetes mellitus, fluctuates. 5.  Hypertension  6. Hyperlipidemia.     Thank you for the consult    Allergies   Allergen Reactions    Lactose Diarrhea     Current Facility-Administered Medications   Medication Dose Route Frequency    atorvastatin (LIPITOR) tablet 40 mg  40 mg Oral QHS    levothyroxine (SYNTHROID) tablet 150 mcg  150 mcg Oral ACB    FLUoxetine (PROzac) capsule 10 mg  10 mg Oral DAILY    naproxen (NAPROSYN) tablet 500 mg  500 mg Oral BID WITH MEALS    nystatin (MYCOSTATIN) 100,000 unit/gram powder   Topical TID    OXcarbazepine (TRILEPTAL) tablet 150 mg  150 mg Oral QAM    OXcarbazepine (TRILEPTAL) tablet 300 mg  300 mg Oral QHS    pantoprazole (PROTONIX) tablet 40 mg  40 mg Oral ACB    sevelamer carbonate (RENVELA) tab 800 mg  800 mg Oral TID WITH MEALS    lacosamide (VIMPAT) tablet 200 mg  200 mg Oral BID    sodium chloride (NS) flush 5-40 mL  5-40 mL IntraVENous Q8H    sodium chloride (NS) flush 5-40 mL  5-40 mL IntraVENous PRN    acetaminophen (TYLENOL) tablet 650 mg  650 mg Oral Q6H PRN    Or    acetaminophen (TYLENOL) suppository 650 mg  650 mg Rectal Q6H PRN    polyethylene glycol (MIRALAX) packet 17 g  17 g Oral DAILY PRN    ondansetron (ZOFRAN ODT) tablet 4 mg  4 mg Oral Q8H PRN    Or    ondansetron (ZOFRAN) injection 4 mg  4 mg IntraVENous Q6H PRN     Past Medical History:   Diagnosis Date    Anxiety disorder     Arthritis     Atherosclerosis of native arteries of the extremities with ulceration (HCC) 8/31/2020    Atrial fibrillation ([facility])     Cardiac pacemaker in situ     Cellulitis and abscess of trunk 8/31/2020    Depression, postpartum     Diabetes ([facility])     Heart disease     Heartburn     Hx of long term use of blood thinners     Hypercholesterolemia     Hypertension     Hypothyroidism     Migraines     Peripheral venous insufficiency 8/31/2020    Seizures ([facility])     Sleep disorder     Stroke ([facility])     2019    Varicose veins of lower extremity with inflammation 8/31/2020     Social History     Tobacco Use    Smoking status: Never Smoker    Smokeless tobacco: Never Used   Vaping Use    Vaping Use: Never used   Substance Use Topics    Alcohol use: Never    Drug use: Never     Exam  Visit Vitals  BP (!) 147/80 (BP Patient Position: At rest;Lying)   Pulse 69   Temp 98 °F (36.7 °C)   Resp 18   Ht 5' 6\" (1.676 m)   Wt 72.6 kg (160 lb)   SpO2 100%   BMI 25.82 kg/m²     General: Well developed, well nourished. Patient in no distress   Head: Normocephalic, atraumatic, anicteric sclera   Neck Normal ROM, No thyromegally               Ext:  No pedal edema   Skin: Supple no rash   NeurologicExam:  Mental Status: Alert and oriented to person place and time   Speech: Fluent no aphasia or dysarthria. Cranial Nerves:   Pupils are equal round and reactive to light and accommodation, extraocular movements are intact and full, visual fields are intact by confrontation, no nystagmus noted, face is symmetric, sensation in face is intact and symmetric, hearing is intact and symmetric, tongue and uvula are in midline with normal movements, palate is elevating equally, shoulder shrug is intact and symmetric. Motor:  Full and symmetric strength of upper and lower ext . Normal bulk and tone. Reflexes:   Deep tendon reflexes trace/4 in the upper and 0/4 in the lower and symmetric. Sensory:   Symmetric and intact    Gait:  Gait is deferred   Tremor:   No tremor noted. Cerebellar:  Coordination intact for finger-nose-finger. Neurovascular: No carotid bruits.  No JVD   Lab Review  Lab Results   Component Value Date/Time    WBC 7.5 04/11/2022 01:20 PM    HCT 24.7 (L) 04/11/2022 01:20 PM    HGB 7.5 (L) 04/11/2022 01:20 PM    PLATELET 432 (L) 53/46/7176 01:20 PM     Lab Results   Component Value Date/Time    Sodium 136 04/11/2022 01:20 PM    Potassium 5.0 04/11/2022 01:20 PM    Chloride 105 04/11/2022 01:20 PM    CO2 25 04/11/2022 01:20 PM    Glucose 77 04/11/2022 01:20 PM    BUN 55 (H) 04/11/2022 01:20 PM    Creatinine 3.15 (H) 04/11/2022 01:20 PM    Calcium 7.7 (L) 04/11/2022 01:20 PM     Lab Results   Component Value Date/Time    Vitamin B12 312 01/12/2022 11:15 AM    Folate 5.4 01/12/2022 11:15 AM     Lab Results   Component Value Date/Time    LDL, calculated 27.8 05/20/2021 05:35 AM     Lab Results   Component Value Date/Time    Hemoglobin A1c 4.7 01/07/2022 10:47 PM    Hemoglobin A1c, External 5.1 09/09/2019 12:00 AM     No components found for: TROPQUANT  No results found for: NGOZI

## 2022-04-12 NOTE — PROGRESS NOTES
Patient: Janki Navarro MRN: [id number]  SSN: [id number]    YOB: 1950  Age: 71 y.o. Sex: female          Subjective:   Patient is seen in the room  She is looking weak and frail  She is on room air  No shortness of breath  No lower extremity swelling  Labs are pending from today  Possibility dialysis tomorrow unless labs show severe hyperkalemia    Objective:     Vitals:    04/12/22 0400 04/12/22 0739 04/12/22 0800 04/12/22 1113   BP:  (!) 147/80  134/64   Pulse: 77 69 62 62   Resp:  18  18   Temp:  98 °F (36.7 °C)  98.1 °F (36.7 °C)   SpO2:  100%  96%   Weight:       Height:            Intake and Output:  Yesterday's Intake and Output:  No intake/output data recorded. Physical Exam:   GENERAL: no distress, appears stated age  EYE: negative  Respiratory -no distress. ABDOMEN: Nondistended. Musculoskeletal -normal on inspection. NEUROLOGIC: She is awake but lethargic      Dialysis access: cuffed tunneled catheter site right and IJ.       Data Review    Meds    Current Facility-Administered Medications   Medication Dose Route Frequency    dextrose 5 % - 0.45% NaCl infusion  75 mL/hr IntraVENous CONTINUOUS    [START ON 4/13/2022] levothyroxine (SYNTHROID) tablet 200 mcg  200 mcg Oral ACB    atorvastatin (LIPITOR) tablet 40 mg  40 mg Oral QHS    FLUoxetine (PROzac) capsule 10 mg  10 mg Oral DAILY    naproxen (NAPROSYN) tablet 500 mg  500 mg Oral BID WITH MEALS    nystatin (MYCOSTATIN) 100,000 unit/gram powder   Topical TID    OXcarbazepine (TRILEPTAL) tablet 150 mg  150 mg Oral QAM    OXcarbazepine (TRILEPTAL) tablet 300 mg  300 mg Oral QHS    pantoprazole (PROTONIX) tablet 40 mg  40 mg Oral ACB    sevelamer carbonate (RENVELA) tab 800 mg  800 mg Oral TID WITH MEALS    lacosamide (VIMPAT) tablet 200 mg  200 mg Oral BID    sodium chloride (NS) flush 5-40 mL  5-40 mL IntraVENous Q8H    sodium chloride (NS) flush 5-40 mL  5-40 mL IntraVENous PRN    acetaminophen (TYLENOL) tablet 650 mg  650 mg Oral Q6H PRN    Or    acetaminophen (TYLENOL) suppository 650 mg  650 mg Rectal Q6H PRN    polyethylene glycol (MIRALAX) packet 17 g  17 g Oral DAILY PRN    ondansetron (ZOFRAN ODT) tablet 4 mg  4 mg Oral Q8H PRN    Or    ondansetron (ZOFRAN) injection 4 mg  4 mg IntraVENous Q6H PRN       Lab         Lab Results   Component Value Date/Time    Color Yellow/Straw 02/12/2022 09:00 AM    Appearance Turbid (A) 02/12/2022 09:00 AM    Specific gravity 1.017 02/12/2022 09:00 AM    pH (UA) 5.0 02/12/2022 09:00 AM    Protein 100 (A) 02/12/2022 09:00 AM    Glucose Negative 02/12/2022 09:00 AM    Ketone 5 (A) 02/12/2022 09:00 AM    Bilirubin Negative 02/12/2022 09:00 AM    Urobilinogen 0.1 02/12/2022 09:00 AM    Nitrites Negative 02/12/2022 09:00 AM    Leukocyte Esterase Moderate (A) 02/12/2022 09:00 AM    Epithelial cells Moderate (A) 05/20/2021 12:56 AM    Bacteria Negative 02/12/2022 09:00 AM    WBC >100 (H) 02/12/2022 09:00 AM    RBC 20-50 02/12/2022 09:00 AM       Imaging         Assessment & Plan:     1. ESRD. -On dialysis at [address] renal care in [place] every MWF   -She was unable to be dialyzed today  -No volume overload/no uremia, K is 5.0  -Pending labs today.  -Plan for dialysis tomorrow  -She has permacath is a dialysis access      2. Anemia   -2/2 ESRD   -hx of GIB in the past  -Hb 7.5       -s/p EGD on 2/14/22 which showed esophagitis chronic gastritis without bleeding. 3 clips from previous intervention were still present without any active bleeding noted  -Continue Procrit 10,000 units with dialysis  -Transfusion as needed to keep hemoglobin  > 7     3  Hyportension   -takes midodrine 5 mg tid at home  -Bps are ok  - will resume midodrine if there will be any intradialytic hypotension     4 Secondary hyperparathyroidism of ESRD. -Calcium and phos are ok.    -cont Renvela   -PTH is 194 which is within target range for her ESRD.     -Continue calcitriol at the current dose     5 altered mental status.    -She was sent from the dialysis center due to altered mental status/unresponsiveness.    -On arrival CT head no acute process. CT face showed nasal fracture. -She had a recent fall and was evaluated in the ER. Neurology is on board.             Signed By: Medina Garcia MD     April 12, 2022

## 2022-04-12 NOTE — PROGRESS NOTES
Hospitalist Progress Note    Subjective:   Daily Progress Note: 4/12/2022 11:19 AM    Hospital Course:  Kiesha Tolbert is a 73-year-old female with a PMHx of ESRD on HD, HTN, HLD, chronic atrial fibrillation not on AC, history of AV block status post pacemaker, DM, CVA and seizure disorders who presented to the ED via EMS after becoming unresponsive during hemodialysis. Per EMS patient was only responsive to pain. Patient herself is a poor historian due to pain. Of note patient was recently treated in the ED following fall from wheelchair which resulted in nasal bone fracture and bruising.      In the ED head, CT repeated which showed no acute changes. Patient CT shows fracture of the right nasal wall with minimally displacement noted. Routine lab work shows hemoglobin 7.5, creatinine 3.15. Vitals /70, pulse 77, temp 97.6, respiratory 18, O2 98% room air. Hypoglycemic with blood sugars in high 60s. Hospitalist team consulted for further evaluation and treatment. Admit to observation. Obtain cardiology evaluation. Given IV glucagon 1 g x1 and started on continuous IV D5. Cardiology consult. Subjective:    Patient seen and examined at bedside. She has exquisite pain when trying to place her on the bedpan. Denies any dizziness, nausea, or visual changes.      Current Facility-Administered Medications   Medication Dose Route Frequency    atorvastatin (LIPITOR) tablet 40 mg  40 mg Oral QHS    levothyroxine (SYNTHROID) tablet 150 mcg  150 mcg Oral ACB    FLUoxetine (PROzac) capsule 10 mg  10 mg Oral DAILY    naproxen (NAPROSYN) tablet 500 mg  500 mg Oral BID WITH MEALS    nystatin (MYCOSTATIN) 100,000 unit/gram powder   Topical TID    OXcarbazepine (TRILEPTAL) tablet 150 mg  150 mg Oral QAM    OXcarbazepine (TRILEPTAL) tablet 300 mg  300 mg Oral QHS    pantoprazole (PROTONIX) tablet 40 mg  40 mg Oral ACB    sevelamer carbonate (RENVELA) tab 800 mg  800 mg Oral TID WITH MEALS    lacosamide (VIMPAT) tablet 200 mg  200 mg Oral BID    sodium chloride (NS) flush 5-40 mL  5-40 mL IntraVENous Q8H    sodium chloride (NS) flush 5-40 mL  5-40 mL IntraVENous PRN    acetaminophen (TYLENOL) tablet 650 mg  650 mg Oral Q6H PRN    Or    acetaminophen (TYLENOL) suppository 650 mg  650 mg Rectal Q6H PRN    polyethylene glycol (MIRALAX) packet 17 g  17 g Oral DAILY PRN    ondansetron (ZOFRAN ODT) tablet 4 mg  4 mg Oral Q8H PRN    Or    ondansetron (ZOFRAN) injection 4 mg  4 mg IntraVENous Q6H PRN        Review of Systems  Reason unable to perform ROS: limited due to medical condition. Musculoskeletal: Positive for myalgias. Neurological: Positive for weakness. Objective:     Visit Vitals  BP (!) 147/80 (BP Patient Position: At rest;Lying)   Pulse 69   Temp 98 °F (36.7 °C)   Resp 18   Ht 5' 6\" (1.676 m)   Wt 72.6 kg (160 lb)   SpO2 100%   BMI 25.82 kg/m²      O2 Device: None (Room air)    Temp (24hrs), Av.7 °F (36.5 °C), Min:97.4 °F (36.3 °C), Max:98 °F (36.7 °C)      No intake/output data recorded. No intake/output data recorded. PHYSICAL EXAM:  Constitutional: No acute distress  Skin: Facial bruising, left eye scar. Left ankle healing surgical scar. HEENT: Sclerae anicteric. PERRL. No oral ulcers. The neck is supple and no masses. Cardiovascular: Irregularly irregular. +S1/S2. No murmur or gallop. Respiratory:  Clear breath sounds bilaterally with no wheezes, rales, or rhonchi. GI: Abdomen nondistended, soft, and nontender. Normal active bowel sounds. Rectal: Deferred   Musculoskeletal: No pitting edema of the lower legs. Able to move all ext  Neurological:  Patient is alert and oriented.  Cranial nerves II-XII grossly intact  Psychiatric: Mood appears appropriate       Data Review    Recent Results (from the past 24 hour(s))   CBC WITH AUTOMATED DIFF    Collection Time: 22  1:20 PM   Result Value Ref Range    WBC 7.5 3.6 - 11.0 K/uL    RBC 2.63 (L) 3.80 - 5.20 M/uL    HGB 7.5 (L) 11.5 - 16.0 g/dL    HCT 24.7 (L) 35.0 - 47.0 %    MCV 93.9 80.0 - 99.0 FL    MCH 28.5 26.0 - 34.0 PG    MCHC 30.4 30.0 - 36.5 g/dL    RDW 15.6 (H) 11.5 - 14.5 %    PLATELET 955 (L) 788 - 400 K/uL    MPV 12.1 8.9 - 12.9 FL    NRBC 0.0 0.0  WBC    ABSOLUTE NRBC 0.00 0.00 - 0.01 K/uL    NEUTROPHILS 68 32 - 75 %    LYMPHOCYTES 26 12 - 49 %    MONOCYTES 5 5 - 13 %    EOSINOPHILS 0 0 - 7 %    BASOPHILS 1 0 - 1 %    IMMATURE GRANULOCYTES 0 0 - 0.5 %    ABS. NEUTROPHILS 5.1 1.8 - 8.0 K/UL    ABS. LYMPHOCYTES 1.9 0.8 - 3.5 K/UL    ABS. MONOCYTES 0.4 0.0 - 1.0 K/UL    ABS. EOSINOPHILS 0.0 0.0 - 0.4 K/UL    ABS. BASOPHILS 0.1 0.0 - 0.1 K/UL    ABS. IMM. GRANS. 0.0 0.00 - 0.04 K/UL    DF AUTOMATED     METABOLIC PANEL, COMPREHENSIVE    Collection Time: 04/11/22  1:20 PM   Result Value Ref Range    Sodium 136 136 - 145 mmol/L    Potassium 5.0 3.5 - 5.1 mmol/L    Chloride 105 97 - 108 mmol/L    CO2 25 21 - 32 mmol/L    Anion gap 6 5 - 15 mmol/L    Glucose 77 65 - 100 mg/dL    BUN 55 (H) 6 - 20 mg/dL    Creatinine 3.15 (H) 0.55 - 1.02 mg/dL    BUN/Creatinine ratio 17 12 - 20      GFR est AA 18 (L) >60 ml/min/1.73m2    GFR est non-AA 15 (L) >60 ml/min/1.73m2    Calcium 7.7 (L) 8.5 - 10.1 mg/dL    Bilirubin, total 0.2 0.2 - 1.0 mg/dL    AST (SGOT) 38 (H) 15 - 37 U/L    ALT (SGPT) 36 12 - 78 U/L    Alk.  phosphatase 108 45 - 117 U/L    Protein, total 4.2 (L) 6.4 - 8.2 g/dL    Albumin 2.0 (L) 3.5 - 5.0 g/dL    Globulin 2.2 2.0 - 4.0 g/dL    A-G Ratio 0.9 (L) 1.1 - 2.2     MAGNESIUM    Collection Time: 04/11/22  1:20 PM   Result Value Ref Range    Magnesium 2.1 1.6 - 2.4 mg/dL   PHOSPHORUS    Collection Time: 04/11/22  1:20 PM   Result Value Ref Range    Phosphorus 4.2 2.6 - 4.7 mg/dL   TROPONIN-HIGH SENSITIVITY    Collection Time: 04/11/22  1:20 PM   Result Value Ref Range    Troponin-High Sensitivity 20 0 - 51 ng/L   GLUCOSE, POC    Collection Time: 04/11/22  8:06 PM   Result Value Ref Range    Glucose (POC) 73 65 - 117 mg/dL Performed by Madison Mathews    GLUCOSE, POC    Collection Time: 04/12/22  7:34 AM   Result Value Ref Range    Glucose (POC) 61 (L) 65 - 117 mg/dL    Performed by Chuy Benavides        CT HEAD WO CONT   Final Result   No acute intracranial abnormality. CT FACIAL BONES WITHOUT INTRAVENOUS CONTRAST:      CLINICAL HISTORY: Injury to the face. Dose Reduction:       All CT scans at this facility are performed using dose reduction optimization   techniques as appropriate to a performed exam including the following: Automated   exposure control, adjustments of the mA and/or kV according to patient size, or   use of iterative reconstruction technique. Thin axial and coronal and sagittal reconstructions were obtained. Comparison is made with a previous CT facial bone examination April 7. There has   been no interval change. Available scans through the orbits show no fracture. Optic globes and optic   nerves have a normal CT appearance. Both zygomatic arches are intact. No   fracture of the mandible or maxilla is noted. Minimally displaced fracture of   the right nasal bone is again noted. No other fractures were identified. .      Mild mucosal changes and small fluid levels within the sphenoid sinus are again   noted and unchanged. Mastoid air cells are clear. Visualized soft tissues of the neck have a normal CT appearance. IMPRESSION: No interval change. Fracture of the right nasal bone minimally displacement. CT MAXILLOFACIAL WO CONT   Final Result   No acute intracranial abnormality. CT FACIAL BONES WITHOUT INTRAVENOUS CONTRAST:      CLINICAL HISTORY: Injury to the face.       Dose Reduction:       All CT scans at this facility are performed using dose reduction optimization   techniques as appropriate to a performed exam including the following: Automated   exposure control, adjustments of the mA and/or kV according to patient size, or   use of iterative reconstruction technique. Thin axial and coronal and sagittal reconstructions were obtained. Comparison is made with a previous CT facial bone examination April 7. There has   been no interval change. Available scans through the orbits show no fracture. Optic globes and optic   nerves have a normal CT appearance. Both zygomatic arches are intact. No   fracture of the mandible or maxilla is noted. Minimally displaced fracture of   the right nasal bone is again noted. No other fractures were identified. .      Mild mucosal changes and small fluid levels within the sphenoid sinus are again   noted and unchanged. Mastoid air cells are clear. Visualized soft tissues of the neck have a normal CT appearance. IMPRESSION: No interval change. Fracture of the right nasal bone minimally displacement. XR HIP RT W OR WO PELV 2-3 VWS   Final Result      XR CHEST PORT   Final Result      XR HIP LT W OR WO PELV 2-3 VWS   Final Result          Principal Problem:    Syncope (4/11/2022)    Active Problems:    History of seizure disorder (4/11/2022)      ESRD (end stage renal disease) on dialysis ([facility] [address] (4/11/2022)        Assessment/Plan:      1. Syncope  - Could be s/t hypoglycemia vs hypotensive episode during dialysis   - Ct head unremarkable for acute intracranial process  - Cleared from neuro standpoint  - Cardiology pending   - Urinalysis pending  - Drug screen pending   - Fall precautions  - PT/OT     2. Hx seizure disorder - Managed with trileptal and vimpat     3. ESRD - HD on MWF. Will consult nephrology     4. Ambulatory dysfunction - pt/ot eval. Fall precautions     5. Depression - managed with prozac     6. Hypothyroidism - managed with synthroid 150mcg. TSH 9.8. Check T3/T4.  Increase synthroid to 200 mcg.      7. Afib/SSS s/p PPM - cardiology consulted       DVT Prophylaxis: SCDs  Code Status: Full  POA:    Care Plan discussed with: patient and nursing     Total time spent with patient: >35 minutes.

## 2022-04-12 NOTE — CONSULTS
Consult    NAME: Samson Santiago   :  1950   MRN:  [id number]     Date/Time:  2022 12:46 PM    Patient PCP: Jarad Hammond MD  ________________________________________________________________________      Subjective:   CHIEF COMPLAINT: During dialysis patient became unresponsive. HISTORY OF PRESENT ILLNESS:   Information is obtained from the patient and from the son who is at her bedside. Apparently patient was having dialysis and became unresponsive. Patient denied any symptoms of chest pain or palpitation prior to that. She did mention she had 1 episode of near syncope in the past.  Patient has a history of atrial fibrillation and ablation and AV node ablation and pacemaker insertion. History of end-stage renal disease and diabetes mellitus and history of stroke and may have a parkinsonism.            Past Medical History:   Diagnosis Date    Anxiety disorder     Arthritis     Atherosclerosis of native arteries of the extremities with ulceration ([address] 2020    Atrial fibrillation (HCC)     Cardiac pacemaker in situ     Cellulitis and abscess of trunk 2020    Depression, postpartum     Diabetes ([address]     Heart disease     Heartburn     Hx of long term use of blood thinners     Hypercholesterolemia     Hypertension     Hypothyroidism     Migraines     Peripheral venous insufficiency 2020    Seizures ([address]     Sleep disorder     [facility]roke [facility] [facility]     2019    Varicose veins of lower extremity with inflammation 2020      Past Surgical History:   Procedure Laterality Date    HX BACK SURGERY      HX OTHER SURGICAL      leg surgery     HX OTHER SURGICAL      pacemaker monitor     IR INSERT TUNL CVC W/O PORT OVER 5 YR  12/10/2021     Allergies   Allergen Reactions    Lactose Diarrhea      Meds:  See below  Social History     Tobacco Use    Smoking status: Never Smoker    Smokeless tobacco: Never Used   Substance Use Topics    Alcohol use: Never   Denies smoking drinking or drugs. Family History   Problem Relation Age of Onset    Heart Disease Mother     Heart Disease Father     Diabetes Sister     Diabetes Brother         FAMILY HISTORY: Mother  when patient was 6years old from stomach cancer and she does not know how old was seen and father  at the age of 79 had some heart condition and she does not know cause of demise of her father. PERSONAL HISTORY : Patient is a  and has a 13 children and did all kind of or drugs and was a homemaker also. REVIEW OF SYSTEMS:     []         Unable to obtain  ROS due to ---   [x]         Total of 12 systems reviewed as follows:    Constitutional: negative fever, negative chills, negative weight loss  Eyes:   negative visual changes  ENT:   negative sore throat, tongue or lip swelling  Respiratory:  negative cough, negative dyspnea  Cards:  negative for chest pain, palpitations, lower extremity edema  GI:   negative for nausea, vomiting, diarrhea, and abdominal pain  Genitourinary: negative for frequency, dysuria, patient has end-stage renal disease and is on dialysis. Integument:  negative for rash   Hematologic:  negative for easy bruising and gum/nose bleeding  Musculoskel: negative for myalgias,  back pain  Neurological:  History of stroke and had an episode of unresponsiveness  Behavl/Psych: negative for feelings of anxiety, depression     Pertinent Positives include :    Objective:      Physical Exam:    Last 24hrs VS reviewed since prior progress note.  Most recent are:    Visit Vitals  /64 (BP Patient Position: At rest;Lying right side)   Pulse 62   Temp 98.1 °F (36.7 °C)   Resp 18   Ht 5' 6\" (1.676 m)   Wt 72.6 kg (160 lb)   SpO2 96%   BMI 25.82 kg/m²       Intake/Output Summary (Last 24 hours) at 2022 1246  Last data filed at 2022 1030  Gross per 24 hour   Intake 400 ml   Output    Net 400 ml        General Appearance: Well developed, well nourished, alert & oriented x 3,    no acute distress. Ears/Nose/Mouth/Throat: Pupils equal and round, Hearing grossly normal.  Neck: Supple. JVP within normal limits. Carotids good upstrokes, with no bruit. Chest: Lungs clear to auscultation bilaterally. Cardiovascular: Regular rate and rhythm, S1S2 normal, no murmur, rubs, gallops. Abdomen: Soft, non-tender, bowel sounds are active. No organomegaly. Extremities: No edema bilaterally. Femoral pulses +2, Distal Pulses +1. Skin: Warm and dry. Neuro: CN II-XII grossly intact, Strength and sensation grossly intact. []         Post-cath site without hematoma, bruit, tenderness, or thrill. Distal pulses intact. Data:      Telemetry:    EKG:  []  No new EKG for review. Prior to Admission medications    Medication Sig Start Date End Date Taking? Authorizing Provider   midodrine (PROAMATINE) 5 mg tablet Take 5 mg by mouth three (3) times daily (with meals). Yes Provider, Historical   metoprolol succinate (TOPROL-XL) 25 mg XL tablet Take 12.5 mg by mouth daily. Yes Provider, Historical   traMADoL (ULTRAM) 50 mg tablet Take 50 mg by mouth every six (6) hours as needed for Pain. Yes Provider, Historical   nystatin (MYCOSTATIN) powder Apply  to affected area four (4) times daily. 3/22/22  Yes Nova Villatoro NP   Spirometers and Accessories avril 1 Each by Does Not Apply route three (3) times daily. 3/8/22  Yes Nova Villatoro NP   FLUoxetine (PROzac) 10 mg capsule Take 1 Capsule by mouth daily for 270 days. 2/24/22 11/21/22 Yes Nova Villatoro NP   pantoprazole (PROTONIX) 40 mg tablet Take 1 Tablet by mouth Daily (before breakfast).  Indications: bleeding from stomach, esophagus or duodenum 2/24/22  Yes Nova Villatoro NP   ondansetron (Zofran ODT) 4 mg disintegrating tablet 1 Tablet by SubLINGual route every eight (8) hours as needed for Nausea or Vomiting. 2/24/22  Yes Nova Villatoro NP   food supplemt, lactose-reduced (Ensure Active High Protein) liqd Take 237 mL by mouth four (4) times daily as needed for PRN Reason (Other) (as pt can tolerate). 2/10/22  Yes Marcos Lax, NP   zinc oxide-white petrolatum 17-57 % topical paste Apply  to affected area as needed for Skin Irritation. Indications: skin irritation 12/11/21  Yes Shania Mcintosh MD   Vimpat 200 mg tab tablet Take 1 Tablet by mouth two (2) times a day. 11/19/21  Yes Provider, Historical   atorvastatin (LIPITOR) 40 mg tablet Take 1 tablet by mouth once daily for 90 days 11/22/21 8/19/22 Yes Royal Ld MD   Euthyrox 150 mcg tablet Take 1 tablet by mouth once daily 10/13/21  Yes Royal Ld MD   sevelamer carbonate (RENVELA) 800 mg tab tab  5/10/21  Yes Provider, Historical   naproxen (NAPROSYN) 500 mg tablet Take 1 Tablet by mouth every twelve (12) hours as needed for Pain. Patient not taking: Reported on 4/11/2022 4/7/22   Sergio Dalton MD   OXcarbazepine (TRILEPTAL) 150 mg tablet Take 1 Tablet by mouth Every morning. Patient not taking: Reported on 3/8/2022 1/29/22   Waldemar Espana MD   OXcarbazepine (TrileptaL) 300 mg tablet Take 1 Tablet by mouth nightly. Patient not taking: Reported on 3/8/2022 1/29/22   Waldemar Espana MD       Recent Results (from the past 24 hour(s))   CBC WITH AUTOMATED DIFF    Collection Time: 04/11/22  1:20 PM   Result Value Ref Range    WBC 7.5 3.6 - 11.0 K/uL    RBC 2.63 (L) 3.80 - 5.20 M/uL    HGB 7.5 (L) 11.5 - 16.0 g/dL    HCT 24.7 (L) 35.0 - 47.0 %    MCV 93.9 80.0 - 99.0 FL    MCH 28.5 26.0 - 34.0 PG    MCHC 30.4 30.0 - 36.5 g/dL    RDW 15.6 (H) 11.5 - 14.5 %    PLATELET 845 (L) 333 - 400 K/uL    MPV 12.1 8.9 - 12.9 FL    NRBC 0.0 0.0  WBC    ABSOLUTE NRBC 0.00 0.00 - 0.01 K/uL    NEUTROPHILS 68 32 - 75 %    LYMPHOCYTES 26 12 - 49 %    MONOCYTES 5 5 - 13 %    EOSINOPHILS 0 0 - 7 %    BASOPHILS 1 0 - 1 %    IMMATURE GRANULOCYTES 0 0 - 0.5 %    ABS. NEUTROPHILS 5.1 1.8 - 8.0 K/UL    ABS.  LYMPHOCYTES 1.9 0.8 - 3.5 K/UL    ABS. MONOCYTES 0.4 0.0 - 1.0 K/UL    ABS. EOSINOPHILS 0.0 0.0 - 0.4 K/UL    ABS. BASOPHILS 0.1 0.0 - 0.1 K/UL    ABS. IMM. GRANS. 0.0 0.00 - 0.04 K/UL    DF AUTOMATED     METABOLIC PANEL, COMPREHENSIVE    Collection Time: 04/11/22  1:20 PM   Result Value Ref Range    Sodium 136 136 - 145 mmol/L    Potassium 5.0 3.5 - 5.1 mmol/L    Chloride 105 97 - 108 mmol/L    CO2 25 21 - 32 mmol/L    Anion gap 6 5 - 15 mmol/L    Glucose 77 65 - 100 mg/dL    BUN 55 (H) 6 - 20 mg/dL    Creatinine 3.15 (H) 0.55 - 1.02 mg/dL    BUN/Creatinine ratio 17 12 - 20      GFR est AA 18 (L) >60 ml/min/1.73m2    GFR est non-AA 15 (L) >60 ml/min/1.73m2    Calcium 7.7 (L) 8.5 - 10.1 mg/dL    Bilirubin, total 0.2 0.2 - 1.0 mg/dL    AST (SGOT) 38 (H) 15 - 37 U/L    ALT (SGPT) 36 12 - 78 U/L    Alk.  phosphatase 108 45 - 117 U/L    Protein, total 4.2 (L) 6.4 - 8.2 g/dL    Albumin 2.0 (L) 3.5 - 5.0 g/dL    Globulin 2.2 2.0 - 4.0 g/dL    A-G Ratio 0.9 (L) 1.1 - 2.2     MAGNESIUM    Collection Time: 04/11/22  1:20 PM   Result Value Ref Range    Magnesium 2.1 1.6 - 2.4 mg/dL   PHOSPHORUS    Collection Time: 04/11/22  1:20 PM   Result Value Ref Range    Phosphorus 4.2 2.6 - 4.7 mg/dL   TROPONIN-HIGH SENSITIVITY    Collection Time: 04/11/22  1:20 PM   Result Value Ref Range    Troponin-High Sensitivity 20 0 - 51 ng/L   GLUCOSE, POC    Collection Time: 04/11/22  8:06 PM   Result Value Ref Range    Glucose (POC) 73 65 - 117 mg/dL    Performed by [employer], POC    Collection Time: 04/12/22  7:34 AM   Result Value Ref Range    Glucose (POC) 61 (L) 65 - 117 mg/dL    Performed by Gage Sullivan    EKG, 12 LEAD, SUBSEQUENT    Collection Time: 04/12/22  9:07 AM   Result Value Ref Range    Ventricular Rate 78 BPM    Atrial Rate 125 BPM    QRS Duration 150 ms    Q-T Interval 416 ms    QTC Calculation (Bezet) 474 ms    Calculated R Axis 127 degrees    Calculated T Axis -62 degrees    Diagnosis       Atrial fibrillation with frequent ventricular-paced complexes and with   premature ventricular or aberrantly conducted complexes  Right bundle branch block  T wave abnormality, consider inferolateral ischemia  Abnormal ECG  When compared with ECG of 11-APR-2022 12:26, (Unconfirmed)  Electronic ventricular pacemaker has replaced Atrial fibrillation  Confirmed by Clementine MARCELO MD ([id number]) on 4/12/2022 11:34:18 AM     TSH 3RD GENERATION    Collection Time: 04/12/22 11:04 AM   Result Value Ref Range    TSH 9.28 (H) 0.36 - 3.74 uIU/mL   CBC WITH AUTOMATED DIFF    Collection Time: 04/12/22 11:04 AM   Result Value Ref Range    WBC 9.5 3.6 - 11.0 K/uL    RBC 2.53 (L) 3.80 - 5.20 M/uL    HGB 7.3 (L) 11.5 - 16.0 g/dL    HCT 23.9 (L) 35.0 - 47.0 %    MCV 94.5 80.0 - 99.0 FL    MCH 28.9 26.0 - 34.0 PG    MCHC 30.5 30.0 - 36.5 g/dL    RDW 15.7 (H) 11.5 - 14.5 %    PLATELET 494 (L) 207 - 400 K/uL    MPV 12.2 8.9 - 12.9 FL    NRBC 0.0 0.0  WBC    ABSOLUTE NRBC 0.00 0.00 - 0.01 K/uL    NEUTROPHILS 77 (H) 32 - 75 %    LYMPHOCYTES 16 12 - 49 %    MONOCYTES 6 5 - 13 %    EOSINOPHILS 1 0 - 7 %    BASOPHILS 0 0 - 1 %    IMMATURE GRANULOCYTES 0 0 - 0.5 %    ABS. NEUTROPHILS 7.3 1.8 - 8.0 K/UL    ABS. LYMPHOCYTES 1.5 0.8 - 3.5 K/UL    ABS. MONOCYTES 0.6 0.0 - 1.0 K/UL    ABS. EOSINOPHILS 0.1 0.0 - 0.4 K/UL    ABS. BASOPHILS 0.0 0.0 - 0.1 K/UL    ABS. IMM. GRANS. 0.0 0.00 - 0.04 K/UL    DF AUTOMATED     GLUCOSE, POC    Collection Time: 04/12/22 11:16 AM   Result Value Ref Range    Glucose (POC) 66 65 - 117 mg/dL    Performed by Josef Kennedy          Assessment:   Syncope, etiology is unclear. History of atrial fibrillation and ablation therapy and AV node ablation and pacemaker insertion. Hypertension and diabetes mellitus and end-stage renal disease and patient is on dialysis. History of stroke. Probably patient has parkinsonism. 1.         Plan:   I will continue anticoagulation. I will check serial EKG and enzymes.   Continue dialysis as per nephrologist. Thank you.   1.       []        High complexity decision making was performed    Mike Disla MD

## 2022-04-12 NOTE — PROGRESS NOTES
Problem: Pressure Injury - Risk of  Goal: *Prevention of pressure injury  Description: Document Nitin Scale and appropriate interventions in the flowsheet. Outcome: Progressing Towards Goal  Note: Pressure Injury Interventions:  Sensory Interventions: Assess changes in LOC,Minimize linen layers    Moisture Interventions: Absorbent underpads,Apply protective barrier, creams and emollients,Internal/External urinary devices,Maintain skin hydration (lotion/cream),Minimize layers,Moisture barrier    Activity Interventions: PT/OT evaluation,Pressure redistribution bed/mattress(bed type),Assess need for specialty bed    Mobility Interventions: HOB 30 degrees or less,Pressure redistribution bed/mattress (bed type),PT/OT evaluation    Nutrition Interventions: Document food/fluid/supplement intake,Offer support with meals,snacks and hydration    Friction and Shear Interventions: Apply protective barrier, creams and emollients,HOB 30 degrees or less,Minimize layers                Problem: Patient Education: Go to Patient Education Activity  Goal: Patient/Family Education  Outcome: Progressing Towards Goal     Problem: Falls - Risk of  Goal: *Absence of Falls  Description: Document Cuco Fall Risk and appropriate interventions in the flowsheet.   Outcome: Progressing Towards Goal  Note: Fall Risk Interventions:       Mentation Interventions: Adequate sleep, hydration, pain control,Bed/chair exit alarm,Door open when patient unattended,More frequent rounding,Reorient patient    Medication Interventions: Bed/chair exit alarm,Patient to call before getting OOB    Elimination Interventions: Call light in reach,Patient to call for help with toileting needs,Bed/chair exit alarm    History of Falls Interventions: Bed/chair exit alarm,Door open when patient unattended         Problem: Patient Education: Go to Patient Education Activity  Goal: Patient/Family Education  Outcome: Progressing Towards Goal     Problem: Risk for Spread of Infection  Goal: Prevent transmission of infectious organism to others  Description: Prevent the transmission of infectious organisms to other patients, staff members, and visitors.   Outcome: Progressing Towards Goal     Problem: Patient Education:  Go to Education Activity  Goal: Patient/Family Education  Outcome: Progressing Towards Goal

## 2022-04-12 NOTE — PROGRESS NOTES
OCCUPATIONAL THERAPY EVALUATION  Patient: Charley Jaimes (82 y.o. female)  Date: 4/12/2022  Primary Diagnosis: Syncope [R55]  ESRD (end stage renal disease) on dialysis ([facility] [address] [N18.6, Z99.2]  History of seizure disorder [Z86.69]        Precautions: fall risk       ASSESSMENT  Pt is a 70 yo female admitted on 4/11/2022 after becoming unresponsive during HD; pt was recently treated and DC homoe following fall from [facility] which resulted in nasal bone fracture. Pt currently being treated for syncope, depression, and afib/SSS s/p PPM. PMH: depression, h/o fall, COVID 19, ESRD on HD, HTN, HLD, chronic atrial fibrillation, history of AV block status post pacemaker, DM, CVA and seizure disorders. Pt semi-supine in bed upon OT/PT arrival, agreeable to evaluation. Pt A&O x name, birth month and day, type of place, and time but not situation or birth year. Per pt report (verified via medical chart) pt resides with son in a 2 story home but resides on 1st floor with ramp entrance, pt required assistance for ADLS/IADLS, WC for primary mobility prior to admission. DME: hospital bed, BSC, grab bars, RW, and WC. Based on current observations, pt presents with deficits in generalized strength/AROM, bed mobility, functional activity tolerance, vision (blind), coordination, cognition/confusion, decreased safety awareness, and pain impacting overall performance of ADLs and functional transfers/mobility. Pt currently requires  Pt noted to be soiled in urine upon entry, pt required max Ax2 for bed mobility, total A for toileting hygiene in side lying (Mepliex pad applied to sacral area - RN aware due to redness noted). Pt setup assist for basic grooming (washing face) in long sitting. After rolling in bed to get pt clean and change chux pad pt too tired to perform further mobility at this time. Overall, pt tolerates session fair with c/o generalized pain 8/10.  Pt would benefit from continued skilled OT services to address current impairments and improve IND and safety with self cares and functional transfers/mobility. Current OT d/c recommendation SNF once medically appropriate. Other factors to consider for discharge: family/social support, DME, time since onset, severity of deficits, decline from functional baseline     Patient will benefit from skilled therapy intervention to address the above noted impairments. PLAN :  Recommendations and Planned Interventions: self care training, functional mobility training, therapeutic exercise, balance training, therapeutic activities, cognitive retraining, endurance activities, neuromuscular re-education, patient education and family training/education    Frequency/Duration: Patient will be followed by occupational therapy:  2-3x/week to address goals. Recommendation for discharge: (in order for the patient to meet his/her long term goals)  Kaleb Jamison    This discharge recommendation:  Has been made in collaboration with the attending provider and/or case management       SUBJECTIVE:   Patient stated that scar on my leg allowed me to drink again.     OBJECTIVE DATA SUMMARY:   HISTORY:   Past Medical History:   Diagnosis Date    Anxiety disorder     Arthritis     Atherosclerosis of native arteries of the extremities with ulceration ([address] 8/31/2020    Atrial fibrillation ([address]     Cardiac pacemaker in situ     Cellulitis and abscess of trunk 8/31/2020    Depression, postpartum     Diabetes ([address]     Heart disease     Heartburn     Hx of long term use of blood thinners     Hypercholesterolemia     Hypertension     Hypothyroidism     Migraines     Peripheral venous insufficiency 8/31/2020    Seizures ([address]     Sleep disorder     Stroke ([address]     2019    Varicose veins of lower extremity with inflammation 8/31/2020     Past Surgical History:   Procedure Laterality Date    HX BACK SURGERY      HX OTHER SURGICAL      leg surgery     HX OTHER SURGICAL pacemaker monitor     IR INSERT TUNL CVC W/O PORT OVER 5 YR  12/10/2021       Expanded or extensive additional review of patient history:     Home Situation  Home Environment: Private residence  # Steps to Enter: 0  Wheelchair Ramp: Yes  One/Two Story Residence: Two story, live on 1st floor  Living Alone: No  Support Systems: Child(jose carlos)  Patient Expects to be Discharged to[de-identified] Home  Current DME Used/Available at Home: Wheelchair,Hospital bed,Grab bars,Shower chair,Commode, bedside    Hand dominance: Right    EXAMINATION OF PERFORMANCE DEFICITS:  Cognitive/Behavioral Status:  Neurologic State: Alert;Confused  Orientation Level: Oriented to place;Oriented to time (disoriented to situation and birth year)  Cognition: Appropriate decision making; Appropriate safety awareness; Follows commands             Skin: noted brusing on LE and to face        Hearing: Auditory  Auditory Impairment: None    Vision/Perceptual:                           Acuity: Impaired near vision; Impaired far vision (Pt blind yousuf eyes)         Range of Motion:  AROM: Generally decreased, functional  PROM: Generally decreased, functional                      Strength:  Strength: Generally decreased, functional                Coordination:  Coordination: Generally decreased, functional  Fine Motor Skills-Upper: Left Intact; Right Intact    Gross Motor Skills-Upper: Left Intact; Right Intact      Functional Mobility and Transfers for ADLs:  Bed Mobility:  Rolling: Maximum assistance;Assist x2; Additional time  Supine to Sit:  (pt declined after being cleaned up)      ADL Intervention and task modifications:       Grooming  Grooming Assistance: Set-up; Stand-by assistance  Position Performed: Long sitting on bed  Washing Face: Set-up; Stand-by assistance                        Toileting  Toileting Assistance:  Total assistance(dependent)         Therapeutic Exercise:  Pt would benefit from UE HEP to improve overall UE AROM/strength and can be further educated in next treatment session. Functional Measure:    [address] [place] [address] AM-PACTM \"6 Clicks\"                                                       Daily Activity Inpatient Short Form  How much help from another person does the patient currently need. .. Total; A Lot A Little None   1. Putting on and taking off regular lower body clothing? [x]  1 []  2 []  3 []  4   2. Bathing (including washing, rinsing, drying)? [x]  1 []  2 []  3 []  4   3. Toileting, which includes using toilet, bedpan or urinal? [x] 1 []  2 []  3 []  4   4. Putting on and taking off regular upper body clothing? []  1 [x]  2 []  3 []  4   5. Taking care of personal grooming such as brushing teeth? []  1 []  2 [x]  3 []  4   6. Eating meals? []  1 []  2 [x]  3 []  4   © , Trustees of [address] [place] [address], under license to [employer]. All rights reserved     Score:      Interpretation of Tool:  Represents clinically-significant functional categories (i.e. Activities of daily living). Percentage of Impairment CH    0%   CI    1-19% CJ    20-39% CK    40-59% CL    60-79% CM    80-99% CN     100%   [facility]  Score 6-24 24 23 20-22 15-19 10-14 7-9 6         Occupational Therapy Evaluation Charge Determination   History Examination Decision-Making   LOW Complexity : Brief history review  MEDIUM Complexity : 3-5 performance deficits relating to physical, cognitive , or psychosocial skils that result in activity limitations and / or participation restrictions HIGH Complexity : Patient presents with comorbidities that affect occupational performance.  Signifigant modification of tasks or assistance (eg, physical or verbal) with assessment (s) is necessary to enable patient to complete evaluation       Based on the above components, the patient evaluation is determined to be of the following complexity level: LOW   Pain Ratin/10 generalized     Activity Tolerance:   Fair    After treatment patient left in no apparent distress:    Supine in bed, Call bell within reach, Bed / chair alarm activated and Side rails x 3, wedge support to L side for offloading     COMMUNICATION/EDUCATION:   The patients plan of care was discussed with: Physical therapist and Registered nurse. Patient/family agree to work toward stated goals and plan of care. This patients plan of care is appropriate for delegation to RICK. OT/PT sessions occurred together for increased patient and clinician safety as pt with decreased activity tolerance and requires A of 2 for mobility at this time.      Thank you for this referral.  Danielle Morales  Time Calculation: 26 mins   Problem: Self Care Deficits Care Plan (Adult)  Goal: *Acute Goals and Plan of Care (Insert Text)  Description: Pt will be min A sup <> sit in prep for EOB ADLs  Pt will be min A grooming sitting EOB/long sit  Pt will be min A UB dressing sitting EOB/long sit   Pt will be max A LE dressing sitting EOB/long sit  Pt will be max A toileting/toilet transfer/cloth mgmt LRAD  Pt will be min A following UE HEP in prep for self care tasks      Outcome: Not Met

## 2022-04-12 NOTE — PROGRESS NOTES
Patient is no sticks to the right, dialysis and has HD port. The left arm has had multiple sticks to the forearm and the only viable vessel to access was just above the elbow.  Should this access give out a trialysis catheter or IJ will be her only other option

## 2022-04-12 NOTE — CONSULTS
Patient: Yuval Rayo MRN: [id number]  SSN: [id number]    YOB: 1950  Age: 71 y.o. Sex: female          Subjective:   Reason for the consultation. Medical management for end-stage renal disease    History of present illness. The patient is 41-year-old woman with underlying history of end-stage renal disease on hemodialysis Monday Wednesday Friday at [address] renal care in [facility] has a dialysis access, chronic A. fib, status post pacemaker due to AV block, old CVA, history of seizure disorder was sent from the dialysis center due to altered mental status/unresponsive, CT head no acute process, CT face showed right nasal wall fracture with minimal displacement (recent fall), no documented fevers or leukocytosis. Her labs showed creatinine 3.15 and potassium 5.0 and chest x-ray showed no evidence of pulmonary edema. Objective:     Vitals:    04/12/22 0400 04/12/22 0739 04/12/22 0800 04/12/22 1113   BP:  (!) 147/80  134/64   Pulse: 77 69 62 62   Resp:  18  18   Temp:  98 °F (36.7 °C)  98.1 °F (36.7 °C)   SpO2:  100%  96%   Weight:       Height:            Intake and Output:  Yesterday's Intake and Output:  No intake/output data recorded. Physical Exam:   GENERAL: no distress, appears stated age  EYE: negative  Respiratory -no distress. ABDOMEN: Nondistended. Musculoskeletal -normal on inspection. NEUROLOGIC: She is awake but lethargic      Dialysis access: cuffed tunneled catheter site right and IJ.       Data Review    Meds    Current Facility-Administered Medications   Medication Dose Route Frequency    dextrose 5 % - 0.45% NaCl infusion  75 mL/hr IntraVENous CONTINUOUS    [START ON 4/13/2022] levothyroxine (SYNTHROID) tablet 200 mcg  200 mcg Oral ACB    atorvastatin (LIPITOR) tablet 40 mg  40 mg Oral QHS    FLUoxetine (PROzac) capsule 10 mg  10 mg Oral DAILY    naproxen (NAPROSYN) tablet 500 mg  500 mg Oral BID WITH MEALS    nystatin (MYCOSTATIN) 100,000 unit/gram powder   Topical TID    OXcarbazepine (TRILEPTAL) tablet 150 mg  150 mg Oral QAM    OXcarbazepine (TRILEPTAL) tablet 300 mg  300 mg Oral QHS    pantoprazole (PROTONIX) tablet 40 mg  40 mg Oral ACB    sevelamer carbonate (RENVELA) tab 800 mg  800 mg Oral TID WITH MEALS    lacosamide (VIMPAT) tablet 200 mg  200 mg Oral BID    sodium chloride (NS) flush 5-40 mL  5-40 mL IntraVENous Q8H    sodium chloride (NS) flush 5-40 mL  5-40 mL IntraVENous PRN    acetaminophen (TYLENOL) tablet 650 mg  650 mg Oral Q6H PRN    Or    acetaminophen (TYLENOL) suppository 650 mg  650 mg Rectal Q6H PRN    polyethylene glycol (MIRALAX) packet 17 g  17 g Oral DAILY PRN    ondansetron (ZOFRAN ODT) tablet 4 mg  4 mg Oral Q8H PRN    Or    ondansetron (ZOFRAN) injection 4 mg  4 mg IntraVENous Q6H PRN       Lab         Lab Results   Component Value Date/Time    Color Yellow/Straw 02/12/2022 09:00 AM    Appearance Turbid (A) 02/12/2022 09:00 AM    Specific gravity 1.017 02/12/2022 09:00 AM    pH (UA) 5.0 02/12/2022 09:00 AM    Protein 100 (A) 02/12/2022 09:00 AM    Glucose Negative 02/12/2022 09:00 AM    Ketone 5 (A) 02/12/2022 09:00 AM    Bilirubin Negative 02/12/2022 09:00 AM    Urobilinogen 0.1 02/12/2022 09:00 AM    Nitrites Negative 02/12/2022 09:00 AM    Leukocyte Esterase Moderate (A) 02/12/2022 09:00 AM    Epithelial cells Moderate (A) 05/20/2021 12:56 AM    Bacteria Negative 02/12/2022 09:00 AM    WBC >100 (H) 02/12/2022 09:00 AM    RBC 20-50 02/12/2022 09:00 AM       Imaging         Assessment & Plan:     1. ESRD. -On dialysis at [address] renal care in [place] every MWF   -She was unable to be dialyzed today  -No volume overload/no uremia, K is 5.0  -No emergent indication for the dialysis today  -Plan for dialysis tomorrow  -She has permacath is a dialysis access      2. Anemia   -2/2 ESRD   -hx of GIB in the past  -Hb 7.5       -s/p EGD on 2/14/22 which showed esophagitis chronic gastritis without bleeding.   3 clips from previous intervention were still present without any active bleeding noted  -Continue Procrit 10,000 units with dialysis  -Transfusion as needed to keep hemoglobin  > 7     3  Hyportension   -takes midodrine 5 mg tid at home  -Bps are ok  - will resume midodrine if there will be any intradialytic hypotension     4 Secondary hyperparathyroidism of ESRD. -Calcium and phos are ok.    -cont Renvela   -PTH is 194 which is within target range for her ESRD. -Continue calcitriol at the current dose     5 altered mental status.    -She was sent from the dialysis center due to altered mental status/unresponsiveness.    -On arrival CT head no acute process. CT face showed nasal fracture. -She had a recent fall and was evaluated in the ER. Neurology is on board. Thank you for the consultation.        Signed By: Macey Gan MD     April 12, 2022

## 2022-04-12 NOTE — PROGRESS NOTES
PHYSICAL THERAPY EVALUATION  Patient: Harish Means (1 y.o. female)  Date: 4/12/2022  Primary Diagnosis: Syncope [R55]  ESRD (end stage renal disease) on dialysis ([facility] [address] [N18.6, Z99.2]  History of seizure disorder [Z86.69]        Precautions: falls, pt is blind       ASSESSMENT  Pt is a 70 yo female admitted on 4/11/2022 after becoming unresponsive during HD; pt was recently treated and DC homoe following fall from [address] which resulted in nasal bone fracture. Pt currently being treated for syncope, depression, and afib/SSS s/p PPM.     PMH: depression, h/o fall, COVID 19, ESRD on HD, HTN, HLD, chronic atrial fibrillation, history of AV block status post pacemaker, DM, CVA and seizure disorders. Pt semi-supine in bed upon PT/OT arrival, agreeable to evaluation. Pt A&O x name, birth month and day, type of place, and time but not situation or birth year. Per pt report (verified via medical chart) pt resides with son in a 2 story home but resides on 1st floor with ramp entrance, pt required assistance for ADLS/IADLS, WC for primary mobility prior to admission. DME: hospital bed, BSC, grab bars, RW, and WC. Based on the objective data described below, the patient presents with generalized weakness, impaired functional mobility, impaired balance, and decreased activity tolerance. Pt noted to be soiled in urine upon entry, pt required max Ax2 for bed mobility, total A for toileting hygiene (see OT note for self care details). After rolling in bed to get pt clean and change chux pad pt too tired to perform further mobility at this time. Pt LE assessed while semi-supine, demonstrates sig weakness in bilaterally with multiple bruises noted as well as which appears to be a closed surgical incision left lateral lower leg. When PT inquired what area was pt reported that is allowed her to drink again. Further mobility per pt request at this time.  Pt did poor with session today with very limited activity tolerance, weakness, and confusion. Pt will benefit from continued skilled PT to address above deficits and return to PLOF. Current PT DC recommendation SNF. Current Level of Function Impacting Discharge (mobility/balance): max Ax2    Other factors to consider for discharge: severity of deficits, PLOF, acute medical state      PLAN :  Recommendations and Planned Interventions: bed mobility training, transfer training, therapeutic exercises, neuromuscular re-education, patient and family training/education and therapeutic activities      Recommend with staff: bedpan for toileting    Frequency/Duration: Patient will be followed by physical therapy:  2-3x/week to address goals. Recommendation for discharge: (in order for the patient to meet his/her long term goals)  Kaleb Jamison    This discharge recommendation:  Has been made in collaboration with the attending provider and/or case management    IF patient discharges home will need the following DME: patient owns DME required for discharge         SUBJECTIVE:   Patient stated Speer Countess just don't feel well at all today.     OBJECTIVE DATA SUMMARY:   HISTORY:    Past Medical History:   Diagnosis Date    Anxiety disorder     Arthritis     Atherosclerosis of native arteries of the extremities with ulceration ([address] 8/31/2020    Atrial fibrillation ([address]     Cardiac pacemaker in situ     Cellulitis and abscess of trunk 8/31/2020    Depression, postpartum     Diabetes ([address]     Heart disease     Heartburn     Hx of long term use of blood thinners     Hypercholesterolemia     Hypertension     Hypothyroidism     Migraines     Peripheral venous insufficiency 8/31/2020    Seizures ([address]     Sleep disorder     Stroke ([address]     2019    Varicose veins of lower extremity with inflammation 8/31/2020     Past Surgical History:   Procedure Laterality Date    HX BACK SURGERY      HX OTHER SURGICAL      leg surgery     HX OTHER SURGICAL      pacemaker monitor     IR INSERT TUNL CVC W/O PORT OVER 5 YR  12/10/2021       Home Situation  Home Environment: Private residence  # Steps to Enter: 0  Wheelchair Ramp: Yes  One/Two Story Residence: Two story, live on 1st floor  Living Alone: No  Support Systems: Child(jose carlos)  Patient Expects to be Discharged to[de-identified] Home  Current DME Used/Available at Home: Wheelchair,Hospital bed,Grab bars,Shower chair,Commode, bedside    EXAMINATION/PRESENTATION/DECISION MAKING:   Critical Behavior:  Neurologic State: Alert,Confused  Orientation Level: Oriented to place,Oriented to time (disoriented to situation and birth year)  Cognition: Appropriate decision making,Appropriate safety awareness,Follows commands     Hearing: Auditory  Auditory Impairment: None  Skin:  Sig bruising noted along bilateral LE and UE with closed surgical type incision left lower lateral leg  Edema: none noted   Range Of Motion:  AROM: Generally decreased, functional           PROM: Generally decreased, functional           Strength:    Strength: Generally decreased, functional        Functional Mobility:  Bed Mobility:  Rolling: Maximum assistance;Assist x2; Additional time  Supine to Sit:  (pt declined after being cleaned up)        Transfers:    not completed this session     Balance:    not assessed this session  Ambulation/Gait Training:  NA      Therapeutic Exercises:   Not completed this session    Functional Measure:  [facility]  How much difficulty does the patient currently have. .. Unable A Lot A Little None   1. Turning over in bed (including adjusting bedclothes, sheets and blankets)? [] 1   [x] 2   [] 3   [] 4   2. Sitting down on and standing up from a chair with arms ( e.g., wheelchair, bedside commode, etc.)   [x] 1   [] 2   [] 3   [] 4   3. Moving from lying on back to sitting on the side of the bed?    [] 1   [x] 2   [] 3   [] 4          How much help from another person does the patient currently need. .. Total A Lot A Little None   4. Moving to and from a bed to a chair (including a wheelchair)? [x] 1   [] 2   [] 3   [] 4   5. Need to walk in hospital room? [x] 1   [] 2   [] 3   [] 4   6. Climbing 3-5 steps with a railing? [x] 1   [] 2   [] 3   [] 4   © , Trustees of [facility] [employer], under license to [employer]. All rights reserved     Score:  Initial:  Most Recent: X (Date: 22 )   Interpretation of Tool:  Represents activities that are increasingly more difficult (i.e. Bed mobility, Transfers, Gait). Score 24 23 22-20 19-15 14-10 9-7 6   Modifier CH CI CJ CK CL CM CN         Physical Therapy Evaluation Charge Determination   History Examination Presentation Decision-Making   HIGH Complexity :3+ comorbidities / personal factors will impact the outcome/ POC  HIGH Complexity : 4+ Standardized tests and measures addressing body structure, function, activity limitation and / or participation in recreation  LOW Complexity : Stable, uncomplicated  Other outcome measures ampac 6  mod      Based on the above components, the patient evaluation is determined to be of the following complexity level: LOW     Pain Ratin/10 FACES generalized during mobility    Activity Tolerance:   Poor and requires frequent rest breaks    After treatment patient left in no apparent distress:   Supine in bed, Heels elevated for pressure relief, Patient positioned in right sidelying for pressure relief, Call bell within reach, Bed / chair alarm activated and Side rails x 3 and nsg updated. GOALS:    Problem: Mobility Impaired (Adult and Pediatric)  Goal: *Acute Goals and Plan of Care (Insert Text)  Description: Pt will be I with LE HEP in 7 days. Pt will perform bed mobility with mod I in 7 days. Pt will perform transfers with mod I in 7 days.      Outcome: Not Met       COMMUNICATION/EDUCATION:   The patients plan of care was discussed with: Occupational therapist, Registered nurse, and Case management. Fall prevention education was provided and the patient/caregiver indicated understanding., Patient/family have participated as able in goal setting and plan of care. , and Patient/family agree to work toward stated goals and plan of care. PT/OT sessions occurred together for increased safety of pt and clinician.        Thank you for this referral.  Joi Curtis, PT, DPT   Time Calculation: 26 mins

## 2022-04-12 NOTE — PROGRESS NOTES
Problem: Pressure Injury - Risk of  Goal: *Prevention of pressure injury  Description: Document Nitin Scale and appropriate interventions in the flowsheet. Outcome: Progressing Towards Goal  Note: Pressure Injury Interventions:  Sensory Interventions: Assess changes in LOC,Discuss PT/OT consult with provider,Maintain/enhance activity level,Keep linens dry and wrinkle-free,Minimize linen layers    Moisture Interventions: Absorbent underpads,Apply protective barrier, creams and emollients,Internal/External urinary devices    Activity Interventions: Assess need for specialty bed,PT/OT evaluation    Mobility Interventions: Assess need for specialty bed,PT/OT evaluation    Nutrition Interventions: Document food/fluid/supplement intake,Offer support with meals,snacks and hydration    Friction and Shear Interventions: Apply protective barrier, creams and emollients,HOB 30 degrees or less,Minimize layers                Problem: Patient Education: Go to Patient Education Activity  Goal: Patient/Family Education  Outcome: Progressing Towards Goal     Problem: Falls - Risk of  Goal: *Absence of Falls  Description: Document Cuco Fall Risk and appropriate interventions in the flowsheet.   Outcome: Progressing Towards Goal  Note: Fall Risk Interventions:       Mentation Interventions: Adequate sleep, hydration, pain control,Bed/chair exit alarm,Door open when patient unattended,Increase mobility    Medication Interventions: Bed/chair exit alarm,Evaluate medications/consider consulting pharmacy    Elimination Interventions: Bed/chair exit alarm,Call light in reach,Patient to call for help with toileting needs    History of Falls Interventions: Bed/chair exit alarm,Consult care management for discharge planning,Door open when patient unattended         Problem: Patient Education: Go to Patient Education Activity  Goal: Patient/Family Education  Outcome: Progressing Towards Goal     Problem: Risk for Spread of Infection  Goal: Prevent transmission of infectious organism to others  Description: Prevent the transmission of infectious organisms to other patients, staff members, and visitors.   Outcome: Progressing Towards Goal     Problem: Patient Education:  Go to Education Activity  Goal: Patient/Family Education  Outcome: Progressing Towards Goal

## 2022-04-13 VITALS
TEMPERATURE: 98.2 F | SYSTOLIC BLOOD PRESSURE: 112 MMHG | HEIGHT: 66 IN | RESPIRATION RATE: 18 BRPM | BODY MASS INDEX: 25.71 KG/M2 | OXYGEN SATURATION: 98 % | DIASTOLIC BLOOD PRESSURE: 63 MMHG | HEART RATE: 63 BPM | WEIGHT: 160 LBS

## 2022-04-13 LAB
ALBUMIN SERPL-MCNC: 2.1 G/DL (ref 3.5–5)
ALBUMIN SERPL-MCNC: 2.2 G/DL (ref 3.5–5)
ALBUMIN/GLOB SERPL: 1 {RATIO} (ref 1.1–2.2)
ALP SERPL-CCNC: 112 U/L (ref 45–117)
ALT SERPL-CCNC: 27 U/L (ref 12–78)
ANION GAP SERPL CALC-SCNC: 11 MMOL/L (ref 5–15)
ANION GAP SERPL CALC-SCNC: 12 MMOL/L (ref 5–15)
AST SERPL W P-5'-P-CCNC: 21 U/L (ref 15–37)
BILIRUB SERPL-MCNC: 0.6 MG/DL (ref 0.2–1)
BUN SERPL-MCNC: 72 MG/DL (ref 6–20)
BUN SERPL-MCNC: 73 MG/DL (ref 6–20)
BUN/CREAT SERPL: 18 (ref 12–20)
BUN/CREAT SERPL: 19 (ref 12–20)
CA-I BLD-MCNC: 7.3 MG/DL (ref 8.5–10.1)
CA-I BLD-MCNC: 7.6 MG/DL (ref 8.5–10.1)
CHLORIDE SERPL-SCNC: 103 MMOL/L (ref 97–108)
CHLORIDE SERPL-SCNC: 105 MMOL/L (ref 97–108)
CO2 SERPL-SCNC: 20 MMOL/L (ref 21–32)
CO2 SERPL-SCNC: 20 MMOL/L (ref 21–32)
CREAT SERPL-MCNC: 3.83 MG/DL (ref 0.55–1.02)
CREAT SERPL-MCNC: 4 MG/DL (ref 0.55–1.02)
GLOBULIN SER CALC-MCNC: 2.2 G/DL (ref 2–4)
GLUCOSE BLD STRIP.AUTO-MCNC: 123 MG/DL (ref 65–117)
GLUCOSE BLD STRIP.AUTO-MCNC: 126 MG/DL (ref 65–117)
GLUCOSE BLD STRIP.AUTO-MCNC: 54 MG/DL (ref 65–117)
GLUCOSE BLD STRIP.AUTO-MCNC: 60 MG/DL (ref 65–117)
GLUCOSE BLD STRIP.AUTO-MCNC: 61 MG/DL (ref 65–117)
GLUCOSE BLD STRIP.AUTO-MCNC: 66 MG/DL (ref 65–117)
GLUCOSE BLD STRIP.AUTO-MCNC: 98 MG/DL (ref 65–117)
GLUCOSE SERPL-MCNC: 58 MG/DL (ref 65–100)
GLUCOSE SERPL-MCNC: 59 MG/DL (ref 65–100)
PERFORMED BY, TECHID: ABNORMAL
PERFORMED BY, TECHID: NORMAL
PERFORMED BY, TECHID: NORMAL
PHOSPHATE SERPL-MCNC: 4.8 MG/DL (ref 2.6–4.7)
POTASSIUM SERPL-SCNC: 5.4 MMOL/L (ref 3.5–5.1)
POTASSIUM SERPL-SCNC: 5.5 MMOL/L (ref 3.5–5.1)
PROT SERPL-MCNC: 4.3 G/DL (ref 6.4–8.2)
SODIUM SERPL-SCNC: 134 MMOL/L (ref 136–145)
SODIUM SERPL-SCNC: 137 MMOL/L (ref 136–145)

## 2022-04-13 PROCEDURE — 74011250636 HC RX REV CODE- 250/636

## 2022-04-13 PROCEDURE — 74011250637 HC RX REV CODE- 250/637: Performed by: NURSE PRACTITIONER

## 2022-04-13 PROCEDURE — 80069 RENAL FUNCTION PANEL: CPT

## 2022-04-13 PROCEDURE — G0257 UNSCHED DIALYSIS ESRD PT HOS: HCPCS

## 2022-04-13 PROCEDURE — 74011250637 HC RX REV CODE- 250/637: Performed by: STUDENT IN AN ORGANIZED HEALTH CARE EDUCATION/TRAINING PROGRAM

## 2022-04-13 PROCEDURE — 80053 COMPREHEN METABOLIC PANEL: CPT

## 2022-04-13 PROCEDURE — G0378 HOSPITAL OBSERVATION PER HR: HCPCS

## 2022-04-13 PROCEDURE — 82962 GLUCOSE BLOOD TEST: CPT

## 2022-04-13 PROCEDURE — 36415 COLL VENOUS BLD VENIPUNCTURE: CPT

## 2022-04-13 PROCEDURE — 74011250637 HC RX REV CODE- 250/637: Performed by: INTERNAL MEDICINE

## 2022-04-13 RX ORDER — HEPARIN SODIUM 1000 [USP'U]/ML
3200 INJECTION, SOLUTION INTRAVENOUS; SUBCUTANEOUS
Status: DISCONTINUED | OUTPATIENT
Start: 2022-04-13 | End: 2022-04-14 | Stop reason: HOSPADM

## 2022-04-13 RX ORDER — MAGNESIUM SULFATE 100 %
16 CRYSTALS MISCELLANEOUS AS NEEDED
Status: DISCONTINUED | OUTPATIENT
Start: 2022-04-13 | End: 2022-04-14 | Stop reason: HOSPADM

## 2022-04-13 RX ORDER — ISOPROPYL ALCOHOL 70 ML/100ML
SWAB TOPICAL
Qty: 50 PAD | Status: SHIPPED | OUTPATIENT
Start: 2022-04-13 | End: 2022-05-09

## 2022-04-13 RX ORDER — INSULIN PUMP SYRINGE, 3 ML
EACH MISCELLANEOUS
Qty: 1 KIT | Refills: 0 | Status: SHIPPED | OUTPATIENT
Start: 2022-04-13 | End: 2022-05-09

## 2022-04-13 RX ORDER — LANCING DEVICE
1 EACH MISCELLANEOUS DAILY
Qty: 1 EACH | Refills: 0 | Status: SHIPPED | OUTPATIENT
Start: 2022-04-13 | End: 2022-05-09

## 2022-04-13 RX ORDER — LANCETS
EACH MISCELLANEOUS
Qty: 30 EACH | Refills: 0 | Status: SHIPPED | OUTPATIENT
Start: 2022-04-13 | End: 2022-05-09

## 2022-04-13 RX ORDER — LEVOTHYROXINE SODIUM 200 UG/1
200 TABLET ORAL
Qty: 30 TABLET | Refills: 0 | Status: SHIPPED | OUTPATIENT
Start: 2022-04-14

## 2022-04-13 RX ORDER — HEPARIN SODIUM 1000 [USP'U]/ML
INJECTION, SOLUTION INTRAVENOUS; SUBCUTANEOUS
Status: COMPLETED
Start: 2022-04-13 | End: 2022-04-13

## 2022-04-13 RX ORDER — MAGNESIUM SULFATE 100 %
15 CRYSTALS MISCELLANEOUS AS NEEDED
Qty: 120 TABLET | Refills: 0 | Status: SHIPPED | OUTPATIENT
Start: 2022-04-13 | End: 2022-05-09

## 2022-04-13 RX ADMIN — ACETAMINOPHEN 650 MG: 325 TABLET ORAL at 08:56

## 2022-04-13 RX ADMIN — PANTOPRAZOLE SODIUM 40 MG: 40 TABLET, DELAYED RELEASE ORAL at 08:56

## 2022-04-13 RX ADMIN — Medication 16 G: at 17:38

## 2022-04-13 RX ADMIN — Medication 16 G: at 09:28

## 2022-04-13 RX ADMIN — LEVOTHYROXINE SODIUM 200 MCG: 0.1 TABLET ORAL at 08:56

## 2022-04-13 RX ADMIN — SEVELAMER CARBONATE 800 MG: 800 TABLET, FILM COATED ORAL at 08:56

## 2022-04-13 RX ADMIN — HEPARIN SODIUM 3200 UNITS: 1000 INJECTION, SOLUTION INTRAVENOUS; SUBCUTANEOUS at 16:20

## 2022-04-13 RX ADMIN — LACOSAMIDE 200 MG: 100 TABLET, FILM COATED ORAL at 08:56

## 2022-04-13 RX ADMIN — FLUOXETINE HYDROCHLORIDE 10 MG: 10 CAPSULE ORAL at 08:56

## 2022-04-13 RX ADMIN — SEVELAMER CARBONATE 800 MG: 800 TABLET, FILM COATED ORAL at 17:38

## 2022-04-13 RX ADMIN — NYSTATIN: 100000 POWDER TOPICAL at 08:56

## 2022-04-13 NOTE — ROUTINE PROCESS
Client is discharged for home. Report given to EMS. V/S done and recorded. Patient's condition stable. No complaints at this time.

## 2022-04-13 NOTE — PROGRESS NOTES
Patient: Deepika Medina MRN: [id number]  SSN: [id number]    YOB: 1950  Age: 71 y.o. Sex: female          Subjective:   Patient is seen on dialysis  She is looking weak and frail  She is on room air  No shortness of breath  No lower extremity swelling        Objective:     Vitals:    04/13/22 1400 04/13/22 1430 04/13/22 1500 04/13/22 1530   BP: (!) 98/42 118/65 112/62 (!) 92/56   Pulse: 62 72 78 81   Resp: 18 18 18 18   Temp:       TempSrc:       SpO2:       Weight:       Height:            Intake and Output:  Yesterday's Intake and Output:  04/12 0701 - 04/13 0700  In: 400 [P.O.:400]  Out: -      Physical Exam:   GENERAL: no distress, appears stated age  EYE: negative  Respiratory -no distress. ABDOMEN: Nondistended. Musculoskeletal -normal on inspection. NEUROLOGIC: She is awake but lethargic      Dialysis access: cuffed tunneled catheter site right and IJ.       Data Review    Meds    Current Facility-Administered Medications   Medication Dose Route Frequency    glucose chewable tablet 16 g  16 g Oral PRN    levothyroxine (SYNTHROID) tablet 200 mcg  200 mcg Oral ACB    atorvastatin (LIPITOR) tablet 40 mg  40 mg Oral QHS    FLUoxetine (PROzac) capsule 10 mg  10 mg Oral DAILY    nystatin (MYCOSTATIN) 100,000 unit/gram powder   Topical TID    pantoprazole (PROTONIX) tablet 40 mg  40 mg Oral ACB    sevelamer carbonate (RENVELA) tab 800 mg  800 mg Oral TID WITH MEALS    lacosamide (VIMPAT) tablet 200 mg  200 mg Oral BID    sodium chloride (NS) flush 5-40 mL  5-40 mL IntraVENous Q8H    sodium chloride (NS) flush 5-40 mL  5-40 mL IntraVENous PRN    acetaminophen (TYLENOL) tablet 650 mg  650 mg Oral Q6H PRN    Or    acetaminophen (TYLENOL) suppository 650 mg  650 mg Rectal Q6H PRN    polyethylene glycol (MIRALAX) packet 17 g  17 g Oral DAILY PRN    ondansetron (ZOFRAN ODT) tablet 4 mg  4 mg Oral Q8H PRN    Or    ondansetron (ZOFRAN) injection 4 mg  4 mg IntraVENous Q6H PRN Lab         Lab Results   Component Value Date/Time    Color Yellow/Straw 04/11/2022 01:15 PM    Appearance Turbid (A) 04/11/2022 01:15 PM    Specific gravity 1.016 04/11/2022 01:15 PM    pH (UA) 5.0 04/11/2022 01:15 PM    Protein 30 (A) 04/11/2022 01:15 PM    Glucose Negative 04/11/2022 01:15 PM    Ketone Negative 04/11/2022 01:15 PM    Bilirubin Negative 04/11/2022 01:15 PM    Urobilinogen 0.1 04/11/2022 01:15 PM    Nitrites Negative 04/11/2022 01:15 PM    Leukocyte Esterase Large (A) 04/11/2022 01:15 PM    Epithelial cells Moderate (A) 05/20/2021 12:56 AM    Bacteria Negative 04/11/2022 01:15 PM    WBC >100 (H) 04/11/2022 01:15 PM    RBC 0-5 04/11/2022 01:15 PM       Imaging         Assessment & Plan:     1. ESRD. -On dialysis at [address] renal care in [place] every MWF   -She was unable to be dialyzed today  -No volume overload/no uremia, K is 5.5  -Plan for dialysis today  -She has permacath is a dialysis access      2. Anemia   -2/2 ESRD   -hx of GIB in the past  -Hb 7.5       -s/p EGD on 2/14/22 which showed esophagitis chronic gastritis without bleeding. 3 clips from previous intervention were still present without any active bleeding noted  -Continue Procrit 10,000 units with dialysis  -Transfusion as needed to keep hemoglobin  > 7     3  Hyportension   -takes midodrine 5 mg tid at home  -Bps are ok  - will resume midodrine if there will be any intradialytic hypotension     4 Secondary hyperparathyroidism of ESRD. -Calcium and phos are ok.    -cont Renvela   -PTH is 194 which is within target range for her ESRD. -Continue calcitriol at the current dose     5 altered mental status.    -She was sent from the dialysis center due to altered mental status/unresponsiveness.    -On arrival CT head no acute process. CT face showed nasal fracture. -She had a recent fall and was evaluated in the ER. Neurology is on board.             Signed By: Macey Gan MD     April 13, 2022

## 2022-04-13 NOTE — PROGRESS NOTES
Problem: Pressure Injury - Risk of  Goal: *Prevention of pressure injury  Description: Document Nitin Scale and appropriate interventions in the flowsheet. Outcome: Progressing Towards Goal  Note: Pressure Injury Interventions:  Sensory Interventions: Assess changes in LOC,Keep linens dry and wrinkle-free,Minimize linen layers    Moisture Interventions: Absorbent underpads,Apply protective barrier, creams and emollients,Internal/External urinary devices,Minimize layers    Activity Interventions: PT/OT evaluation    Mobility Interventions: HOB 30 degrees or less,Pressure redistribution bed/mattress (bed type),PT/OT evaluation    Nutrition Interventions: Document food/fluid/supplement intake,Offer support with meals,snacks and hydration    Friction and Shear Interventions: Apply protective barrier, creams and emollients,HOB 30 degrees or less                Problem: Patient Education: Go to Patient Education Activity  Goal: Patient/Family Education  Outcome: Progressing Towards Goal     Problem: Falls - Risk of  Goal: *Absence of Falls  Description: Document Cuco Fall Risk and appropriate interventions in the flowsheet.   Outcome: Progressing Towards Goal  Note: Fall Risk Interventions:       Mentation Interventions: Bed/chair exit alarm,Door open when patient unattended,Reorient patient,More frequent rounding    Medication Interventions: Patient to call before getting OOB,Bed/chair exit alarm,Evaluate medications/consider consulting pharmacy    Elimination Interventions: Call light in reach,Patient to call for help with toileting needs,Bed/chair exit alarm    History of Falls Interventions: Bed/chair exit alarm,Door open when patient unattended         Problem: Patient Education: Go to Patient Education Activity  Goal: Patient/Family Education  Outcome: Progressing Towards Goal     Problem: Risk for Spread of Infection  Goal: Prevent transmission of infectious organism to others  Description: Prevent the transmission of infectious organisms to other patients, staff members, and visitors.   Outcome: Progressing Towards Goal     Problem: Patient Education:  Go to Education Activity  Goal: Patient/Family Education  Outcome: Progressing Towards Goal     Problem: Patient Education: Go to Patient Education Activity  Goal: Patient/Family Education  Outcome: Progressing Towards Goal     Problem: Patient Education: Go to Patient Education Activity  Goal: Patient/Family Education  Outcome: Progressing Towards Goal

## 2022-04-13 NOTE — PROGRESS NOTES
CM met with patient to discuss PT/OT recc for SNF. Patient reports that her son and daughter-in-law care for her and provide her with 24/7 care and she does not want to go to a SNF at this time as last time she was in one she \"ended up worse than before going in\". CM contacted patient's son, Ema Arce [phone], he confirmed that patient has 24/7 care and that he would like for her to return home. Patient's daughter-in-law, Mehrdad Silverman [phone], will receive patient. CM contacted Ms. Mehrdad Silverman, she stated that patient can d/c to home anytime as she will be home all day. Medicare pt has received, reviewed, and signed 2nd IM letter informing them of their right to appeal the discharge. Signed copied has been placed on pt bedside chart. Patient is clear to d/c from  to home with [facility], accepted with [facility] 4/14/2022. Nurse notified.

## 2022-04-13 NOTE — DISCHARGE SUMMARY
Hospitalist Discharge Summary     Patient ID:  Ludmila Morales  [id number]  82 y.o.  1950 4/11/2022    PCP on record: Catarino Peace MD    Admit date: 4/11/2022  Discharge date and time: 4/13/2022    DISCHARGE DIAGNOSIS:  Syncope  Hypoglycemia  Hx of seizure disorder  ESRD on HD  Depression  Hypothyroidism  Afib/SSS s/p PPM    CONSULTATIONS:  IP CONSULT TO CARDIOLOGY  IP CONSULT TO NEUROLOGY  IP CONSULT TO NEPHROLOGY    Excerpted HPI from H&P of Jasiel Warren MD:  71 y.o. female who presented to the ED via EMS after becoming unresponsive during hemodialysis. Per EMS patient was only responsive to pain. PMH significant for ESRD on HD, HTN, HLD, chronic atrial fibrillation, history of AV block status post pacemaker, DM, CVA and seizure disorders. Patient herself is a poor historian due to pain. Of note patient was recently treated in the ED following fall from wheelchair which resulted in nasal bone fracture and bruising.     ______________________________________________________________________  DISCHARGE SUMMARY/HOSPITAL COURSE:  for full details see H&P, daily progress notes, labs, consult notes. In the ED head CT repeated which showed no acute changes. Patient CT shows fracture of the right nasal wall with minimally displacement noted. Routine lab work shows hemoglobin 7.5, creatinine 3.15. Vitals /70, pulse 77, temp 97.6, respiratory 18, O2 98% room air. Neurology was consulted- no reports of any seizure activity, no additional neurology work up was recommended. Syncope was thought to be due to hypotension during HD. Nephrology was consulted. Cardiology was consulted, recommends- anticoagulation. However, patient is fall risk due to recurrent syncope and had recent fall sustaining nasal bone fracture and bruising. So, I don't think anticoagulation is best option for her given her history of recent fall.  I spoke to patient's son who is in agreement with holding anticoagulation. As per son, patient is not taking anticoagulation currently as it was held when she was discharged from hospital last time. Anticoagulation can be started as outpatient by cardiology when felt appropriate. Patient was also found to have low blood sugar. Prescriptions for glucose tablet, and glucometer sent to patient's pharmacy, and patient advised to have late night snack before going to bed. Patient is being discharged with outpatient cardiology, nephrology and neurology follow up.         _______________________________________________________________________  Patient seen and examined by me on discharge day. Pertinent Findings:  Gen:    Not in distress, facial bruising, left eye scar  Chest: Clear lungs  CVS:   Regular rhythm. No edema  Abd:  Soft, not distended, not tender  Neuro:  Alert, oriented  _______________________________________________________________________  DISCHARGE MEDICATIONS:   Current Discharge Medication List      START taking these medications    Details   glucose 4 gram chewable tablet Take 4 Tablets by mouth as needed (hypoglycemia). Qty: 120 Tablet, Refills: 0  Start date: 4/13/2022      Blood-Glucose Meter monitoring kit Please check your blood sugar in the morning  Qty: 1 Kit, Refills: 0  Start date: 4/13/2022      lancets misc Please check your blood sugar in the morning  Qty: 30 Each, Refills: 0  Start date: 4/13/2022      Lancing Device misc 1 Each by Does Not Apply route daily. Qty: 1 Each, Refills: 0  Start date: 4/13/2022      alcohol swabs padm Please check your blood sugar daily  Qty: 50 Pad, Refills: each  Start date: 4/13/2022         CONTINUE these medications which have CHANGED    Details   levothyroxine (SYNTHROID) 200 mcg tablet Take 1 Tablet by mouth Daily (before breakfast).   Qty: 30 Tablet, Refills: 0  Start date: 4/14/2022         CONTINUE these medications which have NOT CHANGED    Details   midodrine (PROAMATINE) 5 mg tablet Take 5 mg by mouth three (3) times daily (with meals). metoprolol succinate (TOPROL-XL) 25 mg XL tablet Take 12.5 mg by mouth daily. nystatin (MYCOSTATIN) powder Apply  to affected area four (4) times daily. Qty: 60 g, Refills: 2      Spirometers and Accessories avril 1 Each by Does Not Apply route three (3) times daily. Qty: 1 Each, Refills: 1    Associated Diagnoses: Opioid use, unspecified with unspecified opioid-induced disorder ([facility] [address] Respiratory depression      FLUoxetine (PROzac) 10 mg capsule Take 1 Capsule by mouth daily for 270 days. Qty: 90 Capsule, Refills: 2    Associated Diagnoses: TRISHA (generalized anxiety disorder)      pantoprazole (PROTONIX) 40 mg tablet Take 1 Tablet by mouth Daily (before breakfast). Indications: bleeding from stomach, esophagus or duodenum  Qty: 90 Tablet, Refills: 2      ondansetron (Zofran ODT) 4 mg disintegrating tablet 1 Tablet by SubLINGual route every eight (8) hours as needed for Nausea or Vomiting. Qty: 30 Tablet, Refills: 2      food supplemt, lactose-reduced (Ensure Active High Protein) liqd Take 237 mL by mouth four (4) times daily as needed for PRN Reason (Other) (as pt can tolerate). Qty: 60 Each, Refills: 5    Associated Diagnoses: Moderate protein-calorie malnutrition (HCC)      zinc oxide-white petrolatum 17-57 % topical paste Apply  to affected area as needed for Skin Irritation. Indications: skin irritation  Qty: 113 g, Refills: 0      Vimpat 200 mg tab tablet Take 1 Tablet by mouth two (2) times a day.       atorvastatin (LIPITOR) 40 mg tablet Take 1 tablet by mouth once daily for 90 days  Qty: 90 Tablet, Refills: 3      sevelamer carbonate (RENVELA) 800 mg tab tab          STOP taking these medications       traMADoL (ULTRAM) 50 mg tablet Comments:   Reason for Stopping:         naproxen (NAPROSYN) 500 mg tablet Comments:   Reason for Stopping:         OXcarbazepine (TRILEPTAL) 150 mg tablet Comments:   Reason for Stopping:         OXcarbazepine (TrileptaL) 300 mg tablet Comments:   Reason for Stopping:                 Patient Follow Up Instructions: Activity: Activity as tolerated  Diet: Renal Diet  Wound Care: As directed    Follow-up with PCP, cardiology, neurology, nephrology  in 1 week. Follow-up tests/labs blood sugar  Follow-up Information     Follow up With Specialties Details Why Valentina JoyNataly Saravia MD Internal Medicine   [address] [place] [address] [address] [address] [phone]          ________________________________________________________________    Risk of deterioration: Moderate    Condition at Discharge:  Stable  __________________________________________________________________    Disposition  Home with family and home health services    ____________________________________________________________________    Code Status: Full Code  ___________________________________________________________________      Total time in minutes spent coordinating this discharge (includes going over instructions, follow-up, prescriptions, and preparing report for sign off to her PCP) :  35 minutes    Signed:  Gonzalez Aguirre MD     Addendum    Urine culture positive for vancomycin resistant enterococcus faecalis. I discussed the test results with Dr. Salima Correa who has not seen the patient yet. Based on culture and sensitivity results, recommendation is amoxicillin 500 mg po daily and fluconazole 100 mg po daily for 7 days with outpatient ID follow up. Prescriptions sent to patient's pharmacy.

## 2022-04-13 NOTE — PROGRESS NOTES
Problem: Pressure Injury - Risk of  Goal: *Prevention of pressure injury  Description: Document Nitin Scale and appropriate interventions in the flowsheet. Outcome: Resolved/Not Met     Problem: Patient Education: Go to Patient Education Activity  Goal: Patient/Family Education  Outcome: Resolved/Not Met     Problem: Falls - Risk of  Goal: *Absence of Falls  Description: Document Levar Castillo Fall Risk and appropriate interventions in the flowsheet. Outcome: Resolved/Not Met     Problem: Patient Education: Go to Patient Education Activity  Goal: Patient/Family Education  Outcome: Resolved/Not Met     Problem: Risk for Spread of Infection  Goal: Prevent transmission of infectious organism to others  Description: Prevent the transmission of infectious organisms to other patients, staff members, and visitors.   Outcome: Resolved/Not Met

## 2022-04-13 NOTE — PROGRESS NOTES
Neurology Progress Note    Patient ID:  [facility]  [id number]  88 y.o.  1950    Subjective: The patient is seen in follow-up and she is feeling much better. Denies any dizziness but has some headache this morning. Patient is a 71 y.o. right handed  female known to the neurology service very well from repeated admissions at this hospital and follow-ups in the clinic, admitted overnight through the ED where she was brought in from the dialysis place where she passed out during the dialysis. Patient denied any prodrome, like dizziness/lightheadedness. The nurse with the patient did not notice any seizure activity. Patient had a fall couple of days prior to this, face down causing bruise around the left eye. She is recovering from that. .    Current Facility-Administered Medications   Medication Dose Route Frequency    levothyroxine (SYNTHROID) tablet 200 mcg  200 mcg Oral ACB    atorvastatin (LIPITOR) tablet 40 mg  40 mg Oral QHS    FLUoxetine (PROzac) capsule 10 mg  10 mg Oral DAILY    nystatin (MYCOSTATIN) 100,000 unit/gram powder   Topical TID    OXcarbazepine (TRILEPTAL) tablet 150 mg  150 mg Oral QAM    OXcarbazepine (TRILEPTAL) tablet 300 mg  300 mg Oral QHS    pantoprazole (PROTONIX) tablet 40 mg  40 mg Oral ACB    sevelamer carbonate (RENVELA) tab 800 mg  800 mg Oral TID WITH MEALS    lacosamide (VIMPAT) tablet 200 mg  200 mg Oral BID    sodium chloride (NS) flush 5-40 mL  5-40 mL IntraVENous Q8H    sodium chloride (NS) flush 5-40 mL  5-40 mL IntraVENous PRN    acetaminophen (TYLENOL) tablet 650 mg  650 mg Oral Q6H PRN    Or    acetaminophen (TYLENOL) suppository 650 mg  650 mg Rectal Q6H PRN    polyethylene glycol (MIRALAX) packet 17 g  17 g Oral DAILY PRN    ondansetron (ZOFRAN ODT) tablet 4 mg  4 mg Oral Q8H PRN    Or    ondansetron (ZOFRAN) injection 4 mg  4 mg IntraVENous Q6H PRN     Objective:     Patient Vitals for the past 8 hrs:   BP Temp Pulse Resp SpO2 04/13/22 0820 126/60       04/13/22 0818 (!) 119/99 97.5 °F (36.4 °C) 64 16 97 %   04/13/22 0159 126/64 97.9 °F (36.6 °C) 70 18 98 %     No intake/output data recorded. 04/11 1901 - 04/13 0700  In: 400 [P.O.:400]  Out: -     Lab Review   Recent Results (from the past 24 hour(s))   EKG, 12 LEAD, SUBSEQUENT    Collection Time: 04/12/22  9:07 AM   Result Value Ref Range    Ventricular Rate 78 BPM    Atrial Rate 125 BPM    QRS Duration 150 ms    Q-T Interval 416 ms    QTC Calculation (Bezet) 474 ms    Calculated R Axis 127 degrees    Calculated T Axis -62 degrees    Diagnosis       Atrial fibrillation with frequent ventricular-paced complexes and with   premature ventricular or aberrantly conducted complexes  Right bundle branch block  T wave abnormality, consider inferolateral ischemia  Abnormal ECG  When compared with ECG of 11-APR-2022 12:26, (Unconfirmed)  Electronic ventricular pacemaker has replaced Atrial fibrillation  Confirmed by DAVIAN SANCHEZ, Tiffany Nixon ([id number]) on 4/12/2022 11:34:18 AM     IRON PROFILE    Collection Time: 04/12/22 11:04 AM   Result Value Ref Range    Iron 64 35 - 150 ug/dL    TIBC 116 (L) 250 - 450 ug/dL    Iron % saturation 55 (H) 20 - 50 %   TSH 3RD GENERATION    Collection Time: 04/12/22 11:04 AM   Result Value Ref Range    TSH 9.28 (H) 0.36 - 3.74 uIU/mL   CBC WITH AUTOMATED DIFF    Collection Time: 04/12/22 11:04 AM   Result Value Ref Range    WBC 9.5 3.6 - 11.0 K/uL    RBC 2.53 (L) 3.80 - 5.20 M/uL    HGB 7.3 (L) 11.5 - 16.0 g/dL    HCT 23.9 (L) 35.0 - 47.0 %    MCV 94.5 80.0 - 99.0 FL    MCH 28.9 26.0 - 34.0 PG    MCHC 30.5 30.0 - 36.5 g/dL    RDW 15.7 (H) 11.5 - 14.5 %    PLATELET 165 (L) 031 - 400 K/uL    MPV 12.2 8.9 - 12.9 FL    NRBC 0.0 0.0  WBC    ABSOLUTE NRBC 0.00 0.00 - 0.01 K/uL    NEUTROPHILS 77 (H) 32 - 75 %    LYMPHOCYTES 16 12 - 49 %    MONOCYTES 6 5 - 13 %    EOSINOPHILS 1 0 - 7 %    BASOPHILS 0 0 - 1 %    IMMATURE GRANULOCYTES 0 0 - 0.5 %    ABS.  NEUTROPHILS 7.3 1.8 - 8.0 K/UL    ABS. LYMPHOCYTES 1.5 0.8 - 3.5 K/UL    ABS. MONOCYTES 0.6 0.0 - 1.0 K/UL    ABS. EOSINOPHILS 0.1 0.0 - 0.4 K/UL    ABS. BASOPHILS 0.0 0.0 - 0.1 K/UL    ABS. IMM. GRANS. 0.0 0.00 - 0.04 K/UL    DF AUTOMATED     RENAL FUNCTION PANEL    Collection Time: 04/12/22 11:04 AM   Result Value Ref Range    Sodium 138 136 - 145 mmol/L    Potassium 5.5 (H) 3.5 - 5.1 mmol/L    Chloride 105 97 - 108 mmol/L    CO2 24 21 - 32 mmol/L    Anion gap 9 5 - 15 mmol/L    Glucose 70 65 - 100 mg/dL    BUN 68 (H) 6 - 20 mg/dL    Creatinine 3.72 (H) 0.55 - 1.02 mg/dL    BUN/Creatinine ratio 18 12 - 20      GFR est AA 15 (L) >60 ml/min/1.73m2    GFR est non-AA 12 (L) >60 ml/min/1.73m2    Calcium 7.9 (L) 8.5 - 10.1 mg/dL    Phosphorus 4.6 2.6 - 4.7 mg/dL    Albumin 2.1 (L) 3.5 - 5.0 g/dL   T3, FREE    Collection Time: 04/12/22 11:04 AM   Result Value Ref Range    Free Triiodothyronine (T3) 1.0 (L) 2.2 - 4.0 pg/mL   T4, FREE    Collection Time: 04/12/22 11:04 AM   Result Value Ref Range    T4, Free 0.9 0.8 - 1.5 ng/dL   GLUCOSE, POC    Collection Time: 04/12/22 11:16 AM   Result Value Ref Range    Glucose (POC) 66 65 - 117 mg/dL    Performed by [employer]    GLUCOSE, POC    Collection Time: 04/12/22  3:33 PM   Result Value Ref Range    Glucose (POC) 82 65 - 117 mg/dL    Performed by [employer]    GLUCOSE, POC    Collection Time: 04/12/22  9:30 PM   Result Value Ref Range    Glucose (POC) 80 65 - 117 mg/dL    Performed by Mona Amaya      Additional comments: Patient is much better with no dizziness    NEUROLOGICAL EXAM:  Appearance: The patient is well developed, well nourished, and is in no acute distress. Mental Status: Oriented to place and person. Mood and affect appropriate. Cranial Nerves:   Intact visual fields. VADIM, EOM's full, no nystagmus, no ptosis. Facial movement is symmetric. Hearing is normal bilaterally. Motor:  4/5 strength in upper and lower proximal and distal muscles. Normal bulk and tone. Reflexes:   Deep tendon reflexes not checked. Sensory:   Normal to touch, pinprick and vibration. Gait:   Deferred. Tremor:   No tremor noted. Cerebellar:  No cerebellar signs present. Neurovascular:  Normal heart sounds and regular rhythm. Assessment:   1. Syncopal episode: Probably secondary to drop in the pressure during dialysis. There is no report of any seizure activity by the nurse present at the scene. No additional work-up is indicated for this patient and she can be discharged from neurology standpoint. Continue with the same dose of her seizure medicines without any changes and follow-up in the clinic as scheduled. 2.  History of stroke in the past with good recovery. 3.  Seizure disorder, partial complex. 4.  Diabetes mellitus, fluctuates. 5.  Hypertension  6. Hyperlipidemia. 7.  ESRD (end stage renal disease) on dialysis ([facility] [address] (4/11/2022)    Plan:   1. Continue patient's seizure medicines at home dosages. 2.  Recommend to the nephrology team to reassess the amount of fluid taking out for to keep her from passing out. I will sign off the case for now, however, please do not hesitate to call if needed again.     Thank you,    Signed:  Sophia Phillips MD  4/13/2022  8:42 AM

## 2022-04-13 NOTE — PROGRESS NOTES
Progress Note      4/13/2022 11:13 AM  NAME: Yuval Rayo   MRN:  [id number]   Admit Diagnosis: Syncope [R55]  ESRD [facility]end stage [facility] [N18.6, Z99.2]  History of seizure disorder [Z86.69]      Subjective:   Chart reviewed. Patient examined. Patient feels much better. Review of Systems:    Symptom Y/N Comments  Symptom Y/N Comments   Fever/Chills n   Chest Pain n    Poor Appetite    Edema     Cough    Abdominal Pain n    Sputum    Joint Pain     SOB/DOVER n   Pruritis/Rash     Nausea/vomit    Other     Diarrhea         Constipation           Could NOT obtain due to:      Objective:          Physical Exam:    Last 24hrs VS reviewed since prior progress note. Most recent are:    Visit Vitals  /60 (BP 1 Location: Left lower arm, BP Patient Position: At rest)   Pulse 64   Temp 97.5 °F (36.4 °C)   Resp 16   Ht 5' 6\" (1.676 m)   Wt 72.6 kg (160 lb)   SpO2 97%   BMI 25.82 kg/m²       Intake/Output Summary (Last 24 hours) at 4/13/2022 1113  Last data filed at 4/13/2022 1030  Gross per 24 hour   Intake 240 ml   Output    Net 240 ml        General Appearance: Well developed, well nourished, alert & oriented x 3,    no acute distress. Ears/Nose/Mouth/Throat: Hearing grossly normal.  Neck: Supple. Chest: Lungs clear to auscultation bilaterally. Cardiovascular: IRRegular rate and rhythm, S1,S2 normal, no murmur. Abdomen: Soft, non-tender, bowel sounds are active. Extremities: No edema bilaterally. Skin: Warm and dry. []         Post-cath site without hematoma, bruit, tenderness, or thrill. Distal pulses intact.     PMH/SH reviewed - no change compared to H&P    Data Review    Telemetry: Atrial fibrillation with demand ventricular paced rhythm    EKG:   []  No new EKG for review    Lab Data Personally Reviewed:    Recent Labs     04/12/22  1104 04/11/22  1320   WBC 9.5 7.5   HGB 7.3* 7.5*   HCT 23.9* 24.7*   * 120*     No results for input(s): INR, PTP, APTT, INREXT in the last 72 hours. Recent Labs     04/13/22  0734 04/12/22  1104 04/11/22  1320     134* 138 136   K 5.5*  5.4* 5.5* 5.0     103 105 105   CO2 20*  20* 24 25   BUN 73*  72* 68* 55*   CREA 3.83*  4.00* 3.72* 3.15*   GLU 58*  59* 70 77   CA 7.3*  7.6* 7.9* 7.7*   MG  --   --  2.1     No results for input(s): CPK, CKNDX, TROIQ in the last 72 hours. No lab exists for component: CPB  Lab Results   Component Value Date/Time    Cholesterol, total 110 05/20/2021 05:35 AM    HDL Cholesterol 72 05/20/2021 05:35 AM    LDL, calculated 27.8 05/20/2021 05:35 AM    Triglyceride 51 05/20/2021 05:35 AM    CHOL/HDL Ratio 1.5 05/20/2021 05:35 AM       Recent Labs     04/13/22  0734 04/12/22  1104 04/11/22  1320     --  108   TP 4.3*  --  4.2*   ALB 2.1*  2.2* 2.1* 2.0*   GLOB 2.2  --  2.2     No results for input(s): PH, PCO2, PO2 in the last 72 hours.     Medications Personally Reviewed:    Current Facility-Administered Medications   Medication Dose Route Frequency    glucose chewable tablet 16 g  16 g Oral PRN    levothyroxine (SYNTHROID) tablet 200 mcg  200 mcg Oral ACB    atorvastatin (LIPITOR) tablet 40 mg  40 mg Oral QHS    FLUoxetine (PROzac) capsule 10 mg  10 mg Oral DAILY    nystatin (MYCOSTATIN) 100,000 unit/gram powder   Topical TID    pantoprazole (PROTONIX) tablet 40 mg  40 mg Oral ACB    sevelamer carbonate (RENVELA) tab 800 mg  800 mg Oral TID WITH MEALS    lacosamide (VIMPAT) tablet 200 mg  200 mg Oral BID    sodium chloride (NS) flush 5-40 mL  5-40 mL IntraVENous Q8H    sodium chloride (NS) flush 5-40 mL  5-40 mL IntraVENous PRN    acetaminophen (TYLENOL) tablet 650 mg  650 mg Oral Q6H PRN    Or    acetaminophen (TYLENOL) suppository 650 mg  650 mg Rectal Q6H PRN    polyethylene glycol (MIRALAX) packet 17 g  17 g Oral DAILY PRN    ondansetron (ZOFRAN ODT) tablet 4 mg  4 mg Oral Q8H PRN    Or    ondansetron (ZOFRAN) injection 4 mg  4 mg IntraVENous Q6H PRN Problem List:   Syncope could have been related to significant hypotension. Atrial fibrillation and AV node ablation and patient is in a demand ventricular paced rhythm. Hypertension. End-stage renal disease and patient is on dialysis. History of parkinsonism. History of stroke. According to the echocardiogram patient has a normal ejection fraction. 1.      Assessment/Plan:   From cardiac viewpoint I will continue present care. Dialysis as per nephrologist.  Will follow neurology recommendations. Thank you. 1.          []       High complexity decision making was performed in this patient at high risk for decompensation with multiple organ involvement.     Rah Jim MD

## 2022-04-13 NOTE — PROGRESS NOTES
Problem: Pressure Injury - Risk of  Goal: *Prevention of pressure injury  Description: Document Ntiin Scale and appropriate interventions in the flowsheet. Outcome: Progressing Towards Goal  Note: Pressure Injury Interventions:  Sensory Interventions: Assess changes in LOC,Keep linens dry and wrinkle-free,Float heels,Maintain/enhance activity level,Minimize linen layers    Moisture Interventions: Absorbent underpads,Apply protective barrier, creams and emollients,Internal/External urinary devices    Activity Interventions: Assess need for specialty bed,PT/OT evaluation    Mobility Interventions: Assess need for specialty bed,Float heels    Nutrition Interventions: Document food/fluid/supplement intake,Offer support with meals,snacks and hydration    Friction and Shear Interventions: Apply protective barrier, creams and emollients,Minimize layers                Problem: Patient Education: Go to Patient Education Activity  Goal: Patient/Family Education  Outcome: Progressing Towards Goal     Problem: Falls - Risk of  Goal: *Absence of Falls  Description: Document Cuco Fall Risk and appropriate interventions in the flowsheet.   Outcome: Progressing Towards Goal  Note: Fall Risk Interventions:       Mentation Interventions: Adequate sleep, hydration, pain control,Bed/chair exit alarm,Door open when patient unattended,Reorient patient    Medication Interventions: Bed/chair exit alarm    Elimination Interventions: Bed/chair exit alarm,Call light in reach,Patient to call for help with toileting needs    History of Falls Interventions: Bed/chair exit alarm,Door open when patient unattended         Problem: Patient Education: Go to Patient Education Activity  Goal: Patient/Family Education  Outcome: Progressing Towards Goal     Problem: Risk for Spread of Infection  Goal: Prevent transmission of infectious organism to others  Description: Prevent the transmission of infectious organisms to other patients, staff members, and visitors.   Outcome: Progressing Towards Goal     Problem: Patient Education:  Go to Education Activity  Goal: Patient/Family Education  Outcome: Progressing Towards Goal

## 2022-04-13 NOTE — PROGRESS NOTES
Patient given discharge instructions. uNable to sign due to blindness. Transportation set up for 1730. IV removed and telemetry. Permacath in place at discharge. Discharge plan of care/case management plan validated with provider discharge order.

## 2022-04-14 ENCOUNTER — PATIENT OUTREACH (OUTPATIENT)
Dept: CASE MANAGEMENT | Age: 72
End: 2022-04-14

## 2022-04-14 RX ORDER — FLUCONAZOLE 100 MG/1
100 TABLET ORAL DAILY
Qty: 7 TABLET | Refills: 0 | Status: SHIPPED | OUTPATIENT
Start: 2022-04-14 | End: 2022-04-20

## 2022-04-14 RX ORDER — AMOXICILLIN 500 MG/1
500 TABLET, FILM COATED ORAL DAILY
Qty: 7 TABLET | Refills: 0 | Status: SHIPPED | OUTPATIENT
Start: 2022-04-14 | End: 2022-04-20

## 2022-04-14 NOTE — PROGRESS NOTES
Care Transitions Initial Call    Call within 2 business days of discharge: Yes     Patient: Rosalia Melo Patient : 1950 MRN: [id number]    Last Discharge [address]       Complaint Diagnosis Description Type Department Provider    22 Unresponsive Altered mental status, unspecified altered mental status type . .. ED to Hosp-Admission (Discharged) (ADMIT) [id number] Enrique Stafford MD; Anneliese Chou... Was this an external facility discharge? No     Challenges to be reviewed by the provider   Additional needs identified to be addressed with provider: yes    HGB 7.3/HCT 23.9--per patient's DIL, dialysis will check labs and set pt up for transfusion if needed. She states dialysis checks labs every Wednesday. Patient is bedbound, unable to get to MD appts easily. CTN is attempting to find OP resources to assist---at this time pt uses stretcher transportation to get to dialysis    Abnormal UA result 2022---patient is not on abx at discharge. CTN sent Perfect Serve Message to discharging hospitalist to address if pt needs abx. Method of communication with provider : chart routing    Discussed [phone] related testing which was not done at this time.      Advance Care Planning:   Does patient have an Advance Directive:  health care decision makers updated    Inpatient Readmission Risk score: Unplanned Readmit Risk Score: 36.9 ( )    Was this a readmission? no however pt has had frequent admissions and ER visits    Patients top risk factors for readmission: medical condition-frail elderly patient with multiple health issues, per family pt is a full code   Interventions to address risk factors: Scheduled appointment with PCP-see goal, Education of patient/family/caregiver/guardian to support self-management-see goal notes and Establishment or re-establishment of referrals-see goal notes    Care Transition Nurse (CTN) contacted the family by telephone to perform post hospital discharge assessment. Verified name and  with family as identifiers. Provided introduction to self, and explanation of the CTN role. CTN reviewed discharge instructions, medical action plan and red flags with family who verbalized understanding. Were discharge instructions available to patient? yes. Reviewed appropriate site of care based on symptoms and resources available to patient including: PCP and Specialist. Family given an opportunity to ask questions and does not have any further questions or concerns at this time. The family agrees to contact the PCP office for questions related to their healthcare. Medication reconciliation was performed with family, who verbalizes understanding of administration of home medications. Advised obtaining a 90-day supply of all daily and as-needed medications. Referral to Pharm D needed: no     Home Health/Outpatient orders at discharge: home health care  [address]: [address]  Date of initial visit: 22 per DIL report    Durable Medical Equipment ordered at discharge: None    Covid Risk Education    Educated patient about risk for severe COVID-19 due to risk factors according to CDC guidelines. CTN reviewed discharge instructions, medical action plan and red flag symptoms with the family who verbalized understanding. Discussed COVID vaccination status: no. Education provided on COVID-19 vaccination as appropriate. Discussed exposure protocols and quarantine with CDC Guidelines. Family was given an opportunity to verbalize any questions and concerns and agrees to contact CTN or health care provider for questions related to their healthcare. Discussed follow-up appointments. If no appointment was previously scheduled, appointment scheduling offered: yes. Is follow up appointment scheduled within 7 days of discharge? yes.    [facility] follow up appointment(s):   Future Appointments   Date Time Provider Kassandra Nix   2022 11:30 AM Poonam Ozzy Harrington MD SPCPM BS AMB   6/1/2022  1:15 PM Alma Murray MD RSIP BS AMB     Non-BS follow up appointment(s): na at this time, pt bedbound    Plan for follow-up call in 3-5 days based on severity of symptoms and risk factors. Plan for next call: symptom management-syncope, appts, Insurance Care manager  CTN provided contact information for future needs. Goals      Prevent complications post hospitalization. 04/14/22  CTN contacted Galloshandra Caldwell, patient DIL and at home care giver to review d/c instructions/red flags. PCP---CTN contcted PCP office , no appts available until mid June per Lori at [facility] office. She asked CTN to Call [phone]  office. CTN set up VV with Dr Ayaka Srinivasan on 4/19/22 at 11:30. CTN called pt DIL, left detailed VM message with Appt info. CTN will review again next week prior to appt. [place] JACKI--DIL states [place] nurse called last night, will come today for JACKI. She states pt also gets PT and OT services. Patient just saw cards [facility] ) last week prior to admission--HANNAH explains it takes 3 people to lift patient into a WC and out of a car---she states it is too difficult to take patient back into the office. She reports they have a plan as to when pt should return for pacer battery change--this will have to be coordinated with dialysis catheter change per daughter report. Patient will see nephrology at dialysis ([id number] [facility]), daughter states labs are checked every Wednesday, Unit calls them when pt needs blood transfusions and will set up OP transfusions. CTN will follow up with daughter re: neuro appt.  HANNAH reports that she has not yet picked up glucometer and supplies ordered as pt discharged at 2130 last night. She states she has home glucometer for her spouse and has been checking patient BS. She reports pt does not eat much, and she has been providing ensure shakes to help with nutrition.  CTN reminded her to give patient night time snack per hospitalist note.  HANNAH has BP cuff and takes BP--she states if BP is low then she gives the midodrine and holds beta blocker, if BP is elevated she will give the beta blocker. She is able to give CTN examples of high and low BP.  HANNAH states that pt gets sent to ER by dialysis frequently--that \"they send her for everything\".  HANNAH believes patient had a seizure prior to this admission as she states pt have several seizures over the course of a week. She reports it is difficult to regulate meds due to dialysis.  HANNAH states that she will talk to PCP about resuming pain meds as patient \"needs them\". She states she will continue to give to pt as needed.  CTN sent Perfect serve message to discharge MD to ask about UA lab result to see if pt needs abx. CTN will follow up. CTN will follow up next week--rw         Supportive resources in place to maintain patient in the community (ie. Home Health, DME equipment, refer to, medication assistant plan, etc.)      04/14/22    HANNAH states that they have a Medicaid application in for home care aide assistance, she reports that the nurse has come to the home to evaluate patient, and they are waiting for the application process to be completed. At this time , HANNAH is caring for pt 24/7. HANNAH states they declined SNF as pt went after a recent discharge and came back home in worse physical decline than when she went in. She does not want pt to go to higher level of care at this time. CTN called [facility], [facility], and [facility] to see if anyone services [place]. Pt is either out of area, [facility]PA states pt would have to be in a group home or SNF to receive service due to insurance.     Pt out of area for [facility]---mkrw

## 2022-04-14 NOTE — ACP (ADVANCE CARE PLANNING)
04/14/22  CTN spoke to daughter in law Mitch Chacon. She states JEET is patient's son Tom Roth. DIL states at this time it is the patient's wishes to live and try to improve with [facility] services. She reports that they have discussed hospice services in the past, have completed paper work with agency and will call if patient condition declines. Daughter in law states patient is not a DDNR as family wants patient to have opportunity to [employer]" any acute events, but she states if patient was in a life threatening situation they would discuss with MD and make decisions at the time it occurs. ---santos

## 2022-04-15 NOTE — PROGRESS NOTES
Goals Addressed                 This Visit's Progress     Prevent complications post hospitalization. 04/14/22  CTN contacted Bennie Fiore, patient DIL and at home care giver to review d/c instructions/red flags. PCP---CTN contcted PCP office , no appts available until mid June per Lori at [facility] office. She asked CTN to Call [address]  office. CTN set up VV with Dr Nikita Maya on 4/19/22 at 11:30. CTN called pt DIL, left detailed VM message with Appt info. CTN will review again next week prior to appt. [place] JACKI--DIL states [place] nurse called last night, will come today for JACKI. She states pt also gets PT and OT services. Patient just saw cards [facility] ) last week prior to admission--HANNAH explains it takes 3 people to lift patient into a WC and out of a car---she states it is too difficult to take patient back into the office. She reports they have a plan as to when pt should return for pacer battery change--this will have to be coordinated with dialysis catheter change per daughter report. Patient will see nephrology at dialysis ([id number] [facility]), daughter states labs are checked every Wednesday, Unit calls them when pt needs blood transfusions and will set up OP transfusions. Neuro appt--DIL states MD appt is 5/10/22     HANNAH reports that she has not yet picked up glucometer and supplies ordered as pt discharged at 2130 last night. She states she has home glucometer for her spouse and has been checking patient BS. She reports pt does not eat much, and she has been providing ensure shakes to help with nutrition. CTN reminded her to give patient night time snack per hospitalist note.  HANNAH has BP cuff and takes BP--she states if BP is low then she gives the midodrine and holds beta blocker, if BP is elevated she will give the beta blocker. She is able to give CTN examples of high and low BP.    HANNAH states that pt gets sent to ER by dialysis frequently--that \"they send her for everything\".  HANNAH believes patient had a seizure prior to this admission as she states pt have several seizures over the course of a week. She reports it is difficult to regulate meds due to dialysis.  HANNAH states that she will talk to PCP about resuming pain meds as patient \"needs them\". She states she will continue to give to pt as needed.  CTN sent Perfect serve message to discharge MD to ask about UA lab result to see if pt needs abx. CTN will follow up. CTN will follow up next week--mkrw    UPDATE: CTN received message from hospitalist that ID MD Dr Hermenia Seip recommended diflucan and amoxicillin for 7 days and then follow up in 1 week. CTN notified daughter about new scripts. CTN called ID office, had to leave message for appt desk to call CTN back. CTN will follow up---mkrw    04/15/22  CTN had not received call back from ID office yet--CTN called MD office again today, spoke to [facility]. She states MD does do VV, appt made for VV 4/26 at 10:00 to call [employer]'s cell phone. CTN spoke to Rafael Robins to give her appt details. ---mkrw

## 2022-04-17 LAB — LACOSAMIDE SERPL-MCNC: 25.8 UG/ML (ref 5–10)

## 2022-04-18 ENCOUNTER — HOSPITAL ENCOUNTER (OUTPATIENT)
Age: 72
Setting detail: OBSERVATION
Discharge: HOME HEALTH | End: 2022-04-20
Attending: EMERGENCY MEDICINE | Admitting: INTERNAL MEDICINE
Payer: MEDICARE

## 2022-04-18 DIAGNOSIS — D64.9 ANEMIA, UNSPECIFIED TYPE: ICD-10-CM

## 2022-04-18 DIAGNOSIS — K92.2 GASTROINTESTINAL HEMORRHAGE, UNSPECIFIED GASTROINTESTINAL HEMORRHAGE TYPE: ICD-10-CM

## 2022-04-18 DIAGNOSIS — N18.6 ESRD ON DIALYSIS (HCC): Primary | ICD-10-CM

## 2022-04-18 DIAGNOSIS — Z99.2 ESRD ON DIALYSIS (HCC): Primary | ICD-10-CM

## 2022-04-18 LAB
ALBUMIN SERPL-MCNC: 1.9 G/DL (ref 3.5–5)
ALBUMIN/GLOB SERPL: 0.7 {RATIO} (ref 1.1–2.2)
ALP SERPL-CCNC: 94 U/L (ref 45–117)
ALT SERPL-CCNC: 25 U/L (ref 12–78)
ANION GAP SERPL CALC-SCNC: 5 MMOL/L (ref 5–15)
AST SERPL W P-5'-P-CCNC: 27 U/L (ref 15–37)
BASOPHILS # BLD: 0 K/UL (ref 0–0.1)
BASOPHILS NFR BLD: 1 % (ref 0–1)
BILIRUB SERPL-MCNC: 0.3 MG/DL (ref 0.2–1)
BUN SERPL-MCNC: 54 MG/DL (ref 6–20)
BUN/CREAT SERPL: 15 (ref 12–20)
CA-I BLD-MCNC: 7.4 MG/DL (ref 8.5–10.1)
CHLORIDE SERPL-SCNC: 107 MMOL/L (ref 97–108)
CO2 SERPL-SCNC: 27 MMOL/L (ref 21–32)
COLLECT DATE STL: 9
CREAT SERPL-MCNC: 3.63 MG/DL (ref 0.55–1.02)
DIFFERENTIAL METHOD BLD: ABNORMAL
EOSINOPHIL # BLD: 0.1 K/UL (ref 0–0.4)
EOSINOPHIL NFR BLD: 1 % (ref 0–7)
ERYTHROCYTE [DISTWIDTH] IN BLOOD BY AUTOMATED COUNT: 17.4 % (ref 11.5–14.5)
GLOBULIN SER CALC-MCNC: 2.8 G/DL (ref 2–4)
GLUCOSE BLD STRIP.AUTO-MCNC: 70 MG/DL (ref 65–117)
GLUCOSE SERPL-MCNC: 83 MG/DL (ref 65–100)
HCT VFR BLD AUTO: 22 % (ref 35–47)
HEMOCCULT SP1 STL QL: POSITIVE
HGB BLD-MCNC: 6.7 G/DL (ref 11.5–16)
IMM GRANULOCYTES # BLD AUTO: 0 K/UL (ref 0–0.04)
IMM GRANULOCYTES NFR BLD AUTO: 1 % (ref 0–0.5)
LACTATE SERPL-SCNC: 0.4 MMOL/L (ref 0.4–2)
LIPASE SERPL-CCNC: 202 U/L (ref 73–393)
LYMPHOCYTES # BLD: 1.8 K/UL (ref 0.8–3.5)
LYMPHOCYTES NFR BLD: 27 % (ref 12–49)
MAGNESIUM SERPL-MCNC: 2.1 MG/DL (ref 1.6–2.4)
MCH RBC QN AUTO: 29.6 PG (ref 26–34)
MCHC RBC AUTO-ENTMCNC: 30.5 G/DL (ref 30–36.5)
MCV RBC AUTO: 97.3 FL (ref 80–99)
MONOCYTES # BLD: 0.4 K/UL (ref 0–1)
MONOCYTES NFR BLD: 6 % (ref 5–13)
NEUTS SEG # BLD: 4.2 K/UL (ref 1.8–8)
NEUTS SEG NFR BLD: 64 % (ref 32–75)
NRBC # BLD: 0 K/UL (ref 0–0.01)
NRBC BLD-RTO: 0 PER 100 WBC
PERFORMED BY, TECHID: NORMAL
PLATELET # BLD AUTO: 147 K/UL (ref 150–400)
PMV BLD AUTO: 11.2 FL (ref 8.9–12.9)
POTASSIUM SERPL-SCNC: 4.5 MMOL/L (ref 3.5–5.1)
PROT SERPL-MCNC: 4.7 G/DL (ref 6.4–8.2)
RBC # BLD AUTO: 2.26 M/UL (ref 3.8–5.2)
SODIUM SERPL-SCNC: 139 MMOL/L (ref 136–145)
WBC # BLD AUTO: 6.4 K/UL (ref 3.6–11)

## 2022-04-18 PROCEDURE — 85025 COMPLETE CBC W/AUTO DIFF WBC: CPT

## 2022-04-18 PROCEDURE — 82272 OCCULT BLD FECES 1-3 TESTS: CPT

## 2022-04-18 PROCEDURE — 36430 TRANSFUSION BLD/BLD COMPNT: CPT

## 2022-04-18 PROCEDURE — 86900 BLOOD TYPING SEROLOGIC ABO: CPT

## 2022-04-18 PROCEDURE — 83540 ASSAY OF IRON: CPT

## 2022-04-18 PROCEDURE — 86923 COMPATIBILITY TEST ELECTRIC: CPT

## 2022-04-18 PROCEDURE — 74011250637 HC RX REV CODE- 250/637: Performed by: PHYSICIAN ASSISTANT

## 2022-04-18 PROCEDURE — G0378 HOSPITAL OBSERVATION PER HR: HCPCS

## 2022-04-18 PROCEDURE — C9113 INJ PANTOPRAZOLE SODIUM, VIA: HCPCS | Performed by: PHYSICIAN ASSISTANT

## 2022-04-18 PROCEDURE — 82962 GLUCOSE BLOOD TEST: CPT

## 2022-04-18 PROCEDURE — 83605 ASSAY OF LACTIC ACID: CPT

## 2022-04-18 PROCEDURE — 83735 ASSAY OF MAGNESIUM: CPT

## 2022-04-18 PROCEDURE — 96374 THER/PROPH/DIAG INJ IV PUSH: CPT

## 2022-04-18 PROCEDURE — 80053 COMPREHEN METABOLIC PANEL: CPT

## 2022-04-18 PROCEDURE — 99285 EMERGENCY DEPT VISIT HI MDM: CPT

## 2022-04-18 PROCEDURE — 83690 ASSAY OF LIPASE: CPT

## 2022-04-18 PROCEDURE — 36415 COLL VENOUS BLD VENIPUNCTURE: CPT

## 2022-04-18 PROCEDURE — P9016 RBC LEUKOCYTES REDUCED: HCPCS

## 2022-04-18 PROCEDURE — 82607 VITAMIN B-12: CPT

## 2022-04-18 PROCEDURE — 74011250636 HC RX REV CODE- 250/636: Performed by: PHYSICIAN ASSISTANT

## 2022-04-18 PROCEDURE — 74011000250 HC RX REV CODE- 250: Performed by: PHYSICIAN ASSISTANT

## 2022-04-18 RX ORDER — ACETAMINOPHEN 650 MG/1
650 SUPPOSITORY RECTAL
Status: DISCONTINUED | OUTPATIENT
Start: 2022-04-18 | End: 2022-04-21 | Stop reason: HOSPADM

## 2022-04-18 RX ORDER — ONDANSETRON 4 MG/1
4 TABLET, ORALLY DISINTEGRATING ORAL
Status: DISCONTINUED | OUTPATIENT
Start: 2022-04-18 | End: 2022-04-21 | Stop reason: HOSPADM

## 2022-04-18 RX ORDER — METOPROLOL SUCCINATE 25 MG/1
12.5 TABLET, EXTENDED RELEASE ORAL DAILY
Status: DISCONTINUED | OUTPATIENT
Start: 2022-04-19 | End: 2022-04-21 | Stop reason: HOSPADM

## 2022-04-18 RX ORDER — MIDODRINE HYDROCHLORIDE 5 MG/1
5 TABLET ORAL
Status: DISCONTINUED | OUTPATIENT
Start: 2022-04-18 | End: 2022-04-21 | Stop reason: HOSPADM

## 2022-04-18 RX ORDER — LEVETIRACETAM 500 MG/1
1000 TABLET, EXTENDED RELEASE ORAL DAILY
COMMUNITY
End: 2022-05-09

## 2022-04-18 RX ORDER — ATORVASTATIN CALCIUM 40 MG/1
40 TABLET, FILM COATED ORAL
Status: DISCONTINUED | OUTPATIENT
Start: 2022-04-18 | End: 2022-04-21 | Stop reason: HOSPADM

## 2022-04-18 RX ORDER — SODIUM CHLORIDE 9 MG/ML
250 INJECTION, SOLUTION INTRAVENOUS AS NEEDED
Status: DISCONTINUED | OUTPATIENT
Start: 2022-04-18 | End: 2022-04-21 | Stop reason: HOSPADM

## 2022-04-18 RX ORDER — FLUOXETINE 10 MG/1
10 CAPSULE ORAL DAILY
Status: DISCONTINUED | OUTPATIENT
Start: 2022-04-19 | End: 2022-04-21 | Stop reason: HOSPADM

## 2022-04-18 RX ORDER — SEVELAMER CARBONATE 800 MG/1
800 TABLET, FILM COATED ORAL
Status: DISCONTINUED | OUTPATIENT
Start: 2022-04-18 | End: 2022-04-21 | Stop reason: HOSPADM

## 2022-04-18 RX ORDER — LEVOTHYROXINE SODIUM 100 UG/1
200 TABLET ORAL
Status: DISCONTINUED | OUTPATIENT
Start: 2022-04-19 | End: 2022-04-21 | Stop reason: HOSPADM

## 2022-04-18 RX ORDER — SODIUM CHLORIDE 0.9 % (FLUSH) 0.9 %
5-40 SYRINGE (ML) INJECTION EVERY 8 HOURS
Status: DISCONTINUED | OUTPATIENT
Start: 2022-04-18 | End: 2022-04-21 | Stop reason: HOSPADM

## 2022-04-18 RX ORDER — NYSTATIN 100000 [USP'U]/G
POWDER TOPICAL 4 TIMES DAILY
Status: DISCONTINUED | OUTPATIENT
Start: 2022-04-18 | End: 2022-04-21 | Stop reason: HOSPADM

## 2022-04-18 RX ORDER — SODIUM CHLORIDE 0.9 % (FLUSH) 0.9 %
5-40 SYRINGE (ML) INJECTION AS NEEDED
Status: DISCONTINUED | OUTPATIENT
Start: 2022-04-18 | End: 2022-04-21 | Stop reason: HOSPADM

## 2022-04-18 RX ORDER — ONDANSETRON 2 MG/ML
4 INJECTION INTRAMUSCULAR; INTRAVENOUS
Status: DISCONTINUED | OUTPATIENT
Start: 2022-04-18 | End: 2022-04-21 | Stop reason: HOSPADM

## 2022-04-18 RX ORDER — POLYETHYLENE GLYCOL 3350 17 G/17G
17 POWDER, FOR SOLUTION ORAL DAILY PRN
Status: DISCONTINUED | OUTPATIENT
Start: 2022-04-18 | End: 2022-04-21 | Stop reason: HOSPADM

## 2022-04-18 RX ORDER — ACETAMINOPHEN 325 MG/1
650 TABLET ORAL
Status: DISCONTINUED | OUTPATIENT
Start: 2022-04-18 | End: 2022-04-21 | Stop reason: HOSPADM

## 2022-04-18 RX ORDER — LACOSAMIDE 100 MG/1
200 TABLET ORAL 2 TIMES DAILY
Status: DISCONTINUED | OUTPATIENT
Start: 2022-04-18 | End: 2022-04-21 | Stop reason: HOSPADM

## 2022-04-18 RX ADMIN — SODIUM CHLORIDE, PRESERVATIVE FREE 10 ML: 5 INJECTION INTRAVENOUS at 15:45

## 2022-04-18 RX ADMIN — LACOSAMIDE 200 MG: 100 TABLET, FILM COATED ORAL at 21:01

## 2022-04-18 RX ADMIN — SODIUM CHLORIDE, PRESERVATIVE FREE 40 MG: 5 INJECTION INTRAVENOUS at 16:02

## 2022-04-18 RX ADMIN — MIDODRINE HYDROCHLORIDE 5 MG: 5 TABLET ORAL at 16:03

## 2022-04-18 RX ADMIN — SODIUM CHLORIDE, PRESERVATIVE FREE 10 ML: 5 INJECTION INTRAVENOUS at 21:03

## 2022-04-18 RX ADMIN — ATORVASTATIN CALCIUM 40 MG: 40 TABLET, FILM COATED ORAL at 21:01

## 2022-04-18 NOTE — PROGRESS NOTES
Medicare Outpatient Observation Notice (MOON)/ [place] Outpatient Observation Notice (Renetta Feliciano) provided to patient/representative with verbal explanation of the notice. Time allotted for questions regarding the notice. Patient /representative provided a completed copy of the MOON/SCOTT notice. Copy placed on bedside chart.       Received verbal consent via phone from Pt son Zaira Jones

## 2022-04-18 NOTE — H&P
History and Physical    Patient: Jennifer Giordano MRN: [id number]  SSN: [id number]    YOB: 1950  Age: 71 y.o. Sex: female      Subjective:      Jennifer Giordano is a 71 y.o. female  with a past medical history of end-stage renal disease on hemodialysis, atrial fibrillation, hypertension, hypothyroidism, seizure disorder that presented to the emergency room on 4/18/2022 at the advice of her nephrologist for low hemoglobin. Patient is well-known to our services for multiple admissions. Of note patient was recently admitted on 4/11/2022 and discharged on 4/13/2022 for a syncopal episode. Caregiver on phone states that she was discharged with Aleve and since that medication she has noticed that she has had more dark tarry stools. She also started feeling very weak and fatigued. Denies any dizziness, shortness of breath, chest pain. When she got her routine labs with her dialysis they noticed a low hemoglobin. They advised her to come to the ED. In the ED patient's vital signs were stable. Hemoglobin 6.7. Given IV Protonix 40 mg. Patient also had a reported fall approximately 1 week ago and has had multiple scans of the CT of the head. Large improving bruising over the left eye seems to be improving.   It was recommended admission for blood transfusion and GI evaluation for GI bleed    Past Medical History:   Diagnosis Date    Anxiety disorder     Arthritis     Atherosclerosis of native arteries of the extremities with ulceration ([address] 8/31/2020    Atrial fibrillation ([address]     Cardiac pacemaker in situ     Cellulitis and abscess of trunk 8/31/2020    Depression, postpartum     Diabetes ([address]     Heart disease     Heartburn     Hx of long term use of blood thinners     Hypercholesterolemia     Hypertension     Hypothyroidism     Migraines     Peripheral venous insufficiency 8/31/2020    Seizures ([address]     Sleep disorder     Stroke ([address]     2019    Varicose veins of lower extremity with inflammation 8/31/2020     Past Surgical History:   Procedure Laterality Date    HX BACK SURGERY      HX OTHER SURGICAL      leg surgery     HX OTHER SURGICAL      pacemaker monitor     IR INSERT TUNL CVC W/O PORT OVER 5 YR  12/10/2021      Family History   Problem Relation Age of Onset    Heart Disease Mother     Heart Disease Father     Diabetes Sister     Diabetes Brother      Social History     Tobacco Use    Smoking status: Never Smoker    Smokeless tobacco: Never Used   Substance Use Topics    Alcohol use: Never      Prior to Admission medications    Medication Sig Start Date End Date Taking? Authorizing Provider   amoxicillin 500 mg tab Take 500 mg by mouth daily. 4/14/22   Mary Ann Duran MD   fluconazole (DIFLUCAN) 100 mg tablet Take 1 Tablet by mouth daily for 7 days. FDA advises cautious prescribing of oral fluconazole in pregnancy. 4/14/22 4/21/22  Mary Ann Duran MD   levothyroxine (SYNTHROID) 200 mcg tablet Take 1 Tablet by mouth Daily (before breakfast). 4/14/22   Mary Ann Duran MD   glucose 4 gram chewable tablet Take 4 Tablets by mouth as needed (hypoglycemia). 4/13/22   Mary Ann Duran MD   Blood-Glucose Meter monitoring kit Please check your blood sugar in the morning 4/13/22   Mary Ann Duran MD   lancets misc Please check your blood sugar in the morning 4/13/22   Mary Ann Duran MD   Lancing Device misc 1 Each by Does Not Apply route daily. 4/13/22   Mary Ann Duran MD   alcohol swabs padm Please check your blood sugar daily 4/13/22   Mary Ann Duran MD   midodrine (PROAMATINE) 5 mg tablet Take 5 mg by mouth three (3) times daily (with meals). Provider, Historical   metoprolol succinate (TOPROL-XL) 25 mg XL tablet Take 12.5 mg by mouth daily. Provider, Historical   nystatin (MYCOSTATIN) powder Apply  to affected area four (4) times daily. 3/22/22   Shira Tariq NP   Spirometers and Accessories avril 1 Each by Does Not Apply route three (3) times daily. 3/8/22   [place] Adrián, NP   FLUoxetine (PROzac) 10 mg capsule Take 1 Capsule by mouth daily for 270 days. 2/24/22 11/21/22  [place] Adrián NP   pantoprazole (PROTONIX) 40 mg tablet Take 1 Tablet by mouth Daily (before breakfast). Indications: bleeding from stomach, esophagus or duodenum 2/24/22   [place] Adrián, NP   ondansetron (Zofran ODT) 4 mg disintegrating tablet 1 Tablet by SubLINGual route every eight (8) hours as needed for Nausea or Vomiting. 2/24/22   [place] Adrián NP   food supplemt, lactose-reduced (Ensure Active High Protein) liqd Take 237 mL by mouth four (4) times daily as needed for PRN Reason (Other) (as pt can tolerate). 2/10/22   [place] Adrián NP   zinc oxide-white petrolatum 17-57 % topical paste Apply  to affected area as needed for Skin Irritation. Indications: skin irritation 12/11/21   Gerrit Harada, MD   Vimpat 200 mg tab tablet Take 1 Tablet by mouth two (2) times a day.  11/19/21   Provider, Historical   atorvastatin (LIPITOR) 40 mg tablet Take 1 tablet by mouth once daily for 90 days 11/22/21 8/19/22  Daniel Narvaez MD   sevelamer carbonate (RENVELA) 800 mg tab tab  5/10/21   Provider, Historical        Allergies   Allergen Reactions    Lactose Diarrhea       Review of Systems:  Constitutional: No fevers, No chills, No fatigue, No weakness  Eyes: No visual disturbance  Ears, Nose, Mouth, Throat, and Face: No nasal congestion, No sore throat  Respiratory: No cough, No sputum, No wheezing, No SOB  Cardiovascular: No chest pain, No lower extremity edema, No Palpitations   Gastrointestinal: No nausea, No vomiting, No diarrhea, No constipation, No abdominal pain  Genitourinary: No frequency, No dysuria, No hematuria  Integument/Breast: No rash, No skin lesion(s), No dryness  Musculoskeletal: No arthralgias, No neck pain, No back pain  Neurological: No headaches, No dizziness, No confusion,  No seizures  Behavioral/Psychiatric: No anxiety, No depression      Objective:     Vitals:    04/18/22 1300 04/18/22 1315 04/18/22 1330 04/18/22 1400   BP: (!) 140/67 (!) 151/64 (!) 149/81 (!) 145/91   Pulse: 66 70 69 67   Resp: 14 16 16 17   Temp: 97.9 °F (36.6 °C) 97.8 °F (36.6 °C) 97.8 °F (36.6 °C) 98 °F (36.7 °C)   SpO2: 97% 97% 97% 97%   Weight:       Height:            Physical Exam:  General: alert, cooperative, no distress  Eye: conjunctivae/corneas clear. PERRL, EOM's intact, ecchymoses along right orbit,. Throat and Neck: normal and no erythema or exudates noted. No mass   Lung: clear to auscultation bilaterally  Heart: regular rate and rhythm,   Abdomen: soft, non-tender. Bowel sounds normal. No masses,  Extremities:  able to move all extremities normal, atraumatic  Skin: Normal.  Neurologic: AOx3. Cranial nerves 2-12 and sensation grossly intact. Psychiatric: non focal    Recent Results (from the past 24 hour(s))   CBC WITH AUTOMATED DIFF    Collection Time: 04/18/22 11:00 AM   Result Value Ref Range    WBC 6.4 3.6 - 11.0 K/uL    RBC 2.26 (L) 3.80 - 5.20 M/uL    HGB 6.7 (L) 11.5 - 16.0 g/dL    HCT 22.0 (L) 35.0 - 47.0 %    MCV 97.3 80.0 - 99.0 FL    MCH 29.6 26.0 - 34.0 PG    MCHC 30.5 30.0 - 36.5 g/dL    RDW 17.4 (H) 11.5 - 14.5 %    PLATELET 833 (L) 552 - 400 K/uL    MPV 11.2 8.9 - 12.9 FL    NRBC 0.0 0.0  WBC    ABSOLUTE NRBC 0.00 0.00 - 0.01 K/uL    NEUTROPHILS 64 32 - 75 %    LYMPHOCYTES 27 12 - 49 %    MONOCYTES 6 5 - 13 %    EOSINOPHILS 1 0 - 7 %    BASOPHILS 1 0 - 1 %    IMMATURE GRANULOCYTES 1 (H) 0 - 0.5 %    ABS. NEUTROPHILS 4.2 1.8 - 8.0 K/UL    ABS. LYMPHOCYTES 1.8 0.8 - 3.5 K/UL    ABS. MONOCYTES 0.4 0.0 - 1.0 K/UL    ABS. EOSINOPHILS 0.1 0.0 - 0.4 K/UL    ABS. BASOPHILS 0.0 0.0 - 0.1 K/UL    ABS. IMM.  GRANS. 0.0 0.00 - 0.04 K/UL    DF AUTOMATED     METABOLIC PANEL, COMPREHENSIVE    Collection Time: 04/18/22 11:00 AM   Result Value Ref Range    Sodium 139 136 - 145 mmol/L    Potassium 4.5 3.5 - 5.1 mmol/L    Chloride 107 97 - 108 mmol/L    CO2 27 21 - 32 mmol/L    Anion gap 5 5 - 15 mmol/L    Glucose 83 65 - 100 mg/dL    BUN 54 (H) 6 - 20 mg/dL    Creatinine 3.63 (H) 0.55 - 1.02 mg/dL    BUN/Creatinine ratio 15 12 - 20      GFR est AA 15 (L) >60 ml/min/1.73m2    GFR est non-AA 12 (L) >60 ml/min/1.73m2    Calcium 7.4 (L) 8.5 - 10.1 mg/dL    Bilirubin, total 0.3 0.2 - 1.0 mg/dL    AST (SGOT) 27 15 - 37 U/L    ALT (SGPT) 25 12 - 78 U/L    Alk. phosphatase 94 45 - 117 U/L    Protein, total 4.7 (L) 6.4 - 8.2 g/dL    Albumin 1.9 (L) 3.5 - 5.0 g/dL    Globulin 2.8 2.0 - 4.0 g/dL    A-G Ratio 0.7 (L) 1.1 - 2.2     TYPE & SCREEN    Collection Time: 04/18/22 11:00 AM   Result Value Ref Range    Crossmatch Expiration 04/21/2022,2359     ABO/Rh(D) B Positive     Antibody screen Negative     Unit number [id number]     Blood component type RC LR     Unit division 00     Status of unit [address] to transfuse     Crossmatch result Compatible    MAGNESIUM    Collection Time: 04/18/22 11:00 AM   Result Value Ref Range    Magnesium 2.1 1.6 - 2.4 mg/dL   LIPASE    Collection Time: 04/18/22 11:00 AM   Result Value Ref Range    Lipase 202 73 - 393 U/L   OCCULT BLOOD, STOOL    Collection Time: 04/18/22 12:50 PM   Result Value Ref Range    Occult Blood,day 1 Positive (A) Negative      Day 1 date: 9         XR Results (maximum last 3): Results from [place] encounter on 04/11/22    XR HIP RT W OR WO PELV 2-3 VWS    Narrative  Right hip, 2 views    Osteopenia. No plain film evidence for right hip fracture. Pelvic and right  lower extremity arteriosclerosis. XR HIP LT W OR WO PELV 2-3 VWS    Narrative  Left hip, 2 views    Intramedullary erika left femur. Osteopenia. No plain film evidence for fracture or dislocation left hip. Advanced atherosclerosis pelvic and lower extremity arteries. XR CHEST PORT    Narrative  Chest single view. Comparison single view chest February 14, 2022.     Stable right sided permacath. Lungs clear; no gross interstitial or alveolar pulmonary edema. Stable right side cardiac device. Cardiac and mediastinal structures unchanged. No pneumothorax or sizable pleural effusion. CT Results (maximum last 3): Results from [facility] encounter on 04/11/22    CT MAXILLOFACIAL WO CONT    Narrative  CT SCAN OF THE BRAIN WITHOUT CONTRAST:        CLINICAL HISTORY: Head injury. Thin axial and coronal and sagittal reconstructions were obtained. All CT scans at this facility are performed using dose reduction optimization  techniques as appropriate to a performed exam including the following: Automated  exposure control, adjustments of the mA and/or kV according to patient size, or  use of iterative reconstruction technique. Comparison is made with a previous CT brain examination of April 7, 2022. Mild  central and cortical atrophy is again noted. Microvascular ischemic changes are  again noted. Ischemic changes in the left inferior parietal lobule are again  noted and unchanged. No intra or extra-axial hemorrhage or acute infarction a  major arterial distribution is demonstrated. Bony calvarium is intact. Scans through the posterior fossa show no major infarction, mass or hemorrhage. The cerebellar tonsils are at the level of the foramen magnum in a satisfactory  position. Impression  No acute intracranial abnormality. CT FACIAL BONES WITHOUT INTRAVENOUS CONTRAST:    CLINICAL HISTORY: Injury to the face. Dose Reduction:    All CT scans at this facility are performed using dose reduction optimization  techniques as appropriate to a performed exam including the following: Automated  exposure control, adjustments of the mA and/or kV according to patient size, or  use of iterative reconstruction technique. Thin axial and coronal and sagittal reconstructions were obtained. Comparison is made with a previous CT facial bone examination April 7.  There has  been no interval change. Available scans through the orbits show no fracture. Optic globes and optic  nerves have a normal CT appearance. Both zygomatic arches are intact. No  fracture of the mandible or maxilla is noted. Minimally displaced fracture of  the right nasal bone is again noted. No other fractures were identified. .    Mild mucosal changes and small fluid levels within the sphenoid sinus are again  noted and unchanged. Mastoid air cells are clear. Visualized soft tissues of the neck have a normal CT appearance. IMPRESSION: No interval change. Fracture of the right nasal bone minimally displacement. CT HEAD WO CONT    Narrative  CT SCAN OF THE BRAIN WITHOUT CONTRAST:        CLINICAL HISTORY: Head injury. Thin axial and coronal and sagittal reconstructions were obtained. All CT scans at this facility are performed using dose reduction optimization  techniques as appropriate to a performed exam including the following: Automated  exposure control, adjustments of the mA and/or kV according to patient size, or  use of iterative reconstruction technique. Comparison is made with a previous CT brain examination of April 7, 2022. Mild  central and cortical atrophy is again noted. Microvascular ischemic changes are  again noted. Ischemic changes in the left inferior parietal lobule are again  noted and unchanged. No intra or extra-axial hemorrhage or acute infarction a  major arterial distribution is demonstrated. Bony calvarium is intact. Scans through the posterior fossa show no major infarction, mass or hemorrhage. The cerebellar tonsils are at the level of the foramen magnum in a satisfactory  position. Impression  No acute intracranial abnormality. CT FACIAL BONES WITHOUT INTRAVENOUS CONTRAST:    CLINICAL HISTORY: Injury to the face.     Dose Reduction:    All CT scans at this facility are performed using dose reduction optimization  techniques as appropriate to a performed exam including the following: Automated  exposure control, adjustments of the mA and/or kV according to patient size, or  use of iterative reconstruction technique. Thin axial and coronal and sagittal reconstructions were obtained. Comparison is made with a previous CT facial bone examination April 7. There has  been no interval change. Available scans through the orbits show no fracture. Optic globes and optic  nerves have a normal CT appearance. Both zygomatic arches are intact. No  fracture of the mandible or maxilla is noted. Minimally displaced fracture of  the right nasal bone is again noted. No other fractures were identified. .    Mild mucosal changes and small fluid levels within the sphenoid sinus are again  noted and unchanged. Mastoid air cells are clear. Visualized soft tissues of the neck have a normal CT appearance. IMPRESSION: No interval change. Fracture of the right nasal bone minimally displacement. Results from [place] encounter on 04/07/22    CT SPINE CERV WO CONT    Narrative  CT dose reduction was achieved through use of a standardized protocol tailored  for this examination and automatic exposure control for dose modulation. Protocol study shows no fracture, subluxation or prevertebral edema. Mild to  moderate DDD at most levels    Small calcified disc herniations at C3-4, C4-5 and C5-C6, with small  osteophytes. Spinal canal is not significantly stenotic    Moderate diffuse facet DJD, most pronounced on the right at C4-5, without  dominant foraminal stenosis. Craniocervical junction is maintained      Assessment:   1. Acute GI bleed  2. Acute on chronic anemia  3. End-stage renal disease on hemodialysis  4. Hypertension, orthostatic hypotension  5. Seizure disorder  6. Hypothyroidism    Plan:   1. Hemoglobin 6.7. Transfused 2 units of packed red blood cells. Check an occult blood. GI consulted.   Patient did have an EGD on 3/14/2022 which showed esophagitis chronic gastritis without bleeding 3 clips from previous intervention were present without any active bleeding noted. Continue IV Pepcid 20 mg twice daily   2. Check an iron study, vitamin J12, folic acid. Continue with Procrit  3. Nephrology consulted. Continue with dialysis. On Renvela  4. Blood pressure is stable. Continue to monitor per unit protocol. On cardiac telemetry. On metoprolol with holding parameters. Midodrine for orthostatic hypotension  5. On Vimpat  6. Contin Synthroid   7. CBC BMP in a.m.       Full code    DVT prophylaxis SCDs  GI prophylaxis Protonix    Total time: 61 min      Signed By: Zoya Green PA-C     April 18, 2022

## 2022-04-18 NOTE — ED PROVIDER NOTES
EMERGENCY DEPARTMENT HISTORY AND PHYSICAL EXAM      Date: 4/18/2022  Patient Name: Tan Zuleta    History of Presenting Illness     Chief Complaint   Patient presents with    Abnormal Lab Results    Abdominal Pain       History Provided By: Patient and EMS    HPI: Tan Zuleta, 71 y.o. female with a past medical history significant renal insufficiency presents to the ED with chief complaint of Abnormal Lab Results and Abdominal Pain  . 67-year-old female end-stage renal disease on dialysis Monday Wednesday Friday. Patient has had some vague abdominal pain at the same time she is had some melena. Was sent for a very low hemoglobin for blood transfusion. Denies any bright red blood. No chest pain or shortness of breath. No dizziness or passing out. There are no other complaints, changes, or physical findings at this time. PCP: Kenny Lovelace MD    Current Facility-Administered Medications   Medication Dose Route Frequency Provider Last Rate Last Admin    0.9% sodium chloride infusion 250 mL  250 mL IntraVENous PRN MD Merlene Garcia ON 4/20/2022] epoetin anjelica-epbx (RETACRIT) injection 10,000 Units  10,000 Units IntraVENous DIALYSIS MON, WED & Danie Dickerson MD        pantoprazole (PROTONIX) 40 mg in 0.9% sodium chloride 10 mL injection  40 mg IntraVENous NOW Lori Goncalves MD         Current Outpatient Medications   Medication Sig Dispense Refill    amoxicillin 500 mg tab Take 500 mg by mouth daily. 7 Tablet 0    fluconazole (DIFLUCAN) 100 mg tablet Take 1 Tablet by mouth daily for 7 days. FDA advises cautious prescribing of oral fluconazole in pregnancy. 7 Tablet 0    levothyroxine (SYNTHROID) 200 mcg tablet Take 1 Tablet by mouth Daily (before breakfast). 30 Tablet 0    glucose 4 gram chewable tablet Take 4 Tablets by mouth as needed (hypoglycemia).  120 Tablet 0    Blood-Glucose Meter monitoring kit Please check your blood sugar in the morning 1 Kit 0    lancets misc Please check your blood sugar in the morning 30 Each 0    Lancing Device misc 1 Each by Does Not Apply route daily. 1 Each 0    alcohol swabs padm Please check your blood sugar daily 50 Pad each    midodrine (PROAMATINE) 5 mg tablet Take 5 mg by mouth three (3) times daily (with meals).  metoprolol succinate (TOPROL-XL) 25 mg XL tablet Take 12.5 mg by mouth daily.  nystatin (MYCOSTATIN) powder Apply  to affected area four (4) times daily. 60 g 2    Spirometers and Accessories avril 1 Each by Does Not Apply route three (3) times daily. 1 Each 1    FLUoxetine (PROzac) 10 mg capsule Take 1 Capsule by mouth daily for 270 days. 90 Capsule 2    pantoprazole (PROTONIX) 40 mg tablet Take 1 Tablet by mouth Daily (before breakfast). Indications: bleeding from stomach, esophagus or duodenum 90 Tablet 2    ondansetron (Zofran ODT) 4 mg disintegrating tablet 1 Tablet by SubLINGual route every eight (8) hours as needed for Nausea or Vomiting. 30 Tablet 2    food supplemt, lactose-reduced (Ensure Active High Protein) liqd Take 237 mL by mouth four (4) times daily as needed for PRN Reason (Other) (as pt can tolerate). 60 Each 5    zinc oxide-white petrolatum 17-57 % topical paste Apply  to affected area as needed for Skin Irritation. Indications: skin irritation 113 g 0    Vimpat 200 mg tab tablet Take 1 Tablet by mouth two (2) times a day.       atorvastatin (LIPITOR) 40 mg tablet Take 1 tablet by mouth once daily for 90 days 90 Tablet 3    sevelamer carbonate (RENVELA) 800 mg tab tab          Past History     Past Medical History:  Past Medical History:   Diagnosis Date    Anxiety disorder     Arthritis     Atherosclerosis of native arteries of the extremities with ulceration ([address] 8/31/2020    Atrial fibrillation ([address]     Cardiac pacemaker in situ     Cellulitis and abscess of trunk 8/31/2020    Depression, postpartum     Diabetes ([address]     Heart disease     Heartburn     Hx of long term use of blood thinners     Hypercholesterolemia     Hypertension     Hypothyroidism     Migraines     Peripheral venous insufficiency 8/31/2020    Seizures ([address]     Sleep disorder     Stroke [facility])     2019    Varicose veins of lower extremity with inflammation 8/31/2020       Past Surgical History:  Past Surgical History:   Procedure Laterality Date    HX BACK SURGERY      HX OTHER SURGICAL      leg surgery     HX OTHER SURGICAL      pacemaker monitor     IR INSERT TUNL CVC W/O PORT OVER 5 YR  12/10/2021       Family History:  Family History   Problem Relation Age of Onset    Heart Disease Mother     Heart Disease Father     Diabetes Sister     Diabetes Brother        Social History:  Social History     Tobacco Use    Smoking status: Never Smoker    Smokeless tobacco: Never Used   Vaping Use    Vaping Use: Never used   Substance Use Topics    Alcohol use: Never    Drug use: Never       Allergies: Allergies   Allergen Reactions    Lactose Diarrhea         Review of Systems   Review of Systems   Constitutional: Negative. Negative for chills, fatigue and fever. HENT: Negative. Negative for congestion, nosebleeds and sore throat. Eyes: Negative. Negative for pain, discharge and visual disturbance. Respiratory: Negative. Negative for cough, chest tightness and shortness of breath. Cardiovascular: Negative for chest pain, palpitations and leg swelling. Gastrointestinal: Positive for abdominal pain. Negative for blood in stool, constipation, diarrhea, nausea and vomiting. Endocrine: Negative. Genitourinary: Negative. Negative for difficulty urinating, dysuria, pelvic pain and vaginal bleeding. Musculoskeletal: Negative. Negative for arthralgias, back pain and myalgias. Skin: Negative. Negative for rash and wound. Allergic/Immunologic: Negative. Neurological: Negative. Negative for dizziness, syncope, weakness, numbness and headaches. Hematological: Negative. Psychiatric/Behavioral: Negative. Negative for agitation, confusion and suicidal ideas. All other systems reviewed and are negative. Physical Exam   Physical Exam  Vitals and nursing note reviewed. Exam conducted with a chaperone present. Constitutional:       Appearance: Normal appearance. She is normal weight. HENT:      Head: Normocephalic and atraumatic. Nose: Nose normal.      Mouth/Throat:      Mouth: Mucous membranes are moist.      Pharynx: Oropharynx is clear. Eyes:      Extraocular Movements: Extraocular movements intact. Conjunctiva/sclera: Conjunctivae normal.      Pupils: Pupils are equal, round, and reactive to light. Cardiovascular:      Rate and Rhythm: Normal rate and regular rhythm. Pulses: Normal pulses. Heart sounds: Normal heart sounds. Pulmonary:      Effort: Pulmonary effort is normal. No respiratory distress. Breath sounds: Normal breath sounds. Abdominal:      General: Abdomen is flat. Bowel sounds are normal. There is no distension. Palpations: Abdomen is soft. Tenderness: There is no abdominal tenderness. There is no guarding. Musculoskeletal:         General: No swelling, tenderness, deformity or signs of injury. Normal range of motion. Cervical back: Normal range of motion and neck supple. Right lower leg: No edema. Left lower leg: No edema. Skin:     General: Skin is warm and dry. Capillary Refill: Capillary refill takes less than 2 seconds. Findings: No lesion or rash. Neurological:      General: No focal deficit present. Mental Status: She is alert and oriented to person, place, and time. Mental status is at baseline. Cranial Nerves: No cranial nerve deficit. Psychiatric:         Mood and Affect: Mood normal.         Behavior: Behavior normal.         Thought Content:  Thought content normal.         Judgment: Judgment normal.         Diagnostic Study Results Labs -     Recent Results (from the past 12 hour(s))   CBC WITH AUTOMATED DIFF    Collection Time: 04/18/22 11:00 AM   Result Value Ref Range    WBC 6.4 3.6 - 11.0 K/uL    RBC 2.26 (L) 3.80 - 5.20 M/uL    HGB 6.7 (L) 11.5 - 16.0 g/dL    HCT 22.0 (L) 35.0 - 47.0 %    MCV 97.3 80.0 - 99.0 FL    MCH 29.6 26.0 - 34.0 PG    MCHC 30.5 30.0 - 36.5 g/dL    RDW 17.4 (H) 11.5 - 14.5 %    PLATELET 227 (L) 554 - 400 K/uL    MPV 11.2 8.9 - 12.9 FL    NRBC 0.0 0.0  WBC    ABSOLUTE NRBC 0.00 0.00 - 0.01 K/uL    NEUTROPHILS 64 32 - 75 %    LYMPHOCYTES 27 12 - 49 %    MONOCYTES 6 5 - 13 %    EOSINOPHILS 1 0 - 7 %    BASOPHILS 1 0 - 1 %    IMMATURE GRANULOCYTES 1 (H) 0 - 0.5 %    ABS. NEUTROPHILS 4.2 1.8 - 8.0 K/UL    ABS. LYMPHOCYTES 1.8 0.8 - 3.5 K/UL    ABS. MONOCYTES 0.4 0.0 - 1.0 K/UL    ABS. EOSINOPHILS 0.1 0.0 - 0.4 K/UL    ABS. BASOPHILS 0.0 0.0 - 0.1 K/UL    ABS. IMM. GRANS. 0.0 0.00 - 0.04 K/UL    DF AUTOMATED     METABOLIC PANEL, COMPREHENSIVE    Collection Time: 04/18/22 11:00 AM   Result Value Ref Range    Sodium 139 136 - 145 mmol/L    Potassium 4.5 3.5 - 5.1 mmol/L    Chloride 107 97 - 108 mmol/L    CO2 27 21 - 32 mmol/L    Anion gap 5 5 - 15 mmol/L    Glucose 83 65 - 100 mg/dL    BUN 54 (H) 6 - 20 mg/dL    Creatinine 3.63 (H) 0.55 - 1.02 mg/dL    BUN/Creatinine ratio 15 12 - 20      GFR est AA 15 (L) >60 ml/min/1.73m2    GFR est non-AA 12 (L) >60 ml/min/1.73m2    Calcium 7.4 (L) 8.5 - 10.1 mg/dL    Bilirubin, total 0.3 0.2 - 1.0 mg/dL    AST (SGOT) 27 15 - 37 U/L    ALT (SGPT) 25 12 - 78 U/L    Alk.  phosphatase 94 45 - 117 U/L    Protein, total 4.7 (L) 6.4 - 8.2 g/dL    Albumin 1.9 (L) 3.5 - 5.0 g/dL    Globulin 2.8 2.0 - 4.0 g/dL    A-G Ratio 0.7 (L) 1.1 - 2.2     TYPE & SCREEN    Collection Time: 04/18/22 11:00 AM   Result Value Ref Range    Crossmatch Expiration 04/21/2022,2359     ABO/Rh(D) B Positive     Antibody screen Negative     Unit number [id number]     Blood component type RC LR     Unit division 00     Status of unit [address] to transfuse     Crossmatch result Compatible    MAGNESIUM    Collection Time: 04/18/22 11:00 AM   Result Value Ref Range    Magnesium 2.1 1.6 - 2.4 mg/dL   LIPASE    Collection Time: 04/18/22 11:00 AM   Result Value Ref Range    Lipase 202 73 - 393 U/L   OCCULT BLOOD, STOOL    Collection Time: 04/18/22 12:50 PM   Result Value Ref Range    Occult Blood,day 1 Positive (A) Negative      Day 1 date: 9           Radiologic Studies -   No orders to display     CT Results  (Last 48 hours)    None        CXR Results  (Last 48 hours)    None          Medical Decision Making and ED Course   I am the first provider for this patient. I reviewed the vital signs, available nursing notes, past medical history, past surgical history, family history and social history. Vital Signs-Reviewed the patient's vital signs. Patient Vitals for the past 12 hrs:   Temp Pulse Resp BP SpO2   04/18/22 1400 98 °F (36.7 °C) 67 17 (!) 145/91 97 %   04/18/22 1330 97.8 °F (36.6 °C) 69 16 (!) 149/81 97 %   04/18/22 1315 97.8 °F (36.6 °C) 70 16 (!) 151/64 97 %   04/18/22 1300 97.9 °F (36.6 °C) 66 14 (!) 140/67 97 %   04/18/22 1245 97.8 °F (36.6 °C) 73 16 137/74 97 %   04/18/22 1037 98.1 °F (36.7 °C) 68 20 131/88 97 %       EKG interpretation:         Records Reviewed: Previous Hospital chart. EMS run report      ED Course:   Initial assessment performed. The patients presenting problems have been discussed, and they are in agreement with the care plan formulated and outlined with them. I have encouraged them to ask questions as they arise throughout their visit.     Orders Placed This Encounter    CBC WITH AUTOMATED DIFF     Standing Status:   Standing     Number of Occurrences:   1    METABOLIC PANEL, COMPREHENSIVE     Standing Status:   Standing     Number of Occurrences:   1    MAGNESIUM     Standing Status:   Standing     Number of Occurrences:   1    OCCULT BLOOD, STOOL Standing Status:   Standing     Number of Occurrences:   1    LIPASE     Standing Status:   Standing     Number of Occurrences:   1    URINALYSIS W/ RFLX MICROSCOPIC     Standing Status:   Standing     Number of Occurrences:   1    LACTIC ACID     Standing Status:   Standing     Number of Occurrences:   1    TRANSFUSE PACKED RBC'S, 1 Units     Standing Status:   Standing     Number of Occurrences:   1     Order Specific Question:   Reason for transfusion     Answer:   Hgb LESS THAN 7 g/dL with signs or symptoms of anemia.  IP CONSULT TO NEPHROLOGY     Standing Status:   Standing     Number of Occurrences:   1     Order Specific Question:   Reason for Consult: Answer:   esrd on hd, anemia, getting blood transfusion     Order Specific Question:   Did you call or speak to the consulting provider? Answer:   No     Order Specific Question:   Consult To     Answer:   lindsay     Order Specific Question:   Schedule When? Answer:   TODAY    IP CONSULT TO GASTROENTEROLOGY     Standing Status:   Standing     Number of Occurrences:   1     Order Specific Question:   Reason for Consult: Answer:   gib melena hgb 6,7     Order Specific Question:   Did you call or speak to the consulting provider? Answer:   No     Order Specific Question:   Consult To     Answer:   [place]ton     Order Specific Question:   Schedule When? Answer:   TODAY    TYPE & SCREEN     ENTER SURGERY DATE IF FOR PRE-OP TESTING. Standing Status:   Standing     Number of Occurrences:   1    RBC, ALLOCATE, 1 Units Date needed for transfusion: 4/18/2022     Standing Status:   Standing     Number of Occurrences:   1     Order Specific Question:   Date needed for transfusion     Answer:   4/18/2022    HEMODIALYSIS INPATIENT Duration (hr): 3.5; Dialysate Bath:  K: 2; Dialysate Bath:  CA: 2.5;  Target Fluid Removal (mL): 2; Access: Central Line; Blood Flow Rate (mL/min): 400; Dialysate Flow Rate (mL/min): 600 No heparin  M,W,F, HEMODIAYLSIS Routin. .. No heparin     Standing Status:   Standing     Number of Occurrences:   7     Order Specific Question:   Duration (hr)     Answer:   3.5     Order Specific Question:   Dialysate Bath:  K     Answer:   2     Order Specific Question:   Dialysate Bath:  CA     Answer:   2.5     Order Specific Question:   Target Fluid Removal (mL)     Answer:   2     Order Specific Question:   Access     Answer:   Central Line     Order Specific Question:   Blood Flow Rate (mL/min)     Answer:   400     Order Specific Question:   Dialysate Flow Rate (mL/min)     Answer:   600    0.9% sodium chloride infusion 250 mL    epoetin anjelica-epbx (RETACRIT) injection 10,000 Units    pantoprazole (PROTONIX) 40 mg in 0.9% sodium chloride 10 mL injection     Order Specific Question:   PPI INDICATION     Answer:   Peptic Ulcer Dx                 Provider Notes (Medical Decision Making):   78-year-old female end-stage renal disease on dialysis presents with low hemoglobin in the setting of abdominal pain and melena. Will admit for blood transfusion nephrology aware. Also consult GI and given Protonix. Admission to the hospital service.       Consults  Kalin SMITH  nephrolohu      ED Course as of 04/18/22 1429   Mon Apr 18, 2022   1420 Allyson Walton admit [HP]      ED Course User Index  [HP] Bandar Cohn MD         Admitted    Procedures           CRITICAL CARE NOTE :  2:30 PM  Amount of Critical Care Time: 45 minutes    IMPENDING DETERIORATION -Airway, Respiratory and Cardiovascular  ASSOCIATED RISK FACTORS - Hypotension  MANAGEMENT- Bedside Assessment and Supervision of Care  INTERPRETATION -  Xrays and Blood Pressure  INTERVENTIONS - hemodynamic mngmt  CASE REVIEW - Hospitalist/Intensivist and Medical Sub-Specialist  TREATMENT RESPONSE -Stable  PERFORMED BY - Self    NOTES   :  I have spent critical care time involved in lab review, consultations with specialist, family decision- making, bedside attention and documentation. This time excludes time spent in any separate billed procedures. During this entire length of time I was immediately available to the patient . Yang Salazar MD                Disposition       Emergency Department Disposition:  Admitted      Diagnosis     Clinical Impression:   1. ESRD on dialysis ([facility] [address]    2. Gastrointestinal hemorrhage, unspecified gastrointestinal hemorrhage type    3. Anemia, unspecified type        Attestations:    Yang Salazar MD    Please note that this dictation was completed with [employer], the computer voice recognition software. Quite often unanticipated grammatical, syntax, homophones, and other interpretive errors are inadvertently transcribed by the computer software. Please disregard these errors. Please excuse any errors that have escaped final proofreading. Thank you.

## 2022-04-18 NOTE — CONSULTS
Renal Consultation Note    NAME:  Juhi Garcia   :   1950   MRN:   [id number]     ATTENDING: No admitting provider for patient encounter. PCP:  Kylah Martin MD    Date/Time:  2022 12:45 PM      Subjective:   REQUESTING PHYSICIAN:  REASON FOR CONSULT:        Patient is a 36-year-old  female with history of ESRD on hemodialysis every , seizure disorder, hypertension, who was sent here from the dialysis unit because of severe anemia. Labs done on Friday showed a hemoglobin of 5.9 for which she was referred to the ER without getting dialysis today. Patient seen in the ER. Son is at bedside. Patient herself denies any complaints. However the son claims she has been having dark stools over the past 1 week. Her last outpatient dialysis was on Friday  .   Dialysis access is right subclavian permanent dialysis catheter      Past Medical History:   Diagnosis Date    Anxiety disorder     Arthritis     Atherosclerosis of native arteries of the extremities with ulceration ([address] 2020    Atrial fibrillation ([address]     Cardiac pacemaker in situ     Cellulitis and abscess of trunk 2020    Depression, postpartum     Diabetes ([address]     Heart disease     Heartburn     Hx of long term use of blood thinners     Hypercholesterolemia     Hypertension     Hypothyroidism     Migraines     Peripheral venous insufficiency 2020    Seizures ([address]     Sleep disorder     Stroke ([address]     2019    Varicose veins of lower extremity with inflammation 2020      Past Surgical History:   Procedure Laterality Date    HX BACK SURGERY      HX OTHER SURGICAL      leg surgery     HX OTHER SURGICAL      pacemaker monitor     IR INSERT TUNL CVC W/O PORT OVER 5 YR  12/10/2021     Social History     Tobacco Use    Smoking status: Never Smoker    Smokeless tobacco: Never Used   Substance Use Topics    Alcohol use: Never      Family History   Problem Relation Age of Onset    Heart Disease Mother     Heart Disease Father     Diabetes Sister     Diabetes Brother        Allergies   Allergen Reactions    Lactose Diarrhea      Prior to Admission medications    Medication Sig Start Date End Date Taking? Authorizing Provider   amoxicillin 500 mg tab Take 500 mg by mouth daily. 4/14/22   Kiera Guallpa MD   fluconazole (DIFLUCAN) 100 mg tablet Take 1 Tablet by mouth daily for 7 days. FDA advises cautious prescribing of oral fluconazole in pregnancy. 4/14/22 4/21/22  Kiera Guallpa MD   levothyroxine (SYNTHROID) 200 mcg tablet Take 1 Tablet by mouth Daily (before breakfast). 4/14/22   Kiera Guallpa MD   glucose 4 gram chewable tablet Take 4 Tablets by mouth as needed (hypoglycemia). 4/13/22   Kiera Guallpa MD   Blood-Glucose Meter monitoring kit Please check your blood sugar in the morning 4/13/22   Kiera Guallpa MD   lancets misc Please check your blood sugar in the morning 4/13/22   Kiera Guallpa MD   Lancing Device misc 1 Each by Does Not Apply route daily. 4/13/22   Kiera Guallpa MD   alcohol swabs padm Please check your blood sugar daily 4/13/22   Kiera Guallpa MD   midodrine (PROAMATINE) 5 mg tablet Take 5 mg by mouth three (3) times daily (with meals). Provider, Historical   metoprolol succinate (TOPROL-XL) 25 mg XL tablet Take 12.5 mg by mouth daily. Provider, Historical   nystatin (MYCOSTATIN) powder Apply  to affected area four (4) times daily. 3/22/22   Mk Saldivar NP   Spirometers and Accessories avril 1 Each by Does Not Apply route three (3) times daily. 3/8/22   Mk Saldivar NP   FLUoxetine (PROzac) 10 mg capsule Take 1 Capsule by mouth daily for 270 days. 2/24/22 11/21/22  Mk Saldivar NP   pantoprazole (PROTONIX) 40 mg tablet Take 1 Tablet by mouth Daily (before breakfast).  Indications: bleeding from stomach, esophagus or duodenum 2/24/22   Mk Saldivar NP   ondansetron (Zofran ODT) 4 mg disintegrating tablet 1 Tablet by SubLINGual route every eight (8) hours as needed for Nausea or Vomiting. 22   Mk Saldivar NP   food supplemt, lactose-reduced (Ensure Active High Protein) liqd Take 237 mL by mouth four (4) times daily as needed for PRN Reason (Other) (as pt can tolerate). 2/10/22   Mk Saldivar NP   zinc oxide-white petrolatum 17-57 % topical paste Apply  to affected area as needed for Skin Irritation. Indications: skin irritation 21   Juvencio Laguna MD   Vimpat 200 mg tab tablet Take 1 Tablet by mouth two (2) times a day. 21   Provider, Historical   atorvastatin (LIPITOR) 40 mg tablet Take 1 tablet by mouth once daily for 90 days 21  Kylah Martin MD   sevelamer carbonate (RENVELA) 800 mg tab tab  5/10/21   Provider, Historical       REVIEW OF SYSTEMS:       Constitutional: Negative for chills and fever. HENT: Negative. Eyes: Negative. Respiratory: Negative. Cardiovascular: Negative. Gastrointestinal: Negative for abdominal pain and nausea. Skin: Negative. Neurological: Negative. Objective:   VITALS:    Visit Vitals  /88   Pulse 68   Temp 98.1 °F (36.7 °C)   Resp 20   Ht 5' 6\" (1.676 m)   Wt 72.6 kg (160 lb)   SpO2 97%   BMI 25.82 kg/m²     Temp (24hrs), Av.1 °F (36.7 °C), Min:98.1 °F (36.7 °C), Max:98.1 °F (36.7 °C)      PHYSICAL EXAM:     General: NAD, alert, cooperative  Eyes: sclera anicteric  Oral Cavity: No thrush or ulcers  Neck: no JVD  Chest: Fair bilateral air entry. No Wheezing or Rhonchi. No rales.   Heart: normal sounds  Abdomen: soft and non tender   : no gee  Lower Extremities: no edema  Skin: no rash  Neuro: intact, no focals  Psychiatric: non-depressed          LAB DATA REVIEWED:    Recent Results (from the past 24 hour(s))   CBC WITH AUTOMATED DIFF    Collection Time: 22 11:00 AM   Result Value Ref Range    WBC 6.4 3.6 - 11.0 K/uL    RBC 2.26 (L) 3.80 - 5.20 M/uL HGB 6.7 (L) 11.5 - 16.0 g/dL    HCT 22.0 (L) 35.0 - 47.0 %    MCV 97.3 80.0 - 99.0 FL    MCH 29.6 26.0 - 34.0 PG    MCHC 30.5 30.0 - 36.5 g/dL    RDW 17.4 (H) 11.5 - 14.5 %    PLATELET 772 (L) 837 - 400 K/uL    MPV 11.2 8.9 - 12.9 FL    NRBC 0.0 0.0  WBC    ABSOLUTE NRBC 0.00 0.00 - 0.01 K/uL    NEUTROPHILS 64 32 - 75 %    LYMPHOCYTES 27 12 - 49 %    MONOCYTES 6 5 - 13 %    EOSINOPHILS 1 0 - 7 %    BASOPHILS 1 0 - 1 %    IMMATURE GRANULOCYTES 1 (H) 0 - 0.5 %    ABS. NEUTROPHILS 4.2 1.8 - 8.0 K/UL    ABS. LYMPHOCYTES 1.8 0.8 - 3.5 K/UL    ABS. MONOCYTES 0.4 0.0 - 1.0 K/UL    ABS. EOSINOPHILS 0.1 0.0 - 0.4 K/UL    ABS. BASOPHILS 0.0 0.0 - 0.1 K/UL    ABS. IMM. GRANS. 0.0 0.00 - 0.04 K/UL    DF AUTOMATED     METABOLIC PANEL, COMPREHENSIVE    Collection Time: 04/18/22 11:00 AM   Result Value Ref Range    Sodium 139 136 - 145 mmol/L    Potassium 4.5 3.5 - 5.1 mmol/L    Chloride 107 97 - 108 mmol/L    CO2 27 21 - 32 mmol/L    Anion gap 5 5 - 15 mmol/L    Glucose 83 65 - 100 mg/dL    BUN 54 (H) 6 - 20 mg/dL    Creatinine 3.63 (H) 0.55 - 1.02 mg/dL    BUN/Creatinine ratio 15 12 - 20      GFR est AA 15 (L) >60 ml/min/1.73m2    GFR est non-AA 12 (L) >60 ml/min/1.73m2    Calcium 7.4 (L) 8.5 - 10.1 mg/dL    Bilirubin, total 0.3 0.2 - 1.0 mg/dL    AST (SGOT) 27 15 - 37 U/L    ALT (SGPT) 25 12 - 78 U/L    Alk.  phosphatase 94 45 - 117 U/L    Protein, total 4.7 (L) 6.4 - 8.2 g/dL    Albumin 1.9 (L) 3.5 - 5.0 g/dL    Globulin 2.8 2.0 - 4.0 g/dL    A-G Ratio 0.7 (L) 1.1 - 2.2     TYPE & SCREEN    Collection Time: 04/18/22 11:00 AM   Result Value Ref Range    Crossmatch Expiration 04/21/2022,2359     ABO/Rh(D) B Positive     Antibody screen Negative     Unit number [id number]     Blood component type RC LR     Unit division 00     Status of unit [address] to transfuse     Crossmatch result Compatible    MAGNESIUM    Collection Time: 04/18/22 11:00 AM   Result Value Ref Range    Magnesium 2.1 1.6 - 2.4 mg/dL   LIPASE    Collection Time: 04/18/22 11:00 AM   Result Value Ref Range    Lipase 202 73 - 393 U/L       Recommendations/Plan:     1. end-stage renal  -On hemodialysis every Monday Wednesday Friday at [address] renal care [place]  -Last outpatient dialysis was on Friday  -I will plan for dialysis today or tomorrow based on dialysis staff schedule  -Potassium today is 4.5. Clinically she has no volume overload-right subclavian permanent dialysis catheter is current access    2. Anemia   -2/2 ESRD   -hx of GIB in the past  -Hb 6.9   -s/p EGD on 2/14/22 which showed esophagitis chronic gastritis without bleeding. 3 clips from previous intervention were still present without any active bleeding noted  -Continue Procrit 10,000 units with dialysis  -Transfusion as needed to keep hemoglobin  > 7     3  Hyportension   -takes midodrine 5 mg tid at home  -Bps are ok  - will resume midodrine if there will be any intradialytic hypotension     4 Secondary hyperparathyroidism of ESRD.    -Calcium and phos are ok.    -cont Renvela   -PTH is 194 which is within target range for her ESRD.     -Continue calcitriol at the current dose     Thank you for the consultation

## 2022-04-18 NOTE — ED TRIAGE NOTES
Pt family was called by dialysis for low hgb, daughter stated seeing some dark tarry stools.  C/o tenderness of abdomen     Hx HD MWF right arm fistula, DM, seizures

## 2022-04-18 NOTE — PROGRESS NOTES
Reason for Readmission:     GI Bleed         RUR Score/Risk Level:    N/A     PCP: First and Last name:  Jm Moore   Name of Practice:    Are you a current patient: Yes/No: Yes   Approximate date of last visit: March 8, 2022   Can you participate in a virtual visit with your PCP:     Is a Care Conference indicated: No      Did you attend your follow up appointment (s): If not, why not: yes         Resources/supports as identified by patient/family:   None at this time       Top Challenges facing patient (as identified by patient/family and CM): Finances/Medication cost?     No  Transportation    No    Support system or lack thereof? No     Living arrangements? No  Self-care/ADLs/Cognition? No        Current Advanced Directive/Advance Care Plan:  Full code           Plan for utilizing home health:   [address]     Emergency contact: Justine Fish, son, [phone]             Transition of Care Plan:         Met f/f with Pt, she confirmed that the information on the face sheet is correct. Pt stated that she lives with her son and D-I-L. Pt was not sure about the answer to the other questions CM was asking. CM called Pt son, he stated that Pt uses [address], that Pt needs help with her ADl, and uses [address] on [place] [address]. Pt son stated that Pt uses [facility], will send over a referral. Choice letter signed and placed on Pt chart. Pt goes to dialysis at [facility], M-W-F, she is transported via ambulance. CM DISPO: Home with .

## 2022-04-18 NOTE — ROUTINE PROCESS
TRANSFER - OUT REPORT:    Verbal report given to 584(name) on Deepika Medina  being transferred to 584(unit) for routine progression of care       Report consisted of patients Situation, Background, Assessment and   Recommendations(SBAR). Information from the following report(s) SBAR was reviewed with the receiving nurse. Lines:   Venous Access Device Upper chest (subclavicular area, right (Active)       Venous Access Device tunneled perm cath (Active)       Peripheral IV 04/18/22 Anterior; Left Wrist (Active)        Opportunity for questions and clarification was provided.       Patient transported with:   [employer]

## 2022-04-19 LAB
ANION GAP SERPL CALC-SCNC: 7 MMOL/L (ref 5–15)
BASOPHILS # BLD: 0 K/UL (ref 0–0.1)
BASOPHILS NFR BLD: 1 % (ref 0–1)
BUN SERPL-MCNC: 58 MG/DL (ref 6–20)
BUN/CREAT SERPL: 16 (ref 12–20)
CA-I BLD-MCNC: 7.2 MG/DL (ref 8.5–10.1)
CHLORIDE SERPL-SCNC: 105 MMOL/L (ref 97–108)
CO2 SERPL-SCNC: 27 MMOL/L (ref 21–32)
CREAT SERPL-MCNC: 3.7 MG/DL (ref 0.55–1.02)
DIFFERENTIAL METHOD BLD: ABNORMAL
EOSINOPHIL # BLD: 0.1 K/UL (ref 0–0.4)
EOSINOPHIL NFR BLD: 2 % (ref 0–7)
ERYTHROCYTE [DISTWIDTH] IN BLOOD BY AUTOMATED COUNT: 15.8 % (ref 11.5–14.5)
FOLATE SERPL-MCNC: 4.1 NG/ML (ref 5–21)
GLUCOSE BLD STRIP.AUTO-MCNC: 110 MG/DL (ref 65–117)
GLUCOSE BLD STRIP.AUTO-MCNC: 54 MG/DL (ref 65–117)
GLUCOSE BLD STRIP.AUTO-MCNC: 64 MG/DL (ref 65–117)
GLUCOSE BLD STRIP.AUTO-MCNC: 71 MG/DL (ref 65–117)
GLUCOSE BLD STRIP.AUTO-MCNC: 80 MG/DL (ref 65–117)
GLUCOSE BLD STRIP.AUTO-MCNC: 82 MG/DL (ref 65–117)
GLUCOSE BLD STRIP.AUTO-MCNC: 96 MG/DL (ref 65–117)
GLUCOSE SERPL-MCNC: 77 MG/DL (ref 65–100)
HCT VFR BLD AUTO: 31 % (ref 35–47)
HGB BLD-MCNC: 10.1 G/DL (ref 11.5–16)
IMM GRANULOCYTES # BLD AUTO: 0 K/UL (ref 0–0.04)
IMM GRANULOCYTES NFR BLD AUTO: 0 % (ref 0–0.5)
IRON SATN MFR SERPL: 51 % (ref 20–50)
IRON SERPL-MCNC: 88 UG/DL (ref 35–150)
LYMPHOCYTES # BLD: 1.6 K/UL (ref 0.8–3.5)
LYMPHOCYTES NFR BLD: 26 % (ref 12–49)
MCH RBC QN AUTO: 30.7 PG (ref 26–34)
MCHC RBC AUTO-ENTMCNC: 32.6 G/DL (ref 30–36.5)
MCV RBC AUTO: 94.2 FL (ref 80–99)
MONOCYTES # BLD: 0.4 K/UL (ref 0–1)
MONOCYTES NFR BLD: 7 % (ref 5–13)
NEUTS SEG # BLD: 4 K/UL (ref 1.8–8)
NEUTS SEG NFR BLD: 64 % (ref 32–75)
NRBC # BLD: 0 K/UL (ref 0–0.01)
NRBC BLD-RTO: 0 PER 100 WBC
PERFORMED BY, TECHID: ABNORMAL
PERFORMED BY, TECHID: ABNORMAL
PERFORMED BY, TECHID: NORMAL
PLATELET # BLD AUTO: 122 K/UL (ref 150–400)
PMV BLD AUTO: 11.2 FL (ref 8.9–12.9)
POTASSIUM SERPL-SCNC: 4.7 MMOL/L (ref 3.5–5.1)
RBC # BLD AUTO: 3.29 M/UL (ref 3.8–5.2)
SODIUM SERPL-SCNC: 139 MMOL/L (ref 136–145)
TIBC SERPL-MCNC: 174 UG/DL (ref 250–450)
VIT B12 SERPL-MCNC: 398 PG/ML (ref 193–986)
WBC # BLD AUTO: 6.1 K/UL (ref 3.6–11)

## 2022-04-19 PROCEDURE — 97530 THERAPEUTIC ACTIVITIES: CPT

## 2022-04-19 PROCEDURE — 36415 COLL VENOUS BLD VENIPUNCTURE: CPT

## 2022-04-19 PROCEDURE — 82962 GLUCOSE BLOOD TEST: CPT

## 2022-04-19 PROCEDURE — 96376 TX/PRO/DX INJ SAME DRUG ADON: CPT

## 2022-04-19 PROCEDURE — 97165 OT EVAL LOW COMPLEX 30 MIN: CPT

## 2022-04-19 PROCEDURE — 90935 HEMODIALYSIS ONE EVALUATION: CPT

## 2022-04-19 PROCEDURE — 74011250637 HC RX REV CODE- 250/637: Performed by: PHYSICIAN ASSISTANT

## 2022-04-19 PROCEDURE — C9113 INJ PANTOPRAZOLE SODIUM, VIA: HCPCS | Performed by: PHYSICIAN ASSISTANT

## 2022-04-19 PROCEDURE — 74011250636 HC RX REV CODE- 250/636

## 2022-04-19 PROCEDURE — 74011250637 HC RX REV CODE- 250/637: Performed by: INTERNAL MEDICINE

## 2022-04-19 PROCEDURE — 97162 PT EVAL MOD COMPLEX 30 MIN: CPT

## 2022-04-19 PROCEDURE — G0257 UNSCHED DIALYSIS ESRD PT HOS: HCPCS

## 2022-04-19 PROCEDURE — 85025 COMPLETE CBC W/AUTO DIFF WBC: CPT

## 2022-04-19 PROCEDURE — 74011000250 HC RX REV CODE- 250: Performed by: PHYSICIAN ASSISTANT

## 2022-04-19 PROCEDURE — G0378 HOSPITAL OBSERVATION PER HR: HCPCS

## 2022-04-19 PROCEDURE — 80048 BASIC METABOLIC PNL TOTAL CA: CPT

## 2022-04-19 PROCEDURE — 74011250636 HC RX REV CODE- 250/636: Performed by: PHYSICIAN ASSISTANT

## 2022-04-19 PROCEDURE — 74011250636 HC RX REV CODE- 250/636: Performed by: INTERNAL MEDICINE

## 2022-04-19 RX ORDER — BUTALBITAL, ACETAMINOPHEN AND CAFFEINE 50; 325; 40 MG/1; MG/1; MG/1
1 TABLET ORAL
Status: DISCONTINUED | OUTPATIENT
Start: 2022-04-19 | End: 2022-04-21 | Stop reason: HOSPADM

## 2022-04-19 RX ORDER — HEPARIN SODIUM 1000 [USP'U]/ML
INJECTION, SOLUTION INTRAVENOUS; SUBCUTANEOUS
Status: COMPLETED
Start: 2022-04-19 | End: 2022-04-19

## 2022-04-19 RX ORDER — HEPARIN SODIUM 1000 [USP'U]/ML
3200 INJECTION, SOLUTION INTRAVENOUS; SUBCUTANEOUS
Status: DISCONTINUED | OUTPATIENT
Start: 2022-04-19 | End: 2022-04-21 | Stop reason: HOSPADM

## 2022-04-19 RX ADMIN — SEVELAMER CARBONATE 800 MG: 800 TABLET, FILM COATED ORAL at 16:30

## 2022-04-19 RX ADMIN — ACETAMINOPHEN 650 MG: 325 TABLET ORAL at 06:15

## 2022-04-19 RX ADMIN — LACOSAMIDE 200 MG: 100 TABLET, FILM COATED ORAL at 20:21

## 2022-04-19 RX ADMIN — HEPARIN SODIUM 3200 UNITS: 1000 INJECTION, SOLUTION INTRAVENOUS; SUBCUTANEOUS at 11:04

## 2022-04-19 RX ADMIN — SEVELAMER CARBONATE 800 MG: 800 TABLET, FILM COATED ORAL at 11:43

## 2022-04-19 RX ADMIN — SODIUM CHLORIDE, PRESERVATIVE FREE 10 ML: 5 INJECTION INTRAVENOUS at 20:21

## 2022-04-19 RX ADMIN — MIDODRINE HYDROCHLORIDE 5 MG: 5 TABLET ORAL at 11:43

## 2022-04-19 RX ADMIN — METOPROLOL SUCCINATE 12.5 MG: 25 TABLET, EXTENDED RELEASE ORAL at 11:43

## 2022-04-19 RX ADMIN — NYSTATIN: 100000 POWDER TOPICAL at 18:01

## 2022-04-19 RX ADMIN — MIDODRINE HYDROCHLORIDE 5 MG: 5 TABLET ORAL at 16:30

## 2022-04-19 RX ADMIN — SODIUM CHLORIDE, PRESERVATIVE FREE 10 ML: 5 INJECTION INTRAVENOUS at 05:43

## 2022-04-19 RX ADMIN — BUTALBITAL, ACETAMINOPHEN, AND CAFFEINE 1 TABLET: 50; 325; 40 TABLET ORAL at 20:21

## 2022-04-19 RX ADMIN — SODIUM CHLORIDE, PRESERVATIVE FREE 40 MG: 5 INJECTION INTRAVENOUS at 16:29

## 2022-04-19 RX ADMIN — FLUOXETINE 10 MG: 10 CAPSULE ORAL at 11:43

## 2022-04-19 RX ADMIN — SODIUM CHLORIDE, PRESERVATIVE FREE 10 ML: 5 INJECTION INTRAVENOUS at 16:34

## 2022-04-19 RX ADMIN — SODIUM CHLORIDE, PRESERVATIVE FREE 40 MG: 5 INJECTION INTRAVENOUS at 03:48

## 2022-04-19 RX ADMIN — LEVOTHYROXINE SODIUM 200 MCG: 0.1 TABLET ORAL at 11:43

## 2022-04-19 RX ADMIN — BUTALBITAL, ACETAMINOPHEN, AND CAFFEINE 1 TABLET: 50; 325; 40 TABLET ORAL at 11:43

## 2022-04-19 RX ADMIN — EPOETIN ALFA-EPBX 10000 UNITS: 10000 INJECTION, SOLUTION INTRAVENOUS; SUBCUTANEOUS at 08:57

## 2022-04-19 RX ADMIN — ATORVASTATIN CALCIUM 40 MG: 40 TABLET, FILM COATED ORAL at 20:21

## 2022-04-19 NOTE — PROGRESS NOTES
OT evaluation order received and acknowledged. [place] [facility] [place] of the floor in HD. Will continue to follow and re-attempt OT eval at a later time. Thank you.

## 2022-04-19 NOTE — PROGRESS NOTES
Problem: Falls - Risk of  Goal: *Absence of Falls  Description: Document Debbie Rudd Fall Risk and appropriate interventions in the flowsheet. Outcome: Progressing Towards Goal  Note: Fall Risk Interventions:  Mobility Interventions: Bed/chair exit alarm         Medication Interventions: Bed/chair exit alarm    Elimination Interventions: Bed/chair exit alarm    History of Falls Interventions: Bed/chair exit alarm         Problem: Risk for Spread of Infection  Goal: Prevent transmission of infectious organism to others  Description: Prevent the transmission of infectious organisms to other patients, staff members, and visitors.   Outcome: Progressing Towards Goal

## 2022-04-19 NOTE — PROGRESS NOTES
Renal Note    NAME:  Hipolito Hopson   :   1950   MRN:   [id number]     ATTENDING: Indira Rivera MD  PCP:  Mar Qiu MD    Date/Time:  2022 12:45 PM      Subjective:   REQUESTING PHYSICIAN:  REASON FOR CONSULT:        Patient seen in the room. She was dialyzed today with 2 L fluid removal.  She denies any complaints      Past Medical History:   Diagnosis Date    Anxiety disorder     Arthritis     Atherosclerosis of native arteries of the extremities with ulceration ([address] 2020    Atrial fibrillation ([address]     Cardiac pacemaker in situ     Cellulitis and abscess of trunk 2020    Depression, postpartum     Diabetes ([address]     Heart disease     Heartburn     Hx of long term use of blood thinners     Hypercholesterolemia     Hypertension     Hypothyroidism     Migraines     Peripheral venous insufficiency 2020    Seizures ([address]     Sleep disorder     St[address] ([facility]     2019    Varicose veins of lower extremity with inflammation 2020      Past Surgical History:   Procedure Laterality Date    HX BACK SURGERY      HX OTHER SURGICAL      leg surgery     HX OTHER SURGICAL      pacemaker monitor     IR INSERT TUNL CVC W/O PORT OVER 5 YR  12/10/2021     Social History     Tobacco Use    Smoking status: Never Smoker    Smokeless tobacco: Never Used   Substance Use Topics    Alcohol use: Never      Family History   Problem Relation Age of Onset    Heart Disease Mother     Heart Disease Father     Diabetes Sister     Diabetes Brother        Allergies   Allergen Reactions    Lactose Diarrhea      Prior to Admission medications    Medication Sig Start Date End Date Taking? Authorizing Provider   levETIRAcetam (keppra xr) 500 mg ER tablet Take 1,000 mg by mouth daily. Yes Provider, Historical   amoxicillin 500 mg tab Take 500 mg by mouth daily.  22   Jacob Islas MD   fluconazole (DIFLUCAN) 100 mg tablet Take 1 Tablet by mouth daily for 7 days. FDA advises cautious prescribing of oral fluconazole in pregnancy. 4/14/22 4/21/22  Loretta Mandel MD   levothyroxine (SYNTHROID) 200 mcg tablet Take 1 Tablet by mouth Daily (before breakfast). 4/14/22   Loretta Mandel MD   glucose 4 gram chewable tablet Take 4 Tablets by mouth as needed (hypoglycemia). 4/13/22   Loretta Mandel MD   Blood-Glucose Meter monitoring kit Please check your blood sugar in the morning 4/13/22   Loretta Mandel MD   lancets misc Please check your blood sugar in the morning 4/13/22   Loretta Mandel MD   Lancing Device misc 1 Each by Does Not Apply route daily. 4/13/22   Loretta Mandel MD   alcohol swabs padm Please check your blood sugar daily 4/13/22   Loretta Mandel MD   midodrine (PROAMATINE) 5 mg tablet Take 5 mg by mouth three (3) times daily (with meals). Provider, Historical   metoprolol succinate (TOPROL-XL) 25 mg XL tablet Take 12.5 mg by mouth daily. Provider, Historical   nystatin (MYCOSTATIN) powder Apply  to affected area four (4) times daily. 3/22/22   Telma Marycruz, RACHEL   Spirometers and Accessories avril 1 Each by Does Not Apply route three (3) times daily. 3/8/22   Telma Joel NP   FLUoxetine (PROzac) 10 mg capsule Take 1 Capsule by mouth daily for 270 days. 2/24/22 11/21/22  Telma Joel NP   pantoprazole (PROTONIX) 40 mg tablet Take 1 Tablet by mouth Daily (before breakfast). Indications: bleeding from stomach, esophagus or duodenum 2/24/22   Telma Joel NP   ondansetron (Zofran ODT) 4 mg disintegrating tablet 1 Tablet by SubLINGual route every eight (8) hours as needed for Nausea or Vomiting. 2/24/22   Telma Joel NP   food supplemt, lactose-reduced (Ensure Active High Protein) liqd Take 237 mL by mouth four (4) times daily as needed for PRN Reason (Other) (as pt can tolerate).  2/10/22   Telma Joel NP   zinc oxide-white petrolatum 17-57 % topical paste Apply  to affected area as needed for Skin Irritation. Indications: skin irritation 21   Hakeem Lyman MD   Vimpat 200 mg tab tablet Take 1 Tablet by mouth two (2) times a day. 21   Provider, Historical   atorvastatin (LIPITOR) 40 mg tablet Take 1 tablet by mouth once daily for 90 days 21  Hari Ko MD   sevelamer carbonate (RENVELA) 800 mg tab tab  5/10/21   Provider, Historical       REVIEW OF SYSTEMS:       Constitutional: Negative for chills and fever. HENT: Negative. Eyes: Negative. Respiratory: Negative. Cardiovascular: Negative. Gastrointestinal: Negative for abdominal pain and nausea. Skin: Negative. Neurological: Negative. Objective:   VITALS:    Visit Vitals  /60   Pulse 62   Temp 98.2 °F (36.8 °C)   Resp 18   Ht 5' 6\" (1.676 m)   Wt 72.6 kg (160 lb)   SpO2 98%   BMI 25.82 kg/m²     Temp (24hrs), Av.8 °F (36.6 °C), Min:97.3 °F (36.3 °C), Max:98.2 °F (36.8 °C)      PHYSICAL EXAM:     General: NAD, alert, cooperative  Eyes: sclera anicteric  Oral Cavity: No thrush or ulcers  Neck: no JVD  Chest: Fair bilateral air entry. No Wheezing or Rhonchi. No rales.   Heart: normal sounds  Abdomen: soft and non tender   : no gee  Lower Extremities: no edema  Skin: no rash  Neuro: intact, no focals  Psychiatric: non-depressed          LAB DATA REVIEWED:    Recent Results (from the past 24 hour(s))   LACTIC ACID    Collection Time: 22  7:48 PM   Result Value Ref Range    Lactic acid 0.4 0.4 - 2.0 mmol/L   IRON PROFILE    Collection Time: 22  7:48 PM   Result Value Ref Range    Iron 88 35 - 150 ug/dL    TIBC 174 (L) 250 - 450 ug/dL    Iron % saturation 51 (H) 20 - 50 %   VITAMIN B12 & FOLATE    Collection Time: 22  7:48 PM   Result Value Ref Range    Vitamin B12 398 193 - 986 pg/mL    Folate 4.1 (L) 5.0 - 21.0 ng/mL   GLUCOSE, POC    Collection Time: 04/18/22 10:25 PM   Result Value Ref Range    Glucose (POC) 70 65 - 117 mg/dL    Performed by Michelle Hammond REKHA    GLUCOSE, POC    Collection Time: 04/19/22  6:19 AM   Result Value Ref Range    Glucose (POC) 64 (L) 65 - 117 mg/dL    Performed by Kunal Godoy    GLUCOSE, POC    Collection Time: 04/19/22  7:16 AM   Result Value Ref Range    Glucose (POC) 80 65 - 117 mg/dL    Performed by Ayanna BASIC    Collection Time: 04/19/22  7:49 AM   Result Value Ref Range    Sodium 139 136 - 145 mmol/L    Potassium 4.7 3.5 - 5.1 mmol/L    Chloride 105 97 - 108 mmol/L    CO2 27 21 - 32 mmol/L    Anion gap 7 5 - 15 mmol/L    Glucose 77 65 - 100 mg/dL    BUN 58 (H) 6 - 20 mg/dL    Creatinine 3.70 (H) 0.55 - 1.02 mg/dL    BUN/Creatinine ratio 16 12 - 20      GFR est AA 15 (L) >60 ml/min/1.73m2    GFR est non-AA 12 (L) >60 ml/min/1.73m2    Calcium 7.2 (L) 8.5 - 10.1 mg/dL   CBC WITH AUTOMATED DIFF    Collection Time: 04/19/22  7:49 AM   Result Value Ref Range    WBC 6.1 3.6 - 11.0 K/uL    RBC 3.29 (L) 3.80 - 5.20 M/uL    HGB 10.1 (L) 11.5 - 16.0 g/dL    HCT 31.0 (L) 35.0 - 47.0 %    MCV 94.2 80.0 - 99.0 FL    MCH 30.7 26.0 - 34.0 PG    MCHC 32.6 30.0 - 36.5 g/dL    RDW 15.8 (H) 11.5 - 14.5 %    PLATELET 706 (L) 073 - 400 K/uL    MPV 11.2 8.9 - 12.9 FL    NRBC 0.0 0.0  WBC    ABSOLUTE NRBC 0.00 0.00 - 0.01 K/uL    NEUTROPHILS 64 32 - 75 %    LYMPHOCYTES 26 12 - 49 %    MONOCYTES 7 5 - 13 %    EOSINOPHILS 2 0 - 7 %    BASOPHILS 1 0 - 1 %    IMMATURE GRANULOCYTES 0 0 - 0.5 %    ABS. NEUTROPHILS 4.0 1.8 - 8.0 K/UL    ABS. LYMPHOCYTES 1.6 0.8 - 3.5 K/UL    ABS. MONOCYTES 0.4 0.0 - 1.0 K/UL    ABS. EOSINOPHILS 0.1 0.0 - 0.4 K/UL    ABS. BASOPHILS 0.0 0.0 - 0.1 K/UL    ABS. IMM.  GRANS. 0.0 0.00 - 0.04 K/UL    DF AUTOMATED     GLUCOSE, POC    Collection Time: 04/19/22 11:38 AM   Result Value Ref Range    Glucose (POC) 54 (L) 65 - 117 mg/dL    Performed by Marley Sandoval    GLUCOSE, POC    Collection Time: 04/19/22 12:21 PM   Result Value Ref Range    Glucose (POC) 82 65 - 117 mg/dL    Performed by Marley Sandoval GLUCOSE, POC    Collection Time: 04/19/22  2:59 PM   Result Value Ref Range    Glucose (POC) 110 65 - 117 mg/dL    Performed by Marianna Grewal        Recommendations/Plan:     1. end-stage renal  -On hemodialysis every Monday Wednesday Friday at [address] renal care [place]  -Last outpatient dialysis was on Friday  -He was dialyzed this morning with 2 L fluid removal.  -Potassium today is 4.7. Clinically she has no volume overload  -Continue MWF dialysis while in hospital  -right subclavian permanent dialysis catheter is current access    2. Anemia   -2/2 ESRD   -hx of GIB in the past  -Hb 6.9 . Improved to 10.1 today after blood transfusion yesterday  -s/p EGD on 2/14/22 which showed esophagitis chronic gastritis without bleeding.  -Continue Procrit 10,000 units with dialysis  -Transfusion as needed to keep hemoglobin  > 7  -Patient has been having dark tarry stools prior to admission. GI has been consulted. Currently on Protonix 40 mg IV twice daily     3  Hyportension   -Blood pressure is better after continuing midodrine 5 mg 3 times daily. On low-dose metoprolol also  -Bps are ok     4 Secondary hyperparathyroidism of ESRD.    -Calcium and phos are ok.    -cont Renvela   -PTH is 194 which is within target range for her ESRD.     -Continue calcitriol at the current dose

## 2022-04-19 NOTE — PROGRESS NOTES
New admission two person skin assessment performed by KARLA Sesay and myself. Patient noted to have small open area to sacrum, foam dressing applied. Patient also has what seems to be a blood blister to right shin. Scattered abrasion and bruising throughout body, including face from recent fall out of wheelchair.

## 2022-04-19 NOTE — PROGRESS NOTES
CM attempted to discussed d/c planning with patient. She request CM to communicate with her HANNAH Giron. CM placed call to Emmett Salazar [phone]. She states patient receives [address] from [facility]. She would like to resume services. CM communicated with [facility] through [address] [facility]. Updated clinicals sent. Patient has Dialysis treatments at [address] [place] [address],[address] [facility].

## 2022-04-19 NOTE — PROGRESS NOTES
PHYSICAL THERAPY EVALUATION  Patient: Rere Pelletier (89 y.o. female)  Date: 4/19/2022  Primary Diagnosis: GI bleed [K92.2]        Precautions: fall  ASSESSMENT  As per adm notes(Alisha Bocanegra is a 71 y.o. female  with a past medical history of end-stage renal disease on hemodialysis, atrial fibrillation, hypertension, hypothyroidism, seizure disorder that presented to the emergency room on 4/18/2022 at the advice of her nephrologist for low hemoglobin. Patient is well-known to our services for multiple admissions. Of note patient was recently admitted on 4/11/2022 and discharged on 4/13/2022 for a syncopal episode. Caregiver on phone states that she was discharged with Aleve and since that medication she has noticed that she has had more dark tarry stools. She also started feeling very weak and fatigued. Denies any dizziness, shortness of breath, chest pain. When she got her routine labs with her dialysis they noticed a low hemoglobin. They advised her to come to the ED. In the ED patient's vital signs were stable. Hemoglobin 6.7. Given IV Protonix 40 mg. Patient also had a reported fall approximately 1 week ago and has had multiple scans of the CT of the head. Large improving bruising over the left eye seems to be improving. It was recommended admission for blood transfusion and GI evaluation for GI bleed)  Patient found in bed semi supine. Agreeable to PT eval. At baseline patient live with son and his family. Bed [address]. Nonambulatory. Patient was able to transfer with total assist by son. Patient had BM and required changing so RN came to assist therapist.Patient had a very large BM and sofl somewhat loose stool. Required assist of 3 for cleaning and changing all bed linen. patient required mod assist for bed mob  and supine to sit. Attempt STS x 3 with total assist patient unable to stand. Patient preferred to go home with p[revious set up. HH might benefit.      Patient will benefit from skilled therapy intervention to address the above noted impairments. PLAN :  Recommendations and Planned Interventions: bed mobility training, transfer training, gait training, therapeutic exercises, patient and family training/education, and therapeutic activities      Frequency/Duration: Patient will be followed by physical therapy:  3-5x/week to address goals.     Recommendation for discharge: (in order for the patient to meet his/her long term goals)  Home with [address] [place] [address]    This discharge recommendation:  Has been made in collaboration with the attending provider and/or case management    IF patient discharges home will need the following DME: patient owns DME required for discharge         SUBJECTIVE:   Patient stated I had BM.    OBJECTIVE DATA SUMMARY:   HISTORY:    Past Medical History:   Diagnosis Date    Anxiety disorder     Arthritis     Atherosclerosis of native arteries of the extremities with ulceration ([address] 8/31/2020    Atrial fibrillation ([address]     Cardiac pacemaker in situ     Cellulitis and abscess of trunk 8/31/2020    Depression, postpartum     Diabetes ([address]     Heart disease     Heartburn     Hx of long term use of blood thinners     Hypercholesterolemia     Hypertension     Hypothyroidism     Migraines     Peripheral venous insufficiency 8/31/2020    Seizures ([address]     Sleep disorder     Stroke ([address]     2019    Varicose veins of lower extremity with inflammation 8/31/2020     Past Surgical History:   Procedure Laterality Date    HX BACK SURGERY      HX OTHER SURGICAL      leg surgery     HX OTHER SURGICAL      pacemaker monitor     IR INSERT TUNL CVC W/O PORT OVER 5 YR  12/10/2021       Personal factors and/or comorbidities impacting plan of care:Home Situation  Home Environment: Private residence  Wheelchair Ramp: Yes  One/Two Story Residence: Two story, live on 1st floor  Living Alone: No  Support Systems: Child(jose carlos) (Son)  Patient Expects to be Discharged to[de-identified] Home with home health  Current DME Used/Available at Home: Wheelchair,Hospital bed,Commode, bedside,Grab bars,Walker, rolling    EXAMINATION/PRESENTATION/DECISION MAKING:   Critical Behavior:  Neurologic State: Alert  Orientation Level: Oriented to person,Oriented to place  Cognition: Follows commands     Hearing: Auditory  Auditory Impairment: None  Range Of Motion:  AROM: Generally decreased, functional                       Strength:    Strength: Generally decreased, functional                    Tone & Sensation:                                  Coordination:  Coordination: Generally decreased, functional  Vision:   Acuity:  (Pt is blind)  Functional Mobility:  Bed Mobility:  Rolling: Moderate assistance  Supine to Sit: Moderate assistance;Assist x2  Sit to Supine: Moderate assistance;Assist x2  Scooting: Moderate assistance;Assist x2  Transfers:  Sit to Stand: Maximum assistance;Assist x2;Total assistance  Stand to Sit: Maximum assistance; Total assistance;Assist x2                       Balance:   Sitting: Impaired; With support  Sitting - Static: Fair (occasional)  Sitting - Dynamic: Fair (occasional)  Standing: Impaired;Pull to stand; With support  Standing - Static: Poor;Constant support    Functional Measure:  [employer] AM-PAC 6 Clicks         Basic Mobility Inpatient Short Form  How much difficulty does the patient currently have. .. Unable A Lot A Little None   1. Turning over in bed (including adjusting bedclothes, sheets and blankets)? [] 1   [x] 2   [] 3   [] 4   2. Sitting down on and standing up from a chair with arms ( e.g., wheelchair, bedside commode, etc.)   [x] 1   [] 2   [] 3   [] 4   3. Moving from lying on back to sitting on the side of the bed? [] 1   [x] 2   [] 3   [] 4          How much help from another person does the patient currently need. .. Total A Lot A Little None   4. Moving to and from a bed to a chair (including a wheelchair)? [x] 1   [] 2   [] 3   [] 4   5.   Need to walk in hospital room? [x] 1   [] 2   [] 3   [] 4   6. Climbing 3-5 steps with a railing? [x] 1   [] 2   [] 3   [] 4   © , Trustees of [facility], under license to [employer]. All rights reserved     Score:  Initial:  Most Recent: X (Date:2022 )   Interpretation of Tool:  Represents activities that are increasingly more difficult (i.e. Bed mobility, Transfers, Gait). Score 24 23 22-20 19-15 14-10 9-7 6   Modifier CH CI CJ CK CL CM CN         Physical Therapy Evaluation Charge Determination   History Examination Presentation Decision-Making   LOW Complexity : Zero comorbidities / personal factors that will impact the outcome / POC LOW Complexity : 1-2 Standardized tests and measures addressing body structure, function, activity limitation and / or participation in recreation  LOW Complexity : Stable, uncomplicated  LOW Complexity : FOTO score of       Based on the above components, the patient evaluation is determined to be of the following complexity level: LOW     Pain Ratin/10 les    Activity Tolerance:   Fair  Please refer to the flowsheet for vital signs taken during this treatment. After treatment patient left in no apparent distress:   Supine in bed, Heels elevated for pressure relief, Patient positioned in rt sidelying for pressure relief, Call bell within reach, and Bed / chair alarm activated    COMMUNICATION/EDUCATION:   The patients plan of care was discussed with: Occupational therapist, Registered nurse, and Case management. Fall prevention education was provided and the patient/caregiver indicated understanding., Patient/family have participated as able in goal setting and plan of care. , and Patient/family agree to work toward stated goals and plan of care.     Thank you for this referral.  Rogelio Lopez PT   Time Calculation: 44 mins

## 2022-04-19 NOTE — CONSULTS
Gastroenterology Consult     Referring Physician: Jarad Hammond MD     Consult Date: 4/19/2022     Subjective:     Chief Complaint: abnormal lab results, abdominal pain    History of Present Illness: Samson Santiago is a 71 y.o. female who is seen in consultation for Melena, hgb6.7. she has a past medical history of ESRD on hemodialysis, Afib,legally blind,  hypertension, hypothyroidism seizure disorder who presented to the ER on 4/18/22 at the advice of her nephrologist for low hemoglobin. She has had multiple admissions. She was recently admitted on 4/11/22 and discharged on 4/13/22 for syncopal episode. It is reported that she has had black tarry stools since taking Aleve for pain. . She has been feeling mor fatigued and weak. She denies dizziness, shortness of breath, or chest pain. She was advised by dialysis to come to the ER when her labs showed a hgB of 6.7. She reports she had a fall about 1 week ago. She does have a large resolving bruise over her left eye. She had an EGD on 1/13/22 showed Acute gastric ulcer at anastomosis with hemorrhage, treated with 2 hemoclips and injected with epinephrine. .  She had a repeat EGD on 2/14/22 showing Esophagitis, gastritis, hx of gastric bypass, No active bleeding in upper GI tract at that time. Hold anticoagulation. Monitor H&H. Plan colonoscopy as an out patient. EGD 1/3/22: EGD on 1/13/22 showed Acute gastric ulcer at anastomosis with hemorrhage, treated with 2 hemoclips and injected with epinephrine.     EGD 2/14/22: Repeat, Esophagitis, gastritis, chronic without bleeding, three clips from previous intervention still in place no active bleeding noted. Previous gastric bypass surgery and previously placed clips in place no active bleeding in upper GI tract. CT abd/pelvis 2/11/22: IMPRESSION  Fluid overload.  No evidence to suggest a source of GI bleeding, Liver,spleen, atrophic pancreas are normal,      Past Medical History:   Diagnosis Date  Anxiety disorder     Arthritis     Atherosclerosis of native arteries of the extremities with ulceration ([facility] 8/31/2020    Atrial fibrillation ([facility])     Cardiac pacemaker in situ     Cellulitis and abscess of trunk 8/31/2020    Depression, postpartum     Diabetes ([facility]     Heart disease     Heartburn     Hx of long term use of blood thinners     Hypercholesterolemia     Hypertension     Hypothyroidism     Migraines     Peripheral venous insufficiency 8/31/2020    Seizures ([facility]     Sleep disorder     Stroke ([facility]     2019    Varicose veins of lower extremity with inflammation 8/31/2020     Past Surgical History:   Procedure Laterality Date    HX BACK SURGERY      HX OTHER SURGICAL      leg surgery     HX OTHER SURGICAL      pacemaker monitor     IR INSERT TUNL CVC W/O PORT OVER 5 YR  12/10/2021      Family History   Problem Relation Age of Onset    Heart Disease Mother     Heart Disease Father     Diabetes Sister     Diabetes Brother      Social History     Tobacco Use    Smoking status: Never Smoker    Smokeless tobacco: Never Used   Substance Use Topics    Alcohol use: Never      Allergies   Allergen Reactions    Lactose Diarrhea     Current Facility-Administered Medications   Medication Dose Route Frequency    butalbital-acetaminophen-caffeine (FIORICET, ESGIC) -40 mg per tablet 1 Tablet  1 Tablet Oral Q4H PRN    heparin (porcine) 1,000 unit/mL injection 3,200 Units  3,200 Units Hemodialysis DIALYSIS PRN    0.9% sodium chloride infusion 250 mL  250 mL IntraVENous PRN    [START ON 4/20/2022] epoetin anjelica-epbx (RETACRIT) injection 10,000 Units  10,000 Units IntraVENous DIALYSIS MON, WED & FRI    atorvastatin (LIPITOR) tablet 40 mg  40 mg Oral QHS    FLUoxetine (PROzac) capsule 10 mg  10 mg Oral DAILY    levothyroxine (SYNTHROID) tablet 200 mcg  200 mcg Oral ACB    metoprolol succinate (TOPROL-XL) XL tablet 12.5 mg  12.5 mg Oral DAILY    midodrine (PROAMATINE) tablet 5 mg  5 mg Oral TID WITH MEALS    nystatin (MYCOSTATIN) 100,000 unit/gram powder   Topical QID    sevelamer carbonate (RENVELA) tab 800 mg  800 mg Oral TID WITH MEALS    lacosamide (VIMPAT) tablet 200 mg  200 mg Oral BID    0.9% sodium chloride infusion 250 mL  250 mL IntraVENous PRN    sodium chloride (NS) flush 5-40 mL  5-40 mL IntraVENous Q8H    sodium chloride (NS) flush 5-40 mL  5-40 mL IntraVENous PRN    acetaminophen (TYLENOL) tablet 650 mg  650 mg Oral Q6H PRN    Or    acetaminophen (TYLENOL) suppository 650 mg  650 mg Rectal Q6H PRN    polyethylene glycol (MIRALAX) packet 17 g  17 g Oral DAILY PRN    ondansetron (ZOFRAN ODT) tablet 4 mg  4 mg Oral Q8H PRN    Or    ondansetron (ZOFRAN) injection 4 mg  4 mg IntraVENous Q6H PRN    pantoprazole (PROTONIX) 40 mg in 0.9% sodium chloride 10 mL injection  40 mg IntraVENous Q12H        Review of Systems:  A detailed 10 organ review of systems is obtained with pertinent positives as listed in the History of Present Illness and Past Medical History. All others are negative. Objective:     Physical Exam:  Visit Vitals  /60   Pulse 62   Temp 98.2 °F (36.8 °C)   Resp 18   Ht 5' 6\" (1.676 m)   Wt 72.6 kg (160 lb)   SpO2 98%   BMI 25.82 kg/m²        Skin:  Extremities and face reveal no rashes. Resolving ecchymotic area over left eye  HEENT: Sclerae anicteric. Legally blind  Cardiovascular: Regular rate and rhythm. Respiratory:  Comfortable breathing with no accessory muscle use. GI:  Abdomen nondistended, soft, and nontender. Normal active bowel sounds. No enlargement of the liver or spleen. No masses palpable. Rectal:  Deferred  Musculoskeletal: Generalized weakness   Neurological:  Gross memory appears intact. Patient is alert and oriented. Psychiatric:  Mood appears appropriate with judgement intact. Lymphatic:  No cervical or supraclavicular adenopathy.     Lab/Data Review:  CMP:   Lab Results   Component Value Date/Time     04/19/2022 07:49 AM    K 4.7 04/19/2022 07:49 AM     04/19/2022 07:49 AM    CO2 27 04/19/2022 07:49 AM    AGAP 7 04/19/2022 07:49 AM    GLU 77 04/19/2022 07:49 AM    BUN 58 (H) 04/19/2022 07:49 AM    CREA 3.70 (H) 04/19/2022 07:49 AM    GFRAA 15 (L) 04/19/2022 07:49 AM    GFRNA 12 (L) 04/19/2022 07:49 AM    CA 7.2 (L) 04/19/2022 07:49 AM     CBC:   Lab Results   Component Value Date/Time    WBC 6.1 04/19/2022 07:49 AM    HGB 10.1 (L) 04/19/2022 07:49 AM    HCT 31.0 (L) 04/19/2022 07:49 AM     (L) 04/19/2022 07:49 AM     COAGS: No results found for: APTT, PTP, INR, INREXT, INREXT      Assessment/Plan:   1. Melena     HgB 10.2 up from 6.7 on admission     S/P 2 units PRBC's on 4/18/22     Positive stool for occult on 4/18/22     Hold anticoagulation     Continue PPI     S/P EGD on 1/12 & 2/14/22     Plan colonoscopy as an out patient  2. Acute/Chronic Anemia     Iron 88, TIBC 174, Iron % 51, Ferritin 667     Vitamin N57 338      Folic 4.1      Continue Procriit  3. ESRD     Hemodialysis as scheduled     Nephrology input appreciated    Thank you for allowing me to participate in this patients care. Plan discussed with Dr. Fede Gonzalez and he approves.

## 2022-04-19 NOTE — PROGRESS NOTES
OCCUPATIONAL THERAPY EVALUATION  Patient: Erin Larson (16 y.o. female)  Date: 4/19/2022  Primary Diagnosis: GI bleed [K92.2]        Precautions: fall risk       ASSESSMENT  Pt is a 72 y/o F with PMH of ESRD on HD, atrial fibrillation, HTN, hypothyroidism, and seizure disorder presenting to [facility] ED on 4/18/2022 at the advice of her nephrologist for low hemoglobin (6.7 on arrival, 2 PRBCs given); per chart caregiver has also noticed more dark tarry stools and weakness/fatigue. Pt admitted to [facility] 4/18/22 for GI bleed. Of note, patient was recently admitted on 4/11/2022 and discharged on 4/13/2022 for a syncopal episode. Pt was seen by OT that admission, was recommended d/c to SNF, however family was able to provide 24/7 care and pt d/c home with family and [facility] services. Pt received in side lying for toileting routine with RN and PT upon arrival; OT role introduced and pt agreeable to OT/PT evaluations at this time. Per pt report, pt lives with son and DIL in a 2 story home but resides on 1st floor with ramp entrance, pt required assistance for ADLS/IADLS, transferred to w/c with assist of son PTA. DME: hospital bed, BSC, grab bars, RW, and WC    Based on current observations, pt presents with deficits in generalized strength/AROM, bed mobility, static/dynamic sitting balance, static/dynamic standing balance, functional activity tolerance, and vision (blindness) impacting overall performance of ADLs and functional transfers/mobility. Pt currently requires mod A for rolling, mod A x2 supine><sit, and max-total A sit><stand to/from EOB with gt belt and RW, however unable to clear buttocks from EOB to come to complete stand. During bed level toileting pt required total A for bowel hygiene, total A to don socks and mod A to don clean gown.  Pt min A for basic grooming (combing hair) in sitting and setup with meal tray at end of session (in semi-supine with HOB elevated) req setup assist for juice container (able to bring to mouth setup A), OT adapted call bell button by adding tactile sticker for improved success with reaching RN as needed. Pt left resting comfortably with call bell/needs in reach at end of session. Overall, pt tolerates session fair. She would benefit from continued skilled OT services to address current impairments and improve IND and safety with self cares and functional transfers/mobility. Current OT d/c recommendation HHOT with continued 24/7 family care vs. SNF once medically appropriate. Other factors to consider for discharge: family/social support, DME, time since onset, severity of deficits, decline from functional baseline     Patient will benefit from skilled therapy intervention to address the above noted impairments. PLAN :  Recommendations and Planned Interventions: self care training, functional mobility training, therapeutic exercise, balance training, therapeutic activities, endurance activities, patient education and family training/education    Frequency/Duration: Patient will be followed by occupational therapy:  2-3x/week to address goals. Recommendation for discharge: (in order for the patient to meet his/her long term goals)  HHOT with 24/7 family care vs SNF    This discharge recommendation:  Has been made in collaboration with the attending provider and/or case management    IF patient discharges home will need the following DME: patient owns DME required for discharge       SUBJECTIVE:   Patient stated I am doing better.     OBJECTIVE DATA SUMMARY:   HISTORY:   Past Medical History:   Diagnosis Date    Anxiety disorder     Arthritis     Atherosclerosis of native arteries of the extremities with ulceration ([address] 8/31/2020    Atrial fibrillation ([address]     Cardiac pacemaker in situ     Cellulitis and abscess of trunk 8/31/2020    Depression, postpartum     Diabetes ([address]     Heart disease     Heartburn     Hx of long term use of blood thinners     Hypercholesterolemia  Hypertension     Hypothyroidism     Migraines     Peripheral venous insufficiency 8/31/2020    Seizures ([address]     Sleep disorder     Stroke [facility])     2019    Varicose veins of lower extremity with inflammation 8/31/2020     Past Surgical History:   Procedure Laterality Date    HX BACK SURGERY      HX OTHER SURGICAL      leg surgery     HX OTHER SURGICAL      pacemaker monitor     IR INSERT TUNL CVC W/O PORT OVER 5 YR  12/10/2021       Expanded or extensive additional review of patient history:     Home Situation  Home Environment: Private residence  Wheelchair Ramp: Yes  One/Two Story Residence: Two story, live on 1st floor  Living Alone: No  Support Systems: Child(jose carlos) (Son)  Patient Expects to be Discharged to[de-identified] Home with home health  Current DME Used/Available at Home: Wheelchair,Hospital bed,Commode, bedside,Grab bars,Walker, rolling      EXAMINATION OF PERFORMANCE DEFICITS:  Cognitive/Behavioral Status:  Neurologic State: Alert  Orientation Level: Oriented to person;Oriented to place  Cognition: Follows commands    Hearing: Auditory  Auditory Impairment: None    Vision/Perceptual:                           Acuity:  (Pt is blind)         Range of Motion:  AROM: Generally decreased, functional                         Strength:  Strength: Generally decreased, functional                Coordination:  Coordination: Generally decreased, functional  Fine Motor Skills-Upper: Left Intact; Right Intact    Gross Motor Skills-Upper: Left Intact; Right Intact      Balance:  Sitting: Impaired; With support  Sitting - Static: Fair (occasional)  Sitting - Dynamic: Fair (occasional)  Standing: Impaired;Pull to stand; With support  Standing - Static: Poor;Constant support    Functional Mobility and Transfers for ADLs:  Bed Mobility:  Rolling: Moderate assistance  Supine to Sit: Moderate assistance;Assist x2  Sit to Supine: Moderate assistance;Assist x2  Scooting:  Moderate assistance;Assist x2    Transfers:  Sit to Stand: Maximum assistance; Total assistance;Assist x2  Stand to Sit: Maximum assistance; Total assistance;Assist x2      ADL Intervention and task modifications:  Feeding  Drink to Mouth: Set-up    Grooming  Position Performed: Seated edge of bed  Brushing/Combing Hair: Minimum assistance              Upper Body [address]: Moderate assistance    Lower Body Dressing Assistance  Socks: Total assistance (dependent)  Position Performed: Long sitting on bed    Toileting  Toileting Assistance: Total assistance(dependent)  Bowel Hygiene: Total assistance (dependent)         Therapeutic Exercise:  Pt would benefit from UE HEP to improve overall UE AROM/strength and can be further educated in next treatment session. Functional Measure:    [facility] [employer] AM-PACTM \"6 Clicks\"                                                       Daily Activity Inpatient Short Form  How much help from another person does the patient currently need. .. Total; A Lot A Little None   1. Putting on and taking off regular lower body clothing? [x]  1 []  2 []  3 []  4   2. Bathing (including washing, rinsing, drying)? []  1 [x]  2 []  3 []  4   3. Toileting, which includes using toilet, bedpan or urinal? [x] 1 []  2 []  3 []  4   4. Putting on and taking off regular upper body clothing? []  1 [x]  2 []  3 []  4   5. Taking care of personal grooming such as brushing teeth? []  1 []  2 [x]  3 []  4   6. Eating meals? []  1 []  2 [x]  3 []  4   © 2007, Trustees of [facility] [employer], under license to [employer]. All rights reserved     Score: 12/24     Interpretation of Tool:  Represents clinically-significant functional categories (i.e. Activities of daily living).   Percentage of Impairment CH    0%   CI    1-19% CJ    20-39% CK    40-59% CL    60-79% CM    80-99% CN     100%   AMPAC  Score 6-24 24 23 20-22 15-19 10-14 7-9 6         Occupational Therapy Evaluation Charge Determination   History Examination Decision-Making   LOW Complexity : Brief history review  MEDIUM Complexity : 3-5 performance deficits relating to physical, cognitive , or psychosocial skils that result in activity limitations and / or participation restrictions MEDIUM Complexity : Patient may present with comorbidities that affect occupational performnce. Miniml to moderate modification of tasks or assistance (eg, physical or verbal ) with assesment(s) is necessary to enable patient to complete evaluation       Based on the above components, the patient evaluation is determined to be of the following complexity level: LOW   Pain Ratin/10    Activity Tolerance:   Fair    After treatment patient left in no apparent distress:    Supine in bed, Call bell within reach and Side rails x 3, wedge support to L side     COMMUNICATION/EDUCATION:   The patients plan of care was discussed with: Physical therapist and Registered nurse. Patient/family have participated as able in goal setting and plan of care. and Patient/family agree to work toward stated goals and plan of care. This patients plan of care is appropriate for delegation to RICK. OT/PT sessions occurred together for increased patient and clinician safety as pt with decreased activity tolerance and requires A of 2 for mobility at this time.      Thank you for this referral.  Andrea Alaniz  Time Calculation: 44 mins    Problem: Self Care Deficits Care Plan (Adult)  Goal: *Acute Goals and Plan of Care (Insert Text)  Description: Pt will be Min A sup <> sit in prep for EOB ADLs  Pt will be setup/SBA grooming sitting EOB/long sit  Pt will be Min A UB dressing sitting EOB/long sit   Pt will be Min A LE dressing sitting EOB/long sit  Pt will be Mod A sit <>  prep for toileting LRAD  Pt will be Mod A toileting/toilet transfer/cloth mgmt LRAD  Pt will be IND following UE HEP in prep for self care tasks      Outcome: Not Met

## 2022-04-20 VITALS
WEIGHT: 160 LBS | BODY MASS INDEX: 25.71 KG/M2 | DIASTOLIC BLOOD PRESSURE: 74 MMHG | OXYGEN SATURATION: 100 % | TEMPERATURE: 97.8 F | SYSTOLIC BLOOD PRESSURE: 119 MMHG | HEART RATE: 60 BPM | HEIGHT: 66 IN | RESPIRATION RATE: 16 BRPM

## 2022-04-20 LAB
ANION GAP SERPL CALC-SCNC: 7 MMOL/L (ref 5–15)
BUN SERPL-MCNC: 32 MG/DL (ref 6–20)
BUN/CREAT SERPL: 12 (ref 12–20)
CA-I BLD-MCNC: 7.3 MG/DL (ref 8.5–10.1)
CHLORIDE SERPL-SCNC: 104 MMOL/L (ref 97–108)
CO2 SERPL-SCNC: 27 MMOL/L (ref 21–32)
CREAT SERPL-MCNC: 2.64 MG/DL (ref 0.55–1.02)
ERYTHROCYTE [DISTWIDTH] IN BLOOD BY AUTOMATED COUNT: 16.3 % (ref 11.5–14.5)
GLUCOSE SERPL-MCNC: 63 MG/DL (ref 65–100)
HCT VFR BLD AUTO: 35.3 % (ref 35–47)
HGB BLD-MCNC: 11.4 G/DL (ref 11.5–16)
MAGNESIUM SERPL-MCNC: 2.1 MG/DL (ref 1.6–2.4)
MCH RBC QN AUTO: 30.7 PG (ref 26–34)
MCHC RBC AUTO-ENTMCNC: 32.3 G/DL (ref 30–36.5)
MCV RBC AUTO: 95.1 FL (ref 80–99)
NRBC # BLD: 0 K/UL (ref 0–0.01)
NRBC BLD-RTO: 0 PER 100 WBC
PLATELET # BLD AUTO: 128 K/UL (ref 150–400)
PMV BLD AUTO: 10.8 FL (ref 8.9–12.9)
POTASSIUM SERPL-SCNC: 4.4 MMOL/L (ref 3.5–5.1)
RBC # BLD AUTO: 3.71 M/UL (ref 3.8–5.2)
SODIUM SERPL-SCNC: 138 MMOL/L (ref 136–145)
WBC # BLD AUTO: 7.1 K/UL (ref 3.6–11)

## 2022-04-20 PROCEDURE — G0257 UNSCHED DIALYSIS ESRD PT HOS: HCPCS

## 2022-04-20 PROCEDURE — 74011000250 HC RX REV CODE- 250: Performed by: PHYSICIAN ASSISTANT

## 2022-04-20 PROCEDURE — 96376 TX/PRO/DX INJ SAME DRUG ADON: CPT

## 2022-04-20 PROCEDURE — 74011250636 HC RX REV CODE- 250/636: Performed by: PHYSICIAN ASSISTANT

## 2022-04-20 PROCEDURE — 83735 ASSAY OF MAGNESIUM: CPT

## 2022-04-20 PROCEDURE — C9113 INJ PANTOPRAZOLE SODIUM, VIA: HCPCS | Performed by: PHYSICIAN ASSISTANT

## 2022-04-20 PROCEDURE — 90935 HEMODIALYSIS ONE EVALUATION: CPT

## 2022-04-20 PROCEDURE — 85027 COMPLETE CBC AUTOMATED: CPT

## 2022-04-20 PROCEDURE — G0378 HOSPITAL OBSERVATION PER HR: HCPCS

## 2022-04-20 PROCEDURE — 97530 THERAPEUTIC ACTIVITIES: CPT

## 2022-04-20 PROCEDURE — 74011250636 HC RX REV CODE- 250/636: Performed by: INTERNAL MEDICINE

## 2022-04-20 PROCEDURE — 74011250637 HC RX REV CODE- 250/637: Performed by: INTERNAL MEDICINE

## 2022-04-20 PROCEDURE — 74011250637 HC RX REV CODE- 250/637: Performed by: PHYSICIAN ASSISTANT

## 2022-04-20 PROCEDURE — 80048 BASIC METABOLIC PNL TOTAL CA: CPT

## 2022-04-20 PROCEDURE — 74011000250 HC RX REV CODE- 250: Performed by: HOSPITALIST

## 2022-04-20 PROCEDURE — 36415 COLL VENOUS BLD VENIPUNCTURE: CPT

## 2022-04-20 RX ORDER — PANTOPRAZOLE SODIUM 40 MG/1
40 TABLET, DELAYED RELEASE ORAL DAILY
Qty: 30 TABLET | Refills: 4 | Status: SHIPPED | OUTPATIENT
Start: 2022-04-20

## 2022-04-20 RX ORDER — LIDOCAINE 4 G/100G
1 PATCH TOPICAL EVERY 24 HOURS
Status: DISCONTINUED | OUTPATIENT
Start: 2022-04-20 | End: 2022-04-21 | Stop reason: HOSPADM

## 2022-04-20 RX ADMIN — FLUOXETINE 10 MG: 10 CAPSULE ORAL at 10:03

## 2022-04-20 RX ADMIN — SEVELAMER CARBONATE 800 MG: 800 TABLET, FILM COATED ORAL at 17:00

## 2022-04-20 RX ADMIN — LEVOTHYROXINE SODIUM 200 MCG: 0.1 TABLET ORAL at 10:04

## 2022-04-20 RX ADMIN — EPOETIN ALFA-EPBX 10000 UNITS: 10000 INJECTION, SOLUTION INTRAVENOUS; SUBCUTANEOUS at 17:23

## 2022-04-20 RX ADMIN — HEPARIN SODIUM 3200 UNITS: 1000 INJECTION, SOLUTION INTRAVENOUS; SUBCUTANEOUS at 17:22

## 2022-04-20 RX ADMIN — METOPROLOL SUCCINATE 12.5 MG: 25 TABLET, EXTENDED RELEASE ORAL at 10:03

## 2022-04-20 RX ADMIN — NYSTATIN: 100000 POWDER TOPICAL at 18:00

## 2022-04-20 RX ADMIN — LACOSAMIDE 200 MG: 100 TABLET, FILM COATED ORAL at 10:03

## 2022-04-20 RX ADMIN — SODIUM CHLORIDE, PRESERVATIVE FREE 10 ML: 5 INJECTION INTRAVENOUS at 05:18

## 2022-04-20 RX ADMIN — BUTALBITAL, ACETAMINOPHEN, AND CAFFEINE 1 TABLET: 50; 325; 40 TABLET ORAL at 03:52

## 2022-04-20 RX ADMIN — SODIUM CHLORIDE, PRESERVATIVE FREE 40 MG: 5 INJECTION INTRAVENOUS at 03:50

## 2022-04-20 RX ADMIN — SEVELAMER CARBONATE 800 MG: 800 TABLET, FILM COATED ORAL at 10:04

## 2022-04-20 RX ADMIN — NYSTATIN: 100000 POWDER TOPICAL at 09:00

## 2022-04-20 RX ADMIN — MIDODRINE HYDROCHLORIDE 5 MG: 5 TABLET ORAL at 08:00

## 2022-04-20 NOTE — PROGRESS NOTES
OCCUPATIONAL THERAPY TREATMENT  Patient: Maryana Cuello (89 y.o. female)  Date: 4/20/2022  Diagnosis: GI bleed [K92.2] <principal problem not specified>       Precautions:    Chart, occupational therapy assessment, plan of care, and goals were reviewed. ASSESSMENT  Patient continues with skilled OT services and is progressing towards goals. Pt received semi-supine in bed and agreeable to tx session. Pt agreeable to tx sessoin upon arrival and requesting bed pan upon arrival. Pt required min A for rolling for bed pan placement. Pt. required increased time to attempt to have BM; Pt. Required total A for michelle-care. Pt was min Ax2 for supine>sit with good sitting balance noted at EOB. Pt demonstrates with decreased ROM and strength on RUE. Pt. completed sit<>stand with max A x2 from  EOB and was able to achieve full stand today but req cueing for upright posture and noted R knee was flexed with weakness and inability to straighten. Pt was able to demonstrate good sitting balance EOB, SBA with set-up while eating breakfast. Pt mod A scooting and min A x2 to return back to supine. Pt pleasantly cooperative and motivated during session. Rec d/c home with 24/7 care and home health as pt's son has been helping. If this is not enough help at home, rec d/c to SNF. Current Level of Function Impacting Discharge (ADLs):general weakness, right side weakness from. Other factors to consider for discharge: PLOF, time since on set         PLAN :  Patient continues to benefit from skilled intervention to address the above impairments. Continue treatment per established plan of care. to address goals.     Recommendation for discharge: (in order for the patient to meet his/her long term goals)  Occupational therapy at least 2 days/week in the home with 24/7 care vs SNF     This discharge recommendation:  Has been made in collaboration with the attending provider and/or case management    IF patient discharges home will need the following DME: TBD       SUBJECTIVE:   Patient agreeable to tx session    OBJECTIVE DATA SUMMARY:   Cognitive/Behavioral Status:  Neurologic State: Alert (Simultaneous filing. User may not have seen previous data.)  Orientation Level: Oriented X4  Cognition: Appropriate decision making; Follows commands  Functional Mobility and Transfers for ADLs:  Bed Mobility:  Rolling: Minimum assistance  Supine to Sit: Minimum assistance;Assist x2  Sit to Supine: Minimum assistance;Assist x2  Scooting: Moderate assistance    Transfers:  Sit to Stand: Maximum assistance;Assist x2  Balance:  Sitting: Intact  Sitting - Static: Good (unsupported)  Sitting - Dynamic: Good (unsupported)  Standing: Impaired;Pull to stand; With support  Standing - Static: Constant support;Poor    ADL Intervention:  Feeding  Feeding Assistance: Set-up; Stand-by assistance  Container Management: Total assistance (dependent)  Utensil Management: Stand-by assistance  Food to Mouth: Independent  Drink to Mouth: Independent      Toileting  Bladder Hygiene: Total assistance (dependent)    Pain:  0/10 pain reported    Activity Tolerance:   Fair  Please refer to the flowsheet for vital signs taken during this treatment. After treatment patient left in no apparent distress:   Supine in bed, Call bell within reach and Bed / chair alarm activated    COMMUNICATION/COLLABORATION:   The patients plan of care was discussed with: Physical therapy assistant and Registered nurse.  Co-tx with PTA for increased assistance with bed mobility and attempt with sit> stand transfer    Angelia Race  Time Calculation: 38 mins    Problem: Self Care Deficits Care Plan (Adult)  Goal: *Acute Goals and Plan of Care (Insert Text)  Description: Pt will be Min A sup <> sit in prep for EOB ADLs  Pt will be setup/SBA grooming sitting EOB/long sit  Pt will be Min A UB dressing sitting EOB/long sit   Pt will be Min A LE dressing sitting EOB/long sit  Pt will be Mod A sit <>  prep for toileting LRAD  Pt will be Mod A toileting/toilet transfer/cloth mgmt LRAD  Pt will be IND following UE HEP in prep for self care tasks      Outcome: Progressing Towards Goal

## 2022-04-20 NOTE — PROGRESS NOTES
Renal Note    NAME:  Kenya Malik   :   1950   MRN:   [id number]     ATTENDING: Susie Cancino MD  PCP:  Des Alberto MD    Date/Time:  2022 12:45 PM      Subjective:   REQUESTING PHYSICIAN:  REASON FOR CONSULT:        Patient seen in the room. She was dialyzed yest with 2 L fluid removal.  She denies any complaints      Past Medical History:   Diagnosis Date    Anxiety disorder     Arthritis     Atherosclerosis of native arteries of the extremities with ulceration ([address] 2020    Atrial fibrillation ([address]     Cardiac pacemaker in situ     Cellulitis and abscess of trunk 2020    Depression, postpartum     Diabetes ([address]     Heart disease     Heartburn     Hx of long term use of blood thinners     Hypercholesterolemia     Hypertension     Hypothyroidism     Migraines     Peripheral venous insufficiency 2020    Seizures ([address]     Sleep disorder     [facility]     2019    Varicose veins of lower extremity with inflammation 2020      Past Surgical History:   Procedure Laterality Date    HX BACK SURGERY      HX OTHER SURGICAL      leg surgery     HX OTHER SURGICAL      pacemaker monitor     IR INSERT TUNL CVC W/O PORT OVER 5 YR  12/10/2021     Social History     Tobacco Use    Smoking status: Never Smoker    Smokeless tobacco: Never Used   Substance Use Topics    Alcohol use: Never      Family History   Problem Relation Age of Onset    Heart Disease Mother     Heart Disease Father     Diabetes Sister     Diabetes Brother        Allergies   Allergen Reactions    Lactose Diarrhea      Prior to Admission medications    Medication Sig Start Date End Date Taking? Authorizing Provider   pantoprazole (Protonix) 40 mg tablet Take 1 Tablet by mouth daily. 22  Yes Melyssa Bradshaw MD   levETIRAcetam (keppra xr) 500 mg ER tablet Take 1,000 mg by mouth daily.    Yes Provider, Historical   amoxicillin 500 mg tab Take 500 mg by mouth daily. 4/14/22   Jadon Summers MD   fluconazole (DIFLUCAN) 100 mg tablet Take 1 Tablet by mouth daily for 7 days. FDA advises cautious prescribing of oral fluconazole in pregnancy. 4/14/22 4/21/22  Jadon Summers MD   levothyroxine (SYNTHROID) 200 mcg tablet Take 1 Tablet by mouth Daily (before breakfast). 4/14/22   Jadon Summers MD   glucose 4 gram chewable tablet Take 4 Tablets by mouth as needed (hypoglycemia). 4/13/22   Jadon Summers MD   Blood-Glucose Meter monitoring kit Please check your blood sugar in the morning 4/13/22   Jadon Summers MD   lancets misc Please check your blood sugar in the morning 4/13/22   Jadon Summers MD   Lancing Device misc 1 Each by Does Not Apply route daily. 4/13/22   Jadon Summers MD   alcohol swabs padm Please check your blood sugar daily 4/13/22   Jadon Summers MD   midodrine (PROAMATINE) 5 mg tablet Take 5 mg by mouth three (3) times daily (with meals). Provider, Historical   metoprolol succinate (TOPROL-XL) 25 mg XL tablet Take 12.5 mg by mouth daily. Provider, Historical   nystatin (MYCOSTATIN) powder Apply  to affected area four (4) times daily. 3/22/22   Silvino Cramer NP   Spirometers and Accessories avril 1 Each by Does Not Apply route three (3) times daily. 3/8/22   Silvino Cramer NP   FLUoxetine (PROzac) 10 mg capsule Take 1 Capsule by mouth daily for 270 days. 2/24/22 11/21/22  Silvino Cramer NP   pantoprazole (PROTONIX) 40 mg tablet Take 1 Tablet by mouth Daily (before breakfast). Indications: bleeding from stomach, esophagus or duodenum 2/24/22   Silvino Cramer NP   ondansetron (Zofran ODT) 4 mg disintegrating tablet 1 Tablet by SubLINGual route every eight (8) hours as needed for Nausea or Vomiting. 2/24/22   Silvino Cramer NP   food supplemt, lactose-reduced (Ensure Active High Protein) liqd Take 237 mL by mouth four (4) times daily as needed for PRN Reason (Other) (as pt can tolerate).  2/10/22   Silvino Cramer NP   zinc oxide-white petrolatum 17-57 % topical paste Apply  to affected area as needed for Skin Irritation. Indications: skin irritation 21   Gurpreet Castellanos MD   Vimpat 200 mg tab tablet Take 1 Tablet by mouth two (2) times a day. 21   Provider, Historical   atorvastatin (LIPITOR) 40 mg tablet Take 1 tablet by mouth once daily for 90 days 21  Larry Santana MD   sevelamer carbonate (RENVELA) 800 mg tab tab  5/10/21   Provider, Historical       REVIEW OF SYSTEMS:       Constitutional: Negative for chills and fever. HENT: Negative. Eyes: Negative. Respiratory: Negative. Cardiovascular: Negative. Gastrointestinal: Negative for abdominal pain and nausea. Skin: Negative. Neurological: Negative. Objective:   VITALS:    Visit Vitals  BP (!) 99/57   Pulse 60   Temp 97.8 °F (36.6 °C)   Resp 16   Ht 5' 6\" (1.676 m)   Wt 72.6 kg (160 lb)   SpO2 99%   BMI 25.82 kg/m²     Temp (24hrs), Av.8 °F (36.6 °C), Min:97.4 °F (36.3 °C), Max:98.2 °F (36.8 °C)      PHYSICAL EXAM:     General: NAD, alert, cooperative  Eyes: sclera anicteric  Oral Cavity: No thrush or ulcers  Neck: no JVD  Chest: Fair bilateral air entry. No Wheezing or Rhonchi. No rales.   Heart: normal sounds  Abdomen: soft and non tender   : no gee  Lower Extremities: no edema  Skin: no rash  Neuro: intact, no focals  Psychiatric: non-depressed          LAB DATA REVIEWED:    Recent Results (from the past 24 hour(s))   GLUCOSE, POC    Collection Time: 22  4:18 PM   Result Value Ref Range    Glucose (POC) 71 65 - 117 mg/dL    Performed by Juan F Sanchez    GLUCOSE, POC    Collection Time: 22  8:42 PM   Result Value Ref Range    Glucose (POC) 96 65 - 117 mg/dL    Performed by Josesito Patel    CBC W/O DIFF    Collection Time: 22  8:08 AM   Result Value Ref Range    WBC 7.1 3.6 - 11.0 K/uL    RBC 3.71 (L) 3.80 - 5.20 M/uL    HGB 11.4 (L) 11.5 - 16.0 g/dL    HCT 35.3 35.0 - 47.0 % MCV 95.1 80.0 - 99.0 FL    MCH 30.7 26.0 - 34.0 PG    MCHC 32.3 30.0 - 36.5 g/dL    RDW 16.3 (H) 11.5 - 14.5 %    PLATELET 114 (L) 236 - 400 K/uL    MPV 10.8 8.9 - 12.9 FL    NRBC 0.0 0.0  WBC    ABSOLUTE NRBC 0.00 0.00 - 9.46 K/uL   METABOLIC PANEL, BASIC    Collection Time: 04/20/22  8:08 AM   Result Value Ref Range    Sodium 138 136 - 145 mmol/L    Potassium 4.4 3.5 - 5.1 mmol/L    Chloride 104 97 - 108 mmol/L    CO2 27 21 - 32 mmol/L    Anion gap 7 5 - 15 mmol/L    Glucose 63 (L) 65 - 100 mg/dL    BUN 32 (H) 6 - 20 mg/dL    Creatinine 2.64 (H) 0.55 - 1.02 mg/dL    BUN/Creatinine ratio 12 12 - 20      GFR est AA 22 (L) >60 ml/min/1.73m2    GFR est non-AA 18 (L) >60 ml/min/1.73m2    Calcium 7.3 (L) 8.5 - 10.1 mg/dL   MAGNESIUM    Collection Time: 04/20/22  8:08 AM   Result Value Ref Range    Magnesium 2.1 1.6 - 2.4 mg/dL       Recommendations/Plan:     1. end-stage renal  -On hemodialysis every Monday Wednesday Friday at [address] [facility] renal care [place]  -Last outpatient dialysis was on Friday  -He was dialyzed yesterday with 2 L fluid removal.  -Potassium today is 4.4. Clinically she has no volume overload  -She will be dialyzed today per schedule  -Continue MWF dialysis while in hospital  -right subclavian permanent dialysis catheter is current access    2. Anemia   -2/2 ESRD   -hx of GIB in the past  -Hb 6.9 . Improved to 10.1 today after blood transfusion yesterday  -s/p EGD on 2/14/22 which showed esophagitis chronic gastritis without bleeding.  -Continue Procrit 10,000 units with dialysis  -Transfusion as needed to keep hemoglobin  > 7  -Patient has been having dark tarry stools prior to admission. GI has been consulted. Currently on Protonix 40 mg IV twice daily     3  Hyportension   -Blood pressure is better after continuing midodrine 5 mg 3 times daily.   On low-dose metoprolol also  -Bps are ok     4 Secondary hyperparathyroidism of ESRD.    -Calcium and phos are ok.    -cont Renvela   -PTH is 194 which is within target range for her ESRD.     -Continue calcitriol at the current dose

## 2022-04-20 NOTE — PROGRESS NOTES
PHYSICAL THERAPY TREATMENT  Patient: Rosalia Melo (82 y.o. female)  Date: 4/20/2022  Diagnosis: GI bleed [K92.2] <principal problem not specified>       Precautions:    Chart, physical therapy assessment, plan of care and goals were reviewed. ASSESSMENT  Patient continues with skilled PT services and is progressing towards goals. Patient agreeable to PT/OT cotx session upon arrival and requesting bed pan upon arrival. Pt min A for rolling for bed pan placement. Pt then was min Ax2 for supine>sit with good sitting balance EOB. Pt completed sit<>stand with max A x2 from the EOB and was able to achieve full stand today but req cueing for upright posture and noted R knee was flexed with weakness and inability to straighten. Patient demonstrated decreased ROM on the RLE but pt reported she wasn't able to use much. Encouraged pt to continue with LE therex to try to improve LE strength as she does demonstrate strength in RLE. Patient was able to demonstrate good sitting balance EOB, SBA while eating breakfast. Pt mod A scooting and min A x2 to return back to supine. Pt pleasantly cooperative and motivated during session. Rec d/c home with 24/7 care and home health as pt's son has been helping. If this is not enough help, rec d/c to SNF. Current Level of Function Impacting Discharge (mobility/balance): Ax2 OOB, weakness    Other factors to consider for discharge: safety at home, needs assistance with transfers          PLAN :  Patient continues to benefit from skilled intervention to address the above impairments. Continue treatment per established plan of care. to address goals.     Recommendation for discharge: (in order for the patient to meet his/her long term goals)  Home with [address] [place] [address] with 24/7 care     This discharge recommendation:  Has been made in collaboration with the attending provider and/or case management    IF patient discharges home will need the following DME: to be determined (TBD) SUBJECTIVE:   Patient stated [facility] picks me up.     OBJECTIVE DATA SUMMARY:   Critical Behavior:  Neurologic State: Alert (Simultaneous filing. User may not have seen previous data.)  Orientation Level: Oriented X4  Cognition: Appropriate decision making,Follows commands     Functional Mobility Training:  Bed Mobility:  Rolling: Minimum assistance  Supine to Sit: Minimum assistance;Assist x2  Sit to Supine: Minimum assistance;Assist x2  Scooting: Moderate assistance        Transfers:  Sit to Stand: Maximum assistance;Assist x2  Stand to Sit: Maximum assistance;Assist x2     Balance:  Sitting: Intact  Sitting - Static: Good (unsupported)  Sitting - Dynamic: Good (unsupported)  Standing: Impaired;Pull to stand; With support  Standing - Static: Constant support;Poor      Pain Ratin/10    Activity Tolerance:   Fair  Please refer to the flowsheet for vital signs taken during this treatment. After treatment patient left in no apparent distress:   Supine in bed, Call bell within reach, Bed / chair alarm activated and Side rails x 3    COMMUNICATION/COLLABORATION:   The patients plan of care was discussed with: Occupational therapy assistant and Registered nurse. cotx with RICK for increased OOB mobility, A x2.    Vira Calero   Time Calculation: 38 mins         Problem: Mobility Impaired (Adult and Pediatric)  Goal: *Acute Goals and Plan of Care (Insert Text)  Description: Patient will move from supine to sit and sit to supine , scoot up and down, and roll side to side in bed with minimal assistance/contact guard assist within 7 day(s). Patient will transfer from bed to chair and chair to bed with moderate assistance  using the least restrictive device within 7 day(s). Patient will improve static sitting balance to minimal assistance within 1 week(s).     2022 0911 by Talib Pham  Outcome: Progressing Towards Goal

## 2022-04-20 NOTE — PROGRESS NOTES
Progress Note    Patient: Norberto Parks MRN: [id number]  SSN: [id number]    YOB: 1950  Age: 71 y.o. Sex: female      Admit Date: 4/18/2022    LOS: 0 days     Subjective:   GI is following in consultation for melena and low hgb.    4/20: Patient seen in room awake and alert. She complain of back pain which is chronic. Denies abdominal pain. Hgb today is 11.4. She has a pending discharge order for today. Chief Complaint: abnormal lab results, abdominal pain     History of Present Illness: Norberto Parks is a 71 y.o. female who is seen in consultation for Melena, hgb6.7. she has a past medical history of ESRD on hemodialysis, Afib,legally blind,  hypertension, hypothyroidism seizure disorder who presented to the ER on 4/18/22 at the advice of her nephrologist for low hemoglobin. She has had multiple admissions. She was recently admitted on 4/11/22 and discharged on 4/13/22 for syncopal episode. It is reported that she has had black tarry stools since taking Aleve for pain. . She has been feeling mor fatigued and weak. She denies dizziness, shortness of breath, or chest pain. She was advised by dialysis to come to the ER when her labs showed a hgB of 6.7. She reports she had a fall about 1 week ago. She does have a large resolving bruise over her left eye. She had an EGD on 1/13/22 showed Acute gastric ulcer at anastomosis with hemorrhage, treated with 2 hemoclips and injected with epinephrine. .  She had a repeat EGD on 2/14/22 showing Esophagitis, gastritis, hx of gastric bypass, No active bleeding in upper GI tract at that time. Hold anticoagulation. Monitor H&H.  Plan colonoscopy as an out patient.     EGD 1/3/22: EGD on 1/13/22 showed Acute gastric ulcer at anastomosis with hemorrhage, treated with 2 hemoclips and injected with epinephrine.      EGD 2/14/22: Repeat, Esophagitis, gastritis, chronic without bleeding, three clips from previous intervention still in place no active bleeding noted. Previous gastric bypass surgery and previously placed clips in place no active bleeding in upper GI tract.      CT abd/pelvis 2/11/22: IMPRESSION  Fluid overload. No evidence to suggest a source of GI bleeding, Liver,spleen, atrophic pancreas are normal,     Objective:     Vitals:    04/20/22 1425 04/20/22 1430 04/20/22 1500 04/20/22 1530   BP: (!) 116/56 (!) 94/55 (!) 99/57 106/63   Pulse: 60 60 60 60   Resp: 16 16 16 16   Temp: 97.8 °F (36.6 °C)      TempSrc:       SpO2:       Weight:       Height:            Intake and Output:  Current Shift: No intake/output data recorded. Last three shifts: 04/18 1901 - 04/20 0700  In: 429.5   Out: 2000     Physical Exam:   Skin:  Extremities and face reveal no rashes. No brizuela erythema. HEENT: Sclerae anicteric. Extra-occular muscles are intact. No abnormal pigmentation of the lips. The neck is supple. Cardiovascular: Regular rate and rhythm. Respiratory:  Comfortable breathing with no accessory muscle use. GI:  Abdomen nondistended, soft, and nontender. No enlargement of the liver or spleen. No masses palpable. Rectal:  Deferred  Musculoskeletal: Generalized weakness  Neurological:  Gross memory appears intact. Patient is alert and oriented. Psychiatric:  Mood appears appropriate with judgement intact.   Lymphatic:  No visible adenopathy      Lab/Data Review:  Recent Results (from the past 24 hour(s))   GLUCOSE, POC    Collection Time: 04/19/22  4:18 PM   Result Value Ref Range    Glucose (POC) 71 65 - 117 mg/dL    Performed by [facility]    GLUCOSE, POC    Collection Time: 04/19/22  8:42 PM   Result Value Ref Range    Glucose (POC) 96 65 - 117 mg/dL    Performed by Drea Moreno    CBC W/O DIFF    Collection Time: 04/20/22  8:08 AM   Result Value Ref Range    WBC 7.1 3.6 - 11.0 K/uL    RBC 3.71 (L) 3.80 - 5.20 M/uL    HGB 11.4 (L) 11.5 - 16.0 g/dL    HCT 35.3 35.0 - 47.0 %    MCV 95.1 80.0 - 99.0 FL    MCH 30.7 26.0 - 34.0 PG    MCHC 32.3 30.0 - 36.5 g/dL    RDW 16.3 (H) 11.5 - 14.5 %    PLATELET 924 (L) 614 - 400 K/uL    MPV 10.8 8.9 - 12.9 FL    NRBC 0.0 0.0  WBC    ABSOLUTE NRBC 0.00 0.00 - 7.01 K/uL   METABOLIC PANEL, BASIC    Collection Time: 04/20/22  8:08 AM   Result Value Ref Range    Sodium 138 136 - 145 mmol/L    Potassium 4.4 3.5 - 5.1 mmol/L    Chloride 104 97 - 108 mmol/L    CO2 27 21 - 32 mmol/L    Anion gap 7 5 - 15 mmol/L    Glucose 63 (L) 65 - 100 mg/dL    BUN 32 (H) 6 - 20 mg/dL    Creatinine 2.64 (H) 0.55 - 1.02 mg/dL    BUN/Creatinine ratio 12 12 - 20      GFR est AA 22 (L) >60 ml/min/1.73m2    GFR est non-AA 18 (L) >60 ml/min/1.73m2    Calcium 7.3 (L) 8.5 - 10.1 mg/dL   MAGNESIUM    Collection Time: 04/20/22  8:08 AM   Result Value Ref Range    Magnesium 2.1 1.6 - 2.4 mg/dL              No orders to display       Assessment:     Active Problems:    GI bleed (2/11/2022)        Plan:   1. Melena     HgB 11.4 up from 6.7 on admission     S/P 2 units PRBC's on 4/18/22     Positive stool for occult on 4/18/22     Hold anticoagulation     Continue PPI     S/P EGD on 1/12 & 2/14/22     Plan colonoscopy as an out patient  2. Acute/Chronic Anemia     Iron 88, TIBC 174, Iron % 51, Ferritin 667     Vitamin G27 036      Folic 4.1      Continue Procriit  3. ESRD     Hemodialysis as scheduled     Nephrology input appreciated    Patient is clear for discharge per GI. Follow up as outpatient to set up for colonoscopy. Patient discussed with Dr Nguyễn Magdaleno and agrees to above impression and plan. Thank you for allowing me to participate in this patients care    Signed By: Antonietta Barry NP     April 20, 2022

## 2022-04-20 NOTE — DISCHARGE INSTRUCTIONS
INSTRUCTIONS ON DISCHARGE:    (1) please take the medication as prescribed:           PROTONIX 40 mg daily     (2) please AVOID alleve, NSAIDS for pain, take tylenol instead.      (3) please follow-up with PCP in 1 week

## 2022-04-20 NOTE — PROGRESS NOTES
VSS. INT removed with skin intact. Provided pt with discharge instructions, and KELSIE Pelaez called daughter at home to give discharge instructions. Discharge plan of care/case management plan validated with provider discharge order. Pt to discharge home.     Jigar Fleming RN

## 2022-04-20 NOTE — DIALYSIS
Pt tolerated treatment fair. uf goal of 2 liters not met d/t soft Bps   A net total of 1.5 liters removed  Pt remained asymptomatic during treatment.

## 2022-04-20 NOTE — PROGRESS NOTES
0825: Chart reviewed. When medically stable, patient to discharge home with [facility] and dialysis with [facility]. CM will continue to follow patient and recs of medical team.    1135: Discharge order/summary noted and attached in [address] to [facility]. CM made visit to room. Family not available. Patient sleeping. CM attempted to contact patient's daughter-in-law, Darron Lara [phone]. VM received. Message left requesting a return call. CM telephoned [phone] speaking with patient's son, Mignon Sotelo. Notified him of patient's discharge this day. Mignon Sotelo stated his mother and sister live with him and his wife. Patient will need medical transport to address verified on [employer], per Mr. Willa Shields. Mr. Willa Shields verified his wife will be at home all day to receive his mother home. Discharge plan of care/case management plan validated with provider discharge order. Discharge Checklist Completed. 1152: Per ROB message, [facility] to resume care tomorrow or Friday. 1340: Darron Lara returned CM phone call. Understanding verbalized of discharge stating she will be home to receive patient. Darron Lara had questions regarding discharge orders. CM notified nurse.

## 2022-04-20 NOTE — PROGRESS NOTES
Problem: Falls - Risk of  Goal: *Absence of Falls  Description: Document Linda Locket Fall Risk and appropriate interventions in the flowsheet. Outcome: Progressing Towards Goal  Note: Fall Risk Interventions:  Mobility Interventions: Bed/chair exit alarm         Medication Interventions: Bed/chair exit alarm    Elimination Interventions: Bed/chair exit alarm    History of Falls Interventions: Bed/chair exit alarm         Problem: Patient Education: Go to Patient Education Activity  Goal: Patient/Family Education  Outcome: Progressing Towards Goal     Problem: Risk for Spread of Infection  Goal: Prevent transmission of infectious organism to others  Description: Prevent the transmission of infectious organisms to other patients, staff members, and visitors. Outcome: Progressing Towards Goal     Problem: Patient Education:  Go to Education Activity  Goal: Patient/Family Education  Outcome: Progressing Towards Goal     Problem: Pressure Injury - Risk of  Goal: *Prevention of pressure injury  Description: Document Nitin Scale and appropriate interventions in the flowsheet.   Outcome: Progressing Towards Goal  Note: Pressure Injury Interventions:       Moisture Interventions: Absorbent underpads    Activity Interventions: PT/OT evaluation    Mobility Interventions: HOB 30 degrees or less,PT/OT evaluation    Nutrition Interventions: Document food/fluid/supplement intake    Friction and Shear Interventions: Apply protective barrier, creams and emollients,Minimize layers,Sit at 90-degree angle                Problem: Patient Education: Go to Patient Education Activity  Goal: Patient/Family Education  Outcome: Progressing Towards Goal     Problem: Patient Education: Go to Patient Education Activity  Goal: Patient/Family Education  Outcome: Progressing Towards Goal     Problem: Patient Education: Go to Patient Education Activity  Goal: Patient/Family Education  Outcome: Progressing Towards Goal

## 2022-04-20 NOTE — DISCHARGE SUMMARY
Physician Discharge Summary     Patient ID:    Maryana Cuello  [id number]  5 y.o.  1950    Admit date: 4/18/2022    Discharge date : 4/20/2022    Chronic Diagnoses:    Problem List as of 4/20/2022 Date Reviewed: 1/3/2022          Codes Class Noted - Resolved    Syncope ICD-10-CM: R55  ICD-9-CM: 780.2  4/11/2022 - Present        History of seizure disorder ICD-10-CM: Z86.69  ICD-9-CM: V12.49  4/11/2022 - Present        ESRD (end stage renal disease) on dialysis [facility]) ICD-10-CM: N18.6, Z99.2  ICD-9-CM: 585.6, V45.11  4/11/2022 - Present        Opioid use, unspecified with unspecified opioid-induced disorder ICD-10-CM: F11.99  ICD-9-CM: 292.9, 305.50  3/8/2022 - Present        Complicated UTI (urinary tract infection) ICD-10-CM: N39.0  ICD-9-CM: 599.0  2/18/2022 - Present        Respiratory failure with hypoxia ([facility] ICD-10-CM: J96.91  ICD-9-CM: 518.81  2/18/2022 - Present        GI bleed ICD-10-CM: K92.2  ICD-9-CM: 578.9  2/11/2022 - Present        Acute encephalopathy ICD-10-CM: G93.40  ICD-9-CM: 348.30  1/20/2022 - Present        Seizure-like activity ([facility] ICD-10-CM: R56.9  ICD-9-CM: 780.39  1/12/2022 - Present        MSSA bacteremia ICD-10-CM: R78.81, B95.61  ICD-9-CM: 790.7, 041.11  1/3/2022 - Present        Anemia ICD-10-CM: D64.9  ICD-9-CM: 285.9  1/3/2022 - Present        Gait disturbance, post-stroke ICD-10-CM: I69.398, R26.9  ICD-9-CM: 438.89, 781.2  1/3/2022 - Present        COVID-19 ICD-10-CM: U07.1  ICD-9-CM: 079.89  12/26/2021 - Present        Fluid overload ICD-10-CM: E87.70  ICD-9-CM: 276.69  12/5/2021 - Present        Left-sided weakness ICD-10-CM: R53.1  ICD-9-CM: 728.87  12/3/2021 - Present        Intractable nausea and vomiting ICD-10-CM: R11.2  ICD-9-CM: 536.2  12/3/2021 - Present        Hypoglycemia ICD-10-CM: E16.2  ICD-9-CM: 251.2  6/11/2021 - Present        Encephalopathy acute ICD-10-CM: G93.40  ICD-9-CM: 348.30  6/11/2021 - Present        SSS (sick sinus syndrome) [facility]) ICD-10-CM: I49.5  ICD-9-CM: 427.81  6/11/2021 - Present        Seizure ([facility] ICD-10-CM: R56.9  ICD-9-CM: 780.39  5/19/2021 - Present        Hyperkalemia ICD-10-CM: E87.5  ICD-9-CM: 276.7  10/16/2020 - Present        Atherosclerosis of native arteries of the extremities with ulceration (HCC) ICD-10-CM: I70.25  ICD-9-CM: 440.23, 707.9  8/31/2020 - Present        Cellulitis and abscess of trunk ICD-10-CM: L03.319, L02.219  ICD-9-CM: 682.2  8/31/2020 - Present        Peripheral venous insufficiency ICD-10-CM: I87.2  ICD-9-CM: 459.81  8/31/2020 - Present        Varicose veins of lower extremity with inflammation ICD-10-CM: I83.10  ICD-9-CM: 454.1  8/31/2020 - Present        Peripheral vascular disease ([facility] ICD-10-CM: I73.9  ICD-9-CM: 443.9  7/3/2020 - Present        Type II diabetes mellitus ([facility] ICD-10-CM: E11.9  ICD-9-CM: 250.00  7/3/2020 - Present        Long term (current) use of antibiotics ICD-10-CM: Z79.2  ICD-9-CM: V58.62  7/3/2020 - Present        Atrial fibrillation ([facility] ICD-10-CM: I48.91  ICD-9-CM: 427.31  7/3/2020 - Present        Cardiac pacemaker in situ ICD-10-CM: Z95.0  ICD-9-CM: V45.01  7/3/2020 - Present        Carpal tunnel syndrome of right wrist ICD-10-CM: G56.01  ICD-9-CM: 354.0  7/3/2020 - Present        Chest wall pain ICD-10-CM: R07.89  ICD-9-CM: 786.52  7/3/2020 - Present        Chronic diarrhea ICD-10-CM: K52.9  ICD-9-CM: 787.91  7/3/2020 - Present        Chronic neck pain ICD-10-CM: M54.2, G89.29  ICD-9-CM: 723.1, 338.29  7/3/2020 - Present        End stage renal failure on dialysis [facility]) ICD-10-CM: N18.6, Z99.2  ICD-9-CM: 585.6, V45.11  7/3/2020 - Present        History of CVA (cerebrovascular accident) ICD-10-CM: Z86.73  ICD-9-CM: V12.54  7/3/2020 - Present        Hyponatremia ICD-10-CM: E87.1  ICD-9-CM: 276.1  7/3/2020 - Present        Acquired hypothyroidism ICD-10-CM: E03.9  ICD-9-CM: 244.9  7/3/2020 - Present        Breakthrough seizure ([facility] ICD-10-CM: G40.919  ICD-9-CM: 345.91 7/3/2020 - Present        Recurrent falls ICD-10-CM: R29.6  ICD-9-CM: V15.88  7/3/2020 - Present        RESOLVED: Septic shock (HCC) ICD-10-CM: A41.9, R65.21  ICD-9-CM: 038.9, 785.52, 995.92  2/11/2022 - 3/8/2022        RESOLVED: Sepsis ([facility] ICD-10-CM: A41.9  ICD-9-CM: 038.9, 995.91  1/7/2022 - 3/8/2022        RESOLVED: Metabolic encephalopathy E-31-QB: G93.41  ICD-9-CM: 348.31  1/3/2022 - 1/3/2022        RESOLVED: Sepsis ([facility] ICD-10-CM: A41.9  ICD-9-CM: 038.9, 995.91  1/3/2022 - 1/3/2022        RESOLVED: AMS (altered mental status) ICD-10-CM: R41.82  ICD-9-CM: 780.97  12/26/2021 - 1/3/2022        RESOLVED: Disorder due to well controlled type 2 diabetes mellitus ([facility] ICD-10-CM: E11.8  ICD-9-CM: 250.90  8/31/2020 - 6/11/2021 22    Final Diagnoses:   GI bleed [K92.2]    Reason for Hospitalization:  UGIB with ABLA  ESRD on HD  Seizure disorder  Hypothyroidism  pAFIB      Hospital Course:      71 y.o. female who presented with low Hb and kenya    , hgb6.7 on presentation. she has a past medical history of ESRD on hemodialysis, Afib,legally blind,  hypertension, hypothyroidism seizure disorder who presented to the ER on 4/18/22 at the advice of her nephrologist for low hemoglobin. She has had multiple admissions. She was recently admitted on 4/11/22 and discharged on 4/13/22 for syncopal episode. It is reported that she has had black tarry stools since taking Aleve for pain. . She has been feeling mor fatigued and weak. She denies dizziness, shortness of breath, or chest pain. She was advised by dialysis to come to the ER when her labs showed a hgB of 6.7. She reports she had a fall about 1 week ago. She does have a large resolving bruise over her left eye.  She had an EGD on 1/13/22 showed Acute gastric ulcer at anastomosis with hemorrhage, treated with 2 hemoclips and injected with epinephrine. .  She had a repeat EGD on 2/14/22 showing Esophagitis, gastritis, hx of gastric bypass, No active bleeding in upper GI tract at that time. Hold anticoagulation. Monitor H&H. Plan colonoscopy as an out patient. She received 2 PRBC, CT abdomen didn't show signs of GI bleed. INSTRUCTIONS ON DISCHARGE:    (1) please take the medication as prescribed:           PROTONIX 40 mg daily     (2) please AVOID alleve, NSAIDS for pain, take tylenol instead. (3) please follow-up with PCP in 1 week        Discharge Medications:   Current Discharge Medication List      START taking these medications    Details   ! ! pantoprazole (Protonix) 40 mg tablet Take 1 Tablet by mouth daily. Qty: 30 Tablet, Refills: 4  Start date: 4/20/2022       !! - Potential duplicate medications found. Please discuss with provider. CONTINUE these medications which have NOT CHANGED    Details   levETIRAcetam (keppra xr) 500 mg ER tablet Take 1,000 mg by mouth daily. levothyroxine (SYNTHROID) 200 mcg tablet Take 1 Tablet by mouth Daily (before breakfast). Qty: 30 Tablet, Refills: 0      glucose 4 gram chewable tablet Take 4 Tablets by mouth as needed (hypoglycemia). Qty: 120 Tablet, Refills: 0      Blood-Glucose Meter monitoring kit Please check your blood sugar in the morning  Qty: 1 Kit, Refills: 0      lancets misc Please check your blood sugar in the morning  Qty: 30 Each, Refills: 0      Lancing Device misc 1 Each by Does Not Apply route daily. Qty: 1 Each, Refills: 0      alcohol swabs padm Please check your blood sugar daily  Qty: 50 Pad, Refills: each      midodrine (PROAMATINE) 5 mg tablet Take 5 mg by mouth three (3) times daily (with meals). metoprolol succinate (TOPROL-XL) 25 mg XL tablet Take 12.5 mg by mouth daily. nystatin (MYCOSTATIN) powder Apply  to affected area four (4) times daily. Qty: 60 g, Refills: 2      Spirometers and Accessories avril 1 Each by Does Not Apply route three (3) times daily.   Qty: 1 Each, Refills: 1    Associated Diagnoses: Opioid use, unspecified with unspecified opioid-induced disorder ([facility] Respiratory depression      FLUoxetine (PROzac) 10 mg capsule Take 1 Capsule by mouth daily for 270 days. Qty: 90 Capsule, Refills: 2    Associated Diagnoses: TRISHA (generalized anxiety disorder)      ! ! pantoprazole (PROTONIX) 40 mg tablet Take 1 Tablet by mouth Daily (before breakfast). Indications: bleeding from stomach, esophagus or duodenum  Qty: 90 Tablet, Refills: 2      ondansetron (Zofran ODT) 4 mg disintegrating tablet 1 Tablet by SubLINGual route every eight (8) hours as needed for Nausea or Vomiting. Qty: 30 Tablet, Refills: 2      food supplemt, lactose-reduced (Ensure Active High Protein) liqd Take 237 mL by mouth four (4) times daily as needed for PRN Reason (Other) (as pt can tolerate). Qty: 60 Each, Refills: 5    Associated Diagnoses: Moderate protein-calorie malnutrition (HCC)      zinc oxide-white petrolatum 17-57 % topical paste Apply  to affected area as needed for Skin Irritation. Indications: skin irritation  Qty: 113 g, Refills: 0      Vimpat 200 mg tab tablet Take 1 Tablet by mouth two (2) times a day. atorvastatin (LIPITOR) 40 mg tablet Take 1 tablet by mouth once daily for 90 days  Qty: 90 Tablet, Refills: 3      sevelamer carbonate (RENVELA) 800 mg tab tab        !! - Potential duplicate medications found. Please discuss with provider. STOP taking these medications       amoxicillin 500 mg tab Comments:   Reason for Stopping:         fluconazole (DIFLUCAN) 100 mg tablet Comments:   Reason for Stopping: Follow up Care:    1. Min Rosales MD in 1-2 weeks. Please call to set up an appointment shortly after discharge. Diet: cardiac diet    Disposition:  Home with  PT OT SN disease management     Advanced Directive:   FULL x   DNR      Discharge Exam:PHYSICAL EXAM:  General:          Patient appears comfortable    EENT:              EOMI. Anicteric sclerae. MMM  Resp:               CTA bilaterally, no wheezing or rales.   No accessory muscle use  CV:                  Regular  Rhythm, s1/s2 no m/r/g  No edema  GI:                   Soft, Non distended, Non tender.  +Bowel sounds  Neurologic:       Alert and oriented X 3, normal speech,   Psych:   Good insight. Not anxious nor agitated  Skin:                No rashes. No jaundice    CONSULTATIONS: GI    Significant Diagnostic Studies:     Radiologic Studies -   Results from Hospital Encounter encounter on 04/11/22    XR HIP RT W OR WO PELV 2-3 VWS    Narrative  Right hip, 2 views    Osteopenia. No plain film evidence for right hip fracture. Pelvic and right  lower extremity arteriosclerosis.      CT Results  (Last 48 hours)    None              Discharge time spent 35 minutes    Signed:  Umu Hernandez MD  4/20/2022  9:20 AM

## 2022-04-21 ENCOUNTER — PATIENT OUTREACH (OUTPATIENT)
Dept: CASE MANAGEMENT | Age: 72
End: 2022-04-21

## 2022-04-21 NOTE — PROGRESS NOTES
Patient being transported by [facility] to [facility]. Patient stable at time of discharge, no complaints, transported with belongings. Called Amberly Kraus to let him know that patient was on her way.

## 2022-04-22 LAB
ABO + RH BLD: NORMAL
BLD PROD TYP BPU: NORMAL
BLOOD GROUP ANTIBODIES SERPL: NEGATIVE
BPU ID: NORMAL
CROSSMATCH RESULT,%XM: NORMAL
SPECIMEN EXP DATE BLD: NORMAL
STATUS OF UNIT,%ST: NORMAL
TRANSFUSION STATUS PATIENT QL: NORMAL
UNIT DIVISION, %UDIV: 0

## 2022-04-26 ENCOUNTER — VIRTUAL VISIT (OUTPATIENT)
Dept: PRIMARY CARE CLINIC | Age: 72
End: 2022-04-26
Payer: MEDICARE

## 2022-04-26 ENCOUNTER — VIRTUAL VISIT (OUTPATIENT)
Dept: INFECTIOUS DISEASES | Age: 72
End: 2022-04-26

## 2022-04-26 DIAGNOSIS — N18.6 ANEMIA DUE TO CHRONIC KIDNEY DISEASE, ON CHRONIC DIALYSIS (HCC): ICD-10-CM

## 2022-04-26 DIAGNOSIS — D63.1 ANEMIA DUE TO CHRONIC KIDNEY DISEASE, ON CHRONIC DIALYSIS (HCC): ICD-10-CM

## 2022-04-26 DIAGNOSIS — N18.6 ESRD (END STAGE RENAL DISEASE) (HCC): ICD-10-CM

## 2022-04-26 DIAGNOSIS — Z87.19 HISTORY OF GI BLEED: Primary | ICD-10-CM

## 2022-04-26 DIAGNOSIS — Z99.2 ANEMIA DUE TO CHRONIC KIDNEY DISEASE, ON CHRONIC DIALYSIS (HCC): ICD-10-CM

## 2022-04-26 DIAGNOSIS — D69.6 THROMBOCYTOPENIA (HCC): ICD-10-CM

## 2022-04-26 DIAGNOSIS — I48.0 PAROXYSMAL ATRIAL FIBRILLATION (HCC): ICD-10-CM

## 2022-04-26 DIAGNOSIS — N39.0 URINARY TRACT INFECTION WITHOUT HEMATURIA, SITE UNSPECIFIED: Primary | ICD-10-CM

## 2022-04-26 DIAGNOSIS — G89.29 CHRONIC LOW BACK PAIN, UNSPECIFIED BACK PAIN LATERALITY, UNSPECIFIED WHETHER SCIATICA PRESENT: ICD-10-CM

## 2022-04-26 DIAGNOSIS — M54.50 CHRONIC LOW BACK PAIN, UNSPECIFIED BACK PAIN LATERALITY, UNSPECIFIED WHETHER SCIATICA PRESENT: ICD-10-CM

## 2022-04-26 PROBLEM — N18.30 CHRONIC RENAL DISEASE, STAGE III (HCC): Status: ACTIVE | Noted: 2022-04-26

## 2022-04-26 PROBLEM — N18.4 CKD (CHRONIC KIDNEY DISEASE) STAGE 4, GFR 15-29 ML/MIN (HCC): Status: ACTIVE | Noted: 2022-04-26

## 2022-04-26 PROCEDURE — G8427 DOCREV CUR MEDS BY ELIG CLIN: HCPCS | Performed by: FAMILY MEDICINE

## 2022-04-26 PROCEDURE — G8400 PT W/DXA NO RESULTS DOC: HCPCS | Performed by: INTERNAL MEDICINE

## 2022-04-26 PROCEDURE — G8419 CALC BMI OUT NRM PARAM NOF/U: HCPCS | Performed by: INTERNAL MEDICINE

## 2022-04-26 PROCEDURE — G8432 DEP SCR NOT DOC, RNG: HCPCS | Performed by: INTERNAL MEDICINE

## 2022-04-26 PROCEDURE — 1101F PT FALLS ASSESS-DOCD LE1/YR: CPT | Performed by: INTERNAL MEDICINE

## 2022-04-26 PROCEDURE — G8536 NO DOC ELDER MAL SCRN: HCPCS | Performed by: INTERNAL MEDICINE

## 2022-04-26 PROCEDURE — G8427 DOCREV CUR MEDS BY ELIG CLIN: HCPCS | Performed by: INTERNAL MEDICINE

## 2022-04-26 PROCEDURE — 99214 OFFICE O/P EST MOD 30 MIN: CPT | Performed by: INTERNAL MEDICINE

## 2022-04-26 PROCEDURE — 1090F PRES/ABSN URINE INCON ASSESS: CPT | Performed by: INTERNAL MEDICINE

## 2022-04-26 PROCEDURE — 3017F COLORECTAL CA SCREEN DOC REV: CPT | Performed by: INTERNAL MEDICINE

## 2022-04-26 PROCEDURE — 99496 TRANSJ CARE MGMT HIGH F2F 7D: CPT | Performed by: FAMILY MEDICINE

## 2022-04-26 RX ORDER — TRAMADOL HYDROCHLORIDE 50 MG/1
50 TABLET ORAL
Qty: 60 TABLET | Refills: 1 | Status: SHIPPED | OUTPATIENT
Start: 2022-04-26 | End: 2022-05-26

## 2022-04-26 NOTE — PROGRESS NOTES
Subjective  Vernon Kt. Mita Espinoza    HPI. Pt was seen virtually for UTI. She is chronically ill/bedbound w multiple problems as listed below. Her daughter assisted in history taking today. Pt was recently diagnosed w UTI w isolation of E. Faecalis (VRE), PCN sensitive and Yeast from urine Cx in 02/2022. Dr Rabia Frank sought my advise last wk on how to treat her UTI per isolated pathogens, I had recommended a 7 day course of Fluconazole for yeast and renally dosed Amoxicillin for E. Faecalis. Per Daughter pt has been experiencing recurrent UTIs recently but does not have an indwelling gee cath or h/o kidney stones. She has been taking antibiotics as prescribed w/o any side effects. She has underlying ESRD and voids once daily. Her oral in take is poor and she experiences frequent hypoglycemic episodes, which improve a apple juice. Daughter believes her poor oral intake is due to pain. Pt's Tramadol was discontinued during her last hospitalization, reason unclear. Pt is otherwise doing well.      ROS: Unobtainable, pt is non-verbal     Past Medical History:   Diagnosis Date    Anxiety disorder     Arthritis     Atherosclerosis of native arteries of the extremities with ulceration ([address] 8/31/2020    Atrial fibrillation (HCC)     Cardiac pacemaker in situ     Cellulitis and abscess of trunk 8/31/2020    Depression, postpartum     Diabetes ([address]     Heart disease     Heartburn     Hx of long term use of blood thinners     Hypercholesterolemia     Hypertension     Hypothyroidism     Migraines     Peripheral venous insufficiency 8/31/2020    Seizures ([address]     Sleep disorder     Stroke ([address]     2019    Varicose veins of lower extremity with inflammation 8/31/2020        Past Surgical History:   Procedure Laterality Date    HX BACK SURGERY      HX OTHER SURGICAL      leg surgery     HX OTHER SURGICAL      pacemaker monitor     IR INSERT TUNL CVC W/O PORT OVER 5 YR  12/10/2021        Social History Tobacco Use    Smoking status: Never Smoker    Smokeless tobacco: Never Used   Vaping Use    Vaping Use: Never used   Substance Use Topics    Alcohol use: Never    Drug use: Never        Family History   Problem Relation Age of Onset    Heart Disease Mother     Heart Disease Father     Diabetes Sister     Diabetes Brother         Allergies   Allergen Reactions    Lactose Diarrhea        Objective: Unobtainable     Assessment & Plan  Diagnoses and all orders for this visit:    1. Urinary tract infection without hematuria, site unspecified     - Neurogenic bladder, oliguric, VRE and Yeast isolated on from urine in 02/2022     - S/p 1 wk of Fluconazole and renally dosed Amoxicillin      - Will repeat UA/Micro and Cx, daughter will be called w results     2. ESRD (end stage renal disease) ([facility]): On HD    3. Multiple co morbidities, chronically bedbound     4. Generalized Pain, will defer mgt to PCP, Tramadol helps but was discontinued during recent hospitalization to [address] [place] [address] Road, who was evaluated through a synchronous (real-time) {virtual platform audio-video  encounter, and/or her healthcare decision maker, is aware that it is a billable service, which includes applicable co-pays, with coverage as determined by her insurance carrier. She provided verbal consent to proceed and patient identification was verified. This visit was conducted pursuant to the emergency declaration under the [facility], [address] [place] [address] [facility] authority and the [employer] Act. A caregiver was present when appropriate. Ability to conduct physical exam was limited. The patient was located at home in a state where the provider was licensed to provide care.      --Loa Lombard, MD on 4/26/2022 at 1:27 PM

## 2022-04-26 NOTE — PROGRESS NOTES
HPI     Chief Complaint   Patient presents with    Establish Care     Follow up from ER      Patient is bed bound. Daughter, Charlotte Mendiola assists with visit. HPI:  Loco Mora is a 71 y.o. female who has a history of A fib,T2DM, Hx of gastric bypass, Debility and Homebound, HTN, PVD, Diarrhea, Anemia. Patient's history is very complex. She is not known to me. Her PCP is out of the office and patient is unable to matriculate to office given mobility issues. Patient was admitted to [facility] April 18th-April 20th due to GIB. She was found to have Hb of 6.7 and reported tarry stools while on Aleve for pain. In January 2022 she was found to have a gastric ulcer wth hemorrhage treated with 2 hemoclips and injected with epinephrine. Repeat EGD Feb 2022 showed esophagitis and gastritis without active bleeding at that time. CT abdomen did not show evidence of GIB. She was given 2 units of RBCs and referred for outpatient colonoscopy. Her Hb on discharge was 10.1. She is currently doing protonix 40mg once a day. She has not had anymore dark stools, they are normal consistency. She denies abdominal pain. Appetite is decreased, she says it is secondary to pain. She was on tramadol and was taken off this after she was hospitalized, she was placed on aleve, unsure why as she has hx of GIB and CKD. Caregiver is requesting to be placed back on tramadol or pain medications. She was on Tylenol #3 previously. She currently does not have anything and regular Tylenol does not help. She has home health set up. Review of Systems   Constitutional: Negative for chills and fever. Gastrointestinal: Negative for abdominal pain, blood in stool, melena and nausea. Reviewed PmHx, FmHx, SocHx as well as meds and allergies, updated and dated in the chart. Objective     Vital Signs: (As obtained by patient/caregiver at home)  There were no vitals taken for this visit. Patient-Reported Vitals 4/26/2022   Patient-Reported Weight 160   Patient-Reported Height 5 ft 2 inches   Patient-Reported Pulse 66   Patient-Reported Temperature 97.9   Patient-Reported SpO2 97   Patient-Reported Systolic  844   Patient-Reported Diastolic 93       [INSTRUCTIONS:  \"[x]\" Indicates a positive item  \"[]\" Indicates a negative item  -- DELETE ALL ITEMS NOT EXAMINED]    Constitutional: [x] Appears well-developed and well-nourished [x] No apparent distress      [] Abnormal -     Mental status: [x] Alert and awake  [x] Oriented to person/place/time [x] Able to follow commands    [] Abnormal -     Eyes:   EOM    []  Normal    [] Abnormal -   Sclera  [x]  Normal    [] Abnormal -          Discharge [x]  None visible   [] Abnormal -     HENT: [x] Normocephalic, atraumatic  [] Abnormal -   [] Mouth/Throat: Mucous membranes are moist    External Ears [] Normal  [] Abnormal -    Neck: [] No visualized mass [] Abnormal -     Pulmonary/Chest: [x] Respiratory effort normal   [x] No visualized signs of difficulty breathing or respiratory distress        [] Abnormal -      Musculoskeletal:   [x] Normal gait with no signs of ataxia         [x] Normal range of motion of neck        [] Abnormal -     Neurological:        [x] No Facial Asymmetry (Cranial nerve 7 motor function) (limited exam due to video visit)          [x] No gaze palsy        [] Abnormal -          Skin:        [x] No significant exanthematous lesions or discoloration noted on facial skin         [] Abnormal -            Psychiatric:       [x] Normal Affect [] Abnormal -        [x] No Hallucinations    Other pertinent observable physical exam findings:-     Patient is laying down in hospital bed which makes examination difficulty. Assessment and Plan     Last PDMP Crow Bustillo as Reviewed:  Review User Review Instant Review Result   Lisa GARVEY 4/26/2022 11:08 AM Reviewed PDMP [1]     I had a long discussion and patient's daughter.  Notes reviewed at length. I reviewed previous notes as well regarding pain management. She cannot tolerate NSAIDs. Tylenol is not helping. Appetitie decreased due to pain, which if this progresses, will worsen kidney function and her overall strength. She tolerated tramadol before well. Advised to only use at as needed for pain. PDMP reviewed. Diagnoses and all orders for this visit:    1. History of GI bleed  -     CBC W/O DIFF    2. Anemia due to chronic kidney disease, on chronic dialysis (HCC)  -     CBC W/O DIFF    3. Paroxysmal atrial fibrillation (HCC)    4. Thrombocytopenia ([address]    5. Chronic low back pain, unspecified back pain laterality, unspecified whether sciatica present  -     traMADoL (ULTRAM) 50 mg tablet; Take 1 Tablet by mouth two (2) times daily as needed for Pain for up to 30 days. Max Daily Amount: 100 mg. Follow-up and Dispositions    · Return in about 1 month (around 5/26/2022). Erin Larson is being evaluated by a Virtual Visit (video visit) encounter to address concerns as mentioned above. A caregiver was present when appropriate. Due to this being a TeleHealth encounter (During TWXDQ-64 public health emergency), evaluation of the following organ systems was limited: Vitals/Constitutional/EENT/Resp/CV/GI//MS/Neuro/Skin/Heme-Lymph-Imm. Pursuant to the emergency declaration under the [facility], [address] [place] [address] authority and the [employer] Act, this Virtual Visit was conducted with patient's (and/or legal guardian's) consent, to reduce the patient's risk of exposure to COVID-19 and provide necessary medical care. The patient (and/or legal guardian) has also been advised to contact this office for worsening conditions or problems, and seek emergency medical treatment and/or call 911 if deemed necessary.     Patient identification was verified at the start of the visit: YES    Services were provided through a video synchronous discussion virtually to substitute for in-person clinic visit. Patient was located at home and provider was located in office or at home.        Keshav Matthew MD  [address] [place] [address]

## 2022-04-29 ENCOUNTER — PATIENT OUTREACH (OUTPATIENT)
Dept: CASE MANAGEMENT | Age: 72
End: 2022-04-29

## 2022-04-29 NOTE — PROGRESS NOTES
Goals Addressed                 This Visit's Progress     Prevent complications post hospitalization. 04/14/22  CTN contacted Clifton Solis, patient DIL and at home care giver to review d/c instructions/red flags. PCP---CTN contacted PCP office , no appts available until mid June per Lori at PCP office. She asked CTN to call [address]  office. CTN set up VV with Dr Lakeshia Goel on 4/19/22 at 11:30. CTN called pt DIL, left detailed VM message with Appt info. CTN will review again next week prior to appt. [facility] JACKI--DIL states [facility] nurse called last night, will come today for JACKI. She states pt also gets PT and OT services. Patient just saw cards [address] [facility] ) last week prior to admission--HANNAH explains it takes 3 people to lift patient into a WC and out of a car---she states it is too difficult to take patient back into the office. She reports they have a plan as to when pt should return for pacer battery change--this will have to be coordinated with dialysis catheter change per daughter report. Patient will see nephrology at dialysis ([id number] [facility]), daughter states labs are checked every Wednesday, Unit calls them when pt needs blood transfusions and will set up OP transfusions. Neuro appt--DIL states MD appt is 5/10/22     HANNAH reports that she has not yet picked up glucometer and supplies ordered as pt discharged at 2130 last night. She states she has home glucometer for her spouse and has been checking patient BS. She reports pt does not eat much, and she has been providing ensure shakes to help with nutrition. CTN reminded her to give patient night time snack per hospitalist note.  HANNAH has BP cuff and takes BP--she states if BP is low then she gives the midodrine and holds beta blocker, if BP is elevated she will give the beta blocker. She is able to give CTN examples of high and low BP.    HANNAH states that pt gets sent to ER by dialysis frequently--that \"they send her for everything\".  HANNAH believes patient had a seizure prior to this admission as she states pt have several seizures over the course of a week. She reports it is difficult to regulate meds due to dialysis.  HANNAH states that she will talk to PCP about resuming pain meds as patient \"needs them\". She states she will continue to give to pt as needed.  CTN sent Perfect serve message to discharge MD to ask about UA lab result to see if pt needs abx. CTN will follow up. CTN will follow up next week--mkrw    UPDATE: CTN received message from hospitalist that ID MD Dr Agustina Lopez recommended diflucan and amoxicillin for 7 days and then follow up in 1 week. CTN notified daughter about new scripts. CTN called ID office, had to leave message for appt desk to call CTN back. CTN will follow up---mkrw    04/15/22  CTN had not received call back from ID office yet--CTN called MD office again today, spoke to [facility]. She states MD does do VV, appt made for VV 4/26 at 10:00 to call Lurdes's cell phone. CTN spoke to Fozia Schmid to give her appt details. ---mkrw    04/21/22  CTN unable to reach Fozia Schmid on phone, LM on  requesting return call. CTN will review the following:  PCP---Dr Fritz Godoy  4/22 at 8:30  GI Dr Maxi Delgadillo 6/9 at 10:00--OP colonoscopy    Pt received 2 units blood 4/18/22, started on protonix. --mkrw    UPDATE: CTN received call back from [address], reviewed above appointments. Fozia Schmid states patient can not attend PCP appt as this is on dialysis day. CTN called [facility] to change appt per Lurdes's request and to request VV. CTN was  informed that MD wants inperson visit in order to examine patient. CTN informed office that HANNAH needed to be informed in order to make arrangements as it is difficult for family to transport at this time. CTN called HANNAH, left message on  requesting return call. Patient lives out of area for [facility].     DIL states that patient has no had BM since discharge as she did not come home until after 9pm yesterday, went to dialysis early this morning. She reports that patient was having black stools prior to admission. Pt has had clips placed in the past for GI bleed. HANNAH aware of appt with GI 6/9 at 10:00 to discuss OP plan/colonoscopy. UPDATE: CTN called HANNAH Chatman to inform her that PCP office wants patient to come in for in person visit. Marjorie Ramachandran states that patient is too debilitated at this time, she cites example patient was unable to assist with turning in bed today to help [facility] nurse during wound care. CTN called PCP office again, spoke to Sravan about changing appt to VV on a non dialysis day, informed her that patient HANNAH states patient is still too debilitated to come to OP appointments at this time. CTN placed on hold for several minutes, was informed that MD will not do a virtual visit. CTN contacted HANNAH, informed her of above. She states that patient can attend appt in person if it can be on Tues/Thurs either next week or the week of 5/2 as patient son is off and she can ask her brother in law to assist with lifting and getting patient to office. CTN called PCP office back, office did not answer per contact specialist Odalis Knapp when she tried to contact MD office directly. She will send direct message to MD office and have them to call CTN back. CTN will follow up ---santos    04/29/22  CTN unable to contact patient JEY YOUNG on . Chart reviewed, patient has attended both ID and PCP VV on 4/26/22. ID ordered repeat labs for UA/Cx and PCP has ordered CBC. CTN will follow up next week . ---[email or web address]

## 2022-05-05 NOTE — PROGRESS NOTES
Good Afternoon,    I just wanted to let you know that Dr. Darline Shay is on Maturity leave and will not be back until June 6,2022. I see she follow up with Dr. Adrienne De Santiago. I also go the patient rescheduled to see Dr. Darline Shay in a virtual visit.

## 2022-05-06 ENCOUNTER — PATIENT OUTREACH (OUTPATIENT)
Dept: CASE MANAGEMENT | Age: 72
End: 2022-05-06

## 2022-05-06 NOTE — PROGRESS NOTES
Goals      Prevent complications post hospitalization. 04/14/22  CTN contacted Henry Ramires, patient DIL and at home care giver to review d/c instructions/red flags. PCP---CTN contacted PCP office , no appts available until mid June per Lori at PCP office. She asked CTN to call [address]  office. CTN set up VV with Dr Mo Tim on 4/19/22 at 11:30. CTN called pt DIL, left detailed VM message with Appt info. CTN will review again next week prior to appt. [place] JACKI--HANNAH states [place] nurse called last night, will come today for JACKI. She states pt also gets PT and OT services. Patient just saw cards [facility] ) last week prior to admission--HANNAH explains it takes 3 people to lift patient into a WC and out of a car---she states it is too difficult to take patient back into the office. She reports they have a plan as to when pt should return for pacer battery change--this will have to be coordinated with dialysis catheter change per daughter report. Patient will see nephrology at dialysis ([id number] [facility]), daughter states labs are checked every Wednesday, Unit calls them when pt needs blood transfusions and will set up OP transfusions. Neuro appt--DIL states MD appt is 5/10/22     HANNAH reports that she has not yet picked up glucometer and supplies ordered as pt discharged at 2130 last night. She states she has home glucometer for her spouse and has been checking patient BS. She reports pt does not eat much, and she has been providing ensure shakes to help with nutrition. CTN reminded her to give patient night time snack per hospitalist note.  HANNAH has BP cuff and takes BP--she states if BP is low then she gives the midodrine and holds beta blocker, if BP is elevated she will give the beta blocker. She is able to give CTN examples of high and low BP.  HANNAH states that pt gets sent to ER by dialysis frequently--that \"they send her for everything\".    HNANAH believes patient had a seizure prior to this admission as she states pt have several seizures over the course of a week. She reports it is difficult to regulate meds due to dialysis.  DIL states that she will talk to PCP about resuming pain meds as patient \"needs them\". She states she will continue to give to pt as needed.  CTN sent Perfect serve message to discharge MD to ask about UA lab result to see if pt needs abx. CTN will follow up. CTN will follow up next week--mkrw    UPDATE: CTN received message from hospitalist that ID MD Dr Jon Soria recommended diflucan and amoxicillin for 7 days and then follow up in 1 week. CTN notified daughter about new scripts. CTN called ID office, had to leave message for appt desk to call CTN back. CTN will follow up---mkrw    04/15/22  CTN had not received call back from ID office yet--CTN called MD office again today, spoke to [facility]. She states MD does do VV, appt made for VV 4/26 at 10:00 to call Lurdes's cell phone. CTN spoke to Ana Neumann to give her appt details. ---mkrw    04/21/22  CTN unable to reach Ana Neumann on phone, LM on  requesting return call. CTN will review the following:  PCP---Dr Hector Canela  4/22 at 8:30  GI Dr Una Pereira 6/9 at 10:00--OP colonoscopy    Pt received 2 units blood 4/18/22, started on protonix. --mkrw    UPDATE: CTN received call back from [address], reviewed above appointments. Ana Neumann states patient can not attend PCP appt as this is on dialysis day. CTN called [address] [facility] to change appt per Lurdes's request and to request VV. CTN was  informed that MD wants inperson visit in order to examine patient. CTN informed office that DIL needed to be informed in order to make arrangements as it is difficult for family to transport at this time. CTN called [facility], left message on  requesting return call. Patient lives out of area for [facility].     DIL states that patient has no had BM since discharge as she did not come home until after 9pm yesterday, went to dialysis early this morning. She reports that patient was having black stools prior to admission. Pt has had clips placed in the past for GI bleed. HANNAH aware of appt with GI 6/9 at 10:00 to discuss OP plan/colonoscopy. UPDATE: CTN called HANNAH Chatman to inform her that PCP office wants patient to come in for in person visit. Renee Tinajero states that patient is too debilitated at this time, she cites example patient was unable to assist with turning in bed today to [facility] during wound care. CTN called PCP office again, spoke to Shama Ro about changing appt to VV on a non dialysis day, informed her that patient DIL states patient is still too debilitated to come to OP appointments at this time. CTN placed on hold for several minutes, was informed that MD will not do a virtual visit. CTN contacted HANNAH, informed her of above. She states that patient can attend appt in person if it can be on Tues/Thurs either next week or the week of 5/2 as patient son is off and she can ask her brother in law to assist with lifting and getting patient to office. CTN called PCP office back, office did not answer per contact specialist Lissette Wren when she tried to contact MD office directly. She will send direct message to MD office and have them to call CTN back. CTN will follow up ---mkrw    04/29/22  CTN unable to contact patient JEY YOUNG on . Chart reviewed, patient has attended both ID and PCP VV on 4/26/22. ID ordered repeat labs for UA/Cx and PCP has ordered CBC. CTN will follow up next week . ---mkrw    05/06/22  CTN was able to contacted patient HANNAH--she reports she has scheduled appt with PCP however there was no available appts until 7/8/22. She also reports patient went to dialysis on Wednesday, started to have swelling at catheter site. Patient currently at dialysis however HANNAH is waiting for center to schedule catheter change out, she states she should hear from them today for instructions.  CTN will follow up.  ---Kat Paredes Supportive resources in place to maintain patient in the community (ie. Home Health, DME equipment, refer to, medication assistant plan, etc.)      04/14/22    HANNAH states that they have a Medicaid application in for home care aide assistance, she reports that the nurse has come to the home to evaluate patient, and they are waiting for the application process to be completed. At this time , HANNAH is caring for pt 24/7. HANNAH states they declined SNF as pt went after a recent discharge and came back home in worse physical decline than when she went in. She does not want pt to go to higher level of care at this time. CTN called [facility], [facility], and [facility] to see if anyone services [place]. Pt is either out of area, [facility] states pt would have to be in a group home or SNF to receive service due to insurance. Pt out of area for [place]. HANNAH reports that she has resource for a hospice company that will accept dialysis patients, they have already done preliminary paperwork with this agency. HANNAH states that she plans to call if patient develops worsening symptoms or another life threatening dx. At this time patient wants opportunity to improve with [facility] services, family has discussed with pt, Mds at hospital multiple times. Pt has had a few life threatening situations that she has improved from so they would like honor pt wishes at this time. ---mkrw    04/21/22  JOSE readdressed Hospice again with HANNAH. She states patient goals are to improve strength to sit in a wheelchair and \"gain some independence\", such as feeding herself. She states that patient/family would be have gone into hospice program but the agency needed another co morbidity to enroll patient, such as cancer. Patient wants to continue with dialysis, most agencies want to stop treatments before enrolling.     At this time, patient and family goal is to continue with therapies to have the opportunity to improve condition. CTN will readdress with patient /family at later date. --santos    05/06/22  HANNAH states that they received medicaid acceptance letter and will soon have care aides to assist with patient care. HANNAH states that she will call hospice agency to set up intake as dialysis unit SW has encouraged her to do this. She states that once the catheter gets corrected she will find time to contact them. At time of call HANNAH had company at the house and needed to end call. ---santos

## 2022-05-09 ENCOUNTER — HOSPITAL ENCOUNTER (OUTPATIENT)
Age: 72
Setting detail: OBSERVATION
Discharge: HOME HEALTH | End: 2022-05-13
Attending: STUDENT IN AN ORGANIZED HEALTH CARE EDUCATION/TRAINING PROGRAM | Admitting: INTERNAL MEDICINE
Payer: MEDICARE

## 2022-05-09 ENCOUNTER — APPOINTMENT (OUTPATIENT)
Dept: GENERAL RADIOLOGY | Age: 72
End: 2022-05-09
Attending: STUDENT IN AN ORGANIZED HEALTH CARE EDUCATION/TRAINING PROGRAM
Payer: MEDICARE

## 2022-05-09 DIAGNOSIS — R07.9 CHEST PAIN, UNSPECIFIED TYPE: Primary | ICD-10-CM

## 2022-05-09 DIAGNOSIS — I50.9 CONGESTIVE HEART FAILURE, UNSPECIFIED HF CHRONICITY, UNSPECIFIED HEART FAILURE TYPE (HCC): ICD-10-CM

## 2022-05-09 PROBLEM — J81.1 PULMONARY EDEMA: Status: ACTIVE | Noted: 2022-05-09

## 2022-05-09 LAB
ALBUMIN SERPL-MCNC: 2 G/DL (ref 3.5–5)
ALBUMIN/GLOB SERPL: 0.7 {RATIO} (ref 1.1–2.2)
ALP SERPL-CCNC: 129 U/L (ref 45–117)
ALT SERPL-CCNC: 28 U/L (ref 12–78)
ANION GAP SERPL CALC-SCNC: 6 MMOL/L (ref 5–15)
AST SERPL W P-5'-P-CCNC: 20 U/L (ref 15–37)
ATRIAL RATE: 63 BPM
BASOPHILS # BLD: 0.1 K/UL (ref 0–0.1)
BASOPHILS NFR BLD: 1 % (ref 0–1)
BILIRUB SERPL-MCNC: 0.3 MG/DL (ref 0.2–1)
BNP SERPL-MCNC: ABNORMAL PG/ML
BUN SERPL-MCNC: 36 MG/DL (ref 6–20)
BUN/CREAT SERPL: 11 (ref 12–20)
CA-I BLD-MCNC: 7.6 MG/DL (ref 8.5–10.1)
CALCULATED R AXIS, ECG10: 138 DEGREES
CALCULATED T AXIS, ECG11: -24 DEGREES
CHLORIDE SERPL-SCNC: 107 MMOL/L (ref 97–108)
CO2 SERPL-SCNC: 27 MMOL/L (ref 21–32)
CREAT SERPL-MCNC: 3.38 MG/DL (ref 0.55–1.02)
D DIMER PPP FEU-MCNC: 0.9 UG/ML(FEU)
DIAGNOSIS, 93000: NORMAL
DIFFERENTIAL METHOD BLD: ABNORMAL
EOSINOPHIL # BLD: 0.1 K/UL (ref 0–0.4)
EOSINOPHIL NFR BLD: 1 % (ref 0–7)
ERYTHROCYTE [DISTWIDTH] IN BLOOD BY AUTOMATED COUNT: 17.3 % (ref 11.5–14.5)
GLOBULIN SER CALC-MCNC: 3 G/DL (ref 2–4)
GLUCOSE SERPL-MCNC: 55 MG/DL (ref 65–100)
HCT VFR BLD AUTO: 45.8 % (ref 35–47)
HGB BLD-MCNC: 13.7 G/DL (ref 11.5–16)
IMM GRANULOCYTES # BLD AUTO: 0 K/UL (ref 0–0.04)
IMM GRANULOCYTES NFR BLD AUTO: 0 % (ref 0–0.5)
LYMPHOCYTES # BLD: 2.5 K/UL (ref 0.8–3.5)
LYMPHOCYTES NFR BLD: 36 % (ref 12–49)
MCH RBC QN AUTO: 30.5 PG (ref 26–34)
MCHC RBC AUTO-ENTMCNC: 29.9 G/DL (ref 30–36.5)
MCV RBC AUTO: 102 FL (ref 80–99)
MONOCYTES # BLD: 0.5 K/UL (ref 0–1)
MONOCYTES NFR BLD: 7 % (ref 5–13)
NEUTS SEG # BLD: 3.9 K/UL (ref 1.8–8)
NEUTS SEG NFR BLD: 55 % (ref 32–75)
NRBC # BLD: 0 K/UL (ref 0–0.01)
NRBC BLD-RTO: 0 PER 100 WBC
PLATELET # BLD AUTO: 155 K/UL (ref 150–400)
PMV BLD AUTO: 10.2 FL (ref 8.9–12.9)
POTASSIUM SERPL-SCNC: 4.1 MMOL/L (ref 3.5–5.1)
PROT SERPL-MCNC: 5 G/DL (ref 6.4–8.2)
Q-T INTERVAL, ECG07: 392 MS
QRS DURATION, ECG06: 138 MS
QTC CALCULATION (BEZET), ECG08: 435 MS
RBC # BLD AUTO: 4.49 M/UL (ref 3.8–5.2)
SODIUM SERPL-SCNC: 140 MMOL/L (ref 136–145)
TROPONIN-HIGH SENSITIVITY: 26 NG/L (ref 0–51)
TROPONIN-HIGH SENSITIVITY: 28 NG/L (ref 0–51)
VENTRICULAR RATE, ECG03: 74 BPM
WBC # BLD AUTO: 7.1 K/UL (ref 3.6–11)

## 2022-05-09 PROCEDURE — 96372 THER/PROPH/DIAG INJ SC/IM: CPT

## 2022-05-09 PROCEDURE — 71045 X-RAY EXAM CHEST 1 VIEW: CPT

## 2022-05-09 PROCEDURE — 74011250636 HC RX REV CODE- 250/636: Performed by: INTERNAL MEDICINE

## 2022-05-09 PROCEDURE — 36415 COLL VENOUS BLD VENIPUNCTURE: CPT

## 2022-05-09 PROCEDURE — 99285 EMERGENCY DEPT VISIT HI MDM: CPT

## 2022-05-09 PROCEDURE — 84484 ASSAY OF TROPONIN QUANT: CPT

## 2022-05-09 PROCEDURE — 96374 THER/PROPH/DIAG INJ IV PUSH: CPT

## 2022-05-09 PROCEDURE — 65270000029 HC RM PRIVATE

## 2022-05-09 PROCEDURE — 87340 HEPATITIS B SURFACE AG IA: CPT

## 2022-05-09 PROCEDURE — 93005 ELECTROCARDIOGRAM TRACING: CPT

## 2022-05-09 PROCEDURE — 74011000250 HC RX REV CODE- 250: Performed by: INTERNAL MEDICINE

## 2022-05-09 PROCEDURE — 83880 ASSAY OF NATRIURETIC PEPTIDE: CPT

## 2022-05-09 PROCEDURE — 80053 COMPREHEN METABOLIC PANEL: CPT

## 2022-05-09 PROCEDURE — G0378 HOSPITAL OBSERVATION PER HR: HCPCS

## 2022-05-09 PROCEDURE — 85379 FIBRIN DEGRADATION QUANT: CPT

## 2022-05-09 PROCEDURE — 85025 COMPLETE CBC W/AUTO DIFF WBC: CPT

## 2022-05-09 PROCEDURE — 74011250637 HC RX REV CODE- 250/637: Performed by: INTERNAL MEDICINE

## 2022-05-09 PROCEDURE — 74011250636 HC RX REV CODE- 250/636

## 2022-05-09 RX ORDER — HEPARIN SODIUM 1000 [USP'U]/ML
INJECTION, SOLUTION INTRAVENOUS; SUBCUTANEOUS
Status: COMPLETED
Start: 2022-05-09 | End: 2022-05-09

## 2022-05-09 RX ORDER — HEPARIN SODIUM 1000 [USP'U]/ML
3200 INJECTION, SOLUTION INTRAVENOUS; SUBCUTANEOUS
Status: DISCONTINUED | OUTPATIENT
Start: 2022-05-09 | End: 2022-05-13 | Stop reason: HOSPADM

## 2022-05-09 RX ORDER — METOPROLOL SUCCINATE 25 MG/1
12.5 TABLET, EXTENDED RELEASE ORAL DAILY
Status: DISCONTINUED | OUTPATIENT
Start: 2022-05-09 | End: 2022-05-13 | Stop reason: HOSPADM

## 2022-05-09 RX ORDER — SODIUM CHLORIDE 0.9 % (FLUSH) 0.9 %
5-40 SYRINGE (ML) INJECTION AS NEEDED
Status: DISCONTINUED | OUTPATIENT
Start: 2022-05-09 | End: 2022-05-13 | Stop reason: HOSPADM

## 2022-05-09 RX ORDER — ONDANSETRON 4 MG/1
4 TABLET, ORALLY DISINTEGRATING ORAL
Status: DISCONTINUED | OUTPATIENT
Start: 2022-05-09 | End: 2022-05-13 | Stop reason: HOSPADM

## 2022-05-09 RX ORDER — ONDANSETRON 2 MG/ML
4 INJECTION INTRAMUSCULAR; INTRAVENOUS
Status: DISCONTINUED | OUTPATIENT
Start: 2022-05-09 | End: 2022-05-13 | Stop reason: HOSPADM

## 2022-05-09 RX ORDER — ACETAMINOPHEN 325 MG/1
650 TABLET ORAL
Status: DISCONTINUED | OUTPATIENT
Start: 2022-05-09 | End: 2022-05-13 | Stop reason: HOSPADM

## 2022-05-09 RX ORDER — FLUOXETINE 10 MG/1
10 CAPSULE ORAL DAILY
Status: DISCONTINUED | OUTPATIENT
Start: 2022-05-09 | End: 2022-05-13 | Stop reason: HOSPADM

## 2022-05-09 RX ORDER — ACETAMINOPHEN 650 MG/1
650 SUPPOSITORY RECTAL
Status: DISCONTINUED | OUTPATIENT
Start: 2022-05-09 | End: 2022-05-13 | Stop reason: HOSPADM

## 2022-05-09 RX ORDER — LEVETIRACETAM 500 MG/1
1000 TABLET ORAL DAILY
Status: DISCONTINUED | OUTPATIENT
Start: 2022-05-09 | End: 2022-05-13 | Stop reason: HOSPADM

## 2022-05-09 RX ORDER — SODIUM CHLORIDE 0.9 % (FLUSH) 0.9 %
5-40 SYRINGE (ML) INJECTION EVERY 8 HOURS
Status: DISCONTINUED | OUTPATIENT
Start: 2022-05-09 | End: 2022-05-13 | Stop reason: HOSPADM

## 2022-05-09 RX ORDER — LACOSAMIDE 100 MG/1
200 TABLET ORAL 2 TIMES DAILY
Status: DISCONTINUED | OUTPATIENT
Start: 2022-05-09 | End: 2022-05-13 | Stop reason: HOSPADM

## 2022-05-09 RX ORDER — MIDODRINE HYDROCHLORIDE 5 MG/1
5 TABLET ORAL
Status: DISCONTINUED | OUTPATIENT
Start: 2022-05-09 | End: 2022-05-13 | Stop reason: HOSPADM

## 2022-05-09 RX ORDER — PANTOPRAZOLE SODIUM 40 MG/1
40 TABLET, DELAYED RELEASE ORAL
Status: DISCONTINUED | OUTPATIENT
Start: 2022-05-09 | End: 2022-05-13 | Stop reason: HOSPADM

## 2022-05-09 RX ORDER — LEVOTHYROXINE SODIUM 100 UG/1
200 TABLET ORAL
Status: DISCONTINUED | OUTPATIENT
Start: 2022-05-09 | End: 2022-05-13 | Stop reason: HOSPADM

## 2022-05-09 RX ORDER — ENOXAPARIN SODIUM 100 MG/ML
30 INJECTION SUBCUTANEOUS DAILY
Status: DISCONTINUED | OUTPATIENT
Start: 2022-05-09 | End: 2022-05-13 | Stop reason: HOSPADM

## 2022-05-09 RX ORDER — FUROSEMIDE 10 MG/ML
40 INJECTION INTRAMUSCULAR; INTRAVENOUS ONCE
Status: COMPLETED | OUTPATIENT
Start: 2022-05-09 | End: 2022-05-09

## 2022-05-09 RX ORDER — SEVELAMER CARBONATE 800 MG/1
800 TABLET, FILM COATED ORAL
Status: DISCONTINUED | OUTPATIENT
Start: 2022-05-09 | End: 2022-05-13 | Stop reason: HOSPADM

## 2022-05-09 RX ORDER — POLYETHYLENE GLYCOL 3350 17 G/17G
17 POWDER, FOR SOLUTION ORAL DAILY PRN
Status: DISCONTINUED | OUTPATIENT
Start: 2022-05-09 | End: 2022-05-13 | Stop reason: HOSPADM

## 2022-05-09 RX ORDER — ATORVASTATIN CALCIUM 40 MG/1
40 TABLET, FILM COATED ORAL DAILY
Status: DISCONTINUED | OUTPATIENT
Start: 2022-05-09 | End: 2022-05-13 | Stop reason: HOSPADM

## 2022-05-09 RX ADMIN — SEVELAMER CARBONATE 800 MG: 800 TABLET, FILM COATED ORAL at 09:12

## 2022-05-09 RX ADMIN — METOPROLOL SUCCINATE 12.5 MG: 25 TABLET, EXTENDED RELEASE ORAL at 09:12

## 2022-05-09 RX ADMIN — SEVELAMER CARBONATE 800 MG: 800 TABLET, FILM COATED ORAL at 17:48

## 2022-05-09 RX ADMIN — SODIUM CHLORIDE, PRESERVATIVE FREE 10 ML: 5 INJECTION INTRAVENOUS at 15:47

## 2022-05-09 RX ADMIN — LIDOCAINE HYDROCHLORIDE 30 ML: 20 SOLUTION TOPICAL at 07:30

## 2022-05-09 RX ADMIN — ATORVASTATIN CALCIUM 40 MG: 40 TABLET, FILM COATED ORAL at 09:12

## 2022-05-09 RX ADMIN — SODIUM CHLORIDE, PRESERVATIVE FREE 10 ML: 5 INJECTION INTRAVENOUS at 20:31

## 2022-05-09 RX ADMIN — LACOSAMIDE 200 MG: 100 TABLET, FILM COATED ORAL at 20:31

## 2022-05-09 RX ADMIN — HEPARIN SODIUM 3200 UNITS: 1000 INJECTION, SOLUTION INTRAVENOUS; SUBCUTANEOUS at 15:00

## 2022-05-09 RX ADMIN — FUROSEMIDE 40 MG: 10 INJECTION INTRAMUSCULAR; INTRAVENOUS at 09:20

## 2022-05-09 RX ADMIN — MIDODRINE HYDROCHLORIDE 5 MG: 5 TABLET ORAL at 17:48

## 2022-05-09 RX ADMIN — MIDODRINE HYDROCHLORIDE 5 MG: 5 TABLET ORAL at 09:12

## 2022-05-09 RX ADMIN — LEVETIRACETAM 1000 MG: 500 TABLET, FILM COATED ORAL at 09:12

## 2022-05-09 RX ADMIN — FLUOXETINE HYDROCHLORIDE 10 MG: 10 CAPSULE ORAL at 09:12

## 2022-05-09 RX ADMIN — ENOXAPARIN SODIUM 30 MG: 100 INJECTION SUBCUTANEOUS at 09:13

## 2022-05-09 RX ADMIN — ACETAMINOPHEN 650 MG: 325 TABLET ORAL at 20:31

## 2022-05-09 RX ADMIN — PANTOPRAZOLE SODIUM 40 MG: 40 TABLET, DELAYED RELEASE ORAL at 09:12

## 2022-05-09 RX ADMIN — LEVOTHYROXINE SODIUM 200 MCG: 0.1 TABLET ORAL at 09:12

## 2022-05-09 RX ADMIN — LACOSAMIDE 200 MG: 100 TABLET, FILM COATED ORAL at 09:12

## 2022-05-09 NOTE — H&P
History and Physical    Patient: Zoey Canada MRN: [id number]  SSN: [id number]    YOB: 1950  Age: 71 y.o. Sex: female      Subjective:      Zoey Canada is a 71 y.o. female with PMH of atrial fibrillation with pacemaker, hypertension, diabetes, ESRD on hemodialysis (Mon-Wed-Fri), depression, hypothyroidism, seizure and stroke. She presented to the ED with chief complaint of chest pain and shortness of breath. Her symptoms started 3 days ago and reports pressure like chest pain worsening with mild exertion, radiating to epigastric area. Also associated with shortness of breath, worsening with sitting up. No orthopnea or LE swelling. No fever, chills, cough or wheezing. Admits to missing hemodialysis session occasionally, but last hemodialysis session was Friday. In the ED, noted to have have hypertension. Also put on 2L NC, which she uses occasionally at home. No leukocytosis. Troponin negative, but Pro-BNP elevated. Also noted YUNI. Chest X-ray showed small b/l pleural effusion with pulmonary edema.      Past Medical History:   Diagnosis Date    Anxiety disorder     Arthritis     Atherosclerosis of native arteries of the extremities with ulceration ([address] 8/31/2020    Atrial fibrillation (HCC)     Cardiac pacemaker in situ     Cellulitis and abscess of trunk 8/31/2020    Depression, postpartum     Diabetes ([address]     Heart disease     Heartburn     Hx of long term use of blood thinners     Hypercholesterolemia     Hypertension     Hypothyroidism     Migraines     Peripheral venous insufficiency 8/31/2020    Seizures ([address]     Sleep disorder     Stroke ([address]     2019    Varicose veins of lower extremity with inflammation 8/31/2020     Past Surgical History:   Procedure Laterality Date    HX BACK SURGERY      HX OTHER SURGICAL      leg surgery     HX OTHER SURGICAL      pacemaker monitor     IR INSERT TUNL CVC W/O PORT OVER 5 YR  12/10/2021      Family History   Problem Relation Age of Onset    Heart Disease Mother     Heart Disease Father     Diabetes Sister     Diabetes Brother      Social History     Tobacco Use    Smoking status: Never Smoker    Smokeless tobacco: Never Used   Substance Use Topics    Alcohol use: Never      Prior to Admission medications    Medication Sig Start Date End Date Taking? Authorizing Provider   traMADoL (ULTRAM) 50 mg tablet Take 1 Tablet by mouth two (2) times daily as needed for Pain for up to 30 days. Max Daily Amount: 100 mg. 4/26/22 5/26/22  Megha Rubio MD   pantoprazole (Protonix) 40 mg tablet Take 1 Tablet by mouth daily. Patient not taking: Reported on 4/26/2022 4/20/22   Ozzie Anton MD   levETIRAcetam (keppra xr) 500 mg ER tablet Take 1,000 mg by mouth daily. Provider, Historical   levothyroxine (SYNTHROID) 200 mcg tablet Take 1 Tablet by mouth Daily (before breakfast). 4/14/22   Kiera Guallpa MD   glucose 4 gram chewable tablet Take 4 Tablets by mouth as needed (hypoglycemia). 4/13/22   Kiera Guallpa MD   Blood-Glucose Meter monitoring kit Please check your blood sugar in the morning 4/13/22   Kiera Guallpa MD   lancets misc Please check your blood sugar in the morning 4/13/22   Kiera Guallpa MD   Lancing Device misc 1 Each by Does Not Apply route daily. 4/13/22   Kiera Guallpa MD   alcohol swabs padm Please check your blood sugar daily 4/13/22   Kiera Guallpa MD   midodrine (PROAMATINE) 5 mg tablet Take 5 mg by mouth three (3) times daily (with meals). Provider, Historical   metoprolol succinate (TOPROL-XL) 25 mg XL tablet Take 12.5 mg by mouth daily. Provider, Historical   nystatin (MYCOSTATIN) powder Apply  to affected area four (4) times daily. 3/22/22   Mk Saldivar NP   Spirometers and Accessories avril 1 Each by Does Not Apply route three (3) times daily. 3/8/22   Mk Saldivar NP   FLUoxetine (PROzac) 10 mg capsule Take 1 Capsule by mouth daily for 270 days.  2/24/22 11/21/22 Alberto Salamanca NP   pantoprazole (PROTONIX) 40 mg tablet Take 1 Tablet by mouth Daily (before breakfast). Indications: bleeding from stomach, esophagus or duodenum 2/24/22   Alberto Salamanca NP   ondansetron (Zofran ODT) 4 mg disintegrating tablet 1 Tablet by SubLINGual route every eight (8) hours as needed for Nausea or Vomiting. 2/24/22   Alberto Salamanca NP   food supplemt, lactose-reduced (Ensure Active High Protein) liqd Take 237 mL by mouth four (4) times daily as needed for PRN Reason (Other) (as pt can tolerate). 2/10/22   Alberto Salamanca NP   zinc oxide-white petrolatum 17-57 % topical paste Apply  to affected area as needed for Skin Irritation. Indications: skin irritation 12/11/21   Tanner Riggs MD   Vimpat 200 mg tab tablet Take 1 Tablet by mouth two (2) times a day. 11/19/21   Provider, Historical   atorvastatin (LIPITOR) 40 mg tablet Take 1 tablet by mouth once daily for 90 days 11/22/21 8/19/22  Danae Plata MD   sevelamer carbonate (RENVELA) 800 mg tab tab  5/10/21   Provider, Historical        Allergies   Allergen Reactions    Lactose Diarrhea       Review of Systems:   Constitutional: No fevers, No chills, No fatigue, No weakness  Eyes: No visual disturbance  Ears, Nose, Mouth, Throat, and Face: No nasal congestion, No sore throat  Respiratory: See HPI. Cardiovascular: See HPI.    Gastrointestinal: No nausea, No vomiting, No diarrhea, No constipation, No abdominal pain  Genitourinary: No frequency, No dysuria, No hematuria  Integument/Breast: No rash, No skin lesion(s), No dryness  Musculoskeletal: No arthralgias, No neck pain, No back pain  Neurological: No headaches, No dizziness, No confusion,  No seizures  Behavioral/Psychiatric: No anxiety, No depression      Objective:     Vitals:    05/09/22 0021 05/09/22 0128 05/09/22 0151 05/09/22 0348   BP:  (!) 155/87 (!) 163/94 (!) 145/89   Pulse:  77 72 67   Resp:  20 22 20   Temp:       SpO2: 98% 95% 97% 98% Weight:       Height:            Physical Exam:   General: alert, cooperative, in moderate distress  Eye: conjunctivae/corneas clear. PERRL, EOM's intact. Throat and Neck: normal and no erythema or exudates noted. No mass   Lung: Crackles heard on bibasilar lungs on auscultation   Heart: regular rate and rhythm,   Abdomen: soft, non-tender. Bowel sounds normal. No masses,  Extremities:  No LE edema. able to move all extremities normal, bruises seen on extremities. Skin: Normal.  Neurologic:  Motor function and sensation grossly intact. Psychiatric: non focal    Recent Results (from the past 24 hour(s))   CBC WITH AUTOMATED DIFF    Collection Time: 05/09/22 12:50 AM   Result Value Ref Range    WBC 7.1 3.6 - 11.0 K/uL    RBC 4.49 3.80 - 5.20 M/uL    HGB 13.7 11.5 - 16.0 g/dL    HCT 45.8 35.0 - 47.0 %    .0 (H) 80.0 - 99.0 FL    MCH 30.5 26.0 - 34.0 PG    MCHC 29.9 (L) 30.0 - 36.5 g/dL    RDW 17.3 (H) 11.5 - 14.5 %    PLATELET 714 319 - 366 K/uL    MPV 10.2 8.9 - 12.9 FL    NRBC 0.0 0.0  WBC    ABSOLUTE NRBC 0.00 0.00 - 0.01 K/uL    NEUTROPHILS 55 32 - 75 %    LYMPHOCYTES 36 12 - 49 %    MONOCYTES 7 5 - 13 %    EOSINOPHILS 1 0 - 7 %    BASOPHILS 1 0 - 1 %    IMMATURE GRANULOCYTES 0 0 - 0.5 %    ABS. NEUTROPHILS 3.9 1.8 - 8.0 K/UL    ABS. LYMPHOCYTES 2.5 0.8 - 3.5 K/UL    ABS. MONOCYTES 0.5 0.0 - 1.0 K/UL    ABS. EOSINOPHILS 0.1 0.0 - 0.4 K/UL    ABS. BASOPHILS 0.1 0.0 - 0.1 K/UL    ABS. IMM.  GRANS. 0.0 0.00 - 0.04 K/UL    DF AUTOMATED     METABOLIC PANEL, COMPREHENSIVE    Collection Time: 05/09/22 12:50 AM   Result Value Ref Range    Sodium 140 136 - 145 mmol/L    Potassium 4.1 3.5 - 5.1 mmol/L    Chloride 107 97 - 108 mmol/L    CO2 27 21 - 32 mmol/L    Anion gap 6 5 - 15 mmol/L    Glucose 55 (L) 65 - 100 mg/dL    BUN 36 (H) 6 - 20 mg/dL    Creatinine 3.38 (H) 0.55 - 1.02 mg/dL    BUN/Creatinine ratio 11 (L) 12 - 20      GFR est AA 16 (L) >60 ml/min/1.73m2    GFR est non-AA 13 (L) >60 ml/min/1.73m2 Calcium 7.6 (L) 8.5 - 10.1 mg/dL    Bilirubin, total 0.3 0.2 - 1.0 mg/dL    AST (SGOT) 20 15 - 37 U/L    ALT (SGPT) 28 12 - 78 U/L    Alk. phosphatase 129 (H) 45 - 117 U/L    Protein, total 5.0 (L) 6.4 - 8.2 g/dL    Albumin 2.0 (L) 3.5 - 5.0 g/dL    Globulin 3.0 2.0 - 4.0 g/dL    A-G Ratio 0.7 (L) 1.1 - 2.2     TROPONIN-HIGH SENSITIVITY    Collection Time: 05/09/22 12:50 AM   Result Value Ref Range    Troponin-High Sensitivity 28 0 - 51 ng/L   NT-PRO BNP    Collection Time: 05/09/22 12:50 AM   Result Value Ref Range    NT pro-BNP 14,451 (H) <125 pg/mL   TROPONIN-HIGH SENSITIVITY    Collection Time: 05/09/22  3:47 AM   Result Value Ref Range    Troponin-High Sensitivity 26 0 - 51 ng/L       XR Results (maximum last 3): Results from Hospital Encounter encounter on 05/09/22    XR CHEST PORT    Narrative  Study: Chest radiograph(s), 1 view    Clinical Indication: Shortness of breath. Comparison: Chest radiograph dated 4/11/2022. Findings:    Support apparatus is unchanged in position. The cardiac silhouette is unchanged in size. Central vascular congestion and patchy perihilar opacities. Small bilateral  pleural effusions. No discernible pneumothorax. Osteopenia. Impression  Small bilateral pleural effusions and patchy perihilar opacities most likely  representing mild pulmonary edema. Results from [facility] [place] encounter on 04/11/22    XR HIP RT W OR WO PELV 2-3 VWS    Narrative  Right hip, 2 views    Osteopenia. No plain film evidence for right hip fracture. Pelvic and right  lower extremity arteriosclerosis. XR HIP LT W OR WO PELV 2-3 VWS    Narrative  Left hip, 2 views    Intramedullary erika left femur. Osteopenia. No plain film evidence for fracture or dislocation left hip. Advanced atherosclerosis pelvic and lower extremity arteries. CT Results (maximum last 3):   Results from [place] [facility] encounter on 04/11/22    CT MAXILLOFACIAL WO CONT    Narrative  CT SCAN OF THE BRAIN WITHOUT CONTRAST:        CLINICAL HISTORY: Head injury. Thin axial and coronal and sagittal reconstructions were obtained. All CT scans at this facility are performed using dose reduction optimization  techniques as appropriate to a performed exam including the following: Automated  exposure control, adjustments of the mA and/or kV according to patient size, or  use of iterative reconstruction technique. Comparison is made with a previous CT brain examination of April 7, 2022. Mild  central and cortical atrophy is again noted. Microvascular ischemic changes are  again noted. Ischemic changes in the left inferior parietal lobule are again  noted and unchanged. No intra or extra-axial hemorrhage or acute infarction a  major arterial distribution is demonstrated. Bony calvarium is intact. Scans through the posterior fossa show no major infarction, mass or hemorrhage. The cerebellar tonsils are at the level of the foramen magnum in a satisfactory  position. Impression  No acute intracranial abnormality. CT FACIAL BONES WITHOUT INTRAVENOUS CONTRAST:    CLINICAL HISTORY: Injury to the face. Dose Reduction:    All CT scans at this facility are performed using dose reduction optimization  techniques as appropriate to a performed exam including the following: Automated  exposure control, adjustments of the mA and/or kV according to patient size, or  use of iterative reconstruction technique. Thin axial and coronal and sagittal reconstructions were obtained. Comparison is made with a previous CT facial bone examination April 7. There has  been no interval change. Available scans through the orbits show no fracture. Optic globes and optic  nerves have a normal CT appearance. Both zygomatic arches are intact. No  fracture of the mandible or maxilla is noted. Minimally displaced fracture of  the right nasal bone is again noted. No other fractures were identified.  .    Mild mucosal changes and small fluid levels within the sphenoid sinus are again  noted and unchanged. Mastoid air cells are clear. Visualized soft tissues of the neck have a normal CT appearance. IMPRESSION: No interval change. Fracture of the right nasal bone minimally displacement. CT HEAD WO CONT    Narrative  CT SCAN OF THE BRAIN WITHOUT CONTRAST:        CLINICAL HISTORY: Head injury. Thin axial and coronal and sagittal reconstructions were obtained. All CT scans at this facility are performed using dose reduction optimization  techniques as appropriate to a performed exam including the following: Automated  exposure control, adjustments of the mA and/or kV according to patient size, or  use of iterative reconstruction technique. Comparison is made with a previous CT brain examination of April 7, 2022. Mild  central and cortical atrophy is again noted. Microvascular ischemic changes are  again noted. Ischemic changes in the left inferior parietal lobule are again  noted and unchanged. No intra or extra-axial hemorrhage or acute infarction a  major arterial distribution is demonstrated. Bony calvarium is intact. Scans through the posterior fossa show no major infarction, mass or hemorrhage. The cerebellar tonsils are at the level of the foramen magnum in a satisfactory  position. Impression  No acute intracranial abnormality. CT FACIAL BONES WITHOUT INTRAVENOUS CONTRAST:    CLINICAL HISTORY: Injury to the face. Dose Reduction:    All CT scans at this facility are performed using dose reduction optimization  techniques as appropriate to a performed exam including the following: Automated  exposure control, adjustments of the mA and/or kV according to patient size, or  use of iterative reconstruction technique. Thin axial and coronal and sagittal reconstructions were obtained. Comparison is made with a previous CT facial bone examination April 7.  There has  been no interval change. Available scans through the orbits show no fracture. Optic globes and optic  nerves have a normal CT appearance. Both zygomatic arches are intact. No  fracture of the mandible or maxilla is noted. Minimally displaced fracture of  the right nasal bone is again noted. No other fractures were identified. .    Mild mucosal changes and small fluid levels within the sphenoid sinus are again  noted and unchanged. Mastoid air cells are clear. Visualized soft tissues of the neck have a normal CT appearance. IMPRESSION: No interval change. Fracture of the right nasal bone minimally displacement. Results from [place] encounter on 04/07/22    CT SPINE CERV WO CONT    Narrative  CT dose reduction was achieved through use of a standardized protocol tailored  for this examination and automatic exposure control for dose modulation. Protocol study shows no fracture, subluxation or prevertebral edema. Mild to  moderate DDD at most levels    Small calcified disc herniations at C3-4, C4-5 and C5-C6, with small  osteophytes. Spinal canal is not significantly stenotic    Moderate diffuse facet DJD, most pronounced on the right at C4-5, without  dominant foraminal stenosis. Craniocervical junction is maintained      MRI Results (maximum last 3): Results from Hospital Encounter encounter on 01/07/22    MRI BRAIN WO CONT    Narrative  MRI BRAIN WITHOUT CONTRAST:    Clinical History: CVA. T1 and T2 weighted images were obtained in the axial and coronal planes. Diffusion weighted images were also obtained. Available scans show the ventricles to be midline and of normal size. Mild  cortical atrophy is noted. Moderate microvascular ischemic changes are noted. Chronic lacunar infarcts in the left basal ganglia are noted. Chronic infarction  and encephalomalacia changes in the left occipital lobe are noted and unchanged  since May 2021.  Diffusion-weighted images show no acute infarction. Gradient  echo images show no intra or extra-axial hemorrhage. Pituitary gland is of  normal size. Scans through the posterior fossa show mild microvascular ischemic changes in  the michael. There is a 5 x4  mm in size high intensity lesion (T1-weighted images)  in the right cerebellar pontine fissure that is consistent with a small lipoma  and representing an incidental finding. No acute infarction or hemorrhage or  mass in the posterior fossa were demonstrated. Signal void is noted in both internal carotid arteries and the basilar artery  consistent with patency of these vessels. The cerebellar tonsils are at the level of the foramen magnum in a satisfactory  position. Both orbits have a normal MR appearance. Mild mucosal thickening in the ethmoid air cells and frontal sinus is noted. Impression  Microvascular ischemic changes as described above. Chronic lacunar  infarcts left basal ganglia. Chronic infarction left occipital lobe. 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 Diagnosis:    /   NUMBNESS  Examination:  MRI MIGUEL Paiz ABRAHAM CASILLAS CON  - [id number] - May 20 2010 11:08AM  Accession No:  [id number]  Reason:  NUMBNESS      REPORT:  INDICATION:  Right-sided weakness and numbness    COMPARISON: None    TECHNIQUE:  MR imaging of the lumbar spine was performed with sagittal T1, T2, STIR;  axial T1, T2.  , sagittal and axial T1-weighted images following the IV  injection of 20 mL Magnevist    FINDINGS:  . Vertebral body heights are maintained. There are no suspicious marrow  abnormalities. The conus medullaris terminates at L1. The paraspinous soft  tissues are unremarkable. The conus appears normal. No intraspinal masses or  areas of abnormal enhancement are demonstrated.     T12/L1:  No stenosis or foraminal narrowing. L1/2:  No stenosis or foraminal narrowing. L2/3:  No stenosis or foraminal narrowing. L3/4: Mild facet arthrosis with grade 1 spondylolisthesis. Intervertebral  osteochondrosis with loss of height of the disc, mild spurring, and moderate  central disc protrusion. Moderately severe central spinal stenosis. Mild  left foraminal narrowing. L4/5:  Mild to moderate facet arthrosis. Intervertebral osteochondrosis with  loss of height of the disc, spondylosis, and broad-based posterior disc  protrusion, with some asymmetry to the right. Mild central stenosis. Indentation of the central and right side of the thecal sac. Bilateral  foraminal narrowing, right greater than left. L5/S1: Mild facet hypertrophy. No stenosis or foraminal narrowing. IMPRESSION:  Degenerative changes at L3-L4 and L4-L5, as described in detail  above. Stenosis at L3-L4 and to a  lesser degree at L4-L5. Interpreting/Reading Doctor: Sue Godfrey ([id number])  Transcribed: n/a on 05/20/2010  Approved: Sue Godfrey ([id number])  05/20/2010        Distribution:  Attending Doctor: Kenisha Mishra Doctor: Rabia Escoto      MRI SPINE CERVICAL W AND W/O CONT    Narrative  Final Report      ICD Codes / Adm. Diagnosis:    /   NUMBNESS  Examination:  MRI Monique Nogueira CON  - [id number] - May 20 2010 11:18AM  Accession No:  [id number]  Reason:  NUMBNESS      REPORT:  INDICATION: Weakness and numbness of the body, right greater than left    Comparisons: None  STUDY PARAMETERS: Sagittal T1, T2, and STIR. Axial T1, gradient echo,  sagittal and axial T1-weighted images following the IV injection of 20 mL  Magnevist    FINDINGS:    There are no suspicious marrow lesions or evidence of acute bony trauma. The  craniocervical junction and spinal cord appear normal. Paraspinous soft  tissues are within normal limits. There are no masses or areas of abnormal  enhancement.     The cervical discs demonstrate normal height and signal intensity. There is  no evidence of significant disc herniation, bulge, or spondylosis. The  spinal canal is normal in size throughout. There is no significant foraminal  narrowing. IMPRESSION: No significant abnormalities. Interpreting/Reading Doctor: Edelmira Hammer ([id number])  Transcribed: n/a on 05/20/2010  Approved: Edelmira Hammer ([id number])  05/20/2010        Distribution:  Attending Doctor: Eusebia Jennings Doctor: Krystian Durant      Nuclear Medicine Results (maximum last 3): No results found for this or any previous visit. US Results (maximum last 3): No results found for this or any previous visit. Assessment and plan:   # Pulmonary edema  - suspect the cause of her chest pressure and shortness of breath. - Possibly secondary to ESRD  - Last ECHO (2/2022) showed normal systolic and diastolic function.   - Trial of 1 time IV lasix  - consult nephrology for hemodialysis. # Chest pain  - troponin negative x 2. EKG no ALEX.   - Likely secondary to above. - Trial of GI cocktail  - Continue to monitor     # Hypertension  - Continue home medications. # ESRD  - consult nephrology   - Continue home medications. # Hypothyroidism  - Continue home medications.          Signed By: Anum Neumann MD     May 9, 2022

## 2022-05-09 NOTE — ED TRIAGE NOTES
Pt to ER via EMS. Pt reports chest pain and SOB x 2 days. Hx of diabetes, renal failure, CVA no known deficits, seizures and afib.

## 2022-05-09 NOTE — Clinical Note
Patient Class[de-identified] OBSERVATION [104]   Type of Bed: Telemetry [19]   Cardiac Monitoring Required?: Yes   Reason for Observation: chest pain   Admitting Diagnosis: Chest pain [[id number]]   Admitting Physician: [facility] [[id number]]   Attending Physician: [facility] [[id number]]

## 2022-05-09 NOTE — ED PROVIDER NOTES
[facility]  EMERGENCY DEPARTMENT ENCOUNTER NOTE    Date: 5/9/2022  Patient Name: Armando Koch      History of Presenting Illness     Chief Complaint   Patient presents with    Shortness of Breath    Chest Pain       History Provided By: Patient    HPI: Armando Koch, 71 y.o. female with multiple medical problems as below presenting to the ED with chest pain, retrosternal, nothing specific makes it better or worse, pressure-like, nonradiating associated with shortness of breath. This been going for several days. Patient reports that she had multiple similar episodes in the past similar to this. She reports history of chronic coughing that has not changed from before. No fever or chills. There are no other complaints, changes, or physical findings at this time. PCP: Kathryn Mcmanus MD    Current Facility-Administered Medications   Medication Dose Route Frequency Provider Last Rate Last Admin    atorvastatin (LIPITOR) tablet 40 mg  40 mg Oral DAILY Lizette Mcpherson MD        FLUoxetine (PROzac) capsule 10 mg  10 mg Oral Malka Quesada MD        . PHARMACY TO SUBSTITUTE PER PROTOCOL (Reordered from: food supplemt, lactose-reduced (Ensure Active High Protein) liqd)    Per Protocol Lizette Mcpherson MD        levothyroxine (SYNTHROID) tablet 200 mcg  200 mcg Oral ACB Lizette Mcpherson MD        metoprolol succinate (TOPROL-XL) XL tablet 12.5 mg  12.5 mg Oral DAILY Lizette Mcpherson MD        midodrine (PROAMATINE) tablet 5 mg  5 mg Oral TID WITH MEALS Lizette Mcpherson MD        pantoprazole (PROTONIX) tablet 40 mg  40 mg Oral ACB Lizette Mcpherson MD        sevelamer carbonate (RENVELA) tab 800 mg  800 mg Oral TID WITH MEALS Lizette Mcpherson MD        lacosamide (VIMPAT) tablet 200 mg  200 mg Oral BID Waleska Auguste MD        sodium chloride (NS) flush 5-40 mL  5-40 mL IntraVENous Q8H Lizette Mcpherson MD        sodium chloride (NS) flush 5-40 mL  5-40 mL IntraVENous PRN Padmini Andersen MD        acetaminophen (TYLENOL) tablet 650 mg  650 mg Oral Q6H PRN Palma Mcpherson MD        Or    acetaminophen (TYLENOL) suppository 650 mg  650 mg Rectal Q6H PRN Palma Mcpherson MD        polyethylene glycol (MIRALAX) packet 17 g  17 g Oral DAILY PRN Palma Mcpherson MD        ondansetron (ZOFRAN ODT) tablet 4 mg  4 mg Oral Q8H PRN Palma Mcpherson MD        Or    ondansetron (ZOFRAN) injection 4 mg  4 mg IntraVENous Q6H PRN Palma Mcpherson MD        enoxaparin (LOVENOX) injection 30 mg  30 mg SubCUTAneous DAILY Palma Mcpherson MD        furosemide (LASIX) injection 40 mg  40 mg IntraVENous ONCE Palma Mcpherson MD         Current Outpatient Medications   Medication Sig Dispense Refill    traMADoL (ULTRAM) 50 mg tablet Take 1 Tablet by mouth two (2) times daily as needed for Pain for up to 30 days. Max Daily Amount: 100 mg. 60 Tablet 1    pantoprazole (Protonix) 40 mg tablet Take 1 Tablet by mouth daily. (Patient not taking: Reported on 4/26/2022) 30 Tablet 4    levETIRAcetam (keppra xr) 500 mg ER tablet Take 1,000 mg by mouth daily.  levothyroxine (SYNTHROID) 200 mcg tablet Take 1 Tablet by mouth Daily (before breakfast). 30 Tablet 0    glucose 4 gram chewable tablet Take 4 Tablets by mouth as needed (hypoglycemia). 120 Tablet 0    Blood-Glucose Meter monitoring kit Please check your blood sugar in the morning 1 Kit 0    lancets misc Please check your blood sugar in the morning 30 Each 0    Lancing Device misc 1 Each by Does Not Apply route daily. 1 Each 0    alcohol swabs padm Please check your blood sugar daily 50 Pad each    midodrine (PROAMATINE) 5 mg tablet Take 5 mg by mouth three (3) times daily (with meals).  metoprolol succinate (TOPROL-XL) 25 mg XL tablet Take 12.5 mg by mouth daily.  nystatin (MYCOSTATIN) powder Apply  to affected area four (4) times daily.  60 g 2    Spirometers and Accessories avril 1 Each by Does Not Apply route three (3) times daily. 1 Each 1    FLUoxetine (PROzac) 10 mg capsule Take 1 Capsule by mouth daily for 270 days. 90 Capsule 2    pantoprazole (PROTONIX) 40 mg tablet Take 1 Tablet by mouth Daily (before breakfast). Indications: bleeding from stomach, esophagus or duodenum 90 Tablet 2    ondansetron (Zofran ODT) 4 mg disintegrating tablet 1 Tablet by SubLINGual route every eight (8) hours as needed for Nausea or Vomiting. 30 Tablet 2    food supplemt, lactose-reduced (Ensure Active High Protein) liqd Take 237 mL by mouth four (4) times daily as needed for PRN Reason (Other) (as pt can tolerate). 60 Each 5    zinc oxide-white petrolatum 17-57 % topical paste Apply  to affected area as needed for Skin Irritation. Indications: skin irritation 113 g 0    Vimpat 200 mg tab tablet Take 1 Tablet by mouth two (2) times a day.       atorvastatin (LIPITOR) 40 mg tablet Take 1 tablet by mouth once daily for 90 days 90 Tablet 3    sevelamer carbonate (RENVELA) 800 mg tab tab          Past History     Past Medical History:  Past Medical History:   Diagnosis Date    Anxiety disorder     Arthritis     Atherosclerosis of native arteries of the extremities with ulceration ([address] 8/31/2020    Atrial fibrillation ([address]     Cardiac pacemaker in situ     Cellulitis and abscess of trunk 8/31/2020    Depression, postpartum     Diabetes ([address]     Heart disease     Heartburn     Hx of long term use of blood thinners     Hypercholesterolemia     Hypertension     Hypothyroidism     Migraines     Peripheral venous insufficiency 8/31/2020    Seizures ([address]     Sleep disorder     Stroke ([address]     2019    Varicose veins of lower extremity with inflammation 8/31/2020       Past Surgical History:  Past Surgical History:   Procedure Laterality Date    HX BACK SURGERY      HX OTHER SURGICAL      leg surgery     HX OTHER SURGICAL      pacemaker monitor     IR INSERT TUNL CVC W/O PORT OVER 5 YR  12/10/2021       Family History:  Family History   Problem Relation Age of Onset    Heart Disease Mother     Heart Disease Father     Diabetes Sister     Diabetes Brother        Social History:  Social History     Tobacco Use    Smoking status: Never Smoker    Smokeless tobacco: Never Used   Vaping Use    Vaping Use: Never used   Substance Use Topics    Alcohol use: Never    Drug use: Never       Allergies: Allergies   Allergen Reactions    Lactose Diarrhea         Review of Systems     Review of Systems    A 10 point review of system was performed and was negative except as noted above in HPI    Physical Exam     Physical Exam  Vitals and nursing note reviewed. Constitutional:       General: She is not in acute distress. Appearance: She is well-developed. She is not diaphoretic. HENT:      Head: Normocephalic and atraumatic. Eyes:      Extraocular Movements: Extraocular movements intact. Conjunctiva/sclera: Conjunctivae normal.   Cardiovascular:      Rate and Rhythm: Normal rate and regular rhythm. Heart sounds: Normal heart sounds. Pulmonary:      Effort: Pulmonary effort is normal.      Breath sounds: Normal breath sounds. Abdominal:      Palpations: Abdomen is soft. Tenderness: There is no abdominal tenderness. Musculoskeletal:      Cervical back: Neck supple. Right lower leg: No tenderness. No edema. Left lower leg: No tenderness. No edema. Comments: AV fistula right upper extremity   Neurological:      General: No focal deficit present. Mental Status: She is alert and oriented to person, place, and time.          Lab and Diagnostic Study Results     Labs -     Recent Results (from the past 12 hour(s))   CBC WITH AUTOMATED DIFF    Collection Time: 05/09/22 12:50 AM   Result Value Ref Range    WBC 7.1 3.6 - 11.0 K/uL    RBC 4.49 3.80 - 5.20 M/uL    HGB 13.7 11.5 - 16.0 g/dL    HCT 45.8 35.0 - 47.0 %    .0 (H) 80.0 - 99.0 FL    MCH 30.5 26.0 - 34.0 PG    MCHC 29.9 (L) 30.0 - 36.5 g/dL RDW 17.3 (H) 11.5 - 14.5 %    PLATELET 844 862 - 233 K/uL    MPV 10.2 8.9 - 12.9 FL    NRBC 0.0 0.0  WBC    ABSOLUTE NRBC 0.00 0.00 - 0.01 K/uL    NEUTROPHILS 55 32 - 75 %    LYMPHOCYTES 36 12 - 49 %    MONOCYTES 7 5 - 13 %    EOSINOPHILS 1 0 - 7 %    BASOPHILS 1 0 - 1 %    IMMATURE GRANULOCYTES 0 0 - 0.5 %    ABS. NEUTROPHILS 3.9 1.8 - 8.0 K/UL    ABS. LYMPHOCYTES 2.5 0.8 - 3.5 K/UL    ABS. MONOCYTES 0.5 0.0 - 1.0 K/UL    ABS. EOSINOPHILS 0.1 0.0 - 0.4 K/UL    ABS. BASOPHILS 0.1 0.0 - 0.1 K/UL    ABS. IMM. GRANS. 0.0 0.00 - 0.04 K/UL    DF AUTOMATED     METABOLIC PANEL, COMPREHENSIVE    Collection Time: 05/09/22 12:50 AM   Result Value Ref Range    Sodium 140 136 - 145 mmol/L    Potassium 4.1 3.5 - 5.1 mmol/L    Chloride 107 97 - 108 mmol/L    CO2 27 21 - 32 mmol/L    Anion gap 6 5 - 15 mmol/L    Glucose 55 (L) 65 - 100 mg/dL    BUN 36 (H) 6 - 20 mg/dL    Creatinine 3.38 (H) 0.55 - 1.02 mg/dL    BUN/Creatinine ratio 11 (L) 12 - 20      GFR est AA 16 (L) >60 ml/min/1.73m2    GFR est non-AA 13 (L) >60 ml/min/1.73m2    Calcium 7.6 (L) 8.5 - 10.1 mg/dL    Bilirubin, total 0.3 0.2 - 1.0 mg/dL    AST (SGOT) 20 15 - 37 U/L    ALT (SGPT) 28 12 - 78 U/L    Alk.  phosphatase 129 (H) 45 - 117 U/L    Protein, total 5.0 (L) 6.4 - 8.2 g/dL    Albumin 2.0 (L) 3.5 - 5.0 g/dL    Globulin 3.0 2.0 - 4.0 g/dL    A-G Ratio 0.7 (L) 1.1 - 2.2     TROPONIN-HIGH SENSITIVITY    Collection Time: 05/09/22 12:50 AM   Result Value Ref Range    Troponin-High Sensitivity 28 0 - 51 ng/L   NT-PRO BNP    Collection Time: 05/09/22 12:50 AM   Result Value Ref Range    NT pro-BNP 14,451 (H) <125 pg/mL   TROPONIN-HIGH SENSITIVITY    Collection Time: 05/09/22  3:47 AM   Result Value Ref Range    Troponin-High Sensitivity 26 0 - 51 ng/L       Radiologic Studies -   [unfilled]  CT Results  (Last 48 hours)    None        CXR Results  (Last 48 hours)               05/09/22 0039  XR CHEST PORT Final result    Impression:  Small bilateral pleural effusions and patchy perihilar opacities most likely   representing mild pulmonary edema. Narrative:  Study: Chest radiograph(s), 1 view       Clinical Indication: Shortness of breath. Comparison: Chest radiograph dated 4/11/2022. Findings:       Support apparatus is unchanged in position. The cardiac silhouette is unchanged in size. Central vascular congestion and patchy perihilar opacities. Small bilateral   pleural effusions. No discernible pneumothorax. Osteopenia. Medical Decision Making and ED Course   - I am the first and primary provider for this patient AND AM THE PRIMARY PROVIDER OF RECORD. - I reviewed the vital signs, available nursing notes, past medical history, past surgical history, family history and social history. - Initial assessment performed. The patients presenting problems have been discussed, and the staff are in agreement with the care plan formulated and outlined with them. I have encouraged them to ask questions as they arise throughout their visit. Vital Signs-Reviewed the patient's vital signs. Patient Vitals for the past 24 hrs:   Temp Pulse Resp BP SpO2   05/09/22 0348 -- 67 20 (!) 145/89 98 %   05/09/22 0151 -- 72 22 (!) 163/94 97 %   05/09/22 0128 -- 77 20 (!) 155/87 95 %   05/09/22 0021 -- -- -- -- 98 %   05/09/22 0015 97.9 °F (36.6 °C) 79 22 (!) 157/87 97 %       Records Reviewed: Nursing Notes    Provider Notes (Medical Decision Making):     ED Course as of 05/09/22 0641   Mon May 09, 2022   0106 This patient is presenting to the ED with 2 days history of recurrent chest pain and shortness of breath. She reports similar episodes in the past.  Last obtain CBC, chemistry, troponin, proBNP and chest x-ray. Additionally obtain EKG. [AA]   0300 So far is consistent with baseline labs. Pending repeat troponin. [AA]   0537 Work-up in the ED is negative. However, patient is high risk patient for chest pain.   I will admit the patient to the hospital for observation. [facility]      ED Course User Index  [AA] Arslan Godfrey MD       Disposition     Disposition: Condition stable  Admitted to Observation Unit the case was discussed with the admitting physician Dr. Carley Burns      Diagnosis     Clinical Impression:   1. Chest pain, unspecified type    2. Congestive heart failure, unspecified HF chronicity, unspecified heart failure type [facility])        Attestations: Ravi Dumont MD    Please note that this dictation was completed with [employer], the computer voice recognition software. Quite often unanticipated grammatical, syntax, homophones, and other interpretive errors are inadvertently transcribed by the computer software. Please disregard these errors. Please excuse any errors that have escaped final proofreading. Thank you.

## 2022-05-09 NOTE — CONSULTS
NAME:  Loco Mora   :   1950   MRN:   [id number]     ATTENDING: Katarzyna Plasencia MD  PCP:  Royal Ld MD    Date/Time:  2022 7:20 PM      Subjective:   REQUESTING Dana Abdullahi MD  REASON FOR CONSULT:  esrd on HD      History of presenting illness: Patient is a 78-year-old female with past medical history of hypertension, atrial fibrillation, seizure disorder, end-stage renal disease on hemodialysis on  and  presented to the emergency room with complaints of chest pain and shortness of breath. Patient undergoes hemodialysis at [address] [facility] [facility] renal care [place] dialysis unit on MWF and last hemodialysis was on . Patient seen in the emergency room, she is drowsy but arousable, not in any acute respiratory distress. No complaints of any edema. She has a right upper extremity swelling, she also has a permacath on right IJ. Inpatient hemodialysis arranged for today, patient also seen on hemodialysis, she appears more awake while on hemodialysis.   Tolerating hemodialysis well    Past Medical History:   Diagnosis Date    Anxiety disorder     Arthritis     Atherosclerosis of native arteries of the extremities with ulceration ([address] 2020    Atrial fibrillation (HCC)     Cardiac pacemaker in situ     Cellulitis and abscess of trunk 2020    Depression, postpartum     Diabetes ([address]     Heart disease     Heartburn     Hx of long term use of blood thinners     Hypercholesterolemia     Hypertension     Hypothyroidism     Migraines     Peripheral venous insufficiency 2020    Seizures ([address]     Sleep disorder     Stroke ([facility]     2019    Varicose veins of lower extremity with inflammation 2020      Past Surgical History:   Procedure Laterality Date    HX BACK SURGERY      HX OTHER SURGICAL      leg surgery     HX OTHER SURGICAL      pacemaker monitor     IR INSERT TUNL CVC W/O PORT OVER 5 YR  12/10/2021     Social History Tobacco Use    Smoking status: Never Smoker    Smokeless tobacco: Never Used   Substance Use Topics    Alcohol use: Never      Family History   Problem Relation Age of Onset    Heart Disease Mother     Heart Disease Father     Diabetes Sister     Diabetes Brother        Allergies   Allergen Reactions    Lactose Diarrhea      Prior to Admission medications    Medication Sig Start Date End Date Taking? Authorizing Provider   traMADoL (ULTRAM) 50 mg tablet Take 1 Tablet by mouth two (2) times daily as needed for Pain for up to 30 days. Max Daily Amount: 100 mg. 4/26/22 5/26/22 Yes Meggan Casarez MD   pantoprazole (Protonix) 40 mg tablet Take 1 Tablet by mouth daily. 4/20/22  Yes David Martinez MD   levothyroxine (SYNTHROID) 200 mcg tablet Take 1 Tablet by mouth Daily (before breakfast). 4/14/22  Yes Dev Mon MD   nystatin (MYCOSTATIN) powder Apply  to affected area four (4) times daily. 3/22/22  Yes Ana Taveras NP   FLUoxetine (PROzac) 10 mg capsule Take 1 Capsule by mouth daily for 270 days. 2/24/22 11/21/22 Yes Ana Taveras NP   Vimpat 200 mg tab tablet Take 1 Tablet by mouth two (2) times a day.  11/19/21  Yes Provider, Historical   sevelamer carbonate (RENVELA) 800 mg tab tab  5/10/21  Yes Provider, Historical     Current Facility-Administered Medications   Medication Dose Route Frequency    atorvastatin (LIPITOR) tablet 40 mg  40 mg Oral DAILY    FLUoxetine (PROzac) capsule 10 mg  10 mg Oral DAILY    levothyroxine (SYNTHROID) tablet 200 mcg  200 mcg Oral ACB    metoprolol succinate (TOPROL-XL) XL tablet 12.5 mg  12.5 mg Oral DAILY    midodrine (PROAMATINE) tablet 5 mg  5 mg Oral TID WITH MEALS    pantoprazole (PROTONIX) tablet 40 mg  40 mg Oral ACB    sevelamer carbonate (RENVELA) tab 800 mg  800 mg Oral TID WITH MEALS    lacosamide (VIMPAT) tablet 200 mg  200 mg Oral BID    sodium chloride (NS) flush 5-40 mL  5-40 mL IntraVENous Q8H    sodium chloride (NS) flush 5-40 mL 5-40 mL IntraVENous PRN    acetaminophen (TYLENOL) tablet 650 mg  650 mg Oral Q6H PRN    Or    acetaminophen (TYLENOL) suppository 650 mg  650 mg Rectal Q6H PRN    polyethylene glycol (MIRALAX) packet 17 g  17 g Oral DAILY PRN    ondansetron (ZOFRAN ODT) tablet 4 mg  4 mg Oral Q8H PRN    Or    ondansetron (ZOFRAN) injection 4 mg  4 mg IntraVENous Q6H PRN    enoxaparin (LOVENOX) injection 30 mg  30 mg SubCUTAneous DAILY    levETIRAcetam (KEPPRA) tablet 1,000 mg  1,000 mg Oral DAILY    heparin (porcine) 1,000 unit/mL injection 3,200 Units  3,200 Units Hemodialysis DIALYSIS PRN     Current Outpatient Medications   Medication Sig    traMADoL (ULTRAM) 50 mg tablet Take 1 Tablet by mouth two (2) times daily as needed for Pain for up to 30 days. Max Daily Amount: 100 mg.  pantoprazole (Protonix) 40 mg tablet Take 1 Tablet by mouth daily.  levothyroxine (SYNTHROID) 200 mcg tablet Take 1 Tablet by mouth Daily (before breakfast).  nystatin (MYCOSTATIN) powder Apply  to affected area four (4) times daily.  FLUoxetine (PROzac) 10 mg capsule Take 1 Capsule by mouth daily for 270 days.  Vimpat 200 mg tab tablet Take 1 Tablet by mouth two (2) times a day.  sevelamer carbonate (RENVELA) 800 mg tab tab        REVIEW OF SYSTEMS:     Complaints as mentioned in the history of presenting illness. Patient has significant debility, is bedbound, on and off joint pains present  No other significant complaints on complete review of systems. Objective:   VITALS:    Visit Vitals  /70   Pulse 73   Temp 98 °F (36.7 °C) (Oral)   Resp 18   Ht 5' 5\" (1.651 m)   Wt 72.6 kg (160 lb)   SpO2 98%   BMI 26.63 kg/m²     Temp (24hrs), Av.1 °F (36.7 °C), Min:97.9 °F (36.6 °C), Max:98.4 °F (36.9 °C)      PHYSICAL EXAM:   General: Drowsy but arousable,, no acute distress.   HEENT: EOMI, no Icterus, no Pallor, pupils reactive, mucosa dry,   Neck: Neck is supple, No JVD, no thyromegaly,   Lungs: breathsounds normal,  no rhonchi, no rales,  CVS: heart sounds normal,  no murmurs, no rubs. GI: soft, nontender, normal BS, no palpable organomegaly,   Extremeties: no clubbing, no cyanosis, no edema,  Neuro: Sleepy but arousable,  Skin: normal skin turgor, no skin rashes   Musculoskeletal: no acute joint swellings    LAB DATA REVIEWED:    Recent Results (from the past 24 hour(s))   EKG, 12 LEAD, INITIAL    Collection Time: 05/09/22 12:15 AM   Result Value Ref Range    Ventricular Rate 74 BPM    Atrial Rate 63 BPM    QRS Duration 138 ms    Q-T Interval 392 ms    QTC Calculation (Bezet) 435 ms    Calculated R Axis 138 degrees    Calculated T Axis -24 degrees    Diagnosis       Atrial fibrillation with frequent ventricular-paced complexes  Right bundle branch block  Anteroseptal infarct , age undetermined  Abnormal ECG  No previous ECGs available  Confirmed by Arslan Casillas M.D., Lorra Mosher ([id number]) on 5/9/2022 7:38:58 AM     CBC WITH AUTOMATED DIFF    Collection Time: 05/09/22 12:50 AM   Result Value Ref Range    WBC 7.1 3.6 - 11.0 K/uL    RBC 4.49 3.80 - 5.20 M/uL    HGB 13.7 11.5 - 16.0 g/dL    HCT 45.8 35.0 - 47.0 %    .0 (H) 80.0 - 99.0 FL    MCH 30.5 26.0 - 34.0 PG    MCHC 29.9 (L) 30.0 - 36.5 g/dL    RDW 17.3 (H) 11.5 - 14.5 %    PLATELET 576 961 - 781 K/uL    MPV 10.2 8.9 - 12.9 FL    NRBC 0.0 0.0  WBC    ABSOLUTE NRBC 0.00 0.00 - 0.01 K/uL    NEUTROPHILS 55 32 - 75 %    LYMPHOCYTES 36 12 - 49 %    MONOCYTES 7 5 - 13 %    EOSINOPHILS 1 0 - 7 %    BASOPHILS 1 0 - 1 %    IMMATURE GRANULOCYTES 0 0 - 0.5 %    ABS. NEUTROPHILS 3.9 1.8 - 8.0 K/UL    ABS. LYMPHOCYTES 2.5 0.8 - 3.5 K/UL    ABS. MONOCYTES 0.5 0.0 - 1.0 K/UL    ABS. EOSINOPHILS 0.1 0.0 - 0.4 K/UL    ABS. BASOPHILS 0.1 0.0 - 0.1 K/UL    ABS. IMM.  GRANS. 0.0 0.00 - 0.04 K/UL    DF AUTOMATED     METABOLIC PANEL, COMPREHENSIVE    Collection Time: 05/09/22 12:50 AM   Result Value Ref Range    Sodium 140 136 - 145 mmol/L    Potassium 4.1 3.5 - 5.1 mmol/L    Chloride 107 97 - 108 mmol/L    CO2 27 21 - 32 mmol/L    Anion gap 6 5 - 15 mmol/L    Glucose 55 (L) 65 - 100 mg/dL    BUN 36 (H) 6 - 20 mg/dL    Creatinine 3.38 (H) 0.55 - 1.02 mg/dL    BUN/Creatinine ratio 11 (L) 12 - 20      GFR est AA 16 (L) >60 ml/min/1.73m2    GFR est non-AA 13 (L) >60 ml/min/1.73m2    Calcium 7.6 (L) 8.5 - 10.1 mg/dL    Bilirubin, total 0.3 0.2 - 1.0 mg/dL    AST (SGOT) 20 15 - 37 U/L    ALT (SGPT) 28 12 - 78 U/L    Alk. phosphatase 129 (H) 45 - 117 U/L    Protein, total 5.0 (L) 6.4 - 8.2 g/dL    Albumin 2.0 (L) 3.5 - 5.0 g/dL    Globulin 3.0 2.0 - 4.0 g/dL    A-G Ratio 0.7 (L) 1.1 - 2.2     TROPONIN-HIGH SENSITIVITY    Collection Time: 05/09/22 12:50 AM   Result Value Ref Range    Troponin-High Sensitivity 28 0 - 51 ng/L   NT-PRO BNP    Collection Time: 05/09/22 12:50 AM   Result Value Ref Range    NT pro-BNP 14,451 (H) <125 pg/mL   TROPONIN-HIGH SENSITIVITY    Collection Time: 05/09/22  3:47 AM   Result Value Ref Range    Troponin-High Sensitivity 26 0 - 51 ng/L   D DIMER    Collection Time: 05/09/22  7:00 AM   Result Value Ref Range    D DIMER 0.90 (H) <0.50 ug/ml(FEU)       Assessment and plan:     1. End-stage renal disease on hemodialysis: on MWF at [address] [place] [address] hemodialysis as outpatient was on 5/6  No complaints of any fluid overload, no complaints of uremia like nausea or vomiting, drowsy  Stable electrolytes  Inpatient hemodialysis arranged for today, patient seen on hemodialysis, tolerating well  Hemodynamically stable  will continue maintenance hemodialysis on MWF    2. Hypertension: Stable, borderline low on midodrine  Continue current blood pressure medications and continue to monitor blood pressures  Advised salt restricted diet    3.  Anemia: Stable hemoglobin  No need for Epogen with hemodialysis  Continue to monitor H&H    4. Renal osteodystrophy:   Stable calcium level  Hyperphosphatemia: continue phosphate binders  will check phosphorus level and adjust binders as needed  Secondary hyperparathyroidism: Continue Zemplar/Sensipar as per outpatient protocol    5. Shortness of breath/chest discomfort: Probably related to fluid overload   patient clinically improved with HD  Continue to monitor clinically    6. Debility/bedbound: Continue supportive care    7. Right upper extremity swelling: Recommend to check a Doppler study to rule out DVT,   Right upper extremity swelling is probably related to venous impedance with right-sided permacath.     Signed: Deana Hoffman MD

## 2022-05-09 NOTE — PROGRESS NOTES
ENCOUNTER NOTE:    72F, h/o pAFIB not on AC, seizure disorder, hypothyroidism    Recent history: She had an EGD on 1/13/22 showed Acute gastric ulcer at anastomosis with hemorrhage, treated with 2 hemoclips and injected with epinephrine. .  She had a repeat EGD on 2/14/22 showing Esophagitis, gastritis, hx of gastric bypass,    With chest tightness and shortness of breath. (1) Acute hypoxic respiratory failure: 2L . Not on home o2: s/p 1 time lasix, consult nephrology for HD    (2) Atypical chest pain:tender epigastric region. Burning type,  trial of GI cocktail as tender epigastric    (3) Acute gastritis with GERD: stool for occult blood. Protonix, GI cocktail     (4) functional paraplegia: bed bound legally blind, lives with Son. DVt ppx: lovenox     DIPO: after HD, and if she feels better, likely tomorrow.

## 2022-05-09 NOTE — DIALYSIS
Pt tolerated 3h dialysis with 1 L fluid removal and visited with Dr. Bebe Engel.   Report called to [facility] RN ER.

## 2022-05-09 NOTE — PROGRESS NOTES
Reason for Readmission:  Chest Pain             RUR Score/Risk Level: 27%        PCP: First and Last name:  Jagdeep [address]   Name of Practice:    Are you a current patient: Yes/No: yes   Approximate date of last visit: 4/27   Can you participate in a virtual visit with your PCP: Yes    Is a Care Conference indicated: No      Did you attend your follow up appointment (s): If not, why not: Yes         Resources/supports as identified by patient/family:  Home Health Aid from [place]         Top Challenges facing patient (as identified by patient/family and CM): Finances/Medication cost?    No   Transportation    BLS    Support system or lack thereof? No  Living arrangements? Family      Self-care/ADLs/Cognition? Family       Current Advanced Directive/Advance Care Plan:  Full Code           Plan for utilizing home health:   [facility].     Emergency contact: ZACHARY SpicerICHRISTINA, [phone]             Transition of Care Plan:      Met f/f with Pt D-I-L, she confirmed that the information on the face sheet is correct. Pt D-I-L stated that Pt lives with her, and her  and children ages, 11,8 9 23and 25years old. Pt D-I-L stated that Pt is bed bound and has been bed bound since she left the hospital in December with COVID. Pt D-I-L stated that Pt uses [address] Pt has a walker, cane, wheelchair and hospital bed. Pt D-I-L stated that she baths and dresses Pt. Pt D-I-L stated that Pt uses [facility] for her Dialysis. Pt D-I-L stated that Pt uses [address] on [facility] [address]. Pt D-I-L stated that Pt will need a stretcher ride home when she is D/C from the hospital. Pt D-I-L stated that Pt cannot sit up and will fall out of the wheelchair. Pt D-I-L stated that Pt is currently using [address], choice letter signed and placed on Pt chart. Will send over medicals to [address]     CM Dispo: Home with Home Health

## 2022-05-09 NOTE — DISCHARGE INSTRUCTIONS
Thank you! Thank you for allowing me to care for you in the emergency department. I sincerely hope that you are satisfied with your visit today. It is my goal to provide you with excellent care. Below you will find a list of your labs and imaging from your visit today. Should you have any questions regarding these results please do not hesitate to call the emergency department. Labs -     Recent Results (from the past 12 hour(s))   CBC WITH AUTOMATED DIFF    Collection Time: 05/09/22 12:50 AM   Result Value Ref Range    WBC 7.1 3.6 - 11.0 K/uL    RBC 4.49 3.80 - 5.20 M/uL    HGB 13.7 11.5 - 16.0 g/dL    HCT 45.8 35.0 - 47.0 %    .0 (H) 80.0 - 99.0 FL    MCH 30.5 26.0 - 34.0 PG    MCHC 29.9 (L) 30.0 - 36.5 g/dL    RDW 17.3 (H) 11.5 - 14.5 %    PLATELET 174 338 - 006 K/uL    MPV 10.2 8.9 - 12.9 FL    NRBC 0.0 0.0  WBC    ABSOLUTE NRBC 0.00 0.00 - 0.01 K/uL    NEUTROPHILS 55 32 - 75 %    LYMPHOCYTES 36 12 - 49 %    MONOCYTES 7 5 - 13 %    EOSINOPHILS 1 0 - 7 %    BASOPHILS 1 0 - 1 %    IMMATURE GRANULOCYTES 0 0 - 0.5 %    ABS. NEUTROPHILS 3.9 1.8 - 8.0 K/UL    ABS. LYMPHOCYTES 2.5 0.8 - 3.5 K/UL    ABS. MONOCYTES 0.5 0.0 - 1.0 K/UL    ABS. EOSINOPHILS 0.1 0.0 - 0.4 K/UL    ABS. BASOPHILS 0.1 0.0 - 0.1 K/UL    ABS. IMM. GRANS. 0.0 0.00 - 0.04 K/UL    DF AUTOMATED     METABOLIC PANEL, COMPREHENSIVE    Collection Time: 05/09/22 12:50 AM   Result Value Ref Range    Sodium 140 136 - 145 mmol/L    Potassium 4.1 3.5 - 5.1 mmol/L    Chloride 107 97 - 108 mmol/L    CO2 27 21 - 32 mmol/L    Anion gap 6 5 - 15 mmol/L    Glucose 55 (L) 65 - 100 mg/dL    BUN 36 (H) 6 - 20 mg/dL    Creatinine 3.38 (H) 0.55 - 1.02 mg/dL    BUN/Creatinine ratio 11 (L) 12 - 20      GFR est AA 16 (L) >60 ml/min/1.73m2    GFR est non-AA 13 (L) >60 ml/min/1.73m2    Calcium 7.6 (L) 8.5 - 10.1 mg/dL    Bilirubin, total 0.3 0.2 - 1.0 mg/dL    AST (SGOT) 20 15 - 37 U/L    ALT (SGPT) 28 12 - 78 U/L    Alk.  phosphatase 129 (H) 45 - 117 U/L    Protein, total 5.0 (L) 6.4 - 8.2 g/dL    Albumin 2.0 (L) 3.5 - 5.0 g/dL    Globulin 3.0 2.0 - 4.0 g/dL    A-G Ratio 0.7 (L) 1.1 - 2.2     TROPONIN-HIGH SENSITIVITY    Collection Time: 05/09/22 12:50 AM   Result Value Ref Range    Troponin-High Sensitivity 28 0 - 51 ng/L   NT-PRO BNP    Collection Time: 05/09/22 12:50 AM   Result Value Ref Range    NT pro-BNP 14,451 (H) <125 pg/mL   TROPONIN-HIGH SENSITIVITY    Collection Time: 05/09/22  3:47 AM   Result Value Ref Range    Troponin-High Sensitivity 26 0 - 51 ng/L       Radiologic Studies -   XR CHEST PORT   Final Result   Small bilateral pleural effusions and patchy perihilar opacities most likely   representing mild pulmonary edema. CT Results  (Last 48 hours)      None          CXR Results  (Last 48 hours)                 05/09/22 0039  XR CHEST PORT Final result    Impression:  Small bilateral pleural effusions and patchy perihilar opacities most likely   representing mild pulmonary edema. Narrative:  Study: Chest radiograph(s), 1 view       Clinical Indication: Shortness of breath. Comparison: Chest radiograph dated 4/11/2022. Findings:       Support apparatus is unchanged in position. The cardiac silhouette is unchanged in size. Central vascular congestion and patchy perihilar opacities. Small bilateral   pleural effusions. No discernible pneumothorax. Osteopenia. If you feel that you have not received excellent quality care or timely care, please ask to speak to the nurse manager. Please choose us in the future for your continued health care needs. ------------------------------------------------------------------------------------------------------------  The exam and treatment you received in the Emergency Department were for an urgent problem and are not intended as complete care.  It is important that you follow-up with a doctor, nurse practitioner, or physician assistant to:  (1) confirm your diagnosis,  (2) re-evaluation of changes in your illness and treatment, and  (3) for ongoing care. If your symptoms become worse or you do not improve as expected and you are unable to reach your usual health care provider, you should return to the Emergency Department. We are available 24 hours a day. Please take your discharge instructions with you when you go to your follow-up appointment. If you have any problem arranging a follow-up appointment, contact the Emergency Department immediately. If a prescription has been provided, please have it filled as soon as possible to prevent a delay in treatment. Read the entire medication instruction sheet provided to you by the pharmacy. If you have any questions or reservations about taking the medication due to side effects or interactions with other medications, please call your primary care physician or contact the ER to speak with the charge nurse. Make an appointment with your family doctor or the physician you were referred to for follow-up of this visit as instructed on your discharge paperwork, as this is a mandatory follow-up. Return to the ER if you are unable to be seen or if you are unable to be seen in a timely manner. If you have any problem arranging the follow-up visit, contact the Emergency Department immediately.

## 2022-05-10 ENCOUNTER — APPOINTMENT (OUTPATIENT)
Dept: NON INVASIVE DIAGNOSTICS | Age: 72
End: 2022-05-10
Attending: INTERNAL MEDICINE
Payer: MEDICARE

## 2022-05-10 LAB
ALBUMIN SERPL-MCNC: 1.8 G/DL (ref 3.5–5)
ANION GAP SERPL CALC-SCNC: 4 MMOL/L (ref 5–15)
ANION GAP SERPL CALC-SCNC: 5 MMOL/L (ref 5–15)
BASOPHILS # BLD: 0.1 K/UL (ref 0–0.1)
BASOPHILS NFR BLD: 1 % (ref 0–1)
BUN SERPL-MCNC: 30 MG/DL (ref 6–20)
BUN SERPL-MCNC: 30 MG/DL (ref 6–20)
BUN/CREAT SERPL: 11 (ref 12–20)
BUN/CREAT SERPL: 11 (ref 12–20)
CA-I BLD-MCNC: 7.5 MG/DL (ref 8.5–10.1)
CA-I BLD-MCNC: 7.5 MG/DL (ref 8.5–10.1)
CHLORIDE SERPL-SCNC: 106 MMOL/L (ref 97–108)
CHLORIDE SERPL-SCNC: 106 MMOL/L (ref 97–108)
CO2 SERPL-SCNC: 29 MMOL/L (ref 21–32)
CO2 SERPL-SCNC: 29 MMOL/L (ref 21–32)
CREAT SERPL-MCNC: 2.77 MG/DL (ref 0.55–1.02)
CREAT SERPL-MCNC: 2.81 MG/DL (ref 0.55–1.02)
DIFFERENTIAL METHOD BLD: ABNORMAL
EOSINOPHIL # BLD: 0 K/UL (ref 0–0.4)
EOSINOPHIL NFR BLD: 1 % (ref 0–7)
ERYTHROCYTE [DISTWIDTH] IN BLOOD BY AUTOMATED COUNT: 17.4 % (ref 11.5–14.5)
GLUCOSE BLD STRIP.AUTO-MCNC: 113 MG/DL (ref 65–117)
GLUCOSE BLD STRIP.AUTO-MCNC: 123 MG/DL (ref 65–117)
GLUCOSE BLD STRIP.AUTO-MCNC: 27 MG/DL (ref 65–117)
GLUCOSE BLD STRIP.AUTO-MCNC: 45 MG/DL (ref 65–117)
GLUCOSE BLD STRIP.AUTO-MCNC: 55 MG/DL (ref 65–117)
GLUCOSE BLD STRIP.AUTO-MCNC: 62 MG/DL (ref 65–117)
GLUCOSE BLD STRIP.AUTO-MCNC: 68 MG/DL (ref 65–117)
GLUCOSE BLD STRIP.AUTO-MCNC: 83 MG/DL (ref 65–117)
GLUCOSE SERPL-MCNC: 36 MG/DL (ref 65–100)
GLUCOSE SERPL-MCNC: 37 MG/DL (ref 65–100)
HBV SURFACE AG SER QL: <0.1 INDEX
HBV SURFACE AG SER QL: NEGATIVE
HCT VFR BLD AUTO: 45.4 % (ref 35–47)
HGB BLD-MCNC: 13.6 G/DL (ref 11.5–16)
IMM GRANULOCYTES # BLD AUTO: 0 K/UL (ref 0–0.04)
IMM GRANULOCYTES NFR BLD AUTO: 0 % (ref 0–0.5)
LYMPHOCYTES # BLD: 2 K/UL (ref 0.8–3.5)
LYMPHOCYTES NFR BLD: 31 % (ref 12–49)
MCH RBC QN AUTO: 30.2 PG (ref 26–34)
MCHC RBC AUTO-ENTMCNC: 30 G/DL (ref 30–36.5)
MCV RBC AUTO: 100.9 FL (ref 80–99)
MONOCYTES # BLD: 0.4 K/UL (ref 0–1)
MONOCYTES NFR BLD: 7 % (ref 5–13)
NEUTS SEG # BLD: 4.1 K/UL (ref 1.8–8)
NEUTS SEG NFR BLD: 60 % (ref 32–75)
NRBC # BLD: 0.02 K/UL (ref 0–0.01)
NRBC BLD-RTO: 0.3 PER 100 WBC
PERFORMED BY, TECHID: ABNORMAL
PERFORMED BY, TECHID: NORMAL
PHOSPHATE SERPL-MCNC: 4.9 MG/DL (ref 2.6–4.7)
PLATELET # BLD AUTO: 147 K/UL (ref 150–400)
PMV BLD AUTO: 11 FL (ref 8.9–12.9)
POTASSIUM SERPL-SCNC: 4.5 MMOL/L (ref 3.5–5.1)
POTASSIUM SERPL-SCNC: 4.5 MMOL/L (ref 3.5–5.1)
RBC # BLD AUTO: 4.5 M/UL (ref 3.8–5.2)
SODIUM SERPL-SCNC: 139 MMOL/L (ref 136–145)
SODIUM SERPL-SCNC: 140 MMOL/L (ref 136–145)
WBC # BLD AUTO: 6.6 K/UL (ref 3.6–11)

## 2022-05-10 PROCEDURE — G0378 HOSPITAL OBSERVATION PER HR: HCPCS

## 2022-05-10 PROCEDURE — 93971 EXTREMITY STUDY: CPT

## 2022-05-10 PROCEDURE — 74011250637 HC RX REV CODE- 250/637: Performed by: INTERNAL MEDICINE

## 2022-05-10 PROCEDURE — 82962 GLUCOSE BLOOD TEST: CPT

## 2022-05-10 PROCEDURE — 65270000029 HC RM PRIVATE

## 2022-05-10 PROCEDURE — 96372 THER/PROPH/DIAG INJ SC/IM: CPT

## 2022-05-10 PROCEDURE — 74011000250 HC RX REV CODE- 250: Performed by: INTERNAL MEDICINE

## 2022-05-10 PROCEDURE — 74011250636 HC RX REV CODE- 250/636: Performed by: INTERNAL MEDICINE

## 2022-05-10 PROCEDURE — 80048 BASIC METABOLIC PNL TOTAL CA: CPT

## 2022-05-10 PROCEDURE — 80069 RENAL FUNCTION PANEL: CPT

## 2022-05-10 PROCEDURE — 85025 COMPLETE CBC W/AUTO DIFF WBC: CPT

## 2022-05-10 PROCEDURE — 36415 COLL VENOUS BLD VENIPUNCTURE: CPT

## 2022-05-10 RX ORDER — INSULIN LISPRO 100 [IU]/ML
INJECTION, SOLUTION INTRAVENOUS; SUBCUTANEOUS
Status: DISCONTINUED | OUTPATIENT
Start: 2022-05-10 | End: 2022-05-13 | Stop reason: HOSPADM

## 2022-05-10 RX ORDER — DEXTROSE MONOHYDRATE 100 MG/ML
0-250 INJECTION, SOLUTION INTRAVENOUS AS NEEDED
Status: DISCONTINUED | OUTPATIENT
Start: 2022-05-10 | End: 2022-05-13 | Stop reason: HOSPADM

## 2022-05-10 RX ORDER — MAGNESIUM SULFATE 100 %
4 CRYSTALS MISCELLANEOUS AS NEEDED
Status: DISCONTINUED | OUTPATIENT
Start: 2022-05-10 | End: 2022-05-13 | Stop reason: HOSPADM

## 2022-05-10 RX ORDER — DEXTROSE MONOHYDRATE 100 MG/ML
75 INJECTION, SOLUTION INTRAVENOUS CONTINUOUS
Status: DISCONTINUED | OUTPATIENT
Start: 2022-05-10 | End: 2022-05-13

## 2022-05-10 RX ADMIN — SEVELAMER CARBONATE 800 MG: 800 TABLET, FILM COATED ORAL at 17:44

## 2022-05-10 RX ADMIN — LEVETIRACETAM 1000 MG: 500 TABLET, FILM COATED ORAL at 09:16

## 2022-05-10 RX ADMIN — DEXTROSE MONOHYDRATE 75 ML/HR: 100 INJECTION, SOLUTION INTRAVENOUS at 12:36

## 2022-05-10 RX ADMIN — FLUOXETINE HYDROCHLORIDE 10 MG: 10 CAPSULE ORAL at 09:24

## 2022-05-10 RX ADMIN — LACOSAMIDE 200 MG: 100 TABLET, FILM COATED ORAL at 09:24

## 2022-05-10 RX ADMIN — SEVELAMER CARBONATE 800 MG: 800 TABLET, FILM COATED ORAL at 09:16

## 2022-05-10 RX ADMIN — PANTOPRAZOLE SODIUM 40 MG: 40 TABLET, DELAYED RELEASE ORAL at 09:16

## 2022-05-10 RX ADMIN — ACETAMINOPHEN 650 MG: 325 TABLET ORAL at 20:41

## 2022-05-10 RX ADMIN — LEVOTHYROXINE SODIUM 200 MCG: 0.1 TABLET ORAL at 09:16

## 2022-05-10 RX ADMIN — METOPROLOL SUCCINATE 12.5 MG: 25 TABLET, EXTENDED RELEASE ORAL at 09:20

## 2022-05-10 RX ADMIN — SODIUM CHLORIDE, PRESERVATIVE FREE 10 ML: 5 INJECTION INTRAVENOUS at 15:29

## 2022-05-10 RX ADMIN — ATORVASTATIN CALCIUM 40 MG: 40 TABLET, FILM COATED ORAL at 09:16

## 2022-05-10 RX ADMIN — ENOXAPARIN SODIUM 30 MG: 100 INJECTION SUBCUTANEOUS at 09:16

## 2022-05-10 RX ADMIN — LACOSAMIDE 200 MG: 100 TABLET, FILM COATED ORAL at 20:41

## 2022-05-10 NOTE — PROGRESS NOTES
Hospitalist Progress Note               Daily Progress Note: 5/10/2022      Subjective: The patient is seen for follow up. She is a 80-year-old female  hypothyroidism with history of hypertension, atrial fibrillation, seizure disorder, ESRD, hypothyroidism, stroke, GERD, hyperlipidemia functional paraplegia/bedbound, CVA, legally blind, peptic ulcer disease with recent gastric ulcer at anastomosis by EGD in January. She was admitted on 5/9 with a 3-day history of chest and epigastric pain as well as shortness of breath. Chest x-ray showed some fluid overload. Her blood sugar was low as well. Patient was admitted to cardiac telemetry. She was on a D10 drip initially for persistent hypoglycemia but this was stopped overnight and sugar is now dropping again. A rapid response was just called on this patient due to episodes of bradycardia with heart rate down into the 30s    At the time I saw her heart rate was 60.   She is currently in a paced rhythm    Problem List:  Problem List as of 5/10/2022 Date Reviewed: 4/26/2022          Codes Class Noted - Resolved    Chest pain ICD-10-CM: R07.9  ICD-9-CM: 786.50  5/9/2022 - Present        Pulmonary edema ICD-10-CM: J81.1  ICD-9-CM: 877  5/9/2022 - Present        CKD (chronic kidney disease) stage 4, GFR 15-29 ml/min ([facility]) ICD-10-CM: N18.4  ICD-9-CM: 585.4  4/26/2022 - Present        Chronic renal disease, stage III ICD-10-CM: N18.30  ICD-9-CM: 585.3  4/26/2022 - Present        Thrombocytopenia ([facility] ICD-10-CM: D69.6  ICD-9-CM: 287.5  4/26/2022 - Present        Syncope ICD-10-CM: R55  ICD-9-CM: 780.2  4/11/2022 - Present        History of seizure disorder ICD-10-CM: Z86.69  ICD-9-CM: V12.49  4/11/2022 - Present        Anemia due to chronic kidney disease, on chronic dialysis ([facility] ICD-10-CM: N18.6, D63.1, Z99.2  ICD-9-CM: 585.6, 285.21, V45.11  4/11/2022 - Present        Opioid use, unspecified with unspecified opioid-induced disorder ICD-10-CM: F11.99  ICD-9-CM: 292.9, 305.50  3/8/2022 - Present        Complicated UTI (urinary tract infection) ICD-10-CM: N39.0  ICD-9-CM: 599.0  2/18/2022 - Present        Respiratory failure with hypoxia (HCC) ICD-10-CM: J96.91  ICD-9-CM: 518.81  2/18/2022 - Present        GI bleed ICD-10-CM: K92.2  ICD-9-CM: 578.9  2/11/2022 - Present        Acute encephalopathy ICD-10-CM: G93.40  ICD-9-CM: 348.30  1/20/2022 - Present        Seizure-like activity ([facility] ICD-10-CM: R56.9  ICD-9-CM: 780.39  1/12/2022 - Present        MSSA bacteremia ICD-10-CM: R78.81, B95.61  ICD-9-CM: 790.7, 041.11  1/3/2022 - Present        Anemia ICD-10-CM: D64.9  ICD-9-CM: 285.9  1/3/2022 - Present        Gait disturbance, post-stroke ICD-10-CM: I69.398, R26.9  ICD-9-CM: 438.89, 781.2  1/3/2022 - Present        COVID-19 ICD-10-CM: U07.1  ICD-9-CM: 079.89  12/26/2021 - Present        Fluid overload ICD-10-CM: E87.70  ICD-9-CM: 276.69  12/5/2021 - Present        Left-sided weakness ICD-10-CM: R53.1  ICD-9-CM: 728.87  12/3/2021 - Present        Intractable nausea and vomiting ICD-10-CM: R11.2  ICD-9-CM: 536.2  12/3/2021 - Present        Hypoglycemia ICD-10-CM: E16.2  ICD-9-CM: 251.2  6/11/2021 - Present        Encephalopathy acute ICD-10-CM: G93.40  ICD-9-CM: 348.30  6/11/2021 - Present        SSS (sick sinus syndrome) ([facility] ICD-10-CM: I49.5  ICD-9-CM: 427.81  6/11/2021 - Present        Seizure ([facility] ICD-10-CM: R56.9  ICD-9-CM: 780.39  5/19/2021 - Present        Hyperkalemia ICD-10-CM: E87.5  ICD-9-CM: 276.7  10/16/2020 - Present        Atherosclerosis of native arteries of the extremities with ulceration ([facility].) ICD-10-CM: I70.25  ICD-9-CM: 440.23, 707.9  8/31/2020 - Present        Cellulitis and abscess of trunk ICD-10-CM: L03.319, L02.219  ICD-9-CM: 682.2  8/31/2020 - Present        Peripheral venous insufficiency ICD-10-CM: I87.2  ICD-9-CM: 459.81  8/31/2020 - Present        Varicose veins of lower extremity with inflammation ICD-10-CM: I83.10  ICD-9-CM: 454.1  8/31/2020 - Present        Peripheral vascular disease ([facility] ICD-10-CM: I73.9  ICD-9-CM: 443.9  7/3/2020 - Present        Type II diabetes mellitus ([facility].) ICD-10-CM: E11.9  ICD-9-CM: 250.00  7/3/2020 - Present        Long term (current) use of antibiotics ICD-10-CM: Z79.2  ICD-9-CM: V58.62  7/3/2020 - Present        Atrial fibrillation (HCC) ICD-10-CM: I48.91  ICD-9-CM: 427.31  7/3/2020 - Present        Cardiac pacemaker in situ ICD-10-CM: Z95.0  ICD-9-CM: V45.01  7/3/2020 - Present        Carpal tunnel syndrome of right wrist ICD-10-CM: G56.01  ICD-9-CM: 354.0  7/3/2020 - Present        Chest wall pain ICD-10-CM: R07.89  ICD-9-CM: 786.52  7/3/2020 - Present        Chronic diarrhea ICD-10-CM: K52.9  ICD-9-CM: 787.91  7/3/2020 - Present        Chronic neck pain ICD-10-CM: M54.2, G89.29  ICD-9-CM: 723.1, 338.29  7/3/2020 - Present        End stage renal failure on dialysis [facility]) ICD-10-CM: N18.6, Z99.2  ICD-9-CM: 585.6, V45.11  7/3/2020 - Present        History of CVA (cerebrovascular accident) ICD-10-CM: Z86.73  ICD-9-CM: V12.54  7/3/2020 - Present        Hyponatremia ICD-10-CM: E87.1  ICD-9-CM: 276.1  7/3/2020 - Present        Acquired hypothyroidism ICD-10-CM: E03.9  ICD-9-CM: 244.9  7/3/2020 - Present        Breakthrough seizure ([facility] ICD-10-CM: E95.006  ICD-9-CM: 345.91  7/3/2020 - Present        Recurrent falls ICD-10-CM: R29.6  ICD-9-CM: V15.88  7/3/2020 - Present        RESOLVED: Septic shock ([facility].) ICD-10-CM: A41.9, R65.21  ICD-9-CM: 038.9, 785.52, 995.92  2/11/2022 - 3/8/2022        RESOLVED: Sepsis ([facility].) ICD-10-CM: A41.9  ICD-9-CM: 038.9, 995.91  1/7/2022 - 3/8/2022        RESOLVED: Metabolic encephalopathy YEY-44-CN: G93.41  ICD-9-CM: 348.31  1/3/2022 - 1/3/2022        RESOLVED: Sepsis ([facility].) ICD-10-CM: A41.9  ICD-9-CM: 038.9, 995.91  1/3/2022 - 1/3/2022        RESOLVED: AMS (altered mental status) ICD-10-CM: F96.11  ICD-9-CM: 780.97  12/26/2021 - 1/3/2022        RESOLVED: Disorder due to well controlled type 2 diabetes mellitus ([facility] ICD-10-CM: E11.8  ICD-9-CM: 250.90  8/31/2020 - 6/11/2021              Medications reviewed  Current Facility-Administered Medications   Medication Dose Route Frequency    insulin lispro (HUMALOG) injection   SubCUTAneous AC&HS    glucose chewable tablet 16 g  4 Tablet Oral PRN    glucagon (GLUCAGEN) injection 1 mg  1 mg IntraMUSCular PRN    dextrose 10% infusion 0-250 mL  0-250 mL IntraVENous PRN    dextrose 10% infusion  75 mL/hr IntraVENous CONTINUOUS    atorvastatin (LIPITOR) tablet 40 mg  40 mg Oral DAILY    FLUoxetine (PROzac) capsule 10 mg  10 mg Oral DAILY    levothyroxine (SYNTHROID) tablet 200 mcg  200 mcg Oral ACB    metoprolol succinate (TOPROL-XL) XL tablet 12.5 mg  12.5 mg Oral DAILY    midodrine (PROAMATINE) tablet 5 mg  5 mg Oral TID WITH MEALS    pantoprazole (PROTONIX) tablet 40 mg  40 mg Oral ACB    sevelamer carbonate (RENVELA) tab 800 mg  800 mg Oral TID WITH MEALS    lacosamide (VIMPAT) tablet 200 mg  200 mg Oral BID    sodium chloride (NS) flush 5-40 mL  5-40 mL IntraVENous Q8H    sodium chloride (NS) flush 5-40 mL  5-40 mL IntraVENous PRN    acetaminophen (TYLENOL) tablet 650 mg  650 mg Oral Q6H PRN    Or    acetaminophen (TYLENOL) suppository 650 mg  650 mg Rectal Q6H PRN    polyethylene glycol (MIRALAX) packet 17 g  17 g Oral DAILY PRN    ondansetron (ZOFRAN ODT) tablet 4 mg  4 mg Oral Q8H PRN    Or    ondansetron (ZOFRAN) injection 4 mg  4 mg IntraVENous Q6H PRN    enoxaparin (LOVENOX) injection 30 mg  30 mg SubCUTAneous DAILY    levETIRAcetam (KEPPRA) tablet 1,000 mg  1,000 mg Oral DAILY    heparin (porcine) 1,000 unit/mL injection 3,200 Units  3,200 Units Hemodialysis DIALYSIS PRN       Review of Systems:   A comprehensive review of systems was negative except for that written in the HPI.     Objective:   Physical Exam:     Visit Vitals  /75   Pulse (!) 59   Temp 97.9 °F (36.6 °C)   Resp 18   Ht 5' 5\" (1.651 m)   Wt 72.6 kg (160 lb)   SpO2 100%   BMI 26.63 kg/m²    O2 Flow Rate (L/min): 2 l/min O2 Device: Nasal cannula    Temp (24hrs), Av.8 °F (36.6 °C), Min:97.3 °F (36.3 °C), Max:98.3 °F (36.8 °C)    05/10 0701 - 05/10 1900  In: 120 [P.O.:120]  Out: -    1901 - 05/10 0700  In: -   Out: 1200 [Urine:200]    General:   Awake and alert   Lungs:   Clear to auscultation bilaterally. Chest wall:  No tenderness or deformity. Heart:  Regular rate and rhythm, S1, S2 normal, no murmur, click, rub or gallop. Abdomen:   Soft, non-tender. Bowel sounds normal. No masses,  No organomegaly. Extremities: Extremities normal, atraumatic, no cyanosis or edema. Pulses: 2+ and symmetric all extremities. Skin: Skin color, texture, turgor normal. No rashes or lesions   Neurologic: CNII-XII intact. No gross focal deficits         Data Review:       Recent Days:  Recent Labs     05/10/22  0518 22  0050   WBC 6.6 7.1   HGB 13.6 13.7   HCT 45.4 45.8   * 155     Recent Labs     05/10/22  0519 05/10/22  0518 22  0050    139 140   K 4.5 4.5 4.1    106 107   CO2 29 29 27   GLU 36* 37* 55*   BUN 30* 30* 36*   CREA 2.81* 2.77* 3.38*   CA 7.5* 7.5* 7.6*   PHOS 4.9*  --   --    ALB 1.8*  --  2.0*   TBILI  --   --  0.3   ALT  --   --  28     No results for input(s): PH, PCO2, PO2, HCO3, FIO2 in the last 72 hours.     24 Hour Results:  Recent Results (from the past 24 hour(s))   CBC WITH AUTOMATED DIFF    Collection Time: 05/10/22  5:18 AM   Result Value Ref Range    WBC 6.6 3.6 - 11.0 K/uL    RBC 4.50 3.80 - 5.20 M/uL    HGB 13.6 11.5 - 16.0 g/dL    HCT 45.4 35.0 - 47.0 %    .9 (H) 80.0 - 99.0 FL    MCH 30.2 26.0 - 34.0 PG    MCHC 30.0 30.0 - 36.5 g/dL    RDW 17.4 (H) 11.5 - 14.5 %    PLATELET 286 (L) 179 - 400 K/uL    MPV 11.0 8.9 - 12.9 FL    NRBC 0.3 (H) 0.0  WBC    ABSOLUTE NRBC 0.02 (H) 0.00 - 0.01 K/uL    NEUTROPHILS 60 32 - 75 %    LYMPHOCYTES 31 12 - 49 %    MONOCYTES 7 5 - 13 % EOSINOPHILS 1 0 - 7 %    BASOPHILS 1 0 - 1 %    IMMATURE GRANULOCYTES 0 0 - 0.5 %    ABS. NEUTROPHILS 4.1 1.8 - 8.0 K/UL    ABS. LYMPHOCYTES 2.0 0.8 - 3.5 K/UL    ABS. MONOCYTES 0.4 0.0 - 1.0 K/UL    ABS. EOSINOPHILS 0.0 0.0 - 0.4 K/UL    ABS. BASOPHILS 0.1 0.0 - 0.1 K/UL    ABS. IMM.  GRANS. 0.0 0.00 - 0.04 K/UL    DF AUTOMATED     METABOLIC PANEL, BASIC    Collection Time: 05/10/22  5:18 AM   Result Value Ref Range    Sodium 139 136 - 145 mmol/L    Potassium 4.5 3.5 - 5.1 mmol/L    Chloride 106 97 - 108 mmol/L    CO2 29 21 - 32 mmol/L    Anion gap 4 (L) 5 - 15 mmol/L    Glucose 37 (LL) 65 - 100 mg/dL    BUN 30 (H) 6 - 20 mg/dL    Creatinine 2.77 (H) 0.55 - 1.02 mg/dL    BUN/Creatinine ratio 11 (L) 12 - 20      GFR est AA 20 (L) >60 ml/min/1.73m2    GFR est non-AA 17 (L) >60 ml/min/1.73m2    Calcium 7.5 (L) 8.5 - 10.1 mg/dL   RENAL FUNCTION PANEL    Collection Time: 05/10/22  5:19 AM   Result Value Ref Range    Sodium 140 136 - 145 mmol/L    Potassium 4.5 3.5 - 5.1 mmol/L    Chloride 106 97 - 108 mmol/L    CO2 29 21 - 32 mmol/L    Anion gap 5 5 - 15 mmol/L    Glucose 36 (LL) 65 - 100 mg/dL    BUN 30 (H) 6 - 20 mg/dL    Creatinine 2.81 (H) 0.55 - 1.02 mg/dL    BUN/Creatinine ratio 11 (L) 12 - 20      GFR est AA 20 (L) >60 ml/min/1.73m2    GFR est non-AA 17 (L) >60 ml/min/1.73m2    Calcium 7.5 (L) 8.5 - 10.1 mg/dL    Phosphorus 4.9 (H) 2.6 - 4.7 mg/dL    Albumin 1.8 (L) 3.5 - 5.0 g/dL   GLUCOSE, POC    Collection Time: 05/10/22  6:28 AM   Result Value Ref Range    Glucose (POC) 27 (LL) 65 - 117 mg/dL    Performed by Andreea Onofre RN ([facility])    GLUCOSE, POC    Collection Time: 05/10/22  6:46 AM   Result Value Ref Range    Glucose (POC) 45 (LL) 65 - 117 mg/dL    Performed by Andreea Onofre RN ([facility])    GLUCOSE, POC    Collection Time: 05/10/22  7:05 AM   Result Value Ref Range    Glucose (POC) 55 (L) 65 - 117 mg/dL    Performed by Andreea Onofre RN ([facility])    GLUCOSE, POC    Collection Time: 05/10/22  7:51 AM   Result Value Ref Range    Glucose (POC) 123 (H) 65 - 117 mg/dL    Performed by Himanshu James RN ([facility])    GLUCOSE, POC    Collection Time: 05/10/22 12:17 PM   Result Value Ref Range    Glucose (POC) 62 (L) 65 - 117 mg/dL    Performed by LISA TREJO        DUPLEX LOWER EXT VENOUS RIGHT   Final Result      DUPLEX UPPER EXT VENOUS RIGHT   Final Result      XR CHEST PORT   Final Result   Small bilateral pleural effusions and patchy perihilar opacities most likely   representing mild pulmonary edema. Assessment:        Plan:        Care Plan discussed with: Nurse    Disposition:     Total time spent with patient: 30 minutes. Janak De León MD            Hospitalist Progress Note               Daily Progress Note: 5/10/2022      Subjective: The patient is seen for follow up.      Problem List:  Problem List as of 5/10/2022 Date Reviewed: 4/26/2022          Codes Class Noted - Resolved    Chest pain ICD-10-CM: R07.9  ICD-9-CM: 786.50  5/9/2022 - Present        Pulmonary edema ICD-10-CM: J81.1  ICD-9-CM: 623  5/9/2022 - Present        CKD (chronic kidney disease) stage 4, GFR 15-29 ml/min ([facility]) ICD-10-CM: N18.4  ICD-9-CM: 585.4  4/26/2022 - Present        Chronic renal disease, stage III ICD-10-CM: N18.30  ICD-9-CM: 585.3  4/26/2022 - Present        Thrombocytopenia ([facility] ICD-10-CM: D69.6  ICD-9-CM: 287.5  4/26/2022 - Present        Syncope ICD-10-CM: R55  ICD-9-CM: 780.2  4/11/2022 - Present        History of seizure disorder ICD-10-CM: Z86.69  ICD-9-CM: V12.49  4/11/2022 - Present        Anemia due to chronic kidney disease, on chronic dialysis ([facility].) ICD-10-CM: N18.6, D63.1, Z99.2  ICD-9-CM: 585.6, 285.21, V45.11  4/11/2022 - Present        Opioid use, unspecified with unspecified opioid-induced disorder ICD-10-CM: F11.99  ICD-9-CM: 292.9, 305.50  3/8/2022 - Present        Complicated UTI (urinary tract infection) ICD-10-CM: N39.0  ICD-9-CM: 599.0  2/18/2022 - Present        Respiratory failure with hypoxia [facility]) ICD-10-CM: J96.91  ICD-9-CM: 518.81  2/18/2022 - Present        GI bleed ICD-10-CM: K92.2  ICD-9-CM: 578.9  2/11/2022 - Present        Acute encephalopathy ICD-10-CM: G93.40  ICD-9-CM: 348.30  1/20/2022 - Present        Seizure-like activity ([facility] ICD-10-CM: R56.9  ICD-9-CM: 780.39  1/12/2022 - Present        MSSA bacteremia ICD-10-CM: R78.81, B95.61  ICD-9-CM: 790.7, 041.11  1/3/2022 - Present        Anemia ICD-10-CM: D64.9  ICD-9-CM: 285.9  1/3/2022 - Present        Gait disturbance, post-stroke ICD-10-CM: I69.398, R26.9  ICD-9-CM: 438.89, 781.2  1/3/2022 - Present        COVID-19 ICD-10-CM: U07.1  ICD-9-CM: 079.89  12/26/2021 - Present        Fluid overload ICD-10-CM: E87.70  ICD-9-CM: 276.69  12/5/2021 - Present        Left-sided weakness ICD-10-CM: R53.1  ICD-9-CM: 728.87  12/3/2021 - Present        Intractable nausea and vomiting ICD-10-CM: R11.2  ICD-9-CM: 536.2  12/3/2021 - Present        Hypoglycemia ICD-10-CM: E16.2  ICD-9-CM: 251.2  6/11/2021 - Present        Encephalopathy acute ICD-10-CM: G93.40  ICD-9-CM: 348.30  6/11/2021 - Present        SSS (sick sinus syndrome) ([facility] ICD-10-CM: I49.5  ICD-9-CM: 427.81  6/11/2021 - Present        Seizure ([facility] ICD-10-CM: R56.9  ICD-9-CM: 780.39  5/19/2021 - Present        Hyperkalemia ICD-10-CM: E87.5  ICD-9-CM: 276.7  10/16/2020 - Present        Atherosclerosis of native arteries of the extremities with ulceration ([facility] ICD-10-CM: I70.25  ICD-9-CM: 440.23, 707.9  8/31/2020 - Present        Cellulitis and abscess of trunk ICD-10-CM: L03.319, L02.219  ICD-9-CM: 682.2  8/31/2020 - Present        Peripheral venous insufficiency ICD-10-CM: I87.2  ICD-9-CM: 459.81  8/31/2020 - Present        Varicose veins of lower extremity with inflammation ICD-10-CM: I83.10  ICD-9-CM: 454.1  8/31/2020 - Present        Peripheral vascular disease ([facility].) ICD-10-CM: I73.9  ICD-9-CM: 443.9  7/3/2020 - Present        Type II diabetes mellitus ([facility].) ICD-10-CM: E11.9  ICD-9-CM: 250.00 7/3/2020 - Present        Long term (current) use of antibiotics ICD-10-CM: Z79.2  ICD-9-CM: V58.62  7/3/2020 - Present        Atrial fibrillation (HCC) ICD-10-CM: I48.91  ICD-9-CM: 427.31  7/3/2020 - Present        Cardiac pacemaker in situ ICD-10-CM: Z95.0  ICD-9-CM: V45.01  7/3/2020 - Present        Carpal tunnel syndrome of right wrist ICD-10-CM: G56.01  ICD-9-CM: 354.0  7/3/2020 - Present        Chest wall pain ICD-10-CM: R07.89  ICD-9-CM: 786.52  7/3/2020 - Present        Chronic diarrhea ICD-10-CM: K52.9  ICD-9-CM: 787.91  7/3/2020 - Present        Chronic neck pain ICD-10-CM: M54.2, G89.29  ICD-9-CM: 723.1, 338.29  7/3/2020 - Present        End stage renal failure on dialysis [facility]) ICD-10-CM: N18.6, Z99.2  ICD-9-CM: 585.6, V45.11  7/3/2020 - Present        History of CVA (cerebrovascular accident) ICD-10-CM: Z86.73  ICD-9-CM: V12.54  7/3/2020 - Present        Hyponatremia ICD-10-CM: E87.1  ICD-9-CM: 276.1  7/3/2020 - Present        Acquired hypothyroidism ICD-10-CM: E03.9  ICD-9-CM: 244.9  7/3/2020 - Present        Breakthrough seizure ([facility] ICD-10-CM: K83.677  ICD-9-CM: 345.91  7/3/2020 - Present        Recurrent falls ICD-10-CM: R29.6  ICD-9-CM: V15.88  7/3/2020 - Present        RESOLVED: Septic shock ([facility] ICD-10-CM: A41.9, R65.21  ICD-9-CM: 038.9, 785.52, 995.92  2/11/2022 - 3/8/2022        RESOLVED: Sepsis ([facility] ICD-10-CM: A41.9  ICD-9-CM: 038.9, 995.91  1/7/2022 - 3/8/2022        RESOLVED: Metabolic encephalopathy DZB-59-AY: G93.41  ICD-9-CM: 348.31  1/3/2022 - 1/3/2022        RESOLVED: Sepsis ([facility] ICD-10-CM: A41.9  ICD-9-CM: 038.9, 995.91  1/3/2022 - 1/3/2022        RESOLVED: AMS (altered mental status) ICD-10-CM: P22.04  ICD-9-CM: 780.97  12/26/2021 - 1/3/2022        RESOLVED: Disorder due to well controlled type 2 diabetes mellitus ([facility].) ICD-10-CM: E11.8  ICD-9-CM: 250.90  8/31/2020 - 6/11/2021              Medications reviewed  Current Facility-Administered Medications   Medication Dose Route Frequency    insulin lispro (HUMALOG) injection   SubCUTAneous AC&HS    glucose chewable tablet 16 g  4 Tablet Oral PRN    glucagon (GLUCAGEN) injection 1 mg  1 mg IntraMUSCular PRN    dextrose 10% infusion 0-250 mL  0-250 mL IntraVENous PRN    dextrose 10% infusion  75 mL/hr IntraVENous CONTINUOUS    atorvastatin (LIPITOR) tablet 40 mg  40 mg Oral DAILY    FLUoxetine (PROzac) capsule 10 mg  10 mg Oral DAILY    levothyroxine (SYNTHROID) tablet 200 mcg  200 mcg Oral ACB    metoprolol succinate (TOPROL-XL) XL tablet 12.5 mg  12.5 mg Oral DAILY    midodrine (PROAMATINE) tablet 5 mg  5 mg Oral TID WITH MEALS    pantoprazole (PROTONIX) tablet 40 mg  40 mg Oral ACB    sevelamer carbonate (RENVELA) tab 800 mg  800 mg Oral TID WITH MEALS    lacosamide (VIMPAT) tablet 200 mg  200 mg Oral BID    sodium chloride (NS) flush 5-40 mL  5-40 mL IntraVENous Q8H    sodium chloride (NS) flush 5-40 mL  5-40 mL IntraVENous PRN    acetaminophen (TYLENOL) tablet 650 mg  650 mg Oral Q6H PRN    Or    acetaminophen (TYLENOL) suppository 650 mg  650 mg Rectal Q6H PRN    polyethylene glycol (MIRALAX) packet 17 g  17 g Oral DAILY PRN    ondansetron (ZOFRAN ODT) tablet 4 mg  4 mg Oral Q8H PRN    Or    ondansetron (ZOFRAN) injection 4 mg  4 mg IntraVENous Q6H PRN    enoxaparin (LOVENOX) injection 30 mg  30 mg SubCUTAneous DAILY    levETIRAcetam (KEPPRA) tablet 1,000 mg  1,000 mg Oral DAILY    heparin (porcine) 1,000 unit/mL injection 3,200 Units  3,200 Units Hemodialysis DIALYSIS PRN       Review of Systems:   A comprehensive review of systems was negative except for that written in the HPI.     Objective:   Physical Exam:     Visit Vitals  /75   Pulse (!) 59   Temp 97.9 °F (36.6 °C)   Resp 18   Ht 5' 5\" (1.651 m)   Wt 72.6 kg (160 lb)   SpO2 100%   BMI 26.63 kg/m²    O2 Flow Rate (L/min): 2 l/min O2 Device: Nasal cannula    Temp (24hrs), Av.8 °F (36.6 °C), Min:97.3 °F (36.3 °C), Max:98.3 °F (36.8 °C)    05/10 0701 - 05/10 1900  In: 120 [P.O.:120]  Out: -    05/08 1901 - 05/10 0700  In: -   Out: 1200 [Urine:200]    General:   Awake and alert   Lungs:   Clear to auscultation bilaterally. Chest wall:  No tenderness or deformity. Heart:  Regular rate and rhythm, S1, S2 normal, no murmur, click, rub or gallop. Abdomen:   Soft, non-tender. Bowel sounds normal. No masses,  No organomegaly. Extremities: Extremities normal, atraumatic, no cyanosis or edema. Pulses: 2+ and symmetric all extremities. Skin: Skin color, texture, turgor normal. No rashes or lesions   Neurologic: CNII-XII intact. No gross focal deficits         Data Review:       Recent Days:  Recent Labs     05/10/22  0518 05/09/22  0050   WBC 6.6 7.1   HGB 13.6 13.7   HCT 45.4 45.8   * 155     Recent Labs     05/10/22  0519 05/10/22  0518 05/09/22  0050    139 140   K 4.5 4.5 4.1    106 107   CO2 29 29 27   GLU 36* 37* 55*   BUN 30* 30* 36*   CREA 2.81* 2.77* 3.38*   CA 7.5* 7.5* 7.6*   PHOS 4.9*  --   --    ALB 1.8*  --  2.0*   TBILI  --   --  0.3   ALT  --   --  28     No results for input(s): PH, PCO2, PO2, HCO3, FIO2 in the last 72 hours. 24 Hour Results:  Recent Results (from the past 24 hour(s))   CBC WITH AUTOMATED DIFF    Collection Time: 05/10/22  5:18 AM   Result Value Ref Range    WBC 6.6 3.6 - 11.0 K/uL    RBC 4.50 3.80 - 5.20 M/uL    HGB 13.6 11.5 - 16.0 g/dL    HCT 45.4 35.0 - 47.0 %    .9 (H) 80.0 - 99.0 FL    MCH 30.2 26.0 - 34.0 PG    MCHC 30.0 30.0 - 36.5 g/dL    RDW 17.4 (H) 11.5 - 14.5 %    PLATELET 562 (L) 884 - 400 K/uL    MPV 11.0 8.9 - 12.9 FL    NRBC 0.3 (H) 0.0  WBC    ABSOLUTE NRBC 0.02 (H) 0.00 - 0.01 K/uL    NEUTROPHILS 60 32 - 75 %    LYMPHOCYTES 31 12 - 49 %    MONOCYTES 7 5 - 13 %    EOSINOPHILS 1 0 - 7 %    BASOPHILS 1 0 - 1 %    IMMATURE GRANULOCYTES 0 0 - 0.5 %    ABS. NEUTROPHILS 4.1 1.8 - 8.0 K/UL    ABS. LYMPHOCYTES 2.0 0.8 - 3.5 K/UL    ABS. MONOCYTES 0.4 0.0 - 1.0 K/UL    ABS. EOSINOPHILS 0.0 0.0 - 0.4 K/UL    ABS. BASOPHILS 0.1 0.0 - 0.1 K/UL    ABS. IMM.  GRANS. 0.0 0.00 - 0.04 K/UL    DF AUTOMATED     METABOLIC PANEL, BASIC    Collection Time: 05/10/22  5:18 AM   Result Value Ref Range    Sodium 139 136 - 145 mmol/L    Potassium 4.5 3.5 - 5.1 mmol/L    Chloride 106 97 - 108 mmol/L    CO2 29 21 - 32 mmol/L    Anion gap 4 (L) 5 - 15 mmol/L    Glucose 37 (LL) 65 - 100 mg/dL    BUN 30 (H) 6 - 20 mg/dL    Creatinine 2.77 (H) 0.55 - 1.02 mg/dL    BUN/Creatinine ratio 11 (L) 12 - 20      GFR est AA 20 (L) >60 ml/min/1.73m2    GFR est non-AA 17 (L) >60 ml/min/1.73m2    Calcium 7.5 (L) 8.5 - 10.1 mg/dL   RENAL FUNCTION PANEL    Collection Time: 05/10/22  5:19 AM   Result Value Ref Range    Sodium 140 136 - 145 mmol/L    Potassium 4.5 3.5 - 5.1 mmol/L    Chloride 106 97 - 108 mmol/L    CO2 29 21 - 32 mmol/L    Anion gap 5 5 - 15 mmol/L    Glucose 36 (LL) 65 - 100 mg/dL    BUN 30 (H) 6 - 20 mg/dL    Creatinine 2.81 (H) 0.55 - 1.02 mg/dL    BUN/Creatinine ratio 11 (L) 12 - 20      GFR est AA 20 (L) >60 ml/min/1.73m2    GFR est non-AA 17 (L) >60 ml/min/1.73m2    Calcium 7.5 (L) 8.5 - 10.1 mg/dL    Phosphorus 4.9 (H) 2.6 - 4.7 mg/dL    Albumin 1.8 (L) 3.5 - 5.0 g/dL   GLUCOSE, POC    Collection Time: 05/10/22  6:28 AM   Result Value Ref Range    Glucose (POC) 27 (LL) 65 - 117 mg/dL    Performed by Suhail Neumann RN ([place])    GLUCOSE, POC    Collection Time: 05/10/22  6:46 AM   Result Value Ref Range    Glucose (POC) 45 (LL) 65 - 117 mg/dL    Performed by Suhail Neumann RN ([place])    GLUCOSE, POC    Collection Time: 05/10/22  7:05 AM   Result Value Ref Range    Glucose (POC) 55 (L) 65 - 117 mg/dL    Performed by Suhail Neumann RN ([place])    GLUCOSE, POC    Collection Time: 05/10/22  7:51 AM   Result Value Ref Range    Glucose (POC) 123 (H) 65 - 117 mg/dL    Performed by Suhail Neumann RN ([place])    GLUCOSE, POC    Collection Time: 05/10/22 12:17 PM   Result Value Ref Range    Glucose (POC) 62 (L) 65 - 117 mg/dL    Performed by LISA TREJO        DUPLEX LOWER EXT VENOUS RIGHT   Final Result      DUPLEX UPPER EXT VENOUS RIGHT   Final Result      XR CHEST PORT   Final Result   Small bilateral pleural effusions and patchy perihilar opacities most likely   representing mild pulmonary edema. Assessment:  Atypical chest pain. Ruled out for MI    Hypoglycemia    End-stage renal disease, on hemodialysis    Hypothyroidism    Functional paraplegia    Essential hypertension    Permanent pacemaker in situ. Review of strips shows no evidence for bradycardia    History of stroke    History of seizures, stable    Diabetes mellitus type 2. Patient not on any treatment for diabetes at home, has not received any insulin here. Hypoglycemia probably due to poor oral intake      Plan: We are starting a D10 drip for her hypoglycemia  We will see how she does the next 24 hours    Care Plan discussed with: Patient/Family    Disposition:     Total time spent with patient: 30 minutes.     Marquis Das MD

## 2022-05-10 NOTE — PROGRESS NOTES
OT eval order received and acknowledged. Per medical chart, CM notes and IDR meeting, pt currently presenting at functional baseline (bed bound and DIL provides ADL care) with no acute OT needs identified at this time. OT evaluation order will therefore be discontinued as pt has no acute OT needs. Please reorder OT if pt functional status changes. Thank you.

## 2022-05-10 NOTE — PROGRESS NOTES
PT eval order received and acknowledged. Per chart review pt is bed/WC bound at baseline, total care provided by family. Pt screened and is currently presenting at baseline dependent for functional mobility/transfers. PT evaluation order will be discontinued at this time as pt has no acute PT needs. Please reorder PT if pt functional status changes. Thank you.

## 2022-05-10 NOTE — PROGRESS NOTES
Problem: Falls - Risk of  Goal: *Absence of Falls  Description: Document Markie Sites Fall Risk and appropriate interventions in the flowsheet. Outcome: Progressing Towards Goal  Note: Fall Risk Interventions:  Mobility Interventions: Mechanical lift         Medication Interventions: Utilize gait belt for transfers/ambulation    Elimination Interventions: Patient to call for help with toileting needs    History of Falls Interventions: Bed/chair exit alarm         Problem: Patient Education: Go to Patient Education Activity  Goal: Patient/Family Education  Outcome: Progressing Towards Goal     Problem: Risk for Spread of Infection  Goal: Prevent transmission of infectious organism to others  Description: Prevent the transmission of infectious organisms to other patients, staff members, and visitors.   Outcome: Progressing Towards Goal     Problem: Patient Education:  Go to Education Activity  Goal: Patient/Family Education  Outcome: Progressing Towards Goal

## 2022-05-10 NOTE — PROGRESS NOTES
Comprehensive Nutrition Assessment    Type and Reason for Visit: Positive nutrition screen (MST3)    Nutrition Recommendations/Plan:   1. Continue current diet  2. Adjust ONS to ensure enlive 2x/day  3. Monitor and record PO intakes, supplement acceptance, and BMs in I/Os     Malnutrition Assessment:  Malnutrition Status:  No malnutrition (05/10/22 1456)    Context:  Acute illness     Findings of the 6 clinical characteristics of malnutrition:   Energy Intake:  Mild decrease in energy intake (specify)  Weight Loss:  No significant weight loss     Body Fat Loss:  No significant body fat loss,     Muscle Mass Loss:  No significant muscle mass loss,    Fluid Accumulation:  No significant fluid accumulation,     Strength:    Not performed    Nutrition Assessment:    Admitted for chest pain. Rapid response called today for HR in the 20's. MST3 for ?wt loss, decreased intake. No weight loss since admission 2/21, 73.5kg, with hx of good intake >76% of meals. Will add ONS per pt preference. Labs: Na 140, K 4.5, BUN 30, Creat 2.81, Gluc 36, Alb 1.8. Meds: atorvastatin, insulin lispro, levothyroxine,pantoprazole, sevelamer carbonate. Nutrition Related Findings:    (P) NFPE deferred r/t isolation. No n/v, d/c, or problems chewing/swallowing. RLE 1+ pitting edema. BM PTA. Wound Type: Pressure injury,Stage I    Current Nutrition Intake & Therapies:  Average Meal Intake: (P) %  Average Supplement Intake: (P) Unable to assess  ADULT DIET Regular  ADULT ORAL NUTRITION SUPPLEMENT Breakfast; Standard 4 oz    Anthropometric Measures:  Height: 5' 5\" (165.1 cm)  Ideal Body Weight (IBW): 125 lbs (57 kg)  Current Body Wt:  72.6 kg (160 lb 0.9 oz), 128 % IBW. Stated  Current BMI (kg/m2): 26.6  BMI Category: Overweight (BMI 25.0-29. 9)    Estimated Daily Nutrient Needs:  Energy Requirements Based On: (P) Kcal/kg  Weight Used for Energy Requirements: (P) Current  Energy (kcal/day): (P) 2178kcal (30kcal/kg)  Weight Used for Protein Requirements: (P) Current  Protein (g/day): (P) 94 (1.3g/kg)  Method Used for Fluid Requirements: (P) ml/kg  Fluid (ml/day): (P) 1815mL (25mL/kg)    Nutrition Diagnosis:   · (P) Increased nutrient needs related to (P) catabolic illness as evidenced by (P) wounds    Nutrition Interventions:   Food and/or Nutrient Delivery: (P) Continue current diet,Modify oral nutrition supplement  Nutrition Education/Counseling: (P) No recommendations at this time  Coordination of Nutrition Care: (P) Continue to monitor while inpatient,Feeding assistance/environmental change    Goals:  Goals: (P)  (skin integrity)    Nutrition Monitoring and Evaluation:   Behavioral-Environmental Outcomes: (P) None identified  Food/Nutrient Intake Outcomes: (P) Supplement intake,Food and nutrient intake,Diet advancement/tolerance  Physical Signs/Symptoms Outcomes: (P) Biochemical data,GI status,Weight,Meal time behavior,Fluid status or edema    Discharge Planning:    (P) Too soon to determine    Keiry Bains RD  Contact: [phone]

## 2022-05-10 NOTE — PROGRESS NOTES
Primary Nurse Lenin Valencia RN and Bernice Chilel RN performed a dual skin assessment on this patient. Patient has sacral wound stage 1 with intact dressing  Present, wound appears to be healing. She has generalized bruising, right foot +1 pitting edema, right thigh appears to have a Zuni of fluid underneath skin. Right 1st toe and 3rd toe have necrotic scab and also on right ledesma. RUE has old fistula site and RLE bruising and skin is dry/ rough. Left shin has old stitch wound.

## 2022-05-11 LAB
ALBUMIN SERPL-MCNC: 1.7 G/DL (ref 3.5–5)
ANION GAP SERPL CALC-SCNC: 6 MMOL/L (ref 5–15)
BUN SERPL-MCNC: 36 MG/DL (ref 6–20)
BUN/CREAT SERPL: 12 (ref 12–20)
CA-I BLD-MCNC: 6.8 MG/DL (ref 8.5–10.1)
CHLORIDE SERPL-SCNC: 101 MMOL/L (ref 97–108)
CO2 SERPL-SCNC: 27 MMOL/L (ref 21–32)
CREAT SERPL-MCNC: 3.05 MG/DL (ref 0.55–1.02)
GLUCOSE BLD STRIP.AUTO-MCNC: 104 MG/DL (ref 65–117)
GLUCOSE BLD STRIP.AUTO-MCNC: 69 MG/DL (ref 65–117)
GLUCOSE BLD STRIP.AUTO-MCNC: 71 MG/DL (ref 65–117)
GLUCOSE BLD STRIP.AUTO-MCNC: 77 MG/DL (ref 65–117)
GLUCOSE BLD STRIP.AUTO-MCNC: 78 MG/DL (ref 65–117)
GLUCOSE BLD STRIP.AUTO-MCNC: 96 MG/DL (ref 65–117)
GLUCOSE SERPL-MCNC: 92 MG/DL (ref 65–100)
PERFORMED BY, TECHID: NORMAL
PHOSPHATE SERPL-MCNC: 4.5 MG/DL (ref 2.6–4.7)
POTASSIUM SERPL-SCNC: 4.1 MMOL/L (ref 3.5–5.1)
SODIUM SERPL-SCNC: 134 MMOL/L (ref 136–145)

## 2022-05-11 PROCEDURE — 36415 COLL VENOUS BLD VENIPUNCTURE: CPT

## 2022-05-11 PROCEDURE — 82962 GLUCOSE BLOOD TEST: CPT

## 2022-05-11 PROCEDURE — 74011250636 HC RX REV CODE- 250/636: Performed by: INTERNAL MEDICINE

## 2022-05-11 PROCEDURE — 74011250637 HC RX REV CODE- 250/637: Performed by: INTERNAL MEDICINE

## 2022-05-11 PROCEDURE — 74011000250 HC RX REV CODE- 250: Performed by: INTERNAL MEDICINE

## 2022-05-11 PROCEDURE — 96372 THER/PROPH/DIAG INJ SC/IM: CPT

## 2022-05-11 PROCEDURE — 80069 RENAL FUNCTION PANEL: CPT

## 2022-05-11 PROCEDURE — G0378 HOSPITAL OBSERVATION PER HR: HCPCS

## 2022-05-11 PROCEDURE — 87040 BLOOD CULTURE FOR BACTERIA: CPT

## 2022-05-11 PROCEDURE — 65270000029 HC RM PRIVATE

## 2022-05-11 RX ORDER — HEPARIN SODIUM 1000 [USP'U]/ML
INJECTION, SOLUTION INTRAVENOUS; SUBCUTANEOUS
Status: DISPENSED
Start: 2022-05-11 | End: 2022-05-11

## 2022-05-11 RX ORDER — HEPARIN SODIUM 1000 [USP'U]/ML
3200 INJECTION, SOLUTION INTRAVENOUS; SUBCUTANEOUS
Status: DISCONTINUED | OUTPATIENT
Start: 2022-05-11 | End: 2022-05-13 | Stop reason: HOSPADM

## 2022-05-11 RX ADMIN — MIDODRINE HYDROCHLORIDE 5 MG: 5 TABLET ORAL at 11:42

## 2022-05-11 RX ADMIN — LACOSAMIDE 200 MG: 100 TABLET, FILM COATED ORAL at 11:42

## 2022-05-11 RX ADMIN — ENOXAPARIN SODIUM 30 MG: 100 INJECTION SUBCUTANEOUS at 11:41

## 2022-05-11 RX ADMIN — SODIUM CHLORIDE, PRESERVATIVE FREE 10 ML: 5 INJECTION INTRAVENOUS at 13:32

## 2022-05-11 RX ADMIN — ATORVASTATIN CALCIUM 40 MG: 40 TABLET, FILM COATED ORAL at 11:42

## 2022-05-11 RX ADMIN — DEXTROSE MONOHYDRATE 75 ML/HR: 100 INJECTION, SOLUTION INTRAVENOUS at 05:36

## 2022-05-11 RX ADMIN — ACETAMINOPHEN 650 MG: 325 TABLET ORAL at 21:18

## 2022-05-11 RX ADMIN — SEVELAMER CARBONATE 800 MG: 800 TABLET, FILM COATED ORAL at 19:00

## 2022-05-11 RX ADMIN — LACOSAMIDE 200 MG: 100 TABLET, FILM COATED ORAL at 21:18

## 2022-05-11 RX ADMIN — FLUOXETINE HYDROCHLORIDE 10 MG: 10 CAPSULE ORAL at 11:42

## 2022-05-11 RX ADMIN — METOPROLOL SUCCINATE 12.5 MG: 25 TABLET, EXTENDED RELEASE ORAL at 11:42

## 2022-05-11 RX ADMIN — PANTOPRAZOLE SODIUM 40 MG: 40 TABLET, DELAYED RELEASE ORAL at 11:42

## 2022-05-11 RX ADMIN — LEVETIRACETAM 1000 MG: 500 TABLET, FILM COATED ORAL at 11:42

## 2022-05-11 RX ADMIN — SEVELAMER CARBONATE 800 MG: 800 TABLET, FILM COATED ORAL at 11:42

## 2022-05-11 NOTE — PROGRESS NOTES
End of Shift Note    Bedside shift change report given to Sarahi (oncoming nurse) by Mehrdad Rodrigues RN (offgoing nurse). Report included the following information SBAR    Shift worked:  6:45a - 2:45p     Shift summary and any significant changes:     pt had HD today. D10 infusion on hold for now. Patient's BG was 69 when she got back from HD, rechecked after she ate her lunch, it was 104. Concerns for physician to address:  None     Zone phone for oncoming shift:   [phone]       Activity:  Activity Level: Bed Rest  Number times ambulated in hallways past shift: 0  Number of times OOB to chair past shift: 0    Cardiac:   Cardiac Monitoring: Yes      Cardiac Rhythm: Ventricular Paced    Access:   Current line(s): PIV and HD access     Genitourinary:   Urinary status: oliguric    Respiratory:   O2 Device: Nasal cannula  Chronic home O2 use?: NO  Incentive spirometer at bedside: N/A       GI:  Last Bowel Movement Date: 05/10/22  Current diet:  ADULT DIET Regular  ADULT ORAL NUTRITION SUPPLEMENT Breakfast, Dinner; Standard High Calorie/High Protein  Passing flatus: YES  Tolerating current diet: YES       Pain Management:   Patient states pain is manageable on current regimen: YES    Skin:  Nitin Score: 13  Interventions: increase time out of bed and PT/OT consult    Patient Safety:  Fall Score:  Total Score: 5  Interventions: assistive device (walker, cane, etc), gripper socks, pt to call before getting OOB and stay with me (per policy)  High Fall Risk: Yes    Length of Stay:  Expected LOS: 1d 16h  Actual LOS: 13 [address] [facility] [place], RN

## 2022-05-11 NOTE — PROGRESS NOTES
Two person skin assessment completed by radha and Graciela Arguelles. Pt lower right arm is red and swollen. Skin tear noted to left upper arm with 4x4 and tegaderm in place. Midline sacrum old healed wound with mepilex in place. Ecchymosis noted to face and generalized body. Black scab noted to right shin, 1st and 3rd toe on right foot. Excessive dryness and redness to bilateral feet.

## 2022-05-11 NOTE — PROGRESS NOTES
Renal Progress Note    Patient: Samson Santiago MRN: [id number]  SSN: [id number]    YOB: 1950  Age: 71 y.o. Sex: female      Admit Date: 5/9/2022    LOS: 1 day     Subjective:   Patient seen at bedside. Awake and verbally responsive, no acute distress. Patient is feeling better,  No complaints of shortness of breath.  No c/o chest pain        Current Facility-Administered Medications   Medication Dose Route Frequency    insulin lispro (HUMALOG) injection   SubCUTAneous AC&HS    glucose chewable tablet 16 g  4 Tablet Oral PRN    glucagon (GLUCAGEN) injection 1 mg  1 mg IntraMUSCular PRN    dextrose 10% infusion 0-250 mL  0-250 mL IntraVENous PRN    dextrose 10% infusion  75 mL/hr IntraVENous CONTINUOUS    atorvastatin (LIPITOR) tablet 40 mg  40 mg Oral DAILY    FLUoxetine (PROzac) capsule 10 mg  10 mg Oral DAILY    levothyroxine (SYNTHROID) tablet 200 mcg  200 mcg Oral ACB    metoprolol succinate (TOPROL-XL) XL tablet 12.5 mg  12.5 mg Oral DAILY    midodrine (PROAMATINE) tablet 5 mg  5 mg Oral TID WITH MEALS    pantoprazole (PROTONIX) tablet 40 mg  40 mg Oral ACB    sevelamer carbonate (RENVELA) tab 800 mg  800 mg Oral TID WITH MEALS    lacosamide (VIMPAT) tablet 200 mg  200 mg Oral BID    sodium chloride (NS) flush 5-40 mL  5-40 mL IntraVENous Q8H    sodium chloride (NS) flush 5-40 mL  5-40 mL IntraVENous PRN    acetaminophen (TYLENOL) tablet 650 mg  650 mg Oral Q6H PRN    Or    acetaminophen (TYLENOL) suppository 650 mg  650 mg Rectal Q6H PRN    polyethylene glycol (MIRALAX) packet 17 g  17 g Oral DAILY PRN    ondansetron (ZOFRAN ODT) tablet 4 mg  4 mg Oral Q8H PRN    Or    ondansetron (ZOFRAN) injection 4 mg  4 mg IntraVENous Q6H PRN    enoxaparin (LOVENOX) injection 30 mg  30 mg SubCUTAneous DAILY    levETIRAcetam (KEPPRA) tablet 1,000 mg  1,000 mg Oral DAILY    heparin (porcine) 1,000 unit/mL injection 3,200 Units  3,200 Units Hemodialysis DIALYSIS PRN        Vitals: 05/10/22 1235 05/10/22 1458 05/10/22 1510 05/10/22 2033   BP: 134/75  135/76 134/75   Pulse: (!) 59  60 60   Resp:   18 18   Temp:   97.8 °F (36.6 °C) 98.3 °F (36.8 °C)   TempSrc:       SpO2:   98% 100%   Weight:       Height:  5' 5\" (1.651 m)       Objective:   General: alert awake well-oriented, no acute distress. HEENT: EOMI, no Icterus, no Pallor,  mucosa moist.  Neck: Neck is supple, No JVD  Lungs: breathsounds normal, no respiratory distress on inspection, no rhonchi, no rales,   CVS: heart sounds normal,  no murmurs, no rubs. GI: soft, nontender, normal BS. Extremeties: no cyanosis, no LE edema, right UE swelling+, better  Neuro: Alert, awake, answering simple questions  Skin: normal skin turgor, no skin rashes. Intake and Output:  Current Shift: No intake/output data recorded. Last three shifts: 05/09 0701 - 05/10 1900  In: 120 [P.O.:120]  Out: 1200 [Urine:200]      Lab/Data Review:  Recent Labs     05/10/22  0518 05/09/22  0050   WBC 6.6 7.1   HGB 13.6 13.7   HCT 45.4 45.8   * 155     Recent Labs     05/10/22  0519 05/10/22  0518 05/09/22  0050    139 140   K 4.5 4.5 4.1    106 107   CO2 29 29 27   GLU 36* 37* 55*   BUN 30* 30* 36*   CREA 2.81* 2.77* 3.38*   CA 7.5* 7.5* 7.6*   PHOS 4.9*  --   --    ALB 1.8*  --  2.0*   TBILI  --   --  0.3   ALT  --   --  28     No results for input(s): PH, PCO2, PO2, HCO3, FIO2 in the last 72 hours.   Recent Results (from the past 24 hour(s))   CBC WITH AUTOMATED DIFF    Collection Time: 05/10/22  5:18 AM   Result Value Ref Range    WBC 6.6 3.6 - 11.0 K/uL    RBC 4.50 3.80 - 5.20 M/uL    HGB 13.6 11.5 - 16.0 g/dL    HCT 45.4 35.0 - 47.0 %    .9 (H) 80.0 - 99.0 FL    MCH 30.2 26.0 - 34.0 PG    MCHC 30.0 30.0 - 36.5 g/dL    RDW 17.4 (H) 11.5 - 14.5 %    PLATELET 581 (L) 901 - 400 K/uL    MPV 11.0 8.9 - 12.9 FL    NRBC 0.3 (H) 0.0  WBC    ABSOLUTE NRBC 0.02 (H) 0.00 - 0.01 K/uL    NEUTROPHILS 60 32 - 75 %    LYMPHOCYTES 31 12 - 49 % MONOCYTES 7 5 - 13 %    EOSINOPHILS 1 0 - 7 %    BASOPHILS 1 0 - 1 %    IMMATURE GRANULOCYTES 0 0 - 0.5 %    ABS. NEUTROPHILS 4.1 1.8 - 8.0 K/UL    ABS. LYMPHOCYTES 2.0 0.8 - 3.5 K/UL    ABS. MONOCYTES 0.4 0.0 - 1.0 K/UL    ABS. EOSINOPHILS 0.0 0.0 - 0.4 K/UL    ABS. BASOPHILS 0.1 0.0 - 0.1 K/UL    ABS. IMM.  GRANS. 0.0 0.00 - 0.04 K/UL    DF AUTOMATED     METABOLIC PANEL, BASIC    Collection Time: 05/10/22  5:18 AM   Result Value Ref Range    Sodium 139 136 - 145 mmol/L    Potassium 4.5 3.5 - 5.1 mmol/L    Chloride 106 97 - 108 mmol/L    CO2 29 21 - 32 mmol/L    Anion gap 4 (L) 5 - 15 mmol/L    Glucose 37 (LL) 65 - 100 mg/dL    BUN 30 (H) 6 - 20 mg/dL    Creatinine 2.77 (H) 0.55 - 1.02 mg/dL    BUN/Creatinine ratio 11 (L) 12 - 20      GFR est AA 20 (L) >60 ml/min/1.73m2    GFR est non-AA 17 (L) >60 ml/min/1.73m2    Calcium 7.5 (L) 8.5 - 10.1 mg/dL   RENAL FUNCTION PANEL    Collection Time: 05/10/22  5:19 AM   Result Value Ref Range    Sodium 140 136 - 145 mmol/L    Potassium 4.5 3.5 - 5.1 mmol/L    Chloride 106 97 - 108 mmol/L    CO2 29 21 - 32 mmol/L    Anion gap 5 5 - 15 mmol/L    Glucose 36 (LL) 65 - 100 mg/dL    BUN 30 (H) 6 - 20 mg/dL    Creatinine 2.81 (H) 0.55 - 1.02 mg/dL    BUN/Creatinine ratio 11 (L) 12 - 20      GFR est AA 20 (L) >60 ml/min/1.73m2    GFR est non-AA 17 (L) >60 ml/min/1.73m2    Calcium 7.5 (L) 8.5 - 10.1 mg/dL    Phosphorus 4.9 (H) 2.6 - 4.7 mg/dL    Albumin 1.8 (L) 3.5 - 5.0 g/dL   GLUCOSE, POC    Collection Time: 05/10/22  6:28 AM   Result Value Ref Range    Glucose (POC) 27 (LL) 65 - 117 mg/dL    Performed by Chaim Coyle RN (Tv)    GLUCOSE, POC    Collection Time: 05/10/22  6:46 AM   Result Value Ref Range    Glucose (POC) 45 (LL) 65 - 117 mg/dL    Performed by Chaim Coyle RN (Tv)    GLUCOSE, POC    Collection Time: 05/10/22  7:05 AM   Result Value Ref Range    Glucose (POC) 55 (L) 65 - 117 mg/dL    Performed by Chaim Coyle RN (Tvlr)    GLUCOSE, POC    Collection Time: 05/10/22  7:51 AM   Result Value Ref Range    Glucose (POC) 123 (H) 65 - 117 mg/dL    Performed by Ji Cordero RN (Tvlr)    GLUCOSE, POC    Collection Time: 05/10/22 12:17 PM   Result Value Ref Range    Glucose (POC) 62 (L) 65 - 117 mg/dL    Performed by [address], POC    Collection Time: 05/10/22  3:32 PM   Result Value Ref Range    Glucose (POC) 68 65 - 117 mg/dL    Performed by [facility] [email or web address], POC    Collection Time: 05/10/22  5:52 PM   Result Value Ref Range    Glucose (POC) 113 65 - 117 mg/dL    Performed by [facility] [employer], POC    Collection Time: 05/10/22  9:01 PM   Result Value Ref Range    Glucose (POC) 83 65 - 117 mg/dL    Performed by Wai Walker and Plan:     1. End-stage renal disease on hemodialysis: on MWF at [address] [place] [address] hemodialysis as outpatient was on 5/6 and s/p HD yesterday  No complaints of any fluid overload, no complaints of uremia like nausea or vomiting, drowsy  Stable electrolytes  Inpatient hemodialysis arranged for tomorrow  will continue maintenance hemodialysis on [facility]     2. Hypertension: Stable, borderline low on midodrine  Continue current blood pressure medications and continue to monitor blood pressures    3. Anemia: Stable hemoglobin  No need for Epogen with hemodialysis  Continue to monitor H&H     4. Renal osteodystrophy:   Stable calcium level  Hyperphosphatemia: continue phosphate binders, stable phos at 4.9  Secondary hyperparathyroidism: Continue Zemplar/Sensipar as per outpatient protocol     5. Shortness of breath/chest discomfort: Probably related to fluid overload   patient clinically improved with HD  Continue to monitor clinically     6. Debility/bedbound: Continue supportive care     7. Right upper extremity swelling: duplex study negative for DVT,   Right upper extremity swelling is probably related to venous impedence with right-sided permacath.     8. Hypoglycemia: poor intake, on d10, monitor accuchecks    Signed By: Brianne Benjamin MD     May 10, 2022

## 2022-05-11 NOTE — PROGRESS NOTES
Problem: Falls - Risk of  Goal: *Absence of Falls  Description: Document Nilda Julian Fall Risk and appropriate interventions in the flowsheet. Outcome: Progressing Towards Goal  Note: Fall Risk Interventions:  Mobility Interventions: Bed/chair exit alarm,Patient to call before getting OOB,Utilize walker, cane, or other assistive device    Mentation Interventions: Bed/chair exit alarm,Adequate sleep, hydration, pain control    Medication Interventions: Bed/chair exit alarm    Elimination Interventions: Bed/chair exit alarm,Call light in reach,Toileting schedule/hourly rounds    History of Falls Interventions: Bed/chair exit alarm         Problem: Patient Education: Go to Patient Education Activity  Goal: Patient/Family Education  Outcome: Progressing Towards Goal     Problem: Risk for Spread of Infection  Goal: Prevent transmission of infectious organism to others  Description: Prevent the transmission of infectious organisms to other patients, staff members, and visitors. Outcome: Progressing Towards Goal     Problem: Patient Education:  Go to Education Activity  Goal: Patient/Family Education  Outcome: Progressing Towards Goal     Problem: Pressure Injury - Risk of  Goal: *Prevention of pressure injury  Description: Document Nitin Scale and appropriate interventions in the flowsheet.   Outcome: Progressing Towards Goal  Note: Pressure Injury Interventions:  Sensory Interventions: Keep linens dry and wrinkle-free    Moisture Interventions: Absorbent underpads,Minimize layers    Activity Interventions: PT/OT evaluation    Mobility Interventions: Float heels,PT/OT evaluation    Nutrition Interventions: Document food/fluid/supplement intake    Friction and Shear Interventions: Apply protective barrier, creams and emollients,Foam dressings/transparent film/skin sealants                Problem: Patient Education: Go to Patient Education Activity  Goal: Patient/Family Education  Outcome: Progressing Towards Goal

## 2022-05-11 NOTE — PROGRESS NOTES
Hospitalist Progress Note               Daily Progress Note: 5/11/2022      Subjective: The patient is seen for follow up. She is a 67-year-old female  hypothyroidism with history of hypertension, atrial fibrillation, seizure disorder, ESRD, hypothyroidism, stroke, GERD, hyperlipidemia functional paraplegia/bedbound, CVA, legally blind, peptic ulcer disease with recent gastric ulcer at anastomosis by EGD in January. She was admitted on 5/9 with a 3-day history of chest and epigastric pain as well as shortness of breath. Chest x-ray showed some fluid overload. Her blood sugar was low as well. Patient was admitted to cardiac telemetry. She was started on a D10 drip    ------    Patient is seen today for follow-up. She is awake and alert. I stopped her D10 earlier this morning and sugars currently 104    Creatinine has been stable since admission at 2.8-3.3.   Hemoglobin 13.6    Problem List:  Problem List as of 5/11/2022 Date Reviewed: 4/26/2022          Codes Class Noted - Resolved    Chest pain ICD-10-CM: R07.9  ICD-9-CM: 786.50  5/9/2022 - Present        Pulmonary edema ICD-10-CM: J81.1  ICD-9-CM: 947  5/9/2022 - Present        CKD (chronic kidney disease) stage 4, GFR 15-29 ml/min ([facility]) ICD-10-CM: N18.4  ICD-9-CM: 585.4  4/26/2022 - Present        Chronic renal disease, stage III ICD-10-CM: N18.30  ICD-9-CM: 585.3  4/26/2022 - Present        Thrombocytopenia ([facility] ICD-10-CM: D69.6  ICD-9-CM: 287.5  4/26/2022 - Present        Syncope ICD-10-CM: R55  ICD-9-CM: 780.2  4/11/2022 - Present        History of seizure disorder ICD-10-CM: Z86.69  ICD-9-CM: V12.49  4/11/2022 - Present        Anemia due to chronic kidney disease, on chronic dialysis ([facility] ICD-10-CM: N18.6, D63.1, Z99.2  ICD-9-CM: 585.6, 285.21, V45.11  4/11/2022 - Present        Opioid use, unspecified with unspecified opioid-induced disorder ICD-10-CM: F11.99  ICD-9-CM: 292.9, 305.50  3/8/2022 - Present        Complicated UTI (urinary tract infection) ICD-10-CM: N39.0  ICD-9-CM: 599.0  2/18/2022 - Present        Respiratory failure with hypoxia [facility]) ICD-10-CM: J96.91  ICD-9-CM: 518.81  2/18/2022 - Present        GI bleed ICD-10-CM: K92.2  ICD-9-CM: 578.9  2/11/2022 - Present        Acute encephalopathy ICD-10-CM: G93.40  ICD-9-CM: 348.30  1/20/2022 - Present        Seizure-like activity ([facility].) ICD-10-CM: R56.9  ICD-9-CM: 780.39  1/12/2022 - Present        MSSA bacteremia ICD-10-CM: R78.81, B95.61  ICD-9-CM: 790.7, 041.11  1/3/2022 - Present        Anemia ICD-10-CM: D64.9  ICD-9-CM: 285.9  1/3/2022 - Present        Gait disturbance, post-stroke ICD-10-CM: I69.398, R26.9  ICD-9-CM: 438.89, 781.2  1/3/2022 - Present        COVID-19 ICD-10-CM: U07.1  ICD-9-CM: 079.89  12/26/2021 - Present        Fluid overload ICD-10-CM: E87.70  ICD-9-CM: 276.69  12/5/2021 - Present        Left-sided weakness ICD-10-CM: R53.1  ICD-9-CM: 728.87  12/3/2021 - Present        Intractable nausea and vomiting ICD-10-CM: R11.2  ICD-9-CM: 536.2  12/3/2021 - Present        Hypoglycemia ICD-10-CM: E16.2  ICD-9-CM: 251.2  6/11/2021 - Present        Encephalopathy acute ICD-10-CM: G93.40  ICD-9-CM: 348.30  6/11/2021 - Present        SSS (sick sinus syndrome) ([facility].) ICD-10-CM: I49.5  ICD-9-CM: 427.81  6/11/2021 - Present        Seizure ([facility].) ICD-10-CM: R56.9  ICD-9-CM: 780.39  5/19/2021 - Present        Hyperkalemia ICD-10-CM: E87.5  ICD-9-CM: 276.7  10/16/2020 - Present        Atherosclerosis of native arteries of the extremities with ulceration ([facility].) ICD-10-CM: I70.25  ICD-9-CM: 440.23, 707.9  8/31/2020 - Present        Cellulitis and abscess of trunk ICD-10-CM: L03.319, L02.219  ICD-9-CM: 682.2  8/31/2020 - Present        Peripheral venous insufficiency ICD-10-CM: I87.2  ICD-9-CM: 459.81  8/31/2020 - Present        Varicose veins of lower extremity with inflammation ICD-10-CM: I83.10  ICD-9-CM: 454.1  8/31/2020 - Present        Peripheral vascular disease ([facility].) ICD-10-CM: I73.9  ICD-9-CM: 443.9  7/3/2020 - Present        Type II diabetes mellitus ([facility] ICD-10-CM: E11.9  ICD-9-CM: 250.00  7/3/2020 - Present        Long term (current) use of antibiotics ICD-10-CM: Z79.2  ICD-9-CM: V58.62  7/3/2020 - Present        Atrial fibrillation (HCC) ICD-10-CM: I48.91  ICD-9-CM: 427.31  7/3/2020 - Present        Cardiac pacemaker in situ ICD-10-CM: Z95.0  ICD-9-CM: V45.01  7/3/2020 - Present        Carpal tunnel syndrome of right wrist ICD-10-CM: G56.01  ICD-9-CM: 354.0  7/3/2020 - Present        Chest wall pain ICD-10-CM: R07.89  ICD-9-CM: 786.52  7/3/2020 - Present        Chronic diarrhea ICD-10-CM: K52.9  ICD-9-CM: 787.91  7/3/2020 - Present        Chronic neck pain ICD-10-CM: M54.2, G89.29  ICD-9-CM: 723.1, 338.29  7/3/2020 - Present        End stage renal failure on dialysis [facility]) ICD-10-CM: N18.6, Z99.2  ICD-9-CM: 585.6, V45.11  7/3/2020 - Present        History of CVA (cerebrovascular accident) ICD-10-CM: Z86.73  ICD-9-CM: V12.54  7/3/2020 - Present        Hyponatremia ICD-10-CM: E87.1  ICD-9-CM: 276.1  7/3/2020 - Present        Acquired hypothyroidism ICD-10-CM: E03.9  ICD-9-CM: 244.9  7/3/2020 - Present        Breakthrough seizure ([facility] ICD-10-CM: O89.952  ICD-9-CM: 345.91  7/3/2020 - Present        Recurrent falls ICD-10-CM: R29.6  ICD-9-CM: V15.88  7/3/2020 - Present        RESOLVED: Septic shock ([facility].) ICD-10-CM: A41.9, R65.21  ICD-9-CM: 038.9, 785.52, 995.92  2/11/2022 - 3/8/2022        RESOLVED: Sepsis ([facility].) ICD-10-CM: A41.9  ICD-9-CM: 038.9, 995.91  1/7/2022 - 3/8/2022        RESOLVED: Metabolic encephalopathy [id number]: G93.41  ICD-9-CM: 348.31  1/3/2022 - 1/3/2022        RESOLVED: Sepsis ([facility] 75.) ICD-10-CM: A41.9  ICD-9-CM: 038.9, 995.91  1/3/2022 - 1/3/2022        RESOLVED: AMS (altered mental status) ICD-10-CM: I75.09  ICD-9-CM: 780.97  12/26/2021 - 1/3/2022        RESOLVED: Disorder due to well controlled type 2 diabetes mellitus ([facility].) ICD-10-CM: E11.8  ICD-9-CM: 250.90  8/31/2020 - 6/11/2021              Medications reviewed  Current Facility-Administered Medications   Medication Dose Route Frequency    heparin (porcine) 1,000 unit/mL injection 3,200 Units  3,200 Units IntraVENous DIALYSIS PRN    heparin (porcine) 1,000 unit/mL injection        insulin lispro (HUMALOG) injection   SubCUTAneous AC&HS    glucose chewable tablet 16 g  4 Tablet Oral PRN    glucagon (GLUCAGEN) injection 1 mg  1 mg IntraMUSCular PRN    dextrose 10% infusion 0-250 mL  0-250 mL IntraVENous PRN    [Held by provider] dextrose 10% infusion  75 mL/hr IntraVENous CONTINUOUS    atorvastatin (LIPITOR) tablet 40 mg  40 mg Oral DAILY    FLUoxetine (PROzac) capsule 10 mg  10 mg Oral DAILY    levothyroxine (SYNTHROID) tablet 200 mcg  200 mcg Oral ACB    metoprolol succinate (TOPROL-XL) XL tablet 12.5 mg  12.5 mg Oral DAILY    midodrine (PROAMATINE) tablet 5 mg  5 mg Oral TID WITH MEALS    pantoprazole (PROTONIX) tablet 40 mg  40 mg Oral ACB    sevelamer carbonate (RENVELA) tab 800 mg  800 mg Oral TID WITH MEALS    lacosamide (VIMPAT) tablet 200 mg  200 mg Oral BID    sodium chloride (NS) flush 5-40 mL  5-40 mL IntraVENous Q8H    sodium chloride (NS) flush 5-40 mL  5-40 mL IntraVENous PRN    acetaminophen (TYLENOL) tablet 650 mg  650 mg Oral Q6H PRN    Or    acetaminophen (TYLENOL) suppository 650 mg  650 mg Rectal Q6H PRN    polyethylene glycol (MIRALAX) packet 17 g  17 g Oral DAILY PRN    ondansetron (ZOFRAN ODT) tablet 4 mg  4 mg Oral Q8H PRN    Or    ondansetron (ZOFRAN) injection 4 mg  4 mg IntraVENous Q6H PRN    enoxaparin (LOVENOX) injection 30 mg  30 mg SubCUTAneous DAILY    levETIRAcetam (KEPPRA) tablet 1,000 mg  1,000 mg Oral DAILY    heparin (porcine) 1,000 unit/mL injection 3,200 Units  3,200 Units Hemodialysis DIALYSIS PRN       Review of Systems:   A comprehensive review of systems was negative except for that written in the HPI.     Objective:   Physical Exam:     Visit Vitals  /82 (BP 1 Location: Left upper arm, BP Patient Position: At rest)   Pulse 79   Temp 97.7 °F (36.5 °C)   Resp 20   Ht 5' 5\" (1.651 m)   Wt 71.8 kg (158 lb 4.6 oz)   SpO2 98%   BMI 26.34 kg/m²    O2 Flow Rate (L/min): 1 l/min O2 Device: Nasal cannula    Temp (24hrs), Av.9 °F (36.6 °C), Min:97.5 °F (36.4 °C), Max:98.3 °F (36.8 °C)    No intake/output data recorded.  1901 -  0700  In: 120 [P.O.:120]  Out: 200 [Urine:200]    General:   Awake and alert   Lungs:   Clear to auscultation bilaterally. Chest wall:  No tenderness or deformity. Heart:  Regular rate and rhythm, S1, S2 normal, no murmur, click, rub or gallop. Abdomen:   Soft, non-tender. Bowel sounds normal. No masses,  No organomegaly. Extremities: Extremities normal, atraumatic, no cyanosis or edema. Pulses: 2+ and symmetric all extremities. Skin: Skin color, texture, turgor normal. No rashes or lesions   Neurologic: CNII-XII intact. No gross focal deficits         Data Review:       Recent Days:  Recent Labs     05/10/22  0518 22  0050   WBC 6.6 7.1   HGB 13.6 13.7   HCT 45.4 45.8   * 155     Recent Labs     22  0745 05/10/22  0519 05/10/22  0518 22  0050 22  0050   * 140 139   < > 140   K 4.1 4.5 4.5   < > 4.1    106 106   < > 107   CO2 27 29 29   < > 27   GLU 92 36* 37*   < > 55*   BUN 36* 30* 30*   < > 36*   CREA 3.05* 2.81* 2.77*   < > 3.38*   CA 6.8* 7.5* 7.5*   < > 7.6*   PHOS 4.5 4.9*  --   --   --    ALB 1.7* 1.8*  --   --  2.0*   TBILI  --   --   --   --  0.3   ALT  --   --   --   --  28    < > = values in this interval not displayed. No results for input(s): PH, PCO2, PO2, HCO3, FIO2 in the last 72 hours.     24 Hour Results:  Recent Results (from the past 24 hour(s))   GLUCOSE, POC    Collection Time: 05/10/22  3:32 PM   Result Value Ref Range    Glucose (POC) 68 65 - 117 mg/dL    Performed by [address] [place] [address], POC    Collection Time: 05/10/22  5:52 PM   Result Value Ref Range    Glucose (POC) 113 65 - 117 mg/dL    Performed by [address] [place] [address], POC    Collection Time: 05/10/22  9:01 PM   Result Value Ref Range    Glucose (POC) 83 65 - 117 mg/dL    Performed by [facility], POC    Collection Time: 05/11/22  5:29 AM   Result Value Ref Range    Glucose (POC) 71 65 - 117 mg/dL    Performed by [address] [place] [address], POC    Collection Time: 05/11/22  7:11 AM   Result Value Ref Range    Glucose (POC) 96 65 - 117 mg/dL    Performed by Eleanor Aquino    RENAL FUNCTION PANEL    Collection Time: 05/11/22  7:45 AM   Result Value Ref Range    Sodium 134 (L) 136 - 145 mmol/L    Potassium 4.1 3.5 - 5.1 mmol/L    Chloride 101 97 - 108 mmol/L    CO2 27 21 - 32 mmol/L    Anion gap 6 5 - 15 mmol/L    Glucose 92 65 - 100 mg/dL    BUN 36 (H) 6 - 20 mg/dL    Creatinine 3.05 (H) 0.55 - 1.02 mg/dL    BUN/Creatinine ratio 12 12 - 20      GFR est AA 18 (L) >60 ml/min/1.73m2    GFR est non-AA 15 (L) >60 ml/min/1.73m2    Calcium 6.8 (L) 8.5 - 10.1 mg/dL    Phosphorus 4.5 2.6 - 4.7 mg/dL    Albumin 1.7 (L) 3.5 - 5.0 g/dL   CULTURE, BLOOD, LINE    Collection Time: 05/11/22  7:45 AM    Specimen: Blood   Result Value Ref Range    Special Requests: No Special Requests      Culture result: No growth after 1 hour     GLUCOSE, POC    Collection Time: 05/11/22 11:52 AM   Result Value Ref Range    Glucose (POC) 69 65 - 117 mg/dL    Performed by Eleanor Aquino        DUPLEX LOWER EXT VENOUS RIGHT   Final Result      DUPLEX UPPER EXT VENOUS RIGHT   Final Result      XR CHEST PORT   Final Result   Small bilateral pleural effusions and patchy perihilar opacities most likely   representing mild pulmonary edema. Assessment:        Plan:        Care Plan discussed with: Nurse    Disposition:     Total time spent with patient: 30 minutes. Ivette Alvares MD            Hospitalist Progress Note               Daily Progress Note: 5/11/2022      Subjective: The patient is seen for follow up. Problem List:  Problem List as of 5/11/2022 Date Reviewed: 4/26/2022          Codes Class Noted - Resolved    Chest pain ICD-10-CM: R07.9  ICD-9-CM: 786.50  5/9/2022 - Present        Pulmonary edema ICD-10-CM: J81.1  ICD-9-CM: 821  5/9/2022 - Present        CKD (chronic kidney disease) stage 4, GFR 15-29 ml/min ([facility]) ICD-10-CM: N18.4  ICD-9-CM: 585.4  4/26/2022 - Present        Chronic renal disease, stage III ICD-10-CM: N18.30  ICD-9-CM: 585.3  4/26/2022 - Present        Thrombocytopenia ([facility] ICD-10-CM: D69.6  ICD-9-CM: 287.5  4/26/2022 - Present        Syncope ICD-10-CM: R55  ICD-9-CM: 780.2  4/11/2022 - Present        History of seizure disorder ICD-10-CM: Z86.69  ICD-9-CM: V12.49  4/11/2022 - Present        Anemia due to chronic kidney disease, on chronic dialysis ([facility].) ICD-10-CM: N18.6, D63.1, Z99.2  ICD-9-CM: 585.6, 285.21, V45.11  4/11/2022 - Present        Opioid use, unspecified with unspecified opioid-induced disorder ICD-10-CM: F11.99  ICD-9-CM: 292.9, 305.50  3/8/2022 - Present        Complicated UTI (urinary tract infection) ICD-10-CM: N39.0  ICD-9-CM: 599.0  2/18/2022 - Present        Respiratory failure with hypoxia ([facility].) ICD-10-CM: J96.91  ICD-9-CM: 518.81  2/18/2022 - Present        GI bleed ICD-10-CM: K92.2  ICD-9-CM: 578.9  2/11/2022 - Present        Acute encephalopathy ICD-10-CM: G93.40  ICD-9-CM: 348.30  1/20/2022 - Present        Seizure-like activity ([address] ICD-10-CM: R56.9  ICD-9-CM: 780.39  1/12/2022 - Present        MSSA bacteremia ICD-10-CM: R78.81, B95.61  ICD-9-CM: 790.7, 041.11  1/3/2022 - Present        Anemia ICD-10-CM: D64.9  ICD-9-CM: 285.9  1/3/2022 - Present        Gait disturbance, post-stroke ICD-10-CM: I69.398, R26.9  ICD-9-CM: 438.89, 781.2  1/3/2022 - Present        COVID-19 ICD-10-CM: U07.1  ICD-9-CM: 079.89  12/26/2021 - Present        Fluid overload ICD-10-CM: E87.70  ICD-9-CM: 276.69  12/5/2021 - Present        Left-sided weakness ICD-10-CM: R53.1  ICD-9-CM: 728.87 12/3/2021 - Present        Intractable nausea and vomiting ICD-10-CM: R11.2  ICD-9-CM: 536.2  12/3/2021 - Present        Hypoglycemia ICD-10-CM: E16.2  ICD-9-CM: 251.2  6/11/2021 - Present        Encephalopathy acute ICD-10-CM: G93.40  ICD-9-CM: 348.30  6/11/2021 - Present        SSS (sick sinus syndrome) ([facility]) ICD-10-CM: I49.5  ICD-9-CM: 427.81  6/11/2021 - Present        Seizure ([facility].) ICD-10-CM: R56.9  ICD-9-CM: 780.39  5/19/2021 - Present        Hyperkalemia ICD-10-CM: E87.5  ICD-9-CM: 276.7  10/16/2020 - Present        Atherosclerosis of native arteries of the extremities with ulceration ([facility].) ICD-10-CM: I70.25  ICD-9-CM: 440.23, 707.9  8/31/2020 - Present        Cellulitis and abscess of trunk ICD-10-CM: L03.319, L02.219  ICD-9-CM: 682.2  8/31/2020 - Present        Peripheral venous insufficiency ICD-10-CM: I87.2  ICD-9-CM: 459.81  8/31/2020 - Present        Varicose veins of lower extremity with inflammation ICD-10-CM: I83.10  ICD-9-CM: 454.1  8/31/2020 - Present        Peripheral vascular disease ([facility].) ICD-10-CM: I73.9  ICD-9-CM: 443.9  7/3/2020 - Present        Type II diabetes mellitus ([facility].) ICD-10-CM: E11.9  ICD-9-CM: 250.00  7/3/2020 - Present        Long term (current) use of antibiotics ICD-10-CM: Z79.2  ICD-9-CM: V58.62  7/3/2020 - Present        Atrial fibrillation ([facility].) ICD-10-CM: I48.91  ICD-9-CM: 427.31  7/3/2020 - Present        Cardiac pacemaker in situ ICD-10-CM: Z95.0  ICD-9-CM: V45.01  7/3/2020 - Present        Carpal tunnel syndrome of right wrist ICD-10-CM: G56.01  ICD-9-CM: 354.0  7/3/2020 - Present        Chest wall pain ICD-10-CM: R07.89  ICD-9-CM: 786.52  7/3/2020 - Present        Chronic diarrhea ICD-10-CM: K52.9  ICD-9-CM: 787.91  7/3/2020 - Present        Chronic neck pain ICD-10-CM: M54.2, G89.29  ICD-9-CM: 723.1, 338.29  7/3/2020 - Present        End stage renal failure on dialysis [facility]) ICD-10-CM: N18.6, Z99.2  ICD-9-CM: 585.6, V45.11  7/3/2020 - Present        History of CVA (cerebrovascular accident) ICD-10-CM: Z86.73  ICD-9-CM: V12.54  7/3/2020 - Present        Hyponatremia ICD-10-CM: E87.1  ICD-9-CM: 276.1  7/3/2020 - Present        Acquired hypothyroidism ICD-10-CM: E03.9  ICD-9-CM: 244.9  7/3/2020 - Present        Breakthrough seizure ([facility] ICD-10-CM: G40.919  ICD-9-CM: 345.91  7/3/2020 - Present        Recurrent falls ICD-10-CM: R29.6  ICD-9-CM: V15.88  7/3/2020 - Present        RESOLVED: Septic shock (HCC) ICD-10-CM: A41.9, R65.21  ICD-9-CM: 038.9, 785.52, 995.92  2/11/2022 - 3/8/2022        RESOLVED: Sepsis ([facility].) ICD-10-CM: A41.9  ICD-9-CM: 038.9, 995.91  1/7/2022 - 3/8/2022        RESOLVED: Metabolic encephalopathy NVC-38-ZB: G93.41  ICD-9-CM: 348.31  1/3/2022 - 1/3/2022        RESOLVED: Sepsis ([facility].) ICD-10-CM: A41.9  ICD-9-CM: 038.9, 995.91  1/3/2022 - 1/3/2022        RESOLVED: AMS (altered mental status) ICD-10-CM: D80.76  ICD-9-CM: 780.97  12/26/2021 - 1/3/2022        RESOLVED: Disorder due to well controlled type 2 diabetes mellitus ([facility].) ICD-10-CM: E11.8  ICD-9-CM: 250.90  8/31/2020 - 6/11/2021              Medications reviewed  Current Facility-Administered Medications   Medication Dose Route Frequency    heparin (porcine) 1,000 unit/mL injection 3,200 Units  3,200 Units IntraVENous DIALYSIS PRN    heparin (porcine) 1,000 unit/mL injection        insulin lispro (HUMALOG) injection   SubCUTAneous AC&HS    glucose chewable tablet 16 g  4 Tablet Oral PRN    glucagon (GLUCAGEN) injection 1 mg  1 mg IntraMUSCular PRN    dextrose 10% infusion 0-250 mL  0-250 mL IntraVENous PRN    [Held by provider] dextrose 10% infusion  75 mL/hr IntraVENous CONTINUOUS    atorvastatin (LIPITOR) tablet 40 mg  40 mg Oral DAILY    FLUoxetine (PROzac) capsule 10 mg  10 mg Oral DAILY    levothyroxine (SYNTHROID) tablet 200 mcg  200 mcg Oral ACB    metoprolol succinate (TOPROL-XL) XL tablet 12.5 mg  12.5 mg Oral DAILY    midodrine (PROAMATINE) tablet 5 mg  5 mg Oral TID WITH MEALS    pantoprazole (PROTONIX) tablet 40 mg  40 mg Oral ACB    sevelamer carbonate (RENVELA) tab 800 mg  800 mg Oral TID WITH MEALS    lacosamide (VIMPAT) tablet 200 mg  200 mg Oral BID    sodium chloride (NS) flush 5-40 mL  5-40 mL IntraVENous Q8H    sodium chloride (NS) flush 5-40 mL  5-40 mL IntraVENous PRN    acetaminophen (TYLENOL) tablet 650 mg  650 mg Oral Q6H PRN    Or    acetaminophen (TYLENOL) suppository 650 mg  650 mg Rectal Q6H PRN    polyethylene glycol (MIRALAX) packet 17 g  17 g Oral DAILY PRN    ondansetron (ZOFRAN ODT) tablet 4 mg  4 mg Oral Q8H PRN    Or    ondansetron (ZOFRAN) injection 4 mg  4 mg IntraVENous Q6H PRN    enoxaparin (LOVENOX) injection 30 mg  30 mg SubCUTAneous DAILY    levETIRAcetam (KEPPRA) tablet 1,000 mg  1,000 mg Oral DAILY    heparin (porcine) 1,000 unit/mL injection 3,200 Units  3,200 Units Hemodialysis DIALYSIS PRN       Review of Systems:   A comprehensive review of systems was negative except for that written in the HPI. Objective:   Physical Exam:     Visit Vitals  /82 (BP 1 Location: Left upper arm, BP Patient Position: At rest)   Pulse 79   Temp 97.7 °F (36.5 °C)   Resp 20   Ht 5' 5\" (1.651 m)   Wt 71.8 kg (158 lb 4.6 oz)   SpO2 98%   BMI 26.34 kg/m²    O2 Flow Rate (L/min): 1 l/min O2 Device: Nasal cannula    Temp (24hrs), Av.9 °F (36.6 °C), Min:97.5 °F (36.4 °C), Max:98.3 °F (36.8 °C)    No intake/output data recorded.  1901 -  0700  In: 120 [P.O.:120]  Out: 200 [Urine:200]    General:   Awake and alert   Lungs:   Clear to auscultation bilaterally. Chest wall:  No tenderness or deformity. Heart:  Regular rate and rhythm, S1, S2 normal, no murmur, click, rub or gallop. Abdomen:   Soft, non-tender. Bowel sounds normal. No masses,  No organomegaly. Extremities: Extremities normal, atraumatic, no cyanosis or edema. Pulses: 2+ and symmetric all extremities.    Skin: Skin color, texture, turgor normal. No rashes or lesions Neurologic: CNII-XII intact. No gross focal deficits         Data Review:       Recent Days:  Recent Labs     05/10/22  0518 05/09/22  0050   WBC 6.6 7.1   HGB 13.6 13.7   HCT 45.4 45.8   * 155     Recent Labs     05/11/22  0745 05/10/22  0519 05/10/22  0518 05/09/22  0050 05/09/22  0050   * 140 139   < > 140   K 4.1 4.5 4.5   < > 4.1    106 106   < > 107   CO2 27 29 29   < > 27   GLU 92 36* 37*   < > 55*   BUN 36* 30* 30*   < > 36*   CREA 3.05* 2.81* 2.77*   < > 3.38*   CA 6.8* 7.5* 7.5*   < > 7.6*   PHOS 4.5 4.9*  --   --   --    ALB 1.7* 1.8*  --   --  2.0*   TBILI  --   --   --   --  0.3   ALT  --   --   --   --  28    < > = values in this interval not displayed. No results for input(s): PH, PCO2, PO2, HCO3, FIO2 in the last 72 hours.     24 Hour Results:  Recent Results (from the past 24 hour(s))   GLUCOSE, POC    Collection Time: 05/10/22  3:32 PM   Result Value Ref Range    Glucose (POC) 68 65 - 117 mg/dL    Performed by [employer], POC    Collection Time: 05/10/22  5:52 PM   Result Value Ref Range    Glucose (POC) 113 65 - 117 mg/dL    Performed by [employer], POC    Collection Time: 05/10/22  9:01 PM   Result Value Ref Range    Glucose (POC) 83 65 - 117 mg/dL    Performed by [address], POC    Collection Time: 05/11/22  5:29 AM   Result Value Ref Range    Glucose (POC) 71 65 - 117 mg/dL    Performed by [address], POC    Collection Time: 05/11/22  7:11 AM   Result Value Ref Range    Glucose (POC) 96 65 - 117 mg/dL    Performed by Rehana Corado    RENAL FUNCTION PANEL    Collection Time: 05/11/22  7:45 AM   Result Value Ref Range    Sodium 134 (L) 136 - 145 mmol/L    Potassium 4.1 3.5 - 5.1 mmol/L    Chloride 101 97 - 108 mmol/L    CO2 27 21 - 32 mmol/L    Anion gap 6 5 - 15 mmol/L    Glucose 92 65 - 100 mg/dL    BUN 36 (H) 6 - 20 mg/dL    Creatinine 3.05 (H) 0.55 - 1.02 mg/dL    BUN/Creatinine ratio 12 12 - 20      GFR est AA 18 (L) >60 ml/min/1.73m2    GFR est non-AA 15 (L) >60 ml/min/1.73m2    Calcium 6.8 (L) 8.5 - 10.1 mg/dL    Phosphorus 4.5 2.6 - 4.7 mg/dL    Albumin 1.7 (L) 3.5 - 5.0 g/dL   CULTURE, BLOOD, LINE    Collection Time: 05/11/22  7:45 AM    Specimen: Blood   Result Value Ref Range    Special Requests: No Special Requests      Culture result: No growth after 1 hour     GLUCOSE, POC    Collection Time: 05/11/22 11:52 AM   Result Value Ref Range    Glucose (POC) 69 65 - 117 mg/dL    Performed by [id number] EXT VENOUS RIGHT   Final Result      DUPLEX UPPER EXT VENOUS RIGHT   Final Result      XR CHEST PORT   Final Result   Small bilateral pleural effusions and patchy perihilar opacities most likely   representing mild pulmonary edema. Assessment:  Atypical chest pain. Ruled out for MI    Hypoglycemia, improved    End-stage renal disease, on hemodialysis    Hypothyroidism    Functional paraplegia    Essential hypertension    Permanent pacemaker in situ. Review of strips shows no evidence for bradycardia    History of stroke    History of seizures, stable    Diabetes mellitus type 2. Patient not on any treatment for diabetes at home, has not received any insulin here. Hypoglycemia probably due to poor oral intake      Plan: We are starting a D10 drip for her hypoglycemia  We will see how she does the next 24 hours    Care Plan discussed with: Patient/Family    Disposition: Anticipate discharge home in 24 hours    Total time spent with patient: 30 minutes.     Shakira Hernandez MD

## 2022-05-11 NOTE — PROGRESS NOTES
Renal Progress Note    Patient: Millie Vance MRN: [id number]  SSN: [id number]    YOB: 1950  Age: 71 y.o. Sex: female      Admit Date: 5/9/2022    LOS: 2 days     Subjective:   Patient seen on . Awake and verbally responsive, no acute distress. Patient is feeling better,  No complaints of shortness of breath.  No c/o chest pain        Current Facility-Administered Medications   Medication Dose Route Frequency    heparin (porcine) 1,000 unit/mL injection 3,200 Units  3,200 Units IntraVENous DIALYSIS PRN    heparin (porcine) 1,000 unit/mL injection        insulin lispro (HUMALOG) injection   SubCUTAneous AC&HS    glucose chewable tablet 16 g  4 Tablet Oral PRN    glucagon (GLUCAGEN) injection 1 mg  1 mg IntraMUSCular PRN    dextrose 10% infusion 0-250 mL  0-250 mL IntraVENous PRN    [Held by provider] dextrose 10% infusion  75 mL/hr IntraVENous CONTINUOUS    atorvastatin (LIPITOR) tablet 40 mg  40 mg Oral DAILY    FLUoxetine (PROzac) capsule 10 mg  10 mg Oral DAILY    levothyroxine (SYNTHROID) tablet 200 mcg  200 mcg Oral ACB    metoprolol succinate (TOPROL-XL) XL tablet 12.5 mg  12.5 mg Oral DAILY    midodrine (PROAMATINE) tablet 5 mg  5 mg Oral TID WITH MEALS    pantoprazole (PROTONIX) tablet 40 mg  40 mg Oral ACB    sevelamer carbonate (RENVELA) tab 800 mg  800 mg Oral TID WITH MEALS    lacosamide (VIMPAT) tablet 200 mg  200 mg Oral BID    sodium chloride (NS) flush 5-40 mL  5-40 mL IntraVENous Q8H    sodium chloride (NS) flush 5-40 mL  5-40 mL IntraVENous PRN    acetaminophen (TYLENOL) tablet 650 mg  650 mg Oral Q6H PRN    Or    acetaminophen (TYLENOL) suppository 650 mg  650 mg Rectal Q6H PRN    polyethylene glycol (MIRALAX) packet 17 g  17 g Oral DAILY PRN    ondansetron (ZOFRAN ODT) tablet 4 mg  4 mg Oral Q8H PRN    Or    ondansetron (ZOFRAN) injection 4 mg  4 mg IntraVENous Q6H PRN    enoxaparin (LOVENOX) injection 30 mg  30 mg SubCUTAneous DAILY    levETIRAcetam (KEPPRA) tablet 1,000 mg  1,000 mg Oral DAILY    heparin (porcine) 1,000 unit/mL injection 3,200 Units  3,200 Units Hemodialysis DIALYSIS PRN        Vitals:    05/11/22 0930 05/11/22 1003 05/11/22 1132 05/11/22 1423   BP: 117/71 112/71 117/82 121/72   Pulse: 60 61 79 60   Resp:   20 18   Temp:   97.7 °F (36.5 °C) 97.5 °F (36.4 °C)   TempSrc:       SpO2:   98% 99%   Weight:       Height:         Objective:   General: alert awake well-oriented, no acute distress. HEENT: EOMI, no Icterus, no Pallor,  mucosa moist.  Neck: Neck is supple, No JVD  Lungs: breathsounds normal, no respiratory distress on inspection, no rhonchi, no rales,   CVS: heart sounds normal,  no murmurs, no rubs. GI: soft, nontender, normal BS. Extremeties: no cyanosis, no LE edema, right UE swelling+, better  Neuro: Alert, awake, answering simple questions  Skin: normal skin turgor, no skin rashes. Intake and Output:  Current Shift: No intake/output data recorded. Last three shifts: 05/09 1901 - 05/11 0700  In: 120 [P.O.:120]  Out: 200 [Urine:200]      Lab/Data Review:  Recent Labs     05/10/22  0518 05/09/22  0050   WBC 6.6 7.1   HGB 13.6 13.7   HCT 45.4 45.8   * 155     Recent Labs     05/11/22  0745 05/10/22  0519 05/10/22  0518 05/09/22  0050 05/09/22  0050   * 140 139   < > 140   K 4.1 4.5 4.5   < > 4.1    106 106   < > 107   CO2 27 29 29   < > 27   GLU 92 36* 37*   < > 55*   BUN 36* 30* 30*   < > 36*   CREA 3.05* 2.81* 2.77*   < > 3.38*   CA 6.8* 7.5* 7.5*   < > 7.6*   PHOS 4.5 4.9*  --   --   --    ALB 1.7* 1.8*  --   --  2.0*   TBILI  --   --   --   --  0.3   ALT  --   --   --   --  28    < > = values in this interval not displayed. No results for input(s): PH, PCO2, PO2, HCO3, FIO2 in the last 72 hours.   Recent Results (from the past 24 hour(s))   GLUCOSE, POC    Collection Time: 05/10/22  5:52 PM   Result Value Ref Range    Glucose (POC) 113 65 - 117 mg/dL    Performed by [address] [place] [address] Collection Time: 05/10/22  9:01 PM   Result Value Ref Range    Glucose (POC) 83 65 - 117 mg/dL    Performed by [facility], POC    Collection Time: 05/11/22  5:29 AM   Result Value Ref Range    Glucose (POC) 71 65 - 117 mg/dL    Performed by [address], POC    Collection Time: 05/11/22  7:11 AM   Result Value Ref Range    Glucose (POC) 96 65 - 117 mg/dL    Performed by Eleanor Aquino    RENAL FUNCTION PANEL    Collection Time: 05/11/22  7:45 AM   Result Value Ref Range    Sodium 134 (L) 136 - 145 mmol/L    Potassium 4.1 3.5 - 5.1 mmol/L    Chloride 101 97 - 108 mmol/L    CO2 27 21 - 32 mmol/L    Anion gap 6 5 - 15 mmol/L    Glucose 92 65 - 100 mg/dL    BUN 36 (H) 6 - 20 mg/dL    Creatinine 3.05 (H) 0.55 - 1.02 mg/dL    BUN/Creatinine ratio 12 12 - 20      GFR est AA 18 (L) >60 ml/min/1.73m2    GFR est non-AA 15 (L) >60 ml/min/1.73m2    Calcium 6.8 (L) 8.5 - 10.1 mg/dL    Phosphorus 4.5 2.6 - 4.7 mg/dL    Albumin 1.7 (L) 3.5 - 5.0 g/dL   CULTURE, BLOOD, LINE    Collection Time: 05/11/22  7:45 AM    Specimen: Blood   Result Value Ref Range    Special Requests: No Special Requests      Culture result: No growth after 1 hour     GLUCOSE, POC    Collection Time: 05/11/22 11:52 AM   Result Value Ref Range    Glucose (POC) 69 65 - 117 mg/dL    Performed by Radha Mccartney, POC    Collection Time: 05/11/22  1:31 PM   Result Value Ref Range    Glucose (POC) 104 65 - 117 mg/dL    Performed by Elizabeth Lopez    GLUCOSE, POC    Collection Time: 05/11/22  4:08 PM   Result Value Ref Range    Glucose (POC) 78 65 - 117 mg/dL    Performed by Timoteo Nettles         Assessment and Plan:     1.  End-stage renal disease on hemodialysis: on MWF at [address] [place] [address] hemodialysis as outpatient was on 5/6 and s/p HD yesterday  No complaints of any fluid overload, no complaints of uremia like nausea or vomiting, drowsy  Stable electrolytes  Inpatient hemodialysis today  will continue maintenance hemodialysis on MWF     2. Hypertension: Stable, borderline low on midodrine  Continue current blood pressure medications and continue to monitor blood pressures    3. Anemia: Stable hemoglobin  No need for Epogen with hemodialysis  Continue to monitor H&H     4. Renal osteodystrophy:   Low calcium with low albumin  Hyperphosphatemia: continue phosphate binders, stable phos at 4.9  Secondary hyperparathyroidism: Continue Zemplar/Sensipar as per outpatient protocol     5. Shortness of breath/chest discomfort: Probably related to fluid overload   patient clinically improved with HD  Continue to monitor clinically     6. Debility/bedbound: Continue supportive care     7. Right upper extremity swelling: duplex study negative for DVT,   Right upper extremity swelling is probably related to venous impedence with right-sided permacath.     8. Hypoglycemia: poor intake, off d10 now, monitor accuchecks    Signed By: Qing Henriquez MD     May 11, 2022

## 2022-05-12 LAB
GLUCOSE BLD STRIP.AUTO-MCNC: 103 MG/DL (ref 65–117)
GLUCOSE BLD STRIP.AUTO-MCNC: 113 MG/DL (ref 65–117)
GLUCOSE BLD STRIP.AUTO-MCNC: 145 MG/DL (ref 65–117)
GLUCOSE BLD STRIP.AUTO-MCNC: 53 MG/DL (ref 65–117)
GLUCOSE BLD STRIP.AUTO-MCNC: 54 MG/DL (ref 65–117)
GLUCOSE BLD STRIP.AUTO-MCNC: 55 MG/DL (ref 65–117)
GLUCOSE BLD STRIP.AUTO-MCNC: 57 MG/DL (ref 65–117)
GLUCOSE BLD STRIP.AUTO-MCNC: 96 MG/DL (ref 65–117)
GLUCOSE SERPL-MCNC: 40 MG/DL (ref 65–100)
PERFORMED BY, TECHID: ABNORMAL
PERFORMED BY, TECHID: NORMAL

## 2022-05-12 PROCEDURE — 83525 ASSAY OF INSULIN: CPT

## 2022-05-12 PROCEDURE — 82947 ASSAY GLUCOSE BLOOD QUANT: CPT

## 2022-05-12 PROCEDURE — 74011250636 HC RX REV CODE- 250/636: Performed by: INTERNAL MEDICINE

## 2022-05-12 PROCEDURE — 74011000250 HC RX REV CODE- 250: Performed by: INTERNAL MEDICINE

## 2022-05-12 PROCEDURE — 74011250637 HC RX REV CODE- 250/637: Performed by: INTERNAL MEDICINE

## 2022-05-12 PROCEDURE — 82962 GLUCOSE BLOOD TEST: CPT

## 2022-05-12 PROCEDURE — G0378 HOSPITAL OBSERVATION PER HR: HCPCS

## 2022-05-12 PROCEDURE — 84681 ASSAY OF C-PEPTIDE: CPT

## 2022-05-12 PROCEDURE — 36415 COLL VENOUS BLD VENIPUNCTURE: CPT

## 2022-05-12 PROCEDURE — 65270000029 HC RM PRIVATE

## 2022-05-12 PROCEDURE — 96372 THER/PROPH/DIAG INJ SC/IM: CPT

## 2022-05-12 RX ORDER — LOPERAMIDE HYDROCHLORIDE 2 MG/1
4 CAPSULE ORAL
Status: DISCONTINUED | OUTPATIENT
Start: 2022-05-12 | End: 2022-05-13 | Stop reason: HOSPADM

## 2022-05-12 RX ADMIN — SEVELAMER CARBONATE 800 MG: 800 TABLET, FILM COATED ORAL at 12:05

## 2022-05-12 RX ADMIN — FLUOXETINE HYDROCHLORIDE 10 MG: 10 CAPSULE ORAL at 08:33

## 2022-05-12 RX ADMIN — MIDODRINE HYDROCHLORIDE 5 MG: 5 TABLET ORAL at 12:05

## 2022-05-12 RX ADMIN — SODIUM CHLORIDE, PRESERVATIVE FREE 10 ML: 5 INJECTION INTRAVENOUS at 13:49

## 2022-05-12 RX ADMIN — MIDODRINE HYDROCHLORIDE 5 MG: 5 TABLET ORAL at 08:34

## 2022-05-12 RX ADMIN — PANTOPRAZOLE SODIUM 40 MG: 40 TABLET, DELAYED RELEASE ORAL at 08:34

## 2022-05-12 RX ADMIN — LACOSAMIDE 200 MG: 100 TABLET, FILM COATED ORAL at 08:34

## 2022-05-12 RX ADMIN — METOPROLOL SUCCINATE 12.5 MG: 25 TABLET, EXTENDED RELEASE ORAL at 08:34

## 2022-05-12 RX ADMIN — SODIUM CHLORIDE, PRESERVATIVE FREE 10 ML: 5 INJECTION INTRAVENOUS at 05:19

## 2022-05-12 RX ADMIN — DEXTROSE MONOHYDRATE 75 ML/HR: 100 INJECTION, SOLUTION INTRAVENOUS at 12:05

## 2022-05-12 RX ADMIN — ATORVASTATIN CALCIUM 40 MG: 40 TABLET, FILM COATED ORAL at 08:34

## 2022-05-12 RX ADMIN — ACETAMINOPHEN 650 MG: 325 TABLET ORAL at 19:58

## 2022-05-12 RX ADMIN — LEVOTHYROXINE SODIUM 200 MCG: 0.1 TABLET ORAL at 08:34

## 2022-05-12 RX ADMIN — LOPERAMIDE HYDROCHLORIDE 4 MG: 2 CAPSULE ORAL at 12:05

## 2022-05-12 RX ADMIN — LEVETIRACETAM 1000 MG: 500 TABLET, FILM COATED ORAL at 08:34

## 2022-05-12 RX ADMIN — SEVELAMER CARBONATE 800 MG: 800 TABLET, FILM COATED ORAL at 16:56

## 2022-05-12 RX ADMIN — ENOXAPARIN SODIUM 30 MG: 100 INJECTION SUBCUTANEOUS at 08:35

## 2022-05-12 RX ADMIN — DEXTROSE MONOHYDRATE 250 ML: 100 INJECTION, SOLUTION INTRAVENOUS at 08:18

## 2022-05-12 RX ADMIN — SEVELAMER CARBONATE 800 MG: 800 TABLET, FILM COATED ORAL at 08:34

## 2022-05-12 RX ADMIN — ACETAMINOPHEN 650 MG: 325 TABLET ORAL at 08:34

## 2022-05-12 RX ADMIN — LACOSAMIDE 200 MG: 100 TABLET, FILM COATED ORAL at 21:23

## 2022-05-12 NOTE — PROGRESS NOTES
Pt has had multiple loose stools, pt is allergic to lactose and has been receiving lactose products. MD notified. Diet order changed. Pt will get immodium and continue to monitor.

## 2022-05-12 NOTE — PROGRESS NOTES
Hospitalist Progress Note               Daily Progress Note: 5/12/2022      Subjective: The patient is seen for follow up. She is a 78-year-old female  hypothyroidism with history of hypertension, atrial fibrillation, seizure disorder, ESRD, hypothyroidism, stroke, GERD, hyperlipidemia functional paraplegia/bedbound, CVA, legally blind, peptic ulcer disease with recent gastric ulcer at anastomosis by EGD in January. She was admitted on 5/9 with a 3-day history of chest and epigastric pain as well as shortness of breath. Chest x-ray showed some fluid overload. Her blood sugar was low as well. Patient was admitted to cardiac telemetry. She was started on a D10 drip    ------    Patient is seen today for follow-up. Patient more confused today. Blood sugars are dropping again, down into the 50s this morning.     Problem List:  Problem List as of 5/12/2022 Date Reviewed: 4/26/2022          Codes Class Noted - Resolved    Chest pain ICD-10-CM: R07.9  ICD-9-CM: 786.50  5/9/2022 - Present        Pulmonary edema ICD-10-CM: J81.1  ICD-9-CM: 182  5/9/2022 - Present        CKD (chronic kidney disease) stage 4, GFR 15-29 ml/min ([facility]) ICD-10-CM: N18.4  ICD-9-CM: 585.4  4/26/2022 - Present        Chronic renal disease, stage III ICD-10-CM: N18.30  ICD-9-CM: 585.3  4/26/2022 - Present        Thrombocytopenia ([facility] ICD-10-CM: D69.6  ICD-9-CM: 287.5  4/26/2022 - Present        Syncope ICD-10-CM: R55  ICD-9-CM: 780.2  4/11/2022 - Present        History of seizure disorder ICD-10-CM: Z86.69  ICD-9-CM: V12.49  4/11/2022 - Present        Anemia due to chronic kidney disease, on chronic dialysis ([facility] ICD-10-CM: N18.6, D63.1, Z99.2  ICD-9-CM: 585.6, 285.21, V45.11  4/11/2022 - Present        Opioid use, unspecified with unspecified opioid-induced disorder ICD-10-CM: F11.99  ICD-9-CM: 292.9, 305.50  3/8/2022 - Present        Complicated UTI (urinary tract infection) ICD-10-CM: N39.0  ICD-9-CM: 599.0  2/18/2022 - Present Respiratory failure with hypoxia [facility]) ICD-10-CM: J96.91  ICD-9-CM: 518.81  2/18/2022 - Present        GI bleed ICD-10-CM: K92.2  ICD-9-CM: 578.9  2/11/2022 - Present        Acute encephalopathy ICD-10-CM: G93.40  ICD-9-CM: 348.30  1/20/2022 - Present        Seizure-like activity ([facility] ICD-10-CM: R56.9  ICD-9-CM: 780.39  1/12/2022 - Present        MSSA bacteremia ICD-10-CM: R78.81, B95.61  ICD-9-CM: 790.7, 041.11  1/3/2022 - Present        Anemia ICD-10-CM: D64.9  ICD-9-CM: 285.9  1/3/2022 - Present        Gait disturbance, post-stroke ICD-10-CM: I69.398, R26.9  ICD-9-CM: 438.89, 781.2  1/3/2022 - Present        COVID-19 ICD-10-CM: U07.1  ICD-9-CM: 079.89  12/26/2021 - Present        Fluid overload ICD-10-CM: E87.70  ICD-9-CM: 276.69  12/5/2021 - Present        Left-sided weakness ICD-10-CM: R53.1  ICD-9-CM: 728.87  12/3/2021 - Present        Intractable nausea and vomiting ICD-10-CM: R11.2  ICD-9-CM: 536.2  12/3/2021 - Present        Hypoglycemia ICD-10-CM: E16.2  ICD-9-CM: 251.2  6/11/2021 - Present        Encephalopathy acute ICD-10-CM: G93.40  ICD-9-CM: 348.30  6/11/2021 - Present        SSS (sick sinus syndrome) ([facility].) ICD-10-CM: I49.5  ICD-9-CM: 427.81  6/11/2021 - Present        Seizure ([facility] ICD-10-CM: R56.9  ICD-9-CM: 780.39  5/19/2021 - Present        Hyperkalemia ICD-10-CM: E87.5  ICD-9-CM: 276.7  10/16/2020 - Present        Atherosclerosis of native arteries of the extremities with ulceration ([facility] ICD-10-CM: I70.25  ICD-9-CM: 440.23, 707.9  8/31/2020 - Present        Cellulitis and abscess of trunk ICD-10-CM: L03.319, L02.219  ICD-9-CM: 682.2  8/31/2020 - Present        Peripheral venous insufficiency ICD-10-CM: I87.2  ICD-9-CM: 459.81  8/31/2020 - Present        Varicose veins of lower extremity with inflammation ICD-10-CM: I83.10  ICD-9-CM: 454.1  8/31/2020 - Present        Peripheral vascular disease ([facility] ICD-10-CM: I73.9  ICD-9-CM: 443.9  7/3/2020 - Present        Type II diabetes mellitus [facility]) ICD-10-CM: E11.9  ICD-9-CM: 250.00  7/3/2020 - Present        Long term (current) use of antibiotics ICD-10-CM: Z79.2  ICD-9-CM: V58.62  7/3/2020 - Present        Atrial fibrillation ([facility] ICD-10-CM: I48.91  ICD-9-CM: 427.31  7/3/2020 - Present        Cardiac pacemaker in situ ICD-10-CM: Z95.0  ICD-9-CM: V45.01  7/3/2020 - Present        Carpal tunnel syndrome of right wrist ICD-10-CM: G56.01  ICD-9-CM: 354.0  7/3/2020 - Present        Chest wall pain ICD-10-CM: R07.89  ICD-9-CM: 786.52  7/3/2020 - Present        Chronic diarrhea ICD-10-CM: K52.9  ICD-9-CM: 787.91  7/3/2020 - Present        Chronic neck pain ICD-10-CM: M54.2, G89.29  ICD-9-CM: 723.1, 338.29  7/3/2020 - Present        End stage renal failure on dialysis [facility]) ICD-10-CM: N18.6, Z99.2  ICD-9-CM: 585.6, V45.11  7/3/2020 - Present        History of CVA (cerebrovascular accident) ICD-10-CM: Z86.73  ICD-9-CM: V12.54  7/3/2020 - Present        Hyponatremia ICD-10-CM: E87.1  ICD-9-CM: 276.1  7/3/2020 - Present        Acquired hypothyroidism ICD-10-CM: E03.9  ICD-9-CM: 244.9  7/3/2020 - Present        Breakthrough seizure ([facility] ICD-10-CM: M32.483  ICD-9-CM: 345.91  7/3/2020 - Present        Recurrent falls ICD-10-CM: R29.6  ICD-9-CM: V15.88  7/3/2020 - Present        RESOLVED: Septic shock ([facility] ICD-10-CM: A41.9, R65.21  ICD-9-CM: 038.9, 785.52, 995.92  2/11/2022 - 3/8/2022        RESOLVED: Sepsis ([facility] ICD-10-CM: A41.9  ICD-9-CM: 038.9, 995.91  1/7/2022 - 3/8/2022        RESOLVED: Metabolic encephalopathy [id number]: G93.41  ICD-9-CM: 348.31  1/3/2022 - 1/3/2022        RESOLVED: Sepsis ([facility] ICD-10-CM: A41.9  ICD-9-CM: 038.9, 995.91  1/3/2022 - 1/3/2022        RESOLVED: AMS (altered mental status) ICD-10-CM: K67.88  ICD-9-CM: 780.97  12/26/2021 - 1/3/2022        RESOLVED: Disorder due to well controlled type 2 diabetes mellitus ([facility] ICD-10-CM: E11.8  ICD-9-CM: 250.90  8/31/2020 - 6/11/2021              Medications reviewed  Current Facility-Administered Medications   Medication Dose Route Frequency    loperamide (IMODIUM) capsule 4 mg  4 mg Oral Q6H PRN    heparin (porcine) 1,000 unit/mL injection 3,200 Units  3,200 Units IntraVENous DIALYSIS PRN    insulin lispro (HUMALOG) injection   SubCUTAneous AC&HS    glucose chewable tablet 16 g  4 Tablet Oral PRN    glucagon (GLUCAGEN) injection 1 mg  1 mg IntraMUSCular PRN    dextrose 10% infusion 0-250 mL  0-250 mL IntraVENous PRN    [Held by provider] dextrose 10% infusion  75 mL/hr IntraVENous CONTINUOUS    atorvastatin (LIPITOR) tablet 40 mg  40 mg Oral DAILY    FLUoxetine (PROzac) capsule 10 mg  10 mg Oral DAILY    levothyroxine (SYNTHROID) tablet 200 mcg  200 mcg Oral ACB    metoprolol succinate (TOPROL-XL) XL tablet 12.5 mg  12.5 mg Oral DAILY    midodrine (PROAMATINE) tablet 5 mg  5 mg Oral TID WITH MEALS    pantoprazole (PROTONIX) tablet 40 mg  40 mg Oral ACB    sevelamer carbonate (RENVELA) tab 800 mg  800 mg Oral TID WITH MEALS    lacosamide (VIMPAT) tablet 200 mg  200 mg Oral BID    sodium chloride (NS) flush 5-40 mL  5-40 mL IntraVENous Q8H    sodium chloride (NS) flush 5-40 mL  5-40 mL IntraVENous PRN    acetaminophen (TYLENOL) tablet 650 mg  650 mg Oral Q6H PRN    Or    acetaminophen (TYLENOL) suppository 650 mg  650 mg Rectal Q6H PRN    polyethylene glycol (MIRALAX) packet 17 g  17 g Oral DAILY PRN    ondansetron (ZOFRAN ODT) tablet 4 mg  4 mg Oral Q8H PRN    Or    ondansetron (ZOFRAN) injection 4 mg  4 mg IntraVENous Q6H PRN    enoxaparin (LOVENOX) injection 30 mg  30 mg SubCUTAneous DAILY    levETIRAcetam (KEPPRA) tablet 1,000 mg  1,000 mg Oral DAILY    heparin (porcine) 1,000 unit/mL injection 3,200 Units  3,200 Units Hemodialysis DIALYSIS PRN       Review of Systems:   A comprehensive review of systems was negative except for that written in the HPI.     Objective:   Physical Exam:     Visit Vitals  /75 (BP Patient Position: At rest;Semi fowlers) Pulse 60   Temp 98.2 °F (36.8 °C)   Resp 18   Ht 5' 5\" (1.651 m)   Wt 68.9 kg (151 lb 14.4 oz)   SpO2 100%   BMI 25.28 kg/m²    O2 Flow Rate (L/min): 1 l/min O2 Device: Nasal cannula    Temp (24hrs), Av.1 °F (36.7 °C), Min:97.4 °F (36.3 °C), Max:99.4 °F (37.4 °C)    No intake/output data recorded. 05/10 1901 -  0700  In: -   Out: 200 [Urine:200]    General:   Awake and alert   Lungs:   Clear to auscultation bilaterally. Chest wall:  No tenderness or deformity. Heart:  Regular rate and rhythm, S1, S2 normal, no murmur, click, rub or gallop. Abdomen:   Soft, non-tender. Bowel sounds normal. No masses,  No organomegaly. Extremities: Extremities normal, atraumatic, no cyanosis or edema. Pulses: 2+ and symmetric all extremities. Skin: Skin color, texture, turgor normal. No rashes or lesions   Neurologic: CNII-XII intact. No gross focal deficits         Data Review:       Recent Days:  Recent Labs     05/10/22  0518   WBC 6.6   HGB 13.6   HCT 45.4   *     Recent Labs     22  0745 05/10/22  0519 05/10/22  0518   * 140 139   K 4.1 4.5 4.5    106 106   CO2 27 29 29   GLU 92 36* 37*   BUN 36* 30* 30*   CREA 3.05* 2.81* 2.77*   CA 6.8* 7.5* 7.5*   PHOS 4.5 4.9*  --    ALB 1.7* 1.8*  --      No results for input(s): PH, PCO2, PO2, HCO3, FIO2 in the last 72 hours.     24 Hour Results:  Recent Results (from the past 24 hour(s))   GLUCOSE, POC    Collection Time: 22 11:52 AM   Result Value Ref Range    Glucose (POC) 69 65 - 117 mg/dL    Performed by Kiara Fisher, POC    Collection Time: 22  1:31 PM   Result Value Ref Range    Glucose (POC) 104 65 - 117 mg/dL    Performed by Jacob Lopez    GLUCOSE, POC    Collection Time: 22  4:08 PM   Result Value Ref Range    Glucose (POC) 78 65 - 117 mg/dL    [facility], POC    Collection Time: 22  9:12 PM   Result Value Ref Range    Glucose (POC) 77 65 - 117 mg/dL    Performed by Aster Galeano    GLUCOSE, POC    Collection Time: 05/12/22  5:26 AM   Result Value Ref Range    Glucose (POC) 113 65 - 117 mg/dL    Performed by King, POC    Collection Time: 05/12/22  7:56 AM   Result Value Ref Range    Glucose (POC) 53 (LL) 65 - 117 mg/dL    Performed by Eliu Tineo    GLUCOSE, POC    Collection Time: 05/12/22  7:57 AM   Result Value Ref Range    Glucose (POC) 57 (L) 65 - 117 mg/dL    Performed by Eliu Tineo    GLUCOSE, POC    Collection Time: 05/12/22  9:37 AM   Result Value Ref Range    Glucose (POC) 103 65 - 117 mg/dL    Performed by LISA TREJO        DUPLEX LOWER EXT VENOUS RIGHT   Final Result      DUPLEX UPPER EXT VENOUS RIGHT   Final Result      XR CHEST PORT   Final Result   Small bilateral pleural effusions and patchy perihilar opacities most likely   representing mild pulmonary edema. Assessment:        Plan:        Care Plan discussed with: Nurse    Disposition:     Total time spent with patient: 30 minutes. Gail Young MD            Hospitalist Progress Note               Daily Progress Note: 5/12/2022      Subjective: The patient is seen for follow up.      Problem List:  Problem List as of 5/12/2022 Date Reviewed: 4/26/2022          Codes Class Noted - Resolved    Chest pain ICD-10-CM: R07.9  ICD-9-CM: 786.50  5/9/2022 - Present        Pulmonary edema ICD-10-CM: J81.1  ICD-9-CM: 650  5/9/2022 - Present        CKD (chronic kidney disease) stage 4, GFR 15-29 ml/min ([facility]) ICD-10-CM: N18.4  ICD-9-CM: 585.4  4/26/2022 - Present        Chronic renal disease, stage III ICD-10-CM: N18.30  ICD-9-CM: 585.3  4/26/2022 - Present        Thrombocytopenia ([address] ICD-10-CM: D69.6  ICD-9-CM: 287.5  4/26/2022 - Present        Syncope ICD-10-CM: R55  ICD-9-CM: 780.2  4/11/2022 - Present        History of seizure disorder ICD-10-CM: Z86.69  ICD-9-CM: V12.49  4/11/2022 - Present        Anemia due to chronic kidney disease, on chronic dialysis ([facility] ICD-10-CM: N18.6, D63.1, Z99.2  ICD-9-CM: 585.6, 285.21, V45.11  4/11/2022 - Present        Opioid use, unspecified with unspecified opioid-induced disorder ICD-10-CM: F11.99  ICD-9-CM: 292.9, 305.50  3/8/2022 - Present        Complicated UTI (urinary tract infection) ICD-10-CM: N39.0  ICD-9-CM: 599.0  2/18/2022 - Present        Respiratory failure with hypoxia ([facility]) ICD-10-CM: J96.91  ICD-9-CM: 518.81  2/18/2022 - Present        GI bleed ICD-10-CM: K92.2  ICD-9-CM: 578.9  2/11/2022 - Present        Acute encephalopathy ICD-10-CM: G93.40  ICD-9-CM: 348.30  1/20/2022 - Present        Seizure-like activity ([facility] ICD-10-CM: R56.9  ICD-9-CM: 780.39  1/12/2022 - Present        MSSA bacteremia ICD-10-CM: R78.81, B95.61  ICD-9-CM: 790.7, 041.11  1/3/2022 - Present        Anemia ICD-10-CM: D64.9  ICD-9-CM: 285.9  1/3/2022 - Present        Gait disturbance, post-stroke ICD-10-CM: I69.398, R26.9  ICD-9-CM: 438.89, 781.2  1/3/2022 - Present        COVID-19 ICD-10-CM: U07.1  ICD-9-CM: 079.89  12/26/2021 - Present        Fluid overload ICD-10-CM: E87.70  ICD-9-CM: 276.69  12/5/2021 - Present        Left-sided weakness ICD-10-CM: R53.1  ICD-9-CM: 728.87  12/3/2021 - Present        Intractable nausea and vomiting ICD-10-CM: R11.2  ICD-9-CM: 536.2  12/3/2021 - Present        Hypoglycemia ICD-10-CM: E16.2  ICD-9-CM: 251.2  6/11/2021 - Present        Encephalopathy acute ICD-10-CM: G93.40  ICD-9-CM: 348.30  6/11/2021 - Present        SSS (sick sinus syndrome) ([facility] ICD-10-CM: I49.5  ICD-9-CM: 427.81  6/11/2021 - Present        Seizure ([facility] ICD-10-CM: R56.9  ICD-9-CM: 780.39  5/19/2021 - Present        Hyperkalemia ICD-10-CM: E87.5  ICD-9-CM: 276.7  10/16/2020 - Present        Atherosclerosis of native arteries of the extremities with ulceration ([facility] ICD-10-CM: I70.25  ICD-9-CM: 440.23, 707.9  8/31/2020 - Present        Cellulitis and abscess of trunk ICD-10-CM: L03.319, L02.219  ICD-9-CM: 682.2  8/31/2020 - Present Peripheral venous insufficiency ICD-10-CM: I87.2  ICD-9-CM: 459.81  8/31/2020 - Present        Varicose veins of lower extremity with inflammation ICD-10-CM: I83.10  ICD-9-CM: 454.1  8/31/2020 - Present        Peripheral vascular disease ([facility] ICD-10-CM: I73.9  ICD-9-CM: 443.9  7/3/2020 - Present        Type II diabetes mellitus ([facility] ICD-10-CM: E11.9  ICD-9-CM: 250.00  7/3/2020 - Present        Long term (current) use of antibiotics ICD-10-CM: Z79.2  ICD-9-CM: V58.62  7/3/2020 - Present        Atrial fibrillation ([facility] ICD-10-CM: I48.91  ICD-9-CM: 427.31  7/3/2020 - Present        Cardiac pacemaker in situ ICD-10-CM: Z95.0  ICD-9-CM: V45.01  7/3/2020 - Present        Carpal tunnel syndrome of right wrist ICD-10-CM: G56.01  ICD-9-CM: 354.0  7/3/2020 - Present        Chest wall pain ICD-10-CM: R07.89  ICD-9-CM: 786.52  7/3/2020 - Present        Chronic diarrhea ICD-10-CM: K52.9  ICD-9-CM: 787.91  7/3/2020 - Present        Chronic neck pain ICD-10-CM: M54.2, G89.29  ICD-9-CM: 723.1, 338.29  7/3/2020 - Present        End stage renal failure on dialysis [facility]) ICD-10-CM: N18.6, Z99.2  ICD-9-CM: 585.6, V45.11  7/3/2020 - Present        History of CVA (cerebrovascular accident) ICD-10-CM: Z86.73  ICD-9-CM: V12.54  7/3/2020 - Present        Hyponatremia ICD-10-CM: E87.1  ICD-9-CM: 276.1  7/3/2020 - Present        Acquired hypothyroidism ICD-10-CM: E03.9  ICD-9-CM: 244.9  7/3/2020 - Present        Breakthrough seizure ([facility] ICD-10-CM: L30.113  ICD-9-CM: 345.91  7/3/2020 - Present        Recurrent falls ICD-10-CM: R29.6  ICD-9-CM: V15.88  7/3/2020 - Present        RESOLVED: Septic shock ([facility] ICD-10-CM: A41.9, R65.21  ICD-9-CM: 038.9, 785.52, 995.92  2/11/2022 - 3/8/2022        RESOLVED: Sepsis ([facility] ICD-10-CM: A41.9  ICD-9-CM: 038.9, 995.91  1/7/2022 - 3/8/2022        RESOLVED: Metabolic encephalopathy AFM-13-LB: G93.41  ICD-9-CM: 348.31  1/3/2022 - 1/3/2022        RESOLVED: Sepsis ([facility] ICD-10-CM: A41.9  ICD-9-CM: 038.9, 995.91  1/3/2022 - 1/3/2022        RESOLVED: AMS (altered mental status) ICD-10-CM: R41.82  ICD-9-CM: 780.97  12/26/2021 - 1/3/2022        RESOLVED: Disorder due to well controlled type 2 diabetes mellitus ([facility] ICD-10-CM: E11.8  ICD-9-CM: 250.90  8/31/2020 - 6/11/2021              Medications reviewed  Current Facility-Administered Medications   Medication Dose Route Frequency    loperamide (IMODIUM) capsule 4 mg  4 mg Oral Q6H PRN    heparin (porcine) 1,000 unit/mL injection 3,200 Units  3,200 Units IntraVENous DIALYSIS PRN    insulin lispro (HUMALOG) injection   SubCUTAneous AC&HS    glucose chewable tablet 16 g  4 Tablet Oral PRN    glucagon (GLUCAGEN) injection 1 mg  1 mg IntraMUSCular PRN    dextrose 10% infusion 0-250 mL  0-250 mL IntraVENous PRN    [Held by provider] dextrose 10% infusion  75 mL/hr IntraVENous CONTINUOUS    atorvastatin (LIPITOR) tablet 40 mg  40 mg Oral DAILY    FLUoxetine (PROzac) capsule 10 mg  10 mg Oral DAILY    levothyroxine (SYNTHROID) tablet 200 mcg  200 mcg Oral ACB    metoprolol succinate (TOPROL-XL) XL tablet 12.5 mg  12.5 mg Oral DAILY    midodrine (PROAMATINE) tablet 5 mg  5 mg Oral TID WITH MEALS    pantoprazole (PROTONIX) tablet 40 mg  40 mg Oral ACB    sevelamer carbonate (RENVELA) tab 800 mg  800 mg Oral TID WITH MEALS    lacosamide (VIMPAT) tablet 200 mg  200 mg Oral BID    sodium chloride (NS) flush 5-40 mL  5-40 mL IntraVENous Q8H    sodium chloride (NS) flush 5-40 mL  5-40 mL IntraVENous PRN    acetaminophen (TYLENOL) tablet 650 mg  650 mg Oral Q6H PRN    Or    acetaminophen (TYLENOL) suppository 650 mg  650 mg Rectal Q6H PRN    polyethylene glycol (MIRALAX) packet 17 g  17 g Oral DAILY PRN    ondansetron (ZOFRAN ODT) tablet 4 mg  4 mg Oral Q8H PRN    Or    ondansetron (ZOFRAN) injection 4 mg  4 mg IntraVENous Q6H PRN    enoxaparin (LOVENOX) injection 30 mg  30 mg SubCUTAneous DAILY    levETIRAcetam (KEPPRA) tablet 1,000 mg  1,000 mg Oral DAILY  heparin (porcine) 1,000 unit/mL injection 3,200 Units  3,200 Units Hemodialysis DIALYSIS PRN       Review of Systems:   A comprehensive review of systems was negative except for that written in the HPI. Objective:   Physical Exam:     Visit Vitals  /75 (BP Patient Position: At rest;Semi fowlers)   Pulse 60   Temp 98.2 °F (36.8 °C)   Resp 18   Ht 5' 5\" (1.651 m)   Wt 68.9 kg (151 lb 14.4 oz)   SpO2 100%   BMI 25.28 kg/m²    O2 Flow Rate (L/min): 1 l/min O2 Device: Nasal cannula    Temp (24hrs), Av.1 °F (36.7 °C), Min:97.4 °F (36.3 °C), Max:99.4 °F (37.4 °C)    No intake/output data recorded. 05/10 1901 -  0700  In: -   Out: 200 [Urine:200]    General:   Awake and alert   Lungs:   Clear to auscultation bilaterally. Chest wall:  No tenderness or deformity. Heart:  Regular rate and rhythm, S1, S2 normal, no murmur, click, rub or gallop. Abdomen:   Soft, non-tender. Bowel sounds normal. No masses,  No organomegaly. Extremities: Extremities normal, atraumatic, no cyanosis or edema. Pulses: 2+ and symmetric all extremities. Skin: Skin color, texture, turgor normal. No rashes or lesions   Neurologic: CNII-XII intact. No gross focal deficits         Data Review:       Recent Days:  Recent Labs     05/10/22  0518   WBC 6.6   HGB 13.6   HCT 45.4   *     Recent Labs     22  0745 05/10/22  0519 05/10/22  0518   * 140 139   K 4.1 4.5 4.5    106 106   CO2 27 29 29   GLU 92 36* 37*   BUN 36* 30* 30*   CREA 3.05* 2.81* 2.77*   CA 6.8* 7.5* 7.5*   PHOS 4.5 4.9*  --    ALB 1.7* 1.8*  --      No results for input(s): PH, PCO2, PO2, HCO3, FIO2 in the last 72 hours.     24 Hour Results:  Recent Results (from the past 24 hour(s))   GLUCOSE, POC    Collection Time: 22 11:52 AM   Result Value Ref Range    Glucose (POC) 69 65 - 117 mg/dL    Performed by Rashmi Hensley, POC    Collection Time: 22  1:31 PM   Result Value Ref Range    Glucose (POC) 104 65 - 117 mg/dL    Performed by Armond Kerr    GLUCOSE, POC    Collection Time: 05/11/22  4:08 PM   Result Value Ref Range    Glucose (POC) 78 65 - 117 mg/dL    Performed by Reanna Gleason    GLUCOSE, POC    Collection Time: 05/11/22  9:12 PM   Result Value Ref Range    Glucose (POC) 77 65 - 117 mg/dL    Performed by [address] [place] [id number], POC    Collection Time: 05/12/22  5:26 AM   Result Value Ref Range    Glucose (POC) 113 65 - 117 mg/dL    Performed by King, POC    Collection Time: 05/12/22  7:56 AM   Result Value Ref Range    Glucose (POC) 53 (LL) 65 - 117 mg/dL    Performed by Clarisa Mcgarry    GLUCOSE, POC    Collection Time: 05/12/22  7:57 AM   Result Value Ref Range    Glucose (POC) 57 (L) 65 - 117 mg/dL    Performed by Clarisa Mcgarry    GLUCOSE, POC    Collection Time: 05/12/22  9:37 AM   Result Value Ref Range    Glucose (POC) 103 65 - 117 mg/dL    Performed by LISA TREJO        DUPLEX LOWER EXT VENOUS RIGHT   Final Result      DUPLEX UPPER EXT VENOUS RIGHT   Final Result      XR CHEST PORT   Final Result   Small bilateral pleural effusions and patchy perihilar opacities most likely   representing mild pulmonary edema. Assessment:  Atypical chest pain. Ruled out for MI    Recurrent/refractory hypoglycemia. Etiology unclear. Need to consider underlying insulinoma    Metabolic encephalopathy due to hypoglycemia    End-stage renal disease, on hemodialysis    Hypothyroidism     Functional paraplegia    Essential hypertension    Permanent pacemaker in situ. Review of strips shows no evidence for bradycardia    History of stroke    History of seizures, stable    Diabetes mellitus type 2. Not on any treatment    Plan:  Obtain stat insulin level and C-peptide level now. Lab was notified  We will resume a D10 drip following above    Overall prognosis very poor    Hospice may be a consideration.   Will speak with family      Care Plan discussed with: Patient/Family    Disposition: To be determined    Total time spent with patient: 30 minutes.     Guido Farrell MD

## 2022-05-12 NOTE — PROGRESS NOTES
Problem: Falls - Risk of  Goal: *Absence of Falls  Description: Document Cindia Neigh Fall Risk and appropriate interventions in the flowsheet. Outcome: Progressing Towards Goal  Note: Fall Risk Interventions:  Mobility Interventions: Bed/chair exit alarm    Mentation Interventions: Bed/chair exit alarm    Medication Interventions: Bed/chair exit alarm    Elimination Interventions: Call light in reach    History of Falls Interventions: Bed/chair exit alarm         Problem: Patient Education: Go to Patient Education Activity  Goal: Patient/Family Education  Outcome: Progressing Towards Goal     Problem: Risk for Spread of Infection  Goal: Prevent transmission of infectious organism to others  Description: Prevent the transmission of infectious organisms to other patients, staff members, and visitors. Outcome: Progressing Towards Goal     Problem: Patient Education:  Go to Education Activity  Goal: Patient/Family Education  Outcome: Progressing Towards Goal     Problem: Pressure Injury - Risk of  Goal: *Prevention of pressure injury  Description: Document Nitin Scale and appropriate interventions in the flowsheet.   Outcome: Progressing Towards Goal  Note: Pressure Injury Interventions:  Sensory Interventions: Minimize linen layers    Moisture Interventions: Apply protective barrier, creams and emollients,Check for incontinence Q2 hours and as needed    Activity Interventions: PT/OT evaluation    Mobility Interventions: PT/OT evaluation    Nutrition Interventions: Document food/fluid/supplement intake    Friction and Shear Interventions: Minimize layers                Problem: Patient Education: Go to Patient Education Activity  Goal: Patient/Family Education  Outcome: Progressing Towards Goal

## 2022-05-12 NOTE — PROGRESS NOTES
Problem: Falls - Risk of  Goal: *Absence of Falls  Description: Document Debbie Rudd Fall Risk and appropriate interventions in the flowsheet. Outcome: Progressing Towards Goal  Note: Fall Risk Interventions:  Mobility Interventions: Bed/chair exit alarm,Patient to call before getting OOB,PT Consult for mobility concerns,PT Consult for assist device competence    Mentation Interventions: Bed/chair exit alarm,Reorient patient,More frequent rounding    Medication Interventions: Bed/chair exit alarm,Patient to call before getting OOB,Teach patient to arise slowly    Elimination Interventions: Bed/chair exit alarm,Call light in reach,Patient to call for help with toileting needs    History of Falls Interventions: Bed/chair exit alarm         Problem: Patient Education: Go to Patient Education Activity  Goal: Patient/Family Education  Outcome: Progressing Towards Goal     Problem: Patient Education:  Go to Education Activity  Goal: Patient/Family Education  Outcome: Progressing Towards Goal     Problem: Pressure Injury - Risk of  Goal: *Prevention of pressure injury  Description: Document Nitin Scale and appropriate interventions in the flowsheet. Outcome: Progressing Towards Goal  Note: Pressure Injury Interventions:  Sensory Interventions: Assess changes in LOC,Keep linens dry and wrinkle-free,Minimize linen layers,Monitor skin under medical devices,Turn and reposition approx. every two hours (pillows and wedges if needed)    Moisture Interventions: Absorbent underpads,Apply protective barrier, creams and emollients,Internal/External urinary devices,Minimize layers,Moisture barrier    Activity Interventions: PT/OT evaluation    Mobility Interventions: PT/OT evaluation,Turn and reposition approx.  every two hours(pillow and wedges),Pressure redistribution bed/mattress (bed type),HOB 30 degrees or less    Nutrition Interventions: Document food/fluid/supplement intake,Offer support with meals,snacks and hydration    Friction and Shear Interventions: Minimize layers                Problem: Patient Education: Go to Patient Education Activity  Goal: Patient/Family Education  Outcome: Progressing Towards Goal

## 2022-05-13 VITALS
OXYGEN SATURATION: 96 % | HEIGHT: 65 IN | TEMPERATURE: 99 F | SYSTOLIC BLOOD PRESSURE: 151 MMHG | DIASTOLIC BLOOD PRESSURE: 79 MMHG | RESPIRATION RATE: 17 BRPM | WEIGHT: 155.65 LBS | BODY MASS INDEX: 25.93 KG/M2 | HEART RATE: 61 BPM

## 2022-05-13 LAB
ALBUMIN SERPL-MCNC: 1.7 G/DL (ref 3.5–5)
ANION GAP SERPL CALC-SCNC: 7 MMOL/L (ref 5–15)
BUN SERPL-MCNC: 35 MG/DL (ref 6–20)
BUN/CREAT SERPL: 11 (ref 12–20)
C PEPTIDE SERPL-MCNC: 9.1 NG/ML (ref 1.1–4.4)
CA-I BLD-MCNC: 7 MG/DL (ref 8.5–10.1)
CHLORIDE SERPL-SCNC: 98 MMOL/L (ref 97–108)
CO2 SERPL-SCNC: 27 MMOL/L (ref 21–32)
CREAT SERPL-MCNC: 3.12 MG/DL (ref 0.55–1.02)
GLUCOSE BLD STRIP.AUTO-MCNC: 82 MG/DL (ref 65–117)
GLUCOSE SERPL-MCNC: 90 MG/DL (ref 65–100)
INSULIN SERPL-ACNC: 6.5 UIU/ML (ref 2.6–24.9)
PERFORMED BY, TECHID: NORMAL
PHOSPHATE SERPL-MCNC: 3 MG/DL (ref 2.6–4.7)
POTASSIUM SERPL-SCNC: 4.5 MMOL/L (ref 3.5–5.1)
SODIUM SERPL-SCNC: 132 MMOL/L (ref 136–145)

## 2022-05-13 PROCEDURE — 74011250637 HC RX REV CODE- 250/637: Performed by: INTERNAL MEDICINE

## 2022-05-13 PROCEDURE — G0378 HOSPITAL OBSERVATION PER HR: HCPCS

## 2022-05-13 PROCEDURE — G0279 TOMOSYNTHESIS, MAMMO: HCPCS

## 2022-05-13 PROCEDURE — 74011000250 HC RX REV CODE- 250: Performed by: INTERNAL MEDICINE

## 2022-05-13 PROCEDURE — 82962 GLUCOSE BLOOD TEST: CPT

## 2022-05-13 PROCEDURE — 90935 HEMODIALYSIS ONE EVALUATION: CPT

## 2022-05-13 PROCEDURE — 80069 RENAL FUNCTION PANEL: CPT

## 2022-05-13 PROCEDURE — P9047 ALBUMIN (HUMAN), 25%, 50ML: HCPCS

## 2022-05-13 PROCEDURE — 36415 COLL VENOUS BLD VENIPUNCTURE: CPT

## 2022-05-13 PROCEDURE — G0257 UNSCHED DIALYSIS ESRD PT HOS: HCPCS

## 2022-05-13 PROCEDURE — 96375 TX/PRO/DX INJ NEW DRUG ADDON: CPT

## 2022-05-13 PROCEDURE — 74011250636 HC RX REV CODE- 250/636

## 2022-05-13 RX ORDER — ALBUMIN HUMAN 250 G/1000ML
25 SOLUTION INTRAVENOUS
Status: COMPLETED | OUTPATIENT
Start: 2022-05-13 | End: 2022-05-13

## 2022-05-13 RX ORDER — ALBUMIN HUMAN 250 G/1000ML
SOLUTION INTRAVENOUS
Status: COMPLETED
Start: 2022-05-13 | End: 2022-05-13

## 2022-05-13 RX ORDER — HEPARIN SODIUM 1000 [USP'U]/ML
INJECTION, SOLUTION INTRAVENOUS; SUBCUTANEOUS
Status: DISCONTINUED
Start: 2022-05-13 | End: 2022-05-13 | Stop reason: HOSPADM

## 2022-05-13 RX ADMIN — FLUOXETINE HYDROCHLORIDE 10 MG: 10 CAPSULE ORAL at 12:53

## 2022-05-13 RX ADMIN — METOPROLOL SUCCINATE 12.5 MG: 25 TABLET, EXTENDED RELEASE ORAL at 12:53

## 2022-05-13 RX ADMIN — LACOSAMIDE 200 MG: 100 TABLET, FILM COATED ORAL at 12:53

## 2022-05-13 RX ADMIN — ALBUMIN HUMAN 25 G: 250 SOLUTION INTRAVENOUS at 09:07

## 2022-05-13 RX ADMIN — ALBUMIN (HUMAN) 25 G: 0.25 INJECTION, SOLUTION INTRAVENOUS at 09:07

## 2022-05-13 RX ADMIN — SEVELAMER CARBONATE 800 MG: 800 TABLET, FILM COATED ORAL at 12:53

## 2022-05-13 RX ADMIN — DEXTROSE MONOHYDRATE 75 ML/HR: 100 INJECTION, SOLUTION INTRAVENOUS at 00:03

## 2022-05-13 RX ADMIN — SEVELAMER CARBONATE 800 MG: 800 TABLET, FILM COATED ORAL at 18:08

## 2022-05-13 RX ADMIN — SODIUM CHLORIDE, PRESERVATIVE FREE 10 ML: 5 INJECTION INTRAVENOUS at 14:00

## 2022-05-13 RX ADMIN — LEVETIRACETAM 1000 MG: 500 TABLET, FILM COATED ORAL at 12:53

## 2022-05-13 NOTE — DIALYSIS
Patient tolerated treatment. 600 ml of fluid was removed. Patient was alert and oriented at the end of treatment. 72.4 liters of blood was processed during treatment.

## 2022-05-13 NOTE — DISCHARGE SUMMARY
Physician Discharge Summary     Patient ID:    Yuval Rayo  [id number]  68 y.o.  1950    Admit date: 5/9/2022    Discharge date : 5/13/2022    Chronic Diagnoses:    Problem List as of 5/13/2022 Date Reviewed: 4/26/2022          Codes Class Noted - Resolved    Chest pain ICD-10-CM: R07.9  ICD-9-CM: 786.50  5/9/2022 - Present        Pulmonary edema ICD-10-CM: J81.1  ICD-9-CM: 201  5/9/2022 - Present        CKD (chronic kidney disease) stage 4, GFR 15-29 ml/min ([facility]) ICD-10-CM: N18.4  ICD-9-CM: 585.4  4/26/2022 - Present        Chronic renal disease, stage III ICD-10-CM: N18.30  ICD-9-CM: 585.3  4/26/2022 - Present        Thrombocytopenia ([facility] ICD-10-CM: D69.6  ICD-9-CM: 287.5  4/26/2022 - Present        Syncope ICD-10-CM: R55  ICD-9-CM: 780.2  4/11/2022 - Present        History of seizure disorder ICD-10-CM: Z86.69  ICD-9-CM: V12.49  4/11/2022 - Present        Anemia due to chronic kidney disease, on chronic dialysis ([facility] ICD-10-CM: N18.6, D63.1, Z99.2  ICD-9-CM: 585.6, 285.21, V45.11  4/11/2022 - Present        Opioid use, unspecified with unspecified opioid-induced disorder ICD-10-CM: F11.99  ICD-9-CM: 292.9, 305.50  3/8/2022 - Present        Complicated UTI (urinary tract infection) ICD-10-CM: N39.0  ICD-9-CM: 599.0  2/18/2022 - Present        Respiratory failure with hypoxia ([facility] ICD-10-CM: J96.91  ICD-9-CM: 518.81  2/18/2022 - Present        GI bleed ICD-10-CM: K92.2  ICD-9-CM: 578.9  2/11/2022 - Present        Acute encephalopathy ICD-10-CM: G93.40  ICD-9-CM: 348.30  1/20/2022 - Present        Seizure-like activity ([facility] ICD-10-CM: R56.9  ICD-9-CM: 780.39  1/12/2022 - Present        MSSA bacteremia ICD-10-CM: R78.81, B95.61  ICD-9-CM: 790.7, 041.11  1/3/2022 - Present        Anemia ICD-10-CM: D64.9  ICD-9-CM: 285.9  1/3/2022 - Present        Gait disturbance, post-stroke ICD-10-CM: I69.398, R26.9  ICD-9-CM: 438.89, 781.2  1/3/2022 - Present        COVID-19 ICD-10-CM: U07.1  ICD-9-CM: 079.89  12/26/2021 - Present        Fluid overload ICD-10-CM: E87.70  ICD-9-CM: 276.69  12/5/2021 - Present        Left-sided weakness ICD-10-CM: R53.1  ICD-9-CM: 728.87  12/3/2021 - Present        Intractable nausea and vomiting ICD-10-CM: R11.2  ICD-9-CM: 536.2  12/3/2021 - Present        Hypoglycemia ICD-10-CM: E16.2  ICD-9-CM: 251.2  6/11/2021 - Present        Encephalopathy acute ICD-10-CM: G93.40  ICD-9-CM: 348.30  6/11/2021 - Present        SSS (sick sinus syndrome) ([facility] ICD-10-CM: I49.5  ICD-9-CM: 427.81  6/11/2021 - Present        Seizure ([facility] ICD-10-CM: R56.9  ICD-9-CM: 780.39  5/19/2021 - Present        Hyperkalemia ICD-10-CM: E87.5  ICD-9-CM: 276.7  10/16/2020 - Present        Atherosclerosis of native arteries of the extremities with ulceration ([facility] ICD-10-CM: I70.25  ICD-9-CM: 440.23, 707.9  8/31/2020 - Present        Cellulitis and abscess of trunk ICD-10-CM: L03.319, L02.219  ICD-9-CM: 682.2  8/31/2020 - Present        Peripheral venous insufficiency ICD-10-CM: I87.2  ICD-9-CM: 459.81  8/31/2020 - Present        Varicose veins of lower extremity with inflammation ICD-10-CM: I83.10  ICD-9-CM: 454.1  8/31/2020 - Present        Peripheral vascular disease ([facility] ICD-10-CM: I73.9  ICD-9-CM: 443.9  7/3/2020 - Present        Type II diabetes mellitus ([facility] ICD-10-CM: E11.9  ICD-9-CM: 250.00  7/3/2020 - Present        Long term (current) use of antibiotics ICD-10-CM: Z79.2  ICD-9-CM: V58.62  7/3/2020 - Present        Atrial fibrillation ([address] ICD-10-CM: I48.91  ICD-9-CM: 427.31  7/3/2020 - Present        Cardiac pacemaker in situ ICD-10-CM: Z95.0  ICD-9-CM: V45.01  7/3/2020 - Present        Carpal tunnel syndrome of right wrist ICD-10-CM: G56.01  ICD-9-CM: 354.0  7/3/2020 - Present        Chest wall pain ICD-10-CM: R07.89  ICD-9-CM: 786.52  7/3/2020 - Present        Chronic diarrhea ICD-10-CM: K52.9  ICD-9-CM: 787.91  7/3/2020 - Present        Chronic neck pain ICD-10-CM: M54.2, G89.29  ICD-9-CM: 723.1, 338.29  7/3/2020 - Present        End stage renal failure on dialysis [facility]) ICD-10-CM: N18.6, Z99.2  ICD-9-CM: 585.6, V45.11  7/3/2020 - Present        History of CVA (cerebrovascular accident) ICD-10-CM: Z86.73  ICD-9-CM: V12.54  7/3/2020 - Present        Hyponatremia ICD-10-CM: E87.1  ICD-9-CM: 276.1  7/3/2020 - Present        Acquired hypothyroidism ICD-10-CM: E03.9  ICD-9-CM: 244.9  7/3/2020 - Present        Breakthrough seizure ([facility] ICD-10-CM: C13.766  ICD-9-CM: 345.91  7/3/2020 - Present        Recurrent falls ICD-10-CM: R29.6  ICD-9-CM: V15.88  7/3/2020 - Present        RESOLVED: Septic shock ([facility] ICD-10-CM: A41.9, R65.21  ICD-9-CM: 038.9, 785.52, 995.92  2/11/2022 - 3/8/2022        RESOLVED: Sepsis ([facility] ICD-10-CM: A41.9  ICD-9-CM: 038.9, 995.91  1/7/2022 - 3/8/2022        RESOLVED: Metabolic encephalopathy KBL-27-TY: G93.41  ICD-9-CM: 348.31  1/3/2022 - 1/3/2022        RESOLVED: Sepsis ([facility] ICD-10-CM: A41.9  ICD-9-CM: 038.9, 995.91  1/3/2022 - 1/3/2022        RESOLVED: AMS (altered mental status) ICD-10-CM: B15.37  ICD-9-CM: 780.97  12/26/2021 - 1/3/2022        RESOLVED: Disorder due to well controlled type 2 diabetes mellitus ([facility] ICD-10-CM: E11.8  ICD-9-CM: 250.90  8/31/2020 - 6/11/2021          22    Final Diagnoses:   Chest pain [R07.9]  Pulmonary edema [J81.1]  Atypical chest pain. Ruled out for MI     Recurrent/refractory hypoglycemia. Probably largely due to poor oral intake    -Insulin and C-peptide levels are pending    Metabolic encephalopathy due to hypoglycemia     End-stage renal disease, on hemodialysis     Hypothyroidism      Functional paraplegia     Essential hypertension     Permanent pacemaker in situ. Review of strips shows no evidence for bradycardia     History of stroke     History of seizures, stable     Diabetes mellitus type 2.   Not on any treatment    Reason for Hospitalization:  She is a 78-year-old female  hypothyroidism with history of hypertension, atrial fibrillation, seizure disorder, ESRD, hypothyroidism, stroke, GERD, hyperlipidemia functional paraplegia/bedbound, CVA, legally blind, peptic ulcer disease with recent gastric ulcer at anastomosis by EGD in January. She was admitted on 5/9 with a 3-day history of chest and epigastric pain as well as shortness of breath. Chest x-ray showed some fluid overload. Her blood sugar was low as well. Hospital Course: Mated to cardiac telemetry. She was initially started on a D10 drip, subsequently discontinued. However, her blood sugar started dropping again and she was started back on the D10    She underwent dialysis and she had no further chest discomfort. Fluid overload improved    I stopped her D10 drip and blood sugar started dropping again so we restarted it    I did check an insulin and C-peptide level to rule out the unlikely possibility of an insulinoma    I spoke with her son and indicated poor prognosis, especially given her recurrent hypoglycemia which seems largely due to her lack of adequate oral intake. He indicates they are usually able to keep her sugar up at home with the food they give her there    She was felt to have reached maximum inpatient potential by 5/13 and will be discharged home    Her primary care physician should follow-up with her insulin and C-peptide levels which were drawn in the hospital              Discharge Medications:   Current Discharge Medication List      CONTINUE these medications which have NOT CHANGED    Details   traMADoL (ULTRAM) 50 mg tablet Take 1 Tablet by mouth two (2) times daily as needed for Pain for up to 30 days. Max Daily Amount: 100 mg. Qty: 60 Tablet, Refills: 1    Associated Diagnoses: Chronic low back pain, unspecified back pain laterality, unspecified whether sciatica present      pantoprazole (Protonix) 40 mg tablet Take 1 Tablet by mouth daily.   Qty: 30 Tablet, Refills: 4      levothyroxine (SYNTHROID) 200 mcg tablet Take 1 Tablet by mouth Daily (before breakfast). Qty: 30 Tablet, Refills: 0      nystatin (MYCOSTATIN) powder Apply  to affected area four (4) times daily. Qty: 60 g, Refills: 2      FLUoxetine (PROzac) 10 mg capsule Take 1 Capsule by mouth daily for 270 days. Qty: 90 Capsule, Refills: 2    Associated Diagnoses: TRISHA (generalized anxiety disorder)      Vimpat 200 mg tab tablet Take 1 Tablet by mouth two (2) times a day. sevelamer carbonate (RENVELA) 800 mg tab tab                Follow up Care:    1. Darlyn Rojas MD in 1-2 weeks. Please call to set up an appointment shortly after discharge. Diet:  Renal Diet    Disposition:  Home. Advanced Directive:   FULL    DNR      Discharge Exam:  General:  Alert, cooperative, no distress, appears stated age. Lungs:   Clear to auscultation bilaterally. Chest wall:  No tenderness or deformity. Heart:  Regular rate and rhythm, S1, S2 normal, no murmur, click, rub or gallop. Abdomen:   Soft, non-tender. Bowel sounds normal. No masses,  No organomegaly. Extremities: Extremities normal, atraumatic, no cyanosis or edema. Pulses: 2+ and symmetric all extremities. Skin: Skin color, texture, turgor normal. No rashes or lesions   Neurologic: CNII-XII intact. No gross sensory or motor deficits        CONSULTATIONS: Nephrology    Significant Diagnostic Studies:   5/9/2022: BUN 36 mg/dL (H; Ref range: 6 - 20 mg/dL); Calcium 7.6 mg/dL (L; Ref range: 8.5 - 10.1 mg/dL); CO2 27 mmol/L (Ref range: 21 - 32 mmol/L); Creatinine 3.38 mg/dL (H; Ref range: 0.55 - 1.02 mg/dL); Glucose 55 mg/dL (L; Ref range: 65 - 100 mg/dL); HCT 45.8 % (Ref range: 35.0 - 47.0 %); HGB 13.7 g/dL (Ref range: 11.5 - 16.0 g/dL); Potassium 4.1 mmol/L (Ref range: 3.5 - 5.1 mmol/L); Sodium 140 mmol/L (Ref range: 136 - 145 mmol/L)  5/10/2022: BUN 30 mg/dL (H; Ref range: 6 - 20 mg/dL); BUN 30 mg/dL (H; Ref range: 6 - 20 mg/dL);  Calcium 7.5 mg/dL (L; Ref range: 8.5 - 10.1 mg/dL); Calcium 7.5 mg/dL (L; Ref range: 8.5 - 10.1 mg/dL); CO2 29 mmol/L (Ref range: 21 - 32 mmol/L); CO2 29 mmol/L (Ref range: 21 - 32 mmol/L); Creatinine 2.77 mg/dL (H; Ref range: 0.55 - 1.02 mg/dL); Creatinine 2.81 mg/dL (H; Ref range: 0.55 - 1.02 mg/dL); Glucose 37 mg/dL (LL; Ref range: 65 - 100 mg/dL); Glucose 36 mg/dL (LL; Ref range: 65 - 100 mg/dL); HCT 45.4 % (Ref range: 35.0 - 47.0 %); HGB 13.6 g/dL (Ref range: 11.5 - 16.0 g/dL); Potassium 4.5 mmol/L (Ref range: 3.5 - 5.1 mmol/L); Potassium 4.5 mmol/L (Ref range: 3.5 - 5.1 mmol/L); Sodium 139 mmol/L (Ref range: 136 - 145 mmol/L); Sodium 140 mmol/L (Ref range: 136 - 145 mmol/L)  No results for input(s): WBC, HGB, HCT, PLT, HGBEXT, HCTEXT, PLTEXT in the last 72 hours. Recent Labs     05/13/22  0822 05/12/22  1123 05/11/22  0745   *  --  134*   K 4.5  --  4.1   CL 98  --  101   CO2 27  --  27   BUN 35*  --  36*   CREA 3.12*  --  3.05*   GLU 90 40* 92   CA 7.0*  --  6.8*   PHOS 3.0  --  4.5     Recent Labs     05/13/22  0822 05/11/22  0745   ALB 1.7* 1.7*     No results for input(s): INR, PTP, APTT, INREXT in the last 72 hours. No results for input(s): FE, TIBC, PSAT, FERR in the last 72 hours. No results for input(s): PH, PCO2, PO2 in the last 72 hours. No results for input(s): CPK, CKMB in the last 72 hours.     No lab exists for component: TROPONINI  Lab Results   Component Value Date/Time    Glucose (POC) 145 (H) 05/12/2022 09:18 PM    Glucose (POC) 96 05/12/2022 04:04 PM    Glucose (POC) 54 (L) 05/12/2022 12:29 PM    Glucose (POC) 55 (L) 05/12/2022 11:59 AM    Glucose (POC) 103 05/12/2022 09:37 AM       Discharge time spent 35 minutes    Signed:  Sabina Lewis MD  5/13/2022  12:12 PM

## 2022-05-13 NOTE — PROGRESS NOTES
Renal Progress Note    Patient: Norberto Parks MRN: [id number]  SSN: [id number]    YOB: 1950  Age: 71 y.o. Sex: female      Admit Date: 5/9/2022    LOS: 4 days     Subjective:   Patient seenat bedside . S/p HD today  Awake and verbally responsive, no acute distress. No complaints of shortness of breath.  No c/o chest pain        Current Facility-Administered Medications   Medication Dose Route Frequency    heparin (porcine) 1,000 unit/mL injection        loperamide (IMODIUM) capsule 4 mg  4 mg Oral Q6H PRN    heparin (porcine) 1,000 unit/mL injection 3,200 Units  3,200 Units IntraVENous DIALYSIS PRN    insulin lispro (HUMALOG) injection   SubCUTAneous AC&HS    glucose chewable tablet 16 g  4 Tablet Oral PRN    glucagon (GLUCAGEN) injection 1 mg  1 mg IntraMUSCular PRN    dextrose 10% infusion 0-250 mL  0-250 mL IntraVENous PRN    atorvastatin (LIPITOR) tablet 40 mg  40 mg Oral DAILY    FLUoxetine (PROzac) capsule 10 mg  10 mg Oral DAILY    levothyroxine (SYNTHROID) tablet 200 mcg  200 mcg Oral ACB    metoprolol succinate (TOPROL-XL) XL tablet 12.5 mg  12.5 mg Oral DAILY    midodrine (PROAMATINE) tablet 5 mg  5 mg Oral TID WITH MEALS    pantoprazole (PROTONIX) tablet 40 mg  40 mg Oral ACB    sevelamer carbonate (RENVELA) tab 800 mg  800 mg Oral TID WITH MEALS    lacosamide (VIMPAT) tablet 200 mg  200 mg Oral BID    sodium chloride (NS) flush 5-40 mL  5-40 mL IntraVENous Q8H    sodium chloride (NS) flush 5-40 mL  5-40 mL IntraVENous PRN    acetaminophen (TYLENOL) tablet 650 mg  650 mg Oral Q6H PRN    Or    acetaminophen (TYLENOL) suppository 650 mg  650 mg Rectal Q6H PRN    polyethylene glycol (MIRALAX) packet 17 g  17 g Oral DAILY PRN    ondansetron (ZOFRAN ODT) tablet 4 mg  4 mg Oral Q8H PRN    Or    ondansetron (ZOFRAN) injection 4 mg  4 mg IntraVENous Q6H PRN    enoxaparin (LOVENOX) injection 30 mg  30 mg SubCUTAneous DAILY    levETIRAcetam (KEPPRA) tablet 1,000 mg  1,000 mg Oral DAILY    heparin (porcine) 1,000 unit/mL injection 3,200 Units  3,200 Units Hemodialysis DIALYSIS PRN        Vitals:    05/13/22 1100 05/13/22 1130 05/13/22 1236 05/13/22 1541   BP: 124/75 126/69 (!) 142/76 (!) 151/79   Pulse: 62 63 60 61   Resp: 16 16 16 17   Temp:   98.1 °F (36.7 °C) 99 °F (37.2 °C)   TempSrc:       SpO2:   97% 96%   Weight:       Height:         Objective:   General: alert awake well-oriented, no acute distress. HEENT: EOMI, no Icterus, no Pallor,  mucosa moist.  Neck: Neck is supple, No JVD  Lungs: breathsounds normal, no respiratory distress on inspection, no rhonchi, no rales,   CVS: heart sounds normal,  no murmurs, no rubs. GI: soft, nontender, normal BS. Extremeties: no cyanosis, no LE edema, right UE swelling+, better  Neuro: Alert, awake, answering simple questions  Skin: normal skin turgor, no skin rashes. Intake and Output:  Current Shift: 05/13 0701 - 05/13 1900  In: -   Out: 600   Last three shifts: 05/11 1901 - 05/13 0700  In: 500 [P.O.:500]  Out: 600 [Urine:600]      Lab/Data Review:  No results for input(s): WBC, HGB, HCT, PLT, HGBEXT, HCTEXT, PLTEXT, HGBEXT, HCTEXT, PLTEXT in the last 72 hours. Recent Labs     05/13/22  0822 05/12/22  1123 05/11/22  0745   *  --  134*   K 4.5  --  4.1   CL 98  --  101   CO2 27  --  27   GLU 90 40* 92   BUN 35*  --  36*   CREA 3.12*  --  3.05*   CA 7.0*  --  6.8*   PHOS 3.0  --  4.5   ALB 1.7*  --  1.7*     No results for input(s): PH, PCO2, PO2, HCO3, FIO2 in the last 72 hours.   Recent Results (from the past 24 hour(s))   GLUCOSE, POC    Collection Time: 05/12/22  9:18 PM   Result Value Ref Range    Glucose (POC) 145 (H) 65 - 117 mg/dL    Performed by Lisseth Karimi    RENAL FUNCTION PANEL    Collection Time: 05/13/22  8:22 AM   Result Value Ref Range    Sodium 132 (L) 136 - 145 mmol/L    Potassium 4.5 3.5 - 5.1 mmol/L    Chloride 98 97 - 108 mmol/L    CO2 27 21 - 32 mmol/L    Anion gap 7 5 - 15 mmol/L    Glucose 90 65 - 100 mg/dL    BUN 35 (H) 6 - 20 mg/dL    Creatinine 3.12 (H) 0.55 - 1.02 mg/dL    BUN/Creatinine ratio 11 (L) 12 - 20      GFR est AA 18 (L) >60 ml/min/1.73m2    GFR est non-AA 15 (L) >60 ml/min/1.73m2    Calcium 7.0 (L) 8.5 - 10.1 mg/dL    Phosphorus 3.0 2.6 - 4.7 mg/dL    Albumin 1.7 (L) 3.5 - 5.0 g/dL   GLUCOSE, POC    Collection Time: 05/13/22 12:42 PM   Result Value Ref Range    Glucose (POC) 82 65 - 117 mg/dL    Performed by Akash Aviles         Assessment and Plan:     1. End-stage renal disease on hemodialysis: on MWF at [facility]  No complaints of any fluid overload, no complaints of uremia like nausea or vomiting,   Stable electrolytes  Inpatient hemodialysis done today  Did not tolerate UF, no significant edema  will continue maintenance hemodialysis on MWF     2. Hypertension: Stable on midodrine  continue to monitor blood pressures    3. Anemia: Stable hemoglobin  No need for Epogen with hemodialysis  Continue to monitor H&H     4. Renal osteodystrophy:   Low calcium with low albumin  Hyperphosphatemia: continue phosphate binders, stable phos at 4.9  Secondary hyperparathyroidism: Continue Zemplar/Sensipar as per outpatient protocol     5. Shortness of breath/chest discomfort: Probably related to fluid overload   patient clinically improved with HD  Continue to monitor clinically     6. Debility/bedbound: Continue supportive care     7. Right upper extremity swelling: duplex study negative for DVT,   Right upper extremity swelling is probably related to venous impedence with right-sided permacath.     8. Hypoglycemia: poor intake, on d10 now, monitor accuchecks    Stable for dc from renal standpoint    Signed By: Flex Ramírez MD     May 13, 2022

## 2022-05-13 NOTE — PROGRESS NOTES
Patient is clear to d/c from  to home with family care.  contacted patient's son, Jeff Franco [phone], to notify of d/c this date, he is agreeable. Medicare pt has received, reviewed, and signed 2nd IM letter informing them of their right to appeal the discharge. Signed copied has been placed on pt bedside chart. Patient will need ambulance transport, Tom Roth is home at this time and said transport can be set for anytime. Nurse notified.

## 2022-05-13 NOTE — PROGRESS NOTES
Renal Progress Note    Patient: Rosalia Melo MRN: [id number]  SSN: [id number]    YOB: 1950  Age: 71 y.o. Sex: female      Admit Date: 5/9/2022    LOS: 3 days     Subjective:   Patient seen on . Awake and verbally responsive, no acute distress. Low blood sugars noted, poor intake  No complaints of shortness of breath.  No c/o chest pain        Current Facility-Administered Medications   Medication Dose Route Frequency    loperamide (IMODIUM) capsule 4 mg  4 mg Oral Q6H PRN    heparin (porcine) 1,000 unit/mL injection 3,200 Units  3,200 Units IntraVENous DIALYSIS PRN    insulin lispro (HUMALOG) injection   SubCUTAneous AC&HS    glucose chewable tablet 16 g  4 Tablet Oral PRN    glucagon (GLUCAGEN) injection 1 mg  1 mg IntraMUSCular PRN    dextrose 10% infusion 0-250 mL  0-250 mL IntraVENous PRN    dextrose 10% infusion  75 mL/hr IntraVENous CONTINUOUS    atorvastatin (LIPITOR) tablet 40 mg  40 mg Oral DAILY    FLUoxetine (PROzac) capsule 10 mg  10 mg Oral DAILY    levothyroxine (SYNTHROID) tablet 200 mcg  200 mcg Oral ACB    metoprolol succinate (TOPROL-XL) XL tablet 12.5 mg  12.5 mg Oral DAILY    midodrine (PROAMATINE) tablet 5 mg  5 mg Oral TID WITH MEALS    pantoprazole (PROTONIX) tablet 40 mg  40 mg Oral ACB    sevelamer carbonate (RENVELA) tab 800 mg  800 mg Oral TID WITH MEALS    lacosamide (VIMPAT) tablet 200 mg  200 mg Oral BID    sodium chloride (NS) flush 5-40 mL  5-40 mL IntraVENous Q8H    sodium chloride (NS) flush 5-40 mL  5-40 mL IntraVENous PRN    acetaminophen (TYLENOL) tablet 650 mg  650 mg Oral Q6H PRN    Or    acetaminophen (TYLENOL) suppository 650 mg  650 mg Rectal Q6H PRN    polyethylene glycol (MIRALAX) packet 17 g  17 g Oral DAILY PRN    ondansetron (ZOFRAN ODT) tablet 4 mg  4 mg Oral Q8H PRN    Or    ondansetron (ZOFRAN) injection 4 mg  4 mg IntraVENous Q6H PRN    enoxaparin (LOVENOX) injection 30 mg  30 mg SubCUTAneous DAILY    levETIRAcetam (KEPPRA) tablet 1,000 mg  1,000 mg Oral DAILY    heparin (porcine) 1,000 unit/mL injection 3,200 Units  3,200 Units Hemodialysis DIALYSIS PRN        Vitals:    05/12/22 0753 05/12/22 1140 05/12/22 1458 05/12/22 1954   BP: 134/75 (!) 143/78 (!) 156/81 (!) 153/78   Pulse: 60 60 60 60   Resp: 18 18 18 18   Temp: 98.2 °F (36.8 °C) 98 °F (36.7 °C) 98.1 °F (36.7 °C) 98.5 °F (36.9 °C)   TempSrc:       SpO2: 100% 100% 100% 99%   Weight:       Height:         Objective:   General: alert awake well-oriented, no acute distress. HEENT: EOMI, no Icterus, no Pallor,  mucosa moist.  Neck: Neck is supple, No JVD  Lungs: breathsounds normal, no respiratory distress on inspection, no rhonchi, no rales,   CVS: heart sounds normal,  no murmurs, no rubs. GI: soft, nontender, normal BS. Extremeties: no cyanosis, no LE edema, right UE swelling+, better  Neuro: Alert, awake, answering simple questions  Skin: normal skin turgor, no skin rashes. Intake and Output:  Current Shift: No intake/output data recorded. Last three shifts: 05/11 0701 - 05/12 1900  In: 500 [P.O.:500]  Out: 200 [Urine:200]      Lab/Data Review:  Recent Labs     05/10/22  0518   WBC 6.6   HGB 13.6   HCT 45.4   *     Recent Labs     05/12/22  1123 05/11/22  0745 05/10/22  0519 05/10/22  0518 05/10/22  0518   NA  --  134* 140  --  139   K  --  4.1 4.5  --  4.5   CL  --  101 106  --  106   CO2  --  27 29  --  29   GLU 40* 92 36*   < > 37*   BUN  --  36* 30*  --  30*   CREA  --  3.05* 2.81*  --  2.77*   CA  --  6.8* 7.5*  --  7.5*   PHOS  --  4.5 4.9*  --   --    ALB  --  1.7* 1.8*  --   --     < > = values in this interval not displayed. No results for input(s): PH, PCO2, PO2, HCO3, FIO2 in the last 72 hours.   Recent Results (from the past 24 hour(s))   GLUCOSE, POC    Collection Time: 05/11/22  9:12 PM   Result Value Ref Range    Glucose (POC) 77 65 - 117 mg/dL    Performed by [address] [place] [address], POC    Collection Time: 05/12/22  5:26 AM Result Value Ref Range    Glucose (POC) 113 65 - 117 mg/dL    Performed by King, POC    Collection Time: 05/12/22  7:56 AM   Result Value Ref Range    Glucose (POC) 53 (LL) 65 - 117 mg/dL    Performed by Maddy Bustamante    GLUCOSE, POC    Collection Time: 05/12/22  7:57 AM   Result Value Ref Range    Glucose (POC) 57 (L) 65 - 117 mg/dL    Performed by Maddy Bustamante    GLUCOSE, POC    Collection Time: 05/12/22  9:37 AM   Result Value Ref Range    Glucose (POC) 103 65 - 117 mg/dL    Performed by Maddy Bustamante    GLUCOSE, RANDOM    Collection Time: 05/12/22 11:23 AM   Result Value Ref Range    Glucose 40 (LL) 65 - 100 mg/dL   GLUCOSE, POC    Collection Time: 05/12/22 11:59 AM   Result Value Ref Range    Glucose (POC) 55 (L) 65 - 117 mg/dL    Performed by [facility] [address], POC    Collection Time: 05/12/22 12:29 PM   Result Value Ref Range    Glucose (POC) 54 (L) 65 - 117 mg/dL    Performed by Maddy Bustamante    GLUCOSE, POC    Collection Time: 05/12/22  4:04 PM   Result Value Ref Range    Glucose (POC) 96 65 - 117 mg/dL    Performed by Maddy Bustamante         Assessment and Plan:     1. End-stage renal disease on hemodialysis: on MWF at [facility]  No complaints of any fluid overload, no complaints of uremia like nausea or vomiting, drowsy  Stable electrolytes  Inpatient hemodialysis arranged for tomorrow  will continue maintenance hemodialysis on MWF     2. Hypertension: Stable on midodrine  continue to monitor blood pressures    3. Anemia: Stable hemoglobin  No need for Epogen with hemodialysis  Continue to monitor H&H     4. Renal osteodystrophy:   Low calcium with low albumin  Hyperphosphatemia: continue phosphate binders, stable phos at 4.9  Secondary hyperparathyroidism: Continue Zemplar/Sensipar as per outpatient protocol     5. Shortness of breath/chest discomfort: Probably related to fluid overload   patient clinically improved with HD  Continue to monitor clinically     6. Debility/bedbound: Continue supportive care     7. Right upper extremity swelling: duplex study negative for DVT,   Right upper extremity swelling is probably related to venous impedence with right-sided permacath.     8. Hypoglycemia: poor intake, on d10 now, monitor accuchecks    Signed By: Kay Callejas MD     May 12, 2022

## 2022-05-13 NOTE — PROGRESS NOTES
Problem: Falls - Risk of  Goal: *Absence of Falls  Description: Document Tahira Stephen Fall Risk and appropriate interventions in the flowsheet. Outcome: Progressing Towards Goal  Note: Fall Risk Interventions:  Mobility Interventions: OT consult for ADLs,PT Consult for mobility concerns,PT Consult for assist device competence    Mentation Interventions: Reorient patient,More frequent rounding,Toileting rounds    Medication Interventions: Bed/chair exit alarm,Patient to call before getting OOB    Elimination Interventions: Call light in reach,Patient to call for help with toileting needs    History of Falls Interventions: Investigate reason for fall         Problem: Patient Education: Go to Patient Education Activity  Goal: Patient/Family Education  Outcome: Progressing Towards Goal     Problem: Risk for Spread of Infection  Goal: Prevent transmission of infectious organism to others  Description: Prevent the transmission of infectious organisms to other patients, staff members, and visitors. Outcome: Progressing Towards Goal     Problem: Patient Education:  Go to Education Activity  Goal: Patient/Family Education  Outcome: Progressing Towards Goal     Problem: Pressure Injury - Risk of  Goal: *Prevention of pressure injury  Description: Document Nitin Scale and appropriate interventions in the flowsheet. Outcome: Progressing Towards Goal  Note: Pressure Injury Interventions:  Sensory Interventions: Assess changes in LOC,Keep linens dry and wrinkle-free,Minimize linen layers,Pressure redistribution bed/mattress (bed type),Turn and reposition approx. every two hours (pillows and wedges if needed)    Moisture Interventions: Absorbent underpads,Internal/External urinary devices,Minimize layers    Activity Interventions: PT/OT evaluation,Pressure redistribution bed/mattress(bed type)    Mobility Interventions: Pressure redistribution bed/mattress (bed type),PT/OT evaluation,Turn and reposition approx.  every two hours(pillow and wedges)    Nutrition Interventions: Document food/fluid/supplement intake,Offer support with meals,snacks and hydration    Friction and Shear Interventions: Apply protective barrier, creams and emollients,Minimize layers,Transferring/repositioning devices                Problem: Patient Education: Go to Patient Education Activity  Goal: Patient/Family Education  Outcome: Progressing Towards Goal

## 2022-05-16 ENCOUNTER — PATIENT OUTREACH (OUTPATIENT)
Dept: CASE MANAGEMENT | Age: 72
End: 2022-05-16

## 2022-05-17 LAB
BACTERIA SPEC CULT: NORMAL
SPECIAL REQUESTS,SREQ: NORMAL

## 2022-07-08 ENCOUNTER — TELEPHONE (OUTPATIENT)
Dept: INTERNAL MEDICINE CLINIC | Age: 72
End: 2022-07-08

## 2022-07-08 NOTE — TELEPHONE ENCOUNTER
----- Message from Ronny Trevizo sent at 7/7/2022 12:05 PM EDT -----  Subject: Message to Provider    QUESTIONS  Information for Provider? Dorothy Borrego called to let office know that Maryanne [place] is   now in hospice and will pass soon. Any questions, call Dorothy Borrego.  ---------------------------------------------------------------------------  --------------  Shama LYNN  [phone]; OK to leave message on voicemail  ---------------------------------------------------------------------------  --------------  SCRIPT ANSWERS  Relationship to Patient? Other  Representative Name? Ravi Torres  Is the Representative on the appropriate HIPAA document in Epic?  Yes all other ROS negative except as per HPI

## 2023-03-17 NOTE — ED NOTES
Daughter contact [phone] DISPLAY PLAN FREE TEXT DISPLAY PLAN FREE TEXT DISPLAY PLAN FREE TEXT DISPLAY PLAN FREE TEXT DISPLAY PLAN FREE TEXT DISPLAY PLAN FREE TEXT DISPLAY PLAN FREE TEXT DISPLAY PLAN FREE TEXT DISPLAY PLAN FREE TEXT DISPLAY PLAN FREE TEXT DISPLAY PLAN FREE TEXT DISPLAY PLAN FREE TEXT DISPLAY PLAN FREE TEXT DISPLAY PLAN FREE TEXT DISPLAY PLAN FREE TEXT DISPLAY PLAN FREE TEXT DISPLAY PLAN FREE TEXT DISPLAY PLAN FREE TEXT DISPLAY PLAN FREE TEXT DISPLAY PLAN FREE TEXT DISPLAY PLAN FREE TEXT DISPLAY PLAN FREE TEXT DISPLAY PLAN FREE TEXT DISPLAY PLAN FREE TEXT DISPLAY PLAN FREE TEXT DISPLAY PLAN FREE TEXT DISPLAY PLAN FREE TEXT DISPLAY PLAN FREE TEXT DISPLAY PLAN FREE TEXT DISPLAY PLAN FREE TEXT DISPLAY PLAN FREE TEXT DISPLAY PLAN FREE TEXT DISPLAY PLAN FREE TEXT DISPLAY PLAN FREE TEXT DISPLAY PLAN FREE TEXT DISPLAY PLAN FREE TEXT

## 2023-06-21 NOTE — PROGRESS NOTES
Uploaded discharge order and discharge summary. Recommendation Preamble: The following recommendations were made during the visit: Recommendations (Free Text): SPF 50 Mineral sunscreen Detail Level: Generalized Render Risk Assessment In Note?: no

## 2023-10-18 NOTE — PROGRESS NOTES
Goals      Prevent complications post hospitalization. 04/14/22  CTN contacted Mehrdad Silverman, patient DIL and at home care giver to review d/c instructions/red flags. PCP---CTN contacted PCP office , no appts available until mid June per Lori at PCP office. She asked CTN to call [address]  office. CTN set up VV with Dr Rut Tavera on 4/19/22 at 11:30. CTN called pt DIL, left detailed VM message with Appt info. CTN will review again next week prior to appt. [facility] JACKI--HANNAH states [facility] nurse called last night, will come today for JACKI. She states pt also gets PT and OT services. Patient just saw cards [facility] ) last week prior to admission--HANNAH explains it takes 3 people to lift patient into a WC and out of a car---she states it is too difficult to take patient back into the office. She reports they have a plan as to when pt should return for pacer battery change--this will have to be coordinated with dialysis catheter change per daughter report. Patient will see nephrology at dialysis ([id number] [facility]), daughter states labs are checked every Wednesday, Unit calls them when pt needs blood transfusions and will set up OP transfusions. Neuro appt--DIL states MD appt is 5/10/22     HANNAH reports that she has not yet picked up glucometer and supplies ordered as pt discharged at 2130 last night. She states she has home glucometer for her spouse and has been checking patient BS. She reports pt does not eat much, and she has been providing ensure shakes to help with nutrition. CTN reminded her to give patient night time snack per hospitalist note.  HANNAH has BP cuff and takes BP--she states if BP is low then she gives the midodrine and holds beta blocker, if BP is elevated she will give the beta blocker. She is able to give CTN examples of high and low BP.  HANNAH states that pt gets sent to ER by dialysis frequently--that \"they send her for everything\".    HANNAH believes patient had a seizure prior to this admission as she states pt have several seizures over the course of a week. She reports it is difficult to regulate meds due to dialysis.  DIL states that she will talk to PCP about resuming pain meds as patient \"needs them\". She states she will continue to give to pt as needed.  CTN sent Perfect serve message to discharge MD to ask about UA lab result to see if pt needs abx. CTN will follow up. CTN will follow up next week--mkrw    UPDATE: CTN received message from hospitalist that ID MD Dr Magdalena Argueta recommended diflucan and amoxicillin for 7 days and then follow up in 1 week. CTN notified daughter about new scripts. CTN called ID office, had to leave message for appt desk to call CTN back. CTN will follow up---mkrw    04/15/22  CTN had not received call back from ID office yet--CTN called MD office again today, spoke to [facility]. She states MD does do VV, appt made for VV 4/26 at 10:00 to call Lurdes's cell phone. CTN spoke to Guevara Young to give her appt details. ---mkrw    04/21/22  CTN unable to reach Guevara Young on phone, LM on  requesting return call. CTN will review the following:  PCP---Dr Angelique Lay  4/22 at 8:30  GI Dr Idalmis Suero 6/9 at 10:00--OP colonoscopy    Pt received 2 units blood 4/18/22, started on protonix. --mkrw    UPDATE: CTN received call back from [address], reviewed above appointments. Guevara Young states patient can not attend PCP appt as this is on dialysis day. CTN called [facility] to change appt per Lurdes's request and to request VV. CTN was  informed that MD wants inperson visit in order to examine patient. CTN informed office that DIL needed to be informed in order to make arrangements as it is difficult for family to transport at this time. CTN called HANNAH, left message on  requesting return call. Patient lives out of area for [facility].     DIL states that patient has no had BM since discharge as she did not come home until after 9pm yesterday, went to dialysis early this morning. She reports that patient was having black stools prior to admission. Pt has had clips placed in the past for GI bleed. HANNAH aware of appt with GI 6/9 at 10:00 to discuss OP plan/colonoscopy. UPDATE: CTN called HANNAH Lurdes to inform her that PCP office wants patient to come in for in person visit. Nurysirma Robins states that patient is too debilitated at this time, she cites example patient was unable to assist with turning in bed today [facility] during wound care. CTN called PCP office again, spoke to Jeimy Gannon about changing appt to VV on a non dialysis day, informed her that patient DIL states patient is still too debilitated to come to OP appointments at this time. CTN placed on hold for several minutes, was informed that MD will not do a virtual visit. CTN contacted HANNAH, informed her of above. She states that patient can attend appt in person if it can be on Tues/Thurs either next week or the week of 5/2 as patient son is off and she can ask her brother in law to assist with lifting and getting patient to office. CTN called PCP office back, office did not answer per contact specialist Allison Mccall when she tried to contact MD office directly. She will send direct message to MD office and have them to call CTN back. CTN will follow up ---mkrw       Supportive resources in place to maintain patient in the community (ie. Home Health, DME equipment, refer to, medication assistant plan, etc.)      04/14/22    HANNAH states that they have a Medicaid application in for home care aide assistance, she reports that the nurse has come to the home to evaluate patient, and they are waiting for the application process to be completed. At this time , HANNAH is caring for pt 24/7. HANNAH states they declined SNF as pt went after a recent discharge and came back home in worse physical decline than when she went in. She does not want pt to go to higher level of care at this time.     CTN called [facility], [facility], and [facility] to see if anyone services [place]. Pt is either out of area, [facility]PA states pt would have to be in a group home or SNF to receive service due to insurance. Pt out of area for [place]. HANNAH reports that she has resource for a hospice company that will accept dialysis patients, they have already done preliminary paperwork with this agency. HANNAH states that she plans to call if patient develops worsening symptoms or another life threatening dx. At this time patient wants opportunity to improve with [facility] services, family has discussed with pt, Mds at hospital multiple times. Pt has had a few life threatening situations that she has improved from so they would like honor pt wishes at this time. ---santos    04/21/22  JOSE readdressed Hospice again with HANNAH. She states patient goals are to improve strength to sit in a wheelchair and \"gain some independence\", such as feeding herself. She states that patient/family would be have gone into hospice program but the agency needed another co morbidity to enroll patient, such as cancer. Patient wants to continue with dialysis, most agencies want to stop treatments before enrolling. At this time, patient and family goal is to continue with therapies to have the opportunity to improve condition. CTN will readdress with patient /family at later date. --santos solids

## 2025-03-26 NOTE — Clinical Note
Copied from CRM #[id number]. Topic: MW Paging/Messaging - MW Paging Healthcare Rep/Clinician Request  >> Mar 26, 2025 10:27 AM Jennyfer PRABHAKAR wrote:  Ms Mcneill from [employer] [facility] with provider/healthcare rep to provider call:    Regarding BRITTANI CHOWDHURY during Working Hrs;  Selected 'Wrap Up CRM' and created new Telephone Encounter after clicking 'Convert to Clinical Call'.  Sent Pt Message template, selected Reason for Call Other and input in the specific reason in the comments.     Message Not Urgent: Routed as routine priority per Clinician KB page to appropriate clinician pool. Readback full message.    -- DO NOT REPLY / DO NOT REPLY ALL --  -- This inbox is not monitored. If this was sent to the wrong provider or department, reroute message to P ECO Reroute pool. --  -- Message is from Engagement Center Operations (ECO) --    General Patient Message: Ms Mcneill is calling in today because she  would like for the office to please fax over a copy of the   Quantiferon TB Plus test  taking on 3/12 to  [phone]    Caller Information       Contact Date/Time Type Contact Phone/Fax    03/26/2025 10:26 AM CDT Phone (Incoming) Ms Mcneill from [employer] [phone]            Alternative phone number: none    Can a detailed message be left? Yes - Voicemail   Patient has been advised the message will be addressed within 2-3 business days.                 Diagnostic catheter inserted over exchange length wire.

## 2025-03-28 NOTE — PROGRESS NOTES
Medicare Outpatient Observation Notice (MOON)/ [place] Outpatient Observation Notice (Alric Rued) provided to patient/representative with verbal explanation of the notice. Time allotted for questions regarding the notice. Patient /representative provided a completed copy of the MOON/VOON notice. Copy placed on bedside chart. Maverick Selam Nur @ [phone]) informed verbally via phone & consented. Requested copy to be left with pt. Blas

## (undated) DEVICE — ARMADA 35 PTA CATHETER 5 MM X 40 MM X 135 CM / OVER-THE-WIRE: Brand: ARMADA

## (undated) DEVICE — Device

## (undated) DEVICE — SURGIFOAM SPNG SZ 100

## (undated) DEVICE — DECANTER BAG 9": Brand: MEDLINE INDUSTRIES, INC.

## (undated) DEVICE — GENERAL PURPOSE TRAY STERILE: Brand: MEDLINE INDUSTRIES, INC.

## (undated) DEVICE — MOUTHPIECE ENDOSCP 20X27MM --

## (undated) DEVICE — GLOW 'N TELL 55CM TAPE (20 STRIPS): Brand: VASCUTAPE RADIOPAQUE TAPE

## (undated) DEVICE — NAVICROSS SUPPORT CATHETER: Brand: NAVICROSS

## (undated) DEVICE — HI-TORQUE BALANCE MIDDLEWEIGHT UNIVERSAL GUIDE WIRE .014 J TIP 3.0 CM X 300 CM: Brand: HI-TORQUE BALANCE MIDDLEWEIGHT UNIVERSAL

## (undated) DEVICE — ARMADA 35 PTA CATHETER 4 MM X 120 MM X 135 CM / OVER-THE-WIRE: Brand: ARMADA

## (undated) DEVICE — TOWEL,OR,DSP,ST,BLUE,DLX,6/PK,12PK/CS: Brand: MEDLINE

## (undated) DEVICE — GUIDEWIRE VASC STR 3 CM 0.014 INX300 CM HI TORQ FE MAN

## (undated) DEVICE — BASIC SINGLE BASIN-LF: Brand: MEDLINE INDUSTRIES, INC.

## (undated) DEVICE — MINOR VASCULAR PROCEDURE: Brand: MEDLINE INDUSTRIES, INC.

## (undated) DEVICE — GAUZE,SPONGE,4"X4",16PLY,STRL,LF,10/TRAY: Brand: MEDLINE

## (undated) DEVICE — SOUTHSIDE TURNOVER: Brand: MEDLINE INDUSTRIES, INC.

## (undated) DEVICE — ENDO KIT 1: Brand: MEDLINE INDUSTRIES, INC.

## (undated) DEVICE — SOL INJ SOD CL 0.9% 500ML BG --

## (undated) DEVICE — CATHETER ANGIO IMA 038 AD 6 FRX100 CM SUPER TORQUE +

## (undated) DEVICE — GOWN,PREVENTION PLUS,XLN/XL,ST,24/CS: Brand: MEDLINE

## (undated) DEVICE — DRAPE,REIN 53X77,STERILE: Brand: MEDLINE

## (undated) DEVICE — DEVICE VASC CLOSURE 6 FR FEM ART WHT SHTH CLP STARCLOSE

## (undated) DEVICE — 3M™ TEGADERM™ TRANSPARENT FILM DRESSING FIRST AID STYLE, 1621, 4 IN X 4-3/4 IN, 50 BAGS/CARTON, 4 CARTONS/CASE: Brand: 3M™ TEGADERM™

## (undated) DEVICE — 3-0 COATED VICRYL PLUS UNDYED 1X27" SH --

## (undated) DEVICE — SUTURE NONABSORBABLE MONOFILAMENT 6-0 BV-1 1X30 IN PROLENE 8709H

## (undated) DEVICE — DESTINATION PERIPHERAL GUIDING SHEATH: Brand: DESTINATION

## (undated) DEVICE — COVER PRB INTOP 96X6 IN LF

## (undated) DEVICE — SYR 10ML LUER LOK 1/5ML GRAD --

## (undated) DEVICE — CATHETER MARINER BRAID RIM 5FX65 CM .035 IN.DIAGNOSTIC

## (undated) DEVICE — SURGICAL PROCEDURE TRAY CRD CATH 3 PRT

## (undated) DEVICE — HI-TORQUE BALANCE MIDDLEWEIGHT UNIVERSAL II GUIDE WIRE STRAIGHT TIP PAK  300 CM: Brand: HI-TORQUE BALANCE MIDDLEWEIGHT UNIVERSAL II

## (undated) DEVICE — CANNULA NSL O2 AD 7 FT END-TIDAL CARBON DIOX VENTFLO

## (undated) DEVICE — MSK, MED.ADLT,AD CO2 DET: Brand: SPUR® II ADULT RESUSCITATORW/CO2 DETECTOR

## (undated) DEVICE — NEEDLE SCLERO 23GA L240CM OD064MM ID032MM CLR INTERJECT

## (undated) DEVICE — CATHETER ETER DIAG 6FR 100CML 0038IN VASC JUDKINS R 4 SB COR W O

## (undated) DEVICE — RADIFOCUS GLIDECATH: Brand: GLIDECATH

## (undated) DEVICE — DESTINATION CAROTID GUIDING SHEATH: Brand: DESTINATION

## (undated) DEVICE — APPLIER CLP L9.375IN APER 2.1MM CLS L3.8MM 20 SM TI CLP

## (undated) DEVICE — SUTURE PERMAHAND SZ 3-0 L30IN NONABSORBABLE BLK SILK BRAID A304H

## (undated) DEVICE — TOWEL SURG W17XL27IN STD BLU COT NONFENESTRATED PREWASHED

## (undated) DEVICE — CATHETER ANGIO PIG 145 DEG AD 6 FRX110 CM SUPER TORQUE +

## (undated) DEVICE — DEVICE INFL SYR BLLN ENDO 30 -- INTELLI

## (undated) DEVICE — 96"PROBE COVER (US)10PK: Brand: SITE-RITE

## (undated) DEVICE — MICROPUNCTURE INTRODUCER SET SILHOUETTE TRANSITIONLESS PUSH-PLUS DESIGN - STIFFENED CANNULA WITH STAINLESS STEEL WIRE GUIDE: Brand: MICROPUNCTURE

## (undated) DEVICE — ULNAR NERVE PROTECTOR FOAM POSITIONER: Brand: CARDINAL HEALTH

## (undated) DEVICE — SUT MONOCRYL PLUS UD 4-0 --

## (undated) DEVICE — RADIFOCUS GLIDEWIRE: Brand: GLIDEWIRE

## (undated) DEVICE — INTENDED TO BE USED TO OCCLUDE, RETRACT AND IDENTIFY ARTERIES, VEINS, TENDONS AND NERVES IN SURGICAL PROCEDURES: Brand: STERION®  VESSEL LOOP

## (undated) DEVICE — Device: Brand: QUICK-CROSS SUPPORT CATHETER

## (undated) DEVICE — APPLIER LIG CLP M L11IN TI STR RNG HNDL FOR 20 CLP DISP

## (undated) DEVICE — PINNACLE INTRODUCER SHEATH: Brand: PINNACLE

## (undated) DEVICE — Device: Brand: JELCO

## (undated) DEVICE — CATHETER ANGIO 5FR L70CM 0.035IN SEG 20CM PGTL FLSH 20 1

## (undated) DEVICE — SYR 50ML SLIP TIP NSAF LF STRL --

## (undated) DEVICE — GEL US 20GM NONIRRITATING OVERWRAPPED FILE PCH TRNSMIT

## (undated) DEVICE — HT WINN 80 GUIDE WIRE .014" X 300 CM: Brand: HI-TORQUE WINN

## (undated) DEVICE — GLOVE ORANGE PI 7 1/2   MSG9075

## (undated) DEVICE — YANKAUER,BULB TIP,W/O VENT,RIGID,STERILE: Brand: MEDLINE

## (undated) DEVICE — SUTURE PROL SZ 7-0 L30IN NONABSORBABLE BLU L9.3MM BV-1 1/2 8703H